# Patient Record
Sex: FEMALE | Race: WHITE | NOT HISPANIC OR LATINO | Employment: OTHER | URBAN - METROPOLITAN AREA
[De-identification: names, ages, dates, MRNs, and addresses within clinical notes are randomized per-mention and may not be internally consistent; named-entity substitution may affect disease eponyms.]

---

## 2017-06-07 ENCOUNTER — TRANSCRIBE ORDERS (OUTPATIENT)
Dept: LAB | Facility: CLINIC | Age: 67
End: 2017-06-07

## 2017-06-07 ENCOUNTER — ALLSCRIPTS OFFICE VISIT (OUTPATIENT)
Dept: OTHER | Facility: OTHER | Age: 67
End: 2017-06-07

## 2017-06-07 ENCOUNTER — APPOINTMENT (OUTPATIENT)
Dept: LAB | Facility: CLINIC | Age: 67
End: 2017-06-07
Payer: MEDICARE

## 2017-06-07 DIAGNOSIS — M79.10 MYALGIA: ICD-10-CM

## 2017-06-07 DIAGNOSIS — Z78.0 ASYMPTOMATIC MENOPAUSAL STATE: ICD-10-CM

## 2017-06-07 DIAGNOSIS — R53.83 OTHER FATIGUE: ICD-10-CM

## 2017-06-07 DIAGNOSIS — Z12.31 ENCOUNTER FOR SCREENING MAMMOGRAM FOR MALIGNANT NEOPLASM OF BREAST: ICD-10-CM

## 2017-06-07 DIAGNOSIS — J32.9 CHRONIC SINUSITIS: ICD-10-CM

## 2017-06-07 DIAGNOSIS — E78.5 HYPERLIPIDEMIA: ICD-10-CM

## 2017-06-07 DIAGNOSIS — I89.0 LYMPHEDEMA, NOT ELSEWHERE CLASSIFIED: ICD-10-CM

## 2017-06-07 DIAGNOSIS — I10 ESSENTIAL (PRIMARY) HYPERTENSION: ICD-10-CM

## 2017-06-07 DIAGNOSIS — E55.9 VITAMIN D DEFICIENCY: ICD-10-CM

## 2017-06-07 DIAGNOSIS — G56.03 BILATERAL CARPAL TUNNEL SYNDROME: ICD-10-CM

## 2017-06-07 LAB
25(OH)D3 SERPL-MCNC: 21.7 NG/ML (ref 30–100)
ALBUMIN SERPL BCP-MCNC: 3.5 G/DL (ref 3.5–5)
ALP SERPL-CCNC: 125 U/L (ref 46–116)
ALT SERPL W P-5'-P-CCNC: 28 U/L (ref 12–78)
ANION GAP SERPL CALCULATED.3IONS-SCNC: 7 MMOL/L (ref 4–13)
AST SERPL W P-5'-P-CCNC: 22 U/L (ref 5–45)
BASOPHILS # BLD AUTO: 0.03 THOUSANDS/ΜL (ref 0–0.1)
BASOPHILS NFR BLD AUTO: 1 % (ref 0–1)
BILIRUB SERPL-MCNC: 0.72 MG/DL (ref 0.2–1)
BUN SERPL-MCNC: 14 MG/DL (ref 5–25)
CALCIUM SERPL-MCNC: 8.8 MG/DL (ref 8.3–10.1)
CHLORIDE SERPL-SCNC: 107 MMOL/L (ref 100–108)
CHOLEST SERPL-MCNC: 151 MG/DL (ref 50–200)
CO2 SERPL-SCNC: 27 MMOL/L (ref 21–32)
CREAT SERPL-MCNC: 0.61 MG/DL (ref 0.6–1.3)
EOSINOPHIL # BLD AUTO: 0.05 THOUSAND/ΜL (ref 0–0.61)
EOSINOPHIL NFR BLD AUTO: 1 % (ref 0–6)
ERYTHROCYTE [DISTWIDTH] IN BLOOD BY AUTOMATED COUNT: 12 % (ref 11.6–15.1)
GFR SERPL CREATININE-BSD FRML MDRD: >60 ML/MIN/1.73SQ M
GLUCOSE P FAST SERPL-MCNC: 94 MG/DL (ref 65–99)
HCT VFR BLD AUTO: 46.5 % (ref 34.8–46.1)
HDLC SERPL-MCNC: 52 MG/DL (ref 40–60)
HGB BLD-MCNC: 15.9 G/DL (ref 11.5–15.4)
LDLC SERPL CALC-MCNC: 79 MG/DL (ref 0–100)
LYMPHOCYTES # BLD AUTO: 1.61 THOUSANDS/ΜL (ref 0.6–4.47)
LYMPHOCYTES NFR BLD AUTO: 30 % (ref 14–44)
MCH RBC QN AUTO: 30.5 PG (ref 26.8–34.3)
MCHC RBC AUTO-ENTMCNC: 34.2 G/DL (ref 31.4–37.4)
MCV RBC AUTO: 89 FL (ref 82–98)
MONOCYTES # BLD AUTO: 0.48 THOUSAND/ΜL (ref 0.17–1.22)
MONOCYTES NFR BLD AUTO: 9 % (ref 4–12)
NEUTROPHILS # BLD AUTO: 3.17 THOUSANDS/ΜL (ref 1.85–7.62)
NEUTS SEG NFR BLD AUTO: 59 % (ref 43–75)
NRBC BLD AUTO-RTO: 0 /100 WBCS
PLATELET # BLD AUTO: 228 THOUSANDS/UL (ref 149–390)
PMV BLD AUTO: 10.7 FL (ref 8.9–12.7)
POTASSIUM SERPL-SCNC: 4 MMOL/L (ref 3.5–5.3)
PROT SERPL-MCNC: 7 G/DL (ref 6.4–8.2)
RBC # BLD AUTO: 5.22 MILLION/UL (ref 3.81–5.12)
SODIUM SERPL-SCNC: 141 MMOL/L (ref 136–145)
TRIGL SERPL-MCNC: 100 MG/DL
TSH SERPL DL<=0.05 MIU/L-ACNC: 1.78 UIU/ML (ref 0.36–3.74)
WBC # BLD AUTO: 5.36 THOUSAND/UL (ref 4.31–10.16)

## 2017-06-07 PROCEDURE — 82306 VITAMIN D 25 HYDROXY: CPT

## 2017-06-07 PROCEDURE — 80053 COMPREHEN METABOLIC PANEL: CPT

## 2017-06-07 PROCEDURE — 36415 COLL VENOUS BLD VENIPUNCTURE: CPT

## 2017-06-07 PROCEDURE — 85025 COMPLETE CBC W/AUTO DIFF WBC: CPT

## 2017-06-07 PROCEDURE — 84443 ASSAY THYROID STIM HORMONE: CPT

## 2017-06-07 PROCEDURE — 80061 LIPID PANEL: CPT

## 2017-06-08 ENCOUNTER — GENERIC CONVERSION - ENCOUNTER (OUTPATIENT)
Dept: OTHER | Facility: OTHER | Age: 67
End: 2017-06-08

## 2017-06-16 ENCOUNTER — APPOINTMENT (OUTPATIENT)
Dept: PHYSICAL THERAPY | Facility: CLINIC | Age: 67
End: 2017-06-16
Payer: MEDICARE

## 2017-06-16 PROCEDURE — G8991 OTHER PT/OT GOAL STATUS: HCPCS | Performed by: PHYSICAL THERAPIST

## 2017-06-16 PROCEDURE — G8990 OTHER PT/OT CURRENT STATUS: HCPCS | Performed by: PHYSICAL THERAPIST

## 2017-06-16 PROCEDURE — 97535 SELF CARE MNGMENT TRAINING: CPT

## 2017-06-16 PROCEDURE — 97162 PT EVAL MOD COMPLEX 30 MIN: CPT

## 2017-06-26 ENCOUNTER — APPOINTMENT (OUTPATIENT)
Dept: PHYSICAL THERAPY | Facility: CLINIC | Age: 67
End: 2017-06-26
Payer: MEDICARE

## 2017-06-26 PROCEDURE — 97140 MANUAL THERAPY 1/> REGIONS: CPT

## 2017-06-28 ENCOUNTER — APPOINTMENT (OUTPATIENT)
Dept: PHYSICAL THERAPY | Facility: CLINIC | Age: 67
End: 2017-06-28
Payer: MEDICARE

## 2017-06-28 PROCEDURE — 97140 MANUAL THERAPY 1/> REGIONS: CPT

## 2017-06-30 ENCOUNTER — TRANSCRIBE ORDERS (OUTPATIENT)
Dept: ADMINISTRATIVE | Facility: HOSPITAL | Age: 67
End: 2017-06-30

## 2017-06-30 ENCOUNTER — APPOINTMENT (OUTPATIENT)
Dept: PHYSICAL THERAPY | Facility: CLINIC | Age: 67
End: 2017-06-30
Payer: MEDICARE

## 2017-06-30 DIAGNOSIS — Z13.820 SCREENING FOR OSTEOPOROSIS: Primary | ICD-10-CM

## 2017-06-30 PROCEDURE — 97140 MANUAL THERAPY 1/> REGIONS: CPT

## 2017-07-05 ENCOUNTER — APPOINTMENT (OUTPATIENT)
Dept: PHYSICAL THERAPY | Facility: CLINIC | Age: 67
End: 2017-07-05
Payer: MEDICARE

## 2017-07-05 ENCOUNTER — APPOINTMENT (OUTPATIENT)
Dept: OCCUPATIONAL THERAPY | Facility: CLINIC | Age: 67
End: 2017-07-05
Payer: MEDICARE

## 2017-07-05 PROCEDURE — 97535 SELF CARE MNGMENT TRAINING: CPT

## 2017-07-05 PROCEDURE — G8991 OTHER PT/OT GOAL STATUS: HCPCS

## 2017-07-05 PROCEDURE — 97140 MANUAL THERAPY 1/> REGIONS: CPT

## 2017-07-05 PROCEDURE — 97167 OT EVAL HIGH COMPLEX 60 MIN: CPT

## 2017-07-05 PROCEDURE — G8990 OTHER PT/OT CURRENT STATUS: HCPCS

## 2017-07-07 ENCOUNTER — APPOINTMENT (OUTPATIENT)
Dept: PHYSICAL THERAPY | Facility: CLINIC | Age: 67
End: 2017-07-07
Payer: MEDICARE

## 2017-07-07 ENCOUNTER — APPOINTMENT (OUTPATIENT)
Dept: OCCUPATIONAL THERAPY | Facility: CLINIC | Age: 67
End: 2017-07-07
Payer: MEDICARE

## 2017-07-07 PROCEDURE — 97140 MANUAL THERAPY 1/> REGIONS: CPT

## 2017-07-07 PROCEDURE — 97110 THERAPEUTIC EXERCISES: CPT

## 2017-07-07 PROCEDURE — 97035 APP MDLTY 1+ULTRASOUND EA 15: CPT

## 2017-07-11 ENCOUNTER — APPOINTMENT (OUTPATIENT)
Dept: OCCUPATIONAL THERAPY | Facility: CLINIC | Age: 67
End: 2017-07-11
Payer: MEDICARE

## 2017-07-12 ENCOUNTER — APPOINTMENT (OUTPATIENT)
Dept: PHYSICAL THERAPY | Facility: CLINIC | Age: 67
End: 2017-07-12
Payer: MEDICARE

## 2017-07-12 ENCOUNTER — APPOINTMENT (OUTPATIENT)
Dept: OCCUPATIONAL THERAPY | Facility: CLINIC | Age: 67
End: 2017-07-12
Payer: MEDICARE

## 2017-07-12 PROCEDURE — 97140 MANUAL THERAPY 1/> REGIONS: CPT

## 2017-07-14 ENCOUNTER — APPOINTMENT (OUTPATIENT)
Dept: OCCUPATIONAL THERAPY | Facility: CLINIC | Age: 67
End: 2017-07-14
Payer: MEDICARE

## 2017-07-14 PROCEDURE — 97035 APP MDLTY 1+ULTRASOUND EA 15: CPT

## 2017-07-14 PROCEDURE — 97140 MANUAL THERAPY 1/> REGIONS: CPT

## 2017-07-14 PROCEDURE — 97110 THERAPEUTIC EXERCISES: CPT

## 2017-07-17 ENCOUNTER — APPOINTMENT (OUTPATIENT)
Dept: PHYSICAL THERAPY | Facility: CLINIC | Age: 67
End: 2017-07-17
Payer: MEDICARE

## 2017-07-17 ENCOUNTER — APPOINTMENT (OUTPATIENT)
Dept: OCCUPATIONAL THERAPY | Facility: CLINIC | Age: 67
End: 2017-07-17
Payer: MEDICARE

## 2017-07-18 ENCOUNTER — APPOINTMENT (OUTPATIENT)
Dept: OCCUPATIONAL THERAPY | Facility: CLINIC | Age: 67
End: 2017-07-18
Payer: MEDICARE

## 2017-07-18 ENCOUNTER — APPOINTMENT (OUTPATIENT)
Dept: PHYSICAL THERAPY | Facility: CLINIC | Age: 67
End: 2017-07-18
Payer: MEDICARE

## 2017-07-18 PROCEDURE — 97164 PT RE-EVAL EST PLAN CARE: CPT

## 2017-07-18 PROCEDURE — G8991 OTHER PT/OT GOAL STATUS: HCPCS

## 2017-07-18 PROCEDURE — 97140 MANUAL THERAPY 1/> REGIONS: CPT

## 2017-07-18 PROCEDURE — G8990 OTHER PT/OT CURRENT STATUS: HCPCS

## 2017-07-18 PROCEDURE — 97110 THERAPEUTIC EXERCISES: CPT

## 2017-07-18 PROCEDURE — 97035 APP MDLTY 1+ULTRASOUND EA 15: CPT

## 2017-07-19 ENCOUNTER — APPOINTMENT (OUTPATIENT)
Dept: OCCUPATIONAL THERAPY | Facility: CLINIC | Age: 67
End: 2017-07-19
Payer: MEDICARE

## 2017-07-19 PROCEDURE — 97140 MANUAL THERAPY 1/> REGIONS: CPT

## 2017-07-19 PROCEDURE — 97110 THERAPEUTIC EXERCISES: CPT

## 2017-07-19 PROCEDURE — 97035 APP MDLTY 1+ULTRASOUND EA 15: CPT

## 2017-09-06 ENCOUNTER — GENERIC CONVERSION - ENCOUNTER (OUTPATIENT)
Dept: OTHER | Facility: OTHER | Age: 67
End: 2017-09-06

## 2017-09-06 DIAGNOSIS — R14.0 ABDOMINAL DISTENSION (GASEOUS): ICD-10-CM

## 2017-09-06 DIAGNOSIS — R10.9 ABDOMINAL PAIN: ICD-10-CM

## 2017-09-26 ENCOUNTER — GENERIC CONVERSION - ENCOUNTER (OUTPATIENT)
Dept: OTHER | Facility: OTHER | Age: 67
End: 2017-09-26

## 2017-09-26 ENCOUNTER — HOSPITAL ENCOUNTER (OUTPATIENT)
Dept: BONE DENSITY | Facility: HOSPITAL | Age: 67
Discharge: HOME | End: 2017-09-26
Payer: MEDICARE

## 2017-09-26 ENCOUNTER — HOSPITAL ENCOUNTER (OUTPATIENT)
Dept: MAMMOGRAPHY | Facility: HOSPITAL | Age: 67
Discharge: HOME | End: 2017-09-26
Payer: MEDICARE

## 2017-09-26 DIAGNOSIS — Z13.820 SCREENING FOR OSTEOPOROSIS: ICD-10-CM

## 2017-09-26 DIAGNOSIS — Z12.31 ENCOUNTER FOR SCREENING MAMMOGRAM FOR MALIGNANT NEOPLASM OF BREAST: ICD-10-CM

## 2017-09-26 PROCEDURE — G0202 SCR MAMMO BI INCL CAD: HCPCS

## 2017-09-26 PROCEDURE — 77080 DXA BONE DENSITY AXIAL: CPT

## 2017-09-26 PROCEDURE — 77063 BREAST TOMOSYNTHESIS BI: CPT

## 2017-09-27 ENCOUNTER — HOSPITAL ENCOUNTER (OUTPATIENT)
Dept: CT IMAGING | Facility: HOSPITAL | Age: 67
Discharge: HOME | End: 2017-09-27
Payer: MEDICARE

## 2017-09-27 ENCOUNTER — GENERIC CONVERSION - ENCOUNTER (OUTPATIENT)
Dept: OTHER | Facility: OTHER | Age: 67
End: 2017-09-27

## 2017-09-27 DIAGNOSIS — R10.9 ABDOMINAL PAIN: ICD-10-CM

## 2017-09-27 DIAGNOSIS — R14.0 ABDOMINAL DISTENSION (GASEOUS): ICD-10-CM

## 2017-09-27 PROCEDURE — 74176 CT ABD & PELVIS W/O CONTRAST: CPT

## 2017-09-28 ENCOUNTER — GENERIC CONVERSION - ENCOUNTER (OUTPATIENT)
Dept: OTHER | Facility: OTHER | Age: 67
End: 2017-09-28

## 2017-10-18 ENCOUNTER — GENERIC CONVERSION - ENCOUNTER (OUTPATIENT)
Dept: OTHER | Facility: OTHER | Age: 67
End: 2017-10-18

## 2017-12-20 ENCOUNTER — OFFICE VISIT (OUTPATIENT)
Dept: URGENT CARE | Facility: CLINIC | Age: 67
End: 2017-12-20
Payer: COMMERCIAL

## 2017-12-20 PROCEDURE — 99213 OFFICE O/P EST LOW 20 MIN: CPT

## 2017-12-20 PROCEDURE — G0463 HOSPITAL OUTPT CLINIC VISIT: HCPCS

## 2017-12-27 NOTE — PROGRESS NOTES
Assessment   1  Dental infection (522 4) (K04 7)    Plan   Dental infection    · Amoxicillin 875 MG Oral Tablet; TAKE 1 TABLET EVERY 12 HOURS DAILY    Discussion/Summary   Discussion Summary:    Start antibiotic and take as directed  Follow up with dentist     Medication Side Effects Reviewed: Possible side effects of new medications were reviewed with the patient/guardian today  Understands and agrees with treatment plan: The treatment plan was reviewed with the patient/guardian  The patient/guardian understands and agrees with the treatment plan      Chief Complaint   1  Dental Pain  Chief Complaint Free Text Note Form: C/o left lower dental pain and left side facial swelling x 3 days  History of Present Illness   HPI: 29-year-old female here with complaint of left lower dental pain  Has left-sided facial swelling for the last 3 days  Denies any fever or chills  Does not have a dentist     Dental Pain: Isabela Rosas presents with complaints of dental pain  Associated symptoms include facial swelling,-- swollen glands-- and-- ear pain, but-- no purulent discharge-- and-- no fever  Review of Systems   Focused-Female:      Constitutional: No fever, no chills, feels well, no tiredness, no recent weight gain or loss  ENT: as noted in HPI  ROS Reviewed:    ROS reviewed  Active Problems   1  Abdominal distention (787 3) (R14 0)   2  Abdominal pain (789 00) (R10 9)   3  Asthma (493 90) (J45 909)   4  Bilateral carpal tunnel syndrome (354 0) (G56 03)   5  Chronic musculoskeletal pain (729 1,338 29) (M79 1,G89 29)   6  Cough (786 2) (R05)   7  Encounter for screening mammogram for breast cancer (V76 12) (Z12 31)   8  Fatigue (780 79) (R53 83)   9  Generalized anxiety disorder (300 02) (F41 1)   10  Hyperlipidemia LDL goal <130 (272 4) (E78 5)   11  Hypertension (401 9) (I10)   12  Irritable bowel syndrome with diarrhea (564 1) (K58 0)   13  Joint pain (719 40) (M25 50)   14   Left knee pain (719 46) (M25 562)   15  Lymphedema (457 1) (I89 0)   16  Menopause (627 2) (Z78 0)   17  Need for pneumococcal vaccine (V03 82) (Z23)   18  Patent foramen ovale (745 5) (Q21 1)   19  Screening for genitourinary condition (V81 6) (Z13 89)   20  Screening for neurological condition (V80 09) (Z13 89)   21  Sinusitis (473 9) (J32 9)   22  Sprain of ankle, left (845 00) (S93 402A)   23  Status post fall (V15 88) (Z91 81)   24  Tension headache (307 81) (G44 209)   25  Vitamin D deficiency (268 9) (E55 9)    Past Medical History   1  History of Breast cancer, right (174 9) (C50 911)  Active Problems And Past Medical History Reviewed: The active problems and past medical history were reviewed and updated today  Family History   Family History    1  Denied: Family history of substance abuse  Family History Reviewed: The family history was reviewed and updated today  Social History    · Never a smoker   · Occasional alcohol use  Social History Reviewed: The social history was reviewed and updated today  The social history was reviewed and is unchanged  Surgical History   1  History of Breast Surgery   2  History of Gallbladder Surgery   3  History of Hysterectomy   4  History of Knee Surgery   5  History of Tonsillectomy  Surgical History Reviewed: The surgical history was reviewed and updated today  Current Meds    1  Atorvastatin Calcium 20 MG Oral Tablet; Take 1 tablet daily; Therapy: 31JSK7530 to (Evaluate:27Nov2017)  Requested for: 50ROA5930; Last     Rx:48Cxd0393 Ordered   2  Boost Oral Liquid; once daily; Therapy: 62NUD5585 to Recorded   3  Butalbital-APAP-Caffeine -40 MG Oral Tablet; TAKE 1 TABLET 3 TIMES DAILY AS     NEEDED FOR HEADACHE; Therapy: 23UNV4795 to (Evaluate:28Sep2017)  Requested for: 25MOS3434; Last     Rx:26Fki9021 Ordered   4  Dicyclomine HCl - 10 MG Oral Capsule; TAKE 1 CAPSULE 3 TIMES DAILY;      Therapy: 30XOG6640 to (Evaluate:27Mar2018)  Requested for: 50MVR9958; Last     Rx:34Fed5725 Ordered   5  DULoxetine HCl - 30 MG Oral Capsule Delayed Release Particles; take 1 capsule by     mouth daily (along with 60mg capsule); [facility]IB0799 [facility]  Requested for: 20AIM0587 Ordered   6  DULoxetine HCl - 60 MG Oral Capsule Delayed Release Particles; take 1 capsule daily; Therapy: 29LVS3333 to (Evaluate:02Jun2018)  Requested for: 55FHX1964; Last     Rx:42Ucl3158 Ordered   7  Loratadine 10 MG Oral Tablet; TAKE 1 TABLET DAILY; Therapy: 98Dtw3552 to Recorded   8  Montelukast Sodium 10 MG Oral Tablet; TAKE 1 TABLET DAILY AS DIRECTED; Therapy: 73VQD3155 to (Evaluate:27Mar2018)  Requested for: 22QSO3928; Last     Rx:39Xot9053 Ordered   9  Naproxen 500 MG Oral Tablet; TAKE 1 TABLET EVERY 12 HOURS WITH FOOD; Therapy: 28JOK0148 to (Evaluate:18Sep2016)  Requested for: 96VEL2909; Last     Rx:01Txb5429 Ordered   10  ProAir  (90 Base) MCG/ACT Inhalation Aerosol Solution; INHALE 1 TO 2 PUFFS      EVERY 4 TO 6 HOURS AS NEEDED; Therapy: 73AJL6362 to Recorded   11  Vitamin D3 2000 UNIT Oral Capsule; take 1 capsule daily; Therapy: 75OEG5611 to Recorded  Medication List Reviewed: The medication list was reviewed and updated today  Allergies   1  Aspirin TABS   2  Ibuprofen CAPS    Vitals   Signs   Recorded: 20Dec2017 08:07PM   Temperature: 98 4 F  Heart Rate: 118  Respiration: 20  Systolic: 287  Diastolic: 85  Height: 4 ft 10 in  Weight: 182 lb   BMI Calculated: 38 04  BSA Calculated: 1 75  O2 Saturation: 97  Pain Scale: 10    Physical Exam        Constitutional      General appearance: No acute distress, well appearing and well nourished  Ears, Nose, Mouth, and Throat      Oropharynx: Abnormal  -- Left lower jaw tender to palpation with swelling  Swelling extends to the midline and up into left ear  Tender to palpation with enlarged anterior cervical and submandibular lymph node        Pulmonary      Respiratory effort: No increased work of breathing or signs of respiratory distress  Auscultation of lungs: Clear to auscultation  Cardiovascular      Auscultation of heart: Normal rate and rhythm, normal S1 and S2, without murmurs         Signatures    Electronically signed by : Chau Verdugo, [facility]; Dec 26 2017 11:08AM EST                       (Author)     Electronically signed by : DAVID Nichole ; Dec 26 2017 12:05PM EST                       (Co-author)

## 2018-01-10 NOTE — RESULT NOTES
Verified Results  (1) CBC/PLT/DIFF 49FOC6771 10:01AM Herbie Hard Order Number: [id number]     Test Name Result Flag Reference   WBC COUNT 5 36 Thousand/uL  4 31-10 16   RBC COUNT 5 22 Million/uL H 3 81-5 12   HEMOGLOBIN 15 9 g/dL H 11 5-15 4   HEMATOCRIT 46 5 % H 34 8-46  1   MCV 89 fL  82-98   MCH 30 5 pg  26 8-34 3   MCHC 34 2 g/dL  31 4-37 4   RDW 12 0 %  11 6-15 1   MPV 10 7 fL  8 9-12 7   PLATELET COUNT 833 Thousands/uL  149-390   nRBC AUTOMATED 0 /100 WBCs     NEUTROPHILS RELATIVE PERCENT 59 %  43-75   LYMPHOCYTES RELATIVE PERCENT 30 %  14-44   MONOCYTES RELATIVE PERCENT 9 %  4-12   EOSINOPHILS RELATIVE PERCENT 1 %  0-6   BASOPHILS RELATIVE PERCENT 1 %  0-1   NEUTROPHILS ABSOLUTE COUNT 3 17 Thousands/? ??L  1 85-7 62   LYMPHOCYTES ABSOLUTE COUNT 1 61 Thousands/? ??L  0 60-4 47   MONOCYTES ABSOLUTE COUNT 0 48 Thousand/? ??L  0 17-1 22   EOSINOPHILS ABSOLUTE COUNT 0 05 Thousand/? ??L  0 00-0 61   BASOPHILS ABSOLUTE COUNT 0 03 Thousands/? ??L  0 00-0 10     (1) COMPREHENSIVE METABOLIC PANEL 57FRZ5722 82:91SO Jackeline Needle Order Number: [id number]     Test Name Result Flag Reference   SODIUM 141 mmol/L  136-145   POTASSIUM 4 0 mmol/L  3 5-5 3   CHLORIDE 107 mmol/L  100-108   CARBON DIOXIDE 27 mmol/L  21-32   ANION GAP (CALC) 7 mmol/L  4-13   BLOOD UREA NITROGEN 14 mg/dL  5-25   CREATININE 0 61 mg/dL  0 60-1 30   Standardized to IDMS reference method   CALCIUM 8 8 mg/dL  8 3-10 1   BILI, TOTAL 0 72 mg/dL  0 20-1 00   ALK PHOSPHATAS 125 U/L H    ALT (SGPT) 28 U/L  12-78   AST(SGOT) 22 U/L  5-45   ALBUMIN 3 5 g/dL  3 5-5 0   TOTAL PROTEIN 7 0 g/dL  6 4-8 2   eGFR Non-African American      >60 0 ml/min/1 73sq [facility] Disease Education Program recommendations are as follows:  GFR calculation is accurate only with a steady state creatinine  Chronic Kidney disease less than 60 ml/min/1 73 sq  meters  Kidney failure less than 15 ml/min/1 73 sq  meters     GLUCOSE FASTING 94 mg/dL  65-99     (1) LIPID PANEL, FASTING 07Jun2017 10:01AM Vanderpool Silence Order Number: [id number]     Test Name Result Flag Reference   CHOLESTEROL 151 mg/dL     HDL,DIRECT 52 mg/dL  40-60   Specimen collection should occur prior to Metamizole administration due to the potential for falsely depressed results  LDL CHOLESTEROL CALCULATED 79 mg/dL  0-100   Triglyceride:         Normal              <150 mg/dl       Borderline High    150-199 mg/dl       High               200-499 mg/dl       Very High          >499 mg/dl  Cholesterol:         Desirable        <200 mg/dl      Borderline High  200-239 mg/dl      High             >239 mg/dl  HDL Cholesterol:        High    >59 mg/dL      Low     <41 mg/dL  LDL CALCULATED:    This screening LDL is a calculated result  It does not have the accuracy of the Direct Measured LDL in the monitoring of patients with hyperlipidemia and/or statin therapy  Direct Measure LDL (HME513) must be ordered separately in these patients  TRIGLYCERIDES 100 mg/dL  <=150   Specimen collection should occur prior to N-Acetylcysteine or Metamizole administration due to the potential for falsely depressed results  (1) TSH 70RXM7625 10:01AM AstorinoMezekiel Phoenix Order Number: [id number]     Test Name Result Flag Reference   TSH 1 780 uIU/mL  0 358-3 740   Patients undergoing fluorescein dye angiography may retain small amounts of fluorescein in the body for 48-72 hours post procedure  Samples containing fluorescein can produce falsely depressed TSH values  If the patient had this procedure,a specimen should be resubmitted post fluorescein clearance            The recommended reference ranges for TSH during pregnancy are as follows:  First trimester 0 1 to 2 5 uIU/mL  Second trimester  0 2 to 3 0 uIU/mL  Third trimester 0 3 to 3 0 uIU/m     (1) VITAMIN D 25-HYDROXY 38IUQ1316 10:01AM Vanderpool Silence Order Number: [id number]     Test Name Result Flag Reference   VIT D 25-HYDROX 21 7 ng/mL L 30 0-100 0   This assay is a certified procedure of the CDC Vitamin D Standardization Certification Program (VDSCP)     Deficiency <20ng/ml   Insufficiency 20-30ng/ml   Sufficient  ng/ml     *Patients undergoing fluorescein dye angiography may retain small amounts of fluorescein in the body for 48-72 hours post procedure  Samples containing fluorescein can produce falsely elevated Vitamin D values  If the patient had this procedure, a specimen should be resubmitted post fluorescein clearance

## 2018-01-12 ENCOUNTER — GENERIC CONVERSION - ENCOUNTER (OUTPATIENT)
Dept: OTHER | Facility: OTHER | Age: 68
End: 2018-01-12

## 2018-01-12 NOTE — RESULT NOTES
Verified Results  * CT ABDOMEN PELVIS WO CONTRAST [id number] 11:00AM Javon Reason Order Number: [id number]    - Patient Instructions: To schedule this appointment, please contact Central Scheduling at [phone]  Test Name Result Flag Reference   CT ABDOMEN PELVIS WO CONTRAST (Report)     CT ABDOMEN AND PELVIS WITHOUT IV CONTRAST     INDICATION: pt c/o abd pain w/ bloating starting a few weeks ago  Status post cholecystectomy, hysterectomy and right breast surgery  COMPARISON: None  TECHNIQUE: CT examination of the abdomen and pelvis was performed without intravenous contrast  Reformatted images were created in axial, sagittal, and coronal planes  Radiation dose length product (DLP) for this visit: 794 32 mGy-cm   This examination, like all CT scans performed in the [facility], was performed utilizing techniques to minimize radiation dose exposure, including the use of iterative   reconstruction and automated exposure control  Enteric contrast was administered  FINDINGS:     ABDOMEN     LOWER CHEST: Calcified granuloma, left lung base  LIVER/BILIARY TREE: Unremarkable  GALLBLADDER: Surgically absent  SPLEEN: Unremarkable  PANCREAS: Fatty infiltration of the pancreas  ADRENAL GLANDS: Unremarkable  KIDNEYS/URETERS: Left parapelvic renal cysts, largest 20 mm  Exophytic cyst upper pole right kidney, 20 mm  STOMACH AND BOWEL: Stomach and small bowel unremarkable  Numerous left colonic diverticula noted without evidence of acute diverticulitis  APPENDIX: No findings to suggest appendicitis  ABDOMINOPELVIC CAVITY: No ascites or free intraperitoneal air  No lymphadenopathy  VESSELS: Unremarkable for patient's age  PELVIS     REPRODUCTIVE ORGANS: Hysterectomy  URINARY BLADDER: Unremarkable       ABDOMINAL WALL/INGUINAL REGIONS: Fat is noted in the left inguinal canal  Midline anterior pelvic wall surgical wound with focal fatty containing midline hernia (2 5 cm rent)  OSSEOUS STRUCTURES: No acute fracture or destructive osseous lesion  Mild degenerative changes, posterior elements, lower lumbar spine and lumbosacral junction  IMPRESSION:     No acute abnormality to explain patient's abdominal pain and bloating  Negative for bowel obstruction  Bilateral renal cysts  Left colonic diverticular disease without evidence of acute diverticulitis  See above additional nonacute findings  Workstation performed: [id number]     Signed by:   Rolando Ladd MD   9/27/17

## 2018-01-13 VITALS
RESPIRATION RATE: 18 BRPM | WEIGHT: 176.25 LBS | SYSTOLIC BLOOD PRESSURE: 132 MMHG | DIASTOLIC BLOOD PRESSURE: 80 MMHG | OXYGEN SATURATION: 96 % | HEART RATE: 84 BPM | BODY MASS INDEX: 37 KG/M2 | HEIGHT: 58 IN | TEMPERATURE: 97.8 F

## 2018-01-13 NOTE — RESULT NOTES
Verified Results  MAMMO SCREENING LEFT W 3D & CAD [id number] 10:10AM Herb Mae Order Number: [id number]    - Patient Instructions: To schedule this appointment, please contact Central Scheduling at [phone]  Do not wear any perfume, powder, lotion or deodorant on breast or underarm area  Please bring your doctors order, referral (if needed) and insurance information with you on the day of the test  Failure to bring this information may result in this test being rescheduled  Arrive 15 minutes prior to your appointment time to register  On the day of your test, please bring any prior mammogram or breast studies with you that were not performed at a [facility]  Failure to bring prior exams may result in your test needing to be rescheduled  Test Name Result Flag Reference   MAMMO SCREENING LEFT W 3D & CAD (Report)     Patient History:   Patient has history of cancer in the right breast at age 50, has    history of cancer in the right breast at age 48, and had previous   chemotherapy at age 48  Family history of breast cancer in sister  Malignant mastectomy of the right breast, 2005  Chemotherapy,    2005  Radiation therapy of the right breast, 2005  Patient has never smoked  Patient's BMI is 37 6  Reason for exam: screening, asymptomatic  Mammo Screening Left W DBT and CAD: September 26, 2017 - Check In   #: [de-identified]   2D/3D Procedure   3D views: MLO view(s) were taken of the left breast    2D views: CC view(s) were taken of the left breast        Technologist: KEVIN Kimbrough (KEVIN)(M)   Prior study comparison: June 29, 2016, mammo screening left W DBT   and CAD performed at [address] [place] [address] [address] [facility]  September 21, 2007, mammo screening left, performed at [facility]      There are scattered fibroglandular densities   A combination of    mediolateral oblique 3D tomographic slices as well as standard    two-dimensional orthogonal images were obtained  No dominant soft tissue mass, architectural distortion or    suspicious calcifications are noted in either breast   The skin    and nipple structures are within normal limits  Scattered benign   appearing calcifications are noted  No significant changes when compared with prior studies  ACR BI-RADSï¾® Assessments: BiRad:2 - Benign     Recommendation:   Routine screening mammogram of the left breast in 1 year  A    reminder letter will be scheduled  The patient is scheduled in a reminder system for screening    mammography  8-10% of cancers will be missed on mammography  Management of a    palpable abnormality must be based on clinical grounds  Patients   will be notified of their results via letter from our facility  Accredited by [employer] of Radiology and FDA  Transcription Location: KEVIN Kerirozinamadison 98: [id number]     Risk Value(s):   Myriad Table: 12 1%, MRS : Based on personal and/or    family history, consideration of hereditary risk assessment may    be warranted

## 2018-01-13 NOTE — RESULT NOTES
Verified Results  * DXA BONE DENSITY SPINE HIP AND PELVIS [id number] 10:10AM Merced Alex     Test Name Result Flag Reference   DXA BONE DENSITY SPINE HIP AND PELVIS (Report)     CENTRAL DXA SCAN     CLINICAL HISTORY:  74year old post-menopausal  female risk factors include estrogen deficient     TECHNIQUE: Bone densitometry was performed using a [employer] A bone densitometer  Regions of interest appear properly placed  There are no obvious fractures or other confounding variables which could limit the study  COMPARISON: None  RESULTS:    LUMBAR SPINE: L1-L4:   BMD 0 841 gm/cm2   T-score -1 9   Z-score -0 1     LEFT TOTAL HIP:   BMD 0 785 gm/cm2   T-score -1 3   Z-score 0 0     LEFT FEMORAL NECK:   BMD 0 604 gm/cm2   T-score -2 2   Z-score -0 7             IMPRESSION:   1  Based on the [facility] classification, the T-score of -2 2 in the left femoral neck is consistent with low bone mineral density  2  Any secondary causes of low bone mineral density should be excluded prior to treatment, if clinically indicated  3  A daily intake of at least 1200 mg calcium and 800 to 1000 IU of Vitamin D, as well as weight bearing and muscle strengthening exercise, fall prevention and avoidance of tobacco and excessive alcohol intake as basic preventive measures are suggested  4  Repeat DXA in 18 - 24 months, on the same machine, as clinically indicated  The 10 year risk of hip fracture is 1 6%, with the 10 year risk of major osteoporotic fracture being 10%, as calculated by the [facility] fracture risk assessment tool (FRAX)  The current NOF guidelines recommend treating patients with FRAX 10 year risk score    of >3% for hip fracture and >20% for major osteoporotic fracture        WHO CLASSIFICATION:   Normal (a T-score of -1 0 or higher)   Low bone mineral density (a T-score of less than -1 0 but higher than -2 5)   Osteoporosis (a T-score of -2 5 or less)   Severe osteoporosis (a T-score of -2 5 or less with a fragility fracture)             Workstation performed: [id number]     Signed by:   Francheska Reinoso MD   9/26/17

## 2018-01-15 NOTE — PROGRESS NOTES
Assessment    1  Irritable bowel syndrome with diarrhea (564 1) (K58 0)   2  Hyperlipidemia LDL goal <130 (272 4) (E78 5)   3  Joint pain (719 40) (M25 50)   4  Vitamin D deficiency (268 9) (E55 9)    Plan  Hyperlipidemia LDL goal <130    · Atorvastatin Calcium 10 MG Oral Tablet   · Atorvastatin Calcium 20 MG Oral Tablet; Take 1 tablet daily  Irritable bowel syndrome with diarrhea    · Dicyclomine HCl - 10 MG Oral Capsule; TAKE 1 CAPSULE 3 TIMES DAILY  Joint pain    · Lyrica 75 MG Oral Capsule; TAKE 1 CAPSULE AT BEDTIME  Laceration of hand, right    · Tdap (Adacel)    Discussion/Summary  Discussion Summary:   Continue current regime and restart lyrica  Check hepatic function panel  Tdap given  RTO 3 months  Counseling Documentation With Imm: The patient was counseled regarding instructions for management, risk factor reductions, patient and family education, risks and benefits of treatment options, importance of compliance with treatment  Medication SE Review and Pt Understands Tx: Possible side effects of new medications were reviewed with the patient/guardian today  The treatment plan was reviewed with the patient/guardian  The patient/guardian understands and agrees with the treatment plan      Chief Complaint  Chief Complaint Free Text Note Form: Pt here to f/u from last visit  Pt feels her meds are helping her bowel problems  Pt continues with joint pain and she took Lyrica 75 mg in the past which did help her  Pt cut her right hand about one week ago  She has some redness and tenderness in that area  Pt scheduled for for GI consult on 03/23/2016  History of Present Illness  HPI: Pt presents for follow up  She is noting improvement in her ibs-d symptoms  Pt continues with joint pain and desires to restart the lyrica that she had been on previously  Pt has upcoming GI Appt in March  She denies any other complaints or concerns   She did start her cholesterol medicine and vitamin d and is due for hepatic function panel  Review of Systems  Complete-Female:   Constitutional: as noted in HPI  Eyes: No complaints of eye pain, no red eyes, no eyesight problems, no discharge, no dry eyes, no itching of eyes  ENT: no complaints of earache, no loss of hearing, no nose bleeds, no nasal discharge, no sore throat, no hoarseness  Cardiovascular: No complaints of slow heart rate, no fast heart rate, no chest pain, no palpitations, no leg claudication, no lower extremity edema  Respiratory: No complaints of shortness of breath, no wheezing, no cough, no SOB on exertion, no orthopnea, no PND  Gastrointestinal: No complaints of abdominal pain, no constipation, no nausea or vomiting, no diarrhea, no bloody stools  Genitourinary: No complaints of dysuria, no incontinence, no pelvic pain, no dysmenorrhea, no vaginal discharge or bleeding  Musculoskeletal: No complaints of arthralgias, no myalgias, no joint swelling or stiffness, no limb pain or swelling  Integumentary: No complaints of skin rash or lesions, no itching, no skin wounds, no breast pain or lump  Neurological: No complaints of headache, no confusion, no convulsions, no numbness, no dizziness or fainting, no tingling, no limb weakness, no difficulty walking  Psychiatric: Not suicidal, no sleep disturbance, no anxiety or depression, no change in personality, no emotional problems  Endocrine: No complaints of proptosis, no hot flashes, no muscle weakness, no deepening of the voice, no feelings of weakness  Hematologic/Lymphatic: No complaints of swollen glands, no swollen glands in the neck, does not bleed easily, does not bruise easily  ROS Reviewed:   ROS reviewed  Active Problems    1  Encounter for screening for lipid disorder (V77 91) (Z13 220)   2  Encounter for screening mammogram for breast cancer (V76 12) (Z12 31)   3  Encounter for vitamin deficiency screening (V77 99) (Z13 21)   4  Fatigue (780 79) (R53 83)   5   Hyperlipidemia LDL goal <130 (272 4) (E78 5)   6  Irritable bowel syndrome with diarrhea (564 1) (K58 0)   7  Joint pain (719 40) (M25 50)   8  Need for influenza vaccination (V04 81) (Z23)   9  Screening for colon cancer (V76 51) (Z12 11)   10  Screening for depression (V79 0) (Z13 89)   11  Screening for diabetes mellitus (V77 1) (Z13 1)   12  Screening for hypertension (V81 1) (Z13 6)    Past Medical History    1  History of Breast cancer, right (174 9) (C50 911)  Active Problems And Past Medical History Reviewed: The active problems and past medical history were reviewed and updated today  Surgical History    1  History of Breast Surgery   2  History of Gallbladder Surgery   3  History of Hysterectomy   4  History of Knee Surgery   5  History of Tonsillectomy  Surgical History Reviewed: The surgical history was reviewed and updated today  Family History    1  No pertinent family history    2  No pertinent family history  Family History Reviewed: The family history was reviewed and updated today  Social History    · Never a smoker   · Occasional alcohol use  Social History Reviewed: The social history was reviewed and updated today  The social history was reviewed and is unchanged  Current Meds   1  Atorvastatin Calcium 10 MG Oral Tablet; take 1 tablet by mouth once daily; Therapy: [id number] to (Evaluate:04Jun2016)  Requested for: 24AIL9193; Last Rx:[id number]   Ordered   2  Atorvastatin Calcium 20 MG Oral Tablet; Take 1 tablet daily; Therapy: 48KKH2139 to Recorded   3  Dicyclomine HCl - 10 MG Oral Capsule; TAKE 1 CAPSULE 3 TIMES DAILY; Therapy: [id number] to (Preeti Kramer)  Requested for: 67Jpo1754; Last Rx:49Mxb1166   Ordered   4  Loratadine 10 MG Oral Tablet; TAKE 1 TABLET DAILY; Therapy: 70ACI4618 to Recorded   5  Multivitamins Oral Capsule; Therapy: 67MJM6049 to Recorded   6   ProAir  (90 Base) MCG/ACT Inhalation Aerosol Solution; INHALE 1 TO 2 PUFFS EVERY 4 TO   6 HOURS AS NEEDED; Therapy: 73MPH8275 to Recorded   7  Vitamin D3 2000 UNIT Oral Capsule; take 1 capsule daily; Therapy: 76BCX5812 to Recorded  Medication List Reviewed: The medication list was reviewed and updated today  Allergies    1  Aspirin TABS   2  Ibuprofen CAPS    Vitals  Vital Signs [Data Includes: Current Encounter]    Recorded: 43HQF5872 09:59AM   Temperature 97 8 F, Tympanic   Heart Rate 108   Respiration 16   Systolic 379, LUE, Sitting   Diastolic 78, LUE, Sitting   Height 4 ft 10 in   Weight 180 lb 2 08 oz   BMI Calculated 37 65   BSA Calculated 1 74     Physical Exam    Constitutional   General appearance: No acute distress, well appearing and well nourished  Eyes   Conjunctiva and lids: No swelling, erythema or discharge  Pupils and irises: Equal, round and reactive to light  Ears, Nose, Mouth, and Throat   External inspection of ears and nose: Normal     Otoscopic examination: Tympanic membranes translucent with normal light reflex  Canals patent without erythema  Nasal mucosa, septum, and turbinates: Normal without edema or erythema  Oropharynx: Normal with no erythema, edema, exudate or lesions  Pulmonary   Respiratory effort: No increased work of breathing or signs of respiratory distress  Auscultation of lungs: Clear to auscultation  Cardiovascular   Auscultation of heart: Normal rate and rhythm, normal S1 and S2, without murmurs  Examination of extremities for edema and/or varicosities: Normal     Carotid pulses: Normal     Abdomen   Abdomen: Non-tender, no masses  Musculoskeletal   Gait and station: Normal     Digits and nails: Normal without clubbing or cyanosis  Inspection/palpation of joints, bones, and muscles: Normal     Skin   Skin and subcutaneous tissue: Normal without rashes or lesions      Psychiatric   Orientation to person, place, and time: Normal     Mood and affect: Normal          Attending Note  Collaborating Physician Note: Collaborating Physician: I agree with the Advanced Practitioner note        Future Appointments    Date/Time Provider Specialty Site   03/23/2016 10:00 AM Lily Young MD [facility] [address] [place] [address]     Signatures   Electronically signed by : Ev[facility]; Jan 21 2016  4:01PM EST                       (Author)    Electronically signed by : Keri Martinez DO; Jan 21 2016  5:51PM EST                       (Co-participant)

## 2018-01-16 NOTE — RESULT NOTES
Verified Results  (1) HEPATIC FUNCTION PANEL 20Jan2016 06:12PM Mreced Alex     Test Name Result Flag Reference   ALBUMIN 3 7 g/dL  3 5-5 0   ALK PHOSPHATAS 108 U/L     ALT (SGPT) 43 U/L  12-78   AST(SGOT) 23 U/L  5-45   BILI, DIRECT 0 10 mg/dL  0 00-0 20   BILI, TOTAL 0 42 mg/dL  0 20-1 00   TOTAL PROTEIN 7 0 g/dL  6 4-8 2

## 2018-01-17 NOTE — RESULT NOTES
Message   No microscopic colitis  no high risk polyps  repeat colonoscopy in 5 years  enter reminder for repeat colonoscopy       Verified Results  COLONOSCOPY (GI, SURG) [id number] 12:00AM Patricia Marlow     Test Name Result Flag Reference   Colonoscopy 05/31/2016        Summary / No summary entered :      No summary entered   Documents attached :      EPIC OP NOTE - Patricia Marlow; Enc: [id number] - Appointment - Ca Franks - (Gastroenterology Adult) (Additional Information Document)

## 2018-01-22 VITALS
BODY MASS INDEX: 38.26 KG/M2 | SYSTOLIC BLOOD PRESSURE: 128 MMHG | HEART RATE: 84 BPM | HEIGHT: 58 IN | DIASTOLIC BLOOD PRESSURE: 78 MMHG | RESPIRATION RATE: 20 BRPM | WEIGHT: 182.25 LBS | TEMPERATURE: 98 F

## 2018-01-23 VITALS
TEMPERATURE: 98.4 F | OXYGEN SATURATION: 97 % | HEIGHT: 58 IN | DIASTOLIC BLOOD PRESSURE: 85 MMHG | SYSTOLIC BLOOD PRESSURE: 143 MMHG | WEIGHT: 182 LBS | BODY MASS INDEX: 38.2 KG/M2 | HEART RATE: 118 BPM | RESPIRATION RATE: 20 BRPM

## 2018-01-24 VITALS
WEIGHT: 188 LBS | RESPIRATION RATE: 18 BRPM | TEMPERATURE: 98.5 F | SYSTOLIC BLOOD PRESSURE: 140 MMHG | BODY MASS INDEX: 39.47 KG/M2 | DIASTOLIC BLOOD PRESSURE: 82 MMHG | HEIGHT: 58 IN | HEART RATE: 95 BPM | OXYGEN SATURATION: 95 %

## 2018-01-31 ENCOUNTER — TELEPHONE (OUTPATIENT)
Dept: FAMILY MEDICINE CLINIC | Facility: CLINIC | Age: 68
End: 2018-01-31

## 2018-01-31 RX ORDER — LORATADINE 10 MG/1
1 TABLET ORAL DAILY
COMMUNITY
Start: 2015-12-02 | End: 2018-05-16 | Stop reason: SDUPTHER

## 2018-01-31 RX ORDER — OXYCODONE HYDROCHLORIDE AND ACETAMINOPHEN 5; 325 MG/1; MG/1
TABLET ORAL AS NEEDED
COMMUNITY
Start: 2018-01-12 | End: 2018-10-05 | Stop reason: ALTCHOICE

## 2018-01-31 RX ORDER — ATORVASTATIN CALCIUM 20 MG/1
1 TABLET, FILM COATED ORAL DAILY
COMMUNITY
Start: 2015-12-02 | End: 2018-10-05 | Stop reason: SDUPTHER

## 2018-01-31 RX ORDER — BUTALBITAL, ACETAMINOPHEN AND CAFFEINE 50; 325; 40 MG/1; MG/1; MG/1
1 TABLET ORAL 3 TIMES DAILY PRN
COMMUNITY
Start: 2016-06-10 | End: 2019-01-30 | Stop reason: SDUPTHER

## 2018-01-31 RX ORDER — ACETAMINOPHEN 160 MG
1 TABLET,DISINTEGRATING ORAL DAILY
COMMUNITY
Start: 2016-01-20 | End: 2019-12-03

## 2018-01-31 RX ORDER — MONTELUKAST SODIUM 10 MG/1
1 TABLET ORAL DAILY
COMMUNITY
Start: 2017-06-08 | End: 2018-05-16 | Stop reason: SDUPTHER

## 2018-01-31 RX ORDER — ALBUTEROL SULFATE 90 UG/1
1-2 AEROSOL, METERED RESPIRATORY (INHALATION)
COMMUNITY
Start: 2015-12-02 | End: 2018-05-16 | Stop reason: SDUPTHER

## 2018-01-31 RX ORDER — NAPROXEN 500 MG/1
1 TABLET ORAL EVERY 12 HOURS
COMMUNITY
Start: 2016-08-19 | End: 2018-05-21 | Stop reason: ALTCHOICE

## 2018-01-31 RX ORDER — DULOXETIN HYDROCHLORIDE 60 MG/1
1 CAPSULE, DELAYED RELEASE ORAL DAILY
COMMUNITY
Start: 2016-01-29 | End: 2018-08-16 | Stop reason: SDUPTHER

## 2018-01-31 RX ORDER — DULOXETIN HYDROCHLORIDE 30 MG/1
30 CAPSULE, DELAYED RELEASE ORAL DAILY
COMMUNITY
Start: 2016-06-10 | End: 2018-10-05 | Stop reason: SDUPTHER

## 2018-01-31 RX ORDER — DICYCLOMINE HYDROCHLORIDE 10 MG/1
1 CAPSULE ORAL 3 TIMES DAILY
COMMUNITY
Start: 2015-12-02 | End: 2018-10-05 | Stop reason: SDUPTHER

## 2018-01-31 NOTE — TELEPHONE ENCOUNTER
Pt called asking for an antibiotic- states she is having pain and swelling in her jaw- states just recently had this and you treated her not to long ago-   [facility] effort

## 2018-02-01 DIAGNOSIS — K04.7 DENTAL ABSCESS: Primary | ICD-10-CM

## 2018-02-01 RX ORDER — AMOXICILLIN 500 MG/1
500 TABLET, FILM COATED ORAL 3 TIMES DAILY
Qty: 30 TABLET | Refills: 0 | Status: SHIPPED | OUTPATIENT
Start: 2018-02-01 | End: 2018-02-11

## 2018-02-01 NOTE — TELEPHONE ENCOUNTER
Pt has not been match with a dentist yet  She is in a program that does reduction on cost   She stated that her face and still swollen    She also stated that she may just have to see a reg dentist will work with her  Please advise

## 2018-04-17 RX ORDER — OFLOXACIN 3 MG/ML
SOLUTION/ DROPS OPHTHALMIC
Refills: 1 | COMMUNITY
Start: 2018-04-02 | End: 2018-05-21 | Stop reason: ALTCHOICE

## 2018-04-17 RX ORDER — PREDNISOLONE ACETATE 10 MG/ML
SUSPENSION/ DROPS OPHTHALMIC
Refills: 1 | COMMUNITY
Start: 2018-04-02 | End: 2018-05-21 | Stop reason: ALTCHOICE

## 2018-04-18 ENCOUNTER — CONSULT (OUTPATIENT)
Dept: FAMILY MEDICINE CLINIC | Facility: CLINIC | Age: 68
End: 2018-04-18
Payer: COMMERCIAL

## 2018-04-18 VITALS
BODY MASS INDEX: 41.85 KG/M2 | RESPIRATION RATE: 20 BRPM | OXYGEN SATURATION: 96 % | HEART RATE: 88 BPM | TEMPERATURE: 98.4 F | DIASTOLIC BLOOD PRESSURE: 86 MMHG | HEIGHT: 57 IN | SYSTOLIC BLOOD PRESSURE: 140 MMHG | WEIGHT: 194 LBS

## 2018-04-18 DIAGNOSIS — Z01.818 PREOP EXAMINATION: Primary | ICD-10-CM

## 2018-04-18 DIAGNOSIS — H25.012 CORTICAL AGE-RELATED CATARACT OF LEFT EYE: ICD-10-CM

## 2018-04-18 DIAGNOSIS — E78.5 HYPERLIPIDEMIA, UNSPECIFIED HYPERLIPIDEMIA TYPE: ICD-10-CM

## 2018-04-18 DIAGNOSIS — Z13.29 SCREENING FOR THYROID DISORDER: ICD-10-CM

## 2018-04-18 DIAGNOSIS — R10.10 PAIN OF UPPER ABDOMEN: ICD-10-CM

## 2018-04-18 DIAGNOSIS — E55.9 VITAMIN D DEFICIENCY: ICD-10-CM

## 2018-04-18 DIAGNOSIS — Z13.0 SCREENING FOR DEFICIENCY ANEMIA: ICD-10-CM

## 2018-04-18 DIAGNOSIS — Z13.1 SCREENING FOR DIABETES MELLITUS: ICD-10-CM

## 2018-04-18 PROBLEM — J45.909 ASTHMA: Status: ACTIVE | Noted: 2017-06-07

## 2018-04-18 PROBLEM — Z78.0 MENOPAUSE: Status: ACTIVE | Noted: 2017-06-07

## 2018-04-18 PROBLEM — I89.0 LYMPHEDEMA: Status: ACTIVE | Noted: 2017-06-07

## 2018-04-18 PROBLEM — R10.9 ABDOMINAL PAIN: Status: ACTIVE | Noted: 2017-09-06

## 2018-04-18 PROBLEM — G56.03 BILATERAL CARPAL TUNNEL SYNDROME: Status: ACTIVE | Noted: 2017-06-19

## 2018-04-18 PROCEDURE — 99204 OFFICE O/P NEW MOD 45 MIN: CPT | Performed by: NURSE PRACTITIONER

## 2018-04-18 NOTE — PATIENT INSTRUCTIONS
Cataracts   AMBULATORY CARE:   A cataract  is clouding of the eye lens  You may develop a cataract in one or both eyes  The cause of a cataracts is not known  Common symptoms include the following:   · Vision loss    · Cloudy, foggy, fuzzy, or hazy blurring of vision    · Problems driving at night or in bright sunlight    · Double vision    · Problem seeing shades of colors  Seek care immediately if:   · You suddenly lose your eyesight  · You feel a sudden, sharp pain in your eye  Contact your healthcare provider if:   · You have a fever  · You have chills, a cough, or feel weak and achy  · You have questions or concerns about your condition or care  Treatment for cataracts  may include glasses or contact lenses to correct your vision  You may also need surgery to remove your cataract  An artificial lens will be put into your eye to replace the damaged lens  Protect your eyes:   · Wear sunglasses  to protect your eyes from the sunlight and prevent eye discomfort  Make sure the sunglasses have UV protection  · Do not smoke  Nicotine and other chemicals in cigarettes and cigars can cause lung damage  Ask your healthcare provider for information if you currently smoke and need help to quit  E-cigarettes or smokeless tobacco still contain nicotine  Talk to your healthcare provider before you use these products  Follow up with your healthcare provider as directed:  Write down your questions so you remember to ask them during your visits  © 2017 [employer] Information is for End User's use only and may not be sold, redistributed or otherwise used for commercial purposes  All illustrations and images included in CareNotes® are the copyrighted property of A D A M [employer] Inc  or [employer]  The above information is an  only  It is not intended as medical advice for individual conditions or treatments   Talk to your doctor, nurse or pharmacist before following any medical regimen to see if it is safe and effective for you

## 2018-04-18 NOTE — PROGRESS NOTES
Assessment/Plan:      Diagnoses and all orders for this visit:    Preop examination  Comments:  pt low risk for left eye cataract surgery    Cortical age-related cataract of left eye  Comments:  Pt to have left cataract surgery 4/25/18    Pain of upper abdomen  Comments:  large mass noted right abdomen, pt needs to see GI but hasn't yet, CT abd no findings    Other orders  -     ofloxacin (OCUFLOX) 0 3 % ophthalmic solution; INSTILL 1 DROP INTO THE LEFT EYE 4X DAILY 3 DAYS PRIOR TO SURGERY, THEN 1 DROP AM OF SURGERY DAY  -     prednisoLONE acetate (PRED FORTE) 1 % ophthalmic suspension; INSTILL 1 DROP INTO THE LEFT EYE 4X DAILY STARTING 3 DAYS PRIOR TO SURGERY AND 1 DROP AM OF SURGERY          Subjective:     Patient ID: Corrina Taylor is a 79 y o  female  here for preop exam with planned left eye cataract surgery [facility] in [address]    Subjective:     Corrina Taylor is a 79 y o  female who presents to the office today for a preoperative consultation at the request of surgeon Dr Melina Mallory who plans on performing cataract surgery on April 25  This consultation is requested for the specific conditions prompting preoperative evaluation (i e  because of potential affect on operative risk): Bernardo Olmos Planned anesthesia: local/ and IV sedation  The patient has the following known anesthesia issues: past general anesthesia with complications (nausea and vomiting post op)  Patients bleeding risk: no recent abnormal bleeding  Patient does not have objections to receiving blood products if needed  The following portions of the patient's history were reviewed and updated as appropriate:   She  has a past medical history of Anxiety; Asthma; Cancer ([facility] [address] ); Cataract; High cholesterol; Hypertension; IBS (irritable bowel syndrome); Irritable bowel syndrome (IBS) (5/31/2016); Post-menopausal; and Vitamin D deficiency    She   Patient Active Problem List    Diagnosis Date Noted    Abdominal pain 09/06/2017    Bilateral carpal tunnel syndrome 06/19/2017    Asthma 06/07/2017    Lymphedema 06/07/2017    Menopause 06/07/2017    Left knee pain 08/19/2016    Generalized anxiety disorder 06/10/2016    Patent foramen ovale 06/10/2016    Tension type headache 06/10/2016    Hypertension 06/07/2016    Irritable bowel syndrome (IBS) 05/31/2016    Chronic musculoskeletal pain 01/29/2016    Vitamin D deficiency 01/20/2016    Hyperlipidemia LDL goal <130 12/07/2015    Fatigue 12/02/2015    Joint pain 12/02/2015     She  has a past surgical history that includes Tonsillectomy and adenoidectomy; Breast surgery; Cholecystectomy; Knee surgery; Colonoscopy (N/A, 5/31/2016); Gallbladder surgery; and Hysterectomy  Her Family history is unknown by patient  She  reports that she has never smoked  She has never used smokeless tobacco  She reports that she drinks alcohol  She reports that she does not use drugs  Current Outpatient Prescriptions   Medication Sig Dispense Refill    albuterol (2 5 mg/3 mL) 0 083 % nebulizer solution Take 2 5 mg by nebulization every 6 (six) hours as needed for wheezing   albuterol (PROAIR HFA) 90 mcg/act inhaler Inhale 1-2 puffs      atorvastatin (LIPITOR) 20 mg tablet Take 1 tablet by mouth daily      butalbital-acetaminophen-caffeine (FIORICET,ESGIC) -40 mg per tablet Take 1 tablet by mouth 3 (three) times a day as needed      calcium-vitamin D (OSCAL 500 + D) 500 mg-200 units per tablet Take 1 tablet by mouth daily        Cholecalciferol (VITAMIN D3) 2000 units capsule Take 1 capsule by mouth daily      dicyclomine (BENTYL) 10 mg capsule Take 1 capsule by mouth 3 (three) times a day      DULoxetine (CYMBALTA) 30 mg delayed release capsule Take 30 mg by mouth daily        DULoxetine (CYMBALTA) 60 mg delayed release capsule Take 1 capsule by mouth daily      loratadine (CLARITIN) 10 mg tablet Take 1 tablet by mouth daily      montelukast (SINGULAIR) 10 mg tablet Take 1 tablet by mouth daily      naproxen (NAPROSYN) 500 mg tablet Take 1 tablet by mouth every 12 (twelve) hours      Nutritional Supplements (BOOST BREEZE PO) Take by mouth daily      ofloxacin (OCUFLOX) 0 3 % ophthalmic solution INSTILL 1 DROP INTO THE LEFT EYE 4X DAILY 3 DAYS PRIOR TO SURGERY, THEN 1 DROP AM OF SURGERY DAY  1    oxyCODONE-acetaminophen (PERCOCET) 5-325 mg per tablet Take by mouth as needed        prednisoLONE acetate (PRED FORTE) 1 % ophthalmic suspension INSTILL 1 DROP INTO THE LEFT EYE 4X DAILY STARTING 3 DAYS PRIOR TO SURGERY AND 1 DROP AM OF SURGERY  1     No current facility-administered medications for this visit  Current Outpatient Prescriptions on File Prior to Visit   Medication Sig    albuterol (2 5 mg/3 mL) 0 083 % nebulizer solution Take 2 5 mg by nebulization every 6 (six) hours as needed for wheezing   albuterol (PROAIR HFA) 90 mcg/act inhaler Inhale 1-2 puffs    atorvastatin (LIPITOR) 20 mg tablet Take 1 tablet by mouth daily    butalbital-acetaminophen-caffeine (FIORICET,ESGIC) -40 mg per tablet Take 1 tablet by mouth 3 (three) times a day as needed    calcium-vitamin D (OSCAL 500 + D) 500 mg-200 units per tablet Take 1 tablet by mouth daily      Cholecalciferol (VITAMIN D3) 2000 units capsule Take 1 capsule by mouth daily    dicyclomine (BENTYL) 10 mg capsule Take 1 capsule by mouth 3 (three) times a day    DULoxetine (CYMBALTA) 30 mg delayed release capsule Take 30 mg by mouth daily      DULoxetine (CYMBALTA) 60 mg delayed release capsule Take 1 capsule by mouth daily    loratadine (CLARITIN) 10 mg tablet Take 1 tablet by mouth daily    montelukast (SINGULAIR) 10 mg tablet Take 1 tablet by mouth daily    naproxen (NAPROSYN) 500 mg tablet Take 1 tablet by mouth every 12 (twelve) hours    Nutritional Supplements (BOOST BREEZE PO) Take by mouth daily    oxyCODONE-acetaminophen (PERCOCET) 5-325 mg per tablet Take by mouth as needed  [DISCONTINUED] atorvastatin (LIPITOR) 20 mg tablet Take 20 mg by mouth daily   [DISCONTINUED] dicyclomine (BENTYL) 10 mg capsule Take 10 mg by mouth 3 (three) times a day   [DISCONTINUED] DULoxetine (CYMBALTA) 60 mg delayed release capsule Take 60 mg by mouth daily   [DISCONTINUED] loratadine (CLARITIN) 10 mg tablet Take 10 mg by mouth daily   [DISCONTINUED] multivitamin (THERAGRAN) TABS Take 1 tablet by mouth daily  No current facility-administered medications on file prior to visit  She is allergic to ibuprofen; latex; and asa [aspirin]       Review of Systems  A comprehensive review of systems was negative except for: Eyes: positive for contacts/glasses and visual disturbance     Objective:     /86 (BP Location: Left arm, Patient Position: Sitting, Cuff Size: Large)   Pulse 88   Temp 98 4 °F (36 9 °C) (Tympanic)   Resp 20   Ht 4' 9" (1 448 m)   Wt 88 kg (194 lb)   SpO2 96%   BMI 41 98 kg/m²     General Appearance:    Alert, cooperative, no distress, appears stated age   Head:    Normocephalic, without obvious abnormality, atraumatic   Eyes:    PERRL, conjunctiva/corneas clear, EOM's intact, fundi     benign, both eyes   Ears:    Normal TM's and external ear canals, both ears   Nose:   Nares normal, septum midline, mucosa normal, no drainage    or sinus tenderness   Throat:   Lips, mucosa, and tongue normal; teeth and gums normal   Neck:   Supple, symmetrical, trachea midline, no adenopathy;     thyroid:  no enlargement/tenderness/nodules; no carotid    bruit or JVD   Back:     Symmetric, no curvature, ROM normal, no CVA tenderness   Lungs:     Clear to auscultation bilaterally, respirations unlabored   Chest Wall:    No tenderness or deformity    Heart:    Regular rate and rhythm, S1 and S2 normal, no murmur, rub   or gallop   Breast Exam:    No tenderness, masses, or nipple abnormality   Abdomen:     Soft, non-tender, bowel sounds active all four quadrants,     no masses, no organomegaly   Genitalia:    Normal female without lesion, discharge or tenderness   Rectal:    Normal tone, no masses or tenderness; guaiac negative stool   Extremities:   Extremities normal, atraumatic, no cyanosis or edema   Pulses:   2+ and symmetric all extremities   Skin:   Skin color, texture, turgor normal, no rashes or lesions   Lymph nodes:   Cervical, supraclavicular, and axillary nodes normal   Neurologic:   CNII-XII intact, normal strength, sensation and reflexes     throughout         Cardiographics  ECG: no prior ECG available at this time however pt does follow with Dr Bryce Means of Cardiology- 2d echo WNL, stress test completed 2016 no ischemia      Imaging  Chest x-ray: calcified granuloma left chest     Lab Review   Lab Results   Component Value Date     06/07/2017     12/02/2015    K 4 0 06/07/2017    K 4 5 12/02/2015     06/07/2017     12/02/2015    CO2 27 06/07/2017    CO2 29 6 12/02/2015    BUN 14 06/07/2017    BUN 13 12/02/2015    CREATININE 0 61 06/07/2017    CREATININE 0 62 12/02/2015    GLUCOSE 79 12/02/2015    CALCIUM 8 8 06/07/2017    CALCIUM 8 9 12/02/2015       Assessment:     79 y o  female with planned surgery as above  Known risk factors for perioperative complications: None  morbid obesity with large lump right abdomen    Difficulty with intubation is not anticipated  Cardiac Risk Estimation: low    Current medications which may produce withdrawal symptoms if withheld perioperatively:  Plan:     1  Preoperative workup as follows none  2  Change in medication regimen before surgery: hold ASA 3 days before surgery    3  [place] risk Index Score: Subjective: low risk       HPI      Review of Systems         Physical Exam

## 2018-05-16 ENCOUNTER — APPOINTMENT (OUTPATIENT)
Dept: LAB | Facility: CLINIC | Age: 68
End: 2018-05-16
Payer: COMMERCIAL

## 2018-05-16 ENCOUNTER — TRANSCRIBE ORDERS (OUTPATIENT)
Dept: LAB | Facility: CLINIC | Age: 68
End: 2018-05-16

## 2018-05-16 DIAGNOSIS — Z13.0 SCREENING FOR DEFICIENCY ANEMIA: ICD-10-CM

## 2018-05-16 DIAGNOSIS — E55.9 VITAMIN D DEFICIENCY: ICD-10-CM

## 2018-05-16 DIAGNOSIS — R06.02 SHORTNESS OF BREATH: Primary | ICD-10-CM

## 2018-05-16 DIAGNOSIS — J30.9 ALLERGIC RHINITIS, UNSPECIFIED SEASONALITY, UNSPECIFIED TRIGGER: ICD-10-CM

## 2018-05-16 DIAGNOSIS — Z13.29 SCREENING FOR THYROID DISORDER: ICD-10-CM

## 2018-05-16 DIAGNOSIS — E78.5 HYPERLIPIDEMIA, UNSPECIFIED HYPERLIPIDEMIA TYPE: ICD-10-CM

## 2018-05-16 DIAGNOSIS — Z13.1 SCREENING FOR DIABETES MELLITUS: ICD-10-CM

## 2018-05-16 LAB
25(OH)D3 SERPL-MCNC: 19.1 NG/ML (ref 30–100)
ALBUMIN SERPL BCP-MCNC: 3.6 G/DL (ref 3.5–5)
ALP SERPL-CCNC: 119 U/L (ref 46–116)
ALT SERPL W P-5'-P-CCNC: 35 U/L (ref 12–78)
ANION GAP SERPL CALCULATED.3IONS-SCNC: 7 MMOL/L (ref 4–13)
AST SERPL W P-5'-P-CCNC: 24 U/L (ref 5–45)
BASOPHILS # BLD AUTO: 0.03 THOUSANDS/ΜL (ref 0–0.1)
BASOPHILS NFR BLD AUTO: 1 % (ref 0–1)
BILIRUB SERPL-MCNC: 0.66 MG/DL (ref 0.2–1)
BUN SERPL-MCNC: 13 MG/DL (ref 5–25)
CALCIUM SERPL-MCNC: 9 MG/DL (ref 8.3–10.1)
CHLORIDE SERPL-SCNC: 107 MMOL/L (ref 100–108)
CHOLEST SERPL-MCNC: 147 MG/DL (ref 50–200)
CO2 SERPL-SCNC: 27 MMOL/L (ref 21–32)
CREAT SERPL-MCNC: 0.65 MG/DL (ref 0.6–1.3)
EOSINOPHIL # BLD AUTO: 0.07 THOUSAND/ΜL (ref 0–0.61)
EOSINOPHIL NFR BLD AUTO: 1 % (ref 0–6)
ERYTHROCYTE [DISTWIDTH] IN BLOOD BY AUTOMATED COUNT: 12.6 % (ref 11.6–15.1)
GFR SERPL CREATININE-BSD FRML MDRD: 93 ML/MIN/1.73SQ M
GLUCOSE P FAST SERPL-MCNC: 101 MG/DL (ref 65–99)
HCT VFR BLD AUTO: 46 % (ref 34.8–46.1)
HDLC SERPL-MCNC: 58 MG/DL (ref 40–60)
HGB BLD-MCNC: 15.8 G/DL (ref 11.5–15.4)
IMM GRANULOCYTES # BLD AUTO: 0.02 THOUSAND/UL (ref 0–0.2)
IMM GRANULOCYTES NFR BLD AUTO: 0 % (ref 0–2)
LDLC SERPL CALC-MCNC: 67 MG/DL (ref 0–100)
LYMPHOCYTES # BLD AUTO: 1.58 THOUSANDS/ΜL (ref 0.6–4.47)
LYMPHOCYTES NFR BLD AUTO: 29 % (ref 14–44)
MCH RBC QN AUTO: 30.8 PG (ref 26.8–34.3)
MCHC RBC AUTO-ENTMCNC: 34.3 G/DL (ref 31.4–37.4)
MCV RBC AUTO: 90 FL (ref 82–98)
MONOCYTES # BLD AUTO: 0.52 THOUSAND/ΜL (ref 0.17–1.22)
MONOCYTES NFR BLD AUTO: 9 % (ref 4–12)
NEUTROPHILS # BLD AUTO: 3.31 THOUSANDS/ΜL (ref 1.85–7.62)
NEUTS SEG NFR BLD AUTO: 60 % (ref 43–75)
NONHDLC SERPL-MCNC: 89 MG/DL
NRBC BLD AUTO-RTO: 0 /100 WBCS
PLATELET # BLD AUTO: 210 THOUSANDS/UL (ref 149–390)
PMV BLD AUTO: 11 FL (ref 8.9–12.7)
POTASSIUM SERPL-SCNC: 3.7 MMOL/L (ref 3.5–5.3)
PROT SERPL-MCNC: 7.2 G/DL (ref 6.4–8.2)
RBC # BLD AUTO: 5.13 MILLION/UL (ref 3.81–5.12)
SODIUM SERPL-SCNC: 141 MMOL/L (ref 136–145)
TRIGL SERPL-MCNC: 108 MG/DL
TSH SERPL DL<=0.05 MIU/L-ACNC: 2.11 UIU/ML (ref 0.36–3.74)
WBC # BLD AUTO: 5.53 THOUSAND/UL (ref 4.31–10.16)

## 2018-05-16 PROCEDURE — 80061 LIPID PANEL: CPT

## 2018-05-16 PROCEDURE — 85025 COMPLETE CBC W/AUTO DIFF WBC: CPT

## 2018-05-16 PROCEDURE — 82306 VITAMIN D 25 HYDROXY: CPT

## 2018-05-16 PROCEDURE — 80053 COMPREHEN METABOLIC PANEL: CPT

## 2018-05-16 PROCEDURE — 36415 COLL VENOUS BLD VENIPUNCTURE: CPT

## 2018-05-16 PROCEDURE — 84443 ASSAY THYROID STIM HORMONE: CPT

## 2018-05-16 RX ORDER — MONTELUKAST SODIUM 10 MG/1
10 TABLET ORAL DAILY
Qty: 30 TABLET | Refills: 5 | Status: SHIPPED | OUTPATIENT
Start: 2018-05-16 | End: 2018-10-05 | Stop reason: SDUPTHER

## 2018-05-16 RX ORDER — ALBUTEROL SULFATE 90 UG/1
2 AEROSOL, METERED RESPIRATORY (INHALATION) EVERY 4 HOURS PRN
Qty: 1 INHALER | Refills: 5 | Status: SHIPPED | OUTPATIENT
Start: 2018-05-16 | End: 2018-10-05 | Stop reason: SDUPTHER

## 2018-05-16 RX ORDER — LORATADINE 10 MG/1
10 TABLET ORAL DAILY
Qty: 30 TABLET | Refills: 5 | Status: SHIPPED | OUTPATIENT
Start: 2018-05-16 | End: 2018-10-05 | Stop reason: SDUPTHER

## 2018-05-21 ENCOUNTER — OFFICE VISIT (OUTPATIENT)
Dept: FAMILY MEDICINE CLINIC | Facility: CLINIC | Age: 68
End: 2018-05-21
Payer: COMMERCIAL

## 2018-05-21 VITALS
WEIGHT: 194 LBS | DIASTOLIC BLOOD PRESSURE: 82 MMHG | HEART RATE: 80 BPM | SYSTOLIC BLOOD PRESSURE: 126 MMHG | TEMPERATURE: 99.2 F | RESPIRATION RATE: 20 BRPM | BODY MASS INDEX: 41.85 KG/M2 | HEIGHT: 57 IN

## 2018-05-21 DIAGNOSIS — L03.032 PARONYCHIA OF GREAT TOE OF LEFT FOOT: Primary | ICD-10-CM

## 2018-05-21 PROBLEM — R10.9 ABDOMINAL PAIN: Status: RESOLVED | Noted: 2017-09-06 | Resolved: 2018-05-21

## 2018-05-21 PROCEDURE — 99213 OFFICE O/P EST LOW 20 MIN: CPT | Performed by: PHYSICIAN ASSISTANT

## 2018-05-21 PROCEDURE — 3725F SCREEN DEPRESSION PERFORMED: CPT | Performed by: PHYSICIAN ASSISTANT

## 2018-05-21 PROCEDURE — G0438 PPPS, INITIAL VISIT: HCPCS | Performed by: PHYSICIAN ASSISTANT

## 2018-05-21 RX ORDER — CEPHALEXIN 500 MG/1
500 CAPSULE ORAL EVERY 8 HOURS SCHEDULED
Qty: 15 CAPSULE | Refills: 0 | Status: SHIPPED | OUTPATIENT
Start: 2018-05-21 | End: 2018-05-26

## 2018-05-21 RX ORDER — CYCLOSPORINE 0.5 MG/ML
1 EMULSION OPHTHALMIC EVERY 12 HOURS
COMMUNITY
End: 2019-02-22 | Stop reason: ALTCHOICE

## 2018-05-21 NOTE — PROGRESS NOTES
Assessment/Plan:    No problem-specific Assessment & Plan notes found for this encounter  Diagnoses and all orders for this visit:    Paronychia of great toe of left foot  -     cephalexin (KEFLEX) 500 mg capsule; Take 1 capsule (500 mg total) by mouth every 8 (eight) hours for 5 days    Other orders  -     cycloSPORINE (RESTASIS) 0 05 % ophthalmic emulsion; Administer 1 drop to both eyes every 12 (twelve) hours          Subjective:      Patient ID: Nhi Arzate is a 79 y o  female  Pt presents for routine visit  She is doing well overall  Labs were reviewed in detail  Mammogram is up to date  She complains of a wound on her left great toe that began after her daughter was clipping her toenails  It is tender, red, and has scant purulent discharge  The following portions of the patient's history were reviewed and updated as appropriate:   She  has a past medical history of Anxiety; Asthma; Cancer ([facility] [address] ); Cataract; High cholesterol; Hypertension; IBS (irritable bowel syndrome); Irritable bowel syndrome (IBS) (5/31/2016); Obesity; Post-menopausal; and Vitamin D deficiency  She   Patient Active Problem List    Diagnosis Date Noted    Abdominal pain 09/06/2017    Bilateral carpal tunnel syndrome 06/19/2017    Asthma 06/07/2017    Lymphedema 06/07/2017    Menopause 06/07/2017    Left knee pain 08/19/2016    Generalized anxiety disorder 06/10/2016    Patent foramen ovale 06/10/2016    Tension type headache 06/10/2016    Hypertension 06/07/2016    Irritable bowel syndrome (IBS) 05/31/2016    Chronic musculoskeletal pain 01/29/2016    Vitamin D deficiency 01/20/2016    Hyperlipidemia LDL goal <130 12/07/2015    Fatigue 12/02/2015    Joint pain 12/02/2015     She  has a past surgical history that includes Tonsillectomy and adenoidectomy; Breast surgery; Cholecystectomy; Knee surgery; Colonoscopy (N/A, 5/31/2016); Gallbladder surgery;  Hysterectomy; and Cataract extraction, bilateral   Her She was adopted  Family history is unknown by patient  She  reports that she has never smoked  She has never used smokeless tobacco  She reports that she drinks alcohol  She reports that she does not use drugs  Current Outpatient Prescriptions   Medication Sig Dispense Refill    albuterol (2 5 mg/3 mL) 0 083 % nebulizer solution Take 2 5 mg by nebulization every 6 (six) hours as needed for wheezing   albuterol (PROAIR HFA) 90 mcg/act inhaler Inhale 2 puffs every 4 (four) hours as needed for wheezing 1 Inhaler 5    atorvastatin (LIPITOR) 20 mg tablet Take 1 tablet by mouth daily      butalbital-acetaminophen-caffeine (FIORICET,ESGIC) -40 mg per tablet Take 1 tablet by mouth 3 (three) times a day as needed      Cholecalciferol (VITAMIN D3) 2000 units capsule Take 1 capsule by mouth daily      cycloSPORINE (RESTASIS) 0 05 % ophthalmic emulsion Administer 1 drop to both eyes every 12 (twelve) hours      dicyclomine (BENTYL) 10 mg capsule Take 1 capsule by mouth 3 (three) times a day      DULoxetine (CYMBALTA) 30 mg delayed release capsule Take 30 mg by mouth daily        DULoxetine (CYMBALTA) 60 mg delayed release capsule Take 1 capsule by mouth daily      loratadine (CLARITIN) 10 mg tablet Take 1 tablet (10 mg total) by mouth daily 30 tablet 5    montelukast (SINGULAIR) 10 mg tablet Take 1 tablet (10 mg total) by mouth daily 30 tablet 5    Nutritional Supplements (BOOST BREEZE PO) Take by mouth daily      oxyCODONE-acetaminophen (PERCOCET) 5-325 mg per tablet Take by mouth as needed        cephalexin (KEFLEX) 500 mg capsule Take 1 capsule (500 mg total) by mouth every 8 (eight) hours for 5 days 15 capsule 0     No current facility-administered medications for this visit  Current Outpatient Prescriptions on File Prior to Visit   Medication Sig    albuterol (2 5 mg/3 mL) 0 083 % nebulizer solution Take 2 5 mg by nebulization every 6 (six) hours as needed for wheezing      albuterol (PROAIR HFA) 90 mcg/act inhaler Inhale 2 puffs every 4 (four) hours as needed for wheezing    atorvastatin (LIPITOR) 20 mg tablet Take 1 tablet by mouth daily    butalbital-acetaminophen-caffeine (FIORICET,ESGIC) -40 mg per tablet Take 1 tablet by mouth 3 (three) times a day as needed    Cholecalciferol (VITAMIN D3) 2000 units capsule Take 1 capsule by mouth daily    dicyclomine (BENTYL) 10 mg capsule Take 1 capsule by mouth 3 (three) times a day    DULoxetine (CYMBALTA) 30 mg delayed release capsule Take 30 mg by mouth daily      DULoxetine (CYMBALTA) 60 mg delayed release capsule Take 1 capsule by mouth daily    loratadine (CLARITIN) 10 mg tablet Take 1 tablet (10 mg total) by mouth daily    montelukast (SINGULAIR) 10 mg tablet Take 1 tablet (10 mg total) by mouth daily    Nutritional Supplements (BOOST BREEZE PO) Take by mouth daily    oxyCODONE-acetaminophen (PERCOCET) 5-325 mg per tablet Take by mouth as needed      [DISCONTINUED] calcium-vitamin D (OSCAL 500 + D) 500 mg-200 units per tablet Take 1 tablet by mouth daily   [DISCONTINUED] naproxen (NAPROSYN) 500 mg tablet Take 1 tablet by mouth every 12 (twelve) hours    [DISCONTINUED] ofloxacin (OCUFLOX) 0 3 % ophthalmic solution INSTILL 1 DROP INTO THE LEFT EYE 4X DAILY 3 DAYS PRIOR TO SURGERY, THEN 1 DROP AM OF SURGERY DAY   [DISCONTINUED] prednisoLONE acetate (PRED FORTE) 1 % ophthalmic suspension INSTILL 1 DROP INTO THE LEFT EYE 4X DAILY STARTING 3 DAYS PRIOR TO SURGERY AND 1 DROP AM OF SURGERY     No current facility-administered medications on file prior to visit  She is allergic to ibuprofen; latex; and asa [aspirin]       Review of Systems   Constitutional: Negative for chills and fever  HENT: Negative for congestion, ear pain, hearing loss, postnasal drip, rhinorrhea, sinus pain, sinus pressure, sore throat and trouble swallowing  Eyes: Negative for pain and visual disturbance     Respiratory: Negative for cough, chest tightness, shortness of breath and wheezing  Cardiovascular: Negative  Negative for chest pain, palpitations and leg swelling  Gastrointestinal: Negative for abdominal pain, blood in stool, constipation, diarrhea, nausea and vomiting  Endocrine: Negative for cold intolerance, heat intolerance, polydipsia, polyphagia and polyuria  Genitourinary: Negative for difficulty urinating, dysuria, flank pain and urgency  Musculoskeletal: Negative for arthralgias, back pain, gait problem and myalgias  Skin: Positive for wound  Negative for rash  Allergic/Immunologic: Negative  Neurological: Negative for dizziness, weakness, light-headedness and headaches  Hematological: Negative  Psychiatric/Behavioral: Negative for behavioral problems, dysphoric mood and sleep disturbance  The patient is not nervous/anxious  Objective:      /82 (BP Location: Left arm, Patient Position: Sitting, Cuff Size: Large)   Pulse 80   Temp 99 2 °F (37 3 °C) (Tympanic)   Resp 20   Ht 4' 9" (1 448 m)   Wt 88 kg (194 lb)   BMI 41 98 kg/m²          Physical Exam   Constitutional: She is oriented to person, place, and time  She appears well-developed and well-nourished  No distress  HENT:   Head: Normocephalic and atraumatic  Right Ear: External ear normal    Left Ear: External ear normal    Nose: Nose normal    Mouth/Throat: Oropharynx is clear and moist  No oropharyngeal exudate  Eyes: Conjunctivae and EOM are normal  Pupils are equal, round, and reactive to light  Right eye exhibits no discharge  Left eye exhibits no discharge  No scleral icterus  Neck: Normal range of motion  Neck supple  No thyromegaly present  Cardiovascular: Normal rate, regular rhythm and normal heart sounds  Exam reveals no gallop and no friction rub  No murmur heard  Pulmonary/Chest: Effort normal and breath sounds normal  No respiratory distress  She has no wheezes  She has no rales  Abdominal: Soft   Bowel sounds are normal  She exhibits no distension  There is no tenderness  Musculoskeletal: Normal range of motion  She exhibits no edema, tenderness or deformity  Neurological: She is alert and oriented to person, place, and time  No cranial nerve deficit  Skin: Skin is warm and dry  She is not diaphoretic  Psychiatric: She has a normal mood and affect   Her behavior is normal  Judgment and thought content normal

## 2018-05-21 NOTE — PROGRESS NOTES
HPI:  Tyrone Quiroz is a 79 y o  female here for her Initial Wellness Visit  Patient Active Problem List   Diagnosis    Irritable bowel syndrome (IBS)    Abdominal pain    Asthma    Bilateral carpal tunnel syndrome    Chronic musculoskeletal pain    Fatigue    Generalized anxiety disorder    Hyperlipidemia LDL goal <130    Hypertension    Joint pain    Left knee pain    Lymphedema    Menopause    Patent foramen ovale    Tension type headache    Vitamin D deficiency     Past Medical History:   Diagnosis Date    Anxiety     Asthma     Cancer ([address] )     breast right    Cataract     High cholesterol     Hypertension     IBS (irritable bowel syndrome)     Irritable bowel syndrome (IBS) 5/31/2016    Obesity     Post-menopausal     Vitamin D deficiency      Past Surgical History:   Procedure Laterality Date    BREAST SURGERY      CATARACT EXTRACTION, BILATERAL      CHOLECYSTECTOMY      COLONOSCOPY N/A 5/31/2016    Procedure: COLONOSCOPY;  Surgeon: Sunshine Haile MD;  Location: [facility];  Service:    [facility] GALLBLADDER SURGERY      HYSTERECTOMY      Total    KNEE SURGERY      TONSILLECTOMY AND ADENOIDECTOMY       Family History   Problem Relation Age of Onset    Adopted: Yes    Family history unknown: Yes     History   Smoking Status    Never Smoker   Smokeless Tobacco    Never Used     History   Alcohol Use    Yes     Comment: socially      History   Drug Use No     /82 (BP Location: Left arm, Patient Position: Sitting, Cuff Size: Large)   Pulse 80   Temp 99 2 °F (37 3 °C) (Tympanic)   Resp 20   Ht 4' 9" (1 448 m)   Wt 88 kg (194 lb)   BMI 41 98 kg/m²       Current Outpatient Prescriptions   Medication Sig Dispense Refill    albuterol (2 5 mg/3 mL) 0 083 % nebulizer solution Take 2 5 mg by nebulization every 6 (six) hours as needed for wheezing        albuterol (PROAIR HFA) 90 mcg/act inhaler Inhale 2 puffs every 4 (four) hours as needed for wheezing 1 Inhaler 5    atorvastatin (LIPITOR) 20 mg tablet Take 1 tablet by mouth daily      butalbital-acetaminophen-caffeine (FIORICET,ESGIC) -40 mg per tablet Take 1 tablet by mouth 3 (three) times a day as needed      Cholecalciferol (VITAMIN D3) 2000 units capsule Take 1 capsule by mouth daily      cycloSPORINE (RESTASIS) 0 05 % ophthalmic emulsion Administer 1 drop to both eyes every 12 (twelve) hours      dicyclomine (BENTYL) 10 mg capsule Take 1 capsule by mouth 3 (three) times a day      DULoxetine (CYMBALTA) 30 mg delayed release capsule Take 30 mg by mouth daily        DULoxetine (CYMBALTA) 60 mg delayed release capsule Take 1 capsule by mouth daily      loratadine (CLARITIN) 10 mg tablet Take 1 tablet (10 mg total) by mouth daily 30 tablet 5    montelukast (SINGULAIR) 10 mg tablet Take 1 tablet (10 mg total) by mouth daily 30 tablet 5    Nutritional Supplements (BOOST BREEZE PO) Take by mouth daily      oxyCODONE-acetaminophen (PERCOCET) 5-325 mg per tablet Take by mouth as needed        cephalexin (KEFLEX) 500 mg capsule Take 1 capsule (500 mg total) by mouth every 8 (eight) hours for 5 days 15 capsule 0     No current facility-administered medications for this visit        Allergies   Allergen Reactions    Ibuprofen Dizziness and Syncope    Latex Dermatitis    Asa [Aspirin] Rash     Immunization History   Administered Date(s) Administered    Influenza 12/02/2015    Influenza Quadrivalent Preservative Free 3 years and older IM 12/02/2015    Pneumococcal Conjugate 13-Valent 06/07/2017    Tdap 01/20/2016       Patient Care Team:  Urbano Gaines DO as PCP - General  Merced Alex PA-C      Medicare Screening Tests and Risk Assessments:  AWV Clinical     ISAR:   Previous hospitalizations?:  No       Once in a Lifetime Medicare Screening:   EKG performed?:  Yes        Medicare Screening Tests and Risk Assessment:   AAA Risk Assessment    None Indicated:  Yes    Osteoporosis Risk Assessment     Female: Yes   :  Yes    Age over 48:  Yes    HIV Risk Assessment    None indicated:  Yes        Drug and Alcohol Use:   Tobacco use    Cigarettes:  never smoker    Smokeless:  never used smokeless tobacco    Tobacco use duration    Tobacco Cessation Readiness    Alcohol use    Alcohol use:  rare use    Concern about alcohol use:  No Tolerance to alcohol:  No   Attempts to cut down:  No Guilt about use:  No   Patient concern:  No Annoyed by criticism:  No   Morning drinking:  No Family concern:  No   Alcohol Treatment Readiness   Readiness to quit:  declined    Illicit Drug Use    Drug use:  never    Drug type:  no sedative use       Diet & Exercise:   Diet   What is your diet?:  No Added Salt   How many servings a day of the following:   Fruits and Vegetables:  3-4 Meat:  1-2   Whole Grains:  3 Simple Carbs:  2   Dairy:  0 Soda:  1   Coffee:  0 Tea:  2   Exercise    Do you currently exercise?:  yes    Frequency:  occasional    Type of exercise:  walking       Cognitive Impairment Screening:   Depression screening preformed:  Yes     PHQ-9 Depression scale score:  1   Depression screening results:  negative for symptoms   Cognitive Impairment Screening    Do you have difficulty learning or retaining new information?:  No Do you have difficulty handling new tasks?:  No   Do you have difficulty with reasoning?:  No Do you have difficulty with spatial ability and orientation?:  No   Do you have difficulty with language?:  No Do you have difficulty with behavior?:  No       Functional Ability/Level of Safety:   Hearing    Hearing difficulties:  No Bilateral:  normal   Left:  normal Right:  normal   Hearing aid:  No    Hearing Impairment Assessment    Hearing status:  No impairment   Do your family members ever complain that you turn on the radio or T V  too loudly?:  No Do you find that other people have to repeat themselves when talking to you?:  No   Do you have difficulty hearing while talking on the phone?:  No Has anyone ever told you that you are speaking too loudly when talking with them?:  No   Do you have trouble hearing the doorbell or phone ringing?:  No Do you have difficulty hearing such that you feel frustrated talking to people?:  No   Do you feel sad because you cannot hear well?:  No Do you feel inconvienced due to your hearing problem?:  No   Do you think you would be a happier person if you could hear better?:  No Would you be willing to go for a hearing aid fitting if suggested?:  Yes   Current Activities    Status:  unlimited ADL's   Help needed with the folllowing:    Using the phone:  No Transportation:  No   Shopping:  No Preparing Meals:  No   Doing Housework:  No Doing Laundry:  No   Managing Medications:  No Managing Money:  No   ADL    Feeding:  Independant   Oral hygiene and Facial grooming:  Independant   Bathing:  Independant   Upper Body Dressing:  Independant   Lower Body Dressing:  Independant   Toileting:  Independant   Bed Mobility:  Independant   Fall Risk   Have you fallen in the last 12 months?:  Yes Are you unsteady on your feet?:  Yes    Are you taking any medications that may cause fatigue or dizziness?:  Yes    Do you rush to the bathroom potentially risking a fall?:  Yes   Injury History   Polypharmacy:  No Antidepressant Use:  Yes   Sedative Use:  No Antihypertensive Use:  No   Previous Fall:  Yes Alcohol Use:  No   Deconditioning:  No Visual Impairment:  No   Cogitive Impairment:  No Mmobility Impairment:  No   Postural Hypotension:  No Urinary Incontinence:  Yes       Home Safety:   Are there hazards in your environment?:  Yes   What are the hazards:  Loose rugs on the floor   If you fell, would you need help to get back up from the ground?:  Yes Do you have problems or concerns getting in/out of a bed, chair, tub, or toilet?:  No   Do you feel unsteady when walking?:  Yes Is your activity limited by pain?:  No   Do you have handrails and grab-bars in the home?:  Yes Are emergency numbers kept by the phone and regularly updated?:  Yes   Are you and/or family members aware of the dangers of smoking in bed?:  Yes Are firearms stored securely?:  Yes   Do you have working smoke alarms and fire extinguisher?:  Yes Do all household members know how to use them?:  Yes   Have you left the stove on unsupervised?:  No    Home Safety Risk Factors   Unfamilar with surroundings:  No Uneven floors:  No   Stairs or handrail saftey risk:  No Loose rugs:  Yes   Household clutter:  No Poor household lighting:  No   No grab bars in bathroom:  No Further evaluation needed:  No       Advanced Directives:   Advanced Directives    Living Will:  No Durable POA for healthcare:  No   Advanced directive:  No    Patient's End of Life Decisions        Urinary Incontinence:   Do you have urinary incontinence?:  Yes Do you have incomplete emptying?:  No   Do you urinate frequently?:  No Do you have urinary urgency?:  No   Do you have urinary hesitancy?:  No Do you have dysuria (painful and/or difficult urination)?:  No   Do you have nocturia (waking up to urinate)?:  No Do you strain when urinating (have to push to urinate)?:  No   Do you have a weak stream when urinating?:  No Do you have intermittent streaming when urinating?:  No   Do you dribble urine after finishing?:  No    Do you have vaginal pressure?:  No Do you have vaginal dryness?:  No       Glaucoma:            Provider Screening    No data filed        No exam data present  Reviewed Updated [facility] Prior Wellness Visits:   Last Medicare wellness visit information was reviewed, patient interviewed , no change since last AWV N/A  Last Medicare wellness visit information was reviewed, patient interviewed and updates made to the record today yes    Assessment and Plan:  1   Paronychia of great toe of left foot  cephalexin (KEFLEX) 500 mg capsule       Health Maintenance Due   Topic Date Due    Hepatitis C Screening  1950    PNEUMOCOCCAL POLYSACCHARIDE VACCINE AGE 72 AND OVER  01/07/2017       Assessment/Plan:    No problem-specific Assessment & Plan notes found for this encounter  Diagnoses and all orders for this visit:    Paronychia of great toe of left foot  -     cephalexin (KEFLEX) 500 mg capsule; Take 1 capsule (500 mg total) by mouth every 8 (eight) hours for 5 days    Other orders  -     cycloSPORINE (RESTASIS) 0 05 % ophthalmic emulsion; Administer 1 drop to both eyes every 12 (twelve) hours          Subjective:      Patient ID: Florence Shah is a 79 y o  female  HPI    The following portions of the patient's history were reviewed and updated as appropriate:   She  has a past medical history of Anxiety; Asthma; Cancer ([facility] [address] ); Cataract; High cholesterol; Hypertension; IBS (irritable bowel syndrome); Irritable bowel syndrome (IBS) (5/31/2016); Obesity; Post-menopausal; and Vitamin D deficiency  She   Patient Active Problem List    Diagnosis Date Noted    Abdominal pain 09/06/2017    Bilateral carpal tunnel syndrome 06/19/2017    Asthma 06/07/2017    Lymphedema 06/07/2017    Menopause 06/07/2017    Left knee pain 08/19/2016    Generalized anxiety disorder 06/10/2016    Patent foramen ovale 06/10/2016    Tension type headache 06/10/2016    Hypertension 06/07/2016    Irritable bowel syndrome (IBS) 05/31/2016    Chronic musculoskeletal pain 01/29/2016    Vitamin D deficiency 01/20/2016    Hyperlipidemia LDL goal <130 12/07/2015    Fatigue 12/02/2015    Joint pain 12/02/2015     She  has a past surgical history that includes Tonsillectomy and adenoidectomy; Breast surgery; Cholecystectomy; Knee surgery; Colonoscopy (N/A, 5/31/2016); Gallbladder surgery; Hysterectomy; and Cataract extraction, bilateral   Her She was adopted  Family history is unknown by patient  She  reports that she has never smoked  She has never used smokeless tobacco  She reports that she drinks alcohol  She reports that she does not use drugs    Current Outpatient Prescriptions   Medication Sig Dispense Refill    albuterol (2 5 mg/3 mL) 0 083 % nebulizer solution Take 2 5 mg by nebulization every 6 (six) hours as needed for wheezing   albuterol (PROAIR HFA) 90 mcg/act inhaler Inhale 2 puffs every 4 (four) hours as needed for wheezing 1 Inhaler 5    atorvastatin (LIPITOR) 20 mg tablet Take 1 tablet by mouth daily      butalbital-acetaminophen-caffeine (FIORICET,ESGIC) -40 mg per tablet Take 1 tablet by mouth 3 (three) times a day as needed      Cholecalciferol (VITAMIN D3) 2000 units capsule Take 1 capsule by mouth daily      cycloSPORINE (RESTASIS) 0 05 % ophthalmic emulsion Administer 1 drop to both eyes every 12 (twelve) hours      dicyclomine (BENTYL) 10 mg capsule Take 1 capsule by mouth 3 (three) times a day      DULoxetine (CYMBALTA) 30 mg delayed release capsule Take 30 mg by mouth daily        DULoxetine (CYMBALTA) 60 mg delayed release capsule Take 1 capsule by mouth daily      loratadine (CLARITIN) 10 mg tablet Take 1 tablet (10 mg total) by mouth daily 30 tablet 5    montelukast (SINGULAIR) 10 mg tablet Take 1 tablet (10 mg total) by mouth daily 30 tablet 5    Nutritional Supplements (BOOST BREEZE PO) Take by mouth daily      oxyCODONE-acetaminophen (PERCOCET) 5-325 mg per tablet Take by mouth as needed        cephalexin (KEFLEX) 500 mg capsule Take 1 capsule (500 mg total) by mouth every 8 (eight) hours for 5 days 15 capsule 0     No current facility-administered medications for this visit  Current Outpatient Prescriptions on File Prior to Visit   Medication Sig    albuterol (2 5 mg/3 mL) 0 083 % nebulizer solution Take 2 5 mg by nebulization every 6 (six) hours as needed for wheezing      albuterol (PROAIR HFA) 90 mcg/act inhaler Inhale 2 puffs every 4 (four) hours as needed for wheezing    atorvastatin (LIPITOR) 20 mg tablet Take 1 tablet by mouth daily    butalbital-acetaminophen-caffeine (FIORICET,ESGIC) -40 mg per tablet Take 1 tablet by mouth 3 (three) times a day as needed    Cholecalciferol (VITAMIN D3) 2000 units capsule Take 1 capsule by mouth daily    dicyclomine (BENTYL) 10 mg capsule Take 1 capsule by mouth 3 (three) times a day    DULoxetine (CYMBALTA) 30 mg delayed release capsule Take 30 mg by mouth daily      DULoxetine (CYMBALTA) 60 mg delayed release capsule Take 1 capsule by mouth daily    loratadine (CLARITIN) 10 mg tablet Take 1 tablet (10 mg total) by mouth daily    montelukast (SINGULAIR) 10 mg tablet Take 1 tablet (10 mg total) by mouth daily    Nutritional Supplements (BOOST BREEZE PO) Take by mouth daily    oxyCODONE-acetaminophen (PERCOCET) 5-325 mg per tablet Take by mouth as needed      [DISCONTINUED] calcium-vitamin D (OSCAL 500 + D) 500 mg-200 units per tablet Take 1 tablet by mouth daily   [DISCONTINUED] naproxen (NAPROSYN) 500 mg tablet Take 1 tablet by mouth every 12 (twelve) hours    [DISCONTINUED] ofloxacin (OCUFLOX) 0 3 % ophthalmic solution INSTILL 1 DROP INTO THE LEFT EYE 4X DAILY 3 DAYS PRIOR TO SURGERY, THEN 1 DROP AM OF SURGERY DAY   [DISCONTINUED] prednisoLONE acetate (PRED FORTE) 1 % ophthalmic suspension INSTILL 1 DROP INTO THE LEFT EYE 4X DAILY STARTING 3 DAYS PRIOR TO SURGERY AND 1 DROP AM OF SURGERY     No current facility-administered medications on file prior to visit  She is allergic to ibuprofen; latex; and asa [aspirin]       Review of Systems      Objective:      /82 (BP Location: Left arm, Patient Position: Sitting, Cuff Size: Large)   Pulse 80   Temp 99 2 °F (37 3 °C) (Tympanic)   Resp 20   Ht 4' 9" (1 448 m)   Wt 88 kg (194 lb)   BMI 41 98 kg/m²          Physical Exam

## 2018-05-23 ENCOUNTER — TELEPHONE (OUTPATIENT)
Dept: FAMILY MEDICINE CLINIC | Facility: CLINIC | Age: 68
End: 2018-05-23

## 2018-08-16 DIAGNOSIS — F33.41 RECURRENT MAJOR DEPRESSIVE DISORDER, IN PARTIAL REMISSION (HCC): Primary | ICD-10-CM

## 2018-08-16 RX ORDER — DULOXETIN HYDROCHLORIDE 60 MG/1
CAPSULE, DELAYED RELEASE ORAL
Qty: 90 CAPSULE | Refills: 1 | Status: SHIPPED | OUTPATIENT
Start: 2018-08-16 | End: 2018-10-05 | Stop reason: SDUPTHER

## 2018-09-10 ENCOUNTER — APPOINTMENT (EMERGENCY)
Dept: CT IMAGING | Facility: HOSPITAL | Age: 68
End: 2018-09-10
Payer: COMMERCIAL

## 2018-09-10 ENCOUNTER — OFFICE VISIT (OUTPATIENT)
Dept: FAMILY MEDICINE CLINIC | Facility: CLINIC | Age: 68
End: 2018-09-10
Payer: COMMERCIAL

## 2018-09-10 ENCOUNTER — HOSPITAL ENCOUNTER (EMERGENCY)
Facility: HOSPITAL | Age: 68
Discharge: HOME | End: 2018-09-10
Attending: EMERGENCY MEDICINE | Admitting: EMERGENCY MEDICINE
Payer: COMMERCIAL

## 2018-09-10 VITALS
SYSTOLIC BLOOD PRESSURE: 140 MMHG | BODY MASS INDEX: 40.47 KG/M2 | RESPIRATION RATE: 20 BRPM | DIASTOLIC BLOOD PRESSURE: 94 MMHG | WEIGHT: 187.6 LBS | HEIGHT: 57 IN | TEMPERATURE: 99.3 F | OXYGEN SATURATION: 96 % | HEART RATE: 108 BPM

## 2018-09-10 VITALS
RESPIRATION RATE: 18 BRPM | HEART RATE: 86 BPM | WEIGHT: 187 LBS | DIASTOLIC BLOOD PRESSURE: 79 MMHG | HEIGHT: 57 IN | TEMPERATURE: 98.2 F | SYSTOLIC BLOOD PRESSURE: 167 MMHG | BODY MASS INDEX: 40.34 KG/M2 | OXYGEN SATURATION: 94 %

## 2018-09-10 DIAGNOSIS — K85.90 PANCREATITIS: Primary | ICD-10-CM

## 2018-09-10 DIAGNOSIS — R10.84 GENERALIZED ABDOMINAL PAIN: Primary | ICD-10-CM

## 2018-09-10 LAB
ALBUMIN SERPL BCP-MCNC: 2.8 G/DL (ref 3.5–5)
ALP SERPL-CCNC: 129 U/L (ref 46–116)
ALT SERPL W P-5'-P-CCNC: 34 U/L (ref 12–78)
ANION GAP SERPL CALCULATED.3IONS-SCNC: 6 MMOL/L (ref 4–13)
AST SERPL W P-5'-P-CCNC: 29 U/L (ref 5–45)
BASOPHILS # BLD AUTO: 0.04 THOUSANDS/ΜL (ref 0–0.1)
BASOPHILS NFR BLD AUTO: 0 % (ref 0–1)
BILIRUB SERPL-MCNC: 0.7 MG/DL (ref 0.2–1)
BILIRUB UR QL STRIP: ABNORMAL
BUN SERPL-MCNC: 6 MG/DL (ref 5–25)
CALCIUM SERPL-MCNC: 8.8 MG/DL (ref 8.3–10.1)
CHLORIDE SERPL-SCNC: 104 MMOL/L (ref 100–108)
CLARITY UR: CLEAR
CO2 SERPL-SCNC: 30 MMOL/L (ref 21–32)
COLOR UR: YELLOW
CREAT SERPL-MCNC: 0.66 MG/DL (ref 0.6–1.3)
EOSINOPHIL # BLD AUTO: 0.22 THOUSAND/ΜL (ref 0–0.61)
EOSINOPHIL NFR BLD AUTO: 2 % (ref 0–6)
ERYTHROCYTE [DISTWIDTH] IN BLOOD BY AUTOMATED COUNT: 12 % (ref 11.6–15.1)
GFR SERPL CREATININE-BSD FRML MDRD: 92 ML/MIN/1.73SQ M
GLUCOSE SERPL-MCNC: 104 MG/DL (ref 65–140)
GLUCOSE UR STRIP-MCNC: NEGATIVE MG/DL
HCT VFR BLD AUTO: 44.7 % (ref 34.8–46.1)
HGB BLD-MCNC: 15.2 G/DL (ref 11.5–15.4)
HGB UR QL STRIP.AUTO: NEGATIVE
IMM GRANULOCYTES # BLD AUTO: 0.04 THOUSAND/UL (ref 0–0.2)
IMM GRANULOCYTES NFR BLD AUTO: 0 % (ref 0–2)
KETONES UR STRIP-MCNC: ABNORMAL MG/DL
LEUKOCYTE ESTERASE UR QL STRIP: NEGATIVE
LIPASE SERPL-CCNC: 448 U/L (ref 73–393)
LYMPHOCYTES # BLD AUTO: 1.4 THOUSANDS/ΜL (ref 0.6–4.47)
LYMPHOCYTES NFR BLD AUTO: 13 % (ref 14–44)
MCH RBC QN AUTO: 30.3 PG (ref 26.8–34.3)
MCHC RBC AUTO-ENTMCNC: 34 G/DL (ref 31.4–37.4)
MCV RBC AUTO: 89 FL (ref 82–98)
MONOCYTES # BLD AUTO: 1.06 THOUSAND/ΜL (ref 0.17–1.22)
MONOCYTES NFR BLD AUTO: 10 % (ref 4–12)
NEUTROPHILS # BLD AUTO: 7.9 THOUSANDS/ΜL (ref 1.85–7.62)
NEUTS SEG NFR BLD AUTO: 75 % (ref 43–75)
NITRITE UR QL STRIP: NEGATIVE
NRBC BLD AUTO-RTO: 0 /100 WBCS
PH UR STRIP.AUTO: 7.5 [PH] (ref 4.5–8)
PLATELET # BLD AUTO: 189 THOUSANDS/UL (ref 149–390)
PMV BLD AUTO: 9.8 FL (ref 8.9–12.7)
POTASSIUM SERPL-SCNC: 3.6 MMOL/L (ref 3.5–5.3)
PROT SERPL-MCNC: 7 G/DL (ref 6.4–8.2)
PROT UR STRIP-MCNC: NEGATIVE MG/DL
RBC # BLD AUTO: 5.02 MILLION/UL (ref 3.81–5.12)
SODIUM SERPL-SCNC: 140 MMOL/L (ref 136–145)
SP GR UR STRIP.AUTO: 1.01 (ref 1–1.03)
TROPONIN I SERPL-MCNC: <0.02 NG/ML
UROBILINOGEN UR QL STRIP.AUTO: 1 E.U./DL
WBC # BLD AUTO: 10.66 THOUSAND/UL (ref 4.31–10.16)

## 2018-09-10 PROCEDURE — 96361 HYDRATE IV INFUSION ADD-ON: CPT

## 2018-09-10 PROCEDURE — 84484 ASSAY OF TROPONIN QUANT: CPT | Performed by: EMERGENCY MEDICINE

## 2018-09-10 PROCEDURE — 96374 THER/PROPH/DIAG INJ IV PUSH: CPT

## 2018-09-10 PROCEDURE — 93005 ELECTROCARDIOGRAM TRACING: CPT

## 2018-09-10 PROCEDURE — 80053 COMPREHEN METABOLIC PANEL: CPT | Performed by: EMERGENCY MEDICINE

## 2018-09-10 PROCEDURE — 99214 OFFICE O/P EST MOD 30 MIN: CPT | Performed by: PHYSICIAN ASSISTANT

## 2018-09-10 PROCEDURE — 36415 COLL VENOUS BLD VENIPUNCTURE: CPT | Performed by: EMERGENCY MEDICINE

## 2018-09-10 PROCEDURE — 99284 EMERGENCY DEPT VISIT MOD MDM: CPT

## 2018-09-10 PROCEDURE — 81003 URINALYSIS AUTO W/O SCOPE: CPT | Performed by: EMERGENCY MEDICINE

## 2018-09-10 PROCEDURE — 85025 COMPLETE CBC W/AUTO DIFF WBC: CPT | Performed by: EMERGENCY MEDICINE

## 2018-09-10 PROCEDURE — 3008F BODY MASS INDEX DOCD: CPT | Performed by: PHYSICIAN ASSISTANT

## 2018-09-10 PROCEDURE — 4040F PNEUMOC VAC/ADMIN/RCVD: CPT | Performed by: PHYSICIAN ASSISTANT

## 2018-09-10 PROCEDURE — 74177 CT ABD & PELVIS W/CONTRAST: CPT

## 2018-09-10 PROCEDURE — 83690 ASSAY OF LIPASE: CPT | Performed by: EMERGENCY MEDICINE

## 2018-09-10 RX ORDER — ONDANSETRON 4 MG/1
4 TABLET, ORALLY DISINTEGRATING ORAL EVERY 8 HOURS PRN
Qty: 12 TABLET | Refills: 0 | Status: SHIPPED | OUTPATIENT
Start: 2018-09-10 | End: 2019-02-22 | Stop reason: ALTCHOICE

## 2018-09-10 RX ORDER — OXYCODONE HYDROCHLORIDE AND ACETAMINOPHEN 5; 325 MG/1; MG/1
1 TABLET ORAL ONCE
Status: COMPLETED | OUTPATIENT
Start: 2018-09-10 | End: 2018-09-10

## 2018-09-10 RX ORDER — ONDANSETRON 2 MG/ML
4 INJECTION INTRAMUSCULAR; INTRAVENOUS ONCE
Status: COMPLETED | OUTPATIENT
Start: 2018-09-10 | End: 2018-09-10

## 2018-09-10 RX ORDER — OXYCODONE HYDROCHLORIDE AND ACETAMINOPHEN 5; 325 MG/1; MG/1
1 TABLET ORAL EVERY 4 HOURS PRN
Qty: 12 TABLET | Refills: 0 | Status: SHIPPED | OUTPATIENT
Start: 2018-09-10 | End: 2018-09-20

## 2018-09-10 RX ADMIN — OXYCODONE HYDROCHLORIDE AND ACETAMINOPHEN 1 TABLET: 5; 325 TABLET ORAL at 18:59

## 2018-09-10 RX ADMIN — ONDANSETRON 4 MG: 2 INJECTION INTRAMUSCULAR; INTRAVENOUS at 16:15

## 2018-09-10 RX ADMIN — IOHEXOL 100 ML: 350 INJECTION, SOLUTION INTRAVENOUS at 17:29

## 2018-09-10 RX ADMIN — SODIUM CHLORIDE 500 ML: 0.9 INJECTION, SOLUTION INTRAVENOUS at 16:13

## 2018-09-10 NOTE — ED PROVIDER NOTES
History  Chief Complaint   Patient presents with    Abdominal Pain     Pt c/o RUQ abdominal swelling for several months but has worsened on Friday  Pt c/o indigestion and increased flatus  Pt states LBM on Saturday       History provided by:  Patient  Abdominal Pain   Pain location:  RUQ  Pain quality: aching, bloating and fullness    Pain radiates to:  R flank and epigastric region  Pain severity:  Moderate  Onset quality:  Gradual  Duration:  4 weeks  Timing:  Intermittent  Progression:  Waxing and waning  Chronicity:  New  Context: previous surgery (csection, hysterectomy, and cholecystectomy)    Relieved by:  Nothing  Worsened by:  Nothing  Ineffective treatments:  None tried  Associated symptoms: belching, chest pain ("reflux" into chest), fever (subjective temp of 100), flatus and nausea    Associated symptoms: no chills, no constipation, no cough, no diarrhea, no fatigue, no hematuria, no shortness of breath, no sore throat and no vomiting    Risk factors: multiple surgeries and obesity        Prior to Admission Medications   Prescriptions Last Dose Informant Patient Reported? Taking? Cholecalciferol (VITAMIN D3) 2000 units capsule   Yes No   Sig: Take 1 capsule by mouth daily   DULoxetine (CYMBALTA) 30 mg delayed release capsule   Yes No   Sig: Take 30 mg by mouth daily     DULoxetine (CYMBALTA) 60 mg delayed release capsule   No No   Sig: TAKE 1 CAPSULE DAILY   Nutritional Supplements (BOOST BREEZE PO)   Yes No   Sig: Take by mouth daily   albuterol (2 5 mg/3 mL) 0 083 % nebulizer solution   Yes No   Sig: Take 2 5 mg by nebulization every 6 (six) hours as needed for wheezing     albuterol (PROAIR HFA) 90 mcg/act inhaler   No No   Sig: Inhale 2 puffs every 4 (four) hours as needed for wheezing   atorvastatin (LIPITOR) 20 mg tablet   Yes No   Sig: Take 1 tablet by mouth daily   butalbital-acetaminophen-caffeine (FIORICET,ESGIC) -40 mg per tablet   Yes No   Sig: Take 1 tablet by mouth 3 (three) times a day as needed   cycloSPORINE (RESTASIS) 0 05 % ophthalmic emulsion   Yes No   Sig: Administer 1 drop to both eyes every 12 (twelve) hours   dicyclomine (BENTYL) 10 mg capsule   Yes No   Sig: Take 1 capsule by mouth 3 (three) times a day   loratadine (CLARITIN) 10 mg tablet   No No   Sig: Take 1 tablet (10 mg total) by mouth daily   montelukast (SINGULAIR) 10 mg tablet   No No   Sig: Take 1 tablet (10 mg total) by mouth daily   oxyCODONE-acetaminophen (PERCOCET) 5-325 mg per tablet   Yes No   Sig: Take by mouth as needed        Facility-Administered Medications: None       Past Medical History:   Diagnosis Date    Anxiety     Asthma     Cancer ([facility] [address] )     breast right    Cataract     High cholesterol     Hypertension     IBS (irritable bowel syndrome)     Irritable bowel syndrome (IBS) 5/31/2016    Obesity     Post-menopausal     Vitamin D deficiency        Past Surgical History:   Procedure Laterality Date    BREAST SURGERY      CATARACT EXTRACTION, BILATERAL      CHOLECYSTECTOMY      COLONOSCOPY N/A 5/31/2016    Procedure: COLONOSCOPY;  Surgeon: Jose Fernandez MD;  Location: [facility];  Service:    Madeline Garcia GALLBLADDER SURGERY      HYSTERECTOMY      Total    KNEE SURGERY      TONSILLECTOMY AND ADENOIDECTOMY         Family History   Problem Relation Age of Onset    Adopted: Yes    Family history unknown: Yes     I have reviewed and agree with the history as documented  Social History   Substance Use Topics    Smoking status: Never Smoker    Smokeless tobacco: Never Used    Alcohol use Yes      Comment: socially        Review of Systems   Constitutional: Positive for fever (subjective temp of 100)  Negative for chills and fatigue  HENT: Negative for sore throat  Respiratory: Negative for cough and shortness of breath  Cardiovascular: Positive for chest pain ("reflux" into chest)  Gastrointestinal: Positive for abdominal pain, flatus and nausea   Negative for constipation, diarrhea and vomiting  Genitourinary: Negative for hematuria  All other systems reviewed and are negative  Physical Exam  Physical Exam   Constitutional: She appears well-developed and well-nourished  Obese with truncal obesity   HENT:   Head: Normocephalic and atraumatic  Eyes: EOM are normal  Pupils are equal, round, and reactive to light  Neck: Neck supple  Cardiovascular: Normal rate and regular rhythm  No murmur heard  Pulmonary/Chest: Effort normal and breath sounds normal    Abdominal: Soft  Bowel sounds are normal  She exhibits no distension  There is tenderness (diffuse tenderness, greatst is epigastrum and RUQ)  There is no rebound and no guarding  Musculoskeletal: She exhibits no edema  Neurological: She is alert  Skin: Skin is warm  No erythema  Psychiatric: She has a normal mood and affect  Vitals reviewed        Vital Signs  ED Triage Vitals [09/10/18 1514]   Temperature Pulse Respirations Blood Pressure SpO2   98 2 °F (36 8 °C) 101 20 146/85 94 %      Temp Source Heart Rate Source Patient Position - Orthostatic VS BP Location FiO2 (%)   Oral Monitor Sitting Left arm --      Pain Score       Worst Possible Pain           Vitals:    09/10/18 1632 09/10/18 1645 09/10/18 1745 09/10/18 1800   BP: 147/72 147/72 147/67 155/72   Pulse: 94 92 99 102   Patient Position - Orthostatic VS: Lying Lying Lying Lying       Visual Acuity      ED Medications  Medications   oxyCODONE-acetaminophen (PERCOCET) 5-325 mg per tablet 1 tablet (not administered)   sodium chloride 0 9 % bolus 500 mL (0 mL Intravenous Stopped 9/10/18 1743)   ondansetron (ZOFRAN) injection 4 mg (4 mg Intravenous Given 9/10/18 1615)   iohexol (OMNIPAQUE) 350 MG/ML injection (MULTI-DOSE) 100 mL (100 mL Intravenous Given 9/10/18 1729)       Diagnostic Studies  Results Reviewed     Procedure Component Value Units Date/Time    Comprehensive metabolic panel [[id number]]  (Abnormal) Collected:  09/10/18 1613    Lab Status:  Final result Specimen:  Blood from Arm, Left Updated:  09/10/18 1647     Sodium 140 mmol/L      Potassium 3 6 mmol/L      Chloride 104 mmol/L      CO2 30 mmol/L      ANION GAP 6 mmol/L      BUN 6 mg/dL      Creatinine 0 66 mg/dL      Glucose 104 mg/dL      Calcium 8 8 mg/dL      AST 29 U/L      ALT 34 U/L      Alkaline Phosphatase 129 (H) U/L      Total Protein 7 0 g/dL      Albumin 2 8 (L) g/dL      Total Bilirubin 0 70 mg/dL      eGFR 92 ml/min/1 73sq m     Narrative:         National Kidney Disease Education Program recommendations are as follows:  GFR calculation is accurate only with a steady state creatinine  Chronic Kidney disease less than 60 ml/min/1 73 sq  meters  Kidney failure less than 15 ml/min/1 73 sq  meters      Lipase [[id number]]  (Abnormal) Collected:  09/10/18 1613    Lab Status:  Final result Specimen:  Blood from Arm, Left Updated:  09/10/18 1647     Lipase 448 (H) u/L     Troponin I [[id number]]  (Normal) Collected:  09/10/18 1613    Lab Status:  Final result Specimen:  Blood from Arm, Left Updated:  09/10/18 1644     Troponin I <0 02 ng/mL     CBC and differential [[id number]]  (Abnormal) Collected:  09/10/18 1613    Lab Status:  Final result Specimen:  Blood from Arm, Left Updated:  09/10/18 1626     WBC 10 66 (H) Thousand/uL      RBC 5 02 Million/uL      Hemoglobin 15 2 g/dL      Hematocrit 44 7 %      MCV 89 fL      MCH 30 3 pg      MCHC 34 0 g/dL      RDW 12 0 %      MPV 9 8 fL      Platelets 728 Thousands/uL      nRBC 0 /100 WBCs      Neutrophils Relative 75 %      Immat GRANS % 0 %      Lymphocytes Relative 13 (L) %      Monocytes Relative 10 %      Eosinophils Relative 2 %      Basophils Relative 0 %      Neutrophils Absolute 7 90 (H) Thousands/µL      Immature Grans Absolute 0 04 Thousand/uL      Lymphocytes Absolute 1 40 Thousands/µL      Monocytes Absolute 1 06 Thousand/µL      Eosinophils Absolute 0 22 Thousand/µL      Basophils Absolute 0 04 Thousands/µL     UA w Reflex to Microscopic w Reflex to Culture [[id number]]  (Abnormal) Collected:  09/10/18 1539    Lab Status:  Final result Specimen:  Urine from Urine, Other Updated:  09/10/18 1555     Color, UA Yellow     Clarity, UA Clear     Specific Gravity, UA 1 010     pH, UA 7 5     Leukocytes, UA Negative     Nitrite, UA Negative     Protein, UA Negative mg/dl      Glucose, UA Negative mg/dl      Ketones, UA 15 (1+) (A) mg/dl      Urobilinogen, UA 1 0 E U /dl      Bilirubin, UA Small (A)     Blood, UA Negative                 CT abdomen pelvis with contrast   Final Result by Iggy Jarvis MD (09/10 7062)      Mild peripancreatic fat stranding in keeping with mild acute pancreatitis  No peripancreatic venous thrombus, pseudoaneurysm or pseudocysts  Status post cholecystectomy  No biliary dilation or radiopaque choledocholithiasis              Workstation performed: [id number]                    Procedures  ECG 12 Lead Documentation  Date/Time: 9/10/2018 3:52 PM  Performed by: Shea Harvey  Authorized by: Shea Harvey     Indications / Diagnosis:  Epigastric pain  ECG reviewed by me, the ED Provider: yes    Patient location:  ED  Rate:     ECG rate:  92    ECG rate assessment: normal    Rhythm:     Rhythm: sinus rhythm    QRS:     QRS axis:  Normal  Conduction:     Conduction: normal    ST segments:     ST segments:  Normal  T waves:     T waves: normal             Phone Contacts  ED Phone Contact    ED Course                               MDM  Number of Diagnoses or Management Options  Pancreatitis: new and requires workup     Amount and/or Complexity of Data Reviewed  Clinical lab tests: ordered and reviewed  Tests in the radiology section of CPT®: ordered and reviewed  Review and summarize past medical records: yes (Pt saw PCP earlier and sent here for eval )    Risk of Complications, Morbidity, and/or Mortality  Presenting problems: high    Patient Progress  Patient progress: improved (Discussed with patient her labs and her CT scan result showing mild pancreatitis  Upon reviewing patient did state that her symptoms really started Friday night after she ate a piece of sausage and him pizza, since then she has been nauseous and having some stomach discomfort that she has not really been eating for the past 2 days  Her symptoms are actually improving  Discussed with her pancreatitis is treated with NPO and advancing diet as tolerated, starting with liquids and then clears and then bland diet and avoiding fatty foods  Discussed also treating pain and nausea  Patient does not have any vomiting or diarrhea  Does not have fever  Symptoms are actually somewhat improving  So with this think patient can continue to try treatment as an outpatient with pain and nausea control and clear liquids for the next day and advancing as tolerated as we described  Will give her number for GI follow-up )    CritCare Time    Disposition  Final diagnoses:   Pancreatitis     Time reflects when diagnosis was documented in both MDM as applicable and the Disposition within this note     Time User Action Codes Description Comment    9/10/2018  6:53 PM Aurora BULL Add [K85 90] Pancreatitis       ED Disposition     ED Disposition Condition Comment    Discharge  Deyanira ABURTO Divya discharge to home/self care      Condition at discharge: Good        Follow-up Information     Follow up With Specialties Details Why [address] [facility], DO Internal Medicine   [facility] [id number]  [phone]            Patient's Medications   Discharge Prescriptions    ONDANSETRON (ZOFRAN-ODT) 4 MG DISINTEGRATING TABLET    Take 1 tablet (4 mg total) by mouth every 8 (eight) hours as needed for nausea or vomiting for up to 30 days       Start Date: 9/10/2018 End Date: 10/10/2018       Order Dose: 4 mg       Quantity: 12 tablet    Refills: 0    OXYCODONE-ACETAMINOPHEN (PERCOCET) 5-325 MG PER TABLET    Take 1 tablet by mouth every 4 (four) hours as needed for moderate pain for up to 10 days Max Daily Amount: 6 tablets       Start Date: 9/10/2018 End Date: 9/20/2018       Order Dose: 1 tablet       Quantity: 12 tablet    Refills: 0     No discharge procedures on file      ED Provider  Electronically Signed by           Marisol Tolbert MD  09/10/18 0274

## 2018-09-10 NOTE — ASSESSMENT & PLAN NOTE
Pt with an acute abdomen  Symptoms concerning for possible bowel obstruction vs urinary pathology  Will refer to ED for evaluation and needed imaging, labs and pain management   Pt and daughter agreeable to plan and pt will go to [facility] for evaluation

## 2018-09-10 NOTE — PROGRESS NOTES
Assessment/Plan:    Generalized abdominal pain  Pt with an acute abdomen  Symptoms concerning for possible bowel obstruction vs urinary pathology  Will refer to ED for evaluation and needed imaging, labs and pain management  Pt and daughter agreeable to plan and pt will go to [facility] for evaluation       Diagnoses and all orders for this visit:    Generalized abdominal pain          Subjective:      Patient ID: Corrina Taylor is a 79 y o  female  Pt presents for evaluation of severe abdominal pain that began on 9/7/18 and has been constant since onset  She has nausea without vomitting  No diarrhea  Last BM was 9/8/18  Abdominal pain is epigastric and right upper quadrant  Abdomen is visibly distended and pt feeling bloated  She is having increased belching and burping  She did pass flatus  Appetite is decreased  Only intake was tea and some mashed potatoes over the last few days  She is down 8 lbs since last visit  Heart rate and BP are elevated  She is hungry but feels to full to eat  No fever or chills  Some blood in her urine on Sunday with associated flank pain  She is unable to provide urine sample in the office at present  The following portions of the patient's history were reviewed and updated as appropriate:   She  has a past medical history of Anxiety; Asthma; Cancer ([facility] [address] ); Cataract; High cholesterol; Hypertension; IBS (irritable bowel syndrome); Irritable bowel syndrome (IBS) (5/31/2016); Obesity; Post-menopausal; and Vitamin D deficiency    She   Patient Active Problem List    Diagnosis Date Noted    Paronychia of great toe of left foot 05/21/2018    Generalized abdominal pain 09/06/2017    Bilateral carpal tunnel syndrome 06/19/2017    Asthma 06/07/2017    Lymphedema 06/07/2017    Menopause 06/07/2017    Generalized anxiety disorder 06/10/2016    Patent foramen ovale 06/10/2016    Tension type headache 06/10/2016    Hypertension 06/07/2016    Irritable bowel syndrome (IBS) 05/31/2016    Chronic musculoskeletal pain 01/29/2016    Vitamin D deficiency 01/20/2016    Hyperlipidemia LDL goal <130 12/07/2015    Fatigue 12/02/2015     She  has a past surgical history that includes Tonsillectomy and adenoidectomy; Breast surgery; Cholecystectomy; Knee surgery; Colonoscopy (N/A, 5/31/2016); Gallbladder surgery; Hysterectomy; and Cataract extraction, bilateral   Her She was adopted  Family history is unknown by patient  She  reports that she has never smoked  She has never used smokeless tobacco  She reports that she drinks alcohol  She reports that she does not use drugs  Current Outpatient Prescriptions   Medication Sig Dispense Refill    albuterol (2 5 mg/3 mL) 0 083 % nebulizer solution Take 2 5 mg by nebulization every 6 (six) hours as needed for wheezing        albuterol (PROAIR HFA) 90 mcg/act inhaler Inhale 2 puffs every 4 (four) hours as needed for wheezing 1 Inhaler 5    atorvastatin (LIPITOR) 20 mg tablet Take 1 tablet by mouth daily      butalbital-acetaminophen-caffeine (FIORICET,ESGIC) -40 mg per tablet Take 1 tablet by mouth 3 (three) times a day as needed      Cholecalciferol (VITAMIN D3) 2000 units capsule Take 1 capsule by mouth daily      cycloSPORINE (RESTASIS) 0 05 % ophthalmic emulsion Administer 1 drop to both eyes every 12 (twelve) hours      dicyclomine (BENTYL) 10 mg capsule Take 1 capsule by mouth 3 (three) times a day      DULoxetine (CYMBALTA) 30 mg delayed release capsule Take 30 mg by mouth daily        DULoxetine (CYMBALTA) 60 mg delayed release capsule TAKE 1 CAPSULE DAILY 90 capsule 1    loratadine (CLARITIN) 10 mg tablet Take 1 tablet (10 mg total) by mouth daily 30 tablet 5    montelukast (SINGULAIR) 10 mg tablet Take 1 tablet (10 mg total) by mouth daily 30 tablet 5    Nutritional Supplements (BOOST BREEZE PO) Take by mouth daily      oxyCODONE-acetaminophen (PERCOCET) 5-325 mg per tablet Take by mouth as needed         No current facility-administered medications for this visit  Current Outpatient Prescriptions on File Prior to Visit   Medication Sig    albuterol (2 5 mg/3 mL) 0 083 % nebulizer solution Take 2 5 mg by nebulization every 6 (six) hours as needed for wheezing   albuterol (PROAIR HFA) 90 mcg/act inhaler Inhale 2 puffs every 4 (four) hours as needed for wheezing    atorvastatin (LIPITOR) 20 mg tablet Take 1 tablet by mouth daily    butalbital-acetaminophen-caffeine (FIORICET,ESGIC) -40 mg per tablet Take 1 tablet by mouth 3 (three) times a day as needed    Cholecalciferol (VITAMIN D3) 2000 units capsule Take 1 capsule by mouth daily    cycloSPORINE (RESTASIS) 0 05 % ophthalmic emulsion Administer 1 drop to both eyes every 12 (twelve) hours    dicyclomine (BENTYL) 10 mg capsule Take 1 capsule by mouth 3 (three) times a day    DULoxetine (CYMBALTA) 30 mg delayed release capsule Take 30 mg by mouth daily      DULoxetine (CYMBALTA) 60 mg delayed release capsule TAKE 1 CAPSULE DAILY    loratadine (CLARITIN) 10 mg tablet Take 1 tablet (10 mg total) by mouth daily    montelukast (SINGULAIR) 10 mg tablet Take 1 tablet (10 mg total) by mouth daily    Nutritional Supplements (BOOST BREEZE PO) Take by mouth daily    oxyCODONE-acetaminophen (PERCOCET) 5-325 mg per tablet Take by mouth as needed       No current facility-administered medications on file prior to visit  She is allergic to ibuprofen; latex; and asa [aspirin]       Review of Systems   Constitutional: Negative for chills and fever  HENT: Negative for congestion, ear pain, hearing loss, postnasal drip, rhinorrhea, sinus pain, sinus pressure, sore throat and trouble swallowing  Eyes: Negative for pain and visual disturbance  Respiratory: Negative for cough, chest tightness, shortness of breath and wheezing  Cardiovascular: Negative  Negative for chest pain, palpitations and leg swelling     Gastrointestinal: Positive for abdominal distention, abdominal pain and nausea  Negative for blood in stool, constipation, diarrhea and vomiting  Endocrine: Negative for cold intolerance, heat intolerance, polydipsia, polyphagia and polyuria  Genitourinary: Negative for difficulty urinating, dysuria, flank pain and urgency  Musculoskeletal: Negative for arthralgias, back pain, gait problem and myalgias  Skin: Negative for rash  Allergic/Immunologic: Negative  Neurological: Negative for dizziness, weakness, light-headedness and headaches  Hematological: Negative  Psychiatric/Behavioral: Negative for behavioral problems, dysphoric mood and sleep disturbance  The patient is not nervous/anxious  Objective:      /94 (BP Location: Left arm, Patient Position: Sitting, Cuff Size: Large)   Pulse (!) 108   Temp 99 3 °F (37 4 °C) (Tympanic)   Resp 20   Ht 4' 9" (1 448 m)   Wt 85 1 kg (187 lb 9 6 oz)   SpO2 96%   BMI 40 60 kg/m²          Physical Exam   Constitutional: She is oriented to person, place, and time  She appears well-developed and well-nourished  No distress  HENT:   Head: Normocephalic and atraumatic  Right Ear: External ear normal    Left Ear: External ear normal    Nose: Nose normal    Mouth/Throat: Oropharynx is clear and moist  No oropharyngeal exudate  Eyes: Conjunctivae and EOM are normal  Pupils are equal, round, and reactive to light  Right eye exhibits no discharge  Left eye exhibits no discharge  No scleral icterus  Neck: Normal range of motion  Neck supple  No thyromegaly present  Cardiovascular: Normal rate, regular rhythm and normal heart sounds  Exam reveals no gallop and no friction rub  No murmur heard  Pulmonary/Chest: Effort normal and breath sounds normal  No respiratory distress  She has no wheezes  She has no rales  Abdominal: Soft  Bowel sounds are normal  She exhibits distension  There is generalized tenderness  Musculoskeletal: Normal range of motion   She exhibits no edema, tenderness or deformity  Neurological: She is alert and oriented to person, place, and time  No cranial nerve deficit  Skin: Skin is warm and dry  She is not diaphoretic  Psychiatric: She has a normal mood and affect   Her behavior is normal  Judgment and thought content normal

## 2018-09-10 NOTE — DISCHARGE INSTRUCTIONS
Pancreatitis   WHAT YOU NEED TO KNOW:   Pancreatitis is inflammation of your pancreas  The pancreas is an organ that makes insulin  It also makes enzymes (digestive juices) that help your body digest food  Pancreatitis may be an acute (short-term) problem that happens only once  It may become a chronic (long-term) problem that comes and goes over time  DISCHARGE INSTRUCTIONS:   Return to the emergency department if:   · You have severe pain in your abdomen and you are vomiting  Contact your healthcare provider if:   · You have a fever  · You continue to lose weight  · Your skin or the whites of your eyes turn yellow  · You have questions or concerns about your condition or care  Medicines: You may need any of the following:  · Antibiotics  treat a bacterial infection  · Prescription pain medicine  may be given  Ask your healthcare provider how to take this medicine safely  Some prescription pain medicines contain acetaminophen  Do not take other medicines that contain acetaminophen without talking to your healthcare provider  Too much acetaminophen may cause liver damage  Prescription pain medicine may cause constipation  Ask your healthcare provider how to prevent or treat constipation  · Take your medicine as directed  Contact your healthcare provider if you think your medicine is not helping or if you have side effects  Tell him or her if you are allergic to any medicine  Keep a list of the medicines, vitamins, and herbs you take  Include the amounts, and when and why you take them  Bring the list or the pill bottles to follow-up visits  Carry your medicine list with you in case of an emergency  Self-care:   · Rest  when you feel it is needed  Slowly start to do more each day  Return to your usual activities as directed  · Do not drink any alcohol    If you need help to stop drinking, contact the following organization:   ¨ Alcoholics Anonymous  Web Address: http://www means info/      · Ask your healthcare provider or dietitian about the best foods to eat  You may need to eat foods that are low in fat if you have chronic pancreatitis  Follow up with your healthcare provider as directed:  Write down your questions so you remember to ask them during your visits  © 2017 [employer] Information is for End User's use only and may not be sold, redistributed or otherwise used for commercial purposes  All illustrations and images included in CareNotes® are the copyrighted property of [employer]  or [employer]  The above information is an  only  It is not intended as medical advice for individual conditions or treatments  Talk to your doctor, nurse or pharmacist before following any medical regimen to see if it is safe and effective for you

## 2018-09-11 LAB
ATRIAL RATE: 92 BPM
P AXIS: 60 DEGREES
PR INTERVAL: 166 MS
QRS AXIS: 22 DEGREES
QRSD INTERVAL: 90 MS
QT INTERVAL: 368 MS
QTC INTERVAL: 455 MS
T WAVE AXIS: 53 DEGREES
VENTRICULAR RATE: 92 BPM

## 2018-09-11 PROCEDURE — 93010 ELECTROCARDIOGRAM REPORT: CPT | Performed by: INTERNAL MEDICINE

## 2018-10-05 ENCOUNTER — OFFICE VISIT (OUTPATIENT)
Dept: FAMILY MEDICINE CLINIC | Facility: CLINIC | Age: 68
End: 2018-10-05
Payer: COMMERCIAL

## 2018-10-05 ENCOUNTER — APPOINTMENT (OUTPATIENT)
Dept: LAB | Facility: CLINIC | Age: 68
End: 2018-10-05
Payer: COMMERCIAL

## 2018-10-05 ENCOUNTER — TRANSCRIBE ORDERS (OUTPATIENT)
Dept: LAB | Facility: CLINIC | Age: 68
End: 2018-10-05

## 2018-10-05 VITALS
DIASTOLIC BLOOD PRESSURE: 78 MMHG | WEIGHT: 185 LBS | SYSTOLIC BLOOD PRESSURE: 122 MMHG | TEMPERATURE: 98.5 F | RESPIRATION RATE: 20 BRPM | HEART RATE: 88 BPM | HEIGHT: 57 IN | BODY MASS INDEX: 39.91 KG/M2

## 2018-10-05 DIAGNOSIS — F33.41 RECURRENT MAJOR DEPRESSIVE DISORDER, IN PARTIAL REMISSION (HCC): ICD-10-CM

## 2018-10-05 DIAGNOSIS — R06.02 SHORTNESS OF BREATH: ICD-10-CM

## 2018-10-05 DIAGNOSIS — K85.90 ACUTE PANCREATITIS, UNSPECIFIED COMPLICATION STATUS, UNSPECIFIED PANCREATITIS TYPE: ICD-10-CM

## 2018-10-05 DIAGNOSIS — K85.90 ACUTE PANCREATITIS, UNSPECIFIED COMPLICATION STATUS, UNSPECIFIED PANCREATITIS TYPE: Primary | ICD-10-CM

## 2018-10-05 DIAGNOSIS — R10.9 ABDOMINAL PAIN, UNSPECIFIED ABDOMINAL LOCATION: ICD-10-CM

## 2018-10-05 DIAGNOSIS — Z23 NEED FOR INFLUENZA VACCINATION: ICD-10-CM

## 2018-10-05 DIAGNOSIS — J30.9 ALLERGIC RHINITIS, UNSPECIFIED SEASONALITY, UNSPECIFIED TRIGGER: ICD-10-CM

## 2018-10-05 DIAGNOSIS — F32.A DEPRESSION, UNSPECIFIED DEPRESSION TYPE: ICD-10-CM

## 2018-10-05 DIAGNOSIS — E78.5 HYPERLIPIDEMIA, UNSPECIFIED HYPERLIPIDEMIA TYPE: ICD-10-CM

## 2018-10-05 DIAGNOSIS — M19.90 ARTHRITIS: ICD-10-CM

## 2018-10-05 PROBLEM — R10.84 GENERALIZED ABDOMINAL PAIN: Status: RESOLVED | Noted: 2017-09-06 | Resolved: 2018-10-05

## 2018-10-05 PROBLEM — L03.032 PARONYCHIA OF GREAT TOE OF LEFT FOOT: Status: RESOLVED | Noted: 2018-05-21 | Resolved: 2018-10-05

## 2018-10-05 LAB
ALBUMIN SERPL BCP-MCNC: 3.3 G/DL (ref 3.5–5)
ALP SERPL-CCNC: 139 U/L (ref 46–116)
ALT SERPL W P-5'-P-CCNC: 35 U/L (ref 12–78)
AMYLASE SERPL-CCNC: 28 IU/L (ref 25–115)
ANION GAP SERPL CALCULATED.3IONS-SCNC: 6 MMOL/L (ref 4–13)
AST SERPL W P-5'-P-CCNC: 24 U/L (ref 5–45)
BILIRUB SERPL-MCNC: 0.64 MG/DL (ref 0.2–1)
BUN SERPL-MCNC: 14 MG/DL (ref 5–25)
CALCIUM SERPL-MCNC: 9 MG/DL (ref 8.3–10.1)
CHLORIDE SERPL-SCNC: 109 MMOL/L (ref 100–108)
CO2 SERPL-SCNC: 25 MMOL/L (ref 21–32)
CREAT SERPL-MCNC: 0.59 MG/DL (ref 0.6–1.3)
GFR SERPL CREATININE-BSD FRML MDRD: 96 ML/MIN/1.73SQ M
GLUCOSE P FAST SERPL-MCNC: 103 MG/DL (ref 65–99)
LIPASE SERPL-CCNC: 79 U/L (ref 73–393)
POTASSIUM SERPL-SCNC: 3.8 MMOL/L (ref 3.5–5.3)
PROT SERPL-MCNC: 7.2 G/DL (ref 6.4–8.2)
SODIUM SERPL-SCNC: 140 MMOL/L (ref 136–145)

## 2018-10-05 PROCEDURE — 36415 COLL VENOUS BLD VENIPUNCTURE: CPT

## 2018-10-05 PROCEDURE — 83690 ASSAY OF LIPASE: CPT

## 2018-10-05 PROCEDURE — 1036F TOBACCO NON-USER: CPT | Performed by: PHYSICIAN ASSISTANT

## 2018-10-05 PROCEDURE — 1160F RVW MEDS BY RX/DR IN RCRD: CPT | Performed by: PHYSICIAN ASSISTANT

## 2018-10-05 PROCEDURE — 1160F RVW MEDS BY RX/DR IN RCRD: CPT

## 2018-10-05 PROCEDURE — 80053 COMPREHEN METABOLIC PANEL: CPT

## 2018-10-05 PROCEDURE — 82150 ASSAY OF AMYLASE: CPT

## 2018-10-05 PROCEDURE — 99214 OFFICE O/P EST MOD 30 MIN: CPT | Performed by: PHYSICIAN ASSISTANT

## 2018-10-05 PROCEDURE — 90662 IIV NO PRSV INCREASED AG IM: CPT

## 2018-10-05 PROCEDURE — G0008 ADMIN INFLUENZA VIRUS VAC: HCPCS

## 2018-10-05 RX ORDER — ATORVASTATIN CALCIUM 20 MG/1
20 TABLET, FILM COATED ORAL DAILY
Qty: 90 TABLET | Refills: 1 | Status: SHIPPED | OUTPATIENT
Start: 2018-10-05 | End: 2019-03-25 | Stop reason: SDUPTHER

## 2018-10-05 RX ORDER — DULOXETIN HYDROCHLORIDE 60 MG/1
60 CAPSULE, DELAYED RELEASE ORAL DAILY
Qty: 90 CAPSULE | Refills: 1 | Status: SHIPPED | OUTPATIENT
Start: 2018-10-05 | End: 2019-03-25 | Stop reason: SDUPTHER

## 2018-10-05 RX ORDER — DULOXETIN HYDROCHLORIDE 30 MG/1
30 CAPSULE, DELAYED RELEASE ORAL DAILY
Qty: 90 CAPSULE | Refills: 1 | Status: SHIPPED | OUTPATIENT
Start: 2018-10-05 | End: 2019-03-25 | Stop reason: SDUPTHER

## 2018-10-05 RX ORDER — ALBUTEROL SULFATE 90 UG/1
2 AEROSOL, METERED RESPIRATORY (INHALATION) EVERY 4 HOURS PRN
Qty: 3 INHALER | Refills: 1 | Status: SHIPPED | OUTPATIENT
Start: 2018-10-05

## 2018-10-05 RX ORDER — MONTELUKAST SODIUM 10 MG/1
10 TABLET ORAL DAILY
Qty: 90 TABLET | Refills: 1 | Status: SHIPPED | OUTPATIENT
Start: 2018-10-05 | End: 2019-03-25 | Stop reason: SDUPTHER

## 2018-10-05 RX ORDER — DICLOFENAC SODIUM 75 MG/1
75 TABLET, DELAYED RELEASE ORAL 2 TIMES DAILY
Qty: 60 TABLET | Refills: 1 | Status: SHIPPED | OUTPATIENT
Start: 2018-10-05 | End: 2018-12-18 | Stop reason: SDUPTHER

## 2018-10-05 RX ORDER — LORATADINE 10 MG/1
10 TABLET ORAL DAILY
Qty: 90 TABLET | Refills: 1 | Status: SHIPPED | OUTPATIENT
Start: 2018-10-05 | End: 2022-07-20

## 2018-10-05 RX ORDER — DICYCLOMINE HYDROCHLORIDE 10 MG/1
10 CAPSULE ORAL 3 TIMES DAILY
Qty: 270 CAPSULE | Refills: 1 | Status: SHIPPED | OUTPATIENT
Start: 2018-10-05 | End: 2019-07-22 | Stop reason: SDUPTHER

## 2018-10-05 NOTE — ASSESSMENT & PLAN NOTE
Will have pt continue current conservative treatment as origin for her pancreatitis has not been established  Check labs  Keep upcoming GI appt

## 2018-10-05 NOTE — PROGRESS NOTES
Assessment/Plan:    Acute pancreatitis  Will have pt continue current conservative treatment as origin for her pancreatitis has not been established  Check labs  Keep upcoming GI appt  Diagnoses and all orders for this visit:    Acute pancreatitis, unspecified complication status, unspecified pancreatitis type  -     Comprehensive metabolic panel; Future  -     Amylase; Future  -     Lipase; Future    Need for influenza vaccination  -     influenza vaccine, 5602-4021, high-dose, PF 0 5 mL, for patients 65 yr+ (FLUZONE HIGH-DOSE)    Allergic rhinitis, unspecified seasonality, unspecified trigger  -     montelukast (SINGULAIR) 10 mg tablet; Take 1 tablet (10 mg total) by mouth daily  -     loratadine (CLARITIN) 10 mg tablet; Take 1 tablet (10 mg total) by mouth daily    Recurrent major depressive disorder, in partial remission (HCC)  -     DULoxetine (CYMBALTA) 60 mg delayed release capsule; Take 1 capsule (60 mg total) by mouth daily    Shortness of breath  -     albuterol (PROAIR HFA) 90 mcg/act inhaler; Inhale 2 puffs every 4 (four) hours as needed for wheezing    Hyperlipidemia, unspecified hyperlipidemia type  -     atorvastatin (LIPITOR) 20 mg tablet; Take 1 tablet (20 mg total) by mouth daily    Depression, unspecified depression type  -     DULoxetine (CYMBALTA) 30 mg delayed release capsule; Take 1 capsule (30 mg total) by mouth daily    Abdominal pain, unspecified abdominal location  -     dicyclomine (BENTYL) 10 mg capsule; Take 1 capsule (10 mg total) by mouth 3 (three) times a day    Arthritis  -     diclofenac (VOLTAREN) 75 mg EC tablet; Take 1 tablet (75 mg total) by mouth 2 (two) times a day          Subjective:      Patient ID: Robert Can is a 79 y o  female  Pt presents for ER follow up  She was sent to the ER at our last visit for an acute abdomen  She was diagnosed with pancreatitis  She does not drink alcohol and is not a diabetic   She was told to follow a light diet and increase her fluids  She is not on any new medications and no recent abx  She notes her pain is improved but not resolved  She has a GI appt on Monday  She denies any new complaints or concerns  The following portions of the patient's history were reviewed and updated as appropriate:   She  has a past medical history of Anxiety; Asthma; Cancer ([facility] [address] ); Cataract; High cholesterol; Hypertension; IBS (irritable bowel syndrome); Irritable bowel syndrome (IBS) (5/31/2016); Obesity; Post-menopausal; and Vitamin D deficiency  She   Patient Active Problem List    Diagnosis Date Noted    Acute pancreatitis 10/05/2018    Bilateral carpal tunnel syndrome 06/19/2017    Asthma 06/07/2017    Lymphedema 06/07/2017    Menopause 06/07/2017    Generalized anxiety disorder 06/10/2016    Patent foramen ovale 06/10/2016    Tension type headache 06/10/2016    Hypertension 06/07/2016    Irritable bowel syndrome (IBS) 05/31/2016    Chronic musculoskeletal pain 01/29/2016    Vitamin D deficiency 01/20/2016    Hyperlipidemia LDL goal <130 12/07/2015     She  has a past surgical history that includes Tonsillectomy and adenoidectomy; Breast surgery; Cholecystectomy; Knee surgery; Colonoscopy (N/A, 5/31/2016); Gallbladder surgery; Hysterectomy; and Cataract extraction, bilateral   Her She was adopted  Family history is unknown by patient  She  reports that she has never smoked  She has never used smokeless tobacco  She reports that she drinks alcohol  She reports that she does not use drugs  Current Outpatient Prescriptions   Medication Sig Dispense Refill    albuterol (2 5 mg/3 mL) 0 083 % nebulizer solution Take 2 5 mg by nebulization every 6 (six) hours as needed for wheezing        albuterol (PROAIR HFA) 90 mcg/act inhaler Inhale 2 puffs every 4 (four) hours as needed for wheezing 3 Inhaler 1    atorvastatin (LIPITOR) 20 mg tablet Take 1 tablet (20 mg total) by mouth daily 90 tablet 1    butalbital-acetaminophen-caffeine (FIORICET,ESGIC) -40 mg per tablet Take 1 tablet by mouth 3 (three) times a day as needed      Cholecalciferol (VITAMIN D3) 2000 units capsule Take 1 capsule by mouth daily      cycloSPORINE (RESTASIS) 0 05 % ophthalmic emulsion Administer 1 drop to both eyes every 12 (twelve) hours      dicyclomine (BENTYL) 10 mg capsule Take 1 capsule (10 mg total) by mouth 3 (three) times a day 270 capsule 1    DULoxetine (CYMBALTA) 30 mg delayed release capsule Take 1 capsule (30 mg total) by mouth daily 90 capsule 1    DULoxetine (CYMBALTA) 60 mg delayed release capsule Take 1 capsule (60 mg total) by mouth daily 90 capsule 1    loratadine (CLARITIN) 10 mg tablet Take 1 tablet (10 mg total) by mouth daily 90 tablet 1    montelukast (SINGULAIR) 10 mg tablet Take 1 tablet (10 mg total) by mouth daily 90 tablet 1    Nutritional Supplements (BOOST BREEZE PO) Take by mouth daily      ondansetron (ZOFRAN-ODT) 4 mg disintegrating tablet Take 1 tablet (4 mg total) by mouth every 8 (eight) hours as needed for nausea or vomiting for up to 30 days 12 tablet 0    diclofenac (VOLTAREN) 75 mg EC tablet Take 1 tablet (75 mg total) by mouth 2 (two) times a day 60 tablet 1     No current facility-administered medications for this visit  Current Outpatient Prescriptions on File Prior to Visit   Medication Sig    albuterol (2 5 mg/3 mL) 0 083 % nebulizer solution Take 2 5 mg by nebulization every 6 (six) hours as needed for wheezing      butalbital-acetaminophen-caffeine (FIORICET,ESGIC) -40 mg per tablet Take 1 tablet by mouth 3 (three) times a day as needed    Cholecalciferol (VITAMIN D3) 2000 units capsule Take 1 capsule by mouth daily    cycloSPORINE (RESTASIS) 0 05 % ophthalmic emulsion Administer 1 drop to both eyes every 12 (twelve) hours    Nutritional Supplements (BOOST BREEZE PO) Take by mouth daily    ondansetron (ZOFRAN-ODT) 4 mg disintegrating tablet Take 1 tablet (4 mg total) by mouth every 8 (eight) hours as needed for nausea or vomiting for up to 30 days    [DISCONTINUED] albuterol (PROAIR HFA) 90 mcg/act inhaler Inhale 2 puffs every 4 (four) hours as needed for wheezing    [DISCONTINUED] atorvastatin (LIPITOR) 20 mg tablet Take 1 tablet by mouth daily    [DISCONTINUED] dicyclomine (BENTYL) 10 mg capsule Take 1 capsule by mouth 3 (three) times a day    [DISCONTINUED] DULoxetine (CYMBALTA) 30 mg delayed release capsule Take 30 mg by mouth daily      [DISCONTINUED] DULoxetine (CYMBALTA) 60 mg delayed release capsule TAKE 1 CAPSULE DAILY    [DISCONTINUED] loratadine (CLARITIN) 10 mg tablet Take 1 tablet (10 mg total) by mouth daily    [DISCONTINUED] montelukast (SINGULAIR) 10 mg tablet Take 1 tablet (10 mg total) by mouth daily    [DISCONTINUED] oxyCODONE-acetaminophen (PERCOCET) 5-325 mg per tablet Take by mouth as needed       No current facility-administered medications on file prior to visit  She is allergic to latex and asa [aspirin]       Review of Systems   Constitutional: Negative for chills and fever  HENT: Negative for congestion, ear pain, hearing loss, postnasal drip, rhinorrhea, sinus pain, sinus pressure, sore throat and trouble swallowing  Eyes: Negative for pain and visual disturbance  Respiratory: Negative for cough, chest tightness, shortness of breath and wheezing  Cardiovascular: Negative  Negative for chest pain, palpitations and leg swelling  Gastrointestinal: Positive for abdominal pain  Negative for blood in stool, constipation, diarrhea, nausea and vomiting  Endocrine: Negative for cold intolerance, heat intolerance, polydipsia, polyphagia and polyuria  Genitourinary: Negative for difficulty urinating, dysuria, flank pain and urgency  Musculoskeletal: Negative for arthralgias, back pain, gait problem and myalgias  Skin: Negative for rash  Allergic/Immunologic: Negative  Neurological: Negative for dizziness, weakness, light-headedness and headaches  Hematological: Negative  Psychiatric/Behavioral: Negative for behavioral problems, dysphoric mood and sleep disturbance  The patient is not nervous/anxious  Objective:      /78 (BP Location: Left arm, Patient Position: Sitting, Cuff Size: Large)   Pulse 88   Temp 98 5 °F (36 9 °C) (Tympanic)   Resp 20   Ht 4' 9" (1 448 m)   Wt 83 9 kg (185 lb)   BMI 40 03 kg/m²          Physical Exam   Constitutional: She is oriented to person, place, and time  She appears well-developed and well-nourished  No distress  HENT:   Head: Normocephalic and atraumatic  Right Ear: External ear normal    Left Ear: External ear normal    Nose: Nose normal    Mouth/Throat: Oropharynx is clear and moist  No oropharyngeal exudate  Eyes: Pupils are equal, round, and reactive to light  Conjunctivae and EOM are normal  Right eye exhibits no discharge  Left eye exhibits no discharge  No scleral icterus  Neck: Normal range of motion  Neck supple  No thyromegaly present  Cardiovascular: Normal rate, regular rhythm and normal heart sounds  Exam reveals no gallop and no friction rub  No murmur heard  Pulmonary/Chest: Effort normal and breath sounds normal  No respiratory distress  She has no wheezes  She has no rales  Abdominal: Soft  Bowel sounds are normal  She exhibits distension  There is no tenderness  Musculoskeletal: Normal range of motion  She exhibits no edema, tenderness or deformity  Neurological: She is alert and oriented to person, place, and time  No cranial nerve deficit  Skin: Skin is warm and dry  She is not diaphoretic  Psychiatric: She has a normal mood and affect   Her behavior is normal  Judgment and thought content normal

## 2018-10-22 ENCOUNTER — TELEPHONE (OUTPATIENT)
Dept: FAMILY MEDICINE CLINIC | Facility: CLINIC | Age: 68
End: 2018-10-22

## 2018-10-22 ENCOUNTER — TRANSCRIBE ORDERS (OUTPATIENT)
Dept: ADMINISTRATIVE | Facility: HOSPITAL | Age: 68
End: 2018-10-22

## 2018-10-22 DIAGNOSIS — Z85.3 HISTORY OF BREAST CANCER: Primary | ICD-10-CM

## 2018-10-22 DIAGNOSIS — N63.20 LUMP OF LEFT BREAST: Primary | ICD-10-CM

## 2018-10-22 DIAGNOSIS — R10.11 RUQ PAIN: Primary | ICD-10-CM

## 2018-10-22 DIAGNOSIS — Z12.31 VISIT FOR SCREENING MAMMOGRAM: ICD-10-CM

## 2018-10-22 DIAGNOSIS — K86.1 OTHER CHRONIC PANCREATITIS (HCC): ICD-10-CM

## 2018-10-24 ENCOUNTER — TELEPHONE (OUTPATIENT)
Dept: FAMILY MEDICINE CLINIC | Facility: CLINIC | Age: 68
End: 2018-10-24

## 2018-10-24 DIAGNOSIS — N63.20 LUMP OF LEFT BREAST: ICD-10-CM

## 2018-10-24 DIAGNOSIS — Z12.39 SCREENING FOR BREAST CANCER: Primary | ICD-10-CM

## 2018-10-24 NOTE — TELEPHONE ENCOUNTER
Jean Carlos Melvin from [facility] is questioning why she needs a diagnostic Mammo for a lump when there is no indication in your note for this  On the referral it states Approval for Diagnostic call back/ ultrasound if necessary: Unknown    She feels the patient requested this when she scheduled the Mammo  She is scheduled for next week    So they need a little more clarity and a new referral stating exactly what needs to be done      Please [facility]  Phone:  [phone]

## 2018-10-24 NOTE — TELEPHONE ENCOUNTER
Are you going to change the script to include an us if indicated to keep the original or Bring her in for an appointment to document      Please advise

## 2018-10-24 NOTE — TELEPHONE ENCOUNTER
I did not put the mammo order in, the staff did  I also do not know about a lump which is why it wasn't documented  She has a known hx of breast CA and had a mastectomy in the past  She may have been the one to request it like Juan Roche suggested it

## 2018-11-01 ENCOUNTER — HOSPITAL ENCOUNTER (OUTPATIENT)
Dept: MAMMOGRAPHY | Facility: HOSPITAL | Age: 68
Discharge: HOME | End: 2018-11-01
Payer: COMMERCIAL

## 2018-11-01 ENCOUNTER — HOSPITAL ENCOUNTER (OUTPATIENT)
Dept: ULTRASOUND IMAGING | Facility: HOSPITAL | Age: 68
Discharge: HOME | End: 2018-11-01
Payer: COMMERCIAL

## 2018-11-01 DIAGNOSIS — Z12.39 SCREENING FOR BREAST CANCER: ICD-10-CM

## 2018-11-01 DIAGNOSIS — R10.11 RUQ PAIN: ICD-10-CM

## 2018-11-01 DIAGNOSIS — K86.1 OTHER CHRONIC PANCREATITIS (HCC): ICD-10-CM

## 2018-11-01 PROCEDURE — 77063 BREAST TOMOSYNTHESIS BI: CPT

## 2018-11-01 PROCEDURE — 76705 ECHO EXAM OF ABDOMEN: CPT

## 2018-11-01 PROCEDURE — 77067 SCR MAMMO BI INCL CAD: CPT

## 2018-11-19 ENCOUNTER — TELEPHONE (OUTPATIENT)
Dept: FAMILY MEDICINE CLINIC | Facility: CLINIC | Age: 68
End: 2018-11-19

## 2018-11-19 NOTE — TELEPHONE ENCOUNTER
Antoine Neal wants to know if you can remember where you told her to her her prostetic bra from about 2 yrs ago  She cant find a new place and her she remember you helped  Her with this      Please advise    Phone: [phone]

## 2018-12-06 ENCOUNTER — HOSPITAL ENCOUNTER (OUTPATIENT)
Dept: ULTRASOUND IMAGING | Facility: HOSPITAL | Age: 68
Discharge: HOME | End: 2018-12-06
Payer: COMMERCIAL

## 2018-12-06 DIAGNOSIS — R92.8 ABNORMAL MAMMOGRAM: ICD-10-CM

## 2018-12-06 PROCEDURE — 76642 ULTRASOUND BREAST LIMITED: CPT

## 2018-12-18 DIAGNOSIS — M19.90 ARTHRITIS: ICD-10-CM

## 2018-12-18 RX ORDER — DICLOFENAC SODIUM 75 MG/1
TABLET, DELAYED RELEASE ORAL
Qty: 60 TABLET | Refills: 1 | Status: SHIPPED | OUTPATIENT
Start: 2018-12-18 | End: 2019-01-24 | Stop reason: SDUPTHER

## 2019-01-24 DIAGNOSIS — M19.90 ARTHRITIS: ICD-10-CM

## 2019-01-24 RX ORDER — DICLOFENAC SODIUM 75 MG/1
TABLET, DELAYED RELEASE ORAL
Qty: 60 TABLET | Refills: 2 | Status: SHIPPED | OUTPATIENT
Start: 2019-01-24 | End: 2019-04-24 | Stop reason: SDUPTHER

## 2019-01-30 DIAGNOSIS — R51.9 NONINTRACTABLE HEADACHE, UNSPECIFIED CHRONICITY PATTERN, UNSPECIFIED HEADACHE TYPE: Primary | ICD-10-CM

## 2019-01-31 RX ORDER — BUTALBITAL, ACETAMINOPHEN AND CAFFEINE 50; 325; 40 MG/1; MG/1; MG/1
1 TABLET ORAL 3 TIMES DAILY PRN
Qty: 90 TABLET | Refills: 0 | Status: SHIPPED | OUTPATIENT
Start: 2019-01-31 | End: 2020-05-04 | Stop reason: SDUPTHER

## 2019-02-01 ENCOUNTER — TELEPHONE (OUTPATIENT)
Dept: FAMILY MEDICINE CLINIC | Facility: CLINIC | Age: 69
End: 2019-02-01

## 2019-02-01 NOTE — TELEPHONE ENCOUNTER
Pt wanted to let you know dicyclomine, she was taking 3 times a day, having problems going to the bathroom  So she cut it down to 2 times a day  Then went to 1 time a day  Said the 1 time a day is working good  Is this ok with you?

## 2019-02-22 ENCOUNTER — TRANSCRIBE ORDERS (OUTPATIENT)
Dept: LAB | Facility: CLINIC | Age: 69
End: 2019-02-22

## 2019-02-22 ENCOUNTER — APPOINTMENT (OUTPATIENT)
Dept: LAB | Facility: CLINIC | Age: 69
End: 2019-02-22
Payer: COMMERCIAL

## 2019-02-22 ENCOUNTER — OFFICE VISIT (OUTPATIENT)
Dept: FAMILY MEDICINE CLINIC | Facility: CLINIC | Age: 69
End: 2019-02-22
Payer: COMMERCIAL

## 2019-02-22 VITALS
BODY MASS INDEX: 38.62 KG/M2 | WEIGHT: 179 LBS | RESPIRATION RATE: 20 BRPM | HEIGHT: 57 IN | SYSTOLIC BLOOD PRESSURE: 140 MMHG | TEMPERATURE: 98.5 F | HEART RATE: 88 BPM | DIASTOLIC BLOOD PRESSURE: 82 MMHG

## 2019-02-22 DIAGNOSIS — Z11.59 NEED FOR HEPATITIS C SCREENING TEST: ICD-10-CM

## 2019-02-22 DIAGNOSIS — I10 ESSENTIAL HYPERTENSION: Primary | ICD-10-CM

## 2019-02-22 DIAGNOSIS — K86.1 OTHER CHRONIC PANCREATITIS (HCC): ICD-10-CM

## 2019-02-22 DIAGNOSIS — I89.0 LYMPHEDEMA: ICD-10-CM

## 2019-02-22 DIAGNOSIS — G89.29 CHRONIC MUSCULOSKELETAL PAIN: ICD-10-CM

## 2019-02-22 DIAGNOSIS — F33.41 RECURRENT MAJOR DEPRESSIVE DISORDER, IN PARTIAL REMISSION (HCC): ICD-10-CM

## 2019-02-22 DIAGNOSIS — M79.18 CHRONIC MUSCULOSKELETAL PAIN: ICD-10-CM

## 2019-02-22 PROBLEM — K85.90 ACUTE PANCREATITIS: Status: RESOLVED | Noted: 2018-10-05 | Resolved: 2019-02-22

## 2019-02-22 PROCEDURE — 36415 COLL VENOUS BLD VENIPUNCTURE: CPT

## 2019-02-22 PROCEDURE — 99213 OFFICE O/P EST LOW 20 MIN: CPT | Performed by: PHYSICIAN ASSISTANT

## 2019-02-22 PROCEDURE — 86803 HEPATITIS C AB TEST: CPT

## 2019-02-22 PROCEDURE — 3008F BODY MASS INDEX DOCD: CPT | Performed by: PHYSICIAN ASSISTANT

## 2019-02-22 PROCEDURE — 1160F RVW MEDS BY RX/DR IN RCRD: CPT | Performed by: PHYSICIAN ASSISTANT

## 2019-02-22 PROCEDURE — 1036F TOBACCO NON-USER: CPT | Performed by: PHYSICIAN ASSISTANT

## 2019-02-22 NOTE — PROGRESS NOTES
Assessment/Plan:    Hypertension  Pts symptoms are stable with current regime  No changes at present  Lymphedema  Proceed with new lymphedema sleeve and breast prosthetic  Chronic musculoskeletal pain  Pts symptoms are stable with current regime  No changes at present  Diagnoses and all orders for this visit:    Essential hypertension    Need for hepatitis C screening test  -     Hepatitis C antibody; Future    Recurrent major depressive disorder, in partial remission (HCC)    Other chronic pancreatitis (HCC)    Body mass index (BMI) of 40 0-44 9 in [facility])    Lymphedema    Chronic musculoskeletal pain          Subjective:      Patient ID: Roro Gautam is a 81 y o  female  Pt presents for routine visit  She is in the process of getting a new right breast prosthesis and new lymphedema sleeve from medical supply  The cymbalta continues to be beneficial for both her mood and neuropathic pain  Pancreatitis has resolved completely  BP is stable  She denies any new complaints or concerns  Mammo and labs are up to date  The following portions of the patient's history were reviewed and updated as appropriate:   She  has a past medical history of Anxiety, Asthma, Cancer ([facility] [address] ), Cataract, High cholesterol, Hypertension, IBS (irritable bowel syndrome), Irritable bowel syndrome (IBS) (5/31/2016), Obesity, Post-menopausal, and Vitamin D deficiency    She   Patient Active Problem List    Diagnosis Date Noted    Recurrent major depressive disorder, in partial remission ([facility] [address] ) 02/22/2019    [facility] 02/22/2019    Body mass index (BMI) of 40 0-44 9 in [facility]) 02/22/2019    Bilateral carpal tunnel syndrome 06/19/2017    Asthma 06/07/2017    Lymphedema 06/07/2017    Menopause 06/07/2017    Generalized anxiety disorder 06/10/2016    Patent foramen ovale 06/10/2016    Tension type headache 06/10/2016    Hypertension 06/07/2016    Irritable bowel syndrome (IBS) 05/31/2016    Chronic musculoskeletal pain 01/29/2016    Vitamin D deficiency 01/20/2016    Hyperlipidemia LDL goal <130 12/07/2015     She  has a past surgical history that includes Tonsillectomy and adenoidectomy; Breast surgery; Cholecystectomy; Knee surgery; Colonoscopy (N/A, 5/31/2016); Gallbladder surgery; Hysterectomy; and Cataract extraction, bilateral   Her She was adopted  Family history is unknown by patient  She  reports that she has never smoked  She has never used smokeless tobacco  She reports that she drinks alcohol  She reports that she does not use drugs  Current Outpatient Medications   Medication Sig Dispense Refill    albuterol (2 5 mg/3 mL) 0 083 % nebulizer solution Take 2 5 mg by nebulization every 6 (six) hours as needed for wheezing   albuterol (PROAIR HFA) 90 mcg/act inhaler Inhale 2 puffs every 4 (four) hours as needed for wheezing 3 Inhaler 1    atorvastatin (LIPITOR) 20 mg tablet Take 1 tablet (20 mg total) by mouth daily 90 tablet 1    butalbital-acetaminophen-caffeine (FIORICET,ESGIC) -40 mg per tablet Take 1 tablet by mouth 3 (three) times a day as needed for headaches 90 tablet 0    Cholecalciferol (VITAMIN D3) 2000 units capsule Take 1 capsule by mouth daily      diclofenac (VOLTAREN) 75 mg EC tablet Take 1 tablet by mouth twice daily   60 tablet 2    dicyclomine (BENTYL) 10 mg capsule Take 1 capsule (10 mg total) by mouth 3 (three) times a day (Patient taking differently: Take 10 mg by mouth daily ) 270 capsule 1    DULoxetine (CYMBALTA) 30 mg delayed release capsule Take 1 capsule (30 mg total) by mouth daily 90 capsule 1    DULoxetine (CYMBALTA) 60 mg delayed release capsule Take 1 capsule (60 mg total) by mouth daily 90 capsule 1    loratadine (CLARITIN) 10 mg tablet Take 1 tablet (10 mg total) by mouth daily 90 tablet 1    montelukast (SINGULAIR) 10 mg tablet Take 1 tablet (10 mg total) by mouth daily 90 tablet 1    Nutritional Supplements (BOOST BREEZE PO) Take by mouth daily       No current facility-administered medications for this visit  Current Outpatient Medications on File Prior to Visit   Medication Sig    albuterol (2 5 mg/3 mL) 0 083 % nebulizer solution Take 2 5 mg by nebulization every 6 (six) hours as needed for wheezing   albuterol (PROAIR HFA) 90 mcg/act inhaler Inhale 2 puffs every 4 (four) hours as needed for wheezing    atorvastatin (LIPITOR) 20 mg tablet Take 1 tablet (20 mg total) by mouth daily    butalbital-acetaminophen-caffeine (FIORICET,ESGIC) -40 mg per tablet Take 1 tablet by mouth 3 (three) times a day as needed for headaches    Cholecalciferol (VITAMIN D3) 2000 units capsule Take 1 capsule by mouth daily    diclofenac (VOLTAREN) 75 mg EC tablet Take 1 tablet by mouth twice daily   dicyclomine (BENTYL) 10 mg capsule Take 1 capsule (10 mg total) by mouth 3 (three) times a day (Patient taking differently: Take 10 mg by mouth daily )    DULoxetine (CYMBALTA) 30 mg delayed release capsule Take 1 capsule (30 mg total) by mouth daily    DULoxetine (CYMBALTA) 60 mg delayed release capsule Take 1 capsule (60 mg total) by mouth daily    loratadine (CLARITIN) 10 mg tablet Take 1 tablet (10 mg total) by mouth daily    montelukast (SINGULAIR) 10 mg tablet Take 1 tablet (10 mg total) by mouth daily    Nutritional Supplements (BOOST BREEZE PO) Take by mouth daily    [DISCONTINUED] cycloSPORINE (RESTASIS) 0 05 % ophthalmic emulsion Administer 1 drop to both eyes every 12 (twelve) hours    [DISCONTINUED] ondansetron (ZOFRAN-ODT) 4 mg disintegrating tablet Take 1 tablet (4 mg total) by mouth every 8 (eight) hours as needed for nausea or vomiting for up to 30 days     No current facility-administered medications on file prior to visit  She is allergic to latex and asa [aspirin]       Review of Systems   Constitutional: Negative for chills and fever     HENT: Negative for congestion, ear pain, hearing loss, postnasal drip, rhinorrhea, sinus pressure, sinus pain, sore throat and trouble swallowing  Eyes: Negative for pain and visual disturbance  Respiratory: Negative for cough, chest tightness, shortness of breath and wheezing  Cardiovascular: Negative  Negative for chest pain, palpitations and leg swelling  Gastrointestinal: Negative for abdominal pain, blood in stool, constipation, diarrhea, nausea and vomiting  Endocrine: Negative for cold intolerance, heat intolerance, polydipsia, polyphagia and polyuria  Genitourinary: Negative for difficulty urinating, dysuria, flank pain and urgency  Musculoskeletal: Negative for arthralgias, back pain, gait problem and myalgias  Skin: Negative for rash  Allergic/Immunologic: Negative  Neurological: Negative for dizziness, weakness, light-headedness and headaches  Hematological: Negative  Psychiatric/Behavioral: Negative for behavioral problems, dysphoric mood and sleep disturbance  The patient is not nervous/anxious  Objective:      /82 (BP Location: Left arm, Patient Position: Sitting, Cuff Size: Large)   Pulse 88   Temp 98 5 °F (36 9 °C) (Tympanic)   Resp 20   Ht 4' 9" (1 448 m)   Wt 81 2 kg (179 lb)   LMP  (LMP Unknown)   BMI 38 74 kg/m²          Physical Exam   Constitutional: She is oriented to person, place, and time  She appears well-developed and well-nourished  No distress  HENT:   Head: Normocephalic and atraumatic  Right Ear: External ear normal    Left Ear: External ear normal    Nose: Nose normal    Mouth/Throat: Oropharynx is clear and moist  No oropharyngeal exudate  Eyes: Pupils are equal, round, and reactive to light  Conjunctivae and EOM are normal  Right eye exhibits no discharge  Left eye exhibits no discharge  No scleral icterus  Neck: Normal range of motion  Neck supple  No thyromegaly present  Cardiovascular: Normal rate, regular rhythm and normal heart sounds  Exam reveals no gallop and no friction rub  No murmur heard  Pulmonary/Chest: Effort normal and breath sounds normal  No respiratory distress  She has no wheezes  She has no rales  Abdominal: Soft  Bowel sounds are normal  She exhibits no distension  There is no tenderness  Musculoskeletal: Normal range of motion  She exhibits no edema, tenderness or deformity  Neurological: She is alert and oriented to person, place, and time  No cranial nerve deficit  Skin: Skin is warm and dry  She is not diaphoretic  Psychiatric: She has a normal mood and affect   Her behavior is normal  Judgment and thought content normal

## 2019-02-23 LAB — HCV AB SER QL: NORMAL

## 2019-03-25 DIAGNOSIS — F32.A DEPRESSION, UNSPECIFIED DEPRESSION TYPE: ICD-10-CM

## 2019-03-25 DIAGNOSIS — K21.9 GASTROESOPHAGEAL REFLUX DISEASE WITHOUT ESOPHAGITIS: Primary | ICD-10-CM

## 2019-03-25 DIAGNOSIS — J30.9 ALLERGIC RHINITIS, UNSPECIFIED SEASONALITY, UNSPECIFIED TRIGGER: ICD-10-CM

## 2019-03-25 DIAGNOSIS — F33.41 RECURRENT MAJOR DEPRESSIVE DISORDER, IN PARTIAL REMISSION (HCC): ICD-10-CM

## 2019-03-25 DIAGNOSIS — E78.5 HYPERLIPIDEMIA, UNSPECIFIED HYPERLIPIDEMIA TYPE: ICD-10-CM

## 2019-03-25 RX ORDER — PANTOPRAZOLE SODIUM 40 MG/1
TABLET, DELAYED RELEASE ORAL
Qty: 30 TABLET | Refills: 6 | Status: SHIPPED | OUTPATIENT
Start: 2019-03-25 | End: 2019-10-20 | Stop reason: SDUPTHER

## 2019-03-28 RX ORDER — ATORVASTATIN CALCIUM 20 MG/1
TABLET, FILM COATED ORAL
Qty: 30 TABLET | Refills: 5 | Status: SHIPPED | OUTPATIENT
Start: 2019-03-28 | End: 2019-09-21 | Stop reason: SDUPTHER

## 2019-03-28 RX ORDER — DULOXETIN HYDROCHLORIDE 60 MG/1
CAPSULE, DELAYED RELEASE ORAL
Qty: 90 CAPSULE | Refills: 1 | Status: SHIPPED | OUTPATIENT
Start: 2019-03-28 | End: 2019-09-21 | Stop reason: SDUPTHER

## 2019-03-28 RX ORDER — MONTELUKAST SODIUM 10 MG/1
TABLET ORAL
Qty: 30 TABLET | Refills: 5 | Status: SHIPPED | OUTPATIENT
Start: 2019-03-28 | End: 2019-09-21 | Stop reason: SDUPTHER

## 2019-03-28 RX ORDER — DULOXETIN HYDROCHLORIDE 30 MG/1
CAPSULE, DELAYED RELEASE ORAL
Qty: 90 CAPSULE | Refills: 1 | Status: SHIPPED | OUTPATIENT
Start: 2019-03-28 | End: 2019-09-21 | Stop reason: SDUPTHER

## 2019-04-04 DIAGNOSIS — Z85.3 HISTORY OF BREAST CANCER: Primary | ICD-10-CM

## 2019-04-24 DIAGNOSIS — M19.90 ARTHRITIS: ICD-10-CM

## 2019-04-24 RX ORDER — DICLOFENAC SODIUM 75 MG/1
TABLET, DELAYED RELEASE ORAL
Qty: 60 TABLET | Refills: 1 | Status: SHIPPED | OUTPATIENT
Start: 2019-04-24 | End: 2019-06-22 | Stop reason: SDUPTHER

## 2019-05-24 ENCOUNTER — OFFICE VISIT (OUTPATIENT)
Dept: FAMILY MEDICINE CLINIC | Facility: CLINIC | Age: 69
End: 2019-05-24
Payer: COMMERCIAL

## 2019-05-24 VITALS
BODY MASS INDEX: 39.7 KG/M2 | SYSTOLIC BLOOD PRESSURE: 142 MMHG | DIASTOLIC BLOOD PRESSURE: 82 MMHG | RESPIRATION RATE: 20 BRPM | TEMPERATURE: 99.5 F | WEIGHT: 184 LBS | HEART RATE: 80 BPM | HEIGHT: 57 IN

## 2019-05-24 DIAGNOSIS — F33.41 RECURRENT MAJOR DEPRESSIVE DISORDER, IN PARTIAL REMISSION (HCC): ICD-10-CM

## 2019-05-24 DIAGNOSIS — I10 ESSENTIAL HYPERTENSION: Primary | ICD-10-CM

## 2019-05-24 DIAGNOSIS — Z00.00 MEDICARE ANNUAL WELLNESS VISIT, INITIAL: ICD-10-CM

## 2019-05-24 DIAGNOSIS — K86.1 OTHER CHRONIC PANCREATITIS (HCC): ICD-10-CM

## 2019-05-24 PROCEDURE — 1125F AMNT PAIN NOTED PAIN PRSNT: CPT | Performed by: PHYSICIAN ASSISTANT

## 2019-05-24 PROCEDURE — 1170F FXNL STATUS ASSESSED: CPT | Performed by: PHYSICIAN ASSISTANT

## 2019-05-24 PROCEDURE — 3008F BODY MASS INDEX DOCD: CPT | Performed by: PHYSICIAN ASSISTANT

## 2019-05-24 PROCEDURE — G0438 PPPS, INITIAL VISIT: HCPCS | Performed by: PHYSICIAN ASSISTANT

## 2019-05-24 PROCEDURE — 99213 OFFICE O/P EST LOW 20 MIN: CPT | Performed by: PHYSICIAN ASSISTANT

## 2019-05-24 PROCEDURE — 1160F RVW MEDS BY RX/DR IN RCRD: CPT | Performed by: PHYSICIAN ASSISTANT

## 2019-06-22 DIAGNOSIS — M19.90 ARTHRITIS: ICD-10-CM

## 2019-06-24 RX ORDER — DICLOFENAC SODIUM 75 MG/1
TABLET, DELAYED RELEASE ORAL
Qty: 60 TABLET | Refills: 2 | Status: SHIPPED | OUTPATIENT
Start: 2019-06-24 | End: 2019-09-21 | Stop reason: SDUPTHER

## 2019-07-22 DIAGNOSIS — R10.9 ABDOMINAL PAIN, UNSPECIFIED ABDOMINAL LOCATION: ICD-10-CM

## 2019-07-22 RX ORDER — DICYCLOMINE HYDROCHLORIDE 10 MG/1
CAPSULE ORAL
Qty: 90 CAPSULE | Refills: 0 | Status: SHIPPED | OUTPATIENT
Start: 2019-07-22 | End: 2019-10-20 | Stop reason: SDUPTHER

## 2019-09-21 DIAGNOSIS — M19.90 ARTHRITIS: ICD-10-CM

## 2019-09-21 DIAGNOSIS — F33.41 RECURRENT MAJOR DEPRESSIVE DISORDER, IN PARTIAL REMISSION (HCC): ICD-10-CM

## 2019-09-21 DIAGNOSIS — E78.5 HYPERLIPIDEMIA, UNSPECIFIED HYPERLIPIDEMIA TYPE: ICD-10-CM

## 2019-09-21 DIAGNOSIS — F32.A DEPRESSION, UNSPECIFIED DEPRESSION TYPE: ICD-10-CM

## 2019-09-21 DIAGNOSIS — J30.9 ALLERGIC RHINITIS, UNSPECIFIED SEASONALITY, UNSPECIFIED TRIGGER: ICD-10-CM

## 2019-09-21 RX ORDER — MONTELUKAST SODIUM 10 MG/1
TABLET ORAL
Qty: 30 TABLET | Refills: 4 | Status: SHIPPED | OUTPATIENT
Start: 2019-09-21 | End: 2020-02-18

## 2019-09-21 RX ORDER — ATORVASTATIN CALCIUM 20 MG/1
TABLET, FILM COATED ORAL
Qty: 30 TABLET | Refills: 4 | Status: SHIPPED | OUTPATIENT
Start: 2019-09-21 | End: 2020-02-18

## 2019-09-21 RX ORDER — DULOXETIN HYDROCHLORIDE 60 MG/1
CAPSULE, DELAYED RELEASE ORAL
Qty: 90 CAPSULE | Refills: 0 | Status: SHIPPED | OUTPATIENT
Start: 2019-09-21 | End: 2019-10-24 | Stop reason: SDUPTHER

## 2019-09-21 RX ORDER — DULOXETIN HYDROCHLORIDE 30 MG/1
CAPSULE, DELAYED RELEASE ORAL
Qty: 90 CAPSULE | Refills: 0 | Status: SHIPPED | OUTPATIENT
Start: 2019-09-21 | End: 2019-10-24

## 2019-09-21 RX ORDER — DICLOFENAC SODIUM 75 MG/1
TABLET, DELAYED RELEASE ORAL
Qty: 60 TABLET | Refills: 1 | Status: SHIPPED | OUTPATIENT
Start: 2019-09-21 | End: 2019-10-24

## 2019-10-20 DIAGNOSIS — K21.9 GASTROESOPHAGEAL REFLUX DISEASE WITHOUT ESOPHAGITIS: ICD-10-CM

## 2019-10-20 DIAGNOSIS — R10.9 ABDOMINAL PAIN, UNSPECIFIED ABDOMINAL LOCATION: ICD-10-CM

## 2019-10-21 RX ORDER — DICYCLOMINE HYDROCHLORIDE 10 MG/1
CAPSULE ORAL
Qty: 90 CAPSULE | Refills: 0 | Status: SHIPPED | OUTPATIENT
Start: 2019-10-21 | End: 2020-01-19

## 2019-10-22 ENCOUNTER — TELEPHONE (OUTPATIENT)
Dept: OTHER | Facility: OTHER | Age: 69
End: 2019-10-22

## 2019-10-23 RX ORDER — PANTOPRAZOLE SODIUM 40 MG/1
TABLET, DELAYED RELEASE ORAL
Qty: 30 TABLET | Refills: 5 | Status: SHIPPED | OUTPATIENT
Start: 2019-10-23 | End: 2019-10-24

## 2019-10-24 ENCOUNTER — OFFICE VISIT (OUTPATIENT)
Dept: INTERNAL MEDICINE CLINIC | Facility: CLINIC | Age: 69
End: 2019-10-24
Payer: COMMERCIAL

## 2019-10-24 ENCOUNTER — APPOINTMENT (OUTPATIENT)
Dept: RADIOLOGY | Facility: CLINIC | Age: 69
End: 2019-10-24
Payer: COMMERCIAL

## 2019-10-24 VITALS
BODY MASS INDEX: 40.13 KG/M2 | SYSTOLIC BLOOD PRESSURE: 128 MMHG | HEIGHT: 57 IN | WEIGHT: 186 LBS | RESPIRATION RATE: 16 BRPM | TEMPERATURE: 98.5 F | HEART RATE: 84 BPM | DIASTOLIC BLOOD PRESSURE: 72 MMHG

## 2019-10-24 DIAGNOSIS — F33.41 RECURRENT MAJOR DEPRESSIVE DISORDER, IN PARTIAL REMISSION (HCC): ICD-10-CM

## 2019-10-24 DIAGNOSIS — M79.671 FOOT PAIN, BILATERAL: Primary | ICD-10-CM

## 2019-10-24 DIAGNOSIS — Z13.29 SCREENING FOR HYPOTHYROIDISM: ICD-10-CM

## 2019-10-24 DIAGNOSIS — E55.9 VITAMIN D DEFICIENCY: ICD-10-CM

## 2019-10-24 DIAGNOSIS — E78.5 HYPERLIPIDEMIA LDL GOAL <130: ICD-10-CM

## 2019-10-24 DIAGNOSIS — Z23 ENCOUNTER FOR VACCINATION: ICD-10-CM

## 2019-10-24 DIAGNOSIS — M79.671 FOOT PAIN, BILATERAL: ICD-10-CM

## 2019-10-24 DIAGNOSIS — Z13.1 SCREENING FOR DIABETES MELLITUS: ICD-10-CM

## 2019-10-24 DIAGNOSIS — M79.672 FOOT PAIN, BILATERAL: ICD-10-CM

## 2019-10-24 DIAGNOSIS — B35.6 TINEA CRURIS: ICD-10-CM

## 2019-10-24 DIAGNOSIS — Z13.0 SCREENING FOR DEFICIENCY ANEMIA: ICD-10-CM

## 2019-10-24 DIAGNOSIS — M79.672 FOOT PAIN, BILATERAL: Primary | ICD-10-CM

## 2019-10-24 PROCEDURE — 99214 OFFICE O/P EST MOD 30 MIN: CPT | Performed by: PHYSICIAN ASSISTANT

## 2019-10-24 PROCEDURE — 90662 IIV NO PRSV INCREASED AG IM: CPT | Performed by: PHYSICIAN ASSISTANT

## 2019-10-24 PROCEDURE — G0008 ADMIN INFLUENZA VIRUS VAC: HCPCS | Performed by: PHYSICIAN ASSISTANT

## 2019-10-24 PROCEDURE — 3008F BODY MASS INDEX DOCD: CPT | Performed by: PHYSICIAN ASSISTANT

## 2019-10-24 PROCEDURE — 73630 X-RAY EXAM OF FOOT: CPT

## 2019-10-24 RX ORDER — NYSTATIN 100000 [USP'U]/G
POWDER TOPICAL 2 TIMES DAILY
Qty: 45 G | Refills: 0 | Status: SHIPPED | OUTPATIENT
Start: 2019-10-24 | End: 2019-11-03 | Stop reason: HOSPADM

## 2019-10-24 RX ORDER — NYSTATIN 100000 U/G
CREAM TOPICAL 2 TIMES DAILY
Qty: 30 G | Refills: 0 | Status: SHIPPED | OUTPATIENT
Start: 2019-10-24 | End: 2019-11-08

## 2019-10-24 RX ORDER — DICLOFENAC SODIUM 75 MG/1
75 TABLET, DELAYED RELEASE ORAL 2 TIMES DAILY
Qty: 60 TABLET | Refills: 2 | Status: SHIPPED | OUTPATIENT
Start: 2019-10-24 | End: 2020-02-18

## 2019-10-24 RX ORDER — DULOXETIN HYDROCHLORIDE 60 MG/1
60 CAPSULE, DELAYED RELEASE ORAL 2 TIMES DAILY
Qty: 180 CAPSULE | Refills: 0 | Status: SHIPPED | OUTPATIENT
Start: 2019-10-24 | End: 2020-01-27 | Stop reason: SDUPTHER

## 2019-10-24 NOTE — PROGRESS NOTES
Assessment/Plan:  Problem List Items Addressed This Visit        Musculoskeletal and Integument    Tinea cruris     Start nystatin cream each evening before bed  Apply nystatin powder during the day to help wick away moisture and prevent recurrence  Relevant Medications    nystatin (MYCOSTATIN) cream    nystatin (MYCOSTATIN) powder       Other    Hyperlipidemia LDL goal <130    Relevant Orders    Lipid Panel with Direct LDL reflex    Vitamin D deficiency    Relevant Orders    Vitamin D 25 hydroxy    Recurrent major depressive disorder, in partial remission ([facility] [address] )     Will increase cymbalta to 60mg bId to combat symptoms  May need to augment with another medication if ineffective  Start melatonin 10mg for ongoing insomnia  Relevant Medications    DULoxetine (CYMBALTA) 60 mg delayed release capsule    Foot pain, bilateral - Primary     Will get xrays of each foot  Will increase cymbalta to 120mg which may further improve her pain  Podiatry referral provided  Start diclofenac to also improve pain  This may also help the pain at the base of her left thumb  Relevant Medications    diclofenac (VOLTAREN) 75 mg EC tablet    Other Relevant Orders    XR foot 3+ vw right    XR foot 3+ vw left    Ambulatory referral to Podiatry      Other Visit Diagnoses     Screening for deficiency anemia        Relevant Orders    CBC and differential    Screening for diabetes mellitus        Relevant Orders    Comprehensive metabolic panel    Screening for hypothyroidism        Relevant Orders    TSH, 3rd generation with Free T4 reflex           Diagnoses and all orders for this visit:    Foot pain, bilateral  -     XR foot 3+ vw right; Future  -     XR foot 3+ vw left; Future  -     Ambulatory referral to Podiatry; Future  -     diclofenac (VOLTAREN) 75 mg EC tablet;  Take 1 tablet (75 mg total) by mouth 2 (two) times a day    Screening for deficiency anemia  -     CBC and differential; Future    Screening for diabetes mellitus  -     Comprehensive metabolic panel; Future    Hyperlipidemia LDL goal <130  -     Lipid Panel with Direct LDL reflex; Future    Screening for hypothyroidism  -     TSH, 3rd generation with Free T4 reflex; Future    Vitamin D deficiency  -     Vitamin D 25 hydroxy; Future    Recurrent major depressive disorder, in partial remission ([facility])  -     DULoxetine (CYMBALTA) 60 mg delayed release capsule; Take 1 capsule (60 mg total) by mouth 2 (two) times a day    Tinea cruris  -     nystatin (MYCOSTATIN) cream; Apply topically 2 (two) times a day  -     nystatin (MYCOSTATIN) powder; Apply topically 2 (two) times a day        Foot pain, bilateral  Will get xrays of each foot  Will increase cymbalta to 120mg which may further improve her pain  Podiatry referral provided  Start diclofenac to also improve pain  This may also help the pain at the base of her left thumb  Recurrent major depressive disorder, in partial remission ([facility] [address] )  Will increase cymbalta to 60mg bId to combat symptoms  May need to augment with another medication if ineffective  Start melatonin 10mg for ongoing insomnia  Tinea cruris  Start nystatin cream each evening before bed  Apply nystatin powder during the day to help wick away moisture and prevent recurrence  Subjective:      Patient ID: Evy Lazaro is a 81 y o  female  Pt presents for routine visit with multiple complaints  She complains of pain in both of her feet  She has some soft tissue swelling on the top of each foot and is acutely TTP  She is unable to wear shoes due to the pain  She also complains of a raw, red rash along her the crease along her abdominal panus  This is consistent with a fungal infection  She also complains of intense pain in her left thumb  Depression screen is also positive  She is on cymbalta and that had worked well for her more recently  She is having trouble sleeping at night also         The following portions of the patient's history were reviewed and updated as appropriate:   She has a past medical history of Anxiety, Asthma, Cancer ([address] ), Cataract, Depression, GERD (gastroesophageal reflux disease), High cholesterol, Hypertension, IBS (irritable bowel syndrome), Irritable bowel syndrome (IBS) (5/31/2016), Obesity, Post-menopausal, and Vitamin D deficiency  ,  does not have any pertinent problems on file  ,   has a past surgical history that includes Tonsillectomy and adenoidectomy; Breast surgery; Cholecystectomy; Knee surgery; Colonoscopy (N/A, 5/31/2016); Gallbladder surgery; Hysterectomy; and Cataract extraction, bilateral ,  family history includes Breast cancer in her maternal aunt, maternal aunt, maternal aunt, maternal aunt, and maternal aunt; Depression in her mother; Diabetes in her mother; Heart disease in her brother and mother; Mental illness in her mother  She was adopted  ,   reports that she has never smoked  She has never used smokeless tobacco  She reports that she drinks alcohol  She reports that she does not use drugs  ,  is allergic to asa [aspirin] and latex     Current Outpatient Medications   Medication Sig Dispense Refill    albuterol (2 5 mg/3 mL) 0 083 % nebulizer solution Take 2 5 mg by nebulization every 6 (six) hours as needed for wheezing   albuterol (PROAIR HFA) 90 mcg/act inhaler Inhale 2 puffs every 4 (four) hours as needed for wheezing 3 Inhaler 1    atorvastatin (LIPITOR) 20 mg tablet Take 1 tablet by mouth daily  30 tablet 4    butalbital-acetaminophen-caffeine (FIORICET,ESGIC) -40 mg per tablet Take 1 tablet by mouth 3 (three) times a day as needed for headaches 90 tablet 0    Cholecalciferol (VITAMIN D3) 2000 units capsule Take 1 capsule by mouth daily      dicyclomine (BENTYL) 10 mg capsule Take 1 capsule by mouth daily   90 capsule 0    DULoxetine (CYMBALTA) 60 mg delayed release capsule Take 1 capsule (60 mg total) by mouth 2 (two) times a day 180 capsule 0    loratadine (CLARITIN) 10 mg tablet Take 1 tablet (10 mg total) by mouth daily 90 tablet 1    montelukast (SINGULAIR) 10 mg tablet Take 1 tablet by mouth daily  30 tablet 4    Nutritional Supplements (BOOST BREEZE PO) Take by mouth daily      diclofenac (VOLTAREN) 75 mg EC tablet Take 1 tablet (75 mg total) by mouth 2 (two) times a day 60 tablet 2    nystatin (MYCOSTATIN) cream Apply topically 2 (two) times a day 30 g 0    nystatin (MYCOSTATIN) powder Apply topically 2 (two) times a day 45 g 0     No current facility-administered medications for this visit  Review of Systems   Constitutional: Negative for chills and fever  HENT: Negative for congestion, ear pain, hearing loss, postnasal drip, rhinorrhea, sinus pressure, sinus pain, sore throat and trouble swallowing  Eyes: Negative for pain and visual disturbance  Respiratory: Negative for cough, chest tightness, shortness of breath and wheezing  Cardiovascular: Negative  Negative for chest pain, palpitations and leg swelling  Gastrointestinal: Negative for abdominal pain, blood in stool, constipation, diarrhea, nausea and vomiting  Endocrine: Negative for cold intolerance, heat intolerance, polydipsia, polyphagia and polyuria  Genitourinary: Negative for difficulty urinating, dysuria, flank pain and urgency  Musculoskeletal: Positive for arthralgias  Negative for back pain, gait problem and myalgias  Skin: Positive for rash  Allergic/Immunologic: Negative  Neurological: Negative for dizziness, weakness, light-headedness and headaches  Hematological: Negative  Psychiatric/Behavioral: Positive for dysphoric mood and sleep disturbance  Negative for behavioral problems  The patient is not nervous/anxious            PHQ-9 Depression Screening    PHQ-9:    Frequency of the following problems over the past two weeks:       Little interest or pleasure in doing things:  1 - several days  Feeling down, depressed, or hopeless:  1 - several days  Trouble falling or staying asleep, or sleeping too much:  3 - nearly every day  Feeling tired or having little energy:  3 - nearly every day  Poor appetite or overeatin - several days  Feeling bad about yourself - or that you are a failure or have let yourself or your family down:  0 - not at all  Trouble concentrating on things, such as reading the newspaper or watching television:  1 - several days  Moving or speaking so slowly that other people could have noticed  Or the opposite - being so fidgety or restless that you have been moving around a lot more than usual:  1 - several days  Thoughts that you would be better off dead, or of hurting yourself in some way:  0 - not at all  PHQ-2 Score:  2  PHQ-9 Score:  11          Objective:  Vitals:    10/24/19 1010   BP: 128/72   Pulse: 84   Resp: 16   Temp: 98 5 °F (36 9 °C)   TempSrc: Tympanic   Weight: 84 4 kg (186 lb)   Height: 4' 9" (1 448 m)     Body mass index is 40 25 kg/m²  Physical Exam   Constitutional: She is oriented to person, place, and time  She appears well-developed and well-nourished  No distress  HENT:   Head: Normocephalic and atraumatic  Right Ear: External ear normal    Left Ear: External ear normal    Nose: Nose normal    Mouth/Throat: Oropharynx is clear and moist  No oropharyngeal exudate  Eyes: Pupils are equal, round, and reactive to light  Conjunctivae and EOM are normal  Right eye exhibits no discharge  Left eye exhibits no discharge  No scleral icterus  Neck: Normal range of motion  Neck supple  No thyromegaly present  Cardiovascular: Normal rate, regular rhythm and normal heart sounds  Exam reveals no gallop and no friction rub  No murmur heard  Pulmonary/Chest: Effort normal and breath sounds normal  No respiratory distress  She has no wheezes  She has no rales  Abdominal: Soft  Bowel sounds are normal  She exhibits no distension  There is no tenderness  Musculoskeletal: Normal range of motion   She exhibits no edema or deformity  Hands:       Right foot: There is tenderness and swelling  Left foot: There is tenderness and swelling  Neurological: She is alert and oriented to person, place, and time  No cranial nerve deficit  Skin: Skin is warm and dry  She is not diaphoretic  Psychiatric: Her behavior is normal  Judgment and thought content normal  She exhibits a depressed mood  Depression Screening Follow-up Plan: Patient's depression screening was positive with a PHQ-2 score of 2  Their PHQ-9 score was 11  Continue regular follow-up with their psychologist/therapist/psychiatrist who is managing their mental health condition(s)

## 2019-10-24 NOTE — ASSESSMENT & PLAN NOTE
Will increase cymbalta to 60mg bId to combat symptoms  May need to augment with another medication if ineffective  Start melatonin 10mg for ongoing insomnia

## 2019-10-24 NOTE — ASSESSMENT & PLAN NOTE
Start nystatin cream each evening before bed  Apply nystatin powder during the day to help wick away moisture and prevent recurrence

## 2019-10-24 NOTE — ASSESSMENT & PLAN NOTE
Will get xrays of each foot  Will increase cymbalta to 120mg which may further improve her pain  Podiatry referral provided  Start diclofenac to also improve pain  This may also help the pain at the base of her left thumb

## 2019-11-01 ENCOUNTER — HOSPITAL ENCOUNTER (INPATIENT)
Facility: HOSPITAL | Age: 69
LOS: 1 days | Discharge: HOME | DRG: 872 | End: 2019-11-03
Attending: EMERGENCY MEDICINE | Admitting: HOSPITALIST
Payer: COMMERCIAL

## 2019-11-01 ENCOUNTER — APPOINTMENT (OUTPATIENT)
Dept: CT IMAGING | Facility: HOSPITAL | Age: 69
DRG: 872 | End: 2019-11-01
Payer: COMMERCIAL

## 2019-11-01 ENCOUNTER — APPOINTMENT (EMERGENCY)
Dept: NON INVASIVE DIAGNOSTICS | Facility: HOSPITAL | Age: 69
DRG: 872 | End: 2019-11-01
Payer: COMMERCIAL

## 2019-11-01 DIAGNOSIS — L03.90 CELLULITIS: Primary | ICD-10-CM

## 2019-11-01 PROBLEM — J45.20 MILD INTERMITTENT ASTHMA WITHOUT COMPLICATION: Status: ACTIVE | Noted: 2017-06-07

## 2019-11-01 PROBLEM — A41.9 SEPSIS (HCC): Status: ACTIVE | Noted: 2019-11-01

## 2019-11-01 LAB
ALBUMIN SERPL BCP-MCNC: 3.4 G/DL (ref 3.5–5.7)
ALP SERPL-CCNC: 92 U/L (ref 55–165)
ALT SERPL W P-5'-P-CCNC: 36 U/L (ref 7–52)
ANION GAP SERPL CALCULATED.3IONS-SCNC: 8 MMOL/L (ref 4–13)
AST SERPL W P-5'-P-CCNC: 25 U/L (ref 13–39)
BASOPHILS # BLD AUTO: 0 THOUSANDS/ΜL (ref 0–0.1)
BASOPHILS NFR BLD AUTO: 0 % (ref 0–2)
BILIRUB SERPL-MCNC: 0.7 MG/DL (ref 0.2–1)
BILIRUB UR QL STRIP: NEGATIVE
BUN SERPL-MCNC: 11 MG/DL (ref 7–25)
CALCIUM SERPL-MCNC: 8.7 MG/DL (ref 8.6–10.5)
CHLORIDE SERPL-SCNC: 104 MMOL/L (ref 98–107)
CLARITY UR: CLEAR
CO2 SERPL-SCNC: 26 MMOL/L (ref 21–31)
COLOR UR: YELLOW
CREAT SERPL-MCNC: 0.7 MG/DL (ref 0.6–1.2)
EOSINOPHIL # BLD AUTO: 0 THOUSAND/ΜL (ref 0–0.61)
EOSINOPHIL NFR BLD AUTO: 0 % (ref 0–5)
ERYTHROCYTE [DISTWIDTH] IN BLOOD BY AUTOMATED COUNT: 13.1 % (ref 11.5–14.5)
GFR SERPL CREATININE-BSD FRML MDRD: 90 ML/MIN/1.73SQ M
GLUCOSE SERPL-MCNC: 105 MG/DL (ref 65–99)
GLUCOSE UR STRIP-MCNC: NEGATIVE MG/DL
HCT VFR BLD AUTO: 44.6 % (ref 42–47)
HGB BLD-MCNC: 15 G/DL (ref 12–16)
HGB UR QL STRIP.AUTO: NEGATIVE
KETONES UR STRIP-MCNC: NEGATIVE MG/DL
LACTATE SERPL-SCNC: 0.6 MMOL/L (ref 0.5–2)
LEUKOCYTE ESTERASE UR QL STRIP: NEGATIVE
LYMPHOCYTES # BLD AUTO: 1.3 THOUSANDS/ΜL (ref 0.6–4.47)
LYMPHOCYTES NFR BLD AUTO: 14 % (ref 21–51)
MCH RBC QN AUTO: 30.2 PG (ref 26–34)
MCHC RBC AUTO-ENTMCNC: 33.6 G/DL (ref 31–37)
MCV RBC AUTO: 90 FL (ref 81–99)
MONOCYTES # BLD AUTO: 0.7 THOUSAND/ΜL (ref 0.17–1.22)
MONOCYTES NFR BLD AUTO: 8 % (ref 2–12)
NEUTROPHILS # BLD AUTO: 6.9 THOUSANDS/ΜL (ref 1.4–6.5)
NEUTS SEG NFR BLD AUTO: 77 % (ref 42–75)
NITRITE UR QL STRIP: NEGATIVE
PH UR STRIP.AUTO: 6.5 [PH]
PLATELET # BLD AUTO: 176 THOUSANDS/UL (ref 149–390)
PMV BLD AUTO: 7.8 FL (ref 8.6–11.7)
POTASSIUM SERPL-SCNC: 3.6 MMOL/L (ref 3.5–5.5)
PROT SERPL-MCNC: 6.5 G/DL (ref 6.4–8.9)
PROT UR STRIP-MCNC: NEGATIVE MG/DL
RBC # BLD AUTO: 4.96 MILLION/UL (ref 3.9–5.2)
SODIUM SERPL-SCNC: 138 MMOL/L (ref 134–143)
SP GR UR STRIP.AUTO: 1.01 (ref 1–1.03)
UROBILINOGEN UR QL STRIP.AUTO: 1 E.U./DL
WBC # BLD AUTO: 9 THOUSAND/UL (ref 4.8–10.8)

## 2019-11-01 PROCEDURE — 87040 BLOOD CULTURE FOR BACTERIA: CPT | Performed by: NURSE PRACTITIONER

## 2019-11-01 PROCEDURE — 96365 THER/PROPH/DIAG IV INF INIT: CPT

## 2019-11-01 PROCEDURE — 80053 COMPREHEN METABOLIC PANEL: CPT | Performed by: NURSE PRACTITIONER

## 2019-11-01 PROCEDURE — 99285 EMERGENCY DEPT VISIT HI MDM: CPT

## 2019-11-01 PROCEDURE — 93971 EXTREMITY STUDY: CPT | Performed by: SURGERY

## 2019-11-01 PROCEDURE — 83605 ASSAY OF LACTIC ACID: CPT | Performed by: NURSE PRACTITIONER

## 2019-11-01 PROCEDURE — 94664 DEMO&/EVAL PT USE INHALER: CPT

## 2019-11-01 PROCEDURE — 73200 CT UPPER EXTREMITY W/O DYE: CPT

## 2019-11-01 PROCEDURE — 93971 EXTREMITY STUDY: CPT

## 2019-11-01 PROCEDURE — 36415 COLL VENOUS BLD VENIPUNCTURE: CPT | Performed by: NURSE PRACTITIONER

## 2019-11-01 PROCEDURE — 94760 N-INVAS EAR/PLS OXIMETRY 1: CPT

## 2019-11-01 PROCEDURE — 81003 URINALYSIS AUTO W/O SCOPE: CPT | Performed by: NURSE PRACTITIONER

## 2019-11-01 PROCEDURE — 85025 COMPLETE CBC W/AUTO DIFF WBC: CPT | Performed by: NURSE PRACTITIONER

## 2019-11-01 PROCEDURE — 99220 PR INITIAL OBSERVATION CARE/DAY 70 MINUTES: CPT | Performed by: NURSE PRACTITIONER

## 2019-11-01 RX ORDER — ACETAMINOPHEN 325 MG/1
650 TABLET ORAL 4 TIMES DAILY PRN
Status: DISCONTINUED | OUTPATIENT
Start: 2019-11-01 | End: 2019-11-01

## 2019-11-01 RX ORDER — ACETAMINOPHEN 325 MG/1
650 TABLET ORAL 4 TIMES DAILY PRN
Status: DISCONTINUED | OUTPATIENT
Start: 2019-11-01 | End: 2019-11-03 | Stop reason: HOSPADM

## 2019-11-01 RX ORDER — SODIUM CHLORIDE 9 MG/ML
125 INJECTION, SOLUTION INTRAVENOUS CONTINUOUS
Status: DISCONTINUED | OUTPATIENT
Start: 2019-11-01 | End: 2019-11-01

## 2019-11-01 RX ORDER — VANCOMYCIN HYDROCHLORIDE 1 G/200ML
17.5 INJECTION, SOLUTION INTRAVENOUS EVERY 12 HOURS
Status: DISCONTINUED | OUTPATIENT
Start: 2019-11-01 | End: 2019-11-03

## 2019-11-01 RX ORDER — ATORVASTATIN CALCIUM 20 MG/1
20 TABLET, FILM COATED ORAL DAILY
Status: DISCONTINUED | OUTPATIENT
Start: 2019-11-01 | End: 2019-11-03 | Stop reason: HOSPADM

## 2019-11-01 RX ORDER — LORATADINE 10 MG/1
10 TABLET ORAL DAILY
Status: DISCONTINUED | OUTPATIENT
Start: 2019-11-01 | End: 2019-11-03 | Stop reason: HOSPADM

## 2019-11-01 RX ORDER — ACETAMINOPHEN 325 MG/1
650 TABLET ORAL ONCE
Status: COMPLETED | OUTPATIENT
Start: 2019-11-01 | End: 2019-11-01

## 2019-11-01 RX ORDER — DICYCLOMINE HYDROCHLORIDE 10 MG/1
10 CAPSULE ORAL DAILY
Status: DISCONTINUED | OUTPATIENT
Start: 2019-11-01 | End: 2019-11-03 | Stop reason: HOSPADM

## 2019-11-01 RX ORDER — MONTELUKAST SODIUM 10 MG/1
10 TABLET ORAL DAILY
Status: DISCONTINUED | OUTPATIENT
Start: 2019-11-01 | End: 2019-11-03 | Stop reason: HOSPADM

## 2019-11-01 RX ORDER — TRAMADOL HYDROCHLORIDE 50 MG/1
50 TABLET ORAL EVERY 6 HOURS PRN
Status: DISCONTINUED | OUTPATIENT
Start: 2019-11-01 | End: 2019-11-02

## 2019-11-01 RX ORDER — NYSTATIN 100000 U/G
CREAM TOPICAL 2 TIMES DAILY
Status: DISCONTINUED | OUTPATIENT
Start: 2019-11-01 | End: 2019-11-03 | Stop reason: HOSPADM

## 2019-11-01 RX ORDER — DULOXETIN HYDROCHLORIDE 30 MG/1
60 CAPSULE, DELAYED RELEASE ORAL 2 TIMES DAILY
Status: DISCONTINUED | OUTPATIENT
Start: 2019-11-01 | End: 2019-11-03 | Stop reason: HOSPADM

## 2019-11-01 RX ORDER — SODIUM CHLORIDE 9 MG/ML
75 INJECTION, SOLUTION INTRAVENOUS CONTINUOUS
Status: DISCONTINUED | OUTPATIENT
Start: 2019-11-01 | End: 2019-11-02

## 2019-11-01 RX ORDER — ONDANSETRON 2 MG/ML
4 INJECTION INTRAMUSCULAR; INTRAVENOUS ONCE
Status: COMPLETED | OUTPATIENT
Start: 2019-11-01 | End: 2019-11-01

## 2019-11-01 RX ORDER — SODIUM CHLORIDE 9 MG/ML
75 INJECTION, SOLUTION INTRAVENOUS CONTINUOUS
Status: DISCONTINUED | OUTPATIENT
Start: 2019-11-01 | End: 2019-11-01

## 2019-11-01 RX ORDER — ALBUTEROL SULFATE 2.5 MG/3ML
2.5 SOLUTION RESPIRATORY (INHALATION) EVERY 6 HOURS PRN
Status: DISCONTINUED | OUTPATIENT
Start: 2019-11-01 | End: 2019-11-01

## 2019-11-01 RX ORDER — ONDANSETRON 2 MG/ML
4 INJECTION INTRAMUSCULAR; INTRAVENOUS EVERY 6 HOURS PRN
Status: DISCONTINUED | OUTPATIENT
Start: 2019-11-01 | End: 2019-11-03 | Stop reason: HOSPADM

## 2019-11-01 RX ORDER — BUTALBITAL, ACETAMINOPHEN AND CAFFEINE 50; 325; 40 MG/1; MG/1; MG/1
1 TABLET ORAL 3 TIMES DAILY PRN
Status: DISCONTINUED | OUTPATIENT
Start: 2019-11-01 | End: 2019-11-03 | Stop reason: HOSPADM

## 2019-11-01 RX ADMIN — ONDANSETRON 4 MG: 2 INJECTION INTRAMUSCULAR; INTRAVENOUS at 19:47

## 2019-11-01 RX ADMIN — ENOXAPARIN SODIUM 40 MG: 40 INJECTION SUBCUTANEOUS at 16:25

## 2019-11-01 RX ADMIN — VANCOMYCIN HYDROCHLORIDE 1250 MG: 1 INJECTION, POWDER, LYOPHILIZED, FOR SOLUTION INTRAVENOUS at 11:41

## 2019-11-01 RX ADMIN — SODIUM CHLORIDE 125 ML/HR: 0.9 INJECTION, SOLUTION INTRAVENOUS at 11:38

## 2019-11-01 RX ADMIN — ONDANSETRON 4 MG: 2 INJECTION INTRAMUSCULAR; INTRAVENOUS at 13:16

## 2019-11-01 RX ADMIN — VANCOMYCIN HYDROCHLORIDE 1000 MG: 1 INJECTION, SOLUTION INTRAVENOUS at 22:54

## 2019-11-01 RX ADMIN — ACETAMINOPHEN 650 MG: 325 TABLET ORAL at 13:16

## 2019-11-01 RX ADMIN — TRAMADOL HYDROCHLORIDE 50 MG: 50 TABLET, FILM COATED ORAL at 16:25

## 2019-11-01 RX ADMIN — SODIUM CHLORIDE 75 ML/HR: 9 INJECTION, SOLUTION INTRAVENOUS at 16:25

## 2019-11-01 RX ADMIN — NYSTATIN 1 APPLICATION: 100000 CREAM TOPICAL at 18:34

## 2019-11-01 RX ADMIN — TRAMADOL HYDROCHLORIDE 50 MG: 50 TABLET, FILM COATED ORAL at 22:54

## 2019-11-01 RX ADMIN — DULOXETINE HYDROCHLORIDE 60 MG: 30 CAPSULE, DELAYED RELEASE ORAL at 18:34

## 2019-11-01 NOTE — RESPIRATORY THERAPY NOTE
RT Protocol Note  Jimenez Jennings 81 y o  female MRN: [id number]  Unit/Bed#: -01 Encounter: [id number]    Assessment    Principal Problem:    Sepsis ([facility] )  Active Problems:    Irritable bowel syndrome (IBS)    Mild intermittent asthma without complication    Recurrent major depressive disorder, in partial remission ([facility])    Body mass index (BMI) of 40 0-44 9 in adult [facility])    Cellulitis      Home Pulmonary Medications:  None    Home Devices/Therapy: Other (Comment)(None)    Past Medical History:   Diagnosis Date    Anxiety     Arthritis     Asthma     Cancer ([facility] )     breast right    Cataract     CHF (congestive heart failure) ([facility])     Coronary artery disease     Depression     GERD (gastroesophageal reflux disease)     High cholesterol     Hypertension     IBS (irritable bowel syndrome)     Irritable bowel syndrome (IBS) 5/31/2016    Obesity     Post-menopausal     Psychiatric disorder     Renal disorder     Vitamin D deficiency      Social History     Socioeconomic History    Marital status:      Spouse name: None    Number of children: None    Years of education: None    Highest education level: None   Occupational History    Occupation: Retired   Social Needs    Financial resource strain: None    Food insecurity:     Worry: None     Inability: None    Transportation needs:     Medical: None     Non-medical: None   Tobacco Use    Smoking status: Never Smoker    Smokeless tobacco: Never Used   Substance and Sexual Activity    Alcohol use: Not Currently     Frequency: Never     Comment: socially    Drug use: Never    Sexual activity: None   Lifestyle    Physical activity:     Days per week: None     Minutes per session: None    Stress: None   Relationships    Social connections:     Talks on phone: None     Gets together: None     Attends Anabaptist service: None     Active member of club or organization: None     Attends meetings of clubs or organizations: None Relationship status: None    Intimate partner violence:     Fear of current or ex partner: None     Emotionally abused: None     Physically abused: None     Forced sexual activity: None   Other Topics Concern    None   Social History Narrative    Always uses seat belt    Occasional caffeine consumption         Retired    Lives with adult daughter       Subjective         Objective    Physical Exam:   Assessment Type: Assess only  General Appearance: Alert, Awake  Respiratory Pattern: Normal  Chest Assessment: Chest expansion symmetrical  Bilateral Breath Sounds: Clear  Cough: Non-productive, Dry, Strong  O2 Device: RA    Vitals:  Blood pressure 127/70, pulse 103, temperature 98 5 °F (36 9 °C), temperature source Temporal, resp  rate 20, height 4' 9" (1 448 m), weight 87 3 kg (192 lb 7 4 oz), SpO2 93 %  Imaging and other studies: I have personally reviewed pertinent reports  O2 Device: RA     Plan    Respiratory Plan: No distress/Pulmonary history        Resp Comments: Pt  assessed  Pt  stated that she is not SOB or having any Respiratory difficulty  Pt  stated that she does not wear O2, take any breathing medications or use BiPap/CPAP @ home  Nebulizer treatment offered, as ordered  Pt  did not feel that she required a UDN or an MDI @ this time  Albuterol UDN PRN will be DC'd @ this time

## 2019-11-01 NOTE — H&P
H&P- Mihir Michelle 1950, 81 y o  female MRN: [id number]    Unit/Bed#: -01 Encounter: [id number]    Primary Care Provider: Lucie Mims PA-C   Date and time admitted to hospital: 11/1/2019 11:10 AM        * Sepsis ([facility] )  Assessment & Plan  · Patient meets criteria for sepsis with tachycardia, fever with right arm cellulitis  · Will continue with vancomycin   · Please see treatment outlined below    Cellulitis  Assessment & Plan  · No prior of MRSA  No obvious port of entry  History of right lymphedema from prior right mastectomy  · Will continue with vancomycin for now   · Elevate extremity, pain management     Body mass index (BMI) of 40 0-44 9 in [facility])  Assessment & Plan  · Lifestyle and diet modifications discussed      Recurrent major depressive disorder, in partial remission ([facility] )  Assessment & Plan  · Will continue with current home medication    Mild intermittent asthma without complication  Assessment & Plan  · Not in acute exacerbation   · Will continue with montelukast and PRN nebs    Irritable bowel syndrome (IBS)  Assessment & Plan  · Will continue supportive care   · Continue with bentyl       VTE Prophylaxis: Enoxaparin (Lovenox)  Code Status: Full Code   POLST: POLST is not applicable to this patient  Discussion with family: daughter    Anticipated Length of Stay:  Patient will be admitted on an Observation basis with an anticipated length of stay of  < 2 midnights  Justification for Hospital Stay: cellulitis right arm     Chief Complaint:   Right arm redness and swelling    History of Present Illness:    Mihir Michelle is a 81 y o  female who presents with right arm redness and swelling  Patient with history of right mastectomy in 2005 and lymphedema of the right arm  She states noticing right arm redness and increased swelling after she woke up yesterday    The patient has a similar episodes in the past however the redness and the swelling is much worse this time and prompted her to come to the ED for further evaluation  She denies any chills but reports temperature of 99F with her normal temperature at home of 96-97F  She had episodes of nausea and vomiting in the ED today  She denies any abdominal pain or urinary symptom  She denies any diarrhea  She denies any insect bite, cuts, trauma, or injury to the right arm  She is supposed to wear compression stocking for the right arm lymphedema however has been noncompliant with it  Review of Systems:  Review of Systems   Constitutional: Positive for appetite change  Negative for activity change, chills, diaphoresis and fever  HENT: Negative for congestion, ear pain, hearing loss, tinnitus and trouble swallowing  Eyes: Negative for photophobia, pain, discharge, itching and visual disturbance  Respiratory: Negative for cough, shortness of breath, wheezing and stridor  Cardiovascular: Negative for chest pain, palpitations and leg swelling  Gastrointestinal: Positive for nausea and vomiting  Negative for abdominal pain, blood in stool, constipation and diarrhea  Endocrine: Negative for cold intolerance, heat intolerance, polydipsia, polyphagia and polyuria  Genitourinary: Negative for difficulty urinating, dysuria, frequency, hematuria and urgency  Musculoskeletal: Negative for back pain, gait problem and neck stiffness  Skin: Positive for color change  Negative for pallor, rash and wound  Allergic/Immunologic: Negative for environmental allergies, food allergies and immunocompromised state  Neurological: Negative for dizziness, tremors, speech difficulty, weakness, light-headedness, numbness and headaches  Hematological: Negative for adenopathy  Does not bruise/bleed easily  Psychiatric/Behavioral: Negative for confusion, hallucinations and sleep disturbance         Past Medical and Surgical History:   Past Medical History:   Diagnosis Date    Anxiety     Arthritis     Asthma     Cancer ([facility] ) breast right    Cataract     CHF (congestive heart failure) (HCC)     Coronary artery disease     Depression     GERD (gastroesophageal reflux disease)     High cholesterol     Hypertension     IBS (irritable bowel syndrome)     Irritable bowel syndrome (IBS) 5/31/2016    Obesity     Post-menopausal     Psychiatric disorder     Renal disorder     Vitamin D deficiency        Past Surgical History:   Procedure Laterality Date    BREAST SURGERY      CATARACT EXTRACTION, BILATERAL      CHOLECYSTECTOMY      COLONOSCOPY N/A 5/31/2016    Procedure: COLONOSCOPY;  Surgeon: Munira Pederson MD;  Location: [facility];  Service:    [facility] GALLBLADDER SURGERY      HYSTERECTOMY      Total    KNEE SURGERY      TONSILLECTOMY AND ADENOIDECTOMY         Meds/Allergies:  Prior to Admission medications    Medication Sig Start Date End Date Taking? Authorizing Provider   atorvastatin (LIPITOR) 20 mg tablet Take 1 tablet by mouth daily  9/21/19  Yes Merced Alex PA-C   butalbital-acetaminophen-caffeine (FIORICET,ESGIC) -40 mg per tablet Take 1 tablet by mouth 3 (three) times a day as needed for headaches 1/31/19  Yes Merced Alex PA-C   Cholecalciferol (VITAMIN D3) 2000 units capsule Take 1 capsule by mouth daily 1/20/16  Yes Historical Provider, MD   diclofenac (VOLTAREN) 75 mg EC tablet Take 1 tablet (75 mg total) by mouth 2 (two) times a day 10/24/19  Yes Merced Alex PA-C   dicyclomine (BENTYL) 10 mg capsule Take 1 capsule by mouth daily  10/21/19  Yes Merced Alex PA-C   DULoxetine (CYMBALTA) 60 mg delayed release capsule Take 1 capsule (60 mg total) by mouth 2 (two) times a day 10/24/19 1/22/20 Yes Merced Alex PA-C   loratadine (CLARITIN) 10 mg tablet Take 1 tablet (10 mg total) by mouth daily 10/5/18  Yes Merced Alex PA-C   montelukast (SINGULAIR) 10 mg tablet Take 1 tablet by mouth daily   9/21/19  Yes Merced Alex PA-C   albuterol (2 5 mg/3 mL) 0 083 % nebulizer solution Take 2 5 mg by nebulization every 6 (six) hours as needed for wheezing  Historical Provider, MD   albuterol (PROAIR HFA) 90 mcg/act inhaler Inhale 2 puffs every 4 (four) hours as needed for wheezing 10/5/18   Merced Alex PA-C   Nutritional Supplements (BOOST BREEZE PO) Take by mouth daily 9/6/17   Historical Provider, MD   nystatin (MYCOSTATIN) cream Apply topically 2 (two) times a day  Patient not taking: Reported on 11/1/2019 10/24/19   Lucie Mims PA-C   nystatin (MYCOSTATIN) powder Apply topically 2 (two) times a day  Patient not taking: Reported on 11/1/2019 10/24/19   Lucie Mims PA-C     I have reviewed home medications with patient personally  Allergies: Allergies   Allergen Reactions    Asa [Aspirin] Rash    Latex Dermatitis       Social History:  Marital Status:    Occupation: retired   Patient Pre-hospital Living Situation: family   Patient Pre-hospital Level of Mobility: independent   Patient Pre-hospital Diet Restrictions: none   Substance Use History:   Social History     Substance and Sexual Activity   Alcohol Use Not Currently    Frequency: Never    Comment: socially     Social History     Tobacco Use   Smoking Status Never Smoker   Smokeless Tobacco Never Used     Social History     Substance and Sexual Activity   Drug Use Never       Family History:  I have reviewed the patients family history    Physical Exam:   Vitals:   Blood Pressure: 144/72 (11/01/19 1354)  Pulse: (!) 116 (11/01/19 1354)  Temperature: 100 5 °F (38 1 °C) (11/01/19 1354)  Temp Source: Temporal (11/01/19 1354)  Respirations: 20 (11/01/19 1354)  Height: 4' 9" (144 8 cm) (11/01/19 1354)  Weight - Scale: 87 3 kg (192 lb 7 4 oz) (11/01/19 1354)  SpO2: 90 % (11/01/19 1354)    Physical Exam   Constitutional: She is oriented to person, place, and time  She appears well-developed and well-nourished  No distress  HENT:   Head: Normocephalic and atraumatic     Mouth/Throat: Oropharynx is clear and moist    Eyes: Pupils are equal, round, and reactive to light  Conjunctivae and EOM are normal    Neck: Normal range of motion  Neck supple  No thyromegaly present  Cardiovascular: Normal rate, regular rhythm, normal heart sounds and intact distal pulses  Exam reveals no gallop and no friction rub  Pulmonary/Chest: Effort normal and breath sounds normal  She has no wheezes  She has no rales  Abdominal: Soft  Bowel sounds are normal  She exhibits no distension  There is no tenderness  There is no rebound  Musculoskeletal: Normal range of motion  She exhibits no edema, tenderness or deformity  Neurological: She is alert and oriented to person, place, and time  No cranial nerve deficit  Skin: Skin is warm and dry  No rash noted  There is erythema (right upper extremity from right axila to wrist)  Psychiatric: She has a normal mood and affect  Her behavior is normal  Thought content normal    Vitals reviewed  Additional Data:   Lab Results: I have personally reviewed pertinent reports  Results from last 7 days   Lab Units 11/01/19  1137   WBC Thousand/uL 9 00   HEMOGLOBIN g/dL 15 0   HEMATOCRIT % 44 6   PLATELETS Thousands/uL 176   NEUTROS PCT % 77*   LYMPHS PCT % 14*   MONOS PCT % 8   EOS PCT % 0     Results from last 7 days   Lab Units 11/01/19  1137   POTASSIUM mmol/L 3 6   CHLORIDE mmol/L 104   CO2 mmol/L 26   BUN mg/dL 11   CREATININE mg/dL 0 70   CALCIUM mg/dL 8 7   ALK PHOS U/L 92   ALT U/L 36   AST U/L 25                   Imaging: I have personally reviewed pertinent reports  VAS upper limb venous duplex scan, unilateral/limited    (Results Pending)       EKG, Pathology, and Other Studies Reviewed on Admission:   · EKG: sinus tachycardia     [facility] Records Reviewed: Yes     ** Please Note: This note has been constructed using a voice recognition system   **

## 2019-11-01 NOTE — PLAN OF CARE
Problem: Potential for Falls  Goal: Patient will remain free of falls  Description  INTERVENTIONS:  - Assess patient frequently for physical needs  -  Identify cognitive and physical deficits and behaviors that affect risk of falls    -  [place] fall precautions as indicated by assessment   - Educate patient/family on patient safety including physical limitations  - Instruct patient to call for assistance with activity based on assessment  - Modify environment to reduce risk of injury  - Consider OT/PT consult to assist with strengthening/mobility  Outcome: Progressing     Problem: Prexisting or High Potential for Compromised Skin Integrity  Goal: Skin integrity is maintained or improved  Description  INTERVENTIONS:  - Identify patients at risk for skin breakdown  - Assess and monitor skin integrity  - Assess and monitor nutrition and hydration status  - Monitor labs   - Assess for incontinence   - Turn and reposition patient  - Assist with mobility/ambulation  - Relieve pressure over bony prominences  - Avoid friction and shearing  - Provide appropriate hygiene as needed including keeping skin clean and dry  - Evaluate need for skin moisturizer/barrier cream  - Collaborate with interdisciplinary team   - Patient/family teaching  - Consider wound care consult   Outcome: Progressing     Problem: PAIN - ADULT  Goal: Verbalizes/displays adequate comfort level or baseline comfort level  Description  Interventions:  - Encourage patient to monitor pain and request assistance  - Assess pain using appropriate pain scale  - Administer analgesics based on type and severity of pain and evaluate response  - Implement non-pharmacological measures as appropriate and evaluate response  - Consider cultural and social influences on pain and pain management  - Notify physician/advanced practitioner if interventions unsuccessful or patient reports new pain  Outcome: Progressing     Problem: INFECTION - ADULT  Goal: Absence or prevention of progression during hospitalization  Description  INTERVENTIONS:  - Assess and monitor for signs and symptoms of infection  - Monitor lab/diagnostic results  - Monitor all insertion sites, i e  indwelling lines, tubes, and drains  - Monitor endotracheal if appropriate and nasal secretions for changes in amount and color  - [place] appropriate cooling/warming therapies per order  - Administer medications as ordered  - Instruct and encourage patient and family to use good hand hygiene technique  - Identify and instruct in appropriate isolation precautions for identified infection/condition  Outcome: Progressing  Goal: Absence of fever/infection during neutropenic period  Description  INTERVENTIONS:  - Monitor WBC    Outcome: Progressing     Problem: SAFETY ADULT  Goal: Maintain or return to baseline ADL function  Description  INTERVENTIONS:  -  Assess patient's ability to carry out ADLs; assess patient's baseline for ADL function and identify physical deficits which impact ability to perform ADLs (bathing, care of mouth/teeth, toileting, grooming, dressing, etc )  - Assess/evaluate cause of self-care deficits   - Assess range of motion  - Assess patient's mobility; develop plan if impaired  - Assess patient's need for assistive devices and provide as appropriate  - Encourage maximum independence but intervene and supervise when necessary  - Involve family in performance of ADLs  - Assess for home care needs following discharge   - Consider OT consult to assist with ADL evaluation and planning for discharge  - Provide patient education as appropriate  Outcome: Progressing  Goal: Maintain or return mobility status to optimal level  Description  INTERVENTIONS:  - Assess patient's baseline mobility status (ambulation, transfers, stairs, etc )    - Identify cognitive and physical deficits and behaviors that affect mobility  - Identify mobility aids required to assist with transfers and/or ambulation (gait belt, sit-to-stand, lift, walker, cane, etc )  - [place] fall precautions as indicated by assessment  - Record patient progress and toleration of activity level on Mobility SBAR; progress patient to next Phase/Stage  - Instruct patient to call for assistance with activity based on assessment  - Consider rehabilitation consult to assist with strengthening/weightbearing, etc   Outcome: Progressing     Problem: DISCHARGE PLANNING  Goal: Discharge to home or other facility with appropriate resources  Description  INTERVENTIONS:  - Identify barriers to discharge w/patient and caregiver  - Arrange for needed discharge resources and transportation as appropriate  - Identify discharge learning needs (meds, wound care, etc )  - Arrange for interpretive services to assist at discharge as needed  - Refer to Case Management Department for coordinating discharge planning if the patient needs post-hospital services based on physician/advanced practitioner order or complex needs related to functional status, cognitive ability, or social support system  Outcome: Progressing     Problem: Knowledge Deficit  Goal: Patient/family/caregiver demonstrates understanding of disease process, treatment plan, medications, and discharge instructions  Description  Complete learning assessment and assess knowledge base    Interventions:  - Provide teaching at level of understanding  - Provide teaching via preferred learning methods  Outcome: Progressing

## 2019-11-01 NOTE — ASSESSMENT & PLAN NOTE
· Patient meets criteria for sepsis with tachycardia, fever with right arm cellulitis  · Will continue with vancomycin   · Please see treatment outlined below

## 2019-11-01 NOTE — ASSESSMENT & PLAN NOTE
· No prior of MRSA  No obvious port of entry  History of right lymphedema from prior right mastectomy     · Will continue with vancomycin for now   · Elevate extremity, pain management

## 2019-11-01 NOTE — PROGRESS NOTES
Vancomycin Assessment    Dixie E Mardel Favre is a 81 y o  female who is currently ordered vancomycin 1250 mg q12h for skin-soft tissue infection  Relevant clinical data and objective history reviewed:  Creatinine   Date Value Ref Range Status   11/01/2019 0 70 0 60 - 1 20 mg/dL Final     Comment:     Standardized to IDMS reference method   10/05/2018 0 59 (L) 0 60 - 1 30 mg/dL Final     Comment:     Standardized to IDMS reference method   09/10/2018 0 66 0 60 - 1 30 mg/dL Final     Comment:     Standardized to IDMS reference method   12/02/2015 0 62 0 60 - 1 30 mg/dL Final     Comment:     Standardized to IDMS reference method     /70 (BP Location: Right arm)   Pulse (!) 114   Temp 98 5 °F (36 9 °C) (Temporal)   Resp 20   Ht 4' 9" (1 448 m)   Wt 87 3 kg (192 lb 7 4 oz)   LMP  (LMP Unknown)   SpO2 93%   BMI 41 65 kg/m²   No intake/output data recorded  Lab Results   Component Value Date/Time    BUN 11 11/01/2019 11:37 AM    BUN 13 12/02/2015 10:51 AM    WBC 9 00 11/01/2019 11:37 AM    WBC 5 02 12/02/2015 10:51 AM    HGB 15 0 11/01/2019 11:37 AM    HGB 15 2 12/02/2015 10:51 AM    HCT 44 6 11/01/2019 11:37 AM    HCT 45 3 12/02/2015 10:51 AM    MCV 90 11/01/2019 11:37 AM    MCV 90 12/02/2015 10:51 AM     11/01/2019 11:37 AM     12/02/2015 10:51 AM     Temp Readings from Last 3 Encounters:   11/01/19 98 5 °F (36 9 °C) (Temporal)   10/24/19 98 5 °F (36 9 °C) (Tympanic)   05/24/19 99 5 °F (37 5 °C) (Tympanic)     Vancomycin Days of Therapy: 1    Assessment/Plan  The patient is currently on vancomycin utilizing scheduled dosing based on adjusted body weight (due to obesity)  Baseline risks associated with therapy include: advanced age  The patient is currently receiving 1250 mg q12h and after clinical evaluation will be changed to 1000 mg q12h  Pharmacy will also follow closely for s/sx of nephrotoxicity, infusion reactions and appropriateness of therapy    BMP and CBC will be ordered per protocol  Plan for trough as patient approaches steady state, prior to the 5th dose at approximately 1100 on 11/3  Due to infection severity, will target a trough of 15-20 (appropriate for most indications)   Pharmacy will continue to follow the patients culture results and clinical progress daily      Vanessa Thorne, FloryD

## 2019-11-01 NOTE — UTILIZATION REVIEW
Notification of Observation Care Status/Observation Authorization Request    This is a Notification of Observation Care Status to our facility [address] [place] [address]  Be advised that this patient was admitted to our facility under Observation Status  Please contact the Utilization Review Department where the patient is receiving care services for additional admission information  Facility: [address] [place] [address]  Place of Service Code: 25  Place of Service Name: [facility]  CPT Code for Observation: [place]  / CPT 18587    Presentation Date & Time: 11/1/2019 11:10 AM  Observation Admission Date & Time: 11/01/19 1305  Discharge Date & Time: No discharge date for patient encounter  Discharge Disposition (if discharged): Home/Self Care  Attending Physician and NPI#: Tanner Medrano [[id number]]  Admission Orders (From admission, onward)     Ordered        11/01/19 1305  Place in Observation (expected length of stay for this patient is less than two midnights)  Once                   Network Utilization Review Department  Phone: [phone]; Fax [phone]  [email or web address]  ATTENTION: Please call with any questions or concerns to [phone] and carefully listen to the prompts so that you are directed to the right person  All voicemails are confidential   [employer] all requests for admission clinical reviews, approved or denied determinations and any other requests to dedicated fax number below belonging to the campus where the patient is receiving treatment

## 2019-11-01 NOTE — ED PROVIDER NOTES
History  Chief Complaint   Patient presents with    Arm Swelling     right arm     Rt arm swelling warmth, redness and pain x 2-3 days without injury, decreased rom due to swelling and pain, reports numbness of rt hand and fingers Pt has + pulses brisk cap refill, cellulitic changes from rt wrist to shoulder +4 swelling, states not eating or drinking well in past 2-3 days with intermittent low grade fevers          Prior to Admission Medications   Prescriptions Last Dose Informant Patient Reported? Taking? Cholecalciferol (VITAMIN D3) 2000 units capsule   Yes No   Sig: Take 1 capsule by mouth daily   DULoxetine (CYMBALTA) 60 mg delayed release capsule   No No   Sig: Take 1 capsule (60 mg total) by mouth 2 (two) times a day   Nutritional Supplements (BOOST BREEZE PO)   Yes No   Sig: Take by mouth daily   albuterol (2 5 mg/3 mL) 0 083 % nebulizer solution   Yes No   Sig: Take 2 5 mg by nebulization every 6 (six) hours as needed for wheezing  albuterol (PROAIR HFA) 90 mcg/act inhaler   No No   Sig: Inhale 2 puffs every 4 (four) hours as needed for wheezing   atorvastatin (LIPITOR) 20 mg tablet   No No   Sig: Take 1 tablet by mouth daily  butalbital-acetaminophen-caffeine (FIORICET,ESGIC) -40 mg per tablet   No No   Sig: Take 1 tablet by mouth 3 (three) times a day as needed for headaches   diclofenac (VOLTAREN) 75 mg EC tablet   No No   Sig: Take 1 tablet (75 mg total) by mouth 2 (two) times a day   dicyclomine (BENTYL) 10 mg capsule   No No   Sig: Take 1 capsule by mouth daily  loratadine (CLARITIN) 10 mg tablet   No No   Sig: Take 1 tablet (10 mg total) by mouth daily   montelukast (SINGULAIR) 10 mg tablet   No No   Sig: Take 1 tablet by mouth daily     nystatin (MYCOSTATIN) cream   No No   Sig: Apply topically 2 (two) times a day   nystatin (MYCOSTATIN) powder   No No   Sig: Apply topically 2 (two) times a day      Facility-Administered Medications: None       Past Medical History:   Diagnosis Date    Anxiety     Asthma     Cancer ([facility] [address] )     breast right    Cataract     Depression     GERD (gastroesophageal reflux disease)     High cholesterol     Hypertension     IBS (irritable bowel syndrome)     Irritable bowel syndrome (IBS) 5/31/2016    Obesity     Post-menopausal     Vitamin D deficiency        Past Surgical History:   Procedure Laterality Date    BREAST SURGERY      CATARACT EXTRACTION, BILATERAL      CHOLECYSTECTOMY      COLONOSCOPY N/A 5/31/2016    Procedure: COLONOSCOPY;  Surgeon: Jennifer Bolton MD;  Location: [facility];  Service:    [facility] GALLBLADDER SURGERY      HYSTERECTOMY      Total    KNEE SURGERY      TONSILLECTOMY AND ADENOIDECTOMY         Family History   Adopted: Yes   Problem Relation Age of Onset    Diabetes Mother     Heart disease Mother     Mental illness Mother     Depression Mother     Heart disease Brother     Breast cancer Maternal Aunt     Breast cancer Maternal Aunt     Breast cancer Maternal Aunt     Breast cancer Maternal Aunt     Breast cancer Maternal Aunt      I have reviewed and agree with the history as documented  Social History     Tobacco Use    Smoking status: Never Smoker    Smokeless tobacco: Never Used   Substance Use Topics    Alcohol use: Yes     Comment: socially    Drug use: No        Review of Systems   Skin:        Cellulitis rt arm   All other systems reviewed and are negative  Physical Exam  Physical Exam   Constitutional: She is oriented to person, place, and time  She appears well-developed and well-nourished  HENT:   Head: Normocephalic and atraumatic  Eyes: Pupils are equal, round, and reactive to light  Conjunctivae and EOM are normal    Neck: Normal range of motion  Neck supple  Cardiovascular: Normal rate, regular rhythm, normal heart sounds and intact distal pulses  Pulmonary/Chest: [place] normal and breath sounds normal    Abdominal: Soft  Bowel sounds are normal    Musculoskeletal: Normal range of motion  Neurological: She is alert and oriented to person, place, and time  Skin: Capillary refill takes less than 2 seconds  Rt arm swelling warmth, redness and pain, decreased rom due to swelling and pain, reports numbness of rt hand and fingers,  + pulses brisk cap refill, cellulitic changes from rt wrist to shoulder +4 swelling    Nursing note and vitals reviewed  Vital Signs  ED Triage Vitals [11/01/19 1111]   Temperature Pulse Respirations Blood Pressure SpO2   98 4 °F (36 9 °C) 101 19 131/66 96 %      Temp Source Heart Rate Source Patient Position - Orthostatic VS BP Location FiO2 (%)   Tympanic Monitor Sitting Left arm --      Pain Score       8           Vitals:    11/01/19 1111   BP: 131/66   Pulse: 101   Patient Position - Orthostatic VS: Sitting         Visual Acuity      ED Medications  Medications   sodium chloride 0 9 % infusion (125 mL/hr Intravenous New Bag 11/1/19 1138)   vancomycin (VANCOCIN) 1,250 mg in sodium chloride 0 9 % 250 mL IVPB (1,250 mg Intravenous New Bag 11/1/19 1141)       Diagnostic Studies  Results Reviewed     Procedure Component Value Units Date/Time    Lactic acid, plasma [[id number]]  (Normal) Collected:  11/01/19 1137    Lab Status:  Final result Specimen:  Blood from Hand, Left Updated:  11/01/19 1202     LACTIC ACID 0 6 mmol/L     Narrative:       Result may be elevated if tourniquet was used during collection      Comprehensive metabolic panel [[id number]]  (Abnormal) Collected:  11/01/19 1137    Lab Status:  Final result Specimen:  Blood from Hand, Left Updated:  11/01/19 1202     Sodium 138 mmol/L      Potassium 3 6 mmol/L      Chloride 104 mmol/L      CO2 26 mmol/L      ANION GAP 8 mmol/L      BUN 11 mg/dL      Creatinine 0 70 mg/dL      Glucose 105 mg/dL      Calcium 8 7 mg/dL      AST 25 U/L      ALT 36 U/L      Alkaline Phosphatase 92 U/L      Total Protein 6 5 g/dL      Albumin 3 4 g/dL      Total Bilirubin 0 70 mg/dL      eGFR 90 ml/min/1 73sq m     Narrative: [place] guidelines for Chronic Kidney Disease (CKD):     Stage 1 with normal or high GFR (GFR > 90 mL/min/1 73 square meters)    Stage 2 Mild CKD (GFR = 60-89 mL/min/1 73 square meters)    Stage 3A Moderate CKD (GFR = 45-59 mL/min/1 73 square meters)    Stage 3B Moderate CKD (GFR = 30-44 mL/min/1 73 square meters)    Stage 4 Severe CKD (GFR = 15-29 mL/min/1 73 square meters)    Stage 5 End Stage CKD (GFR <15 mL/min/1 73 square meters)  Note: GFR calculation is accurate only with a steady state creatinine    Blood culture #1 [[id number]] Collected:  11/01/19 1138    Lab Status: In process Specimen:  Blood from Hand, Left Updated:  11/01/19 1148    CBC and differential [[id number]]  (Abnormal) Collected:  11/01/19 1137    Lab Status:  Final result Specimen:  Blood from Hand, Left Updated:  11/01/19 1146     WBC 9 00 Thousand/uL      RBC 4 96 Million/uL      Hemoglobin 15 0 g/dL      Hematocrit 44 6 %      MCV 90 fL      MCH 30 2 pg      MCHC 33 6 g/dL      RDW 13 1 %      MPV 7 8 fL      Platelets 744 Thousands/uL      Neutrophils Relative 77 %      Lymphocytes Relative 14 %      Monocytes Relative 8 %      Eosinophils Relative 0 %      Basophils Relative 0 %      Neutrophils Absolute 6 90 Thousands/µL      Lymphocytes Absolute 1 30 Thousands/µL      Monocytes Absolute 0 70 Thousand/µL      Eosinophils Absolute 0 00 Thousand/µL      Basophils Absolute 0 00 Thousands/µL     Blood culture #2 [[id number]] Collected:  11/01/19 1137    Lab Status:   In process Specimen:  Blood from Hand, Left Updated:  11/01/19 1142    UA w Reflex to Microscopic w Reflex to Culture [[id number]]     Lab Status:  No result Specimen:  Urine                  VAS upper limb venous duplex scan, unilateral/limited    (Results Pending)              Procedures  Procedures       ED Course  ED Course as of Nov 01 1306   Fri Nov 01, 2019   1305 Accepted by hosptialist for further [facility] MDM    Disposition  Final diagnoses:   Cellulitis     Time reflects when diagnosis was documented in both MDM as applicable and the Disposition within this note     Time User Action Codes Description Comment    11/1/2019  1:04 PM Oumou Graff Add [L03 90] Cellulitis       ED Disposition     ED Disposition Condition Date/Time Comment    Admit Stable Fri Nov 1, 2019  1:05 PM Case was discussed with Eladia Wiggins and the patient's admission status was agreed to be Admission Status: observation status to the service of Dr Chey Ndiaye   Follow-up Information    None         Patient's Medications   Discharge Prescriptions    No medications on file     No discharge procedures on file      ED Provider  Electronically Signed by           JERARDO Meraz  11/01/19 5413

## 2019-11-02 LAB
ALBUMIN SERPL BCP-MCNC: 3 G/DL (ref 3.5–5.7)
ALP SERPL-CCNC: 108 U/L (ref 55–165)
ALT SERPL W P-5'-P-CCNC: 42 U/L (ref 7–52)
ANION GAP SERPL CALCULATED.3IONS-SCNC: 5 MMOL/L (ref 4–13)
AST SERPL W P-5'-P-CCNC: 42 U/L (ref 13–39)
BASOPHILS # BLD AUTO: 0 THOUSANDS/ΜL (ref 0–0.1)
BASOPHILS NFR BLD AUTO: 0 % (ref 0–2)
BILIRUB SERPL-MCNC: 0.5 MG/DL (ref 0.2–1)
BUN SERPL-MCNC: 10 MG/DL (ref 7–25)
CALCIUM SERPL-MCNC: 8.1 MG/DL (ref 8.6–10.5)
CHLORIDE SERPL-SCNC: 107 MMOL/L (ref 98–107)
CO2 SERPL-SCNC: 27 MMOL/L (ref 21–31)
CREAT SERPL-MCNC: 0.69 MG/DL (ref 0.6–1.2)
EOSINOPHIL # BLD AUTO: 0 THOUSAND/ΜL (ref 0–0.61)
EOSINOPHIL NFR BLD AUTO: 1 % (ref 0–5)
ERYTHROCYTE [DISTWIDTH] IN BLOOD BY AUTOMATED COUNT: 13.2 % (ref 11.5–14.5)
GFR SERPL CREATININE-BSD FRML MDRD: 90 ML/MIN/1.73SQ M
GLUCOSE SERPL-MCNC: 119 MG/DL (ref 65–99)
HCT VFR BLD AUTO: 39.7 % (ref 42–47)
HGB BLD-MCNC: 13.4 G/DL (ref 12–16)
LYMPHOCYTES # BLD AUTO: 0.5 THOUSANDS/ΜL (ref 0.6–4.47)
LYMPHOCYTES NFR BLD AUTO: 15 % (ref 21–51)
MAGNESIUM SERPL-MCNC: 2 MG/DL (ref 1.9–2.7)
MCH RBC QN AUTO: 30.4 PG (ref 26–34)
MCHC RBC AUTO-ENTMCNC: 33.8 G/DL (ref 31–37)
MCV RBC AUTO: 90 FL (ref 81–99)
MONOCYTES # BLD AUTO: 0.2 THOUSAND/ΜL (ref 0.17–1.22)
MONOCYTES NFR BLD AUTO: 7 % (ref 2–12)
NEUTROPHILS # BLD AUTO: 2.5 THOUSANDS/ΜL (ref 1.4–6.5)
NEUTS SEG NFR BLD AUTO: 77 % (ref 42–75)
PLATELET # BLD AUTO: 130 THOUSANDS/UL (ref 149–390)
PMV BLD AUTO: 8.1 FL (ref 8.6–11.7)
POTASSIUM SERPL-SCNC: 3.3 MMOL/L (ref 3.5–5.5)
PROT SERPL-MCNC: 5.6 G/DL (ref 6.4–8.9)
RBC # BLD AUTO: 4.4 MILLION/UL (ref 3.9–5.2)
SODIUM SERPL-SCNC: 139 MMOL/L (ref 134–143)
WBC # BLD AUTO: 3.2 THOUSAND/UL (ref 4.8–10.8)

## 2019-11-02 PROCEDURE — 80053 COMPREHEN METABOLIC PANEL: CPT | Performed by: NURSE PRACTITIONER

## 2019-11-02 PROCEDURE — 99232 SBSQ HOSP IP/OBS MODERATE 35: CPT | Performed by: NURSE PRACTITIONER

## 2019-11-02 PROCEDURE — 83735 ASSAY OF MAGNESIUM: CPT | Performed by: NURSE PRACTITIONER

## 2019-11-02 PROCEDURE — 85025 COMPLETE CBC W/AUTO DIFF WBC: CPT | Performed by: NURSE PRACTITIONER

## 2019-11-02 RX ORDER — TRAMADOL HYDROCHLORIDE 50 MG/1
50 TABLET ORAL EVERY 4 HOURS PRN
Status: DISCONTINUED | OUTPATIENT
Start: 2019-11-02 | End: 2019-11-03 | Stop reason: HOSPADM

## 2019-11-02 RX ORDER — POTASSIUM CHLORIDE 20 MEQ/1
40 TABLET, EXTENDED RELEASE ORAL ONCE
Status: COMPLETED | OUTPATIENT
Start: 2019-11-02 | End: 2019-11-02

## 2019-11-02 RX ADMIN — DULOXETINE HYDROCHLORIDE 60 MG: 30 CAPSULE, DELAYED RELEASE ORAL at 08:49

## 2019-11-02 RX ADMIN — MONTELUKAST SODIUM 10 MG: 10 TABLET, COATED ORAL at 08:50

## 2019-11-02 RX ADMIN — DICYCLOMINE HYDROCHLORIDE 10 MG: 10 CAPSULE ORAL at 08:48

## 2019-11-02 RX ADMIN — NYSTATIN: 100000 CREAM TOPICAL at 08:49

## 2019-11-02 RX ADMIN — ATORVASTATIN CALCIUM 20 MG: 20 TABLET, FILM COATED ORAL at 08:48

## 2019-11-02 RX ADMIN — ACETAMINOPHEN 650 MG: 325 TABLET ORAL at 13:11

## 2019-11-02 RX ADMIN — ENOXAPARIN SODIUM 40 MG: 40 INJECTION SUBCUTANEOUS at 08:49

## 2019-11-02 RX ADMIN — DULOXETINE HYDROCHLORIDE 60 MG: 30 CAPSULE, DELAYED RELEASE ORAL at 17:44

## 2019-11-02 RX ADMIN — POTASSIUM CHLORIDE 40 MEQ: 1500 TABLET, EXTENDED RELEASE ORAL at 08:55

## 2019-11-02 RX ADMIN — VANCOMYCIN HYDROCHLORIDE 1000 MG: 1 INJECTION, SOLUTION INTRAVENOUS at 11:18

## 2019-11-02 RX ADMIN — TRAMADOL HYDROCHLORIDE 50 MG: 50 TABLET, FILM COATED ORAL at 13:10

## 2019-11-02 RX ADMIN — ONDANSETRON 4 MG: 2 INJECTION INTRAMUSCULAR; INTRAVENOUS at 19:43

## 2019-11-02 RX ADMIN — TRAMADOL HYDROCHLORIDE 50 MG: 50 TABLET, FILM COATED ORAL at 08:56

## 2019-11-02 RX ADMIN — ONDANSETRON 4 MG: 2 INJECTION INTRAMUSCULAR; INTRAVENOUS at 08:55

## 2019-11-02 RX ADMIN — LORATADINE 10 MG: 10 TABLET ORAL at 08:49

## 2019-11-02 RX ADMIN — NYSTATIN: 100000 CREAM TOPICAL at 17:44

## 2019-11-02 NOTE — NURSING NOTE
Pt alert and orientated, pleasant and cooperative  Heart is ns w/first degree on telemetry, edema noted to right arm, red warm and tender, outlined with skin marker, elevated on pillow  Pt states she is dizzy when she gets up, reminded pt to not get up without assistance, assist of 1 to commode  To CT and returned to room with no issues  Hourly rounding discussed  Call bell is within reach

## 2019-11-02 NOTE — PROGRESS NOTES
Vancomycin IV Pharmacy-to-Dose Consultation    Lizabeth Fisher is a 81 y o  female who is currently receiving Vancomycin IV with management by the Pharmacy Consult service  Assessment/Plan:  The patient was reviewed  Renal function is stable and no signs or symptoms of nephrotoxicity and/or infusion reactions were documented in the chart  Based on todays assessment, continue current vancomycin (day # 2) dosing of 1000 mg q12h, with a plan for trough to be drawn at 1100 on 11/3  We will continue to follow the patients culture results and clinical progress daily      Quillian Moritz, FloryD

## 2019-11-02 NOTE — PLAN OF CARE
Problem: Potential for Falls  Goal: Patient will remain free of falls  Description  INTERVENTIONS:  - Assess patient frequently for physical needs  -  Identify cognitive and physical deficits and behaviors that affect risk of falls    -  [place] fall precautions as indicated by assessment   - Educate patient/family on patient safety including physical limitations  - Instruct patient to call for assistance with activity based on assessment  - Modify environment to reduce risk of injury  - Consider OT/PT consult to assist with strengthening/mobility  Outcome: Progressing     Problem: Prexisting or High Potential for Compromised Skin Integrity  Goal: Skin integrity is maintained or improved  Description  INTERVENTIONS:  - Identify patients at risk for skin breakdown  - Assess and monitor skin integrity  - Assess and monitor nutrition and hydration status  - Monitor labs   - Assess for incontinence   - Turn and reposition patient  - Assist with mobility/ambulation  - Relieve pressure over bony prominences  - Avoid friction and shearing  - Provide appropriate hygiene as needed including keeping skin clean and dry  - Evaluate need for skin moisturizer/barrier cream  - Collaborate with interdisciplinary team   - Patient/family teaching  - Consider wound care consult   Outcome: Progressing     Problem: PAIN - ADULT  Goal: Verbalizes/displays adequate comfort level or baseline comfort level  Description  Interventions:  - Encourage patient to monitor pain and request assistance  - Assess pain using appropriate pain scale  - Administer analgesics based on type and severity of pain and evaluate response  - Implement non-pharmacological measures as appropriate and evaluate response  - Consider cultural and social influences on pain and pain management  - Notify physician/advanced practitioner if interventions unsuccessful or patient reports new pain  Outcome: Progressing     Problem: INFECTION - ADULT  Goal: Absence or prevention of progression during hospitalization  Description  INTERVENTIONS:  - Assess and monitor for signs and symptoms of infection  - Monitor lab/diagnostic results  - Monitor all insertion sites, i e  indwelling lines, tubes, and drains  - Monitor endotracheal if appropriate and nasal secretions for changes in amount and color  - [place] appropriate cooling/warming therapies per order  - Administer medications as ordered  - Instruct and encourage patient and family to use good hand hygiene technique  - Identify and instruct in appropriate isolation precautions for identified infection/condition  Outcome: Progressing  Goal: Absence of fever/infection during neutropenic period  Description  INTERVENTIONS:  - Monitor WBC    Outcome: Progressing     Problem: SAFETY ADULT  Goal: Maintain or return to baseline ADL function  Description  INTERVENTIONS:  -  Assess patient's ability to carry out ADLs; assess patient's baseline for ADL function and identify physical deficits which impact ability to perform ADLs (bathing, care of mouth/teeth, toileting, grooming, dressing, etc )  - Assess/evaluate cause of self-care deficits   - Assess range of motion  - Assess patient's mobility; develop plan if impaired  - Assess patient's need for assistive devices and provide as appropriate  - Encourage maximum independence but intervene and supervise when necessary  - Involve family in performance of ADLs  - Assess for home care needs following discharge   - Consider OT consult to assist with ADL evaluation and planning for discharge  - Provide patient education as appropriate  Outcome: Progressing  Goal: Maintain or return mobility status to optimal level  Description  INTERVENTIONS:  - Assess patient's baseline mobility status (ambulation, transfers, stairs, etc )    - Identify cognitive and physical deficits and behaviors that affect mobility  - Identify mobility aids required to assist with transfers and/or ambulation (gait belt, sit-to-stand, lift, walker, cane, etc )  - [place] fall precautions as indicated by assessment  - Record patient progress and toleration of activity level on Mobility SBAR; progress patient to next Phase/Stage  - Instruct patient to call for assistance with activity based on assessment  - Consider rehabilitation consult to assist with strengthening/weightbearing, etc   Outcome: Progressing     Problem: DISCHARGE PLANNING  Goal: Discharge to home or other facility with appropriate resources  Description  INTERVENTIONS:  - Identify barriers to discharge w/patient and caregiver  - Arrange for needed discharge resources and transportation as appropriate  - Identify discharge learning needs (meds, wound care, etc )  - Arrange for interpretive services to assist at discharge as needed  - Refer to Case Management Department for coordinating discharge planning if the patient needs post-hospital services based on physician/advanced practitioner order or complex needs related to functional status, cognitive ability, or social support system  Outcome: Progressing     Problem: Knowledge Deficit  Goal: Patient/family/caregiver demonstrates understanding of disease process, treatment plan, medications, and discharge instructions  Description  Complete learning assessment and assess knowledge base    Interventions:  - Provide teaching at level of understanding  - Provide teaching via preferred learning methods  Outcome: Progressing

## 2019-11-02 NOTE — ASSESSMENT & PLAN NOTE
· Patient meets criteria for sepsis with tachycardia, fever with right arm cellulitis  · Will continue with vancomycin day #2  · Please see treatment outlined below

## 2019-11-02 NOTE — PROGRESS NOTES
Progress Note - Justin Lot 1950, 81 y o  female MRN: [id number]    Unit/Bed#: -01 Encounter: [id number]    Primary Care Provider: Laura Mishra PA-C   Date and time admitted to hospital: 11/1/2019 11:10 AM         * Sepsis ([facility] )  Assessment & Plan  · Patient meets criteria for sepsis with tachycardia, fever with right arm cellulitis  · Will continue with vancomycin day #2  · Please see treatment outlined below    Cellulitis  Assessment & Plan  · No prior of MRSA  No obvious port of entry  History of right lymphedema from prior right mastectomy  · Not much improved with IV abx suspected that patient will need another 24-36 hours of IV treatment   · Will continue with vancomycin for now   · Elevate extremity, pain management   · CT right arm- pending     Body mass index (BMI) of 40 0-44 9 in [facility])  Assessment & Plan  · Lifestyle and diet modifications discussed        Recurrent major depressive disorder, in partial remission ([facility] )  Assessment & Plan  · Will continue with current home medication      Mild intermittent asthma without complication  Assessment & Plan  · Not in acute exacerbation   · Will continue with montelukast and PRN nebs      Irritable bowel syndrome (IBS)  Assessment & Plan  · Will continue supportive care   · Continue with bentyl           VTE Pharmacologic Prophylaxis: Pharmacologic: Enoxaparin (Lovenox)    Patient Centered Rounds: I have performed bedside rounds with nursing staff today  Discussions with Specialists or Other Care Team Provider: nursing, PT/OT, CM   Education and Discussions with Family / Patient: patient     Current Length of Stay: 0 day(s)    Current Patient Status: Inpatient   Certification Statement: The patient will continue to require additional inpatient hospital stay due to cellulitis    Discharge Plan: pending hospital course   I have changed patient to inpatient status due to minimal clinical improvement from IV antibiotic and will require at least 24-36 hours More of IV therapy  Code Status: Level 1 - Full Code    Subjective:   Not feeling well  C/o nausea  Feeling tired  She thinks that her right arm is somewhat improved  Objective:     Vitals:   Temp (24hrs), Av 8 °F (37 1 °C), Min:97 °F (36 1 °C), Max:100 7 °F (38 2 °C)    Temp:  [97 °F (36 1 °C)-100 7 °F (38 2 °C)] 99 °F (37 2 °C)  HR:  [] 88  Resp:  [18-20] 18  BP: (112-158)/(60-90) 112/69  SpO2:  [90 %-98 %] 92 %  Body mass index is 41 65 kg/m²  Input and Output Summary (last 24 hours): Intake/Output Summary (Last 24 hours) at 2019 1301  Last data filed at 2019 0334  Gross per 24 hour   Intake 1300 ml   Output 500 ml   Net 800 ml       Physical Exam:     Physical Exam   Constitutional: She is oriented to person, place, and time  She appears well-developed  No distress  HENT:   Head: Normocephalic and atraumatic  Mouth/Throat: Oropharynx is clear and moist    Eyes: Pupils are equal, round, and reactive to light  Conjunctivae and EOM are normal    Neck: Normal range of motion  Neck supple  No thyromegaly present  Cardiovascular: Normal rate and regular rhythm  Exam reveals no gallop and no friction rub  Murmur (+6/6 systolic murmur heard best at apex) heard  Pulmonary/Chest: [place] normal and breath sounds normal  She has no wheezes  She has no rales  Abdominal: Soft  Bowel sounds are normal  She exhibits no distension  There is no tenderness  There is no rebound  Musculoskeletal: Normal range of motion  She exhibits edema (right arm )  She exhibits no tenderness or deformity  Neurological: She is alert and oriented to person, place, and time  No cranial nerve deficit  Skin: Skin is warm and dry  No rash noted  No erythema  Psychiatric: She has a normal mood and affect  Her behavior is normal  Thought content normal    Vitals reviewed        Additional Data:     Labs:    Results from last 7 days   Lab Units 19  0507   WBC Thousand/uL 3 20*   HEMOGLOBIN g/dL 13 4   HEMATOCRIT % 39 7*   PLATELETS Thousands/uL 130*   NEUTROS PCT % 77*   LYMPHS PCT % 15*   MONOS PCT % 7   EOS PCT % 1     Results from last 7 days   Lab Units 11/02/19  0507   POTASSIUM mmol/L 3 3*   CHLORIDE mmol/L 107   CO2 mmol/L 27   BUN mg/dL 10   CREATININE mg/dL 0 69   CALCIUM mg/dL 8 1*   ALK PHOS U/L 108   ALT U/L 42   AST U/L 42*                   * I Have Reviewed All Lab Data Listed Above  * Additional Pertinent Lab Tests Reviewed: [place] [facility] Admission  Reviewed    Imaging:  Imaging Reports Reviewed Today Include: ct arm pending     Recent Cultures (last 7 days):           Last 24 Hours Medication List:     Current Facility-Administered Medications:  acetaminophen 650 mg Oral 4x Daily PRN [place] Greener, CRNP    atorvastatin 20 mg Oral Daily [place] Greener, CRNP    butalbital-acetaminophen-caffeine 1 tablet Oral TID PRN [place] Greener, CRNP    dicyclomine 10 mg Oral Daily [place] Greener, CRNP    DULoxetine 60 mg Oral BID [place] Greener, CRNP    enoxaparin 40 mg Subcutaneous Daily [place] Greener, CRNP    loratadine 10 mg Oral Daily [place] Greener, CRNP    montelukast 10 mg Oral Daily [place] Greener, CRNP    nystatin  Topical BID [place] Greener, CRNP    ondansetron 4 mg Intravenous Q6H PRN [place] Greener, CRNP    traMADol 50 mg Oral Q4H PRN [place] Greener, CRNP    vancomycin 17 5 mg/kg (Adjusted) Intravenous Q12H [place] Greenmaged, CRNP Last Rate: 1,000 mg (11/02/19 1118)        Today, Patient Was Seen By: JERARDO Yoon    ** Please Note: Dictation voice to text software may have been used in the creation of this document   **

## 2019-11-02 NOTE — SOCIAL WORK
Evaluated the pt at the bedside  Pt was admitted to the hospital with cellulitis of the rt arm  Explained the role of CM and then options of discharge planning with the pt  Pt states she lives with her daughter in a 2 story home with 2 ARTUR  Pt has a chairglide to the 2nd floor  Pt states she is independent with bathing and dressing herself  Pt states she cooks meals, however takes breaks while cooking and preparing food  Pt daughter does the cleaning, grocery shopping, laundry and transportation  Pt ambulates with a 4 prong cane  Pt also has a shower bench and hand held shower  Pt [facility] in [place]  Pt feels she does not need any services at this time  Pt daughter will transport home upon discharge  Explained the Observation level of care with the pt and provided booklet of same  Patient received discharge checklist   Content reviewed  Patient/caregiver encouraged to participate in discharge plan of care prior to discharge home  CM reviewed d/c planning process including the following: identifying help at home, patient preference for d/c planning needs, availability of treatment team to discuss questions or concerns patient and/or family may have regarding understanding medications and recognizing signs and symptoms once discharged  CM also encouraged patient to follow up with all recommended appointments after discharge  Patient advised of importance for patient and family to participate in managing patients medical well being

## 2019-11-02 NOTE — UTILIZATION REVIEW
Initial Clinical Review    Admission: Date/Time/Statement:  Placed in Observation status  On 11/1  @ 13:06 changed to inpatient tadmission 11/2 @ 13:01 due to need for continued IV antibiotics  11/02/19 1301  Inpatient Admission      Transfer Service: General Medicine    Expected Discharge Date: 11/03/19       Question Answer   Admitting Physician Rolanda Del Cid [facility]   Level of Care Med Surg   Estimated length of stay More than 2 Midnights   Certification I certify that inpatient services are medically necessary for this patient for a duration of greater than two midnights  See H&P and MD Progress Notes for additional information about the patient's course of treatment  ED Arrival Information     Expected Arrival Acuity Means of Arrival Escorted By Service Admission Type    - 11/1/2019 11:09 Urgent Ambulance [place] [facility] General Medicine Urgent    Arrival Complaint    arm pain        Chief Complaint   Patient presents with    Arm Swelling     right arm     Assessment/Plan: 80 yo f with a PMH of R mastectomy in 2005 and lymphedema of the right arm (noncompliant with her compression stocking)   She presents with right arm redness and swelling, this is much worse then prior episodes  She reoprts temp to 99 at home normal for her is 96-97 here to 100 7  She had episodes of nausea and vomiting in the ED  On exam erythema extends on the RUE from right axilla to her wrist  + 4 swelling  She was placed in observation status  On 11/1 treated with IV abx's  For a diagnosis of RUE cellulitis  11/2  -  Erythema with not much improvement with IV abx's will continue to need to remain for additional IV abx's  Elevate extremity, pain management -   Fever to 99 - Ct results pending       ED Triage Vitals [11/01/19 1111]   Temperature Pulse Respirations Blood Pressure SpO2   98 4 °F (36 9 °C) 101 19 131/66 96 %      Temp Source Heart Rate Source Patient Position - Orthostatic VS BP Location FiO2 (%) Tympanic Monitor Sitting Left arm --      Pain Score       8        Wt Readings from Last 1 Encounters:   11/01/19 87 3 kg (192 lb 7 4 oz)     Additional Vital Signs:   Date/Time  Temp  Pulse  Resp  BP  SpO2  O2 Device  Patient Position - Orthostatic VS   11/02/19 1249  99 °F (37 2 °C)  88  18  112/69  92 %  None (Room air)  Sitting   11/02/19 0556  97 7 °F (36 5 °C)  89  18  142/77  94 %  None (Room air)  Lying   11/02/19 0300  98 °F (36 7 °C)  91  18  132/71  93 %  None (Room air)  Sitting   11/01/19 2221  97 °F (36 1 °C)Abnormal   89  20  116/60  94 %  None (Room air)  Lying   11/01/19 1724    103  20    93 %  None (Room air)     11/01/19 1520  98 5 °F (36 9 °C)  114Abnormal   20  127/70  93 %  None (Room air)  Lying   11/01/19 1400            None (Room air)     11/01/19 1354  100 5 °F (38 1 °C)  116Abnormal   20  144/72  90 %  None (Room air)  Lying   11/01/19 1321  100 7 °F (38 2 °C)Abnormal   115Abnormal   18  158/90  98 %  None (Room air)  Sitting           Pertinent Labs/Diagnostic Test Results:   Results from last 7 days   Lab Units 11/02/19  0507 11/01/19  1137   WBC Thousand/uL 3 20* 9 00   HEMOGLOBIN g/dL 13 4 15 0   HEMATOCRIT % 39 7* 44 6   PLATELETS Thousands/uL 130* 176   NEUTROS ABS Thousands/µL 2 50 6 90*     Results from last 7 days   Lab Units 11/02/19  0507 11/01/19  1137   SODIUM mmol/L 139 138   POTASSIUM mmol/L 3 3* 3 6   CHLORIDE mmol/L 107 104   CO2 mmol/L 27 26   ANION GAP mmol/L 5 8   BUN mg/dL 10 11   CREATININE mg/dL 0 69 0 70   EGFR ml/min/1 73sq m 90 90   CALCIUM mg/dL 8 1* 8 7   MAGNESIUM mg/dL 2 0  --      Results from last 7 days   Lab Units 11/02/19  0507 11/01/19  1137   AST U/L 42* 25   ALT U/L 42 36   ALK PHOS U/L 108 92   TOTAL PROTEIN g/dL 5 6* 6 5   ALBUMIN g/dL 3 0* 3 4*   TOTAL BILIRUBIN mg/dL 0 50 0 70     Results from last 7 days   Lab Units 11/02/19  0507 11/01/19  1137   GLUCOSE RANDOM mg/dL 119* 105*       Results from last 7 days   Lab Units 11/01/19  1137   LACTIC ACID mmol/L 0 6       Results from last 7 days   Lab Units 11/01/19  1406   CLARITY UA  Clear   COLOR UA  Yellow   SPEC GRAV UA  1 015   PH UA  6 5   GLUCOSE UA mg/dl Negative   KETONES UA mg/dl Negative   BLOOD UA  Negative   PROTEIN UA mg/dl Negative   NITRITE UA  Negative   BILIRUBIN UA  Negative   UROBILINOGEN UA E U /dl 1 0   LEUKOCYTES UA  Negative     CT R 11/1 - Humerus - pending read      EKG -11/1 -  S Tach      ED Treatment:   Medication Administration from 11/01/2019 1109 to 11/01/2019 1337       Date/Time Order Dose Route Action     11/01/2019 1138 sodium chloride 0 9 % infusion 125 mL/hr Intravenous New Bag     11/01/2019 1141 vancomycin (VANCOCIN) 1,250 mg in sodium chloride 0 9 % 250 mL IVPB 1,250 mg Intravenous New Bag     11/01/2019 1316 ondansetron (ZOFRAN) injection 4 mg 4 mg Intravenous Given     11/01/2019 1316 acetaminophen (TYLENOL) tablet 650 mg 650 mg Oral Given        Past Medical History:   Diagnosis Date    Anxiety     Arthritis     Asthma     Cancer ([facility] [address] )     breast right    Cataract     CHF (congestive heart failure) (HCC)     Coronary artery disease     Depression     GERD (gastroesophageal reflux disease)     High cholesterol     Hypertension     IBS (irritable bowel syndrome)     Irritable bowel syndrome (IBS) 5/31/2016    Obesity     Post-menopausal     Psychiatric disorder     Renal disorder     Vitamin D deficiency      Present on Admission:   Cellulitis   Recurrent major depressive disorder, in partial remission ([facility] [address] )   Irritable bowel syndrome (IBS)   Mild intermittent asthma without complication      Admitting Diagnosis: Cellulitis [L03 90]  Swelling of arm [M79 89]  Age/Sex: 81 y o  female  Admission Orders:  Scheduled Medications:    Medications:  atorvastatin 20 mg Oral Daily   dicyclomine 10 mg Oral Daily   DULoxetine 60 mg Oral BID   enoxaparin 40 mg Subcutaneous Daily   loratadine 10 mg Oral Daily   montelukast 10 mg Oral Daily   nystatin  Topical BID   vancomycin 17 5 mg/kg (Adjusted) Intravenous Q12H     Continuous IV Infusions:   NS @ 75 ml/hr - d/c'd 11/2 @ 02:24    PRN Meds:    acetaminophen 650 mg Oral 4x Daily PRN 11/2 x 1    butalbital-acetaminophen-caffeine 1 tablet Oral TID PRN    ondansetron 4 mg Intravenous Q6H PRN 11/1 x 1  11/2 x 1    traMADol 50 mg Oral Q4H PRN 11/1 x 2 11/2 x 2     Nursing Orders -  Telem - up  & OOB as tolerated - elevate RUE -  Diet regular house       Network Utilization Review Department  [email or web address]  ATTENTION: Please call with any questions or concerns to [phone] and carefully listen to the prompts so that you are directed to the right person  All voicemails are confidential   [place] all requests for admission clinical reviews, approved or denied determinations and any other requests to dedicated fax number below belonging to the campus where the patient is receiving treatment    FACILITY NAME UR FAX NUMBER   ADMISSION DENIALS (Administrative/Medical Necessity) [address] [facility] (Maternity/NICU/Pediatrics) [phone]   [facility] [address] [facility] [phone]   145 [facility] [facility] [phone]   96 [place] [address] [phone]

## 2019-11-02 NOTE — ASSESSMENT & PLAN NOTE
· No prior of MRSA  No obvious port of entry  History of right lymphedema from prior right mastectomy     · Not much improved with IV abx suspected that patient will need another 24-36 hours of IV treatment   · Will continue with vancomycin for now   · Elevate extremity, pain management   · CT right arm- pending

## 2019-11-03 VITALS
HEART RATE: 89 BPM | SYSTOLIC BLOOD PRESSURE: 149 MMHG | DIASTOLIC BLOOD PRESSURE: 75 MMHG | BODY MASS INDEX: 41.52 KG/M2 | OXYGEN SATURATION: 97 % | TEMPERATURE: 98.1 F | RESPIRATION RATE: 18 BRPM | WEIGHT: 192.46 LBS | HEIGHT: 57 IN

## 2019-11-03 LAB
ANION GAP SERPL CALCULATED.3IONS-SCNC: 8 MMOL/L (ref 4–13)
BUN SERPL-MCNC: 9 MG/DL (ref 7–25)
CALCIUM SERPL-MCNC: 8.9 MG/DL (ref 8.6–10.5)
CHLORIDE SERPL-SCNC: 106 MMOL/L (ref 98–107)
CO2 SERPL-SCNC: 28 MMOL/L (ref 21–31)
CREAT SERPL-MCNC: 0.77 MG/DL (ref 0.6–1.2)
ERYTHROCYTE [DISTWIDTH] IN BLOOD BY AUTOMATED COUNT: 13.1 % (ref 11.5–14.5)
GFR SERPL CREATININE-BSD FRML MDRD: 80 ML/MIN/1.73SQ M
GLUCOSE SERPL-MCNC: 96 MG/DL (ref 65–99)
HCT VFR BLD AUTO: 44.2 % (ref 42–47)
HGB BLD-MCNC: 14.6 G/DL (ref 12–16)
MCH RBC QN AUTO: 30.1 PG (ref 26–34)
MCHC RBC AUTO-ENTMCNC: 33 G/DL (ref 31–37)
MCV RBC AUTO: 91 FL (ref 81–99)
PLATELET # BLD AUTO: 173 THOUSANDS/UL (ref 149–390)
PMV BLD AUTO: 8.1 FL (ref 8.6–11.7)
POTASSIUM SERPL-SCNC: 3.7 MMOL/L (ref 3.5–5.5)
PROCALCITONIN SERPL-MCNC: 0.75 NG/ML
RBC # BLD AUTO: 4.83 MILLION/UL (ref 3.9–5.2)
SODIUM SERPL-SCNC: 142 MMOL/L (ref 134–143)
WBC # BLD AUTO: 3.8 THOUSAND/UL (ref 4.8–10.8)

## 2019-11-03 PROCEDURE — 80048 BASIC METABOLIC PNL TOTAL CA: CPT | Performed by: NURSE PRACTITIONER

## 2019-11-03 PROCEDURE — 99239 HOSP IP/OBS DSCHRG MGMT >30: CPT | Performed by: NURSE PRACTITIONER

## 2019-11-03 PROCEDURE — 84145 PROCALCITONIN (PCT): CPT | Performed by: NURSE PRACTITIONER

## 2019-11-03 PROCEDURE — 85027 COMPLETE CBC AUTOMATED: CPT | Performed by: NURSE PRACTITIONER

## 2019-11-03 RX ORDER — CEPHALEXIN 500 MG/1
500 CAPSULE ORAL EVERY 6 HOURS SCHEDULED
Status: DISCONTINUED | OUTPATIENT
Start: 2019-11-03 | End: 2019-11-03 | Stop reason: HOSPADM

## 2019-11-03 RX ORDER — CEPHALEXIN 500 MG/1
500 CAPSULE ORAL EVERY 6 HOURS SCHEDULED
Qty: 44 CAPSULE | Refills: 0 | Status: SHIPPED | OUTPATIENT
Start: 2019-11-03 | End: 2019-11-14

## 2019-11-03 RX ADMIN — DULOXETINE HYDROCHLORIDE 60 MG: 30 CAPSULE, DELAYED RELEASE ORAL at 08:43

## 2019-11-03 RX ADMIN — DICYCLOMINE HYDROCHLORIDE 10 MG: 10 CAPSULE ORAL at 08:42

## 2019-11-03 RX ADMIN — VANCOMYCIN HYDROCHLORIDE 1000 MG: 1 INJECTION, SOLUTION INTRAVENOUS at 11:23

## 2019-11-03 RX ADMIN — ATORVASTATIN CALCIUM 20 MG: 20 TABLET, FILM COATED ORAL at 08:42

## 2019-11-03 RX ADMIN — ENOXAPARIN SODIUM 40 MG: 40 INJECTION SUBCUTANEOUS at 08:47

## 2019-11-03 RX ADMIN — BUTALBITAL, ACETAMINOPHEN AND CAFFEINE 1 TABLET: 50; 325; 40 TABLET ORAL at 08:42

## 2019-11-03 RX ADMIN — MONTELUKAST SODIUM 10 MG: 10 TABLET, COATED ORAL at 08:43

## 2019-11-03 RX ADMIN — VANCOMYCIN HYDROCHLORIDE 1000 MG: 1 INJECTION, SOLUTION INTRAVENOUS at 00:00

## 2019-11-03 RX ADMIN — LORATADINE 10 MG: 10 TABLET ORAL at 08:42

## 2019-11-03 NOTE — DISCHARGE INSTRUCTIONS
Cellulitis   WHAT YOU NEED TO KNOW:   Cellulitis is a skin infection caused by bacteria  Cellulitis may go away on its own or you may need treatment  Your healthcare provider may draw a [place] around the outside edges of your cellulitis  If your cellulitis spreads, your healthcare provider will see it outside of the [place]  DISCHARGE INSTRUCTIONS:   Call 911 if:   · You have sudden trouble breathing or chest pain  Seek care immediately if:   · Your wound gets larger and more painful  · You feel a crackling under your skin when you touch it  · You have purple dots or bumps on your skin, or you see bleeding under your skin  · You have new swelling and pain in your legs  · The red, warm, swollen area gets larger  · You see red streaks coming from the infected area  Contact your healthcare provider if:   · You have a fever  · Your fever or pain does not go away or gets worse  · The area does not get smaller after 2 days of antibiotics  · Your skin is flaking or peeling off  · You have questions or concerns about your condition or care  Medicines:   · Antibiotics  help treat the bacterial infection  · NSAIDs , such as ibuprofen, help decrease swelling, pain, and fever  NSAIDs can cause stomach bleeding or kidney problems in certain people  If you take blood thinner medicine, always ask if NSAIDs are safe for you  Always read the medicine label and follow directions  Do not give these medicines to children under 10months of age without direction from your child's healthcare provider  · Acetaminophen  decreases pain and fever  It is available without a doctor's order  Ask how much to take and how often to take it  Follow directions  Read the labels of all other medicines you are using to see if they also contain acetaminophen, or ask your doctor or pharmacist  Acetaminophen can cause liver damage if not taken correctly   Do not use more than 4 grams (4,000 milligrams) total of acetaminophen in one day  · Take your medicine as directed  Contact your healthcare provider if you think your medicine is not helping or if you have side effects  Tell him or her if you are allergic to any medicine  Keep a list of the medicines, vitamins, and herbs you take  Include the amounts, and when and why you take them  Bring the list or the pill bottles to follow-up visits  Carry your medicine list with you in case of an emergency  Self-care:   · Elevate the area above the level of your heart  as often as you can  This will help decrease swelling and pain  Prop the area on pillows or blankets to keep it elevated comfortably  · Clean the area daily until the wound scabs over  Gently wash the area with soap and water  Pat dry  Use dressings as directed  · Place cool or warm, wet cloths on the area as directed  Use clean cloths and clean water  Leave it on the area until the cloth is room temperature  Pat the area dry with a clean, dry cloth  The cloths may help decrease pain  Prevent cellulitis:   · Do not scratch bug bites or areas of injury  You increase your risk for cellulitis by scratching these areas  · Do not share personal items, such as towels, clothing, and razors  · Clean exercise equipment  with germ-killing  before and after you use it  · Wash your hands often  Use soap and water  Wash your hands after you use the bathroom, change a child's diapers, or sneeze  Wash your hands before you prepare or eat food  Use lotion to prevent dry, cracked skin  · Wear pressure stockings as directed  You may be told to wear the stockings if you have peripheral edema  The stockings improve blood flow and decrease swelling  · Treat athlete's foot  This can help prevent the spread of a bacterial skin infection  Follow up with your healthcare provider within 3 days, or as directed:   Your healthcare provider will check if your cellulitis is getting better  You may need different medicine  Write down your questions so you remember to ask them during your visits  © 2017 [address] [employer] Information is for End User's use only and may not be sold, redistributed or otherwise used for commercial purposes  All illustrations and images included in CareNotes® are the copyrighted property of [employer]  or [employer]  The above information is an  only  It is not intended as medical advice for individual conditions or treatments  Talk to your doctor, nurse or pharmacist before following any medical regimen to see if it is safe and effective for you  Cephalexin (By mouth)   Cephalexin ([id number]-[id number])  Treats infections  This medicine is a cephalosporin antibiotic  Brand Name(s): Daxbia, Keflex   There may be other brand names for this medicine  When This Medicine Should Not Be Used: This medicine is not right for everyone  Do not use this medicine if you had an allergic reaction to cephalexin or another cephalosporin medicine  How to Use This Medicine:   Capsule, Tablet, Tablet for Suspension, Liquid  · Your doctor will tell you how much medicine to use  Do not use more than directed  · Read and follow the patient instructions that come with this medicine  Talk to your doctor or pharmacist if you have any questions  · You may take your medicine with food or milk to avoid stomach upset  · Oral liquid: Shake well just before use  Measure the oral liquid medicine with a marked measuring spoon, oral syringe, or medicine cup  · Take all of the medicine in your prescription to clear up your infection, even if you feel better after the first few doses  · Missed dose: Take a dose as soon as you remember  If it is almost time for your next dose, wait until then and take a regular dose  Do not take extra medicine to make up for a missed dose    · Capsule or tablet: Store at room temperature away from heat, moisture, and direct light  · Oral liquid: Store in the refrigerator for 14 days  After 14 days, throw away any unused medicine  Do not freeze  Drugs and Foods to Avoid:   Ask your doctor or pharmacist before using any other medicine, including over-the-counter medicines, vitamins, and herbal products  · Some medicines and foods can affect how cephalexin works  Tell your doctor if you are also using  ¨ Metformin  ¨ Probenecid  Warnings While Using This Medicine:   · Tell your doctor if you are pregnant or breastfeeding, or if you have kidney disease, liver disease, or a history of digestive problems, such as colitis  Tell your doctor if you are allergic to penicillin  · This medicine can cause diarrhea  Call your doctor if the diarrhea becomes severe, does not stop, or is bloody  Do not take any medicine to stop diarrhea until you have talked to your doctor  Diarrhea can occur 2 months or more after you stop taking this medicine  · Tell any doctor or dentist who treats you that you are using this medicine  This medicine may affect certain medical test results  · Call your doctor if your symptoms do not improve or if they get worse  · Keep all medicine out of the reach of children  Never share your medicine with anyone  Possible Side Effects While Using This Medicine:   Call your doctor right away if you notice any of these side effects:  · Allergic reaction: Itching or hives, swelling in your face or hands, swelling or tingling in your mouth or throat, chest tightness, trouble breathing  · Blistering, peeling, red skin rash  · Severe diarrhea, especially if bloody or ongoing  · Severe stomach pain, vomiting  If you notice these less serious side effects, talk with your doctor:   · Mild diarrhea or nausea  If you notice other side effects that you think are caused by this medicine, tell your doctor  Call your doctor for medical advice about side effects   You may report side effects to FDA at 6-017-FDA-9026  © 2017 [facility] [address] is for End User's use only and may not be sold, redistributed or otherwise used for commercial purposes  The above information is an  only  It is not intended as medical advice for individual conditions or treatments  Talk to your doctor, nurse or pharmacist before following any medical regimen to see if it is safe and effective for you

## 2019-11-03 NOTE — ASSESSMENT & PLAN NOTE
· No prior of MRSA  No obvious port of entry  History of right lymphedema from prior right mastectomy  · Clinically is improving   Will transition to PO keflex 500 mg q6hr for 11 more days  · Elevate extremity, pain management   · Recommend to wear compression device to help with her lymphedema   · CT right arm- no evidence of abscess

## 2019-11-03 NOTE — ASSESSMENT & PLAN NOTE
· Patient meets criteria for sepsis with tachycardia, fever with right arm cellulitis  · Patient received vancomycin with very response  Will transition to PO keflex for total of 14-day therapy     · Please see treatment outlined below

## 2019-11-03 NOTE — PLAN OF CARE
Problem: Potential for Falls  Goal: Patient will remain free of falls  Description  INTERVENTIONS:  - Assess patient frequently for physical needs  -  Identify cognitive and physical deficits and behaviors that affect risk of falls    -  [place] fall precautions as indicated by assessment   - Educate patient/family on patient safety including physical limitations  - Instruct patient to call for assistance with activity based on assessment  - Modify environment to reduce risk of injury  - Consider OT/PT consult to assist with strengthening/mobility  Outcome: Progressing     Problem: Prexisting or High Potential for Compromised Skin Integrity  Goal: Skin integrity is maintained or improved  Description  INTERVENTIONS:  - Identify patients at risk for skin breakdown  - Assess and monitor skin integrity  - Assess and monitor nutrition and hydration status  - Monitor labs   - Assess for incontinence   - Turn and reposition patient  - Assist with mobility/ambulation  - Relieve pressure over bony prominences  - Avoid friction and shearing  - Provide appropriate hygiene as needed including keeping skin clean and dry  - Evaluate need for skin moisturizer/barrier cream  - Collaborate with interdisciplinary team   - Patient/family teaching  - Consider wound care consult   Outcome: Progressing     Problem: PAIN - ADULT  Goal: Verbalizes/displays adequate comfort level or baseline comfort level  Description  Interventions:  - Encourage patient to monitor pain and request assistance  - Assess pain using appropriate pain scale  - Administer analgesics based on type and severity of pain and evaluate response  - Implement non-pharmacological measures as appropriate and evaluate response  - Consider cultural and social influences on pain and pain management  - Notify physician/advanced practitioner if interventions unsuccessful or patient reports new pain  Outcome: Progressing     Problem: INFECTION - ADULT  Goal: Absence or prevention of progression during hospitalization  Description  INTERVENTIONS:  - Assess and monitor for signs and symptoms of infection  - Monitor lab/diagnostic results  - Monitor all insertion sites, i e  indwelling lines, tubes, and drains  - Monitor endotracheal if appropriate and nasal secretions for changes in amount and color  - [place] appropriate cooling/warming therapies per order  - Administer medications as ordered  - Instruct and encourage patient and family to use good hand hygiene technique  - Identify and instruct in appropriate isolation precautions for identified infection/condition  Outcome: Progressing  Goal: Absence of fever/infection during neutropenic period  Description  INTERVENTIONS:  - Monitor WBC    Outcome: Progressing     Problem: SAFETY ADULT  Goal: Maintain or return to baseline ADL function  Description  INTERVENTIONS:  -  Assess patient's ability to carry out ADLs; assess patient's baseline for ADL function and identify physical deficits which impact ability to perform ADLs (bathing, care of mouth/teeth, toileting, grooming, dressing, etc )  - Assess/evaluate cause of self-care deficits   - Assess range of motion  - Assess patient's mobility; develop plan if impaired  - Assess patient's need for assistive devices and provide as appropriate  - Encourage maximum independence but intervene and supervise when necessary  - Involve family in performance of ADLs  - Assess for home care needs following discharge   - Consider OT consult to assist with ADL evaluation and planning for discharge  - Provide patient education as appropriate  Outcome: Progressing  Goal: Maintain or return mobility status to optimal level  Description  INTERVENTIONS:  - Assess patient's baseline mobility status (ambulation, transfers, stairs, etc )    - Identify cognitive and physical deficits and behaviors that affect mobility  - Identify mobility aids required to assist with transfers and/or ambulation (gait belt, sit-to-stand, lift, walker, cane, etc )  - [place] fall precautions as indicated by assessment  - Record patient progress and toleration of activity level on Mobility SBAR; progress patient to next Phase/Stage  - Instruct patient to call for assistance with activity based on assessment  - Consider rehabilitation consult to assist with strengthening/weightbearing, etc   Outcome: Progressing     Problem: DISCHARGE PLANNING  Goal: Discharge to home or other facility with appropriate resources  Description  INTERVENTIONS:  - Identify barriers to discharge w/patient and caregiver  - Arrange for needed discharge resources and transportation as appropriate  - Identify discharge learning needs (meds, wound care, etc )  - Arrange for interpretive services to assist at discharge as needed  - Refer to Case Management Department for coordinating discharge planning if the patient needs post-hospital services based on physician/advanced practitioner order or complex needs related to functional status, cognitive ability, or social support system  Outcome: Progressing     Problem: Knowledge Deficit  Goal: Patient/family/caregiver demonstrates understanding of disease process, treatment plan, medications, and discharge instructions  Description  Complete learning assessment and assess knowledge base    Interventions:  - Provide teaching at level of understanding  - Provide teaching via preferred learning methods  Outcome: Progressing     Problem: DISCHARGE PLANNING - CARE MANAGEMENT  Goal: Discharge to post-acute care or home with appropriate resources  Description  INTERVENTIONS:  - Conduct assessment to determine patient/family and health care team treatment goals, and need for post-acute services based on payer coverage, community resources, and patient preferences, and barriers to discharge  - Address psychosocial, clinical, and financial barriers to discharge as identified in assessment in conjunction with the patient/family and health care team  - Arrange appropriate level of post-acute services according to patient's   needs and preference and payer coverage in collaboration with the physician and health care team  - Communicate with and update the patient/family, physician, and health care team regarding progress on the discharge plan  - Arrange appropriate transportation to post-acute venues  Discharge home with daughter who will transport     Outcome: Progressing

## 2019-11-03 NOTE — PLAN OF CARE
Problem: Potential for Falls  Goal: Patient will remain free of falls  Description  INTERVENTIONS:  - Assess patient frequently for physical needs  -  Identify cognitive and physical deficits and behaviors that affect risk of falls    -  [place] fall precautions as indicated by assessment   - Educate patient/family on patient safety including physical limitations  - Instruct patient to call for assistance with activity based on assessment  - Modify environment to reduce risk of injury  - Consider OT/PT consult to assist with strengthening/mobility  11/3/2019 1349 by Margi Smith RN  Outcome: Adequate for Discharge  11/3/2019 0906 by Margi Smith RN  Outcome: Progressing     Problem: Prexisting or High Potential for Compromised Skin Integrity  Goal: Skin integrity is maintained or improved  Description  INTERVENTIONS:  - Identify patients at risk for skin breakdown  - Assess and monitor skin integrity  - Assess and monitor nutrition and hydration status  - Monitor labs   - Assess for incontinence   - Turn and reposition patient  - Assist with mobility/ambulation  - Relieve pressure over bony prominences  - Avoid friction and shearing  - Provide appropriate hygiene as needed including keeping skin clean and dry  - Evaluate need for skin moisturizer/barrier cream  - Collaborate with interdisciplinary team   - Patient/family teaching  - Consider wound care consult   11/3/2019 1349 by Margi Smith RN  Outcome: Adequate for Discharge  11/3/2019 0906 by Margi Smith RN  Outcome: Progressing     Problem: PAIN - ADULT  Goal: Verbalizes/displays adequate comfort level or baseline comfort level  Description  Interventions:  - Encourage patient to monitor pain and request assistance  - Assess pain using appropriate pain scale  - Administer analgesics based on type and severity of pain and evaluate response  - Implement non-pharmacological measures as appropriate and evaluate response  - Consider cultural and social influences on pain and pain management  - Notify physician/advanced practitioner if interventions unsuccessful or patient reports new pain  11/3/2019 1349 by Philip Molina RN  Outcome: Adequate for Discharge  11/3/2019 0906 by Philip Molina RN  Outcome: Progressing     Problem: INFECTION - ADULT  Goal: Absence or prevention of progression during hospitalization  Description  INTERVENTIONS:  - Assess and monitor for signs and symptoms of infection  - Monitor lab/diagnostic results  - Monitor all insertion sites, i e  indwelling lines, tubes, and drains  - Monitor endotracheal if appropriate and nasal secretions for changes in amount and color  - [place] appropriate cooling/warming therapies per order  - Administer medications as ordered  - Instruct and encourage patient and family to use good hand hygiene technique  - Identify and instruct in appropriate isolation precautions for identified infection/condition  11/3/2019 1349 by Philip Molina RN  Outcome: Adequate for Discharge  11/3/2019 0906 by Philip Molina RN  Outcome: Progressing  Goal: Absence of fever/infection during neutropenic period  Description  INTERVENTIONS:  - Monitor WBC    11/3/2019 1349 by Philip Molnia RN  Outcome: Adequate for Discharge  11/3/2019 0906 by Philip Molina RN  Outcome: Progressing     Problem: SAFETY ADULT  Goal: Maintain or return to baseline ADL function  Description  INTERVENTIONS:  -  Assess patient's ability to carry out ADLs; assess patient's baseline for ADL function and identify physical deficits which impact ability to perform ADLs (bathing, care of mouth/teeth, toileting, grooming, dressing, etc )  - Assess/evaluate cause of self-care deficits   - Assess range of motion  - Assess patient's mobility; develop plan if impaired  - Assess patient's need for assistive devices and provide as appropriate  - Encourage maximum independence but intervene and supervise when necessary  - Involve family in performance of ADLs  - Assess for home care needs following discharge   - Consider OT consult to assist with ADL evaluation and planning for discharge  - Provide patient education as appropriate  11/3/2019 1349 by Woody ePnn RN  Outcome: Adequate for Discharge  11/3/2019 0906 by Woody Penn RN  Outcome: Progressing  Goal: Maintain or return mobility status to optimal level  Description  INTERVENTIONS:  - Assess patient's baseline mobility status (ambulation, transfers, stairs, etc )    - Identify cognitive and physical deficits and behaviors that affect mobility  - Identify mobility aids required to assist with transfers and/or ambulation (gait belt, sit-to-stand, lift, walker, cane, etc )  - [place] fall precautions as indicated by assessment  - Record patient progress and toleration of activity level on Mobility SBAR; progress patient to next Phase/Stage  - Instruct patient to call for assistance with activity based on assessment  - Consider rehabilitation consult to assist with strengthening/weightbearing, etc   11/3/2019 1349 by Woody Penn RN  Outcome: Adequate for Discharge  11/3/2019 0906 by Woody Penn RN  Outcome: Progressing     Problem: DISCHARGE PLANNING  Goal: Discharge to home or other facility with appropriate resources  Description  INTERVENTIONS:  - Identify barriers to discharge w/patient and caregiver  - Arrange for needed discharge resources and transportation as appropriate  - Identify discharge learning needs (meds, wound care, etc )  - Arrange for interpretive services to assist at discharge as needed  - Refer to Case Management Department for coordinating discharge planning if the patient needs post-hospital services based on physician/advanced practitioner order or complex needs related to functional status, cognitive ability, or social support system  11/3/2019 1349 by Woody Penn RN  Outcome: Adequate for Discharge  11/3/2019 0906 by Fleet Bernheim KOURTNEY Riley  Outcome: Progressing     Problem: Knowledge Deficit  Goal: Patient/family/caregiver demonstrates understanding of disease process, treatment plan, medications, and discharge instructions  Description  Complete learning assessment and assess knowledge base  Interventions:  - Provide teaching at level of understanding  - Provide teaching via preferred learning methods  11/3/2019 1349 by Maggie Huerta RN  Outcome: Adequate for Discharge  11/3/2019 0906 by Maggie Huerta RN  Outcome: Progressing     Problem: DISCHARGE PLANNING - CARE MANAGEMENT  Goal: Discharge to post-acute care or home with appropriate resources  Description  INTERVENTIONS:  - Conduct assessment to determine patient/family and health care team treatment goals, and need for post-acute services based on payer coverage, community resources, and patient preferences, and barriers to discharge  - Address psychosocial, clinical, and financial barriers to discharge as identified in assessment in conjunction with the patient/family and health care team  - Arrange appropriate level of post-acute services according to patient's   needs and preference and payer coverage in collaboration with the physician and health care team  - Communicate with and update the patient/family, physician, and health care team regarding progress on the discharge plan  - Arrange appropriate transportation to post-acute venues  Discharge home with daughter who will transport  11/3/2019 1349 by Maggie Huerta RN  Outcome: Adequate for Discharge  11/3/2019 0906 by Maggie Huerta RN  Outcome: Progressing     Problem: Potential for Falls  Goal: Patient will remain free of falls  Description  INTERVENTIONS:  - Assess patient frequently for physical needs  -  Identify cognitive and physical deficits and behaviors that affect risk of falls    -  [place] fall precautions as indicated by assessment   - Educate patient/family on patient safety including physical limitations  - Instruct patient to call for assistance with activity based on assessment  - Modify environment to reduce risk of injury  - Consider OT/PT consult to assist with strengthening/mobility  11/3/2019 1349 by Reina Ramirez RN  Outcome: Adequate for Discharge  11/3/2019 0906 by Reina Ramirez RN  Outcome: Progressing     Problem: Prexisting or High Potential for Compromised Skin Integrity  Goal: Skin integrity is maintained or improved  Description  INTERVENTIONS:  - Identify patients at risk for skin breakdown  - Assess and monitor skin integrity  - Assess and monitor nutrition and hydration status  - Monitor labs   - Assess for incontinence   - Turn and reposition patient  - Assist with mobility/ambulation  - Relieve pressure over bony prominences  - Avoid friction and shearing  - Provide appropriate hygiene as needed including keeping skin clean and dry  - Evaluate need for skin moisturizer/barrier cream  - Collaborate with interdisciplinary team   - Patient/family teaching  - Consider wound care consult   11/3/2019 1349 by Reina Ramirez RN  Outcome: Adequate for Discharge  11/3/2019 0906 by Reina Ramirez RN  Outcome: Progressing     Problem: PAIN - ADULT  Goal: Verbalizes/displays adequate comfort level or baseline comfort level  Description  Interventions:  - Encourage patient to monitor pain and request assistance  - Assess pain using appropriate pain scale  - Administer analgesics based on type and severity of pain and evaluate response  - Implement non-pharmacological measures as appropriate and evaluate response  - Consider cultural and social influences on pain and pain management  - Notify physician/advanced practitioner if interventions unsuccessful or patient reports new pain  11/3/2019 1349 by Reina Ramirez RN  Outcome: Adequate for Discharge  11/3/2019 0906 by Reina Ramirez RN  Outcome: Progressing     Problem: INFECTION - ADULT  Goal: Absence or prevention of progression during hospitalization  Description  INTERVENTIONS:  - Assess and monitor for signs and symptoms of infection  - Monitor lab/diagnostic results  - Monitor all insertion sites, i e  indwelling lines, tubes, and drains  - Monitor endotracheal if appropriate and nasal secretions for changes in amount and color  - [place] appropriate cooling/warming therapies per order  - Administer medications as ordered  - Instruct and encourage patient and family to use good hand hygiene technique  - Identify and instruct in appropriate isolation precautions for identified infection/condition  11/3/2019 1349 by Randolph Diamond RN  Outcome: Adequate for Discharge  11/3/2019 0906 by Randolph Diamond RN  Outcome: Progressing  Goal: Absence of fever/infection during neutropenic period  Description  INTERVENTIONS:  - Monitor WBC    11/3/2019 1349 by Randolph Diamond RN  Outcome: Adequate for Discharge  11/3/2019 0906 by Randolph Diamond RN  Outcome: Progressing     Problem: SAFETY ADULT  Goal: Maintain or return to baseline ADL function  Description  INTERVENTIONS:  -  Assess patient's ability to carry out ADLs; assess patient's baseline for ADL function and identify physical deficits which impact ability to perform ADLs (bathing, care of mouth/teeth, toileting, grooming, dressing, etc )  - Assess/evaluate cause of self-care deficits   - Assess range of motion  - Assess patient's mobility; develop plan if impaired  - Assess patient's need for assistive devices and provide as appropriate  - Encourage maximum independence but intervene and supervise when necessary  - Involve family in performance of ADLs  - Assess for home care needs following discharge   - Consider OT consult to assist with ADL evaluation and planning for discharge  - Provide patient education as appropriate  11/3/2019 1349 by Randolph Diamond RN  Outcome: Adequate for Discharge  11/3/2019 0906 by Randolph Diamond RN  Outcome: Progressing  Goal: Maintain or return mobility status to optimal level  Description  INTERVENTIONS:  - Assess patient's baseline mobility status (ambulation, transfers, stairs, etc )    - Identify cognitive and physical deficits and behaviors that affect mobility  - Identify mobility aids required to assist with transfers and/or ambulation (gait belt, sit-to-stand, lift, walker, cane, etc )  - [place] fall precautions as indicated by assessment  - Record patient progress and toleration of activity level on Mobility SBAR; progress patient to next Phase/Stage  - Instruct patient to call for assistance with activity based on assessment  - Consider rehabilitation consult to assist with strengthening/weightbearing, etc   11/3/2019 1349 by Caden Weiss RN  Outcome: Adequate for Discharge  11/3/2019 0906 by Caden Weiss RN  Outcome: Progressing     Problem: DISCHARGE PLANNING  Goal: Discharge to home or other facility with appropriate resources  Description  INTERVENTIONS:  - Identify barriers to discharge w/patient and caregiver  - Arrange for needed discharge resources and transportation as appropriate  - Identify discharge learning needs (meds, wound care, etc )  - Arrange for interpretive services to assist at discharge as needed  - Refer to Case Management Department for coordinating discharge planning if the patient needs post-hospital services based on physician/advanced practitioner order or complex needs related to functional status, cognitive ability, or social support system  11/3/2019 1349 by Caden Weiss RN  Outcome: Adequate for Discharge  11/3/2019 0906 by Caden Weiss RN  Outcome: Progressing     Problem: Knowledge Deficit  Goal: Patient/family/caregiver demonstrates understanding of disease process, treatment plan, medications, and discharge instructions  Description  Complete learning assessment and assess knowledge base    Interventions:  - Provide teaching at level of understanding  - Provide teaching via preferred learning methods  11/3/2019 1349 by Jenna Dorsey RN  Outcome: Adequate for Discharge  11/3/2019 0906 by Jenna Dorsey RN  Outcome: Progressing     Problem: DISCHARGE PLANNING - CARE MANAGEMENT  Goal: Discharge to post-acute care or home with appropriate resources  Description  INTERVENTIONS:  - Conduct assessment to determine patient/family and health care team treatment goals, and need for post-acute services based on payer coverage, community resources, and patient preferences, and barriers to discharge  - Address psychosocial, clinical, and financial barriers to discharge as identified in assessment in conjunction with the patient/family and health care team  - Arrange appropriate level of post-acute services according to patient's   needs and preference and payer coverage in collaboration with the physician and health care team  - Communicate with and update the patient/family, physician, and health care team regarding progress on the discharge plan  - Arrange appropriate transportation to post-acute venues  Discharge home with daughter who will transport     11/3/2019 1349 by Jenna Dorsey RN  Outcome: Adequate for Discharge  11/3/2019 0906 by Jenna Dorsey RN  Outcome: Progressing

## 2019-11-03 NOTE — NURSING NOTE
hospitalist was in to see and assess the pt and she is ok for d/c to home, iv site removed with the tip intact, avs reviewed with the pt, all questions answered, pt verbalized understanding, taken to family car via w/c by this rn and the pt was d/c'd from the hospital

## 2019-11-03 NOTE — CONSULTS
Vancomycin IV Pharmacy-to-Dose Consultation    Vancomycin to be discontinued, patient transitioned to cephalexin  Pharmacy will sign off  Please contact or re-consult with questions      Kenya Rogers, FloryD

## 2019-11-04 ENCOUNTER — TRANSITIONAL CARE MANAGEMENT (OUTPATIENT)
Dept: INTERNAL MEDICINE CLINIC | Facility: CLINIC | Age: 69
End: 2019-11-04

## 2019-11-04 ENCOUNTER — TELEPHONE (OUTPATIENT)
Dept: INTERNAL MEDICINE CLINIC | Facility: CLINIC | Age: 69
End: 2019-11-04

## 2019-11-06 LAB
BACTERIA BLD CULT: NORMAL
BACTERIA BLD CULT: NORMAL

## 2019-11-06 NOTE — UTILIZATION REVIEW
Notification of Discharge  This is a Notification of Discharge from our facility [address]  Please be advised that this patient has been discharge from our facility  Below you will find the admission and discharge date and time including the patients disposition  PRESENTATION DATE: 11/1/2019 11:10 AM  OBS ADMISSION DATE:   IP ADMISSION DATE: 11/2/19 1300   DISCHARGE DATE: 11/3/2019  2:28 PM  DISPOSITION: Home/Self Care Home/Self Care   Admission Orders listed below:  Admission Orders (From admission, onward)     Ordered        11/02/19 1300  Inpatient Admission  Once         11/01/19 1305  Place in Observation (expected length of stay for this patient is less than two midnights)  Once                   Please contact the UR Department if additional information is required to close this patient's authorization/case  [address] [place] Paymentus Utilization Review Department  Main: [phone] x carefully listen to the prompts  All voicemails are confidential   [email or web address]  Send all requests for admission clinical reviews, approved or denied determinations and any other requests to dedicated fax number below belonging to the campus where the patient is receiving treatment    List of dedicated fax numbers:  [address] DENIALS (Administrative/Medical Necessity) [phone]   [address] [place] [address] (Maternity/NICU/Pediatrics) [phone]   Mason Blankenship [phone]   Lolita Promise [phone]   [facility] [phone]   Vicky Eubanks [facility] [address] [facility] [phone]   Arnoldo Parris [address] [phone]

## 2019-11-08 ENCOUNTER — OFFICE VISIT (OUTPATIENT)
Dept: INTERNAL MEDICINE CLINIC | Facility: CLINIC | Age: 69
End: 2019-11-08
Payer: COMMERCIAL

## 2019-11-08 VITALS
TEMPERATURE: 98 F | WEIGHT: 185 LBS | HEIGHT: 57 IN | RESPIRATION RATE: 16 BRPM | SYSTOLIC BLOOD PRESSURE: 140 MMHG | HEART RATE: 84 BPM | BODY MASS INDEX: 39.91 KG/M2 | DIASTOLIC BLOOD PRESSURE: 82 MMHG

## 2019-11-08 DIAGNOSIS — G89.29 CHRONIC MUSCULOSKELETAL PAIN: Primary | ICD-10-CM

## 2019-11-08 DIAGNOSIS — L03.113 CELLULITIS OF RIGHT UPPER EXTREMITY: Primary | ICD-10-CM

## 2019-11-08 DIAGNOSIS — Z78.0 MENOPAUSE: ICD-10-CM

## 2019-11-08 DIAGNOSIS — M79.18 CHRONIC MUSCULOSKELETAL PAIN: Primary | ICD-10-CM

## 2019-11-08 DIAGNOSIS — B35.6 TINEA CRURIS: ICD-10-CM

## 2019-11-08 PROCEDURE — 99496 TRANSJ CARE MGMT HIGH F2F 7D: CPT | Performed by: PHYSICIAN ASSISTANT

## 2019-11-08 PROCEDURE — 1111F DSCHRG MED/CURRENT MED MERGE: CPT | Performed by: PHYSICIAN ASSISTANT

## 2019-11-08 PROCEDURE — 1160F RVW MEDS BY RX/DR IN RCRD: CPT | Performed by: PHYSICIAN ASSISTANT

## 2019-11-08 RX ORDER — OXYCODONE HYDROCHLORIDE AND ACETAMINOPHEN 5; 325 MG/1; MG/1
1 TABLET ORAL EVERY 6 HOURS PRN
COMMUNITY
End: 2019-11-08 | Stop reason: SDUPTHER

## 2019-11-08 RX ORDER — NYSTATIN 100000 [USP'U]/G
POWDER TOPICAL 3 TIMES DAILY
Qty: 60 G | Refills: 0 | Status: SHIPPED | OUTPATIENT
Start: 2019-11-08 | End: 2020-02-19 | Stop reason: SDUPTHER

## 2019-11-08 RX ORDER — NYSTATIN 100000 U/G
CREAM TOPICAL 2 TIMES DAILY
Qty: 30 G | Refills: 0 | Status: SHIPPED | OUTPATIENT
Start: 2019-11-08 | End: 2020-04-20 | Stop reason: SDUPTHER

## 2019-11-08 NOTE — TELEPHONE ENCOUNTER
Why is she requesting this?  Was given 5 day supply in ER and now is a request for 120 tabs and rosetta never prescribed this for her

## 2019-11-11 ENCOUNTER — TELEPHONE (OUTPATIENT)
Dept: INTERNAL MEDICINE CLINIC | Facility: CLINIC | Age: 69
End: 2019-11-11

## 2019-11-11 RX ORDER — OXYCODONE HYDROCHLORIDE AND ACETAMINOPHEN 5; 325 MG/1; MG/1
1 TABLET ORAL EVERY 8 HOURS PRN
Qty: 30 TABLET | Refills: 0 | Status: SHIPPED | OUTPATIENT
Start: 2019-11-11 | End: 2020-05-19

## 2019-11-11 NOTE — TELEPHONE ENCOUNTER
I sent rx for percocet but changed quantity from 120 tabs to 30 tabs  Will refill very sparignly  If she feels she needs more pain control would recommend pain management evaluation

## 2019-11-11 NOTE — TELEPHONE ENCOUNTER
Patient called back to clarify and uses it for her back and joint pain does not use frequently only as needed  She stated she does have some pain now in back      Please advise    Maru Rg

## 2019-11-11 NOTE — PROGRESS NOTES
Transition of Care  Follow-up After Hospitalization    Deyanira Stokes 81 y o  female   Date:  11/11/2019    TCM Call (since 10/11/2019)     Date and time call was made  11/4/2019  3:13 PM    Hospital care reviewed  Records reviewed    Patient was hospitialized at  [address] [place] [address]    Date of Admission  11/01/19    Date of discharge  11/03/19    Diagnosis  sepsis    Disposition  Home    Were the patients medications reviewed and updated  Yes    Current Symptoms  None      TCM Call (since 10/11/2019)     Post hospital issues  None    Should patient be enrolled in anticoag monitoring? No    Scheduled for follow up? Yes    Did you obtain your prescribed medications  Yes    Do you need help managing your prescriptions or medications  No    Is transportation to your appointment needed  No    I have advised the patient to call PCP with any new or worsening symptoms  jose r/herve        Admit Date: 11/1/19  Discharge Date: 11/3/19  Diagnosis: Right arm cellulitis  Location: [facility] records were reviewed  Medications upon discharge reviewed/updated  Medication Changes: Iv Vancomycin -> PO Keflex  Imaging: Ct humerus  Consults: none  Discharge Disposition:  home  Follow up visits with other specialists: none      Assessment and Plan:    Alexis Santiago was seen today for follow-up  Diagnoses and all orders for this visit:    Menopause  -     DXA bone density spine hip and pelvis; Future    Tinea cruris  -     nystatin (MYCOSTATIN) cream; Apply topically 2 (two) times a day  -     nystatin (MYCOSTATIN) powder; Apply topically 3 (three) times a day            HPI:  Pt presents for MARVIN  She was admitted to [facility] on 11/1 for right arm pain and swelling  She has a hx of mastectomy and right arm lymphedema as a result  She had not been wearing anything for compression  She underwent CT of her humerus which showed cellulitis  She placed placed on IV vanco and transitioned to PO keflex   She was discharged home on 11/3  Symptoms are nearly completely resolved  ROS: Review of Systems   Constitutional: Negative for chills and fever  HENT: Negative for congestion, ear pain, hearing loss, postnasal drip, rhinorrhea, sinus pressure, sinus pain, sore throat and trouble swallowing  Eyes: Negative for pain and visual disturbance  Respiratory: Negative for cough, chest tightness, shortness of breath and wheezing  Cardiovascular: Negative  Negative for chest pain, palpitations and leg swelling  Gastrointestinal: Negative for abdominal pain, blood in stool, constipation, diarrhea, nausea and vomiting  Endocrine: Negative for cold intolerance, heat intolerance, polydipsia, polyphagia and polyuria  Genitourinary: Negative for difficulty urinating, dysuria, flank pain and urgency  Musculoskeletal: Negative for arthralgias, back pain, gait problem and myalgias  Skin: Negative for rash  Allergic/Immunologic: Negative  Neurological: Negative for dizziness, weakness, light-headedness and headaches  Hematological: Negative  Psychiatric/Behavioral: Negative for behavioral problems, dysphoric mood and sleep disturbance  The patient is not nervous/anxious          Past Medical History:   Diagnosis Date    Anxiety     Arthritis     Asthma     Cancer ([facility] [address] )     breast right    Cataract     CHF (congestive heart failure) (HCC)     Coronary artery disease     Depression     GERD (gastroesophageal reflux disease)     High cholesterol     Hypertension     IBS (irritable bowel syndrome)     Irritable bowel syndrome (IBS) 5/31/2016    Obesity     Post-menopausal     Psychiatric disorder     Renal disorder     Vitamin D deficiency        Past Surgical History:   Procedure Laterality Date    BREAST SURGERY      CATARACT EXTRACTION, BILATERAL      CHOLECYSTECTOMY      COLONOSCOPY N/A 5/31/2016    Procedure: COLONOSCOPY;  Surgeon: Oriana Pizarro MD;  Location: [facility];  Service:    Yvette Fordyce GALLBLADDER SURGERY      HYSTERECTOMY      Total    KNEE SURGERY      TONSILLECTOMY AND ADENOIDECTOMY         Social History     Socioeconomic History    Marital status:      Spouse name: None    Number of children: None    Years of education: None    Highest education level: None   Occupational History    Occupation: Retired   Social Needs    Financial resource strain: None    Food insecurity:     Worry: None     Inability: None    Transportation needs:     Medical: None     Non-medical: None   Tobacco Use    Smoking status: Never Smoker    Smokeless tobacco: Never Used   Substance and Sexual Activity    Alcohol use: Not Currently     Frequency: Never     Comment: socially    Drug use: Never    Sexual activity: None   Lifestyle    Physical activity:     Days per week: None     Minutes per session: None    Stress: None   Relationships    Social connections:     Talks on phone: None     Gets together: None     Attends Sabianism service: None     Active member of club or organization: None     Attends meetings of clubs or organizations: None     Relationship status: None    Intimate partner violence:     Fear of current or ex partner: None     Emotionally abused: None     Physically abused: None     Forced sexual activity: None   Other Topics Concern    None   Social History Narrative    Always uses seat belt    Occasional caffeine consumption         Retired    Lives with adult daughter       Family History   Adopted: Yes   Problem Relation Age of Onset    Diabetes Mother     Heart disease Mother     Mental illness Mother     Depression Mother     Heart disease Brother     Breast cancer Maternal Aunt     Breast cancer Maternal Aunt     Breast cancer Maternal Aunt     Breast cancer Maternal Aunt     Breast cancer Maternal Aunt        Allergies   Allergen Reactions    Ibuprofen Dizziness, Syncope and Nausea Only    Asa [Aspirin] Rash    Latex Dermatitis         Current Outpatient Medications:     albuterol (2 5 mg/3 mL) 0 083 % nebulizer solution, Take 2 5 mg by nebulization every 6 (six) hours as needed for wheezing , Disp: , Rfl:     albuterol (PROAIR HFA) 90 mcg/act inhaler, Inhale 2 puffs every 4 (four) hours as needed for wheezing, Disp: 3 Inhaler, Rfl: 1    atorvastatin (LIPITOR) 20 mg tablet, Take 1 tablet by mouth daily  , Disp: 30 tablet, Rfl: 4    butalbital-acetaminophen-caffeine (FIORICET,ESGIC) -40 mg per tablet, Take 1 tablet by mouth 3 (three) times a day as needed for headaches, Disp: 90 tablet, Rfl: 0    cephalexin (KEFLEX) 500 mg capsule, Take 1 capsule (500 mg total) by mouth every 6 (six) hours for 44 doses, Disp: 44 capsule, Rfl: 0    Cholecalciferol (VITAMIN D3) 2000 units capsule, Take 1 capsule by mouth daily, Disp: , Rfl:     diclofenac (VOLTAREN) 75 mg EC tablet, Take 1 tablet (75 mg total) by mouth 2 (two) times a day, Disp: 60 tablet, Rfl: 2    dicyclomine (BENTYL) 10 mg capsule, Take 1 capsule by mouth daily  , Disp: 90 capsule, Rfl: 0    DULoxetine (CYMBALTA) 60 mg delayed release capsule, Take 1 capsule (60 mg total) by mouth 2 (two) times a day, Disp: 180 capsule, Rfl: 0    loratadine (CLARITIN) 10 mg tablet, Take 1 tablet (10 mg total) by mouth daily, Disp: 90 tablet, Rfl: 1    montelukast (SINGULAIR) 10 mg tablet, Take 1 tablet by mouth daily  , Disp: 30 tablet, Rfl: 4    Nutritional Supplements (BOOST BREEZE PO), Take by mouth daily, Disp: , Rfl:     oxyCODONE-acetaminophen (PERCOCET) 5-325 mg per tablet, Take 1 tablet by mouth every 6 (six) hours as needed for moderate pain, Disp: , Rfl:     nystatin (MYCOSTATIN) cream, Apply topically 2 (two) times a day, Disp: 30 g, Rfl: 0    nystatin (MYCOSTATIN) powder, Apply topically 3 (three) times a day, Disp: 60 g, Rfl: 0      Physical Exam:  /82   Pulse 84   Temp 98 °F (36 7 °C) (Tympanic)   Resp 16   Ht 4' 9" (1 448 m)   Wt 83 9 kg (185 lb)   LMP  (LMP Unknown)   BMI 40 03 kg/m² Physical Exam   Constitutional: She is oriented to person, place, and time  She appears well-developed and well-nourished  No distress  HENT:   Head: Normocephalic and atraumatic  Right Ear: External ear normal    Left Ear: External ear normal    Nose: Nose normal    Mouth/Throat: Oropharynx is clear and moist  No oropharyngeal exudate  Eyes: Pupils are equal, round, and reactive to light  Conjunctivae and EOM are normal  Right eye exhibits no discharge  Left eye exhibits no discharge  No scleral icterus  Neck: Normal range of motion  Neck supple  No thyromegaly present  Cardiovascular: Normal rate, regular rhythm and normal heart sounds  Exam reveals no gallop and no friction rub  No murmur heard  Pulmonary/Chest: Effort normal and breath sounds normal  No respiratory distress  She has no wheezes  She has no rales  Abdominal: Soft  Bowel sounds are normal  She exhibits no distension  There is no tenderness  Musculoskeletal: Normal range of motion  She exhibits no edema, tenderness or deformity  Neurological: She is alert and oriented to person, place, and time  No cranial nerve deficit  Skin: Skin is warm and dry  She is not diaphoretic  Psychiatric: She has a normal mood and affect  Her behavior is normal  Judgment and thought content normal            Labs:  Lab Results   Component Value Date    WBC 3 80 (L) 11/03/2019    HGB 14 6 11/03/2019    HCT 44 2 11/03/2019    MCV 91 11/03/2019     11/03/2019     Lab Results   Component Value Date     12/02/2015    K 3 7 11/03/2019     11/03/2019    CO2 28 11/03/2019    ANIONGAP 7 12/02/2015    BUN 9 11/03/2019    CREATININE 0 77 11/03/2019    GLUCOSE 79 12/02/2015    GLUF 103 (H) 10/05/2018    CALCIUM 8 9 11/03/2019    AST 42 (H) 11/02/2019    ALT 42 11/02/2019    ALKPHOS 108 11/02/2019    PROT 6 9 12/02/2015    BILITOT 0 55 12/02/2015    EGFR 80 11/03/2019           A/P  1   Cellulitis R arm: Pt continues with PO Keflex and symptoms have nearly completely resolved  She has been wearing a compression device which is also helping her lymphedema symptoms

## 2019-11-17 DIAGNOSIS — B35.6 TINEA CRURIS: ICD-10-CM

## 2019-11-18 RX ORDER — NYSTATIN 100000 U/G
CREAM TOPICAL 2 TIMES DAILY
Qty: 30 G | Refills: 1 | Status: SHIPPED | OUTPATIENT
Start: 2019-11-18 | End: 2020-02-26

## 2019-11-26 ENCOUNTER — TRANSCRIBE ORDERS (OUTPATIENT)
Dept: LAB | Facility: CLINIC | Age: 69
End: 2019-11-26

## 2019-11-26 ENCOUNTER — APPOINTMENT (OUTPATIENT)
Dept: LAB | Facility: CLINIC | Age: 69
End: 2019-11-26
Payer: COMMERCIAL

## 2019-11-26 DIAGNOSIS — E78.5 HYPERLIPIDEMIA LDL GOAL <130: ICD-10-CM

## 2019-11-26 DIAGNOSIS — Z13.1 SCREENING FOR DIABETES MELLITUS: ICD-10-CM

## 2019-11-26 DIAGNOSIS — Z13.0 SCREENING FOR DEFICIENCY ANEMIA: ICD-10-CM

## 2019-11-26 DIAGNOSIS — E55.9 VITAMIN D DEFICIENCY: ICD-10-CM

## 2019-11-26 DIAGNOSIS — Z13.29 SCREENING FOR HYPOTHYROIDISM: ICD-10-CM

## 2019-11-26 LAB
25(OH)D3 SERPL-MCNC: 14.6 NG/ML (ref 30–100)
ALBUMIN SERPL BCP-MCNC: 3.3 G/DL (ref 3.5–5)
ALP SERPL-CCNC: 110 U/L (ref 46–116)
ALT SERPL W P-5'-P-CCNC: 30 U/L (ref 12–78)
ANION GAP SERPL CALCULATED.3IONS-SCNC: 7 MMOL/L (ref 4–13)
AST SERPL W P-5'-P-CCNC: 18 U/L (ref 5–45)
BASOPHILS # BLD AUTO: 0.03 THOUSANDS/ΜL (ref 0–0.1)
BASOPHILS NFR BLD AUTO: 1 % (ref 0–1)
BILIRUB SERPL-MCNC: 0.66 MG/DL (ref 0.2–1)
BUN SERPL-MCNC: 11 MG/DL (ref 5–25)
CALCIUM SERPL-MCNC: 8.9 MG/DL (ref 8.3–10.1)
CHLORIDE SERPL-SCNC: 111 MMOL/L (ref 100–108)
CHOLEST SERPL-MCNC: 150 MG/DL (ref 50–200)
CO2 SERPL-SCNC: 25 MMOL/L (ref 21–32)
CREAT SERPL-MCNC: 0.76 MG/DL (ref 0.6–1.3)
EOSINOPHIL # BLD AUTO: 0.14 THOUSAND/ΜL (ref 0–0.61)
EOSINOPHIL NFR BLD AUTO: 3 % (ref 0–6)
ERYTHROCYTE [DISTWIDTH] IN BLOOD BY AUTOMATED COUNT: 12.4 % (ref 11.6–15.1)
GFR SERPL CREATININE-BSD FRML MDRD: 81 ML/MIN/1.73SQ M
GLUCOSE P FAST SERPL-MCNC: 112 MG/DL (ref 65–99)
HCT VFR BLD AUTO: 46.1 % (ref 34.8–46.1)
HDLC SERPL-MCNC: 56 MG/DL
HGB BLD-MCNC: 14.9 G/DL (ref 11.5–15.4)
IMM GRANULOCYTES # BLD AUTO: 0.02 THOUSAND/UL (ref 0–0.2)
IMM GRANULOCYTES NFR BLD AUTO: 1 % (ref 0–2)
LDLC SERPL CALC-MCNC: 69 MG/DL (ref 0–100)
LYMPHOCYTES # BLD AUTO: 1.63 THOUSANDS/ΜL (ref 0.6–4.47)
LYMPHOCYTES NFR BLD AUTO: 39 % (ref 14–44)
MCH RBC QN AUTO: 29.9 PG (ref 26.8–34.3)
MCHC RBC AUTO-ENTMCNC: 32.3 G/DL (ref 31.4–37.4)
MCV RBC AUTO: 93 FL (ref 82–98)
MONOCYTES # BLD AUTO: 0.45 THOUSAND/ΜL (ref 0.17–1.22)
MONOCYTES NFR BLD AUTO: 11 % (ref 4–12)
NEUTROPHILS # BLD AUTO: 1.95 THOUSANDS/ΜL (ref 1.85–7.62)
NEUTS SEG NFR BLD AUTO: 45 % (ref 43–75)
NRBC BLD AUTO-RTO: 0 /100 WBCS
PLATELET # BLD AUTO: 214 THOUSANDS/UL (ref 149–390)
PMV BLD AUTO: 10.2 FL (ref 8.9–12.7)
POTASSIUM SERPL-SCNC: 3.7 MMOL/L (ref 3.5–5.3)
PROT SERPL-MCNC: 6.7 G/DL (ref 6.4–8.2)
RBC # BLD AUTO: 4.98 MILLION/UL (ref 3.81–5.12)
SODIUM SERPL-SCNC: 143 MMOL/L (ref 136–145)
TRIGL SERPL-MCNC: 126 MG/DL
TSH SERPL DL<=0.05 MIU/L-ACNC: 1.6 UIU/ML (ref 0.36–3.74)
WBC # BLD AUTO: 4.22 THOUSAND/UL (ref 4.31–10.16)

## 2019-11-26 PROCEDURE — 82306 VITAMIN D 25 HYDROXY: CPT

## 2019-11-26 PROCEDURE — 85025 COMPLETE CBC W/AUTO DIFF WBC: CPT

## 2019-11-26 PROCEDURE — 36415 COLL VENOUS BLD VENIPUNCTURE: CPT

## 2019-11-26 PROCEDURE — 80053 COMPREHEN METABOLIC PANEL: CPT

## 2019-11-26 PROCEDURE — 84443 ASSAY THYROID STIM HORMONE: CPT

## 2019-11-26 PROCEDURE — 80061 LIPID PANEL: CPT

## 2019-11-27 DIAGNOSIS — E55.9 VITAMIN D DEFICIENCY: Primary | ICD-10-CM

## 2019-11-27 RX ORDER — ERGOCALCIFEROL 1.25 MG/1
50000 CAPSULE ORAL WEEKLY
Qty: 4 CAPSULE | Refills: 3 | Status: SHIPPED | OUTPATIENT
Start: 2019-11-27 | End: 2020-02-26 | Stop reason: SDUPTHER

## 2019-12-03 ENCOUNTER — OFFICE VISIT (OUTPATIENT)
Dept: INTERNAL MEDICINE CLINIC | Facility: CLINIC | Age: 69
End: 2019-12-03
Payer: COMMERCIAL

## 2019-12-03 VITALS
BODY MASS INDEX: 40.13 KG/M2 | WEIGHT: 186 LBS | DIASTOLIC BLOOD PRESSURE: 80 MMHG | SYSTOLIC BLOOD PRESSURE: 136 MMHG | HEIGHT: 57 IN | TEMPERATURE: 99.7 F | HEART RATE: 82 BPM | RESPIRATION RATE: 18 BRPM

## 2019-12-03 DIAGNOSIS — M79.672 FOOT PAIN, BILATERAL: ICD-10-CM

## 2019-12-03 DIAGNOSIS — G62.9 NEUROPATHY: ICD-10-CM

## 2019-12-03 DIAGNOSIS — F51.01 PRIMARY INSOMNIA: ICD-10-CM

## 2019-12-03 DIAGNOSIS — M79.671 FOOT PAIN, BILATERAL: ICD-10-CM

## 2019-12-03 DIAGNOSIS — L03.113 CELLULITIS OF RIGHT UPPER EXTREMITY: Primary | ICD-10-CM

## 2019-12-03 DIAGNOSIS — I89.0 LYMPHEDEMA: ICD-10-CM

## 2019-12-03 PROBLEM — A41.9 SEPSIS (HCC): Status: RESOLVED | Noted: 2019-11-01 | Resolved: 2019-12-03

## 2019-12-03 PROCEDURE — 1160F RVW MEDS BY RX/DR IN RCRD: CPT | Performed by: PHYSICIAN ASSISTANT

## 2019-12-03 PROCEDURE — 1036F TOBACCO NON-USER: CPT | Performed by: PHYSICIAN ASSISTANT

## 2019-12-03 PROCEDURE — 99214 OFFICE O/P EST MOD 30 MIN: CPT | Performed by: PHYSICIAN ASSISTANT

## 2019-12-03 PROCEDURE — 3725F SCREEN DEPRESSION PERFORMED: CPT | Performed by: PHYSICIAN ASSISTANT

## 2019-12-03 PROCEDURE — 3008F BODY MASS INDEX DOCD: CPT | Performed by: PHYSICIAN ASSISTANT

## 2019-12-03 RX ORDER — CEPHALEXIN 500 MG/1
500 CAPSULE ORAL EVERY 8 HOURS SCHEDULED
Qty: 30 CAPSULE | Refills: 0 | Status: SHIPPED | OUTPATIENT
Start: 2019-12-03 | End: 2019-12-13

## 2019-12-03 RX ORDER — TRAZODONE HYDROCHLORIDE 50 MG/1
50-100 TABLET ORAL
Qty: 60 TABLET | Refills: 0 | Status: SHIPPED | OUTPATIENT
Start: 2019-12-03 | End: 2020-02-28 | Stop reason: SDUPTHER

## 2019-12-03 RX ORDER — GABAPENTIN 100 MG/1
100 CAPSULE ORAL 3 TIMES DAILY
Qty: 90 CAPSULE | Refills: 1 | Status: SHIPPED | OUTPATIENT
Start: 2019-12-03 | End: 2020-02-26 | Stop reason: SDUPTHER

## 2019-12-03 NOTE — PROGRESS NOTES
Assessment/Plan:  Problem List Items Addressed This Visit        Nervous and Auditory    Neuropathy    Relevant Medications    gabapentin (NEURONTIN) 100 mg capsule       Other    Lymphedema     PT referral provided  Once this is better controlled the recurring cellulitis should resolve completely  Pt has a sleeve that is ill fitting and unable to compress as needed  Relevant Orders    Ambulatory referral to Physical Therapy    Foot pain, bilateral     Continue to schedule with podiatry as previously recommended  Continue current regime and add gabapentin to try to combat symptoms  Cellulitis     Start keflex  Call if symptoms worsen or persist           Relevant Medications    cephalexin (KEFLEX) 500 mg capsule    Primary insomnia - Primary     Start trazodone  Directions for use and possible side effects discussed and patient verbalized understanding of these  Relevant Medications    traZODone (DESYREL) 50 mg tablet           Diagnoses and all orders for this visit:    Primary insomnia  -     traZODone (DESYREL) 50 mg tablet; Take 1-2 tablets ( mg total) by mouth daily at bedtime    Cellulitis of right upper extremity  -     cephalexin (KEFLEX) 500 mg capsule; Take 1 capsule (500 mg total) by mouth every 8 (eight) hours for 10 days    Lymphedema  -     Ambulatory referral to Physical Therapy; Future    Neuropathy  -     gabapentin (NEURONTIN) 100 mg capsule; Take 1 capsule (100 mg total) by mouth 3 (three) times a day    Foot pain, bilateral        Lymphedema  PT referral provided  Once this is better controlled the recurring cellulitis should resolve completely  Pt has a sleeve that is ill fitting and unable to compress as needed  Foot pain, bilateral  Continue to schedule with podiatry as previously recommended  Continue current regime and add gabapentin to try to combat symptoms  Primary insomnia  Start trazodone   Directions for use and possible side effects discussed and patient verbalized understanding of these  Cellulitis  Start keflex  Call if symptoms worsen or persist        Subjective:      Patient ID: Luis Tang is a 81 y o  female  Pt presents for 1 month followup  She continues with pain in both of her feet despite the increase in cymbalta  She remains on diclofenac with little benefit as well  She has not seen the podiatrist yet  She notes associated numbness and tingling at times also  She also notes ongoing insomnia  She has trouble falling asleep and staying asleep  She notes that her cellulitis is much improved but not completely resolved  She still has some lingering redness and discomfort at times  The following portions of the patient's history were reviewed and updated as appropriate:   She has a past medical history of Anxiety, Arthritis, Asthma, Cancer ([facility] [address] ), Cataract, CHF (congestive heart failure) ([facility] ), Coronary artery disease, Depression, GERD (gastroesophageal reflux disease), High cholesterol, Hypertension, IBS (irritable bowel syndrome), Irritable bowel syndrome (IBS) (5/31/2016), Obesity, Post-menopausal, Psychiatric disorder, Renal disorder, and Vitamin D deficiency  ,  does not have any pertinent problems on file  ,   has a past surgical history that includes Tonsillectomy and adenoidectomy; Breast surgery; Cholecystectomy; Knee surgery; Colonoscopy (N/A, 5/31/2016); Gallbladder surgery; Hysterectomy; and Cataract extraction, bilateral ,  family history includes Breast cancer in her maternal aunt, maternal aunt, maternal aunt, maternal aunt, and maternal aunt; Depression in her mother; Diabetes in her mother; Heart disease in her brother and mother; Mental illness in her mother  She was adopted  ,   reports that she has never smoked  She has never used smokeless tobacco  She reports that she drank alcohol  She reports that she does not use drugs  ,  is allergic to ibuprofen; asa [aspirin]; and latex     Current Outpatient Medications   Medication Sig Dispense Refill    albuterol (2 5 mg/3 mL) 0 083 % nebulizer solution Take 2 5 mg by nebulization every 6 (six) hours as needed for wheezing   albuterol (PROAIR HFA) 90 mcg/act inhaler Inhale 2 puffs every 4 (four) hours as needed for wheezing 3 Inhaler 1    atorvastatin (LIPITOR) 20 mg tablet Take 1 tablet by mouth daily  30 tablet 4    butalbital-acetaminophen-caffeine (FIORICET,ESGIC) -40 mg per tablet Take 1 tablet by mouth 3 (three) times a day as needed for headaches 90 tablet 0    diclofenac (VOLTAREN) 75 mg EC tablet Take 1 tablet (75 mg total) by mouth 2 (two) times a day 60 tablet 2    dicyclomine (BENTYL) 10 mg capsule Take 1 capsule by mouth daily  90 capsule 0    DULoxetine (CYMBALTA) 60 mg delayed release capsule Take 1 capsule (60 mg total) by mouth 2 (two) times a day 180 capsule 0    ergocalciferol (VITAMIN D2) 50,000 units Take 1 capsule (50,000 Units total) by mouth once a week 4 capsule 3    loratadine (CLARITIN) 10 mg tablet Take 1 tablet (10 mg total) by mouth daily 90 tablet 1    montelukast (SINGULAIR) 10 mg tablet Take 1 tablet by mouth daily   30 tablet 4    Nutritional Supplements (BOOST BREEZE PO) Take by mouth daily      nystatin (MYCOSTATIN) cream Apply topically 2 (two) times a day 30 g 0    nystatin (MYCOSTATIN) cream Apply topically 2 (two) times a day 30 g 1    nystatin (MYCOSTATIN) powder Apply topically 3 (three) times a day 60 g 0    oxyCODONE-acetaminophen (PERCOCET) 5-325 mg per tablet Take 1 tablet by mouth every 8 (eight) hours as needed for moderate painMax Daily Amount: 3 tablets 30 tablet 0    cephalexin (KEFLEX) 500 mg capsule Take 1 capsule (500 mg total) by mouth every 8 (eight) hours for 10 days 30 capsule 0    gabapentin (NEURONTIN) 100 mg capsule Take 1 capsule (100 mg total) by mouth 3 (three) times a day 90 capsule 1    traZODone (DESYREL) 50 mg tablet Take 1-2 tablets ( mg total) by mouth daily at bedtime 60 tablet 0     No current facility-administered medications for this visit  Review of Systems   Constitutional: Negative for chills and fever  HENT: Negative for congestion, ear pain, hearing loss, postnasal drip, rhinorrhea, sinus pressure, sinus pain, sore throat and trouble swallowing  Eyes: Negative for pain and visual disturbance  Respiratory: Negative for cough, chest tightness, shortness of breath and wheezing  Cardiovascular: Negative  Negative for chest pain, palpitations and leg swelling  Gastrointestinal: Negative for abdominal pain, blood in stool, constipation, diarrhea, nausea and vomiting  Endocrine: Negative for cold intolerance, heat intolerance, polydipsia, polyphagia and polyuria  Genitourinary: Negative for difficulty urinating, dysuria, flank pain and urgency  Musculoskeletal: Positive for arthralgias and gait problem  Negative for back pain and myalgias  Skin: Positive for color change  Negative for rash  Allergic/Immunologic: Negative  Neurological: Positive for numbness  Negative for dizziness, weakness, light-headedness and headaches  Hematological: Negative  Psychiatric/Behavioral: Positive for sleep disturbance  Negative for behavioral problems and dysphoric mood  The patient is not nervous/anxious            PHQ-9 Depression Screening    PHQ-9:    Frequency of the following problems over the past two weeks:       Little interest or pleasure in doing things:  0 - not at all  Feeling down, depressed, or hopeless:  1 - several days  Trouble falling or staying asleep, or sleeping too much:  2 - more than half the days  Feeling tired or having little energy:  3 - nearly every day  Poor appetite or overeatin - not at all  Feeling bad about yourself - or that you are a failure or have let yourself or your family down:  0 - not at all  Trouble concentrating on things, such as reading the newspaper or watching television:  0 - not at all  Moving or speaking so slowly that other people could have noticed  Or the opposite - being so fidgety or restless that you have been moving around a lot more than usual:  1 - several days  Thoughts that you would be better off dead, or of hurting yourself in some way:  0 - not at all  PHQ-2 Score:  1  PHQ-9 Score:  7          Objective:  Vitals:    12/03/19 1021   BP: 136/80   Pulse: 82   Resp: 18   Temp: 99 7 °F (37 6 °C)   TempSrc: Tympanic   Weight: 84 4 kg (186 lb)   Height: 4' 9" (1 448 m)     Body mass index is 40 25 kg/m²  Physical Exam   Constitutional: She is oriented to person, place, and time  She appears well-developed and well-nourished  No distress  HENT:   Head: Normocephalic and atraumatic  Right Ear: External ear normal    Left Ear: External ear normal    Nose: Nose normal    Mouth/Throat: Oropharynx is clear and moist  No oropharyngeal exudate  Eyes: Pupils are equal, round, and reactive to light  Conjunctivae and EOM are normal  Right eye exhibits no discharge  Left eye exhibits no discharge  No scleral icterus  Neck: Normal range of motion  Neck supple  No thyromegaly present  Cardiovascular: Normal rate, regular rhythm and normal heart sounds  Exam reveals no gallop and no friction rub  No murmur heard  Pulmonary/Chest: Effort normal and breath sounds normal  No respiratory distress  She has no wheezes  She has no rales  Abdominal: Soft  Bowel sounds are normal  She exhibits no distension  There is no tenderness  Musculoskeletal: Normal range of motion  She exhibits no edema, tenderness or deformity  Neurological: She is alert and oriented to person, place, and time  No cranial nerve deficit  Skin: Skin is warm and dry  She is not diaphoretic  Psychiatric: She has a normal mood and affect   Her behavior is normal  Judgment and thought content normal

## 2019-12-03 NOTE — ASSESSMENT & PLAN NOTE
PT referral provided  Once this is better controlled the recurring cellulitis should resolve completely  Pt has a sleeve that is ill fitting and unable to compress as needed

## 2019-12-03 NOTE — ASSESSMENT & PLAN NOTE
Continue to schedule with podiatry as previously recommended  Continue current regime and add gabapentin to try to combat symptoms

## 2019-12-05 DIAGNOSIS — B35.6 TINEA CRURIS: ICD-10-CM

## 2019-12-05 RX ORDER — NYSTATIN 100000 [USP'U]/G
POWDER TOPICAL 2 TIMES DAILY
Qty: 60 G | Refills: 2 | Status: SHIPPED | OUTPATIENT
Start: 2019-12-05 | End: 2020-02-26

## 2019-12-26 ENCOUNTER — HOSPITAL ENCOUNTER (OUTPATIENT)
Dept: MAMMOGRAPHY | Facility: HOSPITAL | Age: 69
Discharge: HOME | End: 2019-12-26
Payer: COMMERCIAL

## 2019-12-26 ENCOUNTER — HOSPITAL ENCOUNTER (OUTPATIENT)
Dept: BONE DENSITY | Facility: HOSPITAL | Age: 69
Discharge: HOME | End: 2019-12-26
Payer: COMMERCIAL

## 2019-12-26 VITALS — HEIGHT: 57 IN | BODY MASS INDEX: 37.97 KG/M2 | WEIGHT: 176 LBS

## 2019-12-26 DIAGNOSIS — Z78.0 MENOPAUSE: ICD-10-CM

## 2019-12-26 DIAGNOSIS — N63.20 LUMP OF LEFT BREAST: ICD-10-CM

## 2019-12-26 PROCEDURE — 77080 DXA BONE DENSITY AXIAL: CPT

## 2019-12-26 PROCEDURE — 77065 DX MAMMO INCL CAD UNI: CPT

## 2019-12-26 PROCEDURE — G0279 TOMOSYNTHESIS, MAMMO: HCPCS

## 2020-01-19 DIAGNOSIS — R10.9 ABDOMINAL PAIN, UNSPECIFIED ABDOMINAL LOCATION: ICD-10-CM

## 2020-01-19 RX ORDER — DICYCLOMINE HYDROCHLORIDE 10 MG/1
CAPSULE ORAL
Qty: 90 CAPSULE | Refills: 0 | Status: SHIPPED | OUTPATIENT
Start: 2020-01-19 | End: 2020-04-20

## 2020-01-27 DIAGNOSIS — F33.41 RECURRENT MAJOR DEPRESSIVE DISORDER, IN PARTIAL REMISSION (HCC): ICD-10-CM

## 2020-01-27 RX ORDER — DULOXETIN HYDROCHLORIDE 60 MG/1
60 CAPSULE, DELAYED RELEASE ORAL 2 TIMES DAILY
Qty: 180 CAPSULE | Refills: 1 | Status: SHIPPED | OUTPATIENT
Start: 2020-01-27 | End: 2020-07-15 | Stop reason: SDUPTHER

## 2020-01-28 ENCOUNTER — EVALUATION (OUTPATIENT)
Dept: PHYSICAL THERAPY | Facility: CLINIC | Age: 70
End: 2020-01-28
Payer: COMMERCIAL

## 2020-01-28 DIAGNOSIS — M79.18 CHRONIC MUSCULOSKELETAL PAIN: ICD-10-CM

## 2020-01-28 DIAGNOSIS — G56.03 BILATERAL CARPAL TUNNEL SYNDROME: Primary | ICD-10-CM

## 2020-01-28 DIAGNOSIS — G89.29 CHRONIC MUSCULOSKELETAL PAIN: ICD-10-CM

## 2020-01-28 DIAGNOSIS — I89.0 LYMPHEDEMA: Primary | ICD-10-CM

## 2020-01-28 PROCEDURE — 97163 PT EVAL HIGH COMPLEX 45 MIN: CPT | Performed by: PHYSICAL THERAPIST

## 2020-01-28 PROCEDURE — 97140 MANUAL THERAPY 1/> REGIONS: CPT | Performed by: PHYSICAL THERAPIST

## 2020-01-28 NOTE — PROGRESS NOTES
PT Evaluation     Today's date: 2020  Patient name: Basilio Bright  : 1950  MRN: [id number]  Referring provider: Graciela Mackey PA-C  Dx:   Encounter Diagnosis     ICD-10-CM    1  Lymphedema I89 0                   Assessment  Assessment details: Patient presents to OPPT with s/s consistent with right UE lymphedema  PT interventions to include CDT (complete decongestive therapy) including MLD (manual lymphatic drainage) and compression using short stretch bandages as able  PT to further provide extensive patient education on self bandaging, self MLD techniques, and skin care  Patient will transition patient to long term maintenance with compression plan for both morning and evening wear once optimal decongestive and volume reduction of limb and trunk/abdomen has been achieved  Thank you for this referral and the opportunity to participate in the care of this patient      Impairments: abnormal or restricted ROM, activity intolerance, impaired physical strength, lacks appropriate home exercise program, pain with function and safety issue  Other impairment: Increased edema, s/s that may be consistent with onset of carpal tunnel, numbness/tingling into right UE that worsens with increased edema  Functional limitations: Decreased ADL performance recently - worsening in the last 2 yearsUnderstanding of Dx/Px/POC: good   Prognosis: good  Prognosis details: Non-compliance with compression sleeve  Non-compliance with short stretch bandages    Goals  STGs to be achieved in 2 - 4 weeks  Demonstrate at least 75% compliance with self MLD  Demonstrate at least 75% compliance with self compression  Demonstrate at least 75% compliance with self skin and nail inspection  Free from s/s of infection  Demonstrate understanding of importance of compliance with POC    LTGs to be achieved in 4 - 6 weeks  Demonstrate at least 100% compliance with self MLD  Demonstrate at least 100% compliance with self compression  Demonstrate at least 100% compliance with self skin and nail inspection  Free from s/s of infection  Demonstrate understanding of day/night compression wearing schedule  Obtain alternative compression to ensure independence with self management      Plan  Patient would benefit from: skilled physical therapy  Other planned modality interventions: Modalities prn for symptom management  Planned therapy interventions: manual therapy, neuromuscular re-education, therapeutic exercise and home exercise program  Frequency: 2x week  Duration in visits: 8  Duration in weeks: 4  Plan of Care beginning date: 1/28/2020  Plan of Care expiration date: 2/27/2020  Treatment plan discussed with: patient        Subjective Evaluation    History of Present Illness  Date of onset: 11/1/2019  Mechanism of injury: Patient with onset of cellulitis in right UE  Unsure if flu shot, bug bite as etiology  Patient with dx of cellulitis upon presentation to ED in November  She was admitted to the hospital   She received IV antibiotics  She ended up admitted for approximately 3 days  She was discharged home with oral antibiotics  A few weeks after she was discharged, she started again with the same symptoms  She was given an oral antibiotic at this time  Her last dose of antibiotics was the first week of January  She has had an increase in swelling in her right UE, right trunk and right abdomen  Pain  Location: Right UE, breast, trunk - Numbness    Social Support  Exterior steps/ramp assessed: 1 ARTUR  Interior stairs assessed: Chair lift     Lives in: multiple-level home  Lives with: adult children    Hand dominance: right    Treatments  Previous treatment: physical therapy  Patient Goals  Patient goals for therapy: increased strength, independence with ADLs/IADLs, return to sport/leisure activities, increased motion, decreased pain and decreased edema          Objective     Tests     Right Shoulder   Positive empty can and painful arc  Negative full can  Right Wrist/Hand   Positive Finkelstein's and Tinel's sign (medial nerve)         Lymphedema Evaluation    Medical Considerations:  NEG Cardiac  NEG Pulmonary  NEG Kidney  NEG Liver  POS Current Fever/Infection - Jan, 2020  NEG Current/Recent Wounds  NEG Current/Recent Treatments/Interventions/Port Access/PICC Line  NEG Current/Recent/History of DVT or Clotting issues    ROM Considerations:  GHJ ROM grossly WFL, painful with elevation and ABd  Strength 3-/5 flexion and ABd due to pain  Elbow flexion 4-/5  Elbow extn 4-/5  Gross  limited 50% 2/2 pain    Skin Considerations/Scars:  Patient presents with skin inspection as follows:  Hemosiderin staining/skin discoloration NEG  Finger/Toe Nails NEG  Open Wounds NEG  S/S infection NEG  Firm/Sponge/Hard - POS sponge feeling posterior forearm between radius and ulna, posterior tricep region  Peau D' Bland - NEG  Weeping - NEG      Girth:    UE girth measurements (cm)      Right           Left   Palmar Crease   19  19                        Wrist    15 5  15  +4                         18 5  17 5  +8 cm    22  21  +12                           27 5      23 5  +16 cm    29  25  +20                           28  25  +24 cm    31  26 5  +28                                   33 5  30  +32    37 5  34 5  +36    39  38  +40    39  37  +44    42 5  40  +48                                          43                   43              Elbow joint line  29  25                Wrist to elbow (pisiform to elbow) - medial - 22 cm  Elbow to ACJ (lateral) - 48 cm       Precautions: Fall, recent cellulitis  Re-eval Date: 2/27/2020    Date 1/28       Visit Count 1       FOTO See IE       Pain In See IE       Pain Out See IE             Manual         MLD                    Exercise Diary         PROM->AAROM                                                                            Modalities

## 2020-01-28 NOTE — PROGRESS NOTES
Daily Note     Today's date: 2020  Patient name: Luis Tang  : 1950  MRN: [id number]  Referring provider: Morenita Espinoza PA-C  Dx:   Encounter Diagnosis     ICD-10-CM    1  Lymphedema I89 0 PT plan of care cert/re-cert       Start Time: 0900  Stop Time: 09  Total time in clinic (min): 45 minutes    Subjective: See IE      Objective: See treatment diary below      Assessment: Tolerated treatment well denied any increase sx's with MLD / compression bandaging to R UE      Plan: Continue per plan of care        Precautions: Fall, recent cellulitis  Re-eval Date: 2020     Date            Visit Count 1           FOTO See IE           Pain In See IE           Pain Out See IE                    Manual  CV/PTA           MLD  45 min                             MLD R UE / R later trunk - pt positioned supine/L S/L for patient comfort    Compression bandaging to R UE - Soft Tubigrip, finger wraps, White foam, 1x 6cm, 1x 8cm, and 1 10 cm      Exercise Diary              PROM->AAROM                                                                                                                                   Modalities                                            Precautions: Fall, recent cellulitis  Re-eval Date: 2020     Date            Visit Count 1           FOTO See IE           Pain In See IE           Pain Out See IE                    Manual              MLD                                 Exercise Diary              PROM->AAROM                                                                                                                                   Modalities

## 2020-01-30 ENCOUNTER — OFFICE VISIT (OUTPATIENT)
Dept: PHYSICAL THERAPY | Facility: CLINIC | Age: 70
End: 2020-01-30
Payer: COMMERCIAL

## 2020-01-30 DIAGNOSIS — I89.0 LYMPHEDEMA: Primary | ICD-10-CM

## 2020-01-30 PROCEDURE — 97140 MANUAL THERAPY 1/> REGIONS: CPT

## 2020-01-30 NOTE — PROGRESS NOTES
Daily Note     Today's date: 2020  Patient name: Luis Tang  : 1950  MRN: [id number]  Referring provider: Morenita Espinoza PA-C  Dx:   Encounter Diagnosis     ICD-10-CM    1  Lymphedema I89 0                   Subjective: Compression bandaging felt really good last PT session  I was able to sleep through without pain waking me up at night      Objective: See treatment diary below      Assessment: Tolerated treatment well  Denied any increase pain/carpal tunnel sx's Pt demonstrated overall reduce volume R UE  Pt defer finger / hand compression trial this date  Patient would benefit from continued PT to reduce R UE edema      Plan: Continue per plan of care        Precautions: Fall, recent cellulitis  Re-eval Date: 2020     Date          Visit Count 1  2         FOTO See IE           Pain In See IE           Pain Out See IE                    Manual  CV/PTA           MLD  45 min 60 min                           MLD R UE / R later trunk - pt positioned supine/L S/L for patient comfort    Compression bandaging to R UE - Soft Tubigrip, finger wraps-NP, White foam, 1x 6cm, 1x 8cm, and 1 10 cm      Exercise Diary              PROM->AAROM                                                                                                               HEP    Lymphedema LE packet issued - review NV               Modalities                                            Precautions: Fall, recent cellulitis  Re-eval Date: 2020     Date            Visit Count 1           FOTO See IE           Pain In See IE           Pain Out See IE                    Manual              MLD                                 Exercise Diary              PROM->AAROM                                                                                                                                   Modalities

## 2020-02-04 ENCOUNTER — OFFICE VISIT (OUTPATIENT)
Dept: PHYSICAL THERAPY | Facility: CLINIC | Age: 70
End: 2020-02-04
Payer: COMMERCIAL

## 2020-02-04 DIAGNOSIS — I89.0 LYMPHEDEMA: Primary | ICD-10-CM

## 2020-02-04 PROCEDURE — 97140 MANUAL THERAPY 1/> REGIONS: CPT

## 2020-02-04 NOTE — PROGRESS NOTES
Daily Note     Today's date: 2020  Patient name: Vivi Boone  : 1950  MRN: [id number]  Referring provider: Stephanie Calloway PA-C  Dx:   Encounter Diagnosis     ICD-10-CM    1  Lymphedema I89 0                   Subjective:  I am doing well with the compression so far      Objective: See treatment diary below      Assessment: Tolerated treatment well  Denied any increase pain/sx's with MLD / bandaging  Noted heat rash R wrist / elbow and arm pit region  Held cotton at wrist/elbow today  Pt encourage to remove bandaging with increase itching, monitor upcoming  Noted overall reduce volume R UE with no finger/hand envolvement  Patient would benefit from continued PT to reduce lymph congestion R UE        Plan: Continue per plan of care        Precautions: Fall, recent cellulitis  Re-eval Date: 2020     Date        Visit Count 1  2  3       FOTO See IE           Pain In See IE           Pain Out See IE                 Manual  CV/PTA           MLD  45 min 60 min  60 min                         MLD R UE / R later trunk - pt positioned supine/L S/L for patient comfort    Compression bandaging to R UE - Soft Tubigrip, finger wraps, White foam, 1x 6cm, 1x 8cm, and 1 10 cm  (HELD cotton 20)     Exercise Diary              PROM->AAROM                                                                                                               HEP    Lymphedema LE packet issued - review NV  Reviewed during MLD             Modalities

## 2020-02-07 ENCOUNTER — OFFICE VISIT (OUTPATIENT)
Dept: PHYSICAL THERAPY | Facility: CLINIC | Age: 70
End: 2020-02-07
Payer: COMMERCIAL

## 2020-02-07 DIAGNOSIS — I89.0 LYMPHEDEMA: Primary | ICD-10-CM

## 2020-02-07 PROCEDURE — 97140 MANUAL THERAPY 1/> REGIONS: CPT

## 2020-02-07 NOTE — PROGRESS NOTES
Daily Note     Today's date: 2020  Patient name: Hafsa Rose  : 1950  MRN: [id number]  Referring provider: Michael Oliver PA-C  Dx:   Encounter Diagnosis     ICD-10-CM    1  Lymphedema I89 0                   Subjective: I am hurting today with arthritis 2* rainy weather  R UE wrapping felt really comfortable last PT session      Objective: See treatment diary below      Assessment: Tolerated treatment well  Pt denied any increase pain/sx's with MLD/compression bandaging this date  Noted improve with reduce volume R UE, hand/fingers sparred  Patient would benefit from continued PT to further address R UE lymphedema and would benefit MFR R pec region      Plan: Continue per plan of care        Precautions: Fall, recent cellulitis  Re-eval Date: 2020     Date      Visit Count 1  2  3  4     FOTO See IE           Pain In See IE           Pain Out See IE                 Manual  CV/PTA           MLD  45 min 60 min  60 min  60 min                       MLD R UE / R later trunk - pt positioned supine/L S/L for patient comfort    Compression bandaging to R UE - Soft Tubigrip, finger wraps(NP), 1/4 grey foam R palm, White foam, 1x 6cm, 1x 8cm, and 1 10 cm  (HELD cotton 20)     Exercise Diary              PROM->AAROM                                                                                                               HEP    Lymphedema LE packet issued - review NV  Reviewed during MLD             Modalities

## 2020-02-11 ENCOUNTER — OFFICE VISIT (OUTPATIENT)
Dept: PHYSICAL THERAPY | Facility: CLINIC | Age: 70
End: 2020-02-11
Payer: COMMERCIAL

## 2020-02-11 DIAGNOSIS — I89.0 LYMPHEDEMA: Primary | ICD-10-CM

## 2020-02-11 PROCEDURE — 97140 MANUAL THERAPY 1/> REGIONS: CPT | Performed by: PHYSICAL THERAPIST

## 2020-02-11 NOTE — PROGRESS NOTES
Daily Note     Today's date: 2020  Patient name: Woody Ellison  : 1950  MRN: [id number]  Referring provider: Valerie Fraser PA-C  Dx:   Encounter Diagnosis     ICD-10-CM    1  Lymphedema I89 0                   Subjective: Pt reporting that she is working on the scar massage at home but gets really sore towards the armpit  Objective: See treatment diary below      Assessment: Discomfort noted with deeper MFR to scar tissue; otherwise good tolerance to MLD throughout  No complaints of tightness or pain to end session with compression bandage in place  Plan: Continue per plan of care              Precautions: Fall, recent cellulitis  Re-eval Date: 2020     Date    Visit Count 1  2  3  4  5   FOTO See IE           Pain In See IE           Pain Out See IE                 Manual  CV/PTA           MLD  45 min 60 min  60 min  60 min  45 min                     MLD R UE / R later trunk - pt positioned supine/L S/L for patient comfort    Compression bandaging to R UE - Soft Tubigrip, finger wraps, 1/4 grey foam R palm, White foam, 1x 6cm, 1x 8cm, and 1 10 cm  (HELD cotton 20)      Exercise Diary              PROM->AAROM                                                                                                               HEP    Lymphedema LE packet issued - review NV  Reviewed during MLD             Modalities

## 2020-02-14 ENCOUNTER — OFFICE VISIT (OUTPATIENT)
Dept: PHYSICAL THERAPY | Facility: CLINIC | Age: 70
End: 2020-02-14
Payer: COMMERCIAL

## 2020-02-14 DIAGNOSIS — I89.0 LYMPHEDEMA: Primary | ICD-10-CM

## 2020-02-14 PROCEDURE — 97140 MANUAL THERAPY 1/> REGIONS: CPT | Performed by: PHYSICAL THERAPIST

## 2020-02-14 NOTE — PROGRESS NOTES
Daily Note     Today's date: 2020  Patient name: Vivi Boone  : 1950  MRN: [id number]  Referring provider: Stephanie Calloway PA-C  Dx:   Encounter Diagnosis     ICD-10-CM    1  Lymphedema I89 0                   Subjective: Pt reporting that the finger wraps were too loose last time and they came off  Objective: See treatment diary below      Assessment: Pt with c/o mild "numbness" in the hand following deeper MFR over incision site; alleviated to end session following compression bandage application  Finger wraps were applied with greater tension without complaints from patient and she was able to make a full fist        Plan: Continue per plan of care           Precautions: Fall, recent cellulitis  Re-eval Date: 2020     Date        Visit Count 6       FOTO            Pain In            Pain Out                  Manual             MLD  45 min                         MLD R UE / R later trunk - pt positioned supine/L S/L for patient comfort    Compression bandaging to R UE - Soft Tubigrip, finger wraps, 1/4 grey foam R palm, White foam, 1x 6cm, 1x 8cm, and 1 10 cm  (HELD cotton 20)      Exercise Diary              PROM->AAROM                                                                                                               HEP                 Modalities

## 2020-02-18 ENCOUNTER — OFFICE VISIT (OUTPATIENT)
Dept: PHYSICAL THERAPY | Facility: CLINIC | Age: 70
End: 2020-02-18
Payer: COMMERCIAL

## 2020-02-18 DIAGNOSIS — M79.671 FOOT PAIN, BILATERAL: ICD-10-CM

## 2020-02-18 DIAGNOSIS — I89.0 LYMPHEDEMA: Primary | ICD-10-CM

## 2020-02-18 DIAGNOSIS — E78.5 HYPERLIPIDEMIA, UNSPECIFIED HYPERLIPIDEMIA TYPE: ICD-10-CM

## 2020-02-18 DIAGNOSIS — M79.672 FOOT PAIN, BILATERAL: ICD-10-CM

## 2020-02-18 DIAGNOSIS — J30.9 ALLERGIC RHINITIS, UNSPECIFIED SEASONALITY, UNSPECIFIED TRIGGER: ICD-10-CM

## 2020-02-18 PROCEDURE — 97140 MANUAL THERAPY 1/> REGIONS: CPT | Performed by: PHYSICAL THERAPIST

## 2020-02-18 RX ORDER — MONTELUKAST SODIUM 10 MG/1
TABLET ORAL
Qty: 30 TABLET | Refills: 3 | Status: SHIPPED | OUTPATIENT
Start: 2020-02-18 | End: 2020-06-17

## 2020-02-18 RX ORDER — DICLOFENAC SODIUM 75 MG/1
75 TABLET, DELAYED RELEASE ORAL 2 TIMES DAILY
Qty: 60 TABLET | Refills: 1 | Status: SHIPPED | OUTPATIENT
Start: 2020-02-18 | End: 2020-04-20

## 2020-02-18 RX ORDER — ATORVASTATIN CALCIUM 20 MG/1
TABLET, FILM COATED ORAL
Qty: 30 TABLET | Refills: 3 | Status: SHIPPED | OUTPATIENT
Start: 2020-02-18 | End: 2020-06-17

## 2020-02-18 NOTE — PROGRESS NOTES
Daily Note     Today's date: 2020  Patient name: Imelda Wood  : 1950  MRN: [id number]  Referring provider: Carolina Yee PA-C  Dx:   Encounter Diagnosis     ICD-10-CM    1  Lymphedema I89 0                   Subjective: Pt states her daughter tried to wrap her but it is too loose  Objective: See treatment diary below      Assessment: Pt tolerable to MLD treatment  PT provided pt with verbal education during compression bandage application for tips to help her daughter with self wrapping at home  Pt verbalized understanding of home instruction  Mild reddening of skin at elbow crease due to slippage of compression bandage; however pt denied any pain or tenderness to soft tissue during session  Plan: Continue per plan of care           Precautions: Fall, recent cellulitis  Re-eval Date: 2020     Date       Visit Count 6 7      FOTO            Pain In            Pain Out                  Manual             MLD  45 min 45 min                        MLD R UE / R later trunk - pt positioned supine/L S/L for patient comfort    Compression bandaging to R UE - Soft Tubigrip, finger wraps, 1/4 grey foam R palm, White foam, 1x 6cm, 1x 8cm, and 1 10 cm  (HELD cotton 20)      Exercise Diary              PROM->AAROM                                                                                                               HEP                 Modalities

## 2020-02-19 DIAGNOSIS — B35.6 TINEA CRURIS: ICD-10-CM

## 2020-02-19 RX ORDER — NYSTATIN 100000 [USP'U]/G
POWDER TOPICAL 2 TIMES DAILY
Qty: 60 G | Refills: 5 | Status: SHIPPED | OUTPATIENT
Start: 2020-02-19 | End: 2022-01-10

## 2020-02-21 ENCOUNTER — OFFICE VISIT (OUTPATIENT)
Dept: PHYSICAL THERAPY | Facility: CLINIC | Age: 70
End: 2020-02-21
Payer: COMMERCIAL

## 2020-02-21 ENCOUNTER — EVALUATION (OUTPATIENT)
Dept: OCCUPATIONAL THERAPY | Facility: CLINIC | Age: 70
End: 2020-02-21
Payer: COMMERCIAL

## 2020-02-21 DIAGNOSIS — G56.03 BILATERAL CARPAL TUNNEL SYNDROME: Primary | ICD-10-CM

## 2020-02-21 DIAGNOSIS — I89.0 LYMPHEDEMA: Primary | ICD-10-CM

## 2020-02-21 PROCEDURE — 97018 PARAFFIN BATH THERAPY: CPT

## 2020-02-21 PROCEDURE — 97166 OT EVAL MOD COMPLEX 45 MIN: CPT

## 2020-02-21 PROCEDURE — 97110 THERAPEUTIC EXERCISES: CPT

## 2020-02-21 PROCEDURE — 97140 MANUAL THERAPY 1/> REGIONS: CPT | Performed by: PHYSICAL THERAPIST

## 2020-02-21 PROCEDURE — 97535 SELF CARE MNGMENT TRAINING: CPT

## 2020-02-21 NOTE — PROGRESS NOTES
Daily Note     Today's date: 2020  Patient name: Vivi Boone  : 1950  MRN: [id number]  Referring provider: Stpehanie Calloway PA-C  Dx:   Encounter Diagnosis     ICD-10-CM    1  Lymphedema I89 0                   Subjective: Patient reports she is doing better  Reports her thumb is sore at her [facility] joint  Objective: See treatment diary below      Assessment: Tolerated treatment well  Patient would benefit from continued PT  Patient with ongoing deficits related to ROM and strength of right UE  This will be addressed by OT  Increased edema noted at right posterior elbow and right hand  Ongoing restrictions with right UE edema  Plan: Continue per plan of care  Precautions: Fall, recent cellulitis  Re-eval Date: 2020     Date      Visit Count 6 7 8     FOTO            Pain In            Pain Out                  Manual             MLD  45 min 45 min 55 mins                       MLD R UE / R later trunk - pt positioned supine/L S/L for patient comfort    Compression bandaging to R UE - Soft Tubigrip, finger wraps, 1/4 grey foam R palm, White foam, 1x 6cm, 1x 8cm, and 1 10 cm       Exercise Diary              PROM->AAROM                                                                                                               HEP                 Modalities

## 2020-02-21 NOTE — PROGRESS NOTES
Girth right UE:  Palm: 18 5 cm  Wrist 16 cm  4+: 18  8+: 23 5  12+: 27  16+: 28  20+: 27  24+: 27 5  28+: 32  32+: 35 5  36+: 38  40+: 42    Length elbow: 23  Length ACJ: 49

## 2020-02-21 NOTE — PROGRESS NOTES
OT Evaluation     Today's date: 2020  Patient name: Brianna Bai  : 1950  MRN: [id number]  Referring provider: Sirisha Dickerson PA-C  Dx:   Encounter Diagnosis     ICD-10-CM    1  Bilateral carpal tunnel syndrome G56 03                   Assessment  Assessment details: Patient presenting to OP OT services with a dx of B/L hand numbness and pain  Patient reports left is worse then right  Patient is right handed  Patient reports symptoms occurred several years ago and have progressively become worse  Patient completed therapy cycle in the past for bilateral hands  Patient reports symptoms are worse in the morning and evening  Patient's next follow-up appointment is on 2020  Impairments: abnormal coordination, abnormal or restricted ROM and impaired physical strength  Other impairment: decreased functional use    Symptom irritability: moderateBarriers to therapy: PMH: See medical chart  Understanding of Dx/Px/POC: good   Prognosis: good    Goals  STGs    Pt will increase  strength by 5-10#  Pt will increase wrist strength by 1/2 grade  Pt will report decrease in morning stiffness by 25%    Pt will report a decrease in sensation deficits by 25%    Independent with HEP      LTGs     Pt will increase  strength by an additional 5-10#  Pt will increase wrist strength by 1-2 grade  Pt will increase pinch strength by 3-5#  Pt will report decrease in morning stiffness by 50%    Pt will report an increase in ADL/IADL participation    Pt will report a decrease in sensation deficits by 50%      Plan  Plan details: Patient has presenting to OP OT services with a dx of B/L Hand pain  Patient demonstrating increased pain, decreased strength, decreased ROM and decreased activity  Pt would benefit from continued Occupational Therapy services two times per week for 4 weeks to return to prior level of function and achieve all established goals   Thank you for the referral!    Patient would benefit from: OT eval and skilled occupational therapy  Referral necessary: Yes  Planned modality interventions: ultrasound, unattended electrical stimulation, thermotherapy: hydrocollator packs, cryotherapy and thermotherapy: paraffin bath  Planned therapy interventions: massage, manual therapy, joint mobilization, patient education, strengthening, stretching, fine motor coordination training, home exercise program, neuromuscular re-education, self care, therapeutic exercise and therapeutic activities  Frequency: 2x week  Duration in visits: 8  Duration in weeks: 4  Treatment plan discussed with: patient        Subjective Evaluation    History of Present Illness  Mechanism of injury: B/L Hand Numbness and Pain  Quality of life: good    Pain  Current pain ratin  At best pain ratin  At worst pain ratin  Location: B/L Hands  Quality: burning and sharp    Hand dominance: right    Treatments  Current treatment: occupational therapy  Patient Goals  Patient goals for therapy: decreased pain, increased motion, increased strength and independence with ADLs/IADLs          Objective     Neurological Testing     Sensation     Wrist/Hand   Left   Diminished: light touch    Right   Diminished: light touch    Additional Neurological Details  Patient presenting with bilateral hand numbness throughout entire left and right hand       Active Range of Motion     Left Wrist   Normal active range of motion    Right Wrist   Normal active range of motion    Left Thumb   Opposition: WNL    Right Thumb   Opposition: WNL    Additional Active Range of Motion Details  Soft composite fist noted    Strength/Myotome Testing     Left Wrist/Hand   Wrist extension: 3+  Wrist flexion: 3+  Radial deviation: 3+  Ulnar deviation: 3+     (2nd hand position)     Trial 1: 10    Thumb Strength  Key/Lateral Pinch     Trail 1: 5    Right Wrist/Hand   Wrist extension: 3+  Wrist flexion: 3+  Radial deviation: 3+  Ulnar deviation: 3+     (2nd hand position)     Trial 1: 15    Thumb Strength   Key/Lateral Pinch     Trial 1: 9    Additional Strength Details  Patient presenting with decreases in left/right , pinch and wrist strength as compared to norms  Tests     Left Wrist/Hand   Positive Finkelstein's and Phalen's sign  Right Wrist/Hand   Positive Finkelstein's and Phalen's sign  Additional Tests Details  Patient tested positive for bilateral Phalen's and Finkelstein's  Swelling     Left Wrist/Hand   Circumference wrist: 16 3 cm    Additional Swelling Details  No significant swelling noted bilaterally    Right Wrist/Hand   Circumference wrist: 16 3 cm    Additional Swelling Details  No significant swelling noted bilaterally            Subjective: "It really hurts at my thumb "      Objective: See treatment diary below      Assessment: Tolerated treatment well  Patient exhibited good technique with therapeutic exercises and would benefit from continued OT  Patient tolerated session well  Patient and therapist discussed exercises as per initial HEP  Patient verbalized understanding of education and demonstrated proper technique  Therapist will make increases as tolerated  Continue with POC        Plan: Continue per plan of care  Progress treatment as tolerated               Precautions CTS       Manual  02/21/2020       Manual Massage Flexor Mass       Wrist Flexor Stretch 30 sec x 3       Thumb Flexor Stretch        Median Nerve Glide        Prayer Stretch            Exercise Diary  02/21/2020       Wrist Flex/Ext  2 x 10       Wrist RD/UD 2 x 10       Putty Grasps Yellow x 20       Putty Pinches Yellow x 20       Digi-Flex        Tension Pins        Hand Master        Wrist Extension Iso        Flex Bar                                                                                                    Modalities 02/21/2020       Paraffin w/ JEROME Completed

## 2020-02-25 ENCOUNTER — OFFICE VISIT (OUTPATIENT)
Dept: PHYSICAL THERAPY | Facility: CLINIC | Age: 70
End: 2020-02-25
Payer: COMMERCIAL

## 2020-02-25 DIAGNOSIS — I89.0 LYMPHEDEMA: Primary | ICD-10-CM

## 2020-02-25 PROCEDURE — 97140 MANUAL THERAPY 1/> REGIONS: CPT | Performed by: PHYSICAL THERAPIST

## 2020-02-25 NOTE — PROGRESS NOTES
Daily Note     Today's date: 2020  Patient name: Greyson Mcdonough  : 1950  MRN: [id number]  Referring provider: Biju Hsieh PA-C  Dx:   Encounter Diagnosis     ICD-10-CM    1  Lymphedema I89 0                   Subjective: Pt reporting that she thinks she is allergic to the coca butter lotion  Objective: See treatment diary below      Assessment: Modified MLD treatment 2* time constraints this date  Pt continues to show good reduction in UE edema but remains with abdominal edema; PT spoke to pt about long term compression shirts to help address abdominal swelling  Plan: Continue per plan of care  Precautions: Fall, recent cellulitis  Re-eval Date: 2020     Date     Visit Count 6 7 8 9    FOTO            Pain In            Pain Out                  Manual             MLD  45 min 45 min 55 mins 30 min                      MLD R UE / R later trunk - pt positioned supine/L S/L for patient comfort    Compression bandaging to R UE - Med Soft Tubigrip, White foam, 1x 6cm, 1x 8cm, and 1 10 cm       Exercise Diary              PROM->AAROM                                                                                                               HEP                 Modalities

## 2020-02-26 ENCOUNTER — OFFICE VISIT (OUTPATIENT)
Dept: INTERNAL MEDICINE CLINIC | Facility: CLINIC | Age: 70
End: 2020-02-26
Payer: COMMERCIAL

## 2020-02-26 VITALS
HEART RATE: 90 BPM | TEMPERATURE: 98.4 F | SYSTOLIC BLOOD PRESSURE: 132 MMHG | HEIGHT: 57 IN | BODY MASS INDEX: 40.86 KG/M2 | DIASTOLIC BLOOD PRESSURE: 74 MMHG | OXYGEN SATURATION: 97 % | RESPIRATION RATE: 18 BRPM | WEIGHT: 189.4 LBS

## 2020-02-26 DIAGNOSIS — K86.1 OTHER CHRONIC PANCREATITIS (HCC): ICD-10-CM

## 2020-02-26 DIAGNOSIS — G62.9 NEUROPATHY: ICD-10-CM

## 2020-02-26 DIAGNOSIS — M81.0 AGE-RELATED OSTEOPOROSIS WITHOUT CURRENT PATHOLOGICAL FRACTURE: Primary | ICD-10-CM

## 2020-02-26 DIAGNOSIS — I89.0 LYMPHEDEMA: ICD-10-CM

## 2020-02-26 DIAGNOSIS — E55.9 VITAMIN D DEFICIENCY: ICD-10-CM

## 2020-02-26 PROBLEM — L03.90 CELLULITIS: Status: RESOLVED | Noted: 2019-11-01 | Resolved: 2020-02-26

## 2020-02-26 PROCEDURE — 3008F BODY MASS INDEX DOCD: CPT | Performed by: PHYSICIAN ASSISTANT

## 2020-02-26 PROCEDURE — 3075F SYST BP GE 130 - 139MM HG: CPT | Performed by: PHYSICIAN ASSISTANT

## 2020-02-26 PROCEDURE — 1160F RVW MEDS BY RX/DR IN RCRD: CPT | Performed by: PHYSICIAN ASSISTANT

## 2020-02-26 PROCEDURE — 99214 OFFICE O/P EST MOD 30 MIN: CPT | Performed by: PHYSICIAN ASSISTANT

## 2020-02-26 PROCEDURE — 1036F TOBACCO NON-USER: CPT | Performed by: PHYSICIAN ASSISTANT

## 2020-02-26 PROCEDURE — 4040F PNEUMOC VAC/ADMIN/RCVD: CPT | Performed by: PHYSICIAN ASSISTANT

## 2020-02-26 PROCEDURE — 3078F DIAST BP <80 MM HG: CPT | Performed by: PHYSICIAN ASSISTANT

## 2020-02-26 RX ORDER — ERGOCALCIFEROL 1.25 MG/1
50000 CAPSULE ORAL WEEKLY
Qty: 4 CAPSULE | Refills: 3 | Status: SHIPPED | OUTPATIENT
Start: 2020-02-26 | End: 2020-02-28 | Stop reason: SDUPTHER

## 2020-02-26 RX ORDER — BUPROPION HYDROCHLORIDE 150 MG/1
150 TABLET ORAL EVERY MORNING
Qty: 30 TABLET | Refills: 5 | Status: SHIPPED | OUTPATIENT
Start: 2020-02-26 | End: 2020-06-10 | Stop reason: SDUPTHER

## 2020-02-26 RX ORDER — GABAPENTIN 300 MG/1
300 CAPSULE ORAL 3 TIMES DAILY
Qty: 90 CAPSULE | Refills: 2 | Status: SHIPPED | OUTPATIENT
Start: 2020-02-26 | End: 2020-06-10 | Stop reason: SDUPTHER

## 2020-02-26 NOTE — PROGRESS NOTES
Assessment/Plan:  Problem List Items Addressed This Visit        Digestive    Other chronic pancreatitis ([address] )       Nervous and Auditory    Neuropathy     Continue gabapentin but increase dose to 300mg daily  Relevant Medications    gabapentin (NEURONTIN) 300 mg capsule       Musculoskeletal and Integument    Age-related osteoporosis without current pathological fracture - Primary     Will start pt with Prolia to help improve T-score and decrease fracture risk  Other    Lymphedema     Pt continues to have PT and OT and is making nice strides  No changes at present  Vitamin D deficiency    Relevant Medications    ergocalciferol (VITAMIN D2) 50,000 units      Other Visit Diagnoses     Body mass index (BMI) 40 0-44 9, adult ([facility])        Relevant Medications    buPROPion (WELLBUTRIN XL) 150 mg 24 hr tablet           Diagnoses and all orders for this visit:    Age-related osteoporosis without current pathological fracture    Neuropathy  -     gabapentin (NEURONTIN) 300 mg capsule; Take 1 capsule (300 mg total) by mouth 3 (three) times a day    Other chronic pancreatitis (HCC)    Body mass index (BMI) 40 0-44 9, adult ([facility])  -     buPROPion (WELLBUTRIN XL) 150 mg 24 hr tablet; Take 1 tablet (150 mg total) by mouth every morning    Vitamin D deficiency  -     ergocalciferol (VITAMIN D2) 50,000 units; Take 1 capsule (50,000 Units total) by mouth once a week    Lymphedema        Neuropathy  Continue gabapentin but increase dose to 300mg daily  Age-related osteoporosis without current pathological fracture  Will start pt with Prolia to help improve T-score and decrease fracture risk  Lymphedema  Pt continues to have PT and OT and is making nice strides  No changes at present  Subjective:      Patient ID: Osmin Pan is a 78 y o  female  Pt presents for routine visit  She is doing well overall  She has an arm wrap for her lymphedema and therapy has been helping this   She is also losing inches from her RUQ swelling also due to her history of lymph node removal  She recently had a dexa that showed osteoporosis  She is agreeable to Prolia  She is noting improvement in her neuropathy symptoms with gabapentin use but would like to try a higher dose  Labs, mammogram and CRC screening are all up to date  The following portions of the patient's history were reviewed and updated as appropriate:   She has a past medical history of Anxiety, Arthritis, Asthma, Breast cancer ([facility] ), [facility] ([facility] ), Cataract, CHF (congestive heart failure) ([facility] ), Coronary artery disease, Depression, GERD (gastroesophageal reflux disease), High cholesterol, History of chemotherapy, Hypertension, IBS (irritable bowel syndrome), Irritable bowel syndrome (IBS) (5/31/2016), Lymphedema of right arm, Obesity, Post-menopausal, Psychiatric disorder, Renal disorder, and Vitamin D deficiency  ,  does not have any pertinent problems on file  ,   has a past surgical history that includes Tonsillectomy and adenoidectomy; Breast surgery; Cholecystectomy; Knee surgery; Colonoscopy (N/A, 5/31/2016); Gallbladder surgery; Hysterectomy; Cataract extraction, bilateral; and Mastectomy (Right)  ,  family history includes Breast cancer in her maternal aunt, maternal aunt, maternal aunt, maternal aunt, and maternal aunt; Depression in her mother; Diabetes in her mother; Heart disease in her brother and mother; Mental illness in her mother; No Known Problems in her father  She was adopted  ,   reports that she has never smoked  She has never used smokeless tobacco  She reports that she drank alcohol  She reports that she does not use drugs  ,  is allergic to ibuprofen; asa [aspirin]; and latex     Current Outpatient Medications   Medication Sig Dispense Refill    albuterol (2 5 mg/3 mL) 0 083 % nebulizer solution Take 2 5 mg by nebulization every 6 (six) hours as needed for wheezing        albuterol (PROAIR HFA) 90 mcg/act inhaler Inhale 2 puffs every 4 (four) hours as needed for wheezing 3 Inhaler 1    atorvastatin (LIPITOR) 20 mg tablet Take 1 tablet by mouth daily  30 tablet 3    butalbital-acetaminophen-caffeine (FIORICET,ESGIC) -40 mg per tablet Take 1 tablet by mouth 3 (three) times a day as needed for headaches 90 tablet 0    diclofenac (VOLTAREN) 75 mg EC tablet Take 1 tablet (75 mg total) by mouth 2 (two) times a day 60 tablet 1    dicyclomine (BENTYL) 10 mg capsule Take 1 capsule by mouth daily  90 capsule 0    DULoxetine (CYMBALTA) 60 mg delayed release capsule Take 1 capsule (60 mg total) by mouth 2 (two) times a day 180 capsule 1    ergocalciferol (VITAMIN D2) 50,000 units Take 1 capsule (50,000 Units total) by mouth once a week 4 capsule 3    gabapentin (NEURONTIN) 300 mg capsule Take 1 capsule (300 mg total) by mouth 3 (three) times a day 90 capsule 2    loratadine (CLARITIN) 10 mg tablet Take 1 tablet (10 mg total) by mouth daily 90 tablet 1    montelukast (SINGULAIR) 10 mg tablet Take 1 tablet by mouth daily  30 tablet 3    Nutritional Supplements (BOOST BREEZE PO) Take by mouth daily      nystatin (MYCOSTATIN) cream Apply topically 2 (two) times a day 30 g 0    nystatin (MYCOSTATIN) powder Apply topically 2 (two) times a day 60 g 5    oxyCODONE-acetaminophen (PERCOCET) 5-325 mg per tablet Take 1 tablet by mouth every 8 (eight) hours as needed for moderate painMax Daily Amount: 3 tablets 30 tablet 0    traZODone (DESYREL) 50 mg tablet Take 1-2 tablets ( mg total) by mouth daily at bedtime 60 tablet 0    buPROPion (WELLBUTRIN XL) 150 mg 24 hr tablet Take 1 tablet (150 mg total) by mouth every morning 30 tablet 5     No current facility-administered medications for this visit  Review of Systems   Constitutional: Negative for chills and fever  HENT: Negative for congestion, ear pain, hearing loss, postnasal drip, rhinorrhea, sinus pressure, sinus pain, sore throat and trouble swallowing      Eyes: Negative for pain and visual disturbance  Respiratory: Negative for cough, chest tightness, shortness of breath and wheezing  Cardiovascular: Positive for leg swelling  Negative for chest pain and palpitations  Gastrointestinal: Negative for abdominal pain, blood in stool, constipation, diarrhea, nausea and vomiting  Endocrine: Negative for cold intolerance, heat intolerance, polydipsia, polyphagia and polyuria  Genitourinary: Negative for difficulty urinating, dysuria, flank pain and urgency  Musculoskeletal: Negative for arthralgias, back pain, gait problem and myalgias  Skin: Negative for rash  Allergic/Immunologic: Negative  Neurological: Positive for numbness  Negative for dizziness, weakness, light-headedness and headaches  Hematological: Negative  Psychiatric/Behavioral: Negative for behavioral problems, dysphoric mood and sleep disturbance  The patient is not nervous/anxious  PHQ-9 Depression Screening    PHQ-9:    Frequency of the following problems over the past two weeks:               Objective:  Vitals:    02/26/20 1003   BP: 132/74   BP Location: Left arm   Patient Position: Sitting   Cuff Size: Large   Pulse: 90   Resp: 18   Temp: 98 4 °F (36 9 °C)   SpO2: 97%   Weight: 85 9 kg (189 lb 6 4 oz)   Height: 4' 9" (1 448 m)     Body mass index is 40 99 kg/m²  Physical Exam   Constitutional: She is oriented to person, place, and time  She appears well-developed and well-nourished  No distress  HENT:   Head: Normocephalic and atraumatic  Right Ear: External ear normal    Left Ear: External ear normal    Nose: Nose normal    Mouth/Throat: Oropharynx is clear and moist  No oropharyngeal exudate  Eyes: Pupils are equal, round, and reactive to light  Conjunctivae and EOM are normal  Right eye exhibits no discharge  Left eye exhibits no discharge  No scleral icterus  Neck: Normal range of motion  Neck supple  No thyromegaly present     Cardiovascular: Normal rate, regular rhythm and normal heart sounds  Exam reveals no gallop and no friction rub  No murmur heard  Pulmonary/Chest: Effort normal and breath sounds normal  No respiratory distress  She has no wheezes  She has no rales  Abdominal: Soft  Bowel sounds are normal  She exhibits no distension  There is no tenderness  Musculoskeletal: Normal range of motion  She exhibits no edema, tenderness or deformity  Neurological: She is alert and oriented to person, place, and time  No cranial nerve deficit  Skin: Skin is warm and dry  She is not diaphoretic  Psychiatric: She has a normal mood and affect  Her behavior is normal  Judgment and thought content normal      BMI Counseling: Body mass index is 40 99 kg/m²  The BMI is above normal  Nutrition recommendations include decreasing portion sizes, consuming healthier snacks and limiting drinks that contain sugar

## 2020-02-28 ENCOUNTER — OFFICE VISIT (OUTPATIENT)
Dept: OCCUPATIONAL THERAPY | Facility: CLINIC | Age: 70
End: 2020-02-28
Payer: COMMERCIAL

## 2020-02-28 ENCOUNTER — OFFICE VISIT (OUTPATIENT)
Dept: PHYSICAL THERAPY | Facility: CLINIC | Age: 70
End: 2020-02-28
Payer: COMMERCIAL

## 2020-02-28 DIAGNOSIS — G56.03 BILATERAL CARPAL TUNNEL SYNDROME: Primary | ICD-10-CM

## 2020-02-28 DIAGNOSIS — E55.9 VITAMIN D DEFICIENCY: ICD-10-CM

## 2020-02-28 DIAGNOSIS — I89.0 LYMPHEDEMA: Primary | ICD-10-CM

## 2020-02-28 DIAGNOSIS — F51.01 PRIMARY INSOMNIA: ICD-10-CM

## 2020-02-28 PROCEDURE — 97140 MANUAL THERAPY 1/> REGIONS: CPT | Performed by: PHYSICAL THERAPIST

## 2020-02-28 PROCEDURE — 97140 MANUAL THERAPY 1/> REGIONS: CPT

## 2020-02-28 PROCEDURE — 97110 THERAPEUTIC EXERCISES: CPT

## 2020-02-28 RX ORDER — TRAZODONE HYDROCHLORIDE 50 MG/1
50-100 TABLET ORAL
Qty: 60 TABLET | Refills: 0 | Status: SHIPPED | OUTPATIENT
Start: 2020-02-28 | End: 2020-03-30

## 2020-02-28 RX ORDER — ERGOCALCIFEROL 1.25 MG/1
50000 CAPSULE ORAL WEEKLY
Qty: 4 CAPSULE | Refills: 3 | Status: SHIPPED | OUTPATIENT
Start: 2020-02-28

## 2020-02-28 NOTE — PROGRESS NOTES
Daily Note     Today's date: 2020  Patient name: Brianna Bai  : 1950  MRN: [id number]  Referring provider: Sirisha Dickerson PA-C  Dx:   Encounter Diagnosis     ICD-10-CM    1  Lymphedema I89 0                   Subjective: Patient notes increased abdominal distension, MD aware  Objective: See treatment diary below      Assessment: Tolerated treatment well  Patient would benefit from continued PT  Noted with increased edema after OT this date  Responded well to MLD  She plans for upcoming appointment with DME rep for UE compression as well as tankl top option for trunkal edema  Overall progression well and demonstrating good carryover with compression schedule  Plan: Continue per plan of care  Precautions: Fall, recent cellulitis  Re-eval Date: 2020     Date    Visit Count 6 7 8 9 10   FOTO            Pain In            Pain Out                  Manual             MLD  45 min 45 min 55 mins 30 min 55 mins                     MLD R UE / R later trunk - pt positioned supine/L S/L for patient comfort    Compression bandaging to R UE - Med Soft Tubigrip, White foam, 1x 6cm, 1x 8cm, and 1 10 cm       Exercise Diary              PROM->AAROM                                                                                                               HEP                 Modalities

## 2020-02-28 NOTE — PROGRESS NOTES
Daily Note     Today's date: 2020  Patient name: Evy Lazaro  : 1950  MRN: [id number]  Referring provider: Katy Ruiz PA-C  Dx:   Encounter Diagnosis     ICD-10-CM    1  Bilateral carpal tunnel syndrome G56 03                   Subjective: ' hand really hurt    The pain is a 20 "      Objective: See treatment diary below  Manual  2020      Manual Massage Flexor Mass Flexor mass B/L 10 min      Wrist Flexor Stretch 30 sec x 3 30 sec x 3      Thumb Flexor Stretch  30 sec x 3      Median Nerve Glide        Prayer Stretch            Exercise Diary  2020      Wrist Flex/Ext  2 x 10 2 x 10      Wrist UD/RD 2 x 10 2 x 10      Pron/sup  10      Opposition   X 3 RUE  Unable LUE      Fisting   X 5 RUE  Unable LUE      Thumb abd   5 B/L              Putty Grasps Yellow x 20       Putty Pinches Yellow x 20       Digi-Flex        Tension Pins        Hand Master        Wrist Extension Iso        Flex Bar                                                                                        HEP with handouts  AROM hand and wrist          Modalities 2020      Paraffin w/ MH Completed 10 minutes B/L                               Assessment: Tolerated treatment fair  Increased pain with difficulty with AROM on LUE and unable to complete any strengthening  Pt states understanding of HEP  Patient demonstrated fatigue post treatment and would benefit from continued OT      Plan: Continue per plan of care         Precautions: Fall, recent cellulitis

## 2020-02-28 NOTE — PROGRESS NOTES
PT Evaluation     Today's date: 2020  Patient name: Justin Vasquez  : 1950  MRN: [id number]  Referring provider: Stephon Johnson PA-C  Dx:   Encounter Diagnosis     ICD-10-CM    1  Lymphedema I89 0                   Assessment  Assessment details: Patient presents to OPPT with s/s consistent with right UE lymphedema  PT interventions included CDT (complete decongestive therapy) including MLD (manual lymphatic drainage) and compression using short stretch bandages as able  PT to further provided extensive patient and family/friend education on self bandaging, self MLD techniques, and skin care  Patient will transition patient to long term maintenance with compression plan for both morning and evening wear once optimal decongestive and volume reduction of limb and trunk/abdomen has been achieved  She is pending appointment with DME rep for appropriate compression/fitting  Thank you for this referral and the opportunity to participate in the care of this patient  Impairments: abnormal or restricted ROM, activity intolerance, impaired physical strength, lacks appropriate home exercise program, pain with function and safety issue  Other impairment: Increased edema, s/s that may be consistent with onset of carpal tunnel, numbness/tingling into right UE that worsens with increased edema  Functional limitations: Decreased ADL performance recently - worsening in the last 2 yearsUnderstanding of Dx/Px/POC: good   Prognosis: good  Prognosis details:  At eval:  Non-compliance with compression sleeve  Non-compliance with short stretch bandages    Goals  STGs to be achieved in 2 - 4 weeks - ALL MET AS LISTED AS STGs  Demonstrate at least 75% compliance with self MLD  Demonstrate at least 75% compliance with self compression  Demonstrate at least 75% compliance with self skin and nail inspection  Free from s/s of infection  Demonstrate understanding of importance of compliance with POC    LTGs to be achieved in 4 - 6 weeks  Demonstrate at least 100% compliance with self MLD - MET  Demonstrate at least 100% compliance with self compression - MET  Demonstrate at least 100% compliance with self skin and nail inspection - MET  Free from s/s of infection - MET  Demonstrate understanding of day/night compression wearing schedule - NOT MET  Obtain alternative compression to ensure independence with self management - NOT MET      Plan  Patient would benefit from: skilled physical therapy  Other planned modality interventions: Modalities prn for symptom management  Planned therapy interventions: manual therapy, neuromuscular re-education, therapeutic exercise and home exercise program  Frequency: 2x week  Duration in visits: 8  Duration in weeks: 4  Plan of Care beginning date: 2/28/2020  Plan of Care expiration date: 3/29/2020  Treatment plan discussed with: patient        Subjective Evaluation    History of Present Illness  Date of onset: 11/1/2019  Mechanism of injury: UPDATE:  Reports she was doing very well  Compliant with self MLD and compression schedule  Has not obtained OTC compression at this time yet  Is following with OT for hand/wrist pain, carpal tunnel  Patient with onset of cellulitis in right UE  Unsure if flu shot, bug bite as etiology  Patient with dx of cellulitis upon presentation to ED in November  She was admitted to the hospital   She received IV antibiotics  She ended up admitted for approximately 3 days  She was discharged home with oral antibiotics  A few weeks after she was discharged, she started again with the same symptoms  She was given an oral antibiotic at this time  Her last dose of antibiotics was the first week of January  She has had an increase in swelling in her right UE, right trunk and right abdomen  Pain  Location: Right UE, breast, trunk - Numbness    Social Support  Exterior steps/ramp assessed: 1 ARTUR  Interior stairs assessed: Chair lift     Lives in: multiple-level home  Lives with: adult children    Hand dominance: right    Treatments  Previous treatment: physical therapy  Patient Goals  Patient goals for therapy: increased strength, independence with ADLs/IADLs, return to sport/leisure activities, increased motion, decreased pain and decreased edema          Objective     Tests     Right Shoulder   Positive empty can and painful arc  Negative full can  Right Wrist/Hand   Positive Finkelstein's and Tinel's sign (medial nerve)  Lymphedema Evaluation    Medical Considerations:  NEG Cardiac  NEG Pulmonary  NEG Kidney  NEG Liver  POS Current Fever/Infection - Jan, 2020  NEG Current/Recent Wounds  NEG Current/Recent Treatments/Interventions/Port Access/PICC Line  NEG Current/Recent/History of DVT or Clotting issues    ROM Considerations:  GHJ ROM grossly WFL, painful with elevation and ABd  Strength 3-/5 flexion and ABd due to pain  Elbow flexion 4-/5  Elbow extn 4-/5  Gross  limited 50% 2/2 pain    Skin Considerations/Scars:  Patient presents with skin inspection as follows:  Hemosiderin staining/skin discoloration NEG  Finger/Toe Nails NEG  Open Wounds NEG  S/S infection NEG  Firm/Sponge/Hard - resolved since SOC  [place] - NEG  Weeping - NEG      Girth:   At re-eval:  Girth right UE:  Palm: 18 5 cm  Wrist 16 cm  4+: 18  8+: 23 5  12+: 27  16+: 28  20+: 27  24+: 27 5  28+: 32  32+: 35 5  36+: 38  40+: 42    At eval:  UE girth measurements (cm)      Right           Left   Palmar Crease   19  19                        Wrist    15 5  15  +4                         18 5  17 5  +8 cm    22  21  +12                           27 5      23 5  +16 cm    29  25  +20                           28  25  +24 cm    31  26 5  +28                                   33 5  30  +32    37 5  34 5  +36    39  38  +40    39  37  +44    42 5  40  +48                                          43                   43              Elbow joint line  29  25                Wrist to elbow (pisiform to elbow) - medial - 22 cm  Elbow to ACJ (lateral) - 48 cm

## 2020-03-04 ENCOUNTER — OFFICE VISIT (OUTPATIENT)
Dept: PHYSICAL THERAPY | Facility: CLINIC | Age: 70
End: 2020-03-04
Payer: COMMERCIAL

## 2020-03-04 ENCOUNTER — OFFICE VISIT (OUTPATIENT)
Dept: OCCUPATIONAL THERAPY | Facility: CLINIC | Age: 70
End: 2020-03-04
Payer: COMMERCIAL

## 2020-03-04 DIAGNOSIS — G56.03 BILATERAL CARPAL TUNNEL SYNDROME: Primary | ICD-10-CM

## 2020-03-04 DIAGNOSIS — I89.0 LYMPHEDEMA: Primary | ICD-10-CM

## 2020-03-04 PROCEDURE — 97110 THERAPEUTIC EXERCISES: CPT | Performed by: PHYSICAL THERAPIST

## 2020-03-04 PROCEDURE — 97018 PARAFFIN BATH THERAPY: CPT

## 2020-03-04 PROCEDURE — 97110 THERAPEUTIC EXERCISES: CPT

## 2020-03-04 PROCEDURE — 97140 MANUAL THERAPY 1/> REGIONS: CPT | Performed by: PHYSICAL THERAPIST

## 2020-03-04 PROCEDURE — 97140 MANUAL THERAPY 1/> REGIONS: CPT

## 2020-03-04 PROCEDURE — 97035 APP MDLTY 1+ULTRASOUND EA 15: CPT

## 2020-03-04 NOTE — PROGRESS NOTES
Daily Note     Today's date: 3/4/2020  Patient name: Daniel Yusuf  : 1950  MRN: [id number]  Referring provider: Lily Green PA-C  Dx:   Encounter Diagnosis     ICD-10-CM    1  Lymphedema I89 0                   Subjective: Feeling tight, but doing okay  Objective: See treatment diary below      Assessment: Tolerated treatment well  Patient demonstrated fatigue post treatment and would benefit from continued PT  Patient with ongoing GHJ restrictions  Tolerated ROM well  Plan: Continue per plan of care  Precautions: Fall, recent cellulitis  Re-eval Date: 3/27/2020     Date 3/4       Visit Count 11       FOTO        Pain In        Pain Out              Manual             MLD 30 mins                         MLD R UE / R later trunk - pt positioned supine/L S/L for patient comfort    Compression bandaging to R UE - Med Soft Tubigrip, White foam, 1x 6cm, 1x 8cm, and 1 10 cm       Exercise Diary              PROM->AAROM  GHJ with scapular repositioning x 5 mins with gentle AP mobs           UE lymph program 3 20 mins                                                                                               HEP                 Modalities

## 2020-03-04 NOTE — PROGRESS NOTES
Daily Note     Today's date: 3/4/2020  Patient name: Juventino Rocha  : 1950  MRN: [id number]  Referring provider: Anup Felix PA-C  Dx:   Encounter Diagnosis     ICD-10-CM    1  Bilateral carpal tunnel syndrome G56 03                   Subjective: My pain is a 10 today  Objective:   Manual  2020 2020  3/4/2020       Manual Massage Flexor Mass Flexor mass B/L 10 min  10 min       Wrist Flexor Stretch 30 sec x 3 30 sec x 3  30 sec x3       Thumb Flexor Stretch   30 sec x 3  30 sec x3       Median Nerve Glide             Prayer Stretch             PROM B/L   10 min ea hand           Exercise Diary  2020 2020  3/4/2020       Wrist Flex/Ext  2 x 10 2 x 10  10/2       Wrist UD/RD 2 x 10 2 x 10  10/2       Pron/sup   10  x10 R   x3 L       Opposition    X 3 RUE  Unable LUE  x4 R  Unable on L       Fisting    X 5 RUE  Unable LUE  x5 R  Unable on L       Thumb abd    5 B/L  x5 R  Unable on L                     Putty Grasps Yellow x 20           Putty Pinches Yellow x 20           Digi-Flex             Tension Pins             Hand Master             Wrist Extension Iso             Flex Bar              Tendon glide  Roof  Straight fist        x5  x5                                                                                                                                     HEP with handouts   AROM hand and wrist               Modalities 2020 2020  3/4/2020       Paraffin w/ MH Completed 10 minutes B/L  10 min   B/L        US L wrist/thumb CMC      10 min  1 0%  3mhz                             Assessment: Tolerated treatment fair  Patient reports pain pre 10 right hand post tx 9  Left hand pre and post tx 8  Unable to tolerate strengthening, increase pain with AROM primarily with LUE  Plan: Continue per plan of care  Progress treatment as tolerated

## 2020-03-06 ENCOUNTER — OFFICE VISIT (OUTPATIENT)
Dept: PHYSICAL THERAPY | Facility: CLINIC | Age: 70
End: 2020-03-06
Payer: COMMERCIAL

## 2020-03-06 ENCOUNTER — OFFICE VISIT (OUTPATIENT)
Dept: OCCUPATIONAL THERAPY | Facility: CLINIC | Age: 70
End: 2020-03-06
Payer: COMMERCIAL

## 2020-03-06 DIAGNOSIS — G56.03 BILATERAL CARPAL TUNNEL SYNDROME: Primary | ICD-10-CM

## 2020-03-06 DIAGNOSIS — I89.0 LYMPHEDEMA: Primary | ICD-10-CM

## 2020-03-06 PROCEDURE — 97140 MANUAL THERAPY 1/> REGIONS: CPT

## 2020-03-06 PROCEDURE — 97110 THERAPEUTIC EXERCISES: CPT

## 2020-03-06 PROCEDURE — 97018 PARAFFIN BATH THERAPY: CPT

## 2020-03-06 NOTE — PROGRESS NOTES
Daily Note     Today's date: 3/6/2020  Patient name: Kai Mcgill  : 1950  MRN: [id number]  Referring provider: Byron Gibbons PA-C  Dx:   Encounter Diagnosis     ICD-10-CM    1  Bilateral carpal tunnel syndrome G56 03                   Subjective: ' I feel a little better today with my hand pain    Maybe its an 8/10    Not as uncomfortable as last time I was here '      Objective: See treatment diary below  Manual  2020 2020  3/4/2020  3/6/2020     Manual Massage Flexor Mass Flexor mass B/L 10 min  10 min  10 min     Wrist Flexor Stretch 30 sec x 3 30 sec x 3  30 sec x3  30 sec x 3     Thumb Flexor Stretch   30 sec x 3  30 sec x3  30 sec x 3     Median Nerve Glide             Prayer Stretch             PROM B/L   10 min ea hand 10 min          Exercise Diary  2020 2020  3/4/2020  3/6/2020     Wrist Flex/Ext  2 x 10 2 x 10  10/2  2 x 10     Wrist UD/RD 2 x 10 2 x 10  10/2  2 x 10     Pron/sup   10  x10 R   x3 L  x 10   X 10     Opposition    X 3 RUE  Unable LUE  x4 R  Unable on L  x 5 R  x5 L D2D3     Fisting    X 5 RUE  Unable LUE  x5 R  Unable on L  x 5 R  X 5 L/incomplete     Thumb abd    5 B/L  x5 R  Unable on L  x 5 R  1/4 range L x5                   Putty Grasps   Yellow x 20     yellow  10R  5L     Putty Pinches Yellow x 20      yellow   20R 10L     Digi-Flex             Tension Pins             Hand Master             Wrist Extension Iso             Flex Bar              Tendon glide  Roof  Straight fist        x5  x5    X 5 B/L  X 5 R                                                                                                                                    HEP with handouts   AROM hand and wrist               Modalities 2020 2020  3/4/2020  3/6/2020     Paraffin w/ MH Completed 10 minutes B/L  10 min   B/L  10 min B/L      US L wrist/thumb CMC      10 min  1 0%  3mhz  10 min   1 0  3mhz                          Assessment: Tolerated treatment well   Pain levels decrease to 5/10 R and 6-7/10 post tx  Increased ROM performance on LUE, though still not to full flex of hand/digits  Patient demonstrated fatigue post treatment and would benefit from continued OT      Plan: Continue per plan of care        Precautions: Fall, recent cellulitis

## 2020-03-06 NOTE — PROGRESS NOTES
Daily Note     Today's date: 3/6/2020  Patient name: German Chung  : 1950  MRN: [id number]  Referring provider: Berkley Valdez PA-C  Dx:   Encounter Diagnosis     ICD-10-CM    1  Lymphedema I89 0                   Subjective: I dont want my R arm to be bandaged today as I have been feeling guilty with not being able to help around the house  I still have not yet been able to get compression garments 2* finances  Hope to go up coming week      Objective: See treatment diary below      Assessment: Tolerated treatment well  Reduce volume R UE noted today  Demonstrated increase R abdominal swelling with pt review compression options and encourage purchasing upcoming  Patient would benefit from continued PT      Plan: Continue per plan of care  Precautions: Fall, recent cellulitis  Re-eval Date:  3/27/2020     Date 3/4 3/6      Visit Count 11 12      FOTO        Pain In        Pain Out              Manual             MLD 30 mins 45 min                        MLD R UE / R later trunk - pt positioned supine/L S/L for patient comfort    Compression bandaging to R UE - Med Soft Tubigrip, White foam, 1x 6cm, 1x 8cm, and 1 10 cm   - np    Pt placed in tubigrip per pt preference over bandaging this date     Exercise Diary              PROM->AAROM  GHJ with scapular repositioning x 5 mins with gentle AP mobs     GHJ with scapular repositioning x 5 mins with gentle AP mobs   15 mins            UE lymph program 3 20 mins                                                                                               HEP                 Modalities

## 2020-03-09 ENCOUNTER — APPOINTMENT (OUTPATIENT)
Dept: OCCUPATIONAL THERAPY | Facility: CLINIC | Age: 70
End: 2020-03-09
Payer: COMMERCIAL

## 2020-03-09 ENCOUNTER — APPOINTMENT (OUTPATIENT)
Dept: PHYSICAL THERAPY | Facility: CLINIC | Age: 70
End: 2020-03-09
Payer: COMMERCIAL

## 2020-03-11 ENCOUNTER — OFFICE VISIT (OUTPATIENT)
Dept: PHYSICAL THERAPY | Facility: CLINIC | Age: 70
End: 2020-03-11
Payer: COMMERCIAL

## 2020-03-11 ENCOUNTER — OFFICE VISIT (OUTPATIENT)
Dept: OCCUPATIONAL THERAPY | Facility: CLINIC | Age: 70
End: 2020-03-11
Payer: COMMERCIAL

## 2020-03-11 DIAGNOSIS — G56.03 BILATERAL CARPAL TUNNEL SYNDROME: Primary | ICD-10-CM

## 2020-03-11 DIAGNOSIS — I89.0 LYMPHEDEMA: Primary | ICD-10-CM

## 2020-03-11 PROCEDURE — 97018 PARAFFIN BATH THERAPY: CPT

## 2020-03-11 PROCEDURE — 97110 THERAPEUTIC EXERCISES: CPT

## 2020-03-11 PROCEDURE — 97140 MANUAL THERAPY 1/> REGIONS: CPT

## 2020-03-11 NOTE — PROGRESS NOTES
Daily Note     Today's date: 3/11/2020  Patient name: Woody Ellison  : 1950  MRN: [id number]  Referring provider: Valerie Fraser PA-C  Dx:   Encounter Diagnosis     ICD-10-CM    1  Bilateral carpal tunnel syndrome G56 03                   Subjective: I have more movement in my left hand but it still really hurts        Objective:   Manual  2020 2020  3/4/2020  3/6/2020 3/11/2020   Manual Massage Flexor Mass Flexor mass B/L 10 min  10 min  10 min  5 min   Wrist Flexor Stretch 30 sec x 3 30 sec x 3  30 sec x3  30 sec x 3  30 sec x3   Thumb Flexor Stretch   30 sec x 3  30 sec x3  30 sec x 3  30 sec x3   Median Nerve Glide             Prayer Stretch             PROM B/L     10 min ea hand 10 min  10 min         Exercise Diary  2020 2020  3/4/2020  3/6/2020 3/11/2020   Wrist Flex/Ext  2 x 10 2 x 10  10/2  2 x 10  10/2   Wrist UD/RD 2 x 10 2 x 10  10/2  2 x 10  10/2   Pron/sup   10  x10 R   x3 L  x 10   X 10  x10  x10   Opposition    X 3 RUE  Unable LUE  x4 R  Unable on L  x 5 R  x5 L D2D3 R x5  L x5 D2,D3,D4   Fisting    X 5 RUE  Unable LUE  x5 R  Unable on L  x 5 R  X 5 L/incomplete  x10 R  x10 L   Thumb abd    5 B/L  x5 R  Unable on L  x 5 R  1/4 range L x5  R x5  1/2 range   x5 L                 Putty Grasps    Yellow x 20     yellow  10R  5L     Putty Pinches Yellow x 20      yellow   20R 10L     Digi-Flex             Tension Pins             Hand Master          Blue  R x10  L x5   Wrist Extension Iso             Flex Bar              Tendon glide  Roof  Straight fist  Hook        x5  x5    X 5 B/L  X 5 R     x5  x5  x5    Yellow Foam         R x10  L x10                                                                                                                   HEP with handouts   AROM hand and wrist               Modalities 2020 2020  3/4/2020  3/6/2020  3/11/2020   Paraffin w/ MH Completed 10 minutes B/L  10 min   B/L  10 min B/L  10 min b/l    US L wrist/thumb CMC      10 min  1 0%  3mhz  10 min   1 0  3mhz  10 min 1 0%  3 mhz                      Assessment: Tolerated treatment well  Patient  reports pain level of 8 pre and post tx of L  thumb/ hand  Increase AROM of left hand noted  Denied pain in R hand  Plan: Continue per plan of care  Progress treatment as tolerated

## 2020-03-11 NOTE — PROGRESS NOTES
Daily Note     Today's date: 3/11/2020  Patient name: Kai Mcgill  : 1950  MRN: [id number]  Referring provider: Byron Gibbons PA-C  Dx:   Encounter Diagnosis     ICD-10-CM    1  Lymphedema I89 0                   Subjective: I still did not get my compression for RUE yet  I still need to call Jose Metzger worked out great last PT session  Tummy still so swollen        Objective: See treatment diary below      Assessment: Tolerated treatment well  Noted overall reduce volume R UE  Cont's with edema R torso/trunk region  Good tolerance with MT today  Review HEP and educated benefit with compliancy with compression  Pt to purchase maintenance compression upcoming  Patient would benefit from continued PT      Plan: Continue per plan of care  Precautions: Fall, recent cellulitis  Re-eval Date:  3/27/2020     Date 3/4 3/6 3/11     Visit Count 11 12 13     FOTO        Pain In        Pain Out              Manual             MLD 30 mins 45 min 45 min                       MLD R UE / R later trunk - pt positioned supine/L S/L for patient comfort    Compression bandaging to R UE - Med Soft Tubigrip, White foam, 1x 6cm, 1x 8cm, and 1 10 cm   - np    Pt placed in tubigrip per pt preference over bandaging this date     Exercise Diary              PROM->AAROM  GHJ with scapular repositioning x 5 mins with gentle AP mobs     GHJ with scapular repositioning x 5 mins with gentle AP mobs   15 mins        GHJ with scapular repositioning x    10 mins          UE lymph program 3 20 mins                                                                                               HEP                 Modalities

## 2020-03-16 ENCOUNTER — OFFICE VISIT (OUTPATIENT)
Dept: OCCUPATIONAL THERAPY | Facility: CLINIC | Age: 70
End: 2020-03-16
Payer: COMMERCIAL

## 2020-03-16 ENCOUNTER — EVALUATION (OUTPATIENT)
Dept: PHYSICAL THERAPY | Facility: CLINIC | Age: 70
End: 2020-03-16
Payer: COMMERCIAL

## 2020-03-16 DIAGNOSIS — R53.1 WEAKNESS: Primary | ICD-10-CM

## 2020-03-16 DIAGNOSIS — G56.03 BILATERAL CARPAL TUNNEL SYNDROME: Primary | ICD-10-CM

## 2020-03-16 PROCEDURE — 97018 PARAFFIN BATH THERAPY: CPT

## 2020-03-16 PROCEDURE — 97110 THERAPEUTIC EXERCISES: CPT

## 2020-03-16 PROCEDURE — 97140 MANUAL THERAPY 1/> REGIONS: CPT

## 2020-03-16 PROCEDURE — 97035 APP MDLTY 1+ULTRASOUND EA 15: CPT

## 2020-03-16 NOTE — PROGRESS NOTES
Daily Note     Today's date: 3/16/2020  Patient name: Joselyn Romero  : 1950  MRN: [id number]  Referring provider: Nae Justin PA-C  Dx:   Encounter Diagnosis     ICD-10-CM    1  Bilateral carpal tunnel syndrome G56 03                   Subjective: I can make a fist with my left hand now        Objective:   Manual  3/16/2020 2020  3/4/2020  3/6/2020 3/11/2020   Manual Massage 5 min  L hand Flexor mass B/L 10 min  10 min  10 min  5 min   Wrist Flexor Stretch 30 sec x 3 30 sec x 3  30 sec x3  30 sec x 3  30 sec x3   Thumb Flexor Stretch   30 sec x 3  30 sec x3  30 sec x 3  30 sec x3   Median Nerve Glide             Prayer Stretch  10 sec hold  x5           PROM B/L  10 min   10 min ea hand 10 min  10 min         Exercise Diary  3/16/2020 2020  3/4/2020  3/6/2020 3/11/2020   Wrist Flex/Ext  3x 10 2 x 10  10/2  2 x 10  10/2   Wrist UD/RD 3 x 10 2 x 10  10/2  2 x 10  10/2   Pron/sup  x15 10  x10 R   x3 L  x 10   X 10  x10  x10   Opposition   x5 b/l X 3 RUE  Unable LUE  x4 R  Unable on L  x 5 R  x5 L D2D3 R x5  L x5 D2,D3,D4   Fisting   x10 b/l X 5 RUE  Unable LUE  x5 R  Unable on L  x 5 R  X 5 L/incomplete  x10 R  x10 L   Thumb abd   x5 b/l 5 B/L  x5 R  Unable on L  x 5 R  1/4 range L x5  R x5  1/2 range   x5 L                 Putty Grasps         yellow  10R  5L     Putty Pinches       yellow   20R 10L     Digi-Flex  Yellow   R-10  L-10           Tension Pins  3 jaw  lat  Red    Yellow  R x10    L x10   R x10    L x10           Hand Master  Blue  R-10  L-8        Blue  R x10  L x5   Wrist Extension Iso             Flex Bar              Tendon glide  Roof  Straight fist  Hook      x5  x5      x5  x5    X 5 B/L  X 5 R     x5  x5  x5    Yellow Foam         R x10  L x10                                                                                                                   HEP with handouts   AROM hand and wrist               Modalities 3/16/2020 2020  3/4/2020  3/6/2020  3/11/2020 Paraffin w/ MH 10 min b/l 10 minutes B/L  10 min   B/L  10 min B/L  10 min b/l    US L wrist/thumb CMC  10 min  1 0%  3 mhz    10 min  1 0%  3mhz  10 min   1 0  3mhz  10 min 1 0%  3 mhz                      Assessment: Tolerated treatment well  Patient demonstrated fatigue post treatment and would benefit from continued OT  Pt reports pain in right hand 0, left hand 8,slightly decreased post tx  Increase in AROM of left hand/wrist, and thumb  Tolerated increase strengthening and AROM of b/l hands  Plan: Continue per plan of care  Progress treatment as tolerated

## 2020-03-16 NOTE — PROGRESS NOTES
Daily Note     Today's date: 3/16/2020  Patient name: Mihir Michelle  : 1950  MRN: [id number]  Referring provider: Antony Lucero PA-C  Dx:   Encounter Diagnosis     ICD-10-CM    1  Weakness R53 1                   Subjective:  My right arm/hand feels swollen today  I have not been wearing my compression and never at night time        Objective: See treatment diary below      Assessment: Tolerated treatment well  Review HEP and performed reps to pt tolerance  Noted increase lymph volume R UE  Pt encourage to comply with daily compression  Review diaphragmatic breathing, abdominal MLD  Patient would benefit from continued PT   See RA for additional Findings    Plan: Continue per plan of care  Pt to bring SSB next visit      Precautions: Fall, recent cellulitis  Re-eval Date:  3/27/2020     Date 3/4 3/6 3/11 3/16    Visit Count 11 12 13 14    FOTO        Pain In        Pain Out              Manual             MLD 30 mins 45 min 45 min 30 min                      MLD R UE / R later trunk - pt positioned supine/L S/L for patient comfort    Compression bandaging to R UE - Med Soft Tubigrip, White foam, 1x 6cm, 1x 8cm, and 1 10 cm   - np (pt to bring NV    Pt placed in tubigrip per pt preference over bandaging this date     Exercise Diary              PROM->AAROM  GHJ with scapular repositioning x 5 mins with gentle AP mobs     GHJ with scapular repositioning x 5 mins with gentle AP mobs   15 mins        GHJ with scapular repositioning x    10 mins          UE lymph   program 3 20 mins      30 min program 3  Self MLD abdominal area  Max cus                                                                                         HEP                 Modalities

## 2020-03-18 ENCOUNTER — APPOINTMENT (OUTPATIENT)
Dept: PHYSICAL THERAPY | Facility: CLINIC | Age: 70
End: 2020-03-18
Payer: COMMERCIAL

## 2020-03-18 ENCOUNTER — OFFICE VISIT (OUTPATIENT)
Dept: OCCUPATIONAL THERAPY | Facility: CLINIC | Age: 70
End: 2020-03-18
Payer: COMMERCIAL

## 2020-03-18 DIAGNOSIS — G56.03 BILATERAL CARPAL TUNNEL SYNDROME: Primary | ICD-10-CM

## 2020-03-18 PROCEDURE — 97140 MANUAL THERAPY 1/> REGIONS: CPT

## 2020-03-18 PROCEDURE — 97018 PARAFFIN BATH THERAPY: CPT

## 2020-03-18 PROCEDURE — 97110 THERAPEUTIC EXERCISES: CPT

## 2020-03-18 NOTE — PROGRESS NOTES
Daily Note     Today's date: 3/18/2020  Patient name: Evy Lazaro  : 1950  MRN: [id number]  Referring provider: Katy Ruiz PA-C  Dx:   Encounter Diagnosis     ICD-10-CM    1  Bilateral carpal tunnel syndrome G56 03                   Subjective: No new c/o's      Objective:   Manual  3/16/2020 3/18/2020  3/4/2020  3/6/2020 3/11/2020   Manual Massage 5 min  L hand 5 min  L hand  10 min  10 min  5 min   Wrist Flexor Stretch 30 sec x 3 30 sec x 3  30 sec x3  30 sec x 3  30 sec x3   Thumb Flexor Stretch   30 sec x 3  30 sec x3  30 sec x 3  30 sec x3   Median Nerve Glide             Prayer Stretch  10 sec hold  x5           PROM B/L  10 min  10 min 10 min ea hand 10 min  10 min         Exercise Diary  3/16/2020 3/18/2020  3/4/2020  3/6/2020 3/11/2020   Wrist Flex/Ext  3x 10 2 x 10  10/2  2 x 10  10/2   Wrist UD/RD 3 x 10 2 x 10  10/2  2 x 10  10/2   Pron/sup  x15 10  x10 R   x3 L  x 10   X 10  x10  x10   Opposition   x5 b/l x10  x4 R  Unable on L  x 5 R  x5 L D2D3 R x5  L x5 D2,D3,D4   Fisting   x10 b/l x30  x5 R  Unable on L  x 5 R  X 5 L/incomplete  x10 R  x10 L   Thumb abd   x5 b/l 7 B/L  x5 R  Unable on L  x 5 R  1/4 range L x5  R x5  1/2 range   x5 L                 Putty Grasps          yellow  10R  5L     Putty Pinches        yellow   20R 10L     Digi-Flex  Yellow   R-10  L-10  Yellow  R-15  L-11         Tension Pins  3 jaw  lat  Red    Yellow  R x10    L x10   R x10    L x10 Red  Yellow  R x10  L x10  R x10  L x10         Hand Master  Blue  R-10  L-8  Blue  R-15  L-10      Blue  R x10  L x5   Wrist Extension Iso             Flex Bar              Tendon glide  Roof  Straight fist  Hook       x5  x5      x10  x10    x5  x5    X 5 B/L  X 5 R     x5  x5  x5    Yellow Foam         R x10  L x10                                                                                                                   HEP with handouts                  Modalities 3/16/2020 3/18/2020  3/4/2020  3/6/2020  3/11/2020 Paraffin w/ MH 10 min b/l 10 minutes B/L  10 min   B/L  10 min B/L  10 min b/l    US L wrist/thumb CMC  10 min  1 0%  3 mhz 1 0% intensity  3MHz  10 min  1 0%  3mhz  10 min   1 0  3mhz  10 min 1 0%  3 mhz                Assessment: Tolerated treatment well  Patient demonstrated fatigue post treatment and would benefit from continued OT  Pt tolerated increase reps  Pain left hand pre 8, decrease to 6 post tx  Pt continues to make gains with AROM of left hand  Plan: Continue per plan of care  Progress treatment as tolerated

## 2020-03-23 ENCOUNTER — APPOINTMENT (OUTPATIENT)
Dept: PHYSICAL THERAPY | Facility: CLINIC | Age: 70
End: 2020-03-23
Payer: COMMERCIAL

## 2020-03-23 ENCOUNTER — OFFICE VISIT (OUTPATIENT)
Dept: OCCUPATIONAL THERAPY | Facility: CLINIC | Age: 70
End: 2020-03-23
Payer: COMMERCIAL

## 2020-03-23 ENCOUNTER — OFFICE VISIT (OUTPATIENT)
Dept: PHYSICAL THERAPY | Facility: CLINIC | Age: 70
End: 2020-03-23
Payer: COMMERCIAL

## 2020-03-23 DIAGNOSIS — R53.1 WEAKNESS: Primary | ICD-10-CM

## 2020-03-23 DIAGNOSIS — G56.03 BILATERAL CARPAL TUNNEL SYNDROME: Primary | ICD-10-CM

## 2020-03-23 PROCEDURE — 97168 OT RE-EVAL EST PLAN CARE: CPT

## 2020-03-23 PROCEDURE — 97140 MANUAL THERAPY 1/> REGIONS: CPT

## 2020-03-23 PROCEDURE — 97110 THERAPEUTIC EXERCISES: CPT

## 2020-03-23 PROCEDURE — 97018 PARAFFIN BATH THERAPY: CPT

## 2020-03-23 PROCEDURE — 97035 APP MDLTY 1+ULTRASOUND EA 15: CPT

## 2020-03-23 NOTE — PROGRESS NOTES
Daily Note     Today's date: 3/23/2020  Patient name: Nicole Turner  : 1950  MRN: [id number]  Referring provider: Trenton Oneill PA-C  Dx:   Encounter Diagnosis     ICD-10-CM    1  Weakness R53 1                   Subjective: I am doing good with my Lymph HEP daily  I meet with Faustino Aldana Saturday for my compression      Objective: See treatment diary below      Assessment: Tolerated treatment well  Demonstrates swelling/R pec, abdominal, R shoulder/arm regions  R hand intact  Review HEP, self massage, daily compression   Patient would benefit from continued PT for additional R UE bandaging to transition into maintenance compression garment        Plan: Continue per plan of care  Precautions: Fall, recent cellulitis  Re-eval Date:  3/27/2020     Date 3/4 3/6 3/11 3/16 3/23   Visit Count 11 12 13 14 15   FOTO        Pain In        Pain Out              Manual             MLD 30 mins 45 min 45 min 30 min 55 min                     MLD R UE / R later trunk - pt positioned supine/L S/L for patient comfort    Compression bandaging to R UE - Med Soft Tubigrip, White foam, 1x 6cm, 1x 8cm, and 1 10 cm       Pt placed in tubigrip per pt preference over bandaging  - NP   Exercise Diary              PROM->AAROM  GHJ with scapular repositioning x 5 mins with gentle AP mobs     GHJ with scapular repositioning x 5 mins with gentle AP mobs   15 mins        GHJ with scapular repositioning x    10 mins          UE lymph   program 3 20 mins      30 min program 3  Self MLD abdominal area  Max cus                                                                                         HEP       review         Modalities

## 2020-03-23 NOTE — PROGRESS NOTES
OT Re-Evaluation    Today's date: 3/23/2020  Patient name: Joselyn Romero  : 1950  MRN: [id number]  Referring provider: Nae Justin PA-C  Dx:   Encounter Diagnosis     ICD-10-CM    1  Bilateral carpal tunnel syndrome G56 03                   Assessment  Assessment details: Patient presenting to OP OT services with a dx of B/L hand numbness and pain  Patient reports left is worse then right  Patient is right handed  Patient reports symptoms occurred several years ago and have progressively become worse  Patient completed therapy cycle in the past for bilateral hands  Patient reports symptoms are worse in the morning and evening  Patient's next follow-up appointment is on 2020  Re-assessment completed on 2020  Patient has consistently attended OP OT services since start of care  Patient has follow-up with MD in May  Patient reports a relief in symptoms of right thumb  Patient continues to report pain in left wrist  Patient has tolerated increases in therapy parameters with improvements noted  Patient notes a benefit in therapy  Impairments: abnormal coordination, abnormal or restricted ROM and impaired physical strength  Other impairment: decreased functional use    Symptom irritability: moderateBarriers to therapy: PMH: See medical chart  Understanding of Dx/Px/POC: good   Prognosis: good    Goals  STGs    Pt will increase  strength by 5-10#  - Met    Pt will increase wrist strength by 1/2 grade  - Met    Pt will report decrease in morning stiffness by 25%  - Met    Pt will report a decrease in sensation deficits by 25% - Met    Independent with HEP  - Met      LTGs     Pt will increase  strength by an additional 5-10#  - Progressing    Pt will increase wrist strength by 1-2 grade  - Progressing    Pt will increase pinch strength by 3-5#  - Met    Pt will report decrease in morning stiffness by 50% - Progressing    Pt will report an increase in ADL/IADL participation   - Progressing    Pt will report a decrease in sensation deficits by 50%  - Progressing      Plan  Plan details: Patient has Consistent attended OP OT services since start of care  Patient has made steady progress towards established goals  Pt has shown improvement start of care, demonstrating decreased pain, increased strength, increased ROM and increased activity  However, pt remains with impairments including remaining ROM and strength deficits, as well as remaining pain  Pt would benefit from continuation of skilled therapy services to further progress POC and address remaining deficits at this time  Thank you!       Patient would benefit from: OT eval and skilled occupational therapy  Referral necessary: Yes  Planned modality interventions: ultrasound, unattended electrical stimulation, thermotherapy: hydrocollator packs, cryotherapy and thermotherapy: paraffin bath  Planned therapy interventions: massage, manual therapy, joint mobilization, patient education, strengthening, stretching, fine motor coordination training, home exercise program, neuromuscular re-education, self care, therapeutic exercise and therapeutic activities  Frequency: 2x week  Duration in visits: 8  Duration in weeks: 4  Treatment plan discussed with: patient        Subjective Evaluation    History of Present Illness  Mechanism of injury: B/L Hand Numbness and Pain  Quality of life: good    Pain  Current pain ratin  At best pain ratin  At worst pain ratin  Location: B/L Hands  Quality: burning and sharp    Hand dominance: right    Treatments  Current treatment: occupational therapy  Patient Goals  Patient goals for therapy: decreased pain, increased motion, increased strength and independence with ADLs/IADLs          Objective     Neurological Testing     Sensation     Wrist/Hand   Left   Diminished: light touch    Right   Diminished: light touch    Additional Neurological Details  Patient presenting with bilateral hand numbness throughout entire left and right hand  Patient reports a 25% improvement since start of care    Active Range of Motion     Left Wrist   Normal active range of motion    Right Wrist   Normal active range of motion    Left Thumb   Opposition: WNL    Right Thumb   Opposition: WNL    Additional Active Range of Motion Details  composite fist noted    Strength/Myotome Testing     Left Wrist/Hand   Wrist extension: 4-  Wrist flexion: 4-  Radial deviation: 4-  Ulnar deviation: 4-     (2nd hand position)     Trial 1: 16    Thumb Strength  Key/Lateral Pinch     Trail 1: 7    Right Wrist/Hand   Wrist extension: 4-  Wrist flexion: 4-  Radial deviation: 4-  Ulnar deviation: 4-     (2nd hand position)     Trial 1: 21    Thumb Strength   Key/Lateral Pinch     Trial 1: 11    Additional Strength Details  Patient presenting with increases in left/right , pinch and wrist strength as compared to IE    Tests     Left Wrist/Hand   Positive Finkelstein's and Phalen's sign  Right Wrist/Hand   Negative Finkelstein's and Phalen's sign  Swelling     Left Wrist/Hand   Circumference wrist: 16 3 cm    Additional Swelling Details  No significant swelling noted bilaterally    Right Wrist/Hand   Circumference wrist: 16 3 cm    Additional Swelling Details  No significant swelling noted bilaterally              Subjective: "The right one is not bad "      Objective: See treatment diary below      Assessment: Tolerated treatment well  Patient exhibited good technique with therapeutic exercises and would benefit from continued OT  Patient reports a relief of symptoms this session in right hand  Therapist completed manual techniques and modalities to left hand this date  Patient tolerated without complaint  Plan: Continue per plan of care  Progress treatment as tolerated         Manual  3/16/2020 3/18/2020  03/23/2020  3/6/2020 3/11/2020   Manual Massage 5 min  L hand 5 min  L hand  10 min  10 min  5 min   Wrist Flexor Stretch 30 sec x 3 30 sec x 3  30 sec x3  30 sec x 3  30 sec x3   Thumb Flexor Stretch   30 sec x 3  30 sec x3  30 sec x 3  30 sec x3   Median Nerve Glide             Prayer Stretch  10 sec hold  x5           PROM B/L  10 min  10 min 10 min L 10 min  10 min         Exercise Diary  3/16/2020 3/18/2020  03/23/2020  3/6/2020 3/11/2020   Wrist Flex/Ext  3x 10 2 x 10 2 x 10  2 x 10  10/2   Wrist UD/RD 3 x 10 2 x 10 2 x 10  2 x 10  10/2   Pron/sup  x15 10   x 10   X 10  x10  x10   Opposition   x5 b/l x10   x 5 R  x5 L D2D3 R x5  L x5 D2,D3,D4   Fisting   x10 b/l x30   x 5 R  X 5 L/incomplete  x10 R  x10 L   Thumb abd   x5 b/l 7 B/L  x5 R  Unable on L  x 5 R  1/4 range L x5  R x5  1/2 range   x5 L                 Putty Grasps          yellow  10R  5L     Putty Pinches        yellow   20R 10L     Digi-Flex  Yellow   R-10  L-10  Yellow  R-15  L-11 Yellow  R x 15  L x 15       Tension Pins  3 jaw  lat  Red    Yellow  R x10    L x10   R x10    L x10 Red  Yellow  R x10  L x10  R x10  L x10 Red  Yellow  R x 10L x 10  R x 10L x 10       Hand Master  Blue  R-10  L-8  Blue  R-15  L-10 Blue  R x 15  L x 10    Blue  R x10  L x5   Wrist Extension Iso             Flex Bar             Tendon glide  Roof  Straight fist  Hook       x5  x5      x10  x10      X 10  X 10    X 5 B/L  X 5 R     x5  x5  x5   Yellow Foam         R x10  L x10                                                                                                                   HEP with handouts                  Modalities 3/16/2020 3/18/2020  03/23/2020  3/6/2020  3/11/2020   Paraffin w/ MH 10 min b/l 10 minutes B/L 10 min   L  10 min B/L  10 min b/l    US L wrist/thumb CMC  10 min  1 0%  3 mhz 1 0% intensity  3MHz 10 min  1 0%  3 MHz  10 min   1 0  3mhz  10 min 1 0%  3 mhz

## 2020-03-25 ENCOUNTER — APPOINTMENT (OUTPATIENT)
Dept: OCCUPATIONAL THERAPY | Facility: CLINIC | Age: 70
End: 2020-03-25
Payer: COMMERCIAL

## 2020-03-25 ENCOUNTER — APPOINTMENT (OUTPATIENT)
Dept: PHYSICAL THERAPY | Facility: CLINIC | Age: 70
End: 2020-03-25
Payer: COMMERCIAL

## 2020-03-27 ENCOUNTER — APPOINTMENT (OUTPATIENT)
Dept: PHYSICAL THERAPY | Facility: CLINIC | Age: 70
End: 2020-03-27
Payer: COMMERCIAL

## 2020-03-27 ENCOUNTER — OFFICE VISIT (OUTPATIENT)
Dept: PHYSICAL THERAPY | Facility: CLINIC | Age: 70
End: 2020-03-27
Payer: COMMERCIAL

## 2020-03-27 ENCOUNTER — TELEPHONE (OUTPATIENT)
Dept: INTERNAL MEDICINE CLINIC | Facility: CLINIC | Age: 70
End: 2020-03-27

## 2020-03-27 ENCOUNTER — APPOINTMENT (OUTPATIENT)
Dept: OCCUPATIONAL THERAPY | Facility: CLINIC | Age: 70
End: 2020-03-27
Payer: COMMERCIAL

## 2020-03-27 DIAGNOSIS — I89.0 LYMPHEDEMA: Primary | ICD-10-CM

## 2020-03-27 NOTE — PROGRESS NOTES
Pt arrived to PT clinic for PT/OT apts  Pt reported a cough at check in  Computer system flagged Pt chart as "being in contact with COVID 19"  Pt when asked again re: cough, stated her cough is part of her ongoing medical history, not new symptom  Temperature 97 7*  Pt stated she has been in self quarentine > 2 weeks and was not aware of being in contact with anyone with COVID 19  Pt sent home 2* to computer system  "Warning" msg    PT/MW made aware

## 2020-03-29 DIAGNOSIS — F51.01 PRIMARY INSOMNIA: ICD-10-CM

## 2020-03-30 ENCOUNTER — APPOINTMENT (OUTPATIENT)
Dept: OCCUPATIONAL THERAPY | Facility: CLINIC | Age: 70
End: 2020-03-30
Payer: COMMERCIAL

## 2020-03-30 ENCOUNTER — APPOINTMENT (OUTPATIENT)
Dept: PHYSICAL THERAPY | Facility: CLINIC | Age: 70
End: 2020-03-30
Payer: COMMERCIAL

## 2020-03-30 RX ORDER — TRAZODONE HYDROCHLORIDE 50 MG/1
50-100 TABLET ORAL
Qty: 60 TABLET | Refills: 0 | Status: SHIPPED | OUTPATIENT
Start: 2020-03-30 | End: 2020-04-27

## 2020-04-01 ENCOUNTER — APPOINTMENT (OUTPATIENT)
Dept: OCCUPATIONAL THERAPY | Facility: CLINIC | Age: 70
End: 2020-04-01
Payer: COMMERCIAL

## 2020-04-01 ENCOUNTER — APPOINTMENT (OUTPATIENT)
Dept: PHYSICAL THERAPY | Facility: CLINIC | Age: 70
End: 2020-04-01
Payer: COMMERCIAL

## 2020-04-02 ENCOUNTER — OFFICE VISIT (OUTPATIENT)
Dept: PHYSICAL THERAPY | Facility: CLINIC | Age: 70
End: 2020-04-02
Payer: COMMERCIAL

## 2020-04-02 ENCOUNTER — OFFICE VISIT (OUTPATIENT)
Dept: OCCUPATIONAL THERAPY | Facility: CLINIC | Age: 70
End: 2020-04-02
Payer: COMMERCIAL

## 2020-04-02 DIAGNOSIS — I89.0 LYMPHEDEMA: Primary | ICD-10-CM

## 2020-04-02 DIAGNOSIS — G56.03 BILATERAL CARPAL TUNNEL SYNDROME: Primary | ICD-10-CM

## 2020-04-02 PROCEDURE — 97110 THERAPEUTIC EXERCISES: CPT

## 2020-04-02 PROCEDURE — 97035 APP MDLTY 1+ULTRASOUND EA 15: CPT

## 2020-04-02 PROCEDURE — 97140 MANUAL THERAPY 1/> REGIONS: CPT

## 2020-04-18 DIAGNOSIS — R10.9 ABDOMINAL PAIN, UNSPECIFIED ABDOMINAL LOCATION: ICD-10-CM

## 2020-04-18 DIAGNOSIS — M79.671 FOOT PAIN, BILATERAL: ICD-10-CM

## 2020-04-18 DIAGNOSIS — K21.9 GASTROESOPHAGEAL REFLUX DISEASE WITHOUT ESOPHAGITIS: ICD-10-CM

## 2020-04-18 DIAGNOSIS — M79.672 FOOT PAIN, BILATERAL: ICD-10-CM

## 2020-04-18 RX ORDER — PANTOPRAZOLE SODIUM 40 MG/1
TABLET, DELAYED RELEASE ORAL
Qty: 30 TABLET | Refills: 4 | Status: SHIPPED | OUTPATIENT
Start: 2020-04-18 | End: 2020-05-19

## 2020-04-20 DIAGNOSIS — B35.6 TINEA CRURIS: ICD-10-CM

## 2020-04-20 RX ORDER — DICLOFENAC SODIUM 75 MG/1
75 TABLET, DELAYED RELEASE ORAL 2 TIMES DAILY
Qty: 60 TABLET | Refills: 0 | Status: SHIPPED | OUTPATIENT
Start: 2020-04-20 | End: 2020-05-18

## 2020-04-20 RX ORDER — NYSTATIN 100000 U/G
CREAM TOPICAL 2 TIMES DAILY
Qty: 30 G | Refills: 3 | Status: SHIPPED | OUTPATIENT
Start: 2020-04-20 | End: 2022-01-10

## 2020-04-20 RX ORDER — DICYCLOMINE HYDROCHLORIDE 10 MG/1
CAPSULE ORAL
Qty: 90 CAPSULE | Refills: 0 | Status: SHIPPED | OUTPATIENT
Start: 2020-04-20 | End: 2020-07-15 | Stop reason: SDUPTHER

## 2020-04-27 DIAGNOSIS — F51.01 PRIMARY INSOMNIA: ICD-10-CM

## 2020-04-27 RX ORDER — TRAZODONE HYDROCHLORIDE 50 MG/1
50-100 TABLET ORAL
Qty: 60 TABLET | Refills: 1 | Status: SHIPPED | OUTPATIENT
Start: 2020-04-27 | End: 2020-06-10 | Stop reason: SDUPTHER

## 2020-05-04 ENCOUNTER — TELEPHONE (OUTPATIENT)
Dept: INTERNAL MEDICINE CLINIC | Facility: CLINIC | Age: 70
End: 2020-05-04

## 2020-05-04 DIAGNOSIS — R51.9 NONINTRACTABLE HEADACHE, UNSPECIFIED CHRONICITY PATTERN, UNSPECIFIED HEADACHE TYPE: ICD-10-CM

## 2020-05-04 RX ORDER — BUTALBITAL, ACETAMINOPHEN AND CAFFEINE 50; 325; 40 MG/1; MG/1; MG/1
1 TABLET ORAL 3 TIMES DAILY PRN
Qty: 90 TABLET | Refills: 1 | Status: SHIPPED | OUTPATIENT
Start: 2020-05-04 | End: 2020-05-04 | Stop reason: SDUPTHER

## 2020-05-05 RX ORDER — BUTALBITAL, ACETAMINOPHEN AND CAFFEINE 50; 325; 40 MG/1; MG/1; MG/1
1 TABLET ORAL 3 TIMES DAILY PRN
Qty: 90 TABLET | Refills: 1 | Status: SHIPPED | OUTPATIENT
Start: 2020-05-05

## 2020-05-18 ENCOUNTER — TELEPHONE (OUTPATIENT)
Dept: INTERNAL MEDICINE CLINIC | Facility: CLINIC | Age: 70
End: 2020-05-18

## 2020-05-18 DIAGNOSIS — M79.671 FOOT PAIN, BILATERAL: ICD-10-CM

## 2020-05-18 DIAGNOSIS — M79.672 FOOT PAIN, BILATERAL: ICD-10-CM

## 2020-05-18 RX ORDER — DICLOFENAC SODIUM 75 MG/1
75 TABLET, DELAYED RELEASE ORAL 2 TIMES DAILY
Qty: 60 TABLET | Refills: 0 | Status: SHIPPED | OUTPATIENT
Start: 2020-05-18 | End: 2020-06-17

## 2020-05-19 ENCOUNTER — OFFICE VISIT (OUTPATIENT)
Dept: INTERNAL MEDICINE CLINIC | Facility: CLINIC | Age: 70
End: 2020-05-19
Payer: COMMERCIAL

## 2020-05-19 VITALS
HEIGHT: 57 IN | OXYGEN SATURATION: 98 % | TEMPERATURE: 99.5 F | SYSTOLIC BLOOD PRESSURE: 150 MMHG | HEART RATE: 82 BPM | BODY MASS INDEX: 40.99 KG/M2 | DIASTOLIC BLOOD PRESSURE: 78 MMHG | RESPIRATION RATE: 18 BRPM

## 2020-05-19 DIAGNOSIS — M25.462 SWELLING OF JOINT OF LEFT KNEE: ICD-10-CM

## 2020-05-19 DIAGNOSIS — M25.562 ACUTE PAIN OF LEFT KNEE: Primary | ICD-10-CM

## 2020-05-19 DIAGNOSIS — G89.29 CHRONIC MUSCULOSKELETAL PAIN: ICD-10-CM

## 2020-05-19 DIAGNOSIS — M79.18 CHRONIC MUSCULOSKELETAL PAIN: ICD-10-CM

## 2020-05-19 PROCEDURE — 1160F RVW MEDS BY RX/DR IN RCRD: CPT | Performed by: PHYSICIAN ASSISTANT

## 2020-05-19 PROCEDURE — 99214 OFFICE O/P EST MOD 30 MIN: CPT | Performed by: PHYSICIAN ASSISTANT

## 2020-05-19 PROCEDURE — 1036F TOBACCO NON-USER: CPT | Performed by: PHYSICIAN ASSISTANT

## 2020-05-19 PROCEDURE — 3078F DIAST BP <80 MM HG: CPT | Performed by: PHYSICIAN ASSISTANT

## 2020-05-19 PROCEDURE — 4040F PNEUMOC VAC/ADMIN/RCVD: CPT | Performed by: PHYSICIAN ASSISTANT

## 2020-05-19 PROCEDURE — 3077F SYST BP >= 140 MM HG: CPT | Performed by: PHYSICIAN ASSISTANT

## 2020-05-19 RX ORDER — OXYCODONE HYDROCHLORIDE AND ACETAMINOPHEN 5; 325 MG/1; MG/1
1 TABLET ORAL EVERY 8 HOURS PRN
Qty: 30 TABLET | Refills: 0 | Status: SHIPPED | OUTPATIENT
Start: 2020-05-19 | End: 2021-02-25

## 2020-05-21 ENCOUNTER — TELEPHONE (OUTPATIENT)
Dept: INTERNAL MEDICINE CLINIC | Facility: CLINIC | Age: 70
End: 2020-05-21

## 2020-05-28 ENCOUNTER — HOSPITAL ENCOUNTER (OUTPATIENT)
Dept: MRI IMAGING | Facility: HOSPITAL | Age: 70
Discharge: HOME | End: 2020-05-28
Payer: COMMERCIAL

## 2020-05-28 DIAGNOSIS — S83.242A ACUTE MEDIAL MENISCUS TEAR OF LEFT KNEE, INITIAL ENCOUNTER: Primary | ICD-10-CM

## 2020-05-28 DIAGNOSIS — M25.562 ACUTE PAIN OF LEFT KNEE: ICD-10-CM

## 2020-05-28 DIAGNOSIS — M25.462 SWELLING OF JOINT OF LEFT KNEE: ICD-10-CM

## 2020-05-28 PROCEDURE — 73721 MRI JNT OF LWR EXTRE W/O DYE: CPT

## 2020-05-29 DIAGNOSIS — R93.89 ABNORMAL MRI: Primary | ICD-10-CM

## 2020-06-02 ENCOUNTER — OFFICE VISIT (OUTPATIENT)
Dept: OBGYN CLINIC | Facility: CLINIC | Age: 70
End: 2020-06-02
Payer: COMMERCIAL

## 2020-06-02 VITALS — DIASTOLIC BLOOD PRESSURE: 80 MMHG | SYSTOLIC BLOOD PRESSURE: 128 MMHG

## 2020-06-02 DIAGNOSIS — M17.12 PRIMARY OSTEOARTHRITIS OF LEFT KNEE: Primary | ICD-10-CM

## 2020-06-02 DIAGNOSIS — S83.242A ACUTE MEDIAL MENISCUS TEAR OF LEFT KNEE, INITIAL ENCOUNTER: ICD-10-CM

## 2020-06-02 PROCEDURE — 1160F RVW MEDS BY RX/DR IN RCRD: CPT | Performed by: PHYSICIAN ASSISTANT

## 2020-06-02 PROCEDURE — 20610 DRAIN/INJ JOINT/BURSA W/O US: CPT | Performed by: PHYSICIAN ASSISTANT

## 2020-06-02 PROCEDURE — 3079F DIAST BP 80-89 MM HG: CPT | Performed by: PHYSICIAN ASSISTANT

## 2020-06-02 PROCEDURE — 99204 OFFICE O/P NEW MOD 45 MIN: CPT | Performed by: PHYSICIAN ASSISTANT

## 2020-06-02 PROCEDURE — 3074F SYST BP LT 130 MM HG: CPT | Performed by: PHYSICIAN ASSISTANT

## 2020-06-02 RX ORDER — BETAMETHASONE SODIUM PHOSPHATE AND BETAMETHASONE ACETATE 3; 3 MG/ML; MG/ML
6 INJECTION, SUSPENSION INTRA-ARTICULAR; INTRALESIONAL; INTRAMUSCULAR; SOFT TISSUE
Status: COMPLETED | OUTPATIENT
Start: 2020-06-02 | End: 2020-06-02

## 2020-06-02 RX ORDER — LIDOCAINE HYDROCHLORIDE 10 MG/ML
5 INJECTION, SOLUTION INFILTRATION; PERINEURAL
Status: COMPLETED | OUTPATIENT
Start: 2020-06-02 | End: 2020-06-02

## 2020-06-02 RX ADMIN — LIDOCAINE HYDROCHLORIDE 5 ML: 10 INJECTION, SOLUTION INFILTRATION; PERINEURAL at 16:02

## 2020-06-02 RX ADMIN — BETAMETHASONE SODIUM PHOSPHATE AND BETAMETHASONE ACETATE 6 MG: 3; 3 INJECTION, SUSPENSION INTRA-ARTICULAR; INTRALESIONAL; INTRAMUSCULAR; SOFT TISSUE at 16:02

## 2020-06-10 DIAGNOSIS — F51.01 PRIMARY INSOMNIA: ICD-10-CM

## 2020-06-10 DIAGNOSIS — G62.9 NEUROPATHY: ICD-10-CM

## 2020-06-10 RX ORDER — BUPROPION HYDROCHLORIDE 150 MG/1
150 TABLET ORAL EVERY MORNING
Qty: 30 TABLET | Refills: 5 | Status: SHIPPED | OUTPATIENT
Start: 2020-06-10 | End: 2020-12-16

## 2020-06-10 RX ORDER — GABAPENTIN 300 MG/1
300 CAPSULE ORAL 3 TIMES DAILY
Qty: 90 CAPSULE | Refills: 2 | Status: SHIPPED | OUTPATIENT
Start: 2020-06-10 | End: 2020-09-17

## 2020-06-10 RX ORDER — TRAZODONE HYDROCHLORIDE 50 MG/1
50-100 TABLET ORAL
Qty: 60 TABLET | Refills: 1 | Status: SHIPPED | OUTPATIENT
Start: 2020-06-10 | End: 2020-07-20

## 2020-06-16 ENCOUNTER — CLINICAL SUPPORT (OUTPATIENT)
Dept: NUTRITION | Facility: HOSPITAL | Age: 70
End: 2020-06-16
Payer: COMMERCIAL

## 2020-06-16 VITALS — BODY MASS INDEX: 41.76 KG/M2 | WEIGHT: 193.56 LBS | HEIGHT: 57 IN

## 2020-06-16 DIAGNOSIS — M17.12 PRIMARY OSTEOARTHRITIS OF LEFT KNEE: ICD-10-CM

## 2020-06-16 PROCEDURE — 97802 MEDICAL NUTRITION INDIV IN: CPT | Performed by: DIETITIAN, REGISTERED

## 2020-06-17 DIAGNOSIS — E78.5 HYPERLIPIDEMIA, UNSPECIFIED HYPERLIPIDEMIA TYPE: ICD-10-CM

## 2020-06-17 DIAGNOSIS — M79.672 FOOT PAIN, BILATERAL: ICD-10-CM

## 2020-06-17 DIAGNOSIS — M79.671 FOOT PAIN, BILATERAL: ICD-10-CM

## 2020-06-17 DIAGNOSIS — J30.9 ALLERGIC RHINITIS, UNSPECIFIED SEASONALITY, UNSPECIFIED TRIGGER: ICD-10-CM

## 2020-06-17 RX ORDER — DICLOFENAC SODIUM 75 MG/1
75 TABLET, DELAYED RELEASE ORAL 2 TIMES DAILY
Qty: 60 TABLET | Refills: 0 | Status: SHIPPED | OUTPATIENT
Start: 2020-06-17 | End: 2020-07-15 | Stop reason: SDUPTHER

## 2020-06-17 RX ORDER — ATORVASTATIN CALCIUM 20 MG/1
TABLET, FILM COATED ORAL
Qty: 30 TABLET | Refills: 2 | Status: SHIPPED | OUTPATIENT
Start: 2020-06-17 | End: 2020-09-17

## 2020-06-17 RX ORDER — MONTELUKAST SODIUM 10 MG/1
TABLET ORAL
Qty: 30 TABLET | Refills: 2 | Status: SHIPPED | OUTPATIENT
Start: 2020-06-17 | End: 2020-09-17

## 2020-07-15 DIAGNOSIS — M79.671 FOOT PAIN, BILATERAL: ICD-10-CM

## 2020-07-15 DIAGNOSIS — F33.41 RECURRENT MAJOR DEPRESSIVE DISORDER, IN PARTIAL REMISSION (HCC): ICD-10-CM

## 2020-07-15 DIAGNOSIS — M79.672 FOOT PAIN, BILATERAL: ICD-10-CM

## 2020-07-15 DIAGNOSIS — R10.9 ABDOMINAL PAIN, UNSPECIFIED ABDOMINAL LOCATION: ICD-10-CM

## 2020-07-15 RX ORDER — DICYCLOMINE HYDROCHLORIDE 10 MG/1
10 CAPSULE ORAL DAILY
Qty: 90 CAPSULE | Refills: 0 | Status: SHIPPED | OUTPATIENT
Start: 2020-07-15 | End: 2020-10-17

## 2020-07-15 RX ORDER — DULOXETIN HYDROCHLORIDE 60 MG/1
60 CAPSULE, DELAYED RELEASE ORAL 2 TIMES DAILY
Qty: 180 CAPSULE | Refills: 1 | Status: SHIPPED | OUTPATIENT
Start: 2020-07-15 | End: 2021-01-15

## 2020-07-15 RX ORDER — DICLOFENAC SODIUM 75 MG/1
75 TABLET, DELAYED RELEASE ORAL 2 TIMES DAILY
Qty: 180 TABLET | Refills: 0 | Status: SHIPPED | OUTPATIENT
Start: 2020-07-15 | End: 2020-10-17

## 2020-07-20 DIAGNOSIS — F51.01 PRIMARY INSOMNIA: ICD-10-CM

## 2020-07-20 RX ORDER — TRAZODONE HYDROCHLORIDE 50 MG/1
50-100 TABLET ORAL
Qty: 180 TABLET | Refills: 0 | Status: SHIPPED | OUTPATIENT
Start: 2020-07-20 | End: 2020-12-09 | Stop reason: SDUPTHER

## 2020-07-22 NOTE — TELEPHONE ENCOUNTER
Needs tcm note started, adm to [facility] on 11-1-19, dc 11-3-19, dx cellulitis, has appt with Graham Plummer on 11-8-19 yes

## 2020-09-10 ENCOUNTER — APPOINTMENT (OUTPATIENT)
Dept: RADIOLOGY | Facility: CLINIC | Age: 70
End: 2020-09-10
Payer: COMMERCIAL

## 2020-09-10 ENCOUNTER — OFFICE VISIT (OUTPATIENT)
Dept: INTERNAL MEDICINE CLINIC | Facility: CLINIC | Age: 70
End: 2020-09-10
Payer: COMMERCIAL

## 2020-09-10 VITALS
RESPIRATION RATE: 14 BRPM | DIASTOLIC BLOOD PRESSURE: 82 MMHG | HEART RATE: 82 BPM | BODY MASS INDEX: 41.29 KG/M2 | HEIGHT: 57 IN | SYSTOLIC BLOOD PRESSURE: 144 MMHG | TEMPERATURE: 99.2 F | WEIGHT: 191.4 LBS | OXYGEN SATURATION: 98 %

## 2020-09-10 DIAGNOSIS — Z23 ENCOUNTER FOR VACCINATION: ICD-10-CM

## 2020-09-10 DIAGNOSIS — F33.41 RECURRENT MAJOR DEPRESSIVE DISORDER, IN PARTIAL REMISSION (HCC): ICD-10-CM

## 2020-09-10 DIAGNOSIS — Z13.29 SCREENING FOR THYROID DISORDER: ICD-10-CM

## 2020-09-10 DIAGNOSIS — Z13.1 SCREENING FOR DIABETES MELLITUS: ICD-10-CM

## 2020-09-10 DIAGNOSIS — M41.9 SCOLIOSIS, UNSPECIFIED SCOLIOSIS TYPE, UNSPECIFIED SPINAL REGION: ICD-10-CM

## 2020-09-10 DIAGNOSIS — E78.5 HYPERLIPIDEMIA LDL GOAL <130: ICD-10-CM

## 2020-09-10 DIAGNOSIS — Z00.00 MEDICARE ANNUAL WELLNESS VISIT, SUBSEQUENT: ICD-10-CM

## 2020-09-10 DIAGNOSIS — I10 ESSENTIAL HYPERTENSION: ICD-10-CM

## 2020-09-10 DIAGNOSIS — N20.0 RIGHT KIDNEY STONE: Primary | ICD-10-CM

## 2020-09-10 DIAGNOSIS — E55.9 VITAMIN D DEFICIENCY: ICD-10-CM

## 2020-09-10 DIAGNOSIS — Z13.0 SCREENING FOR DEFICIENCY ANEMIA: ICD-10-CM

## 2020-09-10 PROBLEM — Z78.0 MENOPAUSE: Status: RESOLVED | Noted: 2017-06-07 | Resolved: 2020-09-10

## 2020-09-10 PROCEDURE — 72072 X-RAY EXAM THORAC SPINE 3VWS: CPT

## 2020-09-10 PROCEDURE — 3725F SCREEN DEPRESSION PERFORMED: CPT | Performed by: PHYSICIAN ASSISTANT

## 2020-09-10 PROCEDURE — 99214 OFFICE O/P EST MOD 30 MIN: CPT | Performed by: PHYSICIAN ASSISTANT

## 2020-09-10 PROCEDURE — G0008 ADMIN INFLUENZA VIRUS VAC: HCPCS | Performed by: PHYSICIAN ASSISTANT

## 2020-09-10 PROCEDURE — 1125F AMNT PAIN NOTED PAIN PRSNT: CPT | Performed by: PHYSICIAN ASSISTANT

## 2020-09-10 PROCEDURE — 90662 IIV NO PRSV INCREASED AG IM: CPT | Performed by: PHYSICIAN ASSISTANT

## 2020-09-10 PROCEDURE — 1170F FXNL STATUS ASSESSED: CPT | Performed by: PHYSICIAN ASSISTANT

## 2020-09-10 PROCEDURE — 72050 X-RAY EXAM NECK SPINE 4/5VWS: CPT

## 2020-09-10 PROCEDURE — G0439 PPPS, SUBSEQ VISIT: HCPCS | Performed by: PHYSICIAN ASSISTANT

## 2020-09-10 NOTE — PROGRESS NOTES
Assessment and Plan:     Problem List Items Addressed This Visit     None      Visit Diagnoses     Encounter for vaccination    -  Primary          Falls Plan of Care: balance, strength, and gait training instructions were provided  Preventive health issues were discussed with patient, and age appropriate screening tests were ordered as noted in patient's After Visit Summary  Personalized health advice and appropriate referrals for health education or preventive services given if needed, as noted in patient's After Visit Summary       History of Present Illness:     Patient presents for Medicare Annual Wellness visit    Patient Care Team:  Kyara Webster PA-C as PCP - General (Internal Medicine)  Raúl MilletDO as PCP - [address] [place] [address] (RTE)  Cristine Romero MD as Endoscopist     Problem List:     Patient Active Problem List   Diagnosis    Irritable bowel syndrome (IBS)    Mild intermittent asthma without complication    Bilateral carpal tunnel syndrome    Chronic musculoskeletal pain    Generalized anxiety disorder    Hyperlipidemia LDL goal <130    Essential hypertension    Acute pain of left knee    Lymphedema    Menopause    Patent foramen ovale    Tension type headache    Vitamin D deficiency    Recurrent major depressive disorder, in partial remission (HCC)    Other chronic pancreatitis ([place] [id number] )    Body mass index (BMI) of 40 0-44 9 in adult [facility])    Foot pain, bilateral    Tinea cruris    Primary insomnia    Neuropathy    Age-related osteoporosis without current pathological fracture    Swelling of joint of left knee      Past Medical and Surgical History:     Past Medical History:   Diagnosis Date    Anxiety     Arthritis     Asthma     Breast cancer ([place] [id number] )     rt mastectomy 2005    Cancer [facility])     breast right    Cataract     CHF (congestive heart failure) ([place] [id number] )     Coronary artery disease     Depression     GERD (gastroesophageal reflux disease)     High cholesterol     History of chemotherapy     2005 rt breast cancer    Hypertension     IBS (irritable bowel syndrome)     Irritable bowel syndrome (IBS) 5/31/2016    Kidney stone     Lymphedema of right arm     Obesity     Post-menopausal     Psychiatric disorder     Renal disorder     Vitamin D deficiency      Past Surgical History:   Procedure Laterality Date    BREAST SURGERY      CATARACT EXTRACTION, BILATERAL      CHOLECYSTECTOMY      COLONOSCOPY N/A 5/31/2016    Procedure: COLONOSCOPY;  Surgeon: Chanda Patel MD;  Location: [facility];  Service:    Tali Ri GALLBLADDER SURGERY      HYSTERECTOMY      Total    KNEE SURGERY      MASTECTOMY Right     rt breast mastectomy 2005    TONSILLECTOMY AND ADENOIDECTOMY        Family History:     Family History   Adopted: Yes   Problem Relation Age of Onset    Diabetes Mother     Heart disease Mother     Mental illness Mother     Depression Mother     Heart disease Brother     Breast cancer Maternal Aunt     Breast cancer Maternal Aunt     Breast cancer Maternal Aunt     Breast cancer Maternal Aunt     Breast cancer Maternal Aunt     No Known Problems Father       Social History:     E-Cigarette/Vaping    E-Cigarette Use Never User      E-Cigarette/Vaping Substances    Nicotine No     THC No     CBD No     Flavoring No     Other No     Unknown No      Social History     Socioeconomic History    Marital status:      Spouse name: None    Number of children: None    Years of education: None    Highest education level: None   Occupational History    Occupation: Retired   Social Needs    Financial resource strain: None    Food insecurity     Worry: None     Inability: None    Transportation needs     Medical: None     Non-medical: None   Tobacco Use    Smoking status: Never Smoker    Smokeless tobacco: Never Used   Substance and Sexual Activity    Alcohol use: Not Currently     Frequency: Never     Comment: socially    Drug use: Never    Sexual activity: None   Lifestyle    Physical activity     Days per week: None     Minutes per session: None    Stress: None   Relationships    Social connections     Talks on phone: None     Gets together: None     Attends [employer] service: None     Active member of club or organization: None     Attends meetings of clubs or organizations: None     Relationship status: None    Intimate partner violence     Fear of current or ex partner: None     Emotionally abused: None     Physically abused: None     Forced sexual activity: None   Other Topics Concern    None   Social History Narrative    Always uses seat belt    Occasional caffeine consumption         Retired    Lives with adult daughter      Medications and Allergies:     Current Outpatient Medications   Medication Sig Dispense Refill    albuterol (2 5 mg/3 mL) 0 083 % nebulizer solution Take 2 5 mg by nebulization every 6 (six) hours as needed for wheezing   albuterol (PROAIR HFA) 90 mcg/act inhaler Inhale 2 puffs every 4 (four) hours as needed for wheezing 3 Inhaler 1    atorvastatin (LIPITOR) 20 mg tablet Take 1 tablet by mouth daily   30 tablet 2    buPROPion (WELLBUTRIN XL) 150 mg 24 hr tablet Take 1 tablet (150 mg total) by mouth every morning 30 tablet 5    butalbital-acetaminophen-caffeine (FIORICET,ESGIC) -40 mg per tablet Take 1 tablet by mouth 3 (three) times a day as needed for headaches 90 tablet 1    diclofenac (VOLTAREN) 75 mg EC tablet Take 1 tablet (75 mg total) by mouth 2 (two) times a day 180 tablet 0    dicyclomine (BENTYL) 10 mg capsule Take 1 capsule (10 mg total) by mouth daily 90 capsule 0    DULoxetine (CYMBALTA) 60 mg delayed release capsule Take 1 capsule (60 mg total) by mouth 2 (two) times a day 180 capsule 1    ergocalciferol (VITAMIN D2) 50,000 units Take 1 capsule (50,000 Units total) by mouth once a week 4 capsule 3    gabapentin (NEURONTIN) 300 mg capsule Take 1 capsule (300 mg total) by mouth 3 (three) times a day 90 capsule 2    loratadine (CLARITIN) 10 mg tablet Take 1 tablet (10 mg total) by mouth daily 90 tablet 1    montelukast (SINGULAIR) 10 mg tablet Take 1 tablet by mouth daily  30 tablet 2    Nutritional Supplements (BOOST BREEZE PO) Take by mouth daily      nystatin (MYCOSTATIN) cream Apply topically 2 (two) times a day 30 g 3    nystatin (MYCOSTATIN) powder Apply topically 2 (two) times a day 60 g 5    oxyCODONE-acetaminophen (PERCOCET) 5-325 mg per tablet Take 1 tablet by mouth every 8 (eight) hours as needed for moderate painMax Daily Amount: 3 tablets 30 tablet 0    traZODone (DESYREL) 50 mg tablet Take 1-2 tablets ( mg total) by mouth daily at bedtime 180 tablet 0     No current facility-administered medications for this visit  Allergies   Allergen Reactions    Asa [Aspirin] Rash    Ibuprofen Nausea Only, Rash, Dizziness and Syncope    Latex Dermatitis      Immunizations:     Immunization History   Administered Date(s) Administered    INFLUENZA 12/02/2015    Influenza Quadrivalent Preservative Free 3 years and older IM 12/02/2015    Influenza, high dose seasonal 0 7 mL 10/05/2018, 10/24/2019    Pneumococcal Conjugate 13-Valent 06/07/2017    Tdap 01/20/2016      Health Maintenance:         Topic Date Due    MAMMOGRAM  12/26/2020    DXA SCAN  12/26/2021    Hepatitis C Screening  Completed         Topic Date Due    Pneumococcal Vaccine: 65+ Years (2 of 2 - PPSV23) 06/07/2018    Influenza Vaccine  07/01/2020      Medicare Health Risk Assessment:     /82 (BP Location: Left arm, Patient Position: Sitting, Cuff Size: Standard)   Pulse 82   Temp 99 2 °F (37 3 °C)   Resp 14   Ht 4' 9" (1 448 m)   Wt 86 8 kg (191 lb 6 4 oz)   LMP  (LMP Unknown)   SpO2 98%   BMI 41 42 kg/m²      Deyanira is here for her Subsequent Wellness visit  Last Medicare Wellness visit information reviewed, patient interviewed and updates made to the record today        Health Risk Assessment:   Patient rates overall health as good  Patient feels that their physical health rating is slightly worse  Eyesight was rated as same  Hearing was rated as same  Patient feels that their emotional and mental health rating is same  Pain experienced in the last 7 days has been some  Patient's pain rating has been 5/10  Patient states that she has experienced no weight loss or gain in last 6 months  Depression Screening:   PHQ-2 Score: 0  PHQ-9 Score: 0      Fall Risk Screening: In the past year, patient has experienced: history of falling in past year    Number of falls: 2 or more  Injured during fall?: No    Feels unsteady when standing or walking?: Yes    Worried about falling?: No      Urinary Incontinence Screening:   Patient has leaked urine accidently in the last six months  Home Safety:  Patient does not have trouble with stairs inside or outside of their home  Patient has working smoke alarms and has working carbon monoxide detector  Home safety hazards include: loose rugs on the floor  Nutrition:   Current diet is Low Saturated Fat  Medications:   Patient is currently taking over-the-counter supplements  OTC medications include: see medication list  Patient is able to manage medications  Activities of Daily Living (ADLs)/Instrumental Activities of Daily Living (IADLs):   Walk and transfer into and out of bed and chair?: Yes  Dress and groom yourself?: Yes    Bathe or shower yourself?: Yes    Feed yourself? Yes  Do your laundry/housekeeping?: Yes  Manage your money, pay your bills and track your expenses?: Yes  Make your own meals?: Yes    Do your own shopping?: No    Previous Hospitalizations:   Any hospitalizations or ED visits within the last 12 months?: Yes      Advance Care Planning:   Living will: No    Durable POA for healthcare:  Yes    Advanced directive: No    Five wishes given: Yes      Cognitive Screening:   Provider or family/friend/caregiver concerned regarding cognition?: No    PREVENTIVE SCREENINGS      Cardiovascular Screening:    General: Screening Not Indicated and History Lipid Disorder      Diabetes Screening:     General: Screening Current      Colorectal Cancer Screening:     General: Screening Current      Breast Cancer Screening:     General: History Breast Cancer      Cervical Cancer Screening:    General: Screening Not Indicated      Osteoporosis Screening:    General: Screening Not Indicated and History Osteoporosis      Abdominal Aortic Aneurysm (AAA) Screening:        General: Screening Not Indicated      Lung Cancer Screening:     General: Screening Not Indicated      Hepatitis C Screening:    General: Screening Current      Uzair Hartman PA-C

## 2020-09-10 NOTE — PATIENT INSTRUCTIONS
Medicare Preventive Visit Patient Instructions  Thank you for completing your Welcome to Medicare Visit or Medicare Annual Wellness Visit today  Your next wellness visit will be due in one year (9/10/2021)  The screening/preventive services that you may require over the next 5-10 years are detailed below  Some tests may not apply to you based off risk factors and/or age  Screening tests ordered at today's visit but not completed yet may show as past due  Also, please note that scanned in results may not display below  Preventive Screenings:  Service Recommendations Previous Testing/Comments   Colorectal Cancer Screening  * Colonoscopy    * Fecal Occult Blood Test (FOBT)/Fecal Immunochemical Test (FIT)  * Fecal DNA/Cologuard Test  * Flexible Sigmoidoscopy Age: 54-65 years old   Colonoscopy: every 10 years (may be performed more frequently if at higher risk)  OR  FOBT/FIT: every 1 year  OR  Cologuard: every 3 years  OR  Sigmoidoscopy: every 5 years  Screening may be recommended earlier than age 48 if at higher risk for colorectal cancer  Also, an individualized decision between you and your healthcare provider will decide whether screening between the ages of 74-80 would be appropriate  Colonoscopy: 05/31/2016  FOBT/FIT: Not on file  Cologuard: Not on file  Sigmoidoscopy: Not on file    Screening Current     Breast Cancer Screening Age: 36 years old  Frequency: every 1-2 years  Not required if history of left and right mastectomy Mammogram: 12/26/2019    History Breast Cancer   Cervical Cancer Screening Between the ages of 21-29, pap smear recommended once every 3 years  Between the ages of 33-67, can perform pap smear with HPV co-testing every 5 years     Recommendations may differ for women with a history of total hysterectomy, cervical cancer, or abnormal pap smears in past  Pap Smear: Not on file    Screening Not Indicated   Hepatitis C Screening Once for adults born between 1945 and 1965  More frequently in patients at high risk for Hepatitis C Hep C Antibody: 02/22/2019    Screening Current   Diabetes Screening 1-2 times per year if you're at risk for diabetes or have pre-diabetes Fasting glucose: 112 mg/dL   A1C: No results in last 5 years    Screening Current   Cholesterol Screening Once every 5 years if you don't have a lipid disorder  May order more often based on risk factors  Lipid panel: 11/26/2019    Screening Not Indicated  History Lipid Disorder     Other Preventive Screenings Covered by Medicare:  1  Abdominal Aortic Aneurysm (AAA) Screening: covered once if your at risk  You're considered to be at risk if you have a family history of AAA  2  Lung Cancer Screening: covers low dose CT scan once per year if you meet all of the following conditions: (1) Age 50-69; (2) No signs or symptoms of lung cancer; (3) Current smoker or have quit smoking within the last 15 years; (4) You have a tobacco smoking history of at least 30 pack years (packs per day multiplied by number of years you smoked); (5) You get a written order from a healthcare provider  3  Glaucoma Screening: covered annually if you're considered high risk: (1) You have diabetes OR (2) Family history of glaucoma OR (3)  aged 48 and older OR (3)  American aged 72 and older  3  Osteoporosis Screening: covered every 2 years if you meet one of the following conditions: (1) You're estrogen deficient and at risk for osteoporosis based off medical history and other findings; (2) Have a vertebral abnormality; (3) On glucocorticoid therapy for more than 3 months; (4) Have primary hyperparathyroidism; (5) On osteoporosis medications and need to assess response to drug therapy  · Last bone density test (DXA Scan): 12/26/2019   5  HIV Screening: covered annually if you're between the age of 15-65  Also covered annually if you are younger than 13 and older than 72 with risk factors for HIV infection   For pregnant patients, it is covered up to 3 times per pregnancy  Immunizations:  Immunization Recommendations   Influenza Vaccine Annual influenza vaccination during flu season is recommended for all persons aged >= 6 months who do not have contraindications   Pneumococcal Vaccine (Prevnar and Pneumovax)  * Prevnar = PCV13  * Pneumovax = PPSV23   Adults 25-60 years old: 1-3 doses may be recommended based on certain risk factors  Adults 72 years old: Prevnar (PCV13) vaccine recommended followed by Pneumovax (PPSV23) vaccine  If already received PPSV23 since turning 65, then PCV13 recommended at least one year after PPSV23 dose  Hepatitis B Vaccine 3 dose series if at intermediate or high risk (ex: diabetes, end stage renal disease, liver disease)   Tetanus (Td) Vaccine - COST NOT COVERED BY MEDICARE PART B Following completion of primary series, a booster dose should be given every 10 years to maintain immunity against tetanus  Td may also be given as tetanus wound prophylaxis  Tdap Vaccine - COST NOT COVERED BY MEDICARE PART B Recommended at least once for all adults  For pregnant patients, recommended with each pregnancy  Shingles Vaccine (Shingrix) - COST NOT COVERED BY MEDICARE PART B  2 shot series recommended in those aged 48 and above     Health Maintenance Due:      Topic Date Due    MAMMOGRAM  12/26/2020    DXA SCAN  12/26/2021    Hepatitis C Screening  Completed     Immunizations Due:      Topic Date Due    Pneumococcal Vaccine: 65+ Years (2 of 2 - PPSV23) 06/07/2018    Influenza Vaccine  07/01/2020     Advance Directives   What are advance directives? Advance directives are legal documents that state your wishes and plans for medical care  These plans are made ahead of time in case you lose your ability to make decisions for yourself  Advance directives can apply to any medical decision, such as the treatments you want, and if you want to donate organs  What are the types of advance directives?   There are many types of advance directives, and each state has rules about how to use them  You may choose a combination of any of the following:  · Living will: This is a written record of the treatment you want  You can also choose which treatments you do not want, which to limit, and which to stop at a certain time  This includes surgery, medicine, IV fluid, and tube feedings  · Durable power of  for healthcare [facility]): This is a written record that states who you want to make healthcare choices for you when you are unable to make them for yourself  This person, called a proxy, is usually a family member or a friend  You may choose more than 1 proxy  · Do not resuscitate (DNR) order:  A DNR order is used in case your heart stops beating or you stop breathing  It is a request not to have certain forms of treatment, such as CPR  A DNR order may be included in other types of advance directives  · Medical directive: This covers the care that you want if you are in a coma, near death, or unable to make decisions for yourself  You can list the treatments you want for each condition  Treatment may include pain medicine, surgery, blood transfusions, dialysis, IV or tube feedings, and a ventilator (breathing machine)  · Values history: This document has questions about your views, beliefs, and how you feel and think about life  This information can help others choose the care that you would choose  Why are advance directives important? An advance directive helps you control your care  Although spoken wishes may be used, it is better to have your wishes written down  Spoken wishes can be misunderstood, or not followed  Treatments may be given even if you do not want them  An advance directive may make it easier for your family to make difficult choices about your care  Fall Prevention    Fall prevention  includes ways to make your home and other areas safer  It also includes ways you can move more carefully to prevent a fall  Health conditions that cause changes in your blood pressure, vision, or muscle strength and coordination may increase your risk for falls  Medicines may also increase your risk for falls if they make you dizzy, weak, or sleepy  Fall prevention tips:   · Stand or sit up slowly  · Use assistive devices as directed  · Wear shoes that fit well and have soles that   · Wear a personal alarm  · Stay active  · Manage your medical conditions  Home Safety Tips:  · Add items to prevent falls in the bathroom  · Keep paths clear  · Install bright lights in your home  · Keep items you use often on shelves within reach  · Paint or place reflective tape on the edges of your stairs  Urinary Incontinence   Urinary incontinence (UI)  is when you lose control of your bladder  UI develops because your bladder cannot store or empty urine properly  The 3 most common types of UI are stress incontinence, urge incontinence, or both  Medicines:   · May be given to help strengthen your bladder control  Report any side effects of medication to your healthcare provider  Do pelvic muscle exercises often:  Your pelvic muscles help you stop urinating  Squeeze these muscles tight for 5 seconds, then relax for 5 seconds  Gradually work up to squeezing for 10 seconds  Do 3 sets of 15 repetitions a day, or as directed  This will help strengthen your pelvic muscles and improve bladder control  Train your bladder:  Go to the bathroom at set times, such as every 2 hours, even if you do not feel the urge to go  You can also try to hold your urine when you feel the urge to go  For example, hold your urine for 5 minutes when you feel the urge to go  As that becomes easier, hold your urine for 10 minutes  Self-care:   · Keep a UI record  Write down how often you leak urine and how much you leak  Make a note of what you were doing when you leaked urine  · Drink liquids as directed   You may need to limit the amount of liquid you drink to help control your urine leakage  Do not drink any liquid right before you go to bed  Limit or do not have drinks that contain caffeine or alcohol  · Prevent constipation  Eat a variety of high-fiber foods  Good examples are high-fiber cereals, beans, vegetables, and whole-grain breads  Walking is the best way to trigger your intestines to have a bowel movement  · Exercise regularly and maintain a healthy weight  Weight loss and exercise will decrease pressure on your bladder and help you control your leakage  · Use a catheter as directed  to help empty your bladder  A catheter is a tiny, plastic tube that is put into your bladder to drain your urine  · Go to behavior therapy as directed  Behavior therapy may be used to help you learn to control your urge to urinate  Weight Management   Why it is important to manage your weight:  Being overweight increases your risk of health conditions such as heart disease, high blood pressure, type 2 diabetes, and certain types of cancer  It can also increase your risk for osteoarthritis, sleep apnea, and other respiratory problems  Aim for a slow, steady weight loss  Even a small amount of weight loss can lower your risk of health problems  How to lose weight safely:  A safe and healthy way to lose weight is to eat fewer calories and get regular exercise  You can lose up about 1 pound a week by decreasing the number of calories you eat by 500 calories each day  Healthy meal plan for weight management:  A healthy meal plan includes a variety of foods, contains fewer calories, and helps you stay healthy  A healthy meal plan includes the following:  · Eat whole-grain foods more often  A healthy meal plan should contain fiber  Fiber is the part of grains, fruits, and vegetables that is not broken down by your body  Whole-grain foods are healthy and provide extra fiber in your diet   Some examples of whole-grain foods are whole-wheat breads and pastas, oatmeal, brown rice, and bulgur  · Eat a variety of vegetables every day  Include dark, leafy greens such as spinach, kale, florin greens, and mustard greens  Eat yellow and orange vegetables such as carrots, sweet potatoes, and winter squash  · Eat a variety of fruits every day  Choose fresh or canned fruit (canned in its own juice or light syrup) instead of juice  Fruit juice has very little or no fiber  · Eat low-fat dairy foods  Drink fat-free (skim) milk or 1% milk  Eat fat-free yogurt and low-fat cottage cheese  Try low-fat cheeses such as mozzarella and other reduced-fat cheeses  · Choose meat and other protein foods that are low in fat  Choose beans or other legumes such as split peas or lentils  Choose fish, skinless poultry (chicken or turkey), or lean cuts of red meat (beef or pork)  Before you cook meat or poultry, cut off any visible fat  · Use less fat and oil  Try baking foods instead of frying them  Add less fat, such as margarine, sour cream, regular salad dressing and mayonnaise to foods  Eat fewer high-fat foods  Some examples of high-fat foods include french fries, doughnuts, ice cream, and cakes  · Eat fewer sweets  Limit foods and drinks that are high in sugar  This includes candy, cookies, regular soda, and sweetened drinks  Exercise:  Exercise at least 30 minutes per day on most days of the week  Some examples of exercise include walking, biking, dancing, and swimming  You can also fit in more physical activity by taking the stairs instead of the elevator or parking farther away from stores  Ask your healthcare provider about the best exercise plan for you  © Copyright "[employer]" 2018 Information is for End User's use only and may not be sold, redistributed or otherwise used for commercial purposes   All illustrations and images included in CareNotes® are the copyrighted property of [employer]  or [address] [place] [address]

## 2020-09-10 NOTE — PROGRESS NOTES
Assessment/Plan:  Problem List Items Addressed This Visit        Cardiovascular and Mediastinum    Essential hypertension    Relevant Orders    Comprehensive metabolic panel       Musculoskeletal and Integument    Scoliosis     Will get xrays cervical and thoracic spine to better evalaute         Relevant Orders    XR spine thoracic 3 vw    XR spine cervical complete 4 or 5 vw non injury       Genitourinary    Right kidney stone - Primary     Symptoms improving  Pt urinating regularly  She is unsure if she passed the stone  Pain is improved but not resolved  Urology referral provided  Relevant Orders    Ambulatory referral to Urology       Other    Hyperlipidemia LDL goal <130    Relevant Orders    Lipid panel    Vitamin D deficiency    Relevant Orders    Vitamin D 25 hydroxy    Recurrent major depressive disorder, in partial remission ([facility] )      Other Visit Diagnoses     Encounter for vaccination        Relevant Orders    influenza vaccine, high-dose, PF 0 7 mL (FLUZONE HIGH-DOSE) (Completed)    Screening for deficiency anemia        Relevant Orders    CBC and differential    Screening for thyroid disorder        Relevant Orders    TSH, 3rd generation with Free T4 reflex    Screening for diabetes mellitus        Relevant Orders    Hemoglobin A1C           Diagnoses and all orders for this visit:    Right kidney stone  -     Ambulatory referral to Urology; Future    Encounter for vaccination  -     influenza vaccine, high-dose, PF 0 7 mL (FLUZONE HIGH-DOSE)    Recurrent major depressive disorder, in partial remission ([facility] )    Essential hypertension  -     Comprehensive metabolic panel; Future    Hyperlipidemia LDL goal <130  -     Lipid panel; Future    Vitamin D deficiency  -     Vitamin D 25 hydroxy; Future    Screening for deficiency anemia  -     CBC and differential; Future    Screening for thyroid disorder  -     TSH, 3rd generation with Free T4 reflex;  Future    Screening for diabetes mellitus  - Hemoglobin A1C; Future    Scoliosis, unspecified scoliosis type, unspecified spinal region  -     XR spine thoracic 3 vw; Future  -     XR spine cervical complete 4 or 5 vw non injury; Future        Right kidney stone  Symptoms improving  Pt urinating regularly  She is unsure if she passed the stone  Pain is improved but not resolved  Urology referral provided  Scoliosis  Will get xrays cervical and thoracic spine to better evalaute      Subjective:      Patient ID: Roro Gautam is a 78 y o  female  Pt presents for ER follow up  She was evaluated at [address] [place] [address] for right flank pain and urinary retention  She underwent CT that showed 4mm obstructing stone with mild hydrouretonephrosis  She has a hx of kidney stones  She was discharged home and pain is much improved but not resolved  She is unsure if she passed this stone or not  She desires urology referral  She is due for labs in November  She also was told that she should get xrays of her neck and back to rule out scoliosis due to buffalo hump type deformity  The following portions of the patient's history were reviewed and updated as appropriate:   She has a past medical history of Anxiety, Arthritis, Asthma, Breast cancer ([facility] ), Cancer ([facility] ), Cataract, CHF (congestive heart failure) ([facility] [facility] ), Coronary artery disease, Depression, GERD (gastroesophageal reflux disease), High cholesterol, History of chemotherapy, Hypertension, IBS (irritable bowel syndrome), Irritable bowel syndrome (IBS) (5/31/2016), Kidney stone, Lymphedema of right arm, Obesity, Post-menopausal, Psychiatric disorder, Renal disorder, and Vitamin D deficiency  ,  does not have any pertinent problems on file  ,   has a past surgical history that includes Tonsillectomy and adenoidectomy; Breast surgery; Cholecystectomy; Knee surgery; Colonoscopy (N/A, 5/31/2016); Gallbladder surgery; Hysterectomy; Cataract extraction, bilateral; and Mastectomy (Right)  ,  family history includes Breast cancer in her maternal aunt, maternal aunt, maternal aunt, maternal aunt, and maternal aunt; Depression in her mother; Diabetes in her mother; Heart disease in her brother and mother; Mental illness in her mother; No Known Problems in her father  She was adopted  ,   reports that she has never smoked  She has never used smokeless tobacco  She reports previous alcohol use  She reports that she does not use drugs  ,  is allergic to asa [aspirin]; ibuprofen; and latex     Current Outpatient Medications   Medication Sig Dispense Refill    albuterol (2 5 mg/3 mL) 0 083 % nebulizer solution Take 2 5 mg by nebulization every 6 (six) hours as needed for wheezing   albuterol (PROAIR HFA) 90 mcg/act inhaler Inhale 2 puffs every 4 (four) hours as needed for wheezing 3 Inhaler 1    atorvastatin (LIPITOR) 20 mg tablet Take 1 tablet by mouth daily  30 tablet 2    buPROPion (WELLBUTRIN XL) 150 mg 24 hr tablet Take 1 tablet (150 mg total) by mouth every morning 30 tablet 5    butalbital-acetaminophen-caffeine (FIORICET,ESGIC) -40 mg per tablet Take 1 tablet by mouth 3 (three) times a day as needed for headaches 90 tablet 1    diclofenac (VOLTAREN) 75 mg EC tablet Take 1 tablet (75 mg total) by mouth 2 (two) times a day 180 tablet 0    dicyclomine (BENTYL) 10 mg capsule Take 1 capsule (10 mg total) by mouth daily 90 capsule 0    DULoxetine (CYMBALTA) 60 mg delayed release capsule Take 1 capsule (60 mg total) by mouth 2 (two) times a day 180 capsule 1    ergocalciferol (VITAMIN D2) 50,000 units Take 1 capsule (50,000 Units total) by mouth once a week 4 capsule 3    gabapentin (NEURONTIN) 300 mg capsule Take 1 capsule (300 mg total) by mouth 3 (three) times a day 90 capsule 2    loratadine (CLARITIN) 10 mg tablet Take 1 tablet (10 mg total) by mouth daily 90 tablet 1    montelukast (SINGULAIR) 10 mg tablet Take 1 tablet by mouth daily   30 tablet 2    Nutritional Supplements (BOOST BREEZE PO) Take by mouth daily  nystatin (MYCOSTATIN) cream Apply topically 2 (two) times a day 30 g 3    nystatin (MYCOSTATIN) powder Apply topically 2 (two) times a day 60 g 5    oxyCODONE-acetaminophen (PERCOCET) 5-325 mg per tablet Take 1 tablet by mouth every 8 (eight) hours as needed for moderate painMax Daily Amount: 3 tablets 30 tablet 0    traZODone (DESYREL) 50 mg tablet Take 1-2 tablets ( mg total) by mouth daily at bedtime 180 tablet 0     No current facility-administered medications for this visit  Review of Systems   Constitutional: Negative for chills and fever  HENT: Negative for congestion, ear pain, hearing loss, postnasal drip, rhinorrhea, sinus pressure, sinus pain, sore throat and trouble swallowing  Eyes: Negative for pain and visual disturbance  Respiratory: Negative for cough, chest tightness, shortness of breath and wheezing  Cardiovascular: Negative  Negative for chest pain, palpitations and leg swelling  Gastrointestinal: Negative for abdominal pain, blood in stool, constipation, diarrhea, nausea and vomiting  Endocrine: Negative for cold intolerance, heat intolerance, polydipsia, polyphagia and polyuria  Genitourinary: Positive for flank pain  Negative for difficulty urinating, dysuria and urgency  Musculoskeletal: Negative for arthralgias, back pain, gait problem and myalgias  Skin: Negative for rash  Allergic/Immunologic: Negative  Neurological: Negative for dizziness, weakness, light-headedness and headaches  Hematological: Negative  Psychiatric/Behavioral: Negative for behavioral problems, dysphoric mood and sleep disturbance  The patient is not nervous/anxious  Falls Plan of Care: balance, strength, and gait training instructions were provided           PHQ-9 Depression Screening    PHQ-9:    Frequency of the following problems over the past two weeks:       Little interest or pleasure in doing things:  0 - not at all  Feeling down, depressed, or hopeless:  0 - not at all  Trouble falling or staying asleep, or sleeping too much:  0 - not at all  Feeling tired or having little energy:  0 - not at all  Poor appetite or overeatin - not at all  Feeling bad about yourself - or that you are a failure or have let yourself or your family down:  0 - not at all  Trouble concentrating on things, such as reading the newspaper or watching television:  0 - not at all  Moving or speaking so slowly that other people could have noticed  Or the opposite - being so fidgety or restless that you have been moving around a lot more than usual:  0 - not at all  Thoughts that you would be better off dead, or of hurting yourself in some way:  0 - not at all  PHQ-2 Score:  0  PHQ-9 Score:  0          Objective:  Vitals:    09/10/20 0836   BP: 144/82   BP Location: Left arm   Patient Position: Sitting   Cuff Size: Standard   Pulse: 82   Resp: 14   Temp: 99 2 °F (37 3 °C)   SpO2: 98%   Weight: 86 8 kg (191 lb 6 4 oz)   Height: 4' 9" (1 448 m)     Body mass index is 41 42 kg/m²  Physical Exam  Constitutional:       General: She is not in acute distress  Appearance: She is well-developed  She is not diaphoretic  HENT:      Head: Normocephalic and atraumatic  Right Ear: External ear normal       Left Ear: External ear normal       Nose: Nose normal       Mouth/Throat:      Pharynx: No oropharyngeal exudate  Eyes:      General: No scleral icterus  Right eye: No discharge  Left eye: No discharge  Conjunctiva/sclera: Conjunctivae normal       Pupils: Pupils are equal, round, and reactive to light  Neck:      Musculoskeletal: Normal range of motion and neck supple  Thyroid: No thyromegaly  Cardiovascular:      Rate and Rhythm: Normal rate and regular rhythm  Heart sounds: Normal heart sounds  No murmur  No friction rub  No gallop  Pulmonary:      Effort: Pulmonary effort is normal  No respiratory distress  Breath sounds: Normal breath sounds  No wheezing or rales  Abdominal:      General: Bowel sounds are normal  There is no distension  Palpations: Abdomen is soft  Tenderness: There is no abdominal tenderness  Musculoskeletal: Normal range of motion  General: No tenderness or deformity  Skin:     General: Skin is warm and dry  Neurological:      Mental Status: She is alert and oriented to person, place, and time  Cranial Nerves: No cranial nerve deficit  Psychiatric:         Behavior: Behavior normal          Thought Content:  Thought content normal          Judgment: Judgment normal

## 2020-09-11 ENCOUNTER — TELEPHONE (OUTPATIENT)
Dept: UROLOGY | Facility: CLINIC | Age: 70
End: 2020-09-11

## 2020-09-11 NOTE — TELEPHONE ENCOUNTER
Received referral message from patients PCP for kidney stones  Please review CT scan in care everywhere and advise on follow up for patient

## 2020-09-11 NOTE — TELEPHONE ENCOUNTER
Seen in [place] 07/26/2020 for 3 mm right UVJ stone  Re-presented for recurrent symptoms 09/04/2020 to [facility] scan at that time a 4mm right UVJ stone  I wonder if it's the same one that never passed  Not infected and kidney function OK   See if can get in the next 2-3 weeks

## 2020-09-17 ENCOUNTER — APPOINTMENT (OUTPATIENT)
Dept: LAB | Facility: MEDICAL CENTER | Age: 70
End: 2020-09-17
Payer: COMMERCIAL

## 2020-09-17 ENCOUNTER — OFFICE VISIT (OUTPATIENT)
Dept: UROLOGY | Facility: CLINIC | Age: 70
End: 2020-09-17
Payer: COMMERCIAL

## 2020-09-17 VITALS
BODY MASS INDEX: 41.22 KG/M2 | WEIGHT: 190.48 LBS | TEMPERATURE: 98.2 F | HEART RATE: 68 BPM | SYSTOLIC BLOOD PRESSURE: 120 MMHG | DIASTOLIC BLOOD PRESSURE: 80 MMHG

## 2020-09-17 DIAGNOSIS — K21.9 GASTROESOPHAGEAL REFLUX DISEASE WITHOUT ESOPHAGITIS: ICD-10-CM

## 2020-09-17 DIAGNOSIS — E78.5 HYPERLIPIDEMIA, UNSPECIFIED HYPERLIPIDEMIA TYPE: ICD-10-CM

## 2020-09-17 DIAGNOSIS — N20.1 URETERAL CALCULUS, RIGHT: Primary | ICD-10-CM

## 2020-09-17 DIAGNOSIS — N20.0 RIGHT KIDNEY STONE: ICD-10-CM

## 2020-09-17 DIAGNOSIS — J30.9 ALLERGIC RHINITIS, UNSPECIFIED SEASONALITY, UNSPECIFIED TRIGGER: ICD-10-CM

## 2020-09-17 DIAGNOSIS — N39.41 URGE URINARY INCONTINENCE: ICD-10-CM

## 2020-09-17 DIAGNOSIS — G62.9 NEUROPATHY: ICD-10-CM

## 2020-09-17 DIAGNOSIS — N20.1 URETERAL CALCULUS, RIGHT: ICD-10-CM

## 2020-09-17 LAB
ANION GAP SERPL CALCULATED.3IONS-SCNC: 5 MMOL/L (ref 4–13)
BUN SERPL-MCNC: 10 MG/DL (ref 5–25)
CALCIUM SERPL-MCNC: 9.3 MG/DL (ref 8.3–10.1)
CHLORIDE SERPL-SCNC: 108 MMOL/L (ref 100–108)
CO2 SERPL-SCNC: 29 MMOL/L (ref 21–32)
CREAT SERPL-MCNC: 0.71 MG/DL (ref 0.6–1.3)
GFR SERPL CREATININE-BSD FRML MDRD: 88 ML/MIN/1.73SQ M
GLUCOSE P FAST SERPL-MCNC: 106 MG/DL (ref 65–99)
POTASSIUM SERPL-SCNC: 4.3 MMOL/L (ref 3.5–5.3)
SODIUM SERPL-SCNC: 142 MMOL/L (ref 136–145)

## 2020-09-17 PROCEDURE — 80048 BASIC METABOLIC PNL TOTAL CA: CPT

## 2020-09-17 PROCEDURE — 36415 COLL VENOUS BLD VENIPUNCTURE: CPT

## 2020-09-17 PROCEDURE — 99204 OFFICE O/P NEW MOD 45 MIN: CPT | Performed by: PHYSICIAN ASSISTANT

## 2020-09-17 RX ORDER — MONTELUKAST SODIUM 10 MG/1
TABLET ORAL
Qty: 30 TABLET | Refills: 1 | Status: SHIPPED | OUTPATIENT
Start: 2020-09-17 | End: 2020-11-17

## 2020-09-17 RX ORDER — ATORVASTATIN CALCIUM 20 MG/1
TABLET, FILM COATED ORAL
Qty: 30 TABLET | Refills: 1 | Status: SHIPPED | OUTPATIENT
Start: 2020-09-17 | End: 2020-11-17

## 2020-09-17 RX ORDER — TAMSULOSIN HYDROCHLORIDE 0.4 MG/1
0.4 CAPSULE ORAL
Qty: 14 CAPSULE | Refills: 0 | Status: SHIPPED | OUTPATIENT
Start: 2020-09-17 | End: 2022-01-10

## 2020-09-17 RX ORDER — OXYBUTYNIN CHLORIDE 5 MG/1
5 TABLET, EXTENDED RELEASE ORAL DAILY
Qty: 30 TABLET | Refills: 1 | Status: SHIPPED | OUTPATIENT
Start: 2020-09-17 | End: 2020-12-14

## 2020-09-17 RX ORDER — GABAPENTIN 300 MG/1
300 CAPSULE ORAL 3 TIMES DAILY
Qty: 90 CAPSULE | Refills: 1 | Status: SHIPPED | OUTPATIENT
Start: 2020-09-17 | End: 2020-11-17

## 2020-09-17 RX ORDER — PANTOPRAZOLE SODIUM 40 MG/1
TABLET, DELAYED RELEASE ORAL
Qty: 30 TABLET | Refills: 3 | Status: SHIPPED | OUTPATIENT
Start: 2020-09-17 | End: 2021-01-15

## 2020-09-17 NOTE — PATIENT INSTRUCTIONS
BMP bloodwork for kidney function then CT scan in 1 week, meds sent to [facility] (tamsulosin- relax ureter tube to pass stone; oxybutynin- for urge incontinence/leakage)  will call with results for possible OR vs passage        Ureteral Stones   WHAT YOU NEED TO KNOW:   A ureteral stone is a stone that forms in the kidney and moves down the ureter and gets stuck there  The ureter is the tube that takes urine from the kidney to the bladder  Stones can form in the urinary system when your urine has high levels of minerals and salts  Urinary stones can be made of uric acid, calcium, phosphate, or oxalate crystals  DISCHARGE INSTRUCTIONS:   Return to the emergency department if:   · You have severe pain that does not improve, even after you take medicine  · You have vomiting that is not relieved by medicine  · You develop a fever  Contact your healthcare provider if:   · You develop a fever  · You have any questions or concerns about your condition or care  Follow up with your healthcare provider as directed: You may need to return for more tests  Write down your questions so you remember to ask them during your visits  Medicines: You may need any of the following:  · NSAIDs , such as ibuprofen, help decrease swelling, pain, and fever  This medicine is available with or without a doctor's order  NSAIDs can cause stomach bleeding or kidney problems in certain people  If you take blood thinner medicine, always ask your healthcare provider if NSAIDs are safe for you  Always read the medicine label and follow directions  · Prescription pain medicine  may help decrease pain or help your ureteral stone pass  Do not wait until the pain is severe before you take pain medicine  · Nausea medicine  may help calm your stomach and prevent vomiting  · Take your medicine as directed  Contact your healthcare provider if you think your medicine is not helping or if you have side effects   Tell him or her if you are allergic to any medicine  Keep a list of the medicines, vitamins, and herbs you take  Include the amounts, and when and why you take them  Bring the list or the pill bottles to follow-up visits  Carry your medicine list with you in case of an emergency  Self-care:   · Drink plenty of liquids  Your healthcare provider may tell you to drink at least 8 to 12 (eight-ounce) cups of liquids each day  This helps flush out the ureteral stones when you urinate  Water is the best liquid to drink  · Strain your urine every time you go to the bathroom  Urinate through a strainer or a piece of thin cloth to catch the stones  Take the stones to your healthcare provider so they can be sent to the lab for tests  This will help your healthcare providers plan the best treatment for you  · Ask your healthcare provider about any nutrition changes you need to make  You may need to limit certain foods such as foods high in sodium (salt), certain protein foods, or foods high in oxalate  After you pass your ureteral stone: Once you have passed your ureteral stone, you may need to do a 24-hour urine test  You may need to save all of your urine for 24 hours  Each time you go to the bathroom, you will urinate into a container  Then you will pour your urine into a larger container that is kept cold  You may be told to write down the time and amount of urine you passed  At the end of 24 hours, the urine is sent to a lab for tests  Results from the test will help your healthcare provider plan ways to prevent more stones from forming  © 2017 [employer] Information is for End User's use only and may not be sold, redistributed or otherwise used for commercial purposes  All illustrations and images included in CareNotes® are the copyrighted property of [employer]  or [employer]  The above information is an  only   It is not intended as medical advice for individual conditions or treatments  Talk to your doctor, nurse or pharmacist before following any medical regimen to see if it is safe and effective for you

## 2020-09-17 NOTE — PROGRESS NOTES
9/17/2020      Chief Complaint   Patient presents with    Nephrolithiasis         Assessment and Plan    78 y o  female NEW PATIENT    1  Right ureteral calculus    2  Urge incontinence    She has not passed any stone as far as she can tell  She's been straining the past 2 weeks  She remains mildly symptomatic right groin and bladder symptoms and a dull right back pain  Concerned she's still obstructed  Plan: Repeat CT scan so we can visualize physical images, if stone remains, schedule ureteroscopy lihtotripsy, ureteral dilation and stent on RIGHT side  Will prescribe flomax and oxybutynin for symptoms at this time  She wants to try oxybutynin longterm for urge incontinence anyway  History of Present Illness  Deyanira Oconnor is a 78 y o  female here for evaluation of ureteral calculus  Patient was seen in ER in [place] 07/26/2020 for 3 mm right UVJ stone with mild hydro  Re-presented for recurrent symptoms 09/04/2020 to [facility] scan at that time a 4mm right UVJ stone with mild hydro  I wonder if it's the same one that never passed  Not infected and kidney function was OK (creatinine 0 8)  Urinalysis with large microscopic blood both times  She also has a several year history of urge incontinence  She reports hysterectomy for fibroids at age 47 with some type of bladder surgery performed at the time as "turns out my uterus and bladder were stuck together and he made my bladder new during the surgery but it's only the size of 1 oz " She's had urinary urgency for about 10 years, and incontinence for the past few- on the way to the toilet  She wears 2 briefs per day, 1 overnight  She's not had hematuria or UTIs  Review of Systems   Constitutional: Negative for activity change, appetite change, chills, fever and unexpected weight change  HENT: Negative  Respiratory: Negative  Negative for shortness of breath  Cardiovascular: Negative  Negative for chest pain     Gastrointestinal: Negative for abdominal pain, diarrhea, nausea and vomiting  Endocrine: Negative  Genitourinary: Positive for flank pain, frequency, pelvic pain and urgency  Negative for decreased urine volume, difficulty urinating, dysuria, hematuria and vaginal bleeding  Musculoskeletal: Negative for back pain and gait problem  Skin: Negative  Allergic/Immunologic: Negative  Neurological: Negative  Hematological: Negative for adenopathy  Does not bruise/bleed easily                  Past Medical History  Past Medical History:   Diagnosis Date    Anxiety     Arthritis     Asthma     Breast cancer ([address] )     rt mastectomy 2005    Cancer [facility])     breast right    Cataract     CHF (congestive heart failure) (HCC)     Coronary artery disease     Depression     GERD (gastroesophageal reflux disease)     High cholesterol     History of chemotherapy     2005 rt breast cancer    Hypertension     IBS (irritable bowel syndrome)     Irritable bowel syndrome (IBS) 5/31/2016    Kidney stone     Lymphedema of right arm     Obesity     Post-menopausal     Psychiatric disorder     Renal disorder     Vitamin D deficiency      Past Social History  Past Surgical History:   Procedure Laterality Date    BREAST SURGERY      CATARACT EXTRACTION, BILATERAL      CHOLECYSTECTOMY      COLONOSCOPY N/A 5/31/2016    Procedure: COLONOSCOPY;  Surgeon: Shannan Thapa MD;  Location: [facility];  Service:    Temit[facility] Ramos GALLBLADDER SURGERY      HYSTERECTOMY      Total    KNEE SURGERY      MASTECTOMY Right     rt breast mastectomy 2005    TONSILLECTOMY AND ADENOIDECTOMY       Social History     Tobacco Use   Smoking Status Never Smoker   Smokeless Tobacco Never Used     Past Family History  Family History   Adopted: Yes   Problem Relation Age of Onset    Diabetes Mother     Heart disease Mother     Mental illness Mother     Depression Mother     Heart disease Brother     Breast cancer Maternal Aunt     Breast cancer Maternal Aunt     Breast cancer Maternal Aunt     Breast cancer Maternal Aunt     Breast cancer Maternal Aunt     No Known Problems Father      Past Social history  Social History     Socioeconomic History    Marital status:      Spouse name: Not on file    Number of children: Not on file    Years of education: Not on file    Highest education level: Not on file   Occupational History    Occupation: Retired   Social Needs    Financial resource strain: Not on file    Food insecurity     Worry: Not on file     Inability: Not on file   [employer] needs     Medical: Not on file     Non-medical: Not on file   Tobacco Use    Smoking status: Never Smoker    Smokeless tobacco: Never Used   Substance and Sexual Activity    Alcohol use: Yes     Frequency: Monthly or less     Comment: socially    Drug use: Never    Sexual activity: Not on file   Lifestyle    Physical activity     Days per week: Not on file     Minutes per session: Not on file    Stress: Not on file   Relationships    Social connections     Talks on phone: Not on file     Gets together: Not on file     Attends [employer] service: Not on file     Active member of club or organization: Not on file     Attends meetings of clubs or organizations: Not on file     Relationship status: Not on file    Intimate partner violence     Fear of current or ex partner: Not on file     Emotionally abused: Not on file     Physically abused: Not on file     Forced sexual activity: Not on file   Other Topics Concern    Not on file   Social History Narrative    Always uses seat belt    Occasional caffeine consumption         Retired    Lives with adult daughter     Current Medications  Current Outpatient Medications   Medication Sig Dispense Refill    albuterol (2 5 mg/3 mL) 0 083 % nebulizer solution Take 2 5 mg by nebulization every 6 (six) hours as needed for wheezing        albuterol (PROAIR HFA) 90 mcg/act inhaler Inhale 2 puffs every 4 (four) hours as needed for wheezing 3 Inhaler 1    atorvastatin (LIPITOR) 20 mg tablet Take 1 tablet by mouth daily  30 tablet 1    buPROPion (WELLBUTRIN XL) 150 mg 24 hr tablet Take 1 tablet (150 mg total) by mouth every morning 30 tablet 5    butalbital-acetaminophen-caffeine (FIORICET,ESGIC) -40 mg per tablet Take 1 tablet by mouth 3 (three) times a day as needed for headaches 90 tablet 1    diclofenac (VOLTAREN) 75 mg EC tablet Take 1 tablet (75 mg total) by mouth 2 (two) times a day 180 tablet 0    dicyclomine (BENTYL) 10 mg capsule Take 1 capsule (10 mg total) by mouth daily 90 capsule 0    DULoxetine (CYMBALTA) 60 mg delayed release capsule Take 1 capsule (60 mg total) by mouth 2 (two) times a day 180 capsule 1    ergocalciferol (VITAMIN D2) 50,000 units Take 1 capsule (50,000 Units total) by mouth once a week 4 capsule 3    gabapentin (NEURONTIN) 300 mg capsule Take 1 capsule (300 mg total) by mouth 3 (three) times a day 90 capsule 1    loratadine (CLARITIN) 10 mg tablet Take 1 tablet (10 mg total) by mouth daily 90 tablet 1    montelukast (SINGULAIR) 10 mg tablet Take 1 tablet by mouth daily  30 tablet 1    Nutritional Supplements (BOOST BREEZE PO) Take by mouth daily      oxyCODONE-acetaminophen (PERCOCET) 5-325 mg per tablet Take 1 tablet by mouth every 8 (eight) hours as needed for moderate painMax Daily Amount: 3 tablets 30 tablet 0    pantoprazole (PROTONIX) 40 mg tablet Take 1 tablet by mouth daily   30 tablet 3    traZODone (DESYREL) 50 mg tablet Take 1-2 tablets ( mg total) by mouth daily at bedtime 180 tablet 0    nystatin (MYCOSTATIN) cream Apply topically 2 (two) times a day (Patient not taking: Reported on 9/17/2020) 30 g 3    nystatin (MYCOSTATIN) powder Apply topically 2 (two) times a day (Patient not taking: Reported on 9/17/2020) 60 g 5    oxybutynin (DITROPAN-XL) 5 mg 24 hr tablet Take 1 tablet (5 mg total) by mouth daily 30 tablet 1    tamsulosin (FLOMAX) 0 4 mg Take 1 capsule (0 4 mg total) by mouth daily with dinner 14 capsule 0     No current facility-administered medications for this visit  Allergies  Allergies   Allergen Reactions    Asa [Aspirin] Rash    Ibuprofen Nausea Only, Rash, Dizziness and Syncope    Latex Dermatitis         The following portions of the patient's history were reviewed and updated as appropriate: allergies, current medications, past medical history, past social history, past surgical history and problem list       Vitals  Vitals:    09/17/20 1306   BP: 120/80   Pulse: 68   Temp: 98 2 °F (36 8 °C)   Weight: 86 4 kg (190 lb 7 6 oz)       Physical Exam  Vitals signs and nursing note reviewed  Constitutional:       General: She is not in acute distress  Appearance: Normal appearance  She is well-developed  She is obese  She is not diaphoretic  HENT:      Head: Normocephalic and atraumatic  Pulmonary:      Effort: Pulmonary effort is normal    Abdominal:      Tenderness: There is no right CVA tenderness or left CVA tenderness  Musculoskeletal:      Right lower leg: No edema  Left lower leg: No edema  Skin:     General: Skin is warm and dry  Neurological:      General: No focal deficit present  Mental Status: She is alert and oriented to person, place, and time  Psychiatric:         Mood and Affect: Mood normal          Speech: Speech normal          Behavior: Behavior normal            Results  No results found for this or any previous visit (from the past 1 hour(s))  ]  No results found for: PSA  Lab Results   Component Value Date    GLUCOSE 79 12/02/2015    CALCIUM 8 9 11/26/2019     12/02/2015    K 3 7 11/26/2019    CO2 25 11/26/2019     (H) 11/26/2019    BUN 11 11/26/2019    CREATININE 0 76 11/26/2019     Lab Results   Component Value Date    WBC 4 22 (L) 11/26/2019    HGB 14 9 11/26/2019    HCT 46 1 11/26/2019    MCV 93 11/26/2019     11/26/2019         Orders  Orders Placed This Encounter   Procedures    CT renal stone study abdomen pelvis wo contrast     Standing Status:   Future     Standing Expiration Date:   9/17/2024     Scheduling Instructions:      Nothing to eat 3 hours prior to your test   Clear liquids are also permitted up until the time of the scan  Clear liquids include water, black coffee or tea, apple juice or clear broth  If possible wear clothing without any metal in the abdomen area  Sweat suit, shorts, sports bra or bra without underwire may eliminate the need to change  Please bring your insurance cards, a form of photo ID and a list of your medications with you  Arrive 15 minutes prior to your appointment time in order to register  On the day of your test, please bring any prior CT or MRI studies of this area with you that were not performed at a [facility]  To schedule this appointment, please contact Central Scheduling at [phone]  Order Specific Question:   What is the patient's sedation requirement? Answer:   No Sedation     Order Specific Question:   Mychart Results Delay     Answer:   30 days    Basic metabolic panel     This is a patient instruction: Patient fasting for 8 hours or longer recommended       Standing Status:   Future     Number of Occurrences:   1     Standing Expiration Date:   9/17/2021

## 2020-09-22 ENCOUNTER — OFFICE VISIT (OUTPATIENT)
Dept: OBGYN CLINIC | Facility: CLINIC | Age: 70
End: 2020-09-22
Payer: COMMERCIAL

## 2020-09-22 VITALS
TEMPERATURE: 98.3 F | SYSTOLIC BLOOD PRESSURE: 110 MMHG | BODY MASS INDEX: 40.99 KG/M2 | DIASTOLIC BLOOD PRESSURE: 80 MMHG | WEIGHT: 190 LBS | HEIGHT: 57 IN

## 2020-09-22 DIAGNOSIS — M17.12 PRIMARY OSTEOARTHRITIS OF LEFT KNEE: Primary | ICD-10-CM

## 2020-09-22 PROCEDURE — 3079F DIAST BP 80-89 MM HG: CPT | Performed by: ORTHOPAEDIC SURGERY

## 2020-09-22 PROCEDURE — 99213 OFFICE O/P EST LOW 20 MIN: CPT | Performed by: ORTHOPAEDIC SURGERY

## 2020-09-22 PROCEDURE — 1160F RVW MEDS BY RX/DR IN RCRD: CPT | Performed by: ORTHOPAEDIC SURGERY

## 2020-09-22 PROCEDURE — 1036F TOBACCO NON-USER: CPT | Performed by: ORTHOPAEDIC SURGERY

## 2020-09-22 RX ORDER — OXYCODONE HYDROCHLORIDE 5 MG/1
5 TABLET ORAL EVERY 6 HOURS PRN
COMMUNITY
Start: 2020-07-26 | End: 2021-02-25

## 2020-09-22 RX ORDER — ONDANSETRON 4 MG/1
4 TABLET, ORALLY DISINTEGRATING ORAL EVERY 8 HOURS PRN
COMMUNITY
Start: 2020-07-26 | End: 2022-01-10

## 2020-09-22 NOTE — PROGRESS NOTES
Assessment:     1  Primary osteoarthritis of left knee          Plan:     Problem List Items Addressed This Visit        Musculoskeletal and Integument    Primary osteoarthritis of left knee - Primary    Relevant Orders    Injection procedure prior authorization       left knee osteoarthritis  Treatment options were discussed with Tuan Rene today  Visco injections were discussed  Orthovisc prior Karina Sousa was submitted today  This would be one injection a week for 3 weeks  If this is not beneficial for her, a total knee arthroplasty may be discussed  She was encouraged to continue to ice the knee  The office will give her a call once the visco injections are approved  Patient ID: Akshat Hamilton is a 78 y o  female  Chief Complaint:  Left knee pain     HPI:  78 y o  female presents to the office today for a follow up regarding left knee pain  Tuan Rene was seen in the office on 06/02/2020, at which time a CSI was perfromed  She notes that she was "sick" for 2 days following the injection  She notes that the CSI only provided her with aprox  1 week of decreased pain  She notes pain tot he medial and lateral aspect of her left knee         Allergy:  Allergies   Allergen Reactions    Asa [Aspirin] Rash    Ibuprofen Nausea Only, Rash, Dizziness and Syncope    Latex Dermatitis       Medications:  all current active meds have been reviewed    Past Medical History:  Past Medical History:   Diagnosis Date    Anxiety     Arthritis     Asthma     Breast cancer ([address] )     rt mastectomy 2005    Cancer [facility])     breast right    Cataract     CHF (congestive heart failure) (HCC)     Coronary artery disease     Depression     GERD (gastroesophageal reflux disease)     High cholesterol     History of chemotherapy     2005 rt breast cancer    Hypertension     IBS (irritable bowel syndrome)     Irritable bowel syndrome (IBS) 5/31/2016    Kidney stone     Lymphedema of right arm     Obesity     Post-menopausal  Psychiatric disorder     Renal disorder     Vitamin D deficiency        Past Surgical History:  Past Surgical History:   Procedure Laterality Date    BREAST SURGERY      CATARACT EXTRACTION, BILATERAL      CHOLECYSTECTOMY      COLONOSCOPY N/A 5/31/2016    Procedure: COLONOSCOPY;  Surgeon: Marda Dubin, MD;  Location: [facility];  Service:    [facility] GALLBLADDER SURGERY      HYSTERECTOMY      Total    KNEE SURGERY      MASTECTOMY Right     rt breast mastectomy 2005    TONSILLECTOMY AND ADENOIDECTOMY         Family History:  Family History   Adopted: Yes   Problem Relation Age of Onset    Diabetes Mother     Heart disease Mother     Mental illness Mother     Depression Mother     Heart disease Brother     Breast cancer Maternal Aunt     Breast cancer Maternal Aunt     Breast cancer Maternal Aunt     Breast cancer Maternal Aunt     Breast cancer Maternal Aunt     No Known Problems Father        Social History:  Social History     Substance and Sexual Activity   Alcohol Use Yes    Frequency: Monthly or less    Comment: socially     Social History     Substance and Sexual Activity   Drug Use Never     Social History     Tobacco Use   Smoking Status Never Smoker   Smokeless Tobacco Never Used           ROS:  Review of Systems   Constitutional: Negative for chills, fever and unexpected weight change  HENT: Negative for hearing loss, nosebleeds and sore throat  Eyes: Negative for pain, redness and visual disturbance  Respiratory: Negative for cough, shortness of breath and wheezing  Cardiovascular: Negative for chest pain, palpitations and leg swelling  Gastrointestinal: Negative for abdominal pain, nausea and vomiting  Endocrine: Negative for polydipsia and polyuria  Genitourinary: Negative for difficulty urinating and hematuria  Musculoskeletal: Positive for arthralgias  Negative for joint swelling and myalgias  Skin: Negative for rash and wound     Neurological: Negative for dizziness, numbness and headaches  Psychiatric/Behavioral: Negative for decreased concentration, dysphoric mood and suicidal ideas  The patient is not nervous/anxious  Objective:  BP Readings from Last 1 Encounters:   09/22/20 110/80      Wt Readings from Last 1 Encounters:   09/22/20 86 2 kg (190 lb)        BMI:   Estimated body mass index is 41 12 kg/m² as calculated from the following:    Height as of this encounter: 4' 9" (1 448 m)  Weight as of this encounter: 86 2 kg (190 lb)  EXAM:   Physical Exam  Vitals signs and nursing note reviewed  Constitutional:       Appearance: She is well-developed  HENT:      Head: Normocephalic and atraumatic  Eyes:      Pupils: Pupils are equal, round, and reactive to light  Neck:      Musculoskeletal: Neck supple  Pulmonary:      Effort: Pulmonary effort is normal       Breath sounds: Normal breath sounds  Musculoskeletal:      Left knee: She exhibits effusion (mild)  Skin:     General: Skin is warm and dry  Neurological:      Mental Status: She is alert and oriented to person, place, and time  Left Knee Exam     Tenderness   The patient is experiencing tenderness in the medial joint line      Range of Motion   Extension: 5   Left knee flexion: 95      Other   Erythema: absent  Scars: absent  Sensation: normal  Pulse: present  Swelling: none  Effusion: effusion (mild) present            Radiographs:  No new imaging to review       Scribe Attestation    I,:   Anam Valle am acting as a scribe while in the presence of the attending physician :        I,:   Kathie Galloway MD personally performed the services described in this documentation    as scribed in my presence :

## 2020-09-25 ENCOUNTER — HOSPITAL ENCOUNTER (OUTPATIENT)
Dept: CT IMAGING | Facility: HOSPITAL | Age: 70
Discharge: HOME | End: 2020-09-25
Payer: COMMERCIAL

## 2020-09-25 DIAGNOSIS — N20.1 URETERAL CALCULUS, RIGHT: ICD-10-CM

## 2020-09-25 PROCEDURE — 74176 CT ABD & PELVIS W/O CONTRAST: CPT

## 2020-09-25 PROCEDURE — G1004 CDSM NDSC: HCPCS

## 2020-09-28 ENCOUNTER — TELEPHONE (OUTPATIENT)
Dept: OTHER | Facility: HOSPITAL | Age: 70
End: 2020-09-28

## 2020-09-28 DIAGNOSIS — N20.0 NEPHROLITHIASIS: Primary | ICD-10-CM

## 2020-09-28 NOTE — TELEPHONE ENCOUNTER
ARCHULETA patient  Please let Deyanira know that I reviewed her repeat CT scan  She passed her stone  She currently has no remaining stones  She wants to continue to oxybutynin for incontinence that's fine  She can stop the flomax now   See me in 6 months with US

## 2020-09-30 NOTE — TELEPHONE ENCOUNTER
Pt scheduled at [facility] for [address] 3/24/2021, mailed appt card for US and FU to pt with directions for test

## 2020-09-30 NOTE — TELEPHONE ENCOUNTER
Patient returned call to office  Informed patient of CT scan results  Patient to follow up in 6 months  Patient scheduled for 3/30/21 at 9:00am  Patient will need to be scheduled for ultrasound at the [address] [place] [address]  Please send appointment cards to patient

## 2020-10-12 PROBLEM — Z85.3 HX: BREAST CANCER: Status: ACTIVE | Noted: 2020-10-12

## 2020-10-17 DIAGNOSIS — M79.672 FOOT PAIN, BILATERAL: ICD-10-CM

## 2020-10-17 DIAGNOSIS — M79.671 FOOT PAIN, BILATERAL: ICD-10-CM

## 2020-10-17 DIAGNOSIS — R10.9 ABDOMINAL PAIN, UNSPECIFIED ABDOMINAL LOCATION: ICD-10-CM

## 2020-10-17 RX ORDER — DICLOFENAC SODIUM 75 MG/1
TABLET, DELAYED RELEASE ORAL
Qty: 180 TABLET | Refills: 1 | Status: SHIPPED | OUTPATIENT
Start: 2020-10-17 | End: 2021-02-25 | Stop reason: SDUPTHER

## 2020-10-17 RX ORDER — DICYCLOMINE HYDROCHLORIDE 10 MG/1
CAPSULE ORAL
Qty: 90 CAPSULE | Refills: 1 | Status: SHIPPED | OUTPATIENT
Start: 2020-10-17 | End: 2021-04-15

## 2020-10-19 ENCOUNTER — TELEPHONE (OUTPATIENT)
Dept: INTERNAL MEDICINE CLINIC | Facility: CLINIC | Age: 70
End: 2020-10-19

## 2020-10-21 DIAGNOSIS — Z85.3 HX: BREAST CANCER: Primary | ICD-10-CM

## 2020-10-28 ENCOUNTER — TELEPHONE (OUTPATIENT)
Dept: INTERNAL MEDICINE CLINIC | Facility: CLINIC | Age: 70
End: 2020-10-28

## 2020-11-03 ENCOUNTER — TELEPHONE (OUTPATIENT)
Dept: INTERNAL MEDICINE CLINIC | Facility: CLINIC | Age: 70
End: 2020-11-03

## 2020-11-17 DIAGNOSIS — J30.9 ALLERGIC RHINITIS, UNSPECIFIED SEASONALITY, UNSPECIFIED TRIGGER: ICD-10-CM

## 2020-11-17 DIAGNOSIS — E78.5 HYPERLIPIDEMIA, UNSPECIFIED HYPERLIPIDEMIA TYPE: ICD-10-CM

## 2020-11-17 DIAGNOSIS — G62.9 NEUROPATHY: ICD-10-CM

## 2020-11-17 RX ORDER — MONTELUKAST SODIUM 10 MG/1
TABLET ORAL
Qty: 30 TABLET | Refills: 0 | Status: SHIPPED | OUTPATIENT
Start: 2020-11-17 | End: 2020-12-16

## 2020-11-17 RX ORDER — ATORVASTATIN CALCIUM 20 MG/1
TABLET, FILM COATED ORAL
Qty: 30 TABLET | Refills: 0 | Status: SHIPPED | OUTPATIENT
Start: 2020-11-17 | End: 2020-12-16

## 2020-11-17 RX ORDER — GABAPENTIN 300 MG/1
CAPSULE ORAL
Qty: 90 CAPSULE | Refills: 1 | Status: SHIPPED | OUTPATIENT
Start: 2020-11-17 | End: 2021-01-15

## 2020-12-01 ENCOUNTER — OFFICE VISIT (OUTPATIENT)
Dept: OBGYN CLINIC | Facility: CLINIC | Age: 70
End: 2020-12-01
Payer: COMMERCIAL

## 2020-12-01 VITALS
HEART RATE: 97 BPM | HEIGHT: 57 IN | DIASTOLIC BLOOD PRESSURE: 92 MMHG | WEIGHT: 190 LBS | BODY MASS INDEX: 40.99 KG/M2 | SYSTOLIC BLOOD PRESSURE: 145 MMHG

## 2020-12-01 DIAGNOSIS — M17.12 PRIMARY OSTEOARTHRITIS OF LEFT KNEE: Primary | ICD-10-CM

## 2020-12-01 PROCEDURE — 20610 DRAIN/INJ JOINT/BURSA W/O US: CPT | Performed by: ORTHOPAEDIC SURGERY

## 2020-12-08 ENCOUNTER — OFFICE VISIT (OUTPATIENT)
Dept: OBGYN CLINIC | Facility: CLINIC | Age: 70
End: 2020-12-08
Payer: COMMERCIAL

## 2020-12-08 VITALS
BODY MASS INDEX: 40.99 KG/M2 | HEIGHT: 57 IN | WEIGHT: 190 LBS | SYSTOLIC BLOOD PRESSURE: 139 MMHG | HEART RATE: 109 BPM | DIASTOLIC BLOOD PRESSURE: 86 MMHG

## 2020-12-08 DIAGNOSIS — M17.12 PRIMARY OSTEOARTHRITIS OF LEFT KNEE: Primary | ICD-10-CM

## 2020-12-08 PROCEDURE — 1036F TOBACCO NON-USER: CPT | Performed by: ORTHOPAEDIC SURGERY

## 2020-12-08 PROCEDURE — 3075F SYST BP GE 130 - 139MM HG: CPT | Performed by: ORTHOPAEDIC SURGERY

## 2020-12-08 PROCEDURE — 3008F BODY MASS INDEX DOCD: CPT | Performed by: ORTHOPAEDIC SURGERY

## 2020-12-08 PROCEDURE — 99213 OFFICE O/P EST LOW 20 MIN: CPT | Performed by: ORTHOPAEDIC SURGERY

## 2020-12-08 PROCEDURE — 1160F RVW MEDS BY RX/DR IN RCRD: CPT | Performed by: ORTHOPAEDIC SURGERY

## 2020-12-08 PROCEDURE — 3079F DIAST BP 80-89 MM HG: CPT | Performed by: ORTHOPAEDIC SURGERY

## 2020-12-09 DIAGNOSIS — F51.01 PRIMARY INSOMNIA: ICD-10-CM

## 2020-12-09 RX ORDER — TRAZODONE HYDROCHLORIDE 50 MG/1
50-100 TABLET ORAL
Qty: 180 TABLET | Refills: 0 | Status: SHIPPED | OUTPATIENT
Start: 2020-12-09 | End: 2021-03-08

## 2020-12-14 DIAGNOSIS — N20.1 URETERAL CALCULUS, RIGHT: ICD-10-CM

## 2020-12-14 DIAGNOSIS — N39.41 URGE URINARY INCONTINENCE: ICD-10-CM

## 2020-12-14 RX ORDER — OXYBUTYNIN CHLORIDE 5 MG/1
TABLET, EXTENDED RELEASE ORAL
Qty: 90 TABLET | Refills: 1 | Status: SHIPPED | OUTPATIENT
Start: 2020-12-14 | End: 2021-04-27

## 2020-12-16 ENCOUNTER — APPOINTMENT (OUTPATIENT)
Dept: PHYSICAL THERAPY | Facility: CLINIC | Age: 70
End: 2020-12-16
Payer: COMMERCIAL

## 2020-12-16 DIAGNOSIS — J30.9 ALLERGIC RHINITIS, UNSPECIFIED SEASONALITY, UNSPECIFIED TRIGGER: ICD-10-CM

## 2020-12-16 DIAGNOSIS — E78.5 HYPERLIPIDEMIA, UNSPECIFIED HYPERLIPIDEMIA TYPE: ICD-10-CM

## 2020-12-16 RX ORDER — MONTELUKAST SODIUM 10 MG/1
TABLET ORAL
Qty: 30 TABLET | Refills: 0 | Status: SHIPPED | OUTPATIENT
Start: 2020-12-16 | End: 2021-01-15

## 2020-12-16 RX ORDER — ATORVASTATIN CALCIUM 20 MG/1
TABLET, FILM COATED ORAL
Qty: 30 TABLET | Refills: 0 | Status: SHIPPED | OUTPATIENT
Start: 2020-12-16 | End: 2021-01-15

## 2020-12-16 RX ORDER — BUPROPION HYDROCHLORIDE 150 MG/1
TABLET ORAL
Qty: 30 TABLET | Refills: 4 | Status: SHIPPED | OUTPATIENT
Start: 2020-12-16 | End: 2021-05-13

## 2020-12-16 NOTE — PROGRESS NOTES
PT Evaluation     Today's date: 2020  Patient name: Evy Lazaro  : 1950  MRN: [id number]  Referring provider: Katy Ruiz PA-C  Dx:   Encounter Diagnosis     ICD-10-CM    1  Primary osteoarthritis of left knee  M17 12                   Assessment  Assessment details: Evy aLzaro is a 78 y o  female presenting to outpatient physical therapy with diagnosis of Primary osteoarthritis of left knee   Patient's current impairments include L knee pain, impaired soft tissue mobility, reduced range of motion, reduced strength, reduced postural awareness, and reduced activity tolerance  Patient's present functional limitations include difficulty with ADLs with increased need for assistance, reliance on medication and/or modalities for pain relief, poor tolerance for functional mobility and activity, and difficulty completing work/school responsibilities  Patient to benefit from skilled outpatient physical therapy ***x/week for *** weeks in order to reduce pain, maximize pain free range of motion, increase strength and stability, and improve functional mobility/functional activity in order to maximize return to prior level of function with reduced limitations   Thank you for your referral           Subjective    Objective           Precautions: ***    Re-eval Date: {Free Text:68997::"***"}    Date        Visit Count 1       FOTO completed       Pain In See eval       Pain Out See eval               Manuals                                        Neuro Re-Ed                                                                Ther Ex                                                                        Ther Activity                        Gait Training                        Modalities

## 2020-12-22 ENCOUNTER — EVALUATION (OUTPATIENT)
Dept: PHYSICAL THERAPY | Facility: CLINIC | Age: 70
End: 2020-12-22
Payer: COMMERCIAL

## 2020-12-22 DIAGNOSIS — M17.12 PRIMARY OSTEOARTHRITIS OF LEFT KNEE: ICD-10-CM

## 2020-12-22 PROCEDURE — 97110 THERAPEUTIC EXERCISES: CPT | Performed by: PHYSICAL THERAPIST

## 2020-12-22 PROCEDURE — 97162 PT EVAL MOD COMPLEX 30 MIN: CPT | Performed by: PHYSICAL THERAPIST

## 2020-12-28 ENCOUNTER — OFFICE VISIT (OUTPATIENT)
Dept: PHYSICAL THERAPY | Facility: CLINIC | Age: 70
End: 2020-12-28
Payer: COMMERCIAL

## 2020-12-28 DIAGNOSIS — M17.12 PRIMARY OSTEOARTHRITIS OF LEFT KNEE: Primary | ICD-10-CM

## 2020-12-28 PROCEDURE — 97110 THERAPEUTIC EXERCISES: CPT | Performed by: PHYSICAL THERAPIST

## 2020-12-30 ENCOUNTER — OFFICE VISIT (OUTPATIENT)
Dept: PHYSICAL THERAPY | Facility: CLINIC | Age: 70
End: 2020-12-30
Payer: COMMERCIAL

## 2020-12-30 DIAGNOSIS — M17.12 PRIMARY OSTEOARTHRITIS OF LEFT KNEE: Primary | ICD-10-CM

## 2020-12-30 PROCEDURE — 97140 MANUAL THERAPY 1/> REGIONS: CPT

## 2020-12-30 PROCEDURE — 97110 THERAPEUTIC EXERCISES: CPT

## 2021-01-06 ENCOUNTER — APPOINTMENT (OUTPATIENT)
Dept: RADIOLOGY | Facility: CLINIC | Age: 71
End: 2021-01-06
Payer: COMMERCIAL

## 2021-01-06 ENCOUNTER — OFFICE VISIT (OUTPATIENT)
Dept: PHYSICAL THERAPY | Facility: CLINIC | Age: 71
End: 2021-01-06
Payer: COMMERCIAL
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
## 2021-01-06 DIAGNOSIS — M17.12 PRIMARY OSTEOARTHRITIS OF LEFT KNEE: Primary | ICD-10-CM
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
## 2021-01-06 PROCEDURE — 73564 X-RAY EXAM KNEE 4 OR MORE: CPT

## 2021-01-06 NOTE — PROGRESS NOTES
Daily Note     Today's date: 2021  Patient name: Joselyn Romero  : 1950  MRN: [id number]  Referring provider: Nae Justin PA-C  Dx:   Encounter Diagnosis     ICD-10-CM    1  Primary osteoarthritis of left knee  M17 12                   Subjective: I fell on Monday and landed right on my left knee cap   No bruising but feels more painful swelling      Objective: See treatment diary below      Assessment:  Patient demonstrated hypersensitivty to L patella with very light palpation and medial patella swelling  Decrease knee flex  Per discussion with PT/TT recommend f/u with MD and to benefit with possible xray      Plan: Continue per plan of care    per MD recommendation following xray findings     Precautions: high pain tolerance   Re-Eval Date: 2021    Visit Count 1 2 3 4    FOTO See IE       Pain In See IE 9/10 9/10 9-10/10    Pain Out  see IE 9/10  No worse         Manuals    Held     Prn Distraction, flex mob, MFR/soft tissue ITB, Hamstring, medial/lateral knee  TTP   Knee flex/ext to pt charles  PROM                             Neuro Re-Ed        Rhom EO/EC        Tandem EO/EC                                                Ther Ex        NuStep 5 min   L1   Pain   Resume NV     LAQ/SAQ 2x10 ea  SAQ 25x ea     LAQ 12x ea  SAQ 25x ea     LAQ 12x ea     Glut Sets Supine   10x 5"  Supine   2x10 5"  Supine   2x10 5"      Quad Sets  Supine   Bolster under knees to rashel   2x10 5"   Supine   Bolster under knees to rashel   2x10 5"       Clamshells Supine   In painfree range   2x10  Supine B/L  Blue Band   2x10     Pain, rest breaks required  Supine B/L  W/o band  10x    pain     Bridges        HS curls        SLR        Supine Hip ADD   2x10 5"   W/ ball  2x10 5"   W/ ball   seated     Heel Slides   2x 5 to pt rashel      HR   Seated  20x     Ther Activity                        Gait Training        Training w/ cane                Modalities        prn CP 10 min   Supine w/ bolster CP 10 min   Supine w/ bolster  Pt def               HEP - see handout 12/30/20 PTA/CV  BL LE strengthening

## 2021-01-11 ENCOUNTER — OFFICE VISIT (OUTPATIENT)
Dept: PHYSICAL THERAPY | Facility: CLINIC | Age: 71
End: 2021-01-11
Payer: COMMERCIAL

## 2021-01-11 DIAGNOSIS — M17.12 PRIMARY OSTEOARTHRITIS OF LEFT KNEE: Primary | ICD-10-CM

## 2021-01-11 PROCEDURE — 97140 MANUAL THERAPY 1/> REGIONS: CPT

## 2021-01-11 PROCEDURE — 97110 THERAPEUTIC EXERCISES: CPT

## 2021-01-11 NOTE — PROGRESS NOTES
Daily Note     Today's date: 2021  Patient name: Vivi Boone  : 1950  MRN: [id number]  Referring provider: Stephanie Calloway PA-C  Dx:   Encounter Diagnosis     ICD-10-CM    1  Primary osteoarthritis of left knee  M17 12                   Subjective: Not taking anything for pain other than an occasional Tylenol   RTD 21      Objective: See treatment diary below      Assessment: Tolerated treatment fairly  well  Pt encourages submax with progression with WB TE this date to pt tolerance  Limited ROM L knee with 1* flexion  AROM 50* PROM w/OP 65*  Pt provided frequent seated rest 2* endurance fatigue  Pt ambulates with RW presently  Pt remains fear of falling this date  Patient would benefit from continued PT      Plan: Continue per plan of care        Precautions: high pain tolerance   Re-Eval Date: 2021   Visit Count 1 2 3 4 5   FOTO See IE       Pain In See IE 9/10 9/10 9-10/10 8   Pain Out  see IE 9/10  No worse  Better/looser       Manuals    Held     Prn Distraction, flex mob, MFR/soft tissue ITB, Hamstring, medial/lateral knee  TTP   Knee flex/ext to pt charles  PROM  15 min                           Neuro Re-Ed        Rhom EO/EC        Tandem EO/EC                                                Ther Ex        NuStep 5 min   L1   Pain   Resume NV  L1 10 min  First 2 min  Stretch hold 10 sec  Cues for pacing remaining 8 min   LAQ/SAQ 2x10 ea  SAQ 25x ea     LAQ 12x ea  SAQ 25x ea     LAQ 12x ea  2x10 5"      2x 10, 5"   Glut Sets Supine   10x 5"  Supine   2x10 5"  Supine   2x10 5"   Supine   2x10 5"    Quad Sets  Supine   Bolster under knees to rashel   2x10 5"   Supine   Bolster under knees to rashel   2x10 5"       Clamshells Supine   In painfree range   2x10  Supine B/L  Blue Band   2x10     Pain, rest breaks required  Supine B/L  W/o band  10x    pain  Supine B/L  Blue Band   2x10    Bridges        HS curls        SLR        Supine Hip ADD   2x10 5"   W/ ball 2x10 5"   W/ ball   seated  Seated  2x10, 5"   Heel Slides   2x 5 to pt rashel      HR   Seated  20x  HR 10x    TR 2x10   Steamboats     2x10 LLE only  Flex/ Ext  2x10 BL  ABD    Firm 2 HHA   Stand HS curls     10x   Firm  2 HHA           Ther Activity                        Gait Training        Training w/ cane                Modalities        prn CP 10 min   Supine w/ bolster CP 10 min   Supine w/ bolster  Pt def  Pt def             HEP - see handout 12/30/20 PTA/CV  BL LE strengthening

## 2021-01-13 ENCOUNTER — OFFICE VISIT (OUTPATIENT)
Dept: PHYSICAL THERAPY | Facility: CLINIC | Age: 71
End: 2021-01-13
Payer: COMMERCIAL

## 2021-01-13 DIAGNOSIS — M17.12 PRIMARY OSTEOARTHRITIS OF LEFT KNEE: Primary | ICD-10-CM

## 2021-01-13 PROCEDURE — 97110 THERAPEUTIC EXERCISES: CPT

## 2021-01-13 NOTE — PROGRESS NOTES
Daily Note     Today's date: 2021  Patient name: Vivi Boone  : 1950  MRN: [id number]  Referring provider: Stephanie Calloway PA-C  Dx:   Encounter Diagnosis     ICD-10-CM    1  Primary osteoarthritis of left knee  M17 12                   Subjective: I am trying to walk with a SPC vs my RW  I dont trust WB on my LLE too long      Objective: See treatment diary below      Assessment: Tolerated treatment well  Pt tolerated increase reps/resistance progression this date  Noted improved knee flexion  Poor patella mobility   Patient would benefit from continued PT      Plan: Continue per plan of care    review HEP and updated NV with possible DC next week   Pt considering possible TKR      Precautions: high pain tolerance   Re-Eval Date: 2021       Visit Count 6       FOTO Not completed -- -- -- --   Pain In L knee 7       Pain Out better           Manuals        Prn Distraction, flex mob, MFR/soft tissue ITB, Hamstring, medial/lateral knee  5 min patella mobs  Seated knee flexion to pt rashel                               Neuro Re-Ed        Rhom EO/EC        Tandem EO/EC                                                Ther Ex        NuStep L1 10 min  Cues for pacing       LAQ/SAQ 1#  2x10 5"      2x 10, 5" NP       Glut Sets Review DC to HEP       Quad Sets Review   DC HEP       Clamshells Seated  Hip abd w/ Red TB  2x10 5"       Bridges        SLR        Supine Hip ADD  Seated  2x10, 5"       Heel Slides        HR Stand/firm  HR 3x10    TR  3x10       Steamboats // bars  2 HHA  Firm     1# LLE 2x10 ea dir    0# RLE ext/abd only         Stand HS curls 2x10 0#  Firm  2 HHA  Limited ROM       Steps L LE step bottom step with knee flex to pt rashel  2 HHA  2x10                               Ther Activity                        Gait Training        Training w/ cane                Modalities        prn Pt def                 HEP - see handout 20 PTA/CV  BL LE strengthening

## 2021-01-15 DIAGNOSIS — G62.9 NEUROPATHY: ICD-10-CM

## 2021-01-15 DIAGNOSIS — J30.9 ALLERGIC RHINITIS, UNSPECIFIED SEASONALITY, UNSPECIFIED TRIGGER: ICD-10-CM

## 2021-01-15 DIAGNOSIS — K21.9 GASTROESOPHAGEAL REFLUX DISEASE WITHOUT ESOPHAGITIS: ICD-10-CM

## 2021-01-15 DIAGNOSIS — E78.5 HYPERLIPIDEMIA, UNSPECIFIED HYPERLIPIDEMIA TYPE: ICD-10-CM

## 2021-01-15 DIAGNOSIS — F33.41 RECURRENT MAJOR DEPRESSIVE DISORDER, IN PARTIAL REMISSION (HCC): ICD-10-CM

## 2021-01-15 RX ORDER — ATORVASTATIN CALCIUM 20 MG/1
TABLET, FILM COATED ORAL
Qty: 30 TABLET | Refills: 0 | Status: SHIPPED | OUTPATIENT
Start: 2021-01-15 | End: 2021-02-14

## 2021-01-15 RX ORDER — DULOXETIN HYDROCHLORIDE 60 MG/1
CAPSULE, DELAYED RELEASE ORAL
Qty: 180 CAPSULE | Refills: 0 | Status: SHIPPED | OUTPATIENT
Start: 2021-01-15 | End: 2021-04-15

## 2021-01-15 RX ORDER — MONTELUKAST SODIUM 10 MG/1
TABLET ORAL
Qty: 30 TABLET | Refills: 0 | Status: SHIPPED | OUTPATIENT
Start: 2021-01-15 | End: 2021-02-14

## 2021-01-15 RX ORDER — GABAPENTIN 300 MG/1
CAPSULE ORAL
Qty: 90 CAPSULE | Refills: 5 | Status: SHIPPED | OUTPATIENT
Start: 2021-01-15 | End: 2021-05-28 | Stop reason: HOSPADM

## 2021-01-15 RX ORDER — PANTOPRAZOLE SODIUM 40 MG/1
TABLET, DELAYED RELEASE ORAL
Qty: 30 TABLET | Refills: 2 | Status: SHIPPED | OUTPATIENT
Start: 2021-01-15 | End: 2021-04-15 | Stop reason: SDUPTHER

## 2021-01-18 ENCOUNTER — OFFICE VISIT (OUTPATIENT)
Dept: PHYSICAL THERAPY | Facility: CLINIC | Age: 71
End: 2021-01-18
Payer: COMMERCIAL

## 2021-01-18 DIAGNOSIS — M17.12 PRIMARY OSTEOARTHRITIS OF LEFT KNEE: Primary | ICD-10-CM

## 2021-01-18 PROCEDURE — 97110 THERAPEUTIC EXERCISES: CPT

## 2021-01-18 PROCEDURE — 97140 MANUAL THERAPY 1/> REGIONS: CPT

## 2021-01-18 NOTE — PROGRESS NOTES
Daily Note     Today's date: 2021  Patient name: Luis Tang  : 1950  MRN: [id number]  Referring provider: Morenita Espinoza PA-C  Dx:   Encounter Diagnosis     ICD-10-CM    1  Primary osteoarthritis of left knee  M17 12                   Subjective: Pt  reports "7"/10 pain level @ L knee @ this present time  Also reports L Quad Mm soreness/stiffness  Objective: See treatment diary below      Assessment: Tolerated treatment Fair+ to Fairly Well range overall with performance and tolerance to ther exer and Manual therapy applic  Pain level  by  5 by end of treatment session, as per pt feedback  Received MHP and CP applications Well  Plan:  Con't services 2x/week  Precautions: high pain tolerance     Re-Eval Date: 2021 18 21      Visit Count 6 7      FOTO Not completed -- -- -- --   Pain In L knee 7 7/10      Pain Out better 6 5/10          Manuals  1 18 21      Prn Distraction, flex mob, MFR/soft tissue ITB, Hamstring, medial/lateral knee  5 min patella mobs  Seated knee flexion to pt rashel 10 min total patella mobs,  Seated knee flexion, to pt rashel   flex mob, MFR/soft tissue ITB, Hamstring, medial/lateral knee                              Neuro Re-Ed        Rhom EO/EC        Tandem EO/EC                                                Ther Ex  1 18 21      NuStep L1 10 min  Cues for pacing L1 x 10 min      LAQ/SAQ 1#  2x10 5"      2x 10, 5" NP 1#  2x10 5"  ea      Glut Sets Review DC to HEP       Quad Sets Review   DC HEP       Clamshells Seated  Hip abd w/ Red TB  2x10 5" Seated  Hip abd w/ Red TB  2x10 5"      Bridges        SLR        Supine Hip ADD  Seated  2x10, 5" Seated  2x10, 5"      Heel Slides        HR Stand/firm  HR 3x10    TR  3x10 Stand/firm  HR 3x10    TR  3x10      Steamboats // bars  2 HHA  Firm     1# LLE 2x10 ea dir    0# RLE ext/abd only   // bars  2 HHA  Firm     1# LLE 2x10 ea dir    0# RLE ext/abd only        Stand HS curls 2x10 0#  Firm  2 HHA  Limited ROM 2x10 0#  Firm  2 HHA  Limited ROM      Steps L LE step bottom step with knee flex to pt rashel  2 HHA  2x10 L LE step bottom step with knee flex to pt rashel  2 HHA  2x10                              Ther Activity                        Gait Training        Training w/ cane                Modalities  1 18 21      prn Pt def **MHP to L ant /lat Quad Mm 10 min        **CP to L ant Knee 10 min      **No adverse reaction to MHP/CP applic    HEP - see handout 12/30/20 PTA/CV  BL LE strengthening

## 2021-01-20 ENCOUNTER — OFFICE VISIT (OUTPATIENT)
Dept: PHYSICAL THERAPY | Facility: CLINIC | Age: 71
End: 2021-01-20
Payer: COMMERCIAL

## 2021-01-20 DIAGNOSIS — M17.12 PRIMARY OSTEOARTHRITIS OF LEFT KNEE: Primary | ICD-10-CM

## 2021-01-20 PROCEDURE — 97110 THERAPEUTIC EXERCISES: CPT | Performed by: PHYSICAL THERAPIST

## 2021-01-20 NOTE — PROGRESS NOTES
PT Discharge    Today's date: 2021  Patient name: Luis Tang  : 1950  MRN: [id number]  Referring provider: Morenita Espinoza PA-C  Dx:   Encounter Diagnosis     ICD-10-CM    1  Primary osteoarthritis of left knee  M17 12        Start Time: 1300  Stop Time: 1400  Total time in clinic (min): 60 minutes    Assessment  Assessment details: Luis Tang is a 78 y o  female presenting to outpatient physical therapy with diagnosis of primary OA of L knee  Patient's current impairments include pain, impaired soft tissue mobility, limited range of motion, reduced strength, and reduced activity tolerance  Patient's present functional limitations include difficulty with ADLs with increased need for assistance, reliance on medication for pain relief, poor tolerance for functional mobility and activity  Pt is preparing for L TKA and would benefit from bilateral strengthening  PMH positive for cancer, HTN, and lymphedema of R UE  Pt has demonstrated improvements in strength and ROM, however is still not [facility] for either  Pt has demonstrated limited improvements in balance and pain, pt continues to resist WB through L LE  Pt can be d/c to HEP in preparation for surgery  Goals  STGs to be achieved in 4-6 weeks:    1  Pt will report reduced resting pain levels "at worst" by at least 2 points (0-10 scale) in order to allow improved tolerance for functional mobility and reduced reliance on medication or modalities for pain relief  - MET  2  Pt will demonstrate improved AROM of L knee by at least 10-15* in order to reduce pt difficulty with gait and transfers and restore normal joint mobility  - Partially MET  3  Pt will demonstrate improved strength of L knee by at least 1/2-1 MMT in order to improve weight bearing joint stability and allow for restoration of normal joint mechanics to reduce pain and improve function  - NOT MET     LTGs to be achieved in 8-12 weeks: - NOT MET as listed    1   Pt will be independent with HEP, demonstrating proper technique with exercises and understanding of self progression of program without need for cueing or assistance  2  Pt will report minimized pain levels with at least 50% reduction in pain since [facility]  3  Pt will demonstrate increased AROM by 30 degrees  4  Pt will demonstrate ability to ambulate 300 ft w/ use of least restrictive AD  5  FOTO will reflect score at discharge that is greater than or equal to predicted level  Plan  Duration in visits: 1  Duration in weeks: 1  Plan of Care beginning date: 1/20/2021  Plan of Care expiration date: 1/20/2021  Treatment plan discussed with: patient and PTA        Subjective Evaluation    History of Present Illness  Mechanism of injury: UPDATE:   Pt reports minimal changes in pain  She is eager for surgery to commence  Pt has been completing HEP as much as tolerated ~2-3x/week  Pt reports she is planning on L TKA but requires pre-hab for strengthening of L knee  Eventual goal, per pt, is for bilateral TKA  Pt reports increased swelling and edema bilateral LE with increased activities  Pt reports increased falls 1x/week, no injuries as a result  Pt struggles with ambulation in first 15-30 seconds, requires time to steady herself one standing  Pt reports she has decreased ambulation for exercise since March, and now cannot walk longer than 3 minutes without requiring a break  Pt experiences a lot of pain (~10/10) most of the time     Pain  Current pain rating: 10  At worst pain rating: 10  Location: Bilateral knee   Quality: sharp, discomfort and pressure  Relieving factors: rest, medications and ice  Aggravating factors: sitting, standing, stair climbing, walking and lifting  Progression: worsening    Social Support  Steps to enter house: yes  Stairs in house: yes   Lives in: multiple-level home  Lives with: adult children    Employment status: not working    Diagnostic Tests  MRI studies: normal    FCE comments: MRI LEFT KNEE     INDICATION:   M25 562: Pain in left knee  M25 462: Effusion, left knee      COMPARISON: None      TECHNIQUE:    MR sequences were obtained of the left knee including:  Localizer, axial T2 fat sat, coronal T1/T2 fat sat, sagittal PD/T2 fat sat        Gadolinium was not used      FINDINGS:     SUBCUTANEOUS TISSUES: Normal     JOINT EFFUSION: There is a small joint effusion      BAKER'S CYST: There is a small Baker's cyst      MENISCI: Diffuse degeneration and tearing noted throughout the medial meniscus exhibiting mild extrusion without flipped fragment  The lateral meniscus appears intact      CRUCIATE LIGAMENTS: Intact      EXTENSOR APPARATUS: Intact      COLLATERAL LIGAMENTS: Intact      ARTICULAR SURFACES: Tricompartmental degenerative change including extensive cartilage denudation throughout the medial compartment with prominent osteophyte formation and reactive subchondral marrow edema      BONES: Normal      MUSCULATURE:  Intact      IMPRESSION:     1  Diffuse degeneration and tearing throughout the medial meniscus exhibiting meniscal extrusion without flipped fragment  Lateral meniscus remains intact  2   Tricompartmental degenerative change including complete medial compartment cartilage denudation with reactive subchondral marrow edema and exuberant osteophyte formation noted  3   Small joint effusion and small Baker's cyst        Treatments  Previous treatment: medication and injection treatment  Current treatment: medication and physical therapy        Objective     Observations   Left Knee   Positive for edema  Right Knee   Positive for edema  Palpation   Left   Tenderness of the distal biceps femoris, distal semimembranosus, distal semitendinosus, lateral gastrocnemius, medial gastrocnemius, rectus femoris, vastus lateralis and vastus medialis       Additional Palpation Details  Significant tenderness     Tenderness   Left Knee   Tenderness in the inferior patella, ITB, lateral joint line, lateral patella, LCL (distal), LCL (proximal), MCL (distal), MCL (proximal), medial joint line, medial patella, patellar tendon, quadriceps tendon and superior patella  Neurological Testing     Sensation     Knee   Left Knee   Intact: light touch    Right Knee   Intact: light touch     Active Range of Motion   Left Knee   Flexion: 80 degrees with pain  Extension: -20 degrees with pain    Right Knee   Flexion: 80 degrees with pain  Extension: -20 degrees with pain    Passive Range of Motion     Additional Passive Range of Motion Details  Not tolerated due to pain threshold     Re-Eval Date: 1/19/2021 1/13 1 18 21 1/20     Visit Count 6 7 8     FOTO Not completed -- -- -- --   Pain In L knee 7 7/10      Pain Out better 6 5/10          Manuals  1 18 21      Prn Distraction, flex mob, MFR/soft tissue ITB, Hamstring, medial/lateral knee  5 min patella mobs  Seated knee flexion to pt rashel 10 min total patella mobs,  Seated knee flexion, to pt rashel   flex mob, MFR/soft tissue ITB, Hamstring, medial/lateral knee                              Neuro Re-Ed        Rhom EO/EC        Tandem EO/EC                                                Ther Ex  1 18 21      NuStep L1 10 min  Cues for pacing L1 x 10 min L2   10 min      LAQ/SAQ 1#  2x10 5"      2x 10, 5" NP 1#  2x10 5"  ea 1 5# 2x10   5" ea        Glut Sets Review DC to HEP       Quad Sets Review   DC HEP       Clamshells Seated  Hip abd w/ Red TB  2x10 5" Seated  Hip abd w/ Red TB  2x10 5"      Bridges        SLR        Supine Hip ADD  Seated  2x10, 5" Seated  2x10, 5" Seated  2x10, 5"     Heel Slides        HR Stand/firm  HR 3x10    TR  3x10 Stand/firm  HR 3x10    TR  3x10 Stand/firm  HR 3x10    TR  3x10     Steamboats // bars  2 HHA  Firm     1# LLE 2x10 ea dir    0# RLE ext/abd only   // bars  2 HHA  Firm     1# LLE 2x10 ea dir    0# RLE ext/abd only   // bars  2 HHA  Firm     1 5# BLE 2x10 ea dir       Stand HS curls 2x10 0#  Firm  2 HHA  Limited ROM 2x10 0#  Firm  2 HHA  Limited ROM 2x10 0#   Firm   2 HHA   Limited ROM     Steps L LE step bottom step with knee flex to pt rashel  2 HHA  2x10 L LE step bottom step with knee flex to pt rashel  2 HHA  2x10                              Ther Activity                        Gait Training        Training w/ cane                Modalities  1 18 21      prn Pt def **MHP to L ant /lat Quad Mm 10 min CP to L knee        **CP to L ant Knee 10 min      **No adverse reaction to MHP/CP applic    HEP - see handout 12/30/20 PTA/CV  BL LE strengthening

## 2021-01-25 ENCOUNTER — APPOINTMENT (OUTPATIENT)
Dept: PHYSICAL THERAPY | Facility: CLINIC | Age: 71
End: 2021-01-25
Payer: COMMERCIAL

## 2021-01-27 ENCOUNTER — APPOINTMENT (OUTPATIENT)
Dept: PHYSICAL THERAPY | Facility: CLINIC | Age: 71
End: 2021-01-27
Payer: COMMERCIAL

## 2021-02-14 DIAGNOSIS — E78.5 HYPERLIPIDEMIA, UNSPECIFIED HYPERLIPIDEMIA TYPE: ICD-10-CM

## 2021-02-14 DIAGNOSIS — J30.9 ALLERGIC RHINITIS, UNSPECIFIED SEASONALITY, UNSPECIFIED TRIGGER: ICD-10-CM

## 2021-02-14 RX ORDER — MONTELUKAST SODIUM 10 MG/1
TABLET ORAL
Qty: 30 TABLET | Refills: 0 | Status: SHIPPED | OUTPATIENT
Start: 2021-02-14 | End: 2021-03-16

## 2021-02-14 RX ORDER — ATORVASTATIN CALCIUM 20 MG/1
TABLET, FILM COATED ORAL
Qty: 30 TABLET | Refills: 0 | Status: SHIPPED | OUTPATIENT
Start: 2021-02-14 | End: 2021-03-16

## 2021-02-17 ENCOUNTER — LAB (OUTPATIENT)
Dept: LAB | Facility: CLINIC | Age: 71
End: 2021-02-17
Payer: COMMERCIAL

## 2021-02-17 ENCOUNTER — TRANSCRIBE ORDERS (OUTPATIENT)
Dept: LAB | Facility: CLINIC | Age: 71
End: 2021-02-17

## 2021-02-17 DIAGNOSIS — Z13.29 SCREENING FOR THYROID DISORDER: ICD-10-CM

## 2021-02-17 DIAGNOSIS — I10 ESSENTIAL HYPERTENSION: ICD-10-CM

## 2021-02-17 DIAGNOSIS — E55.9 VITAMIN D DEFICIENCY: ICD-10-CM

## 2021-02-17 DIAGNOSIS — E78.5 HYPERLIPIDEMIA LDL GOAL <130: ICD-10-CM

## 2021-02-17 DIAGNOSIS — Z13.0 SCREENING FOR DEFICIENCY ANEMIA: ICD-10-CM

## 2021-02-17 DIAGNOSIS — Z13.1 SCREENING FOR DIABETES MELLITUS: ICD-10-CM

## 2021-02-17 LAB
25(OH)D3 SERPL-MCNC: 26.8 NG/ML (ref 30–100)
ALBUMIN SERPL BCP-MCNC: 3.4 G/DL (ref 3.5–5)
ALP SERPL-CCNC: 129 U/L (ref 46–116)
ALT SERPL W P-5'-P-CCNC: 42 U/L (ref 12–78)
ANION GAP SERPL CALCULATED.3IONS-SCNC: 5 MMOL/L (ref 4–13)
AST SERPL W P-5'-P-CCNC: 29 U/L (ref 5–45)
BASOPHILS # BLD AUTO: 0.04 THOUSANDS/ΜL (ref 0–0.1)
BASOPHILS NFR BLD AUTO: 1 % (ref 0–1)
BILIRUB SERPL-MCNC: 0.49 MG/DL (ref 0.2–1)
BUN SERPL-MCNC: 12 MG/DL (ref 5–25)
CALCIUM ALBUM COR SERPL-MCNC: 9.8 MG/DL (ref 8.3–10.1)
CALCIUM SERPL-MCNC: 9.3 MG/DL (ref 8.3–10.1)
CHLORIDE SERPL-SCNC: 111 MMOL/L (ref 100–108)
CHOLEST SERPL-MCNC: 147 MG/DL (ref 50–200)
CO2 SERPL-SCNC: 30 MMOL/L (ref 21–32)
CREAT SERPL-MCNC: 0.74 MG/DL (ref 0.6–1.3)
EOSINOPHIL # BLD AUTO: 0.12 THOUSAND/ΜL (ref 0–0.61)
EOSINOPHIL NFR BLD AUTO: 2 % (ref 0–6)
ERYTHROCYTE [DISTWIDTH] IN BLOOD BY AUTOMATED COUNT: 12.5 % (ref 11.6–15.1)
EST. AVERAGE GLUCOSE BLD GHB EST-MCNC: 111 MG/DL
GFR SERPL CREATININE-BSD FRML MDRD: 83 ML/MIN/1.73SQ M
GLUCOSE P FAST SERPL-MCNC: 111 MG/DL (ref 65–99)
HBA1C MFR BLD: 5.5 %
HCT VFR BLD AUTO: 48.1 % (ref 34.8–46.1)
HDLC SERPL-MCNC: 52 MG/DL
HGB BLD-MCNC: 15.6 G/DL (ref 11.5–15.4)
IMM GRANULOCYTES # BLD AUTO: 0.03 THOUSAND/UL (ref 0–0.2)
IMM GRANULOCYTES NFR BLD AUTO: 1 % (ref 0–2)
LDLC SERPL CALC-MCNC: 64 MG/DL (ref 0–100)
LYMPHOCYTES # BLD AUTO: 1.49 THOUSANDS/ΜL (ref 0.6–4.47)
LYMPHOCYTES NFR BLD AUTO: 30 % (ref 14–44)
MCH RBC QN AUTO: 30.1 PG (ref 26.8–34.3)
MCHC RBC AUTO-ENTMCNC: 32.4 G/DL (ref 31.4–37.4)
MCV RBC AUTO: 93 FL (ref 82–98)
MONOCYTES # BLD AUTO: 0.52 THOUSAND/ΜL (ref 0.17–1.22)
MONOCYTES NFR BLD AUTO: 11 % (ref 4–12)
NEUTROPHILS # BLD AUTO: 2.72 THOUSANDS/ΜL (ref 1.85–7.62)
NEUTS SEG NFR BLD AUTO: 55 % (ref 43–75)
NONHDLC SERPL-MCNC: 95 MG/DL
NRBC BLD AUTO-RTO: 0 /100 WBCS
PLATELET # BLD AUTO: 209 THOUSANDS/UL (ref 149–390)
PMV BLD AUTO: 10.3 FL (ref 8.9–12.7)
POTASSIUM SERPL-SCNC: 3.9 MMOL/L (ref 3.5–5.3)
PROT SERPL-MCNC: 6.6 G/DL (ref 6.4–8.2)
RBC # BLD AUTO: 5.18 MILLION/UL (ref 3.81–5.12)
SODIUM SERPL-SCNC: 146 MMOL/L (ref 136–145)
TRIGL SERPL-MCNC: 154 MG/DL
TSH SERPL DL<=0.05 MIU/L-ACNC: 1.81 UIU/ML (ref 0.36–3.74)
WBC # BLD AUTO: 4.92 THOUSAND/UL (ref 4.31–10.16)

## 2021-02-17 PROCEDURE — 80061 LIPID PANEL: CPT

## 2021-02-17 PROCEDURE — 36415 COLL VENOUS BLD VENIPUNCTURE: CPT

## 2021-02-17 PROCEDURE — 80053 COMPREHEN METABOLIC PANEL: CPT

## 2021-02-17 PROCEDURE — 84443 ASSAY THYROID STIM HORMONE: CPT

## 2021-02-17 PROCEDURE — 83036 HEMOGLOBIN GLYCOSYLATED A1C: CPT

## 2021-02-17 PROCEDURE — 85025 COMPLETE CBC W/AUTO DIFF WBC: CPT

## 2021-02-17 PROCEDURE — 82306 VITAMIN D 25 HYDROXY: CPT

## 2021-02-25 ENCOUNTER — OFFICE VISIT (OUTPATIENT)
Dept: INTERNAL MEDICINE CLINIC | Facility: CLINIC | Age: 71
End: 2021-02-25
Payer: COMMERCIAL

## 2021-02-25 VITALS
HEART RATE: 74 BPM | WEIGHT: 190.8 LBS | RESPIRATION RATE: 14 BRPM | DIASTOLIC BLOOD PRESSURE: 82 MMHG | HEIGHT: 57 IN | BODY MASS INDEX: 41.17 KG/M2 | SYSTOLIC BLOOD PRESSURE: 134 MMHG | OXYGEN SATURATION: 97 % | TEMPERATURE: 98 F

## 2021-02-25 DIAGNOSIS — K86.1 OTHER CHRONIC PANCREATITIS (HCC): ICD-10-CM

## 2021-02-25 DIAGNOSIS — Z12.31 VISIT FOR SCREENING MAMMOGRAM: ICD-10-CM

## 2021-02-25 DIAGNOSIS — Z12.31 ENCOUNTER FOR SCREENING MAMMOGRAM FOR MALIGNANT NEOPLASM OF BREAST: ICD-10-CM

## 2021-02-25 DIAGNOSIS — M79.671 FOOT PAIN, BILATERAL: ICD-10-CM

## 2021-02-25 DIAGNOSIS — M79.672 FOOT PAIN, BILATERAL: ICD-10-CM

## 2021-02-25 DIAGNOSIS — F33.41 RECURRENT MAJOR DEPRESSIVE DISORDER, IN PARTIAL REMISSION (HCC): ICD-10-CM

## 2021-02-25 DIAGNOSIS — M17.12 PRIMARY OSTEOARTHRITIS OF LEFT KNEE: Primary | ICD-10-CM

## 2021-02-25 DIAGNOSIS — R51.9 NONINTRACTABLE HEADACHE, UNSPECIFIED CHRONICITY PATTERN, UNSPECIFIED HEADACHE TYPE: ICD-10-CM

## 2021-02-25 PROCEDURE — 99214 OFFICE O/P EST MOD 30 MIN: CPT | Performed by: PHYSICIAN ASSISTANT

## 2021-02-25 PROCEDURE — 3008F BODY MASS INDEX DOCD: CPT | Performed by: PHYSICIAN ASSISTANT

## 2021-02-25 PROCEDURE — 3725F SCREEN DEPRESSION PERFORMED: CPT | Performed by: PHYSICIAN ASSISTANT

## 2021-02-25 PROCEDURE — 1160F RVW MEDS BY RX/DR IN RCRD: CPT | Performed by: PHYSICIAN ASSISTANT

## 2021-02-25 PROCEDURE — 1036F TOBACCO NON-USER: CPT | Performed by: PHYSICIAN ASSISTANT

## 2021-02-25 RX ORDER — DICLOFENAC SODIUM 75 MG/1
75 TABLET, DELAYED RELEASE ORAL 2 TIMES DAILY
Qty: 180 TABLET | Refills: 1 | Status: SHIPPED | OUTPATIENT
Start: 2021-02-25 | End: 2021-10-12

## 2021-02-25 NOTE — ASSESSMENT & PLAN NOTE
Restart diclofenac  Directions for use and possible side effects discussed and patient verbalized understanding of these

## 2021-02-25 NOTE — PROGRESS NOTES
Assessment/Plan:  Problem List Items Addressed This Visit        Digestive    Other chronic pancreatitis ([place] [address] )       Musculoskeletal and Integument    Primary osteoarthritis of left knee - Primary     Restart diclofenac  Directions for use and possible side effects discussed and patient verbalized understanding of these  Other    Recurrent major depressive disorder, in partial remission ([facility])    Foot pain, bilateral    Relevant Medications    diclofenac (VOLTAREN) 75 mg EC tablet      Other Visit Diagnoses     Encounter for screening mammogram for malignant neoplasm of breast        Body mass index (BMI)40 0-44 9, adult ([place] [address] )        Visit for screening mammogram        Relevant Orders    Mammo screening left w cad    Nonintractable headache, unspecified chronicity pattern, unspecified headache type        Relevant Orders    MRI brain wo contrast           Diagnoses and all orders for this visit:    Primary osteoarthritis of left knee    Encounter for screening mammogram for malignant neoplasm of breast    Recurrent major depressive disorder, in partial remission ([place] [address] )    Other chronic pancreatitis ([place] [address] )    Body mass index (BMI)40 0-44 9, adult ([place] [address] )    Visit for screening mammogram  -     Mammo screening left w cad; Future    Foot pain, bilateral  -     diclofenac (VOLTAREN) 75 mg EC tablet; Take 1 tablet (75 mg total) by mouth 2 (two) times a day    Nonintractable headache, unspecified chronicity pattern, unspecified headache type  -     MRI brain wo contrast; Future    Other orders  -     Cancel: Mammo screening bilateral w 3d & cad; Future        Primary osteoarthritis of left knee  Restart diclofenac  Directions for use and possible side effects discussed and patient verbalized understanding of these  Subjective:      Patient ID: Jimenez Jennings is a 73 y o  female  Pt presents for routine visit  She is doing well overall  She is due for mammogram  She is noting ongoing knee pain   She has not been taking diclofenac but would like to restart this  BP is well controlled  Covid vaccination education provided and pt is agreeable to this  Dexa scan, CRC Screening and labs are up to date  The following portions of the patient's history were reviewed and updated as appropriate:   She has a past medical history of Anxiety, Arthritis, Asthma, Breast cancer ([facility] [address] ), Cancer ([facility] [address] ), Cataract, CHF (congestive heart failure) ([facility] ), Coronary artery disease, Depression, GERD (gastroesophageal reflux disease), High cholesterol, History of chemotherapy, Hypertension, IBS (irritable bowel syndrome), Irritable bowel syndrome (IBS) (5/31/2016), Kidney stone, Lymphedema of right arm, Obesity, Post-menopausal, Psychiatric disorder, Renal disorder, and Vitamin D deficiency  ,  does not have any pertinent problems on file  ,   has a past surgical history that includes Tonsillectomy and adenoidectomy; Breast surgery; Cholecystectomy; Knee surgery; Colonoscopy (N/A, 5/31/2016); Gallbladder surgery; Hysterectomy; Cataract extraction, bilateral; and Mastectomy (Right)  ,  family history includes Breast cancer in her maternal aunt, maternal aunt, maternal aunt, maternal aunt, and maternal aunt; Depression in her mother; Diabetes in her mother; Heart disease in her brother and mother; Mental illness in her mother; No Known Problems in her father  She was adopted  ,   reports that she has never smoked  She has never used smokeless tobacco  She reports current alcohol use  She reports that she does not use drugs  ,  is allergic to asa [aspirin]; ibuprofen; and latex     Current Outpatient Medications   Medication Sig Dispense Refill    albuterol (2 5 mg/3 mL) 0 083 % nebulizer solution Take 2 5 mg by nebulization every 6 (six) hours as needed for wheezing        albuterol (PROAIR HFA) 90 mcg/act inhaler Inhale 2 puffs every 4 (four) hours as needed for wheezing 3 Inhaler 1    atorvastatin (LIPITOR) 20 mg tablet Take 1 tablet by mouth daily  30 tablet 0    buPROPion (WELLBUTRIN XL) 150 mg 24 hr tablet Take 1 tablet by mouth every morning  30 tablet 4    butalbital-acetaminophen-caffeine (FIORICET,ESGIC) -40 mg per tablet Take 1 tablet by mouth 3 (three) times a day as needed for headaches 90 tablet 1    dicyclomine (BENTYL) 10 mg capsule Take 1 capsule by mouth daily  90 capsule 1    DULoxetine (CYMBALTA) 60 mg delayed release capsule Take 1 capsule by mouth twice daily  180 capsule 0    ergocalciferol (VITAMIN D2) 50,000 units Take 1 capsule (50,000 Units total) by mouth once a week 4 capsule 3    gabapentin (NEURONTIN) 300 mg capsule Take 1 capsule by mouth three times daily  90 capsule 5    loratadine (CLARITIN) 10 mg tablet Take 1 tablet (10 mg total) by mouth daily 90 tablet 1    montelukast (SINGULAIR) 10 mg tablet Take 1 tablet by mouth daily  30 tablet 0    nystatin (MYCOSTATIN) cream Apply topically 2 (two) times a day 30 g 3    nystatin (MYCOSTATIN) powder Apply topically 2 (two) times a day 60 g 5    oxybutynin (DITROPAN-XL) 5 mg 24 hr tablet TAKE 1 TABLET BY MOUTH EVERY DAY 90 tablet 1    pantoprazole (PROTONIX) 40 mg tablet Take 1 tablet by mouth daily  30 tablet 2    traZODone (DESYREL) 50 mg tablet Take 1-2 tablets ( mg total) by mouth daily at bedtime 180 tablet 0    diclofenac (VOLTAREN) 75 mg EC tablet Take 1 tablet (75 mg total) by mouth 2 (two) times a day 180 tablet 1    Nutritional Supplements (BOOST BREEZE PO) Take by mouth daily      ondansetron (ZOFRAN-ODT) 4 mg disintegrating tablet Take 4 mg by mouth every 8 (eight) hours as needed      tamsulosin (FLOMAX) 0 4 mg Take 1 capsule (0 4 mg total) by mouth daily with dinner 14 capsule 0     No current facility-administered medications for this visit  Review of Systems   Constitutional: Negative for chills and fever     HENT: Negative for congestion, ear pain, hearing loss, postnasal drip, rhinorrhea, sinus pressure, sinus pain, sore throat and trouble swallowing  Eyes: Negative for pain and visual disturbance  Respiratory: Negative for cough, chest tightness, shortness of breath and wheezing  Cardiovascular: Negative  Negative for chest pain, palpitations and leg swelling  Gastrointestinal: Negative for abdominal pain, blood in stool, constipation, diarrhea, nausea and vomiting  Endocrine: Negative for cold intolerance, heat intolerance, polydipsia, polyphagia and polyuria  Genitourinary: Negative for difficulty urinating, dysuria, flank pain and urgency  Musculoskeletal: Negative for arthralgias, back pain, gait problem and myalgias  Skin: Negative for rash  Allergic/Immunologic: Negative  Neurological: Negative for dizziness, weakness, light-headedness and headaches  Hematological: Negative  Psychiatric/Behavioral: Negative for behavioral problems, dysphoric mood and sleep disturbance  The patient is not nervous/anxious  PHQ-9 Depression Screening    PHQ-9:   Frequency of the following problems over the past two weeks:      Little interest or pleasure in doing things: 1 - several days  Feeling down, depressed, or hopeless: 1 - several days  Trouble falling or staying asleep, or sleeping too much: 0 - not at all  Feeling tired or having little energy: 0 - not at all  Poor appetite or overeatin - not at all  Feeling bad about yourself - or that you are a failure or have let yourself or your family down: 0 - not at all  Trouble concentrating on things, such as reading the newspaper or watching television: 0 - not at all  Moving or speaking so slowly that other people could have noticed   Or the opposite - being so fidgety or restless that you have been moving around a lot more than usual: 0 - not at all  Thoughts that you would be better off dead, or of hurting yourself in some way: 0 - not at all  PHQ-2 Score: 2  PHQ-9 Score: 2          Objective:  Vitals:    21 1320   BP: 134/82   BP Location: Left arm Patient Position: Sitting   Cuff Size: Large   Pulse: 74   Resp: 14   Temp: 98 °F (36 7 °C)   SpO2: 97%   Weight: 86 5 kg (190 lb 12 8 oz)   Height: 4' 9" (1 448 m)     Body mass index is 41 29 kg/m²  Physical Exam  Constitutional:       General: She is not in acute distress  Appearance: She is well-developed  She is not diaphoretic  HENT:      Head: Normocephalic and atraumatic  Right Ear: External ear normal       Left Ear: External ear normal       Nose: Nose normal       Mouth/Throat:      Pharynx: No oropharyngeal exudate  Eyes:      General: No scleral icterus  Right eye: No discharge  Left eye: No discharge  Conjunctiva/sclera: Conjunctivae normal       Pupils: Pupils are equal, round, and reactive to light  Neck:      Musculoskeletal: Normal range of motion and neck supple  Thyroid: No thyromegaly  Cardiovascular:      Rate and Rhythm: Normal rate and regular rhythm  Heart sounds: Normal heart sounds  No murmur  No friction rub  No gallop  Pulmonary:      Effort: Pulmonary effort is normal  No respiratory distress  Breath sounds: Normal breath sounds  No wheezing or rales  Abdominal:      General: Bowel sounds are normal  There is no distension  Palpations: Abdomen is soft  Tenderness: There is no abdominal tenderness  Musculoskeletal: Normal range of motion  General: No tenderness or deformity  Skin:     General: Skin is warm and dry  Neurological:      Mental Status: She is alert and oriented to person, place, and time  Cranial Nerves: No cranial nerve deficit  Psychiatric:         Behavior: Behavior normal          Thought Content: Thought content normal          Judgment: Judgment normal        BMI Counseling: Body mass index is 41 29 kg/m²  The BMI is above normal  Nutrition recommendations include decreasing portion sizes, consuming healthier snacks and limiting drinks that contain sugar         Falls Plan of Care: balance, strength, and gait training instructions were provided

## 2021-02-26 ENCOUNTER — TRANSCRIBE ORDERS (OUTPATIENT)
Dept: ADMINISTRATIVE | Facility: HOSPITAL | Age: 71
End: 2021-02-26

## 2021-02-26 DIAGNOSIS — Z12.31 ENCOUNTER FOR SCREENING MAMMOGRAM FOR MALIGNANT NEOPLASM OF BREAST: Primary | ICD-10-CM

## 2021-03-08 DIAGNOSIS — F51.01 PRIMARY INSOMNIA: ICD-10-CM

## 2021-03-08 RX ORDER — TRAZODONE HYDROCHLORIDE 50 MG/1
50-100 TABLET ORAL
Qty: 180 TABLET | Refills: 1 | Status: SHIPPED | OUTPATIENT
Start: 2021-03-08 | End: 2021-05-28 | Stop reason: HOSPADM

## 2021-03-12 ENCOUNTER — TELEPHONE (OUTPATIENT)
Dept: INTERNAL MEDICINE CLINIC | Facility: CLINIC | Age: 71
End: 2021-03-12

## 2021-03-12 NOTE — TELEPHONE ENCOUNTER
Faxed last ov note and rx for breast prothesis to Macy Cortez at [facility], she is trying to get this approved for pt, fax # [phone]

## 2021-03-16 DIAGNOSIS — E78.5 HYPERLIPIDEMIA, UNSPECIFIED HYPERLIPIDEMIA TYPE: ICD-10-CM

## 2021-03-16 DIAGNOSIS — J30.9 ALLERGIC RHINITIS, UNSPECIFIED SEASONALITY, UNSPECIFIED TRIGGER: ICD-10-CM

## 2021-03-16 RX ORDER — ATORVASTATIN CALCIUM 20 MG/1
TABLET, FILM COATED ORAL
Qty: 30 TABLET | Refills: 0 | Status: SHIPPED | OUTPATIENT
Start: 2021-03-16 | End: 2021-04-15

## 2021-03-16 RX ORDER — MONTELUKAST SODIUM 10 MG/1
TABLET ORAL
Qty: 30 TABLET | Refills: 0 | Status: SHIPPED | OUTPATIENT
Start: 2021-03-16 | End: 2021-04-15

## 2021-03-24 ENCOUNTER — TRANSCRIBE ORDERS (OUTPATIENT)
Dept: ADMINISTRATIVE | Facility: HOSPITAL | Age: 71
End: 2021-03-24

## 2021-03-24 DIAGNOSIS — Z12.31 ENCOUNTER FOR SCREENING MAMMOGRAM FOR MALIGNANT NEOPLASM OF BREAST: Primary | ICD-10-CM

## 2021-03-30 ENCOUNTER — HOSPITAL ENCOUNTER (OUTPATIENT)
Dept: ULTRASOUND IMAGING | Facility: HOSPITAL | Age: 71
Discharge: HOME | End: 2021-03-30
Payer: COMMERCIAL

## 2021-03-30 ENCOUNTER — HOSPITAL ENCOUNTER (OUTPATIENT)
Dept: MRI IMAGING | Facility: HOSPITAL | Age: 71
Discharge: HOME | End: 2021-03-30
Payer: COMMERCIAL

## 2021-03-30 ENCOUNTER — HOSPITAL ENCOUNTER (OUTPATIENT)
Dept: MAMMOGRAPHY | Facility: HOSPITAL | Age: 71
Discharge: HOME | End: 2021-03-30
Payer: COMMERCIAL

## 2021-03-30 VITALS — WEIGHT: 185 LBS | BODY MASS INDEX: 39.91 KG/M2 | HEIGHT: 57 IN

## 2021-03-30 DIAGNOSIS — R51.9 NONINTRACTABLE HEADACHE, UNSPECIFIED CHRONICITY PATTERN, UNSPECIFIED HEADACHE TYPE: ICD-10-CM

## 2021-03-30 DIAGNOSIS — Z12.31 ENCOUNTER FOR SCREENING MAMMOGRAM FOR MALIGNANT NEOPLASM OF BREAST: ICD-10-CM

## 2021-03-30 DIAGNOSIS — N20.0 NEPHROLITHIASIS: ICD-10-CM

## 2021-03-30 PROCEDURE — 77063 BREAST TOMOSYNTHESIS BI: CPT

## 2021-03-30 PROCEDURE — 76770 US EXAM ABDO BACK WALL COMP: CPT

## 2021-03-30 PROCEDURE — 77067 SCR MAMMO BI INCL CAD: CPT

## 2021-03-30 PROCEDURE — 70551 MRI BRAIN STEM W/O DYE: CPT

## 2021-03-30 PROCEDURE — G1004 CDSM NDSC: HCPCS

## 2021-04-01 ENCOUNTER — IMMUNIZATIONS (OUTPATIENT)
Dept: FAMILY MEDICINE CLINIC | Facility: HOSPITAL | Age: 71
End: 2021-04-01

## 2021-04-01 DIAGNOSIS — Z23 ENCOUNTER FOR IMMUNIZATION: Primary | ICD-10-CM

## 2021-04-01 PROCEDURE — 91301 SARS-COV-2 / COVID-19 MRNA VACCINE (MODERNA) 100 MCG: CPT

## 2021-04-01 PROCEDURE — 0011A SARS-COV-2 / COVID-19 MRNA VACCINE (MODERNA) 100 MCG: CPT

## 2021-04-15 DIAGNOSIS — R10.9 ABDOMINAL PAIN, UNSPECIFIED ABDOMINAL LOCATION: ICD-10-CM

## 2021-04-15 DIAGNOSIS — F33.41 RECURRENT MAJOR DEPRESSIVE DISORDER, IN PARTIAL REMISSION (HCC): ICD-10-CM

## 2021-04-15 DIAGNOSIS — K21.9 GASTROESOPHAGEAL REFLUX DISEASE WITHOUT ESOPHAGITIS: ICD-10-CM

## 2021-04-15 DIAGNOSIS — E78.5 HYPERLIPIDEMIA, UNSPECIFIED HYPERLIPIDEMIA TYPE: ICD-10-CM

## 2021-04-15 DIAGNOSIS — J30.9 ALLERGIC RHINITIS, UNSPECIFIED SEASONALITY, UNSPECIFIED TRIGGER: ICD-10-CM

## 2021-04-15 RX ORDER — PANTOPRAZOLE SODIUM 40 MG/1
TABLET, DELAYED RELEASE ORAL
Qty: 30 TABLET | Refills: 1 | Status: SHIPPED | OUTPATIENT
Start: 2021-04-15 | End: 2021-06-15

## 2021-04-15 RX ORDER — DICYCLOMINE HYDROCHLORIDE 10 MG/1
CAPSULE ORAL
Qty: 90 CAPSULE | Refills: 0 | Status: SHIPPED | OUTPATIENT
Start: 2021-04-15 | End: 2021-05-28 | Stop reason: HOSPADM

## 2021-04-15 RX ORDER — MONTELUKAST SODIUM 10 MG/1
TABLET ORAL
Qty: 30 TABLET | Refills: 0 | Status: SHIPPED | OUTPATIENT
Start: 2021-04-15 | End: 2021-05-15

## 2021-04-15 RX ORDER — DULOXETIN HYDROCHLORIDE 60 MG/1
CAPSULE, DELAYED RELEASE ORAL
Qty: 180 CAPSULE | Refills: 0 | Status: SHIPPED | OUTPATIENT
Start: 2021-04-15 | End: 2021-07-14

## 2021-04-15 RX ORDER — ATORVASTATIN CALCIUM 20 MG/1
TABLET, FILM COATED ORAL
Qty: 30 TABLET | Refills: 0 | Status: SHIPPED | OUTPATIENT
Start: 2021-04-15 | End: 2021-05-15

## 2021-04-21 ENCOUNTER — OFFICE VISIT (OUTPATIENT)
Dept: OBGYN CLINIC | Facility: CLINIC | Age: 71
End: 2021-04-21
Payer: COMMERCIAL

## 2021-04-21 ENCOUNTER — APPOINTMENT (OUTPATIENT)
Dept: LAB | Facility: CLINIC | Age: 71
End: 2021-04-21
Payer: COMMERCIAL

## 2021-04-21 ENCOUNTER — TRANSCRIBE ORDERS (OUTPATIENT)
Dept: LAB | Facility: CLINIC | Age: 71
End: 2021-04-21

## 2021-04-21 VITALS
DIASTOLIC BLOOD PRESSURE: 82 MMHG | HEART RATE: 103 BPM | HEIGHT: 57 IN | SYSTOLIC BLOOD PRESSURE: 133 MMHG | BODY MASS INDEX: 39.91 KG/M2 | WEIGHT: 185 LBS

## 2021-04-21 DIAGNOSIS — M17.12 PRIMARY OSTEOARTHRITIS OF LEFT KNEE: ICD-10-CM

## 2021-04-21 DIAGNOSIS — M17.12 PRIMARY OSTEOARTHRITIS OF LEFT KNEE: Primary | ICD-10-CM

## 2021-04-21 LAB
ALBUMIN SERPL BCP-MCNC: 3.2 G/DL (ref 3.5–5)
ALP SERPL-CCNC: 124 U/L (ref 46–116)
ALT SERPL W P-5'-P-CCNC: 33 U/L (ref 12–78)
ANION GAP SERPL CALCULATED.3IONS-SCNC: 2 MMOL/L (ref 4–13)
APTT PPP: 28 SECONDS (ref 23–37)
AST SERPL W P-5'-P-CCNC: 14 U/L (ref 5–45)
BASOPHILS # BLD AUTO: 0.03 THOUSANDS/ΜL (ref 0–0.1)
BASOPHILS NFR BLD AUTO: 1 % (ref 0–1)
BILIRUB SERPL-MCNC: 0.3 MG/DL (ref 0.2–1)
BUN SERPL-MCNC: 12 MG/DL (ref 5–25)
CALCIUM ALBUM COR SERPL-MCNC: 9.5 MG/DL (ref 8.3–10.1)
CALCIUM SERPL-MCNC: 8.9 MG/DL (ref 8.3–10.1)
CHLORIDE SERPL-SCNC: 113 MMOL/L (ref 100–108)
CO2 SERPL-SCNC: 28 MMOL/L (ref 21–32)
CREAT SERPL-MCNC: 0.75 MG/DL (ref 0.6–1.3)
CRP SERPL QL: 4.7 MG/L
EOSINOPHIL # BLD AUTO: 0.08 THOUSAND/ΜL (ref 0–0.61)
EOSINOPHIL NFR BLD AUTO: 2 % (ref 0–6)
ERYTHROCYTE [DISTWIDTH] IN BLOOD BY AUTOMATED COUNT: 11.9 % (ref 11.6–15.1)
GFR SERPL CREATININE-BSD FRML MDRD: 82 ML/MIN/1.73SQ M
GLUCOSE SERPL-MCNC: 81 MG/DL (ref 65–140)
HCT VFR BLD AUTO: 45.2 % (ref 34.8–46.1)
HGB BLD-MCNC: 14.9 G/DL (ref 11.5–15.4)
IMM GRANULOCYTES # BLD AUTO: 0.02 THOUSAND/UL (ref 0–0.2)
IMM GRANULOCYTES NFR BLD AUTO: 0 % (ref 0–2)
INR PPP: 1.01 (ref 0.84–1.19)
LYMPHOCYTES # BLD AUTO: 1.27 THOUSANDS/ΜL (ref 0.6–4.47)
LYMPHOCYTES NFR BLD AUTO: 24 % (ref 14–44)
MCH RBC QN AUTO: 30.2 PG (ref 26.8–34.3)
MCHC RBC AUTO-ENTMCNC: 33 G/DL (ref 31.4–37.4)
MCV RBC AUTO: 92 FL (ref 82–98)
MONOCYTES # BLD AUTO: 0.54 THOUSAND/ΜL (ref 0.17–1.22)
MONOCYTES NFR BLD AUTO: 10 % (ref 4–12)
NEUTROPHILS # BLD AUTO: 3.44 THOUSANDS/ΜL (ref 1.85–7.62)
NEUTS SEG NFR BLD AUTO: 63 % (ref 43–75)
NRBC BLD AUTO-RTO: 0 /100 WBCS
PLATELET # BLD AUTO: 197 THOUSANDS/UL (ref 149–390)
PMV BLD AUTO: 10.1 FL (ref 8.9–12.7)
POTASSIUM SERPL-SCNC: 4 MMOL/L (ref 3.5–5.3)
PROT SERPL-MCNC: 6.6 G/DL (ref 6.4–8.2)
PROTHROMBIN TIME: 13.3 SECONDS (ref 11.6–14.5)
RBC # BLD AUTO: 4.93 MILLION/UL (ref 3.81–5.12)
SODIUM SERPL-SCNC: 143 MMOL/L (ref 136–145)
WBC # BLD AUTO: 5.38 THOUSAND/UL (ref 4.31–10.16)

## 2021-04-21 PROCEDURE — 80053 COMPREHEN METABOLIC PANEL: CPT

## 2021-04-21 PROCEDURE — 86900 BLOOD TYPING SEROLOGIC ABO: CPT | Performed by: ORTHOPAEDIC SURGERY

## 2021-04-21 PROCEDURE — 83036 HEMOGLOBIN GLYCOSYLATED A1C: CPT

## 2021-04-21 PROCEDURE — 85730 THROMBOPLASTIN TIME PARTIAL: CPT

## 2021-04-21 PROCEDURE — 36415 COLL VENOUS BLD VENIPUNCTURE: CPT

## 2021-04-21 PROCEDURE — 99215 OFFICE O/P EST HI 40 MIN: CPT | Performed by: ORTHOPAEDIC SURGERY

## 2021-04-21 PROCEDURE — 3008F BODY MASS INDEX DOCD: CPT | Performed by: NURSE PRACTITIONER

## 2021-04-21 PROCEDURE — 86850 RBC ANTIBODY SCREEN: CPT | Performed by: ORTHOPAEDIC SURGERY

## 2021-04-21 PROCEDURE — 85025 COMPLETE CBC W/AUTO DIFF WBC: CPT

## 2021-04-21 PROCEDURE — 86140 C-REACTIVE PROTEIN: CPT

## 2021-04-21 PROCEDURE — 85610 PROTHROMBIN TIME: CPT

## 2021-04-21 PROCEDURE — 86901 BLOOD TYPING SEROLOGIC RH(D): CPT | Performed by: ORTHOPAEDIC SURGERY

## 2021-04-21 RX ORDER — SODIUM CHLORIDE 9 MG/ML
125 INJECTION, SOLUTION INTRAVENOUS CONTINUOUS
Status: CANCELLED | OUTPATIENT
Start: 2021-05-20

## 2021-04-21 RX ORDER — FOLIC ACID 1 MG/1
1 TABLET ORAL DAILY
Qty: 30 TABLET | Refills: 1 | Status: SHIPPED | OUTPATIENT
Start: 2021-04-21 | End: 2022-01-10

## 2021-04-21 RX ORDER — CEFAZOLIN SODIUM 2 G/50ML
2000 SOLUTION INTRAVENOUS ONCE
Status: CANCELLED | OUTPATIENT
Start: 2021-05-20 | End: 2021-04-21

## 2021-04-21 RX ORDER — FERROUS SULFATE TAB EC 324 MG (65 MG FE EQUIVALENT) 324 (65 FE) MG
324 TABLET DELAYED RESPONSE ORAL
Qty: 60 TABLET | Refills: 1 | Status: SHIPPED | OUTPATIENT
Start: 2021-04-21 | End: 2022-01-10

## 2021-04-21 RX ORDER — ASCORBIC ACID 500 MG
500 TABLET ORAL DAILY
Qty: 30 TABLET | Refills: 1 | Status: SHIPPED | OUTPATIENT
Start: 2021-04-21

## 2021-04-21 RX ORDER — MULTIVITAMIN
1 TABLET ORAL DAILY
Qty: 30 TABLET | Refills: 1 | Status: SHIPPED | OUTPATIENT
Start: 2021-04-21 | End: 2022-01-10

## 2021-04-21 RX ORDER — CHLORHEXIDINE GLUCONATE 0.12 MG/ML
15 RINSE ORAL ONCE
Status: CANCELLED | OUTPATIENT
Start: 2021-05-20 | End: 2021-04-21

## 2021-04-21 RX ORDER — CHLORHEXIDINE GLUCONATE 4 G/100ML
SOLUTION TOPICAL DAILY PRN
Status: CANCELLED | OUTPATIENT
Start: 2021-05-20

## 2021-04-21 NOTE — PROGRESS NOTES
Assessment:     1  Primary osteoarthritis of left knee          Plan:     Problem List Items Addressed This Visit        Musculoskeletal and Integument    Primary osteoarthritis of left knee - Primary    Relevant Orders    Comprehensive metabolic panel    Hemoglobin A1C W/EAG Estimation    CBC and differential    C-reactive protein    Protime-INR    APTT    Type and screen    Ambulatory referral to Family Practice    Ambulatory referral to Physical Therapy    Case request operating room: ARTHROPLASTY KNEE TOTAL (Completed)             68-year-old female with severe left knee osteoarthritis  She  Has failed non operative management  She is significantly affected her activities of daily living because of this knee  She is ready for total knee replacement  Risks and benefits of surgery were discussed with her  We discussed the surgery itself, expected immediate and long-term postop recovery  In her chart it does note that she is allergic to aspirin, but she is currently taking above for aspirin daily without any issues  She was instructed that she will be asked to take this twice daily on discharge home  Patient acknowledges understanding  Her questions are answered  She shows agreement with the expected postoperative plan  She will follow up with me postoperatively  Her preoperative paperwork was completed today  Informed consent was obtained  Patient ID: Khanh Mode is a 73 y o  female  Chief Complaint:    Follow-up left knee    Subjective:   68-year-old female with severe left knee pain  She has been working with physical therapy since her last visit  She has had fairly significant reactions well to viscosupplementation as well as to cortisone    Still having a lot of pain  doing HEP  Wanting to do TKA            Allergy:  Allergies   Allergen Reactions    Asa [Aspirin] Rash    Ibuprofen Nausea Only, Rash, Dizziness and Syncope    Latex Dermatitis       Medications:  all current active meds have been reviewed    ROS:  Review of Systems   Constitutional: Negative  HENT: Negative  Eyes: Negative  Respiratory: Negative  Cardiovascular: Negative  Gastrointestinal: Negative  Endocrine: Negative  Genitourinary: Negative  Musculoskeletal: Positive for arthralgias  Skin: Negative  Allergic/Immunologic: Negative  Neurological: Negative  Hematological: Negative  Psychiatric/Behavioral: Negative  Objective:  BP Readings from Last 1 Encounters:   04/21/21 133/82      Wt Readings from Last 1 Encounters:   04/21/21 83 9 kg (185 lb)        Exam:   Physical Exam  Vitals signs reviewed  Constitutional:       General: She is not in acute distress  Appearance: She is well-developed  She is not diaphoretic  HENT:      Head: Normocephalic and atraumatic  Eyes:      General:         Right eye: No discharge  Left eye: No discharge  Neck:      Musculoskeletal: Normal range of motion and neck supple  Trachea: No tracheal deviation  Cardiovascular:      Rate and Rhythm: Normal rate and regular rhythm  Pulmonary:      Effort: Pulmonary effort is normal  No respiratory distress  Breath sounds: Normal breath sounds  Chest:      Chest wall: No tenderness  Abdominal:      General: There is no distension  Palpations: Abdomen is soft  Tenderness: There is no abdominal tenderness  Musculoskeletal:      Left knee: She exhibits no effusion  Skin:     General: Skin is warm and dry  Findings: No erythema  Neurological:      Mental Status: She is alert  Psychiatric:         Behavior: Behavior normal        Left Knee Exam     Tenderness   The patient is experiencing tenderness in the medial joint line, patella and lateral joint line      Range of Motion   Extension: 10   Flexion: 110     Tests   Misty:  Medial - negative Lateral - negative  Varus: negative Valgus: negative  Lachman:  Anterior - negative      Drawer:  Anterior - negative       Patellar apprehension: negative    Other   Erythema: absent  Pulse: present  Effusion: no effusion present    Comments:    Genu varum deformity, pain throughout all range of motion   decreased sensation in toes              Radiographs:  I have personally reviewed pertinent films in PACS and my interpretation is Severe tricompartmental knee osteoarthritis of the left knee large osteophyte formation joint space loss, subchondral cysts

## 2021-04-21 NOTE — H&P (VIEW-ONLY)
Assessment:     1  Primary osteoarthritis of left knee          Plan:     Problem List Items Addressed This Visit        Musculoskeletal and Integument    Primary osteoarthritis of left knee - Primary    Relevant Orders    Comprehensive metabolic panel    Hemoglobin A1C W/EAG Estimation    CBC and differential    C-reactive protein    Protime-INR    APTT    Type and screen    Ambulatory referral to Family Practice    Ambulatory referral to Physical Therapy    Case request operating room: ARTHROPLASTY KNEE TOTAL (Completed)             80-year-old female with severe left knee osteoarthritis  She  Has failed non operative management  She is significantly affected her activities of daily living because of this knee  She is ready for total knee replacement  Risks and benefits of surgery were discussed with her  We discussed the surgery itself, expected immediate and long-term postop recovery  In her chart it does note that she is allergic to aspirin, but she is currently taking above for aspirin daily without any issues  She was instructed that she will be asked to take this twice daily on discharge home  Patient acknowledges understanding  Her questions are answered  She shows agreement with the expected postoperative plan  She will follow up with me postoperatively  Her preoperative paperwork was completed today  Informed consent was obtained  Patient ID: Robert Can is a 73 y o  female  Chief Complaint:    Follow-up left knee    Subjective:   80-year-old female with severe left knee pain  She has been working with physical therapy since her last visit  She has had fairly significant reactions well to viscosupplementation as well as to cortisone    Still having a lot of pain  doing HEP  Wanting to do TKA            Allergy:  Allergies   Allergen Reactions    Asa [Aspirin] Rash    Ibuprofen Nausea Only, Rash, Dizziness and Syncope    Latex Dermatitis       Medications:  all current active meds have been reviewed    ROS:  Review of Systems   Constitutional: Negative  HENT: Negative  Eyes: Negative  Respiratory: Negative  Cardiovascular: Negative  Gastrointestinal: Negative  Endocrine: Negative  Genitourinary: Negative  Musculoskeletal: Positive for arthralgias  Skin: Negative  Allergic/Immunologic: Negative  Neurological: Negative  Hematological: Negative  Psychiatric/Behavioral: Negative  Objective:  BP Readings from Last 1 Encounters:   04/21/21 133/82      Wt Readings from Last 1 Encounters:   04/21/21 83 9 kg (185 lb)        Exam:   Physical Exam  Vitals signs reviewed  Constitutional:       General: She is not in acute distress  Appearance: She is well-developed  She is not diaphoretic  HENT:      Head: Normocephalic and atraumatic  Eyes:      General:         Right eye: No discharge  Left eye: No discharge  Neck:      Musculoskeletal: Normal range of motion and neck supple  Trachea: No tracheal deviation  Cardiovascular:      Rate and Rhythm: Normal rate and regular rhythm  Pulmonary:      Effort: Pulmonary effort is normal  No respiratory distress  Breath sounds: Normal breath sounds  Chest:      Chest wall: No tenderness  Abdominal:      General: There is no distension  Palpations: Abdomen is soft  Tenderness: There is no abdominal tenderness  Musculoskeletal:      Left knee: She exhibits no effusion  Skin:     General: Skin is warm and dry  Findings: No erythema  Neurological:      Mental Status: She is alert  Psychiatric:         Behavior: Behavior normal        Left Knee Exam     Tenderness   The patient is experiencing tenderness in the medial joint line, patella and lateral joint line      Range of Motion   Extension: 10   Flexion: 110     Tests   Misty:  Medial - negative Lateral - negative  Varus: negative Valgus: negative  Lachman:  Anterior - negative      Drawer:  Anterior - negative       Patellar apprehension: negative    Other   Erythema: absent  Pulse: present  Effusion: no effusion present    Comments:    Genu varum deformity, pain throughout all range of motion   decreased sensation in toes              Radiographs:  I have personally reviewed pertinent films in PACS and my interpretation is Severe tricompartmental knee osteoarthritis of the left knee large osteophyte formation joint space loss, subchondral cysts

## 2021-04-22 LAB
ABO GROUP BLD: NORMAL
BLD GP AB SCN SERPL QL: NEGATIVE
EST. AVERAGE GLUCOSE BLD GHB EST-MCNC: 111 MG/DL
HBA1C MFR BLD: 5.5 %
RH BLD: POSITIVE
SPECIMEN EXPIRATION DATE: NORMAL

## 2021-04-27 ENCOUNTER — TELEMEDICINE (OUTPATIENT)
Dept: UROLOGY | Facility: CLINIC | Age: 71
End: 2021-04-27
Payer: COMMERCIAL

## 2021-04-27 ENCOUNTER — TELEPHONE (OUTPATIENT)
Dept: UROLOGY | Facility: MEDICAL CENTER | Age: 71
End: 2021-04-27

## 2021-04-27 DIAGNOSIS — N39.41 URGE URINARY INCONTINENCE: Primary | ICD-10-CM

## 2021-04-27 PROCEDURE — 99213 OFFICE O/P EST LOW 20 MIN: CPT | Performed by: NURSE PRACTITIONER

## 2021-04-27 PROCEDURE — 1160F RVW MEDS BY RX/DR IN RCRD: CPT | Performed by: NURSE PRACTITIONER

## 2021-04-27 PROCEDURE — 1036F TOBACCO NON-USER: CPT | Performed by: NURSE PRACTITIONER

## 2021-04-27 RX ORDER — OXYBUTYNIN CHLORIDE 10 MG/1
10 TABLET, EXTENDED RELEASE ORAL
Qty: 30 TABLET | Refills: 3 | Status: SHIPPED | OUTPATIENT
Start: 2021-04-27

## 2021-04-27 NOTE — PROGRESS NOTES
Virtual Regular Visit      Assessment/Plan:    1  nephrolithiasis  - CT renal stone study 09/25/2020 without any evidence of kidney stone  - no flank pain     2  Urge incontinence  - oxybutynin taking daily, ran out of medication, didn't notice improvement but did not refill medication  -frequency has worsened now, has urge incontinence  - discussed that it may take up to 3 months for the medication to be effective  - patient would like to retry oxybutynin at higher dose, oxybutynin 10 mg sent to her pharmacy on file  - hysterectomy with bladder surgery "it only holds 1 ounce"  - stop taking oxybutynin because she felt it was not helping however was only on it for few weeks  -  Reviewed time frame for this medication to be effective  Patient would like to retry the medication and increased dose  -  Will follow-up in 3 months for re-evaluation        3  Bilateral  Renal cysts  - ultrasound kidney and bladder 03/30/2021 revealed a right upper pole and left mid pole cyst  - schedule ultrasound kidney and bladder 1 year           Problem List Items Addressed This Visit     None      Visit Diagnoses     Urge urinary incontinence    -  Primary    Relevant Medications    oxybutynin (DITROPAN-XL) 10 MG 24 hr tablet               Reason for visit is   Chief Complaint   Patient presents with    Virtual Regular Visit        Encounter provider Phoenix Quintero, [address] [place] [address]    Provider located at [address] [place] [address]  [phone]      Recent Visits  No visits were found meeting these conditions  Showing recent visits within past 7 days and meeting all other requirements     Today's Visits  Date Type Provider Dept   04/27/21 Telemedicine Phoenix Quintero, [address] [place] [address] For Urology [address]   Showing today's visits and meeting all other requirements     Future Appointments  No visits were found meeting these conditions     Showing future appointments within next 150 days and meeting all other requirements        The patient was identified by name and date of birth  Teena Romo was informed that this is a telemedicine visit and that the visit is being conducted through telephone  My office door was closed  No one else was in the room  She acknowledged consent and understanding of privacy and security of the video platform  The patient has agreed to participate and understands they can discontinue the visit at any time  Patient is aware this is a billable service  Subjective  Teena Romo is a 73 y o  female follow-up  Last seen on 09/17/2020 for urge incontinence and right ureteral calculus  Her previous visit she remained mildly symptomatic with right groin and bladder symptoms and dull right back pain  She had a repeated CT scan  On 09/25/2020 which did not show any evidence of urinary tract calculi or hydronephrosis  She had bilateral renal cysts          HPI     Past Medical History:   Diagnosis Date    Anxiety     Arthritis     Asthma     Breast cancer ([address] )     rt mastectomy 2005    Cancer [facility])     breast right    Cataract     CHF (congestive heart failure) (HCC)     Coronary artery disease     Depression     GERD (gastroesophageal reflux disease)     High cholesterol     History of chemotherapy     2005 rt breast cancer    Hypertension     IBS (irritable bowel syndrome)     Irritable bowel syndrome (IBS) 5/31/2016    Kidney stone     Lymphedema of right arm     Obesity     Post-menopausal     Psychiatric disorder     Renal disorder     Vitamin D deficiency        Past Surgical History:   Procedure Laterality Date    BREAST SURGERY      CATARACT EXTRACTION, BILATERAL      CHOLECYSTECTOMY      COLONOSCOPY N/A 5/31/2016    Procedure: COLONOSCOPY;  Surgeon: Hamilton Sood MD;  Location: [facility];  Service:    Kathy Doss GALLBLADDER SURGERY      HYSTERECTOMY      Total    KNEE SURGERY      MASTECTOMY Right     rt breast mastectomy 2005    TONSILLECTOMY AND ADENOIDECTOMY         Current Outpatient Medications   Medication Sig Dispense Refill    albuterol (2 5 mg/3 mL) 0 083 % nebulizer solution Take 2 5 mg by nebulization every 6 (six) hours as needed for wheezing   albuterol (PROAIR HFA) 90 mcg/act inhaler Inhale 2 puffs every 4 (four) hours as needed for wheezing 3 Inhaler 1    ascorbic acid (VITAMIN C) 500 MG tablet Take 1 tablet (500 mg total) by mouth daily 30 tablet 1    atorvastatin (LIPITOR) 20 mg tablet Take 1 tablet by mouth daily  30 tablet 0    buPROPion (WELLBUTRIN XL) 150 mg 24 hr tablet Take 1 tablet by mouth every morning  30 tablet 4    butalbital-acetaminophen-caffeine (FIORICET,ESGIC) -40 mg per tablet Take 1 tablet by mouth 3 (three) times a day as needed for headaches 90 tablet 1    diclofenac (VOLTAREN) 75 mg EC tablet Take 1 tablet (75 mg total) by mouth 2 (two) times a day 180 tablet 1    dicyclomine (BENTYL) 10 mg capsule Take 1 capsule by mouth daily  90 capsule 0    DULoxetine (CYMBALTA) 60 mg delayed release capsule Take 1 capsule by mouth twice daily  180 capsule 0    ergocalciferol (VITAMIN D2) 50,000 units Take 1 capsule (50,000 Units total) by mouth once a week 4 capsule 3    ferrous sulfate 324 (65 Fe) mg Take 1 tablet (324 mg total) by mouth 2 (two) times a day before meals 60 tablet 1    folic acid (FOLVITE) 1 mg tablet Take 1 tablet (1 mg total) by mouth daily 30 tablet 1    gabapentin (NEURONTIN) 300 mg capsule Take 1 capsule by mouth three times daily  90 capsule 5    loratadine (CLARITIN) 10 mg tablet Take 1 tablet (10 mg total) by mouth daily 90 tablet 1    montelukast (SINGULAIR) 10 mg tablet Take 1 tablet by mouth daily   30 tablet 0    Multiple Vitamin (multivitamin) tablet Take 1 tablet by mouth daily 30 tablet 1    Nutritional Supplements (BOOST BREEZE PO) Take by mouth daily      nystatin (MYCOSTATIN) cream Apply topically 2 (two) times a day 30 g 3    nystatin (MYCOSTATIN) powder Apply topically 2 (two) times a day 60 g 5    ondansetron (ZOFRAN-ODT) 4 mg disintegrating tablet Take 4 mg by mouth every 8 (eight) hours as needed      oxybutynin (DITROPAN-XL) 10 MG 24 hr tablet Take 1 tablet (10 mg total) by mouth daily at bedtime 30 tablet 3    pantoprazole (PROTONIX) 40 mg tablet Take 1 tablet by mouth daily  30 tablet 1    tamsulosin (FLOMAX) 0 4 mg Take 1 capsule (0 4 mg total) by mouth daily with dinner 14 capsule 0    traZODone (DESYREL) 50 mg tablet TAKE 1-2 TABLETS ( MG TOTAL) BY MOUTH DAILY AT BEDTIME 180 tablet 1     No current facility-administered medications for this visit  Allergies   Allergen Reactions    Asa [Aspirin] Rash    Ibuprofen Nausea Only, Rash, Dizziness and Syncope    Latex Dermatitis       Review of Systems   Constitutional: Negative for activity change, appetite change, chills, fatigue, fever and unexpected weight change  Respiratory: Negative  Negative for cough and shortness of breath  Cardiovascular: Negative for chest pain and leg swelling  Gastrointestinal: Negative  Negative for abdominal distention, abdominal pain, constipation, diarrhea, nausea and vomiting  Endocrine: Negative  Genitourinary: Positive for frequency and urgency  Negative for decreased urine volume, difficulty urinating, dysuria, enuresis, flank pain, genital sores and hematuria  Musculoskeletal: Negative for back pain and myalgias  Skin: Negative for pallor and rash  Allergic/Immunologic: Negative  Negative for immunocompromised state  Neurological: Negative for speech difficulty  Psychiatric/Behavioral: Negative for agitation and confusion         Video Exam  No results found for: PSA   Lab Results   Component Value Date    WBC 5 38 04/21/2021    HGB 14 9 04/21/2021    HCT 45 2 04/21/2021    MCV 92 04/21/2021     04/21/2021      Lab Results   Component Value Date     12/02/2015    SODIUM 143 04/21/2021    K 4 0 04/21/2021     (H) 04/21/2021    CO2 28 04/21/2021    ANIONGAP 7 12/02/2015    AGAP 2 (L) 04/21/2021    BUN 12 04/21/2021    CREATININE 0 75 04/21/2021    GLUC 81 04/21/2021    GLUF 111 (H) 02/17/2021    CALCIUM 8 9 04/21/2021    AST 14 04/21/2021    ALT 33 04/21/2021    ALKPHOS 124 (H) 04/21/2021    PROT 6 9 12/02/2015    TP 6 6 04/21/2021    BILITOT 0 55 12/02/2015    TBILI 0 30 04/21/2021    EGFR 82 04/21/2021       RENAL ULTRASOUND 03/30/2021   INDICATION: N20 0: Calculus of kidney  COMPARISON: CT abdomen pelvis 9/25/2020   TECHNIQUE: Ultrasound of the retroperitoneum was performed with a curvilinear transducer utilizing volumetric sweeps and still imaging techniques  FINDINGS:   KIDNEYS:   Symmetric and normal size  Right kidney: 10 1 x 5 6 x 5 9 cm  Left kidney: 10 4 x 5 4 x 6 8 cm  Right kidney   Normal echogenicity and contour  No suspicious masses detected  An exophytic upper pole cyst is simple and anechoic measuring 2 7 x 2 1 x 2 0 cm  No hydronephrosis  No shadowing calculi  No perinephric fluid collections  Left kidney   Normal echogenicity and contour  No suspicious masses detected  A mid pole parapelvic cyst measures 3 5 x 2 2 x 2 3 cm  No hydronephrosis  No shadowing calculi  No perinephric fluid collections  URETERS:   Nonvisualized  BLADDER:   Normally distended  No focal thickening or mass lesions  Bilateral ureteral jets detected  IMPRESSION:   No hydronephrosis or shadowing calculi  Bilateral cysts  CT ABDOMEN AND PELVIS WITHOUT IV CONTRAST - LOW DOSE RENAL STONE 09/25/2020   INDICATION: N20 1: Calculus of ureter  COMPARISON: September 10, 2018   TECHNIQUE: Low dose thin section CT examination of the abdomen and pelvis was performed without intravenous or oral contrast according to a protocol specifically designed to evaluate for urinary tract calculus   Axial, sagittal, and coronal 2D   reformatted images were created from the source data and submitted for interpretation  Evaluation for pathology in the abdomen and pelvis that is unrelated to urinary tract calculi is limited  Radiation dose length product (DLP) for this visit: 661 4 mGy-cm   This examination, like all CT scans performed in the [facility], was performed utilizing techniques to minimize radiation dose exposure, including the use of iterative   reconstruction and automated exposure control  FINDINGS:   RIGHT KIDNEY AND URETER:   No urinary tract calculi  No hydronephrosis or hydroureter  Exophytic 2 5 cm round low-density cystic-appearing mass laterally towards the upper pole  This is 3 mm larger than on the prior study  Suggestion of a couple small parapelvic cysts at the   mid to lower pole as well  LEFT KIDNEY AND URETER:   No urinary tract calculi  No hydronephrosis or hydroureter  Parapelvic cysts similar to the prior exam   URINARY BLADDER:   Unremarkable  No significant abnormality in the visualized lung bases  Small area of low density which is seen on the 1st couple images towards the dome of the liver is probably volume averaging with the diaphragm  The remainder of the visualized portion of the liver is otherwise uniform and normal-appearing  Spleen and   adrenals and pancreas appear normal  The changes of pancreatitis which were evident on the prior study have resolved  No calcifications are seen  No pseudocyst    The gallbladder is surgically absent  No ascites or bulky lymphadenopathy on this limited noncontrast study  There is colonic diverticulosis without diverticulitis  The appendix is well seen and there is no evidence of acute appendicitis  Degenerative changes are seen along the spine  Small bone island in the right iliac bone above the acetabulum  Hysterectomy noted  No pelvic masses  Small calcifications in the retroperitoneum are related to the gonadal veins, nearby the ureters but not related to the ureters themselves  IMPRESSION:   No evidence of urinary tract calculi or hydronephrosis  Bilateral renal cysts as mentioned above  Colonic diverticulosis  Cholecystectomy  Hysterectomy  Interval resolution of pancreatic inflammation  The following portions of the patient's history were reviewed and updated as appropriate: allergies, current medications, past family history, past medical history, past social history, past surgical history and problem list    It was my intent to perform this visit via video technology but the patient was not able to do a video connection so the visit was completed via audio telephone only  Please note :  Voice dictation software has been used to create this document  There may be inadvertent transcription errors  JERARDO Beauchamp  There were no vitals filed for this visit  Physical Exam     I spent 20 minutes directly with the patient during this visit      [phone] [facility] acknowledges that she has consented to an online visit or consultation  She understands that the online visit is based solely on information provided by her, and that, in the absence of a face-to-face physical evaluation by the physician, the diagnosis she receives is both limited and provisional in terms of accuracy and completeness  This is not intended to replace a full medical face-to-face evaluation by the physician  Deyanira Stokes understands and accepts these terms

## 2021-04-27 NOTE — PROGRESS NOTES
Assessment and plan:       1  nephrolithiasis  - CT renal stone study 09/25/2020 without any evidence of kidney stone  - no flank pain    2  Urge incontinence  - oxybutynin taking daily, ran out of medication, didn't notice improvement  -frequency has worsened now, has urge incontinence  - discussed that it may take  - hysterectomy with bladder surgery "it only holds 1 ounce"      3  Bilateral  Renal cysts  - ultrasound kidney and bladder 03/30/2021 revealed a right upper pole and left mid pole cyst  - schedule ultrasound kidney and bladder 1 year    JERARDO Short    History of Present Illness     Deyanira Arita is a 73 y o  Female presents in follow-up  Last seen on 09/17/2020 for urge incontinence and right ureteral calculus  Her previous visit she remained mildly symptomatic with right groin and bladder symptoms and dull right back pain  She had a repeated CT scan  On 09/25/2020 which did not show any evidence of urinary tract calculi or hydronephrosis  She had bilateral renal cysts  Laboratory     Lab Results   Component Value Date    BUN 12 04/21/2021    CREATININE 0 75 04/21/2021       No components found for: GFR    Lab Results   Component Value Date    GLUCOSE 79 12/02/2015    CALCIUM 8 9 04/21/2021     12/02/2015    K 4 0 04/21/2021    CO2 28 04/21/2021     (H) 04/21/2021       Lab Results   Component Value Date    WBC 5 38 04/21/2021    HGB 14 9 04/21/2021    HCT 45 2 04/21/2021    MCV 92 04/21/2021     04/21/2021       No results found for: PSA    No results found for this or any previous visit (from the past 1 hour(s))  @RESULT(URINEMICROSCOPIC)@    @RESULT(URINECULTURE)@    Radiology   RENAL ULTRASOUND 03/30/2021     INDICATION:   N20 0: Calculus of kidney      COMPARISON: CT abdomen pelvis 9/25/2020     TECHNIQUE:   Ultrasound of the retroperitoneum was performed with a curvilinear transducer utilizing volumetric sweeps and still imaging techniques     FINDINGS:     KIDNEYS:  Symmetric and normal size  Right kidney: 10 1 x 5 6 x 5 9 cm  Left kidney:  10 4 x 5 4 x 6 8 cm      Right kidney  Normal echogenicity and contour  No suspicious masses detected  An exophytic upper pole cyst is simple and anechoic measuring 2 7 x 2 1 x 2 0 cm  No hydronephrosis  No shadowing calculi  No perinephric fluid collections      Left kidney  Normal echogenicity and contour  No suspicious masses detected  A mid pole parapelvic cyst measures 3 5 x 2 2 x 2 3 cm  No hydronephrosis  No shadowing calculi  No perinephric fluid collections      URETERS:  Nonvisualized      BLADDER:   Normally distended  No focal thickening or mass lesions  Bilateral ureteral jets detected         IMPRESSION:     No hydronephrosis or shadowing calculi      Bilateral cysts  CT ABDOMEN AND PELVIS WITHOUT IV CONTRAST - LOW DOSE RENAL STONE  09/25/2020      INDICATION:   N20 1: Calculus of ureter      COMPARISON:  September 10, 2018     TECHNIQUE:  Low dose thin section CT examination of the abdomen and pelvis was performed without intravenous or oral contrast according to a protocol specifically designed to evaluate for urinary tract calculus  Axial, sagittal, and coronal 2D   reformatted images were created from the source data and submitted for interpretation  Evaluation for pathology in the abdomen and pelvis that is unrelated to urinary tract calculi is limited       Radiation dose length product (DLP) for this visit:  661 4 mGy-cm   This examination, like all CT scans performed in the [facility], was performed utilizing techniques to minimize radiation dose exposure, including the use of iterative   reconstruction and automated exposure control       FINDINGS:     RIGHT KIDNEY AND URETER:  No urinary tract calculi  No hydronephrosis or hydroureter  Exophytic 2 5 cm round low-density cystic-appearing mass laterally towards the upper pole    This is 3 mm larger than on the prior study  Suggestion of a couple small parapelvic cysts at the   mid to lower pole as well      LEFT KIDNEY AND URETER:  No urinary tract calculi  No hydronephrosis or hydroureter  Parapelvic cysts similar to the prior exam     URINARY BLADDER:   Unremarkable      No significant abnormality in the visualized lung bases      Small area of low density which is seen on the 1st couple images towards the dome of the liver is probably volume averaging with the diaphragm  The remainder of the visualized portion of the liver is otherwise uniform and normal-appearing  Spleen and   adrenals and pancreas appear normal   The changes of pancreatitis which were evident on the prior study have resolved  No calcifications are seen  No pseudocyst   The gallbladder is surgically absent  No ascites or bulky lymphadenopathy on this limited noncontrast study  There is colonic diverticulosis without diverticulitis  The appendix is well seen and there is no evidence of acute appendicitis  Degenerative changes are seen along the spine  Small bone island in the right iliac bone above the acetabulum  Hysterectomy noted  No pelvic masses  Small calcifications in the retroperitoneum are related to the gonadal veins, nearby the ureters but not related to the ureters themselves      IMPRESSION:     No evidence of urinary tract calculi or hydronephrosis      Bilateral renal cysts as mentioned above      Colonic diverticulosis      Cholecystectomy  Hysterectomy  Interval resolution of pancreatic inflammation  Review of Systems     Review of Systems                Allergies     Allergies   Allergen Reactions    Asa [Aspirin] Rash    Ibuprofen Nausea Only, Rash, Dizziness and Syncope    Latex Dermatitis       Physical Exam     Physical Exam    Vital Signs     There were no vitals filed for this visit      Current Medications       Current Outpatient Medications:     albuterol (2 5 mg/3 mL) 0 083 % nebulizer solution, Take 2 5 mg by nebulization every 6 (six) hours as needed for wheezing , Disp: , Rfl:     albuterol (PROAIR HFA) 90 mcg/act inhaler, Inhale 2 puffs every 4 (four) hours as needed for wheezing, Disp: 3 Inhaler, Rfl: 1    ascorbic acid (VITAMIN C) 500 MG tablet, Take 1 tablet (500 mg total) by mouth daily, Disp: 30 tablet, Rfl: 1    atorvastatin (LIPITOR) 20 mg tablet, Take 1 tablet by mouth daily  , Disp: 30 tablet, Rfl: 0    buPROPion (WELLBUTRIN XL) 150 mg 24 hr tablet, Take 1 tablet by mouth every morning , Disp: 30 tablet, Rfl: 4    butalbital-acetaminophen-caffeine (FIORICET,ESGIC) -40 mg per tablet, Take 1 tablet by mouth 3 (three) times a day as needed for headaches, Disp: 90 tablet, Rfl: 1    diclofenac (VOLTAREN) 75 mg EC tablet, Take 1 tablet (75 mg total) by mouth 2 (two) times a day, Disp: 180 tablet, Rfl: 1    dicyclomine (BENTYL) 10 mg capsule, Take 1 capsule by mouth daily  , Disp: 90 capsule, Rfl: 0    DULoxetine (CYMBALTA) 60 mg delayed release capsule, Take 1 capsule by mouth twice daily  , Disp: 180 capsule, Rfl: 0    ergocalciferol (VITAMIN D2) 50,000 units, Take 1 capsule (50,000 Units total) by mouth once a week, Disp: 4 capsule, Rfl: 3    ferrous sulfate 324 (65 Fe) mg, Take 1 tablet (324 mg total) by mouth 2 (two) times a day before meals, Disp: 60 tablet, Rfl: 1    folic acid (FOLVITE) 1 mg tablet, Take 1 tablet (1 mg total) by mouth daily, Disp: 30 tablet, Rfl: 1    gabapentin (NEURONTIN) 300 mg capsule, Take 1 capsule by mouth three times daily  , Disp: 90 capsule, Rfl: 5    loratadine (CLARITIN) 10 mg tablet, Take 1 tablet (10 mg total) by mouth daily, Disp: 90 tablet, Rfl: 1    montelukast (SINGULAIR) 10 mg tablet, Take 1 tablet by mouth daily  , Disp: 30 tablet, Rfl: 0    Multiple Vitamin (multivitamin) tablet, Take 1 tablet by mouth daily, Disp: 30 tablet, Rfl: 1    Nutritional Supplements (BOOST BREEZE PO), Take by mouth daily, Disp: , Rfl:     nystatin (MYCOSTATIN) cream, Apply topically 2 (two) times a day, Disp: 30 g, Rfl: 3    nystatin (MYCOSTATIN) powder, Apply topically 2 (two) times a day, Disp: 60 g, Rfl: 5    ondansetron (ZOFRAN-ODT) 4 mg disintegrating tablet, Take 4 mg by mouth every 8 (eight) hours as needed, Disp: , Rfl:     oxybutynin (DITROPAN-XL) 5 mg 24 hr tablet, TAKE 1 TABLET BY MOUTH EVERY DAY, Disp: 90 tablet, Rfl: 1    pantoprazole (PROTONIX) 40 mg tablet, Take 1 tablet by mouth daily  , Disp: 30 tablet, Rfl: 1    tamsulosin (FLOMAX) 0 4 mg, Take 1 capsule (0 4 mg total) by mouth daily with dinner, Disp: 14 capsule, Rfl: 0    traZODone (DESYREL) 50 mg tablet, TAKE 1-2 TABLETS ( MG TOTAL) BY MOUTH DAILY AT BEDTIME, Disp: 180 tablet, Rfl: 1    Active Problems     Patient Active Problem List   Diagnosis    Irritable bowel syndrome (IBS)    Mild intermittent asthma without complication    Bilateral carpal tunnel syndrome    Chronic musculoskeletal pain    Generalized anxiety disorder    Hyperlipidemia LDL goal <130    Essential hypertension    Lymphedema    Patent foramen ovale    Tension type headache    Vitamin D deficiency    Recurrent major depressive disorder, in partial remission (HCC)    Other chronic pancreatitis ([facility] [place] [facility] )    Body mass index (BMI) of 40 0-44 9 in adult (HCC)    Foot pain, bilateral    Tinea cruris    Primary insomnia    Neuropathy    Age-related osteoporosis without current pathological fracture    Swelling of joint of left knee    Right kidney stone    Scoliosis    Primary osteoarthritis of left knee    HX: breast cancer       Past Medical History     Past Medical History:   Diagnosis Date    Anxiety     Arthritis     Asthma     [facility]     rt mastectomy 2005    Cancer [facility] )     breast right    Cataract     CHF (congestive heart failure) (HCC)     Coronary artery disease     Depression     GERD (gastroesophageal reflux disease)     High cholesterol     History of chemotherapy     2005 rt breast cancer    Hypertension     IBS (irritable bowel syndrome)     Irritable bowel syndrome (IBS) 5/31/2016    Kidney stone     Lymphedema of right arm     Obesity     Post-menopausal     Psychiatric disorder     Renal disorder     Vitamin D deficiency        Surgical History     Past Surgical History:   Procedure Laterality Date    BREAST SURGERY      CATARACT EXTRACTION, BILATERAL      CHOLECYSTECTOMY      COLONOSCOPY N/A 5/31/2016    Procedure: COLONOSCOPY;  Surgeon: Shannan Thapa MD;  Location: [facility];  Service:    [facility] GALLBLADDER SURGERY      HYSTERECTOMY      Total    KNEE SURGERY      MASTECTOMY Right     rt breast mastectomy 2005    TONSILLECTOMY AND ADENOIDECTOMY         Family History     Family History   Adopted: Yes   Problem Relation Age of Onset    Diabetes Mother     Heart disease Mother     Mental illness Mother     Depression Mother     Heart disease Brother     Breast cancer Maternal Aunt     Breast cancer Maternal Aunt     Breast cancer Maternal Aunt     Breast cancer Maternal Aunt     Breast cancer Maternal Aunt     No Known Problems Father     No Known Problems Sister     No Known Problems Daughter     Breast cancer Maternal Grandmother     No Known Problems Sister     No Known Problems Sister     No Known Problems Sister        Social History     Social History     Social History     Tobacco Use   Smoking Status Never Smoker   Smokeless Tobacco Never Used       Past Surgical History:   Procedure Laterality Date    BREAST SURGERY      CATARACT EXTRACTION, BILATERAL      CHOLECYSTECTOMY      COLONOSCOPY N/A 5/31/2016    Procedure: COLONOSCOPY;  Surgeon: Shannan Thapa MD;  Location: [facility];  Service:    [facility] GALLBLADDER SURGERY      HYSTERECTOMY      Total    KNEE SURGERY      MASTECTOMY Right     rt breast mastectomy 2005    TONSILLECTOMY AND ADENOIDECTOMY           The following portions of the patient's history were reviewed and updated as appropriate: allergies, current medications, past family history, past medical history, past social history, past surgical history and problem list    Please note :  Voice dictation software has been used to create this document  There may be inadvertent transcription errors      [id number] [id number] [address]

## 2021-04-27 NOTE — TELEPHONE ENCOUNTER
Pt calling in regards to 1pm appointment with Ellie Carlin she was just informed 35 [address] Chile[address] no longer goes to [place] area can her appointment be done virtual/telephone?

## 2021-04-29 ENCOUNTER — TELEPHONE (OUTPATIENT)
Dept: OBGYN CLINIC | Facility: HOSPITAL | Age: 71
End: 2021-04-29

## 2021-04-29 ENCOUNTER — IMMUNIZATIONS (OUTPATIENT)
Dept: FAMILY MEDICINE CLINIC | Facility: HOSPITAL | Age: 71
End: 2021-04-29

## 2021-04-29 DIAGNOSIS — Z23 ENCOUNTER FOR IMMUNIZATION: Primary | ICD-10-CM

## 2021-04-29 PROCEDURE — 0012A SARS-COV-2 / COVID-19 MRNA VACCINE (MODERNA) 100 MCG: CPT

## 2021-04-29 PROCEDURE — 91301 SARS-COV-2 / COVID-19 MRNA VACCINE (MODERNA) 100 MCG: CPT

## 2021-04-29 NOTE — TELEPHONE ENCOUNTER
Pt contacted today to complete a preoperative elective admission assessment  VM left for patient to return my call at earliest convenience  Pt and I will discuss BMI, as she is slightly over at 40 03, needs 1lb loss per [facility] protocol  Will discuss and watch for wt check at [facility] appt

## 2021-04-30 NOTE — TELEPHONE ENCOUNTER
Attempted patient again to complete a preoperative elective admission assessment  VM left requesting a callback

## 2021-05-08 NOTE — PROGRESS NOTES
PT Evaluation     Today's date: 5/10/21  Patient name: Jemal Turcios  : 1950  MRN: [id number]  Referring provider: Елена Lloyd PA-C  Dx:   Encounter Diagnosis     ICD-10-CM    1  Primary osteoarthritis of left knee  M17 12                   Assessment  Assessment details: Jemal Turcios is a 73 y o  female presenting to outpatient physical therapy with diagnosis of Primary osteoarthritis of left knee  (primary encounter diagnosis)  Pt is here for a pre-op PT visit  Patient's current impairments include L knee  pain, impaired soft tissue mobility, reduced  L knee salbador of motion, reduced  LLE strength, reduced postural awareness, and reduced activity tolerance  Patient's present functional limitations include difficulty with ADLs with increased need for assistance, reliance on medication and/or modalities for pain relief, poor tolerance for functional mobility and activity, and difficulty completing work/school responsibilities  Patient to benefit from skilled outpatient physical therapy 2-3 x/week for 8 weeks post op L TKR  in order to reduce pain, maximize pain free range of motion, increase strength and stability, and improve functional mobility/functional activity in order to maximize return to prior level of function with reduced limitations   Thank you for your referral     Impairments: abnormal gait, abnormal or restricted ROM, activity intolerance, impaired physical strength, lacks appropriate home exercise program and pain with function  Understanding of Dx/Px/POC: good   Prognosis: good    Goals  To be established at 1st post-op visit    Plan  Plan details: 1 visit only pre-op, will cont PT 2-3x/week x 8 weeks post op    Patient would benefit from: skilled physical therapy  Planned modality interventions: cryotherapy and thermotherapy: hydrocollator packs  Planned therapy interventions: manual therapy, neuromuscular re-education, patient education, strengthening, stretching, therapeutic exercise, home exercise program and gait training  Plan of Care beginning date: 5/10/2021  Plan of Care expiration date: 5/10/2021  Treatment plan discussed with: patient        Subjective Evaluation    History of Present Illness  Mechanism of injury:  Per Dr Nadira Hernandez note:    42-year-old female with severe left knee osteoarthritis  She  Has failed non operative management  She is significantly affected her activities of daily living because of this knee  She is ready for total knee replacement   Pt is scheduled for L TKR           Recurrent probem          Objective     Tests     Additional Tests Details  TUG test  5TSTS test             Precautions: ***    Re-eval Date: 6/10/21    Date 5/10/21       Visit Count 1       FOTO completed       Pain In See IE       Pain Out See IE               Manuals 5/10                                       Neuro Re-Ed                                                                Ther Ex        QS/APs reviewed       GS reviewed       Heel slides reviewed       abd/add reviewed       LAQs reviewed                               Ther Activity                        Gait Training                        Modalities

## 2021-05-10 ENCOUNTER — APPOINTMENT (OUTPATIENT)
Dept: PHYSICAL THERAPY | Facility: CLINIC | Age: 71
End: 2021-05-10
Payer: COMMERCIAL

## 2021-05-10 NOTE — TELEPHONE ENCOUNTER
Patient sees Jasen Fonseca    Patient gave me a good call back number for us to call her back to answer the pre-operative questions       - [phone] - Home Number  Call after 11 am today 05/10/21

## 2021-05-10 NOTE — TELEPHONE ENCOUNTER
Preoperative Elective Admission Assessment- Spoke to patient     Living Situation:  Pt reports living at home with her daughter, in a multilevel home with a chair lift  Her bedroom is on the second floor, with bathroom (walk in shower with large space and shower seat)  Pt reports someone is "also near by" when showering currently and after surgery  Pt reports daughter has been preparing the home and picking up rugs to make it accessible as instructed  Steps: 2 to enter then chairlift to 2nd floor  First Floor Setup: Chairlift    DME: Pt reports having a cane and RW at home  Patient's Current Level of Function: independent with ADLS but is using the cane and RW for ambulating due to pain  Post-op Caregiver: Daughter, Adrian Diego Transport: Daughter, or friend  Outpatient Physical Therapy Site: [facility] scheduled  Medication Management: Pt self manages daily medications removing them daily from the pillpacks from the pharmacy  Preferred Pharmacy for Post-op Medications: [facility] [place]  Blood Management Vitamin Regimen: Pt reports only taking a daily multivitamin  Will update surgeon  Post-op anticoagulant: TBD by surgeon  Discharge Plan: Pt educated that our goal, if at all possible, is to appropriately discharge patient based off their post-op function while striving to maintain maximal independence  If possible, the goal is to discharge patient to home and for them to attend outpatient physical therapy      Barriers to DC identified preoperatively:   *Chairlift in place at home  *Transportatio- daughter is busy with work per patient, but reports a friend will be available for appts, including PT      BMI: 40 03     [employer]: Pt denied enrollment    Patient Education: Pt educated on post op pain, early mobilization (POD0), indication/use of incentive spirometer (10x/hr while awake), and indication for/use of foot/leg pumps  pt encouraged to call me with questions, concerns or issues

## 2021-05-13 ENCOUNTER — APPOINTMENT (OUTPATIENT)
Dept: RADIOLOGY | Facility: CLINIC | Age: 71
End: 2021-05-13
Payer: COMMERCIAL

## 2021-05-13 ENCOUNTER — CONSULT (OUTPATIENT)
Dept: INTERNAL MEDICINE CLINIC | Facility: CLINIC | Age: 71
End: 2021-05-13
Payer: COMMERCIAL

## 2021-05-13 VITALS
WEIGHT: 188 LBS | DIASTOLIC BLOOD PRESSURE: 80 MMHG | TEMPERATURE: 97.6 F | RESPIRATION RATE: 18 BRPM | BODY MASS INDEX: 40.56 KG/M2 | SYSTOLIC BLOOD PRESSURE: 124 MMHG | OXYGEN SATURATION: 97 % | HEART RATE: 83 BPM | HEIGHT: 57 IN

## 2021-05-13 DIAGNOSIS — F33.41 RECURRENT MAJOR DEPRESSIVE DISORDER, IN PARTIAL REMISSION (HCC): ICD-10-CM

## 2021-05-13 DIAGNOSIS — G89.29 CHRONIC PAIN OF LEFT KNEE: ICD-10-CM

## 2021-05-13 DIAGNOSIS — Z01.818 PRE-OPERATIVE CLEARANCE: Primary | ICD-10-CM

## 2021-05-13 DIAGNOSIS — M25.562 CHRONIC PAIN OF LEFT KNEE: ICD-10-CM

## 2021-05-13 DIAGNOSIS — Z01.818 PREOP TESTING: ICD-10-CM

## 2021-05-13 PROCEDURE — 93000 ELECTROCARDIOGRAM COMPLETE: CPT | Performed by: PHYSICIAN ASSISTANT

## 2021-05-13 PROCEDURE — 1036F TOBACCO NON-USER: CPT | Performed by: PHYSICIAN ASSISTANT

## 2021-05-13 PROCEDURE — 71046 X-RAY EXAM CHEST 2 VIEWS: CPT

## 2021-05-13 PROCEDURE — 99213 OFFICE O/P EST LOW 20 MIN: CPT | Performed by: PHYSICIAN ASSISTANT

## 2021-05-13 PROCEDURE — 1160F RVW MEDS BY RX/DR IN RCRD: CPT | Performed by: PHYSICIAN ASSISTANT

## 2021-05-13 RX ORDER — BUPROPION HYDROCHLORIDE 300 MG/1
300 TABLET ORAL EVERY MORNING
Qty: 90 TABLET | Refills: 3 | Status: SHIPPED | OUTPATIENT
Start: 2021-05-13

## 2021-05-13 NOTE — PROGRESS NOTES
Subjective:     Juve Rodriguez is a 73 y o  female who presents to the office today for a preoperative consultation at the request of surgeon Dr Ankit Cisneros who plans on performing Left TKR on May 20  Planned anesthesia: general  The patient has the following known anesthesia issues: past general anesthesia with complications (nausea and vomiting)  Patients bleeding risk: no recent abnormal bleeding  Patient does not have objections to receiving blood products if needed  The following portions of the patient's history were reviewed and updated as appropriate: She  has a past medical history of Anxiety, Arthritis, Asthma, Breast cancer ([facility] [address] ), Cancer ([facility] ), Cataract, CHF (congestive heart failure) ([facility] ), Coronary artery disease, Depression, GERD (gastroesophageal reflux disease), High cholesterol, History of chemotherapy, Hypertension, IBS (irritable bowel syndrome), Irritable bowel syndrome (IBS) (5/31/2016), Kidney stone, Lymphedema of right arm, Obesity, Post-menopausal, Psychiatric disorder, Renal disorder, and Vitamin D deficiency    She   Patient Active Problem List    Diagnosis Date Noted    HX: breast cancer 10/12/2020    Primary osteoarthritis of left knee 09/22/2020    Right kidney stone 09/10/2020    Scoliosis 09/10/2020    Swelling of joint of left knee 05/19/2020    Age-related osteoporosis without current pathological fracture 02/26/2020    Primary insomnia 12/03/2019    Neuropathy 12/03/2019    Foot pain, bilateral 10/24/2019    Tinea cruris 10/24/2019    Recurrent major depressive disorder, in partial remission ([facility] ) 02/22/2019    Other chronic pancreatitis ([facility] ) 02/22/2019    Body mass index (BMI) of 40 0-44 9 in adult [facility]) 02/22/2019    Bilateral carpal tunnel syndrome 06/19/2017    Mild intermittent asthma without complication 51/82/3313    Lymphedema 06/07/2017    Generalized anxiety disorder 06/10/2016    Patent foramen ovale 06/10/2016    Tension type headache 06/10/2016    Essential hypertension 06/07/2016    Irritable bowel syndrome (IBS) 05/31/2016    Chronic musculoskeletal pain 01/29/2016    Vitamin D deficiency 01/20/2016    Hyperlipidemia LDL goal <130 12/07/2015     She  has a past surgical history that includes Tonsillectomy and adenoidectomy; Breast surgery; Cholecystectomy; Knee surgery; Colonoscopy (N/A, 5/31/2016); Gallbladder surgery; Hysterectomy; Cataract extraction, bilateral; and Mastectomy (Right)  Her family history includes Breast cancer in her maternal aunt, maternal aunt, maternal aunt, maternal aunt, maternal aunt, and maternal grandmother; Depression in her mother; Diabetes in her mother; Heart disease in her brother and mother; Mental illness in her mother; No Known Problems in her daughter, father, sister, sister, sister, and sister  She was adopted  She  reports that she has never smoked  She has never used smokeless tobacco  She reports current alcohol use  She reports that she does not use drugs  Current Outpatient Medications   Medication Sig Dispense Refill    albuterol (2 5 mg/3 mL) 0 083 % nebulizer solution Take 2 5 mg by nebulization every 6 (six) hours as needed for wheezing   albuterol (PROAIR HFA) 90 mcg/act inhaler Inhale 2 puffs every 4 (four) hours as needed for wheezing 3 Inhaler 1    ascorbic acid (VITAMIN C) 500 MG tablet Take 1 tablet (500 mg total) by mouth daily 30 tablet 1    atorvastatin (LIPITOR) 20 mg tablet Take 1 tablet by mouth daily  30 tablet 0    butalbital-acetaminophen-caffeine (FIORICET,ESGIC) -40 mg per tablet Take 1 tablet by mouth 3 (three) times a day as needed for headaches 90 tablet 1    diclofenac (VOLTAREN) 75 mg EC tablet Take 1 tablet (75 mg total) by mouth 2 (two) times a day 180 tablet 1    dicyclomine (BENTYL) 10 mg capsule Take 1 capsule by mouth daily  90 capsule 0    DULoxetine (CYMBALTA) 60 mg delayed release capsule Take 1 capsule by mouth twice daily   180 capsule 0    ergocalciferol (VITAMIN D2) 50,000 units Take 1 capsule (50,000 Units total) by mouth once a week 4 capsule 3    ferrous sulfate 324 (65 Fe) mg Take 1 tablet (324 mg total) by mouth 2 (two) times a day before meals 60 tablet 1    folic acid (FOLVITE) 1 mg tablet Take 1 tablet (1 mg total) by mouth daily 30 tablet 1    gabapentin (NEURONTIN) 300 mg capsule Take 1 capsule by mouth three times daily  90 capsule 5    loratadine (CLARITIN) 10 mg tablet Take 1 tablet (10 mg total) by mouth daily 90 tablet 1    montelukast (SINGULAIR) 10 mg tablet Take 1 tablet by mouth daily  30 tablet 0    Multiple Vitamin (multivitamin) tablet Take 1 tablet by mouth daily 30 tablet 1    Nutritional Supplements (BOOST BREEZE PO) Take by mouth daily      nystatin (MYCOSTATIN) cream Apply topically 2 (two) times a day 30 g 3    nystatin (MYCOSTATIN) powder Apply topically 2 (two) times a day 60 g 5    ondansetron (ZOFRAN-ODT) 4 mg disintegrating tablet Take 4 mg by mouth every 8 (eight) hours as needed      oxybutynin (DITROPAN-XL) 10 MG 24 hr tablet Take 1 tablet (10 mg total) by mouth daily at bedtime 30 tablet 3    pantoprazole (PROTONIX) 40 mg tablet Take 1 tablet by mouth daily  30 tablet 1    tamsulosin (FLOMAX) 0 4 mg Take 1 capsule (0 4 mg total) by mouth daily with dinner 14 capsule 0    traZODone (DESYREL) 50 mg tablet TAKE 1-2 TABLETS ( MG TOTAL) BY MOUTH DAILY AT BEDTIME 180 tablet 1    buPROPion (WELLBUTRIN XL) 300 mg 24 hr tablet Take 1 tablet (300 mg total) by mouth every morning 90 tablet 3     No current facility-administered medications for this visit  Current Outpatient Medications on File Prior to Visit   Medication Sig    albuterol (2 5 mg/3 mL) 0 083 % nebulizer solution Take 2 5 mg by nebulization every 6 (six) hours as needed for wheezing      albuterol (PROAIR HFA) 90 mcg/act inhaler Inhale 2 puffs every 4 (four) hours as needed for wheezing    ascorbic acid (VITAMIN C) 500 MG tablet Take 1 tablet (500 mg total) by mouth daily    atorvastatin (LIPITOR) 20 mg tablet Take 1 tablet by mouth daily   butalbital-acetaminophen-caffeine (FIORICET,ESGIC) -40 mg per tablet Take 1 tablet by mouth 3 (three) times a day as needed for headaches    diclofenac (VOLTAREN) 75 mg EC tablet Take 1 tablet (75 mg total) by mouth 2 (two) times a day    dicyclomine (BENTYL) 10 mg capsule Take 1 capsule by mouth daily   DULoxetine (CYMBALTA) 60 mg delayed release capsule Take 1 capsule by mouth twice daily   ergocalciferol (VITAMIN D2) 50,000 units Take 1 capsule (50,000 Units total) by mouth once a week    ferrous sulfate 324 (65 Fe) mg Take 1 tablet (324 mg total) by mouth 2 (two) times a day before meals    folic acid (FOLVITE) 1 mg tablet Take 1 tablet (1 mg total) by mouth daily    gabapentin (NEURONTIN) 300 mg capsule Take 1 capsule by mouth three times daily   loratadine (CLARITIN) 10 mg tablet Take 1 tablet (10 mg total) by mouth daily    montelukast (SINGULAIR) 10 mg tablet Take 1 tablet by mouth daily   Multiple Vitamin (multivitamin) tablet Take 1 tablet by mouth daily    Nutritional Supplements (BOOST BREEZE PO) Take by mouth daily    nystatin (MYCOSTATIN) cream Apply topically 2 (two) times a day    nystatin (MYCOSTATIN) powder Apply topically 2 (two) times a day    ondansetron (ZOFRAN-ODT) 4 mg disintegrating tablet Take 4 mg by mouth every 8 (eight) hours as needed    oxybutynin (DITROPAN-XL) 10 MG 24 hr tablet Take 1 tablet (10 mg total) by mouth daily at bedtime    pantoprazole (PROTONIX) 40 mg tablet Take 1 tablet by mouth daily   tamsulosin (FLOMAX) 0 4 mg Take 1 capsule (0 4 mg total) by mouth daily with dinner    traZODone (DESYREL) 50 mg tablet TAKE 1-2 TABLETS ( MG TOTAL) BY MOUTH DAILY AT BEDTIME    [DISCONTINUED] buPROPion (WELLBUTRIN XL) 150 mg 24 hr tablet Take 1 tablet by mouth every morning      [DISCONTINUED] pantoprazole (PROTONIX) 40 mg tablet Take 1 tablet by mouth daily  No current facility-administered medications on file prior to visit  She is allergic to asa [aspirin]; ibuprofen; and latex       Review of Systems  Constitutional: negative  Ears, nose, mouth, throat, and face: negative  Respiratory: negative  Cardiovascular: negative  Genitourinary:negative  Musculoskeletal:negative  Neurological: negative  Behavioral/Psych: negative     Objective:     /80   Pulse 83   Temp 97 6 °F (36 4 °C) (Tympanic)   Resp 18   Ht 4' 9" (1 448 m)   Wt 85 3 kg (188 lb)   LMP  (LMP Unknown)   SpO2 97%   BMI 40 68 kg/m²     General Appearance:    Alert, cooperative, no distress, appears stated age   Head:    Normocephalic, without obvious abnormality, atraumatic   Eyes:    PERRL, conjunctiva/corneas clear, EOM's intact, fundi     benign, both eyes   Ears:    Normal TM's and external ear canals, both ears   Nose:   Nares normal, septum midline, mucosa normal, no drainage    or sinus tenderness   Throat:   Lips, mucosa, and tongue normal; teeth and gums normal   Neck:   Supple, symmetrical, trachea midline, no adenopathy;     thyroid:  no enlargement/tenderness/nodules; no carotid    bruit or JVD   Back:     Symmetric, no curvature, ROM normal, no CVA tenderness   Lungs:     Clear to auscultation bilaterally, respirations unlabored   Chest Wall:    No tenderness or deformity    Heart:    Regular rate and rhythm, S1 and S2 normal, no murmur, rub   or gallop   Breast Exam:    No tenderness, masses, or nipple abnormality   Abdomen:     Soft, non-tender, bowel sounds active all four quadrants,     no masses, no organomegaly   Genitalia:    Normal female without lesion, discharge or tenderness   Rectal:    Normal tone, no masses or tenderness; guaiac negative stool   Extremities:   Extremities normal, atraumatic, no cyanosis or edema   Pulses:   2+ and symmetric all extremities   Skin:   Skin color, texture, turgor normal, no rashes or lesions   Lymph nodes:   Cervical, supraclavicular, and axillary nodes normal   Neurologic:   CNII-XII intact, normal strength, sensation and reflexes     throughout         Cardiographics  ECG: normal sinus rhythm, no blocks or conduction defects, no ischemic changes      Imaging  Chest x-ray: normal     Lab Review   Appointment on 04/21/2021   Component Date Value    Sodium 04/21/2021 143     Potassium 04/21/2021 4 0     Chloride 04/21/2021 113*    CO2 04/21/2021 28     ANION GAP 04/21/2021 2*    BUN 04/21/2021 12     Creatinine 04/21/2021 0 75     Glucose 04/21/2021 81     Calcium 04/21/2021 8 9     Corrected Calcium 04/21/2021 9 5     AST 04/21/2021 14     ALT 04/21/2021 33     Alkaline Phosphatase 04/21/2021 124*    Total Protein 04/21/2021 6 6     Albumin 04/21/2021 3 2*    Total Bilirubin 04/21/2021 0 30     eGFR 04/21/2021 82     WBC 04/21/2021 5 38     RBC 04/21/2021 4 93     Hemoglobin 04/21/2021 14 9     Hematocrit 04/21/2021 45 2     MCV 04/21/2021 92     MCH 04/21/2021 30 2     MCHC 04/21/2021 33 0     RDW 04/21/2021 11 9     MPV 04/21/2021 10 1     Platelets 88/49/8751 197     nRBC 04/21/2021 0     Neutrophils Relative 04/21/2021 63     Immat GRANS % 04/21/2021 0     Lymphocytes Relative 04/21/2021 24     Monocytes Relative 04/21/2021 10     Eosinophils Relative 04/21/2021 2     Basophils Relative 04/21/2021 1     Neutrophils Absolute 04/21/2021 3 44     Immature Grans Absolute 04/21/2021 0 02     Lymphocytes Absolute 04/21/2021 1 27     Monocytes Absolute 04/21/2021 0 54     Eosinophils Absolute 04/21/2021 0 08     Basophils Absolute 04/21/2021 0 03     CRP 04/21/2021 4 7*    Protime 04/21/2021 13 3     INR 04/21/2021 1 01     PTT 04/21/2021 28     Hemoglobin A1C 04/21/2021 5 5     EAG 04/21/2021 111    Transcribe Orders on 04/21/2021   Component Date Value    ABO Grouping 04/21/2021 A     Rh Factor 04/21/2021 Positive     Antibody Screen 04/21/2021 Negative     Specimen Expiration Date 04/21/2021 [id number]         Assessment:     73 y o  female with planned surgery as above  Known risk factors for perioperative complications: None    Difficulty with intubation is not anticipated  Cardiac Risk Estimation: low    Current medications which may produce withdrawal symptoms if withheld perioperatively: none      Plan:     1  Preoperative workup as follows chest x-ray, ECG, hemoglobin, hematocrit, electrolytes, creatinine, glucose, liver function studies, coagulation studies  2  Change in medication regimen before surgery: discontinue ASA 14 days before surgery  3  [place] risk Index Score: 0 giving pt a less than 1% chance of perioperative cardiac complications

## 2021-05-15 DIAGNOSIS — J30.9 ALLERGIC RHINITIS, UNSPECIFIED SEASONALITY, UNSPECIFIED TRIGGER: ICD-10-CM

## 2021-05-15 DIAGNOSIS — E78.5 HYPERLIPIDEMIA, UNSPECIFIED HYPERLIPIDEMIA TYPE: ICD-10-CM

## 2021-05-15 RX ORDER — MONTELUKAST SODIUM 10 MG/1
TABLET ORAL
Qty: 30 TABLET | Refills: 0 | Status: SHIPPED | OUTPATIENT
Start: 2021-05-15 | End: 2021-06-14

## 2021-05-15 RX ORDER — ATORVASTATIN CALCIUM 20 MG/1
TABLET, FILM COATED ORAL
Qty: 30 TABLET | Refills: 0 | Status: SHIPPED | OUTPATIENT
Start: 2021-05-15 | End: 2021-06-14

## 2021-05-15 NOTE — PROGRESS NOTES
PT Evaluation    Pre-Op for TKR pending     Today's date: 2021  Patient name: Rubi Presley  : 1950  MRN: [id number]  Referring provider: Darrian Jewell PA-C  Dx:   Encounter Diagnosis     ICD-10-CM    1  Primary osteoarthritis of left knee  M17 12 Ambulatory referral to Physical Therapy                  Assessment  Assessment details: Rubi Presley is a 73 y o  female presenting to outpatient physical therapy with diagnosis of primary osteoarthritis of left knee  She is pre-op at this time and pending surgery on 2021  Patient's current impairments include pain, impaired soft tissue mobility, reduced range of motion, reduced strength, reduced postural awareness, and reduced activity tolerance  Patient's present functional limitations include difficulty with ADLs with increased need for assistance, reliance on medication and/or modalities for pain relief, poor tolerance for functional mobility and activity, and difficulty completing household responsibilities  Patient to benefit from skilled outpatient physical therapy 2x/week for 6-8 weeks after surgery in order to reduce pain, maximize pain free range of motion, increase strength and stability, and improve functional mobility/functional activity in order to maximize return to prior level of function with reduced limitations  Thank you for your referral     Impairments: abnormal gait, abnormal or restricted ROM, abnormal movement, activity intolerance, impaired physical strength, lacks appropriate home exercise program, pain with function and weight-bearing intolerance  Understanding of Dx/Px/POC: good   Prognosis: good  Prognosis details: Positive prognostic indicators include positive attitude toward recovery and good understanding of diagnosis and treatment plan options  Negative prognostic indicators include chronicity of symptoms and co-morbidities  Goals  STGs to be achieved in 4 weeks:  1   Pt to demonstrate reduced subjective pain rating "at worst" by at least 2-3 points from Initial Eval in order to allow for reduced pain with ADLs and improved functional activity tolerance  2  Pt to demonstrate increased AROM of left knee by at least 5-10 degrees in order to allow for greater ease and independence with ADLs and functional mobility  3  Pt to demonstrate full PROM of left knee in order to maximize joint mobility and function and allow for progression of exercise program and achievement of goals  4  Pt to demonstrate increased MMT of left LE by at least 1/2-1 grade in order to improve safety and stability with ADLs and functional mobility  LTGs to be achieved in 6-8 weeks:  1  Pt will be I with HEP in order to continue to improve quality of life and independence and reduce risk for re-injury  2  Pt to demonstrate return to community activities without limitations or restrictions  3  Pt to demonstrate improved function as noted by achieving or exceeding predicted score on FOTO outcomes assessment tool  Plan  Patient would benefit from: skilled physical therapy  Other planned modality interventions: Modalities prn for symptom management  Planned therapy interventions: manual therapy, neuromuscular re-education, Ellis taping, therapeutic exercise, therapeutic activities, gait training and home exercise program  Frequency: 1x week  Duration in visits: 1  Duration in weeks: 1  Plan of Care beginning date: 5/17/2021  Plan of Care expiration date: 5/17/2021  Treatment plan discussed with: patient        Subjective Evaluation    History of Present Illness  Mechanism of injury: Chronic knee pain, pending OR for TKR on 5/20/2021    2 ARTUR - challenge height of last step  Does have another entrance available  All areas are accessible with her walker  She will have 24 hours supervision/assist  Lupillo Gross is planning for meal delivery  First living area floor - 1/2 bath, grab bar  Level surface, removed throw rug  Has area to elevate leg on this level  Stair glide to 2nd floor is available  Has a cane and walker available upstairs  Bedroom is on 2nd floor  Bathroom with full shower on the 2nd floor  Walk-in shower with shower chair  She is unsure if she is going to be doing home health or outpatient PT after surgery  Quality of life: good    Pain  Location: Constant and chronic pain    Social Support  Steps to enter house: yes  Stairs in house: yes   Lives in: multiple-level home  Lives with: adult children    Employment status: not working    Diagnostic Tests    FCE comments: LEFT KNEE     INDICATION:   W19  XXXA: Unspecified fall, initial encounter      COMPARISON:  None     VIEWS:  XR KNEE 4+ VW LEFT INJURY         FINDINGS:     There is no acute fracture or dislocation      Small joint effusion      Tricompartmental osteophytes  Moderate narrowing of the medial compartment      No lytic or blastic osseous lesion      Patellar enthesopathy      IMPRESSION:     No acute osseous abnormality      Degenerative changes as described  Treatments  Previous treatment: medication  Current treatment: medication and physical therapy  Patient Goals  Patient goals for therapy: increased strength, independence with ADLs/IADLs, return to sport/leisure activities, improved balance, decreased pain and increased motion          Objective     Observations   Left Knee   Positive for atrophy and effusion       Neurological Testing     Sensation     Knee   Left Knee   Intact: proprioception  Diminished: light touch     Additional Neurological Details  Chronic neuropathy in bilateral feet    Active Range of Motion   Left Knee   Flexion: 74 degrees with pain  Extension: -12 degrees with pain  Extensor lag: 10 degrees     Additional Active Range of Motion Details  (+) valgus noted    Edema  Inf patella - 35 cm  Mid patella - 42 cm  Sup patella - 46 cm    Strength/Myotome Testing     Left Knee   Flexion: 3+  Extension: 3+    Additional Strength Details  Hip flexion 3+/5  Hip ABd 3+/5  Hip extn 3/5                 Precautions: Falls  Re-eval Date: 6/16/2021    Date 5/17       Visit Count 1       FOTO PRE OP       Pain In        Pain Out            Manuals        LEs prn                               Neuro Re-Ed        Dynavision        Natus         Obstacle Course        Star Drill        PWR focus                        Ther Ex        Aerobic        SLR x 4        HR/TR        Mini Squats        Single Leg progression                                HEP 10 mins for APs, heel slides, GS, HS set, hip ABd LAQ       Ther Activity        Kitchen/ADL                Gait Training        Divided Attention        Head turns        Modalities         prn

## 2021-05-17 ENCOUNTER — ANESTHESIA EVENT (OUTPATIENT)
Dept: PERIOP | Facility: HOSPITAL | Age: 71
End: 2021-05-17
Payer: COMMERCIAL

## 2021-05-17 ENCOUNTER — EVALUATION (OUTPATIENT)
Dept: PHYSICAL THERAPY | Facility: CLINIC | Age: 71
End: 2021-05-17
Payer: COMMERCIAL

## 2021-05-17 DIAGNOSIS — M17.12 PRIMARY OSTEOARTHRITIS OF LEFT KNEE: ICD-10-CM

## 2021-05-17 PROCEDURE — 97162 PT EVAL MOD COMPLEX 30 MIN: CPT | Performed by: PHYSICAL THERAPIST

## 2021-05-17 PROCEDURE — 97110 THERAPEUTIC EXERCISES: CPT | Performed by: PHYSICAL THERAPIST

## 2021-05-18 ENCOUNTER — APPOINTMENT (OUTPATIENT)
Dept: PREADMISSION TESTING | Facility: HOSPITAL | Age: 71
End: 2021-05-18
Payer: COMMERCIAL

## 2021-05-18 NOTE — PRE-PROCEDURE INSTRUCTIONS
Pre-Surgery Instructions:   Medication Instructions    atorvastatin (LIPITOR) 20 mg tablet Instructed patient per Anesthesia Guidelines  take 5/20    buPROPion (WELLBUTRIN XL) 300 mg 24 hr tablet Instructed patient per Anesthesia Guidelines  take 5/20    diclofenac (VOLTAREN) 75 mg EC tablet Instructed patient per Anesthesia Guidelines  stop 5/18    dicyclomine (BENTYL) 10 mg capsule Instructed patient per Anesthesia Guidelines  take 5/20    DULoxetine (CYMBALTA) 60 mg delayed release capsule Instructed patient per Anesthesia Guidelines  take 5/20    ergocalciferol (VITAMIN D2) 50,000 units Instructed patient per Anesthesia Guidelines   gabapentin (NEURONTIN) 300 mg capsule Instructed patient per Anesthesia Guidelines  may take 5/20    loratadine (CLARITIN) 10 mg tablet Instructed patient per Anesthesia Guidelines  stop 5/18    montelukast (SINGULAIR) 10 mg tablet Instructed patient per Anesthesia Guidelines  take 5/20    Multiple Vitamin (multivitamin) tablet Instructed patient per Anesthesia Guidelines  hold 5/20    Nutritional Supplements (BOOST BREEZE PO) Instructed patient per Anesthesia Guidelines   nystatin (MYCOSTATIN) cream Instructed patient per Anesthesia Guidelines  do not use after surgical shower    nystatin (MYCOSTATIN) powder Instructed patient per Anesthesia Guidelines  do not use after surgical shower    oxybutynin (DITROPAN-XL) 10 MG 24 hr tablet Instructed patient per Anesthesia Guidelines  pm usage hold 5/20    tamsulosin (FLOMAX) 0 4 mg Instructed patient per Anesthesia Guidelines  take 5/20    traZODone (DESYREL) 50 mg tablet Instructed patient per Anesthesia Guidelines  Pre Procedure instructions given and verbalized understanding  NPO after MN  Ortho Class completed  Bathing reviewed  Incentive Spirometer reviewed and to be started 5/18  No NSAIDS  Morning meds with water  No vitamins dos  Ortho office notified patient did not know she was to take any orthopedic vitamins  All questions answered

## 2021-05-19 NOTE — ANESTHESIA PREPROCEDURE EVALUATION
Procedure:  ARTHROPLASTY KNEE TOTAL (Left Knee)    Relevant Problems   CARDIO   (+) Essential hypertension   (+) Hyperlipidemia LDL goal <130      GI/HEPATIC   (+) Other chronic pancreatitis (HCC)      /RENAL   (+) Right kidney stone      MUSCULOSKELETAL   (+) Primary osteoarthritis of left knee   (+) Scoliosis      NEURO/PSYCH   (+) Chronic musculoskeletal pain   (+) Generalized anxiety disorder   (+) HX: breast cancer   (+) Recurrent major depressive disorder, in partial remission (HCC)   (+) Tension type headache      PULMONARY   (+) Mild intermittent asthma without complication      CAD, CHF, GERD, HLD, breast ca s/p R mastectomy  R arm lymphedema    bmi 41    Cr 0 75, hgb 14 9, plt 197  a1c 5 5       Anesthesia Plan  ASA Score- 3     Anesthesia Type- spinal with ASA Monitors  Additional Monitors:   Airway Plan:     Comment: Spinal with IV sedation, GA back up; standard ASA monitors  Risks and benefits discussed with patient; patient consented and agrees to proceed  I saw and evaluated the patient  If seen with CRNA, we have discussed the anesthetic plan and I am in agreement that the plan is appropriate for the patient  Spinal anesthetic - no bleeding disorders, no blood thinners; risks dicussed including bleeding, infection, PDPH, neurological compromise, failed spinal  LEFT iPACK and adductor canal blocks requested by surgeon for post-operative pain control - risks discussed including infection, bleeding, neurological compromise, failed block  Risks and alternatives discussed  Plan Factors-    Chart reviewed  Existing labs reviewed  Induction- intravenous  Postoperative Plan- Plan for postoperative opioid use  Informed Consent- Anesthetic plan and risks discussed with patient  I personally reviewed this patient with the CRNA  Discussed and agreed on the Anesthesia Plan with the CRNA  Allison Beckett

## 2021-05-20 ENCOUNTER — ANESTHESIA (OUTPATIENT)
Dept: PERIOP | Facility: HOSPITAL | Age: 71
End: 2021-05-20
Payer: COMMERCIAL

## 2021-05-20 ENCOUNTER — HOSPITAL ENCOUNTER (OUTPATIENT)
Facility: HOSPITAL | Age: 71
Setting detail: OUTPATIENT SURGERY
End: 2021-05-24
Attending: ORTHOPAEDIC SURGERY | Admitting: ORTHOPAEDIC SURGERY
Payer: COMMERCIAL

## 2021-05-20 DIAGNOSIS — J45.20 MILD INTERMITTENT ASTHMA WITHOUT COMPLICATION: ICD-10-CM

## 2021-05-20 DIAGNOSIS — M17.12 PRIMARY OSTEOARTHRITIS OF LEFT KNEE: Primary | ICD-10-CM

## 2021-05-20 DIAGNOSIS — I10 ESSENTIAL HYPERTENSION: ICD-10-CM

## 2021-05-20 PROCEDURE — C1776 JOINT DEVICE (IMPLANTABLE): HCPCS | Performed by: ORTHOPAEDIC SURGERY

## 2021-05-20 PROCEDURE — 27447 TOTAL KNEE ARTHROPLASTY: CPT | Performed by: ORTHOPAEDIC SURGERY

## 2021-05-20 PROCEDURE — 97163 PT EVAL HIGH COMPLEX 45 MIN: CPT

## 2021-05-20 PROCEDURE — C9290 INJ, BUPIVACAINE LIPOSOME: HCPCS | Performed by: ANESTHESIOLOGY

## 2021-05-20 PROCEDURE — 94664 DEMO&/EVAL PT USE INHALER: CPT

## 2021-05-20 PROCEDURE — 97167 OT EVAL HIGH COMPLEX 60 MIN: CPT

## 2021-05-20 PROCEDURE — 99220 PR INITIAL OBSERVATION CARE/DAY 70 MINUTES: CPT | Performed by: INTERNAL MEDICINE

## 2021-05-20 PROCEDURE — C1713 ANCHOR/SCREW BN/BN,TIS/BN: HCPCS | Performed by: ORTHOPAEDIC SURGERY

## 2021-05-20 PROCEDURE — 94760 N-INVAS EAR/PLS OXIMETRY 1: CPT

## 2021-05-20 DEVICE — SMARTSET HIGH PERFORMANCE MV MEDIUM VISCOSITY BONE CEMENT 40G
Type: IMPLANTABLE DEVICE | Site: KNEE | Status: FUNCTIONAL
Brand: SMARTSET

## 2021-05-20 DEVICE — ATTUNE PATELLA MEDIALIZED DOME 32MM CEMENTED AOX
Type: IMPLANTABLE DEVICE | Site: KNEE | Status: FUNCTIONAL
Brand: ATTUNE

## 2021-05-20 DEVICE — ATTUNE KNEE SYSTEM TIBIAL INSERT FIXED BEARING POSTERIOR STABILIZED 4 16MM AOX
Type: IMPLANTABLE DEVICE | Site: KNEE | Status: FUNCTIONAL
Brand: ATTUNE

## 2021-05-20 DEVICE — ATTUNE KNEE SYSTEM TIBIAL BASE FIXED BEARING SIZE 3 CEMENTED
Type: IMPLANTABLE DEVICE | Site: KNEE | Status: FUNCTIONAL
Brand: ATTUNE

## 2021-05-20 DEVICE — ATTUNE KNEE SYSTEM FEMORAL POSTERIOR STABILIZED SIZE 4 LEFT CEMENTED
Type: IMPLANTABLE DEVICE | Site: KNEE | Status: FUNCTIONAL
Brand: ATTUNE

## 2021-05-20 RX ORDER — ATORVASTATIN CALCIUM 20 MG/1
20 TABLET, FILM COATED ORAL DAILY
Status: DISCONTINUED | OUTPATIENT
Start: 2021-05-20 | End: 2021-05-24 | Stop reason: HOSPADM

## 2021-05-20 RX ORDER — TRAZODONE HYDROCHLORIDE 50 MG/1
50 TABLET ORAL
Status: DISCONTINUED | OUTPATIENT
Start: 2021-05-20 | End: 2021-05-24 | Stop reason: HOSPADM

## 2021-05-20 RX ORDER — FENTANYL CITRATE 50 UG/ML
INJECTION, SOLUTION INTRAMUSCULAR; INTRAVENOUS AS NEEDED
Status: DISCONTINUED | OUTPATIENT
Start: 2021-05-20 | End: 2021-05-20

## 2021-05-20 RX ORDER — FERROUS SULFATE 325(65) MG
325 TABLET ORAL 2 TIMES DAILY WITH MEALS
Status: DISCONTINUED | OUTPATIENT
Start: 2021-05-20 | End: 2021-05-24 | Stop reason: HOSPADM

## 2021-05-20 RX ORDER — DICYCLOMINE HYDROCHLORIDE 10 MG/1
10 CAPSULE ORAL DAILY
Status: DISCONTINUED | OUTPATIENT
Start: 2021-05-20 | End: 2021-05-24 | Stop reason: HOSPADM

## 2021-05-20 RX ORDER — PROPOFOL 10 MG/ML
INJECTION, EMULSION INTRAVENOUS CONTINUOUS PRN
Status: DISCONTINUED | OUTPATIENT
Start: 2021-05-20 | End: 2021-05-20

## 2021-05-20 RX ORDER — MAGNESIUM HYDROXIDE/ALUMINUM HYDROXICE/SIMETHICONE 120; 1200; 1200 MG/30ML; MG/30ML; MG/30ML
30 SUSPENSION ORAL EVERY 6 HOURS PRN
Status: DISCONTINUED | OUTPATIENT
Start: 2021-05-20 | End: 2021-05-24 | Stop reason: HOSPADM

## 2021-05-20 RX ORDER — DULOXETIN HYDROCHLORIDE 60 MG/1
60 CAPSULE, DELAYED RELEASE ORAL 2 TIMES DAILY
Status: DISCONTINUED | OUTPATIENT
Start: 2021-05-20 | End: 2021-05-24 | Stop reason: HOSPADM

## 2021-05-20 RX ORDER — BUPIVACAINE HYDROCHLORIDE 7.5 MG/ML
INJECTION, SOLUTION INTRASPINAL AS NEEDED
Status: DISCONTINUED | OUTPATIENT
Start: 2021-05-20 | End: 2021-05-20

## 2021-05-20 RX ORDER — DOCUSATE SODIUM 100 MG/1
100 CAPSULE, LIQUID FILLED ORAL 2 TIMES DAILY
Status: DISCONTINUED | OUTPATIENT
Start: 2021-05-20 | End: 2021-05-24 | Stop reason: HOSPADM

## 2021-05-20 RX ORDER — FENTANYL CITRATE/PF 50 MCG/ML
25 SYRINGE (ML) INJECTION
Status: DISCONTINUED | OUTPATIENT
Start: 2021-05-20 | End: 2021-05-20 | Stop reason: HOSPADM

## 2021-05-20 RX ORDER — CHLORHEXIDINE GLUCONATE 4 G/100ML
SOLUTION TOPICAL DAILY PRN
Status: DISCONTINUED | OUTPATIENT
Start: 2021-05-20 | End: 2021-05-20 | Stop reason: HOSPADM

## 2021-05-20 RX ORDER — SODIUM CHLORIDE 9 MG/ML
125 INJECTION, SOLUTION INTRAVENOUS CONTINUOUS
Status: DISCONTINUED | OUTPATIENT
Start: 2021-05-20 | End: 2021-05-20

## 2021-05-20 RX ORDER — ALBUTEROL SULFATE 90 UG/1
2 AEROSOL, METERED RESPIRATORY (INHALATION) EVERY 4 HOURS PRN
Status: DISCONTINUED | OUTPATIENT
Start: 2021-05-20 | End: 2021-05-24 | Stop reason: HOSPADM

## 2021-05-20 RX ORDER — CEFAZOLIN SODIUM 2 G/50ML
2000 SOLUTION INTRAVENOUS EVERY 8 HOURS
Status: COMPLETED | OUTPATIENT
Start: 2021-05-20 | End: 2021-05-21

## 2021-05-20 RX ORDER — CEFAZOLIN SODIUM 2 G/50ML
2000 SOLUTION INTRAVENOUS ONCE
Status: COMPLETED | OUTPATIENT
Start: 2021-05-20 | End: 2021-05-20

## 2021-05-20 RX ORDER — HYDROMORPHONE HCL IN WATER/PF 6 MG/30 ML
0.2 PATIENT CONTROLLED ANALGESIA SYRINGE INTRAVENOUS
Status: DISCONTINUED | OUTPATIENT
Start: 2021-05-20 | End: 2021-05-20 | Stop reason: HOSPADM

## 2021-05-20 RX ORDER — OXYCODONE HYDROCHLORIDE 10 MG/1
10 TABLET ORAL EVERY 4 HOURS PRN
Status: DISCONTINUED | OUTPATIENT
Start: 2021-05-20 | End: 2021-05-24 | Stop reason: HOSPADM

## 2021-05-20 RX ORDER — FOLIC ACID 1 MG/1
1 TABLET ORAL DAILY
Status: DISCONTINUED | OUTPATIENT
Start: 2021-05-20 | End: 2021-05-24 | Stop reason: HOSPADM

## 2021-05-20 RX ORDER — ONDANSETRON 2 MG/ML
INJECTION INTRAMUSCULAR; INTRAVENOUS AS NEEDED
Status: DISCONTINUED | OUTPATIENT
Start: 2021-05-20 | End: 2021-05-20

## 2021-05-20 RX ORDER — CHLORHEXIDINE GLUCONATE 0.12 MG/ML
15 RINSE ORAL ONCE
Status: COMPLETED | OUTPATIENT
Start: 2021-05-20 | End: 2021-05-20

## 2021-05-20 RX ORDER — MIDAZOLAM HYDROCHLORIDE 2 MG/2ML
INJECTION, SOLUTION INTRAMUSCULAR; INTRAVENOUS AS NEEDED
Status: DISCONTINUED | OUTPATIENT
Start: 2021-05-20 | End: 2021-05-20

## 2021-05-20 RX ORDER — ASCORBIC ACID 500 MG
500 TABLET ORAL DAILY
Status: DISCONTINUED | OUTPATIENT
Start: 2021-05-20 | End: 2021-05-24 | Stop reason: HOSPADM

## 2021-05-20 RX ORDER — ACETAMINOPHEN 325 MG/1
650 TABLET ORAL EVERY 6 HOURS PRN
Status: DISCONTINUED | OUTPATIENT
Start: 2021-05-20 | End: 2021-05-24 | Stop reason: HOSPADM

## 2021-05-20 RX ORDER — BISACODYL 10 MG
10 SUPPOSITORY, RECTAL RECTAL DAILY PRN
Status: DISCONTINUED | OUTPATIENT
Start: 2021-05-20 | End: 2021-05-24 | Stop reason: HOSPADM

## 2021-05-20 RX ORDER — PROPOFOL 10 MG/ML
INJECTION, EMULSION INTRAVENOUS AS NEEDED
Status: DISCONTINUED | OUTPATIENT
Start: 2021-05-20 | End: 2021-05-20

## 2021-05-20 RX ORDER — ERGOCALCIFEROL 1.25 MG/1
50000 CAPSULE ORAL WEEKLY
Status: DISCONTINUED | OUTPATIENT
Start: 2021-05-26 | End: 2021-05-24 | Stop reason: HOSPADM

## 2021-05-20 RX ORDER — PANTOPRAZOLE SODIUM 40 MG/1
40 TABLET, DELAYED RELEASE ORAL DAILY
Status: DISCONTINUED | OUTPATIENT
Start: 2021-05-20 | End: 2021-05-24 | Stop reason: HOSPADM

## 2021-05-20 RX ORDER — OXYCODONE HYDROCHLORIDE 5 MG/1
5 TABLET ORAL EVERY 4 HOURS PRN
Status: DISCONTINUED | OUTPATIENT
Start: 2021-05-20 | End: 2021-05-24 | Stop reason: HOSPADM

## 2021-05-20 RX ORDER — MONTELUKAST SODIUM 10 MG/1
10 TABLET ORAL DAILY
Status: DISCONTINUED | OUTPATIENT
Start: 2021-05-20 | End: 2021-05-24 | Stop reason: HOSPADM

## 2021-05-20 RX ORDER — OXYBUTYNIN CHLORIDE 5 MG/1
10 TABLET, EXTENDED RELEASE ORAL
Status: DISCONTINUED | OUTPATIENT
Start: 2021-05-20 | End: 2021-05-24 | Stop reason: HOSPADM

## 2021-05-20 RX ORDER — SODIUM CHLORIDE, SODIUM LACTATE, POTASSIUM CHLORIDE, CALCIUM CHLORIDE 600; 310; 30; 20 MG/100ML; MG/100ML; MG/100ML; MG/100ML
125 INJECTION, SOLUTION INTRAVENOUS CONTINUOUS
Status: DISCONTINUED | OUTPATIENT
Start: 2021-05-20 | End: 2021-05-20

## 2021-05-20 RX ORDER — GABAPENTIN 300 MG/1
300 CAPSULE ORAL 3 TIMES DAILY
Status: DISCONTINUED | OUTPATIENT
Start: 2021-05-20 | End: 2021-05-24 | Stop reason: HOSPADM

## 2021-05-20 RX ORDER — SODIUM CHLORIDE, SODIUM LACTATE, POTASSIUM CHLORIDE, CALCIUM CHLORIDE 600; 310; 30; 20 MG/100ML; MG/100ML; MG/100ML; MG/100ML
125 INJECTION, SOLUTION INTRAVENOUS CONTINUOUS
Status: DISCONTINUED | OUTPATIENT
Start: 2021-05-20 | End: 2021-05-22

## 2021-05-20 RX ORDER — TAMSULOSIN HYDROCHLORIDE 0.4 MG/1
0.4 CAPSULE ORAL
Status: DISCONTINUED | OUTPATIENT
Start: 2021-05-20 | End: 2021-05-24 | Stop reason: HOSPADM

## 2021-05-20 RX ORDER — BUPROPION HYDROCHLORIDE 150 MG/1
300 TABLET ORAL EVERY MORNING
Status: DISCONTINUED | OUTPATIENT
Start: 2021-05-20 | End: 2021-05-24 | Stop reason: HOSPADM

## 2021-05-20 RX ORDER — MAGNESIUM HYDROXIDE 1200 MG/15ML
LIQUID ORAL AS NEEDED
Status: DISCONTINUED | OUTPATIENT
Start: 2021-05-20 | End: 2021-05-20 | Stop reason: HOSPADM

## 2021-05-20 RX ORDER — CALCIUM CARBONATE 200(500)MG
1000 TABLET,CHEWABLE ORAL DAILY PRN
Status: DISCONTINUED | OUTPATIENT
Start: 2021-05-20 | End: 2021-05-24 | Stop reason: HOSPADM

## 2021-05-20 RX ORDER — ROPIVACAINE HYDROCHLORIDE 5 MG/ML
INJECTION, SOLUTION EPIDURAL; INFILTRATION; PERINEURAL
Status: COMPLETED | OUTPATIENT
Start: 2021-05-20 | End: 2021-05-20

## 2021-05-20 RX ORDER — ALBUTEROL SULFATE 2.5 MG/3ML
2.5 SOLUTION RESPIRATORY (INHALATION) EVERY 6 HOURS PRN
Status: DISCONTINUED | OUTPATIENT
Start: 2021-05-20 | End: 2021-05-20

## 2021-05-20 RX ORDER — BUPIVACAINE HYDROCHLORIDE 2.5 MG/ML
INJECTION, SOLUTION EPIDURAL; INFILTRATION; INTRACAUDAL
Status: COMPLETED | OUTPATIENT
Start: 2021-05-20 | End: 2021-05-20

## 2021-05-20 RX ORDER — NYSTATIN 100000 [USP'U]/G
POWDER TOPICAL 2 TIMES DAILY
Status: DISCONTINUED | OUTPATIENT
Start: 2021-05-20 | End: 2021-05-24 | Stop reason: HOSPADM

## 2021-05-20 RX ADMIN — MIDAZOLAM HYDROCHLORIDE 1 MG: 1 INJECTION, SOLUTION INTRAMUSCULAR; INTRAVENOUS at 08:03

## 2021-05-20 RX ADMIN — PROPOFOL 70 MCG/KG/MIN: 10 INJECTION, EMULSION INTRAVENOUS at 08:41

## 2021-05-20 RX ADMIN — CEFAZOLIN SODIUM 2000 MG: 2 SOLUTION INTRAVENOUS at 08:19

## 2021-05-20 RX ADMIN — FOLIC ACID 1 MG: 1 TABLET ORAL at 12:21

## 2021-05-20 RX ADMIN — ONDANSETRON 4 MG: 2 INJECTION INTRAMUSCULAR; INTRAVENOUS at 10:05

## 2021-05-20 RX ADMIN — DULOXETINE HYDROCHLORIDE 60 MG: 60 CAPSULE, DELAYED RELEASE ORAL at 17:28

## 2021-05-20 RX ADMIN — FENTANYL CITRATE 50 MCG: 50 INJECTION INTRAMUSCULAR; INTRAVENOUS at 08:03

## 2021-05-20 RX ADMIN — PROPOFOL 30 MG: 10 INJECTION, EMULSION INTRAVENOUS at 08:41

## 2021-05-20 RX ADMIN — ROPIVACAINE HYDROCHLORIDE 8 ML: 5 INJECTION, SOLUTION EPIDURAL; INFILTRATION; PERINEURAL at 08:07

## 2021-05-20 RX ADMIN — MONTELUKAST SODIUM 10 MG: 10 TABLET, COATED ORAL at 12:21

## 2021-05-20 RX ADMIN — DOCUSATE SODIUM 100 MG: 100 CAPSULE, LIQUID FILLED ORAL at 17:28

## 2021-05-20 RX ADMIN — OXYCODONE HYDROCHLORIDE AND ACETAMINOPHEN 500 MG: 500 TABLET ORAL at 12:20

## 2021-05-20 RX ADMIN — FERROUS SULFATE TAB 325 MG (65 MG ELEMENTAL FE) 325 MG: 325 (65 FE) TAB at 17:25

## 2021-05-20 RX ADMIN — NYSTATIN: 100000 POWDER TOPICAL at 17:28

## 2021-05-20 RX ADMIN — MORPHINE SULFATE 2 MG: 2 INJECTION, SOLUTION INTRAMUSCULAR; INTRAVENOUS at 19:27

## 2021-05-20 RX ADMIN — TRANEXAMIC ACID 1000 MG: 1 INJECTION, SOLUTION INTRAVENOUS at 08:32

## 2021-05-20 RX ADMIN — GABAPENTIN 300 MG: 300 CAPSULE ORAL at 17:25

## 2021-05-20 RX ADMIN — TRAZODONE HYDROCHLORIDE 50 MG: 50 TABLET ORAL at 21:04

## 2021-05-20 RX ADMIN — FENTANYL CITRATE 25 MCG: 50 INJECTION INTRAMUSCULAR; INTRAVENOUS at 11:22

## 2021-05-20 RX ADMIN — CHLORHEXIDINE GLUCONATE 0.12% ORAL RINSE 15 ML: 1.2 LIQUID ORAL at 06:55

## 2021-05-20 RX ADMIN — MIDAZOLAM HYDROCHLORIDE 1 MG: 1 INJECTION, SOLUTION INTRAMUSCULAR; INTRAVENOUS at 08:05

## 2021-05-20 RX ADMIN — OXYCODONE HYDROCHLORIDE 10 MG: 10 TABLET ORAL at 22:46

## 2021-05-20 RX ADMIN — ENOXAPARIN SODIUM 40 MG: 40 INJECTION SUBCUTANEOUS at 12:20

## 2021-05-20 RX ADMIN — DOCUSATE SODIUM 100 MG: 100 CAPSULE, LIQUID FILLED ORAL at 12:20

## 2021-05-20 RX ADMIN — BUPIVACAINE HYDROCHLORIDE IN DEXTROSE 1.6 ML: 7.5 INJECTION, SOLUTION SUBARACHNOID at 08:38

## 2021-05-20 RX ADMIN — TAMSULOSIN HYDROCHLORIDE 0.4 MG: 0.4 CAPSULE ORAL at 17:25

## 2021-05-20 RX ADMIN — OXYCODONE HYDROCHLORIDE 10 MG: 10 TABLET ORAL at 17:25

## 2021-05-20 RX ADMIN — PHENYLEPHRINE HYDROCHLORIDE 200 MCG: 10 INJECTION INTRAVENOUS at 09:10

## 2021-05-20 RX ADMIN — BUPIVACAINE 20 ML: 13.3 INJECTION, SUSPENSION, LIPOSOMAL INFILTRATION at 08:16

## 2021-05-20 RX ADMIN — SODIUM CHLORIDE, SODIUM LACTATE, POTASSIUM CHLORIDE, AND CALCIUM CHLORIDE: .6; .31; .03; .02 INJECTION, SOLUTION INTRAVENOUS at 10:03

## 2021-05-20 RX ADMIN — BUPIVACAINE HYDROCHLORIDE 7 ML: 2.5 INJECTION, SOLUTION EPIDURAL; INFILTRATION; INTRACAUDAL; PERINEURAL at 08:26

## 2021-05-20 RX ADMIN — ATORVASTATIN CALCIUM 20 MG: 20 TABLET, FILM COATED ORAL at 12:21

## 2021-05-20 RX ADMIN — FENTANYL CITRATE 25 MCG: 50 INJECTION INTRAMUSCULAR; INTRAVENOUS at 11:27

## 2021-05-20 RX ADMIN — SODIUM CHLORIDE, SODIUM LACTATE, POTASSIUM CHLORIDE, AND CALCIUM CHLORIDE 125 ML/HR: .6; .31; .03; .02 INJECTION, SOLUTION INTRAVENOUS at 07:00

## 2021-05-20 RX ADMIN — OXYCODONE HYDROCHLORIDE 10 MG: 10 TABLET ORAL at 12:20

## 2021-05-20 RX ADMIN — PANTOPRAZOLE SODIUM 40 MG: 40 TABLET, DELAYED RELEASE ORAL at 12:22

## 2021-05-20 RX ADMIN — BUPROPION HYDROCHLORIDE 300 MG: 150 TABLET, EXTENDED RELEASE ORAL at 12:21

## 2021-05-20 RX ADMIN — DICYCLOMINE HYDROCHLORIDE 10 MG: 10 CAPSULE ORAL at 12:20

## 2021-05-20 RX ADMIN — CEFAZOLIN SODIUM 2000 MG: 2 SOLUTION INTRAVENOUS at 17:25

## 2021-05-20 RX ADMIN — PHENYLEPHRINE HYDROCHLORIDE 100 MCG: 10 INJECTION INTRAVENOUS at 08:59

## 2021-05-20 RX ADMIN — SODIUM CHLORIDE, SODIUM LACTATE, POTASSIUM CHLORIDE, AND CALCIUM CHLORIDE 125 ML/HR: .6; .31; .03; .02 INJECTION, SOLUTION INTRAVENOUS at 19:27

## 2021-05-20 RX ADMIN — SODIUM CHLORIDE, SODIUM LACTATE, POTASSIUM CHLORIDE, AND CALCIUM CHLORIDE 125 ML/HR: .6; .31; .03; .02 INJECTION, SOLUTION INTRAVENOUS at 12:08

## 2021-05-20 RX ADMIN — GABAPENTIN 300 MG: 300 CAPSULE ORAL at 21:04

## 2021-05-20 RX ADMIN — OXYBUTYNIN CHLORIDE 10 MG: 5 TABLET, EXTENDED RELEASE ORAL at 21:04

## 2021-05-20 NOTE — OP NOTE
Orthopedics ARTHROPLASTY KNEE TOTAL  Op Note      Pre-op Diagnosis:   Primary osteoarthritis of left knee    Post-op Diagnosis:  Same    Procedure:  LEFT TOTAL KNEE ARTHROPLASTY     Surgeon:  Shun Tapia MD    Assistants:  KOFI Loera was present for the entire procedure, A qualified resident physician was not available and A physician assistant was required during the procedure for retraction tissue handling,dissection and suturing      Anesthesia:  Regional block with spinal    Tourniquet Time:  55 min    Estimated Blood Loss:  Minimal    Material sent to lab:  None      Complications:  None    Findings:  Severe tricompartmental knee osteoarthritis of medial compartment, lateral compartment and patellofemoral compartment    Indications:  73 y o  y/o female with pain in her left knee with exam and X-rays consistent with osteoarthritis of the knee  The patient was unresponsive to nonoperative management  Treatment options were discussed, and the patient wished to proceed with a total knee replacement  Risks and benefits of surgery were discussed which included but were not limited to infection, neurovascular damage, incomplete resolution of symptoms, wound healing problems, stiffness, instability, need for further surgery, failure of the implants, fracture, need for revision surgery, MI, DVT, PE, and death  Informed consent was obtained  Implants:  DePuy Attune Total Knee System  Femur: 4  Tibia: 3  Patella: 32  Poly: 14        Procedure:  Patient was met preoperatively and the left knee was identified and signed  The patient was taken back to the operating room where a Spinal was performed  A well-padded tourniquet was placed on the left thigh and the leg was sterilely prepped and draped in the usual fashion  A timeout was held identifying the patient, side, site, antibiotics, and allergies  The patient received Ancef and TXA preoperatively      The leg was exsanguinated with an Esmarch and the tourniquet inflated to 250 mmHg  A longitudinal incision was made on the anterior aspect of the knee  I dissected down through the subcutaneous tissue to Ron's layer  Ron's layer was split and elevated a full-thickness fashion over the tendon  A standard medial parapatellar approach was then made  The patella was everted, the fat pad was excised, the soft tissue off the proximal medial tibia was elevated, the medial and lateral menisci were debrided as tolerated  The knee was flexed and the ACL and PCL were excised  The femoral entry reamer was placed down the femoral canal, and the distal femoral cutting jig was placed  The distal femoral cut was made, removing 11 mm of distal femur  My attention was then turned to the proximal tibia  The external cutting guide was used  I secured it proximally, set the varus and valgus as well as the posterior slope, and elected to remove 2 mm off the  medial tibial plateau  A significant medial release had been performed due to the severe varus deformity  I checked the extension gaps, and once pleased with this returned to the femur  I then turned my attention back to the distal femur  All remaining menisci were removed at this time  The distal femur was sized at a size 4  The cutting block was then secured, the flexion gap checked, and then the anterior, posterior, anterior chamfer, and posterior chamfer cuts were made  The cutting block was removed, the notch cutting block was placed in the notch cut was made  I returned back to the tibia  I sized the proximal tibia at a size 3  The rotation was set, and the proximal tibia was reamed and then broached  The trial implants were then placed, the knee was ranged, the stability was assessed  At this time I sequentially released further medially trialing sequentially with my spacer use    Once I had this spacers up to a size 14 good stability able to varus and valgus stress both in full extension as well as flexion  Patient's knee flexion was to approximately 90° before there is anterior translation of the tibia  Her preoperative flexion was approximately 90°  Attention was then turned to the patella  The patella was measured at a thickness of 23 mm  8 5 mm of patella were removed  The patella was sized at a 32, and the drill was used for the peg holes  The knee was ranged with the trial patella and which showed excellent patellar tracking  The trial implants were removed  Osteophytes were removed posteriorly from the knee  Aqua mantis was used posteriorly and laterally and medially on the capsule  Duramorph was placed in the posterior capsule  Pulse lavage was used to thoroughly irrigate and clean the knee and remove all fluid from the cancellus bone  In a standard fashion the implants were cemented in, first the tibia, then the femur, then the patella  The knee was held until the cement had hardened  I re-trialed with the poly and placed a final 14 mm poly without difficulty  At this point the knee had excellent range of motion and good stability with varus and valgus stress and no significant anterior posterior instability  The tourniquet was let down  Adequate hemostasis was obtained  The deep fascia was closed with interrupted Ethibond, subcutaneous skin was closed with 0 Vicryl, 2-0 Vicryl, and stratus fix  Steri-Strips and a sterile dressing were placed  Disposition:  Patient tolerated the procedure well and was taken to PACU postoperatively  This patient underwent a procedure not on the inpatient only list and therefore is subject to the 2 midnight benchmark  Accordingly, in my judgement, the patient will require at least 2 midnights in the hospital receiving acute medical care  The patient is noted to have comorbid conditions which will require management in the neo-operative period prior to safe discharge   As such the patient will require acute care beyond the usual and routine recovery period for the procedure alone and is therefore appropriate for inpatient admission    Plan:  Patient will be admitted postoperatively  - 24 hours of IV antibiotics  - DVT prophylaxis: Lovenox for 2 weeks followed by one month of aspirin, 325 mg twice a day, foot pumps  - Physical therapy and occupational therapy consults  - Weightbearing as tolerated  - Internal medicine consult for medical management postoperatively  - Social work consult for discharge planning  - Incentive spirometer every hour when awake  - We'll monitor patient's hematocrit daily, assess acute blood loss anemia, and transfuse only if necessary  - We'll follow patient's vital signs, blood pressure, temperature and address issues as needed  - Follow-up 10-14 days postoperatively with Dr Hetal Dougherty in clinic  No x-rays will be needed at that time          @[facility]@     Date: 5/20/2021  Time: 10:29 AM

## 2021-05-20 NOTE — PLAN OF CARE
Problem: OCCUPATIONAL THERAPY ADULT  Goal: Performs self-care activities at highest level of function for planned discharge setting  See evaluation for individualized goals  Description: Treatment Interventions: ADL retraining, Functional transfer training, Endurance training, Patient/family training, Equipment evaluation/education, Compensatory technique education          See flowsheet documentation for full assessment, interventions and recommendations  Note: Limitation: Decreased ADL status, Decreased endurance, Decreased sensation, Decreased self-care trans, Decreased high-level ADLs  Prognosis: Good  Assessment: Patient is a 73 y o  female seen for OT evaluation s/p admit to [address] [place] [address]  on 5/20/2021 w/ primary OA of L knee  Commorbidities affecting patient's functional performance at time of assessment include: irritable bowel syndrome, bilateral carpal tunnel syndrome, anxiety disorder, HTN, and insomnia  Orders placed for OT evaluation and treatment and ambulate with walker  Performed at least two patient identifiers during session including name and wristband  Prior to admission, pt  reporting being independent w/ADLs, ambulatory w/rollator, and lives w/ daughter  Personal factors affecting patient at time of initial evaluation include: steps to enter, difficulty performing ADLs and difficulty performing IADLs  Upon evaluation, patient requires supervision and minimal  assist for UB ADLs, moderate assist for LB ADLs, transfers and functional ambulation in room and bathroom with minimal  assist x 2, with the use of Rolling Walker  Patient is oriented x 4 and presents with ability to recognize a problem, define a problem, identify alternative plan, select a plan, organize steps in a plan, implement plan and evaluate outcome (problem solving)    Occupational performance is affected by the following deficits: decreased muscle strength, dynamic sit/ stand balance deficit with poor standing tolerance time for self care and functional mobility, decreased activity tolerance, impaired sensation, increased pain and delayed righting and equilibrium reactions  Patient to benefit from continued Occupational Therapy treatment while in the hospital to address deficits as defined above and maximize level of functional independence with ADLs and functional mobility  Occupational Performance areas to address include: grooming , bathing/ shower, dressing, toilet hygiene, transfer to all surfaces, functional ambulation and functional mobility  From OT standpoint, recommendation at time of d/c would be Post acute rehabilitation services         OT Discharge Recommendation: Post acute rehabilitation services  OT - OK to Discharge: Yes(once medically cleared)     Shannan Evans, OT

## 2021-05-20 NOTE — ANESTHESIA PROCEDURE NOTES
Peripheral Block    Start time: 5/20/2021 8:16 AM  Reason for block: at surgeon's request and post-op pain management  Staffing  Anesthesiologist: Aristides Mcknight MD  Performed: anesthesiologist   Preanesthetic Checklist  Completed: patient identified, site marked, surgical consent, pre-op evaluation, timeout performed, IV checked, risks and benefits discussed and monitors and equipment checked  Peripheral Block  Patient position: supine  Prep: ChloraPrep  Patient monitoring: heart rate, cardiac monitor, continuous pulse ox and frequent blood pressure checks  Block type: adductor canal block  Laterality: left  Injection technique: single-shot  Procedures: ultrasound guided, Ultrasound guidance required for the procedure to increase accuracy and safety of medication placement and decrease risk of complications    Ultrasound permanent image savedbupivacaine (MARCAINE) 0 25 % perineural infiltration, 7 mL  Needle  Needle type: Stimuplex (4 inch)   Needle localization: ultrasound guidance  Assessment  Injection assessment: incremental injection, local visualized surrounding nerve on ultrasound, negative aspiration for CSF, negative aspiration for heme and no paresthesia on injection  Paresthesia pain: none  Heart rate change: yes  Slow fractionated injection: yes  Post-procedure:  site cleaned  patient tolerated the procedure well with no immediate complications

## 2021-05-20 NOTE — PHYSICAL THERAPY NOTE
Physical Therapy Evaluation     Patient's Name: Dileep Monge    Admitting Diagnosis  Primary osteoarthritis of left knee [M17 12]    Problem List  Patient Active Problem List   Diagnosis    Irritable bowel syndrome (IBS)    Mild intermittent asthma without complication    Bilateral carpal tunnel syndrome    Chronic musculoskeletal pain    Generalized anxiety disorder    Hyperlipidemia LDL goal <130    Essential hypertension    Lymphedema    Patent foramen ovale    Tension type headache    Vitamin D deficiency    Recurrent major depressive disorder, in partial remission (HCC)    Other chronic pancreatitis ([facility] )    Body mass index (BMI) of 40 0-44 9 in adult ([facility]    Foot pain, bilateral    Tinea cruris    Primary insomnia    Neuropathy    Age-related osteoporosis without current pathological fracture    Swelling of joint of left knee    Right kidney stone    Scoliosis    Primary osteoarthritis of left knee    HX: breast cancer       Past Medical History  Past Medical History:   Diagnosis Date    Anxiety     Arthritis     Asthma     [facility] cancer [facility]     rt mastectomy 2005    Cancer ([facility] )     breast right    Cataract     CHF (congestive heart failure) (HCC)     Coronary artery disease     Depression     GERD (gastroesophageal reflux disease)     High cholesterol     History of chemotherapy     2005 rt breast cancer    History of transfusion     Hypertension     IBS (irritable bowel syndrome)     Irritable bowel syndrome (IBS) 5/31/2016    Kidney stone     Lymphedema of right arm     Obesity     PONV (postoperative nausea and vomiting)     Post-menopausal     Psychiatric disorder     Renal disorder     Vitamin D deficiency        Past Surgical History  Past Surgical History:   Procedure Laterality Date    BREAST SURGERY      CATARACT EXTRACTION Bilateral     CATARACT EXTRACTION, BILATERAL      CHOLECYSTECTOMY      COLONOSCOPY N/A 5/31/2016    Procedure: COLONOSCOPY;  Surgeon: Chanda Patel MD;  Location: [facility] MAIN OR;  Service:     GALLBLADDER SURGERY      HYSTERECTOMY      Total    KNEE ARTHROSCOPY      KNEE SURGERY      arthroscopy    MASTECTOMY Right     rt breast mastectomy 2005    AR TOTAL KNEE ARTHROPLASTY Left 5/20/2021    Procedure: ARTHROPLASTY KNEE TOTAL;  Surgeon: Marcin Torres MD;  Location: [facility] MAIN OR;  Service: Orthopedics    TONSILLECTOMY AND ADENOIDECTOMY          05/20/21 1248   PT Last Visit   PT Visit Date 05/20/21   Note Type   Note type Evaluation   Pain Assessment   Pain Assessment Tool 0-10   Pain Score 9   Pain Location/Orientation Orientation: Left; Location: Knee   Pain Onset/Description Frequency: Constant/Continuous; Onset: Ongoing  ("feels like a hammer smashing knee")   Effect of Pain on Daily Activities yes   Hospital Pain Intervention(s) Medication (See MAR); Cold applied; Ambulation/increased activity   Multiple Pain Sites No   Home Living   Type of Home House  (2 ARTUR w/ handrails)   Home Layout Two level  (chair lift to access bedroom on 2nd floor)   Bathroom Shower/Tub Walk-in shower   Bathroom Toilet Raised   Bathroom Equipment Grab bars around toilet;Built-in shower seat   Bathroom Accessibility Accessible   Home Equipment Walker;Cane;Stair glide  (RW, rollator, leg )   Additional Comments uses rollator primarily but also has available a RW   Prior Function   Level of [place] Independent with ADLs and functional mobility; Needs assistance with IADLs   Lives With Daughter;Family   Receives Help From Family   ADL Assistance Independent  (bathing, laundry, cooking)   IADLs Needs assistance  (does not drive; daughter goes for groceries)   Falls in the last 6 months 1 to 4  (Pt stated 4 or 5, unsure of exact amount)   Vocational Retired   Restrictions/Precautions   Wells Steve Bearing Precautions Per Order Yes   LLE Weight Bearing Per Order WBAT   Braces or Orthoses LE Immobilizer  (LLE)   Other Precautions Chair Alarm; Bed Alarm;WBS;O2;Fall Risk;Pain  (2L NC O2, did not utilize prior to admission)   General   Family/Caregiver Present No   Cognition   Overall Cognitive Status WFL   Arousal/Participation Responsive   Orientation Level Oriented X4   Memory Within functional limits   Following Commands Follows all commands and directions without difficulty   RLE Assessment   RLE Assessment X  (4-/5 grossly assessed)   LLE Assessment   LLE Assessment X  (3/5 grossly assessed)   Coordination   Movements are Fluid and Coordinated 0   Bed Mobility   Supine to Sit 4  Minimal assistance   Additional items Assist x 1;HOB elevated; Increased time required;LE management;Verbal cues   Sit to Supine 4  Minimal assistance   Additional items Assist x 1; Increased time required;LE management;Verbal cues   Transfers   Sit to Stand 4  Minimal assistance   Additional items Assist x 2; Increased time required;Verbal cues   Stand to Sit 4  Minimal assistance   Additional items Assist x 2; Increased time required;Verbal cues   Ambulation/Elevation   Gait pattern Foward flexed; Antalgic; Short stride  (took side steps)   Gait Assistance 4  Minimal assist   Additional items Assist x 2;Verbal cues; Tactile cues   Assistive Device Rolling walker  (knee immobilizer)   Distance 2 ft  (towards head of bed)   Balance   Static Sitting Fair +   Dynamic Sitting Fair   Static Standing Fair   Dynamic Standing Fair -   Ambulatory Fair -   Endurance Deficit   Endurance Deficit Yes   Endurance Deficit Description EOB BP taken with Christine /78 L arm, O2 96% w/ supplemental O2 maintained  (lightheadedness present with mobility, resolved with BTB)   Activity Tolerance   Activity Tolerance Patient limited by pain;Treatment limited secondary to medical complications (Comment)   Medical Staff Made Aware Pam griffin OT co-evaluation due to post-operative complexity and first time up [address] yes, Christine PCA made aware of outcomes   Assessment   Prognosis Good  (motivated to get better)   Problem List Decreased strength;Decreased endurance;Decreased mobility; Decreased coordination;Obesity; Decreased skin integrity;Orthopedic restrictions;Pain   Assessment Pt is 73 y o  female seen for PT evaluation s/p admit to [address] [place] [address] on 5/20/2021 w/ primary OA L knee  PT consulted to assess pt's functional mobility and d/c needs  Order placed for PT eval and tx, w/ WBAT L LE and ambulate with walker order  Comorbidities affecting pt's physical performance at time of assessment include: weakness, pain  PTA, pt was independent w/ all functional mobility w/ AD usage  Personal factors affecting pt at time of IE include: ambulating w/ assistive device, stairs to enter home, inability to ambulate household distances, inability to navigate community distances, inability to navigate level surfaces w/o external assistance, unable to perform dynamic tasks in community, positive fall history and inability to perform ADLs  Please find objective findings from PT assessment regarding body systems outlined above with impairments and limitations including weakness, impaired balance, decreased endurance, impaired coordination, gait deviations, pain, decreased activity tolerance, decreased functional mobility tolerance, fall risk, orthopedic restrictions and decreased skin integrity  The following objective measures performed on IE also reveal limitations: AM-PAC 6-Clicks: 13/87  Pt's clinical presentation is currently unstable/unpredictable seen in pt's presentation of pain severity, lightheadedness,fall hx  Pt to benefit from continued PT tx to address deficits as defined above and maximize level of functional independent mobility and consistency  From PT/mobility standpoint, recommendation at time of d/c would be post acute rehabilitation services pending progress in order to facilitate return to PLOF     Barriers to Discharge Inaccessible home environment  (ARTUR home)   Goals   Patient Goals to do her therapy and get better   LTG Expiration Date 05/30/21   Long Term Goal #1 1 )Patient will complete bed mobility CGA of 1 for decrease need for caregiver assistance, decrease burden of care  2 ) Patient will complete transfers with CGA of 1 to decrease risk of falls, facilitate upright standing posture  3 ) LLE strength to greater than/equal to 4-/5 gross musculature to increase ability to safely transfer, control descent to chair  4 ) Patient will exhibit increase static standing balance to Fair+ > 2 minutes without LOB and CGA of 1 to improve activity tolerance  5 ) Patient will exhibit increase dynamic ambulatory balance to Fair+ > 50 feet w/AD CGA of 1 to improve ability to mobilize to toilet, chair and decrease risk for additional medical complications  PT Treatment Day 0   Plan   Treatment/Interventions Functional transfer training;LE strengthening/ROM; Therapeutic exercise; Endurance training;Patient/family training;Equipment eval/education; Bed mobility;Gait training;Spoke to nursing;OT   PT Frequency Twice a day   Recommendation   PT Discharge Recommendation Post acute rehabilitation services   Equipment Recommended Elizabeth Oneill  (pt  has own)   PT - OK to Discharge No   Additional Comments Upon conclusion, pt  was resting in bed w/bed alarm engaged and all needs within reach,CPs placed x 2  Additional Comments 2 Pt's raw score on the AM-PAC Basic Mobility inpatient short form is 15, standardized score is 36 97  Patients at this level are likely to benefit from DC to [facility]  However, please refer to therapist recommendation for safe DC planning     AM-PAC Basic Mobility Inpatient   Turning in Bed Without Bedrails 3   Lying on Back to Sitting on Edge of Flat Bed 3   Moving Bed to Chair 3   Standing Up From Chair 2   Walk in Room 2   Climb 3-5 Stairs 2   Basic Mobility Inpatient Raw Score 15   Basic Mobility Standardized Score 36 97     Coby Galarza, PT

## 2021-05-20 NOTE — ANESTHESIA POSTPROCEDURE EVALUATION
Post-Op Assessment Note    CV Status:  Stable  Pain Score: 0    Pain management: adequate     Mental Status:  Alert and awake   Hydration Status:  Euvolemic   PONV Controlled:  Controlled   Airway Patency:  Patent      Post Op Vitals Reviewed: Yes      Staff: CRNA         No complications documented      BP   99/55   Temp   97 4   Pulse  84   Resp   20   SpO2   96

## 2021-05-20 NOTE — ANESTHESIA PROCEDURE NOTES
Spinal Block    Patient location during procedure: OR  Start time: 5/20/2021 8:38 AM  Reason for block: primary anesthetic  Staffing  Anesthesiologist: Serena Carney MD  Performed: anesthesiologist   Preanesthetic Checklist  Completed: patient identified, surgical consent, pre-op evaluation, timeout performed, IV checked, risks and benefits discussed and monitors and equipment checked  Spinal Block  Patient position: sitting  Prep: Betadine, site prepped and draped and swab x3  Patient monitoring: heart rate, cardiac monitor, continuous pulse ox and frequent blood pressure checks  Approach: midline  Location: L3-4  Injection technique: single-shot  Needle  Needle type: pencil-tip (pencan)   Needle gauge: 25 G  Assessment  Injection Assessment:  negative aspiration for heme, no paresthesia on injection and positive aspiration for clear CSF    Post-procedure:  site cleaned

## 2021-05-20 NOTE — ANESTHESIA PROCEDURE NOTES
Peripheral Block    Patient location during procedure: holding area  Start time: 5/20/2021 8:07 AM  Reason for block: at surgeon's request and post-op pain management  Staffing  Anesthesiologist: Avinash Bowie MD  Performed: anesthesiologist   Preanesthetic Checklist  Completed: patient identified, site marked, surgical consent, pre-op evaluation, timeout performed, IV checked, risks and benefits discussed and monitors and equipment checked  Peripheral Block  Patient position: sitting  Prep: ChloraPrep  Patient monitoring: heart rate, cardiac monitor, continuous pulse ox and frequent blood pressure checks  Block type: ipack block  Laterality: left  Injection technique: single-shot  Procedures: ultrasound guided, Ultrasound guidance required for the procedure to increase accuracy and safety of medication placement and decrease risk of complications  Ultrasound permanent image savedropivacaine (NAROPIN) 0 5 % perineural infiltration, 8 mL  Needle  Needle type: Stimuplex   Needle gauge: 4 inch    Needle localization: ultrasound guidance  Assessment  Injection assessment: incremental injection, local visualized surrounding nerve on ultrasound, negative aspiration for CSF, negative aspiration for heme and no paresthesia on injection  Paresthesia pain: none  Heart rate change: no  Slow fractionated injection: no  Post-procedure:  site cleaned  patient tolerated the procedure well with no immediate complications  Additional Notes  In 12 cc NS

## 2021-05-20 NOTE — PLAN OF CARE
Problem: PHYSICAL THERAPY ADULT  Goal: Performs mobility at highest level of function for planned discharge setting  See evaluation for individualized goals  Description: Treatment/Interventions: Functional transfer training, LE strengthening/ROM, Therapeutic exercise, Endurance training, Patient/family training, Equipment eval/education, Bed mobility, Gait training, Spoke to nursing, OT  Equipment Recommended: Walker(pt  has own)       See flowsheet documentation for full assessment, interventions and recommendations  Note: Prognosis: Good(motivated to get better)  Problem List: Decreased strength, Decreased endurance, Decreased mobility, Decreased coordination, Obesity, Decreased skin integrity, Orthopedic restrictions, Pain  Assessment: Pt is 73 y o  female seen for PT evaluation s/p admit to [address] [place] [address] on 5/20/2021 w/ primary OA L knee  PT consulted to assess pt's functional mobility and d/c needs  Order placed for PT eval and tx, w/ WBAT L LE and ambulate with walker order  Comorbidities affecting pt's physical performance at time of assessment include: weakness, pain  PTA, pt was independent w/ all functional mobility w/ AD usage  Personal factors affecting pt at time of IE include: ambulating w/ assistive device, stairs to enter home, inability to ambulate household distances, inability to navigate community distances, inability to navigate level surfaces w/o external assistance, unable to perform dynamic tasks in community, positive fall history and inability to perform ADLs  Please find objective findings from PT assessment regarding body systems outlined above with impairments and limitations including weakness, impaired balance, decreased endurance, impaired coordination, gait deviations, pain, decreased activity tolerance, decreased functional mobility tolerance, fall risk, orthopedic restrictions and decreased skin integrity   The following objective measures performed on IE also reveal limitations: AM-PAC 6-Clicks: 10/90  Pt's clinical presentation is currently unstable/unpredictable seen in pt's presentation of pain severity, lightheadedness,fall hx  Pt to benefit from continued PT tx to address deficits as defined above and maximize level of functional independent mobility and consistency  From PT/mobility standpoint, recommendation at time of d/c would be post acute rehabilitation services pending progress in order to facilitate return to PLOF  Barriers to Discharge: Inaccessible home environment(ARTUR home)        PT Discharge Recommendation: (S) Post acute rehabilitation services     PT - OK to Discharge: No    See flowsheet documentation for full assessment

## 2021-05-20 NOTE — ASSESSMENT & PLAN NOTE
· Patient status post left total knee arthroplasty on 05/20/2021  · Continue management per orthopedic team

## 2021-05-20 NOTE — INTERVAL H&P NOTE
H&P reviewed  After examining the patient I find no changes in the patients condition since the H&P had been written      Vitals:    05/20/21 0637   BP: 137/68   Pulse: 84   Resp: 18   Temp: 98 1 °F (36 7 °C)   SpO2: 95%

## 2021-05-20 NOTE — RESPIRATORY THERAPY NOTE
RT Protocol Note  Ceci Victor 73 y o  female MRN: [id number]  Unit/Bed#: -01 Encounter: [id number]    Assessment    Active Problems:    * No active hospital problems   *      Home Pulmonary Medications:    Home Devices/Therapy: (P) Other (Comment)(MDi alb prn no other resp therapy )    Past Medical History:   Diagnosis Date    Anxiety     Arthritis     Asthma     Breast cancer ([address] )     rt mastectomy 2005    Cancer [facility])     breast right    Cataract     CHF (congestive heart failure) (HCC)     Coronary artery disease     Depression     GERD (gastroesophageal reflux disease)     High cholesterol     History of chemotherapy     2005 rt breast cancer    History of transfusion     Hypertension     IBS (irritable bowel syndrome)     Irritable bowel syndrome (IBS) 5/31/2016    Kidney stone     Lymphedema of right arm     Obesity     PONV (postoperative nausea and vomiting)     Post-menopausal     Psychiatric disorder     Renal disorder     Vitamin D deficiency      Social History     Socioeconomic History    Marital status:      Spouse name: None    Number of children: None    Years of education: None    Highest education level: None   Occupational History    Occupation: Retired   Social Needs    Financial resource strain: None    Food insecurity     Worry: None     Inability: None    Transportation needs     Medical: None     Non-medical: None   Tobacco Use    Smoking status: Never Smoker    Smokeless tobacco: Never Used   Substance and Sexual Activity    Alcohol use: Never     Frequency: Monthly or less     Comment: socially    Drug use: Never    Sexual activity: Not Currently   Lifestyle    Physical activity     Days per week: None     Minutes per session: None    Stress: None   Relationships    Social connections     Talks on phone: None     Gets together: None     Attends [employer]ism service: None     Active member of club or organization: None     Attends meetings of clubs or organizations: None     Relationship status: None    Intimate partner violence     Fear of current or ex partner: None     Emotionally abused: None     Physically abused: None     Forced sexual activity: None   Other Topics Concern    None   Social History Narrative    Always uses seat belt    Occasional caffeine consumption         Retired    Lives with adult daughter       Subjective    Subjective Data: (P) pt is post op     Objective    Physical Exam:   Assessment Type: (P) Assess only  General Appearance: (P) Alert, Awake  Respiratory Pattern: (P) Normal  Chest Assessment: (P) Chest expansion symmetrical  Bilateral Breath Sounds: (P) Clear  O2 Device: (P) NC     Vitals:  Blood pressure 137/78, pulse (P) 89, temperature (!) 96 6 °F (35 9 °C), temperature source Temporal, resp  rate (P) 16, height 4' 9" (1 448 m), weight 83 9 kg (185 lb), SpO2 (P) 95 %, not currently breastfeeding  Imaging and other studies: I have personally reviewed pertinent reports        O2 Device: (P) NC      Plan    Respiratory Plan: (P) Home Bronchodilator Patient pathway        Resp Comments: (P) pt assessed for resp protocol, no apparent distress will cont with MDi alb prn as pt has asthma hx,  pt aware to call rn if needs, spacer given and demonstrated safe and proper use, will cont to monitor

## 2021-05-20 NOTE — OCCUPATIONAL THERAPY NOTE
Occupational Therapy Evaluation      Delta Mode    5/20/2021    Patient Active Problem List   Diagnosis    Irritable bowel syndrome (IBS)    Mild intermittent asthma without complication    Bilateral carpal tunnel syndrome    Chronic musculoskeletal pain    Generalized anxiety disorder    Hyperlipidemia LDL goal <130    Essential hypertension    Lymphedema    Patent foramen ovale    Tension type headache    Vitamin D deficiency    Recurrent major depressive disorder, in partial remission (HCC)    Other chronic pancreatitis (HCC)    Body mass index (BMI) of 40 0-44 9 in adult [facility])    Foot pain, bilateral    Tinea cruris    Primary insomnia    Neuropathy    Age-related osteoporosis without current pathological fracture    Swelling of joint of left knee    Right kidney stone    Scoliosis    Primary osteoarthritis of left knee    HX: breast cancer       Past Medical History:   Diagnosis Date    Anxiety     Arthritis     Asthma     Breast cancer [address] [address] )     rt mastectomy 2005    Cancer ([facility] [address] )     breast right    Cataract     CHF (congestive heart failure) (HCC)     Coronary artery disease     Depression     GERD (gastroesophageal reflux disease)     High cholesterol     History of chemotherapy     2005 rt breast cancer    History of transfusion     Hypertension     IBS (irritable bowel syndrome)     Irritable bowel syndrome (IBS) 5/31/2016    Kidney stone     Lymphedema of right arm     Obesity     PONV (postoperative nausea and vomiting)     Post-menopausal     Psychiatric disorder     Renal disorder     Vitamin D deficiency        Past Surgical History:   Procedure Laterality Date    BREAST SURGERY      CATARACT EXTRACTION Bilateral     CATARACT EXTRACTION, BILATERAL      CHOLECYSTECTOMY      COLONOSCOPY N/A 5/31/2016    Procedure: COLONOSCOPY;  Surgeon: Skip Hsieh MD;  Location: [facility] MAIN [facility];  Service:    [facility] GALLBLADDER SURGERY      HYSTERECTOMY      Total    KNEE ARTHROSCOPY      KNEE SURGERY      arthroscopy    MASTECTOMY Right     rt breast mastectomy 2005    AL TOTAL KNEE ARTHROPLASTY Left 5/20/2021    Procedure: ARTHROPLASTY KNEE TOTAL;  Surgeon: Kathie Galloway MD;  Location: [address] [place] [address] MAIN OR;  Service: Orthopedics    TONSILLECTOMY AND ADENOIDECTOMY          05/20/21 1400   OT Last Visit   OT Visit Date 05/20/21   Note Type   Note type Evaluation   Restrictions/Precautions   Weight Bearing Precautions Per Order Yes   LLE Weight Bearing Per Order WBAT   Braces or Orthoses LE Immobilizer  (LLE)   Pain Assessment   Pain Assessment Tool 0-10   Pain Score 9   Pain Location/Orientation Orientation: Left; Location: Knee   Pain Onset/Description Onset: Ongoing;Frequency: Constant/Continuous  ("feels like a hammer smashing knee")   Effect of Pain on Daily Activities yes   Hospital Pain Intervention(s) Medication (See MAR); Repositioned; Ambulation/increased activity   Multiple Pain Sites No   Home Living   Type of [address] [place] [address] Two level;Stairs to enter with rails  (chair lift to access bedroom on 2nd floor, 2 ARTUR)   Bathroom Shower/Tub Walk-in shower   Bathroom Toilet Raised   Bathroom Equipment Grab bars in shower;Built-in shower seat   Bathroom Accessibility Accessible   Home Equipment Walker;Cane  (RW, rollator with prior usage)   Prior Function   Level of [place] Independent with ADLs and functional mobility; Needs assistance with IADLs   Lives With Daughter;Family   Receives Help From Family   ADL Assistance Independent  (bathing, laundry, cooking)   IADLs Needs assistance  (does not drive; daughter goes for groceries)   Falls in the last 6 months 1 to 4  (Pt stated 4 or 5, unsure of exact amount)   Vocational Retired   Lifestyle   Autonomy pt  reporting being independent w/ADLs, ambulatory w/rollator, and lives w/ daughter   Reciprocal Relationships supportive daughter   Service to Others retired   ADL   Eating Assistance 6  Modified independent   Grooming Assistance 6  Modified Independent   UB Bathing Assistance 5  Supervision/Setup   LB Bathing Assistance 3  Moderate Assistance   UB Dressing Assistance 4  Minimal Assistance   LB Dressing Assistance 3  Moderate Assistance   Toileting Assistance  3  Moderate Assistance   Bed Mobility   Supine to Sit 4  Minimal assistance   Additional items Assist x 1;HOB elevated; Bedrails; Increased time required;Verbal cues;LE management   Sit to Supine 4  Minimal assistance   Additional items Assist x 1; Increased time required;Verbal cues;LE management   Transfers   Sit to Stand 4  Minimal assistance   Additional items Assist x 2; Increased time required;Verbal cues   Stand to Sit 4  Minimal assistance   Additional items Assist x 2; Increased time required;Verbal cues   Additional Comments Pt  reported lightheadedness with positional change  Symptomatic resolution upon return BTB  Sitting BP taken w/Christine /78, O2 saturation 96%  Balance   Static Sitting Fair +   Dynamic Sitting Fair   Static Standing Fair   Dynamic Standing Fair -   Ambulatory Fair -   Activity Tolerance   Activity Tolerance Patient limited by pain;Treatment limited secondary to medical complications (Comment)   Medical Staff Made Aware Yes, co-evaluation with [employer] PT due to post operative complexity and first OOB assessment     Nurse Made Aware yes, Christine PCA   RUE Assessment   RUE Assessment WFL   LUE Assessment   LUE Assessment WFL   Hand Function   Gross Motor Coordination Functional   Fine Motor Coordination Functional   Sensation   Light Touch Partial deficits in the RLE;Partial deficits in the LLE  (neuropathy noted B feet)   Vision-Basic Assessment   Current Vision Wears glasses only for reading   Visual History Corrective eye surgery   Cognition   Overall Cognitive Status WFL   Arousal/Participation Alert   Attention Within functional limits   Orientation Level Oriented X4   Memory Within functional limits   Following Commands Follows all commands and directions without difficulty   Comments Pt  agreeable to OT assessment  Assessment   Limitation Decreased ADL status; Decreased endurance;Decreased sensation;Decreased self-care trans;Decreased high-level ADLs   Prognosis Good   Assessment Patient is a 73 y o  female seen for OT evaluation s/p admit to [address] [place] [address]  on 5/20/2021 w/ primary OA of L knee  Commorbidities affecting patient's functional performance at time of assessment include: irritable bowel syndrome, bilateral carpal tunnel syndrome, anxiety disorder, HTN, and insomnia  Orders placed for OT evaluation and treatment and ambulate with walker  Performed at least two patient identifiers during session including name and wristband  Prior to admission, pt  reporting being independent w/ADLs, ambulatory w/rollator, and lives w/ daughter  Personal factors affecting patient at time of initial evaluation include: steps to enter, difficulty performing ADLs and difficulty performing IADLs  Upon evaluation, patient requires supervision and minimal  assist for UB ADLs, moderate assist for LB ADLs, transfers and functional ambulation in room and bathroom with minimal  assist x 2, with the use of Rolling Walker  Patient is oriented x 4 and presents with ability to recognize a problem, define a problem, identify alternative plan, select a plan, organize steps in a plan, implement plan and evaluate outcome (problem solving)  Occupational performance is affected by the following deficits: decreased muscle strength, dynamic sit/ stand balance deficit with poor standing tolerance time for self care and functional mobility, decreased activity tolerance, impaired sensation, increased pain and delayed righting and equilibrium reactions  Patient to benefit from continued Occupational Therapy treatment while in the hospital to address deficits as defined above and maximize level of functional independence with ADLs and functional mobility   Occupational Performance areas to address include: grooming , bathing/ shower, dressing, toilet hygiene, transfer to all surfaces, functional ambulation and functional mobility  From OT standpoint, recommendation at time of d/c would be Post acute rehabilitation services  Goals   Patient Goals to do therapy and get better   Plan   Treatment Interventions ADL retraining;Functional transfer training; Endurance training;Patient/family training;Equipment evaluation/education; Compensatory technique education   Goal Expiration Date 05/30/21   OT Treatment Day 0   OT Frequency 3-5x/wk   Recommendation   OT Discharge Recommendation Post acute rehabilitation services   OT - OK to Discharge Yes  (once medically cleared)   Additional Comments  The patient's raw score on the AM-PAC Daily Activity inpatient short form is 16, standardized score is 35 96, less than 39 4  Patients at this level are likely to benefit from discharge to post-acute rehabilitation services  Please refer to the recommendation of the Occupational Therapist for safe discharge planning     AM-PAC Daily Activity Inpatient   Lower Body Dressing 2   Bathing 3   Toileting 2   Upper Body Dressing 3   Grooming 3   Eating 3   Daily Activity Raw Score 16   Daily Activity Standardized Score (Calc for Raw Score >=11) 35 96   -[facility] Applied Cognition Inpatient   Following a Speech/Presentation 4   Understanding Ordinary Conversation 4   Taking Medications 3   Remembering Where Things Are Placed or Put Away 3   Remembering List of 4-5 Errands 3   Taking Care of Complicated Tasks 3   Applied Cognition Raw Score 20   Applied Cognition Standardized Score 41 76     GOALS:    *ADL transfers with (I) for inc'd independence with ADLs/purposeful tasks    *UB ADL with (I) for inc'd independence with self cares    *LB ADL with (I) using AE prn for inc'd independence with self cares    *Toileting with (I) for clothing management and hygiene for return to PLOF with personal care    *Increase stand tolerance x5 m for inc'd tolerance with standing purposeful tasks    *Participate in 10m UE therex to increase overall stamina/activity tolerance for purposeful tasks    *Bed mobility- (I) for inc'd independence to manage own comfort and initiate EOB & OOB purposeful tasks    *Patient will verbalize 3 safety awareness/ principles to prevent falls in the home setting  *Patient will verbalize and demonstrate use of energy conservation/deep breathing techniques and work simplification skills during functional activities with no verbal cues  *Patient will increase OOB/sitting tolerance to 2-4 hours per day to increase participation in self-care and leisure tasks with no s/s of exertion  *Pt will verbalize and demonstrate understanding of post-op movement precautions 100% in tx sessions for increased safety and functional mobility        *Pt will demonstrate use of long handled AE during 100% of tx sessions for increased ADL safety and independence following D/C     Adrianna Collins MS, OTR/L

## 2021-05-20 NOTE — CONSULTS
Jim Stokes 1950, 73 y o  female MRN: [id number]  Unit/Bed#: -01 Encounter: [id number]  Primary Care Provider: Sam Brown PA-C   Date and time admitted to hospital: 5/20/2021  6:19 AM    Inpatient consult to Internal Medicine  Consult performed by: Georgi Jo MD  Consult ordered by: Janneth Coyle PA-C          Primary osteoarthritis of left knee  Assessment & Plan  · Patient status post left total knee arthroplasty on 05/20/2021  · Continue management per orthopedic team    Neuropathy  Assessment & Plan  · Continue gabapentin    Recurrent major depressive disorder, in partial remission ([facility] [address] )  Assessment & Plan  · Stable, continue Cymbalta, Wellbutrin, and trazodone    Vitamin D deficiency  Assessment & Plan  · Continue vitamin-D supplementation    Hyperlipidemia LDL goal <130  Assessment & Plan  · Continue statin    Mild intermittent asthma without complication  Assessment & Plan  · Continue albuterol p r n  · Continue singular  · Patient is not on home oxygen      Recommendations for Discharge:  · Per Primary Service    History of Present Illness:  Teena Romo is a 73 y o  female who is originally admitted to the orthopedic service due to left knee osteoarthritis  We are consulted for medical management  Patient had left knee replacement done with orthopedic team today  Patient tolerated procedure well  Patient states her pain is currently controlled  Patient is tolerating diet  Denies any nausea vomiting  No fever or chills  Review of Systems:  Review of Systems   Constitutional: Positive for activity change  Negative for chills and fever  Respiratory: Negative for shortness of breath  Cardiovascular: Negative for chest pain  Musculoskeletal: Positive for arthralgias  All other systems reviewed and are negative        Past Medical and Surgical History:   Past Medical History:   Diagnosis Date    Anxiety     Arthritis     Asthma     Breast cancer ([facility] [address] )     rt mastectomy 2005    Cancer [facility])     breast right    Cataract     CHF (congestive heart failure) (HCC)     Coronary artery disease     Depression     GERD (gastroesophageal reflux disease)     High cholesterol     History of chemotherapy     2005 rt breast cancer    History of transfusion     Hypertension     IBS (irritable bowel syndrome)     Irritable bowel syndrome (IBS) 5/31/2016    Kidney stone     Lymphedema of right arm     Obesity     PONV (postoperative nausea and vomiting)     Post-menopausal     Psychiatric disorder     Renal disorder     Vitamin D deficiency        Past Surgical History:   Procedure Laterality Date    BREAST SURGERY      CATARACT EXTRACTION Bilateral     CATARACT EXTRACTION, BILATERAL      CHOLECYSTECTOMY      COLONOSCOPY N/A 5/31/2016    Procedure: COLONOSCOPY;  Surgeon: Lenny Vega MD;  Location: [facility] MA[facility];  Service:    [facility] GALLBLADDER SURGERY      HYSTERECTOMY      Total    KNEE ARTHROSCOPY      KNEE SURGERY      arthroscopy    MASTECTOMY Right     rt breast mastectomy 2005    DE TOTAL KNEE ARTHROPLASTY Left 5/20/2021    Procedure: ARTHROPLASTY KNEE TOTAL;  Surgeon: Chula Conway MD;  [facility] MA[facility] OR;  Service: Orthopedics    TONSILLECTOMY AND ADENOIDECTOMY         Meds/Allergies:  all medications and allergies reviewed    Allergies:    Allergies   Allergen Reactions    Ibuprofen Nausea Only, Rash, Dizziness and Syncope    Latex Dermatitis       Social History:     Marital Status:     Substance Use History:   Social History     Substance and Sexual Activity   Alcohol Use Never    Frequency: Monthly or less    Comment: socially     Social History     Tobacco Use   Smoking Status Never Smoker   Smokeless Tobacco Never Used     Social History     Substance and Sexual Activity   Drug Use Never       Family History:  I have reviewed the patients family history    Physical Exam:   Vitals:   Blood Pressure: 125/57 (05/20/21 1400)  Pulse: 84 (05/20/21 1400)  Temperature: (!) 96 5 °F (35 8 °C) (05/20/21 1400)  Temp Source: Temporal (05/20/21 1400)  Respirations: 18 (05/20/21 1400)  Height: 4' 9" (144 8 cm) (05/20/21 6304)  Weight - Scale: 83 9 kg (185 lb) (05/20/21 0697)  SpO2: 90 % (05/20/21 1400)    Physical Exam  Constitutional:       General: She is not in acute distress  Appearance: She is obese  HENT:      Head: Normocephalic and atraumatic  Nose: Nose normal       Mouth/Throat:      Mouth: Mucous membranes are moist    Eyes:      Extraocular Movements: Extraocular movements intact  Conjunctiva/sclera: Conjunctivae normal    Neck:      Musculoskeletal: Normal range of motion and neck supple  Cardiovascular:      Rate and Rhythm: Normal rate and regular rhythm  Pulmonary:      Effort: Pulmonary effort is normal  No respiratory distress  Abdominal:      Palpations: Abdomen is soft  Tenderness: There is no abdominal tenderness  Musculoskeletal:      Comments: Left knee with decreased range of motion and brace in place   Skin:     General: Skin is warm and dry  Neurological:      General: No focal deficit present  Mental Status: She is alert  Cranial Nerves: No cranial nerve deficit  Psychiatric:         Mood and Affect: Mood normal          Behavior: Behavior normal          Additional Data:   Lab Results: I have personally reviewed pertinent reports  Invalid input(s): LABALBU          Lab Results   Component Value Date/Time    HGBA1C 5 5 04/21/2021 10:35 AM    HGBA1C 5 5 02/17/2021 07:20 AM           Imaging: I have personally reviewed pertinent reports  No orders to display       ** Please Note: This note has been constructed using a voice recognition system   **

## 2021-05-21 LAB
ANION GAP SERPL CALCULATED.3IONS-SCNC: 5 MMOL/L (ref 4–13)
BUN SERPL-MCNC: 8 MG/DL (ref 7–25)
CALCIUM SERPL-MCNC: 8 MG/DL (ref 8.6–10.5)
CHLORIDE SERPL-SCNC: 98 MMOL/L (ref 98–107)
CO2 SERPL-SCNC: 30 MMOL/L (ref 21–31)
CREAT SERPL-MCNC: 0.68 MG/DL (ref 0.6–1.2)
ERYTHROCYTE [DISTWIDTH] IN BLOOD BY AUTOMATED COUNT: 12.8 % (ref 11.5–14.5)
GFR SERPL CREATININE-BSD FRML MDRD: 90 ML/MIN/1.73SQ M
GLUCOSE P FAST SERPL-MCNC: 166 MG/DL (ref 65–99)
GLUCOSE SERPL-MCNC: 166 MG/DL (ref 65–99)
HCT VFR BLD AUTO: 37.2 % (ref 42–47)
HGB BLD-MCNC: 12.6 G/DL (ref 12–16)
MCH RBC QN AUTO: 31 PG (ref 26–34)
MCHC RBC AUTO-ENTMCNC: 33.9 G/DL (ref 31–37)
MCV RBC AUTO: 92 FL (ref 81–99)
PLATELET # BLD AUTO: 146 THOUSANDS/UL (ref 149–390)
PMV BLD AUTO: 8.5 FL (ref 8.6–11.7)
POTASSIUM SERPL-SCNC: 3.8 MMOL/L (ref 3.5–5.5)
RBC # BLD AUTO: 4.06 MILLION/UL (ref 3.9–5.2)
SODIUM SERPL-SCNC: 133 MMOL/L (ref 134–143)
WBC # BLD AUTO: 6.5 THOUSAND/UL (ref 4.8–10.8)

## 2021-05-21 PROCEDURE — 85027 COMPLETE CBC AUTOMATED: CPT | Performed by: ORTHOPAEDIC SURGERY

## 2021-05-21 PROCEDURE — 97116 GAIT TRAINING THERAPY: CPT

## 2021-05-21 PROCEDURE — 99024 POSTOP FOLLOW-UP VISIT: CPT | Performed by: ORTHOPAEDIC SURGERY

## 2021-05-21 PROCEDURE — 80048 BASIC METABOLIC PNL TOTAL CA: CPT | Performed by: ORTHOPAEDIC SURGERY

## 2021-05-21 PROCEDURE — 97530 THERAPEUTIC ACTIVITIES: CPT

## 2021-05-21 PROCEDURE — 97110 THERAPEUTIC EXERCISES: CPT

## 2021-05-21 PROCEDURE — 94664 DEMO&/EVAL PT USE INHALER: CPT

## 2021-05-21 PROCEDURE — 94760 N-INVAS EAR/PLS OXIMETRY 1: CPT

## 2021-05-21 RX ORDER — ONDANSETRON 2 MG/ML
4 INJECTION INTRAMUSCULAR; INTRAVENOUS EVERY 6 HOURS PRN
Status: DISCONTINUED | OUTPATIENT
Start: 2021-05-21 | End: 2021-05-24 | Stop reason: HOSPADM

## 2021-05-21 RX ADMIN — SODIUM CHLORIDE, SODIUM LACTATE, POTASSIUM CHLORIDE, AND CALCIUM CHLORIDE 125 ML/HR: .6; .31; .03; .02 INJECTION, SOLUTION INTRAVENOUS at 15:27

## 2021-05-21 RX ADMIN — OXYBUTYNIN CHLORIDE 10 MG: 5 TABLET, EXTENDED RELEASE ORAL at 22:14

## 2021-05-21 RX ADMIN — OXYCODONE HYDROCHLORIDE 10 MG: 10 TABLET ORAL at 15:27

## 2021-05-21 RX ADMIN — NYSTATIN: 100000 POWDER TOPICAL at 08:45

## 2021-05-21 RX ADMIN — DULOXETINE HYDROCHLORIDE 60 MG: 60 CAPSULE, DELAYED RELEASE ORAL at 17:33

## 2021-05-21 RX ADMIN — DULOXETINE HYDROCHLORIDE 60 MG: 60 CAPSULE, DELAYED RELEASE ORAL at 08:56

## 2021-05-21 RX ADMIN — OXYCODONE HYDROCHLORIDE AND ACETAMINOPHEN 500 MG: 500 TABLET ORAL at 08:55

## 2021-05-21 RX ADMIN — DOCUSATE SODIUM 100 MG: 100 CAPSULE, LIQUID FILLED ORAL at 08:56

## 2021-05-21 RX ADMIN — TRAZODONE HYDROCHLORIDE 50 MG: 50 TABLET ORAL at 22:14

## 2021-05-21 RX ADMIN — NYSTATIN: 100000 POWDER TOPICAL at 17:32

## 2021-05-21 RX ADMIN — DOCUSATE SODIUM 100 MG: 100 CAPSULE, LIQUID FILLED ORAL at 17:33

## 2021-05-21 RX ADMIN — OXYCODONE HYDROCHLORIDE 10 MG: 10 TABLET ORAL at 22:13

## 2021-05-21 RX ADMIN — BUPROPION HYDROCHLORIDE 300 MG: 150 TABLET, EXTENDED RELEASE ORAL at 08:56

## 2021-05-21 RX ADMIN — MONTELUKAST SODIUM 10 MG: 10 TABLET, COATED ORAL at 08:55

## 2021-05-21 RX ADMIN — OXYCODONE HYDROCHLORIDE 10 MG: 10 TABLET ORAL at 08:50

## 2021-05-21 RX ADMIN — ENOXAPARIN SODIUM 40 MG: 40 INJECTION SUBCUTANEOUS at 08:56

## 2021-05-21 RX ADMIN — FERROUS SULFATE TAB 325 MG (65 MG ELEMENTAL FE) 325 MG: 325 (65 FE) TAB at 08:55

## 2021-05-21 RX ADMIN — ATORVASTATIN CALCIUM 20 MG: 20 TABLET, FILM COATED ORAL at 08:55

## 2021-05-21 RX ADMIN — DICYCLOMINE HYDROCHLORIDE 10 MG: 10 CAPSULE ORAL at 08:55

## 2021-05-21 RX ADMIN — PANTOPRAZOLE SODIUM 40 MG: 40 TABLET, DELAYED RELEASE ORAL at 08:55

## 2021-05-21 RX ADMIN — ONDANSETRON 4 MG: 2 INJECTION INTRAMUSCULAR; INTRAVENOUS at 08:55

## 2021-05-21 RX ADMIN — FERROUS SULFATE TAB 325 MG (65 MG ELEMENTAL FE) 325 MG: 325 (65 FE) TAB at 15:30

## 2021-05-21 RX ADMIN — CEFAZOLIN SODIUM 2000 MG: 2 SOLUTION INTRAVENOUS at 01:17

## 2021-05-21 RX ADMIN — GABAPENTIN 300 MG: 300 CAPSULE ORAL at 08:55

## 2021-05-21 RX ADMIN — CEFAZOLIN SODIUM 2000 MG: 2 SOLUTION INTRAVENOUS at 08:56

## 2021-05-21 RX ADMIN — GABAPENTIN 300 MG: 300 CAPSULE ORAL at 22:00

## 2021-05-21 RX ADMIN — SODIUM CHLORIDE, SODIUM LACTATE, POTASSIUM CHLORIDE, AND CALCIUM CHLORIDE 125 ML/HR: .6; .31; .03; .02 INJECTION, SOLUTION INTRAVENOUS at 04:11

## 2021-05-21 RX ADMIN — GABAPENTIN 300 MG: 300 CAPSULE ORAL at 15:30

## 2021-05-21 RX ADMIN — TAMSULOSIN HYDROCHLORIDE 0.4 MG: 0.4 CAPSULE ORAL at 15:30

## 2021-05-21 RX ADMIN — FOLIC ACID 1 MG: 1 TABLET ORAL at 08:56

## 2021-05-21 NOTE — OCCUPATIONAL THERAPY NOTE
05/21/21 1031   OT Last Visit   OT Visit Date 05/20/21   Note Type   Note Type Treatment   Restrictions/Precautions   Weight Bearing Precautions Per Order Yes   LLE Weight Bearing Per Order WBAT   General   Response to Previous Treatment Patient unable to report, no changes reported from family or staff   ADL   Equipment Provided   (initiated instruction/demos  of LH/LB AE-attention lacking)   Therapeutic Excerise-Strength   UE Strength Yes   Right Upper Extremity- Strength   R Shoulder Flexion; Horizontal ABduction; Other (Comment)  (pro/re-traction)   R Elbow Elbow flexion;Elbow extension   R Weight/Reps/Sets 1 pound weight - 10 reps shoulder and 15 reps elbow    Left Upper Extremity-Strength   L Weights/Reps/Sets all exers  same as RUE above    LUE Strength Comment *poor performance secondary to patient's lethargy - she also had complaint of a headache (nurse aware)    Coordination   Gross Motor appears WFL   Dexterity appears WFL   Cognition   Overall Cognitive Status Impaired  (lethargic, patient reports some unclear thinking )   Arousal/Participation Responsive; Cooperative   Attention Difficulty attending to directions   Following Commands Follows one step commands with increased time or repetition   Activity Tolerance   Activity Tolerance Patient limited by fatigue;Patient limited by pain   Assessment   Assessment Patient participated in Skilled OT session this date with interventions consisting of ADL re training with the use of correct body mechnaics,  long handle equipment and therapeutic exercise to: increase functional use of BUEs, increase BUE muscle strength    Patient agreeable to OT treatment session, upon arrival patient was found semi-reclined in bed (appeared to be sleeping)  Patient requiring frequent re direction, verbal cues for correct technique, one step directives and frequent rest periods   Patient continues to be functioning below baseline level, occupational performance remains limited secondary to factors listed above and increased risk for falls and injury  From OT standpoint, recommendation at time of d/c would be post-acute rehabilitation services  *Poor level of participation today  *   Patient to benefit from continued Occupational Therapy treatment while in the hospital to address deficits as defined above and maximize level of functional independence with ADLs and functional mobility  Plan   Treatment Interventions ADL retraining;UE strengthening/ROM; Patient/family training; Activityengagement   Goal Expiration Date 05/30/21   OT Treatment Day 1   Recommendation   Additional Comments 2 The patient's raw score on the AM-PAC Daily Activity inpatient short form is 16, standardized score is 35 96, less than 39 4  Patients at this level are likely to benefit from discharge to post-acute rehabilitation services  Please refer to the recommendation of the Occupational Therapist for safe discharge planning     AM-PAC Daily Activity Inpatient   Lower Body Dressing 2   Bathing 3   Toileting 2   Upper Body Dressing 3   Grooming 3   Eating 3   Daily Activity Raw Score 16   Daily Activity Standardized Score (Calc for Raw Score >=11) 35 96   AM-[facility] Applied Cognition Inpatient   Following a Speech/Presentation 3   Understanding Ordinary Conversation 4   Taking Medications 3   Remembering Where Things Are Placed or Put Away 3   Remembering List of 4-5 Errands 3   Taking Care of Complicated Tasks 2   Applied Cognition Raw Score 18   Applied Cognition Standardized Score 38 07   LEAH Damon/PORTIA

## 2021-05-21 NOTE — PLAN OF CARE
Problem: PHYSICAL THERAPY ADULT  Goal: Performs mobility at highest level of function for planned discharge setting  See evaluation for individualized goals  Description: Treatment/Interventions: Functional transfer training, LE strengthening/ROM, Therapeutic exercise, Endurance training, Patient/family training, Equipment eval/education, Bed mobility, Gait training, Spoke to nursing, OT  Equipment Recommended: Walker(pt  has own)       See flowsheet documentation for full assessment, interventions and recommendations  5/21/2021 1440 by Roseline Perez PT  Outcome: Progressing  Note: Prognosis: Good  Problem List: Decreased endurance, Decreased mobility, Decreased coordination, Obesity, Decreased skin integrity, Orthopedic restrictions, Pain, Decreased range of motion, Decreased strength  Assessment: Pt seen for PT treatment session this date with interventions consisting of gait training w/ emphasis on improving pt's ability to ambulate level surfaces x 10 feet total with mod A provided by therapist with RW, Therapeutic exercise consisting of: AROM and AAROM 10 reps and 20 reps B LE in sitting position and therapeutic activity consisting of training: supine<>sit transfers, sit<>stand transfers, static sitting tolerance at EOB for 5 minutes w/ B UE support, static standing tolerance for 2 minutes w/ B UE support, vc and tactile cues for static sitting posture faciliation, vc and tactile cues for static standing posture faciliation and stand pivot transfers towards B direction  Pt agreeable to PT treatment session upon arrival, pt found supine in bed w/ HOB elevated, A&O x 4  In comparison to previous session, pt with improvements in ambulatory distance  Post session: pt returned BTB, bed alarm engaged, all needs in reach and RN notified of session findings/recommendations   Continue to recommend post acute rehabilitation services at time of d/c in order to maximize pt's functional independence and safety w/ mobility  Pt continues to be functioning below baseline level, and remains limited 2* factors listed above and including weakness, pain, gait deviations, impaired balance, decreased endurance  PT will continue to see pt during current hospitalization in order to address the deficits listed above and provide interventions consistent w/ POC in effort to achieve LTGs  Barriers to Discharge: Inaccessible home environment  Barriers to Discharge Comments: ARTUR home     PT Discharge Recommendation: Post acute rehabilitation services(continued recommendation)     PT - OK to Discharge: Yes(if medically stable, to PAR)    See flowsheet documentation for full assessment

## 2021-05-21 NOTE — PROGRESS NOTES
Progress Note - Orthopedics   Cris King 73 y o  female MRN: [id number]  Unit/Bed#: -01 Encounter: [id number]    Assessment:  POD 1 s/p  Elective left total knee replacement    Plan:   continue physical therapy and occupational therapy weight-bearing as tolerated   Continue Lovenox for DVT prophylaxis   Out of bed   Continue immobilizer unless patient is ambulating, sitting, icing her knee, or working with physical therapy   patient will need rehab upon discharge   Dressing change prior to discharge  Weight bearing:  Weight-bearing as tolerated left lower extremity    VTE Pharmacologic Prophylaxis: Enoxaparin (Lovenox)  VTE Mechanical Prophylaxis: sequential compression device    Subjective:  Pain is well controlled  Slowly ambulating  Vitals: Blood pressure 148/74, pulse (!) 117, temperature (!) 97 4 °F (36 3 °C), temperature source Temporal, resp  rate 19, height 4' 9" (1 448 m), weight 83 9 kg (185 lb), SpO2 97 %, not currently breastfeeding  ,Body mass index is 40 03 kg/m²        Intake/Output Summary (Last 24 hours) at 5/21/2021 1101  Last data filed at 5/20/2021 1700  Gross per 24 hour   Intake 240 ml   Output 400 ml   Net -160 ml       Invasive Devices     Peripheral Intravenous Line            Peripheral IV 05/20/21 Left Arm 1 day                Physical Exam:    left lower extremity is neurovascularly intact   Toes are pink and mobile   Compartments are soft   Dressing is clean, dry and intact   Knee immobilizer in place   Negative Homans   Good dorsiflexion and plantar flexion of ankle   Brisk cap refill   Sensation intact    Lab, Imaging and other studies:   CBC:   Lab Results   Component Value Date    WBC 6 50 05/21/2021    HGB 12 6 05/21/2021    HCT 37 2 (L) 05/21/2021    MCV 92 05/21/2021     (L) 05/21/2021    MCH 31 0 05/21/2021    MCHC 33 9 05/21/2021    RDW 12 8 05/21/2021    MPV 8 5 (L) 05/21/2021     CMP:   Lab Results   Component Value Date    SODIUM 133 (L) 05/21/2021    CL 98 05/21/2021    CO2 30 05/21/2021    BUN 8 05/21/2021    CREATININE 0 68 05/21/2021    CALCIUM 8 0 (L) 05/21/2021    EGFR 90 05/21/2021

## 2021-05-21 NOTE — CASE MANAGEMENT
Cm placed a call to [employer] and spoke with Hien Sears at 13:48 pm to # [phone], pt is able to come over the weekend if an Sybil Back is obtained, pt will need a covid test 24 hrs prior to d/c , transportation is needed, faizan placed a call to the insurance company at 13:55 and I spoke with Felicitas Chan , ref# [id number] she stated I marjorie receive a mariano from a nurse and then I will be told where to fax clinical, faizan will continue to work on a safe d/c plan

## 2021-05-21 NOTE — UTILIZATION REVIEW
Initial Clinical Review    Elective  Outpatient  surgical procedure  Age/Sex: 73 y o  female  Surgery Date:  5/20  Procedure:  LEFT TOTAL KNEE ARTHROPLASTY   Anesthesia:  Regional block w/spinal   Operative Findings:  Severe tricompartmental knee osteoarthritis of medial compartment, lateral compartment and patellofemoral compartment    Outpatient No Charge Bed (Outpatient No Charge Bed/Extended Recovery) Once      05/20/21 1050       POD#1 Progress Note: Cont PT/OT, wbat, cont immobilizer/ice when not ambulating ;   Dressing change prior to discharge    Per PT - Pain and drowsiness/fatigue/dizziness w/being upright      Requiring assist w/all transfers       CONT IVF @  125,  Pain / nausea control     Vital Signs: /74 (BP Location: Left arm)   Pulse (!) 117   Temp (!) 97 4 °F (36 3 °C) (Temporal)   Resp 19   Ht 4' 9" (1 448 m)   Wt 83 9 kg (185 lb)   LMP  (LMP Unknown)   SpO2 97%   Breastfeeding No   BMI 40 03 kg/m²     Pertinent Labs/Diagnostic Test Results:         Results from last 7 days   Lab Units 05/21/21  0429   WBC Thousand/uL 6 50   HEMOGLOBIN g/dL 12 6   HEMATOCRIT % 37 2*   PLATELETS Thousands/uL 146*         Results from last 7 days   Lab Units 05/21/21  0429   SODIUM mmol/L 133*   POTASSIUM mmol/L 3 8   CHLORIDE mmol/L 98   CO2 mmol/L 30   ANION GAP mmol/L 5   BUN mg/dL 8   CREATININE mg/dL 0 68   EGFR ml/min/1 73sq m 90   CALCIUM mg/dL 8 0*             Results from last 7 days   Lab Units 05/21/21  0429   GLUCOSE RANDOM mg/dL 166*     Diet: reg  Mobility: amb q shift w/assist;   wbat   DVT Prophylaxis: lovenox/ scd's     Medications/Pain Control: see below   Scheduled Medications:  ascorbic acid, 500 mg, Oral, Daily  atorvastatin, 20 mg, Oral, Daily  buPROPion, 300 mg, Oral, QAM  dicyclomine, 10 mg, Oral, Daily  docusate sodium, 100 mg, Oral, BID  DULoxetine, 60 mg, Oral, BID  enoxaparin, 40 mg, Subcutaneous, Daily  [START ON 5/26/2021] ergocalciferol, 50,000 Units, Oral, Weekly  ferrous sulfate, 325 mg, Oral, BID With Meals  folic acid, 1 mg, Oral, Daily  gabapentin, 300 mg, Oral, TID  montelukast, 10 mg, Oral, Daily  nystatin, , Topical, BID  oxybutynin, 10 mg, Oral, HS  pantoprazole, 40 mg, Oral, Daily  tamsulosin, 0 4 mg, Oral, Daily With Dinner  traZODone, 50 mg, Oral, HS      Continuous IV Infusions:  lactated ringers, 125 mL/hr, Intravenous, Continuous      PRN Meds:     acetaminophen, 650 mg, Oral, Q6H PRN  albuterol, 2 puff, Inhalation, Q4H PRN  aluminum-magnesium hydroxide-simethicone, 30 mL, Oral, Q6H PRN  bisacodyl, 10 mg, Rectal, Daily PRN  calcium carbonate, 1,000 mg, Oral, Daily PRN  morphine injection, 2 mg, Intravenous, Q2H PRN   5/20 @  1930  ondansetron, 4 mg, Intravenous, Q6H PRN   5/21 @  0900  oxyCODONE, 10 mg, Oral, Q4H PRN  5/20 postop x3;  5/21 @  0900  oxyCODONE, 5 mg, Oral, Q4H PRN        Network Utilization Review Department  ATTENTION: Please call with any questions or concerns to [phone] and carefully listen to the prompts so that you are directed to the right person  All voicemails are confidential   [facility] all requests for admission clinical reviews, approved or denied determinations and any other requests to dedicated fax number below belonging to the campus where the patient is receiving treatment   List of dedicated fax numbers for the Facilities:  [address] DENIALS (Administrative/Medical Necessity) [phone]   [address] (Maternity/NICU/Pediatrics) [phone]   [address] Isac Arechiga [address] [place] [address] [phone]   Daniel Clarke [address] [place] [address] [address] [place] [address] [phone]

## 2021-05-21 NOTE — CASE MANAGEMENT
LOS: 1 day pt was admitted as outpt surgery, pt is not a 30 day admission , cm met with the pt and made her aware of cm role, pt was not up to answering my questions to complete my assessment, pt gave me permission to call her daughter, cm placed a call at 09:18 am to # [phone] and I had to leave a message for her to call me to discuss d/c plan,   A post acute care recommendation was made by your care team for STR  Discussed Freedom of Choice with patient  List of facilities given to patient via in person  patient aware the list is custom filtered for them by zip code location and that [facility] post acute providers are designated  Pt's daughter called me and left a message for a new# to call her back on, cm placed a call at 10:19 am to # [phone] , pt lives with her daughter in a 2 story home with also a basement, pt does not need to use, 3 steps outside, ,stair lift to the 2nd floor, pt does had hx of depression, pt is a negative pasar, DME: walker, stair lift pt has no hx of HHC, pt does have a visiting nurse once a year from [employer], RX plan [facility], pt is independent except for driving, I did review the facility list with Jane Suh and she gave permission to send to [facility], referrals were sent authorization is need, transportation will be needed cm will continue to work on a safe d/c plan CM reviewed d/c planning process including the following: identifying help at home, patient preference for d/c planning needs, availability of treatment team to discuss questions or concerns patient and/or family may have regarding understanding medications and recognizing signs and symptoms once discharged  CM also encouraged patient to follow up with all recommended appointments after discharge  Patient advised of importance for patient and family to participate in managing patients medical well being

## 2021-05-21 NOTE — PHYSICAL THERAPY NOTE
Physical Therapy Treatment Session Note    Patient's Name: Horacio Salas    Admitting Diagnosis  Primary osteoarthritis of left knee [M17 12]    Problem List  Patient Active Problem List   Diagnosis    Irritable bowel syndrome (IBS)    Mild intermittent asthma without complication    Bilateral carpal tunnel syndrome    Chronic musculoskeletal pain    Generalized anxiety disorder    Hyperlipidemia LDL goal <130    Essential hypertension    Lymphedema    Patent foramen ovale    Tension type headache    Vitamin D deficiency    Recurrent major depressive disorder, in partial remission (HCC)    Other chronic pancreatitis ([facility] )    Body mass index (BMI) of 40 0-44 9 in adult ([facility] )    Foot pain, bilateral    Tinea cruris    Primary insomnia    Neuropathy    Age-related osteoporosis without current pathological fracture    Swelling of joint of left knee    Right kidney stone    Scoliosis    Primary osteoarthritis of left knee    HX: breast cancer       Past Medical History  Past Medical History:   Diagnosis Date    Anxiety     Arthritis     Asthma     Breast cancer ([facility] )     rt mastectomy 2005    Cancer ([facility] )     breast right    Cataract     CHF (congestive heart failure) (HCC)     Coronary artery disease     Depression     GERD (gastroesophageal reflux disease)     High cholesterol     History of chemotherapy     2005 rt breast cancer    History of transfusion     Hypertension     IBS (irritable bowel syndrome)     Irritable bowel syndrome (IBS) 5/31/2016    Kidney stone     Lymphedema of right arm     Obesity     PONV (postoperative nausea and vomiting)     Post-menopausal     Psychiatric disorder     Renal disorder     Vitamin D deficiency        Past Surgical History  Past Surgical History:   Procedure Laterality Date    BREAST SURGERY      CATARACT EXTRACTION Bilateral     CATARACT EXTRACTION, BILATERAL      CHOLECYSTECTOMY      COLONOSCOPY N/A 5/31/2016 Procedure: COLONOSCOPY;  Surgeon: Shannan Thapa MD;  Location: [facility] MAIN OR;  Service:     GALLBLADDER SURGERY      HYSTERECTOMY      Total    KNEE ARTHROSCOPY      KNEE SURGERY      arthroscopy    MASTECTOMY Right     rt breast mastectomy 2005    FL TOTAL KNEE ARTHROPLASTY Left 5/20/2021    Procedure: ARTHROPLASTY KNEE TOTAL;  Surgeon: Namita Luna MD;  Location: [address] [place] [address] MAIN OR;  Service: Orthopedics    TONSILLECTOMY AND ADENOIDECTOMY          05/21/21 0819   PT Last Visit   PT Visit Date 05/21/21   Note Type   Note Type Treatment   Pain Assessment   Pain Assessment Tool 0-10   Pain Score Worst Possible Pain   Pain Location/Orientation Orientation: Left; Location: Knee  (also reported 10/10 headache, nausea)   Pain Onset/Description Onset: Ongoing;Frequency: Constant/Continuous; Descriptor: Aching;Descriptor: Sore  ("spasms")   Effect of Pain on Daily Activities yes   Patient's Stated Pain Goal No pain   Hospital Pain Intervention(s) Medication (See MAR); Repositioned; Ambulation/increased activity; Elevated; Emotional support;Cold applied   Multiple Pain Sites No   Restrictions/Precautions   Weight Bearing Precautions Per Order Yes   LLE Weight Bearing Per Order WBAT   Braces or Orthoses LE Immobilizer  (LLE)   Other Precautions Chair Alarm; Bed Alarm;Multiple lines; Fall Risk;Pain  (on RA as 2 L has been discontinued)   General   Chart Reviewed Yes   Response to Previous Treatment Patient with no complaints from previous session  Family/Caregiver Present No   Cognition   Overall Cognitive Status WFL   Arousal/Participation Alert; Responsive; Cooperative   Attention Within functional limits   Orientation Level Oriented X4   Memory Within functional limits   Following Commands Follows one step commands with increased time or repetition   Comments Pt  agreeable to PT tx session, pleasant     Subjective   Subjective "I am just so sleepy"   Bed Mobility   Supine to Sit 3  Moderate assistance   Additional items Assist x 1;HOB elevated; Bedrails; Increased time required;Verbal cues;LE management   Sit to Supine   (DNT as pt  was sitting OOB on recliner upon conclusion )   Additional Comments Pt  reported initial dizziness EOB  Symptomatic resolution within 2 minutes rest    Transfers   Sit to Stand 3  Moderate assistance   Additional items Assist x 1;Bedrails; Increased time required;Verbal cues   Stand to Sit 3  Moderate assistance   Additional items Assist x 1;Bedrails; Increased time required;Verbal cues   Stand pivot 3  Moderate assistance   Additional items Assist x 1; Increased time required;Verbal cues; Other  (2nd staff present for safety)   Ambulation/Elevation   Gait pattern Improper Weight shift; Forward Flexion;Decreased foot clearance;Decreased L stance; Antalgic; Short stride; Excessively slow; Inconsistent eloisa   Gait Assistance 3  Moderate assist   Additional items Assist x 1;Verbal cues; Tactile cues; Other (Comment)  (SBA of 2nd staff for safety)   Assistive Device Rolling walker   Distance 4 feet  (bedside->recliner, could not advance 2/2 pain)   Stair Management Assistance Not tested  (unsafe for trialing)   Balance   Static Sitting Fair +   Dynamic Sitting Fair   Static Standing Fair   Dynamic Standing Fair -   Ambulatory Fair -   Endurance Deficit   Endurance Deficit Yes   Activity Tolerance   Activity Tolerance Patient limited by fatigue;Patient limited by pain   Medical Staff Made Aware yes, Anuja Olmos CM   Nurse Made Aware yes, Paulina BRICEÑO present for mobility; Steff RN informed of outcome   Exercises   [employer]; Bilateral;20 reps  (BLE elevated)   Glute Sets Sitting;20 reps;AROM; Bilateral   Hip Flexion Sitting;20 reps;AAROM; Left;AROM; Right   Hip Abduction Sitting;20 reps;AROM; Bilateral   Hip Adduction Sitting;20 reps;AROM; Bilateral   Ankle Pumps Supine;AROM; Bilateral;20 reps; Sitting   Assessment   Prognosis Good   Problem List Decreased endurance;Decreased mobility; Decreased coordination;Obesity; Decreased skin integrity;Orthopedic restrictions;Pain;Decreased range of motion;Decreased strength   Assessment Pt seen for PT treatment session this date with interventions consisting of gait training w/ emphasis on improving pt's ability to ambulate level surfaces x 4 feet with mod A provided by therapist with RW, Therapeutic exercise consisting of: AROM 20 reps B LE in sitting position and therapeutic activity consisting of training: supine<>sit transfers, sit<>stand transfers, static sitting tolerance at EOB for 5 minutes w/ B UE support, static standing tolerance for 2 minutes w/ B UE support, vc and tactile cues for static sitting posture faciliation, vc and tactile cues for static standing posture faciliation and stand pivot transfers towards R direction  Pt agreeable to PT treatment session upon arrival, pt found supine in bed w/ HOB elevated, A&O x 4  In comparison to previous session, pt with improvements in functional mobility to OOB  Post session: chair alarm engaged, all needs in reach and RN notified of session findings/recommendations  Continue to recommend post acute rehabilitation services at time of d/c in order to maximize pt's functional independence and safety w/ mobility  Pt continues to be functioning below baseline level, and remains limited 2* factors listed above and including weakness, pain, gait deviations, decreased endurance  PT will continue to see pt during current hospitalization in order to address the deficits listed above and provide interventions consistent w/ POC in effort to achieve LTGs  Barriers to Discharge Inaccessible home environment   Goals   Patient Goals to rest and less nausea  Jennifer Castro RN provided Zofran  )   LTG Expiration Date 05/30/21   Long Term Goal #1 LTGs remain appropriate   PT Treatment Day 1   Plan   Treatment/Interventions Functional transfer training; Therapeutic exercise; Endurance training;Elevations;LE strengthening/ROM; Patient/family training;Equipment eval/education; Bed mobility;Gait training;Spoke to nursing;Spoke to case management;OT   Progress Progressing toward goals   PT Frequency Twice a day   Recommendation   PT Discharge Recommendation Post acute rehabilitation services  (continued recommendation)   Equipment Recommended [employer] Hernandez  (pt  has own)   PT - OK to Discharge No   Additional Comments Upon conclusion, pt  was sitting OOB on recliner w/CPs placed and all needs within reach  Nursing at chairside  Additional Comments 2 Pt's raw score on the AM-PAC Basic Mobility inpatient short form is 15, standardized score is 36 97  Patients at this level are likely to benefit from DC to [facility]  However, please refer to therapist recommendation for safe DC planning     AM-PAC Basic Mobility Inpatient   Turning in Bed Without Bedrails 3   Lying on Back to Sitting on Edge of Flat Bed 3   Moving Bed to Chair 3   Standing Up From Chair 2   Walk in Room 2   Climb 3-5 Stairs 2   Basic Mobility Inpatient Raw Score 15   Basic Mobility Standardized Score 36 97     Tammy Herndon, PT

## 2021-05-21 NOTE — PLAN OF CARE
Problem: OCCUPATIONAL THERAPY ADULT  Goal: Performs self-care activities at highest level of function for planned discharge setting  See evaluation for individualized goals  Description: Treatment Interventions: ADL retraining, Functional transfer training, Endurance training, Patient/family training, Equipment evaluation/education, Compensatory technique education          See flowsheet documentation for full assessment, interventions and recommendations  Outcome: Not Progressing - this session   Note: Limitation: Decreased ADL status, Decreased endurance, Decreased sensation, Decreased self-care trans, Decreased high-level ADLs  Prognosis: Good  Assessment: Patient participated in Skilled OT session this date with interventions consisting of ADL re training with the use of correct body mechnaics,  long handle equipment and therapeutic exercise to: increase functional use of BUEs, increase BUE muscle strength    Patient agreeable to OT treatment session, upon arrival patient was found semi-reclined in bed (appeared to be sleeping)  Patient requiring frequent re direction, verbal cues for correct technique, one step directives and frequent rest periods  Patient continues to be functioning below baseline level, occupational performance remains limited secondary to factors listed above and increased risk for falls and injury  From OT standpoint, recommendation at time of d/c would be post-acute rehabilitation services  *Poor level of participation today  *   Patient to benefit from continued Occupational Therapy treatment while in the hospital to address deficits as defined above and maximize level of functional independence with ADLs and functional mobility        OT Discharge Recommendation: Post acute rehabilitation services  OT - OK to Discharge: Yes(once medically cleared)  LEAH Fuentes/PORTIA

## 2021-05-21 NOTE — PLAN OF CARE
Problem: PHYSICAL THERAPY ADULT  Goal: Performs mobility at highest level of function for planned discharge setting  See evaluation for individualized goals  Description: Treatment/Interventions: Functional transfer training, LE strengthening/ROM, Therapeutic exercise, Endurance training, Patient/family training, Equipment eval/education, Bed mobility, Gait training, Spoke to nursing, OT  Equipment Recommended: Walker(pt  has own)       See flowsheet documentation for full assessment, interventions and recommendations  Outcome: Progressing  Note: Prognosis: Good  Problem List: Decreased endurance, Decreased mobility, Decreased coordination, Obesity, Decreased skin integrity, Orthopedic restrictions, Pain, Decreased range of motion, Decreased strength  Assessment: Pt seen for PT treatment session this date with interventions consisting of gait training w/ emphasis on improving pt's ability to ambulate level surfaces x 4 feet with mod A provided by therapist with RW, Therapeutic exercise consisting of: AROM 20 reps B LE in sitting position and therapeutic activity consisting of training: supine<>sit transfers, sit<>stand transfers, static sitting tolerance at EOB for 5 minutes w/ B UE support, static standing tolerance for 2 minutes w/ B UE support, vc and tactile cues for static sitting posture faciliation, vc and tactile cues for static standing posture faciliation and stand pivot transfers towards R direction  Pt agreeable to PT treatment session upon arrival, pt found supine in bed w/ HOB elevated, A&O x 4  In comparison to previous session, pt with improvements in functional mobility to OOB  Post session: chair alarm engaged, all needs in reach and RN notified of session findings/recommendations  Continue to recommend post acute rehabilitation services at time of d/c in order to maximize pt's functional independence and safety w/ mobility   Pt continues to be functioning below baseline level, and remains limited 2* factors listed above and including weakness, pain, gait deviations, decreased endurance  PT will continue to see pt during current hospitalization in order to address the deficits listed above and provide interventions consistent w/ POC in effort to achieve LTGs  Barriers to Discharge: Inaccessible home environment        PT Discharge Recommendation: Post acute rehabilitation services(continued recommendation)     PT - OK to Discharge: No    See flowsheet documentation for full assessment

## 2021-05-21 NOTE — PHYSICAL THERAPY NOTE
Physical Therapy Treatment Session Note    Patient's Name: Kyra Chavez    Admitting Diagnosis  Primary osteoarthritis of left knee [M17 12]    Problem List  Patient Active Problem List   Diagnosis    Irritable bowel syndrome (IBS)    Mild intermittent asthma without complication    Bilateral carpal tunnel syndrome    Chronic musculoskeletal pain    Generalized anxiety disorder    Hyperlipidemia LDL goal <130    Essential hypertension    Lymphedema    Patent foramen ovale    Tension type headache    Vitamin D deficiency    Recurrent major depressive disorder, in partial remission (HCC)    Other [facility] ([facility] )    Body mass index (BMI) of 40 0-44 9 in adult ([facility] )    Foot pain, bilateral    Tinea cruris    Primary insomnia    Neuropathy    Age-related osteoporosis without current pathological fracture    Swelling of joint of left knee    Right kidney stone    Scoliosis    Primary osteoarthritis of left knee    HX: breast cancer       Past Medical History  Past Medical History:   Diagnosis Date    Anxiety     Arthritis     Asthma     Breast cancer ([facility] )     rt mastectomy 2005    Cancer ([facility] )     breast right    Cataract     CHF (congestive heart failure) (HCC)     Coronary artery disease     Depression     GERD (gastroesophageal reflux disease)     High cholesterol     History of chemotherapy     2005 rt breast cancer    History of transfusion     Hypertension     IBS (irritable bowel syndrome)     Irritable bowel syndrome (IBS) 5/31/2016    Kidney stone     Lymphedema of right arm     Obesity     PONV (postoperative nausea and vomiting)     Post-menopausal     Psychiatric disorder     Renal disorder     Vitamin D deficiency        Past Surgical History  Past Surgical History:   Procedure Laterality Date    BREAST SURGERY      CATARACT EXTRACTION Bilateral     CATARACT EXTRACTION, BILATERAL      CHOLECYSTECTOMY      COLONOSCOPY N/A 5/31/2016 Procedure: COLONOSCOPY;  Surgeon: Los Blanc MD;  Location: [facility];  Service:     GALLBLADDER SURGERY      HYSTERECTOMY      Total    KNEE ARTHROSCOPY      KNEE SURGERY      arthroscopy    MASTECTOMY Right     rt breast mastectomy 2005    NE TOTAL KNEE ARTHROPLASTY Left 5/20/2021    Procedure: ARTHROPLASTY KNEE TOTAL;  Surgeon: Juan Rodriguez MD;  Location: [facility] MAIN OR;  Service: Orthopedics    TONSILLECTOMY AND ADENOIDECTOMY          05/21/21 1323   PT Last Visit   PT Visit Date 05/21/21   Note Type   Note Type BID visit/treatment   Pain Assessment   Pain Assessment Tool 0-10   Pain Score 7   Pain Location/Orientation Orientation: Left; Location: Knee   Pain Onset/Description Onset: Ongoing;Frequency: Constant/Continuous; Descriptor: Aching;Descriptor: Sore   Effect of Pain on Daily Activities yes   Patient's Stated Pain Goal No pain   Hospital Pain Intervention(s) Medication (See MAR); Repositioned; Ambulation/increased activity; Elevated;Cold applied  (Cold packs x 2 refreshed and placed upon conclusion )   Multiple Pain Sites No   Restrictions/Precautions   Weight Bearing Precautions Per Order Yes   LLE Weight Bearing Per Order WBAT   Braces or Orthoses LE Immobilizer  (L LE)   Other Precautions Chair Alarm; Bed Alarm; Fall Risk;Pain;Multiple lines   General   Chart Reviewed Yes   Response to Previous Treatment Patient with no complaints from previous session  Family/Caregiver Present No   Cognition   Overall Cognitive Status WFL   Arousal/Participation Alert; Responsive; Cooperative   Attention Attends with cues to redirect   Orientation Level Oriented X4   Memory Within functional limits   Following Commands Follows one step commands with increased time or repetition   Comments Pt  agreeable to PT tx session, pleasant  Subjective   Subjective "I can get up and do stuff"   Bed Mobility   Supine to Sit 3  Moderate assistance   Additional items Assist x 1;HOB elevated; Bedrails; Increased time required;LE management;Verbal cues   Sit to Supine 3  Moderate assistance   Additional items Assist x 1;Bedrails; Increased time required;Verbal cues;LE management   Additional Comments Pt  denied lightheadedness with initial positional change  Agreeable to mobilize OOB to recliner for TE, but requested return BTB at session's end  Transfers   Sit to Stand 3  Moderate assistance   Additional items Assist x 1;Bedrails; Increased time required;Verbal cues   Stand to Sit 3  Moderate assistance   Additional items Assist x 1;Bedrails; Increased time required;Verbal cues   Stand pivot 3  Moderate assistance   Additional items Assist x 1; Increased time required;Verbal cues  (max A for terminal hip shift onto surfaces)   Ambulation/Elevation   Gait pattern Improper Weight shift; Antalgic;Decreased foot clearance; Forward Flexion;Decreased L stance; Step to;Excessively slow   Gait Assistance 3  Moderate assist   Additional items Assist x 1;Verbal cues; Tactile cues   Assistive Device Rolling walker   Distance 5 feet x 2  (to/from recliner)   Stair Management Assistance Not tested  (unsafe for trialing at this time)   Balance   Static Sitting Fair +   Dynamic Sitting Fair   Static Standing Fair   Dynamic Standing Fair -   Ambulatory Fair -   Endurance Deficit   Endurance Deficit Yes   Endurance Deficit Description Reported lightheadedness upon sitting in recliner, symptomatic resolution ~ 2 minutes rest    Activity Tolerance   Activity Tolerance Patient limited by fatigue;Patient limited by pain;Treatment limited secondary to medical complications (Comment)   Medical Staff Made Aware Yes, Adama naylor PA-C informed of continued PAR recommendation  Nurse Made Aware yes, Riccardo Estrella Located [facility]   Exercises   [employer] Sitting;10 reps; Left   Glute Sets Sitting;20 reps;AROM; Bilateral   Hip Flexion Sitting;20 reps;AROM; Right;10 reps;AAROM; Left   Hip Abduction Sitting;20 reps;AROM; Bilateral   Hip Adduction Sitting;20 reps;AROM; Bilateral   Knee AROM [employer] Sitting;20 reps;AROM; Right;10 reps;AAROM; Left   Ankle Pumps Sitting;20 reps;AROM; Bilateral   Assessment   Prognosis Good   Problem List Decreased endurance;Decreased mobility; Decreased coordination;Obesity; Decreased skin integrity;Orthopedic restrictions;Pain;Decreased range of motion;Decreased strength   Assessment Pt seen for PT treatment session this date with interventions consisting of gait training w/ emphasis on improving pt's ability to ambulate level surfaces x 10 feet total with mod A provided by therapist with RW, Therapeutic exercise consisting of: AROM and AAROM 10 reps and 20 reps B LE in sitting position and therapeutic activity consisting of training: supine<>sit transfers, sit<>stand transfers, static sitting tolerance at EOB for 5 minutes w/ B UE support, static standing tolerance for 2 minutes w/ B UE support, vc and tactile cues for static sitting posture faciliation, vc and tactile cues for static standing posture faciliation and stand pivot transfers towards B direction  Pt agreeable to PT treatment session upon arrival, pt found supine in bed w/ HOB elevated, A&O x 4  In comparison to previous session, pt with improvements in ambulatory distance  Post session: pt returned BTB, bed alarm engaged, all needs in reach and RN notified of session findings/recommendations  Continue to recommend post acute rehabilitation services at time of d/c in order to maximize pt's functional independence and safety w/ mobility  Pt continues to be functioning below baseline level, and remains limited 2* factors listed above and including weakness, pain, gait deviations, impaired balance, decreased endurance  PT will continue to see pt during current hospitalization in order to address the deficits listed above and provide interventions consistent w/ POC in effort to achieve LTGs     Barriers to Discharge Inaccessible home environment   Barriers to Discharge Comments [facility] home   Goals   Patient Goals to have a hot tea  (prepared for pt  upon conclusion)   LTG Expiration Date 05/30/21   Long Term Goal #1 LTGs remain appropriate   PT Treatment Day 2   Plan   Treatment/Interventions Functional transfer training;LE strengthening/ROM; Elevations; Therapeutic exercise; Endurance training;Patient/family training;Equipment eval/education; Bed mobility;Gait training;Spoke to nursing;Spoke to advanced practitioner   Progress Progressing toward goals   PT Frequency Twice a day   Recommendation   PT Discharge Recommendation Post acute rehabilitation services  (continued recommendation)   Equipment Recommended Walker  (pt  has own)   PT - OK to Discharge Yes  (if medically stable, to PAR)   Additional Comments Upon conclusion, pt  was resting in bed w/bed alarm engaged, cold packs placed, and all needs within reach  Additional Comments 2 Pt's raw score on the AM-PAC Basic Mobility inpatient short form is 15, standardized score is 36 97  Patients at this level are likely to benefit from DC to [address]  However, please refer to therapist recommendation for safe DC planning     AM-PAC Basic Mobility Inpatient   Turning in Bed Without Bedrails 3   Lying on Back to Sitting on Edge of Flat Bed 3   Moving Bed to Chair 3   Standing Up From Chair 2   Walk in Room 2   Climb 3-5 Stairs 2   Basic Mobility Inpatient Raw Score 15   Basic Mobility Standardized Score 36 97     Bharati Campo, PT

## 2021-05-22 ENCOUNTER — APPOINTMENT (OUTPATIENT)
Dept: CT IMAGING | Facility: HOSPITAL | Age: 71
End: 2021-05-22
Payer: COMMERCIAL

## 2021-05-22 PROBLEM — R00.0 TACHYCARDIA: Status: ACTIVE | Noted: 2021-05-22

## 2021-05-22 LAB
ANION GAP SERPL CALCULATED.3IONS-SCNC: 5 MMOL/L (ref 4–13)
ATRIAL RATE: 117 BPM
BUN SERPL-MCNC: 8 MG/DL (ref 7–25)
CALCIUM SERPL-MCNC: 8.3 MG/DL (ref 8.6–10.5)
CHLORIDE SERPL-SCNC: 99 MMOL/L (ref 98–107)
CO2 SERPL-SCNC: 32 MMOL/L (ref 21–31)
CREAT SERPL-MCNC: 0.65 MG/DL (ref 0.6–1.2)
ERYTHROCYTE [DISTWIDTH] IN BLOOD BY AUTOMATED COUNT: 12.6 % (ref 11.5–14.5)
GFR SERPL CREATININE-BSD FRML MDRD: 91 ML/MIN/1.73SQ M
GLUCOSE SERPL-MCNC: 163 MG/DL (ref 65–99)
HCT VFR BLD AUTO: 32.3 % (ref 42–47)
HGB BLD-MCNC: 11.2 G/DL (ref 12–16)
MCH RBC QN AUTO: 31.5 PG (ref 26–34)
MCHC RBC AUTO-ENTMCNC: 34.6 G/DL (ref 31–37)
MCV RBC AUTO: 91 FL (ref 81–99)
P AXIS: 57 DEGREES
PLATELET # BLD AUTO: 138 THOUSANDS/UL (ref 149–390)
PMV BLD AUTO: 8.6 FL (ref 8.6–11.7)
POTASSIUM SERPL-SCNC: 3.6 MMOL/L (ref 3.5–5.5)
PR INTERVAL: 178 MS
QRS AXIS: -2 DEGREES
QRSD INTERVAL: 98 MS
QT INTERVAL: 320 MS
QTC INTERVAL: 446 MS
RBC # BLD AUTO: 3.55 MILLION/UL (ref 3.9–5.2)
SODIUM SERPL-SCNC: 136 MMOL/L (ref 134–143)
T WAVE AXIS: 56 DEGREES
VENTRICULAR RATE: 117 BPM
WBC # BLD AUTO: 7.1 THOUSAND/UL (ref 4.8–10.8)

## 2021-05-22 PROCEDURE — 93010 ELECTROCARDIOGRAM REPORT: CPT | Performed by: INTERNAL MEDICINE

## 2021-05-22 PROCEDURE — 71275 CT ANGIOGRAPHY CHEST: CPT

## 2021-05-22 PROCEDURE — 93005 ELECTROCARDIOGRAM TRACING: CPT

## 2021-05-22 PROCEDURE — 80048 BASIC METABOLIC PNL TOTAL CA: CPT | Performed by: ORTHOPAEDIC SURGERY

## 2021-05-22 PROCEDURE — 99024 POSTOP FOLLOW-UP VISIT: CPT | Performed by: ORTHOPAEDIC SURGERY

## 2021-05-22 PROCEDURE — 85027 COMPLETE CBC AUTOMATED: CPT | Performed by: ORTHOPAEDIC SURGERY

## 2021-05-22 PROCEDURE — G1004 CDSM NDSC: HCPCS

## 2021-05-22 PROCEDURE — 99225 PR SBSQ OBSERVATION CARE/DAY 25 MINUTES: CPT | Performed by: INTERNAL MEDICINE

## 2021-05-22 RX ADMIN — ENOXAPARIN SODIUM 40 MG: 40 INJECTION SUBCUTANEOUS at 08:11

## 2021-05-22 RX ADMIN — ATORVASTATIN CALCIUM 20 MG: 20 TABLET, FILM COATED ORAL at 08:10

## 2021-05-22 RX ADMIN — MONTELUKAST SODIUM 10 MG: 10 TABLET, COATED ORAL at 08:12

## 2021-05-22 RX ADMIN — OXYCODONE HYDROCHLORIDE 10 MG: 10 TABLET ORAL at 08:10

## 2021-05-22 RX ADMIN — DULOXETINE HYDROCHLORIDE 60 MG: 60 CAPSULE, DELAYED RELEASE ORAL at 17:46

## 2021-05-22 RX ADMIN — NYSTATIN: 100000 POWDER TOPICAL at 17:46

## 2021-05-22 RX ADMIN — FOLIC ACID 1 MG: 1 TABLET ORAL at 08:12

## 2021-05-22 RX ADMIN — OXYCODONE HYDROCHLORIDE 10 MG: 10 TABLET ORAL at 03:33

## 2021-05-22 RX ADMIN — FERROUS SULFATE TAB 325 MG (65 MG ELEMENTAL FE) 325 MG: 325 (65 FE) TAB at 08:11

## 2021-05-22 RX ADMIN — TRAZODONE HYDROCHLORIDE 50 MG: 50 TABLET ORAL at 22:06

## 2021-05-22 RX ADMIN — IOHEXOL 85 ML: 350 INJECTION, SOLUTION INTRAVENOUS at 11:50

## 2021-05-22 RX ADMIN — FERROUS SULFATE TAB 325 MG (65 MG ELEMENTAL FE) 325 MG: 325 (65 FE) TAB at 15:59

## 2021-05-22 RX ADMIN — NYSTATIN 1 APPLICATION: 100000 POWDER TOPICAL at 08:12

## 2021-05-22 RX ADMIN — DICYCLOMINE HYDROCHLORIDE 10 MG: 10 CAPSULE ORAL at 08:10

## 2021-05-22 RX ADMIN — OXYCODONE HYDROCHLORIDE 5 MG: 5 TABLET ORAL at 19:46

## 2021-05-22 RX ADMIN — GABAPENTIN 300 MG: 300 CAPSULE ORAL at 21:05

## 2021-05-22 RX ADMIN — SODIUM CHLORIDE, SODIUM LACTATE, POTASSIUM CHLORIDE, AND CALCIUM CHLORIDE 125 ML/HR: .6; .31; .03; .02 INJECTION, SOLUTION INTRAVENOUS at 06:08

## 2021-05-22 RX ADMIN — GABAPENTIN 300 MG: 300 CAPSULE ORAL at 15:59

## 2021-05-22 RX ADMIN — DULOXETINE HYDROCHLORIDE 60 MG: 60 CAPSULE, DELAYED RELEASE ORAL at 08:11

## 2021-05-22 RX ADMIN — OXYBUTYNIN CHLORIDE 10 MG: 5 TABLET, EXTENDED RELEASE ORAL at 22:05

## 2021-05-22 RX ADMIN — OXYCODONE HYDROCHLORIDE AND ACETAMINOPHEN 500 MG: 500 TABLET ORAL at 08:09

## 2021-05-22 RX ADMIN — TAMSULOSIN HYDROCHLORIDE 0.4 MG: 0.4 CAPSULE ORAL at 16:00

## 2021-05-22 RX ADMIN — DOCUSATE SODIUM 100 MG: 100 CAPSULE, LIQUID FILLED ORAL at 08:11

## 2021-05-22 RX ADMIN — DOCUSATE SODIUM 100 MG: 100 CAPSULE, LIQUID FILLED ORAL at 17:46

## 2021-05-22 RX ADMIN — BUPROPION HYDROCHLORIDE 300 MG: 150 TABLET, EXTENDED RELEASE ORAL at 08:10

## 2021-05-22 RX ADMIN — OXYCODONE HYDROCHLORIDE 10 MG: 10 TABLET ORAL at 16:29

## 2021-05-22 RX ADMIN — PANTOPRAZOLE SODIUM 40 MG: 40 TABLET, DELAYED RELEASE ORAL at 08:12

## 2021-05-22 RX ADMIN — GABAPENTIN 300 MG: 300 CAPSULE ORAL at 08:12

## 2021-05-22 NOTE — CASE MANAGEMENT
Cm called Airam Teixeira at [facility] [place] and they are not able to transport tomorrow, cm called [facility] and they are not able to transport, pt met with Connie Bermudez and she does not feel comfortable transporting the pt via her car, pt was not medically stable for d/c today, Connie Bermudez was made aware [place] w/[facility] is able to transport the pt at 10 am on Monday to [facility] , she is aware she will receive a call to secure payment for the transport, pt and family are in agreement with the d/c plan, Patrickjanny Pickering was called again and made aware of the 10 am d/c for Monday cm tiger text ortho and made them aware of the Monday transport time and the pt needs a covid test 24 hrs before d/c to [facility],  report needs to be called to [phone]  Fax d/c instructions to # [phone], pt had elvated hr rate today and had a ct of the chest today and he does not have a PE, pt is able to go to [facility] on Monday if medically stable, cm will continue to follow

## 2021-05-22 NOTE — UTILIZATION REVIEW
Continued Stay Review    Date: 5/22/21                          Current Patient Class: Outpatient procedure  Current Level of Care:  Med Surg     HPI:71 y o  female initially admitted on 5/20 for elective OP surgery - left TKR  Assessment/Plan: 5/22 - pt with a blister on anterior lower extremity, distal to her incision , drained , wound dressed with xerofoam, ABD's kerlix and ace wrap  Monitor Hg 11 2 today  weigt bearing as toelrated LLE, PT working with her  DVT prophylaxis, conntiue immobilizer unless ambulating, sitting of icing her knee           Vital Signs:   Date/Time  Temp  Pulse  Resp  BP  MAP (mmHg)  SpO2  Calculated FIO2 (%) - Nasal Cannula  O2 Flow Rate (L/min)  Nasal Cannula O2 Flow Rate (L/min)  O2 Device  Cardiac (WDL)  Patient Position - Orthostatic VS   05/22/21 0650  97 9 °F (36 6 °C)  120Abnormal   18  121/58  83  90 %  --  --  --  None (Room air)  --  Lying   05/21/21 2300  98 6 °F (37 °C)  123Abnormal   18  128/65  91  90 %  --  --  --  None (Room air)  --  Lying   05/21/21 1546  97 6 °F (36 4 °C)  124Abnormal   20  146/85  --  90 %  --  --  --  None (Room air)  --  Lying   05/21/21 0815  --  118Abnormal   16  --  --  94 %  --  --  --  None (Room air)  --  --   05/21/21 0700  97 4 °F (36 3 °C)Abnormal   117Abnormal   19  148/74  --  97 %  --  --  --  None (Room air)  --  --   05/20/21 2252  99 °F (37 2 °C)  102  18  144/71  100  94 %  --  --  --  None (Room air)  --  Lying         Pertinent Labs/Diagnostic Results:       Results from last 7 days   Lab Units 05/22/21 0423 05/21/21  0429   WBC Thousand/uL 7 10 6 50   HEMOGLOBIN g/dL 11 2* 12 6   HEMATOCRIT % 32 3* 37 2*   PLATELETS Thousands/uL 138* 146*         Results from last 7 days   Lab Units 05/22/21  0423 05/21/21  0429   SODIUM mmol/L 136 133*   POTASSIUM mmol/L 3 6 3 8   CHLORIDE mmol/L 99 98   CO2 mmol/L 32* 30   ANION GAP mmol/L 5 5   BUN mg/dL 8 8   CREATININE mg/dL 0 65 0 68   EGFR ml/min/1 73sq m 91 90   CALCIUM mg/dL 8 3* 8 0*             Results from last 7 days   Lab Units 05/22/21  0423 05/21/21  0429   GLUCOSE RANDOM mg/dL 163* 166*     CTA Chest  - 5/22 -  No PE       Medications:   Scheduled Medications:  ascorbic acid, 500 mg, Oral, Daily  atorvastatin, 20 mg, Oral, Daily  buPROPion, 300 mg, Oral, QAM  dicyclomine, 10 mg, Oral, Daily  docusate sodium, 100 mg, Oral, BID  DULoxetine, 60 mg, Oral, BID  enoxaparin, 40 mg, Subcutaneous, Daily  [START ON 5/26/2021] ergocalciferol, 50,000 Units, Oral, Weekly  ferrous sulfate, 325 mg, Oral, BID With Meals  folic acid, 1 mg, Oral, Daily  gabapentin, 300 mg, Oral, TID  montelukast, 10 mg, Oral, Daily  nystatin, , Topical, BID  oxybutynin, 10 mg, Oral, HS  pantoprazole, 40 mg, Oral, Daily  tamsulosin, 0 4 mg, Oral, Daily With Dinner  traZODone, 50 mg, Oral, HS      Continuous IV Infusions:  lactated ringers, 125 mL/hr, Intravenous, Continuous      PRN Meds:  acetaminophen, 650 mg, Oral, Q6H PRN  albuterol, 2 puff, Inhalation, Q4H PRN  aluminum-magnesium hydroxide-simethicone, 30 mL, Oral, Q6H PRN  bisacodyl, 10 mg, Rectal, Daily PRN  calcium carbonate, 1,000 mg, Oral, Daily PRN  ondansetron, 4 mg, Intravenous, Q6H PRN - 5/21 x 1   oxyCODONE, 10 mg, Oral, Q4H PRN - 5/21 x 3 - 5/22 x 2   oxyCODONE, 5 mg, Oral, Q4H PRN        Discharge Plan: To rehab on 5/23    Network Utilization Review Department  ATTENTION: Please call with any questions or concerns to [phone] and carefully listen to the prompts so that you are directed to the right person  All voicemails are confidential   [facility] all requests for admission clinical reviews, approved or denied determinations and any other requests to dedicated fax number below belonging to the campus where the patient is receiving treatment   List of dedicated fax numbers for the Facilities:  [address] [place] [address] DENIALS (Administrative/Medical Necessity) [phone]   [address]  (Maternity/NICU/Pediatrics) [phone] [address] [place] [address] [address] [place] [address] [address] [place] [facility] [phone] [address] [place] [address] [address]ie [phone] [place] [address] [phone]

## 2021-05-22 NOTE — CASE MANAGEMENT
Sky received a call from the insurance company, pt was approved to go to ;s as per Danna, auth# [id number] nrd 7 days from admission, Danna was made aware pt is not medically stable for d/c today, fax# for clinical [address], phone# [phone], cm called Jay Valverde at [facility]' at 12:47pm to # [phone] and I made her aware I received the authorization and the pt will be able to be d/c tomorrow, report needs to be called to [phone] and fax # for d/c instructions [phone], cm met with pt and made her aware she received approval,sky then placed a call to her daughter at 13:03 to # [phone], cm spoke with therapy and they feel the family could transport if they wanted they would just need assistance from the staff her and at the snf , sky called veronica w/[facility] and they are unable to transport tomorrow, sky then called elsy at [facility] and the will call me back to let me know if they are able to transport  Cm will continue to work on a safe d/c plan

## 2021-05-22 NOTE — NURSING NOTE
1900---AWAKE AND PLEASANT   R/A STATUS  RESP EASY  NO SOB  LUNGS CLEAR  DRESSING DRY AND INTACT TOLEFT KNEE  ADEQ PEDALS C/O 5/10 PAIN   CALL BELL GIVEN

## 2021-05-22 NOTE — PHYSICAL THERAPY NOTE
Physical Therapy Cancellation Note       05/22/21 1104   PT Last Visit   PT Visit Date 05/22/21   Note Type   Cancel Reasons Medical status       Chart review performed  At this time, PT treatment session cancelled as patient is pending CTA of chest to r/o PE  PT will follow and provide PT interventions as appropriate once imaging results are received      Lisa Fernandes, PT, DPT

## 2021-05-22 NOTE — ASSESSMENT & PLAN NOTE
· Patient with tachycardia which has been persistent over the last 24 hours  Patient complaining of pain in left lower extremity more than she anticipated  · Given risk factor for PE, will check CTA chest to rule out embolism    Patient has been on DVT prophylaxis postoperatively with Lovenox  · Continue pain control per orthopedic team  · Will check EKG

## 2021-05-22 NOTE — PROGRESS NOTES
Progress Note - Orthopedics   Cris King 73 y o  female MRN: [id number]  Unit/Bed#: -01 Encounter: [id number]    Assessment:  POD 2 S/P  Elective left total knee replacement  Plan:  - Blister on anterior lower extremity, distal to incision successfully drained with a 27G needle  Wound covered in xeroform, ABDs, kerlix, and ace wrap  The patient tolerated the dressing change well without immediate complications  - WBAT LLE, OOB  - Continue PT/OT   -Patient cleared by PT/OT for discharge to acute rehab facility   - Case management on board  - SLIM on board, pending medical clearance  - Continue DVT prophylaxis with Lovenox   - Continue immobilizer unless patient is ambulating, sitting, icing her knee, or working with physical therapy  - Dressing change prior to discharge  - Hgb this morning at 11 2  Will continue to monitor for acute blood loss anemia  Weight bearing:  Weight-bearing as tolerated left lower extremity    VTE Pharmacologic Prophylaxis: Enoxaparin (Lovenox)  VTE Mechanical Prophylaxis: sequential compression device    Subjective:  71 YOF POD2 left TKA  The patient is doing well this morning, she continues to complain of significant pain in the left knee, only slightly improved with pain medication  Slowly ambulating  The patient denies any numbness or tingling  She denies any fever or chills  Vitals: Blood pressure 121/58, pulse (!) 120, temperature 97 9 °F (36 6 °C), temperature source Temporal, resp  rate 18, height 4' 9" (1 448 m), weight 83 9 kg (185 lb), SpO2 90 %, not currently breastfeeding  ,Body mass index is 40 03 kg/m²        Intake/Output Summary (Last 24 hours) at 5/22/2021 0911  Last data filed at 5/22/2021 6088  Gross per 24 hour   Intake 1000 ml   Output --   Net 1000 ml       Invasive Devices     Peripheral Intravenous Line            Peripheral IV 05/20/21 Left Arm 2 days                Physical Exam:   - Left lower extremity is neurovascularly intact   - Knee immobilizer in place   - Dressings are clean, dry, and intact  The dressings were removed for further examination   - Incision is clean, dry, and intact, healing well without signs of dehiscence or infection  There is a large serous filled blister along the anterior portion of the lower extremity, distal to the incision  There are two smaller blisters also noted along the lateral and medial sides   No signs of erythema, drainage, or infection    - Toes are pink and mobile   - Lower extremity compartments are soft   - Good dorsiflexion and plantar flexion of ankle   - Brisk cap refill in all toes  - Sensation intact to light touch    Lab, Imaging and other studies:   CBC:   Lab Results   Component Value Date    WBC 7 10 05/22/2021    HGB 11 2 (L) 05/22/2021    HCT 32 3 (L) 05/22/2021    MCV 91 05/22/2021     (L) 05/22/2021    MCH 31 5 05/22/2021    MCHC 34 6 05/22/2021    RDW 12 6 05/22/2021    MPV 8 6 05/22/2021     CMP:   Lab Results   Component Value Date    SODIUM 136 05/22/2021    CL 99 05/22/2021    CO2 32 (H) 05/22/2021    BUN 8 05/22/2021    CREATININE 0 65 05/22/2021    CALCIUM 8 3 (L) 05/22/2021    EGFR 91 05/22/2021

## 2021-05-22 NOTE — PROGRESS NOTES
[address] [facility]  Progress Note - [facility] 1950, 73 y o  female MRN: [id number]  Unit/Bed#: -01 Encounter: [id number]  Primary Care Provider: Serenity Wiseman PA-C   Date and time admitted to hospital: 5/20/2021  6:19 AM    Tachycardia  Assessment & Plan  · Patient with tachycardia which has been persistent over the last 24 hours  Patient complaining of pain in left lower extremity more than she anticipated  · Given risk factor for PE, will check CTA chest to rule out embolism  Patient has been on DVT prophylaxis postoperatively with Lovenox  · Continue pain control per orthopedic team  · Will check EKG    Recurrent major depressive disorder, in partial remission (HCC)  Assessment & Plan  · Stable, continue Cymbalta, Wellbutrin, and trazodone    Vitamin D deficiency  Assessment & Plan  · Continue vitamin-D supplementation    Mild intermittent asthma without complication  Assessment & Plan  · Continue albuterol p r n  · Continue singular  · Patient is not on home oxygen    * Primary osteoarthritis of left knee  Assessment & Plan  · Patient status post left total knee arthroplasty on 05/20/2021  · Continue management per orthopedic team      VTE Prophylaxis:  Enoxaparin (Lovenox)    Patient Centered Rounds: I have performed bedside rounds with nursing staff today  Discussions with Specialists or Other Care Team Provider: yes  Education and Discussions with Family / Patient:  Updated patient regarding plan of care    Current Length of Stay: 0 day(s)    Current Patient Status: Outpatient Surgery   Certification Statement: The patient will continue to require additional inpatient hospital stay due to Per primary team    Discharge Plan:  Per primary team    Code Status: Level 1 - Full Code    Subjective:   Patient tachycardic and complaining of pain in left knee  Denies any nausea or vomiting  No fever or chills    Tolerating diet    Objective:     Vitals:   Temp (24hrs), Av °F (36 7 °C), Min:97 6 °F (36 4 °C), Max:98 6 °F (37 °C)    Temp:  [97 6 °F (36 4 °C)-98 6 °F (37 °C)] 97 9 °F (36 6 °C)  HR:  [120-124] 120  Resp:  [18-20] 18  BP: (121-146)/(58-85) 121/58  SpO2:  [90 %] 90 %  Body mass index is 40 03 kg/m²  Input and Output Summary (last 24 hours): Intake/Output Summary (Last 24 hours) at 2021 0941  Last data filed at 2021 2935  Gross per 24 hour   Intake 1000 ml   Output --   Net 1000 ml       Physical Exam:   Physical Exam  Constitutional:       General: She is not in acute distress  Appearance: She is obese  HENT:      Head: Normocephalic and atraumatic  Nose: Nose normal       Mouth/Throat:      Mouth: Mucous membranes are moist    Eyes:      Extraocular Movements: Extraocular movements intact  Conjunctiva/sclera: Conjunctivae normal    Neck:      Musculoskeletal: Normal range of motion and neck supple  Cardiovascular:      Rate and Rhythm: Tachycardia present  Pulmonary:      Effort: Pulmonary effort is normal  No respiratory distress  Abdominal:      Palpations: Abdomen is soft  Tenderness: There is no abdominal tenderness  Musculoskeletal:      Comments: Decreased range of motion left knee with dressing/brace in place   Skin:     General: Skin is warm and dry  Neurological:      General: No focal deficit present  Mental Status: She is alert  Cranial Nerves: No cranial nerve deficit     Psychiatric:         Mood and Affect: Mood normal          Behavior: Behavior normal          Additional Data:     Labs:    Results from last 7 days   Lab Units 21  0423   WBC Thousand/uL 7 10   HEMOGLOBIN g/dL 11 2*   HEMATOCRIT % 32 3*   PLATELETS Thousands/uL 138*     Results from last 7 days   Lab Units 21  0423   SODIUM mmol/L 136   POTASSIUM mmol/L 3 6   CHLORIDE mmol/L 99   CO2 mmol/L 32*   BUN mg/dL 8   CREATININE mg/dL 0 65   CALCIUM mg/dL 8 3*                   * I Have Reviewed All Lab Data Listed Above   * Additional Pertinent Lab Tests Reviewed:  [facility] 68 Admission  Reviewed    Imaging:  Imaging Reports Reviewed Today Include:  No new imaging    Recent Cultures (last 7 days):           Last 24 Hours Medication List:   Current Facility-Administered Medications   Medication Dose Route Frequency Provider Last Rate    acetaminophen  650 mg Oral Q6H PRN Ivan Scriver, PA-C      albuterol  2 puff Inhalation Q4H PRN Ivan Scriver, PA-C      aluminum-magnesium hydroxide-simethicone  30 mL Oral Q6H PRN Ivan Scriver, PA-C      ascorbic acid  500 mg Oral Daily Ivan Scriver, PA-C      atorvastatin  20 mg Oral Daily Ivan Scriver, [place]      bisacodyl  10 mg Rectal Daily PRN Ivan Scriver, PA-C      buPROPion  300 mg Oral QAM Ivan Scriver, PA-C      calcium carbonate  1,000 mg Oral Daily PRN Ivan Scriver, PA-C      dicyclomine  10 mg Oral Daily Ivan Scriver, PA-C      docusate sodium  100 mg Oral BID Ivan Scriver, PA-C      DULoxetine  60 mg Oral BID Ivan Scriver, PA-C      enoxaparin  40 mg Subcutaneous Daily Ivan Scriver, [place]      [START ON 5/26/2021] ergocalciferol  50,000 Units Oral Weekly Ivan Scriver, PA-C      ferrous sulfate  325 mg Oral BID With Meals Ivan Scriver, PA-C      folic acid  1 mg Oral Daily Ivan Scriver, PA-C      gabapentin  300 mg Oral TID Ivan Scriver, PA-C      lactated ringers  125 mL/hr Intravenous Continuous Ivan Scriver, PA-C 125 mL/hr (05/22/21 0608)    montelukast  10 mg Oral Daily Ivan Scriver, PA-C      morphine injection  2 mg Intravenous Q2H PRN Ivan Scriver, PA-C      nystatin   Topical BID Ivan Scriver, PA-C      ondansetron  4 mg Intravenous Q6H PRN Branda Hurl, PA-C      oxybutynin  10 mg Oral HS Ivan Scriver, PA-C      oxyCODONE  10 mg Oral Q4H PRN Ivan Scriver, PA-C      oxyCODONE  5 mg Oral Q4H PRN Ivan Scriver, PA-C      pantoprazole  40 mg Oral Daily Ivan Scriver, PA-C      tamsulosin  0 4 mg Oral Daily With Melania Bell PA-C      traZODone  50 mg Oral HS Barbara Bell PA-C          Today, Patient Was Seen By: Pérez Cabrera MD    ** Please Note: Dictation voice to text software may have been used in the creation of this document   **

## 2021-05-23 LAB
ANION GAP SERPL CALCULATED.3IONS-SCNC: 4 MMOL/L (ref 4–13)
BUN SERPL-MCNC: 8 MG/DL (ref 7–25)
CALCIUM SERPL-MCNC: 8 MG/DL (ref 8.6–10.5)
CHLORIDE SERPL-SCNC: 100 MMOL/L (ref 98–107)
CO2 SERPL-SCNC: 34 MMOL/L (ref 21–31)
CREAT SERPL-MCNC: 0.58 MG/DL (ref 0.6–1.2)
ERYTHROCYTE [DISTWIDTH] IN BLOOD BY AUTOMATED COUNT: 12.8 % (ref 11.5–14.5)
GFR SERPL CREATININE-BSD FRML MDRD: 94 ML/MIN/1.73SQ M
GLUCOSE P FAST SERPL-MCNC: 136 MG/DL (ref 65–99)
GLUCOSE SERPL-MCNC: 136 MG/DL (ref 65–99)
HCT VFR BLD AUTO: 29.3 % (ref 42–47)
HGB BLD-MCNC: 10.1 G/DL (ref 12–16)
MCH RBC QN AUTO: 31.3 PG (ref 26–34)
MCHC RBC AUTO-ENTMCNC: 34.6 G/DL (ref 31–37)
MCV RBC AUTO: 91 FL (ref 81–99)
PLATELET # BLD AUTO: 146 THOUSANDS/UL (ref 149–390)
PMV BLD AUTO: 8.1 FL (ref 8.6–11.7)
POTASSIUM SERPL-SCNC: 3.7 MMOL/L (ref 3.5–5.5)
RBC # BLD AUTO: 3.23 MILLION/UL (ref 3.9–5.2)
SODIUM SERPL-SCNC: 138 MMOL/L (ref 134–143)
WBC # BLD AUTO: 6.8 THOUSAND/UL (ref 4.8–10.8)

## 2021-05-23 PROCEDURE — 99024 POSTOP FOLLOW-UP VISIT: CPT | Performed by: ORTHOPAEDIC SURGERY

## 2021-05-23 PROCEDURE — 97530 THERAPEUTIC ACTIVITIES: CPT

## 2021-05-23 PROCEDURE — 85027 COMPLETE CBC AUTOMATED: CPT | Performed by: ORTHOPAEDIC SURGERY

## 2021-05-23 PROCEDURE — 80048 BASIC METABOLIC PNL TOTAL CA: CPT | Performed by: ORTHOPAEDIC SURGERY

## 2021-05-23 RX ADMIN — FERROUS SULFATE TAB 325 MG (65 MG ELEMENTAL FE) 325 MG: 325 (65 FE) TAB at 08:18

## 2021-05-23 RX ADMIN — BUPROPION HYDROCHLORIDE 300 MG: 150 TABLET, EXTENDED RELEASE ORAL at 08:18

## 2021-05-23 RX ADMIN — PANTOPRAZOLE SODIUM 40 MG: 40 TABLET, DELAYED RELEASE ORAL at 08:17

## 2021-05-23 RX ADMIN — FERROUS SULFATE TAB 325 MG (65 MG ELEMENTAL FE) 325 MG: 325 (65 FE) TAB at 15:44

## 2021-05-23 RX ADMIN — NYSTATIN 1 APPLICATION: 100000 POWDER TOPICAL at 09:57

## 2021-05-23 RX ADMIN — OXYCODONE HYDROCHLORIDE AND ACETAMINOPHEN 500 MG: 500 TABLET ORAL at 08:17

## 2021-05-23 RX ADMIN — OXYCODONE HYDROCHLORIDE 10 MG: 10 TABLET ORAL at 08:17

## 2021-05-23 RX ADMIN — ALBUTEROL SULFATE 2 PUFF: 90 AEROSOL, METERED RESPIRATORY (INHALATION) at 14:32

## 2021-05-23 RX ADMIN — TRAZODONE HYDROCHLORIDE 50 MG: 50 TABLET ORAL at 21:42

## 2021-05-23 RX ADMIN — OXYCODONE HYDROCHLORIDE 10 MG: 10 TABLET ORAL at 16:18

## 2021-05-23 RX ADMIN — FOLIC ACID 1 MG: 1 TABLET ORAL at 08:19

## 2021-05-23 RX ADMIN — OXYBUTYNIN CHLORIDE 10 MG: 5 TABLET, EXTENDED RELEASE ORAL at 21:43

## 2021-05-23 RX ADMIN — DOCUSATE SODIUM 100 MG: 100 CAPSULE, LIQUID FILLED ORAL at 17:45

## 2021-05-23 RX ADMIN — GABAPENTIN 300 MG: 300 CAPSULE ORAL at 15:44

## 2021-05-23 RX ADMIN — ENOXAPARIN SODIUM 40 MG: 40 INJECTION SUBCUTANEOUS at 09:49

## 2021-05-23 RX ADMIN — TAMSULOSIN HYDROCHLORIDE 0.4 MG: 0.4 CAPSULE ORAL at 15:44

## 2021-05-23 RX ADMIN — NYSTATIN 1 APPLICATION: 100000 POWDER TOPICAL at 17:45

## 2021-05-23 RX ADMIN — DOCUSATE SODIUM 100 MG: 100 CAPSULE, LIQUID FILLED ORAL at 08:18

## 2021-05-23 RX ADMIN — GABAPENTIN 300 MG: 300 CAPSULE ORAL at 21:43

## 2021-05-23 RX ADMIN — OXYCODONE HYDROCHLORIDE 10 MG: 10 TABLET ORAL at 12:45

## 2021-05-23 RX ADMIN — DULOXETINE HYDROCHLORIDE 60 MG: 60 CAPSULE, DELAYED RELEASE ORAL at 17:46

## 2021-05-23 RX ADMIN — DULOXETINE HYDROCHLORIDE 60 MG: 60 CAPSULE, DELAYED RELEASE ORAL at 08:18

## 2021-05-23 RX ADMIN — ATORVASTATIN CALCIUM 20 MG: 20 TABLET, FILM COATED ORAL at 08:17

## 2021-05-23 RX ADMIN — MONTELUKAST SODIUM 10 MG: 10 TABLET, COATED ORAL at 08:18

## 2021-05-23 RX ADMIN — GABAPENTIN 300 MG: 300 CAPSULE ORAL at 08:18

## 2021-05-23 RX ADMIN — DICYCLOMINE HYDROCHLORIDE 10 MG: 10 CAPSULE ORAL at 08:18

## 2021-05-23 RX ADMIN — OXYCODONE HYDROCHLORIDE 10 MG: 10 TABLET ORAL at 03:13

## 2021-05-23 NOTE — NURSING NOTE
1900---awake and in bed  R/a status  resp easy  No sob  Lungs clear  Dressing dry and intact left knee  No c/o pain  adeq left pedal and cap refill is noted  Call bell given, and scds are maintained ble  No complaints are voiced

## 2021-05-23 NOTE — NURSING NOTE
Awakened and medicated for pain  No changes to left knee dressing/post op site or neurovascular status   Call bell given 3 rails up/ bed alarm on scds maintained

## 2021-05-23 NOTE — PROGRESS NOTES
Progress Note - Orthopedics   Luke Livingston 73 y o  female MRN: [id number]  Unit/Bed#: [facility]Alej[facility] Encounter: [id number]    Assessment:  POD 3 S/P  left total knee replacement  Plan:  - WBAT LLE, OOB  - Continue PT/OT   -Patient cleared by PT/OT for discharge to acute rehab facility   - Case management on board  - SLIM on board, pending medical clearance  - Continue DVT prophylaxis with Lovenox, CT neg for PE  - Continue immobilizer unless patient is ambulating, sitting, icing her knee, or working with physical therapy  - Dressing change prior to discharge  - Hgb this morning at 10  ABLA with drop in H/H, tachycardia, no transfusion indicated at this time  - consider LLE doppler to r/o DVT    Weight bearing:  Weight-bearing as tolerated left lower extremity    VTE Pharmacologic Prophylaxis: Enoxaparin (Lovenox)  VTE Mechanical Prophylaxis: sequential compression device    Subjective:  71 YOF POD3 left TKA  Patient c/o being groggy  Weaned off O2  Denies CP, SOB, dizziness  Vitals: Blood pressure 104/55, pulse (!) 108, temperature 98 8 °F (37 1 °C), temperature source Tympanic, resp  rate 20, height 4' 9" (1 448 m), weight 83 9 kg (185 lb), SpO2 90 %, not currently breastfeeding  ,Body mass index is 40 03 kg/m²  Intake/Output Summary (Last 24 hours) at 5/23/2021 1114  Last data filed at 5/23/2021 0900  Gross per 24 hour   Intake 1040 ml   Output 0 ml   Net 1040 ml       Invasive Devices     Peripheral Intravenous Line            Peripheral IV 05/20/21 Left Arm 3 days                Physical Exam:   - Left lower extremity is neurovascularly intact   - Dressings are clean, dry, and intact    - Toes are pink and mobile   - Lower extremity compartments are soft but calf tender  - Good dorsiflexion and plantar flexion of ankle   - Brisk cap refill in all toes  - Sensation intact to light touch    Lab, Imaging and other studies:   CBC:   Lab Results   Component Value Date    WBC 6 80 05/23/2021    HGB 10 1 (L) 05/23/2021    HCT 29 3 (L) 05/23/2021    MCV 91 05/23/2021     (L) 05/23/2021    MCH 31 3 05/23/2021    MCHC 34 6 05/23/2021    RDW 12 8 05/23/2021    MPV 8 1 (L) 05/23/2021     CMP:   Lab Results   Component Value Date    SODIUM 138 05/23/2021     05/23/2021    CO2 34 (H) 05/23/2021    BUN 8 05/23/2021    CREATININE 0 58 (L) 05/23/2021    CALCIUM 8 0 (L) 05/23/2021    EGFR 94 05/23/2021

## 2021-05-23 NOTE — PLAN OF CARE
Problem: Prexisting or High Potential for Compromised Skin Integrity  Goal: Skin integrity is maintained or improved  Description: INTERVENTIONS:  - Identify patients at risk for skin breakdown  - Assess and monitor skin integrity  - Assess and monitor nutrition and hydration status  - Monitor labs   - Assess for incontinence   - Turn and reposition patient  - Assist with mobility/ambulation  - Relieve pressure over bony prominences  - Avoid friction and shearing  - Provide appropriate hygiene as needed including keeping skin clean and dry  - Evaluate need for skin moisturizer/barrier cream  - Collaborate with interdisciplinary team   - Patient/family teaching  - Consider wound care consult   Outcome: Progressing     Problem: Potential for Falls  Goal: Patient will remain free of falls  Description: INTERVENTIONS:  - Assess patient frequently for physical needs  -  Identify cognitive and physical deficits and behaviors that affect risk of falls    -  [place] fall precautions as indicated by assessment   - Educate patient/family on patient safety including physical limitations  - Instruct patient to call for assistance with activity based on assessment  - Modify environment to reduce risk of injury  - Consider OT/PT consult to assist with strengthening/mobility  Outcome: Progressing     Problem: MUSCULOSKELETAL - ADULT  Goal: Maintain or return mobility to safest level of function  Description: INTERVENTIONS:  - Assess patient's ability to carry out ADLs; assess patient's baseline for ADL function and identify physical deficits which impact ability to perform ADLs (bathing, care of mouth/teeth, toileting, grooming, dressing, etc )  - Assess/evaluate cause of self-care deficits   - Assess range of motion  - Assess patient's mobility  - Assess patient's need for assistive devices and provide as appropriate  - Encourage maximum independence but intervene and supervise when necessary  - Involve family in performance of ADLs  - Assess for home care needs following discharge   - Consider OT consult to assist with ADL evaluation and planning for discharge  - Provide patient education as appropriate  Outcome: Progressing  Goal: Maintain proper alignment of affected body part  Description: INTERVENTIONS:  - Support, maintain and protect limb and body alignment  - Provide patient/ family with appropriate education  Outcome: Progressing     Problem: PAIN - ADULT  Goal: Verbalizes/displays adequate comfort level or baseline comfort level  Description: Interventions:  - Encourage patient to monitor pain and request assistance  - Assess pain using appropriate pain scale  - Administer analgesics based on type and severity of pain and evaluate response  - Implement non-pharmacological measures as appropriate and evaluate response  - Consider cultural and social influences on pain and pain management  - Notify physician/advanced practitioner if interventions unsuccessful or patient reports new pain  Outcome: Progressing     Problem: INFECTION - ADULT  Goal: Absence or prevention of progression during hospitalization  Description: INTERVENTIONS:  - Assess and monitor for signs and symptoms of infection  - Monitor lab/diagnostic results  - Monitor all insertion sites, i e  indwelling lines, tubes, and drains  - Monitor endotracheal if appropriate and nasal secretions for changes in amount and color  - [place] appropriate cooling/warming therapies per order  - Administer medications as ordered  - Instruct and encourage patient and family to use good hand hygiene technique  - Identify and instruct in appropriate isolation precautions for identified infection/condition  Outcome: Progressing  Goal: Absence of fever/infection during neutropenic period  Description: INTERVENTIONS:  - Monitor WBC    Outcome: Progressing     Problem: SAFETY ADULT  Goal: Patient will remain free of falls  Description: INTERVENTIONS:  - Assess patient frequently for physical needs  -  Identify cognitive and physical deficits and behaviors that affect risk of falls    -  [place] fall precautions as indicated by assessment   - Educate patient/family on patient safety including physical limitations  - Instruct patient to call for assistance with activity based on assessment  - Modify environment to reduce risk of injury  - Consider OT/PT consult to assist with strengthening/mobility  Outcome: Progressing  Goal: Maintain or return to baseline ADL function  Description: INTERVENTIONS:  -  Assess patient's ability to carry out ADLs; assess patient's baseline for ADL function and identify physical deficits which impact ability to perform ADLs (bathing, care of mouth/teeth, toileting, grooming, dressing, etc )  - Assess/evaluate cause of self-care deficits   - Assess range of motion  - Assess patient's mobility; develop plan if impaired  - Assess patient's need for assistive devices and provide as appropriate  - Encourage maximum independence but intervene and supervise when necessary  - Involve family in performance of ADLs  - Assess for home care needs following discharge   - Consider OT consult to assist with ADL evaluation and planning for discharge  - Provide patient education as appropriate  Outcome: Progressing  Goal: Maintain or return mobility status to optimal level  Description: INTERVENTIONS:  - Assess patient's baseline mobility status (ambulation, transfers, stairs, etc )    - Identify cognitive and physical deficits and behaviors that affect mobility  - Identify mobility aids required to assist with transfers and/or ambulation (gait belt, sit-to-stand, lift, walker, cane, etc )  - [place] fall precautions as indicated by assessment  - Record patient progress and toleration of activity level on Mobility SBAR; progress patient to next Phase/Stage  - Instruct patient to call for assistance with activity based on assessment  - Consider rehabilitation consult to assist with strengthening/weightbearing, etc   Outcome: Progressing     Problem: DISCHARGE PLANNING  Goal: Discharge to home or other facility with appropriate resources  Description: INTERVENTIONS:  - Identify barriers to discharge w/patient and caregiver  - Arrange for needed discharge resources and transportation as appropriate  - Identify discharge learning needs (meds, wound care, etc )  - Arrange for interpretive services to assist at discharge as needed  - Refer to Case Management Department for coordinating discharge planning if the patient needs post-hospital services based on physician/advanced practitioner order or complex needs related to functional status, cognitive ability, or social support system  Outcome: Progressing     Problem: Knowledge Deficit  Goal: Patient/family/caregiver demonstrates understanding of disease process, treatment plan, medications, and discharge instructions  Description: Complete learning assessment and assess knowledge base    Interventions:  - Provide teaching at level of understanding  - Provide teaching via preferred learning methods  Outcome: Progressing

## 2021-05-23 NOTE — PLAN OF CARE
Problem: PHYSICAL THERAPY ADULT  Goal: Performs mobility at highest level of function for planned discharge setting  See evaluation for individualized goals  Description: Treatment/Interventions: Functional transfer training, LE strengthening/ROM, Therapeutic exercise, Endurance training, Patient/family training, Equipment eval/education, Bed mobility, Gait training, Spoke to nursing, OT  Equipment Recommended: Walker(pt  has own)       See flowsheet documentation for full assessment, interventions and recommendations  Outcome: Progressing  Note: Prognosis: Good  Problem List: Decreased endurance, Decreased mobility, Decreased coordination, Obesity, Decreased skin integrity, Orthopedic restrictions, Pain, Decreased range of motion, Decreased strength  Assessment: Pt seen for PT treatment session this date with interventions consisting of gait training w/ emphasis on improving pt's ability to ambulate level surfaces x 2 ft x 2 with max A provided by therapist with RW and therapeutic activity consisting of training: supine<>sit transfers, sit<>stand transfers and vc and tactile cues for static standing posture faciliation  Pt agreeable to PT treatment session upon arrival, pt found supine in bed w/ HOB elevated, in no apparent distress and responsive  In comparison to previous session, pt with improvements in consistency improvement with OOB tolerance, however pt remains limited d/t fatigue and pain  Post session: pt returned back to recliner, all needs in reach and RN notified of session findings/recommendations  Continue to recommend post acute rehabilitation services at time of d/c in order to maximize pt's functional independence and safety w/ mobility  Pt continues to be functioning below baseline level, and remains limited 2* factors listed above   PT will continue to see pt during current hospitalization in order to address the deficits listed above and provide interventions consistent w/ POC in effort to achieve STGs  Barriers to Discharge: Inaccessible home environment  Barriers to Discharge Comments: [facility] home     PT Discharge Recommendation: Post acute rehabilitation services     PT - OK to Discharge: Yes(when medically cleared, if to STR)    See flowsheet documentation for full assessment

## 2021-05-23 NOTE — PHYSICAL THERAPY NOTE
Physical Therapy Treatment Note       05/23/21 0959   PT Last Visit   PT Visit Date 05/23/21   Note Type   Note Type Treatment   Pain Assessment   Pain Assessment Tool 0-10   Pain Score 8   Pain Location/Orientation Orientation: Left; Location: Knee   Pain Onset/Description Onset: Ongoing   Patient's Stated Pain Goal No pain   Hospital Pain Intervention(s) Repositioned; Ambulation/increased activity; Elevated; Emotional support; Rest   Restrictions/Precautions   Weight Bearing Precautions Per Order Yes   LLE Weight Bearing Per Order WBAT   Braces or Orthoses LE Immobilizer   Other Precautions Chair Alarm; Bed Alarm;WBS;Fall Risk;Pain   General   Chart Reviewed Yes   Response to Previous Treatment Patient with no complaints from previous session  Family/Caregiver Present No   Cognition   Arousal/Participation Alert; Responsive; Cooperative   Attention Attends with cues to redirect   Orientation Level Oriented X4   Memory Decreased recall of precautions   Following Commands Follows one step commands with increased time or repetition   Comments pt agreeable to PT session   Subjective   Subjective "I need to use the commode"   Bed Mobility   Supine to Sit 3  Moderate assistance   Additional items Assist x 1;HOB elevated; Bedrails; Increased time required;Verbal cues;LE management   Transfers   Sit to Stand 2  Maximal assistance   Additional items Assist x 1; Increased time required;Verbal cues  (poor safety awareness)   Stand to Sit 3  Moderate assistance   Additional items Assist x 1; Increased time required;Verbal cues   Toilet transfer 2  Maximal assistance   Additional items Assist x 1;Commode;Verbal cues; Increased time required   Ambulation/Elevation   Gait pattern Decreased foot clearance;Decreased L stance; Antalgic; Shuffling; Step to;Excessively slow   Gait Assistance 2  Maximal assist   Additional items Assist x 1;Verbal cues; Tactile cues   Assistive Device Rolling walker   Distance 2 ft x 2   Stair Management Assistance Not tested   Balance   Static Sitting Fair +   Dynamic Sitting Fair -   Static Standing Poor +   Dynamic Standing Poor   Ambulatory Poor   Endurance Deficit   Endurance Deficit Yes   Activity Tolerance   Activity Tolerance Patient limited by fatigue;Patient limited by pain   Nurse Made Aware Yes, RN Evangelina Eliza made aware of outcomes/recs   Assessment   Prognosis Good   Problem List Decreased endurance;Decreased mobility; Decreased coordination;Obesity; Decreased skin integrity;Orthopedic restrictions;Pain;Decreased range of motion;Decreased strength   Assessment Pt seen for PT treatment session this date with interventions consisting of gait training w/ emphasis on improving pt's ability to ambulate level surfaces x 2 ft x 2 with max A provided by therapist with RW and therapeutic activity consisting of training: supine<>sit transfers, sit<>stand transfers and vc and tactile cues for static standing posture faciliation  Pt agreeable to PT treatment session upon arrival, pt found supine in bed w/ HOB elevated, in no apparent distress and responsive  In comparison to previous session, pt with improvements in consistency improvement with OOB tolerance, however pt remains limited d/t fatigue and pain  Post session: pt returned back to recliner, all needs in reach and RN notified of session findings/recommendations  Continue to recommend post acute rehabilitation services at time of d/c in order to maximize pt's functional independence and safety w/ mobility  Pt continues to be functioning below baseline level, and remains limited 2* factors listed above  PT will continue to see pt during current hospitalization in order to address the deficits listed above and provide interventions consistent w/ POC in effort to achieve STGs     Barriers to Discharge Inaccessible home environment   Goals   Patient Goals "to get better"   LTG Expiration Date 05/30/21   Long Term Goal #1 goals remain appropriate   PT Treatment Day 3   Plan Treatment/Interventions Functional transfer training;LE strengthening/ROM; Therapeutic exercise; Endurance training;Patient/family training;Equipment eval/education; Bed mobility;Gait training;Spoke to nursing;OT   Progress Slow progress, decreased activity tolerance   PT Frequency Twice a day   Recommendation   PT Discharge Recommendation Post acute rehabilitation services   Equipment Recommended   (continue RW use)   PT - OK to Discharge Yes  (when medically cleared, if to STR)   Additional Comments Pt's raw score on the AM-[facility] Basic Mobility inpatient short form is 11, standardized score is 30 25  Patients at this level are likely to benefit from DC to SNF/IRF, however, please refer to therapist recommendation for safe DC planning     AM-[facility] Basic Mobility Inpatient   Turning in Bed Without Bedrails 2   Lying on Back to Sitting on Edge of Flat Bed 2   Moving Bed to Chair 2   Standing Up From Chair 2   Walk in Room 2   Climb 3-5 Stairs 1   Basic Mobility Inpatient Raw Score 11   Basic Mobility Standardized Score 30 25       Laura Marcus, PT    Time of PT treatment session: 0195-1900

## 2021-05-24 ENCOUNTER — APPOINTMENT (EMERGENCY)
Dept: RADIOLOGY | Facility: HOSPITAL | Age: 71
DRG: 092 | End: 2021-05-24
Payer: COMMERCIAL

## 2021-05-24 ENCOUNTER — HOSPITAL ENCOUNTER (INPATIENT)
Facility: HOSPITAL | Age: 71
LOS: 4 days | Discharge: NURSING, REHAB OR LONG-TERM CARE | DRG: 092 | End: 2021-05-28
Attending: EMERGENCY MEDICINE | Admitting: INTERNAL MEDICINE
Payer: COMMERCIAL

## 2021-05-24 ENCOUNTER — APPOINTMENT (EMERGENCY)
Dept: NON INVASIVE DIAGNOSTICS | Facility: HOSPITAL | Age: 71
DRG: 092 | End: 2021-05-24
Payer: COMMERCIAL

## 2021-05-24 ENCOUNTER — HOSPITAL ENCOUNTER (INPATIENT)
Facility: HOSPITAL | Age: 71
LOS: 1 days | DRG: 554 | End: 2021-05-24
Attending: INTERNAL MEDICINE | Admitting: INTERNAL MEDICINE
Payer: COMMERCIAL

## 2021-05-24 ENCOUNTER — APPOINTMENT (EMERGENCY)
Dept: CT IMAGING | Facility: HOSPITAL | Age: 71
DRG: 092 | End: 2021-05-24
Payer: COMMERCIAL

## 2021-05-24 VITALS
DIASTOLIC BLOOD PRESSURE: 71 MMHG | BODY MASS INDEX: 39.91 KG/M2 | HEIGHT: 57 IN | SYSTOLIC BLOOD PRESSURE: 133 MMHG | OXYGEN SATURATION: 94 % | WEIGHT: 185 LBS | TEMPERATURE: 98 F | RESPIRATION RATE: 18 BRPM | HEART RATE: 116 BPM

## 2021-05-24 DIAGNOSIS — Z96.652 S/P TOTAL KNEE ARTHROPLASTY, LEFT: ICD-10-CM

## 2021-05-24 DIAGNOSIS — R41.82 ALTERED MENTAL STATUS, UNSPECIFIED ALTERED MENTAL STATUS TYPE: ICD-10-CM

## 2021-05-24 DIAGNOSIS — G93.41 ACUTE METABOLIC ENCEPHALOPATHY: ICD-10-CM

## 2021-05-24 DIAGNOSIS — R44.0 AUDITORY HALLUCINATION: ICD-10-CM

## 2021-05-24 DIAGNOSIS — M17.12 PRIMARY OSTEOARTHRITIS OF LEFT KNEE: ICD-10-CM

## 2021-05-24 DIAGNOSIS — M79.18 CHRONIC MUSCULOSKELETAL PAIN: ICD-10-CM

## 2021-05-24 DIAGNOSIS — G89.29 CHRONIC MUSCULOSKELETAL PAIN: ICD-10-CM

## 2021-05-24 DIAGNOSIS — N20.0 RIGHT KIDNEY STONE: ICD-10-CM

## 2021-05-24 DIAGNOSIS — Z11.52 ENCOUNTER FOR SCREENING FOR COVID-19: ICD-10-CM

## 2021-05-24 DIAGNOSIS — K86.1 OTHER CHRONIC PANCREATITIS (HCC): Primary | ICD-10-CM

## 2021-05-24 DIAGNOSIS — R41.0 DELIRIUM: Primary | ICD-10-CM

## 2021-05-24 LAB
ALBUMIN SERPL BCP-MCNC: 2.5 G/DL (ref 3.5–5)
ALP SERPL-CCNC: 130 U/L (ref 46–116)
ALT SERPL W P-5'-P-CCNC: 72 U/L (ref 12–78)
ANION GAP SERPL CALCULATED.3IONS-SCNC: 6 MMOL/L (ref 4–13)
AST SERPL W P-5'-P-CCNC: 28 U/L (ref 5–45)
BASOPHILS # BLD AUTO: 0.05 THOUSANDS/ΜL (ref 0–0.1)
BASOPHILS NFR BLD AUTO: 1 % (ref 0–1)
BILIRUB SERPL-MCNC: 0.68 MG/DL (ref 0.2–1)
BILIRUB UR QL STRIP: NEGATIVE
BUN SERPL-MCNC: 11 MG/DL (ref 5–25)
CALCIUM ALBUM COR SERPL-MCNC: 9.6 MG/DL (ref 8.3–10.1)
CALCIUM SERPL-MCNC: 8.4 MG/DL (ref 8.3–10.1)
CHLORIDE SERPL-SCNC: 102 MMOL/L (ref 100–108)
CLARITY UR: CLEAR
CO2 SERPL-SCNC: 30 MMOL/L (ref 21–32)
COLOR UR: YELLOW
CREAT SERPL-MCNC: 0.76 MG/DL (ref 0.6–1.3)
EOSINOPHIL # BLD AUTO: 0.14 THOUSAND/ΜL (ref 0–0.61)
EOSINOPHIL NFR BLD AUTO: 2 % (ref 0–6)
ERYTHROCYTE [DISTWIDTH] IN BLOOD BY AUTOMATED COUNT: 12.1 % (ref 11.6–15.1)
GFR SERPL CREATININE-BSD FRML MDRD: 80 ML/MIN/1.73SQ M
GLUCOSE SERPL-MCNC: 158 MG/DL (ref 65–140)
GLUCOSE UR STRIP-MCNC: NEGATIVE MG/DL
HCT VFR BLD AUTO: 32.4 % (ref 34.8–46.1)
HGB BLD-MCNC: 10.5 G/DL (ref 11.5–15.4)
HGB UR QL STRIP.AUTO: NEGATIVE
IMM GRANULOCYTES # BLD AUTO: 0.16 THOUSAND/UL (ref 0–0.2)
IMM GRANULOCYTES NFR BLD AUTO: 2 % (ref 0–2)
KETONES UR STRIP-MCNC: NEGATIVE MG/DL
LACTATE SERPL-SCNC: 1 MMOL/L (ref 0.5–2)
LEUKOCYTE ESTERASE UR QL STRIP: NEGATIVE
LIPASE SERPL-CCNC: 27 U/L (ref 73–393)
LYMPHOCYTES # BLD AUTO: 1.01 THOUSANDS/ΜL (ref 0.6–4.47)
LYMPHOCYTES NFR BLD AUTO: 13 % (ref 14–44)
MAGNESIUM SERPL-MCNC: 2.1 MG/DL (ref 1.6–2.6)
MCH RBC QN AUTO: 31.1 PG (ref 26.8–34.3)
MCHC RBC AUTO-ENTMCNC: 32.4 G/DL (ref 31.4–37.4)
MCV RBC AUTO: 96 FL (ref 82–98)
MONOCYTES # BLD AUTO: 0.88 THOUSAND/ΜL (ref 0.17–1.22)
MONOCYTES NFR BLD AUTO: 12 % (ref 4–12)
NEUTROPHILS # BLD AUTO: 5.35 THOUSANDS/ΜL (ref 1.85–7.62)
NEUTS SEG NFR BLD AUTO: 70 % (ref 43–75)
NITRITE UR QL STRIP: NEGATIVE
NRBC BLD AUTO-RTO: 0 /100 WBCS
NT-PROBNP SERPL-MCNC: 217.7 PG/ML
PH UR STRIP.AUTO: 7 [PH]
PLATELET # BLD AUTO: 202 THOUSANDS/UL (ref 149–390)
PMV BLD AUTO: 9.8 FL (ref 8.9–12.7)
POTASSIUM SERPL-SCNC: 3.6 MMOL/L (ref 3.5–5.3)
PROT SERPL-MCNC: 6.4 G/DL (ref 6.4–8.2)
PROT UR STRIP-MCNC: NEGATIVE MG/DL
RBC # BLD AUTO: 3.38 MILLION/UL (ref 3.81–5.12)
SARS-COV-2 RNA RESP QL NAA+PROBE: NEGATIVE
SODIUM SERPL-SCNC: 138 MMOL/L (ref 136–145)
SP GR UR STRIP.AUTO: 1.01 (ref 1–1.03)
TROPONIN I SERPL-MCNC: <0.02 NG/ML
UROBILINOGEN UR QL STRIP.AUTO: 0.2 E.U./DL
WBC # BLD AUTO: 7.59 THOUSAND/UL (ref 4.31–10.16)

## 2021-05-24 PROCEDURE — U0003 INFECTIOUS AGENT DETECTION BY NUCLEIC ACID (DNA OR RNA); SEVERE ACUTE RESPIRATORY SYNDROME CORONAVIRUS 2 (SARS-COV-2) (CORONAVIRUS DISEASE [COVID-19]), AMPLIFIED PROBE TECHNIQUE, MAKING USE OF HIGH THROUGHPUT TECHNOLOGIES AS DESCRIBED BY CMS-2020-01-R: HCPCS | Performed by: HOSPITALIST

## 2021-05-24 PROCEDURE — G1004 CDSM NDSC: HCPCS

## 2021-05-24 PROCEDURE — 83605 ASSAY OF LACTIC ACID: CPT | Performed by: EMERGENCY MEDICINE

## 2021-05-24 PROCEDURE — 97116 GAIT TRAINING THERAPY: CPT

## 2021-05-24 PROCEDURE — 71045 X-RAY EXAM CHEST 1 VIEW: CPT

## 2021-05-24 PROCEDURE — U0005 INFEC AGEN DETEC AMPLI PROBE: HCPCS | Performed by: HOSPITALIST

## 2021-05-24 PROCEDURE — 80053 COMPREHEN METABOLIC PANEL: CPT | Performed by: EMERGENCY MEDICINE

## 2021-05-24 PROCEDURE — 99024 POSTOP FOLLOW-UP VISIT: CPT | Performed by: ORTHOPAEDIC SURGERY

## 2021-05-24 PROCEDURE — 93971 EXTREMITY STUDY: CPT | Performed by: SURGERY

## 2021-05-24 PROCEDURE — 93971 EXTREMITY STUDY: CPT

## 2021-05-24 PROCEDURE — 87081 CULTURE SCREEN ONLY: CPT | Performed by: INTERNAL MEDICINE

## 2021-05-24 PROCEDURE — 87040 BLOOD CULTURE FOR BACTERIA: CPT | Performed by: EMERGENCY MEDICINE

## 2021-05-24 PROCEDURE — 81003 URINALYSIS AUTO W/O SCOPE: CPT | Performed by: EMERGENCY MEDICINE

## 2021-05-24 PROCEDURE — 84484 ASSAY OF TROPONIN QUANT: CPT | Performed by: EMERGENCY MEDICINE

## 2021-05-24 PROCEDURE — 99285 EMERGENCY DEPT VISIT HI MDM: CPT

## 2021-05-24 PROCEDURE — 97167 OT EVAL HIGH COMPLEX 60 MIN: CPT

## 2021-05-24 PROCEDURE — 99223 1ST HOSP IP/OBS HIGH 75: CPT | Performed by: PHYSICIAN ASSISTANT

## 2021-05-24 PROCEDURE — 97163 PT EVAL HIGH COMPLEX 45 MIN: CPT

## 2021-05-24 PROCEDURE — 83880 ASSAY OF NATRIURETIC PEPTIDE: CPT | Performed by: EMERGENCY MEDICINE

## 2021-05-24 PROCEDURE — 97530 THERAPEUTIC ACTIVITIES: CPT

## 2021-05-24 PROCEDURE — 85025 COMPLETE CBC W/AUTO DIFF WBC: CPT | Performed by: EMERGENCY MEDICINE

## 2021-05-24 PROCEDURE — 83735 ASSAY OF MAGNESIUM: CPT | Performed by: EMERGENCY MEDICINE

## 2021-05-24 PROCEDURE — 97110 THERAPEUTIC EXERCISES: CPT

## 2021-05-24 PROCEDURE — 93005 ELECTROCARDIOGRAM TRACING: CPT

## 2021-05-24 PROCEDURE — 99285 EMERGENCY DEPT VISIT HI MDM: CPT | Performed by: EMERGENCY MEDICINE

## 2021-05-24 PROCEDURE — 83690 ASSAY OF LIPASE: CPT | Performed by: EMERGENCY MEDICINE

## 2021-05-24 PROCEDURE — 36415 COLL VENOUS BLD VENIPUNCTURE: CPT | Performed by: EMERGENCY MEDICINE

## 2021-05-24 PROCEDURE — 70450 CT HEAD/BRAIN W/O DYE: CPT

## 2021-05-24 PROCEDURE — 94664 DEMO&/EVAL PT USE INHALER: CPT

## 2021-05-24 RX ORDER — DOCUSATE SODIUM 100 MG/1
100 CAPSULE, LIQUID FILLED ORAL 2 TIMES DAILY
Qty: 10 CAPSULE | Refills: 0
Start: 2021-05-24 | End: 2022-01-10

## 2021-05-24 RX ORDER — OXYCODONE HYDROCHLORIDE 5 MG/1
5 TABLET ORAL EVERY 4 HOURS PRN
Qty: 30 TABLET | Refills: 0 | Status: SHIPPED | OUTPATIENT
Start: 2021-05-24 | End: 2021-05-28 | Stop reason: HOSPADM

## 2021-05-24 RX ORDER — GABAPENTIN 300 MG/1
300 CAPSULE ORAL 3 TIMES DAILY
Status: DISCONTINUED | OUTPATIENT
Start: 2021-05-24 | End: 2021-05-25

## 2021-05-24 RX ORDER — OXYCODONE HYDROCHLORIDE 5 MG/1
5 TABLET ORAL EVERY 4 HOURS PRN
Status: DISCONTINUED | OUTPATIENT
Start: 2021-05-24 | End: 2021-05-25

## 2021-05-24 RX ORDER — LORATADINE 10 MG/1
10 TABLET ORAL DAILY
Status: DISCONTINUED | OUTPATIENT
Start: 2021-05-25 | End: 2021-05-28 | Stop reason: HOSPADM

## 2021-05-24 RX ORDER — ALBUTEROL SULFATE 2.5 MG/3ML
2.5 SOLUTION RESPIRATORY (INHALATION) EVERY 6 HOURS PRN
Status: DISCONTINUED | OUTPATIENT
Start: 2021-05-24 | End: 2021-05-24

## 2021-05-24 RX ORDER — BUTALBITAL, ACETAMINOPHEN AND CAFFEINE 50; 325; 40 MG/1; MG/1; MG/1
1 TABLET ORAL 3 TIMES DAILY PRN
Status: DISCONTINUED | OUTPATIENT
Start: 2021-05-24 | End: 2021-05-28 | Stop reason: HOSPADM

## 2021-05-24 RX ORDER — ONDANSETRON 2 MG/ML
4 INJECTION INTRAMUSCULAR; INTRAVENOUS EVERY 6 HOURS PRN
Status: DISCONTINUED | OUTPATIENT
Start: 2021-05-24 | End: 2021-05-28 | Stop reason: HOSPADM

## 2021-05-24 RX ORDER — MONTELUKAST SODIUM 10 MG/1
10 TABLET ORAL
Status: DISCONTINUED | OUTPATIENT
Start: 2021-05-24 | End: 2021-05-28 | Stop reason: HOSPADM

## 2021-05-24 RX ORDER — SODIUM CHLORIDE 9 MG/ML
75 INJECTION, SOLUTION INTRAVENOUS CONTINUOUS
Status: DISCONTINUED | OUTPATIENT
Start: 2021-05-24 | End: 2021-05-25

## 2021-05-24 RX ORDER — ATORVASTATIN CALCIUM 10 MG/1
20 TABLET, FILM COATED ORAL EVERY EVENING
Status: DISCONTINUED | OUTPATIENT
Start: 2021-05-24 | End: 2021-05-28 | Stop reason: HOSPADM

## 2021-05-24 RX ORDER — ALBUTEROL SULFATE 90 UG/1
2 AEROSOL, METERED RESPIRATORY (INHALATION) EVERY 4 HOURS PRN
Status: DISCONTINUED | OUTPATIENT
Start: 2021-05-24 | End: 2021-05-24

## 2021-05-24 RX ORDER — TAMSULOSIN HYDROCHLORIDE 0.4 MG/1
0.4 CAPSULE ORAL
Status: DISCONTINUED | OUTPATIENT
Start: 2021-05-25 | End: 2021-05-28 | Stop reason: HOSPADM

## 2021-05-24 RX ORDER — ALBUTEROL SULFATE 2.5 MG/3ML
2.5 SOLUTION RESPIRATORY (INHALATION) EVERY 4 HOURS PRN
Status: DISCONTINUED | OUTPATIENT
Start: 2021-05-24 | End: 2021-05-28 | Stop reason: HOSPADM

## 2021-05-24 RX ORDER — DOCUSATE SODIUM 100 MG/1
100 CAPSULE, LIQUID FILLED ORAL 2 TIMES DAILY
Status: DISCONTINUED | OUTPATIENT
Start: 2021-05-24 | End: 2021-05-28 | Stop reason: HOSPADM

## 2021-05-24 RX ORDER — ASPIRIN 325 MG
325 TABLET, DELAYED RELEASE (ENTERIC COATED) ORAL 2 TIMES DAILY
Qty: 60 TABLET | Refills: 0
Start: 2021-06-04 | End: 2021-06-15

## 2021-05-24 RX ORDER — ASCORBIC ACID 500 MG
500 TABLET ORAL DAILY
Status: DISCONTINUED | OUTPATIENT
Start: 2021-05-25 | End: 2021-05-28 | Stop reason: HOSPADM

## 2021-05-24 RX ORDER — FOLIC ACID 1 MG/1
1 TABLET ORAL DAILY
Status: DISCONTINUED | OUTPATIENT
Start: 2021-05-25 | End: 2021-05-28 | Stop reason: HOSPADM

## 2021-05-24 RX ORDER — DICYCLOMINE HYDROCHLORIDE 10 MG/1
10 CAPSULE ORAL DAILY
Status: DISCONTINUED | OUTPATIENT
Start: 2021-05-25 | End: 2021-05-28 | Stop reason: HOSPADM

## 2021-05-24 RX ORDER — FERROUS SULFATE 325(65) MG
325 TABLET ORAL 2 TIMES DAILY WITH MEALS
Status: DISCONTINUED | OUTPATIENT
Start: 2021-05-25 | End: 2021-05-28 | Stop reason: HOSPADM

## 2021-05-24 RX ORDER — SENNOSIDES 8.6 MG
650 CAPSULE ORAL EVERY 8 HOURS PRN
Qty: 30 TABLET | Refills: 0
Start: 2021-05-24

## 2021-05-24 RX ORDER — ACETAMINOPHEN 325 MG/1
650 TABLET ORAL EVERY 6 HOURS PRN
Status: DISCONTINUED | OUTPATIENT
Start: 2021-05-24 | End: 2021-05-28 | Stop reason: HOSPADM

## 2021-05-24 RX ORDER — SODIUM CHLORIDE 9 MG/ML
3 INJECTION INTRAVENOUS
Status: DISCONTINUED | OUTPATIENT
Start: 2021-05-24 | End: 2021-05-28 | Stop reason: HOSPADM

## 2021-05-24 RX ADMIN — ENOXAPARIN SODIUM 40 MG: 40 INJECTION SUBCUTANEOUS at 08:20

## 2021-05-24 RX ADMIN — FOLIC ACID 1 MG: 1 TABLET ORAL at 08:21

## 2021-05-24 RX ADMIN — ENOXAPARIN SODIUM 40 MG: 40 INJECTION SUBCUTANEOUS at 20:48

## 2021-05-24 RX ADMIN — DICYCLOMINE HYDROCHLORIDE 10 MG: 10 CAPSULE ORAL at 08:21

## 2021-05-24 RX ADMIN — DULOXETINE HYDROCHLORIDE 60 MG: 60 CAPSULE, DELAYED RELEASE ORAL at 08:21

## 2021-05-24 RX ADMIN — MONTELUKAST SODIUM 10 MG: 10 TABLET, COATED ORAL at 08:21

## 2021-05-24 RX ADMIN — BUPROPION HYDROCHLORIDE 300 MG: 150 TABLET, EXTENDED RELEASE ORAL at 08:21

## 2021-05-24 RX ADMIN — DOCUSATE SODIUM 100 MG: 100 CAPSULE, LIQUID FILLED ORAL at 20:46

## 2021-05-24 RX ADMIN — NYSTATIN: 100000 POWDER TOPICAL at 08:24

## 2021-05-24 RX ADMIN — OXYCODONE HYDROCHLORIDE AND ACETAMINOPHEN 500 MG: 500 TABLET ORAL at 08:21

## 2021-05-24 RX ADMIN — DOCUSATE SODIUM 100 MG: 100 CAPSULE, LIQUID FILLED ORAL at 08:21

## 2021-05-24 RX ADMIN — GABAPENTIN 300 MG: 300 CAPSULE ORAL at 08:21

## 2021-05-24 RX ADMIN — ATORVASTATIN CALCIUM 20 MG: 10 TABLET, FILM COATED ORAL at 20:46

## 2021-05-24 RX ADMIN — SODIUM CHLORIDE 75 ML/HR: 0.9 INJECTION, SOLUTION INTRAVENOUS at 22:51

## 2021-05-24 RX ADMIN — MONTELUKAST 10 MG: 10 TABLET, FILM COATED ORAL at 21:03

## 2021-05-24 RX ADMIN — GABAPENTIN 300 MG: 300 CAPSULE ORAL at 20:47

## 2021-05-24 RX ADMIN — PANTOPRAZOLE SODIUM 40 MG: 40 TABLET, DELAYED RELEASE ORAL at 08:21

## 2021-05-24 RX ADMIN — ATORVASTATIN CALCIUM 20 MG: 20 TABLET, FILM COATED ORAL at 08:21

## 2021-05-24 RX ADMIN — FERROUS SULFATE TAB 325 MG (65 MG ELEMENTAL FE) 325 MG: 325 (65 FE) TAB at 07:53

## 2021-05-24 NOTE — ASSESSMENT & PLAN NOTE
Presented to the emergency room for evaluation of changes in mental status  She was admitted skilled nursing facility for acute Rehab S/P total left knee Arthroplasty  Unclear etiology probably multifactorial  Head CT shows-No acute intracranial abnormality     Blood glucose level at 158  Admit to med surg  Hold current psych medication for now  Neuro Check q 4 hours  Consider MRI if worsening mental status  Consider Neurology consult  Monitor vital signs closely  Speech Eval  Am labs  Supportive care

## 2021-05-24 NOTE — NURSING NOTE
CONFUSED MORE TONIGHT, AND TALKING, AND NOT MAKING SENSE  3 RAILS ARE UP AND BED ALAR  NO CHANGES TO LEFT KNEE  LEFT LEG IMMOBILIZER IS INTACT  NEUROVASCULAR STATUS TO LLE REMAINS ADEQ  CALL [facility]ADAMS GIVEN  SHE DENIES PAIN MED NEEDS

## 2021-05-24 NOTE — CASE MANAGEMENT
Pt has been cleared for discharge  IMM reviewed with patient  patient agree with discharge determination  Pt is set up with [facility] for 10 am   Pt requested a call to the dtr to bring some belongings  Left a VM for dtr Veronica at [phone]  Provided the number for report to [facility]

## 2021-05-24 NOTE — ASSESSMENT & PLAN NOTE
Respiratory distress evidence of acute exacerbation  Respiratory protocol  Continue PT respiratory medications  Continue outpatient pulmonary follow-up

## 2021-05-24 NOTE — NURSING NOTE
Patient discharged in stable condition  Report called to [employer], questions addressed

## 2021-05-24 NOTE — PHYSICAL THERAPY NOTE
Physical Therapy Treatment Session Note    Patient's Name: Ashley Ma    Admitting Diagnosis  Primary osteoarthritis of left knee [M17 12]    Problem List  Patient Active Problem List   Diagnosis    Irritable bowel syndrome (IBS)    Mild intermittent asthma without complication    Bilateral carpal tunnel syndrome    Chronic musculoskeletal pain    Generalized anxiety disorder    Hyperlipidemia LDL goal <130    Essential hypertension    Lymphedema    Patent foramen ovale    Tension type headache    Vitamin D deficiency    Recurrent major depressive disorder, in partial remission (HCC)    Other chronic pancreatitis ([facility] )    Body mass index (BMI) of 40 0-44 9 in adult ([facility] )    Foot pain, bilateral    Tinea cruris    Primary insomnia    Neuropathy    Age-related osteoporosis without current pathological fracture    Swelling of joint of left knee    Right kidney stone    Scoliosis    Primary osteoarthritis of left knee    HX: breast cancer    Tachycardia       Past Medical History  Past Medical History:   Diagnosis Date    Anxiety     Arthritis     Asthma     Breast cancer ([facility] )     rt mastectomy 2005    Cancer ([place] [address] )     breast right    Cataract     CHF (congestive heart failure) (HCC)     Coronary artery disease     Depression     GERD (gastroesophageal reflux disease)     High cholesterol     History of chemotherapy     2005 rt breast cancer    History of transfusion     Hypertension     IBS (irritable bowel syndrome)     Irritable bowel syndrome (IBS) 5/31/2016    Kidney stone     Lymphedema of right arm     Obesity     PONV (postoperative nausea and vomiting)     Post-menopausal     Psychiatric disorder     Renal disorder     Vitamin D deficiency        Past Surgical History  Past Surgical History:   Procedure Laterality Date    BREAST SURGERY      CATARACT EXTRACTION Bilateral     CATARACT EXTRACTION, BILATERAL      CHOLECYSTECTOMY      COLONOSCOPY N/A 5/31/2016    Procedure: COLONOSCOPY;  Surgeon: Sudheer Morrison MD;  Location: [facility] MAIN [facility];  Service:    [address] [place] [address] GALLBLADDER SURGERY      HYSTERECTOMY      Total    KNEE ARTHROSCOPY      KNEE SURGERY      arthroscopy    MASTECTOMY Right     rt breast mastectomy 2005    TN TOTAL KNEE ARTHROPLASTY Left 5/20/2021    Procedure: ARTHROPLASTY KNEE TOTAL;  Surgeon: Immanuel Robert MD;  Location: [address] [place] [address] MAIN OR;  Service: Orthopedics    TONSILLECTOMY AND ADENOIDECTOMY          05/24/21 0931   PT Last Visit   PT Visit Date 05/24/21   Note Type   Note Type Treatment   Pain Assessment   Pain Assessment Tool 0-10   Pain Score 9   Pain Location/Orientation Orientation: Left; Location: Knee   Pain Onset/Description Onset: Ongoing;Frequency: Constant/Continuous; Descriptor: Aching;Descriptor: Sore   Effect of Pain on Daily Activities yes   Patient's Stated Pain Goal No pain   Hospital Pain Intervention(s) Medication (See MAR); Repositioned; Ambulation/increased activity; Elevated; Emotional support   Multiple Pain Sites No   Restrictions/Precautions   Weight Bearing Precautions Per Order Yes   LLE Weight Bearing Per Order WBAT   Braces or Orthoses LE Immobilizer  (LLE,see below for usage parameters)   Other Precautions Cognitive; Chair Alarm; Bed Alarm;Pain; Fall Risk   General   Chart Reviewed Yes   Additional Pertinent History As per Dr Shailesh Jackman: "Continue immobilizer unless patient is ambulating, sitting, icing her knee, or working with physical therapy"   Response to Previous Treatment Patient unable to report, no changes reported from family or staff   Family/Caregiver Present Yes  (daughter present upon conclusion)   Cognition   Overall Cognitive Status Impaired   Arousal/Participation Alert   Attention Attends with cues to redirect   Orientation Level Oriented to person;Disoriented to time;Disoriented to situation;Oriented to place   Memory Decreased recall of recent events;Decreased recall of precautions   Following Commands Follows one step commands with increased time or repetition   Comments Pt  agreeable to PT tx session  Subjective   Subjective "tell your mother to talk in a ambriz voice"   Bed Mobility   Supine to Sit 3  Moderate assistance   Additional items Assist x 1;HOB elevated; Bedrails; Increased time required;Verbal cues;LE management   Sit to Supine   (DNT as pt  was sitting OOB on recliner upon conclusion )   Additional Comments Pt  denied lightheadedness w/positional change  Urgently placed L foot on floor, as she reported it helps with pain  Transfers   Sit to Stand 3  Moderate assistance   Additional items Assist x 1;Bedrails; Increased time required;Verbal cues   Stand to Sit 3  Moderate assistance   Additional items Assist x 1;Bedrails; Increased time required;Verbal cues   Stand pivot 3  Moderate assistance   Additional items Assist x 1; Increased time required;Verbal cues   Toilet transfer 3  Moderate assistance   Additional items Assist x 1; Increased time required;Verbal cues;Raised toilet seat; Other  (commode placed over top, R grab bar)   Additional Comments Pt  was assisted w/donning clothing, undergarments, shoes for pending d/c to [facility]  Ambulation/Elevation   Gait pattern Improper Weight shift;Narrow SUSSY; Forward Flexion;Decreased foot clearance;Decreased L stance; Short stride   Gait Assistance 3  Moderate assist   Additional items Assist x 1;Verbal cues; Tactile cues   Assistive Device Rolling walker   Distance 20 feet x 2   Stair Management Assistance Not tested  (unsafe for trialing at this time)   Balance   Static Sitting Fair +   Dynamic Sitting Fair   Static Standing Fair -   Dynamic Standing Poor +   Ambulatory Poor +   Endurance Deficit   Endurance Deficit Yes   Activity Tolerance   Activity Tolerance Patient limited by fatigue;Patient limited by pain   Nurse Made Aware yes, Briseyda Donnelly RN & Paulina PCA   Exercises   Hip Flexion Sitting;AROM; Bilateral;25 reps   Hip Abduction Sitting;AROM; Bilateral;25 reps   Hip Adduction Sitting;AROM; Bilateral;25 reps   Knee AROM [employer]; Bilateral;25 reps   Ankle Pumps Sitting;AROM; Bilateral;25 reps   Assessment   Prognosis Good   Problem List Decreased endurance;Decreased mobility; Decreased coordination;Obesity; Decreased skin integrity;Orthopedic restrictions;Pain;Decreased range of motion;Decreased strength;Decreased cognition;Decreased safety awareness   Assessment Pt seen for PT treatment session this date with interventions consisting of gait training w/ emphasis on improving pt's ability to ambulate level surfaces x 40 feet total with mod A provided by therapist with RW, Therapeutic exercise consisting of: AROM 25 reps B LE in sitting position and therapeutic activity consisting of training: supine<>sit transfers, sit<>stand transfers, static sitting tolerance at EOB for 5 minutes w/ B UE support, static standing tolerance for 3 minutes w/ B UE support, vc and tactile cues for static sitting posture faciliation, vc and tactile cues for static standing posture faciliation, stand pivot transfers towards B direction and toileting  Pt agreeable to PT treatment session upon arrival, pt found supine in bed w/ HOB elevated, A&O x 2  In comparison to previous session, pt with improvements in ambulatory distance  Post session: chair alarm engaged, all needs in reach and RN notified of session findings/recommendations  Continue to recommend post acute rehabilitation services at time of d/c in order to maximize pt's functional independence and safety w/ mobility  Pt continues to be functioning below baseline level, and remains limited 2* factors listed above and including weakness, impaired balance, decreased endurance, impaired cognition, gait deviations  PT will continue to see pt during current hospitalization in order to address the deficits listed above and provide interventions consistent w/ POC in effort to achieve LTGs     Barriers to Discharge Inaccessible home environment Goals   Patient Goals to rest   LTG Expiration Date 05/30/21   Long Term Goal #1 LTGs remain appropriate   PT Treatment Day 4   Plan   Treatment/Interventions Functional transfer training;LE strengthening/ROM; Therapeutic exercise;Elevations; Endurance training;Cognitive reorientation;Patient/family training;Equipment eval/education; Bed mobility;Gait training;Spoke to nursing;OT   Progress Slow progress, decreased activity tolerance   PT Frequency Twice a day   Recommendation   PT Discharge Recommendation Post acute rehabilitation services  (continued recommendation)   Equipment Recommended Walker  (pt  has own)   PT - OK to Discharge Yes  (when medically stable, to [facility])   Additional Comments Upon conclusion, pt  was resting OOB on recliner with all needs within [facility] [place] present  Additional Comments 2 Pt's raw score on the AM-[facility] Basic Mobility inpatient short form is 13, standardized score is 33 99  Patients at this level are likely to benefit from DC to [facility]  However, please refer to therapist recommendation for safe DC planning     AM-[facility] Basic Mobility Inpatient   Turning in Bed Without Bedrails 3   Lying on Back to Sitting on Edge of Flat Bed 3   Moving Bed to Chair 2   Standing Up From Chair 2   Walk in Room 2   Climb 3-5 Stairs 1   Basic Mobility Inpatient Raw Score 13   Basic Mobility Standardized Score 33 99       Roseline Perez, PT

## 2021-05-24 NOTE — PLAN OF CARE
Problem: PHYSICAL THERAPY ADULT  Goal: Performs mobility at highest level of function for planned discharge setting  See evaluation for individualized goals  Description: Treatment/Interventions: Functional transfer training, LE strengthening/ROM, Therapeutic exercise, Endurance training, Patient/family training, Equipment eval/education, Bed mobility, Gait training, Spoke to nursing, OT  Equipment Recommended: Walker(pt  has own)       See flowsheet documentation for full assessment, interventions and recommendations  Outcome: Progressing  Note: Prognosis: Good  Problem List: Decreased endurance, Decreased mobility, Decreased coordination, Obesity, Decreased skin integrity, Orthopedic restrictions, Pain, Decreased range of motion, Decreased strength, Decreased cognition, Decreased safety awareness  Assessment: Pt seen for PT treatment session this date with interventions consisting of gait training w/ emphasis on improving pt's ability to ambulate level surfaces x 40 feet total with mod A provided by therapist with RW, Therapeutic exercise consisting of: AROM 25 reps B LE in sitting position and therapeutic activity consisting of training: supine<>sit transfers, sit<>stand transfers, static sitting tolerance at EOB for 5 minutes w/ B UE support, static standing tolerance for 3 minutes w/ B UE support, vc and tactile cues for static sitting posture faciliation, vc and tactile cues for static standing posture faciliation, stand pivot transfers towards B direction and toileting  Pt agreeable to PT treatment session upon arrival, pt found supine in bed w/ HOB elevated, A&O x 2  In comparison to previous session, pt with improvements in ambulatory distance  Post session: chair alarm engaged, all needs in reach and RN notified of session findings/recommendations  Continue to recommend post acute rehabilitation services at time of d/c in order to maximize pt's functional independence and safety w/ mobility   Pt continues to be functioning below baseline level, and remains limited 2* factors listed above and including weakness, impaired balance, decreased endurance, impaired cognition, gait deviations  PT will continue to see pt during current hospitalization in order to address the deficits listed above and provide interventions consistent w/ POC in effort to achieve LTGs  Barriers to Discharge: Inaccessible home environment  Barriers to Discharge Comments: [facility] home     PT Discharge Recommendation: Post acute rehabilitation services(continued recommendation)     PT - OK to Discharge: Yes(when medically stable, to [facility])    See flowsheet documentation for full assessment

## 2021-05-24 NOTE — PLAN OF CARE
Problem: Potential for Falls  Goal: Patient will remain free of falls  Description: INTERVENTIONS:  - Assess patient frequently for physical needs  -  Identify cognitive and physical deficits and behaviors that affect risk of falls    -  [place] fall precautions as indicated by assessment   - Educate patient/family on patient safety including physical limitations  - Instruct patient to call for assistance with activity based on assessment  - Modify environment to reduce risk of injury  - Consider OT/PT consult to assist with strengthening/mobility  Outcome: Progressing     Problem: Prexisting or High Potential for Compromised Skin Integrity  Goal: Skin integrity is maintained or improved  Description: INTERVENTIONS:  - Identify patients at risk for skin breakdown  - Assess and monitor skin integrity  - Assess and monitor nutrition and hydration status  - Monitor labs   - Assess for incontinence   - Turn and reposition patient  - Assist with mobility/ambulation  - Relieve pressure over bony prominences  - Avoid friction and shearing  - Provide appropriate hygiene as needed including keeping skin clean and dry  - Evaluate need for skin moisturizer/barrier cream  - Collaborate with interdisciplinary team   - Patient/family teaching  - Consider wound care consult   Outcome: Progressing     Problem: NEUROSENSORY - ADULT  Goal: Achieves stable or improved neurological status  Description: INTERVENTIONS  - Monitor and report changes in neurological status  - Monitor vital signs such as temperature, blood pressure, glucose, and any other labs ordered   - Initiate measures to prevent increased intracranial pressure  - Monitor for seizure activity and implement precautions if appropriate      Outcome: Progressing  Goal: Remains free of injury related to seizures activity  Description: INTERVENTIONS  - Maintain airway, patient safety  and administer oxygen as ordered  - Monitor patient for seizure activity, document and report duration and description of seizure to physician/advanced practitioner  - If seizure occurs,  ensure patient safety during seizure  - Reorient patient post seizure  - Seizure pads on all 4 side rails  - Instruct patient/family to notify RN of any seizure activity including if an aura is experienced  - Instruct patient/family to call for assistance with activity based on nursing assessment  - Administer anti-seizure medications if ordered    Outcome: Progressing  Goal: Achieves maximal functionality and self care  Description: INTERVENTIONS  - Monitor swallowing and airway patency with patient fatigue and changes in neurological status  - Encourage and assist patient to increase activity and self care     - Encourage visually impaired, hearing impaired and aphasic patients to use assistive/communication devices  Outcome: Progressing     Problem: SKIN/TISSUE INTEGRITY - ADULT  Goal: Skin integrity remains intact  Description: INTERVENTIONS  - Identify patients at risk for skin breakdown  - Assess and monitor skin integrity  - Assess and monitor nutrition and hydration status  - Monitor labs (i e  albumin)  - Assess for incontinence   - Turn and reposition patient  - Assist with mobility/ambulation  - Relieve pressure over bony prominences  - Avoid friction and shearing  - Provide appropriate hygiene as needed including keeping skin clean and dry  - Evaluate need for skin moisturizer/barrier cream  - Collaborate with interdisciplinary team (i e  Nutrition, Rehabilitation, etc )   - Patient/family teaching  Outcome: Progressing  Goal: Incision(s), wounds(s) or drain site(s) healing without S/S of infection  Description: INTERVENTIONS  - Assess and document risk factors for skin impairment   - Assess and document dressing, incision, wound bed, drain sites and surrounding tissue  - Consider nutrition services referral as needed  - Oral mucous membranes remain intact  - Provide patient/ family education  Outcome: Progressing  Goal: Oral mucous membranes remain intact  Description: INTERVENTIONS  - Assess oral mucosa and hygiene practices  - Implement preventative oral hygiene regimen  - Implement oral medicated treatments as ordered  - Initiate Nutrition services referral as needed  Outcome: Progressing     Problem: MUSCULOSKELETAL - ADULT  Goal: Maintain or return mobility to safest level of function  Description: INTERVENTIONS:  - Assess patient's ability to carry out ADLs; assess patient's baseline for ADL function and identify physical deficits which impact ability to perform ADLs (bathing, care of mouth/teeth, toileting, grooming, dressing, etc )  - Assess/evaluate cause of self-care deficits   - Assess range of motion  - Assess patient's mobility  - Assess patient's need for assistive devices and provide as appropriate  - Encourage maximum independence but intervene and supervise when necessary  - Involve family in performance of ADLs  - Assess for home care needs following discharge   - Consider OT consult to assist with ADL evaluation and planning for discharge  - Provide patient education as appropriate  Outcome: Progressing  Goal: Maintain proper alignment of affected body part  Description: INTERVENTIONS:  - Support, maintain and protect limb and body alignment  - Provide patient/ family with appropriate education  Outcome: Progressing

## 2021-05-24 NOTE — H&P
[address] [facility] 1950, 73 y o  female MRN: [id number]  Unit/Bed#: 406-01 Encounter: [id number]  Primary Care Provider: Eagle Bolton PA-C   Date and time admitted to hospital: 5/24/2021  1:39 PM    * Altered mental status, unspecified  Assessment & Plan  Presented to the emergency room for evaluation of changes in mental status  She was admitted skilled nursing facility for acute Rehab S/P total left knee Arthroplasty  Unclear etiology probably multifactorial  Head CT shows-No acute intracranial abnormality     Blood glucose level at 158  Admit to med surg  Hold current psych medication for now  Neuro Check q 4 hours  Consider MRI if worsening mental status  Consider Neurology consult  Monitor vital signs closely  Speech Eval  Am labs  Supportive care    S/P total knee arthroplasty, left  Assessment & Plan  She is S/P left knee arthroplasty on 5/20/2021  She has notable fluid-filled bullae at surgical site  Continue PRN pain medication  Daily wound care  Orthopedic surgery consult   Supportive care     Tachycardia  Assessment & Plan  She has ongoing episodes of unexplained tachycardia  She had onset of tachycardia post OP left knee Arthroplasty  CTA was negative for acute PE  EKG shows-sinus tachycardia at rate of 111 bpm  Telemetry monitoring  Check ECHO  Monitor for new onset chest pain  Monitor for evidence of acute infection  Continue to manage patient's pain  Monitor vital signs closely  Consider cardiology consult  Supportive care    Recurrent major depressive disorder, in partial remission ([facility] [address] )  Assessment & Plan  Currently stable  Hold PTA psych medication for tonight given current AMS and reports of Hallucinations  Consider inpatient psych consult pending current work up for AMS    Vitamin D deficiency  Assessment & Plan  Continue PTA medication    Essential hypertension  Assessment & Plan  Blood pressure is currently stable  Continue PTA blood pressure medications  Monitor blood pressure     Mild intermittent asthma without complication  Assessment & Plan  Respiratory distress evidence of acute exacerbation  Respiratory protocol  Continue PT respiratory medications  Continue outpatient pulmonary follow-up    VTE Prophylaxis: Enoxaparin (Lovenox)  / sequential compression device   Code Status: Level 3- DNAR/DNI  POLST: POLST form is not discussed and not completed at this time  Discussion with family: None    Anticipated Length of Stay:  Patient will be admitted on an Inpatient basis with an anticipated length of stay of  > 2 midnights  Justification for Hospital Stay: Altered mental status, Tachycardia    Total Time for Visit, including Counseling / Coordination of Care: 30 minutes  Greater than 50% of this total time spent on direct patient counseling and coordination of care  Chief Complaint:   Altered mental status    History of Present Illness:    Myron Lackey is a 73 y o  female who presents to ER acute rehab for evaluation of change in mental status  Patient is status post total left knee replacement on 05/20/2021 and was admitted to 5th floor SNF acute rehab  Patient was also noted to be tachycardic with unknown etiology at this time  Upon arrival to the emergency room patient was noted to have large fluid-filled bullae close to site of surgical wound  She was still disoriented upon arrival to the ER she was able to endorse that she still has some pain to her left knee  No reports of chest pain, shortness of breath, fever, chills, cough, nausea, vomiting, diarrhea, abdominal pain  Labs completed in the ER with results as shown below  CT head and chest completed with results as shown below  At bedside patient is awake with intermittent periods of confusion no acute distress  She she follows commands   She was unable to perform finger-to-nose during neuro exam  Patient is being admitted on inpatient status med surg level care for further work up and management of Altered mental status, Tachycardia,     Review of Systems:    Review of Systems   Constitutional: Negative for chills, diaphoresis, fatigue and fever  HENT: Negative for congestion  Eyes: Negative for photophobia and visual disturbance  Respiratory: Negative for cough, chest tightness, shortness of breath and wheezing  Cardiovascular: Negative for chest pain, palpitations and leg swelling  Gastrointestinal: Negative for abdominal pain, nausea and vomiting  Endocrine: Negative  Genitourinary: Negative for difficulty urinating, dysuria, frequency and hematuria  Musculoskeletal: Positive for gait problem  Negative for arthralgias, back pain and joint swelling  Skin: Positive for wound  Negative for color change, pallor and rash  Surgical wound   Neurological: Positive for headaches  Negative for dizziness  Psychiatric/Behavioral: Positive for agitation and confusion  The patient is nervous/anxious          Past Medical and Surgical History:     Past Medical History:   Diagnosis Date    Anxiety     Arthritis     Asthma     Breast cancer ([address] )     rt mastectomy 2005    Cancer [facility])     breast right    Cataract     CHF (congestive heart failure) (HCC)     Coronary artery disease     Depression     GERD (gastroesophageal reflux disease)     High cholesterol     History of chemotherapy     2005 rt breast cancer    History of transfusion     Hypertension     IBS (irritable bowel syndrome)     Irritable bowel syndrome (IBS) 5/31/2016    Kidney stone     Lymphedema of right arm     Obesity     PONV (postoperative nausea and vomiting)     Post-menopausal     Psychiatric disorder     Renal disorder     Vitamin D deficiency        Past Surgical History:   Procedure Laterality Date    BREAST SURGERY      CATARACT EXTRACTION Bilateral     CATARACT EXTRACTION, BILATERAL      CHOLECYSTECTOMY      COLONOSCOPY N/A 5/31/2016    Procedure: COLONOSCOPY;  Surgeon: Grazyna Escobar MD;  Location: [facility] MAIN OR;  Service:    [facility] GALLBLADDER SURGERY      HYSTERECTOMY      Total    KNEE ARTHROSCOPY      KNEE SURGERY      arthroscopy    MASTECTOMY Right     rt breast mastectomy 2005    [place] TOTAL KNEE ARTHROPLASTY Left 5/20/2021    Procedure: ARTHROPLASTY KNEE TOTAL;  Surgeon: Matty Juan MD;  Location: [address] [place] [address] MAIN OR;  Service: Orthopedics    TONSILLECTOMY AND ADENOIDECTOMY         Meds/Allergies:    Prior to Admission medications    Medication Sig Start Date End Date Taking? Authorizing Provider   acetaminophen (TYLENOL) 650 mg CR tablet Take 1 tablet (650 mg total) by mouth every 8 (eight) hours as needed for mild pain 5/24/21  Yes Noel Chaudhari PA-C   albuterol (2 5 mg/3 mL) 0 083 % nebulizer solution Take 2 5 mg by nebulization every 6 (six) hours as needed for wheezing  Yes Historical Provider, MD   ascorbic acid (VITAMIN C) 500 MG tablet Take 1 tablet (500 mg total) by mouth daily 4/21/21  Yes Matty Juan MD   aspirin (ECOTRIN) 325 mg EC tablet Take 1 tablet (325 mg total) by mouth 2 (two) times a day 6/4/21 7/4/21 Yes Noel Chaudhari PA-C   atorvastatin (LIPITOR) 20 mg tablet Take 1 tablet by mouth daily  5/15/21  Yes Merced Alex PA-C   buPROPion (WELLBUTRIN XL) 300 mg 24 hr tablet Take 1 tablet (300 mg total) by mouth every morning 5/13/21  Yes Merced Alex PA-C   butalbital-acetaminophen-caffeine (FIORICET,ESGIC) -40 mg per tablet Take 1 tablet by mouth 3 (three) times a day as needed for headaches 5/5/20  Yes Merced Alex PA-C   diclofenac (VOLTAREN) 75 mg EC tablet Take 1 tablet (75 mg total) by mouth 2 (two) times a day 2/25/21  Yes Merced Alex PA-C   dicyclomine (BENTYL) 10 mg capsule Take 1 capsule by mouth daily   4/15/21  Yes Merced Alex PA-C   docusate sodium (COLACE) 100 mg capsule Take 1 capsule (100 mg total) by mouth 2 (two) times a day 5/24/21  Yes Noel Chaudhari PA-C   DULoxetine (CYMBALTA) 60 mg delayed release capsule Take 1 capsule by mouth twice daily  4/15/21  Yes Merced Alex PA-C   enoxaparin (LOVENOX) 40 mg/0 4 mL Inject 0 4 mL (40 mg total) under the skin 2 (two) times a day for 11 days 5/24/21 6/4/21 Yes Barbara Bell PA-C   ergocalciferol (VITAMIN D2) 50,000 units Take 1 capsule (50,000 Units total) by mouth once a week 2/28/20  Yes Merced lAex PA-C   ferrous sulfate 324 (65 Fe) mg Take 1 tablet (324 mg total) by mouth 2 (two) times a day before meals 4/21/21  Yes Jackson Barragan MD   folic acid (FOLVITE) 1 mg tablet Take 1 tablet (1 mg total) by mouth daily 4/21/21  Yes Jackson Barragan MD   gabapentin (NEURONTIN) 300 mg capsule Take 1 capsule by mouth three times daily  1/15/21  Yes Merced Alex PA-C   loratadine (CLARITIN) 10 mg tablet Take 1 tablet (10 mg total) by mouth daily 10/5/18  Yes Merced Alex PA-C   montelukast (SINGULAIR) 10 mg tablet Take 1 tablet by mouth daily   5/15/21  Yes Merced Alex PA-C   Nutritional Supplements (BOOST BREEZE PO) Take by mouth daily 9/6/17  Yes Claude Griffin MD   nystatin (MYCOSTATIN) powder Apply topically 2 (two) times a day 2/19/20  Yes Merced Alex PA-C   tamsulosin (FLOMAX) 0 4 mg Take 1 capsule (0 4 mg total) by mouth daily with dinner 9/17/20  Yes Corey Gibson PA-C   traZODone (DESYREL) 50 mg tablet TAKE 1-2 TABLETS ( MG TOTAL) BY MOUTH DAILY AT BEDTIME 3/8/21  Yes Merced Alex PA-C   albuterol (PROAIR HFA) 90 mcg/act inhaler Inhale 2 puffs every 4 (four) hours as needed for wheezing 10/5/18   Merced Alex PA-C   Multiple Vitamin (multivitamin) tablet Take 1 tablet by mouth daily  Patient not taking: Reported on 5/24/2021 4/21/21   Jackson Barragan MD   nystatin (MYCOSTATIN) cream Apply topically 2 (two) times a day  Patient not taking: Reported on 5/24/2021 4/20/20   Merced Alex PA-C   ondansetron (ZOFRAN-ODT) 4 mg disintegrating tablet Take 4 mg by mouth every 8 (eight) hours as needed 7/26/20   Historical Provider, MD   oxybutynin (DITROPAN-XL) 10 MG 24 hr tablet Take 1 tablet (10 mg total) by mouth daily at bedtime 4/27/21   JERARDO Duncan   oxyCODONE (ROXICODONE) 5 mg immediate release tablet Take 1 tablet (5 mg total) by mouth every 4 (four) hours as needed for moderate pain for up to 20 daysMax Daily Amount: 30 mg 5/24/21 6/13/21  Angus Das PA-C   pantoprazole (PROTONIX) 40 mg tablet Take 1 tablet by mouth daily  Patient not taking: Reported on 5/24/2021 4/15/21   Darshan Angeles PA-C   pantoprazole (PROTONIX) 40 mg tablet Take 1 tablet by mouth daily  1/15/21   Darshan Angeles PA-C     I have reviewed home medications with patient personally  Allergies:    Allergies   Allergen Reactions    Ibuprofen Nausea Only, Rash, Dizziness and Syncope    Latex Dermatitis       Social History:     Marital Status:    Occupation: Retired  Patient Pre-hospital Living Situation: Lives with daughter  Patient Pre-hospital Level of Mobility: Active, Uses walker to assist with ambulation  Patient Pre-hospital Diet Restrictions: None reported  Substance Use History:   Social History     Substance and Sexual Activity   Alcohol Use Never    Frequency: Monthly or less    Comment: socially     Social History     Tobacco Use   Smoking Status Never Smoker   Smokeless Tobacco Never Used     Social History     Substance and Sexual Activity   Drug Use Never       Family History:    Family History   Adopted: Yes   Problem Relation Age of Onset    Diabetes Mother     Heart disease Mother     Mental illness Mother     Depression Mother     Heart disease Brother     Breast cancer Maternal Aunt     Breast cancer Maternal Aunt     Breast cancer Maternal Aunt     Breast cancer Maternal Aunt     Breast cancer Maternal Aunt     No Known Problems Father     No Known Problems Sister     No Known Problems Daughter     Breast cancer Maternal Grandmother     No Known Problems Sister     No Known Problems Sister     No Known Problems Sister Physical Exam:     Vitals:   Blood Pressure: 134/79 (05/25/21 0011)  Pulse: (!) 121 (05/25/21 0011)  Temperature: 99 7 °F (37 6 °C) (05/25/21 0011)  Temp Source: Temporal (05/24/21 1342)  Respirations: (!) 32 (05/25/21 0011)  Height: 4' 9" (144 8 cm) (05/24/21 1747)  Weight - Scale: 87 8 kg (193 lb 9 oz) (05/24/21 1747)  SpO2: 94 % (05/25/21 0011)    Physical Exam  Vitals signs reviewed  Constitutional:       General: She is not in acute distress  Appearance: She is not ill-appearing  HENT:      Head: Normocephalic and atraumatic  Nose: No congestion or rhinorrhea  Mouth/Throat:      Mouth: Mucous membranes are dry  Pharynx: Oropharynx is clear  Eyes:      Pupils: Pupils are equal, round, and reactive to light  Neck:      Musculoskeletal: Neck supple  Cardiovascular:      Rate and Rhythm: Normal rate and regular rhythm  Pulses: Normal pulses  Pulmonary:      Effort: No respiratory distress  Breath sounds: No wheezing or rales  Abdominal:      General: There is no distension  Tenderness: There is no abdominal tenderness  Musculoskeletal:         General: Swelling and tenderness present  Right lower leg: No edema  Left lower leg: No edema  Comments: Post surgical wounds,intact sressing   Skin:     General: Skin is warm and dry  Capillary Refill: Capillary refill takes less than 2 seconds  Coloration: Skin is not pale  Findings: No erythema or rash  Neurological:      Mental Status: She is disoriented  Comments: Unable to perform finger to nose bilaterally         Additional Data:     Lab Results: I have personally reviewed pertinent reports        Results from last 7 days   Lab Units 05/24/21  1354   WBC Thousand/uL 7 59   HEMOGLOBIN g/dL 10 5*   HEMATOCRIT % 32 4*   PLATELETS Thousands/uL 202   NEUTROS PCT % 70   LYMPHS PCT % 13*   MONOS PCT % 12   EOS PCT % 2     Results from last 7 days   Lab Units 05/24/21  1354   SODIUM mmol/L 138   POTASSIUM mmol/L 3 6   CHLORIDE mmol/L 102   CO2 mmol/L 30   BUN mg/dL 11   CREATININE mg/dL 0 76   ANION GAP mmol/L 6   CALCIUM mg/dL 8 4   ALBUMIN g/dL 2 5*   TOTAL BILIRUBIN mg/dL 0 68   ALK PHOS U/L 130*   ALT U/L 72   AST U/L 28   GLUCOSE RANDOM mg/dL 158*                 Results from last 7 days   Lab Units 05/24/21  1354   LACTIC ACID mmol/L 1 0       Imaging: I have personally reviewed pertinent reports  CT head without contrast   Final Result by Rayray Flores MD (05/24 1632)      No acute intracranial abnormality  Workstation performed: [id number]         VAS lower limb venous duplex study, unilateral/limited   Final Result by Abida Loyola DO (05/24 2049)      X-ray chest 1 view portable   Final Result by Ele Vega MD (05/24 1515)      Mild pulmonary venous congestion  Workstation performed: [id number]             EKG, Pathology, and Other Studies Reviewed on Admission:   · EKG: Sinus Tachycardia at rate of 111 bpm    Allscripts / Epic Records Reviewed: Yes     ** Please Note: This note has been constructed using a voice recognition system   **

## 2021-05-24 NOTE — PLAN OF CARE
Problem: Prexisting or High Potential for Compromised Skin Integrity  Goal: Skin integrity is maintained or improved  Description: INTERVENTIONS:  - Identify patients at risk for skin breakdown  - Assess and monitor skin integrity  - Assess and monitor nutrition and hydration status  - Monitor labs   - Assess for incontinence   - Turn and reposition patient  - Assist with mobility/ambulation  - Relieve pressure over bony prominences  - Avoid friction and shearing  - Provide appropriate hygiene as needed including keeping skin clean and dry  - Evaluate need for skin moisturizer/barrier cream  - Collaborate with interdisciplinary team   - Patient/family teaching  - Consider wound care consult   Outcome: Adequate for Discharge     Problem: Potential for Falls  Goal: Patient will remain free of falls  Description: INTERVENTIONS:  - Assess patient frequently for physical needs  -  Identify cognitive and physical deficits and behaviors that affect risk of falls    -  [place] fall precautions as indicated by assessment   - Educate patient/family on patient safety including physical limitations  - Instruct patient to call for assistance with activity based on assessment  - Modify environment to reduce risk of injury  - Consider OT/PT consult to assist with strengthening/mobility  Outcome: Adequate for Discharge     Problem: MUSCULOSKELETAL - ADULT  Goal: Maintain or return mobility to safest level of function  Description: INTERVENTIONS:  - Assess patient's ability to carry out ADLs; assess patient's baseline for ADL function and identify physical deficits which impact ability to perform ADLs (bathing, care of mouth/teeth, toileting, grooming, dressing, etc )  - Assess/evaluate cause of self-care deficits   - Assess range of motion  - Assess patient's mobility  - Assess patient's need for assistive devices and provide as appropriate  - Encourage maximum independence but intervene and supervise when necessary  - Involve family in performance of ADLs  - Assess for home care needs following discharge   - Consider OT consult to assist with ADL evaluation and planning for discharge  - Provide patient education as appropriate  Outcome: Adequate for Discharge  Goal: Maintain proper alignment of affected body part  Description: INTERVENTIONS:  - Support, maintain and protect limb and body alignment  - Provide patient/ family with appropriate education  Outcome: Adequate for Discharge     Problem: PAIN - ADULT  Goal: Verbalizes/displays adequate comfort level or baseline comfort level  Description: Interventions:  - Encourage patient to monitor pain and request assistance  - Assess pain using appropriate pain scale  - Administer analgesics based on type and severity of pain and evaluate response  - Implement non-pharmacological measures as appropriate and evaluate response  - Consider cultural and social influences on pain and pain management  - Notify physician/advanced practitioner if interventions unsuccessful or patient reports new pain  Outcome: Adequate for Discharge     Problem: INFECTION - ADULT  Goal: Absence or prevention of progression during hospitalization  Description: INTERVENTIONS:  - Assess and monitor for signs and symptoms of infection  - Monitor lab/diagnostic results  - Monitor all insertion sites, i e  indwelling lines, tubes, and drains  - Monitor endotracheal if appropriate and nasal secretions for changes in amount and color  - [place] appropriate cooling/warming therapies per order  - Administer medications as ordered  - Instruct and encourage patient and family to use good hand hygiene technique  - Identify and instruct in appropriate isolation precautions for identified infection/condition  Outcome: Adequate for Discharge  Goal: Absence of fever/infection during neutropenic period  Description: INTERVENTIONS:  - Monitor WBC    Outcome: Adequate for Discharge     Problem: SAFETY ADULT  Goal: Patient will remain free of falls  Description: INTERVENTIONS:  - Assess patient frequently for physical needs  -  Identify cognitive and physical deficits and behaviors that affect risk of falls    -  [place] fall precautions as indicated by assessment   - Educate patient/family on patient safety including physical limitations  - Instruct patient to call for assistance with activity based on assessment  - Modify environment to reduce risk of injury  - Consider OT/PT consult to assist with strengthening/mobility  Outcome: Adequate for Discharge  Goal: Maintain or return to baseline ADL function  Description: INTERVENTIONS:  -  Assess patient's ability to carry out ADLs; assess patient's baseline for ADL function and identify physical deficits which impact ability to perform ADLs (bathing, care of mouth/teeth, toileting, grooming, dressing, etc )  - Assess/evaluate cause of self-care deficits   - Assess range of motion  - Assess patient's mobility; develop plan if impaired  - Assess patient's need for assistive devices and provide as appropriate  - Encourage maximum independence but intervene and supervise when necessary  - Involve family in performance of ADLs  - Assess for home care needs following discharge   - Consider OT consult to assist with ADL evaluation and planning for discharge  - Provide patient education as appropriate  Outcome: Adequate for Discharge  Goal: Maintain or return mobility status to optimal level  Description: INTERVENTIONS:  - Assess patient's baseline mobility status (ambulation, transfers, stairs, etc )    - Identify cognitive and physical deficits and behaviors that affect mobility  - Identify mobility aids required to assist with transfers and/or ambulation (gait belt, sit-to-stand, lift, walker, cane, etc )  - [place] fall precautions as indicated by assessment  - Record patient progress and toleration of activity level on Mobility SBAR; progress patient to next Phase/Stage  - Instruct patient to call for assistance with activity based on assessment  - Consider rehabilitation consult to assist with strengthening/weightbearing, etc   Outcome: Adequate for Discharge     Problem: DISCHARGE PLANNING  Goal: Discharge to home or other facility with appropriate resources  Description: INTERVENTIONS:  - Identify barriers to discharge w/patient and caregiver  - Arrange for needed discharge resources and transportation as appropriate  - Identify discharge learning needs (meds, wound care, etc )  - Arrange for interpretive services to assist at discharge as needed  - Refer to Case Management Department for coordinating discharge planning if the patient needs post-hospital services based on physician/advanced practitioner order or complex needs related to functional status, cognitive ability, or social support system  Outcome: Adequate for Discharge     Problem: Knowledge Deficit  Goal: Patient/family/caregiver demonstrates understanding of disease process, treatment plan, medications, and discharge instructions  Description: Complete learning assessment and assess knowledge base    Interventions:  - Provide teaching at level of understanding  - Provide teaching via preferred learning methods  Outcome: Adequate for Discharge

## 2021-05-24 NOTE — ASSESSMENT & PLAN NOTE
Currently stable  Hold PTA psych medication for tonight given current AMS and reports of Hallucinations  Consider inpatient psych consult pending current work up for AMS

## 2021-05-24 NOTE — DISCHARGE SUMMARY
ORTHOPEDICS DISCHARGE SUMMARY  Kyra Chavez 73 y o  female MRN: [id number]  Unit/Bed#: -01      Attending Physician: Dr Sb Torres    Admitting diagnosis: Primary osteoarthritis of left knee [M17 12]    Discharge diagnosis: Primary osteoarthritis of left knee [M17 12]    Date of admission: 5/20/2021    Date of discharge: 05/24/21       Procedure: Total knee arthroplasty- left    HPI:  This is a 73y o  year old female that presented to the office with signs and symptoms of left knee osteoarthritis  They tried and failed conservative treatment measures and wished to proceed with surgical intervention  The risks, benefits, and complications of the procedure were discussed with the patient and informed consent was obtained  Hospital Course: The patient was admitted to the hospital on 5/20/2021 and underwent an uncomplicated left total knee arthroplasty  They were transferred to the floor after a brief stay in the post-anesthesia care unit  Their pain was well managed with IV and oral pain medications  They began therapy on post operative day #1  Lovenox was also started for DVT prophylaxis 12 hours post operatively  The patient was noted to have tachycardia, so a CT scan was ordered, which was negative for PE  On discharge date pt was cleared by PT and the medicine team and determined to be safe for discharge to an acute rehab facility  Daily discussion was had with the patient, nursing staff, orthopaedic team, and family members if present  All questions were answered to the patients satisifaction        0   Lab Value Date/Time    HGB 10 1 (L) 05/23/2021 0520    HGB 11 2 (L) 05/22/2021 0423    HGB 12 6 05/21/2021 0429    HGB 14 9 04/21/2021 1035    HGB 15 6 (H) 02/17/2021 0720    HGB 14 9 11/26/2019 0816    HGB 14 6 11/03/2019 0603    HGB 13 4 11/02/2019 0507    HGB 15 0 11/01/2019 1137    HGB 15 2 09/10/2018 1613    HGB 15 8 (H) 05/16/2018 0909    HGB 15 9 (H) 06/07/2017 1001    HGB 15 2 12/02/2015 1051       Greater than 2 gram drop which qualifies for diagnosis of acute blood loss anemia  Vital signs remained stable and pt was resuscitated with IVF as needed   Body mass index is 40 03 kg/m²  morbidly obese  Recommend behavior modifications, nutrition and physical activity  Discharge Instructions:  -The patient was discharged to acute rehab facility  -Maintain dressings clean, dry, and intact  -Weight bearing as tolerated to the left lower extremity    -Lovenox will be continued until 6/3/2021 (total of 14 days post op)  -Continue PT/OT  -Take pain medications as instructed  Discharge Medications: For the complete list of discharge medications, please refer to the patient's medication reconciliation

## 2021-05-24 NOTE — DISCHARGE INSTRUCTIONS
Keep incision clean and until follow-up    When patient is laying in bed, the knee should be straight  Do not put any pillows underneath the knee  A knee immobilizer the use to remind the patient and the staff to keep the knee straight  The knee immobilizer does not need to be on at all times  She may have a removed to the ambulating, to work with therapy, to be out of bed  Knee immobilizer is only to remind the patient to keep the knee straight and not have pillows underneath the knee when she is laying in bed    Lovenox, 40 mg, b i d  Until two weeks postoperatively at which time the Lovenox will be transitioned to aspirin, 325 mg p o  B i d     Weight-bearing as tolerated to the right lower extremity    Keep blister sites covered with a Xeroform dressing until healing well  Keep a light, dry dressing over the wound  Change as needed  Knee Replacement   WHAT YOU NEED TO KNOW:   Knee replacement is surgery to replace all or part of your knee joint  It is also called knee arthroplasty  It is normal to have increased stiffness and pain after surgery  Your pain and stiffness should get better with exercise  You may be able to go home shortly after surgery  Your healthcare provider will talk to you about rehabilitation you can do at home  You may  be given a joint replacement ID card  The card will tell what joint was replaced and when it was replaced  Tell all healthcare providers about your joint replacement surgery  DISCHARGE INSTRUCTIONS:   Call your local emergency number (911 in the [facility]) for any of the following:   · You feel lightheaded, short of breath, and have chest pain  · You cough up blood  Call your doctor or surgeon if:   · Your leg feels warm, tender, and painful  It may look swollen and red  · You cannot walk or move your knee  · Blood soaks through your bandage  · Your incision wound comes apart  · Your incision area is red, swollen, or draining pus      · You have a fever or chills  · You have trouble moving or bending your knee  · You have new knee pain or pain that does not get better with medicine  · You have questions or concerns about your condition or care  Medicines: You may  need any of the following:  · Prescription pain medicine  may be given  Ask your healthcare provider how to take this medicine safely  Some prescription pain medicines contain acetaminophen  Do not take other medicines that contain acetaminophen without talking to your healthcare provider  Too much acetaminophen may cause liver damage  Prescription pain medicine may cause constipation  Ask your healthcare provider how to prevent or treat constipation  · NSAIDs , such as ibuprofen, help decrease swelling, pain, and fever  This medicine is available with or without a doctor's order  NSAIDs can cause stomach bleeding or kidney problems in certain people  If you take blood thinner medicine, always ask your healthcare provider if NSAIDs are safe for you  Always read the medicine label and follow directions  · Blood thinners  help prevent blood clots  Clots can cause strokes, heart attacks, and death  The following are general safety guidelines to follow while you are taking a blood thinner:    ? Watch for bleeding and bruising while you take blood thinners  Watch for bleeding from your gums or nose  Watch for blood in your urine and bowel movements  Use a soft washcloth on your skin, and a soft toothbrush to brush your teeth  This can keep your skin and gums from bleeding  If you shave, use an electric shaver  Do not play contact sports  ? Tell your dentist and other healthcare providers that you take a blood thinner  Wear a bracelet or necklace that says you take this medicine  ? Do not start or stop any other medicines unless your healthcare provider tells you to  Many medicines cannot be used with blood thinners      ? Take your blood thinner exactly as prescribed by your healthcare provider  Do not skip does or take less than prescribed  Tell your provider right away if you forget to take your blood thinner, or if you take too much  ? Warfarin  is a blood thinner that you may need to take  The following are things you should be aware of if you take warfarin:     § Foods and medicines can affect the amount of warfarin in your blood  Do not make major changes to your diet while you take warfarin  Warfarin works best when you eat about the same amount of vitamin K every day  Vitamin K is found in green leafy vegetables and certain other foods  Ask for more information about what to eat when you are taking warfarin  § You will need to see your healthcare provider for follow-up visits when you are on warfarin  You will need regular blood tests  These tests are used to decide how much medicine you need  · Take your medicine as directed  Contact your healthcare provider if you think your medicine is not helping or if you have side effects  Tell him or her if you are allergic to any medicine  Keep a list of the medicines, vitamins, and herbs you take  Include the amounts, and when and why you take them  Bring the list or the pill bottles to follow-up visits  Carry your medicine list with you in case of an emergency  Self-care:   · Apply ice  on your knee for 15 to 20 minutes every hour or as directed  Use an ice pack, or put crushed ice in a plastic bag  Cover it with a towel  Ice helps prevent tissue damage and decreases swelling and pain  · Do not get the area wet  until your healthcare provider says it is okay  When your provider says it is okay, carefully wash the area with soap and water  Dry the area and put on new, clean bandages as directed  · Care for your incision area  as directed  Do not put powders or lotions over the area  If you have strips of medical tape over the incision area, let them fall off on their own   If they do not fall off within 14 days, gently remove them  Check the area for signs of infection, such as swelling, redness, or pus  · Go to physical or occupational therapy , if directed  A physical therapist will teach you exercises to build muscle strength, decrease pain and swelling, and prevent blood clots  An occupational therapist will show you how to do daily activities safely  He or she may recommend assistive devices to help you dress, bathe, and  objects  The assistive devices will help you prevent knee damage from twisting or bending  Prevent blood clots:   · Wear pressure stockings as directed  Pressure stockings help keep blood from pooling in your leg veins  Your healthcare provider can prescribe stockings that are right for you  Do not buy over-the-counter pressure stockings unless your healthcare provider says it is okay  They may not fit correctly or may have elastic that cuts off your circulation  Ask your healthcare provider when to start wearing pressure stockings and how long to wear them each day  · Do not cross your legs for 6 weeks or as directed  Your risk for blood clots is greater when you cross your legs  You might also move your implant out of place  Activity guidelines:   · Know your limits  Start activities slowly and give yourself rest periods  Pain and swelling can increase when you do too much  Do not do an activity until your healthcare provider says you are ready  · Use assistive devices as directed  Your thigh muscles will be weak after surgery  Assistive devices will help you not fall  · Go up the stairs  by placing your non-operated leg on the step first  Then bring your operated leg to the same step  Repeat  · Go down stairs  by placing your operated leg down on the step first  Then bring your non-operated leg to the same step  Repeat  · Do light housekeeping only  Ask your healthcare provider which housekeeping activities are okay for you   Do not vacuum, change sheets on a bed, or anything that makes you reach up, bend, or twist     · Do not drive for at least 6 weeks  or until your healthcare provider says it is okay  · Do not play contact sports, tennis, golf, or ski  until your healthcare provider says it is okay  These activities can loosen your knee implant  Prevent falls:   · Remove all loose carpets and cords  These can cause you to trip and fall  · Use a shower bench or chair  when you take a shower to limit the time you are standing  · Use a toilet seat riser with arms  if your toilet seat is low  A toilet seat riser will help prevent bending or twisting your knee  Metal detectors and MRIs after joint replacement surgery:   · The metal in your joint may set off metal detectors, such as at an airport  Tell the officials about your joint replacement surgery  You may also want to show your joint replacement ID card, if you were given one  · MRIs are considered safe for people with joint replacements  The type of metal used is not magnetic  Tell all healthcare providers about your joint replacement surgery  Follow up with your healthcare provider as directed: Your healthcare provider may contact you weeks after your surgery  He or she may ask if surgery helped relieve your pain or stiffness  Tell your provider how well you are able to do your daily activities after surgery  Also tell him or her if you are having any problems with mobility or range of motion  Write down your questions so you remember to ask them during your visits  © Copyright [address] Information is for End User's use only and may not be sold, redistributed or otherwise used for commercial purposes  All illustrations and images included in CareNotes® are the copyrighted property of [employer]  or [facility]   The above information is an  only  It is not intended as medical advice for individual conditions or treatments   Talk to your doctor, nurse or pharmacist before following any medical regimen to see if it is safe and effective for you

## 2021-05-24 NOTE — ASSESSMENT & PLAN NOTE
She has ongoing episodes of unexplained tachycardia  She had onset of tachycardia post OP left knee Arthroplasty  CTA was negative for acute PE  EKG shows-sinus tachycardia at rate of 111 bpm  Telemetry monitoring  Check ECHO  Monitor for new onset chest pain  Monitor for evidence of acute infection  Continue to manage patient's pain  Monitor vital signs closely  Consider cardiology consult  Supportive care

## 2021-05-25 ENCOUNTER — APPOINTMENT (INPATIENT)
Dept: NON INVASIVE DIAGNOSTICS | Facility: HOSPITAL | Age: 71
DRG: 092 | End: 2021-05-25
Payer: COMMERCIAL

## 2021-05-25 PROBLEM — G93.41 ACUTE METABOLIC ENCEPHALOPATHY: Status: ACTIVE | Noted: 2021-05-24

## 2021-05-25 PROBLEM — L30.9 DERMATITIS: Status: ACTIVE | Noted: 2021-05-25

## 2021-05-25 LAB
ALBUMIN SERPL BCP-MCNC: 2.2 G/DL (ref 3.5–5)
ALP SERPL-CCNC: 112 U/L (ref 46–116)
ALT SERPL W P-5'-P-CCNC: 56 U/L (ref 12–78)
ANION GAP SERPL CALCULATED.3IONS-SCNC: 6 MMOL/L (ref 4–13)
AST SERPL W P-5'-P-CCNC: 24 U/L (ref 5–45)
ATRIAL RATE: 111 BPM
ATRIAL RATE: 112 BPM
BILIRUB SERPL-MCNC: 0.92 MG/DL (ref 0.2–1)
BUN SERPL-MCNC: 10 MG/DL (ref 5–25)
CALCIUM ALBUM COR SERPL-MCNC: 9.4 MG/DL (ref 8.3–10.1)
CALCIUM SERPL-MCNC: 8 MG/DL (ref 8.3–10.1)
CHLORIDE SERPL-SCNC: 106 MMOL/L (ref 100–108)
CO2 SERPL-SCNC: 28 MMOL/L (ref 21–32)
CREAT SERPL-MCNC: 0.6 MG/DL (ref 0.6–1.3)
ERYTHROCYTE [DISTWIDTH] IN BLOOD BY AUTOMATED COUNT: 12.1 % (ref 11.6–15.1)
GFR SERPL CREATININE-BSD FRML MDRD: 93 ML/MIN/1.73SQ M
GLUCOSE SERPL-MCNC: 120 MG/DL (ref 65–140)
HCT VFR BLD AUTO: 30.4 % (ref 34.8–46.1)
HGB BLD-MCNC: 9.6 G/DL (ref 11.5–15.4)
MAGNESIUM SERPL-MCNC: 2.1 MG/DL (ref 1.6–2.6)
MAGNESIUM SERPL-MCNC: 2.1 MG/DL (ref 1.6–2.6)
MCH RBC QN AUTO: 30.2 PG (ref 26.8–34.3)
MCHC RBC AUTO-ENTMCNC: 31.6 G/DL (ref 31.4–37.4)
MCV RBC AUTO: 96 FL (ref 82–98)
P AXIS: 49 DEGREES
P AXIS: 62 DEGREES
PHOSPHATE SERPL-MCNC: 3.2 MG/DL (ref 2.3–4.1)
PLATELET # BLD AUTO: 218 THOUSANDS/UL (ref 149–390)
PMV BLD AUTO: 9.7 FL (ref 8.9–12.7)
POTASSIUM SERPL-SCNC: 3.6 MMOL/L (ref 3.5–5.3)
PR INTERVAL: 180 MS
PR INTERVAL: 182 MS
PROT SERPL-MCNC: 5.9 G/DL (ref 6.4–8.2)
QRS AXIS: 11 DEGREES
QRS AXIS: 15 DEGREES
QRSD INTERVAL: 100 MS
QRSD INTERVAL: 96 MS
QT INTERVAL: 340 MS
QT INTERVAL: 342 MS
QTC INTERVAL: 464 MS
QTC INTERVAL: 465 MS
RBC # BLD AUTO: 3.18 MILLION/UL (ref 3.81–5.12)
SODIUM SERPL-SCNC: 140 MMOL/L (ref 136–145)
T WAVE AXIS: 35 DEGREES
T WAVE AXIS: 47 DEGREES
VENTRICULAR RATE: 111 BPM
VENTRICULAR RATE: 112 BPM
WBC # BLD AUTO: 7.4 THOUSAND/UL (ref 4.31–10.16)

## 2021-05-25 PROCEDURE — 93306 TTE W/DOPPLER COMPLETE: CPT

## 2021-05-25 PROCEDURE — 99232 SBSQ HOSP IP/OBS MODERATE 35: CPT | Performed by: PHYSICIAN ASSISTANT

## 2021-05-25 PROCEDURE — 93306 TTE W/DOPPLER COMPLETE: CPT | Performed by: INTERNAL MEDICINE

## 2021-05-25 PROCEDURE — 99024 POSTOP FOLLOW-UP VISIT: CPT | Performed by: PHYSICIAN ASSISTANT

## 2021-05-25 PROCEDURE — 97167 OT EVAL HIGH COMPLEX 60 MIN: CPT

## 2021-05-25 PROCEDURE — 83735 ASSAY OF MAGNESIUM: CPT | Performed by: PHYSICIAN ASSISTANT

## 2021-05-25 PROCEDURE — 84100 ASSAY OF PHOSPHORUS: CPT | Performed by: PHYSICIAN ASSISTANT

## 2021-05-25 PROCEDURE — 93010 ELECTROCARDIOGRAM REPORT: CPT | Performed by: INTERNAL MEDICINE

## 2021-05-25 PROCEDURE — 97163 PT EVAL HIGH COMPLEX 45 MIN: CPT

## 2021-05-25 PROCEDURE — 97535 SELF CARE MNGMENT TRAINING: CPT

## 2021-05-25 PROCEDURE — 85027 COMPLETE CBC AUTOMATED: CPT | Performed by: PHYSICIAN ASSISTANT

## 2021-05-25 PROCEDURE — 92610 EVALUATE SWALLOWING FUNCTION: CPT

## 2021-05-25 PROCEDURE — 97530 THERAPEUTIC ACTIVITIES: CPT

## 2021-05-25 PROCEDURE — 80053 COMPREHEN METABOLIC PANEL: CPT | Performed by: PHYSICIAN ASSISTANT

## 2021-05-25 RX ORDER — SODIUM CHLORIDE 9 MG/ML
75 INJECTION, SOLUTION INTRAVENOUS CONTINUOUS
Status: DISCONTINUED | OUTPATIENT
Start: 2021-05-25 | End: 2021-05-27

## 2021-05-25 RX ORDER — GABAPENTIN 300 MG/1
300 CAPSULE ORAL 2 TIMES DAILY
Status: DISCONTINUED | OUTPATIENT
Start: 2021-05-25 | End: 2021-05-28 | Stop reason: HOSPADM

## 2021-05-25 RX ORDER — DULOXETIN HYDROCHLORIDE 30 MG/1
60 CAPSULE, DELAYED RELEASE ORAL 2 TIMES DAILY
Status: DISCONTINUED | OUTPATIENT
Start: 2021-05-25 | End: 2021-05-28 | Stop reason: HOSPADM

## 2021-05-25 RX ORDER — OXYCODONE HYDROCHLORIDE 5 MG/1
5 TABLET ORAL EVERY 6 HOURS PRN
Status: DISCONTINUED | OUTPATIENT
Start: 2021-05-25 | End: 2021-05-28 | Stop reason: HOSPADM

## 2021-05-25 RX ORDER — OXYCODONE HYDROCHLORIDE 5 MG/1
2.5 TABLET ORAL EVERY 6 HOURS PRN
Status: DISCONTINUED | OUTPATIENT
Start: 2021-05-25 | End: 2021-05-28 | Stop reason: HOSPADM

## 2021-05-25 RX ORDER — BUPROPION HYDROCHLORIDE 150 MG/1
300 TABLET ORAL DAILY
Status: DISCONTINUED | OUTPATIENT
Start: 2021-05-25 | End: 2021-05-28 | Stop reason: HOSPADM

## 2021-05-25 RX ORDER — TRAZODONE HYDROCHLORIDE 50 MG/1
25 TABLET ORAL
Status: DISCONTINUED | OUTPATIENT
Start: 2021-05-25 | End: 2021-05-25

## 2021-05-25 RX ADMIN — MONTELUKAST 10 MG: 10 TABLET, FILM COATED ORAL at 23:05

## 2021-05-25 RX ADMIN — FERROUS SULFATE TAB 325 MG (65 MG ELEMENTAL FE) 325 MG: 325 (65 FE) TAB at 18:20

## 2021-05-25 RX ADMIN — DOCUSATE SODIUM 100 MG: 100 CAPSULE, LIQUID FILLED ORAL at 09:36

## 2021-05-25 RX ADMIN — SODIUM CHLORIDE 75 ML/HR: 0.9 INJECTION, SOLUTION INTRAVENOUS at 18:21

## 2021-05-25 RX ADMIN — DICYCLOMINE HYDROCHLORIDE 10 MG: 10 CAPSULE ORAL at 09:36

## 2021-05-25 RX ADMIN — GABAPENTIN 300 MG: 300 CAPSULE ORAL at 18:20

## 2021-05-25 RX ADMIN — TAMSULOSIN HYDROCHLORIDE 0.4 MG: 0.4 CAPSULE ORAL at 18:20

## 2021-05-25 RX ADMIN — FOLIC ACID 1 MG: 1 TABLET ORAL at 09:36

## 2021-05-25 RX ADMIN — OXYCODONE HYDROCHLORIDE 5 MG: 5 TABLET ORAL at 10:56

## 2021-05-25 RX ADMIN — OXYCODONE HYDROCHLORIDE AND ACETAMINOPHEN 500 MG: 500 TABLET ORAL at 09:36

## 2021-05-25 RX ADMIN — LORATADINE 10 MG: 10 TABLET ORAL at 09:35

## 2021-05-25 RX ADMIN — ENOXAPARIN SODIUM 40 MG: 40 INJECTION SUBCUTANEOUS at 09:35

## 2021-05-25 RX ADMIN — ATORVASTATIN CALCIUM 20 MG: 10 TABLET, FILM COATED ORAL at 18:20

## 2021-05-25 RX ADMIN — DULOXETINE HYDROCHLORIDE 60 MG: 30 CAPSULE, DELAYED RELEASE ORAL at 18:23

## 2021-05-25 RX ADMIN — DOCUSATE SODIUM 100 MG: 100 CAPSULE, LIQUID FILLED ORAL at 18:20

## 2021-05-25 RX ADMIN — ENOXAPARIN SODIUM 40 MG: 40 INJECTION SUBCUTANEOUS at 23:05

## 2021-05-25 RX ADMIN — GABAPENTIN 300 MG: 300 CAPSULE ORAL at 09:35

## 2021-05-25 RX ADMIN — BUPROPION HYDROCHLORIDE 300 MG: 150 TABLET, EXTENDED RELEASE ORAL at 11:56

## 2021-05-25 RX ADMIN — DULOXETINE HYDROCHLORIDE 60 MG: 30 CAPSULE, DELAYED RELEASE ORAL at 11:56

## 2021-05-25 RX ADMIN — FERROUS SULFATE TAB 325 MG (65 MG ELEMENTAL FE) 325 MG: 325 (65 FE) TAB at 09:35

## 2021-05-25 NOTE — UTILIZATION REVIEW
Initial Clinical Review    Admission: Date/Time/Statement:   Admission Orders (From admission, onward)    Orders from this encounter     Ordered        05/24/21 1706  Inpatient Admission  Once                          Orders Placed This Encounter   Procedures    Inpatient Admission     Standing Status:   Standing     Number of Occurrences:   1     Order Specific Question:   Level of Care     Answer:   Med Surg [16]     Order Specific Question:   Estimated length of stay     Answer:   More than 2 Midnights     Order Specific Question:   Certification     Answer:   I certify that inpatient services are medically necessary for this patient for a duration of greater than two midnights  See H&P and MD Progress Notes for additional information about the patient's course of treatment  ED Arrival Information     Expected Arrival Acuity Means of Arrival Escorted By Service Admission Type    - 5/24/2021 13:38 Urgent Stretcher Friend Hospitalist Urgent    Arrival Complaint    confusion        Chief Complaint   Patient presents with    Altered Mental Status     pt was just admitted to skilled nursing about 2 hours ago, states that she started last night with altered mental status, nursing staff is concerned due to recent L knee replacement     Initial Presentation:    72 yo female,  Elective Left TKR on 5/20  Remained in hospital (therapies) until 5/24 when transfer to Acute rehab:  A few hrs later pt was taken to ER for  evaluation of changes in mental status and intermittent episodes of tachycardia (started postop)  IN ER  HEAD Ct neg (no acute abnormality)    EKG shows-sinus tachycardia at rate of 111 bpm   large fluid-filled bullae close to site of surgical wound (Dermatitis?)      Admit IP Status, ms level of care for workup of acute metabolic encephalopathy following TKR on 5/20  Will cont telemetry,  Check ECHO,  HOLD all psych meds    Place psych meds on HOLD, neuro cks q 4 hr,  Consult Neurology and psychology, Order Speech (cog eval)  and Ortho (fup Rt knee)  Wound care consult  (knee bullae)    IVF NS @  75 cc/hr (dc'ed 5/25  @  1130)       Date:  5/25  Day 2:  GCS 15 - Pt forgetful, poor concentration; short term memory loss   Roxicodone po @  1100   For  Knee pain  Remains tachycardic,  TMax 99 7     Will add neurology consult given ongoing hallucinations and confusion, although seems to be improved  Will discontinue trazodone given ongoing confusion  , add IV fluid hydration as the patient appears dry by exam currently    INITIATED IVF NS  @  75 cc/hr      ED Triage Vitals [05/24/21 1342]   Temperature Pulse Respirations Blood Pressure SpO2   97 6 °F (36 4 °C) (!) 115 18 127/68 96 %      Temp Source Heart Rate Source Patient Position - Orthostatic VS BP Location FiO2 (%)   Temporal Monitor Lying Left arm --      Pain Score       Worst Possible Pain          Wt Readings from Last 1 Encounters:   05/25/21 90 9 kg (200 lb 6 4 oz)     Additional Vital Signs:   05/25/21 07:22:02  99 2 °F   114Abnormal   18  153/92  112  90 %  None (Room air)   05/25/21 00:11:19  99 7 °F   121Abnormal   32  134/79  97  94 %  --   05/24/21 20:50:26  98 9 °F   115Abnormal   --  136/80  99  91 %       05/26/21 06:49:06  99 °F (37 2 °C)  101  18  134/91  105  94 %  None (Room air)  Lying   05/25/21 23:07:06  99 3 °F (37 4 °C)  112Abnormal   20  136/90  105  95 %             Pertinent Labs/Diagnostic Test Results:   5/24  EKG: Sinus Tachycardia at rate of 111 bpm      Results from last 7 days   Lab Units 05/24/21  0758   SARS-COV-2  Negative     Results from last 7 days   Lab Units 05/25/21  0420 05/24/21  1354 05/23/21  0520 05/22/21  0423 05/21/21  0429   WBC Thousand/uL 7 40 7 59 6 80 7 10 6 50   HEMOGLOBIN g/dL 9 6* 10 5* 10 1* 11 2* 12 6   HEMATOCRIT % 30 4* 32 4* 29 3* 32 3* 37 2*   PLATELETS Thousands/uL 218 202 146* 138* 146*   NEUTROS ABS Thousands/µL  --  5 35  --   --   --          Results from last 7 days   Lab Units 05/25/21  0428 05/24/21  1354 05/23/21  0520 05/22/21  0423 05/21/21  0429   SODIUM mmol/L 140 138 138 136 133*   POTASSIUM mmol/L 3 6 3 6 3 7 3 6 3 8   CHLORIDE mmol/L 106 102 100 99 98   CO2 mmol/L 28 30 34* 32* 30   ANION GAP mmol/L 6 6 4 5 5   BUN mg/dL 10 11 8 8 8   CREATININE mg/dL 0 60 0 76 0 58* 0 65 0 68   EGFR ml/min/1 73sq m 93 80 94 91 90   CALCIUM mg/dL 8 0* 8 4 8 0* 8 3* 8 0*   MAGNESIUM mg/dL 2 1 2 1  --   --   --    PHOSPHORUS mg/dL 3 2  --   --   --   --      Results from last 7 days   Lab Units 05/25/21  0420 05/24/21  1354   AST U/L 24 28   ALT U/L 56 72   ALK PHOS U/L 112 130*   TOTAL PROTEIN g/dL 5 9* 6 4   ALBUMIN g/dL 2 2* 2 5*   TOTAL BILIRUBIN mg/dL 0 92 0 68         Results from last 7 days   Lab Units 05/25/21  0420 05/24/21  1354 05/23/21  0520 05/22/21  0423 05/21/21  0429   GLUCOSE RANDOM mg/dL 120 158* 136* 163* 166*     Results from last 7 days   Lab Units 05/24/21  1354   TROPONIN I ng/mL <0 02     Results from last 7 days   Lab Units 05/24/21  1354   LACTIC ACID mmol/L 1 0     Results from last 7 days   Lab Units 05/24/21  1354   NT-PRO BNP pg/mL 217 7*     Results from last 7 days   Lab Units 05/24/21  1354   LIPASE u/L 27*     Results from last 7 days   Lab Units 05/24/21  1504   CLARITY UA  Clear   COLOR UA  Yellow   SPEC GRAV UA  1 015   PH UA  7 0   GLUCOSE UA mg/dl Negative   KETONES UA mg/dl Negative   BLOOD UA  Negative   PROTEIN UA mg/dl Negative   NITRITE UA  Negative   BILIRUBIN UA  Negative   UROBILINOGEN UA E U /dl 0 2   LEUKOCYTES UA  Negative     Results from last 7 days   Lab Units 05/24/21  1354   BLOOD CULTURE  Received in Microbiology Lab  Culture in Progress  Received in Microbiology Lab  Culture in Progress       Past Medical History:   Diagnosis Date    Anxiety     Arthritis     Asthma     Breast cancer ([facility] )     rt mastectomy 2005    Cancer [facility])     breast right    Cataract     CHF (congestive heart failure) (HCC)     Coronary artery disease     Depression     GERD (gastroesophageal reflux disease)     High cholesterol     History of chemotherapy     2005 rt breast cancer    History of transfusion     Hypertension     IBS (irritable bowel syndrome)     Irritable bowel syndrome (IBS) 5/31/2016    Kidney stone     Lymphedema of right arm     Obesity     PONV (postoperative nausea and vomiting)     Post-menopausal     Psychiatric disorder     Renal disorder     Vitamin D deficiency      Present on Admission:   Acute metabolic encephalopathy   Essential hypertension   Recurrent major depressive disorder, in partial remission ([facility] )   Sinus tachycardia   Vitamin D deficiency   Mild intermittent asthma without complication      Admitting Diagnosis: Delirium [R41 0]  Altered mental status [R41 82]  Age/Sex: 73 y o  female  Admission Orders:  Telemetry;  PT/OT/Speech - wbat w/RW;  neuro cks q 4 hr;  I/O q shift;  daily wgt;  ECHO;  IP CONSULT TO ORTHOPEDIC SURGERY;   Wound care ;  scd's b/l LE;      Scheduled Medications:  ascorbic acid, 500 mg, Oral, Daily  atorvastatin, 20 mg, Oral, QPM  buPROPion, 300 mg, Oral, Daily  dicyclomine, 10 mg, Oral, Daily  docusate sodium, 100 mg, Oral, BID  DULoxetine, 60 mg, Oral, BID  enoxaparin, 40 mg, Subcutaneous, BID  ferrous sulfate, 325 mg, Oral, BID With Meals  folic acid, 1 mg, Oral, Daily  gabapentin, 300 mg, Oral, BID  loratadine, 10 mg, Oral, Daily  montelukast, 10 mg, Oral, HS  tamsulosin, 0 4 mg, Oral, Daily With Dinner      Continuous IV Infusions:   NS @  75 cc/hr      PRN Meds:  acetaminophen, 650 mg, Oral, Q6H PRN  albuterol, 2 5 mg, Nebulization, Q4H PRN  butalbital-acetaminophen-caffeine, 1 tablet, Oral, TID PRN  ondansetron, 4 mg, Intravenous, Q6H PRN  oxyCODONE, 2 5 mg, Oral, Q6H PRN    Or  oxyCODONE, 5 mg, Oral, Q6H PRN  sodium chloride (PF), 3 mL, Intravenous, Q1H PRN      Network Utilization Review Department  ATTENTION: Please call with any questions or concerns to [phone] and carefully listen to the prompts so that you are directed to the right person  All voicemails are confidential   Amalia Ham all requests for admission clinical reviews, approved or denied determinations and any other requests to dedicated fax number below belonging to the campus where the patient is receiving treatment   List of dedicated fax numbers for the Facilities:  [address] DENIALS (Administrative/Medical Necessity) [phone]   [address] (Maternity/NICU/Pediatrics) [phone]   [address] [place] [address] [address] [address] [place] [address] [facility] [place] [address] [phone]

## 2021-05-25 NOTE — PLAN OF CARE
Problem: PHYSICAL THERAPY ADULT  Goal: Performs mobility at highest level of function for planned discharge setting  See evaluation for individualized goals  Description: Treatment/Interventions: LE strengthening/ROM, Functional transfer training, Elevations, Therapeutic exercise, Endurance training, Gait training, Bed mobility          See flowsheet documentation for full assessment, interventions and recommendations  Note: Prognosis: Fair  Problem List: Decreased strength, Decreased range of motion, Decreased endurance, Impaired balance, Decreased mobility, Decreased cognition, Orthopedic restrictions, Pain  Assessment:Patient is a 73 y o  female evaluated by Physical Therapy s/p admit to [address] [place] [address],[address] on 5/24/2021 with admitting diagnosis of: Delirium, Altered mental status and principal problem of: Acute metabolic encephalopathy  PT was consulted to assess patient's functional mobility and discharge needs  Ordered are PT Evaluation and treatment with activity level of: up with assistance  Comorbidities affecting patient's physical performance at time of assessment include: GERD, asthma, arthritis, CHF, CAD  Personal factors affecting the patient at time of IE include: lives in 2 story home, ambulating with assistive device, step(s) to enter home, inability to navigate community distances, inability to navigate level surfaces without external assistance, unable to perform dynamic tasks in community, impaired cognition, inability/difficulty performing IADLs and inability/difficulty performing ADLs  Please locate objective findings from PT assessment regarding body systems outlined above  Upon evaluation, pt completed sit <-> stand and commode transfer with MinAx2, RW, and minimal cuing  Pt unable to ambulate after transfer due to increased L knee pain and fatigue  Pt notes minimal to moderate fatigue during transfer  She did not experience LOB during session   The patient's AM-PAC Basic Mobility Inpatient Short Form Raw Score is 12, Standardized Score is 32 23  A standardized score less than 42 9 suggests the patient may benefit from discharge to post-acute rehabilitation services  Please also refer to the recommendation of the Physical Therapist for safe discharge planning  Pt will benefit from continued PT intervention during LOS to address current deficits, increase LOF, and facilitate safe d/c to next level of care when medically appropriate  D/c recommendation at this time is return to facility with rehabilitation services  PT Discharge Recommendation: Return to facility with rehabilitation services          See flowsheet documentation for full assessment

## 2021-05-25 NOTE — PROGRESS NOTES
[address] [facility]  Progress Note - Roro Gautam 1950, 73 y o  female MRN: [id number]  Unit/Bed#: 406-01 Encounter: [id number]  Primary Care Provider: Yanique Renee PA-C   Date and time admitted to hospital: 5/24/2021  1:39 PM    * Acute metabolic encephalopathy  Assessment & Plan  Presented to the emergency room for evaluation of changes in mental status  She was just admitted skilled nursing facility for acute Rehab S/P total left knee Arthroplasty and found to be confused and with hallucinations upon arrival     Suspect etiology is due to polypharmacy - on oxycodone 10mg tablets every 6 hours in addition to underlying psychiatric medications  Head CT shows - No acute intracranial abnormality  Blood glucose level at 158  No focal neurologic deficits  Will continue neurologic checks and close observation  Consider further imaging studies or specialty consultation if her mental status worsen or additional symptoms present  Will restart narcotics at lower dosing protocol  Will reduce Neurontin dosing from TID to BID dosing  Dermatitis  Assessment & Plan  Bullae noted surrounding surgical site  Suspect contact dermatitis due to allergy vs  Irritant  Will monitor closely  Orthopedics consult pending  Wound care consult pending  S/P total knee arthroplasty, left  Assessment & Plan  She is S/P left knee arthroplasty on 5/20/2021  She has notable fluid-filled bullae at surgical site  Suspect possible contact allergic dermatitis due to skin prep or from immobilizer? Afebrile, no leukocytosis  Orthopedics consult pending  Continue PT/OT - WBAT LLE  Plan for STR on discharge  Sinus tachycardia  Assessment & Plan  She has ongoing episodes postop sinus tachycardia  Suspect this is multifactorial in the setting of pain, mild anxiety, dehydration  CTA was negative for acute PE on 5/22    EKG shows-sinus tachycardia at rate of 111 bpm     Patient received lana IVF hydration overnight, will discontinue further IV fluids at this time given chest Xray findings  Control pain adequately  Continue Telemetry monitoring  Check magnesium level  Echocardiogram - EF 60%, mild LVH, moderate AS, otherwise unremarkable  Recurrent major depressive disorder, in partial remission ([facility] [address] )  Assessment & Plan  Currently stable  Held PTA psych medication overnight given AMS and reports of Hallucinations  Will reoder cymbalta, wellbutrin at previous doses  Will restart trazodone at 25mg dose (usual dose is 50mg)  Vitamin D deficiency  Assessment & Plan  Continue vitamin D weekly supplementation  Essential hypertension  Assessment & Plan  Blood pressure is currently stable  Not on any home BP medications  Monitor blood pressure closely  Mild intermittent asthma without complication  Assessment & Plan  Respiratory protocol  Continue PT respiratory medications  Continue outpatient pulmonary follow-up  VTE Pharmacologic Prophylaxis:   Pharmacologic: Enoxaparin (Lovenox)  Mechanical VTE Prophylaxis in Place: No    Patient Centered Rounds: I have performed bedside rounds with nursing staff today  Time Spent for Care: 30 minutes  More than 50% of total time spent on counseling and coordination of care as described above  Current Length of Stay: 1 day(s)    Current Patient Status: Inpatient   Certification Statement: The patient will continue to require additional inpatient hospital stay due to need for telemetry monitoring, neuro checks, close lab monitoring  Discharge Plan / Estimated Discharge Date: TBD      Code Status: Level 3 - DNAR and DNI      Subjective:   Patient is feeling well today, slightly fatigued and lightheaded but felt much better when she woke up this morning  She does not remember exactly what happened yesterday, cannot recall the events from discharge from hospital to admission at rehab   She notes some tingling in her fingers and toes otherwise ok  Objective:   Vitals:   Temp (24hrs), Av °F (37 2 °C), Min:97 6 °F (36 4 °C), Max:99 7 °F (37 6 °C)    Temp:  [97 6 °F (36 4 °C)-99 7 °F (37 6 °C)] 99 2 °F (37 3 °C)  HR:  [] 114  Resp:  [16-38] 18  BP: (122-153)/(61-96) 153/92  SpO2:  [90 %-97 %] 90 %  Body mass index is 43 37 kg/m²  Input and Output Summary (last 24 hours): Intake/Output Summary (Last 24 hours) at 2021 1119  Last data filed at 2021 1271  Gross per 24 hour   Intake 623 75 ml   Output 1500 ml   Net -876 25 ml         Physical Exam:   Physical Exam  Vitals signs and nursing note reviewed  Constitutional:       General: She is not in acute distress  Appearance: She is obese  She is not ill-appearing  HENT:      Head: Normocephalic  Mouth/Throat:      Mouth: Mucous membranes are moist    Neck:      Musculoskeletal: Neck supple  Cardiovascular:      Rate and Rhythm: Regular rhythm  Tachycardia present  Heart sounds: Murmur (systolic) present  Pulmonary:      Effort: Pulmonary effort is normal    Abdominal:      General: There is no distension  Palpations: Abdomen is soft  Tenderness: There is no abdominal tenderness  Musculoskeletal:      Right lower leg: No edema  Left lower leg: No edema  Skin:     General: Skin is warm  Coloration: Skin is not pale  Comments: Left knee with ACE bandage in place, appears clean and dry  Neurovascularly intact  Neurological:      Mental Status: She is alert and oriented to person, place, and time     Psychiatric:         Mood and Affect: Mood normal            Additional Data:   Labs:  Results from last 7 days   Lab Units 21  0420 21  1354   WBC Thousand/uL 7 40 7 59   HEMOGLOBIN g/dL 9 6* 10 5*   HEMATOCRIT % 30 4* 32 4*   PLATELETS Thousands/uL 218 202   NEUTROS PCT %  --  70   LYMPHS PCT %  --  13*   MONOS PCT %  --  12   EOS PCT %  --  2     Results from last 7 days   Lab Units 21  0420   POTASSIUM mmol/L 3 6   CHLORIDE mmol/L 106   CO2 mmol/L 28   BUN mg/dL 10   CREATININE mg/dL 0 60   CALCIUM mg/dL 8 0*   ALK PHOS U/L 112   ALT U/L 56   AST U/L 24           * I Have Reviewed All Lab Data Listed Above  * Additional Pertinent Lab Tests Reviewed: All Labs Within Last 24 Hours Reviewed      Imaging:  Imaging Reports Reviewed Today Include:         Recent Cultures (last 7 days):     Results from last 7 days   Lab Units 05/24/21  1354   BLOOD CULTURE  Received in Microbiology Lab  Culture in Progress  Received in Microbiology Lab  Culture in Progress  Last 24 Hours Medication List:   Current Facility-Administered Medications   Medication Dose Route Frequency Provider Last Rate    acetaminophen  650 mg Oral Q6H PRN Juancho Li PA-C      albuterol  2 5 mg Nebulization Q4H PRN Tanner Floyd MD      ascorbic acid  500 mg Oral Daily Juancho Li, KOFI      atorvastatin  20 mg Oral QPM Juancho Li PA-C      butalbital-acetaminophen-caffeine  1 tablet Oral TID PRN Juancho Li PA-C      dicyclomine  10 mg Oral Daily Juancho Li PA-C      docusate sodium  100 mg Oral BID Juancho Li PA-C      enoxaparin  40 mg Subcutaneous BID Juancho Li, KOFI      ferrous sulfate  325 mg Oral BID With Meals Juancho Li PA-C      folic acid  1 mg Oral Daily Juancho Li, KOFI      gabapentin  300 mg Oral TID Juancho Li, PA-JOSÉ LUIS      loratadine  10 mg Oral Daily Juancho Li PA-C      montelukast  10 mg Oral HS Juancho Li PA-C      ondansetron  4 mg Intravenous Q6H PRN Juancho Li PA-C      oxyCODONE  2 5 mg Oral Q6H PRN Jose Roberto Correia PA-C      Or    oxyCODONE  5 mg Oral Q6H PRN Jose Roberto Correia PA-C      sodium chloride (PF)  3 mL Intravenous Q1H PRN Juancho Li PA-C      tamsulosin  0 4 mg Oral Daily With Dinner Juancho Li PA-C          Today, Patient Was Seen By: Jose Roberto Correia PA-C    ** Please Note: Dragon 360 Dictation voice to text software may have been used in the creation of this document   **

## 2021-05-25 NOTE — PLAN OF CARE
Problem: OCCUPATIONAL THERAPY ADULT  Goal: Performs self-care activities at highest level of function for planned discharge setting  See evaluation for individualized goals  Description: Treatment Interventions: ADL retraining, Functional transfer training, UE strengthening/ROM, Endurance training, Patient/family training, Compensatory technique education, Energy conservation, Activityengagement          See flowsheet documentation for full assessment, interventions and recommendations  Note: Limitation: Decreased ADL status, Decreased UE strength, Decreased Safe judgement during ADL, Decreased endurance, Decreased self-care trans, Decreased high-level ADLs     Assessment:  Pt is a 73 y o  female seen for OT evaluation s/p admit to [facility] on 1/36[facility]7141 w/ Acute metabolic encephalopathy  Comorbidities affecting pt's functional performance at time of assessment include: CHF and Cancer, IBS, Asthma, Anxiety, HTN, GERD, Arthritis, CAD, Renal disorder  Personal factors affecting pt at time of IE include:steps to enter environment, difficulty performing ADLS, difficulty performing IADLS , limited insight into deficits, decreased initiation and engagement  and health management   Prior to admission, pt was (I) with ADLs and (A) IADLs using a rollator for functional mobility  Upon evaluation: Pt requires min-max (A) x1-2 for ADLs including transfers, toileting, LB dressing requiring the use of RW 2* the following deficits impacting occupational performance: weakness, decreased strength, decreased balance, decreased tolerance, impaired initiation, impaired sequencing, impaired problem solving, decreased safety awareness and increased pain  Pt to benefit from continued skilled OT tx while in the hospital to address deficits as defined above and maximize level of functional independence w ADL's and functional mobility   Occupational Performance areas to address include: grooming, bathing/shower, toilet hygiene, dressing, health maintenance, functional mobility, community mobility and clothing management  The patient's raw score on the AM-PAC Daily Activity inpatient short form is 16, standardized score is 35 96, less than 39 4  Patients at this level are likely to benefit from discharge to post-acute rehabilitation services  Please refer to the recommendation of the Occupational Therapist for safe discharge planning         OT Discharge Recommendation: Post acute rehabilitation services

## 2021-05-25 NOTE — ASSESSMENT & PLAN NOTE
Bullae noted surrounding surgical site  Suspect contact dermatitis due to allergy vs  Irritant  Will monitor closely  Orthopedics consult pending  Wound care consult pending

## 2021-05-25 NOTE — ASSESSMENT & PLAN NOTE
Presented to the emergency room for evaluation of changes in mental status  She was just admitted skilled nursing facility for acute Rehab S/P total left knee Arthroplasty and found to be confused and with hallucinations upon arrival     Suspect etiology is due to polypharmacy - on oxycodone 10mg tablets every 6 hours in addition to underlying psychiatric medications  Head CT shows - No acute intracranial abnormality  Blood glucose level at 158  No focal neurologic deficits  Will continue neurologic checks and close observation  Consider further imaging studies or specialty consultation if her mental status worsen or additional symptoms present  Will restart narcotics at lower dosing protocol  Will reduce Neurontin dosing from TID to BID dosing

## 2021-05-25 NOTE — RESPIRATORY THERAPY NOTE
RT Protocol Note  Cris King 73 y o  female MRN: [id number]  Unit/Bed#: 406-01 Encounter: [id number]    Assessment    Principal Problem:    Altered mental status, unspecified  Active Problems:    Mild intermittent asthma without complication    Essential hypertension    Vitamin D deficiency    Recurrent major depressive disorder, in partial remission (HCC)    Tachycardia      Home Pulmonary Medications:  Albuterol MDI does not take   Albuterol Neb  Home Devices/Therapy: Other (Comment)(Pt  takes alb   MDI prn but does not take it )    Past Medical History:   Diagnosis Date    Anxiety     Arthritis     Asthma     Breast cancer ([address] )     rt mastectomy 2005    Cancer [facility])     breast right    Cataract     CHF (congestive heart failure) (HCC)     Coronary artery disease     Depression     GERD (gastroesophageal reflux disease)     High cholesterol     History of chemotherapy     2005 rt breast cancer    History of transfusion     Hypertension     IBS (irritable bowel syndrome)     Irritable bowel syndrome (IBS) 5/31/2016    Kidney stone     Lymphedema of right arm     Obesity     PONV (postoperative nausea and vomiting)     Post-menopausal     Psychiatric disorder     Renal disorder     Vitamin D deficiency      Social History     Socioeconomic History    Marital status:      Spouse name: None    Number of children: None    Years of education: None    Highest education level: None   Occupational History    Occupation: Retired   Social Needs    Financial resource strain: None    Food insecurity     Worry: None     Inability: None    Transportation needs     Medical: None     Non-medical: None   Tobacco Use    Smoking status: Never Smoker    Smokeless tobacco: Never Used   Substance and Sexual Activity    Alcohol use: Never     Frequency: Monthly or less     Comment: socially    Drug use: Never    Sexual activity: Not Currently   Lifestyle    Physical activity     Days per week: None     Minutes per session: None    Stress: None   Relationships    Social connections     Talks on phone: None     Gets together: None     Attends Buddhist service: None     Active member of club or organization: None     Attends meetings of clubs or organizations: None     Relationship status: None    Intimate partner violence     Fear of current or ex partner: None     Emotionally abused: None     Physically abused: None     Forced sexual activity: None   Other Topics Concern    None   Social History Narrative    Always uses seat belt    Occasional caffeine consumption         Retired    Lives with adult daughter       Subjective         Objective    Physical Exam:   Assessment Type: Assess only  General Appearance: Alert, Awake  Respiratory Pattern: Normal  Chest Assessment: Chest expansion symmetrical  Bilateral Breath Sounds: Clear  Cough: None  O2 Device: None    Vitals:  Blood pressure 127/77, pulse 86, temperature 98 9 °F (37 2 °C), resp  rate 16, height 4' 9" (1 448 m), weight 87 8 kg (193 lb 9 oz), SpO2 97 %, not currently breastfeeding  Imaging and other studies: I have personally reviewed pertinent reports  O2 Device: None     Plan    Respiratory Plan: Home Bronchodilator Patient pathway      Will keep Prn Neb as ordered

## 2021-05-25 NOTE — QUICK NOTE
Re-evaluated patient with daughter at bedside and gave overview of treatment plan and ongoing care plan  Will add neurology consult given ongoing hallucinations and confusion, although seems to be improved  Will discontinue trazodone given ongoing confusion  , add IV fluid hydration as the patient appears dry by exam currently

## 2021-05-25 NOTE — CASE MANAGEMENT
Met with pt to discuss role as  in helping pt to develop discharge plan and to help pt carry out their plan  Pt's current LOS is  1 day     Pt is a Risk of Unplanned Readmission score of  12    Pt is a 30 day readmission  Pt was going to 5th floor for STR  Ino Shankar is going to talk to patient and daughter to see if they are a bedhold  On discharge from 5th floor pt will go to return home with daughter   Pt has 3 ARTUR  Pt has 13 steps on the inside but patient has a chair lift            I will continue to follow for any Case Management needs

## 2021-05-25 NOTE — ASSESSMENT & PLAN NOTE
She is S/P left knee arthroplasty on 5/20/2021  She has notable fluid-filled bullae at surgical site  Suspect possible contact allergic dermatitis due to skin prep or from immobilizer? Afebrile, no leukocytosis  Orthopedics consult pending  Continue PT/OT - WBAT LLE  Plan for STR on discharge

## 2021-05-25 NOTE — PLAN OF CARE
Problem: Potential for Falls  Goal: Patient will remain free of falls  Description: INTERVENTIONS:  - Assess patient frequently for physical needs  -  Identify cognitive and physical deficits and behaviors that affect risk of falls  (confusion, hallucinations, weakness, swelling, injury, pain)  -  [place] fall precautions as indicated by assessment   (high fall risk)  - Educate patient/family on patient safety including physical limitations  - Instruct patient to call for assistance with activity based on assessment  - Modify environment to reduce risk of injury  - Consider OT/PT consult to assist with strengthening/mobility  Outcome: Progressing     Problem: Prexisting or High Potential for Compromised Skin Integrity  Goal: Skin integrity is maintained or improved  Description: INTERVENTIONS:  - Identify patients at risk for skin breakdown  - Assess and monitor skin integrity  - Assess and monitor nutrition and hydration status  - Monitor labs   - Assess for incontinence   - Turn and reposition patient  - Assist with mobility/ambulation  - Relieve pressure over bony prominences  - Avoid friction and shearing  - Provide appropriate hygiene as needed including keeping skin clean and dry  - Evaluate need for skin moisturizer/barrier cream  - Collaborate with interdisciplinary team   - Patient/family teaching  - Consider wound care consult   Outcome: Progressing     Problem: NEUROSENSORY - ADULT  Goal: Achieves stable or improved neurological status  Description: INTERVENTIONS  - Monitor and report changes in neurological status  - Monitor vital signs such as temperature, blood pressure, glucose, and any other labs ordered   - Initiate measures to prevent increased intracranial pressure  - Monitor for seizure activity and implement precautions if appropriate      Outcome: Progressing     Problem: NEUROSENSORY - ADULT  Goal: Achieves maximal functionality and self care  Description: INTERVENTIONS  - Monitor swallowing and airway patency with patient fatigue and changes in neurological status  - Encourage and assist patient to increase activity and self care     - Encourage visually impaired, hearing impaired and aphasic patients to use assistive/communication devices  Outcome: Progressing     Problem: SKIN/TISSUE INTEGRITY - ADULT  Goal: Skin integrity remains intact  Description: INTERVENTIONS  - Identify patients at risk for skin breakdown  - Assess and monitor skin integrity  - Assess and monitor nutrition and hydration status  - Monitor labs (i e  albumin)  - Assess for incontinence   - Turn and reposition patient  - Assist with mobility/ambulation  - Relieve pressure over bony prominences  - Avoid friction and shearing  - Provide appropriate hygiene as needed including keeping skin clean and dry  - Evaluate need for skin moisturizer/barrier cream  - Collaborate with interdisciplinary team (i e  Nutrition, Rehabilitation, etc )   - Patient/family teaching  Outcome: Progressing     Problem: MUSCULOSKELETAL - ADULT  Goal: Maintain or return mobility to safest level of function  Description: INTERVENTIONS:  - Assess patient's ability to carry out ADLs; assess patient's baseline for ADL function and identify physical deficits which impact ability to perform ADLs (bathing, care of mouth/teeth, toileting, grooming, dressing, etc )  - Assess/evaluate cause of self-care deficits   - Assess range of motion  - Assess patient's mobility  - Assess patient's need for assistive devices and provide as appropriate  - Encourage maximum independence but intervene and supervise when necessary  - Involve family in performance of ADLs  - Assess for home care needs following discharge   - Consider OT consult to assist with ADL evaluation and planning for discharge  - Provide patient education as appropriate  Outcome: Progressing     Problem: INFECTION - ADULT  Goal: Absence or prevention of progression during hospitalization  Description: INTERVENTIONS:  - Assess and monitor for signs and symptoms of infection  - Monitor lab/diagnostic results  - Monitor all insertion sites, i e  indwelling lines, tubes, and drains  - Monitor endotracheal if appropriate and nasal secretions for changes in amount and color  - [place] appropriate cooling/warming therapies per order  - Administer medications as ordered  - Instruct and encourage patient and family to use good hand hygiene technique  - Identify and instruct in appropriate isolation precautions for identified infection/condition  Outcome: Progressing     Problem: SAFETY ADULT  Goal: Patient will remain free of falls  Description: INTERVENTIONS:  - Assess patient frequently for physical needs  -  Identify cognitive and physical deficits and behaviors that affect risk of falls  (confusion, hallucinations, weakness, swelling, injury, pain)  -  [place] fall precautions as indicated by assessment   (high fall risk)  - Educate patient/family on patient safety including physical limitations  - Instruct patient to call for assistance with activity based on assessment  - Modify environment to reduce risk of injury  - Consider OT/PT consult to assist with strengthening/mobility  Outcome: Progressing  Goal: Maintain or return to baseline ADL function  Description: INTERVENTIONS:  -  Assess patient's ability to carry out ADLs; assess patient's baseline for ADL function and identify physical deficits which impact ability to perform ADLs (bathing, care of mouth/teeth, toileting, grooming, dressing, etc )  - Assess/evaluate cause of self-care deficits   - Assess range of motion  - Assess patient's mobility; develop plan if impaired  - Assess patient's need for assistive devices and provide as appropriate  - Encourage maximum independence but intervene and supervise when necessary  - Involve family in performance of ADLs  - Assess for home care needs following discharge   - Consider OT consult to assist with ADL evaluation and planning for discharge  - Provide patient education as appropriate  Outcome: Progressing  Goal: Maintain or return mobility status to optimal level  Description: INTERVENTIONS:  - Assess patient's baseline mobility status (ambulation, transfers, stairs, etc )    - Identify cognitive and physical deficits and behaviors that affect mobility  - Identify mobility aids required to assist with transfers and/or ambulation (gait belt, sit-to-stand, lift, walker, cane, etc )  - [employer] fall precautions as indicated by assessment  - Record patient progress and toleration of activity level on Mobility SBAR; progress patient to next Phase/Stage  - Instruct patient to call for assistance with activity based on assessment  - Consider rehabilitation consult to assist with strengthening/weightbearing, etc   Outcome: Progressing     Problem: PAIN - ADULT  Goal: Verbalizes/displays adequate comfort level or baseline comfort level  Description: Interventions:  - Encourage patient to monitor pain and request assistance  - Assess pain using appropriate pain scale  - Administer analgesics based on type and severity of pain and evaluate response  - Implement non-pharmacological measures as appropriate and evaluate response  - Consider cultural and social influences on pain and pain management  - Notify physician/advanced practitioner if interventions unsuccessful or patient reports new pain  Outcome: Progressing     Problem: DISCHARGE PLANNING  Goal: Discharge to home or other facility with appropriate resources  Description: INTERVENTIONS:  - Identify barriers to discharge w/patient and caregiver  - Arrange for needed discharge resources and transportation as appropriate  - Identify discharge learning needs (meds, wound care, etc )  - Arrange for interpretive services to assist at discharge as needed  - Refer to Case Management Department for coordinating discharge planning if the patient needs post-hospital services based on physician/advanced practitioner order or complex needs related to functional status, cognitive ability, or social support system  Outcome: Progressing     Problem: Nutrition/Hydration-ADULT  Goal: Nutrient/Hydration intake appropriate for improving, restoring or maintaining nutritional needs  Description: Monitor and assess patient's nutrition/hydration status for malnutrition  Collaborate with interdisciplinary team and initiate plan and interventions as ordered  Monitor patient's weight and dietary intake as ordered or per policy  Utilize nutrition screening tool and intervene as necessary  Determine patient's food preferences and provide high-protein, high-caloric foods as appropriate       INTERVENTIONS:  - Monitor oral intake, urinary output, labs, and treatment plans  - Assess nutrition and hydration status and recommend course of action  - Evaluate amount of meals eaten  - Assist patient with eating if necessary   - Allow adequate time for meals  - Recommend/ encourage appropriate diets, oral nutritional supplements, and vitamin/mineral supplements  - Order, calculate, and assess calorie counts as needed  - Recommend, monitor, and adjust tube feedings and TPN/PPN based on assessed needs  - Assess need for intravenous fluids  - Provide specific nutrition/hydration education as appropriate  - Include patient/family/caregiver in decisions related to nutrition  Outcome: Progressing

## 2021-05-25 NOTE — PLAN OF CARE
Problem: Potential for Falls  Goal: Patient will remain free of falls  Description: INTERVENTIONS:  - Assess patient frequently for physical needs  -  Identify cognitive and physical deficits and behaviors that affect risk of falls  (confusion, hallucinations, weakness, swelling, injury, pain)  -  [place] fall precautions as indicated by assessment   (high fall risk)  - Educate patient/family on patient safety including physical limitations  - Instruct patient to call for assistance with activity based on assessment  - Modify environment to reduce risk of injury  - Consider OT/PT consult to assist with strengthening/mobility  5/25/2021 0252 by Kathy Angel RN  Outcome: Progressing  5/25/2021 0251 by Kathy Angel RN  Outcome: Progressing     Problem: Prexisting or High Potential for Compromised Skin Integrity  Goal: Skin integrity is maintained or improved  Description: INTERVENTIONS:  - Identify patients at risk for skin breakdown  - Assess and monitor skin integrity  - Assess and monitor nutrition and hydration status  - Monitor labs   - Assess for incontinence   - Turn and reposition patient  - Assist with mobility/ambulation  - Relieve pressure over bony prominences  - Avoid friction and shearing  - Provide appropriate hygiene as needed including keeping skin clean and dry  - Evaluate need for skin moisturizer/barrier cream  - Collaborate with interdisciplinary team   - Patient/family teaching  - Consider wound care consult   5/25/2021 0252 by Kathy Angel RN  Outcome: Progressing  5/25/2021 0251 by Kathy Angel RN  Outcome: Progressing     Problem: NEUROSENSORY - ADULT  Goal: Achieves stable or improved neurological status  Description: INTERVENTIONS  - Monitor and report changes in neurological status  - Monitor vital signs such as temperature, blood pressure, glucose, and any other labs ordered   - Initiate measures to prevent increased intracranial pressure  - Monitor for seizure activity and implement precautions if appropriate      5/25/2021 0252 by Fernanda Mena RN  Outcome: Progressing  5/25/2021 0251 by Fernanda Mena RN  Outcome: Progressing  Goal: Remains free of injury related to seizures activity  Description: INTERVENTIONS  - Maintain airway, patient safety  and administer oxygen as ordered  - Monitor patient for seizure activity, document and report duration and description of seizure to physician/advanced practitioner  - If seizure occurs,  ensure patient safety during seizure  - Reorient patient post seizure  - Seizure pads on all 4 side rails  - Instruct patient/family to notify RN of any seizure activity including if an aura is experienced  - Instruct patient/family to call for assistance with activity based on nursing assessment  - Administer anti-seizure medications if ordered    5/25/2021 0252 by Fernanda Mena RN  Outcome: Progressing  5/25/2021 0251 by Fernanda Mena RN  Outcome: Progressing  Goal: Achieves maximal functionality and self care  Description: INTERVENTIONS  - Monitor swallowing and airway patency with patient fatigue and changes in neurological status  - Encourage and assist patient to increase activity and self care     - Encourage visually impaired, hearing impaired and aphasic patients to use assistive/communication devices  5/25/2021 0252 by Fernanda Mena RN  Outcome: Progressing  5/25/2021 0251 by Fernanda Mena RN  Outcome: Progressing     Problem: SKIN/TISSUE INTEGRITY - ADULT  Goal: Skin integrity remains intact  Description: INTERVENTIONS  - Identify patients at risk for skin breakdown  - Assess and monitor skin integrity  - Assess and monitor nutrition and hydration status  - Monitor labs (i e  albumin)  - Assess for incontinence   - Turn and reposition patient  - Assist with mobility/ambulation  - Relieve pressure over bony prominences  - Avoid friction and shearing  - Provide appropriate hygiene as needed including keeping skin clean and dry  - Evaluate need for skin moisturizer/barrier cream  - Collaborate with interdisciplinary team (i e  Nutrition, Rehabilitation, etc )   - Patient/family teaching  5/25/2021 0252 by Norberto Maynard RN  Outcome: Progressing  5/25/2021 0251 by Norberto Maynard RN  Outcome: Progressing  Goal: Incision(s), wounds(s) or drain site(s) healing without S/S of infection  Description: INTERVENTIONS  - Assess and document risk factors for skin impairment   - Assess and document dressing, incision, wound bed, drain sites and surrounding tissue  - Consider nutrition services referral as needed  - Oral mucous membranes remain intact  - Provide patient/ family education  5/25/2021 0252 by Norberto Maynard RN  Outcome: Progressing  5/25/2021 0251 by Norberto Maynard RN  Outcome: Progressing  Goal: Oral mucous membranes remain intact  Description: INTERVENTIONS  - Assess oral mucosa and hygiene practices  - Implement preventative oral hygiene regimen  - Implement oral medicated treatments as ordered  - Initiate Nutrition services referral as needed  5/25/2021 0252 by Norberto Maynard RN  Outcome: Progressing  5/25/2021 0251 by Norberto Maynard RN  Outcome: Progressing     Problem: MUSCULOSKELETAL - ADULT  Goal: Maintain or return mobility to safest level of function  Description: INTERVENTIONS:  - Assess patient's ability to carry out ADLs; assess patient's baseline for ADL function and identify physical deficits which impact ability to perform ADLs (bathing, care of mouth/teeth, toileting, grooming, dressing, etc )  - Assess/evaluate cause of self-care deficits   - Assess range of motion  - Assess patient's mobility  - Assess patient's need for assistive devices and provide as appropriate  - Encourage maximum independence but intervene and supervise when necessary  - Involve family in performance of ADLs  - Assess for home care needs following discharge   - Consider OT consult to assist with ADL evaluation and planning for discharge  - Provide patient education as appropriate  5/25/2021 0252 by Gareth Sharif RN  Outcome: Progressing  5/25/2021 0251 by Gareth Sharif RN  Outcome: Progressing  Goal: Maintain proper alignment of affected body part  Description: INTERVENTIONS:  - Support, maintain and protect limb and body alignment  - Provide patient/ family with appropriate education  5/25/2021 0252 by Gareth Sharif RN  Outcome: Progressing  5/25/2021 0251 by Gareth Sharif RN  Outcome: Progressing     Problem: INFECTION - ADULT  Goal: Absence or prevention of progression during hospitalization  Description: INTERVENTIONS:  - Assess and monitor for signs and symptoms of infection  - Monitor lab/diagnostic results  - Monitor all insertion sites, i e  indwelling lines, tubes, and drains  - Monitor endotracheal if appropriate and nasal secretions for changes in amount and color  - [place] appropriate cooling/warming therapies per order  - Administer medications as ordered  - Instruct and encourage patient and family to use good hand hygiene technique  - Identify and instruct in appropriate isolation precautions for identified infection/condition  5/25/2021 0252 by Gareth Sharif RN  Outcome: Progressing  5/25/2021 0251 by Gareth Sharif RN  Outcome: Progressing     Problem: SAFETY ADULT  Goal: Patient will remain free of falls  Description: INTERVENTIONS:  - Assess patient frequently for physical needs  -  Identify cognitive and physical deficits and behaviors that affect risk of falls  (confusion, hallucinations, weakness, swelling, injury, pain)  -  [place] fall precautions as indicated by assessment   (high fall risk)  - Educate patient/family on patient safety including physical limitations  - Instruct patient to call for assistance with activity based on assessment  - Modify environment to reduce risk of injury  - Consider OT/PT consult to assist with strengthening/mobility  5/25/2021 0252 by Gareth Sharif RN  Outcome: Progressing  5/25/2021 0251 by Ember Nance KOURTNEY Silva  Outcome: Progressing  Goal: Maintain or return to baseline ADL function  Description: INTERVENTIONS:  -  Assess patient's ability to carry out ADLs; assess patient's baseline for ADL function and identify physical deficits which impact ability to perform ADLs (bathing, care of mouth/teeth, toileting, grooming, dressing, etc )  - Assess/evaluate cause of self-care deficits   - Assess range of motion  - Assess patient's mobility; develop plan if impaired  - Assess patient's need for assistive devices and provide as appropriate  - Encourage maximum independence but intervene and supervise when necessary  - Involve family in performance of ADLs  - Assess for home care needs following discharge   - Consider OT consult to assist with ADL evaluation and planning for discharge  - Provide patient education as appropriate  5/25/2021 0252 by Amada Casey RN  Outcome: Progressing  5/25/2021 0251 by Amada Casey RN  Outcome: Progressing  Goal: Maintain or return mobility status to optimal level  Description: INTERVENTIONS:  - Assess patient's baseline mobility status (ambulation, transfers, stairs, etc )    - Identify cognitive and physical deficits and behaviors that affect mobility  - Identify mobility aids required to assist with transfers and/or ambulation (gait belt, sit-to-stand, lift, walker, cane, etc )  - [place] fall precautions as indicated by assessment  - Record patient progress and toleration of activity level on Mobility SBAR; progress patient to next Phase/Stage  - Instruct patient to call for assistance with activity based on assessment  - Consider rehabilitation consult to assist with strengthening/weightbearing, etc   5/25/2021 0252 by Amada Casey RN  Outcome: Progressing  5/25/2021 0251 by Amada Casey RN  Outcome: Progressing

## 2021-05-25 NOTE — CONSULTS
Liban Callejas [place] 73 y o  female MRN: [id number]  Unit/Bed#: 406-01      Chief Complaint:   left knee pain    HPI:   73 y  o female complaining of left knee pain  She underwent left total knee arthroplasty by Dr Gavin Homans 5/20/21, 5 days ago  She went from the hospital to the 5th floor rehab  In rehab she was noted to have mental status changes was sent back to the hospital for evaluation ER and was admitted under medicine service  She is unsure whether she fell  Orthopedics has been asked to evaluate the patient due to recent surgery and fluid-filled bullae  Patient is not the best historian at current      Review Of Systems:   · Skin: Normal  · Neuro: See HPI  · Musculoskeletal: See HPI  · 14 point review of systems negative except as stated above     Past Medical History:   Past Medical History:   Diagnosis Date    Anxiety     Arthritis     Asthma     Breast cancer ([address] )     rt mastectomy 2005    Cancer [facility])     breast right    Cataract     CHF (congestive heart failure) (HCC)     Coronary artery disease     Depression     GERD (gastroesophageal reflux disease)     High cholesterol     History of chemotherapy     2005 rt breast cancer    History of transfusion     Hypertension     IBS (irritable bowel syndrome)     Irritable bowel syndrome (IBS) 5/31/2016    Kidney stone     Lymphedema of right arm     Obesity     PONV (postoperative nausea and vomiting)     Post-menopausal     Psychiatric disorder     Renal disorder     Vitamin D deficiency      Past Surgical History:   Past Surgical History:   Procedure Laterality Date    BREAST SURGERY      CATARACT EXTRACTION Bilateral     CATARACT EXTRACTION, BILATERAL      CHOLECYSTECTOMY      COLONOSCOPY N/A 5/31/2016    Procedure: COLONOSCOPY;  Surgeon: Hussain Armando MD;  [facility]: [facility];  Service:    Serena Dey GALLBLADDER SURGERY      HYSTERECTOMY      Total    KNEE ARTHROSCOPY      KNEE SURGERY      arthroscopy    MASTECTOMY Right rt breast mastectomy 2005    [place] TOTAL KNEE ARTHROPLASTY Left 5/20/2021    Procedure: ARTHROPLASTY KNEE TOTAL;  Surgeon: Livier Ramos MD;  Location: [address] [place], [place] [address] MAIN OR;  Service: Orthopedics    TONSILLECTOMY AND ADENOIDECTOMY       Family History:  Family history reviewed and non-contributory  Family History   Adopted: Yes   Problem Relation Age of Onset    Diabetes Mother     Heart disease Mother     Mental illness Mother     Depression Mother     Heart disease Brother     Breast cancer Maternal Aunt     Breast cancer Maternal Aunt     Breast cancer Maternal Aunt     Breast cancer Maternal Aunt     Breast cancer Maternal Aunt     No Known Problems Father     No Known Problems Sister     No Known Problems Daughter     Breast cancer Maternal Grandmother     No Known Problems Sister     No Known Problems Sister     No Known Problems Sister      Social History:  Social History     Socioeconomic History    Marital status:      Spouse name: None    Number of children: None    Years of education: None    Highest education level: None   Occupational History    Occupation: Retired   Social Needs    Financial resource strain: None    Food insecurity     Worry: None     Inability: None    Transportation needs     Medical: None     Non-medical: None   Tobacco Use    Smoking status: Never Smoker    Smokeless tobacco: Never Used   Substance and Sexual Activity    Alcohol use: Never     Frequency: Monthly or less     Comment: socially    Drug use: Never    Sexual activity: Not Currently   Lifestyle    Physical activity     Days per week: None     Minutes per session: None    Stress: None   Relationships    Social connections     Talks on phone: None     Gets together: None     Attends [employer] service: None     Active member of club or organization: None     Attends meetings of clubs or organizations: None     Relationship status: None    Intimate partner violence     Fear of current or ex partner: None     Emotionally abused: None     Physically abused: None     Forced sexual activity: None   Other Topics Concern    None   Social History Narrative    Always uses seat belt    Occasional caffeine consumption         Retired    Lives with adult daughter     Allergies:    Allergies   Allergen Reactions    Ibuprofen Nausea Only, Rash, Dizziness and Syncope    Latex Dermatitis     Labs:  0   Lab Value Date/Time    HCT 30 4 (L) 05/25/2021 0420    HCT 32 4 (L) 05/24/2021 1354    HCT 29 3 (L) 05/23/2021 0520    HCT 45 3 12/02/2015 1051    HGB 9 6 (L) 05/25/2021 0420    HGB 10 5 (L) 05/24/2021 1354    HGB 10 1 (L) 05/23/2021 0520    HGB 15 2 12/02/2015 1051    INR 1 01 04/21/2021 1035    WBC 7 40 05/25/2021 0420    WBC 7 59 05/24/2021 1354    WBC 6 80 05/23/2021 0520    WBC 5 02 12/02/2015 1051    ESR 15 12/02/2015 1051    CRP 4 7 (H) 04/21/2021 1035     Meds:    Current Facility-Administered Medications:     acetaminophen (TYLENOL) tablet 650 mg, 650 mg, Oral, Q6H PRN, Juancho Li PA-C    albuterol inhalation solution 2 5 mg, 2 5 mg, Nebulization, Q4H PRN, Alena Dunn MD    ascorbic acid (VITAMIN C) tablet 500 mg, 500 mg, Oral, Daily, Juancho Li PA-C, 500 mg at 05/25/21 0936    atorvastatin (LIPITOR) tablet 20 mg, 20 mg, Oral, QPM, Juancho Li PA-C, 20 mg at 05/24/21 2046    buPROPion (WELLBUTRIN XL) 24 hr tablet 300 mg, 300 mg, Oral, Daily, Araceli Ruth PA-C    butalbital-acetaminophen-caffeine (FIORICET,ESGIC) -40 mg per tablet 1 tablet, 1 tablet, Oral, TID PRN, Juancho Li PA-C    dicyclomine (BENTYL) capsule 10 mg, 10 mg, Oral, Daily, Juancho Li PA-C, 10 mg at 05/25/21 0936    docusate sodium (COLACE) capsule 100 mg, 100 mg, Oral, BID, Juancho Li PA-C, 100 mg at 05/25/21 0936    DULoxetine (CYMBALTA) delayed release capsule 60 mg, 60 mg, Oral, BID, Araceli Ruth PA-C    enoxaparin (LOVENOX) subcutaneous injection 40 mg, 40 mg, Subcutaneous, BID, Juancho Li PA-C, 40 mg at 05/25/21 0935    ferrous sulfate tablet 325 mg, 325 mg, Oral, BID With Meals, Juancho Li PA-C, 325 mg at 67/96/51 5432    folic acid (FOLVITE) tablet 1 mg, 1 mg, Oral, Daily, Juancho Li PA-C, 1 mg at 05/25/21 1583    gabapentin (NEURONTIN) capsule 300 mg, 300 mg, Oral, BID, Araceli Ruth PA-C    loratadine (CLARITIN) tablet 10 mg, 10 mg, Oral, Daily, Juancho Li PA-C, 10 mg at 05/25/21 0935    montelukast (SINGULAIR) tablet 10 mg, 10 mg, Oral, HS, Juancho Li PA-C, 10 mg at 05/24/21 2103    ondansetron (ZOFRAN) injection 4 mg, 4 mg, Intravenous, Q6H PRN, Juancho Li PA-C    oxyCODONE (ROXICODONE) IR tablet 2 5 mg, 2 5 mg, Oral, Q6H PRN **OR** oxyCODONE (ROXICODONE) IR tablet 5 mg, 5 mg, Oral, Q6H PRN, Araceli Ruth PA-C, 5 mg at 05/25/21 1056    Insert peripheral IV, , , Once **AND** sodium chloride (PF) 0 9 % injection 3 mL, 3 mL, Intravenous, Q1H PRN, Juancho Li PA-C    tamsulosin (FLOMAX) capsule 0 4 mg, 0 4 mg, Oral, Daily With Dinner, Juancho Li PA-C    Blood Culture:   Lab Results   Component Value Date    BLOODCX Received in Microbiology Lab  Culture in Progress  05/24/2021    BLOODCX Received in Microbiology Lab  Culture in Progress  05/24/2021     Wound Culture:   No results found for: WOUNDCULT    Ins and Outs:  I/O last 24 hours: In: 706 3 [I V :706 3]  Out: 1500 [Urine:1500]    Physical Exam:   /92 (BP Location: Left arm)   Pulse (!) 114   Temp 99 2 °F (37 3 °C) (Oral)   Resp 18   Ht 4' 9" (1 448 m)   Wt 90 9 kg (200 lb 6 4 oz)   LMP  (LMP Unknown)   SpO2 90%   BMI 43 37 kg/m²   Gen: Alert and oriented to person, place, time  HEENT: EOMI, eyes clear, moist mucus membranes, hearing intact  Respiratory: Bilateral chest rise  No audible wheezing found  Cardiovascular: Regular Rate and Rhythm  Abdomen: soft nontender/nondistended  Musculoskeletal: left lower extremity  · Skin intact with fluid bullae secondary to soft tissue swelling    There was an organized clot extending out the distal aspect of the incision which was removed with alcohol pads  There is no obvious disruption or dehiscence of the incision with Steri-Strips in place  There was saturation distal aspect of the incision time likely ruptured bullae  For multiple bullae on the anterior half of the leg distal to the knee  There is no lymphangitic streaking  There is no calf pain  The thigh is soft nontender  There is expected tenderness about the knee joint  · Positive postop mild effusion  · Can perform straight leg raise, slightly  · Sensation intact L3-S1  · 4-/5 strength to hip flexion/extension, knee flexion/extension, ankle dorsi/plantar flexion, EHL/FHL  · 2+ DP pulse    Radiology:    None    Assessment:  73 y o  female with left total knee arthroplasty 5 days ago with significant fluid filled swelling blisters and recent mental status changes making the patient's history unreliable  Plan:   · Weight bearing as tolerated  left lower extremity but must be in knee immobilizer for ambulation  · PT  · Pain control elevation the leg with ice to the knee as needed  · Pain meds as needed  · Continue anticoagulation, current Lovenox  · Attending surgeon I will see the patient tomorrow and case was discussed with her earlier today  · We will keep 0 form gauze and bulky dressings an Ace wrap in place left knee  · Body mass index is 43 37 kg/m²  moderately obese  Recommend behavior modifications    · Dispo: Ortho will follow    Jose Trevino PA-C

## 2021-05-25 NOTE — SPEECH THERAPY NOTE
Speech-Language Pathology Bedside Swallow Evaluation    Patient Name: Marino NICHOLS Date: 5/25/2021     Problem List  Principal Problem:    Acute metabolic encephalopathy  Active Problems:    Mild intermittent asthma without complication    Essential hypertension    Vitamin D deficiency    Recurrent major depressive disorder, in partial remission (HCC)    Sinus tachycardia    S/P total knee arthroplasty, left    Dermatitis      Past Medical History  Past Medical History:   Diagnosis Date    Anxiety     Arthritis     Asthma     Breast cancer ([facility] )     rt mastectomy 2005    Cancer [facility])     breast right    Cataract     CHF (congestive heart failure) ([facility] )     Coronary artery disease     Depression     GERD (gastroesophageal reflux disease)     High cholesterol     History of chemotherapy     2005 rt breast cancer    History of transfusion     Hypertension     IBS (irritable bowel syndrome)     Irritable bowel syndrome (IBS) 5/31/2016    Kidney stone     Lymphedema of right arm     Obesity     PONV (postoperative nausea and vomiting)     Post-menopausal     Psychiatric disorder     Renal disorder     Vitamin D deficiency        Past Surgical History  Past Surgical History:   Procedure Laterality Date    BREAST SURGERY      CATARACT EXTRACTION Bilateral     CATARACT EXTRACTION, BILATERAL      CHOLECYSTECTOMY      COLONOSCOPY N/A 5/31/2016    Procedure: COLONOSCOPY;  Surgeon: Joanne Hedrick MD;  [facility];  Service:    Maritza Alvarado GALLBLADDER SURGERY      HYSTERECTOMY      Total    KNEE ARTHROSCOPY      KNEE SURGERY      arthroscopy    MASTECTOMY Right     rt breast mastectomy 2005    [place] TOTAL KNEE ARTHROPLASTY Left 5/20/2021    Procedure: ARTHROPLASTY KNEE TOTAL;  Surgeon: Mattie Sen MD;  Location: [address] [place] [address] MAIN OR;  Service: Orthopedics    TONSILLECTOMY AND ADENOIDECTOMY         Summary  Pt presented with s/s suggestive of mild oral dysphagia and suspected WFL pharyngeal swallow  Pt is edentulous, wears dentures to eat but does not have them with her  Shw was seen for evaluation during lunch, and is able to cut foods and masticate thoroughly prior to swallow  Pt prefers to remain on regular diet and cut foods herself rather than have foods cut for her, which is reasonable  ST will f/u briefly for tolerance  Risk/s for Aspiration: low    Recommended Diet: regular diet and thin liquids   Recommended Form of Meds: whole with liquid   Aspiration precautions and swallowing strategies: upright posture      Current Medical Status per H&P 5/24/21  Marcia Mitchell is a 73 y o  female who presents to ER acute rehab for evaluation of change in mental status  Patient is status post total left knee replacement on 05/20/2021 and was admitted to 5th floor SNF acute rehab  Patient was also noted to be tachycardic with unknown etiology at this time  Upon arrival to the emergency room patient was noted to have large fluid-filled bullae close to site of surgical wound  She was still disoriented upon arrival to the ER she was able to endorse that she still has some pain to her left knee  No reports of chest pain, shortness of breath, fever, chills, cough, nausea, vomiting, diarrhea, abdominal pain  Labs completed in the ER with results as shown below  CT head and chest completed with results as shown below  At bedside patient is awake with intermittent periods of confusion no acute distress  She she follows commands  She was unable to perform finger-to-nose during neuro exam  Patient is being admitted on inpatient status med surg level care for further work up and management of Altered mental status, Tachycardia    Current Precautions: Fall     Special Studies:  CT head 5/24/21 IMPRESSION:  No acute intracranial abnormality  CXR 5/24/21 IMPRESSION:  Mild pulmonary venous congestion      Social/Education/Vocational Hx:  Pt lives with family    Swallow Information   Current Risks for Dysphagia & Aspiration: AMS  Current Diet: regular diet and thin liquids   Baseline Diet: regular diet and thin liquids      Baseline Assessment   Behavior/Cognition: alert  Speech/Language Status: able to participate in conversation and able to follow commands  Patient Positioning: upright in bed  Pain Status/Interventions/Response to Interventions: No report of or nonverbal indications of pain  Swallow Mechanism Exam  Facial: symmetrical  Labial: WFL  Lingual: WFL  Velum: symmetrical  Mandible: adequate ROM  Dentition: edentulous  Vocal quality:clear/adequate   Volitional Cough: strong/productive   Respiratory Status: on RA       Consistencies Assessed and Performance   Consistencies Administered: thin liquids, puree and regular solids    Oral Stage: mild  Mastication was slow/prolonged but adequate with the materials administered today  Bolus formation and transfer were functional with no significant oral residue noted  No overt s/s reduced oral control  Pharyngeal Stage: WFL  Swallow Mechanics: Swallowing initiation appeared prompt  Laryngeal rise was palpated and judged to be within functional limits  No coughing, throat clearing, change in vocal quality or respiratory status noted today  Esophageal Concerns: none reported      Summary and Recommendations (see above)    Results Reviewed with: patient and RN     Treatment Recommended: yes, brief f/u     Frequency of treatment: 1-2x f/u    Patient Stated Goal: to remain on regular diet    Dysphagia LTG  -Patient will demonstrate safe and effective oral intake (without overt s/s significant oral/pharyngeal dysphagia including s/s penetration or aspiration) for the highest appropriate diet level  Short Term Goals:  -Pt will tolerate regular diet and thin liquid with no significant s/s oral or pharyngeal dysphagia across 1-3 diagnostic sessions

## 2021-05-25 NOTE — PROGRESS NOTES
ST recommending regular diet/thin liquids per 5/25 note  Fair intake at this time consuming 50% of meals  Pt noted previously to drink Boost daily at home  Given recent surgery and inadequate oral intake, will order ensure enlive chocolate BID  Maintain regular diet at this time  Diet ed not appropriate at this time

## 2021-05-25 NOTE — ASSESSMENT & PLAN NOTE
Presented to the emergency room for evaluation of change in mental status  She was just admitted to skilled nursing facility for STR S/P total left knee Arthroplasty and found to be confused and with hallucinations upon arrival     Suspect etiology is due to polypharmacy - on oxycodone 10mg tablets every 6 hours in addition to underlying psychiatric medications  CT head- No acute intracranial abnormality  No focal neurologic deficits  Will continue neurologic checks and close observation  Restarted narcotics at lower dosing protocol  Reduced Neurontin dosing from TID to BID dosing    Neurology consult pending

## 2021-05-25 NOTE — ASSESSMENT & PLAN NOTE
Currently stable  Held PTA psych medication overnight given AMS and reports of Hallucinations  Will reoder cymbalta, wellbutrin at previous doses  Will restart trazodone at 25mg dose (usual dose is 50mg)

## 2021-05-25 NOTE — PHYSICAL THERAPY NOTE
Physical Therapy Evaluation    Patient Name: Ceci Victor    KMIJG'R Date: 5/25/2021     Problem List  Principal Problem:    Acute metabolic encephalopathy  Active Problems:    Mild intermittent asthma without complication    Essential hypertension    Vitamin D deficiency    Recurrent major depressive disorder, in partial remission (HCC)    Sinus tachycardia    S/P total knee arthroplasty, left    Dermatitis       Past Medical History  Past Medical History:   Diagnosis Date    Anxiety     Arthritis     Asthma     [facility] ([facility] )     rt mastectomy 2005    Cancer [facility])     breast right    Cataract     CHF (congestive heart failure) ([facility] )     Coronary artery disease     Depression     GERD (gastroesophageal reflux disease)     High cholesterol     History of chemotherapy     2005 rt breast cancer    History of transfusion     Hypertension     IBS (irritable bowel syndrome)     Irritable bowel syndrome (IBS) 5/31/2016    Kidney stone     Lymphedema of right arm     Obesity     PONV (postoperative nausea and vomiting)     Post-menopausal     Psychiatric disorder     Renal disorder     Vitamin D deficiency         Past Surgical History  Past Surgical History:   Procedure Laterality Date    BREAST SURGERY      CATARACT EXTRACTION Bilateral     CATARACT EXTRACTION, BILATERAL      CHOLECYSTECTOMY      COLONOSCOPY N/A 5/31/2016    Procedure: COLONOSCOPY;  Surgeon: Boyd Rodriguez MD;  [facility];  Service:    [facility] GALLBLADDER SURGERY      HYSTERECTOMY      Total    KNEE ARTHROSCOPY      KNEE SURGERY      arthroscopy    MASTECTOMY Right     rt breast mastectomy 2005    [place] TOTAL KNEE ARTHROPLASTY Left 5/20/2021    Procedure: ARTHROPLASTY KNEE TOTAL;  Surgeon: Nadeen Gowers, MD;  Location: [address] [place] [address] MAIN OR;  Service: Orthopedics    TONSILLECTOMY AND ADENOIDECTOMY          05/25/21 0958   PT Last Visit   PT Visit Date 05/25/21   Note Type Note type Evaluation   Pain Assessment   Pain Assessment Tool 0-10   Pain Score 8   Pain Location/Orientation Orientation: Left; Location: Knee   Home Living   Type of [address] Two [address]   Bathroom Shower/Tub Walk-in shower  (Simultaneous filing  User may not have seen previous data )   Bathroom Toilet Raised   Bathroom Equipment Grab bars in shower; Shower chair   [address] Walker;Cane;Stair glide   Additional Comments pt reports use of rollator for ambulation    Prior Function   Level of [place] Needs assistance with IADLs; Needs assistance with ADLs and functional mobility   Lives With Family   Receives Help From Family   ADL Assistance Needs assistance   IADLs Needs assistance   Falls in the last 6 months 1 to 4   Vocational Retired   Comments family drives   Restrictions/Precautions   [place] Bearing Precautions Per Order Yes   LLE Weight Bearing Per Order WBAT   Other Precautions Chair Alarm;WBS;Fall Risk;Pain   Cognition   Overall Cognitive Status Impaired   Arousal/Participation Alert   Orientation Level Oriented to person;Oriented to place   Following Commands Follows one step commands without difficulty   RLE Assessment   RLE Assessment X  (4/5 gross strength)   LLE Assessment   LLE Assessment X  (decreased L knee ROM, 3-/5 strength grossly)   Bed Mobility   Additional Comments pt seated in chair at beginning/end of session    Transfers   Sit to Stand 4  Minimal assistance   Additional items Assist x 2;Armrests; Other  (RW)   Stand to Sit 4  Minimal assistance   Additional items Assist x 2;Armrests; Other  (RW)   Stand pivot 4  Minimal assistance   Additional items Assist x 2;Armrests; Other  (RW)   Toilet transfer 4  Minimal assistance   Additional items Assist x 2;Other;Commode  (RW)   Additional Comments pt notes increased L knee pain with transfers      Ambulation/Elevation   Gait pattern Not appropriate   Balance   Static Sitting Good   Dynamic Sitting Fair   Static Standing Fair   Dynamic Standing Fair -   Ambulatory Zero   Endurance Deficit   Endurance Deficit Yes   Endurance Deficit Description Pt reports fatigue and dizziness during transfers   Activity Tolerance   Activity Tolerance Patient limited by pain; Patient limited by fatigue   Assessment   Prognosis Fair   Problem List Decreased strength;Decreased range of motion;Decreased endurance; Impaired balance;Decreased mobility; Decreased cognition;Orthopedic restrictions;Pain   Assessment Patient is a 73 y o  female evaluated by Physical Therapy s/p admit to [facility] on 5/24/2021 with admitting diagnosis of: Delirium, Altered mental status and principal problem of: Acute metabolic encephalopathy  PT was consulted to assess patient's functional mobility and discharge needs  Ordered are PT Evaluation and treatment with activity level of: up with assistance  Comorbidities affecting patient's physical performance at time of assessment include: GERD, asthma, arthritis, CHF, CAD  Personal factors affecting the patient at time of IE include: lives in 2 story home, ambulating with assistive device, step(s) to enter home, inability to navigate community distances, inability to navigate level surfaces without external assistance, unable to perform dynamic tasks in community, impaired cognition, inability/difficulty performing IADLs and inability/difficulty performing ADLs  Please locate objective findings from PT assessment regarding body systems outlined above  Upon evaluation, pt completed sit <-> stand and commode transfer with MinAx2, RW, and minimal cuing  Pt unable to ambulate after transfer due to increased L knee pain and fatigue  Pt notes minimal to moderate fatigue during transfer  She did not experience LOB during session  The patient's AM-PAC Basic Mobility Inpatient Short Form Raw Score is 12, Standardized Score is 32 23   A standardized score less than 42 9 suggests the patient may benefit from discharge to post-acute rehabilitation services  Please also refer to the recommendation of the Physical Therapist for safe discharge planning  Pt will benefit from continued PT intervention during LOS to address current deficits, increase LOF, and facilitate safe d/c to next level of care when medically appropriate  D/c recommendation at this time is return to facility with rehabilitation services  Goals   Patient Goals to reduce knee pain   Short Term Goal #1 pt will perform BLE strengthening exercises to improve functional mobility   LTG Expiration Date 06/08/21   Long Term Goal #1 pt will ambulate 50' on level surface with minAx1 and RW with good safety awareness   Long Term Goal #2 pt will perform functional transfers and bed mobility with SUP while maintaining balance   Plan   Treatment/Interventions LE strengthening/ROM; Functional transfer training;Elevations; Therapeutic exercise; Endurance training;Gait training;Bed mobility   PT Frequency Other (Comment)  (BID M-F; 1x/day Sat & Sun)   Recommendation   PT Discharge Recommendation Return to facility with rehabilitation services   AM-PAC Basic Mobility Inpatient   Turning in Bed Without Bedrails 2   Lying on Back to Sitting on Edge of Flat Bed 2   Moving Bed to Chair 3   Standing Up From Chair 2   Walk in Room 1  (Simultaneous filing  User may not have seen previous data )   Climb 3-5 Stairs 1   Basic Mobility Inpatient Raw Score 11   Basic Mobility Standardized Score 30 25   Pt ends session seated in recliner with all needs in reach

## 2021-05-25 NOTE — ASSESSMENT & PLAN NOTE
She is S/P left knee arthroplasty on 5/20/2021  She has notable fluid-filled bullae at surgical site  Continue PRN pain medication  Daily wound care  Orthopedic surgery consult   Supportive care

## 2021-05-25 NOTE — ASSESSMENT & PLAN NOTE
She has ongoing episodes postop sinus tachycardia  Suspect this is multifactorial in the setting of pain, mild anxiety, dehydration  CTA was negative for acute PE on 5/22  EKG shows-sinus tachycardia at rate of 111 bpm     Patient received lana IVF hydration overnight, will discontinue further IV fluids at this time given chest Xray findings  Control pain adequately  Continue Telemetry monitoring  Check magnesium level  Echocardiogram - EF 60%, mild LVH, moderate AS, otherwise unremarkable

## 2021-05-25 NOTE — OCCUPATIONAL THERAPY NOTE
Occupational Therapy Evaluation     Patient Name: Joel Reyes  BFRGS'K Date: 5/25/2021  Problem List  Principal Problem:    Acute metabolic encephalopathy  Active Problems:    Mild intermittent asthma without complication    Essential hypertension    Vitamin D deficiency    Recurrent major depressive disorder, in partial remission (HCC)    Sinus tachycardia    S/P total knee arthroplasty, left    Dermatitis    Past Medical History  Past Medical History:   Diagnosis Date    Anxiety     Arthritis     Asthma     Breast cancer ([facility] )     rt mastectomy 2005    Cancer [facility])     breast right    Cataract     CHF (congestive heart failure) ([facility] )     Coronary artery disease     Depression     GERD (gastroesophageal reflux disease)     High cholesterol     History of chemotherapy     2005 rt breast cancer    History of transfusion     Hypertension     IBS (irritable bowel syndrome)     Irritable bowel syndrome (IBS) 5/31/2016    Kidney stone     Lymphedema of right arm     Obesity     PONV (postoperative nausea and vomiting)     Post-menopausal     Psychiatric disorder     Renal disorder     Vitamin D deficiency      Past Surgical History  Past Surgical History:   Procedure Laterality Date    BREAST SURGERY      CATARACT EXTRACTION Bilateral     CATARACT EXTRACTION, BILATERAL      CHOLECYSTECTOMY      COLONOSCOPY N/A 5/31/2016    Procedure: COLONOSCOPY;  Surgeon: Tiffany Hill MD;  Location: [facility] MAIN OR;  Service:    Erin Campos GALLBLADDER SURGERY      HYSTERECTOMY      Total    KNEE ARTHROSCOPY      KNEE SURGERY      arthroscopy    MASTECTOMY Right     rt breast mastectomy 2005    [place] TOTAL KNEE ARTHROPLASTY Left 5/20/2021    Procedure: ARTHROPLASTY KNEE TOTAL;  Surgeon: Rajwinder Moore MD;  Location: [address] [place] [address] MAIN OR;  Service: Orthopedics    TONSILLECTOMY AND ADENOIDECTOMY               05/25/21 0956   OT Last Visit   OT Visit Date 05/25/21   Note Type   Note type Evaluation   Restrictions/Precautions Weight Bearing Precautions Per Order Yes   LLE Weight Bearing Per Order WBAT   Other Precautions WBS; Chair Alarm;Multiple lines; Fall Risk;Pain   Pain Assessment   Pain Assessment Tool 0-10   Pain Score 8   Pain Location/Orientation Orientation: Left; Location: Leg;Location: Knee   Home Living   Type of [address] Two level   Bathroom Shower/Tub Walk-in shower   Bathroom Toilet Raised   Bathroom Equipment Built-in shower seat;Grab bars in [employer] [address] Walker;Cane  (rollator)   Additional Comments 2 ARTUR; stair guide to 2nd; pt reports use of rollator at baseline   Prior Function   Level of [place] Needs assistance with IADLs; Needs assistance with ADLs and functional mobility   Lives With Family;Daughter   Receives Help From Family   ADL Assistance Needs assistance   IADLs Needs assistance   Falls in the last 6 months 1 to 4   Vocational Retired   Comments Dtr drives   Psychosocial   Psychosocial (WDL) WDL   Subjective   Subjective "I have to use the bathroom"   ADL   Where Assessed Other (Comment)  (Commode)   LB Dressing Assistance 2  Maximal Assistance   LB Dressing Deficit Verbal cueing;Supervision/safety; Increased time to complete; Thread RLE into underwear; Thread LLE into underwear;Pull up over hips   Toileting Assistance  2  Maximal Assistance   Toileting Deficit Verbal cueing;Supervison/safety; Increased time to complete; Bedside commode;Clothing management up;Clothing management down   Additional Comments pt attempted donning pull-up; despite education on compensitory strategies pt unable to complete due to limiting distal funcitonal reach; pt unable to perform clothing management while standing due to instability requiring 2 hands on RW due to limiting weight through (L)LE   Bed Mobility   Additional Comments pt on RA; SPO2 WNL; pt seated in caro at start/end of session with all needs within reach   Transfers   Sit to Stand 4  Minimal assistance Additional items Assist x 2;Armrests; Increased time required;Verbal cues   Stand to Sit 4  Minimal assistance   Additional items Assist x 2;Armrests; Increased time required;Verbal cues   Toilet transfer 4  Minimal assistance   Additional items Assist x 2;Armrests; Increased time required;Verbal cues; Commode   Additional Comments RW used   Functional Mobility   Additional Comments due to increased c/o pain and fatigue functional mobility no completed this day   Balance   Static Sitting Good   Dynamic Sitting Fair   Static Standing Fair   Dynamic Standing Fair -   Activity Tolerance   Activity Tolerance Patient limited by fatigue;Patient limited by pain   RUE Assessment   RUE Assessment WFL   LUE Assessment   LUE Assessment WFL   Hand Function   Gross Motor Coordination Functional   Fine Motor Coordination Functional   Sensation   Light Touch No apparent deficits   Sharp/Dull No apparent deficits   Cognition   Overall Cognitive Status Impaired   Arousal/Participation Alert; Responsive; Cooperative   Attention Attends with cues to redirect   Orientation Level Oriented to person;Oriented to place; Disoriented to time;Disoriented to situation   Memory Decreased recall of recent events   Following Commands Follows one step commands without difficulty   Assessment   Limitation Decreased ADL status; Decreased UE strength;Decreased Safe judgement during ADL;Decreased endurance;Decreased self-care trans;Decreased high-level ADLs   Assessment  Pt is a 73 y o  female seen for OT evaluation s/p admit to [facility] on 9/87[facility]3345 w/ Acute metabolic encephalopathy  Comorbidities affecting pt's functional performance at time of assessment include: CHF and Cancer, IBS, Asthma, Anxiety, HTN, GERD, Arthritis, CAD, Renal disorder   Personal factors affecting pt at time of IE include:steps to enter environment, difficulty performing ADLS, difficulty performing IADLS , limited insight into deficits, decreased initiation and engagement  and health management   Prior to admission, pt was (I) with ADLs and (A) IADLs using a rollator for functional mobility  Upon evaluation: Pt requires min-max (A) x1-2 for ADLs including transfers, toileting, LB dressing requiring the use of RW 2* the following deficits impacting occupational performance: weakness, decreased strength, decreased balance, decreased tolerance, impaired initiation, impaired sequencing, impaired problem solving, decreased safety awareness and increased pain  Pt to benefit from continued skilled OT tx while in the hospital to address deficits as defined above and maximize level of functional independence w ADL's and functional mobility  Occupational Performance areas to address include: grooming, bathing/shower, toilet hygiene, dressing, health maintenance, functional mobility, community mobility and clothing management  The patient's raw score on the AM-PAC Daily Activity inpatient short form is 16, standardized score is 35 96, less than 39 4  Patients at this level are likely to benefit from discharge to post-acute rehabilitation services  Please refer to the recommendation of the Occupational Therapist for safe discharge planning  Goals   Patient Goals to go home   Short Term Goal  pt will perform UB strengthening exercises   Long Term Goal #1 pt will demonstrate UB/LB bathing and grooming at (S) level   Long Term Goal #2 pt will demonstrate toileting transfers and hygine at a (S) level   Long Term Goal pt will demonstrate funcitonal mobility with RW at a (S) level with no LOB   Plan   Treatment Interventions ADL retraining;Functional transfer training;UE strengthening/ROM; Endurance training;Patient/family training; Compensatory technique education; Energy conservation; Activityengagement   Goal Expiration Date 06/08/21   OT Frequency 3-5x/wk   Recommendation   OT Discharge Recommendation Post acute rehabilitation services   [facility] Daily Activity Inpatient   Lower Body Dressing 2   Bathing 2 Toileting 2   Upper Body Dressing 2   Grooming 4   Eating 4   Daily Activity Raw Score 16   Daily Activity Standardized Score (Calc for Raw Score >=11) 35 96   AM-PAC Applied Cognition Inpatient   Following a Speech/Presentation 4   Understanding Ordinary Conversation 4   Taking Medications 4   Remembering Where Things Are Placed or Put Away 3   Remembering List of 4-5 Errands 2   Taking Care of Complicated Tasks 2   Applied Cognition Raw Score 19   Applied Cognition Standardized Score 39 77     Pt will benefit from continued OT services in order to maximize (I) c ADL performance, FM c RW, and improve overall endurance/strength required to complete functional tasks in preparation for d/c  Pt left seated in chair at end of session; all needs within reach; all lines intact

## 2021-05-26 LAB
ANION GAP SERPL CALCULATED.3IONS-SCNC: 10 MMOL/L (ref 4–13)
BASOPHILS # BLD MANUAL: 0 THOUSAND/UL (ref 0–0.1)
BASOPHILS NFR MAR MANUAL: 0 % (ref 0–1)
BUN SERPL-MCNC: 9 MG/DL (ref 5–25)
CALCIUM SERPL-MCNC: 7.4 MG/DL (ref 8.3–10.1)
CHLORIDE SERPL-SCNC: 107 MMOL/L (ref 100–108)
CO2 SERPL-SCNC: 23 MMOL/L (ref 21–32)
CREAT SERPL-MCNC: 0.55 MG/DL (ref 0.6–1.3)
EOSINOPHIL # BLD MANUAL: 0.07 THOUSAND/UL (ref 0–0.4)
EOSINOPHIL NFR BLD MANUAL: 1 % (ref 0–6)
ERYTHROCYTE [DISTWIDTH] IN BLOOD BY AUTOMATED COUNT: 12.2 % (ref 11.6–15.1)
GFR SERPL CREATININE-BSD FRML MDRD: 96 ML/MIN/1.73SQ M
GLUCOSE SERPL-MCNC: 121 MG/DL (ref 65–140)
HCT VFR BLD AUTO: 29 % (ref 34.8–46.1)
HGB BLD-MCNC: 9.3 G/DL (ref 11.5–15.4)
LYMPHOCYTES # BLD AUTO: 1.39 THOUSAND/UL (ref 0.6–4.47)
LYMPHOCYTES # BLD AUTO: 19 % (ref 14–44)
MCH RBC QN AUTO: 30.6 PG (ref 26.8–34.3)
MCHC RBC AUTO-ENTMCNC: 32.1 G/DL (ref 31.4–37.4)
MCV RBC AUTO: 95 FL (ref 82–98)
METAMYELOCYTES NFR BLD MANUAL: 2 % (ref 0–1)
MONOCYTES # BLD AUTO: 0.81 THOUSAND/UL (ref 0–1.22)
MONOCYTES NFR BLD: 11 % (ref 4–12)
MRSA NOSE QL CULT: NORMAL
MYELOCYTES NFR BLD MANUAL: 1 % (ref 0–1)
NEUTROPHILS # BLD MANUAL: 4.76 THOUSAND/UL (ref 1.85–7.62)
NEUTS SEG NFR BLD AUTO: 65 % (ref 43–75)
NRBC BLD AUTO-RTO: 0 /100 WBCS
PLATELET # BLD AUTO: 222 THOUSANDS/UL (ref 149–390)
PLATELET BLD QL SMEAR: ADEQUATE
PMV BLD AUTO: 9.6 FL (ref 8.9–12.7)
POTASSIUM SERPL-SCNC: 3.1 MMOL/L (ref 3.5–5.3)
RBC # BLD AUTO: 3.04 MILLION/UL (ref 3.81–5.12)
SODIUM SERPL-SCNC: 140 MMOL/L (ref 136–145)
TOTAL CELLS COUNTED SPEC: 100
VARIANT LYMPHS # BLD AUTO: 1 %
WBC # BLD AUTO: 7.32 THOUSAND/UL (ref 4.31–10.16)

## 2021-05-26 PROCEDURE — G0425 INPT/ED TELECONSULT30: HCPCS | Performed by: PSYCHIATRY & NEUROLOGY

## 2021-05-26 PROCEDURE — 99232 SBSQ HOSP IP/OBS MODERATE 35: CPT | Performed by: PHYSICIAN ASSISTANT

## 2021-05-26 PROCEDURE — 94760 N-INVAS EAR/PLS OXIMETRY 1: CPT

## 2021-05-26 PROCEDURE — 85007 BL SMEAR W/DIFF WBC COUNT: CPT | Performed by: PHYSICIAN ASSISTANT

## 2021-05-26 PROCEDURE — 85027 COMPLETE CBC AUTOMATED: CPT | Performed by: PHYSICIAN ASSISTANT

## 2021-05-26 PROCEDURE — 80048 BASIC METABOLIC PNL TOTAL CA: CPT | Performed by: PHYSICIAN ASSISTANT

## 2021-05-26 RX ORDER — POTASSIUM CHLORIDE 20 MEQ/1
40 TABLET, EXTENDED RELEASE ORAL ONCE
Status: COMPLETED | OUTPATIENT
Start: 2021-05-26 | End: 2021-05-26

## 2021-05-26 RX ADMIN — TAMSULOSIN HYDROCHLORIDE 0.4 MG: 0.4 CAPSULE ORAL at 16:51

## 2021-05-26 RX ADMIN — ATORVASTATIN CALCIUM 20 MG: 10 TABLET, FILM COATED ORAL at 17:50

## 2021-05-26 RX ADMIN — FERROUS SULFATE TAB 325 MG (65 MG ELEMENTAL FE) 325 MG: 325 (65 FE) TAB at 16:51

## 2021-05-26 RX ADMIN — DOCUSATE SODIUM 100 MG: 100 CAPSULE, LIQUID FILLED ORAL at 08:36

## 2021-05-26 RX ADMIN — OXYCODONE HYDROCHLORIDE AND ACETAMINOPHEN 500 MG: 500 TABLET ORAL at 08:36

## 2021-05-26 RX ADMIN — POTASSIUM CHLORIDE 40 MEQ: 1500 TABLET, EXTENDED RELEASE ORAL at 11:34

## 2021-05-26 RX ADMIN — ENOXAPARIN SODIUM 40 MG: 40 INJECTION SUBCUTANEOUS at 08:36

## 2021-05-26 RX ADMIN — DULOXETINE HYDROCHLORIDE 60 MG: 30 CAPSULE, DELAYED RELEASE ORAL at 08:36

## 2021-05-26 RX ADMIN — BUPROPION HYDROCHLORIDE 300 MG: 150 TABLET, EXTENDED RELEASE ORAL at 08:36

## 2021-05-26 RX ADMIN — DULOXETINE HYDROCHLORIDE 60 MG: 30 CAPSULE, DELAYED RELEASE ORAL at 17:50

## 2021-05-26 RX ADMIN — OXYCODONE HYDROCHLORIDE 5 MG: 5 TABLET ORAL at 04:29

## 2021-05-26 RX ADMIN — OXYCODONE HYDROCHLORIDE 5 MG: 5 TABLET ORAL at 11:33

## 2021-05-26 RX ADMIN — GABAPENTIN 300 MG: 300 CAPSULE ORAL at 17:50

## 2021-05-26 RX ADMIN — ENOXAPARIN SODIUM 40 MG: 40 INJECTION SUBCUTANEOUS at 21:46

## 2021-05-26 RX ADMIN — MONTELUKAST 10 MG: 10 TABLET, FILM COATED ORAL at 21:46

## 2021-05-26 RX ADMIN — DICYCLOMINE HYDROCHLORIDE 10 MG: 10 CAPSULE ORAL at 08:36

## 2021-05-26 RX ADMIN — FOLIC ACID 1 MG: 1 TABLET ORAL at 08:36

## 2021-05-26 RX ADMIN — OXYCODONE HYDROCHLORIDE 5 MG: 5 TABLET ORAL at 18:09

## 2021-05-26 RX ADMIN — LORATADINE 10 MG: 10 TABLET ORAL at 08:36

## 2021-05-26 RX ADMIN — GABAPENTIN 300 MG: 300 CAPSULE ORAL at 08:36

## 2021-05-26 RX ADMIN — DOCUSATE SODIUM 100 MG: 100 CAPSULE, LIQUID FILLED ORAL at 17:49

## 2021-05-26 RX ADMIN — FERROUS SULFATE TAB 325 MG (65 MG ELEMENTAL FE) 325 MG: 325 (65 FE) TAB at 08:35

## 2021-05-26 NOTE — TELEMEDICINE
REQUIRED DOCUMENTATION:     1  This service was provided via Telemedicine  2  Provider located at [facility] AND [facility]  3  TeleMed provider: Carter Sequeira MD   4  Identify all parties in room with patient during tele consult:  Patient, nurse  5  After connecting through [email or web address], patient was identified by name and date of birth and assistant checked wristband  Patient was then informed that this was a Telemedicine visit and that the exam was being conducted confidentially over secure lines  My office door was closed  No one else was in the room  Patient acknowledged consent and understanding of privacy and security of the Telemedicine visit, and gave us permission to have the assistant stay in the room in order to assist with the history and to conduct the exam   I informed the patient that I have reviewed their record in Epic and presented the opportunity for them to ask any questions regarding the visit today  The patient agreed to participate  Teleneurology Consultation     PATIENT NAME: Levon Davis  YOB: 1950   MEDICAL RECORD NUMBER: [id number]  Chief Complaint/Reason for Consult: encephalopathy/confusion    HPI: Levon Davis is a 73 y o  female with of HTN/HLD, remote breat cancer IBS i've been called to evaluate for confusion  She went in for surgery for a knee replacement on May 20th and then "things become fuzzy for me"  She was noted to have confusion and was reported to have hallucinations  It was noted post op she had some tachycardia and hada  PE study  She was admitted to skilled nursing facility and was brought to the ER for mental status changes  She has had intermittent tachycardai  She denied history of psychiatric disease initially but ultimately did admit to depressiON (it is noted she has recurrent MDD) and is on medications to "keep me level"  In the past she had episodes where she would forget where she was and felt she was in a different place than she was at   She lives at home with her daughter  She was a  professionally when she worked  She does have a history of breast Ca/chemo in 2005  She does have some orolingual dyskinesias         PAST MEDICAL HISTORY  Past Medical History:   Diagnosis Date    Anxiety     Arthritis     Asthma     Breast cancer ([address] )     rt mastectomy 2005    Cancer [facility])     breast right    Cataract     CHF (congestive heart failure) (HCC)     Coronary artery disease     Depression     GERD (gastroesophageal reflux disease)     High cholesterol     History of chemotherapy     2005 rt breast cancer    History of transfusion     Hypertension     IBS (irritable bowel syndrome)     Irritable bowel syndrome (IBS) 5/31/2016    Kidney stone     Lymphedema of right arm     Obesity     PONV (postoperative nausea and vomiting)     Post-menopausal     Psychiatric disorder     Renal disorder     Vitamin D deficiency         PAST SURGICAL HISTORY  Past Surgical History:   Procedure Laterality Date    BREAST SURGERY      CATARACT EXTRACTION Bilateral     CATARACT EXTRACTION, BILATERAL      CHOLECYSTECTOMY      COLONOSCOPY N/A 5/31/2016    Procedure: COLONOSCOPY;  Surgeon: Joanne Hedrick MD;  Location: [facility] OR;  Service:    [facility] GALLBLADDER SURGERY      HYSTERECTOMY      Total    KNEE ARTHROSCOPY      KNEE SURGERY      arthroscopy    MASTECTOMY Right     rt breast mastectomy 2005    ID TOTAL KNEE ARTHROPLASTY Left 5/20/2021    Procedure: ARTHROPLASTY KNEE TOTAL;  Surgeon: Mattie Sen MD;  Location: [address] [place] [address] MAIN OR;  Service: Orthopedics    TONSILLECTOMY AND ADENOIDECTOMY          ALLERGIES: Ibuprofen and Latex     CURRENT MEDICATIONS  Scheduled Meds:  Current Facility-Administered Medications   Medication Dose Route Frequency Provider Last Rate    acetaminophen  650 mg Oral Q6H PRN Gae Hamman, PA-C      albuterol  2 5 mg Nebulization Q4H PRN Gae Hamman, PA-C      ascorbic acid  500 mg Oral Daily Gae Hamman, PA-C      atorvastatin  20 mg Oral QPM [facility], PA-C      buPROPion  300 mg Oral Daily [facility], [place]      butalbital-acetaminophen-caffeine  1 tablet Oral TID PRN [facility], PA-C      dicyclomine  10 mg Oral Daily [facility], PA-C      docusate sodium  100 mg Oral BID [facility], PA-C      DULoxetine  60 mg Oral BID [facility], PA-C      enoxaparin  40 mg Subcutaneous BID [facility], PA-C      ferrous sulfate  325 mg Oral BID With Meals [facility], PA-C      folic acid  1 mg Oral Daily [facility], PA-C      gabapentin  300 mg Oral BID [facility], PA-C      loratadine  10 mg Oral Daily [facility], PA-C      montelukast  10 mg Oral HS [facility], PA-C      ondansetron  4 mg Intravenous Q6H PRN [facility], PA-C      oxyCODONE  2 5 mg Oral Q6H PRN [facility], PA-C      Or    oxyCODONE  5 mg Oral Q6H PRN [facility], PA-C      sodium chloride (PF)  3 mL Intravenous Q1H PRN [facility], PA-C      sodium chloride  75 mL/hr Intravenous Continuous [facility], PA-C 75 mL/hr (05/25/21 1821)    tamsulosin  0 4 mg Oral Daily With Dinner [facility], PA-JOSÉ LUIS       Continuous Infusions:sodium chloride, 75 mL/hr, Last Rate: 75 mL/hr (05/25/21 1821)      PRN Meds:   acetaminophen    albuterol    butalbital-acetaminophen-caffeine    ondansetron    oxyCODONE **OR** oxyCODONE    Insert peripheral IV **AND** sodium chloride (PF)     SOCIAL HISTORY   reports that she has never smoked  She has never used smokeless tobacco  She reports that she does not drink alcohol or use drugs          FAMILY HISTORY  Family History   Adopted: Yes   Problem Relation Age of Onset    Diabetes Mother     Heart disease Mother     Mental illness Mother     Depression Mother     Heart disease Brother     Breast cancer Maternal Aunt     Breast cancer Maternal Aunt     Breast cancer Maternal Aunt     Breast cancer Maternal Aunt     Breast cancer Maternal Aunt     No Known Problems Father     No Known Problems Sister     No Known Problems Daughter     Breast cancer Maternal Grandmother     No Known Problems Sister     No Known Problems Sister     No Known Problems Sister         REVIEW OF SYSTEMS   Extensive 12 point ROS conducted, negative except for HPI  PHYSICAL EXAMINATION  Temp:  [99 °F (37 2 °C)-99 3 °F (37 4 °C)] 99 °F (37 2 °C)  HR:  [101-112] 101  Resp:  [18-20] 18  BP: (134-136)/(90-91) 134/91   General Examination: In no apparent distress, well developed and well nourished, and cooperative   Some mild oroliginual dyskinesias at times  HEENT: Normocephalic, Atraumatic  Moist mucus membranes  Anicteric  PPC    CVS: No JVD noted  Lungs: Speaking full sentences, no respiratory distress  No usage of accessory muscles of respiration  Abdomen: Unable to assess  Ext: some LLE swelling and bandaging from the knee replacement  Psych: Thought content - No VH/AH  No delusions  Though Process - logical    Skin - No rash    Neurological Examination:   Mental Status: The patient was awake, alert, attentive, oriented to person, place, and time  Recent and remote memory intact to conversation with no evidence of language dysfunction  Satisfactory fund of knowledge  Normal attention span and concentration  Able to name and repeat  Speech fluent  Knows president, good insight  [address] in a dollar  Able to spell "world" forwards but spelled it "DLORW" backwards"  Cranial Nerves:   I: smell Not tested   II: visual fields grossly full although limited assessment via video  Pupils equal, round, reactive to light with normal accomodation  Fundus: unable to assess  III,IV,VI: extraocular muscles EOMI, no nystagmus   V:   Sensation in the V1 through V3 distributions intact to self light touch bilaterally  VII: Face is symmetric with no weakness noted  VIII: mildly diminished hearing in left ear compared to right to self finger rub  IX/X: No dysarthria noted      XI: Trapezius and SCM full ROM, no asymmetry bilaterally  XII: Tongue midline with no atrophy or fasciculations with appropriate movement  Motor Examination:   No pronator drift  Bulk: Normal  No atrophy  Fasciculations: None  Grossly symmetric movements and strength b/l UE and RLE  Deferred LLE exam due to recent knee replacement  Reflexes:    Unable to assess  Coordination: Patient able to perform normal finger-to-nose and heel to shin appropriately  Normal rapid alternating movements  Mild intention tremor L>R  Sensory: Normal sensation to self light touch bilateral upper and lower extremities  Gait:deferred due to fall risk       Labs:  Recent Results (from the past 24 hour(s))   CBC and differential    Collection Time: 05/26/21  4:26 AM   Result Value Ref Range    WBC 7 32 4 31 - 10 16 Thousand/uL    RBC 3 04 (L) 3 81 - 5 12 Million/uL    Hemoglobin 9 3 (L) 11 5 - 15 4 g/dL    Hematocrit 29 0 (L) 34 8 - 46 1 %    MCV 95 82 - 98 fL    MCH 30 6 26 8 - 34 3 pg    MCHC 32 1 31 4 - 37 4 g/dL    RDW 12 2 11 6 - 15 1 %    MPV 9 6 8 9 - 12 7 fL    Platelets 702 201 - 409 Thousands/uL    nRBC 0 /100 WBCs   Basic metabolic panel    Collection Time: 05/26/21  4:26 AM   Result Value Ref Range    Sodium 140 136 - 145 mmol/L    Potassium 3 1 (L) 3 5 - 5 3 mmol/L    Chloride 107 100 - 108 mmol/L    CO2 23 21 - 32 mmol/L    ANION GAP 10 4 - 13 mmol/L    BUN 9 5 - 25 mg/dL    Creatinine 0 55 (L) 0 60 - 1 30 mg/dL    Glucose 121 65 - 140 mg/dL    Calcium 7 4 (L) 8 3 - 10 1 mg/dL    eGFR 96 ml/min/1 73sq m   Manual Differential(PHLEBS Do Not Order)    Collection Time: 05/26/21  4:26 AM   Result Value Ref Range    Segmented % 65 43 - 75 %    Lymphocytes % 19 14 - 44 %    Monocytes % 11 4 - 12 %    Eosinophils, % 1 0 - 6 %    Basophils % 0 0 - 1 %    Metamyelocytes% 2 (H) 0 - 1 %    Myelocytes % 1 0 - 1 %    Atypical Lymphocytes % 1 (H) <=0 %    Absolute Neutrophils 4 76 1 85 - 7 62 Thousand/uL Lymphocytes Absolute 1 39 0 60 - 4 47 Thousand/uL    Monocytes Absolute 0 81 0 00 - 1 22 Thousand/uL    Eosinophils Absolute 0 07 0 00 - 0 40 Thousand/uL    Basophils Absolute 0 00 0 00 - 0 10 Thousand/uL    Total Counted 100     Platelet Estimate Adequate Adequate            panel  Results from last 7 days   Lab Units 05/26/21  0426   POTASSIUM mmol/L 3 1*   CHLORIDE mmol/L 107   BUN mg/dL 9   CREATININE mg/dL 0 55*      Results from last 7 days   Lab Units 05/26/21  0426   HEMATOCRIT % 29 0*   HEMOGLOBIN g/dL 9 3*   MCH pg 30 6   MCHC g/dL 32 1   MCV fL 95   MPV fL 9 6   PLATELETS Thousands/uL 222   RDW % 12 2   WBC Thousand/uL 7 32          Invalid input(s): LABPT   Results from last 7 days   Lab Units 05/26/21  0426   SODIUM mmol/L 140   CO2 mmol/L 23   BUN mg/dL 9   CREATININE mg/dL 0 55*      Lab Results   Component Value Date    ALT 56 05/25/2021    AST 24 05/25/2021    ALKPHOS 112 05/25/2021    TBILI 0 92 05/25/2021          Invalid input(s): TRIGLYCERIDE   Lab Results   Component Value Date    HGBA1C 5 5 04/21/2021    TSH i: No results found for: TSH Imaging: CT head     CT head - no acute disease  ASSESSMENT/PLAN  74 yo female with what appears to be post op encephalopathy  Check B12  Avoid anticholinergics  Suspect more psychiatric/delerium than organic neurological issue  Can consider routine EEG, but if improving would not need it  Call with questions

## 2021-05-26 NOTE — ASSESSMENT & PLAN NOTE
Bullae noted surrounding surgical site  Suspect contact dermatitis due to allergy vs  Irritant  Will monitor closely  Orthopedics consultation appreciated- does not appear to have infection  Wound care consult pending

## 2021-05-26 NOTE — CASE MANAGEMENT
I notified patient that physician said patient might be ready to start her rehab again tomorrow  5th floor does not think they will have  Bed tomorrow  Pt is not quite sure where she wanted to go to Rehab  I called patient' s daughter Cammy Hagan to see where she wanted her to go to STR  Cammy Hagan did not answer so I left a message for her to return my call  Pt did request to go to [facility] when she was at [facility]   I will sent referrals to those facilities have any availability while I await for daughters return call

## 2021-05-26 NOTE — PROGRESS NOTES
[address] [facility] [address]  Progress Note - Shlomo Myrick 1950, 73 y o  female MRN: [id number]  Unit/Bed#: 411-01 Encounter: [id number]  Primary Care Provider: Morris Acosta PA-C   Date and time admitted to hospital: 5/24/2021  1:39 PM    * Acute metabolic encephalopathy  Assessment & Plan  Presented to the emergency room for evaluation of change in mental status  She was just admitted to skilled nursing facility for STR S/P total left knee Arthroplasty and found to be confused and with hallucinations upon arrival     Suspect etiology is due to polypharmacy - on oxycodone 10mg tablets every 6 hours in addition to underlying psychiatric medications  CT head- No acute intracranial abnormality  No focal neurologic deficits  Will continue neurologic checks and close observation  Restarted narcotics at lower dosing protocol  Reduced Neurontin dosing from TID to BID dosing  Neurology consult pending        Dermatitis  Assessment & Plan  Bullae noted surrounding surgical site  Suspect contact dermatitis due to allergy vs  Irritant  Will monitor closely  Orthopedics consultation appreciated- does not appear to have infection  Wound care consult pending  S/P total knee arthroplasty, left  Assessment & Plan  She is S/P left knee arthroplasty on 5/20/2021  She has notable fluid-filled bullae at surgical site  Suspect possible contact allergic dermatitis due to skin prep or from immobilizer? Afebrile, no leukocytosis  Orthopedics consultation appreciated  Continue PT/OT - WBAT LLE  Plan for STR on discharge- patient may not be able to return to 5th floor,  to send out referrals  Sinus tachycardia  Assessment & Plan  She has ongoing episodes postop sinus tachycardia  Suspect this is multifactorial in the setting of pain, mild anxiety, dehydration  CTA was negative for acute PE on 5/22    EKG shows-sinus tachycardia at rate of 111 bpm     Patient received lana IVF hydration overnight, discontinued further IV fluids on 5/25 given chest Xray findings  Control pain adequately  magnesium level 2 1  Echocardiogram - EF 60%, mild LVH, moderate AS, otherwise unremarkable  Recurrent major depressive disorder, in partial remission ([facility] [address] )  Assessment & Plan  Currently stable  Held PTA psych medication overnight on the night of admission given AMS and reports of Hallucinations  Restarted cymbalta, wellbutrin at previous doses on 5/25/21  Restarted trazodone at 25mg dose (usual dose is 50mg) on 5/25/21  Consider psych consult pending neurology recommendations    Vitamin D deficiency  Assessment & Plan  Continue vitamin D weekly supplementation  Essential hypertension  Assessment & Plan  Blood pressure is currently stable  Not on any home BP medications  Monitor blood pressure closely  Mild intermittent asthma without complication  Assessment & Plan  Respiratory protocol  Continue PT respiratory medications  Continue outpatient pulmonary follow-up  VTE Pharmacologic Prophylaxis:   Pharmacologic: Enoxaparin (Lovenox)  Mechanical VTE Prophylaxis in Place: Yes    Patient Centered Rounds: I have performed bedside rounds with nursing staff today  Discussions with Specialists or Other Care Team Provider: nursing, CM    Education and Discussions with Family / Patient: patient    Time Spent for Care: 20 minutes  More than 50% of total time spent on counseling and coordination of care as described above  Current Length of Stay: 2 day(s)    Current Patient Status: Inpatient   Certification Statement: The patient will continue to require additional inpatient hospital stay due to continued workup and monitoring of encephalopathy with neuro consult    Discharge Plan: Discharge to SNF when medically stable    Code Status: Level 3 - DNAR and DNI      Subjective: The patient was seen and examined   The patient states she heard the voice of her daughter and grandchildren this am  She does acknowledge that her daughter/grandchildren were not at the hospital this am      Objective:     Vitals:   Temp (24hrs), Av °F (37 2 °C), Min:98 6 °F (37 °C), Max:99 3 °F (37 4 °C)    Temp:  [98 6 °F (37 °C)-99 3 °F (37 4 °C)] 98 6 °F (37 °C)  HR:  [101-112] 101  Resp:  [18-20] 18  BP: (133-136)/(79-91) 133/79  SpO2:  [91 %-95 %] 94 %  Body mass index is 41 7 kg/m²  Input and Output Summary (last 24 hours): Intake/Output Summary (Last 24 hours) at 2021 1622  Last data filed at 2021 1252  Gross per 24 hour   Intake 360 ml   Output 200 ml   Net 160 ml       Physical Exam:     Physical Exam  Vitals signs and nursing note reviewed  Constitutional:       General: She is awake  Appearance: Normal appearance  Cardiovascular:      Rate and Rhythm: Normal rate and regular rhythm  Pulmonary:      Effort: Pulmonary effort is normal       Breath sounds: Normal breath sounds  No wheezing, rhonchi or rales  Abdominal:      General: Bowel sounds are normal       Palpations: Abdomen is soft  Tenderness: There is no abdominal tenderness  Skin:     General: Skin is warm and dry  Neurological:      General: No focal deficit present  Mental Status: She is alert and oriented to person, place, and time  Comments: Patient stated her surgery was done here at [facility]  I did tell the patient her surgery was preformed at [facility] and then transfer for STR to [facility]  The patient does NOT have any memory of this  Psychiatric:         Attention and Perception: Attention normal          Mood and Affect: Mood normal          Speech: Speech normal          Behavior: Behavior is cooperative           Additional Data:     Labs:    Results from last 7 days   Lab Units 21  0426  21  1354   WBC Thousand/uL 7 32   < > 7 59   HEMOGLOBIN g/dL 9 3*   < > 10 5*   HEMATOCRIT % 29 0*   < > 32 4*   PLATELETS Thousands/uL 222   < > 202   NEUTROS PCT %  --   --  70 LYMPHS PCT %  --   --  13*   LYMPHO PCT % 19  --   --    MONOS PCT %  --   --  12   MONO PCT % 11  --   --    EOS PCT % 1  --  2    < > = values in this interval not displayed  Results from last 7 days   Lab Units 05/26/21  0426 05/25/21  0420   SODIUM mmol/L 140 140   POTASSIUM mmol/L 3 1* 3 6   CHLORIDE mmol/L 107 106   CO2 mmol/L 23 28   BUN mg/dL 9 10   CREATININE mg/dL 0 55* 0 60   ANION GAP mmol/L 10 6   CALCIUM mg/dL 7 4* 8 0*   ALBUMIN g/dL  --  2 2*   TOTAL BILIRUBIN mg/dL  --  0 92   ALK PHOS U/L  --  112   ALT U/L  --  56   AST U/L  --  24   GLUCOSE RANDOM mg/dL 121 120                 Results from last 7 days   Lab Units 05/24/21  1354   LACTIC ACID mmol/L 1 0           * I Have Reviewed All Lab Data Listed Above  * Additional Pertinent Lab Tests Reviewed: All Labs Within Last 24 Hours Reviewed    Imaging:    Imaging Reports Reviewed Today Include: none  Imaging Personally Reviewed by Myself Includes:  none    Recent Cultures (last 7 days):     Results from last 7 days   Lab Units 05/24/21  1354   BLOOD CULTURE  No Growth at 24 hrs  No Growth at 24 hrs         Last 24 Hours Medication List:   Current Facility-Administered Medications   Medication Dose Route Frequency Provider Last Rate    acetaminophen  650 mg Oral Q6H PRN María Pearce, PA-C      albuterol  2 5 mg Nebulization Q4H PRN María Pearce, PA-C      ascorbic acid  500 mg Oral Daily María Pearce, PA-C      atorvastatin  20 mg Oral QPM María Pearce, PA-C      buPROPion  300 mg Oral Daily María Pearce, PA-C      butalbital-acetaminophen-caffeine  1 tablet Oral TID PRN María Pearce, PA-C      dicyclomine  10 mg Oral Daily María Pearce, PA-C      docusate sodium  100 mg Oral BID María Pearce, PA-C      DULoxetine  60 mg Oral BID María Pearce, PA-C      enoxaparin  40 mg Subcutaneous BID María Pearce, PA-C      ferrous sulfate  325 mg Oral BID With Meals María Pearce, PA-C      folic acid  1 mg Oral Daily María Pearce, KOFI      gabapentin  300 mg Oral BID [facility], PA-JOSÉ LUIS      loratadine  10 mg Oral Daily [facility], PA-JOSÉ LUIS      montelukast  10 mg Oral HS Araceli Ruth PA-C      ondansetron  4 mg Intravenous Q6H PRN [facility], KOFI      oxyCODONE  2 5 mg Oral Q6H PRN [facility], KOFI      Or    oxyCODONE  5 mg Oral Q6H PRN [facility], KOFI      sodium chloride (PF)  3 mL Intravenous Q1H PRN [facility], PA-JOSÉ LUIS      sodium chloride  75 mL/hr Intravenous Continuous Araceli Ruth PA-C 75 mL/hr (05/25/21 1821)    tamsulosin  0 4 mg Oral Daily With Dinner Ambarha Joseline, KOFI          Today, Patient Was Seen By: Erin Cooper PA-C    ** Please Note: Dictation voice to text software may have been used in the creation of this document   **

## 2021-05-26 NOTE — ASSESSMENT & PLAN NOTE
She is S/P left knee arthroplasty on 5/20/2021  She has notable fluid-filled bullae at surgical site  Suspect possible contact allergic dermatitis due to skin prep or from immobilizer? Afebrile, no leukocytosis  Orthopedics consultation appreciated  Continue PT/OT - WBAT LLE  Plan for STR on discharge- patient may not be able to return to 5th floor,  to send out referrals

## 2021-05-26 NOTE — ASSESSMENT & PLAN NOTE
She has ongoing episodes postop sinus tachycardia  Suspect this is multifactorial in the setting of pain, mild anxiety, dehydration  CTA was negative for acute PE on 5/22  EKG shows-sinus tachycardia at rate of 111 bpm     Patient received lana IVF hydration overnight, discontinued further IV fluids on 5/25 given chest Xray findings  Control pain adequately  magnesium level 2 1  Echocardiogram - EF 60%, mild LVH, moderate AS, otherwise unremarkable

## 2021-05-26 NOTE — PLAN OF CARE
Problem: Potential for Falls  Goal: Patient will remain free of falls  Description: INTERVENTIONS:  - Assess patient frequently for physical needs  -  Identify cognitive and physical deficits and behaviors that affect risk of falls  (confusion, hallucinations, weakness, swelling, injury, pain)  -  [place] fall precautions as indicated by assessment   (high fall risk)  - Educate patient/family on patient safety including physical limitations  - Instruct patient to call for assistance with activity based on assessment  - Modify environment to reduce risk of injury  - Consider OT/PT consult to assist with strengthening/mobility  Outcome: Progressing     Problem: Prexisting or High Potential for Compromised Skin Integrity  Goal: Skin integrity is maintained or improved  Description: INTERVENTIONS:  - Identify patients at risk for skin breakdown  - Assess and monitor skin integrity  - Assess and monitor nutrition and hydration status  - Monitor labs   - Assess for incontinence   - Turn and reposition patient  - Assist with mobility/ambulation  - Relieve pressure over bony prominences  - Avoid friction and shearing  - Provide appropriate hygiene as needed including keeping skin clean and dry  - Evaluate need for skin moisturizer/barrier cream  - Collaborate with interdisciplinary team   - Patient/family teaching  - Consider wound care consult   Outcome: Progressing     Problem: NEUROSENSORY - ADULT  Goal: Achieves stable or improved neurological status  Description: INTERVENTIONS  - Monitor and report changes in neurological status  - Monitor vital signs such as temperature, blood pressure, glucose, and any other labs ordered   - Initiate measures to prevent increased intracranial pressure  - Monitor for seizure activity and implement precautions if appropriate      Outcome: Progressing  Goal: Achieves maximal functionality and self care  Description: INTERVENTIONS  - Monitor swallowing and airway patency with patient fatigue and changes in neurological status  - Encourage and assist patient to increase activity and self care     - Encourage visually impaired, hearing impaired and aphasic patients to use assistive/communication devices  Outcome: Progressing     Problem: SKIN/TISSUE INTEGRITY - ADULT  Goal: Skin integrity remains intact  Description: INTERVENTIONS  - Identify patients at risk for skin breakdown  - Assess and monitor skin integrity  - Assess and monitor nutrition and hydration status  - Monitor labs (i e  albumin)  - Assess for incontinence   - Turn and reposition patient  - Assist with mobility/ambulation  - Relieve pressure over bony prominences  - Avoid friction and shearing  - Provide appropriate hygiene as needed including keeping skin clean and dry  - Evaluate need for skin moisturizer/barrier cream  - Collaborate with interdisciplinary team (i e  Nutrition, Rehabilitation, etc )   - Patient/family teaching  Outcome: Progressing     Problem: MUSCULOSKELETAL - ADULT  Goal: Maintain or return mobility to safest level of function  Description: INTERVENTIONS:  - Assess patient's ability to carry out ADLs; assess patient's baseline for ADL function and identify physical deficits which impact ability to perform ADLs (bathing, care of mouth/teeth, toileting, grooming, dressing, etc )  - Assess/evaluate cause of self-care deficits   - Assess range of motion  - Assess patient's mobility  - Assess patient's need for assistive devices and provide as appropriate  - Encourage maximum independence but intervene and supervise when necessary  - Involve family in performance of ADLs  - Assess for home care needs following discharge   - Consider OT consult to assist with ADL evaluation and planning for discharge  - Provide patient education as appropriate  Outcome: Progressing     Problem: INFECTION - ADULT  Goal: Absence or prevention of progression during hospitalization  Description: INTERVENTIONS:  - Assess and monitor for signs and symptoms of infection  - Monitor lab/diagnostic results  - Monitor all insertion sites, i e  indwelling lines, tubes, and drains  - Monitor endotracheal if appropriate and nasal secretions for changes in amount and color  - [place] appropriate cooling/warming therapies per order  - Administer medications as ordered  - Instruct and encourage patient and family to use good hand hygiene technique  - Identify and instruct in appropriate isolation precautions for identified infection/condition  Outcome: Progressing     Problem: SAFETY ADULT  Goal: Patient will remain free of falls  Description: INTERVENTIONS:  - Assess patient frequently for physical needs  -  Identify cognitive and physical deficits and behaviors that affect risk of falls  (confusion, hallucinations, weakness, swelling, injury, pain)  -  [place] fall precautions as indicated by assessment   (high fall risk)  - Educate patient/family on patient safety including physical limitations  - Instruct patient to call for assistance with activity based on assessment  - Modify environment to reduce risk of injury  - Consider OT/PT consult to assist with strengthening/mobility  Outcome: Progressing  Goal: Maintain or return to baseline ADL function  Description: INTERVENTIONS:  -  Assess patient's ability to carry out ADLs; assess patient's baseline for ADL function and identify physical deficits which impact ability to perform ADLs (bathing, care of mouth/teeth, toileting, grooming, dressing, etc )  - Assess/evaluate cause of self-care deficits   - Assess range of motion  - Assess patient's mobility; develop plan if impaired  - Assess patient's need for assistive devices and provide as appropriate  - Encourage maximum independence but intervene and supervise when necessary  - Involve family in performance of ADLs  - Assess for home care needs following discharge   - Consider OT consult to assist with ADL evaluation and planning for discharge  - Provide patient education as appropriate  Outcome: Progressing  Goal: Maintain or return mobility status to optimal level  Description: INTERVENTIONS:  - Assess patient's baseline mobility status (ambulation, transfers, stairs, etc )    - Identify cognitive and physical deficits and behaviors that affect mobility  - Identify mobility aids required to assist with transfers and/or ambulation (gait belt, sit-to-stand, lift, walker, cane, etc )  - [place] fall precautions as indicated by assessment  - Record patient progress and toleration of activity level on Mobility SBAR; progress patient to next Phase/Stage  - Instruct patient to call for assistance with activity based on assessment  - Consider rehabilitation consult to assist with strengthening/weightbearing, etc   Outcome: Progressing     Problem: PAIN - ADULT  Goal: Verbalizes/displays adequate comfort level or baseline comfort level  Description: Interventions:  - Encourage patient to monitor pain and request assistance  - Assess pain using appropriate pain scale  - Administer analgesics based on type and severity of pain and evaluate response  - Implement non-pharmacological measures as appropriate and evaluate response  - Consider cultural and social influences on pain and pain management  - Notify physician/advanced practitioner if interventions unsuccessful or patient reports new pain  Outcome: Progressing     Problem: DISCHARGE PLANNING  Goal: Discharge to home or other facility with appropriate resources  Description: INTERVENTIONS:  - Identify barriers to discharge w/patient and caregiver  - Arrange for needed discharge resources and transportation as appropriate  - Identify discharge learning needs (meds, wound care, etc )  - Arrange for interpretive services to assist at discharge as needed  - Refer to Case Management Department for coordinating discharge planning if the patient needs post-hospital services based on physician/advanced practitioner order or complex needs related to functional status, cognitive ability, or social support system  Outcome: Progressing     Problem: Nutrition/Hydration-ADULT  Goal: Nutrient/Hydration intake appropriate for improving, restoring or maintaining nutritional needs  Description: Monitor and assess patient's nutrition/hydration status for malnutrition  Collaborate with interdisciplinary team and initiate plan and interventions as ordered  Monitor patient's weight and dietary intake as ordered or per policy  Utilize nutrition screening tool and intervene as necessary  Determine patient's food preferences and provide high-protein, high-caloric foods as appropriate       INTERVENTIONS:  - Monitor oral intake, urinary output, labs, and treatment plans  - Assess nutrition and hydration status and recommend course of action  - Evaluate amount of meals eaten  - Assist patient with eating if necessary   - Allow adequate time for meals  - Recommend/ encourage appropriate diets, oral nutritional supplements, and vitamin/mineral supplements  - Order, calculate, and assess calorie counts as needed  - Recommend, monitor, and adjust tube feedings and TPN/PPN based on assessed needs  - Assess need for intravenous fluids  - Provide specific nutrition/hydration education as appropriate  - Include patient/family/caregiver in decisions related to nutrition  Outcome: Progressing

## 2021-05-26 NOTE — QUICK NOTE
Patient would benefit from psych consult given auditory hallucinations  I did speak to the patient's daughter regarding this  She would like to be present doing the consult if possible  Given visiting hours are almost over, will place consult tomorrow in hopes it is done with daughter is here  Will pass on to nursing to have consulted called daughter if consult done when daughter not present

## 2021-05-26 NOTE — ASSESSMENT & PLAN NOTE
Currently stable  Held PTA psych medication overnight on the night of admission given AMS and reports of Hallucinations  Restarted cymbalta, wellbutrin at previous doses on 5/25/21  Restarted trazodone at 25mg dose (usual dose is 50mg) on 5/25/21     Consider psych consult pending neurology recommendations

## 2021-05-27 LAB
ANION GAP SERPL CALCULATED.3IONS-SCNC: 6 MMOL/L (ref 4–13)
BUN SERPL-MCNC: 8 MG/DL (ref 5–25)
CALCIUM SERPL-MCNC: 8.4 MG/DL (ref 8.3–10.1)
CHLORIDE SERPL-SCNC: 106 MMOL/L (ref 100–108)
CO2 SERPL-SCNC: 28 MMOL/L (ref 21–32)
CREAT SERPL-MCNC: 0.67 MG/DL (ref 0.6–1.3)
ERYTHROCYTE [DISTWIDTH] IN BLOOD BY AUTOMATED COUNT: 12.4 % (ref 11.6–15.1)
GFR SERPL CREATININE-BSD FRML MDRD: 90 ML/MIN/1.73SQ M
GLUCOSE SERPL-MCNC: 121 MG/DL (ref 65–140)
HCT VFR BLD AUTO: 33 % (ref 34.8–46.1)
HGB BLD-MCNC: 10.8 G/DL (ref 11.5–15.4)
MCH RBC QN AUTO: 31 PG (ref 26.8–34.3)
MCHC RBC AUTO-ENTMCNC: 32.7 G/DL (ref 31.4–37.4)
MCV RBC AUTO: 95 FL (ref 82–98)
NRBC BLD AUTO-RTO: 0 /100 WBCS
PLATELET # BLD AUTO: 271 THOUSANDS/UL (ref 149–390)
PMV BLD AUTO: 9.1 FL (ref 8.9–12.7)
POTASSIUM SERPL-SCNC: 3.7 MMOL/L (ref 3.5–5.3)
RBC # BLD AUTO: 3.48 MILLION/UL (ref 3.81–5.12)
SODIUM SERPL-SCNC: 140 MMOL/L (ref 136–145)
WBC # BLD AUTO: 8.46 THOUSAND/UL (ref 4.31–10.16)

## 2021-05-27 PROCEDURE — 97530 THERAPEUTIC ACTIVITIES: CPT

## 2021-05-27 PROCEDURE — 85027 COMPLETE CBC AUTOMATED: CPT | Performed by: PHYSICIAN ASSISTANT

## 2021-05-27 PROCEDURE — 99024 POSTOP FOLLOW-UP VISIT: CPT | Performed by: PHYSICIAN ASSISTANT

## 2021-05-27 PROCEDURE — 97535 SELF CARE MNGMENT TRAINING: CPT

## 2021-05-27 PROCEDURE — NC001 PR NO CHARGE: Performed by: PSYCHIATRY & NEUROLOGY

## 2021-05-27 PROCEDURE — 99221 1ST HOSP IP/OBS SF/LOW 40: CPT | Performed by: PSYCHIATRY & NEUROLOGY

## 2021-05-27 PROCEDURE — 94668 MNPJ CHEST WALL SBSQ: CPT

## 2021-05-27 PROCEDURE — 97116 GAIT TRAINING THERAPY: CPT

## 2021-05-27 PROCEDURE — 99232 SBSQ HOSP IP/OBS MODERATE 35: CPT | Performed by: PHYSICIAN ASSISTANT

## 2021-05-27 PROCEDURE — 80048 BASIC METABOLIC PNL TOTAL CA: CPT | Performed by: PHYSICIAN ASSISTANT

## 2021-05-27 RX ORDER — PROMETHAZINE HYDROCHLORIDE 25 MG/ML
25 INJECTION, SOLUTION INTRAMUSCULAR; INTRAVENOUS EVERY 6 HOURS PRN
Status: DISCONTINUED | OUTPATIENT
Start: 2021-05-27 | End: 2021-05-28 | Stop reason: HOSPADM

## 2021-05-27 RX ADMIN — OXYCODONE HYDROCHLORIDE 5 MG: 5 TABLET ORAL at 15:46

## 2021-05-27 RX ADMIN — ATORVASTATIN CALCIUM 20 MG: 10 TABLET, FILM COATED ORAL at 18:17

## 2021-05-27 RX ADMIN — OXYCODONE HYDROCHLORIDE AND ACETAMINOPHEN 500 MG: 500 TABLET ORAL at 10:06

## 2021-05-27 RX ADMIN — FERROUS SULFATE TAB 325 MG (65 MG ELEMENTAL FE) 325 MG: 325 (65 FE) TAB at 10:06

## 2021-05-27 RX ADMIN — SODIUM CHLORIDE 75 ML/HR: 0.9 INJECTION, SOLUTION INTRAVENOUS at 03:29

## 2021-05-27 RX ADMIN — MONTELUKAST 10 MG: 10 TABLET, FILM COATED ORAL at 21:05

## 2021-05-27 RX ADMIN — DOCUSATE SODIUM 100 MG: 100 CAPSULE, LIQUID FILLED ORAL at 18:17

## 2021-05-27 RX ADMIN — LORATADINE 10 MG: 10 TABLET ORAL at 10:06

## 2021-05-27 RX ADMIN — OXYCODONE HYDROCHLORIDE 5 MG: 5 TABLET ORAL at 00:04

## 2021-05-27 RX ADMIN — DICYCLOMINE HYDROCHLORIDE 10 MG: 10 CAPSULE ORAL at 10:06

## 2021-05-27 RX ADMIN — BUPROPION HYDROCHLORIDE 300 MG: 150 TABLET, EXTENDED RELEASE ORAL at 10:06

## 2021-05-27 RX ADMIN — ONDANSETRON 4 MG: 2 INJECTION INTRAMUSCULAR; INTRAVENOUS at 11:09

## 2021-05-27 RX ADMIN — FERROUS SULFATE TAB 325 MG (65 MG ELEMENTAL FE) 325 MG: 325 (65 FE) TAB at 15:46

## 2021-05-27 RX ADMIN — TAMSULOSIN HYDROCHLORIDE 0.4 MG: 0.4 CAPSULE ORAL at 15:46

## 2021-05-27 RX ADMIN — DULOXETINE HYDROCHLORIDE 60 MG: 30 CAPSULE, DELAYED RELEASE ORAL at 18:17

## 2021-05-27 RX ADMIN — DOCUSATE SODIUM 100 MG: 100 CAPSULE, LIQUID FILLED ORAL at 10:06

## 2021-05-27 RX ADMIN — DULOXETINE HYDROCHLORIDE 60 MG: 30 CAPSULE, DELAYED RELEASE ORAL at 10:06

## 2021-05-27 RX ADMIN — ENOXAPARIN SODIUM 40 MG: 40 INJECTION SUBCUTANEOUS at 21:05

## 2021-05-27 RX ADMIN — ENOXAPARIN SODIUM 40 MG: 40 INJECTION SUBCUTANEOUS at 10:05

## 2021-05-27 RX ADMIN — GABAPENTIN 300 MG: 300 CAPSULE ORAL at 10:06

## 2021-05-27 RX ADMIN — GABAPENTIN 300 MG: 300 CAPSULE ORAL at 18:17

## 2021-05-27 RX ADMIN — FOLIC ACID 1 MG: 1 TABLET ORAL at 10:05

## 2021-05-27 NOTE — TELEMEDICINE
TeleConsultation - [facility]   Ceci Victor 73 y o  female MRN: [id number]  Unit/Bed#: 411-01 Encounter: [id number]      REQUIRED DOCUMENTATION:     1  This service was provided via Telemedicine  2  Provider located at [facility]   3  TeleMed provider: Val Mitchell  4  Identify all parties in room with patient during tele consult:  Patient and daughter at patient request  5  After connecting through televideo, patient was identified by name and date of birth and assistant checked wristband  Patient was then informed that this was a Telemedicine visit and that the exam was being conducted confidentially over secure lines  My office door was closed  No one else was in the room  Patient acknowledged consent and understanding of privacy and security of the Telemedicine visit, and gave us permission to have the assistant stay in the room in order to assist with the history and to conduct the exam   I informed the patient that I have reviewed their record in Epic and presented the opportunity for them to ask any questions regarding the visit today  The patient agreed to participate  Assessment/Plan     Assessment:  Possible anticholinergic (Bentyl) induced confusion and hallucinations possibly due to delirium component at times    Plan:   If possible, eliminating all medications with significant anticholinergic affects such as the Bentyl would likely be very helpful  Chief Complaint:  I was seeing things not there    History of Present Illness     Reason for Consult / Principal Problem:  Complaint of visual hallucinations    History of present illness: This 53-year-old patient had recent knee replacement surgery on May 20th  Thereafter she began to report some confusion as well as visual hallucinations  Neurological evaluation was unremarkable and it was felt that the patient was likely experiencing and encephalopathy/delirium component  It was recommended to avoid anticholinergic medications      At this point since her hospitalization the patient only seems to recall that last night during the night when she was extremely tired and trying to sleep she felt she saw a big vicious dog coming down the hallway  She says she has not experienced that since awakening today  She does not seem to recall other reported hallucinations during the hospital stay  She does state however that about 2 months ago she began to experience auditory nation's feeling that she was hearing people like on the news in the next room in but nothing was in the next room and nobody was present  She states she was not able to make out what was being said  She found this very frustrating  She had reported this to daughter who confirmed these reports that the patient's frustration  The patient also reports that very gradually over recent years she has experienced having difficulty recalling things  Daughter says she has more frequently heard her mother say that she knows that she has for gotten something which is can think what it is but really has not noticed any memory disturbance other than the patient's report  There have been no visual hallucinations reported prior to the hospitalization  Past psychiatric history:  The patient states she had a breakdown about 35 years ago and received treatment for that  She states she is had no problems since then but she is on Wellbutrin and Cymbalta although these perhaps could have been started for pain management along with the Neurontin she is on for her knee pain  This is not clear to me  The patient reports she has also been on trazodone at night for insomnia at home  Social history:  Regarding alcohol the patient states she may have a glass a wine every now and then  She states she did smoke some marijuana before her children were born but not after she got   She is not a smoker  She is   She lives with her daughter  She has 3 children    She is retired from [employer]  Family history:  The patient states she knows that her biological mother required psychiatric hospitalization for about a year when the patient was very young reportedly for depression, anxiety and schizophrenia  Mental status examination:  The patient is alert and well oriented to person, time, place and situation  She is very pleasant and cooperative throughout the evaluation  Sensorium is clear  Thought process is logical linear  Thought content is reality based  The patient demonstrate good remote and immediate memory  Regarding recent memory she recalled 2 of 3 words after 5 minutes  She reports auditory and visual hallucinations as described above  She denies experiencing any hallucinations since being up this morning  Insight and judgment are intact at this time  She is judged to be of average intellectual function  She appears to have a very good relationship with her daughter who was present  Daughter presents as being very supportive     Inpatient consult to Psychiatry  Consult performed by: Anna Brown  Consult ordered by: Jackie Mckeon PA-C            Past Medical History:   Diagnosis Date    Anxiety     Arthritis     Asthma     Breast cancer ([facility] [address] )     rt mastectomy 2005    [facility])     breast right    Cataract     CHF (congestive heart failure) ([facility] )     Coronary artery disease     Depression     GERD (gastroesophageal reflux disease)     High cholesterol     History of chemotherapy     2005 rt breast cancer    History of transfusion     Hypertension     IBS (irritable bowel syndrome)     Irritable bowel syndrome (IBS) 5/31/2016    Kidney stone     Lymphedema of right arm     Obesity     PONV (postoperative nausea and vomiting)     Post-menopausal     Psychiatric disorder     Renal disorder     Vitamin D deficiency        Medical Review Of Systems:  Review of Systems    Meds/Allergies   all current active meds have been reviewed  Allergies   Allergen Reactions    Ibuprofen Nausea Only, Rash, Dizziness and Syncope    Latex Dermatitis       Objective   Vital signs in last 24 hours:  Temp:  [98 4 °F (36 9 °C)-98 6 °F (37 °C)] 98 4 °F (36 9 °C)  HR:  [107] 107  Resp:  [16-18] 16  BP: (133)/(79) 133/79      Intake/Output Summary (Last 24 hours) at 5/27/2021 1513  Last data filed at 5/27/2021 7159  Gross per 24 hour   Intake 2480 ml   Output 800 ml   Net 1680 ml         Lab Results:  Reviewed  Imaging Studies:  Reviewed  EKG, Pathology, and Other Studies:  Reviewed     Code Status: Level 3 - DNAR and DNI  Advance Directive and Living Will:      Power of :    POLST:      Counseling / Coordination of Care  Total floor / unit time spent today 50 minutes  Greater than 50% of total time was spent with the patient and / or family counseling and / or coordination of care   A description of the counseling / coordination of care:  Chart review, patient evaluation and discussion of results with patient and her daughter, coordination staff

## 2021-05-27 NOTE — PLAN OF CARE
Problem: PHYSICAL THERAPY ADULT  Goal: Performs mobility at highest level of function for planned discharge setting  See evaluation for individualized goals  Description: Treatment/Interventions: LE strengthening/ROM, Functional transfer training, Elevations, Therapeutic exercise, Endurance training, Gait training, Bed mobility          See flowsheet documentation for full assessment, interventions and recommendations  Outcome: Progressing  Note: Prognosis: Guarded  Problem List: Decreased strength, Decreased range of motion, Decreased endurance, Decreased cognition, Decreased mobility, Impaired balance, Decreased safety awareness, Impaired judgement, Pain, Orthopedic restrictions  Assessment: Pt seen this date for physical therapy treatment session to improve functional mobility and transfers  During sit<-> transfers, pt requires Min Ax2, frequent verbal cues and RW  She displays decreased weightbearing through LLE in transfers, and requires cuing to shift weight onto LLE to tolerance  Pt was able to ambulate 5' and 21' with Min Ax2, RW, and chair follow; pt reports fatigue and SOB  She required seated rest during ambulation, and needed chair to return to room  SpO2 remained WFL on RA throughout  Throughout session, pt guarded L knee flexion and was hesitant to move LLE  Pt displays improvements in exercise tolerance in comparison to last session  PT services are recommended to continue for current hospitalization in order to address functional deficits and facilitate safe discharge  The patient's AM-PAC Basic Mobility Inpatient Short Form Raw Score is 14, Standardized Score is 35 55  A standardized score less than 42 9 suggests the patient may benefit from discharge to post-acute rehabilitation services  Please also refer to the recommendation of the Physical Therapist for safe discharge planning             PT Discharge Recommendation: Return to facility with rehabilitation services          See flowsheet documentation for full assessment

## 2021-05-27 NOTE — PROGRESS NOTES
[facility]  Progress Note - Teena Romo 1950, 73 y o  female MRN: [id number]  Unit/Bed#: 411-01 Encounter: [id number]  Primary Care Provider: Sam Brown PA-C   Date and time admitted to hospital: 5/24/2021  1:39 PM    * Acute metabolic encephalopathy  Assessment & Plan  Presented to the emergency room for evaluation of change in mental status  She was just admitted to skilled nursing facility for STR S/P total left knee Arthroplasty and found to be confused and with hallucinations upon arrival  Now resolved    Suspect etiology is due to polypharmacy - on oxycodone 10mg tablets every 6 hours in addition to underlying psychiatric medications  CT head- No acute intracranial abnormality  No focal neurologic deficits  Restarted narcotics at lower dosing protocol  Reduced Neurontin dosing from TID to BID dosing  Neurology consultation appreciated- suspect psychiatric/delerium more than organic neurological issue  Check B12, avoid anticholinergics  Psychiatry consult appreciated-  Possible anticholinergic induced confusion and hallucinations- avoid/elimiate all medications with significant anticholinergic if possible  Dermatitis  Assessment & Plan  Bullae noted surrounding surgical site  Suspect contact dermatitis due to allergy vs  Irritant  Will monitor closely  Orthopedics consultation appreciated- does not appear to have infection  Wound care consult pending  S/P total knee arthroplasty, left  Assessment & Plan  She is S/P left knee arthroplasty on 5/20/2021  She has notable fluid-filled bullae at surgical site  Suspect possible contact allergic dermatitis due to skin prep or from immobilizer? Afebrile, no leukocytosis  Orthopedics consultation appreciated  Continue PT/OT - WBAT LLE    Plan for STR on discharge- patient accepted at [address]     Sinus tachycardia  Assessment & Plan  She has ongoing episodes postop sinus tachycardia  Suspect this is multifactorial in the setting of pain, mild anxiety, dehydration  CTA was negative for acute PE on 5/22  EKG shows-sinus tachycardia at rate of 111 bpm     Patient received lana IVF hydration overnight, discontinued further IV fluids on 5/25 given chest Xray findings  Control pain adequately  magnesium level 2 1  Echocardiogram - EF 60%, mild LVH, moderate AS, otherwise unremarkable  Recurrent major depressive disorder, in partial remission ([facility] [address] )  Assessment & Plan  Currently stable  Held PTA psych medication overnight on the night of admission given AMS and reports of Hallucinations  Restarted cymbalta, wellbutrin at previous doses on 5/25/21  Restarted trazodone at 25mg dose (usual dose is 50mg) on 5/25/21  Vitamin D deficiency  Assessment & Plan  Continue vitamin D weekly supplementation  Essential hypertension  Assessment & Plan  Blood pressure is currently stable  Not on any home BP medications  Monitor blood pressure closely  Mild intermittent asthma without complication  Assessment & Plan  Respiratory protocol  Continue PT respiratory medications  Continue outpatient pulmonary follow-up  VTE Pharmacologic Prophylaxis:   Pharmacologic: Enoxaparin (Lovenox)  Mechanical VTE Prophylaxis in Place: Yes    Patient Centered Rounds: I have performed bedside rounds with nursing staff today  Discussions with Specialists or Other Care Team Provider: nursing, CM    Education and Discussions with Family / Patient: patient    Time Spent for Care: 20 minutes  More than 50% of total time spent on counseling and coordination of care as described above      Current Length of Stay: 3 day(s)    Current Patient Status: Inpatient   Certification Statement: The patient will continue to require additional inpatient hospital stay due to need for placement to STR    Discharge Plan: Likely discharge tomorrow to [address] [place] [address]     Code Status: Level 3 - DNAR and DNI      Subjective: The patient was seen and examined  The patient denies any complaints  Objective:     Vitals:   Temp (24hrs), Av 6 °F (37 °C), Min:98 4 °F (36 9 °C), Max:98 7 °F (37 1 °C)    Temp:  [98 4 °F (36 9 °C)-98 7 °F (37 1 °C)] 98 7 °F (37 1 °C)  HR:  [107-109] 109  Resp:  [16-18] 17  BP: (133-140)/(79-89) 140/89  SpO2:  [93 %-95 %] 94 %  Body mass index is 40 55 kg/m²  Input and Output Summary (last 24 hours): Intake/Output Summary (Last 24 hours) at 2021 1734  Last data filed at 2021 0810  Gross per 24 hour   Intake 2480 ml   Output 800 ml   Net 1680 ml       Physical Exam:     Physical Exam  Vitals signs and nursing note reviewed  Constitutional:       General: She is awake  Appearance: Normal appearance  Cardiovascular:      Rate and Rhythm: Normal rate and regular rhythm  Pulmonary:      Effort: Pulmonary effort is normal       Breath sounds: Normal breath sounds  No wheezing, rhonchi or rales  Abdominal:      General: Bowel sounds are normal       Palpations: Abdomen is soft  Tenderness: There is no abdominal tenderness  Skin:     General: Skin is warm and dry  Neurological:      General: No focal deficit present  Mental Status: She is alert and oriented to person, place, and time  Psychiatric:         Attention and Perception: Attention normal          Mood and Affect: Mood normal          Speech: Speech normal          Behavior: Behavior is cooperative           Additional Data:     Labs:    Results from last 7 days   Lab Units 21  0441 21  0426  21  1354   WBC Thousand/uL 8 46 7 32   < > 7 59   HEMOGLOBIN g/dL 10 8* 9 3*   < > 10 5*   HEMATOCRIT % 33 0* 29 0*   < > 32 4*   PLATELETS Thousands/uL 271 222   < > 202   NEUTROS PCT %  --   --   --  70   LYMPHS PCT %  --   --   --  13*   LYMPHO PCT %  --  19  --   --    MONOS PCT %  --   --   --  12   MONO PCT %  --  11  --   --    EOS PCT %  --  1  --  2    < > = values in this interval not displayed  Results from last 7 days   Lab Units 05/27/21  0441  05/25/21  0420   SODIUM mmol/L 140   < > 140   POTASSIUM mmol/L 3 7   < > 3 6   CHLORIDE mmol/L 106   < > 106   CO2 mmol/L 28   < > 28   BUN mg/dL 8   < > 10   CREATININE mg/dL 0 67   < > 0 60   ANION GAP mmol/L 6   < > 6   CALCIUM mg/dL 8 4   < > 8 0*   ALBUMIN g/dL  --   --  2 2*   TOTAL BILIRUBIN mg/dL  --   --  0 92   ALK PHOS U/L  --   --  112   ALT U/L  --   --  56   AST U/L  --   --  24   GLUCOSE RANDOM mg/dL 121   < > 120    < > = values in this interval not displayed  Results from last 7 days   Lab Units 05/24/21  1354   LACTIC ACID mmol/L 1 0           * I Have Reviewed All Lab Data Listed Above  * Additional Pertinent Lab Tests Reviewed: All Labs Within Last 24 Hours Reviewed    Imaging:    Imaging Reports Reviewed Today Include: none  Imaging Personally Reviewed by Myself Includes:  none    Recent Cultures (last 7 days):     Results from last 7 days   Lab Units 05/24/21  1354   BLOOD CULTURE  No Growth at 48 hrs  No Growth at 48 hrs         Last 24 Hours Medication List:   Current Facility-Administered Medications   Medication Dose Route Frequency Provider Last Rate    acetaminophen  650 mg Oral Q6H PRN Sanjay Acosta PA-C      albuterol  2 5 mg Nebulization Q4H PRN Sanjay Acosta, PA-JOSÉ LUIS      ascorbic acid  500 mg Oral Daily ARIS Roblero-JOSÉ LUIS      atorvastatin  20 mg Oral QPM Sanjay Acosta PA-C      buPROPion  300 mg Oral Daily Sanjay Acosta PA-C      butalbital-acetaminophen-caffeine  1 tablet Oral TID PRN Sanjay Acosta, PA-JOSÉ LUIS      dicyclomine  10 mg Oral Daily Sanjay Acosta PA-C      docusate sodium  100 mg Oral BID Sanjay Acosta PA-C      DULoxetine  60 mg Oral BID Sanjay Acosta PA-C      enoxaparin  40 mg Subcutaneous BID Sanjay Acosta, PA-JOSÉ LUIS      ferrous sulfate  325 mg Oral BID With Meals ARIS Roblero-JOSÉ LUIS      folic acid  1 mg Oral Daily Araceli Ruth PA-C      gabapentin  300 mg Oral BID Sophia Hays PA-C      loratadine  10 mg Oral Daily Sophia Hays PA-C      montelukast  10 mg Oral HS Araceli Ruth PA-C      ondansetron  4 mg Intravenous Q6H PRN Sophia Hays PA-C      oxyCODONE  2 5 mg Oral Q6H PRN Sophia Hays PA-C      Or    oxyCODONE  5 mg Oral Q6H PRN Sophia Hays PA-C      promethazine  25 mg Intravenous Q6H PRN Isis Rosales PA-C      sodium chloride (PF)  3 mL Intravenous Q1H PRN Sophia Hays PA-C      sodium chloride  75 mL/hr Intravenous Continuous Araceli Ruth PA-C 75 mL/hr (05/27/21 0329)    tamsulosin  0 4 mg Oral Daily With Dinner Sophia Hays PA-C          Today, Patient Was Seen By: Isis Rosales PA-C    ** Please Note: Dictation voice to text software may have been used in the creation of this document   **

## 2021-05-27 NOTE — RESPIRATORY THERAPY NOTE
05/26/21 8590   Oxygen Therapy/Pulse Ox   O2 Device None (Room air)   O2 Therapy Room air   SpO2 Activity At Rest   $ Pulse Oximetry Spot Check Charge Completed       Oxygen saturations are 94%  Patient is not c/o sob, nor is she in respiratory distress  She does have a coarse, congested, non-clearing cough   She stated she had a flutter valve, but that it got lost in her moves in the hospital  I have given patient a new one, it is on her bedside table, at this time, patient is sleeping, but she did tell me earlier she knew how to use the device

## 2021-05-27 NOTE — ASSESSMENT & PLAN NOTE
She is S/P left knee arthroplasty on 5/20/2021  She has notable fluid-filled bullae at surgical site  Suspect possible contact allergic dermatitis due to skin prep or from immobilizer? Afebrile, no leukocytosis  Orthopedics consultation appreciated  Continue PT/OT - WBAT LLE    Plan for STR on discharge- patient accepted at [address]

## 2021-05-27 NOTE — CASE MANAGEMENT
I spoke with patient's daughter Catalina Wilson and she would like the patient to go to 1) 5th floor and [facility] for STR   Referrals were made yesterday

## 2021-05-27 NOTE — PLAN OF CARE
Problem: Potential for Falls  Goal: Patient will remain free of falls  Description: INTERVENTIONS:  - Assess patient frequently for physical needs  -  Identify cognitive and physical deficits and behaviors that affect risk of falls  (confusion, hallucinations, weakness, swelling, injury, pain)  -  [place] fall precautions as indicated by assessment   (high fall risk)  - Educate patient/family on patient safety including physical limitations  - Instruct patient to call for assistance with activity based on assessment  - Modify environment to reduce risk of injury  - Consider OT/PT consult to assist with strengthening/mobility  Outcome: Progressing     Problem: Prexisting or High Potential for Compromised Skin Integrity  Goal: Skin integrity is maintained or improved  Description: INTERVENTIONS:  - Identify patients at risk for skin breakdown  - Assess and monitor skin integrity  - Assess and monitor nutrition and hydration status  - Monitor labs   - Assess for incontinence   - Turn and reposition patient  - Assist with mobility/ambulation  - Relieve pressure over bony prominences  - Avoid friction and shearing  - Provide appropriate hygiene as needed including keeping skin clean and dry  - Evaluate need for skin moisturizer/barrier cream  - Collaborate with interdisciplinary team   - Patient/family teaching  - Consider wound care consult   Outcome: Progressing     Problem: NEUROSENSORY - ADULT  Goal: Achieves stable or improved neurological status  Description: INTERVENTIONS  - Monitor and report changes in neurological status  - Monitor vital signs such as temperature, blood pressure, glucose, and any other labs ordered   - Initiate measures to prevent increased intracranial pressure  - Monitor for seizure activity and implement precautions if appropriate      Outcome: Progressing  Goal: Achieves maximal functionality and self care  Description: INTERVENTIONS  - Monitor swallowing and airway patency with patient fatigue and changes in neurological status  - Encourage and assist patient to increase activity and self care     - Encourage visually impaired, hearing impaired and aphasic patients to use assistive/communication devices  Outcome: Progressing     Problem: SKIN/TISSUE INTEGRITY - ADULT  Goal: Skin integrity remains intact  Description: INTERVENTIONS  - Identify patients at risk for skin breakdown  - Assess and monitor skin integrity  - Assess and monitor nutrition and hydration status  - Monitor labs (i e  albumin)  - Assess for incontinence   - Turn and reposition patient  - Assist with mobility/ambulation  - Relieve pressure over bony prominences  - Avoid friction and shearing  - Provide appropriate hygiene as needed including keeping skin clean and dry  - Evaluate need for skin moisturizer/barrier cream  - Collaborate with interdisciplinary team (i e  Nutrition, Rehabilitation, etc )   - Patient/family teaching  Outcome: Progressing     Problem: MUSCULOSKELETAL - ADULT  Goal: Maintain or return mobility to safest level of function  Description: INTERVENTIONS:  - Assess patient's ability to carry out ADLs; assess patient's baseline for ADL function and identify physical deficits which impact ability to perform ADLs (bathing, care of mouth/teeth, toileting, grooming, dressing, etc )  - Assess/evaluate cause of self-care deficits   - Assess range of motion  - Assess patient's mobility  - Assess patient's need for assistive devices and provide as appropriate  - Encourage maximum independence but intervene and supervise when necessary  - Involve family in performance of ADLs  - Assess for home care needs following discharge   - Consider OT consult to assist with ADL evaluation and planning for discharge  - Provide patient education as appropriate  Outcome: Progressing     Problem: INFECTION - ADULT  Goal: Absence or prevention of progression during hospitalization  Description: INTERVENTIONS:  - Assess and monitor for signs and symptoms of infection  - Monitor lab/diagnostic results  - Monitor all insertion sites, i e  indwelling lines, tubes, and drains  - Monitor endotracheal if appropriate and nasal secretions for changes in amount and color  - [place] appropriate cooling/warming therapies per order  - Administer medications as ordered  - Instruct and encourage patient and family to use good hand hygiene technique  - Identify and instruct in appropriate isolation precautions for identified infection/condition  Outcome: Progressing     Problem: SAFETY ADULT  Goal: Patient will remain free of falls  Description: INTERVENTIONS:  - Assess patient frequently for physical needs  -  Identify cognitive and physical deficits and behaviors that affect risk of falls  (confusion, hallucinations, weakness, swelling, injury, pain)  -  [place] fall precautions as indicated by assessment   (high fall risk)  - Educate patient/family on patient safety including physical limitations  - Instruct patient to call for assistance with activity based on assessment  - Modify environment to reduce risk of injury  - Consider OT/PT consult to assist with strengthening/mobility  Outcome: Progressing  Goal: Maintain or return to baseline ADL function  Description: INTERVENTIONS:  -  Assess patient's ability to carry out ADLs; assess patient's baseline for ADL function and identify physical deficits which impact ability to perform ADLs (bathing, care of mouth/teeth, toileting, grooming, dressing, etc )  - Assess/evaluate cause of self-care deficits   - Assess range of motion  - Assess patient's mobility; develop plan if impaired  - Assess patient's need for assistive devices and provide as appropriate  - Encourage maximum independence but intervene and supervise when necessary  - Involve family in performance of ADLs  - Assess for home care needs following discharge   - Consider OT consult to assist with ADL evaluation and planning for discharge  - Provide patient education as appropriate  Outcome: Progressing  Goal: Maintain or return mobility status to optimal level  Description: INTERVENTIONS:  - Assess patient's baseline mobility status (ambulation, transfers, stairs, etc )    - Identify cognitive and physical deficits and behaviors that affect mobility  - Identify mobility aids required to assist with transfers and/or ambulation (gait belt, sit-to-stand, lift, walker, cane, etc )  - [place] fall precautions as indicated by assessment  - Record patient progress and toleration of activity level on Mobility SBAR; progress patient to next Phase/Stage  - Instruct patient to call for assistance with activity based on assessment  - Consider rehabilitation consult to assist with strengthening/weightbearing, etc   Outcome: Progressing     Problem: PAIN - ADULT  Goal: Verbalizes/displays adequate comfort level or baseline comfort level  Description: Interventions:  - Encourage patient to monitor pain and request assistance  - Assess pain using appropriate pain scale  - Administer analgesics based on type and severity of pain and evaluate response  - Implement non-pharmacological measures as appropriate and evaluate response  - Consider cultural and social influences on pain and pain management  - Notify physician/advanced practitioner if interventions unsuccessful or patient reports new pain  Outcome: Progressing     Problem: DISCHARGE PLANNING  Goal: Discharge to home or other facility with appropriate resources  Description: INTERVENTIONS:  - Identify barriers to discharge w/patient and caregiver  - Arrange for needed discharge resources and transportation as appropriate  - Identify discharge learning needs (meds, wound care, etc )  - Arrange for interpretive services to assist at discharge as needed  - Refer to Case Management Department for coordinating discharge planning if the patient needs post-hospital services based on physician/advanced practitioner order or complex needs related to functional status, cognitive ability, or social support system  Outcome: Progressing     Problem: Nutrition/Hydration-ADULT  Goal: Nutrient/Hydration intake appropriate for improving, restoring or maintaining nutritional needs  Description: Monitor and assess patient's nutrition/hydration status for malnutrition  Collaborate with interdisciplinary team and initiate plan and interventions as ordered  Monitor patient's weight and dietary intake as ordered or per policy  Utilize nutrition screening tool and intervene as necessary  Determine patient's food preferences and provide high-protein, high-caloric foods as appropriate       INTERVENTIONS:  - Monitor oral intake, urinary output, labs, and treatment plans  - Assess nutrition and hydration status and recommend course of action  - Evaluate amount of meals eaten  - Assist patient with eating if necessary   - Allow adequate time for meals  - Recommend/ encourage appropriate diets, oral nutritional supplements, and vitamin/mineral supplements  - Order, calculate, and assess calorie counts as needed  - Recommend, monitor, and adjust tube feedings and TPN/PPN based on assessed needs  - Assess need for intravenous fluids  - Provide specific nutrition/hydration education as appropriate  - Include patient/family/caregiver in decisions related to nutrition  Outcome: Progressing

## 2021-05-27 NOTE — OCCUPATIONAL THERAPY NOTE
OccupationalTherapy Progress Note     Patient Name: Dileep Monge  XJNDH'P Date: 5/27/2021  Problem List  Principal Problem:    Acute metabolic encephalopathy  Active Problems:    Mild intermittent asthma without complication    Essential hypertension    Vitamin D deficiency    Recurrent major depressive disorder, in partial remission (HCC)    Sinus tachycardia    S/P total knee arthroplasty, left    Dermatitis              05/27/21 1332   OT Last Visit   OT Visit Date 05/27/21   Note Type   Note Type Treatment   Restrictions/Precautions   Weight Bearing Precautions Per Order Yes   LLE Weight Bearing Per Order WBAT   Other Precautions Chair Alarm;WBS;Multiple lines;O2;Fall Risk;Pain   Pain Assessment   Pain Assessment Tool 0-10   Pain Score 6   Pain Location/Orientation Orientation: Left; Location: Leg;Location: Generalized; Location: Knee   ADL   Where Assessed Chair   LB Dressing Assistance 3  Moderate Assistance   LB Dressing Deficit Verbal cueing;Supervision/safety; Increased time to complete; Thread LLE into underwear; Thread RLE into underwear;Pull up over hips; Impulsive   Toileting Comments pt donned pull-up with education to michelle (L)LE first; pt encouraged to slow down with activity as she was impulsive at times    Functional Standing Tolerance   Time ~2 min   Activity pt educated on weight shifting while standing c RW; pt tolerated standing to michelle pull-up over hips and funcitonal mobility    Bed Mobility   Additional Comments pt on RA; SPO2 WNL; pt seated in chair at start/end of session with all needs within reach   Transfers   Sit to Stand 4  Minimal assistance   Additional items Assist x 2;Armrests; Increased time required;Verbal cues; Impulsive   Stand to Sit 4  Minimal assistance   Additional items Assist x 2;Armrests; Increased time required;Verbal cues; Impulsive   Additional Comments RW used   Functional Mobility   Functional Mobility 4  Minimal assistance   Additional Comments pt demonstrated functional mobility c RW; 1st 5 ft; 2nd 20ft; (A) x2 due to frequent reminders to educate pt on sequencing of RW/pace/and direction    Additional items Rolling walker   Cognition   Overall Cognitive Status Impaired   Arousal/Participation Alert; Responsive; Cooperative   Attention Attends with cues to redirect   Orientation Level Oriented to person;Oriented to place;Oriented to time;Disoriented to situation   Memory Decreased recall of recent events;Decreased recall of precautions   Following Commands Follows one step commands without difficulty   Activity Tolerance   Activity Tolerance Patient limited by fatigue;Patient limited by pain   Assessment   Assessment  Pt is a 73 y o  female seen for OT treatment s/p admit [facility] 0/49[facility]0819 w/ Acute metabolic encephalopathy  Pt requires min (A) x1-2 for ADLs, tranfers, and functional mobility requiring the use of RW 2* the following deficits impacting occupational performance: weakness, decreased strength, decreased balance, decreased tolerance, impaired initiation, impaired sequencing, impaired problem solving, impulsivity, decreased safety awareness and increased pain  Pt to benefit from continued skilled OT tx while in the hospital to address deficits as defined above and maximize level of functional independence w ADL's and functional mobility  Occupational Performance areas to address include: grooming, bathing/shower, toilet hygiene, dressing, health maintenance, functional mobility, community mobility and clothing management  The patient's raw score on the AM-PAC Daily Activity inpatient short form is 16, standardized score is 35 96, less than 39 4  Patients at this level are likely to benefit from discharge to post-acute rehabilitation services  Please refer to the recommendation of the Occupational Therapist for safe discharge planning  Plan   Treatment Interventions ADL retraining;Functional transfer training;UE strengthening/ROM; Endurance training;Patient/family training;Equipment evaluation/education; Energy conservation; Activityengagement   Goal Expiration Date 06/08/21   OT Treatment Day 1   OT Frequency 3-5x/wk   Recommendation   OT Discharge Recommendation Post acute rehabilitation services   AM-PAC Daily Activity Inpatient   Lower Body Dressing 2   Bathing 2   Toileting 2   Upper Body Dressing 2   Grooming 4   Eating 4   Daily Activity Raw Score 16   Daily Activity Standardized Score (Calc for Raw Score >=11) 35 96   AM-PAC Applied Cognition Inpatient   Following a Speech/Presentation 4   Understanding Ordinary Conversation 4   Taking Medications 4   Remembering Where Things Are Placed or Put Away 3   Remembering List of 4-5 Errands 3   Taking Care of Complicated Tasks 2   Applied Cognition Raw Score 20   Applied Cognition Standardized Score 41 76     Pt will benefit from continued OT services in order to maximize (I) c ADL performance, FM c RW, and improve overall endurance/strength required to complete functional tasks in preparation for d/c  Pt left seated in chair at end of session; all needs within reach; all lines intact; scds connected and turned on

## 2021-05-27 NOTE — CASE MANAGEMENT
5th floor is unable to accept the patient  [facility] able to [facility] the patient  I started authorization for SNF  Pending Reference number is [id number]  I await call to see where I send clinicals to

## 2021-05-27 NOTE — PROGRESS NOTES
Progress Note - Chiquita Stokes 73 y o  female MRN: [id number]  Unit/Bed#: 411-01 Encounter: [id number]    Assessment:    Status post left total knee replacement from 05/20/2021    Plan:   dressing was changed this morning  Continue physical therapy and occupational therapy weight-bearing as tolerated   Continue Lovenox for DVT prophylaxis   Continue daily dressing changes with Ace wrap, 4 x 4 gauze, ABDs   okay to discharge to rehab/snf from orthopedic perspective    Weight bearing:  Weight-bearing as tolerated    VTE Pharmacologic Prophylaxis: Enoxaparin (Lovenox)  VTE Mechanical Prophylaxis: sequential compression device    Subjective:  States pain is better controlled today  Vitals: Blood pressure 133/79, pulse (!) 107, temperature 98 4 °F (36 9 °C), resp  rate 16, height 4' 9" (1 448 m), weight 85 kg (187 lb 6 3 oz), SpO2 95 %, not currently breastfeeding  ,Body mass index is 40 55 kg/m²        Intake/Output Summary (Last 24 hours) at 5/27/2021 1153  Last data filed at 5/27/2021 0778  Gross per 24 hour   Intake 2720 ml   Output 1000 ml   Net 1720 ml       Invasive Devices     Peripheral Intravenous Line            Peripheral IV 05/25/21 Left;Dorsal (posterior) Hand 2 days          Drain            External Urinary Catheter less than 1 day                Physical Exam:     Left lower extremity is neurovascularly intact  Toes are pink and mobile   Compartments are soft   There are several bullae along the anterior portion of the patient's lower leg  There are no streaking, tahir pus, or significant erythema   Some of the bullae have ruptured, but no signs of infection   Incision is clean, dry and intact with Steri-Strips in place   Brisk cap refill   Sensation intact   Negative Homans   Good dorsiflexion and plantar flexion of ankle  Good quad tone    Lab, Imaging and other studies:   CBC:   Lab Results   Component Value Date    WBC 8 46 05/27/2021    HGB 10 8 (L) 05/27/2021    HCT 33 0 (L) 05/27/2021    MCV 95 05/27/2021     05/27/2021    MCH 31 0 05/27/2021    MCHC 32 7 05/27/2021    RDW 12 4 05/27/2021    MPV 9 1 05/27/2021    NRBC 0 05/27/2021     CMP:   Lab Results   Component Value Date    SODIUM 140 05/27/2021     05/27/2021    CO2 28 05/27/2021    BUN 8 05/27/2021    CREATININE 0 67 05/27/2021    CALCIUM 8 4 05/27/2021    EGFR 90 05/27/2021

## 2021-05-27 NOTE — PHYSICAL THERAPY NOTE
Physical Therapy Progress Note    Patient Name: Delta Mode    CRXHG'M Date: 5/27/2021     Problem List  Principal Problem:    Acute metabolic encephalopathy  Active Problems:    Mild intermittent asthma without complication    Essential hypertension    Vitamin D deficiency    Recurrent major depressive disorder, in partial remission (HCC)    Sinus tachycardia    S/P total knee arthroplasty, left    Dermatitis       Past Medical History  Past Medical History:   Diagnosis Date    Anxiety     Arthritis     Asthma     Breast cancer ([facility] )     rt mastectomy 2005    Cancer [facility])     breast right    Cataract     CHF (congestive heart failure) ([facility] )     Coronary artery disease     Depression     GERD (gastroesophageal reflux disease)     High cholesterol     History of chemotherapy     2005 rt breast cancer    History of transfusion     Hypertension     IBS (irritable bowel syndrome)     Irritable bowel syndrome (IBS) 5/31/2016    Kidney stone     Lymphedema of right arm     Obesity     PONV (postoperative nausea and vomiting)     Post-menopausal     Psychiatric disorder     Renal disorder     Vitamin D deficiency         Past Surgical History  Past Surgical History:   Procedure Laterality Date    BREAST SURGERY      CATARACT EXTRACTION Bilateral     CATARACT EXTRACTION, BILATERAL      CHOLECYSTECTOMY      COLONOSCOPY N/A 5/31/2016    Procedure: COLONOSCOPY;  Surgeon: Skip Hsieh MD;  Location: [facility] MAIN [facility];  Service:    Olga Mckeon GALLBLADDER SURGERY      HYSTERECTOMY      Total    KNEE ARTHROSCOPY      KNEE SURGERY      arthroscopy    MASTECTOMY Right     rt breast mastectomy 2005    AL TOTAL KNEE ARTHROPLASTY Left 5/20/2021    Procedure: ARTHROPLASTY KNEE TOTAL;  Surgeon: Ally Fonseca MD;  Location[facility] MAIN OR;  Service: Orthopedics    TONSILLECTOMY AND ADENOIDECTOMY             05/27/21 1334   PT Last Visit   PT Visit Date 05/27/21   Note Type   Note Type Treatment   Pain Assessment   Pain Assessment Tool 0-10   Pain Score 6   Pain Location/Orientation Orientation: Left; Location: Leg   Restrictions/Precautions   Weight Bearing Precautions Per Order Yes   LLE Weight Bearing Per Order WBAT   Other Precautions Multiple lines;Pain; Fall Risk; Chair Alarm; Bed Alarm;WBS;Cognitive   General   Chart Reviewed Yes   Family/Caregiver Present Yes  (Daughter)   Cognition   Overall Cognitive Status Impaired   Arousal/Participation Alert   Attention Difficulty attending to directions   Orientation Level Oriented to person;Oriented to place;Oriented to time   Memory Decreased recall of precautions   Following Commands Follows one step commands without difficulty   Subjective   Subjective "I don't mean to complain"   Transfers   Sit to Stand 4  Minimal assistance   Additional items Assist x 2;Armrests; Increased time required;Verbal cues; Impulsive   Stand to Sit 4  Minimal assistance   Additional items Assist x 2;Armrests; Increased time required;Verbal cues; Impulsive   Ambulation/Elevation   Gait pattern Excessively slow; Forward Flexion; Inconsistent eloisa; Improper Weight shift;Decreased L stance   Gait Assistance 4  Minimal assist   Additional items Verbal cues; Assist x 2  (Chair follow )   Assistive Device Rolling walker   Distance 20'   Stair Management Assistance Not tested   Balance   Static Sitting Good   Dynamic Sitting Fair +   Static Standing Fair -   Dynamic Standing Poor +   Ambulatory Poor +   Endurance Deficit   Endurance Deficit Yes   Endurance Deficit Description pt reports SOB and nausea   Activity Tolerance   Activity Tolerance Patient limited by fatigue;Patient limited by pain   Assessment   Prognosis Guarded   Problem List Decreased strength;Decreased range of motion;Decreased endurance;Decreased cognition;Decreased mobility; Impaired balance;Decreased safety awareness; Impaired judgement;Pain;Orthopedic restrictions   Assessment Pt seen this date for physical therapy treatment session to improve functional mobility and transfers  During sit<-> transfers, pt requires Min Ax2, frequent verbal cues and RW  She displays decreased weightbearing through LLE in transfers, and requires cuing to shift weight onto LLE to tolerance  Pt was able to ambulate 5' and 21' with Min Ax2, RW, and chair follow; pt reports fatigue and SOB  She required seated rest during ambulation, and needed chair to return to room  SpO2 remained WFL on RA throughout  Throughout session, pt guarded L knee flexion and was hesitant to move LLE  Pt displays improvements in exercise tolerance in comparison to last session  PT services are recommended to continue for current hospitalization in order to address functional deficits and facilitate safe discharge  The [facility]'s [facility] Basic Mobility Inpatient Short Form Raw Score is 14, Standardized Score is 35 55  A standardized score less than 42 9 suggests the patient may benefit from discharge to post-acute rehabilitation services  Please also refer to the recommendation of the Physical Therapist for safe discharge planning  Goals   Patient Goals to get better   LTG Expiration Date 06/10/21   Plan   Treatment/Interventions Functional transfer training;LE strengthening/ROM; Therapeutic exercise; Bed mobility;Gait training; Endurance training   Progress Progressing toward goals   PT Frequency Other (Comment)  (3-5x/wk)   AM-PAC Basic Mobility Inpatient   Turning in Bed Without Bedrails 2   Lying on Back to Sitting on Edge of Flat Bed 2   Moving Bed to Chair 2   Standing Up From Chair 2   Walk in Room 2   Climb 3-5 Stairs 4   Basic Mobility Inpatient Raw Score 14   Basic Mobility Standardized Score 35 55   Pt seated in recliner at end of session with all needs in reach

## 2021-05-27 NOTE — CASE MANAGEMENT
Pt will likely be going to [facility] at [facility] on 5/28/21  [facility] will pick the patient up at 1300 to transport her

## 2021-05-27 NOTE — PLAN OF CARE
Problem: OCCUPATIONAL THERAPY ADULT  Goal: Performs self-care activities at highest level of function for planned discharge setting  See evaluation for individualized goals  Description: Treatment Interventions: ADL retraining, Functional transfer training, UE strengthening/ROM, Endurance training, Patient/family training, Compensatory technique education, Energy conservation, Activityengagement          See flowsheet documentation for full assessment, interventions and recommendations  Outcome: Progressing  Note: Limitation: Decreased ADL status, Decreased UE strength, Decreased Safe judgement during ADL, Decreased endurance, Decreased self-care trans, Decreased high-level ADLs     Assessment:  Pt is a 73 y o  female seen for OT treatment s/p admit to [facility] on 9/78/0746 w/ Acute metabolic encephalopathy  Pt requires min (A) x1-2 for ADLs, tranfers, and functional mobility requiring the use of RW 2* the following deficits impacting occupational performance: weakness, decreased strength, decreased balance, decreased tolerance, impaired initiation, impaired sequencing, impaired problem solving, impulsivity, decreased safety awareness and increased pain  Pt to benefit from continued skilled OT tx while in the hospital to address deficits as defined above and maximize level of functional independence w ADL's and functional mobility  Occupational Performance areas to address include: grooming, bathing/shower, toilet hygiene, dressing, health maintenance, functional mobility, community mobility and clothing management  The patient's raw score on the AM-PAC Daily Activity inpatient short form is 16, standardized score is 35 96, less than 39 4  Patients at this level are likely to benefit from discharge to post-acute rehabilitation services  Please refer to the recommendation of the Occupational Therapist for safe discharge planning         OT Discharge Recommendation: Post acute rehabilitation services

## 2021-05-27 NOTE — ASSESSMENT & PLAN NOTE
Presented to the emergency room for evaluation of change in mental status  She was just admitted to skilled nursing facility for STR S/P total left knee Arthroplasty and found to be confused and with hallucinations upon arrival  Now resolved    Suspect etiology is due to polypharmacy - on oxycodone 10mg tablets every 6 hours in addition to underlying psychiatric medications  CT head- No acute intracranial abnormality  No focal neurologic deficits  Restarted narcotics at lower dosing protocol  Reduced Neurontin dosing from TID to BID dosing  Neurology consultation appreciated- suspect psychiatric/delerium more than organic neurological issue  Check B12, avoid anticholinergics  Psychiatry consult appreciated-  Possible anticholinergic induced confusion and hallucinations- avoid/elimiate all medications with significant anticholinergic if possible

## 2021-05-28 VITALS
DIASTOLIC BLOOD PRESSURE: 90 MMHG | RESPIRATION RATE: 18 BRPM | HEART RATE: 107 BPM | BODY MASS INDEX: 40.62 KG/M2 | SYSTOLIC BLOOD PRESSURE: 142 MMHG | TEMPERATURE: 98.4 F | OXYGEN SATURATION: 93 % | WEIGHT: 188.27 LBS | HEIGHT: 57 IN

## 2021-05-28 LAB
ANION GAP SERPL CALCULATED.3IONS-SCNC: 6 MMOL/L (ref 4–13)
ATRIAL RATE: 117 BPM
BUN SERPL-MCNC: 10 MG/DL (ref 5–25)
CALCIUM SERPL-MCNC: 8.2 MG/DL (ref 8.3–10.1)
CHLORIDE SERPL-SCNC: 106 MMOL/L (ref 100–108)
CO2 SERPL-SCNC: 28 MMOL/L (ref 21–32)
CREAT SERPL-MCNC: 0.62 MG/DL (ref 0.6–1.3)
ERYTHROCYTE [DISTWIDTH] IN BLOOD BY AUTOMATED COUNT: 12.3 % (ref 11.6–15.1)
GFR SERPL CREATININE-BSD FRML MDRD: 92 ML/MIN/1.73SQ M
GLUCOSE SERPL-MCNC: 132 MG/DL (ref 65–140)
HCT VFR BLD AUTO: 33.4 % (ref 34.8–46.1)
HGB BLD-MCNC: 10.7 G/DL (ref 11.5–15.4)
MCH RBC QN AUTO: 30.5 PG (ref 26.8–34.3)
MCHC RBC AUTO-ENTMCNC: 32 G/DL (ref 31.4–37.4)
MCV RBC AUTO: 95 FL (ref 82–98)
NRBC BLD AUTO-RTO: 0 /100 WBCS
P AXIS: 57 DEGREES
PLATELET # BLD AUTO: 315 THOUSANDS/UL (ref 149–390)
PMV BLD AUTO: 8.9 FL (ref 8.9–12.7)
POTASSIUM SERPL-SCNC: 3.7 MMOL/L (ref 3.5–5.3)
PR INTERVAL: 178 MS
QRS AXIS: -2 DEGREES
QRSD INTERVAL: 98 MS
QT INTERVAL: 320 MS
QTC INTERVAL: 446 MS
RBC # BLD AUTO: 3.51 MILLION/UL (ref 3.81–5.12)
SARS-COV-2 RNA RESP QL NAA+PROBE: NEGATIVE
SODIUM SERPL-SCNC: 140 MMOL/L (ref 136–145)
T WAVE AXIS: 56 DEGREES
VENTRICULAR RATE: 117 BPM
VIT B12 SERPL-MCNC: 379 PG/ML (ref 100–900)
WBC # BLD AUTO: 8.54 THOUSAND/UL (ref 4.31–10.16)

## 2021-05-28 PROCEDURE — 97535 SELF CARE MNGMENT TRAINING: CPT

## 2021-05-28 PROCEDURE — 97116 GAIT TRAINING THERAPY: CPT

## 2021-05-28 PROCEDURE — 82607 VITAMIN B-12: CPT | Performed by: PHYSICIAN ASSISTANT

## 2021-05-28 PROCEDURE — 93010 ELECTROCARDIOGRAM REPORT: CPT | Performed by: INTERNAL MEDICINE

## 2021-05-28 PROCEDURE — 97530 THERAPEUTIC ACTIVITIES: CPT

## 2021-05-28 PROCEDURE — 94760 N-INVAS EAR/PLS OXIMETRY 1: CPT

## 2021-05-28 PROCEDURE — 94664 DEMO&/EVAL PT USE INHALER: CPT

## 2021-05-28 PROCEDURE — 99239 HOSP IP/OBS DSCHRG MGMT >30: CPT | Performed by: PHYSICIAN ASSISTANT

## 2021-05-28 PROCEDURE — 80048 BASIC METABOLIC PNL TOTAL CA: CPT | Performed by: PHYSICIAN ASSISTANT

## 2021-05-28 PROCEDURE — U0005 INFEC AGEN DETEC AMPLI PROBE: HCPCS | Performed by: PHYSICIAN ASSISTANT

## 2021-05-28 PROCEDURE — 85027 COMPLETE CBC AUTOMATED: CPT | Performed by: PHYSICIAN ASSISTANT

## 2021-05-28 PROCEDURE — U0003 INFECTIOUS AGENT DETECTION BY NUCLEIC ACID (DNA OR RNA); SEVERE ACUTE RESPIRATORY SYNDROME CORONAVIRUS 2 (SARS-COV-2) (CORONAVIRUS DISEASE [COVID-19]), AMPLIFIED PROBE TECHNIQUE, MAKING USE OF HIGH THROUGHPUT TECHNOLOGIES AS DESCRIBED BY CMS-2020-01-R: HCPCS | Performed by: PHYSICIAN ASSISTANT

## 2021-05-28 RX ORDER — GABAPENTIN 300 MG/1
300 CAPSULE ORAL 2 TIMES DAILY
Refills: 0
Start: 2021-05-28 | End: 2022-01-10

## 2021-05-28 RX ORDER — OXYCODONE HYDROCHLORIDE 5 MG/1
2.5 TABLET ORAL EVERY 6 HOURS PRN
Qty: 4 TABLET | Refills: 0 | Status: SHIPPED | OUTPATIENT
Start: 2021-05-28 | End: 2021-06-15

## 2021-05-28 RX ORDER — OXYCODONE HYDROCHLORIDE 5 MG/1
5 TABLET ORAL EVERY 6 HOURS PRN
Qty: 4 TABLET | Refills: 0 | Status: SHIPPED | OUTPATIENT
Start: 2021-05-28 | End: 2022-07-20

## 2021-05-28 RX ADMIN — BUPROPION HYDROCHLORIDE 300 MG: 150 TABLET, EXTENDED RELEASE ORAL at 08:35

## 2021-05-28 RX ADMIN — DULOXETINE HYDROCHLORIDE 60 MG: 30 CAPSULE, DELAYED RELEASE ORAL at 08:35

## 2021-05-28 RX ADMIN — FERROUS SULFATE TAB 325 MG (65 MG ELEMENTAL FE) 325 MG: 325 (65 FE) TAB at 08:34

## 2021-05-28 RX ADMIN — DOCUSATE SODIUM 100 MG: 100 CAPSULE, LIQUID FILLED ORAL at 08:35

## 2021-05-28 RX ADMIN — OXYCODONE HYDROCHLORIDE AND ACETAMINOPHEN 500 MG: 500 TABLET ORAL at 08:35

## 2021-05-28 RX ADMIN — ENOXAPARIN SODIUM 40 MG: 40 INJECTION SUBCUTANEOUS at 08:35

## 2021-05-28 RX ADMIN — LORATADINE 10 MG: 10 TABLET ORAL at 08:35

## 2021-05-28 RX ADMIN — GABAPENTIN 300 MG: 300 CAPSULE ORAL at 08:35

## 2021-05-28 RX ADMIN — FOLIC ACID 1 MG: 1 TABLET ORAL at 08:35

## 2021-05-28 RX ADMIN — OXYCODONE HYDROCHLORIDE 5 MG: 5 TABLET ORAL at 08:34

## 2021-05-28 NOTE — PHYSICAL THERAPY NOTE
PHYSICAL THERAPY NOTE          Patient Name: Diana LOCKE'A Date: 5/28/2021 05/28/21 1126   Note Type   Note Type Treatment   Restrictions/Precautions   Weight Bearing Precautions Per Order Yes   LLE Weight Bearing Per Order WBAT   Braces or Orthoses LE Immobilizer   Cognition   Overall Cognitive Status WFL   Following Commands Follows one step commands without difficulty   Subjective   Subjective States she is coming around and feeling better  Transfers   Sit to Stand 4  Minimal assistance   Additional items Assist x 2;Armrests; Increased time required;Verbal cues   Stand to Sit 4  Minimal assistance   Additional items Assist x 2;Armrests; Increased time required;Verbal cues   Toilet transfer 4  Minimal assistance   Additional items Assist x 2;Armrests; Increased time required;Commode   Additional Comments Co treatment with SIRENA secondary to complex medical condition of pt, ( min ) A of 2 required to achieve and maintain transitional movements, requiring the need of skilled therapeutic intervention of 2 therapists to achieve delivery of services  poor+ balance standing to for hygiene and clothing  Min A x 2 when managing clothing  Ambulation/Elevation   Gait pattern Excessively slow; Foward flexed;Decreased L stance; Improper Weight shift;Decreased foot clearance   Gait Assistance 4  Minimal assist   Additional items Assist x 2;Verbal cues   Assistive Device Rolling walker   Distance 20' x 1, 45' x 1   Balance   Static Sitting Fair +   Dynamic Sitting Fair +   Static Standing Fair -   Dynamic Standing Poor +  (unsupported)   Ambulatory Fair -  (with RW)   Endurance Deficit   Endurance Deficit Yes   Activity Tolerance   Activity Tolerance Patient limited by fatigue   Assessment   Prognosis Good   Problem List Decreased strength;Decreased range of motion;Decreased endurance; Impaired balance;Decreased mobility;Orthopedic restrictions   Assessment Pt  seen for PT treatment session this date with interventions consisting of  therapeutic exercises, transfers and  gait training w/ emphasis on improving pt's ability to ambulate  Pt  Currently performing  tx and ambulation at (min ) x 2 level of function  The patient's AM-PAC Basic Mobility Inpatient Short Form Raw Score is 16, Standardized Score is 38 32  A standardized score less than 42 9 suggests the patient may benefit from discharge to post-acute rehabilitation services  Please also refer to physical therapy recommendation for safe DC planning  In comparison to previous session, Pt  With improvements in activity tolerance  Pt is in need of continued activity in PT to improve strength balance endurance mobility transfers and ambulation with return to maximize LOF  From PT/mobility standpoint, recommendation at time of d/c would be post acute rehab  in order to promote return to PLOF and independence  Goals   LTG Expiration Date 06/10/21   Plan   Treatment/Interventions Functional transfer training;LE strengthening/ROM; Therapeutic exercise; Endurance training;Bed mobility;Gait training   Progress Progressing toward goals   Recommendation   PT Discharge Recommendation Post acute rehabilitation services   AM-PAC Basic Mobility Inpatient   Turning in Bed Without Bedrails 3   Lying on Back to Sitting on Edge of Flat Bed 2   Moving Bed to Chair 3   Standing Up From Chair 3   Walk in Room 3   Climb 3-5 Stairs 2   Basic Mobility Inpatient Raw Score 16   Basic Mobility Standardized Score 38 32   Pt  OOB in chair  with call bell within reach, all lines intact and alarm on at end of PT session  Discussed with  PT today's treatment and patient's current level of function for care coordination

## 2021-05-28 NOTE — RESPIRATORY THERAPY NOTE
05/28/21 0908   Respiratory Assessment   Assessment Type Assess only   General Appearance Alert; Awake   Respiratory Pattern Normal   Chest Assessment Chest expansion symmetrical   Bilateral Breath Sounds Clear;Diminished   O2 Device RA   Additional Assessments   Pulse (!) 107   Respirations 18   SpO2 93 %

## 2021-05-28 NOTE — ASSESSMENT & PLAN NOTE
Bullae noted surrounding surgical site  Suspect contact dermatitis due to allergy vs  Irritant  Orthopedics consultation appreciated- does not appear to have infection  Wound care consult recommended after discharge

## 2021-05-28 NOTE — PLAN OF CARE
Problem: Potential for Falls  Goal: Patient will remain free of falls  Description: INTERVENTIONS:  - Assess patient frequently for physical needs  -  Identify cognitive and physical deficits and behaviors that affect risk of falls  (confusion, hallucinations, weakness, swelling, injury, pain)  -  [place] fall precautions as indicated by assessment   (high fall risk)  - Educate patient/family on patient safety including physical limitations  - Instruct patient to call for assistance with activity based on assessment  - Modify environment to reduce risk of injury  - Consider OT/PT consult to assist with strengthening/mobility  Outcome: Progressing     Problem: Prexisting or High Potential for Compromised Skin Integrity  Goal: Skin integrity is maintained or improved  Description: INTERVENTIONS:  - Identify patients at risk for skin breakdown  - Assess and monitor skin integrity  - Assess and monitor nutrition and hydration status  - Monitor labs   - Assess for incontinence   - Turn and reposition patient  - Assist with mobility/ambulation  - Relieve pressure over bony prominences  - Avoid friction and shearing  - Provide appropriate hygiene as needed including keeping skin clean and dry  - Evaluate need for skin moisturizer/barrier cream  - Collaborate with interdisciplinary team   - Patient/family teaching  - Consider wound care consult   Outcome: Progressing     Problem: NEUROSENSORY - ADULT  Goal: Achieves stable or improved neurological status  Description: INTERVENTIONS  - Monitor and report changes in neurological status  - Monitor vital signs such as temperature, blood pressure, glucose, and any other labs ordered   - Initiate measures to prevent increased intracranial pressure  - Monitor for seizure activity and implement precautions if appropriate      Outcome: Progressing  Goal: Achieves maximal functionality and self care  Description: INTERVENTIONS  - Monitor swallowing and airway patency with patient fatigue and changes in neurological status  - Encourage and assist patient to increase activity and self care     - Encourage visually impaired, hearing impaired and aphasic patients to use assistive/communication devices  Outcome: Progressing     Problem: SKIN/TISSUE INTEGRITY - ADULT  Goal: Skin integrity remains intact  Description: INTERVENTIONS  - Identify patients at risk for skin breakdown  - Assess and monitor skin integrity  - Assess and monitor nutrition and hydration status  - Monitor labs (i e  albumin)  - Assess for incontinence   - Turn and reposition patient  - Assist with mobility/ambulation  - Relieve pressure over bony prominences  - Avoid friction and shearing  - Provide appropriate hygiene as needed including keeping skin clean and dry  - Evaluate need for skin moisturizer/barrier cream  - Collaborate with interdisciplinary team (i e  Nutrition, Rehabilitation, etc )   - Patient/family teaching  Outcome: Progressing     Problem: MUSCULOSKELETAL - ADULT  Goal: Maintain or return mobility to safest level of function  Description: INTERVENTIONS:  - Assess patient's ability to carry out ADLs; assess patient's baseline for ADL function and identify physical deficits which impact ability to perform ADLs (bathing, care of mouth/teeth, toileting, grooming, dressing, etc )  - Assess/evaluate cause of self-care deficits   - Assess range of motion  - Assess patient's mobility  - Assess patient's need for assistive devices and provide as appropriate  - Encourage maximum independence but intervene and supervise when necessary  - Involve family in performance of ADLs  - Assess for home care needs following discharge   - Consider OT consult to assist with ADL evaluation and planning for discharge  - Provide patient education as appropriate  Outcome: Progressing     Problem: INFECTION - ADULT  Goal: Absence or prevention of progression during hospitalization  Description: INTERVENTIONS:  - Assess and monitor for signs and symptoms of infection  - Monitor lab/diagnostic results  - Monitor all insertion sites, i e  indwelling lines, tubes, and drains  - Monitor endotracheal if appropriate and nasal secretions for changes in amount and color  - [place] appropriate cooling/warming therapies per order  - Administer medications as ordered  - Instruct and encourage patient and family to use good hand hygiene technique  - Identify and instruct in appropriate isolation precautions for identified infection/condition  Outcome: Progressing     Problem: SAFETY ADULT  Goal: Patient will remain free of falls  Description: INTERVENTIONS:  - Assess patient frequently for physical needs  -  Identify cognitive and physical deficits and behaviors that affect risk of falls  (confusion, hallucinations, weakness, swelling, injury, pain)  -  [place] fall precautions as indicated by assessment   (high fall risk)  - Educate patient/family on patient safety including physical limitations  - Instruct patient to call for assistance with activity based on assessment  - Modify environment to reduce risk of injury  - Consider OT/PT consult to assist with strengthening/mobility  Outcome: Progressing  Goal: Maintain or return to baseline ADL function  Description: INTERVENTIONS:  -  Assess patient's ability to carry out ADLs; assess patient's baseline for ADL function and identify physical deficits which impact ability to perform ADLs (bathing, care of mouth/teeth, toileting, grooming, dressing, etc )  - Assess/evaluate cause of self-care deficits   - Assess range of motion  - Assess patient's mobility; develop plan if impaired  - Assess patient's need for assistive devices and provide as appropriate  - Encourage maximum independence but intervene and supervise when necessary  - Involve family in performance of ADLs  - Assess for home care needs following discharge   - Consider OT consult to assist with ADL evaluation and planning for discharge  - Provide patient education as appropriate  Outcome: Progressing  Goal: Maintain or return mobility status to optimal level  Description: INTERVENTIONS:  - Assess patient's baseline mobility status (ambulation, transfers, stairs, etc )    - Identify cognitive and physical deficits and behaviors that affect mobility  - Identify mobility aids required to assist with transfers and/or ambulation (gait belt, sit-to-stand, lift, walker, cane, etc )  - [place] fall precautions as indicated by assessment  - Record patient progress and toleration of activity level on Mobility SBAR; progress patient to next Phase/Stage  - Instruct patient to call for assistance with activity based on assessment  - Consider rehabilitation consult to assist with strengthening/weightbearing, etc   Outcome: Progressing     Problem: PAIN - ADULT  Goal: Verbalizes/displays adequate comfort level or baseline comfort level  Description: Interventions:  - Encourage patient to monitor pain and request assistance  - Assess pain using appropriate pain scale  - Administer analgesics based on type and severity of pain and evaluate response  - Implement non-pharmacological measures as appropriate and evaluate response  - Consider cultural and social influences on pain and pain management  - Notify physician/advanced practitioner if interventions unsuccessful or patient reports new pain  Outcome: Progressing     Problem: DISCHARGE PLANNING  Goal: Discharge to home or other facility with appropriate resources  Description: INTERVENTIONS:  - Identify barriers to discharge w/patient and caregiver  - Arrange for needed discharge resources and transportation as appropriate  - Identify discharge learning needs (meds, wound care, etc )  - Arrange for interpretive services to assist at discharge as needed  - Refer to Case Management Department for coordinating discharge planning if the patient needs post-hospital services based on physician/advanced practitioner order or complex needs related to functional status, cognitive ability, or social support system  Outcome: Progressing     Problem: Nutrition/Hydration-ADULT  Goal: Nutrient/Hydration intake appropriate for improving, restoring or maintaining nutritional needs  Description: Monitor and assess patient's nutrition/hydration status for malnutrition  Collaborate with interdisciplinary team and initiate plan and interventions as ordered  Monitor patient's weight and dietary intake as ordered or per policy  Utilize nutrition screening tool and intervene as necessary  Determine patient's food preferences and provide high-protein, high-caloric foods as appropriate       INTERVENTIONS:  - Monitor oral intake, urinary output, labs, and treatment plans  - Assess nutrition and hydration status and recommend course of action  - Evaluate amount of meals eaten  - Assist patient with eating if necessary   - Allow adequate time for meals  - Recommend/ encourage appropriate diets, oral nutritional supplements, and vitamin/mineral supplements  - Order, calculate, and assess calorie counts as needed  - Recommend, monitor, and adjust tube feedings and TPN/PPN based on assessed needs  - Assess need for intravenous fluids  - Provide specific nutrition/hydration education as appropriate  - Include patient/family/caregiver in decisions related to nutrition  Outcome: Progressing

## 2021-05-28 NOTE — DISCHARGE SUMMARY
[facility] [address]  Discharge- Diana Juan 1950, 73 y o  female MRN: [id number]  Unit/Bed#: 125-42 Encounter: [id number]  Primary Care Provider: Hernandez Garcia PA-C   Date and time admitted to hospital: 5/24/2021  1:39 PM    * Acute metabolic encephalopathy  Assessment & Plan  Presented to the emergency room for evaluation of change in mental status  She was just admitted to skilled nursing facility for STR S/P total left knee Arthroplasty and found to be confused and with hallucinations upon arrival  Now resolved    Suspected etiology was due to polypharmacy, anticholinergic effects - on oxycodone 10mg tablets every 6 hours in addition to underlying psychiatric medications prior to admissions  Doing better with lower dosing protocol of narcotics, decreased frequency of neurontin  Also with possible anticholinergic effects  CT head- No acute intracranial abnormality  No focal neurologic deficits  Neurology consultation appreciated- suspect psychiatric/delerium more than organic neurological issue  Check B12 (pending), avoid anticholinergics  Psychiatry consult appreciated-  Possible anticholinergic induced confusion and hallucinations- avoid/elimiate all medications with significant anticholinergic if possible  Dermatitis  Assessment & Plan  Bullae noted surrounding surgical site  Suspect contact dermatitis due to allergy vs  Irritant  Orthopedics consultation appreciated- does not appear to have infection  Wound care consult recommended after discharge  S/P total knee arthroplasty, left  Assessment & Plan  She is S/P left knee arthroplasty on 5/20/2021  She has notable fluid-filled bullae at surgical site  Suspect possible contact allergic dermatitis due to skin prep or from immobilizer? Afebrile, no leukocytosis  Orthopedics consultation appreciated  Continue PT/OT - WBAT LLE  Plan for 2 weeks of Lovenox to prevent postoperative DVT    Plan for STR on discharge- patient accepted at [facility] [address]     Sinus tachycardia  Assessment & Plan  She is having intermittent episodes postop sinus tachycardia, now appears resolved  Suspect this is multifactorial in the setting of pain, mild anxiety, dehydration  CTA was negative for acute PE on 5/22  EKG on admission -sinus tachycardia at rate of 111 bpm     Patient received lana IVF hydration overnight, discontinued further IV fluids on 5/25 given chest Xray findings  Continue to Control pain adequately  magnesium level 2 1  Echocardiogram - EF 60%, mild LVH, moderate AS, otherwise unremarkable  Recurrent major depressive disorder, in partial remission ([facility] [address] )  Assessment & Plan  Currently stable  Held PTA psych medication overnight on the night of admission given AMS and reports of Hallucinations  Restarted cymbalta, wellbutrin at previous doses on 5/25/21  Stopped trazodone due to possible adverse effects  Recommend close outpatient follow up with psychiatry  Vitamin D deficiency  Assessment & Plan  Continue vitamin D weekly supplementation  Essential hypertension  Assessment & Plan  Blood pressure is currently stable  Not on any home BP medications  Mild intermittent asthma without complication  Assessment & Plan  Respiratory protocol  Continue singulair,   Continue outpatient pulmonary follow-up  Discharging Physician / Practitioner: Valerie Calvert PA-C  PCP: Morris Acosta PA-C  Admission Date:   Admission Orders (From admission, onward)     Ordered        05/24/21 1706  Inpatient Admission  Once                   Discharge Date: 05/28/21    Resolved Problems  Date Reviewed: 5/28/2021    None          Consultations During Hospital Stay:  · Psychiatry  · Neurology  · Orthopedics    Procedures Performed:   · None    Significant Findings / Test Results:   X-ray Chest 1 View Portable  Result Date: 5/24/2021  Impression: Mild pulmonary venous congestion   Workstation performed: [id number]     Ct Head Without Contrast  Result Date: 5/24/2021  Impression: No acute intracranial abnormality  Workstation performed: [id number]       Incidental Findings:   · None    Test Results Pending at Discharge (will require follow up): · Vitamin B12     Outpatient Tests Requested:  · none    Complications:  none    Reason for Admission: altered mental status    Hospital Course:     Rafael Gonzales is a 73 y o  female patient who originally presented to the hospital on 5/24/2021 due to Altered Mental Status (pt was just admitted to skilled nursing about 2 hours ago, states that she started last night with altered mental status, nursing staff is concerned due to recent L knee replacement)    Patient had a CT of the head performed that was negative  She underwent workup to include neurology and psychiatry evaluation  Ultimately her altered mental status was likely due to polypharmacy, including frequent dosing of narcotics as well as anticholinergic effects from her home medications  Mental status seemed to improve with multiple medication adjustments as noted above  Orthopedics evaluated the patient and did not feel there is any concern over acute infection of the left knee she should continue general postop care  She will be discharged to short-term rehab  Please see above list of diagnoses and related plan for additional information  Condition at Discharge: good     Discharge Day Visit / Exam:   Subjective:  Patient is feeling well today  Hallucinations and confusion are getting much better overall  She feels she is greatly improved since admission  No CP, SOB, fever    Vitals: Blood Pressure: 142/90 (05/28/21 0723)  Pulse: (!) 107 (05/28/21 0908)  Temperature: 98 4 °F (36 9 °C) (05/28/21 0723)  Temp Source: Oral (05/28/21 0723)  Respirations: 18 (05/28/21 0908)  Height: 4' 9" (144 8 cm) (05/25/21 1323)  Weight - Scale: 85 4 kg (188 lb 4 4 oz) (05/28/21 0600)  SpO2: 93 % (05/28/21 0908)  Exam: Physical Exam  Vitals signs and nursing note reviewed  Constitutional:       General: She is not in acute distress  Appearance: She is not ill-appearing  HENT:      Head: Normocephalic  Mouth/Throat:      Mouth: Mucous membranes are moist    Cardiovascular:      Rate and Rhythm: Normal rate and regular rhythm  Heart sounds: Normal heart sounds  Pulmonary:      Effort: Pulmonary effort is normal       Breath sounds: Normal breath sounds  Musculoskeletal:      Right lower leg: No edema  Left lower leg: No edema  Comments: ACE wrap over LLE  Skin:     General: Skin is warm and dry  Neurological:      General: No focal deficit present  Mental Status: She is alert and oriented to person, place, and time  Psychiatric:         Mood and Affect: Mood normal          Behavior: Behavior normal          Discussion with Family: discussed with daughter Nayeli Aguilera via phone  Discharge instructions/Information to patient and family:   See after visit summary for information provided to patient and family  Provisions for Follow-Up Care:  See after visit summary for information related to follow-up care and any pertinent home health orders  Disposition:   Prateek Valdez Sylvester at [facility]      Planned Readmission: no     Discharge Statement:  I spent 45 minutes discharging the patient  This time was spent on the day of discharge  I had direct contact with the patient on the day of discharge  Greater than 50% of the total time was spent examining patient, answering all patient questions, arranging and discussing plan of care with patient as well as directly providing post-discharge instructions  Additional time then spent on discharge activities  Discharge Medications:  See after visit summary for reconciled discharge medications provided to patient and family        ** Please Note: This note has been constructed using a voice recognition system **

## 2021-05-28 NOTE — PLAN OF CARE
Problem: OCCUPATIONAL THERAPY ADULT  Goal: Performs self-care activities at highest level of function for planned discharge setting  See evaluation for individualized goals  Description: Treatment Interventions: ADL retraining, Functional transfer training, UE strengthening/ROM, Endurance training, Patient/family training, Compensatory technique education, Energy conservation, Activityengagement          See flowsheet documentation for full assessment, interventions and recommendations  Outcome: Progressing  Note: Limitation: Decreased ADL status, Decreased UE strength, Decreased Safe judgement during ADL, Decreased endurance, Decreased self-care trans, Decreased high-level ADLs     Assessment: Patient participated in Skilled OT session this date with interventions consisting of ADL re training with the use of correct body mechnaics and  therapeutic activities to: increase activity tolerance   Co treatment with PTA secondary to complex medical condition of pt, Min A of 2 required to achieve and maintain transitional movements, requiring the need of skilled therapeutic intervention of 2 therapists to achieve delivery of services  Patient agreeable to OT treatment session, upon arrival patient was found seated OOB to Recliner  Sit to stand txfrs from chyna chair with Min Ax2 and verbal cues throughout, standing balance/functional mobility around room with RW and Min Ax2 with no significant LOB throughout with Pts O2 WFL throughout  Pt required cues for pacing, sequencing and safety with RW  To increase posture, dynamic standing balance, balance during self cares, use of BUE  Pt completed toileting with Min A and cues throughout to complete, toilet txfr with use of RW and Min Ax2, LB dressing Mod A  Patient continues to be functioning below baseline level, occupational performance remains limited secondary to factors listed above and increased risk for falls and injury     From OT standpoint, recommendation at time of d/c would be Post acute rehabilitation services  The patient's raw score on the AM-PAC Daily Activity inpatient short form is 18, standardized score is 38 66, less than 39 4  Patients at this level are likely to benefit from discharge to post-acute rehabilitation services  Please refer to the recommendation of the Occupational Therapist for safe discharge planning       OT Discharge Recommendation: Post acute rehabilitation services

## 2021-05-28 NOTE — ASSESSMENT & PLAN NOTE
Currently stable  Held PTA psych medication overnight on the night of admission given AMS and reports of Hallucinations  Restarted cymbalta, wellbutrin at previous doses on 5/25/21  Stopped trazodone due to possible adverse effects  Recommend close outpatient follow up with psychiatry

## 2021-05-28 NOTE — DISCHARGE INSTRUCTIONS
Recommend wound care consult after discharge  Per orthopedics:  Maintain dressings clean, dry, and intact  -Weight bearing as tolerated to the left lower extremity    -Lovenox will be continued until 6/3/2021 (total of 14 days post op)  -Continue PT/OT  -Take pain medications as instructed

## 2021-05-28 NOTE — ASSESSMENT & PLAN NOTE
She is S/P left knee arthroplasty on 5/20/2021  She has notable fluid-filled bullae at surgical site  Suspect possible contact allergic dermatitis due to skin prep or from immobilizer? Afebrile, no leukocytosis  Orthopedics consultation appreciated  Continue PT/OT - WBAT LLE  Plan for 2 weeks of Lovenox to prevent postoperative DVT    Plan for STR on discharge- patient accepted at [facility]

## 2021-05-28 NOTE — PLAN OF CARE
Problem: PHYSICAL THERAPY ADULT  Goal: Performs mobility at highest level of function for planned discharge setting  See evaluation for individualized goals  Description: Treatment/Interventions: LE strengthening/ROM, Functional transfer training, Elevations, Therapeutic exercise, Endurance training, Gait training, Bed mobility          See flowsheet documentation for full assessment, interventions and recommendations  Outcome: Progressing  Note: Prognosis: Good  Problem List: Decreased strength, Decreased range of motion, Decreased endurance, Impaired balance, Decreased mobility, Orthopedic restrictions  Assessment: Pt  seen for PT treatment session this date with interventions consisting of  therapeutic exercises, transfers and  gait training w/ emphasis on improving pt's ability to ambulate  Pt  Currently performing  tx and ambulation at (min ) x 2 level of function  The patient's AM-PAC Basic Mobility Inpatient Short Form Raw Score is 16, Standardized Score is 38 32  A standardized score less than 42 9 suggests the patient may benefit from discharge to post-acute rehabilitation services  Please also refer to physical therapy recommendation for safe DC planning  In comparison to previous session, Pt  With improvements in activity tolerance  Pt is in need of continued activity in PT to improve strength balance endurance mobility transfers and ambulation with return to maximize LOF  From PT/mobility standpoint, recommendation at time of d/c would be post acute rehab  in order to promote return to PLOF and independence  PT Discharge Recommendation: Post acute rehabilitation services          See flowsheet documentation for full assessment

## 2021-05-28 NOTE — RESPIRATORY THERAPY NOTE
05/27/21 2130   Chest Physiotherapy Tx   $ Chest Physiotherapy (CPT)  Yes   CPT Delivery Source [place]   CPT Duration 10   CPT Chest Site Full range   Breath Sounds Pre-Treatment Bilateral Diminished   Breath Sounds Post-Treatment Bilateral Diminished   Cough Non-productive; Congested   Position Semi Vero's   CPT Treatment Tolerance Tolerated well       Patient is on room air  She denies sob at this time   Patient gave good return demonstration on the flutter valve

## 2021-05-28 NOTE — ASSESSMENT & PLAN NOTE
She is having intermittent episodes postop sinus tachycardia, now appears resolved  Suspect this is multifactorial in the setting of pain, mild anxiety, dehydration  CTA was negative for acute PE on 5/22  EKG on admission -sinus tachycardia at rate of 111 bpm     Patient received lana IVF hydration overnight, discontinued further IV fluids on 5/25 given chest Xray findings  Continue to Control pain adequately  magnesium level 2 1  Echocardiogram - EF 60%, mild LVH, moderate AS, otherwise unremarkable

## 2021-05-28 NOTE — ASSESSMENT & PLAN NOTE
Presented to the emergency room for evaluation of change in mental status  She was just admitted to skilled nursing facility for STR S/P total left knee Arthroplasty and found to be confused and with hallucinations upon arrival  Now resolved    Suspected etiology was due to polypharmacy, anticholinergic effects - on oxycodone 10mg tablets every 6 hours in addition to underlying psychiatric medications prior to admissions  Doing better with lower dosing protocol of narcotics, decreased frequency of neurontin  Also with possible anticholinergic effects  CT head- No acute intracranial abnormality  No focal neurologic deficits  Neurology consultation appreciated- suspect psychiatric/delerium more than organic neurological issue  Check B12 (pending), avoid anticholinergics  Psychiatry consult appreciated-  Possible anticholinergic induced confusion and hallucinations- avoid/elimiate all medications with significant anticholinergic if possible

## 2021-05-28 NOTE — CASE MANAGEMENT
Received auth from Daija for patient to go to [address] [place] [address]  NRD is in 7 days  Auth number is [id number]  Clinicals should be sent to [facility] [phone] and fax  [phone]  The facility must submit notification within 24 hours of arriving at facility

## 2021-05-28 NOTE — NURSING NOTE
Patient in no acute distress, able to leave the floor accompanied by Maureen abebe wheelchair van transporter

## 2021-05-28 NOTE — OCCUPATIONAL THERAPY NOTE
OccupationalTherapy Progress Note     Patient Name: Teena Romo  FWLKD'T Date: 5/28/2021  Problem List  Principal Problem:    Acute metabolic encephalopathy  Active Problems:    Mild intermittent asthma without complication    Essential hypertension    Vitamin D deficiency    Recurrent major depressive disorder, in partial remission ([facility] [address] )    Sinus tachycardia    S/P total knee arthroplasty, left    Dermatitis          05/28/21 1113   OT Last Visit   OT Visit Date 05/28/21   Note Type   Note Type Treatment   Restrictions/Precautions   Weight Bearing Precautions Per Order Yes   LLE Weight Bearing Per Order WBAT   Other Precautions Fall Risk; Chair Alarm; Bed Alarm;Multiple lines;Pain   Pain Assessment   Pain Assessment Tool Pain Assessment not indicated - pt denies pain   ADL   Where Assessed Chair   LB Dressing Assistance 3  Moderate Assistance   LB Dressing Deficit Verbal cueing;Supervision/safety; Increased time to complete; Don/doff R sock; Don/doff L sock   Toileting Assistance  4  Minimal Assistance   Toileting Deficit Setup;Supervison/safety; Increased time to complete;Perineal hygiene; Bedside commode   Transfers   Sit to Stand 4  Minimal assistance   Additional items Assist x 2; Increased time required;Verbal cues;Armrests   Stand to Sit 4  Minimal assistance   Additional items Assist x 2; Increased time required;Verbal cues;Armrests   Toilet transfer 4  Minimal assistance   Additional items Assist x 2; Increased time required;Verbal cues; Commode  (RW)   Additional Comments Use of RW   Functional Mobility   Functional Mobility 4  Minimal assistance   Additional Comments Pt completed standing balance/functional mobility around room with RW and Min Ax2 with verbal cues for safety with RW, sequencing and pace with O2 WFL throughout and no significant LOB throughout     Additional items Rolling walker   Toilet Transfers   Toilet Transfer From Rolling walker   Toilet Transfer Type To and from   Toilet Transfer to Standard bedside commode   Toilet Transfer Technique Ambulating   Toilet Transfers Minimal assistance   Toilet Transfers Comments Ax2 with cues throughout   Cognition   Overall Cognitive Status WFL   Arousal/Participation Alert   Attention Within functional limits   Orientation Level Oriented to person;Oriented to place;Oriented to time;Oriented to situation   Memory Decreased recall of precautions   Following Commands Follows one step commands without difficulty   Activity Tolerance   Activity Tolerance Patient limited by fatigue   Assessment   Assessment Patient participated in Skilled OT session this date with interventions consisting of ADL re training with the use of correct body mechnaics and  therapeutic activities to: increase activity tolerance   Co treatment with PTA secondary to complex medical condition of pt, Min A of 2 required to achieve and maintain transitional movements, requiring the need of skilled therapeutic intervention of 2 therapists to achieve delivery of services  Patient agreeable to OT treatment session, upon arrival patient was found seated OOB to Recliner  Sit to stand txfrs from chyna chair with Min Ax2 and verbal cues throughout, standing balance/functional mobility around room with RW and Min Ax2 with no significant LOB throughout with Pts O2 WFL throughout  Pt required cues for pacing, sequencing and safety with RW  To increase posture, dynamic standing balance, balance during self cares, use of BUE  Pt completed toileting with Min A and cues throughout to complete, toilet txfr with use of RW and Min Ax2, LB dressing Mod A  Patient continues to be functioning below baseline level, occupational performance remains limited secondary to factors listed above and increased risk for falls and injury  From OT standpoint, recommendation at time of d/c would be Post acute rehabilitation services    The patient's raw score on the AM-PAC Daily Activity inpatient short form is 18, standardized score is 38 66, less than 39 4  Patients at this level are likely to benefit from discharge to post-acute rehabilitation services  Please refer to the recommendation of the Occupational Therapist for safe discharge planning  Plan   Goal Expiration Date 06/08/21   OT Treatment Day 2   OT Frequency 3-5x/wk   Recommendation   OT Discharge Recommendation Post acute rehabilitation services   AM-PAC Daily Activity Inpatient   Lower Body Dressing 2   Bathing 2   Toileting 2   Upper Body Dressing 3   Grooming 4   Eating 4   Daily Activity Raw Score 17   Daily Activity Standardized Score (Calc for Raw Score >=11) 37 26   AM-PAC Applied Cognition Inpatient   Following a Speech/Presentation 4   Understanding Ordinary Conversation 4   Taking Medications 4   Remembering Where Things Are Placed or Put Away 3   Remembering List of 4-5 Errands 3   Taking Care of Complicated Tasks 2   Applied Cognition Raw Score 20   Applied Cognition Standardized Score 41 76     Pt left seated in norris chair with chair alarm on and all needs in reach

## 2021-05-29 LAB
BACTERIA BLD CULT: NORMAL
BACTERIA BLD CULT: NORMAL

## 2021-06-02 NOTE — UTILIZATION REVIEW
Notification of Discharge   This is a Notification of Discharge from our facility [address]  Please be advised that this patient has been discharge from our facility  Below you will find the admission and discharge date and time including the patients disposition  UTILIZATION REVIEW CONTACT:  Ray Baig RN  Utilization   Network Utilization Review Department  Phone: [phone] x carefully listen to the prompts  All voicemails are confidential   Email: [email or web address]     PHYSICIAN ADVISORY SERVICES:  FOR [id number] REVIEW - MEDICAL NECESSITY DENIAL  Phone: [phone]  Fax: [phone]  Email: [email or web address]     PRESENTATION DATE: 5/24/2021  1:39 PM  INPATIENT ADMISSION DATE: 5/24/21 1706   DISCHARGE DATE: 5/28/2021  1:45 PM  DISPOSITION: Non [facility]/S[facility] Non [facility] SN[facility]/SNU      IMPORTANT INFORMATION:  Send all requests for admission clinical reviews, approved or denied determinations and any other requests to dedicated fax number below belonging to the campus where the patient is receiving treatment   List of dedicated fax numbers:  [address] [place] [address] DENIALS (Administrative/Medical Necessity) [phone]   [address]  (Maternity/NICU/Pediatrics) [phone]   Jody Sanchez [phone]   Zeeshan Leija [phone]   Bharti Olguin [phone]   [facility] [address] [facility] [phone]   [facility]  [phone]   [address] [facility]  [address] [facility] [address] [facility] [phone]

## 2021-06-03 ENCOUNTER — TELEPHONE (OUTPATIENT)
Dept: OBGYN CLINIC | Facility: HOSPITAL | Age: 71
End: 2021-06-03

## 2021-06-03 ENCOUNTER — TELEPHONE (OUTPATIENT)
Dept: OBGYN CLINIC | Facility: CLINIC | Age: 71
End: 2021-06-03

## 2021-06-03 NOTE — TELEPHONE ENCOUNTER
Belkys Queen from [facility] called to see if the patient needs to wear her knee mobilizer at all times or for pain  He can be reached at [phone]  If you could please ask for Mirta Lebron if calling tomorrow 6/4/21

## 2021-06-04 NOTE — TELEPHONE ENCOUNTER
Spoke with Snehal Miranda at [facility]  KI should be worn during periods of rest to encourage full knee extension   It should be remove to work with PT

## 2021-06-08 ENCOUNTER — OFFICE VISIT (OUTPATIENT)
Dept: OBGYN CLINIC | Facility: CLINIC | Age: 71
End: 2021-06-08

## 2021-06-08 ENCOUNTER — TELEPHONE (OUTPATIENT)
Dept: OBGYN CLINIC | Facility: MEDICAL CENTER | Age: 71
End: 2021-06-08

## 2021-06-08 VITALS
BODY MASS INDEX: 40.74 KG/M2 | HEIGHT: 57 IN | DIASTOLIC BLOOD PRESSURE: 79 MMHG | SYSTOLIC BLOOD PRESSURE: 137 MMHG | HEART RATE: 77 BPM

## 2021-06-08 DIAGNOSIS — M17.12 PRIMARY OSTEOARTHRITIS OF LEFT KNEE: ICD-10-CM

## 2021-06-08 DIAGNOSIS — Z96.652 S/P TOTAL KNEE ARTHROPLASTY, LEFT: Primary | ICD-10-CM

## 2021-06-08 PROCEDURE — 99024 POSTOP FOLLOW-UP VISIT: CPT | Performed by: ORTHOPAEDIC SURGERY

## 2021-06-08 NOTE — TELEPHONE ENCOUNTER
Patient sees Dr Jenny Addison at [facility] confirming appt for today, confirmed

## 2021-06-08 NOTE — PROGRESS NOTES
Assessment:     1  S/P total knee arthroplasty, left    2  Primary osteoarthritis of left knee          Plan:   Diagnoses and all orders for this visit:    S/P total knee arthroplasty, left    Primary osteoarthritis of left knee          53-year-old female s/p left TKA performed on 5/20/2021  I discussed that she may discontinue use of the brace at night  I discussed that if she wakes up and has a difficult time extending the knee, then she must wear the brace at night again  I discussed that she should continue physical therapy at this time  I will see her back in office in 4 weeks for a re-evaluation of the left knee and new x-rays at that time  Patient ID: Veronica Marvin is a 73 y o  female  Chief Complaint:  Follow-up  Left TKA    HPI:   53-year-old female presents to the office today status post left TKA performed on 05/20/2021  She presents to the office today in a wheelchair  Patient is doing well since surgery  Patient stated that she has been doing well since surgery  She stated that she has a significant decrease in knee pain since the surgery  She stated that she has been doing physical therapy  Patient stated that when she slept with the immobilizer on the knee became very stiff  She denied any numbness or tingling        Allergy:  Allergies   Allergen Reactions    Ibuprofen Nausea Only, Rash, Dizziness and Syncope    Latex Dermatitis       Medications:  all current active meds have been reviewed    Past Medical History:  Past Medical History:   Diagnosis Date    Anxiety     Arthritis     Asthma     Breast cancer ([facility] [address] )     rt mastectomy 2005    Cancer [facility])     breast right    Cataract     CHF (congestive heart failure) (HCC)     Coronary artery disease     Depression     GERD (gastroesophageal reflux disease)     High cholesterol     History of chemotherapy     2005 rt breast cancer    History of transfusion     Hypertension     IBS (irritable bowel syndrome)     Irritable bowel syndrome (IBS) 5/31/2016    Kidney stone     Lymphedema of right arm     Obesity     PONV (postoperative nausea and vomiting)     Post-menopausal     Psychiatric disorder     Renal disorder     Vitamin D deficiency        Past Surgical History:  Past Surgical History:   Procedure Laterality Date    BREAST SURGERY      CATARACT EXTRACTION Bilateral     CATARACT EXTRACTION, BILATERAL      CHOLECYSTECTOMY      COLONOSCOPY N/A 5/31/2016    Procedure: COLONOSCOPY;  Surgeon: Boyd Rodriguez MD;  [facility]: [facility];  Service:    Leighann Marjade GALLBLADDER SURGERY      HYSTERECTOMY      Total    KNEE ARTHROSCOPY      KNEE SURGERY      arthroscopy    MASTECTOMY Right     rt breast mastectomy 2005    [place] TOTAL KNEE ARTHROPLASTY Left 5/20/2021    Procedure: ARTHROPLASTY KNEE TOTAL;  Surgeon: Nadeen Gowers, MD;  Location: [facility] MAIN OR;  Service: Orthopedics    TONSILLECTOMY AND ADENOIDECTOMY         Family History:  Family History   Adopted: Yes   Problem Relation Age of Onset    Diabetes Mother     Heart disease Mother     Mental illness Mother     Depression Mother     Heart disease Brother     Breast cancer Maternal Aunt     Breast cancer Maternal Aunt     Breast cancer Maternal Aunt     Breast cancer Maternal Aunt     Breast cancer Maternal Aunt     No Known Problems Father     No Known Problems Sister     No Known Problems Daughter     Breast cancer Maternal Grandmother     No Known Problems Sister     No Known Problems Sister     No Known Problems Sister        Social History:  Social History     Substance and Sexual Activity   Alcohol Use Never    Frequency: Monthly or less    Comment: socially     Social History     Substance and Sexual Activity   Drug Use Never     Social History     Tobacco Use   Smoking Status Never Smoker   Smokeless Tobacco Never Used           ROS:  Review of Systems   All other systems reviewed and are negative        Objective:  BP Readings from Last 1 Encounters:   06/08/21 137/79      Wt Readings from Last 1 Encounters:   05/28/21 85 4 kg (188 lb 4 4 oz)        BMI:   Estimated body mass index is 40 74 kg/m² as calculated from the following:    Height as of this encounter: 4' 9" (1 448 m)  Weight as of 5/28/21: 85 4 kg (188 lb 4 4 oz)  EXAM:   Physical Exam  Musculoskeletal:      Left knee: She exhibits no effusion  Right Knee Exam   Right knee exam is normal       Left Knee Exam     Tenderness   The patient is experiencing no tenderness       Range of Motion   Extension: 5   Flexion: 70     Tests   Varus: negative Valgus: negative    Other   Erythema: absent  Scars: present  Sensation: normal  Pulse: present  Swelling: none  Effusion: no effusion present    Comments:  Incision clean, dry, intact   Healed blisters noted anterior leg   Compartments soft   Toes pink and mobile   Sensation intact             :  Radiographs  None today        Scribe Attestation    I,:  Jen Angeles am acting as a scribe while in the presence of the attending physician :       I,:  Ely Hernandez MD personally performed the services described in this documentation    as scribed in my presence :

## 2021-06-09 DIAGNOSIS — M81.0 AGE-RELATED OSTEOPOROSIS WITHOUT CURRENT PATHOLOGICAL FRACTURE: Primary | ICD-10-CM

## 2021-06-11 ENCOUNTER — TRANSITIONAL CARE MANAGEMENT (OUTPATIENT)
Dept: INTERNAL MEDICINE CLINIC | Facility: CLINIC | Age: 71
End: 2021-06-11

## 2021-06-11 ENCOUNTER — TELEPHONE (OUTPATIENT)
Dept: OBGYN CLINIC | Facility: HOSPITAL | Age: 71
End: 2021-06-11

## 2021-06-11 NOTE — TELEPHONE ENCOUNTER
Solomon Doherty is calling from [facility]  Patient has been on Lovenox since 05-20-21 and is being discharged from [facility] to home  Patient refuses to administer the Lovenox at home  Solomon Doherty would like to know if Dr Stella Denise is comfortable discontinuing Lovenox early        Please call Solomon Doherty at [facility] , until 4 pm

## 2021-06-12 NOTE — PROGRESS NOTES
I called deng howard back and spoke to dontae-she will have everything faxed to us today-discharge paperwork/summary ( I called [phone])

## 2021-06-14 DIAGNOSIS — E78.5 HYPERLIPIDEMIA, UNSPECIFIED HYPERLIPIDEMIA TYPE: ICD-10-CM

## 2021-06-14 DIAGNOSIS — K21.9 GASTROESOPHAGEAL REFLUX DISEASE WITHOUT ESOPHAGITIS: ICD-10-CM

## 2021-06-14 DIAGNOSIS — J30.9 ALLERGIC RHINITIS, UNSPECIFIED SEASONALITY, UNSPECIFIED TRIGGER: ICD-10-CM

## 2021-06-14 RX ORDER — MONTELUKAST SODIUM 10 MG/1
TABLET ORAL
Qty: 30 TABLET | Refills: 0 | Status: SHIPPED | OUTPATIENT
Start: 2021-06-14 | End: 2021-07-14

## 2021-06-14 RX ORDER — ATORVASTATIN CALCIUM 20 MG/1
TABLET, FILM COATED ORAL
Qty: 30 TABLET | Refills: 0 | Status: SHIPPED | OUTPATIENT
Start: 2021-06-14 | End: 2021-07-14

## 2021-06-15 ENCOUNTER — OFFICE VISIT (OUTPATIENT)
Dept: INTERNAL MEDICINE CLINIC | Facility: CLINIC | Age: 71
End: 2021-06-15
Payer: COMMERCIAL

## 2021-06-15 VITALS
OXYGEN SATURATION: 98 % | BODY MASS INDEX: 38.83 KG/M2 | SYSTOLIC BLOOD PRESSURE: 120 MMHG | RESPIRATION RATE: 18 BRPM | HEART RATE: 90 BPM | TEMPERATURE: 97.5 F | HEIGHT: 57 IN | DIASTOLIC BLOOD PRESSURE: 70 MMHG | WEIGHT: 180 LBS

## 2021-06-15 DIAGNOSIS — R41.0 DELIRIUM: ICD-10-CM

## 2021-06-15 DIAGNOSIS — M17.12 PRIMARY OSTEOARTHRITIS OF LEFT KNEE: Primary | ICD-10-CM

## 2021-06-15 PROCEDURE — 3008F BODY MASS INDEX DOCD: CPT | Performed by: PHYSICIAN ASSISTANT

## 2021-06-15 PROCEDURE — 99496 TRANSJ CARE MGMT HIGH F2F 7D: CPT | Performed by: PHYSICIAN ASSISTANT

## 2021-06-15 RX ORDER — ASPIRIN 81 MG/1
81 TABLET ORAL 2 TIMES DAILY
Start: 2021-06-15

## 2021-06-15 RX ORDER — PANTOPRAZOLE SODIUM 40 MG/1
TABLET, DELAYED RELEASE ORAL
Qty: 30 TABLET | Refills: 0 | Status: SHIPPED | OUTPATIENT
Start: 2021-06-15 | End: 2021-07-14

## 2021-06-16 NOTE — PROGRESS NOTES
Transition of Care  Follow-up After Hospitalization    Deyanira Stokes 73 y o  female   Date:  6/16/2021    TCM Call (since 5/16/2021)     None      TCM Call (since 5/16/2021)     None        Admit Date: 5/20  Discharge Date:6/12  Diagnosis:left TKR  Location: [facility] --> [facility] for rehab  Hospital records were reviewed  Medications upon discharge reviewed/updated  Medication Changes:   Imaging: CT head, CTA chest, CXR and venous duplex  Consults: none  Discharge Disposition:home    Follow up visits with other specialists: ortho      Assessment and Plan:    1  S/P Left TKR: incision site is healing nicely  Bullae are resolving with continued avoidance of knee immobilizer  She is now on ASA 81mg BID for 6 weeks in place of lovenox  2  Delerium: Improved but not resolved  Will do further lab workup and treat according to results  MMSE revealed appropriate answers but delaying in getting to the answer  Deyanira was seen today for follow-up  Diagnoses and all orders for this visit:    Primary osteoarthritis of left knee  -     aspirin (ECOTRIN LOW STRENGTH) 81 mg EC tablet; Take 1 tablet (81 mg total) by mouth 2 (two) times a day    Delirium  -     TSH, 3rd generation with Free T4 reflex; Future  -     Ammonia; Future  -     CBC and differential; Future  -     Comprehensive metabolic panel; Future  -     UA w Reflex to Microscopic w Reflex to Culture -Lab Collect  -     Blood culture; Future  -     Phosphorus; Future  -     Magnesium; Future            HPI:  Pt presents for TCM  She was admitted to [facility] on 5/20 for elective left TKR  Surgery went well  She was going to be discharged to [facility] skilled nursing unit for rehab on 5/24 but 2 hours after admission there she became very confused and was evaluated in the ER  She underwent significant lab workup for her delirium as well as CTA chest, CT head, venous duplex and CXR   Workup was negative and it was felt that the pt had delirium induced by polypharmacy and anticholinergic medications  She also had large bullae surrounding her surgical site and it was felt this may have been due to the prep used for surgery vs her immobilizer  These have since improved but rash remains where bullae were present  She was then discharged to [address] [place] [address] for rehab until 6/12  She continues with episodes of confusion  She notes visual hallucinations of seeing shadow people  She had trouble recognizing her family  She notes distorted images of the rooms she is in  She notes she still has good insight and these episodes both frighten her and make her depressed as she is worried it wont go away  There is some confusion in regards to what mediations she should be taking and what ones to avoid  ROS: Review of Systems   Constitutional: Negative for chills and fever  HENT: Negative for congestion, ear pain, hearing loss, postnasal drip, rhinorrhea, sinus pressure, sinus pain, sore throat and trouble swallowing  Eyes: Negative for pain and visual disturbance  Respiratory: Negative for cough, chest tightness, shortness of breath and wheezing  Cardiovascular: Negative  Negative for chest pain, palpitations and leg swelling  Gastrointestinal: Negative for abdominal pain, blood in stool, constipation, diarrhea, nausea and vomiting  Endocrine: Negative for cold intolerance, heat intolerance, polydipsia, polyphagia and polyuria  Genitourinary: Negative for difficulty urinating, dysuria, flank pain and urgency  Musculoskeletal: Positive for arthralgias and gait problem  Negative for back pain and myalgias  Skin: Negative for rash  Allergic/Immunologic: Negative  Neurological: Negative for dizziness, weakness, light-headedness and headaches  Hematological: Negative  Psychiatric/Behavioral: Positive for confusion and hallucinations  Negative for behavioral problems, dysphoric mood and sleep disturbance  The patient is nervous/anxious  Past Medical History:   Diagnosis Date    Anxiety     Arthritis     Asthma     Breast cancer ([address] )     rt mastectomy 2005    Cancer [facility])     breast right    Cataract     CHF (congestive heart failure) (HCC)     Coronary artery disease     Depression     GERD (gastroesophageal reflux disease)     High cholesterol     History of chemotherapy     2005 rt breast cancer    History of transfusion     Hypertension     IBS (irritable bowel syndrome)     Irritable bowel syndrome (IBS) 5/31/2016    Kidney stone     Lymphedema of right arm     Obesity     PONV (postoperative nausea and vomiting)     Post-menopausal     Psychiatric disorder     Renal disorder     Vitamin D deficiency        Past Surgical History:   Procedure Laterality Date    BREAST SURGERY      CATARACT EXTRACTION Bilateral     CATARACT EXTRACTION, BILATERAL      CHOLECYSTECTOMY      COLONOSCOPY N/A 5/31/2016    Procedure: COLONOSCOPY;  Surgeon: Anushka Foster MD;  Location: [facility];  Service:    Danica Harmon GALLBLADDER SURGERY      HYSTERECTOMY      Total    KNEE ARTHROSCOPY      KNEE SURGERY      arthroscopy    MASTECTOMY Right     rt breast mastectomy 2005    [place] TOTAL KNEE ARTHROPLASTY Left 5/20/2021    Procedure: ARTHROPLASTY KNEE TOTAL;  Surgeon: Lawyer Bright MD;  Location: [facility] MAIN OR;  Service: Orthopedics    TONSILLECTOMY AND ADENOIDECTOMY         Social History     Socioeconomic History    Marital status:      Spouse name: None    Number of children: None    Years of education: None    Highest education level: None   Occupational History    Occupation: Retired   Tobacco Use    Smoking status: Never Smoker    Smokeless tobacco: Never Used   Vaping Use    Vaping Use: Never used   Substance and Sexual Activity    Alcohol use: Never     Comment: socially    Drug use: Never    Sexual activity: Not Currently   Other Topics Concern    None   Social History Narrative    Always uses seat belt    Occasional caffeine consumption         Retired    Lives with adult daughter     Social Determinants of [address] Strain:     Difficulty of Paying Living Expenses:    Food Insecurity:     Worried About [address] in the Last Year:    [address] in the Last Year:    Transportation Needs:     Lack of Transportation (Medical):      Lack of Transportation (Non-Medical):    Physical Activity:     Days of Exercise per Week:     Minutes of Exercise per Session:    Stress:     Feeling of Stress :    Social Connections:     Frequency of Communication with Friends and Family:     Frequency of Social Gatherings with Friends and Family:     Attends Mu-ism Services:     Active Member of Clubs or Organizations:     Attends Club or Organization Meetings:     Marital Status:    Intimate Partner Violence:     Fear of Current or Ex-Partner:     Emotionally Abused:     Physically Abused:     Sexually Abused:        Family History   Adopted: Yes   Problem Relation Age of Onset    Diabetes Mother     Heart disease Mother     Mental illness Mother     Depression Mother     Heart disease Brother     Breast cancer Maternal Aunt     Breast cancer Maternal Aunt     Breast cancer Maternal Aunt     Breast cancer Maternal Aunt     Breast cancer Maternal Aunt     No Known Problems Father     No Known Problems Sister     No Known Problems Daughter     Breast cancer Maternal Grandmother     No Known Problems Sister     No Known Problems Sister     No Known Problems Sister        Allergies   Allergen Reactions    Ibuprofen Nausea Only, Rash, Dizziness and Syncope    Morphine Other (See Comments)     Intolerance    Latex Dermatitis         Current Outpatient Medications:     acetaminophen (TYLENOL) 650 mg CR tablet, Take 1 tablet (650 mg total) by mouth every 8 (eight) hours as needed for mild pain, Disp: 30 tablet, Rfl: 0    albuterol (2 5 mg/3 mL) 0 083 % nebulizer solution, Take 2 5 mg by nebulization every 6 (six) hours as needed for wheezing , Disp: , Rfl:     albuterol (PROAIR HFA) 90 mcg/act inhaler, Inhale 2 puffs every 4 (four) hours as needed for wheezing, Disp: 3 Inhaler, Rfl: 1    ascorbic acid (VITAMIN C) 500 MG tablet, Take 1 tablet (500 mg total) by mouth daily, Disp: 30 tablet, Rfl: 1    atorvastatin (LIPITOR) 20 mg tablet, Take 1 tablet by mouth daily  , Disp: 30 tablet, Rfl: 0    buPROPion (WELLBUTRIN XL) 300 mg 24 hr tablet, Take 1 tablet (300 mg total) by mouth every morning, Disp: 90 tablet, Rfl: 3    butalbital-acetaminophen-caffeine (FIORICET,ESGIC) -40 mg per tablet, Take 1 tablet by mouth 3 (three) times a day as needed for headaches, Disp: 90 tablet, Rfl: 1    diclofenac (VOLTAREN) 75 mg EC tablet, Take 1 tablet (75 mg total) by mouth 2 (two) times a day, Disp: 180 tablet, Rfl: 1    docusate sodium (COLACE) 100 mg capsule, Take 1 capsule (100 mg total) by mouth 2 (two) times a day, Disp: 10 capsule, Rfl: 0    DULoxetine (CYMBALTA) 60 mg delayed release capsule, Take 1 capsule by mouth twice daily  , Disp: 180 capsule, Rfl: 0    ergocalciferol (VITAMIN D2) 50,000 units, Take 1 capsule (50,000 Units total) by mouth once a week, Disp: 4 capsule, Rfl: 3    ferrous sulfate 324 (65 Fe) mg, Take 1 tablet (324 mg total) by mouth 2 (two) times a day before meals, Disp: 60 tablet, Rfl: 1    folic acid (FOLVITE) 1 mg tablet, Take 1 tablet (1 mg total) by mouth daily, Disp: 30 tablet, Rfl: 1    gabapentin (NEURONTIN) 300 mg capsule, Take 1 capsule (300 mg total) by mouth 2 (two) times a day, Disp:  , Rfl: 0    loratadine (CLARITIN) 10 mg tablet, Take 1 tablet (10 mg total) by mouth daily, Disp: 90 tablet, Rfl: 1    montelukast (SINGULAIR) 10 mg tablet, Take 1 tablet by mouth daily  , Disp: 30 tablet, Rfl: 0    Multiple Vitamin (multivitamin) tablet, Take 1 tablet by mouth daily, Disp: 30 tablet, Rfl: 1    nystatin (MYCOSTATIN) cream, Apply topically 2 (two) times a day, Disp: 30 g, Rfl: 3    nystatin (MYCOSTATIN) powder, Apply topically 2 (two) times a day, Disp: 60 g, Rfl: 5    ondansetron (ZOFRAN-ODT) 4 mg disintegrating tablet, Take 4 mg by mouth every 8 (eight) hours as needed, Disp: , Rfl:     oxybutynin (DITROPAN-XL) 10 MG 24 hr tablet, Take 1 tablet (10 mg total) by mouth daily at bedtime, Disp: 30 tablet, Rfl: 3    oxyCODONE (ROXICODONE) 5 mg immediate release tablet, Take 1 tablet (5 mg total) by mouth every 6 (six) hours as needed for severe pain for up to 4 dosesMax Daily Amount: 20 mg, Disp: 4 tablet, Rfl: 0    tamsulosin (FLOMAX) 0 4 mg, Take 1 capsule (0 4 mg total) by mouth daily with dinner, Disp: 14 capsule, Rfl: 0    aspirin (ECOTRIN LOW STRENGTH) 81 mg EC tablet, Take 1 tablet (81 mg total) by mouth 2 (two) times a day, Disp: , Rfl:     Nutritional Supplements (BOOST BREEZE PO), Take by mouth daily, Disp: , Rfl:     pantoprazole (PROTONIX) 40 mg tablet, Take 1 tablet by mouth daily  , Disp: 30 tablet, Rfl: 0      Physical Exam:  /70   Pulse 90   Temp 97 5 °F (36 4 °C) (Tympanic)   Resp 18   Ht 4' 9" (1 448 m)   Wt 81 6 kg (180 lb)   LMP  (LMP Unknown)   SpO2 98%   BMI 38 95 kg/m²     Physical Exam  Constitutional:       General: She is not in acute distress  Appearance: She is well-developed  She is obese  She is not diaphoretic  HENT:      Head: Normocephalic and atraumatic  Right Ear: External ear normal       Left Ear: External ear normal       Nose: Nose normal       Mouth/Throat:      Pharynx: No oropharyngeal exudate  Eyes:      General: No scleral icterus  Right eye: No discharge  Left eye: No discharge  Conjunctiva/sclera: Conjunctivae normal       Pupils: Pupils are equal, round, and reactive to light  Neck:      Thyroid: No thyromegaly  Cardiovascular:      Rate and Rhythm: Normal rate and regular rhythm  Heart sounds: Normal heart sounds  No murmur heard     No friction rub  No gallop  Pulmonary:      Effort: Pulmonary effort is normal  No respiratory distress  Breath sounds: Normal breath sounds  No wheezing or rales  Abdominal:      General: Bowel sounds are normal  There is no distension  Palpations: Abdomen is soft  Tenderness: There is no abdominal tenderness  Musculoskeletal:         General: No tenderness or deformity  Normal range of motion  Cervical back: Normal range of motion and neck supple  Left knee: Swelling present  Skin:     General: Skin is warm and dry  Findings: Rash present  Rash is papular  Pustular rash: surrounding left knee where immobilizer was  Neurological:      Mental Status: She is alert and oriented to person, place, and time  Cranial Nerves: No cranial nerve deficit  Psychiatric:         Mood and Affect: Mood is anxious and depressed  Affect is tearful  Behavior: Behavior normal          Thought Content:  Thought content normal          Judgment: Judgment normal              Labs:  Lab Results   Component Value Date    WBC 8 54 05/28/2021    HGB 10 7 (L) 05/28/2021    HCT 33 4 (L) 05/28/2021    MCV 95 05/28/2021     05/28/2021     Lab Results   Component Value Date     12/02/2015    K 3 7 05/28/2021     05/28/2021    CO2 28 05/28/2021    ANIONGAP 7 12/02/2015    BUN 10 05/28/2021    CREATININE 0 62 05/28/2021    GLUCOSE 79 12/02/2015    GLUF 136 (H) 05/23/2021    CALCIUM 8 2 (L) 05/28/2021    CORRECTEDCA 9 4 05/25/2021    AST 24 05/25/2021    ALT 56 05/25/2021    ALKPHOS 112 05/25/2021    PROT 6 9 12/02/2015    BILITOT 0 55 12/02/2015    EGFR 92 05/28/2021

## 2021-06-18 ENCOUNTER — TELEPHONE (OUTPATIENT)
Dept: INTERNAL MEDICINE CLINIC | Facility: CLINIC | Age: 71
End: 2021-06-18

## 2021-06-18 NOTE — TELEPHONE ENCOUNTER
Pt states that she hasn't heard from home health care, and is concerned  She was told to call us and update us

## 2021-06-22 ENCOUNTER — TELEPHONE (OUTPATIENT)
Dept: OBGYN CLINIC | Facility: HOSPITAL | Age: 71
End: 2021-06-22

## 2021-06-22 ENCOUNTER — APPOINTMENT (OUTPATIENT)
Dept: LAB | Facility: HOSPITAL | Age: 71
End: 2021-06-22
Payer: COMMERCIAL

## 2021-06-22 DIAGNOSIS — R41.0 DELIRIUM: ICD-10-CM

## 2021-06-22 LAB
ALBUMIN SERPL BCP-MCNC: 3.6 G/DL (ref 3.5–5.7)
ALP SERPL-CCNC: 130 U/L (ref 55–165)
ALT SERPL W P-5'-P-CCNC: 17 U/L (ref 7–52)
AMMONIA PLAS-SCNC: 35 UMOL/L (ref 25–90)
ANION GAP SERPL CALCULATED.3IONS-SCNC: 9 MMOL/L (ref 4–13)
AST SERPL W P-5'-P-CCNC: 17 U/L (ref 13–39)
BASOPHILS # BLD AUTO: 0 THOUSANDS/ΜL (ref 0–0.1)
BASOPHILS NFR BLD AUTO: 1 % (ref 0–2)
BILIRUB SERPL-MCNC: 0.6 MG/DL (ref 0.2–1)
BILIRUB UR QL STRIP: NEGATIVE
BUN SERPL-MCNC: 14 MG/DL (ref 7–25)
CALCIUM SERPL-MCNC: 9.2 MG/DL (ref 8.6–10.5)
CHLORIDE SERPL-SCNC: 103 MMOL/L (ref 98–107)
CLARITY UR: NORMAL
CO2 SERPL-SCNC: 31 MMOL/L (ref 21–31)
COLOR UR: YELLOW
CREAT SERPL-MCNC: 0.86 MG/DL (ref 0.6–1.2)
EOSINOPHIL # BLD AUTO: 0.1 THOUSAND/ΜL (ref 0–0.61)
EOSINOPHIL NFR BLD AUTO: 2 % (ref 0–5)
ERYTHROCYTE [DISTWIDTH] IN BLOOD BY AUTOMATED COUNT: 13.6 % (ref 11.5–14.5)
GFR SERPL CREATININE-BSD FRML MDRD: 69 ML/MIN/1.73SQ M
GLUCOSE P FAST SERPL-MCNC: 116 MG/DL (ref 65–99)
GLUCOSE UR STRIP-MCNC: NEGATIVE MG/DL
HCT VFR BLD AUTO: 43 % (ref 42–47)
HGB BLD-MCNC: 14 G/DL (ref 12–16)
HGB UR QL STRIP.AUTO: NEGATIVE
KETONES UR STRIP-MCNC: NEGATIVE MG/DL
LEUKOCYTE ESTERASE UR QL STRIP: NEGATIVE
LYMPHOCYTES # BLD AUTO: 1.2 THOUSANDS/ΜL (ref 0.6–4.47)
LYMPHOCYTES NFR BLD AUTO: 22 % (ref 21–51)
MAGNESIUM SERPL-MCNC: 1.9 MG/DL (ref 1.9–2.7)
MCH RBC QN AUTO: 29.9 PG (ref 26–34)
MCHC RBC AUTO-ENTMCNC: 32.6 G/DL (ref 31–37)
MCV RBC AUTO: 92 FL (ref 81–99)
MONOCYTES # BLD AUTO: 0.6 THOUSAND/ΜL (ref 0.17–1.22)
MONOCYTES NFR BLD AUTO: 11 % (ref 2–12)
NEUTROPHILS # BLD AUTO: 3.4 THOUSANDS/ΜL (ref 1.4–6.5)
NEUTS SEG NFR BLD AUTO: 65 % (ref 42–75)
NITRITE UR QL STRIP: NEGATIVE
PH UR STRIP.AUTO: 6 [PH]
PHOSPHATE SERPL-MCNC: 4.1 MG/DL (ref 3–5.5)
PLATELET # BLD AUTO: 245 THOUSANDS/UL (ref 149–390)
PMV BLD AUTO: 7.9 FL (ref 8.6–11.7)
POTASSIUM SERPL-SCNC: 4 MMOL/L (ref 3.5–5.5)
PROT SERPL-MCNC: 6.4 G/DL (ref 6.4–8.9)
PROT UR STRIP-MCNC: NEGATIVE MG/DL
RBC # BLD AUTO: 4.7 MILLION/UL (ref 3.9–5.2)
SODIUM SERPL-SCNC: 143 MMOL/L (ref 134–143)
SP GR UR STRIP.AUTO: 1.02 (ref 1–1.03)
TSH SERPL DL<=0.05 MIU/L-ACNC: 2.56 UIU/ML (ref 0.45–5.33)
UROBILINOGEN UR QL STRIP.AUTO: 0.2 E.U./DL
WBC # BLD AUTO: 5.3 THOUSAND/UL (ref 4.8–10.8)

## 2021-06-22 PROCEDURE — 36415 COLL VENOUS BLD VENIPUNCTURE: CPT

## 2021-06-22 PROCEDURE — 80053 COMPREHEN METABOLIC PANEL: CPT

## 2021-06-22 PROCEDURE — 83735 ASSAY OF MAGNESIUM: CPT

## 2021-06-22 PROCEDURE — 82140 ASSAY OF AMMONIA: CPT

## 2021-06-22 PROCEDURE — 84443 ASSAY THYROID STIM HORMONE: CPT

## 2021-06-22 PROCEDURE — 81003 URINALYSIS AUTO W/O SCOPE: CPT | Performed by: PHYSICIAN ASSISTANT

## 2021-06-22 PROCEDURE — 85025 COMPLETE CBC W/AUTO DIFF WBC: CPT

## 2021-06-22 PROCEDURE — 84100 ASSAY OF PHOSPHORUS: CPT

## 2021-06-22 PROCEDURE — 87040 BLOOD CULTURE FOR BACTERIA: CPT

## 2021-06-22 NOTE — TELEPHONE ENCOUNTER
Patient is calling to state that she had surgery on 05/20/21 and then was in [address] [facility] [address]  She's been home about a week and still hasn't received a call regarding home health care  Please advise      Callback [phone]

## 2021-06-22 NOTE — TELEPHONE ENCOUNTER
S/w pt  She is unaware of which [place] [id number] agency is supposed to be seeing her  attempted to reach  at [address] [place] [address] to discuss what home health care agency is supposed to be servicing patient    She is unavailable, Left  for  asking for call back with Kaden

## 2021-06-24 NOTE — TELEPHONE ENCOUNTER
Ly with  at [address] [place] [address] left NN VM that she s/w pt  Pt was referred to [facility] [place] for home services  Per her, they have been trying to reach pt to set up services but have been unable to reach pt  Per her VM she gave pt [employer] for pt to call and f/u about setting up date/time to be seen

## 2021-06-27 LAB — BACTERIA BLD CULT: NORMAL

## 2021-06-29 ENCOUNTER — OFFICE VISIT (OUTPATIENT)
Dept: INTERNAL MEDICINE CLINIC | Facility: CLINIC | Age: 71
End: 2021-06-29
Payer: COMMERCIAL

## 2021-06-29 VITALS
RESPIRATION RATE: 18 BRPM | DIASTOLIC BLOOD PRESSURE: 68 MMHG | HEART RATE: 104 BPM | WEIGHT: 177 LBS | SYSTOLIC BLOOD PRESSURE: 120 MMHG | OXYGEN SATURATION: 96 % | HEIGHT: 57 IN | TEMPERATURE: 98 F | BODY MASS INDEX: 38.19 KG/M2

## 2021-06-29 DIAGNOSIS — G93.41 ACUTE METABOLIC ENCEPHALOPATHY: ICD-10-CM

## 2021-06-29 DIAGNOSIS — N32.81 OAB (OVERACTIVE BLADDER): Primary | ICD-10-CM

## 2021-06-29 PROCEDURE — 99213 OFFICE O/P EST LOW 20 MIN: CPT | Performed by: PHYSICIAN ASSISTANT

## 2021-06-29 PROCEDURE — 1160F RVW MEDS BY RX/DR IN RCRD: CPT | Performed by: PHYSICIAN ASSISTANT

## 2021-06-29 PROCEDURE — 1036F TOBACCO NON-USER: CPT | Performed by: PHYSICIAN ASSISTANT

## 2021-06-29 RX ORDER — OXYBUTYNIN CHLORIDE 5 MG/1
5 TABLET ORAL 3 TIMES DAILY
Qty: 30 TABLET | Refills: 2 | Status: SHIPPED | OUTPATIENT
Start: 2021-06-29

## 2021-06-29 NOTE — PROGRESS NOTES
Assessment/Plan:  Problem List Items Addressed This Visit        Nervous and Auditory    Acute metabolic encephalopathy     Resolving since last visit  Continue without trazodone  Recheck 3 months           Other Visit Diagnoses     OAB (overactive bladder)    -  Primary    Relevant Medications    oxybutynin (DITROPAN) 5 mg tablet           Diagnoses and all orders for this visit:    OAB (overactive bladder)  -     oxybutynin (DITROPAN) 5 mg tablet; Take 1 tablet (5 mg total) by mouth 3 (three) times a day    Acute metabolic encephalopathy        Acute metabolic encephalopathy  Resolving since last visit  Continue without trazodone  Recheck 3 months      Subjective:      Patient ID: Johanna Payne is a 73 y o  female  Pt presents for 2 week follow up  She is noting improvement in both her cognition and hallucinations  She is accompanied by her daughter who relates that her cognition and hallucinations directly occur after the pt takes trazodone  Will continue to avoid this  Pt should try OTC melatonin or zzzquil in the meantime to try to combat her insomnia without causing side effects  The following portions of the patient's history were reviewed and updated as appropriate:   She has a past medical history of Anxiety, Arthritis, Asthma, Breast cancer ([facility] [address] ), [facility] ), Cataract, CHF (congestive heart failure) ([facility] ), Coronary artery disease, Depression, GERD (gastroesophageal reflux disease), High cholesterol, History of chemotherapy, History of transfusion, Hypertension, IBS (irritable bowel syndrome), Irritable bowel syndrome (IBS) (5/31/2016), Kidney stone, Lymphedema of right arm, Obesity, PONV (postoperative nausea and vomiting), Post-menopausal, Psychiatric disorder, Renal disorder, and Vitamin D deficiency  ,  does not have any pertinent problems on file  ,   has a past surgical history that includes Tonsillectomy and adenoidectomy; Breast surgery;  Cholecystectomy; Colonoscopy (N/A, 5/31/2016); Gallbladder surgery; Hysterectomy; Cataract extraction, bilateral; Mastectomy (Right); Knee arthroscopy; Knee surgery; Cataract extraction (Bilateral); and pr total knee arthroplasty (Left, 5/20/2021)  ,  family history includes Breast cancer in her maternal aunt, maternal aunt, maternal aunt, maternal aunt, maternal aunt, and maternal grandmother; Depression in her mother; Diabetes in her mother; Heart disease in her brother and mother; Mental illness in her mother; No Known Problems in her daughter, father, sister, sister, sister, and sister  She was adopted  ,   reports that she has never smoked  She has never used smokeless tobacco  She reports that she does not drink alcohol and does not use drugs  ,  is allergic to ibuprofen, morphine, and latex     Current Outpatient Medications   Medication Sig Dispense Refill    acetaminophen (TYLENOL) 650 mg CR tablet Take 1 tablet (650 mg total) by mouth every 8 (eight) hours as needed for mild pain 30 tablet 0    albuterol (2 5 mg/3 mL) 0 083 % nebulizer solution Take 2 5 mg by nebulization every 6 (six) hours as needed for wheezing   albuterol (PROAIR HFA) 90 mcg/act inhaler Inhale 2 puffs every 4 (four) hours as needed for wheezing 3 Inhaler 1    ascorbic acid (VITAMIN C) 500 MG tablet Take 1 tablet (500 mg total) by mouth daily 30 tablet 1    aspirin (ECOTRIN LOW STRENGTH) 81 mg EC tablet Take 1 tablet (81 mg total) by mouth 2 (two) times a day      atorvastatin (LIPITOR) 20 mg tablet Take 1 tablet by mouth daily   30 tablet 0    buPROPion (WELLBUTRIN XL) 300 mg 24 hr tablet Take 1 tablet (300 mg total) by mouth every morning 90 tablet 3    butalbital-acetaminophen-caffeine (FIORICET,ESGIC) -40 mg per tablet Take 1 tablet by mouth 3 (three) times a day as needed for headaches 90 tablet 1    docusate sodium (COLACE) 100 mg capsule Take 1 capsule (100 mg total) by mouth 2 (two) times a day 10 capsule 0    ergocalciferol (VITAMIN D2) 50,000 units Take 1 capsule (50,000 Units total) by mouth once a week 4 capsule 3    ferrous sulfate 324 (65 Fe) mg Take 1 tablet (324 mg total) by mouth 2 (two) times a day before meals 60 tablet 1    folic acid (FOLVITE) 1 mg tablet Take 1 tablet (1 mg total) by mouth daily 30 tablet 1    loratadine (CLARITIN) 10 mg tablet Take 1 tablet (10 mg total) by mouth daily 90 tablet 1    montelukast (SINGULAIR) 10 mg tablet Take 1 tablet by mouth daily  30 tablet 0    Multiple Vitamin (multivitamin) tablet Take 1 tablet by mouth daily 30 tablet 1    Nutritional Supplements (BOOST BREEZE PO) Take by mouth daily      nystatin (MYCOSTATIN) cream Apply topically 2 (two) times a day 30 g 3    nystatin (MYCOSTATIN) powder Apply topically 2 (two) times a day 60 g 5    ondansetron (ZOFRAN-ODT) 4 mg disintegrating tablet Take 4 mg by mouth every 8 (eight) hours as needed      oxyCODONE (ROXICODONE) 5 mg immediate release tablet Take 1 tablet (5 mg total) by mouth every 6 (six) hours as needed for severe pain for up to 4 dosesMax Daily Amount: 20 mg 4 tablet 0    pantoprazole (PROTONIX) 40 mg tablet Take 1 tablet by mouth daily  30 tablet 0    tamsulosin (FLOMAX) 0 4 mg Take 1 capsule (0 4 mg total) by mouth daily with dinner 14 capsule 0    diclofenac (VOLTAREN) 75 mg EC tablet Take 1 tablet (75 mg total) by mouth 2 (two) times a day (Patient not taking: Reported on 6/29/2021) 180 tablet 1    DULoxetine (CYMBALTA) 60 mg delayed release capsule Take 1 capsule by mouth twice daily   (Patient not taking: Reported on 6/29/2021) 180 capsule 0    gabapentin (NEURONTIN) 300 mg capsule Take 1 capsule (300 mg total) by mouth 2 (two) times a day (Patient not taking: Reported on 6/29/2021)  0    oxybutynin (DITROPAN) 5 mg tablet Take 1 tablet (5 mg total) by mouth 3 (three) times a day 30 tablet 2    oxybutynin (DITROPAN-XL) 10 MG 24 hr tablet Take 1 tablet (10 mg total) by mouth daily at bedtime (Patient not taking: Reported on 6/29/2021) 30 tablet 3     No current facility-administered medications for this visit  Review of Systems   Constitutional: Negative for chills and fever  HENT: Negative for congestion, ear pain, hearing loss, postnasal drip, rhinorrhea, sinus pressure, sinus pain, sore throat and trouble swallowing  Eyes: Negative for pain and visual disturbance  Respiratory: Negative for cough, chest tightness, shortness of breath and wheezing  Cardiovascular: Negative  Negative for chest pain, palpitations and leg swelling  Gastrointestinal: Negative for abdominal pain, blood in stool, constipation, diarrhea, nausea and vomiting  Endocrine: Negative for cold intolerance, heat intolerance, polydipsia, polyphagia and polyuria  Genitourinary: Negative for difficulty urinating, dysuria, flank pain and urgency  Musculoskeletal: Negative for arthralgias, back pain, gait problem and myalgias  Skin: Negative for rash  Allergic/Immunologic: Negative  Neurological: Negative for dizziness, weakness, light-headedness and headaches  Hematological: Negative  Psychiatric/Behavioral: Negative for behavioral problems, dysphoric mood and sleep disturbance  The patient is not nervous/anxious  PHQ-9 Depression Screening    PHQ-9:   Frequency of the following problems over the past two weeks:              Objective:  Vitals:    06/29/21 1002   BP: 120/68   Pulse: 104   Resp: 18   Temp: 98 °F (36 7 °C)   TempSrc: Tympanic   SpO2: 96%   Weight: 80 3 kg (177 lb)   Height: 4' 9" (1 448 m)     Body mass index is 38 3 kg/m²  Physical Exam  Constitutional:       General: She is not in acute distress  Appearance: She is well-developed  She is not diaphoretic  HENT:      Head: Normocephalic and atraumatic  Right Ear: External ear normal       Left Ear: External ear normal       Nose: Nose normal       Mouth/Throat:      Pharynx: No oropharyngeal exudate  Eyes:      General: No scleral icterus          Right eye: No discharge  Left eye: No discharge  Conjunctiva/sclera: Conjunctivae normal       Pupils: Pupils are equal, round, and reactive to light  Neck:      Thyroid: No thyromegaly  Cardiovascular:      Rate and Rhythm: Normal rate and regular rhythm  Heart sounds: Normal heart sounds  No murmur heard  No friction rub  No gallop  Pulmonary:      Effort: Pulmonary effort is normal  No respiratory distress  Breath sounds: Normal breath sounds  No wheezing or rales  Abdominal:      General: Bowel sounds are normal  There is no distension  Palpations: Abdomen is soft  Tenderness: There is no abdominal tenderness  Musculoskeletal:         General: No tenderness or deformity  Normal range of motion  Cervical back: Normal range of motion and neck supple  Skin:     General: Skin is warm and dry  Neurological:      Mental Status: She is alert and oriented to person, place, and time  Cranial Nerves: No cranial nerve deficit  Psychiatric:         Behavior: Behavior normal          Thought Content:  Thought content normal          Judgment: Judgment normal

## 2021-07-06 ENCOUNTER — OFFICE VISIT (OUTPATIENT)
Dept: OBGYN CLINIC | Facility: CLINIC | Age: 71
End: 2021-07-06

## 2021-07-06 ENCOUNTER — APPOINTMENT (OUTPATIENT)
Dept: RADIOLOGY | Facility: CLINIC | Age: 71
End: 2021-07-06
Payer: COMMERCIAL

## 2021-07-06 VITALS — HEIGHT: 57 IN | WEIGHT: 177 LBS | BODY MASS INDEX: 38.19 KG/M2

## 2021-07-06 DIAGNOSIS — M17.12 PRIMARY OSTEOARTHRITIS OF LEFT KNEE: ICD-10-CM

## 2021-07-06 DIAGNOSIS — M17.12 PRIMARY OSTEOARTHRITIS OF LEFT KNEE: Primary | ICD-10-CM

## 2021-07-06 PROCEDURE — 3008F BODY MASS INDEX DOCD: CPT | Performed by: PHYSICIAN ASSISTANT

## 2021-07-06 PROCEDURE — 99024 POSTOP FOLLOW-UP VISIT: CPT | Performed by: ORTHOPAEDIC SURGERY

## 2021-07-06 PROCEDURE — 73562 X-RAY EXAM OF KNEE 3: CPT

## 2021-07-06 NOTE — PROGRESS NOTES
Assessment:     1  Primary osteoarthritis of left knee          Plan:   Diagnoses and all orders for this visit:    Primary osteoarthritis of left knee  -     XR knee 3 vw left non injury; Future          68-year-old female s/p left TKA performed on 5/20/2021  She is making good progress  Importance of stretching discussed  F/u 1 m  No xrays needed  Patient ID: Corrina Taylor is a 73 y o  female  Chief Complaint:  Follow-up  Left TKA    HPI:   68-year-old female presents to the office today status post left TKA performed on 05/20/2021  She is doing well  Having no pain  Hopeing to be off the walker next week        Allergy:  Allergies   Allergen Reactions    Ibuprofen Nausea Only, Rash, Dizziness and Syncope    Morphine Other (See Comments)     Intolerance    Latex Dermatitis       Medications:  all current active meds have been reviewed    Past Medical History:  Past Medical History:   Diagnosis Date    Anxiety     Arthritis     Asthma     Breast cancer ([address] )     rt mastectomy 2005    Cancer [facility])     breast right    Cataract     CHF (congestive heart failure) (HCC)     Coronary artery disease     Depression     GERD (gastroesophageal reflux disease)     High cholesterol     History of chemotherapy     2005 rt breast cancer    History of transfusion     Hypertension     IBS (irritable bowel syndrome)     Irritable bowel syndrome (IBS) 5/31/2016    Kidney stone     Lymphedema of right arm     Obesity     PONV (postoperative nausea and vomiting)     Post-menopausal     Psychiatric disorder     Renal disorder     Vitamin D deficiency        Past Surgical History:  Past Surgical History:   Procedure Laterality Date    BREAST SURGERY      CATARACT EXTRACTION Bilateral     CATARACT EXTRACTION, BILATERAL      CHOLECYSTECTOMY      COLONOSCOPY N/A 5/31/2016    Procedure: COLONOSCOPY;  Surgeon: Cuca Haji MD;  Location: [facility];  Service:    [address] [place] [address]  HYSTERECTOMY      Total    KNEE ARTHROSCOPY      KNEE SURGERY      arthroscopy    MASTECTOMY Right     rt breast mastectomy 2005    HI TOTAL KNEE ARTHROPLASTY Left 5/20/2021    Procedure: ARTHROPLASTY KNEE TOTAL;  Surgeon: Eliza Gaines MD;  Location: [address] [place] [address] MAIN OR;  Service: Orthopedics    TONSILLECTOMY AND ADENOIDECTOMY         Family History:  Family History   Adopted: Yes   Problem Relation Age of Onset    Diabetes Mother     Heart disease Mother     Mental illness Mother     Depression Mother     Heart disease Brother     Breast cancer Maternal Aunt     Breast cancer Maternal Aunt     Breast cancer Maternal Aunt     Breast cancer Maternal Aunt     Breast cancer Maternal Aunt     No Known Problems Father     No Known Problems Sister     No Known Problems Daughter     Breast cancer Maternal Grandmother     No Known Problems Sister     No Known Problems Sister     No Known Problems Sister        Social History:  Social History     Substance and Sexual Activity   Alcohol Use Never    Comment: socially     Social History     Substance and Sexual Activity   Drug Use Never     Social History     Tobacco Use   Smoking Status Never Smoker   Smokeless Tobacco Never Used           ROS:  Review of Systems   All other systems reviewed and are negative  Objective:  BP Readings from Last 1 Encounters:   06/29/21 120/68      Wt Readings from Last 1 Encounters:   07/06/21 80 3 kg (177 lb)        BMI:   Estimated body mass index is 38 3 kg/m² as calculated from the following:    Height as of this encounter: 4' 9" (1 448 m)  Weight as of this encounter: 80 3 kg (177 lb)  EXAM:   Physical Exam  Musculoskeletal:      Left knee: No effusion  Left Knee Exam     Tenderness   The patient is experiencing no tenderness       Range of Motion   Extension: 0   Flexion: 90     Tests   Varus: negative Valgus: negative    Other   Erythema: absent  Scars: present  Sensation: normal  Pulse: present  Swelling: none  Effusion: no effusion present    Comments:  Incision well healed            :  Radiographs  I have personally reviewed pertinent films in PACS and my interpretation is a total knee replacement in good position with no loosening or failure

## 2021-07-14 DIAGNOSIS — F33.41 RECURRENT MAJOR DEPRESSIVE DISORDER, IN PARTIAL REMISSION (HCC): ICD-10-CM

## 2021-07-14 DIAGNOSIS — J30.9 ALLERGIC RHINITIS, UNSPECIFIED SEASONALITY, UNSPECIFIED TRIGGER: ICD-10-CM

## 2021-07-14 DIAGNOSIS — K21.9 GASTROESOPHAGEAL REFLUX DISEASE WITHOUT ESOPHAGITIS: ICD-10-CM

## 2021-07-14 DIAGNOSIS — E78.5 HYPERLIPIDEMIA, UNSPECIFIED HYPERLIPIDEMIA TYPE: ICD-10-CM

## 2021-07-14 RX ORDER — MONTELUKAST SODIUM 10 MG/1
TABLET ORAL
Qty: 30 TABLET | Refills: 0 | Status: SHIPPED | OUTPATIENT
Start: 2021-07-14 | End: 2021-08-13

## 2021-07-14 RX ORDER — PANTOPRAZOLE SODIUM 40 MG/1
TABLET, DELAYED RELEASE ORAL
Qty: 30 TABLET | Refills: 0 | Status: SHIPPED | OUTPATIENT
Start: 2021-07-14 | End: 2021-08-13

## 2021-07-14 RX ORDER — ATORVASTATIN CALCIUM 20 MG/1
TABLET, FILM COATED ORAL
Qty: 30 TABLET | Refills: 0 | Status: SHIPPED | OUTPATIENT
Start: 2021-07-14 | End: 2021-08-13

## 2021-07-14 RX ORDER — DULOXETIN HYDROCHLORIDE 60 MG/1
CAPSULE, DELAYED RELEASE ORAL
Qty: 180 CAPSULE | Refills: 0 | Status: SHIPPED | OUTPATIENT
Start: 2021-07-14 | End: 2021-10-12

## 2021-07-19 ENCOUNTER — HOSPITAL ENCOUNTER (OUTPATIENT)
Dept: INFUSION CENTER | Facility: HOSPITAL | Age: 71
Discharge: HOME | End: 2021-07-19
Payer: COMMERCIAL

## 2021-07-19 VITALS — TEMPERATURE: 97 F

## 2021-07-19 DIAGNOSIS — M81.0 AGE-RELATED OSTEOPOROSIS WITHOUT CURRENT PATHOLOGICAL FRACTURE: Primary | ICD-10-CM

## 2021-07-19 PROCEDURE — 96401 CHEMO ANTI-NEOPL SQ/IM: CPT

## 2021-07-19 RX ADMIN — DENOSUMAB 60 MG: 60 INJECTION SUBCUTANEOUS at 15:35

## 2021-07-19 NOTE — PROGRESS NOTES
Pt tolerated todays prolia injection well to left arm  Recent labs reviewed  Discharged ambulatory with avs and next appt scheduled

## 2021-07-23 ENCOUNTER — TELEPHONE (OUTPATIENT)
Dept: OBGYN CLINIC | Facility: HOSPITAL | Age: 71
End: 2021-07-23

## 2021-07-23 NOTE — TELEPHONE ENCOUNTER
Patient sees Dr Yasmeen Maldonado from [facility] called asking, if patient can please have a script to have outpatient physical therapy at [facility] in [place]?

## 2021-07-23 NOTE — TELEPHONE ENCOUNTER
I spoke with Franck Mena who confirmed that she will let patient know PT is ordered and they will be in contact with her to schedule

## 2021-08-10 ENCOUNTER — OFFICE VISIT (OUTPATIENT)
Dept: OBGYN CLINIC | Facility: CLINIC | Age: 71
End: 2021-08-10

## 2021-08-10 VITALS
SYSTOLIC BLOOD PRESSURE: 133 MMHG | DIASTOLIC BLOOD PRESSURE: 85 MMHG | BODY MASS INDEX: 38.62 KG/M2 | HEIGHT: 57 IN | WEIGHT: 179 LBS | HEART RATE: 94 BPM

## 2021-08-10 DIAGNOSIS — Z96.652 S/P TOTAL KNEE ARTHROPLASTY, LEFT: Primary | ICD-10-CM

## 2021-08-10 PROCEDURE — 99024 POSTOP FOLLOW-UP VISIT: CPT | Performed by: ORTHOPAEDIC SURGERY

## 2021-08-10 PROCEDURE — 3008F BODY MASS INDEX DOCD: CPT | Performed by: PHYSICIAN ASSISTANT

## 2021-08-10 NOTE — PROGRESS NOTES
Assessment:     1  S/P total knee arthroplasty, left          Plan:   Diagnoses and all orders for this visit:    S/P total knee arthroplasty, left          68-year-old female s/p left TKA performed on 5/20/2021  She is making good progress  Continue work with PT and work on motion  F/u 1 year with xrays of left knee  Patient ID: Amanuel Peñaloza is a 73 y o  female  Chief Complaint:  Follow-up  Left TKA    HPI:   68-year-old female presents to the office today status post left TKA performed on 05/20/2021  She states she is doing very well  She is not having any pain  She is no longer using the walker  She notes that she forgets her cane around the house regularly        Allergy:  Allergies   Allergen Reactions    Ibuprofen Nausea Only, Rash, Dizziness and Syncope    Morphine Other (See Comments)     Intolerance    Latex Dermatitis       Medications:  all current active meds have been reviewed    Past Medical History:  Past Medical History:   Diagnosis Date    Anxiety     Arthritis     Asthma     Breast cancer ([address] )     rt mastectomy 2005    Cancer [facility])     breast right    Cataract     CHF (congestive heart failure) (HCC)     Coronary artery disease     Depression     GERD (gastroesophageal reflux disease)     High cholesterol     History of chemotherapy     2005 rt breast cancer    History of transfusion     Hypertension     IBS (irritable bowel syndrome)     Irritable bowel syndrome (IBS) 5/31/2016    Kidney stone     Lymphedema of right arm     Obesity     PONV (postoperative nausea and vomiting)     Post-menopausal     Psychiatric disorder     Renal disorder     Vitamin D deficiency        Past Surgical History:  Past Surgical History:   Procedure Laterality Date    BREAST SURGERY      CATARACT EXTRACTION Bilateral     CATARACT EXTRACTION, BILATERAL      CHOLECYSTECTOMY      COLONOSCOPY N/A 5/31/2016    Procedure: COLONOSCOPY;  Surgeon: Fidencio Mccain MD;  Location: [facility] OR;  Service:     GALLBLADDER SURGERY      HYSTERECTOMY      Total    KNEE ARTHROSCOPY      KNEE SURGERY      arthroscopy    MASTECTOMY Right     rt breast mastectomy 2005    NM TOTAL KNEE ARTHROPLASTY Left 5/20/2021    Procedure: ARTHROPLASTY KNEE TOTAL;  Surgeon: Radha Wu MD;  Location: [address] [place], [place] [address] MAIN OR;  Service: Orthopedics    TONSILLECTOMY AND ADENOIDECTOMY         Family History:  Family History   Adopted: Yes   Problem Relation Age of Onset    Diabetes Mother     Heart disease Mother     Mental illness Mother     Depression Mother     Heart disease Brother     Breast cancer Maternal Aunt     Breast cancer Maternal Aunt     Breast cancer Maternal Aunt     Breast cancer Maternal Aunt     Breast cancer Maternal Aunt     No Known Problems Father     No Known Problems Sister     No Known Problems Daughter     Breast cancer Maternal Grandmother     No Known Problems Sister     No Known Problems Sister     No Known Problems Sister        Social History:  Social History     Substance and Sexual Activity   Alcohol Use Never    Comment: socially     Social History     Substance and Sexual Activity   Drug Use Never     Social History     Tobacco Use   Smoking Status Never Smoker   Smokeless Tobacco Never Used           ROS:  Review of Systems   All other systems reviewed and are negative  Objective:  BP Readings from Last 1 Encounters:   08/10/21 133/85      Wt Readings from Last 1 Encounters:   08/10/21 81 2 kg (179 lb)        BMI:   Estimated body mass index is 38 74 kg/m² as calculated from the following:    Height as of this encounter: 4' 9" (1 448 m)  Weight as of this encounter: 81 2 kg (179 lb)  EXAM:   Physical Exam  Musculoskeletal:      Left knee: No effusion  Left Knee Exam     Tenderness   The patient is experiencing no tenderness       Range of Motion   Extension: 0   Flexion: 80     Tests   Varus: negative Valgus: negative    Other   Erythema: absent  Scars: present  Sensation: normal  Pulse: present  Swelling: none  Effusion: no effusion present    Comments:  Incision well healed

## 2021-08-13 DIAGNOSIS — E78.5 HYPERLIPIDEMIA, UNSPECIFIED HYPERLIPIDEMIA TYPE: ICD-10-CM

## 2021-08-13 DIAGNOSIS — J30.9 ALLERGIC RHINITIS, UNSPECIFIED SEASONALITY, UNSPECIFIED TRIGGER: ICD-10-CM

## 2021-08-13 DIAGNOSIS — K21.9 GASTROESOPHAGEAL REFLUX DISEASE WITHOUT ESOPHAGITIS: ICD-10-CM

## 2021-08-13 RX ORDER — ATORVASTATIN CALCIUM 20 MG/1
TABLET, FILM COATED ORAL
Qty: 30 TABLET | Refills: 0 | Status: SHIPPED | OUTPATIENT
Start: 2021-08-13 | End: 2021-09-13

## 2021-08-13 RX ORDER — MONTELUKAST SODIUM 10 MG/1
TABLET ORAL
Qty: 30 TABLET | Refills: 0 | Status: SHIPPED | OUTPATIENT
Start: 2021-08-13 | End: 2021-09-13

## 2021-08-13 RX ORDER — PANTOPRAZOLE SODIUM 40 MG/1
TABLET, DELAYED RELEASE ORAL
Qty: 30 TABLET | Refills: 0 | Status: SHIPPED | OUTPATIENT
Start: 2021-08-13 | End: 2021-09-12

## 2021-09-02 ENCOUNTER — EVALUATION (OUTPATIENT)
Dept: PHYSICAL THERAPY | Facility: CLINIC | Age: 71
End: 2021-09-02
Payer: COMMERCIAL

## 2021-09-02 DIAGNOSIS — Z96.652 S/P TOTAL KNEE ARTHROPLASTY, LEFT: ICD-10-CM

## 2021-09-02 PROCEDURE — 97110 THERAPEUTIC EXERCISES: CPT

## 2021-09-02 PROCEDURE — 97162 PT EVAL MOD COMPLEX 30 MIN: CPT

## 2021-09-02 NOTE — PROGRESS NOTES
PT Evaluation     Today's date: 21  Patient name: Cristy Canales  : 1950  MRN: [id number]  Referring provider: Rom Kulkarni MD  Dx:   No diagnosis found  Assessment  Assessment details: Cristy Canales is a 73 y o  female presenting to outpatient physical therapy with noted impairments including pain, impaired soft tissue mobility, reduced range of motion, reduced strength, reduced postural awareness, and reduced activity tolerance  Signs and symptoms at present are consistent with referring diagnosis of L TKR  Due to noted impairments, the patient's present functional limitations include difficulty with ADLs with increased need for assistance, reliance on medication and/or modalities for pain relief, reduced tolerance for functional mobility and activity, and difficulty completing HH and  Self care  responsibilities  Patient to benefit from skilled outpatient physical therapy 2x/week for 4 weeks in order to reduce pain, maximize pain free range of motion, increase strength and stability, and improve functional mobility/functional activity in order to maximize return to prior level of function with reduced limitations  Home exercise program was provided and all questions answered to patient's level of satisfaction  Pt notes she will be leaving for [place]  and will not be back until November  Thank you for your referral       Impairments: abnormal gait, abnormal or restricted ROM, activity intolerance, impaired balance, impaired physical strength, lacks appropriate home exercise program and pain with function  Understanding of Dx/Px/POC: good   Prognosis: good    Goals  STGs to be achieved in 4 weeks:  1  Pt to demonstrate reduced subjective pain rating "at worst" by at least 2-3 points from Initial Eval in order to allow for reduced pain with ADLs and improved functional activity tolerance     2  Pt to demonstrate increased AROM of L knee  by at least 5-10 degrees in order to allow for greater ease and independence with ADLs and functional mobility  3  Pt to demonstrate FOF  stair climbing in order to maximize  mobility and function and allow for progression of exercise program and achievement of goals  4  Pt to demonstrate increased MMT of L knee  by at least 1/2-1 grade in order to improve safety and stability with ADLs and functional mobility  LTGs to be achieved in 6-8 weeks:  1  Pt will be I with HEP in order to continue to improve quality of life and independence and reduce risk for re-injury  2  Pt to demonstrate return to usual [place] and community activities without limitations or restrictions  3  Pt to demonstrate improved function as noted by achieving or exceeding predicted score on FOTO outcomes assessment tool  Plan  Patient would benefit from: skilled physical therapy  Planned modality interventions: cryotherapy  Planned therapy interventions: manual therapy, neuromuscular re-education, strengthening, stretching, therapeutic exercise and home exercise program  Frequency: 2x week  Duration in weeks: 4  Plan of Care beginning date: 9/2/2021  Plan of Care expiration date: 10/2/2021  Treatment plan discussed with: patient        Subjective Evaluation    History of Present Illness  Mechanism of injury: Per Dr Brody Morales note:  62-year-old female s/p left TKA performed on 5/20/2021  She is making good progress  Continue work with PT and work on motion  F/u 1 year with xrays of left knee  Pt had complications of cardiac arrest during surgery, blisters from L knee to ankle, severe allergic reaction to morphine  Was hospitalized for 2 1/2 weeks  Transferred to [facility] and [address]  [address] 6/12/21   Pt had [address] for [address] sessions  States they [address] her after she reached 90* flexion  Pt states she lost a lot of memory and experienced confusion after surgery  Now for OPPT            Not a recurrent problem   Quality of life: good    Pain  Current pain ratin  At best pain ratin  At worst pain ratin  Quality: sharp, dull ache and tight  Relieving factors: rest, relaxation, ice, medications and change in position  Aggravating factors: walking and standing (bending the L knee)  Progression: improved    Social Support  Steps to enter house: yes (2)  Stairs in house: yes (stair glide)   Lives in: multiple-level home  Lives with: adult children    Employment status: not working  Treatments  Previous treatment: physical therapy, occupational therapy and medication  Current treatment: medication and physical therapy  Patient Goals  Patient goals for therapy: increased motion, increased strength, return to sport/leisure activities and independence with ADLs/IADLs  Patient goal: return to swimming        Objective     Observations   Left Knee   Positive for incision (healing)  Negative for drainage  Tenderness     Additional Tenderness Details  NO TTP    Neurological Testing     Sensation     Knee   Left Knee   Intact: hot/cold discrimination  Diminished: light touch     Comments   Left light touch: diminished L knee area  Active Range of Motion     Right Knee   Flexion: 88 degrees   Extension: -11 degrees     Strength/Myotome Testing     Left Knee   Flexion: 4  Extension: 4+  Quadriceps contraction: good    Right Knee   Normal strength    Tests     Additional Tests Details  5TSTS test= with UE's  22 3 sec  TUG test    Ambulation     Observational Gait   Gait: antalgic   Stride length, left stance time and left step length within functional limits  Decreased walking speed  Left foot contact pattern: heel to toe  Base of support: normal    Additional Observational Gait Details  Mild antalgic gt with decreased L knee flex during swing phase of gait   Using [employer] outdoors, no AD's inside             Precautions: OA R knee    Re-eval Date: 10/2/21    Date  21       Visit Count 1       FOTO completed       Pain In See IE       Pain Out See IE Manuals 9/2       PROM/stretching L knee        Patellar mobs                        Neuro Re-Ed        Wobble board        aeromat   Side step  tandem        Tandem stand                                        Ther Ex        Nustep L2  12'       Heel slides        Knee flex stretch        SAQs        Sup abd/add        bridges        Seated march        LAQs        Step ups  Fwd/lat        Mini squats        TR's/HR's        Leg press        Ham stretch                                Gait Training                        Modalities        CP to L knee

## 2021-09-07 NOTE — PROGRESS NOTES
Daily Note     Today's date: 2021  Patient name: Albin Harding  : 1950  MRN: [id number]  Referring provider: Anisa Seaman MD  Dx:   Encounter Diagnosis     ICD-10-CM    1  S/P total knee arthroplasty, left  [id number]        Start Time: 1400  Stop Time: 1500  Total time in clinic (min): 60 minutes    Subjective: "I'm good today  Sometimes I forget I can't bend my knee as far as I would like "      Objective: See treatment diary below      Assessment: Tolerated treatment well  Initiated exercise program without incident  Good strength and motion noted  Appropriate end range tightness noted with minimal pain  Would benefit from continued skilled therapy to increase motion, mobility, and strength to return to PLOF  Patient demonstrated fatigue post treatment and would benefit from continued PT      Plan: Continue per plan of care  Progress treatment as tolerated         Precautions: OA R knee    Re-eval Date: 10/2/21    Date  21      Visit Count 1 2      FOTO completed       Pain In See IE 0      Pain Out See IE 0        Manuals       PROM/stretching L knee  PROM/stretching L knee; patella mobility 10'      Patellar mobs                        Neuro Re-Ed        Wobble board        aeromat   Side step  tandem        Tandem stand                                        Ther Ex        Nustep L2  12' L2 10'      Heel slides  2x10/3-5"      Knee flex stretch  QS 2x10/3-5"      SAQs        Sup abd/add  Ball sq   20x/3-5"      bridges        Seated march        LAQs  2x10/3-5"      Step ups  Fwd/lat  Fwd 6"   20x cueing      Mini squats  2x10      TR's/HR's  20x ea      Leg press        Ham stretch                                Gait Training                        Modalities        CP to L knee  CP to L knee 10' no adverse reaction

## 2021-09-08 ENCOUNTER — OFFICE VISIT (OUTPATIENT)
Dept: PHYSICAL THERAPY | Facility: CLINIC | Age: 71
End: 2021-09-08
Payer: COMMERCIAL

## 2021-09-08 DIAGNOSIS — Z96.652 S/P TOTAL KNEE ARTHROPLASTY, LEFT: Primary | ICD-10-CM

## 2021-09-08 PROCEDURE — 97140 MANUAL THERAPY 1/> REGIONS: CPT

## 2021-09-08 PROCEDURE — 97110 THERAPEUTIC EXERCISES: CPT

## 2021-09-10 ENCOUNTER — OFFICE VISIT (OUTPATIENT)
Dept: PHYSICAL THERAPY | Facility: CLINIC | Age: 71
End: 2021-09-10
Payer: COMMERCIAL

## 2021-09-10 DIAGNOSIS — Z96.652 S/P TOTAL KNEE ARTHROPLASTY, LEFT: Primary | ICD-10-CM

## 2021-09-10 PROCEDURE — 97110 THERAPEUTIC EXERCISES: CPT

## 2021-09-10 NOTE — PROGRESS NOTES
Daily Note     Today's date: 9/10/2021  Patient name: Reinaldo Slater  : 1950  MRN: [id number]  Referring provider: Sathish Bay MD  Dx:   Encounter Diagnosis     ICD-10-CM    1  S/P total knee arthroplasty, left  [id number]                   Subjective:  Pt's only c/o is tightness @ L  [place]  Objective: See treatment diary below      Assessment: Tolerated treatment Fairly Well overall with performance of ther exer  Pt  ambulating w/wo AD (SPC)  Plan:  Con't services 2x/week           Precautions: OA R knee    Re-eval Date: 10/2/21    Date  21 9 10 21     Visit Count 1 2 3     FOTO completed       Pain In See IE 0 0/10  "tight"     Pain Out See IE 0 0/10       Manuals  9 10 21     PROM/stretching L knee  PROM/stretching L knee; patella mobility 10' PROM/stretching L knee; patella mobility 10'     Patellar mobs   **5 min                     Neuro Re-Ed        Wobble board        aeromat   Side step  tandem        Tandem stand                                        Ther Ex   9 10 21     Nustep L2  12' L2 10' L2 10'       Heel slides  2x10/3-5" 2x10/3-5"       Knee flex stretch  QS 2x10/3-5" QS 2x10/3-5"     SAQs   **L 3x10/5"       Sup abd/add  Ball sq   20x/3-5" Ball sq   30x/3-5"     bridges        Seated march   **B Alt 30x     LAQs  2x10/3-5" L 30x       Step ups  Fwd/lat  Fwd 6"   20x cueing Fwd 6"   20x ea     Mini squats  2x10 2x10       TR's/HR's  20x ea 30x       Leg press        Ham stretch   **L 4x/30"                             Gait Training                        Modalities   9 10 21     CP to L knee  CP to L knee 10' no adverse reaction  **Deferred

## 2021-09-12 DIAGNOSIS — K21.9 GASTROESOPHAGEAL REFLUX DISEASE WITHOUT ESOPHAGITIS: ICD-10-CM

## 2021-09-12 DIAGNOSIS — E78.5 HYPERLIPIDEMIA, UNSPECIFIED HYPERLIPIDEMIA TYPE: ICD-10-CM

## 2021-09-12 DIAGNOSIS — J30.9 ALLERGIC RHINITIS, UNSPECIFIED SEASONALITY, UNSPECIFIED TRIGGER: ICD-10-CM

## 2021-09-12 RX ORDER — PANTOPRAZOLE SODIUM 40 MG/1
TABLET, DELAYED RELEASE ORAL
Qty: 30 TABLET | Refills: 0 | Status: SHIPPED | OUTPATIENT
Start: 2021-09-12 | End: 2021-10-13

## 2021-09-13 ENCOUNTER — APPOINTMENT (OUTPATIENT)
Dept: PHYSICAL THERAPY | Facility: CLINIC | Age: 71
End: 2021-09-13
Payer: COMMERCIAL

## 2021-09-13 RX ORDER — ATORVASTATIN CALCIUM 20 MG/1
TABLET, FILM COATED ORAL
Qty: 30 TABLET | Refills: 0 | Status: SHIPPED | OUTPATIENT
Start: 2021-09-13 | End: 2021-10-12

## 2021-09-13 RX ORDER — MONTELUKAST SODIUM 10 MG/1
TABLET ORAL
Qty: 30 TABLET | Refills: 0 | Status: SHIPPED | OUTPATIENT
Start: 2021-09-13 | End: 2021-10-12

## 2021-09-15 ENCOUNTER — OFFICE VISIT (OUTPATIENT)
Dept: PHYSICAL THERAPY | Facility: CLINIC | Age: 71
End: 2021-09-15
Payer: COMMERCIAL

## 2021-09-15 DIAGNOSIS — Z96.652 S/P TOTAL KNEE ARTHROPLASTY, LEFT: Primary | ICD-10-CM

## 2021-09-15 PROCEDURE — 97110 THERAPEUTIC EXERCISES: CPT

## 2021-09-15 NOTE — PROGRESS NOTES
Daily Note     Today's date: 9/15/2021  Patient name: Lubna Franco  : 1950  MRN: [id number]  Referring provider: Petra Correia MD  Dx:   Encounter Diagnosis     ICD-10-CM    1  S/P total knee arthroplasty, left  [id number]                   Subjective:  Pt  states she's not feeling the greatest today  Says her arthritic issues are bothersome today, and also has a headache which usually comes along with the arthritis flare-up  Objective: See treatment diary below      Assessment: Tolerated treatment Fair+ to Fairly Well overall with performance of ther exer  Pt stated she felt better after therapy session  Plan: Con't services 2x/week          Precautions: OA R knee    Re-eval Date: 10/2/21    Date  21 9 10 21 9 15 21    Visit Count 1 2 3 4    FOTO completed       Pain In See IE 0 0/10  "tight" 0/10    Pain Out See IE 0 0/10 0/10      Manuals  9 10 21 9 15 21    PROM/stretching L knee  PROM/stretching L knee; patella mobility 10' PROM/stretching L knee; patella mobility 10' PROM/stretching L knee; patella mobility 10'    Patellar mobs   **5 min 5 min                    Neuro Re-Ed        Wobble board        aeromat   Side step  tandem        Tandem stand                                        Ther Ex   9 10 21 9 15 21    Nustep L2  12' L2 10' L2 10'   L3 10    Heel slides  2x10/3-5" 2x10/3-5"   3x10/3-5"    Knee flex stretch  QS 2x10/3-5" QS 2x10/3-5" QS 2x15/3-5"    SAQs   **L 3x10/5"   L 3x10/5"    Sup abd/add  Ball sq   20x/3-5" Ball sq   30x/3-5" Ball sq   30x/3-5"    bridges        Seated march   **B Alt 30x B Alt 30x    LAQs  2x10/3-5" L 30x   L 30x    Step ups  Fwd/lat  Fwd 6"   20x cueing Fwd 6"   20x ea resume    Mini squats  2x10 2x10   2x10    TR's/HR's  20x ea 30x   Seated 40x    Leg press        Ham stretch   **L 4x/30" *L 4x/30"                            Gait Training                        Modalities   9 10 21 9 15 21    CP to L knee  CP to L knee 10' no adverse reaction **Deferred CP to L knee 10' no adverse reaction

## 2021-09-20 ENCOUNTER — OFFICE VISIT (OUTPATIENT)
Dept: PHYSICAL THERAPY | Facility: CLINIC | Age: 71
End: 2021-09-20
Payer: COMMERCIAL

## 2021-09-20 DIAGNOSIS — Z96.652 S/P TOTAL KNEE ARTHROPLASTY, LEFT: Primary | ICD-10-CM

## 2021-09-20 PROCEDURE — 97110 THERAPEUTIC EXERCISES: CPT

## 2021-09-20 NOTE — PROGRESS NOTES
Daily Note     Today's date: 2021  Patient name: Tatiana Palmer  : 1950  MRN: [id number]  Referring provider: Katrina Hernadez MD  Dx:   Encounter Diagnosis     ICD-10-CM    1  S/P total knee arthroplasty, left  [id number]                   Subjective:  Pt  feeling better today  Denies any pain @ L knee at this present time  Objective: See treatment diary below      Assessment: Tolerated treatment Fairly Well to Well overall with performance and tolerance of ther exer  Plan: Con't services 2x/week           Precautions: OA R knee    Re-eval Date: 10/2/21    Date  21 9 10 21 9 15 21 9 20 21   Visit Count 1 2 3 4 5   FOTO completed       Pain In See IE 0 0/10  "tight" 0/10 0/10   Pain Out See IE 0 0/10 0/10 0/10     Manuals  9 10 21 9 15 21 9 20 21   PROM/stretching L knee  PROM/stretching L knee; patella mobility 10' PROM/stretching L knee; patella mobility 10' PROM/stretching L knee; patella mobility 10' PROM/stretching L knee; patella mobility 10'   Patellar mobs   **5 min 5 min 5 min                   Neuro Re-Ed        Wobble board        aeromat   Side step  tandem        Tandem stand                                        Ther Ex   9 10 21 9 15 21 9 20 21   Nustep L2  12' L2 10' L2 10'   L3 10 L3 10   Heel slides  2x10/3-5" 2x10/3-5"   3x10/3-5" 3x10/3-5"   Knee flex stretch  QS 2x10/3-5" QS 2x10/3-5" QS 2x15/3-5" QS 2x15/3-5"   SAQs   **L 3x10/5"   L 3x10/5" L 3x15/5"   Sup abd/add  Ball sq   20x/3-5" Ball sq   30x/3-5" Ball sq   30x/3-5" Ball sq   40x/3-5"   bridges        Seated march   **B Alt 30x B Alt 30x B Alt 40x   LAQs  2x10/3-5" L 30x   L 30x L 40x   Step ups  Fwd/lat  Fwd 6"   20x cueing Fwd 6"   20x ea resume Fwd 6"   20x ea   Mini squats  2x10 2x10   2x10 2x15   TR's/HR's  20x ea 30x   Seated 40x Seated 40x   Leg press        Ham stretch   **L 4x/30" *L 4x/30" L 4x/20"                           Gait Training                        Modalities   9 10 21 9 15 21 9 20 21 CP to L knee  CP to L knee 10' no adverse reaction  **Deferred CP to L knee 10' no adverse reaction CP to L knee 10' no adverse reaction

## 2021-09-22 ENCOUNTER — OFFICE VISIT (OUTPATIENT)
Dept: PHYSICAL THERAPY | Facility: CLINIC | Age: 71
End: 2021-09-22
Payer: COMMERCIAL

## 2021-09-22 DIAGNOSIS — Z96.652 S/P TOTAL KNEE ARTHROPLASTY, LEFT: Primary | ICD-10-CM

## 2021-09-22 PROCEDURE — 97110 THERAPEUTIC EXERCISES: CPT

## 2021-09-22 PROCEDURE — 97140 MANUAL THERAPY 1/> REGIONS: CPT

## 2021-09-22 PROCEDURE — 97535 SELF CARE MNGMENT TRAINING: CPT

## 2021-09-22 NOTE — PROGRESS NOTES
Daily Note     Today's date: 2021  Patient name: Linda Ching  : 1950  MRN: [id number]  Referring provider: Era Barrera MD  Dx:   Encounter Diagnosis     ICD-10-CM    1  S/P total knee arthroplasty, left  V10 208                   Subjective: Patient states she has been feeling pretty good  Patient denies L knee pain at beginning of session  Patient reports she will be traveling for a few weeks and would like to transition to an independent HEP  Objective: See treatment diary below      Assessment: Provided patient with updated HEP- patient demonstrated verbal understanding  Tolerated treatment well  Patient demonstrated moderate fatigue following performance of heel slides and step ups  Patient would benefit from transition to independent HEP to increase L knee strength and mobility  Plan: See PT DC summary       Precautions: OA R knee    Re-eval Date: 10/2/21    Date 8 9 10 21 9 15 21 9 20 21   Visit Count 6 2 3 4 5   FOTO        Pain In 0/10 0 0/10  "tight" 0/10 0/10   Pain Out 0/10 0 0/10 0/10 0/10     Manuals 8 9 10 21 9 15 21 9 20 21   PROM/stretching L knee PROM/stretching L knee; patella mobility 10' PROM/stretching L knee; patella mobility 10' PROM/stretching L knee; patella mobility 10' PROM/stretching L knee; patella mobility 10' PROM/stretching L knee; patella mobility 10'   Patellar mobs   **5 min 5 min 5 min                   Neuro Re-Ed        Wobble board        aeromat   Side step  tandem        Tandem stand                                        Ther Ex   9 10 21 9 15 21 9 20 21   Nustep L3 10 L2 10' L2 10'   L3 10 L3 10   Heel slides 3x10/3-5" 2x10/3-5" 2x10/3-5"   3x10/3-5" 3x10/3-5"   Knee flex stretch QS 2x15/3-5" QS 2x10/3-5" QS 2x10/3-5" QS 2x15/3-5" QS 2x15/3-5"   SAQs L 3x15/5"  **L 3x10/5"   L 3x10/5" L 3x15/5"   Sup abd/add Ball sq   40x/3-5" Ball sq   20x/3-5" Ball sq   30x/3-5" Ball sq   30x/3-5" Ball sq   40x/3-5"   bridges        Seated  Alt 40x  **B Alt 30x B Alt 30x B Alt 40x   LAQs L 40x 2x10/3-5" L 30x   L 30x L 40x   Step ups  Fwd/lat Fwd 6"   20x ea Fwd 6"   20x cueing Fwd 6"   20x ea resume Fwd 6"   20x ea   Mini squats 2x15 2x10 2x10   2x10 2x15   TR's/HR's Seated 40x 20x ea 30x   Seated 40x Seated 40x   Leg press        Ham stretch   **L 4x/30" *L 4x/30" L 4x/20"                           Gait Training                        Modalities   9 10 21 9 15 21 9 20 21   CP to L knee CP to L knee 10' no adverse reaction CP to L knee 10' no adverse reaction  **Deferred CP to L knee 10' no adverse reaction CP to L knee 10' no adverse reaction

## 2021-09-22 NOTE — PROGRESS NOTES
PT Discharge    Today's date: 21  Patient name: Albni Harding  : 1950  MRN: [id number]  Referring provider: Anisa Seaman MD  Dx:   Encounter Diagnosis     ICD-10-CM    1  S/P total knee arthroplasty, left  [id number] [id number]        Start Time: 1300  Stop Time: 1400  Total time in clinic (min): 60 minutes    Assessment  Assessment details: Albin Harding is a 73 y o  female presenting to outpatient physical therapy with noted impairments including pain, impaired soft tissue mobility, reduced range of motion, reduced strength, reduced postural awareness, and reduced activity tolerance  Signs and symptoms at present are consistent with referring diagnosis of L TKR  Due to noted impairments, the patient's present functional limitations include difficulty with ADLs with increased need for assistance, reliance on medication and/or modalities for pain relief, reduced tolerance for functional mobility and activity, and difficulty completing HH and  Self care  responsibilities  Patient to benefit from skilled outpatient physical therapy 2x/week for 4 weeks in order to reduce pain, maximize pain free range of motion, increase strength and stability, and improve functional mobility/functional activity in order to maximize return to prior level of function with reduced limitations  Home exercise program was provided and all questions answered to patient's level of satisfaction  Pt notes she will be leaving for [place]  and will not be back until November  Thank you for your referral     21 Pt states today will be her last visit as she is going to [place] for 6 weeks  Pt feels she is doing well enough to do this despite her limited l knee flex  Pt states she will be compliant with HEP provided to pt today  Pt has made steady progressduring her course of PT as seen in objective data  Will DC PT at this time    Impairments: abnormal gait, abnormal or restricted ROM, activity intolerance, impaired balance, impaired physical strength, lacks appropriate home exercise program and pain with function  Understanding of Dx/Px/POC: good   Prognosis: good    Goals  STGs to be achieved in 4 weeks:  1  Pt to demonstrate reduced subjective pain rating "at worst" by at least 2-3 points from Initial Eval in order to allow for reduced pain with ADLs and improved functional activity tolerance  MET  2  Pt to demonstrate increased AROM of L knee  by at least 5-10 degrees in order to allow for greater ease and independence with ADLs and functional mobility  MET  3  Pt to demonstrate FOF  stair climbing in order to maximize  mobility and function and allow for progression of exercise program and achievement of goals  NOT MET  4  Pt to demonstrate increased MMT of L knee  by at least 1/2-1 grade in order to improve safety and stability with ADLs and functional mobility  Partially Met    LTGs to be achieved in 6-8 weeks:  1  Pt will be I with HEP in order to continue to improve quality of life and independence and reduce risk for re-injury  MET  2  Pt to demonstrate return to usual [facility] and community activities without limitations or restrictions  3  Pt to demonstrate improved function as noted by achieving or exceeding predicted score on FOTO outcomes assessment tool  Plan  Plan details: DC PT        Subjective Evaluation    History of Present Illness  Mechanism of injury: Per Dr Joe Farias note:  72-year-old female s/p left TKA performed on 5/20/2021  She is making good progress  Continue work with PT and work on motion  F/u 1 year with xrays of left knee  Pt had complications of cardiac arrest during surgery, blisters from L knee to ankle, severe allergic reaction to morphine  Was hospitalized for 2 1/2 weeks  Transferred to [facility] and [address]  [address] 6/12/21   Pt had [address] for [address] sessions  States they [address] her after she reached 90* flexion   Pt states she lost a lot of memory and experienced confusion after surgery  Now for OPPT     21 Pt states today will be her last visit as she is going to [place] for 6 weeks  Pt feels she is doing well enough to do this despite her limited l knee flex  Pt states she will be compliant with HEP provided to pt today  Not a recurrent problem   Quality of life: good    Pain  Current pain ratin  At best pain ratin  At worst pain rating: 3  Quality: dull ache and tight  Relieving factors: rest, relaxation, ice, medications and change in position  Aggravating factors: walking and standing (bending the L knee)  Progression: improved    Social Support  Steps to enter house: yes (2)  Stairs in house: yes (stair glide)   Lives in: multiple-level home  Lives with: adult children    Employment status: not working  Treatments  Previous treatment: physical therapy, occupational therapy and medication  Current treatment: medication and physical therapy  Patient Goals  Patient goals for therapy: increased motion, increased strength, return to sport/leisure activities and independence with ADLs/IADLs  Patient goal: return to swimming        Objective     Observations   Left Knee   Positive for incision (healing)  Negative for drainage  Tenderness     Additional Tenderness Details  NO TTP    Neurological Testing     Sensation     Knee   Left Knee   Intact: hot/cold discrimination  Diminished: light touch     Comments   Left light touch: diminished L knee area  Active Range of Motion     Right Knee   Flexion: 100 degrees   Extension: -6 degrees     Strength/Myotome Testing     Left Knee   Flexion: 4+  Extension: 4+  Quadriceps contraction: good    Right Knee   Normal strength    Tests     Additional Tests Details  5TSTS test= with UE's  22 3 sec  TUG test    Ambulation     Observational Gait   Gait: antalgic   Stride length, left stance time and left step length within functional limits  Decreased walking speed     Left foot contact pattern: heel to toe  Base of support: normal    Additional Observational Gait Details  Mild antalgic gt with decreased L knee flex during swing phase of gait   Using [employer] outdoors, no AD's inside             Precautions: OA R knee    Re-eval Date: 10/2/21    Date  9/2/21       Visit Count 1       FOTO completed       Pain In See IE       Pain Out See IE               Manuals 9/2       PROM/stretching L knee        Patellar mobs                        Neuro Re-Ed        Wobble board        aeromat   Side step  tandem        Tandem stand                                        Ther Ex        Nustep L2  12'       Heel slides        Knee flex stretch        SAQs        Sup abd/add        bridges        Seated march        LAQs        Step ups  Fwd/lat        Mini squats        TR's/HR's        Leg press        Ham stretch                                Gait Training                        Modalities        CP to L knee

## 2021-10-12 DIAGNOSIS — M79.671 FOOT PAIN, BILATERAL: ICD-10-CM

## 2021-10-12 DIAGNOSIS — E78.5 HYPERLIPIDEMIA, UNSPECIFIED HYPERLIPIDEMIA TYPE: ICD-10-CM

## 2021-10-12 DIAGNOSIS — J30.9 ALLERGIC RHINITIS, UNSPECIFIED SEASONALITY, UNSPECIFIED TRIGGER: ICD-10-CM

## 2021-10-12 DIAGNOSIS — F33.41 RECURRENT MAJOR DEPRESSIVE DISORDER, IN PARTIAL REMISSION (HCC): ICD-10-CM

## 2021-10-12 DIAGNOSIS — M79.672 FOOT PAIN, BILATERAL: ICD-10-CM

## 2021-10-12 DIAGNOSIS — K21.9 GASTROESOPHAGEAL REFLUX DISEASE WITHOUT ESOPHAGITIS: ICD-10-CM

## 2021-10-12 RX ORDER — DULOXETIN HYDROCHLORIDE 60 MG/1
CAPSULE, DELAYED RELEASE ORAL
Qty: 180 CAPSULE | Refills: 0 | Status: SHIPPED | OUTPATIENT
Start: 2021-10-12 | End: 2022-01-12

## 2021-10-12 RX ORDER — ATORVASTATIN CALCIUM 20 MG/1
TABLET, FILM COATED ORAL
Qty: 30 TABLET | Refills: 0 | Status: SHIPPED | OUTPATIENT
Start: 2021-10-12 | End: 2021-11-12

## 2021-10-12 RX ORDER — MONTELUKAST SODIUM 10 MG/1
TABLET ORAL
Qty: 30 TABLET | Refills: 0 | Status: SHIPPED | OUTPATIENT
Start: 2021-10-12 | End: 2021-11-12

## 2021-10-12 RX ORDER — DICLOFENAC SODIUM 75 MG/1
TABLET, DELAYED RELEASE ORAL
Qty: 180 TABLET | Refills: 0 | Status: SHIPPED | OUTPATIENT
Start: 2021-10-12 | End: 2022-01-12

## 2021-10-13 RX ORDER — PANTOPRAZOLE SODIUM 40 MG/1
TABLET, DELAYED RELEASE ORAL
Qty: 30 TABLET | Refills: 0 | Status: SHIPPED | OUTPATIENT
Start: 2021-10-13 | End: 2021-11-12

## 2021-11-09 ENCOUNTER — TELEPHONE (OUTPATIENT)
Dept: INTERNAL MEDICINE CLINIC | Facility: CLINIC | Age: 71
End: 2021-11-09

## 2021-11-12 DIAGNOSIS — J30.9 ALLERGIC RHINITIS, UNSPECIFIED SEASONALITY, UNSPECIFIED TRIGGER: ICD-10-CM

## 2021-11-12 DIAGNOSIS — E78.5 HYPERLIPIDEMIA, UNSPECIFIED HYPERLIPIDEMIA TYPE: ICD-10-CM

## 2021-11-12 DIAGNOSIS — K21.9 GASTROESOPHAGEAL REFLUX DISEASE WITHOUT ESOPHAGITIS: ICD-10-CM

## 2021-11-12 RX ORDER — ATORVASTATIN CALCIUM 20 MG/1
TABLET, FILM COATED ORAL
Qty: 30 TABLET | Refills: 0 | Status: SHIPPED | OUTPATIENT
Start: 2021-11-12 | End: 2021-12-13

## 2021-11-12 RX ORDER — PANTOPRAZOLE SODIUM 40 MG/1
TABLET, DELAYED RELEASE ORAL
Qty: 30 TABLET | Refills: 0 | Status: SHIPPED | OUTPATIENT
Start: 2021-11-12 | End: 2021-12-12

## 2021-11-12 RX ORDER — MONTELUKAST SODIUM 10 MG/1
TABLET ORAL
Qty: 30 TABLET | Refills: 0 | Status: SHIPPED | OUTPATIENT
Start: 2021-11-12 | End: 2021-12-13

## 2021-11-30 DIAGNOSIS — M81.0 AGE-RELATED OSTEOPOROSIS WITHOUT CURRENT PATHOLOGICAL FRACTURE: Primary | ICD-10-CM

## 2021-12-12 DIAGNOSIS — E78.5 HYPERLIPIDEMIA, UNSPECIFIED HYPERLIPIDEMIA TYPE: ICD-10-CM

## 2021-12-12 DIAGNOSIS — J30.9 ALLERGIC RHINITIS, UNSPECIFIED SEASONALITY, UNSPECIFIED TRIGGER: ICD-10-CM

## 2021-12-12 DIAGNOSIS — K21.9 GASTROESOPHAGEAL REFLUX DISEASE WITHOUT ESOPHAGITIS: ICD-10-CM

## 2021-12-12 RX ORDER — PANTOPRAZOLE SODIUM 40 MG/1
TABLET, DELAYED RELEASE ORAL
Qty: 30 TABLET | Refills: 0 | Status: SHIPPED | OUTPATIENT
Start: 2021-12-12 | End: 2022-01-10 | Stop reason: SDUPTHER

## 2021-12-13 RX ORDER — ATORVASTATIN CALCIUM 20 MG/1
TABLET, FILM COATED ORAL
Qty: 30 TABLET | Refills: 0 | Status: SHIPPED | OUTPATIENT
Start: 2021-12-13 | End: 2022-01-12

## 2021-12-13 RX ORDER — MONTELUKAST SODIUM 10 MG/1
TABLET ORAL
Qty: 30 TABLET | Refills: 0 | Status: SHIPPED | OUTPATIENT
Start: 2021-12-13 | End: 2022-01-10

## 2021-12-29 ENCOUNTER — TELEPHONE (OUTPATIENT)
Dept: INTERNAL MEDICINE CLINIC | Facility: CLINIC | Age: 71
End: 2021-12-29

## 2022-01-06 ENCOUNTER — RA CDI HCC (OUTPATIENT)
Dept: OTHER | Facility: HOSPITAL | Age: 72
End: 2022-01-06

## 2022-01-07 NOTE — PROGRESS NOTES
[place] [address]  coding opportunities     provider did not use E66 01     Number of diagnosis code(s) already on the problem list added to FYI flag: 3               Number of suggestions used: 2      Number of suggestions NOT actually used: 1     Patients insurance company: [employer]     Visit status: Patient arrived for their scheduled appointment        [place] [address]  coding opportunities     Please review/recertify the BPA diagnoses for 2022     Number of diagnosis code(s) already on the problem list added to FYI flag: 3                     Patients insurance company: [employer]

## 2022-01-10 ENCOUNTER — OFFICE VISIT (OUTPATIENT)
Dept: INTERNAL MEDICINE CLINIC | Facility: CLINIC | Age: 72
End: 2022-01-10
Payer: COMMERCIAL

## 2022-01-10 ENCOUNTER — APPOINTMENT (OUTPATIENT)
Dept: LAB | Facility: CLINIC | Age: 72
End: 2022-01-10
Payer: COMMERCIAL

## 2022-01-10 VITALS
WEIGHT: 183.38 LBS | BODY MASS INDEX: 39.56 KG/M2 | DIASTOLIC BLOOD PRESSURE: 78 MMHG | HEART RATE: 96 BPM | TEMPERATURE: 98.3 F | HEIGHT: 57 IN | OXYGEN SATURATION: 97 % | SYSTOLIC BLOOD PRESSURE: 138 MMHG

## 2022-01-10 DIAGNOSIS — Z13.0 SCREENING FOR DEFICIENCY ANEMIA: ICD-10-CM

## 2022-01-10 DIAGNOSIS — Z13.29 SCREENING FOR THYROID DISORDER: ICD-10-CM

## 2022-01-10 DIAGNOSIS — I10 ESSENTIAL HYPERTENSION: Primary | ICD-10-CM

## 2022-01-10 DIAGNOSIS — E55.9 VITAMIN D DEFICIENCY: ICD-10-CM

## 2022-01-10 DIAGNOSIS — E28.39 PRIMARY OVARIAN FAILURE: ICD-10-CM

## 2022-01-10 DIAGNOSIS — Z12.31 SCREENING MAMMOGRAM FOR BREAST CANCER: ICD-10-CM

## 2022-01-10 DIAGNOSIS — M81.0 AGE-RELATED OSTEOPOROSIS WITHOUT CURRENT PATHOLOGICAL FRACTURE: ICD-10-CM

## 2022-01-10 DIAGNOSIS — K86.1 OTHER CHRONIC PANCREATITIS (HCC): ICD-10-CM

## 2022-01-10 DIAGNOSIS — F33.41 MAJOR DEPRESSIVE DISORDER, RECURRENT, IN PARTIAL REMISSION (HCC): ICD-10-CM

## 2022-01-10 DIAGNOSIS — Z23 NEED FOR INFLUENZA VACCINATION: ICD-10-CM

## 2022-01-10 DIAGNOSIS — L82.1 SEBORRHEIC KERATOSES: ICD-10-CM

## 2022-01-10 DIAGNOSIS — Z13.1 SCREENING FOR DIABETES MELLITUS: ICD-10-CM

## 2022-01-10 DIAGNOSIS — F11.20 CONTINUOUS OPIOID DEPENDENCE (HCC): ICD-10-CM

## 2022-01-10 DIAGNOSIS — Z00.00 MEDICARE ANNUAL WELLNESS VISIT, SUBSEQUENT: ICD-10-CM

## 2022-01-10 DIAGNOSIS — K21.9 GASTROESOPHAGEAL REFLUX DISEASE WITHOUT ESOPHAGITIS: ICD-10-CM

## 2022-01-10 DIAGNOSIS — E78.5 HYPERLIPIDEMIA LDL GOAL <130: ICD-10-CM

## 2022-01-10 LAB
25(OH)D3 SERPL-MCNC: 29.5 NG/ML (ref 30–100)
ALBUMIN SERPL BCP-MCNC: 3.3 G/DL (ref 3.5–5)
ALP SERPL-CCNC: 107 U/L (ref 46–116)
ALT SERPL W P-5'-P-CCNC: 25 U/L (ref 12–78)
ANION GAP SERPL CALCULATED.3IONS-SCNC: 4 MMOL/L (ref 4–13)
AST SERPL W P-5'-P-CCNC: 18 U/L (ref 5–45)
BASOPHILS # BLD AUTO: 0.05 THOUSANDS/ΜL (ref 0–0.1)
BASOPHILS NFR BLD AUTO: 1 % (ref 0–1)
BILIRUB SERPL-MCNC: 0.72 MG/DL (ref 0.2–1)
BUN SERPL-MCNC: 10 MG/DL (ref 5–25)
CALCIUM ALBUM COR SERPL-MCNC: 9.4 MG/DL (ref 8.3–10.1)
CALCIUM SERPL-MCNC: 8.8 MG/DL (ref 8.3–10.1)
CHLORIDE SERPL-SCNC: 107 MMOL/L (ref 100–108)
CHOLEST SERPL-MCNC: 161 MG/DL
CO2 SERPL-SCNC: 30 MMOL/L (ref 21–32)
CREAT SERPL-MCNC: 0.73 MG/DL (ref 0.6–1.3)
EOSINOPHIL # BLD AUTO: 0.1 THOUSAND/ΜL (ref 0–0.61)
EOSINOPHIL NFR BLD AUTO: 2 % (ref 0–6)
ERYTHROCYTE [DISTWIDTH] IN BLOOD BY AUTOMATED COUNT: 12 % (ref 11.6–15.1)
GFR SERPL CREATININE-BSD FRML MDRD: 83 ML/MIN/1.73SQ M
GLUCOSE P FAST SERPL-MCNC: 97 MG/DL (ref 65–99)
HCT VFR BLD AUTO: 48.8 % (ref 34.8–46.1)
HDLC SERPL-MCNC: 54 MG/DL
HGB BLD-MCNC: 16.2 G/DL (ref 11.5–15.4)
IMM GRANULOCYTES # BLD AUTO: 0.02 THOUSAND/UL (ref 0–0.2)
IMM GRANULOCYTES NFR BLD AUTO: 0 % (ref 0–2)
LDLC SERPL CALC-MCNC: 79 MG/DL (ref 0–100)
LYMPHOCYTES # BLD AUTO: 1.95 THOUSANDS/ΜL (ref 0.6–4.47)
LYMPHOCYTES NFR BLD AUTO: 30 % (ref 14–44)
MCH RBC QN AUTO: 29.6 PG (ref 26.8–34.3)
MCHC RBC AUTO-ENTMCNC: 33.2 G/DL (ref 31.4–37.4)
MCV RBC AUTO: 89 FL (ref 82–98)
MONOCYTES # BLD AUTO: 0.77 THOUSAND/ΜL (ref 0.17–1.22)
MONOCYTES NFR BLD AUTO: 12 % (ref 4–12)
NEUTROPHILS # BLD AUTO: 3.7 THOUSANDS/ΜL (ref 1.85–7.62)
NEUTS SEG NFR BLD AUTO: 55 % (ref 43–75)
NONHDLC SERPL-MCNC: 107 MG/DL
NRBC BLD AUTO-RTO: 0 /100 WBCS
PLATELET # BLD AUTO: 251 THOUSANDS/UL (ref 149–390)
PMV BLD AUTO: 9.8 FL (ref 8.9–12.7)
POTASSIUM SERPL-SCNC: 4.1 MMOL/L (ref 3.5–5.3)
PROT SERPL-MCNC: 7.3 G/DL (ref 6.4–8.2)
RBC # BLD AUTO: 5.48 MILLION/UL (ref 3.81–5.12)
SODIUM SERPL-SCNC: 141 MMOL/L (ref 136–145)
TRIGL SERPL-MCNC: 139 MG/DL
TSH SERPL DL<=0.05 MIU/L-ACNC: 1.5 UIU/ML (ref 0.36–3.74)
WBC # BLD AUTO: 6.59 THOUSAND/UL (ref 4.31–10.16)

## 2022-01-10 PROCEDURE — 1036F TOBACCO NON-USER: CPT | Performed by: PHYSICIAN ASSISTANT

## 2022-01-10 PROCEDURE — 99214 OFFICE O/P EST MOD 30 MIN: CPT | Performed by: PHYSICIAN ASSISTANT

## 2022-01-10 PROCEDURE — 80061 LIPID PANEL: CPT

## 2022-01-10 PROCEDURE — 85025 COMPLETE CBC W/AUTO DIFF WBC: CPT

## 2022-01-10 PROCEDURE — 1125F AMNT PAIN NOTED PAIN PRSNT: CPT | Performed by: PHYSICIAN ASSISTANT

## 2022-01-10 PROCEDURE — 4040F PNEUMOC VAC/ADMIN/RCVD: CPT | Performed by: PHYSICIAN ASSISTANT

## 2022-01-10 PROCEDURE — 3725F SCREEN DEPRESSION PERFORMED: CPT | Performed by: PHYSICIAN ASSISTANT

## 2022-01-10 PROCEDURE — 3288F FALL RISK ASSESSMENT DOCD: CPT | Performed by: PHYSICIAN ASSISTANT

## 2022-01-10 PROCEDURE — 84443 ASSAY THYROID STIM HORMONE: CPT

## 2022-01-10 PROCEDURE — 36415 COLL VENOUS BLD VENIPUNCTURE: CPT

## 2022-01-10 PROCEDURE — 3008F BODY MASS INDEX DOCD: CPT | Performed by: PHYSICIAN ASSISTANT

## 2022-01-10 PROCEDURE — 83036 HEMOGLOBIN GLYCOSYLATED A1C: CPT

## 2022-01-10 PROCEDURE — 1170F FXNL STATUS ASSESSED: CPT | Performed by: PHYSICIAN ASSISTANT

## 2022-01-10 PROCEDURE — G0439 PPPS, SUBSEQ VISIT: HCPCS | Performed by: PHYSICIAN ASSISTANT

## 2022-01-10 PROCEDURE — G0008 ADMIN INFLUENZA VIRUS VAC: HCPCS | Performed by: PHYSICIAN ASSISTANT

## 2022-01-10 PROCEDURE — 82306 VITAMIN D 25 HYDROXY: CPT

## 2022-01-10 PROCEDURE — 90662 IIV NO PRSV INCREASED AG IM: CPT | Performed by: PHYSICIAN ASSISTANT

## 2022-01-10 PROCEDURE — 80053 COMPREHEN METABOLIC PANEL: CPT

## 2022-01-10 PROCEDURE — 1160F RVW MEDS BY RX/DR IN RCRD: CPT | Performed by: PHYSICIAN ASSISTANT

## 2022-01-10 RX ORDER — PANTOPRAZOLE SODIUM 40 MG/1
40 TABLET, DELAYED RELEASE ORAL DAILY
Qty: 30 TABLET | Refills: 0 | Status: SHIPPED | OUTPATIENT
Start: 2022-01-10

## 2022-01-10 NOTE — ASSESSMENT & PLAN NOTE
Pt counseled on benign nature of these, dermatology referral provided per pt request and for full skin check

## 2022-01-10 NOTE — PROGRESS NOTES
Assessment and Plan:     Problem List Items Addressed This Visit     None      Visit Diagnoses     Screening mammogram for breast cancer    -  Primary        BMI Counseling: Body mass index is 39 68 kg/m²  The BMI is above normal  Nutrition recommendations include decreasing portion sizes, consuming healthier snacks and limiting drinks that contain sugar  Rationale for BMI follow-up plan is due to patient being overweight or obese  Preventive health issues were discussed with patient, and age appropriate screening tests were ordered as noted in patient's After Visit Summary  Personalized health advice and appropriate referrals for health education or preventive services given if needed, as noted in patient's After Visit Summary       History of Present Illness:     Patient presents for Medicare Annual Wellness visit    Patient Care Team:  Dalila Wagner PA-C as PCP - General (Internal Medicine)  [facility], DO as PCP - [address] [place] [address])  Samuel Thomson MD as Endoscopist     Problem List:     Patient Active Problem List   Diagnosis    Irritable bowel syndrome (IBS)    Mild intermittent asthma without complication    Bilateral carpal tunnel syndrome    Chronic musculoskeletal pain    Generalized anxiety disorder    Hyperlipidemia LDL goal <130    Essential hypertension    Lymphedema    Patent foramen ovale    Tension type headache    Vitamin D deficiency    Recurrent major depressive disorder, in partial remission ([facility] )    Other chronic pancreatitis ([facility] )    Body mass index (BMI) of 40 0-44 9 in adult [facility])    Foot pain, bilateral    Tinea cruris    Primary insomnia    Neuropathy    Age-related osteoporosis without current pathological fracture    Swelling of joint of left knee    Right kidney stone    Scoliosis    Primary osteoarthritis of left knee    HX: breast cancer    Sinus tachycardia    Acute metabolic encephalopathy    Encounter for screening for COVID-19    S/P total knee arthroplasty, left    Dermatitis      Past Medical and Surgical History:     Past Medical History:   Diagnosis Date    Anxiety     Arthritis     Asthma     Breast cancer ([address] )     rt mastectomy 2005    [facility])     breast right    Cataract     CHF (congestive heart failure) (HCC)     Coronary artery disease     Depression     GERD (gastroesophageal reflux disease)     High cholesterol     History of chemotherapy     2005 rt breast cancer    History of transfusion     Hypertension     IBS (irritable bowel syndrome)     Irritable bowel syndrome (IBS) 5/31/2016    Kidney stone     Lymphedema of right arm     Obesity     PONV (postoperative nausea and vomiting)     Post-menopausal     Psychiatric disorder     Renal disorder     Vitamin D deficiency      Past Surgical History:   Procedure Laterality Date    BREAST SURGERY      CATARACT EXTRACTION Bilateral     CATARACT EXTRACTION, BILATERAL      CHOLECYSTECTOMY      COLONOSCOPY N/A 5/31/2016    Procedure: COLONOSCOPY;  Surgeon: Boyd Rodriguez MD;  Location: [facility] MAIN [facility];  Service:    Leighann Marjade GALLBLADDER SURGERY      HYSTERECTOMY      Total    KNEE ARTHROSCOPY      KNEE SURGERY      arthroscopy    MASTECTOMY Right     rt breast mastectomy 2005    ND TOTAL KNEE ARTHROPLASTY Left 5/20/2021    Procedure: ARTHROPLASTY KNEE TOTAL;  Surgeon: Nadeen Gowers, MD;  [facility] MA[facility];  Service: Orthopedics    TONSILLECTOMY AND ADENOIDECTOMY        Family History:     Family History   Adopted: Yes   Problem Relation Age of Onset    Diabetes Mother     Heart disease Mother     Mental illness Mother     Depression Mother     Heart disease Brother     Breast cancer Maternal Aunt     Breast cancer Maternal Aunt     Breast cancer Maternal Aunt     Breast cancer Maternal Aunt     Breast cancer Maternal Aunt     No Known Problems Father     No Known Problems Sister     No Known Problems Daughter     Breast cancer Maternal Grandmother  No Known Problems Sister     No Known Problems Sister     No Known Problems Sister       Social History:     Social History     Socioeconomic History    Marital status:      Spouse name: None    Number of children: None    Years of education: None    Highest education level: None   Occupational History    Occupation: Retired   Tobacco Use    Smoking status: Never Smoker    Smokeless tobacco: Never Used   Vaping Use    Vaping Use: Never used   Substance and Sexual Activity    Alcohol use: Never     Comment: socially    Drug use: Never    Sexual activity: Not Currently   Other Topics Concern    None   Social History Narrative    Always uses seat belt    Occasional caffeine consumption         Retired    Lives with adult daughter     Social Determinants of Health     Financial Resource Strain: Not on file   Food Insecurity: Not on file   Transportation Needs: Not on file   Physical Activity: Not on file   Stress: Not on file   Social Connections: Not on file   Intimate Partner Violence: Not on file   Housing Stability: Not on file      Medications and Allergies:     Current Outpatient Medications   Medication Sig Dispense Refill    acetaminophen (TYLENOL) 650 mg CR tablet Take 1 tablet (650 mg total) by mouth every 8 (eight) hours as needed for mild pain 30 tablet 0    albuterol (2 5 mg/3 mL) 0 083 % nebulizer solution Take 2 5 mg by nebulization every 6 (six) hours as needed for wheezing   albuterol (PROAIR HFA) 90 mcg/act inhaler Inhale 2 puffs every 4 (four) hours as needed for wheezing 3 Inhaler 1    ascorbic acid (VITAMIN C) 500 MG tablet Take 1 tablet (500 mg total) by mouth daily 30 tablet 1    aspirin (ECOTRIN LOW STRENGTH) 81 mg EC tablet Take 1 tablet (81 mg total) by mouth 2 (two) times a day      atorvastatin (LIPITOR) 20 mg tablet Take 1 tablet by mouth daily   30 tablet 0    buPROPion (WELLBUTRIN XL) 300 mg 24 hr tablet Take 1 tablet (300 mg total) by mouth every morning 90 tablet 3    butalbital-acetaminophen-caffeine (FIORICET,ESGIC) -40 mg per tablet Take 1 tablet by mouth 3 (three) times a day as needed for headaches 90 tablet 1    diclofenac (VOLTAREN) 75 mg EC tablet Take 1 tablet by mouth twice daily  180 tablet 0    DULoxetine (CYMBALTA) 60 mg delayed release capsule Take 1 capsule by mouth twice daily   180 capsule 0    ergocalciferol (VITAMIN D2) 50,000 units Take 1 capsule (50,000 Units total) by mouth once a week 4 capsule 3    loratadine (CLARITIN) 10 mg tablet Take 1 tablet (10 mg total) by mouth daily 90 tablet 1    Nutritional Supplements (BOOST BREEZE PO) Take by mouth daily      nystatin (MYCOSTATIN) cream Apply topically 2 (two) times a day (Patient taking differently: Apply topically 2 (two) times a day Only PRN ) 30 g 3    nystatin (MYCOSTATIN) powder Apply topically 2 (two) times a day (Patient taking differently: Apply topically 2 (two) times a day Only PRN ) 60 g 5    oxybutynin (DITROPAN) 5 mg tablet Take 1 tablet (5 mg total) by mouth 3 (three) times a day 30 tablet 2    oxyCODONE (ROXICODONE) 5 mg immediate release tablet Take 1 tablet (5 mg total) by mouth every 6 (six) hours as needed for severe pain for up to 4 dosesMax Daily Amount: 20 mg (Patient taking differently: Take 5 mg by mouth every 6 (six) hours as needed for severe pain only PRN ) 4 tablet 0    docusate sodium (COLACE) 100 mg capsule Take 1 capsule (100 mg total) by mouth 2 (two) times a day (Patient not taking: Reported on 1/10/2022 ) 10 capsule 0    ferrous sulfate 324 (65 Fe) mg Take 1 tablet (324 mg total) by mouth 2 (two) times a day before meals (Patient not taking: Reported on 1/10/2022 ) 60 tablet 1    folic acid (FOLVITE) 1 mg tablet Take 1 tablet (1 mg total) by mouth daily (Patient not taking: Reported on 1/10/2022 ) 30 tablet 1    gabapentin (NEURONTIN) 300 mg capsule Take 1 capsule (300 mg total) by mouth 2 (two) times a day (Patient not taking: Reported on 1/10/2022 )  0    montelukast (SINGULAIR) 10 mg tablet Take 1 tablet by mouth daily  (Patient not taking: Reported on 1/10/2022) 30 tablet 0    Multiple Vitamin (multivitamin) tablet Take 1 tablet by mouth daily (Patient not taking: Reported on 1/10/2022 ) 30 tablet 1    ondansetron (ZOFRAN-ODT) 4 mg disintegrating tablet Take 4 mg by mouth every 8 (eight) hours as needed (Patient not taking: Reported on 1/10/2022 )      oxybutynin (DITROPAN-XL) 10 MG 24 hr tablet Take 1 tablet (10 mg total) by mouth daily at bedtime (Patient not taking: Reported on 1/10/2022 ) 30 tablet 3    pantoprazole (PROTONIX) 40 mg tablet Take 1 tablet by mouth daily  (Patient not taking: Reported on 1/10/2022) 30 tablet 0    tamsulosin (FLOMAX) 0 4 mg Take 1 capsule (0 4 mg total) by mouth daily with dinner (Patient not taking: Reported on 1/10/2022 ) 14 capsule 0     No current facility-administered medications for this visit       Allergies   Allergen Reactions    Ibuprofen Nausea Only, Rash, Dizziness and Syncope    Morphine Other (See Comments)     Intolerance    Latex Dermatitis      Immunizations:     Immunization History   Administered Date(s) Administered    COVID-19 MODERNA VACC 0 5 ML IM 04/01/2021, 04/29/2021    INFLUENZA 12/02/2015    Influenza Quadrivalent Preservative Free 3 years and older IM 12/02/2015    Influenza, high dose seasonal 0 7 mL 10/05/2018, 10/24/2019, 09/10/2020    Pneumococcal Conjugate 13-Valent 06/07/2017    Tdap 01/20/2016      Health Maintenance:         Topic Date Due    DXA SCAN  12/26/2021    Breast Cancer Screening: Mammogram  03/30/2022    Colorectal Cancer Screening  05/31/2026    Hepatitis C Screening  Completed         Topic Date Due    Pneumococcal Vaccine: 65+ Years (1 of 2 - PPSV23) 08/02/2017    Influenza Vaccine (1) 09/01/2021    COVID-19 Vaccine (3 - Booster for Moderna series) 10/29/2021      Medicare Health Risk Assessment:     /78 (BP Location: Left arm, Patient Position: Sitting)   Pulse 96   Temp 98 3 °F (36 8 °C)   Ht 4' 9" (1 448 m)   Wt 83 2 kg (183 lb 6 oz)   LMP  (LMP Unknown)   SpO2 97%   BMI 39 68 kg/m²      Deyanira is here for her Subsequent Wellness visit  Last Medicare Wellness visit information reviewed, patient interviewed and updates made to the record today  Health Risk Assessment:   Patient rates overall health as good  Patient feels that their physical health rating is slightly better  Patient is very satisfied with their life  Eyesight was rated as same  Hearing was rated as slightly worse  Patient feels that their emotional and mental health rating is slightly better  Patients states they are never, rarely angry  Patient states they are often unusually tired/fatigued  Pain experienced in the last 7 days has been some  Patient's pain rating has been 6/10  Patient states that she has experienced no weight loss or gain in last 6 months  Depression Screening:   PHQ-9 Score: 9      Fall Risk Screening: In the past year, patient has experienced: history of falling in past year    Number of falls: 2 or more  Injured during fall?: Yes    Feels unsteady when standing or walking?: Yes    Worried about falling?: Yes      Urinary Incontinence Screening:   Patient has leaked urine accidently in the last six months  Home Safety:  Patient has trouble with stairs inside or outside of their home  Patient has working smoke alarms and has working carbon monoxide detector  Home safety hazards include: none  Nutrition:   Meditrianian    Medications:   Patient is currently taking over-the-counter supplements  OTC medications include: see medication list  Patient is able to manage medications  Activities of Daily Living (ADLs)/Instrumental Activities of Daily Living (IADLs):   Walk and transfer into and out of bed and chair?: Yes  Dress and groom yourself?: Yes    Bathe or shower yourself?: Yes    Feed yourself?  Yes  Do your laundry/housekeeping?: Yes  Manage your money, pay your bills and track your expenses?: No  Make your own meals?: Yes    Do your own shopping?: No    Previous Hospitalizations:   Any hospitalizations or ED visits within the last 12 months?: Yes    How many hospitalizations have you had in the last year?: 1-2    Advance Care Planning:   Living will: No    Durable POA for healthcare:  Yes    Advanced directive: Yes      Cognitive Screening:   Provider or family/friend/caregiver concerned regarding cognition?: No    PREVENTIVE SCREENINGS      Cardiovascular Screening:    General: Screening Not Indicated and History Lipid Disorder      Diabetes Screening:     General: Screening Current      Colorectal Cancer Screening:     General: Screening Current      Breast Cancer Screening:     General: History Breast Cancer      Cervical Cancer Screening:    General: Screening Not Indicated      Osteoporosis Screening:    General: Screening Not Indicated and History Osteoporosis      Abdominal Aortic Aneurysm (AAA) Screening:        General: Screening Not Indicated      Lung Cancer Screening:     General: Screening Not Indicated      Hepatitis C Screening:    General: Screening Current    Screening, Brief Intervention, and Referral to Treatment (SBIRT)    Screening      Single Item Drug Screening:  How often have you used an illegal drug (including marijuana) or a prescription medication for non-medical reasons in the past year? never    Single Item Drug Screen Score: 0  Interpretation: Negative screen for possible drug use disorder    Review of Current Opioid Use    Opioid Risk Tool (ORT) Interpretation: Complete Opioid Risk Tool (ORT)      Merced Alex PA-C

## 2022-01-10 NOTE — PROGRESS NOTES
Assessment/Plan:  Problem List Items Addressed This Visit        Digestive    Other chronic pancreatitis [facility])       Cardiovascular and Mediastinum    Essential hypertension - Primary     Pts symptoms are stable with current regime  No changes at present  Musculoskeletal and Integument    Seborrheic keratoses     Pt counseled on benign nature of these, dermatology referral provided per pt request and for full skin check         Relevant Orders    Ambulatory referral to Dermatology       Other    Hyperlipidemia LDL goal <130    Relevant Orders    Lipid panel    Vitamin D deficiency    Relevant Orders    Vitamin D 25 hydroxy    Continuous opioid dependence ([facility] [address] )      Other Visit Diagnoses     Screening mammogram for breast cancer        Relevant Orders    Mammo screening bilateral w 3d & cad    Need for influenza vaccination        Relevant Orders    influenza vaccine, high-dose, PF 0 7 mL (FLUZONE HIGH-DOSE) (Completed)    Screening for diabetes mellitus        Relevant Orders    Comprehensive metabolic panel    Hemoglobin A1C    Screening for thyroid disorder        Relevant Orders    TSH, 3rd generation with Free T4 reflex    Screening for deficiency anemia        Relevant Orders    CBC and differential    Primary ovarian failure        Relevant Orders    DXA bone density spine hip and pelvis    Major depressive disorder, recurrent, in partial remission (HCC)        Body mass index (BMI) 40 0-44 9, adult (HCC)        Gastroesophageal reflux disease without esophagitis        Relevant Medications    pantoprazole (PROTONIX) 40 mg tablet           Diagnoses and all orders for this visit:    Essential hypertension    Screening mammogram for breast cancer  -     Mammo screening bilateral w 3d & cad; Future    Need for influenza vaccination  -     influenza vaccine, high-dose, PF 0 7 mL (FLUZONE HIGH-DOSE)    Hyperlipidemia LDL goal <130  -     Lipid panel;  Future    Vitamin D deficiency  -     Vitamin D 25 hydroxy; Future    Screening for diabetes mellitus  -     Comprehensive metabolic panel; Future  -     Hemoglobin A1C; Future    Screening for thyroid disorder  -     TSH, 3rd generation with Free T4 reflex; Future    Screening for deficiency anemia  -     CBC and differential; Future    Primary ovarian failure  -     DXA bone density spine hip and pelvis; Future    Continuous opioid dependence (HCC)    Major depressive disorder, recurrent, in partial remission ([facility] )    Other chronic pancreatitis ([facility] )    Body mass index (BMI) 40 0-44 9, adult (HCC)    Seborrheic keratoses  -     Ambulatory referral to Dermatology; Future    Gastroesophageal reflux disease without esophagitis  -     pantoprazole (PROTONIX) 40 mg tablet; Take 1 tablet (40 mg total) by mouth daily        Essential hypertension  Pts symptoms are stable with current regime  No changes at present  Seborrheic keratoses  Pt counseled on benign nature of these, dermatology referral provided per pt request and for full skin check      Subjective:      Patient ID: Khanh Waldrop is a 81 y o  female  Pt presents for routine visit  She is doing well overall  Subsequent AWV also performed  She is due for labs, mammogram, dexa scan and pneumovax 23  She had her flu vaccine and covid vaccine with booster  CRC screening up to date  She complains of hearing loss and her ears feeling blocked  No ear pain  Symptoms x weeks  She also complaints of a skin tag on her back as well as multiple brown spots on her back and face that she would like evaluated         The following portions of the patient's history were reviewed and updated as appropriate:   She has a past medical history of Anxiety, Arthritis, Asthma, Breast cancer ([facility] ), Cancer ([facility] ), Cataract, CHF (congestive heart failure) ([facility] ), Coronary artery disease, Depression, GERD (gastroesophageal reflux disease), High cholesterol, History of chemotherapy, History of transfusion, Hypertension, IBS (irritable bowel syndrome), Irritable bowel syndrome (IBS) (5/31/2016), Kidney stone, Lymphedema of right arm, Obesity, PONV (postoperative nausea and vomiting), Post-menopausal, Psychiatric disorder, Renal disorder, and Vitamin D deficiency  ,  does not have any pertinent problems on file  ,   has a past surgical history that includes Tonsillectomy and adenoidectomy; Breast surgery; Cholecystectomy; Colonoscopy (N/A, 5/31/2016); Gallbladder surgery; Hysterectomy; Cataract extraction, bilateral; Mastectomy (Right); Knee arthroscopy; Knee surgery; Cataract extraction (Bilateral); and pr total knee arthroplasty (Left, 5/20/2021)  ,  family history includes Breast cancer in her maternal aunt, maternal aunt, maternal aunt, maternal aunt, maternal aunt, and maternal grandmother; Depression in her mother; Diabetes in her mother; Heart disease in her brother and mother; Mental illness in her mother; No Known Problems in her daughter, father, sister, sister, sister, and sister  She was adopted  ,   reports that she has never smoked  She has never used smokeless tobacco  She reports that she does not drink alcohol and does not use drugs  ,  is allergic to ibuprofen, morphine, and latex     Current Outpatient Medications   Medication Sig Dispense Refill    acetaminophen (TYLENOL) 650 mg CR tablet Take 1 tablet (650 mg total) by mouth every 8 (eight) hours as needed for mild pain 30 tablet 0    albuterol (2 5 mg/3 mL) 0 083 % nebulizer solution Take 2 5 mg by nebulization every 6 (six) hours as needed for wheezing   albuterol (PROAIR HFA) 90 mcg/act inhaler Inhale 2 puffs every 4 (four) hours as needed for wheezing 3 Inhaler 1    ascorbic acid (VITAMIN C) 500 MG tablet Take 1 tablet (500 mg total) by mouth daily 30 tablet 1    aspirin (ECOTRIN LOW STRENGTH) 81 mg EC tablet Take 1 tablet (81 mg total) by mouth 2 (two) times a day      atorvastatin (LIPITOR) 20 mg tablet Take 1 tablet by mouth daily   30 tablet 0    buPROPion (WELLBUTRIN XL) 300 mg 24 hr tablet Take 1 tablet (300 mg total) by mouth every morning 90 tablet 3    butalbital-acetaminophen-caffeine (FIORICET,ESGIC) -40 mg per tablet Take 1 tablet by mouth 3 (three) times a day as needed for headaches 90 tablet 1    diclofenac (VOLTAREN) 75 mg EC tablet Take 1 tablet by mouth twice daily  180 tablet 0    DULoxetine (CYMBALTA) 60 mg delayed release capsule Take 1 capsule by mouth twice daily  180 capsule 0    ergocalciferol (VITAMIN D2) 50,000 units Take 1 capsule (50,000 Units total) by mouth once a week 4 capsule 3    loratadine (CLARITIN) 10 mg tablet Take 1 tablet (10 mg total) by mouth daily 90 tablet 1    Nutritional Supplements (BOOST BREEZE PO) Take by mouth daily      oxybutynin (DITROPAN) 5 mg tablet Take 1 tablet (5 mg total) by mouth 3 (three) times a day 30 tablet 2    oxyCODONE (ROXICODONE) 5 mg immediate release tablet Take 1 tablet (5 mg total) by mouth every 6 (six) hours as needed for severe pain for up to 4 dosesMax Daily Amount: 20 mg (Patient taking differently: Take 5 mg by mouth every 6 (six) hours as needed for severe pain only PRN ) 4 tablet 0    oxybutynin (DITROPAN-XL) 10 MG 24 hr tablet Take 1 tablet (10 mg total) by mouth daily at bedtime (Patient not taking: Reported on 1/10/2022 ) 30 tablet 3    pantoprazole (PROTONIX) 40 mg tablet Take 1 tablet (40 mg total) by mouth daily 30 tablet 0     No current facility-administered medications for this visit  Review of Systems   Constitutional: Negative for chills and fever  HENT: Positive for hearing loss  Negative for congestion, ear pain, postnasal drip, rhinorrhea, sinus pressure, sinus pain, sore throat and trouble swallowing  Eyes: Negative for pain and visual disturbance  Respiratory: Negative for cough, chest tightness, shortness of breath and wheezing  Cardiovascular: Negative  Negative for chest pain, palpitations and leg swelling     Gastrointestinal: Negative for abdominal pain, blood in stool, constipation, diarrhea, nausea and vomiting  Endocrine: Negative for cold intolerance, heat intolerance, polydipsia, polyphagia and polyuria  Genitourinary: Negative for difficulty urinating, dysuria, flank pain and urgency  Musculoskeletal: Negative for arthralgias, back pain, gait problem and myalgias  Skin: Positive for rash  Allergic/Immunologic: Negative  Neurological: Negative for dizziness, weakness, light-headedness and headaches  Hematological: Negative  Psychiatric/Behavioral: Negative for behavioral problems, dysphoric mood and sleep disturbance  The patient is not nervous/anxious  PHQ-2/9 Depression Screening    Little interest or pleasure in doing things: 0 - not at all  Feeling down, depressed, or hopeless: 1 - several days  Trouble falling or staying asleep, or sleeping too much: 3 - nearly every day  Feeling tired or having little energy: 3 - nearly every day  Poor appetite or overeatin - not at all  Feeling bad about yourself - or that you are a failure or have let yourself or your family down: 0 - not at all  Trouble concentrating on things, such as reading the newspaper or watching television: 1 - several days  Moving or speaking so slowly that other people could have noticed  Or the opposite - being so fidgety or restless that you have been moving around a lot more than usual: 1 - several days  Thoughts that you would be better off dead, or of hurting yourself in some way: 0 - not at all  PHQ-9 Score: 9   PHQ-9 Interpretation: Mild depression           Objective:  Vitals:    01/10/22 1034   BP: 138/78   BP Location: Left arm   Patient Position: Sitting   Pulse: 96   Temp: 98 3 °F (36 8 °C)   SpO2: 97%   Weight: 83 2 kg (183 lb 6 oz)   Height: 4' 9" (1 448 m)     Body mass index is 39 68 kg/m²  Physical Exam  Constitutional:       General: She is not in acute distress  Appearance: She is well-developed   She is not diaphoretic  HENT:      Head: Normocephalic and atraumatic  Right Ear: External ear normal       Left Ear: External ear normal       Nose: Nose normal       Mouth/Throat:      Pharynx: No oropharyngeal exudate  Eyes:      General: No scleral icterus  Right eye: No discharge  Left eye: No discharge  Conjunctiva/sclera: Conjunctivae normal       Pupils: Pupils are equal, round, and reactive to light  Neck:      Thyroid: No thyromegaly  Cardiovascular:      Rate and Rhythm: Normal rate and regular rhythm  Heart sounds: Normal heart sounds  No murmur heard  No friction rub  No gallop  Pulmonary:      Effort: Pulmonary effort is normal  No respiratory distress  Breath sounds: Normal breath sounds  No wheezing or rales  Abdominal:      General: Bowel sounds are normal  There is no distension  Palpations: Abdomen is soft  Tenderness: There is no abdominal tenderness  Musculoskeletal:         General: No tenderness or deformity  Normal range of motion  Cervical back: Normal range of motion and neck supple  Skin:     General: Skin is warm and dry  Neurological:      Mental Status: She is alert and oriented to person, place, and time  Cranial Nerves: No cranial nerve deficit  Psychiatric:         Behavior: Behavior normal          Thought Content: Thought content normal          Judgment: Judgment normal        BMI Counseling: Body mass index is 39 68 kg/m²  The BMI is above normal  Nutrition recommendations include decreasing portion sizes, consuming healthier snacks and limiting drinks that contain sugar  Rationale for BMI follow-up plan is due to patient being overweight or obese

## 2022-01-10 NOTE — PATIENT INSTRUCTIONS
Medicare Preventive Visit Patient Instructions  Thank you for completing your Welcome to Medicare Visit or Medicare Annual Wellness Visit today  Your next wellness visit will be due in one year (1/11/2023)  The screening/preventive services that you may require over the next 5-10 years are detailed below  Some tests may not apply to you based off risk factors and/or age  Screening tests ordered at today's visit but not completed yet may show as past due  Also, please note that scanned in results may not display below  Preventive Screenings:  Service Recommendations Previous Testing/Comments   Colorectal Cancer Screening  * Colonoscopy    * Fecal Occult Blood Test (FOBT)/Fecal Immunochemical Test (FIT)  * Fecal DNA/Cologuard Test  * Flexible Sigmoidoscopy Age: 54-65 years old   Colonoscopy: every 10 years (may be performed more frequently if at higher risk)  OR  FOBT/FIT: every 1 year  OR  Cologuard: every 3 years  OR  Sigmoidoscopy: every 5 years  Screening may be recommended earlier than age 48 if at higher risk for colorectal cancer  Also, an individualized decision between you and your healthcare provider will decide whether screening between the ages of 74-80 would be appropriate  Colonoscopy: 05/31/2016  FOBT/FIT: Not on file  Cologuard: Not on file  Sigmoidoscopy: Not on file    Screening Current     Breast Cancer Screening Age: 36 years old  Frequency: every 1-2 years  Not required if history of left and right mastectomy Mammogram: 03/30/2021    History Breast Cancer   Cervical Cancer Screening Between the ages of 21-29, pap smear recommended once every 3 years  Between the ages of 33-67, can perform pap smear with HPV co-testing every 5 years     Recommendations may differ for women with a history of total hysterectomy, cervical cancer, or abnormal pap smears in past  Pap Smear: Not on file    Screening Not Indicated   Hepatitis C Screening Once for adults born between 1945 and 1965  More frequently in patients at high risk for Hepatitis C Hep C Antibody: 02/22/2019    Screening Current   Diabetes Screening 1-2 times per year if you're at risk for diabetes or have pre-diabetes Fasting glucose: 116 mg/dL   A1C: 5 5 %    Screening Current   Cholesterol Screening Once every 5 years if you don't have a lipid disorder  May order more often based on risk factors  Lipid panel: 02/17/2021    Screening Not Indicated  History Lipid Disorder     Other Preventive Screenings Covered by Medicare:  1  Abdominal Aortic Aneurysm (AAA) Screening: covered once if your at risk  You're considered to be at risk if you have a family history of AAA  2  Lung Cancer Screening: covers low dose CT scan once per year if you meet all of the following conditions: (1) Age 50-69; (2) No signs or symptoms of lung cancer; (3) Current smoker or have quit smoking within the last 15 years; (4) You have a tobacco smoking history of at least 30 pack years (packs per day multiplied by number of years you smoked); (5) You get a written order from a healthcare provider  3  Glaucoma Screening: covered annually if you're considered high risk: (1) You have diabetes OR (2) Family history of glaucoma OR (3)  aged 48 and older OR (3)  American aged 72 and older  3  Osteoporosis Screening: covered every 2 years if you meet one of the following conditions: (1) You're estrogen deficient and at risk for osteoporosis based off medical history and other findings; (2) Have a vertebral abnormality; (3) On glucocorticoid therapy for more than 3 months; (4) Have primary hyperparathyroidism; (5) On osteoporosis medications and need to assess response to drug therapy  · Last bone density test (DXA Scan): 12/26/2019   5  HIV Screening: covered annually if you're between the age of 15-65  Also covered annually if you are younger than 13 and older than 72 with risk factors for HIV infection   For pregnant patients, it is covered up to 3 times per pregnancy  Immunizations:  Immunization Recommendations   Influenza Vaccine Annual influenza vaccination during flu season is recommended for all persons aged >= 6 months who do not have contraindications   Pneumococcal Vaccine (Prevnar and Pneumovax)  * Prevnar = PCV13  * Pneumovax = PPSV23   Adults 25-60 years old: 1-3 doses may be recommended based on certain risk factors  Adults 72 years old: Prevnar (PCV13) vaccine recommended followed by Pneumovax (PPSV23) vaccine  If already received PPSV23 since turning 65, then PCV13 recommended at least one year after PPSV23 dose  Hepatitis B Vaccine 3 dose series if at intermediate or high risk (ex: diabetes, end stage renal disease, liver disease)   Tetanus (Td) Vaccine - COST NOT COVERED BY MEDICARE PART B Following completion of primary series, a booster dose should be given every 10 years to maintain immunity against tetanus  Td may also be given as tetanus wound prophylaxis  Tdap Vaccine - COST NOT COVERED BY MEDICARE PART B Recommended at least once for all adults  For pregnant patients, recommended with each pregnancy  Shingles Vaccine (Shingrix) - COST NOT COVERED BY MEDICARE PART B  2 shot series recommended in those aged 48 and above     Health Maintenance Due:      Topic Date Due    DXA SCAN  12/26/2021    Breast Cancer Screening: Mammogram  03/30/2022    Colorectal Cancer Screening  05/31/2026    Hepatitis C Screening  Completed     Immunizations Due:      Topic Date Due    Pneumococcal Vaccine: 65+ Years (1 of 2 - PPSV23) 08/02/2017    Influenza Vaccine (1) 09/01/2021    COVID-19 Vaccine (3 - Booster for Moderna series) 10/29/2021     Advance Directives   What are advance directives? Advance directives are legal documents that state your wishes and plans for medical care  These plans are made ahead of time in case you lose your ability to make decisions for yourself   Advance directives can apply to any medical decision, such as the treatments you want, and if you want to donate organs  What are the types of advance directives? There are many types of advance directives, and each state has rules about how to use them  You may choose a combination of any of the following:  · Living will: This is a written record of the treatment you want  You can also choose which treatments you do not want, which to limit, and which to stop at a certain time  This includes surgery, medicine, IV fluid, and tube feedings  · Durable power of  for healthcare [facility]): This is a written record that states who you want to make healthcare choices for you when you are unable to make them for yourself  This person, called a proxy, is usually a family member or a friend  You may choose more than 1 proxy  · Do not resuscitate (DNR) order:  A DNR order is used in case your heart stops beating or you stop breathing  It is a request not to have certain forms of treatment, such as CPR  A DNR order may be included in other types of advance directives  · Medical directive: This covers the care that you want if you are in a coma, near death, or unable to make decisions for yourself  You can list the treatments you want for each condition  Treatment may include pain medicine, surgery, blood transfusions, dialysis, IV or tube feedings, and a ventilator (breathing machine)  · Values history: This document has questions about your views, beliefs, and how you feel and think about life  This information can help others choose the care that you would choose  Why are advance directives important? An advance directive helps you control your care  Although spoken wishes may be used, it is better to have your wishes written down  Spoken wishes can be misunderstood, or not followed  Treatments may be given even if you do not want them  An advance directive may make it easier for your family to make difficult choices about your care     Fall Prevention    Fall prevention includes ways to make your home and other areas safer  It also includes ways you can move more carefully to prevent a fall  Health conditions that cause changes in your blood pressure, vision, or muscle strength and coordination may increase your risk for falls  Medicines may also increase your risk for falls if they make you dizzy, weak, or sleepy  Fall prevention tips:   · Stand or sit up slowly  · Use assistive devices as directed  · Wear shoes that fit well and have soles that   · Wear a personal alarm  · Stay active  · Manage your medical conditions  Home Safety Tips:  · Add items to prevent falls in the bathroom  · Keep paths clear  · Install bright lights in your home  · Keep items you use often on shelves within reach  · Paint or place reflective tape on the edges of your stairs  Urinary Incontinence   Urinary incontinence (UI)  is when you lose control of your bladder  UI develops because your bladder cannot store or empty urine properly  The 3 most common types of UI are stress incontinence, urge incontinence, or both  Medicines:   · May be given to help strengthen your bladder control  Report any side effects of medication to your healthcare provider  Do pelvic muscle exercises often:  Your pelvic muscles help you stop urinating  Squeeze these muscles tight for 5 seconds, then relax for 5 seconds  Gradually work up to squeezing for 10 seconds  Do 3 sets of 15 repetitions a day, or as directed  This will help strengthen your pelvic muscles and improve bladder control  Train your bladder:  Go to the bathroom at set times, such as every 2 hours, even if you do not feel the urge to go  You can also try to hold your urine when you feel the urge to go  For example, hold your urine for 5 minutes when you feel the urge to go  As that becomes easier, hold your urine for 10 minutes  Self-care:   · Keep a UI record    Write down how often you leak urine and how much you leak  Make a note of what you were doing when you leaked urine  · Drink liquids as directed  You may need to limit the amount of liquid you drink to help control your urine leakage  Do not drink any liquid right before you go to bed  Limit or do not have drinks that contain caffeine or alcohol  · Prevent constipation  Eat a variety of high-fiber foods  Good examples are high-fiber cereals, beans, vegetables, and whole-grain breads  Walking is the best way to trigger your intestines to have a bowel movement  · Exercise regularly and maintain a healthy weight  Weight loss and exercise will decrease pressure on your bladder and help you control your leakage  · Use a catheter as directed  to help empty your bladder  A catheter is a tiny, plastic tube that is put into your bladder to drain your urine  · Go to behavior therapy as directed  Behavior therapy may be used to help you learn to control your urge to urinate  Weight Management   Why it is important to manage your weight:  Being overweight increases your risk of health conditions such as heart disease, high blood pressure, type 2 diabetes, and certain types of cancer  It can also increase your risk for osteoarthritis, sleep apnea, and other respiratory problems  Aim for a slow, steady weight loss  Even a small amount of weight loss can lower your risk of health problems  How to lose weight safely:  A safe and healthy way to lose weight is to eat fewer calories and get regular exercise  You can lose up about 1 pound a week by decreasing the number of calories you eat by 500 calories each day  Healthy meal plan for weight management:  A healthy meal plan includes a variety of foods, contains fewer calories, and helps you stay healthy  A healthy meal plan includes the following:  · Eat whole-grain foods more often  A healthy meal plan should contain fiber  Fiber is the part of grains, fruits, and vegetables that is not broken down by your body  Whole-grain foods are healthy and provide extra fiber in your diet  Some examples of whole-grain foods are whole-wheat breads and pastas, oatmeal, brown rice, and bulgur  · Eat a variety of vegetables every day  Include dark, leafy greens such as spinach, kale, florin greens, and mustard greens  Eat yellow and orange vegetables such as carrots, sweet potatoes, and winter squash  · Eat a variety of fruits every day  Choose fresh or canned fruit (canned in its own juice or light syrup) instead of juice  Fruit juice has very little or no fiber  · Eat low-fat dairy foods  Drink fat-free (skim) milk or 1% milk  Eat fat-free yogurt and low-fat cottage cheese  Try low-fat cheeses such as mozzarella and other reduced-fat cheeses  · Choose meat and other protein foods that are low in fat  Choose beans or other legumes such as split peas or lentils  Choose fish, skinless poultry (chicken or turkey), or lean cuts of red meat (beef or pork)  Before you cook meat or poultry, cut off any visible fat  · Use less fat and oil  Try baking foods instead of frying them  Add less fat, such as margarine, sour cream, regular salad dressing and mayonnaise to foods  Eat fewer high-fat foods  Some examples of high-fat foods include french fries, doughnuts, ice cream, and cakes  · Eat fewer sweets  Limit foods and drinks that are high in sugar  This includes candy, cookies, regular soda, and sweetened drinks  Exercise:  Exercise at least 30 minutes per day on most days of the week  Some examples of exercise include walking, biking, dancing, and swimming  You can also fit in more physical activity by taking the stairs instead of the elevator or parking farther away from stores  Ask your healthcare provider about the best exercise plan for you     Narcotic (Opioid) Safety    Use narcotics safely:  · Take prescribed narcotics exactly as directed  · Do not give narcotics to others or take narcotics that belong to someone else  · Do not mix narcotics without medicines or alcohol  · Do not drive or operate heavy machinery after you take the narcotic  · Monitor for side effects and notify your healthcare provider if you experienced side effects such as nausea, sleepiness, itching, or trouble thinking clearly  Manage constipation:    Constipation is the most common side effect of narcotic medicine  Constipation is when you have hard, dry bowel movements, or you go longer than usual between bowel movements  Tell your healthcare provider about all changes in your bowel movements while you are taking narcotics  He or she may recommend laxative medicine to help you have a bowel movement  He or she may also change the kind of narcotic you are taking, or change when you take it  The following are more ways you can prevent or relieve constipation:    · Drink liquids as directed  You may need to drink extra liquids to help soften and move your bowels  Ask how much liquid to drink each day and which liquids are best for you  · Eat high-fiber foods  This may help decrease constipation by adding bulk to your bowel movements  High-fiber foods include fruits, vegetables, whole-grain breads and cereals, and beans  Your healthcare provider or dietitian can help you create a high-fiber meal plan  Your provider may also recommend a fiber supplement if you cannot get enough fiber from food  · Exercise regularly  Regular physical activity can help stimulate your intestines  Walking is a good exercise to prevent or relieve constipation  Ask which exercises are best for you  · Schedule a time each day to have a bowel movement  This may help train your body to have regular bowel movements  Bend forward while you are on the toilet to help move the bowel movement out  Sit on the toilet for at least 10 minutes, even if you do not have a bowel movement  Store narcotics safely:   · Store narcotics where others cannot easily get them    Keep them in a locked cabinet or secure area  Do not  keep them in a purse or other bag you carry with you  A person may be looking for something else and find the narcotics  · Make sure narcotics are stored out of the reach of children  A child can easily overdose on narcotics  Narcotics may look like candy to a small child  The best way to dispose of narcotics: The laws vary by country and area  In the [place], the best way is to return the narcotics through a take-back program  This program is offered by the [employer] ([employer])  The following are options for using the program:  · Take the narcotics to a NATALY collection site  The site is often a law enforcement center  Call your local law enforcement center for scheduled take-back days in your area  You will be given information on where to go if the collection site is in a different location  · Take the narcotics to an approved pharmacy or hospital   A pharmacy or hospital may be set up as a collection site  You will need to ask if it is a NATALY collection site if you were not directed there  A pharmacy or doctor's office may not be able to take back narcotics unless it is a NATALY site  · Use a mail-back system  This means you are given containers to put the narcotics into  You will then mail them in the containers  · Use a take-back drop box  This is a place to leave the narcotics at any time  People and animals will not be able to get into the box  Your local law enforcement agency can tell you where to find a drop box in your area  Other ways to manage pain:   · Ask your healthcare provider about non-narcotic medicines to control pain  Nonprescription medicines include NSAIDs (such as ibuprofen) and acetaminophen  Prescription medicines include muscle relaxers, antidepressants, and steroids  · Pain may be managed without any medicines  Some ways to relieve pain include massage, aromatherapy, or meditation   Physical or occupational therapy may also help  For more information:   · Drug Enforcement Administration  [address]  Phone: 6- 063 - 571-6404  Web Address: [facility]/drug_disposal/    · [address] and Drug Administration  [place] , [address]  Phone: 1- 089 - 815-1279  Web Address: http://[employer]/     © Copyright [facility] 2018 Information is for End User's use only and may not be sold, redistributed or otherwise used for commercial purposes   All illustrations and images included in CareNotes® are the copyrighted property of A D A M , Inc  or [facility] [place]

## 2022-01-11 LAB
EST. AVERAGE GLUCOSE BLD GHB EST-MCNC: 120 MG/DL
HBA1C MFR BLD: 5.8 %

## 2022-01-12 DIAGNOSIS — M79.672 FOOT PAIN, BILATERAL: ICD-10-CM

## 2022-01-12 DIAGNOSIS — M79.671 FOOT PAIN, BILATERAL: ICD-10-CM

## 2022-01-12 DIAGNOSIS — F33.41 RECURRENT MAJOR DEPRESSIVE DISORDER, IN PARTIAL REMISSION (HCC): ICD-10-CM

## 2022-01-12 DIAGNOSIS — E78.5 HYPERLIPIDEMIA, UNSPECIFIED HYPERLIPIDEMIA TYPE: ICD-10-CM

## 2022-01-12 RX ORDER — ATORVASTATIN CALCIUM 20 MG/1
TABLET, FILM COATED ORAL
Qty: 30 TABLET | Refills: 0 | Status: SHIPPED | OUTPATIENT
Start: 2022-01-12 | End: 2022-02-14

## 2022-01-12 RX ORDER — DICLOFENAC SODIUM 75 MG/1
TABLET, DELAYED RELEASE ORAL
Qty: 180 TABLET | Refills: 0 | Status: SHIPPED | OUTPATIENT
Start: 2022-01-12 | End: 2022-04-16

## 2022-01-12 RX ORDER — DULOXETIN HYDROCHLORIDE 60 MG/1
CAPSULE, DELAYED RELEASE ORAL
Qty: 180 CAPSULE | Refills: 0 | Status: SHIPPED | OUTPATIENT
Start: 2022-01-12 | End: 2022-04-16

## 2022-01-14 ENCOUNTER — TELEPHONE (OUTPATIENT)
Dept: INTERNAL MEDICINE CLINIC | Facility: CLINIC | Age: 72
End: 2022-01-14

## 2022-01-14 DIAGNOSIS — M81.0 AGE-RELATED OSTEOPOROSIS WITHOUT CURRENT PATHOLOGICAL FRACTURE: Primary | ICD-10-CM

## 2022-01-17 ENCOUNTER — HOSPITAL ENCOUNTER (OUTPATIENT)
Dept: INFUSION CENTER | Facility: HOSPITAL | Age: 72
Discharge: HOME | End: 2022-01-17
Attending: INTERNAL MEDICINE

## 2022-01-20 ENCOUNTER — TELEPHONE (OUTPATIENT)
Dept: INTERNAL MEDICINE CLINIC | Facility: CLINIC | Age: 72
End: 2022-01-20

## 2022-01-20 DIAGNOSIS — M81.0 AGE-RELATED OSTEOPOROSIS WITHOUT CURRENT PATHOLOGICAL FRACTURE: Primary | ICD-10-CM

## 2022-01-24 ENCOUNTER — HOSPITAL ENCOUNTER (OUTPATIENT)
Dept: INFUSION CENTER | Facility: HOSPITAL | Age: 72
Discharge: HOME | End: 2022-01-24
Attending: INTERNAL MEDICINE
Payer: COMMERCIAL

## 2022-01-24 VITALS
SYSTOLIC BLOOD PRESSURE: 146 MMHG | HEART RATE: 112 BPM | DIASTOLIC BLOOD PRESSURE: 85 MMHG | RESPIRATION RATE: 18 BRPM | TEMPERATURE: 97 F

## 2022-01-24 DIAGNOSIS — M81.0 AGE-RELATED OSTEOPOROSIS WITHOUT CURRENT PATHOLOGICAL FRACTURE: Primary | ICD-10-CM

## 2022-01-24 PROCEDURE — 96372 THER/PROPH/DIAG INJ SC/IM: CPT

## 2022-01-24 RX ADMIN — DENOSUMAB 60 MG: 60 INJECTION SUBCUTANEOUS at 13:23

## 2022-01-24 NOTE — PROGRESS NOTES
Presented today for prolia injection, prolia 60mg given as ordered in L arm, tolerated well, Will return 7/2022 for next prolia injection

## 2022-04-15 DIAGNOSIS — F33.41 RECURRENT MAJOR DEPRESSIVE DISORDER, IN PARTIAL REMISSION (HCC): ICD-10-CM

## 2022-04-15 DIAGNOSIS — M79.672 FOOT PAIN, BILATERAL: ICD-10-CM

## 2022-04-15 DIAGNOSIS — M79.671 FOOT PAIN, BILATERAL: ICD-10-CM

## 2022-04-16 RX ORDER — DULOXETIN HYDROCHLORIDE 60 MG/1
CAPSULE, DELAYED RELEASE ORAL
Qty: 180 CAPSULE | Refills: 0 | Status: SHIPPED | OUTPATIENT
Start: 2022-04-16 | End: 2022-07-14

## 2022-04-16 RX ORDER — DICLOFENAC SODIUM 75 MG/1
TABLET, DELAYED RELEASE ORAL
Qty: 180 TABLET | Refills: 0 | Status: SHIPPED | OUTPATIENT
Start: 2022-04-16 | End: 2022-07-20 | Stop reason: SDUPTHER

## 2022-05-13 DIAGNOSIS — E78.5 HYPERLIPIDEMIA, UNSPECIFIED HYPERLIPIDEMIA TYPE: ICD-10-CM

## 2022-05-13 RX ORDER — ATORVASTATIN CALCIUM 20 MG/1
TABLET, FILM COATED ORAL
Qty: 30 TABLET | Refills: 1 | Status: SHIPPED | OUTPATIENT
Start: 2022-05-13 | End: 2022-07-14

## 2022-06-14 DIAGNOSIS — M81.0 AGE-RELATED OSTEOPOROSIS WITHOUT CURRENT PATHOLOGICAL FRACTURE: Primary | ICD-10-CM

## 2022-06-20 ENCOUNTER — TELEPHONE (OUTPATIENT)
Dept: INTERNAL MEDICINE CLINIC | Facility: CLINIC | Age: 72
End: 2022-06-20

## 2022-06-20 NOTE — TELEPHONE ENCOUNTER
PT called stating that at her last visit you placed a referral for her for podiatry  I could not find a referral in there  Pt would like to know if you could please place that order  I asked what she needed it for  She states she is having issues with her R foot  PT states she cannot move three of her toes and has swelling in her foot since January  Please advise

## 2022-06-21 ENCOUNTER — TELEPHONE (OUTPATIENT)
Dept: OBGYN CLINIC | Facility: HOSPITAL | Age: 72
End: 2022-06-21

## 2022-06-21 DIAGNOSIS — M79.671 RIGHT FOOT PAIN: Primary | ICD-10-CM

## 2022-06-22 ENCOUNTER — APPOINTMENT (OUTPATIENT)
Dept: RADIOLOGY | Facility: CLINIC | Age: 72
End: 2022-06-22
Payer: COMMERCIAL

## 2022-06-22 ENCOUNTER — CONSULT (OUTPATIENT)
Dept: PODIATRY | Facility: CLINIC | Age: 72
End: 2022-06-22
Payer: COMMERCIAL

## 2022-06-22 ENCOUNTER — HOSPITAL ENCOUNTER (OUTPATIENT)
Dept: MRI IMAGING | Facility: HOSPITAL | Age: 72
Discharge: HOME | End: 2022-06-22
Attending: PODIATRIST
Payer: COMMERCIAL

## 2022-06-22 VITALS
DIASTOLIC BLOOD PRESSURE: 85 MMHG | HEIGHT: 57 IN | SYSTOLIC BLOOD PRESSURE: 146 MMHG | WEIGHT: 183 LBS | BODY MASS INDEX: 39.48 KG/M2

## 2022-06-22 DIAGNOSIS — S96.819A RUPTURE OF EXTENSOR TENDON OF FOOT: ICD-10-CM

## 2022-06-22 DIAGNOSIS — M79.671 PAIN IN BOTH FEET: ICD-10-CM

## 2022-06-22 DIAGNOSIS — M79.672 PAIN IN BOTH FEET: ICD-10-CM

## 2022-06-22 DIAGNOSIS — M79.672 PAIN IN BOTH FEET: Primary | ICD-10-CM

## 2022-06-22 DIAGNOSIS — M79.671 RIGHT FOOT PAIN: ICD-10-CM

## 2022-06-22 DIAGNOSIS — M79.671 PAIN IN BOTH FEET: Primary | ICD-10-CM

## 2022-06-22 PROCEDURE — 1160F RVW MEDS BY RX/DR IN RCRD: CPT | Performed by: PODIATRIST

## 2022-06-22 PROCEDURE — 1036F TOBACCO NON-USER: CPT | Performed by: PODIATRIST

## 2022-06-22 PROCEDURE — 99204 OFFICE O/P NEW MOD 45 MIN: CPT | Performed by: PODIATRIST

## 2022-06-22 PROCEDURE — 73630 X-RAY EXAM OF FOOT: CPT

## 2022-06-22 PROCEDURE — 73718 MRI LOWER EXTREMITY W/O DYE: CPT

## 2022-06-22 NOTE — PROGRESS NOTES
HISTORY AND PHYSICAL EXAM  - [facility]    PATIENT:  Carolyne Medina    1950      Assessment/Plan     Problem List Items Addressed This Visit    None     Visit Diagnoses     Pain in both feet    -  Primary    Relevant Orders    X-ray foot left 3+ views    X-ray foot right 3+ views    Right foot pain        Rupture of extensor tendon of foot               Diagnoses and all orders for this visit:    Pain in both feet  -     X-ray foot left 3+ views; Future  -     X-ray foot right 3+ views; Future    Right foot pain  -     Ambulatory Referral to Podiatry    Rupture of extensor tendon of foot     -x-rays three views were taken reviewed of the bilateral feet, and show no acute osseous abnormalities, there is mild diffuse degenerative change across the right foot with significant dorsal soft tissue swelling  No acute osseous abnormalities   -will obtain a stat MRI to rule out extensor tendon rupture and please place marker overlying the midfoot    History of Present Illness   Deyanira Diaz is a 81 y o  female who presents with acute right foot pain and inability to move her lesser digits of her right foot, states that she got up and felt a pop yesterday and since then had severe pain in her right foot and inability to move her toes  Review of Systems   Constitutional: Negative for chills and fever  HENT: Negative for ear pain and sore throat  Eyes: Negative for pain and visual disturbance  Respiratory: Negative for cough and shortness of breath  Cardiovascular: Negative for chest pain and palpitations  Gastrointestinal: Negative for abdominal pain and vomiting  Genitourinary: Negative for dysuria and hematuria  Musculoskeletal: Negative for arthralgias and back pain  Skin: Negative for color change and rash  Neurological: Negative for seizures and syncope  All other systems reviewed and are negative        Historical Information   Past Medical History:   Diagnosis Date    Anxiety     Arthritis     Asthma     Breast cancer ([facility] [address] )     rt mastectomy 2005    Cancer [facility])     breast right    Cataract     CHF (congestive heart failure) (HCC)     Coronary artery disease     Depression     GERD (gastroesophageal reflux disease)     High cholesterol     History of chemotherapy     2005 rt breast cancer    History of transfusion     Hypertension     IBS (irritable bowel syndrome)     Irritable bowel syndrome (IBS) 5/31/2016    Kidney stone     Lymphedema of right arm     Obesity     PONV (postoperative nausea and vomiting)     Post-menopausal     Psychiatric disorder     Renal disorder     Vitamin D deficiency      Past Surgical History:   Procedure Laterality Date    BREAST SURGERY      CATARACT EXTRACTION Bilateral     CATARACT EXTRACTION, BILATERAL      CHOLECYSTECTOMY      COLONOSCOPY N/A 5/31/2016    Procedure: COLONOSCOPY;  Surgeon: Moustapha Salazar MD;  Location: [facility] MAIN [facility];  Service:    [facility] GALLBLADDER SURGERY      HYSTERECTOMY      Total    KNEE ARTHROSCOPY      KNEE SURGERY      arthroscopy    MASTECTOMY Right     rt breast mastectomy 2005    DE TOTAL KNEE ARTHROPLASTY Left 5/20/2021    Procedure: ARTHROPLASTY KNEE TOTAL;  Surgeon: Smitha Mccrya MD;  Location: [address] [place] [address] MAIN OR;  Service: Orthopedics    TONSILLECTOMY AND ADENOIDECTOMY       Social History   Social History     Substance and Sexual Activity   Alcohol Use Never    Comment: socially     Social History     Substance and Sexual Activity   Drug Use Never     Social History     Tobacco Use   Smoking Status Never Smoker   Smokeless Tobacco Never Used     Family History: non-contributory    Meds/Allergies   all medications and allergies reviewed  Allergies   Allergen Reactions    Ibuprofen Nausea Only, Rash, Dizziness and Syncope    Morphine Other (See Comments)     Intolerance    Latex Dermatitis       Objective   Vitals: Blood pressure 146/85, height 4' 9" (1 448 m), weight 83 kg (183 lb), not currently breastfeeding  ,Body mass index is 39 6 kg/m²  Physical Exam  Vitals reviewed  Constitutional:       Appearance: Normal appearance  She is obese  HENT:      Head: Normocephalic and atraumatic  Nose: Nose normal       Mouth/Throat:      Mouth: Mucous membranes are moist    Eyes:      Pupils: Pupils are equal, round, and reactive to light  Pulmonary:      Effort: Pulmonary effort is normal    Musculoskeletal:         General: Swelling and tenderness present  Normal range of motion  Cervical back: Normal range of motion  Right lower leg: Edema present  Comments: There is significant lack of range of motion of the lesser digits of the right foot, significant pain along the dorsal foot and there seems to be a nidus for bile duct tendon overlying her actual midfoot   Skin:     Capillary Refill: Capillary refill takes 2 to 3 seconds  Neurological:      General: No focal deficit present  Mental Status: She is alert and oriented to person, place, and time           Ortho Exam

## 2022-06-23 ENCOUNTER — TELEPHONE (OUTPATIENT)
Dept: PAIN MEDICINE | Facility: MEDICAL CENTER | Age: 72
End: 2022-06-23

## 2022-06-27 ENCOUNTER — OFFICE VISIT (OUTPATIENT)
Dept: PODIATRY | Facility: CLINIC | Age: 72
End: 2022-06-27
Payer: COMMERCIAL

## 2022-06-27 VITALS
DIASTOLIC BLOOD PRESSURE: 81 MMHG | SYSTOLIC BLOOD PRESSURE: 146 MMHG | HEIGHT: 57 IN | BODY MASS INDEX: 39.48 KG/M2 | WEIGHT: 183 LBS

## 2022-06-27 DIAGNOSIS — S90.31XA CONTUSION OF RIGHT FOOT, INITIAL ENCOUNTER: Primary | ICD-10-CM

## 2022-06-27 DIAGNOSIS — S93.601A SPRAIN OF RIGHT FOOT, INITIAL ENCOUNTER: ICD-10-CM

## 2022-06-27 PROCEDURE — 3008F BODY MASS INDEX DOCD: CPT | Performed by: PODIATRIST

## 2022-06-27 PROCEDURE — 99213 OFFICE O/P EST LOW 20 MIN: CPT | Performed by: PODIATRIST

## 2022-06-27 NOTE — PROGRESS NOTES
Assessment/Plan:    No problem-specific Assessment & Plan notes found for this encounter  Diagnoses and all orders for this visit:    Contusion of right foot, initial encounter    Sprain of right foot, initial encounter      - MRI reviewed and shows partial interossei tear and 5th metatarsal contusion  - She is to be immobilized for the next 6 week, WBAT in CAM boot  - She is to return in 3 weeks for further assessment of her pain  - She is emile continue ice and elevation and modulate her activity  Subjective:      Patient ID: Nitza Bernstein is a 81 y o  female  Patient presents for evaluation and managment for her right foot pain, has been wearing her CAM boot and notes some reduction in her pain, is still having some continued pain  No open lesions  She is also here to review her MRI      The following portions of the patient's history were reviewed and updated as appropriate: allergies, current medications, past family history, past medical history, past social history, past surgical history and problem list     Review of Systems   Constitutional: Negative for chills and fever  HENT: Negative for ear pain and sore throat  Eyes: Negative for pain and visual disturbance  Respiratory: Negative for cough and shortness of breath  Cardiovascular: Negative for chest pain and palpitations  Gastrointestinal: Negative for abdominal pain and vomiting  Genitourinary: Negative for dysuria and hematuria  Musculoskeletal: Negative for arthralgias and back pain  Skin: Negative for color change and rash  Neurological: Negative for seizures and syncope  All other systems reviewed and are negative  Objective:      /81   Ht 4' 9" (1 448 m)   Wt 83 kg (183 lb)   LMP  (LMP Unknown)   BMI 39 60 kg/m²          Physical Exam  Vitals reviewed  Constitutional:       Appearance: Normal appearance  She is obese  HENT:      Head: Normocephalic and atraumatic        Right Ear: Tympanic membrane normal       Nose: Nose normal       Mouth/Throat:      Mouth: Mucous membranes are moist    Eyes:      Pupils: Pupils are equal, round, and reactive to light  Pulmonary:      Effort: Pulmonary effort is normal    Musculoskeletal:         General: Swelling and tenderness present  Right lower leg: Edema present  Skin:     Capillary Refill: Capillary refill takes 2 to 3 seconds  Neurological:      General: No focal deficit present  Mental Status: She is alert and oriented to person, place, and time

## 2022-07-08 ENCOUNTER — OFFICE VISIT (OUTPATIENT)
Dept: PODIATRY | Facility: CLINIC | Age: 72
End: 2022-07-08
Payer: COMMERCIAL

## 2022-07-08 ENCOUNTER — TELEPHONE (OUTPATIENT)
Dept: INTERNAL MEDICINE CLINIC | Facility: CLINIC | Age: 72
End: 2022-07-08

## 2022-07-08 VITALS
HEIGHT: 57 IN | WEIGHT: 183 LBS | BODY MASS INDEX: 39.48 KG/M2 | SYSTOLIC BLOOD PRESSURE: 130 MMHG | DIASTOLIC BLOOD PRESSURE: 87 MMHG | HEART RATE: 123 BPM

## 2022-07-08 DIAGNOSIS — S90.31XA CONTUSION OF RIGHT FOOT, INITIAL ENCOUNTER: Primary | ICD-10-CM

## 2022-07-08 DIAGNOSIS — M79.671 RIGHT FOOT PAIN: ICD-10-CM

## 2022-07-08 DIAGNOSIS — S93.601A SPRAIN OF RIGHT FOOT, INITIAL ENCOUNTER: ICD-10-CM

## 2022-07-08 PROCEDURE — 99213 OFFICE O/P EST LOW 20 MIN: CPT | Performed by: PODIATRIST

## 2022-07-08 NOTE — PROGRESS NOTES
Assessment/Plan:    No problem-specific Assessment & Plan notes found for this encounter  Diagnoses and all orders for this visit:    Contusion of right foot, initial encounter    Sprain of right foot, initial encounter    Right foot pain      -patient is weight-bearing as tolerated to the right foot in Cam boot with cane assistance  -she is to continue anti-inflammatories, I advised her to increase her icing regimen for the continued increased pain  We discussed she is around 3-4 weeks out from her initial injury, and she can start transitioning out of her boot at night to sleep without her boot  She also may remove this to shower  She still should be walking every single step of the day in her cam boot     -she is to return in 4 weeks for further assessment of her right foot pain, and will consider transitioning her into physical therapy pending her function and pain  Subjective:      Patient ID: Horacio Salas is a 81 y o  female  Patient presents for evaluation management of her right foot injury, she has been immobilized in Cam boot for the past 3 weeks, she states that her pain has come down from a 10/10 to an 8/10  She is icing, taking Tylenol and elevating as much as possible  She is using cane for assistance  She states that she is coming out of boot occasionally and walking barefoot, but she is wearing the boot at night to bed  The following portions of the patient's history were reviewed and updated as appropriate: allergies, current medications, past family history, past medical history, past social history, past surgical history and problem list     Review of Systems   Constitutional: Negative for chills and fever  HENT: Negative for ear pain and sore throat  Eyes: Negative for pain and visual disturbance  Respiratory: Negative for cough and shortness of breath  Cardiovascular: Negative for chest pain and palpitations     Gastrointestinal: Negative for abdominal pain and vomiting  Genitourinary: Negative for dysuria and hematuria  Musculoskeletal: Negative for arthralgias and back pain  Skin: Negative for color change and rash  Neurological: Negative for seizures and syncope  All other systems reviewed and are negative  Objective:      /87   Pulse (!) 123   Ht 4' 9" (1 448 m)   Wt 83 kg (183 lb)   LMP  (LMP Unknown)   BMI 39 60 kg/m²          Physical Exam  Vitals reviewed  Constitutional:       Appearance: Normal appearance  She is obese  HENT:      Head: Normocephalic and atraumatic  Nose: Nose normal       Mouth/Throat:      Mouth: Mucous membranes are moist    Eyes:      Pupils: Pupils are equal, round, and reactive to light  Pulmonary:      Effort: Pulmonary effort is normal    Musculoskeletal:         General: Swelling, tenderness and signs of injury present  Comments: There is tenderness to palpation along the right foot, the swelling has improved, but still present  Ecchymosis has also improved  There are paresthesias about the right 5th toe   Skin:     Capillary Refill: Capillary refill takes 2 to 3 seconds  Findings: No erythema  Neurological:      General: No focal deficit present  Mental Status: She is alert and oriented to person, place, and time

## 2022-07-08 NOTE — TELEPHONE ENCOUNTER
Pt came to office asking for a referral for lymphedema therapy for right arm and rt hand numbness  She does her therapy at home but with the weather and humidity it is bothersome

## 2022-07-10 DIAGNOSIS — I89.0 LYMPHEDEMA: Primary | ICD-10-CM

## 2022-07-12 ENCOUNTER — RA CDI HCC (OUTPATIENT)
Dept: OTHER | Facility: HOSPITAL | Age: 72
End: 2022-07-12

## 2022-07-12 DIAGNOSIS — M81.0 AGE-RELATED OSTEOPOROSIS WITHOUT CURRENT PATHOLOGICAL FRACTURE: Primary | ICD-10-CM

## 2022-07-13 NOTE — PROGRESS NOTES
[address] [address]  coding opportunities       Chart reviewed, no opportunity found: CHART REVIEWED, NO OPPORTUNITY FOUND        Patients Insurance     Medicare Insurance: [employer]

## 2022-07-14 DIAGNOSIS — E78.5 HYPERLIPIDEMIA, UNSPECIFIED HYPERLIPIDEMIA TYPE: ICD-10-CM

## 2022-07-14 DIAGNOSIS — F33.41 RECURRENT MAJOR DEPRESSIVE DISORDER, IN PARTIAL REMISSION (HCC): ICD-10-CM

## 2022-07-14 RX ORDER — DULOXETIN HYDROCHLORIDE 60 MG/1
CAPSULE, DELAYED RELEASE ORAL
Qty: 180 CAPSULE | Refills: 0 | Status: SHIPPED | OUTPATIENT
Start: 2022-07-14 | End: 2022-10-07

## 2022-07-14 RX ORDER — ATORVASTATIN CALCIUM 20 MG/1
TABLET, FILM COATED ORAL
Qty: 30 TABLET | Refills: 0 | Status: SHIPPED | OUTPATIENT
Start: 2022-07-14 | End: 2022-08-12

## 2022-07-15 ENCOUNTER — VBI (OUTPATIENT)
Dept: ADMINISTRATIVE | Facility: OTHER | Age: 72
End: 2022-07-15

## 2022-07-15 NOTE — TELEPHONE ENCOUNTER
07/15/22 10:53 AM     See documentation in the [employer] SmartForm       No PVP     Devaughn De Luna MA

## 2022-07-20 ENCOUNTER — TELEMEDICINE (OUTPATIENT)
Dept: INTERNAL MEDICINE CLINIC | Facility: CLINIC | Age: 72
End: 2022-07-20
Payer: COMMERCIAL

## 2022-07-20 DIAGNOSIS — H10.13 ALLERGIC CONJUNCTIVITIS OF BOTH EYES: Primary | ICD-10-CM

## 2022-07-20 DIAGNOSIS — N32.81 OAB (OVERACTIVE BLADDER): ICD-10-CM

## 2022-07-20 DIAGNOSIS — M79.672 FOOT PAIN, BILATERAL: ICD-10-CM

## 2022-07-20 DIAGNOSIS — J30.2 SEASONAL ALLERGIES: ICD-10-CM

## 2022-07-20 DIAGNOSIS — M79.671 FOOT PAIN, BILATERAL: ICD-10-CM

## 2022-07-20 DIAGNOSIS — M79.671 RIGHT FOOT PAIN: ICD-10-CM

## 2022-07-20 PROCEDURE — 99214 OFFICE O/P EST MOD 30 MIN: CPT | Performed by: PHYSICIAN ASSISTANT

## 2022-07-20 PROCEDURE — 1160F RVW MEDS BY RX/DR IN RCRD: CPT | Performed by: PHYSICIAN ASSISTANT

## 2022-07-20 PROCEDURE — 3288F FALL RISK ASSESSMENT DOCD: CPT | Performed by: PHYSICIAN ASSISTANT

## 2022-07-20 PROCEDURE — 1100F PTFALLS ASSESS-DOCD GE2>/YR: CPT | Performed by: PHYSICIAN ASSISTANT

## 2022-07-20 RX ORDER — FEXOFENADINE HCL 180 MG/1
180 TABLET ORAL DAILY
Qty: 30 TABLET | Refills: 2 | Status: SHIPPED | OUTPATIENT
Start: 2022-07-20 | End: 2022-10-11

## 2022-07-20 RX ORDER — OLOPATADINE HYDROCHLORIDE 1 MG/ML
1 SOLUTION/ DROPS OPHTHALMIC 2 TIMES DAILY
Qty: 5 ML | Refills: 1 | Status: SHIPPED | OUTPATIENT
Start: 2022-07-20 | End: 2022-10-11

## 2022-07-20 RX ORDER — DICLOFENAC SODIUM 75 MG/1
75 TABLET, DELAYED RELEASE ORAL 2 TIMES DAILY
Qty: 180 TABLET | Refills: 0 | Status: SHIPPED | OUTPATIENT
Start: 2022-07-20 | End: 2022-10-07

## 2022-07-21 PROBLEM — N32.81 OAB (OVERACTIVE BLADDER): Status: ACTIVE | Noted: 2022-07-21

## 2022-07-21 PROBLEM — J30.2 SEASONAL ALLERGIES: Status: ACTIVE | Noted: 2022-07-21

## 2022-07-21 PROBLEM — H10.13 ALLERGIC CONJUNCTIVITIS OF BOTH EYES: Status: ACTIVE | Noted: 2022-07-21

## 2022-07-21 NOTE — PROGRESS NOTES
Virtual Regular Visit    Verification of patient location:    Patient is located in the following state in which I hold an active license [place]      Assessment/Plan:    Problem List Items Addressed This Visit        Genitourinary    OAB (overactive bladder)    Relevant Orders    Ambulatory Referral to Urology       Other    Right foot pain     PT is following with ortho and will give PT referral also  Relevant Medications    diclofenac (VOLTAREN) 75 mg EC tablet    Other Relevant Orders    Ambulatory Referral to Physical Therapy    Allergic conjunctivitis of both eyes - Primary    Relevant Medications    diclofenac (VOLTAREN) 75 mg EC tablet    olopatadine (PATANOL) 0 1 % ophthalmic solution    Seasonal allergies     Start allegra in place of claritin  Start pataday for allergic conjunctivitis symptoms  Relevant Medications    fexofenadine (ALLEGRA) 180 MG tablet            Falls Plan of Care: balance, strength, and gait training instructions were provided and referral to physical therapy  Reason for visit is   Chief Complaint   Patient presents with    Virtual Brief Visit     Amwell-Epic  review medication, allergies, very tired    Virtual Regular Visit        Encounter provider Castro Lam PA-C    Provider located at [address] [place] [id number]      Recent Visits  Date Type Provider Dept   07/20/22 Telemedicine [facility]g recent visits within past 7 days and meeting all other requirements  Future Appointments  No visits were found meeting these conditions  Showing future appointments within next 150 days and meeting all other requirements       The patient was identified by name and date of birth   Marino Osborne was informed that this is a telemedicine visit and that the visit is being conducted through [facility] and patient was informed this is a secure, HIPAA-complaint platform  She agrees to proceed     My office door was closed  No one else was in the room  She acknowledged consent and understanding of privacy and security of the video platform  The patient has agreed to participate and understands they can discontinue the visit at any time  Patient is aware this is a billable service  Federico Mendoza is a 81 y o  female    Pt presents for routine visit  She is up to date with labs and CRC screening and AWV  She is due for dexa scan and mammogram which is scheduled  She desires a referral to see urology again as she is having increased incontinence issues  She also notes issues with worsening seasonal allergies especially with itchy, watery, irritated eyes as well and rhinitis and Pnd  She was using claritin with little improvement   She also needs a new PT referral         Past Medical History:   Diagnosis Date    Anxiety     Arthritis     Asthma     Breast cancer ([facility] [address] )     rt mastectomy 2005    Cancer [facility])     breast right    Cataract     CHF (congestive heart failure) (HCC)     Coronary artery disease     Depression     GERD (gastroesophageal reflux disease)     High cholesterol     History of chemotherapy     2005 rt breast cancer    History of transfusion     Hypertension     IBS (irritable bowel syndrome)     Irritable bowel syndrome (IBS) 5/31/2016    Kidney stone     Lymphedema of right arm     Obesity     PONV (postoperative nausea and vomiting)     Post-menopausal     Psychiatric disorder     Renal disorder     Vitamin D deficiency        Past Surgical History:   Procedure Laterality Date    BREAST SURGERY      CATARACT EXTRACTION Bilateral     CATARACT EXTRACTION, BILATERAL      CHOLECYSTECTOMY      COLONOSCOPY N/A 5/31/2016    Procedure: COLONOSCOPY;  Surgeon: Danita Marcano MD;  Location: [facility];  Service:    Elder Malone GALLBLADDER SURGERY      HYSTERECTOMY      Total    KNEE ARTHROSCOPY      KNEE SURGERY arthroscopy    MASTECTOMY Right     rt breast mastectomy 2005    SC TOTAL KNEE ARTHROPLASTY Left 5/20/2021    Procedure: ARTHROPLASTY KNEE TOTAL;  Surgeon: No Yeboah MD;  Location: [address] [place], [place] [address];  Service: Orthopedics    TONSILLECTOMY AND ADENOIDECTOMY         Current Outpatient Medications   Medication Sig Dispense Refill    diclofenac (VOLTAREN) 75 mg EC tablet Take 1 tablet (75 mg total) by mouth 2 (two) times a day 180 tablet 0    fexofenadine (ALLEGRA) 180 MG tablet Take 1 tablet (180 mg total) by mouth daily 30 tablet 2    olopatadine (PATANOL) 0 1 % ophthalmic solution Administer 1 drop to both eyes 2 (two) times a day 5 mL 1    acetaminophen (TYLENOL) 650 mg CR tablet Take 1 tablet (650 mg total) by mouth every 8 (eight) hours as needed for mild pain 30 tablet 0    albuterol (2 5 mg/3 mL) 0 083 % nebulizer solution Take 2 5 mg by nebulization every 6 (six) hours as needed for wheezing   albuterol (PROAIR HFA) 90 mcg/act inhaler Inhale 2 puffs every 4 (four) hours as needed for wheezing 3 Inhaler 1    ascorbic acid (VITAMIN C) 500 MG tablet Take 1 tablet (500 mg total) by mouth daily 30 tablet 1    aspirin (ECOTRIN LOW STRENGTH) 81 mg EC tablet Take 1 tablet (81 mg total) by mouth 2 (two) times a day      atorvastatin (LIPITOR) 20 mg tablet Take 1 tablet by mouth daily  30 tablet 0    buPROPion (WELLBUTRIN XL) 300 mg 24 hr tablet Take 1 tablet (300 mg total) by mouth every morning 90 tablet 3    butalbital-acetaminophen-caffeine (FIORICET,ESGIC) -40 mg per tablet Take 1 tablet by mouth 3 (three) times a day as needed for headaches 90 tablet 1    DULoxetine (CYMBALTA) 60 mg delayed release capsule Take 1 capsule by mouth twice daily   180 capsule 0    ergocalciferol (VITAMIN D2) 50,000 units Take 1 capsule (50,000 Units total) by mouth once a week 4 capsule 3    Nutritional Supplements (BOOST BREEZE PO) Take by mouth daily      oxybutynin (DITROPAN) 5 mg tablet Take 1 tablet (5 mg total) by mouth 3 (three) times a day 30 tablet 2    oxybutynin (DITROPAN-XL) 10 MG 24 hr tablet Take 1 tablet (10 mg total) by mouth daily at bedtime 30 tablet 3    pantoprazole (PROTONIX) 40 mg tablet Take 1 tablet (40 mg total) by mouth daily 30 tablet 0     No current facility-administered medications for this visit  Allergies   Allergen Reactions    Ibuprofen Nausea Only, Rash, Dizziness and Syncope    Morphine Other (See Comments)     Intolerance    Latex Dermatitis       Review of Systems   Constitutional: Negative for chills and fever  HENT: Positive for postnasal drip and rhinorrhea  Negative for congestion, ear pain, hearing loss, sinus pressure, sinus pain, sore throat and trouble swallowing  Eyes: Positive for discharge, redness and itching  Negative for pain and visual disturbance  Respiratory: Negative for cough, chest tightness, shortness of breath and wheezing  Cardiovascular: Negative  Negative for chest pain, palpitations and leg swelling  Gastrointestinal: Negative for abdominal pain, blood in stool, constipation, diarrhea, nausea and vomiting  Endocrine: Negative for cold intolerance, heat intolerance, polydipsia, polyphagia and polyuria  Genitourinary: Positive for enuresis  Negative for difficulty urinating, dysuria, flank pain and urgency  Musculoskeletal: Positive for arthralgias  Negative for back pain, gait problem and myalgias  Skin: Negative for rash  Allergic/Immunologic: Negative  Neurological: Negative for dizziness, weakness, light-headedness and headaches  Hematological: Negative  Psychiatric/Behavioral: Negative for behavioral problems, dysphoric mood and sleep disturbance  The patient is not nervous/anxious  Video Exam    Vitals:       Physical Exam  Nursing note reviewed  Constitutional:       Appearance: She is well-developed  HENT:      Head: Normocephalic and atraumatic        Nose: Nose normal    Eyes:      Pupils: Pupils are equal, round, and reactive to light  Pulmonary:      Effort: Pulmonary effort is normal    Musculoskeletal:         General: Normal range of motion  Cervical back: Normal range of motion  Neurological:      Mental Status: She is alert  Psychiatric:         Behavior: Behavior normal          Thought Content: Thought content normal          Judgment: Judgment normal           I spent 20 minutes directly with the patient during this visit    Cesar Rosario 149 verbally agrees to participate in [employer]  Pt is aware that [employer] could be limited without vital signs or the ability to perform a full hands-on physical exam  Deyanira Carrasco understands she or the provider may request at any time to terminate the video visit and request the patient to seek care or treatment in person

## 2022-07-22 ENCOUNTER — APPOINTMENT (OUTPATIENT)
Dept: LAB | Facility: CLINIC | Age: 72
End: 2022-07-22
Payer: COMMERCIAL

## 2022-07-22 DIAGNOSIS — M81.0 AGE-RELATED OSTEOPOROSIS WITHOUT CURRENT PATHOLOGICAL FRACTURE: ICD-10-CM

## 2022-07-22 LAB
ALBUMIN SERPL BCP-MCNC: 3.2 G/DL (ref 3.5–5)
ALP SERPL-CCNC: 94 U/L (ref 46–116)
ALT SERPL W P-5'-P-CCNC: 31 U/L (ref 12–78)
ANION GAP SERPL CALCULATED.3IONS-SCNC: 4 MMOL/L (ref 4–13)
AST SERPL W P-5'-P-CCNC: 21 U/L (ref 5–45)
BILIRUB SERPL-MCNC: 0.57 MG/DL (ref 0.2–1)
BUN SERPL-MCNC: 19 MG/DL (ref 5–25)
CALCIUM ALBUM COR SERPL-MCNC: 10.6 MG/DL (ref 8.3–10.1)
CALCIUM SERPL-MCNC: 10 MG/DL (ref 8.3–10.1)
CHLORIDE SERPL-SCNC: 109 MMOL/L (ref 96–108)
CO2 SERPL-SCNC: 29 MMOL/L (ref 21–32)
CREAT SERPL-MCNC: 0.75 MG/DL (ref 0.6–1.3)
GFR SERPL CREATININE-BSD FRML MDRD: 81 ML/MIN/1.73SQ M
GLUCOSE SERPL-MCNC: 113 MG/DL (ref 65–140)
POTASSIUM SERPL-SCNC: 4 MMOL/L (ref 3.5–5.3)
PROT SERPL-MCNC: 6.7 G/DL (ref 6.4–8.4)
SODIUM SERPL-SCNC: 142 MMOL/L (ref 135–147)

## 2022-07-22 PROCEDURE — 36415 COLL VENOUS BLD VENIPUNCTURE: CPT

## 2022-07-22 PROCEDURE — 80053 COMPREHEN METABOLIC PANEL: CPT

## 2022-07-25 ENCOUNTER — HOSPITAL ENCOUNTER (OUTPATIENT)
Dept: INFUSION CENTER | Facility: HOSPITAL | Age: 72
Discharge: HOME | End: 2022-07-25
Attending: INTERNAL MEDICINE

## 2022-07-25 DIAGNOSIS — M81.0 AGE-RELATED OSTEOPOROSIS WITHOUT CURRENT PATHOLOGICAL FRACTURE: Primary | ICD-10-CM

## 2022-07-28 ENCOUNTER — HOSPITAL ENCOUNTER (OUTPATIENT)
Dept: INFUSION CENTER | Facility: HOSPITAL | Age: 72
Discharge: HOME | End: 2022-07-28
Attending: INTERNAL MEDICINE
Payer: COMMERCIAL

## 2022-07-28 DIAGNOSIS — M81.0 AGE-RELATED OSTEOPOROSIS WITHOUT CURRENT PATHOLOGICAL FRACTURE: Primary | ICD-10-CM

## 2022-07-28 PROCEDURE — 96372 THER/PROPH/DIAG INJ SC/IM: CPT

## 2022-07-28 RX ADMIN — DENOSUMAB 60 MG: 60 INJECTION SUBCUTANEOUS at 13:04

## 2022-08-04 ENCOUNTER — HOSPITAL ENCOUNTER (OUTPATIENT)
Dept: MAMMOGRAPHY | Facility: HOSPITAL | Age: 72
Discharge: HOME | End: 2022-08-04
Payer: COMMERCIAL

## 2022-08-04 VITALS — WEIGHT: 183 LBS | HEIGHT: 57 IN | BODY MASS INDEX: 39.48 KG/M2

## 2022-08-04 DIAGNOSIS — Z12.31 SCREENING MAMMOGRAM FOR BREAST CANCER: ICD-10-CM

## 2022-08-04 PROCEDURE — 77063 BREAST TOMOSYNTHESIS BI: CPT

## 2022-08-04 PROCEDURE — 77067 SCR MAMMO BI INCL CAD: CPT

## 2022-08-12 DIAGNOSIS — E78.5 HYPERLIPIDEMIA, UNSPECIFIED HYPERLIPIDEMIA TYPE: ICD-10-CM

## 2022-08-12 RX ORDER — ATORVASTATIN CALCIUM 20 MG/1
TABLET, FILM COATED ORAL
Qty: 30 TABLET | Refills: 0 | Status: SHIPPED | OUTPATIENT
Start: 2022-08-12 | End: 2022-09-10

## 2022-08-16 NOTE — DISCHARGE SUMMARY
Discharge- Cindy Moyer 1950, 81 y o  female MRN: [id number]    Unit/Bed#: -01 Encounter: [id number]    Primary Care Provider: David Conte PA-C   Date and time admitted to hospital: 11/1/2019 11:10 AM        * Sepsis ([facility] )  Assessment & Plan  · Patient meets criteria for sepsis with tachycardia, fever with right arm cellulitis  · Patient received vancomycin with very response  Will transition to PO keflex for total of 14-day therapy  · Please see treatment outlined below    Cellulitis  Assessment & Plan  · No prior of MRSA  No obvious port of entry  History of right lymphedema from prior right mastectomy  · Clinically is improving   Will transition to PO keflex 500 mg q6hr for 11 more days  · Elevate extremity, pain management   · Recommend to wear compression device to help with her lymphedema   · CT right arm- no evidence of abscess     Body mass index (BMI) of 40 0-44 9 in [facility])  Assessment & Plan  · Lifestyle and diet modifications discussed        Recurrent major depressive disorder, in partial remission ([facility] )  Assessment & Plan  · Will continue with current home medication      Mild intermittent asthma without complication  Assessment & Plan  · Not in acute exacerbation   · Will continue with montelukast and PRN nebs      Irritable bowel syndrome (IBS)  Assessment & Plan  · Will continue supportive care   · Continue with bentyl           Discharging Physician / Practitioner: Lance Raymond  PCP: David Conte PA-C  Admission Date:   Admission Orders (From admission, onward)     Ordered        11/02/19 1300  Inpatient Admission  Once         11/01/19 1305  Place in Observation (expected length of stay for this patient is less than two midnights)  Once                   Discharge Date: 11/03/19    Resolved Problems  Date Reviewed: 11/3/2019    None          Consultations During Hospital Stay:  · None    Procedures Performed:   · None     Significant Findings / Test Results:   · CT humerus right- There is subcutaneous edema in the right forearm and upper arm consistent with cellulitis   Although limited without IV contrast, there is no gross evidence of abscess formation   There is also no evidence of deeper infections  Incidental Findings:   · None      Test Results Pending at Discharge (will require follow up): · Final blood culture      Outpatient Tests Requested:  · Follow up with PCP     Complications:     None     Reason for Admission: right arm redness and swelling     Hospital Course:     Grecia Lawrence is a 81 y o  female patient who originally presented to the hospital on 11/1/2019 due to right arm cellulitis  Patient with history of right arm lymphedema status post right mastectomy  Patient presented with worsening of right arm redness and swelling  She was started on vancomycin IV  CT right arm result noted above  Initially, patient had slow response to IV antibiotic but today's erythema and swelling is much improved  Patient would like to go home  Her blood cultures are negative to date  She remains afebrile without leukocytosis  She will be transitioned to PO keflex for 11 more days of therapy  Recommended that she follow up with PCP and wear compression device on right arm  Please see above list of diagnoses and related plan for additional information  Condition at Discharge: good     Discharge Day Visit / Exam:     Subjective:  Feeling better  Having some mild headache  Denies any fevers or chills  Vitals: Blood Pressure: 149/75 (11/03/19 0716)  Pulse: 89 (11/03/19 0716)  Temperature: 98 1 °F (36 7 °C) (11/03/19 0716)  Temp Source: Temporal (11/03/19 0716)  Respirations: 18 (11/03/19 0716)  Height: 4' 9" (144 8 cm) (11/01/19 1354)  Weight - Scale: 87 3 kg (192 lb 7 4 oz) (11/01/19 1354)  SpO2: 97 % (11/03/19 0716)  Exam:   Physical Exam   Constitutional: She is oriented to person, place, and time  She appears well-developed and well-nourished  No distress  HENT:   Head: Normocephalic and atraumatic  Mouth/Throat: Oropharynx is clear and moist    Eyes: Pupils are equal, round, and reactive to light  Conjunctivae and EOM are normal    Neck: Normal range of motion  Neck supple  No thyromegaly present  Cardiovascular: Normal rate, regular rhythm and normal heart sounds  Exam reveals no gallop and no friction rub  No murmur heard  Pulmonary/Chest: Effort normal and breath sounds normal  She has no wheezes  She has no rales  Abdominal: Soft  Bowel sounds are normal  She exhibits no distension  There is no tenderness  There is no rebound  Musculoskeletal: Normal range of motion  She exhibits edema (right arm)  She exhibits no tenderness or deformity  Neurological: She is alert and oriented to person, place, and time  No cranial nerve deficit  Skin: Skin is warm and dry  No rash noted  There is erythema (right arm- clinically is much improved)  Psychiatric: She has a normal mood and affect  Her behavior is normal  Thought content normal    Vitals reviewed  Discussion with Family: n/a     Discharge instructions/Information to patient and family:   See after visit summary for information provided to patient and family  Provisions for Follow-Up Care:  See after visit summary for information related to follow-up care and any pertinent home health orders  Disposition:     Home    For Discharges to [facility] SNF:   · Not Applicable to this Patient - Not Applicable to this Patient    Planned Readmission:    No      Discharge Statement:  I spent 36 minutes discharging the patient  This time was spent on the day of discharge  I had direct contact with the patient on the day of discharge  Greater than 50% of the total time was spent examining patient, answering all patient questions, arranging and discussing plan of care with patient as well as directly providing post-discharge instructions    Additional time then spent on discharge activities  Discharge Medications:  See after visit summary for reconciled discharge medications provided to patient and family        ** Please Note: This note has been constructed using a voice recognition system ** Wound Care: [place]

## 2022-09-10 DIAGNOSIS — E78.5 HYPERLIPIDEMIA, UNSPECIFIED HYPERLIPIDEMIA TYPE: ICD-10-CM

## 2022-09-10 RX ORDER — ATORVASTATIN CALCIUM 20 MG/1
TABLET, FILM COATED ORAL
Qty: 30 TABLET | Refills: 0 | Status: SHIPPED | OUTPATIENT
Start: 2022-09-10 | End: 2022-10-09

## 2022-09-19 ENCOUNTER — OFFICE VISIT (OUTPATIENT)
Dept: PODIATRY | Facility: CLINIC | Age: 72
End: 2022-09-19
Payer: COMMERCIAL

## 2022-09-19 ENCOUNTER — APPOINTMENT (OUTPATIENT)
Dept: RADIOLOGY | Facility: CLINIC | Age: 72
End: 2022-09-19
Payer: COMMERCIAL

## 2022-09-19 VITALS
DIASTOLIC BLOOD PRESSURE: 85 MMHG | WEIGHT: 183 LBS | HEIGHT: 57 IN | SYSTOLIC BLOOD PRESSURE: 151 MMHG | BODY MASS INDEX: 39.48 KG/M2 | HEART RATE: 97 BPM

## 2022-09-19 DIAGNOSIS — M79.671 RIGHT FOOT PAIN: ICD-10-CM

## 2022-09-19 DIAGNOSIS — S92.901D CLOSED FRACTURE OF RIGHT FOOT WITH ROUTINE HEALING, SUBSEQUENT ENCOUNTER: ICD-10-CM

## 2022-09-19 DIAGNOSIS — M19.279 OSTEOARTHRITIS OF MIDFOOT DUE TO INFLAMMATORY ARTHRITIS: ICD-10-CM

## 2022-09-19 DIAGNOSIS — M79.671 RIGHT FOOT PAIN: Primary | ICD-10-CM

## 2022-09-19 DIAGNOSIS — M19.90 OSTEOARTHRITIS OF MIDFOOT DUE TO INFLAMMATORY ARTHRITIS: ICD-10-CM

## 2022-09-19 PROCEDURE — 99214 OFFICE O/P EST MOD 30 MIN: CPT | Performed by: PODIATRIST

## 2022-09-19 PROCEDURE — 11721 DEBRIDE NAIL 6 OR MORE: CPT | Performed by: PODIATRIST

## 2022-09-19 PROCEDURE — 11055 PARING/CUTG B9 HYPRKER LES 1: CPT | Performed by: PODIATRIST

## 2022-09-19 PROCEDURE — 73630 X-RAY EXAM OF FOOT: CPT

## 2022-09-19 NOTE — PROGRESS NOTES
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  -     FL guided needle plac bx/asp/inj; Future       -x-rays three views weight-bearing were taken reviewed of the right foot and show healed 5th metatarsal mid shaft fractures consistent with dancer's fracture, global osteopenia, significant degenerative joint disease with spurring of the midfoot  Decreased longitudinal medial arch  -I will send her for fluoroscopic guided needle injection with Dr Sarah Colin or Dr Debora Daly in [place]  -advised rigid shoes  -she is to return in 3 weeks following her injection   -Q 8 findings with [id number], [id number]      Procedure: All mycotic toenails were reduced and debrided in length, width, and girth using a nail nipper and dremel  All hyperkeratotic skin lesion(s) were sharply pared with a scalpel with no bleeding or evidence of ulceration  Patient tolerated procedure(s) well without complications  Subjective:      Patient ID: Luke Livingston is a 81 y o  female  Patient presents for evaluation management of right foot pain, she is also complaining of elongated painful toenails which he can no longer bend down and cut herself  She is having pain of the right foot and did wear a boot and notes that this is significantly improved, she has minimal pain now when she is outside of the boot  He notes aching and pain stabbing as the day goes on especially when she is barefoot of her midfoot        The following portions of the patient's history were reviewed and updated as appropriate: allergies, current medications, past family history, past medical history, past social history, past surgical history and problem list     Review of Systems   Constitutional: Negative for chills and fever  HENT: Negative for ear pain and sore throat  Eyes: Negative for pain and visual disturbance  Respiratory: Negative for cough and shortness of breath  Cardiovascular: Negative for chest pain and palpitations  Gastrointestinal: Negative for abdominal pain and vomiting  Genitourinary: Negative for dysuria and hematuria  Musculoskeletal: Negative for arthralgias and back pain  Skin: Negative for color change and rash  Neurological: Negative for seizures and syncope  All other systems reviewed and are negative  Objective:      /85 Comment: left arm only  Pulse 97   Ht 4' 9" (1 448 m)   Wt 83 kg (183 lb)   LMP  (LMP Unknown)   BMI 39 60 kg/m²          Physical Exam  Vitals reviewed  Constitutional:       Appearance: Normal appearance  She is obese  HENT:      Head: Normocephalic and atraumatic  Nose: Nose normal       Mouth/Throat:      Mouth: Mucous membranes are moist    Pulmonary:      Effort: Pulmonary effort is normal    Musculoskeletal:         General: Swelling and tenderness present  Comments: There is tenderness with palpation along the 5th metatarsal mid shaft, minimal   Nails 1 through 5 bilaterally thickened elongated with notable subungual debris, there is callus on the medial aspect of the left hallux with extension proximal PIPJ   +1/4 pitting edema bilateral lower extremities, DP are +1/4 and PT are +0/4  Skin:     Capillary Refill: Capillary refill takes less than 2 seconds  Neurological:      General: No focal deficit present  Mental Status: She is alert and oriented to person, place, and time

## 2022-09-20 ENCOUNTER — APPOINTMENT (OUTPATIENT)
Dept: RADIOLOGY | Facility: CLINIC | Age: 72
End: 2022-09-20
Payer: COMMERCIAL

## 2022-09-20 ENCOUNTER — OFFICE VISIT (OUTPATIENT)
Dept: OBGYN CLINIC | Facility: CLINIC | Age: 72
End: 2022-09-20
Payer: COMMERCIAL

## 2022-09-20 ENCOUNTER — APPOINTMENT (OUTPATIENT)
Dept: PHYSICAL THERAPY | Facility: CLINIC | Age: 72
End: 2022-09-20

## 2022-09-20 VITALS
HEART RATE: 94 BPM | WEIGHT: 183 LBS | DIASTOLIC BLOOD PRESSURE: 84 MMHG | BODY MASS INDEX: 39.48 KG/M2 | HEIGHT: 57 IN | SYSTOLIC BLOOD PRESSURE: 148 MMHG

## 2022-09-20 DIAGNOSIS — Z11.59 SPECIAL SCREENING EXAMINATION FOR VIRAL DISEASE: ICD-10-CM

## 2022-09-20 DIAGNOSIS — G89.29 CHRONIC PAIN OF RIGHT KNEE: ICD-10-CM

## 2022-09-20 DIAGNOSIS — Z96.652 S/P TOTAL KNEE ARTHROPLASTY, LEFT: ICD-10-CM

## 2022-09-20 DIAGNOSIS — Z01.812 PRE-OPERATIVE LABORATORY EXAMINATION: ICD-10-CM

## 2022-09-20 DIAGNOSIS — M25.561 CHRONIC PAIN OF RIGHT KNEE: ICD-10-CM

## 2022-09-20 DIAGNOSIS — M17.11 PRIMARY OSTEOARTHRITIS OF RIGHT KNEE: Primary | ICD-10-CM

## 2022-09-20 PROCEDURE — 1160F RVW MEDS BY RX/DR IN RCRD: CPT | Performed by: ORTHOPAEDIC SURGERY

## 2022-09-20 PROCEDURE — 73562 X-RAY EXAM OF KNEE 3: CPT

## 2022-09-20 PROCEDURE — 99213 OFFICE O/P EST LOW 20 MIN: CPT | Performed by: ORTHOPAEDIC SURGERY

## 2022-09-20 RX ORDER — ASCORBIC ACID 500 MG
500 TABLET ORAL 2 TIMES DAILY
Qty: 60 TABLET | Refills: 1 | Status: SHIPPED | OUTPATIENT
Start: 2022-10-16 | End: 2022-10-11

## 2022-09-20 RX ORDER — FOLIC ACID 1 MG/1
1 TABLET ORAL DAILY
Qty: 30 TABLET | Refills: 1 | Status: SHIPPED | OUTPATIENT
Start: 2022-10-16 | End: 2022-10-11

## 2022-09-20 RX ORDER — MULTIVIT-MIN/IRON FUM/FOLIC AC 7.5 MG-4
1 TABLET ORAL DAILY
Qty: 30 TABLET | Refills: 1 | Status: SHIPPED | OUTPATIENT
Start: 2022-10-16 | End: 2022-10-11

## 2022-09-20 RX ORDER — FERROUS SULFATE TAB EC 324 MG (65 MG FE EQUIVALENT) 324 (65 FE) MG
324 TABLET DELAYED RESPONSE ORAL
Qty: 60 TABLET | Refills: 1 | Status: SHIPPED | OUTPATIENT
Start: 2022-10-16 | End: 2022-10-11

## 2022-09-20 RX ORDER — CHLORHEXIDINE GLUCONATE 0.12 MG/ML
15 RINSE ORAL ONCE
OUTPATIENT
Start: 2022-09-20 | End: 2022-09-20

## 2022-09-20 RX ORDER — CEFAZOLIN SODIUM 2 G/50ML
2000 SOLUTION INTRAVENOUS ONCE
OUTPATIENT
Start: 2022-09-20 | End: 2022-09-20

## 2022-09-20 RX ORDER — CHLORHEXIDINE GLUCONATE 4 G/100ML
SOLUTION TOPICAL DAILY PRN
OUTPATIENT
Start: 2022-09-20

## 2022-09-20 NOTE — PROGRESS NOTES
PT Evaluation     Today's date: 2022  Patient name: Lili Paige  : 1950  MRN: [id number]  Referring provider: Harrington Litten, PA-C  Dx:   Encounter Diagnosis     ICD-10-CM    1  Lymphedema  I89 0                   Assessment/Plan    Subjective    Objective Lymphedema Evaluation    Medical/MLD Precautions/Contraindications:  ___  Cardiac/CHF/Cardiac Edema/Cardiac arrhythmia  ___  Pulmonary  ___  Kidney  ___  Liver  ___  Current Fever/Infection  ___  Current/Recent Wounds  ___  Current/Recent Treatments/Interventions/Port Access/PICC Line/Soo filter  ___  Current/Recent/History of DVT or Clotting issues/Pelvic DVT  ___  Age > 62 y/o  ___  HTN  ___  Malignancy  ___  Hyper/hypothyroidism  ___  AAA  ___  GI disorder (Crohn's Diverticulitis, IBD)  ___  Liver disease  ___  Recent abdominal surgery  ___  Pregnancy  ___  Abdominal pain    Bandaging Precautions/Contraindications:  ___  Diabetes  ___  Neuropathy  ___  Vascular problems/insufficiency  ___  Arterial Disease    Other:  ___  Latex allergies  ___  Pacemaker    CA treatment:  ___  Chemo  ___  XRT      ROM Considerations:      Strength Considerations:      Gait Considerations:  Patient with symmetry of gait and adequate foot clearance to allow for safe placement of LE compression to ***Knee/***Hip  Patient ambulates ***Independently/***with assist on level surfaces with ***no AD***SPC***RW***4WRW      Skin Considerations/Scars:  Patient presents with skin inspection as follows:  Hemosiderin staining/skin discoloration  Finger/Toe Nails  Open Wounds/Size/Color  S/S infection  Firm/Sponge/Hard  Peau D' Orange/Papillomas  Lymphorrhea/Weeping  Skin mobility  Skin temperature  Fungal infection  Lymph fistula  Lipodermatosclerosis    Girth:                 Precautions: ***      Re-eval Date: {Free Text:03762::"***"}    Date        Visit Count   1       FOTO         Pain In See eval       Pain Out              Manuals Neuro Re-Ed                                                                Ther Ex                                                                        Ther Activity                        Gait Training                        Modalities

## 2022-09-20 NOTE — PROGRESS NOTES
ASSESSMENT/PLAN:    Diagnoses and all orders for this visit:    Primary osteoarthritis of right knee  -     Ambulatory referral to Physical Therapy; Future  -     Case request operating room: ARTHROPLASTY KNEE TOTAL; Standing  -     ascorbic acid (VITAMIN C) 500 MG tablet; Take 1 tablet (500 mg total) by mouth 2 (two) times a day  -     ferrous sulfate 324 (65 Fe) mg; Take 1 tablet (324 mg total) by mouth 2 (two) times a day before meals  -     folic acid (FOLVITE) 1 mg tablet; Take 1 tablet (1 mg total) by mouth daily  -     Multiple Vitamins-Minerals (multivitamin with minerals) tablet; Take 1 tablet by mouth daily  -     Case request operating room: ARTHROPLASTY KNEE TOTAL    S/P total knee arthroplasty, left  -     XR knee 3 vw left non injury; Future    Chronic pain of right knee  -     XR knee 3 vw right non injury; Future    Pre-operative laboratory examination  -     Comprehensive metabolic panel; Future  -     Hemoglobin A1C W/EAG Estimation; Future  -     CBC and differential; Future  -     Anemia Panel w/Reflex; Future  -     Protime-INR; Future  -     APTT; Future  -     Type and screen; Future  -     EKG 12 lead; Future    Special screening examination for viral disease  -     PAT Covid Screening; Future    Other orders  -     Diet NPO; Sips with meds; Standing  -     Nursing Communication [address] [place] [address] Interventions Implemented; Standing  -     Nursing Communication [facility] bath, have staff wash entire body (neck down) per pre-op bathing protocol  Routine, evening prior to, and day of surgery ; Standing  -     Nursing Communication Swab both nares with Povidone-Iodine solution, EXCLUDE if patient has shellfish/Iodine allergy  Routine, day of surgery, on call to OR; Standing  -     chlorhexidine (PERIDEX) 0 12 % oral rinse 15 mL  -     Void on call to OR; Standing  -     Insert peripheral IV;  Standing  -     Place sequential compression device; Standing  -     chlorhexidine (HIBICLENS) 4 % topical liquid  - ceFAZolin (ANCEF) IVPB (premix in dextrose) 2,000 mg 50 mL        X-rays of the patient's left knee are consistent with a well-seated left total knee replacement  The prosthesis is in good alignment  There are no signs of loosening  There are no fractures dislocations  X-rays of the patient's right knee are consistent with advanced arthritic changes with no joint space remaining  After exhausting conservative treatment, the risks and benefits of surgery discussed with the patient  She decided on an elective right total knee replacement  Her surgery is tentatively scheduled for November 16, 2022  In regards to the patient's left knee, she is doing quite well  There is full strength full motion  In regards her severely arthritic right knee, there was significant amount of pain  There is decreased range of motion in a deformity  Treatment options were discussed  She has opted for elective right total knee replacement  All risks, complications, benefits were discussed with the patient in great detail including bleeding, infection, blood clots, pain, stiffness, neurovascular damage, fractures, dislocations, the possibility of loss of life from surgery, heart attack, stroke, etcetera  Her surgery scheduled for November 16, 2022    Return in about 8 weeks (around 11/16/2022) for Surgery  _____________________________________________________  CHIEF COMPLAINT:  Chief Complaint   Patient presents with    Left Knee - Follow-up         SUBJECTIVE:  Cris King is a 81 y o  female who presents to our office for a follow-up visit  The patient is status post left total knee replacement from 05/20/2021 performed by Dr Laird Cowden  She is pleased with the results from surgery  She does complain of right knee pain today  She states that her symptoms are continuing to worsen starting to affect her quality of life  She is ambulating with a single-point cane and antalgic gait    She denies any numbness or tingling  She denies any fever or chills        The following portions of the patient's history were reviewed and updated as appropriate: allergies, current medications, past family history, past medical history, past social history, past surgical history and problem list     PAST MEDICAL HISTORY:  Past Medical History:   Diagnosis Date    Anxiety     Arthritis     Asthma     Breast cancer ([address] )     rt mastectomy 2005    Cancer [facility])     breast right    Cataract     CHF (congestive heart failure) (HCC)     Coronary artery disease     Depression     GERD (gastroesophageal reflux disease)     High cholesterol     History of chemotherapy     2005 rt breast cancer    History of transfusion     Hypertension     IBS (irritable bowel syndrome)     Irritable bowel syndrome (IBS) 5/31/2016    Kidney stone     Lymphedema of right arm     Obesity     PONV (postoperative nausea and vomiting)     Post-menopausal     Psychiatric disorder     Renal disorder     Vitamin D deficiency        PAST SURGICAL HISTORY:  Past Surgical History:   Procedure Laterality Date    BREAST SURGERY      CATARACT EXTRACTION Bilateral     CATARACT EXTRACTION, BILATERAL      CHOLECYSTECTOMY      COLONOSCOPY N/A 5/31/2016    Procedure: COLONOSCOPY;  Surgeon: Lenny Vega MD;  Location: [facility] MAIN [facility];  Service:    Kaleb Woodson GALLBLADDER SURGERY      HYSTERECTOMY      Total    KNEE ARTHROSCOPY      KNEE SURGERY      arthroscopy    MASTECTOMY Right     rt breast mastectomy 2005    [place] TOTAL KNEE ARTHROPLASTY Left 5/20/2021    Procedure: ARTHROPLASTY KNEE TOTAL;  Surgeon: Chula Conway MD;  Location: [address] [place] [address] MAIN OR;  Service: Orthopedics    TONSILLECTOMY AND ADENOIDECTOMY         FAMILY HISTORY:  Family History   Adopted: Yes   Problem Relation Age of Onset    Diabetes Mother     Heart disease Mother     Mental illness Mother     Depression Mother     Heart disease Brother     Breast cancer Maternal Aunt     Breast cancer Maternal Aunt  Breast cancer Maternal Aunt     Breast cancer Maternal Aunt     Breast cancer Maternal Aunt     No Known Problems Father     No Known Problems Sister     No Known Problems Daughter     Breast cancer Maternal Grandmother     No Known Problems Sister     No Known Problems Sister     No Known Problems Sister        SOCIAL HISTORY:  Social History     Tobacco Use    Smoking status: Never Smoker    Smokeless tobacco: Never Used   Vaping Use    Vaping Use: Never used   Substance Use Topics    Alcohol use: Never     Comment: socially    Drug use: Never       MEDICATIONS:    Current Outpatient Medications:     acetaminophen (TYLENOL) 650 mg CR tablet, Take 1 tablet (650 mg total) by mouth every 8 (eight) hours as needed for mild pain, Disp: 30 tablet, Rfl: 0    albuterol (2 5 mg/3 mL) 0 083 % nebulizer solution, Take 2 5 mg by nebulization every 6 (six) hours as needed for wheezing , Disp: , Rfl:     albuterol (PROAIR HFA) 90 mcg/act inhaler, Inhale 2 puffs every 4 (four) hours as needed for wheezing, Disp: 3 Inhaler, Rfl: 1    ascorbic acid (VITAMIN C) 500 MG tablet, Take 1 tablet (500 mg total) by mouth daily, Disp: 30 tablet, Rfl: 1    [START ON 10/16/2022] ascorbic acid (VITAMIN C) 500 MG tablet, Take 1 tablet (500 mg total) by mouth 2 (two) times a day, Disp: 60 tablet, Rfl: 1    aspirin (ECOTRIN LOW STRENGTH) 81 mg EC tablet, Take 1 tablet (81 mg total) by mouth 2 (two) times a day, Disp: , Rfl:     atorvastatin (LIPITOR) 20 mg tablet, Take 1 tablet by mouth daily  , Disp: 30 tablet, Rfl: 0    buPROPion (WELLBUTRIN XL) 300 mg 24 hr tablet, Take 1 tablet (300 mg total) by mouth every morning, Disp: 90 tablet, Rfl: 3    butalbital-acetaminophen-caffeine (FIORICET,ESGIC) -40 mg per tablet, Take 1 tablet by mouth 3 (three) times a day as needed for headaches, Disp: 90 tablet, Rfl: 1    diclofenac (VOLTAREN) 75 mg EC tablet, Take 1 tablet (75 mg total) by mouth 2 (two) times a day, Disp: 180 tablet, Rfl: 0    DULoxetine (CYMBALTA) 60 mg delayed release capsule, Take 1 capsule by mouth twice daily  , Disp: 180 capsule, Rfl: 0    ergocalciferol (VITAMIN D2) 50,000 units, Take 1 capsule (50,000 Units total) by mouth once a week, Disp: 4 capsule, Rfl: 3    [START ON 10/16/2022] ferrous sulfate 324 (65 Fe) mg, Take 1 tablet (324 mg total) by mouth 2 (two) times a day before meals, Disp: 60 tablet, Rfl: 1    fexofenadine (ALLEGRA) 180 MG tablet, Take 1 tablet (180 mg total) by mouth daily, Disp: 30 tablet, Rfl: 2    [START ON 42/84/3818] folic acid (FOLVITE) 1 mg tablet, Take 1 tablet (1 mg total) by mouth daily, Disp: 30 tablet, Rfl: 1    [START ON 10/16/2022] Multiple Vitamins-Minerals (multivitamin with minerals) tablet, Take 1 tablet by mouth daily, Disp: 30 tablet, Rfl: 1    Nutritional Supplements (BOOST BREEZE PO), Take by mouth daily, Disp: , Rfl:     olopatadine (PATANOL) 0 1 % ophthalmic solution, Administer 1 drop to both eyes 2 (two) times a day, Disp: 5 mL, Rfl: 1    oxybutynin (DITROPAN) 5 mg tablet, Take 1 tablet (5 mg total) by mouth 3 (three) times a day, Disp: 30 tablet, Rfl: 2    oxybutynin (DITROPAN-XL) 10 MG 24 hr tablet, Take 1 tablet (10 mg total) by mouth daily at bedtime, Disp: 30 tablet, Rfl: 3    pantoprazole (PROTONIX) 40 mg tablet, Take 1 tablet (40 mg total) by mouth daily, Disp: 30 tablet, Rfl: 0    ALLERGIES:  Allergies   Allergen Reactions    Ibuprofen Nausea Only, Rash, Dizziness and Syncope    Morphine Other (See Comments)     Intolerance    Latex Dermatitis       ROS:  Review of Systems     Constitutional: Negative for fatigue, fever or loss of appetite  HENT: Negative  Respiratory: Negative for shortness of breath, dyspnea  Cardiovascular: Negative for chest pain/tightness  Gastrointestinal: Negative for abdominal pain, N/V  Endocrine: Negative for cold/heat intolerance, unexplained weight loss/gain     Genitourinary: Negative for flank pain, dysuria, hematuria  Musculoskeletal: Positive for arthralgia   Skin: Negative for rash  Neurological: Negative for numbness or tingling  Psychiatric/Behavioral: Negative for agitation  _____________________________________________________  PHYSICAL EXAMINATION:    Blood pressure 148/84, pulse 94, height 4' 9" (1 448 m), weight 83 kg (183 lb), not currently breastfeeding  Constitutional: Oriented to person, place, and time  Appears well-developed and well-nourished  No distress  HENT:   Head: Normocephalic  Eyes: Conjunctivae are normal  Right eye exhibits no discharge  Left eye exhibits no discharge  No scleral icterus  Cardiovascular: Normal rate  Pulmonary/Chest: Effort normal    Neurological: Alert and oriented to person, place, and time  Skin: Skin is warm and dry  No rash noted  Not diaphoretic  No erythema  No pallor  Psychiatric: Normal mood and affect  Behavior is normal  Judgment and thought content normal       MUSCULOSKELETAL EXAMINATION:   Physical Exam  Ortho Exam    Left lower extremity is neurovascularly intact  Toes are pink and mobile  Compartments are soft  Range of motion of the knee is from 0-120 degrees  No ligament laxity  Brisk cap refill  Sensation intact   Incision is well-healed    Right lower extremity is neurovascularly intact  Toes are pink and mobile  Compartments are soft  Range of motion of the knee is from 5-115 degrees  No ligament laxity  Brisk cap refill  Sensation intact  Medial joint line tenderness and slight lateral joint line tenderness    Objective:  BP Readings from Last 1 Encounters:   09/20/22 148/84      Wt Readings from Last 1 Encounters:   09/20/22 83 kg (183 lb)        BMI:   Estimated body mass index is 39 6 kg/m² as calculated from the following:    Height as of this encounter: 4' 9" (1 448 m)  Weight as of this encounter: 83 kg (183 lb)            Scribe Attestation    I,:  Radhika Candelario PA-C am acting as a scribe while in the presence of the attending physician :       I,:  Curtis Freeman DO personally performed the services described in this documentation    as scribed in my presence :

## 2022-09-21 ENCOUNTER — PREP FOR PROCEDURE (OUTPATIENT)
Dept: OBGYN CLINIC | Facility: CLINIC | Age: 72
End: 2022-09-21

## 2022-09-25 NOTE — PROGRESS NOTES
PT Evaluation     Today's date: 2022  Patient name: Kelin Juarez  : 1950  MRN: [id number]  Referring provider: Chantell Witt PA-C  Dx:   No diagnosis found  Assessment  Assessment details: Patient presents to OPPT with s/s consistent with right/left/bilateral UE/LE lymphedema  PT interventions to include CDT (complete decongestive therapy) including MLD (manual lymphatic drainage) and compression using short stretch bandages as able  PT to further provide extensive patient education on self bandaging, self MLD techniques, and skin care  Patient will transition patient to long term maintenance with compression plan for both morning and evening wear once optimal decongestive and volume reduction of limb has been achieved  Thank you for this referral and the opportunity to participate in the care of this patient  Impairments: abnormal or restricted ROM, abnormal movement, activity intolerance, impaired physical strength, lacks appropriate home exercise program and safety issue  Prognosis details: Positive prognostic indicators include positive attitude toward recovery and good understanding of diagnosis and treatment plan options  Negative prognostic indicators include chronicity of symptoms and ***      Goals  STGs to be achieved in 2 - 4 weeks  Demonstrate at least 75% compliance with self MLD  Demonstrate at least 75% compliance with self compression  Demonstrate at least 75% compliance with self skin and nail inspection  Free from s/s of infection  Demonstrate understanding of importance of compliance with POC    LTGs to be achieved in 4 - 6 weeks  Demonstrate at least 100% compliance with self MLD  Demonstrate at least 100% compliance with self compression  Demonstrate at least 100% compliance with self skin and nail inspection  Free from s/s of infection  Demonstrate understanding of day/night compression wearing schedule  Obtain alternative compression to ensure independence with self management     Plan  Patient would benefit from: skilled physical therapy  Other planned modality interventions: Modalities prn for symptom management  Planned therapy interventions: manual therapy, neuromuscular re-education, therapeutic exercise, therapeutic training, home exercise program and orthotic fitting/training  Frequency: 2x week  Duration in weeks: 8  Plan of Care beginning date: 9/27/2022  Plan of Care expiration date: 11/27/2022  Treatment plan discussed with: PTA and patient        Subjective    Objective Lymphedema Evaluation    Medical/MLD Precautions/Contraindications:  ___  Cardiac/CHF/Cardiac Edema/Cardiac arrhythmia  ___  Pulmonary  ___  Kidney  ___  Liver  ___  Current Fever/Infection  ___  Current/Recent Wounds  ___  Current/Recent Treatments/Interventions/Port Access/PICC Line/Byron filter  ___  Current/Recent/History of DVT or Clotting issues/Pelvic DVT  ___  Age > 60 y/o  ___  HTN  ___  Malignancy  ___  Hyper/hypothyroidism  ___  AAA  ___  GI disorder (Crohn's Diverticulitis, IBD)  ___  Liver disease  ___  Recent abdominal surgery  ___  Pregnancy  ___  Abdominal pain    Bandaging Precautions/Contraindications:  ___  Diabetes  ___  Neuropathy  ___  Vascular problems/insufficiency  ___  Arterial Disease    Other:  ___  Latex allergies  ___  Pacemaker    CA treatment:  ___  Chemo  ___  XRT      ROM Considerations:      Strength Considerations:      Gait Considerations:  Patient with symmetry of gait and adequate foot clearance to allow for safe placement of LE compression to ***Knee/***Hip  Patient ambulates ***Independently/***with assist on level surfaces with ***no AD***SPC***RW***4WRW      Skin Considerations/Scars:  Patient presents with skin inspection as follows:  Hemosiderin staining/skin discoloration  Finger/Toe Nails  Open Wounds/Size/Color  S/S infection  Firm/Sponge/Hard  Peau D' Orange/Papillomas  Lymphorrhea/Weeping  Skin mobility  Skin temperature  Fungal infection  Lymph fistula  Lipodermatosclerosis    Girth:                 Precautions: R breast CA, anxiety, psych disorder, renal disorder      Re-eval Date: 10/25/22    Date 9/20       Visit Count   1       FOTO         Pain In See eval       Pain Out              Manuals                                        Neuro Re-Ed                                                                Ther Ex                                                                        Ther Activity                        Gait Training                        Modalities

## 2022-09-27 ENCOUNTER — APPOINTMENT (OUTPATIENT)
Dept: PHYSICAL THERAPY | Facility: CLINIC | Age: 72
End: 2022-09-27

## 2022-10-03 NOTE — PROGRESS NOTES
PT Evaluation     Today's date: 10/6/22  Patient name: Jemal   : 1950  MRN: [id number]  Referring provider: Елена Lloyd PA-C  Dx:   Encounter Diagnosis     ICD-10-CM    1  Lymphedema  I89 0                   Assessment  Assessment details: Patient presents to OPPT with s/s consistent with right/left/bilateral UE/LE lymphedema  PT interventions to include CDT (complete decongestive therapy) including MLD (manual lymphatic drainage) and compression using short stretch bandages as able  PT to further provide extensive patient education on self bandaging, self MLD techniques, and skin care  Patient will transition patient to long term maintenance with compression plan for both morning and evening wear once optimal decongestive and volume reduction of limb has been achieved  Thank you for this referral and the opportunity to participate in the care of this patient  Impairments: abnormal or restricted ROM, abnormal movement, activity intolerance, impaired physical strength, lacks appropriate home exercise program and safety issue  Understanding of Dx/Px/POC: good   Prognosis: good  Prognosis details: Positive prognostic indicators include positive attitude toward recovery and good understanding of diagnosis and treatment plan options  Negative prognostic indicators include chronicity of symptoms and ***      Goals  STGs to be achieved in 2 - 4 weeks  Demonstrate at least 75% compliance with self MLD  Demonstrate at least 75% compliance with self compression  Demonstrate at least 75% compliance with self skin and nail inspection  Free from s/s of infection  Demonstrate understanding of importance of compliance with POC    LTGs to be achieved in 4 - 6 weeks  Demonstrate at least 100% compliance with self MLD  Demonstrate at least 100% compliance with self compression  Demonstrate at least 100% compliance with self skin and nail inspection  Free from s/s of infection  Demonstrate understanding of day/night compression wearing schedule  Obtain alternative compression to ensure independence with self management     Plan  Plan details: Pt will need to have her pneumatic pump upgraded  Patient would benefit from: skilled physical therapy  Other planned modality interventions: Modalities prn for symptom management  Planned therapy interventions: manual therapy, neuromuscular re-education, therapeutic exercise, therapeutic training, home exercise program and orthotic fitting/training  Frequency: 2x week  Duration in weeks: 8  Plan of Care beginning date: 10/6/2022  Plan of Care expiration date: 2022  Treatment plan discussed with: PTA and patient    **Pt has increased swelling R abdominal area and R axilla with tight cord in R sub axilla area    Subjective Evaluation    History of Present Illness  Mechanism of injury: surgery  Mechanism of injury: Pt with hx of R breast CA in  with R mastectomy and removal of 25 lymph nodes, 12 of which were cancerous  Pt has pneumatic pump which she uses 10 min per day  Pt wears daily compression sleeve which she removes for sleep  States her daughter noticed her R arm is bigger than the L which promptd her to return for tx  Recurrent probem    Quality of life: fair    Pain  Current pain ratin  At best pain ratin  At worst pain rating: 10  Quality: dull ache, needle-like and tight  Relieving factors: ice, relaxation, rest and change in position  Exacerbated by: heat, certain foods    Progression: worsening    Social Support  Steps to enter house: yes (2)  Stairs in house: yes (14, has stair glide)   Lives in: multiple-level home  Lives with: adult children    Employment status: not working  Hand dominance: right    Treatments  Previous treatment: massage and physical therapy  Current treatment: massage and physical therapy  Patient Goals  Patient goals for therapy: decreased edema, improved balance, increased motion and increased strength          Objective Lymphedema Evaluation    Medical/MLD Precautions/Contraindications:  N___  Cardiac/CHF/Cardiac Edema/Cardiac arrhythmia  Y___  Pulmonary---asthma  _N__  Kidney  _N__  Liver  _N_  Current Fever/Infection  _N__  Current/Recent Wounds  _N_  Current/Recent Treatments/Interventions/Port Access/PICC Line/Soo filter  _N__  Current/Recent/History of DVT or Clotting issues/Pelvic DVT  _Y__  Age > 60 y/o  _Y__  HTN  N___  Malignancy  N___  Hyper/hypothyroidism  _N__  AAA  ___  GI disorder (Crohn's Diverticulitis, IBD)  _N__  Liver disease  _N__  Recent abdominal surgery  _N__  Pregnancy  _Y__  Abdominal pain--when she does not follow dietary recs    Bandaging Precautions/Contraindications:  _N__  Diabetes  Y___  Neuropathy  _N__  Vascular problems/insufficiency  _N__  Arterial Disease    Other:  _Y__  Latex allergies  _N__  Pacemaker    CA treatment:  __N_  Chemo  N___  XRT      ROM Considerations: RUE end range limitations on flex/abd      Strength Considerations: RUE weaker than L      Gait Considerations:  Pt is indep with amb w/SPC, pt has had recent falls  Skin Considerations/Scars: All skin cnsiderations are negative  Firm tissue in subaxillary area  Patient presents with skin inspection as follows:  Hemosiderin staining/skin discoloration  Finger/Toe Nails  Open Wounds/Size/Color  S/S infection  Firm/Sponge/Hard  Peau D' Orange/Papillomas  Lymphorrhea/Weeping  Skin mobility  Skin temperature  Fungal infection  Lymph fistula  Lipodermatosclerosis    Girth:see volumetrics      * Pt has increased abdominal swelling on R , increased swelling R axilla with tight cord R subaxilla area and increased swelling R scapular area             Precautions: R breast CA, anxiety, psych disorder, renal disorder, recent R foot fx w/removal of boot 10/1/22      Re-eval Date: 10/25/22    Date 10/6       Visit Count   1       FOTO         Pain In See eval       Pain Out              Manuals 10/6 MLD  by PTA  CV    10'                               Neuro Re-Ed                                                                Ther Ex                                                                        Ther Activity                        Gait Training                        Modalities

## 2022-10-06 ENCOUNTER — EVALUATION (OUTPATIENT)
Dept: PHYSICAL THERAPY | Facility: CLINIC | Age: 72
End: 2022-10-06
Payer: COMMERCIAL

## 2022-10-06 DIAGNOSIS — I89.0 LYMPHEDEMA: Primary | ICD-10-CM

## 2022-10-06 PROCEDURE — 97140 MANUAL THERAPY 1/> REGIONS: CPT

## 2022-10-06 PROCEDURE — 97163 PT EVAL HIGH COMPLEX 45 MIN: CPT

## 2022-10-07 ENCOUNTER — RA CDI HCC (OUTPATIENT)
Dept: OTHER | Facility: HOSPITAL | Age: 72
End: 2022-10-07

## 2022-10-07 DIAGNOSIS — M79.672 FOOT PAIN, BILATERAL: ICD-10-CM

## 2022-10-07 DIAGNOSIS — F33.41 RECURRENT MAJOR DEPRESSIVE DISORDER, IN PARTIAL REMISSION (HCC): ICD-10-CM

## 2022-10-07 DIAGNOSIS — M79.671 FOOT PAIN, BILATERAL: ICD-10-CM

## 2022-10-07 RX ORDER — DULOXETIN HYDROCHLORIDE 60 MG/1
CAPSULE, DELAYED RELEASE ORAL
Qty: 180 CAPSULE | Refills: 0 | Status: SHIPPED | OUTPATIENT
Start: 2022-10-07

## 2022-10-07 RX ORDER — DICLOFENAC SODIUM 75 MG/1
TABLET, DELAYED RELEASE ORAL
Qty: 180 TABLET | Refills: 0 | Status: SHIPPED | OUTPATIENT
Start: 2022-10-07 | End: 2022-10-11

## 2022-10-07 NOTE — PROGRESS NOTES
PT Evaluation     Today's date: 10/6/22  Patient name: Marino Osborne  : 1950  MRN: [id number]  Referring provider: Moustapha Houser PA-C  Dx:   Encounter Diagnosis     ICD-10-CM    1  Lymphedema  I89 0 PT plan of care cert/re-cert       Start Time: 1300  Stop Time: 1400  Total time in clinic (min): 60 minutes    Assessment  Assessment details: Patient presents to OPPT with s/s consistent with right/left/bilateral UE/LE lymphedema  PT interventions to include CDT (complete decongestive therapy) including MLD (manual lymphatic drainage) and compression using short stretch bandages as able  PT to further provide extensive patient education on self bandaging, self MLD techniques, and skin care  Patient will transition patient to long term maintenance with compression plan for both morning and evening wear once optimal decongestive and volume reduction of limb has been achieved  Thank you for this referral and the opportunity to participate in the care of this patient  Impairments: abnormal gait, abnormal or restricted ROM, abnormal movement, activity intolerance and lacks appropriate home exercise program  Other impairment: RUE and scapular and abdominal edema  Understanding of Dx/Px/POC: good   Prognosis: good  Prognosis details: Positive prognostic indicators include positive attitude toward recovery and good understanding of diagnosis and treatment plan options  Negative prognostic indicators include chronicity of symptoms          Goals  STGs to be achieved in 2 - 4 weeks  Demonstrate at least 75% compliance with self MLD  Demonstrate at least 75% compliance with self compression  Demonstrate at least 75% compliance with self skin and nail inspection  Free from s/s of infection  Demonstrate understanding of importance of compliance with POC    LTGs to be achieved in 4 - 6 weeks  Demonstrate at least 100% compliance with self MLD  Demonstrate at least 100% compliance with self compression  Demonstrate at least 100% compliance with self skin and nail inspection  Free from s/s of infection  Demonstrate understanding of day/night compression wearing schedule  Obtain alternative compression to ensure independence with self management      Plan  Patient would benefit from: skilled physical therapy  Other planned modality interventions: Modalities prn for symptom management  Planned therapy interventions: manual therapy, neuromuscular re-education, therapeutic activities, therapeutic exercise, home exercise program and gait training  Frequency: 2x week  Duration in weeks: 8  Plan of Care beginning date: 10/6/2022  Plan of Care expiration date: 11/3/2022  Treatment plan discussed with: patient and PTA        Subjective Evaluation    History of Present Illness  Mechanism of injury: surgery  Mechanism of injury: 80 yo female with hx of R breast CA  And subsequent R mastectomy with removal of 25 lymph nodes, of which 12 were cancerous  Pt began with lymphedema following surgery  States she has a lymphedema pump at home which she uses 10 min per day  Wears a compression sleeve daily which she removes for sleep  Pt feels she has increased swelling in her RUE which prompted her to return for lymphedema management  Recurrent probem    Quality of life: fair    Pain  Current pain rating: 3  At best pain ratin  At worst pain rating: 10  Quality: dull ache, tight and needle-like  Relieving factors: ice, rest and relaxation  Exacerbated by: humidity, heat  Progression: worsening    Social Support  Steps to enter house: yes  Stairs in house: yes   Lives in: multiple-level home  Lives with: adult daughter      Employment status: not working  Hand dominance: right  Exercise history: 3x per week at [employer] for balance and ROM ex    Treatments  Previous treatment: physical therapy  Current treatment: physical therapy  Patient Goals  Patient goals for therapy: decreased edema, improved balance, increased motion, increased strength and independence with ADLs/IADLs          Objective Lymphedema Evaluation    Medical/MLD Precautions/Contraindications:  _N__  Cardiac/CHF/Cardiac Edema/Cardiac arrhythmia  _Y__  Pulmonary--asthma  N___  Kidney  _N__  Liver  _N__  Current Fever/Infection  _N__  Current/Recent Wounds  _N__  Current/Recent Treatments/Interventions/Port Access/PICC Line/Waller filter  _N__  Current/Recent/History of DVT or Clotting issues/Pelvic DVT  _Y__  Age > 62 y/o  _Y_  HTN  _N__  Malignancy--hx of R breast CA  N___  Hyper/hypothyroidism  _N__  AAA  _Y__  GI disorder (Crohn's Diverticulitis, IBD)---diverticulitis, IBS  _N_  Liver disease  N___  Recent abdominal surgery  N___  Pregnancy  N___  Abdominal pain    Bandaging Precautions/Contraindications:  _N__  Diabetes  _Y__  Neuropathy  _N__  Vascular problems/insufficiency  _N__  Arterial Disease    Other:  Y___  Latex allergies  _N__  Pacemaker    CA treatment:  N__  Chemo  _N__  XRT      ROM Considerations:  end range R shld flex and abd limitations    Strength Considerations: RUE weaker than L, but functional      Gait Considerations: Pt amb with SPC with steady waddle gait    Skin Considerations/Scars: No abnormal skin issues noted upon eval except firmness in R subaxilla area with tight cord    Patient presents with skin inspection as follows:  Hemosiderin staining/skin discoloration  Finger/Toe Nails  Open Wounds/Size/Color  S/S infection  Firm/Sponge/Hard  Peau D' Orange/Papillomas  Lymphorrhea/Weeping  Skin mobility  Skin temperature  Fungal infection  Lymph fistula  Lipodermatosclerosis    Girth:see volumetrics for RUE measurements                   Precautions: hx of R breast CA with mastectomy, psych disorder, renal disorder, recent R foot fx with removal of boot 10/1/22, fall risk, imbalance    Re-eval Date: 11/3/22    Date   10/6       Visit Count 1       FOTO Comp 10/6       Pain In  see eval       Pain Out  see eval               Manuals 10/6 MLD By PTA--CV  30'                               Neuro Re-Ed                                                                Ther Ex                                                                        Ther Activity                        Gait Training                        Modalities

## 2022-10-09 DIAGNOSIS — E78.5 HYPERLIPIDEMIA, UNSPECIFIED HYPERLIPIDEMIA TYPE: ICD-10-CM

## 2022-10-09 RX ORDER — ATORVASTATIN CALCIUM 20 MG/1
TABLET, FILM COATED ORAL
Qty: 30 TABLET | Refills: 0 | Status: SHIPPED | OUTPATIENT
Start: 2022-10-09

## 2022-10-10 ENCOUNTER — OFFICE VISIT (OUTPATIENT)
Dept: UROLOGY | Facility: CLINIC | Age: 72
End: 2022-10-10
Payer: COMMERCIAL

## 2022-10-10 VITALS
BODY MASS INDEX: 39.48 KG/M2 | HEART RATE: 110 BPM | SYSTOLIC BLOOD PRESSURE: 118 MMHG | HEIGHT: 57 IN | WEIGHT: 183 LBS | DIASTOLIC BLOOD PRESSURE: 80 MMHG

## 2022-10-10 DIAGNOSIS — N20.0 NEPHROLITHIASIS: Primary | ICD-10-CM

## 2022-10-10 DIAGNOSIS — N39.46 MIXED STRESS AND URGE URINARY INCONTINENCE: ICD-10-CM

## 2022-10-10 DIAGNOSIS — Z11.59 SPECIAL SCREENING EXAMINATION FOR VIRAL DISEASE: ICD-10-CM

## 2022-10-10 DIAGNOSIS — N39.41 URGE URINARY INCONTINENCE: ICD-10-CM

## 2022-10-10 PROBLEM — N28.1 RENAL CYST: Status: ACTIVE | Noted: 2022-10-10

## 2022-10-10 PROCEDURE — 81001 URINALYSIS AUTO W/SCOPE: CPT | Performed by: NURSE PRACTITIONER

## 2022-10-10 PROCEDURE — 99214 OFFICE O/P EST MOD 30 MIN: CPT | Performed by: NURSE PRACTITIONER

## 2022-10-10 PROCEDURE — 87086 URINE CULTURE/COLONY COUNT: CPT | Performed by: NURSE PRACTITIONER

## 2022-10-10 RX ORDER — OXYBUTYNIN CHLORIDE 10 MG/1
10 TABLET, EXTENDED RELEASE ORAL
Qty: 30 TABLET | Refills: 12 | Status: SHIPPED | OUTPATIENT
Start: 2022-10-10

## 2022-10-10 NOTE — ASSESSMENT & PLAN NOTE
· Hysterectomy and bladder surgery "only holds 1 ounce"  · Referral to pelvic floor physical therapy  · Resume oxybutynin 10 mg daily  · Follow-up 3 months, can be telemedicine visit

## 2022-10-10 NOTE — PATIENT INSTRUCTIONS
BLADDER HEALTH    WHAT IS CONSIDERED NORMAL? The average bladder can hold about 2 cups of urine before it needs to be emptied  The normal range of voiding urine is 6 to 8 times during a 24 hour period  As we get older, our bladder capacity can get smaller and we may need to pass urine more frequently but usually not more than every 2 hours  Urine should flow easily without discomfort in a good, steady stream until the bladder is empty  No pushing or straining is necessary to empty the bladder  An urge is a signal that you feel as the bladder stretches to fill with urine  Urges can be felt even if the bladder is not full  Urges are not commands to go to the toilet, merely a signal and can be controlled  WHAT ARE GOOD BLADDER HABITS? Take your time when emptying your bladder  Don’t strain or push to empty your bladder  Make sure you empty your bladder completely each time you pass urine  Do not rush the process  Consistently ignoring the urge to go (waiting more than 4 hours between toileting) or urinating too infrequently may be convenient but not healthy for your bladder  Avoid going to the toilet “just in case” or more often than every 2 hours  It is usually not necessary to go when you feel the first urge  Try to go only when your bladder is full  Urgency and frequency of urination can be improved by retraining the bladder and spacing your fluid intake throughout the day  Practice good toilet habits  Don’t let your bladder control your life  TIPS TO MAINTAIN GOOD BLADDER HABITS  Maintain a good fluid intake  Depending on your body size and environment, drink 6 -8 cups (8 oz each) of fluid per day unless otherwise advised by your doctor  Not enough fluid creates a foul odor and dark color of the urine  Limit the amount of caffeine (coffee, cola, chocolate or tea) and citrus foods that you consume as these foods can be associated with increased sensation of urinary urgency and frequency  Limit the amount of alcohol you drink  Alcohol increases urine production and makes it difficult for the brain to coordinate bladder control  Avoid constipation by maintaining a balanced diet of dietary fiber  Cigarette smoking is also irritating to the bladder surface and is associated with bladder cancer  In addition, the coughing associated with smoking may lead to increased incontinent episodes because of the increased pressure  HOW DIET CAN AFFECT YOUR BLADDER  Although there is no particular "diet" that can cure bladder control, there are certain dietary suggestions you can use to help control the problem  There are 2 points to consider when evaluating how your habits and diet may affect your bladder:    Foods and fluids can irritate the bladder  Some foods and beverages are thought to contribute to bladder leakage and irritability  However their effect on the bladder is not completely understood and you may want to see if eliminating one or all of these items improves your bladder control  If you are unable to give them up completely, it is recommended that you use the following items in moderation:  Acidic beverages and foods (orange juice, grapefruit juice, lemonade etc)  Alcoholic beverages  Vinegar  Coffee (regular and decaf)  Tea (regular and decaf)  Caffeinated beverages  Carbonated beverages          Drinking enough and the right kinds of fluids  Many people with bladder control issues decrease their intake of liquids in hope that they will need to urinate less frequently or have less urinary leakage  You should not restrict fluids to control your bladder  While a decrease in liquid intake does result in a decrease in the volume of urine, the smaller amount of urine may be more highly concentrated  Highly concentrated, dark yellow urine is irritating to the bladder surface and may actually cause you to go to the bathroom more frequently        It also encourages the growth of bacteria, which may lead to infections resulting in incontinence  Substitutions for Bladder Irritants: water is always the best beverage choice  Grape and apple juice are thirst quenchers are good selections and are not as irritating to the bladder  Low acid fruits:  Pears, apricots, papaya, watermelon  For coffee drinkers: KAVA®, Postum®, Lucila®, Kaffree Bhavna®  For tea drinkers:  non-citrus or herbal and sun brewed tea    BEHAVIORAL THERAPY FOR IMPROVED BLADDER CONTROL      1  Urge Control Techniques       A  Stop whatever you are doing and concentrate on daria your pelvic floor muscles  B   Contract your pelvic floor muscles repetitively (as in your "flick" exercises)  C   Once the urgency has subsided, realize that sometimes the urgency is because you have a             full bladder and have to urinate  Other times the urgency is a false signal and you do not have a full bladder  D  If you have urgency triggered by running water, "key in the door," getting up from a sitting position, etc , contract your pelvic floor muscles prior to       initiating the activity  2   Daily Fluid Intake       A  Avoid drinking too little (less than 3 cups/day) or drinking too much (more than 6             cups/day)  B   Avoid caffeinated beverages  C   If voiding frequently at night, limit your liquid intake after 7 p m  3   Bowel Regularity--Constipation Makes Bladder Symptoms Worse       A  Drink enough liquids, eat plenty of high fiber food  B  Exercise       C  Avoid laxatives and enemas on a regular basis, this decreases the bowel's normal function  4   Voiding Schedules       A  Empty your bladder at 1½ to 2 hour intervals even if you may not have the urge to urinate             at that time  You may gradually increase the time between voidings to 30  minutes, until you can comfortably void every 3 hours  B    If you are voiding more frequently than every 1½ to 2 hours, resist the urge to go  by using urge control techniques (described in 1 )  Dietary Management of Kidney Stone Disease    The dietary recommendations for most people who make kidney stones (especially the most common calcium oxalate stones) are uncomplicated and are not too tedious or bland  Most importantly, the following recommendations also promote better health for a variety of reasons  FLUIDS:  The single most important change for the majority patients is the need to greatly increase fluid intake  You should at least produce two liters (about two quarts) of urine each day  Depending on the heat outdoors and your level of physical activity, this usually means consuming ten, 10 ounce glasses (100 ounces) of fluid per day  Water is always a good choice, but other drinks including tea, coffee, soda, and juice are also allowed as long as no one beverage becomes the sole source of fluid  CALCIUM:  There is excellent evidence that calcium should not be avoided, but instead moderated  A range of 600 to 1,100 mg of calcium per day, especially consumed at meals is probably a reasonable target  (i e  2-3 dairy servings per day) This might include small servings of yogurt, milk or ice cream   This amount helps avoid over-absorption of oxalate from the digestive tract and also allows for healthy bone maintenance  SODIUM (SALT): Too much salt in your diet (both from the shaker and in the prepared foods that we buy) is bad for your blood pressure, bad for your heart, and also increases the amount of calcium in your urine  A reasonable sodium restriction to 2,000-2,500mg/day (about the amount in one teaspoon) is an excellent target  You should get into the habit of reading the “Nutrients” labels on all the foods that you eat and watch out for the foods that have a high sodium content (snack foods, smoked or processed foods, caned foods)    Fresh and frozen foods usually have the least amount of sodium  PROTEIN:  High protein diets from animal meat (beef, chicken, pork, fish) also increases the rate of kidney stone formation and is equally unhealthy for your heart  All patients should moderate their meat intake to 3-7 ounces per day, and particularly stay away from red meat protein  OXALATE:  Most stone-formers should avoid heavy intake of oxalate-rich foods  These include green roughage (spinach, mustard, kale), strawberries, chocolate, tea, iced tea, and nuts  In addition, heavy, excess doses of Vitamin C can also produce surges in urinary oxalate levels and should be avoided  ** THESE FOODS ARE HIGH IN OXALATE - TRY TO LIMIT HOW MUCH OF THESE YOU EAT:  Coke and Pepsi  Nuts  Dark Leafy Greens:     Spinach and Kale, Rhubarb, Chard  Asparagus  Beets  Sweet potatoes   Blueberries, Strawberries   Dark tea (Green tea is okay)  Tofu      DRINK 3 QUARTS (96 Oz ) LIQUIDS EACH DAY - ALL LIQUIDS COUNT        ADD LEMON JUICE 3 OZ  DAILY - Fresh squeezed or lemon juice concentrate - Not [employer], [place] [facility] [place], Crystal Lite, etc         ** Recipe for AGARWAL'S OLDCRISELDA TYME LEMONADE:         One ounce of lemon juice, glass of water, sweetener of your choice    Coffee is okay! Cranberry juice is good to prevent infections, but does not help for stones  BARE-BONES RECOMMENDATIONS:  Fluids, fluids, fluids  Low salt diet (your primary care doctor will love you)  Moderate red meat intake  Moderate calcium (dairy products), especially with meals

## 2022-10-10 NOTE — PROGRESS NOTES
Assessment and plan:     Nephrolithiasis  · Schedule ct stone study  · Reviewed AUA dietary recommendations and hydration goals  · Follow up 1 year, sooner pending results of imaging    Mixed stress and urge urinary incontinence  · Hysterectomy and bladder surgery "only holds 1 ounce"  · Referral to pelvic floor physical therapy  · Resume oxybutynin 10 mg daily  · Follow-up 3 months, can be telemedicine visit    Renal cyst  · Bilateral renal cysts noted on prior ultrasound  · No further imaging needed  · Follow-up 1 year      Magdalena Sport    History of Present Illness     Levon Davis is a 81 y o  female presents for 1 year follow-up  Last seen in a telemedicine visit by me 04/27/2021  She is following up for urge incontinence as well as right ureteral calculus  She reports urinary frequency and urinary incontinence  She wears depends  She has some urgency and urge incontinence  She also has a several year history of urge incontinence  She reports hysterectomy for fibroids at age 47 with some type of bladder surgery performed at the time as "turns out my uterus and bladder were stuck together and he made my bladder new during the surgery but it's only the size of 1 oz " She's had urinary urgency for about 12 years, and incontinence for the past few- on the way to the toilet  She wears 2 briefs per day, 1 overnight  She's not had hematuria or UTIs  She drinks 30 ounces of water a day, 16 ounces of tea and juice  Rare soda and no coffee  She reports having passed 2 kidney stones approximately 1 year ago  She does report some mild right-sided flank pain    Laboratory     Lab Results   Component Value Date    BUN 19 07/22/2022    CREATININE 0 75 07/22/2022       No components found for: GFR    Lab Results   Component Value Date    GLUCOSE 79 12/02/2015    CALCIUM 10 0 07/22/2022     12/02/2015    K 4 0 07/22/2022    CO2 29 07/22/2022     (H) 07/22/2022       Lab Results   Component Value Date WBC 6 59 01/10/2022    HGB 16 2 (H) 01/10/2022    HCT 48 8 (H) 01/10/2022    MCV 89 01/10/2022     01/10/2022       No results found for: PSA    No results found for this or any previous visit (from the past 1 hour(s))  @RESULT(URINEMICROSCOPIC)@    @RESULT(URINECULTURE)@    Radiology     RENAL ULTRASOUND 03/30/2021     INDICATION:   N20 0: Calculus of kidney      COMPARISON: CT abdomen pelvis 9/25/2020     TECHNIQUE:   Ultrasound of the retroperitoneum was performed with a curvilinear transducer utilizing volumetric sweeps and still imaging techniques       FINDINGS:     KIDNEYS:  Symmetric and normal size  Right kidney: 10 1 x 5 6 x 5 9 cm  Left kidney:  10 4 x 5 4 x 6 8 cm      Right kidney  Normal echogenicity and contour  No suspicious masses detected  An exophytic upper pole cyst is simple and anechoic measuring 2 7 x 2 1 x 2 0 cm  No hydronephrosis  No shadowing calculi  No perinephric fluid collections      Left kidney  Normal echogenicity and contour  No suspicious masses detected  A mid pole parapelvic cyst measures 3 5 x 2 2 x 2 3 cm  No hydronephrosis  No shadowing calculi  No perinephric fluid collections      URETERS:  Nonvisualized      BLADDER:   Normally distended  No focal thickening or mass lesions  Bilateral ureteral jets detected         IMPRESSION:     No hydronephrosis or shadowing calculi      Bilateral cysts       Review of Systems     Review of Systems   Constitutional: Negative for activity change, appetite change, chills, fatigue, fever and unexpected weight change  HENT: Negative for facial swelling  Eyes: Negative for discharge  Respiratory: Negative  Negative for cough and shortness of breath  Cardiovascular: Negative for chest pain and leg swelling  Gastrointestinal: Positive for constipation and diarrhea  Negative for abdominal distention, abdominal pain, nausea and vomiting  IBS   Endocrine: Negative      Genitourinary: Positive for flank pain, frequency and urgency  Negative for decreased urine volume, difficulty urinating, dysuria, enuresis, genital sores and hematuria  Musculoskeletal: Negative for back pain and myalgias  Skin: Negative for pallor and rash  Allergic/Immunologic: Negative  Negative for immunocompromised state  Neurological: Negative for facial asymmetry and speech difficulty  Psychiatric/Behavioral: Negative for agitation and confusion  Allergies     Allergies   Allergen Reactions   • Ibuprofen Nausea Only, Rash, Dizziness and Syncope   • Morphine Other (See Comments)     Intolerance   • Latex Dermatitis       Physical Exam     Physical Exam  Vitals reviewed  Constitutional:       General: She is not in acute distress  Appearance: Normal appearance  She is obese  She is not ill-appearing, toxic-appearing or diaphoretic  HENT:      Head: Normocephalic and atraumatic  Eyes:      General: No scleral icterus  Cardiovascular:      Rate and Rhythm: Normal rate  Pulmonary:      Effort: Pulmonary effort is normal  No respiratory distress  Abdominal:      General: Abdomen is flat  There is no distension  Palpations: Abdomen is soft  Tenderness: There is no abdominal tenderness  There is no right CVA tenderness, left CVA tenderness, guarding or rebound  Musculoskeletal:         General: No swelling  Cervical back: Normal range of motion  Right lower leg: No edema  Left lower leg: No edema  Comments: Right Lymphedema secondary to right mastectomy   Skin:     General: Skin is warm and dry  Coloration: Skin is not jaundiced or pale  Findings: No rash  Neurological:      General: No focal deficit present  Mental Status: She is alert and oriented to person, place, and time  Gait: Gait normal    Psychiatric:         Mood and Affect: Mood normal          Behavior: Behavior normal          Thought Content:  Thought content normal          Judgment: Judgment normal          Vital Signs     Vitals:    10/10/22 1133   BP: 118/80   Pulse: (!) 110   Weight: 83 kg (183 lb)   Height: 4' 9" (1 448 m)       Current Medications       Current Outpatient Medications:   •  acetaminophen (TYLENOL) 650 mg CR tablet, Take 1 tablet (650 mg total) by mouth every 8 (eight) hours as needed for mild pain, Disp: 30 tablet, Rfl: 0  •  albuterol (2 5 mg/3 mL) 0 083 % nebulizer solution, Take 2 5 mg by nebulization every 6 (six) hours as needed for wheezing , Disp: , Rfl:   •  albuterol (PROAIR HFA) 90 mcg/act inhaler, Inhale 2 puffs every 4 (four) hours as needed for wheezing, Disp: 3 Inhaler, Rfl: 1  •  [START ON 10/16/2022] ascorbic acid (VITAMIN C) 500 MG tablet, Take 1 tablet (500 mg total) by mouth 2 (two) times a day, Disp: 60 tablet, Rfl: 1  •  aspirin (ECOTRIN LOW STRENGTH) 81 mg EC tablet, Take 1 tablet (81 mg total) by mouth 2 (two) times a day, Disp: , Rfl:   •  atorvastatin (LIPITOR) 20 mg tablet, Take 1 tablet by mouth daily  , Disp: 30 tablet, Rfl: 0  •  buPROPion (WELLBUTRIN XL) 300 mg 24 hr tablet, Take 1 tablet (300 mg total) by mouth every morning, Disp: 90 tablet, Rfl: 3  •  butalbital-acetaminophen-caffeine (FIORICET,ESGIC) -40 mg per tablet, Take 1 tablet by mouth 3 (three) times a day as needed for headaches, Disp: 90 tablet, Rfl: 1  •  DULoxetine (CYMBALTA) 60 mg delayed release capsule, Take 1 capsule by mouth twice daily  , Disp: 180 capsule, Rfl: 0  •  ergocalciferol (VITAMIN D2) 50,000 units, Take 1 capsule (50,000 Units total) by mouth once a week, Disp: 4 capsule, Rfl: 3  •  Nutritional Supplements (BOOST BREEZE PO), Take by mouth daily, Disp: , Rfl:   •  oxybutynin (DITROPAN-XL) 10 MG 24 hr tablet, Take 1 tablet (10 mg total) by mouth daily at bedtime, Disp: 30 tablet, Rfl: 12  •  pantoprazole (PROTONIX) 40 mg tablet, Take 1 tablet (40 mg total) by mouth daily, Disp: 30 tablet, Rfl: 0  •  ascorbic acid (VITAMIN C) 500 MG tablet, Take 1 tablet (500 mg total) by mouth daily, Disp: 30 tablet, Rfl: 1  •  diclofenac (VOLTAREN) 75 mg EC tablet, Take 1 tablet by mouth twice daily  , Disp: 180 tablet, Rfl: 0  •  [START ON 10/16/2022] ferrous sulfate 324 (65 Fe) mg, Take 1 tablet (324 mg total) by mouth 2 (two) times a day before meals, Disp: 60 tablet, Rfl: 1  •  fexofenadine (ALLEGRA) 180 MG tablet, Take 1 tablet (180 mg total) by mouth daily, Disp: 30 tablet, Rfl: 2  •  [START ON 58/14/8400] folic acid (FOLVITE) 1 mg tablet, Take 1 tablet (1 mg total) by mouth daily, Disp: 30 tablet, Rfl: 1  •  [START ON 10/16/2022] Multiple Vitamins-Minerals (multivitamin with minerals) tablet, Take 1 tablet by mouth daily, Disp: 30 tablet, Rfl: 1  •  olopatadine (PATANOL) 0 1 % ophthalmic solution, Administer 1 drop to both eyes 2 (two) times a day, Disp: 5 mL, Rfl: 1    Active Problems     Patient Active Problem List   Diagnosis   • Irritable bowel syndrome (IBS)   • Mild intermittent asthma without complication   • Bilateral carpal tunnel syndrome   • Chronic musculoskeletal pain   • Generalized anxiety disorder   • Hyperlipidemia LDL goal <130   • Essential hypertension   • Lymphedema   • Patent foramen ovale   • Tension type headache   • Vitamin D deficiency   • Recurrent major depressive disorder, in partial remission (HCC)   • Other chronic pancreatitis (HCC)   • Body mass index (BMI) of 40 0-44 9 in adult [facility])   • Right foot pain   • Tinea cruris   • Primary insomnia   • Neuropathy   • Age-related osteoporosis without current pathological fracture   • Swelling of joint of left knee   • Nephrolithiasis   • Scoliosis   • Primary osteoarthritis of left knee   • HX: breast cancer   • Sinus tachycardia   • Acute metabolic encephalopathy   • Encounter for screening for COVID-19   • S/P total knee arthroplasty, left   • Dermatitis   • Continuous opioid dependence (HCC)   • Seborrheic keratoses   • Allergic conjunctivitis of both eyes   • Seasonal allergies   • OAB (overactive bladder)   • Age-related osteoporosis without current pathological fracture   • Mixed stress and urge urinary incontinence   • Renal cyst       Past Medical History     Past Medical History:   Diagnosis Date   • Anxiety    • Arthritis    • Asthma    • Breast cancer ([facility] [address] )     rt mastectomy 2005   • Cancer [facility])     breast right   • Cataract    • CHF (congestive heart failure) ([facility] [address] )    • Coronary artery disease    • Depression    • GERD (gastroesophageal reflux disease)    • High cholesterol    • History of chemotherapy     2005 rt breast cancer   • History of transfusion    • Hypertension    • IBS (irritable bowel syndrome)    • Irritable bowel syndrome (IBS) 5/31/2016   • Kidney stone    • Lymphedema of right arm    • Obesity    • PONV (postoperative nausea and vomiting)    • Post-menopausal    • Psychiatric disorder    • Renal disorder    • Vitamin D deficiency        Surgical History     Past Surgical History:   Procedure Laterality Date   • BREAST SURGERY     • CATARACT EXTRACTION Bilateral    • CATARACT EXTRACTION, BILATERAL     • CHOLECYSTECTOMY     • COLONOSCOPY N/A 5/31/2016    Procedure: COLONOSCOPY;  Surgeon: Vika Buckley MD;  Location: [facility] MAIN OR;  Service:    • GALLBLADDER SURGERY     • HYSTERECTOMY      Total   • KNEE ARTHROSCOPY     • KNEE SURGERY      arthroscopy   • MASTECTOMY Right     rt breast mastectomy 2005   • CO TOTAL KNEE ARTHROPLASTY Left 5/20/2021    Procedure: ARTHROPLASTY KNEE TOTAL;  Surgeon: Chiquita Villalobos MD;  Location: [facility] MAIN OR;  Service: Orthopedics   • TONSILLECTOMY AND ADENOIDECTOMY         Family History     Family History   Adopted: Yes   Problem Relation Age of Onset   • Diabetes Mother    • Heart disease Mother    • Mental illness Mother    • Depression Mother    • Heart disease Brother    • Breast cancer Maternal Aunt    • Breast cancer Maternal Aunt    • Breast cancer Maternal Aunt    • Breast cancer Maternal Aunt    • Breast cancer Maternal Aunt    • No Known Problems Father    • No Known Problems Sister    • No Known Problems Daughter    • Breast cancer Maternal Grandmother    • No Known Problems Sister    • No Known Problems Sister    • No Known Problems Sister        Social History     Social History     Social History     Tobacco Use   Smoking Status Never Smoker   Smokeless Tobacco Never Used       Past Surgical History:   Procedure Laterality Date   • BREAST SURGERY     • CATARACT EXTRACTION Bilateral    • CATARACT EXTRACTION, BILATERAL     • CHOLECYSTECTOMY     • COLONOSCOPY N/A 5/31/2016    Procedure: COLONOSCOPY;  Surgeon: Samuel Thomson MD;  Location: [facility] MA[facility];  Service:    • GALLBLADDER SURGERY     • HYSTERECTOMY      Total   • KNEE ARTHROSCOPY     • KNEE SURGERY      arthroscopy   • MASTECTOMY Right     rt breast mastectomy 2005   • [place] TOTAL KNEE ARTHROPLASTY Left 5/20/2021    Procedure: ARTHROPLASTY KNEE TOTAL;  Surgeon: Chiquis Matos MD;  Location: [facility] MAIN OR;  Service: Orthopedics   • TONSILLECTOMY AND ADENOIDECTOMY           The following portions of the patient's history were reviewed and updated as appropriate: allergies, current medications, past family history, past medical history, past social history, past surgical history and problem list    Please note :  Voice dictation software has been used to create this document  There may be inadvertent transcription errors      Lisseth Montemayor

## 2022-10-10 NOTE — ASSESSMENT & PLAN NOTE
· Schedule ct stone study  · Reviewed AUA dietary recommendations and hydration goals  · Follow up 1 year, sooner pending results of imaging

## 2022-10-11 ENCOUNTER — OFFICE VISIT (OUTPATIENT)
Dept: INTERNAL MEDICINE CLINIC | Facility: CLINIC | Age: 72
End: 2022-10-11
Payer: COMMERCIAL

## 2022-10-11 VITALS
HEART RATE: 91 BPM | OXYGEN SATURATION: 96 % | HEIGHT: 57 IN | DIASTOLIC BLOOD PRESSURE: 66 MMHG | TEMPERATURE: 97.6 F | BODY MASS INDEX: 39.91 KG/M2 | WEIGHT: 185 LBS | SYSTOLIC BLOOD PRESSURE: 124 MMHG

## 2022-10-11 DIAGNOSIS — K59.09 CHRONIC CONSTIPATION: Primary | ICD-10-CM

## 2022-10-11 PROBLEM — H10.13 ALLERGIC CONJUNCTIVITIS OF BOTH EYES: Status: RESOLVED | Noted: 2022-07-21 | Resolved: 2022-10-11

## 2022-10-11 PROCEDURE — 99213 OFFICE O/P EST LOW 20 MIN: CPT | Performed by: PHYSICIAN ASSISTANT

## 2022-10-11 RX ORDER — LINACLOTIDE 145 UG/1
1 CAPSULE, GELATIN COATED ORAL DAILY
Qty: 30 CAPSULE | Refills: 2 | Status: SHIPPED | OUTPATIENT
Start: 2022-10-11

## 2022-10-11 RX ORDER — LORATADINE 10 MG/1
10 TABLET ORAL DAILY
COMMUNITY

## 2022-10-11 NOTE — PROGRESS NOTES
Jamar Baxter      : 1950      MRN: [id number]  Encounter Provider: Eagle Bolton PA-C  Encounter Date: 10/11/2022   Encounter department: [address] [place] [address]  Chronic constipation  Assessment & Plan:  Start linzess  Directions for use and possible side effects discussed and patient verbalized understanding of these  Orders:  -     linaCLOtide (Linzess) 145 MCG CAPS; Take 1 capsule (145 mcg total) by mouth in the morning    2  Body mass index (BMI) of 40 0-44 9 in adult [facility])  Assessment & Plan:  Weight management referral provided per pt request      Orders:  -     Ambulatory Referral to Weight Management; Future           Subjective      Pt presents for routine visit  She is having knee replacement surgery next month and is a bit nervous about this  Reassurance provided  She also notes issues with constipation  She notes she has to strain and does not feel that she has a complete evacuation on the days that she does go  She tends to have a BM every 3rd day  Lastly, she would like a referral to weight management  Review of Systems   Constitutional: Negative for chills and fever  HENT: Negative for congestion, ear pain, hearing loss, postnasal drip, rhinorrhea, sinus pressure, sinus pain, sore throat and trouble swallowing  Eyes: Negative for pain and visual disturbance  Respiratory: Negative for cough, chest tightness, shortness of breath and wheezing  Cardiovascular: Negative  Negative for chest pain, palpitations and leg swelling  Gastrointestinal: Positive for constipation  Negative for abdominal pain, blood in stool, diarrhea, nausea and vomiting  Endocrine: Negative for cold intolerance, heat intolerance, polydipsia, polyphagia and polyuria  Genitourinary: Negative for difficulty urinating, dysuria, flank pain and urgency  Musculoskeletal: Negative for arthralgias, back pain, gait problem and myalgias     Skin: Negative for rash    Allergic/Immunologic: Negative  Neurological: Negative for dizziness, weakness, light-headedness and headaches  Hematological: Negative  Psychiatric/Behavioral: Negative for behavioral problems, dysphoric mood and sleep disturbance  The patient is not nervous/anxious  Current Outpatient Medications on File Prior to Visit   Medication Sig   • acetaminophen (TYLENOL) 650 mg CR tablet Take 1 tablet (650 mg total) by mouth every 8 (eight) hours as needed for mild pain   • albuterol (2 5 mg/3 mL) 0 083 % nebulizer solution Take 2 5 mg by nebulization every 6 (six) hours as needed for wheezing  • albuterol (PROAIR HFA) 90 mcg/act inhaler Inhale 2 puffs every 4 (four) hours as needed for wheezing   • ascorbic acid (VITAMIN C) 500 MG tablet Take 1 tablet (500 mg total) by mouth daily   • atorvastatin (LIPITOR) 20 mg tablet Take 1 tablet by mouth daily  • buPROPion (WELLBUTRIN XL) 300 mg 24 hr tablet Take 1 tablet (300 mg total) by mouth every morning   • butalbital-acetaminophen-caffeine (FIORICET,ESGIC) -40 mg per tablet Take 1 tablet by mouth 3 (three) times a day as needed for headaches   • DULoxetine (CYMBALTA) 60 mg delayed release capsule Take 1 capsule by mouth twice daily     • ergocalciferol (VITAMIN D2) 50,000 units Take 1 capsule (50,000 Units total) by mouth once a week   • loratadine (CLARITIN) 10 mg tablet Take 10 mg by mouth daily   • Nutritional Supplements (BOOST BREEZE PO) Take by mouth daily   • oxybutynin (DITROPAN-XL) 10 MG 24 hr tablet Take 1 tablet (10 mg total) by mouth daily at bedtime   • [DISCONTINUED] aspirin (ECOTRIN LOW STRENGTH) 81 mg EC tablet Take 1 tablet (81 mg total) by mouth 2 (two) times a day (Patient taking differently: Take 81 mg by mouth daily)   • [DISCONTINUED] ascorbic acid (VITAMIN C) 500 MG tablet Take 1 tablet (500 mg total) by mouth 2 (two) times a day (Patient not taking: Reported on 10/11/2022 Do not start before October 16, 2022 )   • [DISCONTINUED] diclofenac (VOLTAREN) 75 mg EC tablet Take 1 tablet by mouth twice daily  (Patient not taking: Reported on 10/11/2022)   • [DISCONTINUED] ferrous sulfate 324 (65 Fe) mg Take 1 tablet (324 mg total) by mouth 2 (two) times a day before meals (Patient not taking: Reported on 10/11/2022 Do not start before October 16, 2022 )   • [DISCONTINUED] fexofenadine (ALLEGRA) 180 MG tablet Take 1 tablet (180 mg total) by mouth daily (Patient not taking: Reported on 10/11/2022)   • [DISCONTINUED] folic acid (FOLVITE) 1 mg tablet Take 1 tablet (1 mg total) by mouth daily (Patient not taking: Reported on 10/11/2022 Do not start before October 16, 2022 )   • [DISCONTINUED] Multiple Vitamins-Minerals (multivitamin with minerals) tablet Take 1 tablet by mouth daily (Patient not taking: Reported on 10/11/2022 Do not start before October 16, 2022 )   • [DISCONTINUED] olopatadine (PATANOL) 0 1 % ophthalmic solution Administer 1 drop to both eyes 2 (two) times a day (Patient not taking: Reported on 10/11/2022)   • [DISCONTINUED] pantoprazole (PROTONIX) 40 mg tablet Take 1 tablet (40 mg total) by mouth daily (Patient not taking: Reported on 10/11/2022)       Objective     /66 (BP Location: Left arm, Patient Position: Sitting)   Pulse 91   Temp 97 6 °F (36 4 °C)   Ht 4' 9" (1 448 m)   Wt 83 9 kg (185 lb)   LMP  (LMP Unknown)   SpO2 96%   BMI 40 03 kg/m²     Physical Exam  Constitutional:       General: She is not in acute distress  Appearance: She is well-developed  She is obese  She is not diaphoretic  HENT:      Head: Normocephalic and atraumatic  Right Ear: External ear normal       Left Ear: External ear normal       Nose: Nose normal       Mouth/Throat:      Pharynx: No oropharyngeal exudate  Eyes:      General: No scleral icterus  Right eye: No discharge  Left eye: No discharge        Conjunctiva/sclera: Conjunctivae normal       Pupils: Pupils are equal, round, and reactive to light    Neck:      Thyroid: No thyromegaly  Cardiovascular:      Rate and Rhythm: Normal rate and regular rhythm  Heart sounds: Normal heart sounds  No murmur heard  No friction rub  No gallop  Pulmonary:      Effort: Pulmonary effort is normal  No respiratory distress  Breath sounds: Normal breath sounds  No wheezing or rales  Abdominal:      General: Bowel sounds are normal  There is no distension  Palpations: Abdomen is soft  Tenderness: There is no abdominal tenderness  Musculoskeletal:         General: No tenderness or deformity  Normal range of motion  Cervical back: Normal range of motion and neck supple  Skin:     General: Skin is warm and dry  Neurological:      Mental Status: She is alert and oriented to person, place, and time  Cranial Nerves: No cranial nerve deficit  Psychiatric:         Behavior: Behavior normal          Thought Content:  Thought content normal          Judgment: Judgment normal        Merced Alex PA-C

## 2022-10-11 NOTE — ASSESSMENT & PLAN NOTE
Start linzess  Directions for use and possible side effects discussed and patient verbalized understanding of these

## 2022-10-12 ENCOUNTER — TELEPHONE (OUTPATIENT)
Dept: UROLOGY | Facility: AMBULATORY SURGERY CENTER | Age: 72
End: 2022-10-12

## 2022-10-12 DIAGNOSIS — N20.0 NEPHROLITHIASIS: Primary | ICD-10-CM

## 2022-10-12 LAB
BACTERIA UR QL AUTO: ABNORMAL /HPF
BILIRUB UR QL STRIP: NEGATIVE
CAOX CRY URNS QL MICRO: ABNORMAL /HPF
CLARITY UR: ABNORMAL
COLOR UR: YELLOW
GLUCOSE UR STRIP-MCNC: NEGATIVE MG/DL
HGB UR QL STRIP.AUTO: NEGATIVE
KETONES UR STRIP-MCNC: NEGATIVE MG/DL
LEUKOCYTE ESTERASE UR QL STRIP: NEGATIVE
MUCOUS THREADS UR QL AUTO: ABNORMAL
NITRITE UR QL STRIP: NEGATIVE
NON-SQ EPI CELLS URNS QL MICRO: ABNORMAL /HPF
PH UR STRIP.AUTO: 5.5 [PH]
PROT UR STRIP-MCNC: ABNORMAL MG/DL
RBC #/AREA URNS AUTO: ABNORMAL /HPF
SP GR UR STRIP.AUTO: 1.02 (ref 1–1.03)
UROBILINOGEN UR STRIP-ACNC: <2 MG/DL
WBC #/AREA URNS AUTO: ABNORMAL /HPF

## 2022-10-12 NOTE — TELEPHONE ENCOUNTER
Pt under care of Madelaine Toledo from [facility] called and left VM stating he is in need or urine culture from 10/10/22     Call [phone]

## 2022-10-12 NOTE — PROGRESS NOTES
Daily Note     Today's date: 10/13/2022  Patient name: Gulshan Fatima  : 1950  MRN: [id number]  Referring provider: Lori Chavez PA-C  Dx:   Encounter Diagnosis     ICD-10-CM    1  Lymphedema  I89 0                   Subjective: I brought my daughter to help/learn self massage        Objective: See treatment diary below      Assessment: Tolerated treatment well  1* focus pt /pt's daughter educ self massage  Review current compression bras/R UE sleeves  Pt encourage to update compression for max compression/reduction R UE  Patient would benefit from continued PT      Plan: Continue per plan of care        Precautions: hx of R breast CA with mastectomy, psych disorder, renal disorder, recent R foot fx with removal of boot 10/1/22, fall risk, imbalance    Re-eval Date: 11/3/22    Date   10/6 10/11      Visit Count 1 2      FOTO Comp 10/6       Pain In  see eval       Pain Out  see eval               Manuals 10/6       MLD By PTA--CV  30' Short neck  Diaphragmatic breath  AIA  R UE      Pt /pt daughter   educ  lymph massage  Short neck  L axilla  Femoral triangle  Deltoid  AIA  Poster IAA  Pt self perf with max cuing                                Neuro Re-Ed                                                                Ther Ex                                                                        Ther Activity                        Gait Training                        Modalities

## 2022-10-12 NOTE — TELEPHONE ENCOUNTER
Tabatha Rendon from [address] [place] [address] labs have a question about specimen that was not received at the lab     Tabatha Rendon can be reached at [phone] option 1

## 2022-10-13 ENCOUNTER — TELEPHONE (OUTPATIENT)
Dept: UROLOGY | Facility: CLINIC | Age: 72
End: 2022-10-13

## 2022-10-13 ENCOUNTER — APPOINTMENT (OUTPATIENT)
Dept: PHYSICAL THERAPY | Facility: CLINIC | Age: 72
End: 2022-10-13

## 2022-10-13 ENCOUNTER — OFFICE VISIT (OUTPATIENT)
Dept: PHYSICAL THERAPY | Facility: CLINIC | Age: 72
End: 2022-10-13
Payer: COMMERCIAL

## 2022-10-13 DIAGNOSIS — I89.0 LYMPHEDEMA: Primary | ICD-10-CM

## 2022-10-13 LAB — BACTERIA UR CULT: NORMAL

## 2022-10-13 PROCEDURE — 97110 THERAPEUTIC EXERCISES: CPT

## 2022-10-13 PROCEDURE — 97140 MANUAL THERAPY 1/> REGIONS: CPT

## 2022-10-13 NOTE — TELEPHONE ENCOUNTER
Spoke with patient, advised results, no further questions  Patient was thankful for the call back  Detail Level: Detailed

## 2022-10-13 NOTE — TELEPHONE ENCOUNTER
----- Message from [address] [place] [address] sent at 10/13/2022  1:37 PM EDT -----  Please let patient know her urine testing did not show any signs of blood or infection

## 2022-10-14 ENCOUNTER — ANESTHESIA EVENT (OUTPATIENT)
Dept: PERIOP | Facility: HOSPITAL | Age: 72
End: 2022-10-14

## 2022-10-18 ENCOUNTER — TELEPHONE (OUTPATIENT)
Dept: OBGYN CLINIC | Facility: HOSPITAL | Age: 72
End: 2022-10-18

## 2022-10-18 ENCOUNTER — OFFICE VISIT (OUTPATIENT)
Dept: PHYSICAL THERAPY | Facility: CLINIC | Age: 72
End: 2022-10-18
Payer: COMMERCIAL

## 2022-10-18 DIAGNOSIS — I89.0 LYMPHEDEMA: Primary | ICD-10-CM

## 2022-10-18 PROCEDURE — 97140 MANUAL THERAPY 1/> REGIONS: CPT

## 2022-10-18 NOTE — PROGRESS NOTES
Daily Note     Today's date: 10/18/2022  Patient name: Johanna Payne  : 1950  MRN: [id number]  Referring provider: Seferino Mulligan PA-C  Dx:   Encounter Diagnosis     ICD-10-CM    1  Lymphedema  I89 0                   Subjective: I am not sure if I am doing self massage properly        Objective: See treatment diary below      Assessment: Tolerated treatment well  Noted decrease firmness/decongestion end rx session  Pt demon edema 1* R UE/ R abdominal region  Pt looking into updating compression R UE sleeve/bras  Pt to benefit with upgrade of R UE pneumatic pump with current pump old/non function to further reduce R UE congestion  Patient would benefit from continued PT      Plan: Continue per plan of care  Precautions: hx of R breast CA with mastectomy, psych disorder, renal disorder, recent R foot fx with removal of boot 10/1/22, fall risk, imbalance    Re-eval Date: 11/3/22    Date   10/6 10/11 10/18     Visit Count 1 2 3     FOTO Comp 10/6       Pain In  see eval       Pain Out  see eval               Manuals 10/6       MLD By PTA--CV  30' Short neck  Diaphragmatic breath  AIA  R UE      Pt /pt daughter   educ  lymph massage  Short neck  L axilla  Femoral triangle  Deltoid  AIA  Poster IAA  Pt self perf with max cuing   Short neck  Diaphragmatic breath  Deep abdominal viseral   R AIA & posterior IAA  Anterior thorax  R UE    Pt review self lymph massage    Pt educ   UE lymph exer    Added pec stretch gentle    Total 60 min                             Neuro Re-Ed                                                                Ther Ex                                                                        Ther Activity                        Gait Training                        Modalities                            Access Code: [email or web address]  URL: [email or web address]  Date: 10/18/2022  Prepared by: Sandra Mendes    Exercises  · Doorway Pec Stretch at 60 Degrees Abduction with Arm Straight - 1 x daily - 7 x weekly - 3 sets - 30 sec hold

## 2022-10-20 ENCOUNTER — HOSPITAL ENCOUNTER (OUTPATIENT)
Dept: CT IMAGING | Facility: HOSPITAL | Age: 72
End: 2022-10-20
Payer: COMMERCIAL

## 2022-10-20 DIAGNOSIS — N20.0 NEPHROLITHIASIS: ICD-10-CM

## 2022-10-20 PROCEDURE — 74176 CT ABD & PELVIS W/O CONTRAST: CPT

## 2022-10-20 PROCEDURE — G1004 CDSM NDSC: HCPCS

## 2022-10-24 NOTE — PROGRESS NOTES
Daily Note     Today's date: 10/24/2022  Patient name: Deo Swain  : 1950  MRN: [id number]  Referring provider: Priyanka See PA-C  Dx: No diagnosis found  Subjective: ***      Objective: See treatment diary below      Assessment: Tolerated treatment {Tolerated treatment :}  Patient {assessment:}      Plan: {PLAN:}     Precautions: hx of R breast CA with mastectomy, psych disorder, renal disorder, recent R foot fx with removal of boot 10/1/22, fall risk, imbalance    Re-eval Date: 11/3/22    Date   10/6 10/11 10/18     Visit Count 1 2 3     FOTO Comp 10/6       Pain In  see eval       Pain Out  see eval               Manuals 10/6       MLD By PTA--CV  30' Short neck  Diaphragmatic breath  AIA  R UE      Pt /pt daughter   educ  lymph massage  Short neck  L axilla  Femoral triangle  Deltoid  AIA  Poster IAA  Pt self perf with max cuing   Short neck  Diaphragmatic breath  Deep abdominal viseral   R AIA & posterior IAA  Anterior thorax  R UE    Pt review self lymph massage    Pt educ   UE lymph exer    Added pec stretch gentle    Total 60 min                             Neuro Re-Ed                                                                Ther Ex                                                                        Ther Activity                        Gait Training                        Modalities                            Access Code: [email or web address]  URL: [email or web address]  Date: 10/18/2022  Prepared by: Lianne Wright    Exercises  · Doorway Pec Stretch at 60 Degrees Abduction with Arm Straight - 1 x daily - 7 x weekly - 3 sets - 30 sec hold

## 2022-10-25 ENCOUNTER — APPOINTMENT (OUTPATIENT)
Dept: PHYSICAL THERAPY | Facility: CLINIC | Age: 72
End: 2022-10-25

## 2022-10-25 ENCOUNTER — TELEPHONE (OUTPATIENT)
Dept: UROLOGY | Facility: AMBULATORY SURGERY CENTER | Age: 72
End: 2022-10-25

## 2022-10-25 NOTE — TELEPHONE ENCOUNTER
Radiology Test Results - Radiology Calling with report update   ct abd pelvis 10/20/22    Pt under care of: Prema    Significant Findings - Please review

## 2022-10-25 NOTE — TELEPHONE ENCOUNTER
IMPRESSION:     1  No urinary tract calculi or hydronephrosis  2   Very mild inflammatory changes about the distal descending colon which could represent acute uncomplicated diverticulitis or a nonspecific colitis

## 2022-10-26 NOTE — TELEPHONE ENCOUNTER
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

## 2022-10-27 ENCOUNTER — APPOINTMENT (OUTPATIENT)
Dept: PHYSICAL THERAPY | Facility: CLINIC | Age: 72
End: 2022-10-27

## 2022-10-31 DIAGNOSIS — M17.12 PRIMARY OSTEOARTHRITIS OF LEFT KNEE: ICD-10-CM

## 2022-10-31 RX ORDER — ASCORBIC ACID 500 MG
500 TABLET ORAL DAILY
Qty: 30 TABLET | Refills: 1 | Status: SHIPPED | OUTPATIENT
Start: 2022-10-31

## 2022-10-31 RX ORDER — FOLIC ACID 1 MG/1
1 TABLET ORAL DAILY
Qty: 30 TABLET | Refills: 1 | Status: SHIPPED | OUTPATIENT
Start: 2022-10-31

## 2022-10-31 RX ORDER — FERROUS SULFATE TAB EC 324 MG (65 MG FE EQUIVALENT) 324 (65 FE) MG
324 TABLET DELAYED RESPONSE ORAL
Qty: 60 TABLET | Refills: 1 | Status: SHIPPED | OUTPATIENT
Start: 2022-10-31 | End: 2022-12-30

## 2022-10-31 RX ORDER — MULTIVIT-MIN/IRON FUM/FOLIC AC 7.5 MG-4
1 TABLET ORAL DAILY
Qty: 30 TABLET | Refills: 1 | Status: SHIPPED | OUTPATIENT
Start: 2022-10-31

## 2022-11-01 ENCOUNTER — TELEPHONE (OUTPATIENT)
Dept: OBGYN CLINIC | Facility: CLINIC | Age: 72
End: 2022-11-01

## 2022-11-01 ENCOUNTER — OFFICE VISIT (OUTPATIENT)
Dept: PHYSICAL THERAPY | Facility: CLINIC | Age: 72
End: 2022-11-01

## 2022-11-01 ENCOUNTER — LAB (OUTPATIENT)
Dept: LAB | Facility: CLINIC | Age: 72
End: 2022-11-01

## 2022-11-01 DIAGNOSIS — Z01.818 PRE-OP TESTING: ICD-10-CM

## 2022-11-01 DIAGNOSIS — I89.0 LYMPHEDEMA: Primary | ICD-10-CM

## 2022-11-01 DIAGNOSIS — N20.0 NEPHROLITHIASIS: ICD-10-CM

## 2022-11-01 DIAGNOSIS — Z01.812 PRE-OPERATIVE LABORATORY EXAMINATION: ICD-10-CM

## 2022-11-01 LAB
ABO GROUP BLD: NORMAL
ALBUMIN SERPL BCP-MCNC: 3.2 G/DL (ref 3.5–5)
ALP SERPL-CCNC: 79 U/L (ref 46–116)
ALT SERPL W P-5'-P-CCNC: 33 U/L (ref 12–78)
ANION GAP SERPL CALCULATED.3IONS-SCNC: 3 MMOL/L (ref 4–13)
APTT PPP: 26 SECONDS (ref 23–37)
AST SERPL W P-5'-P-CCNC: 22 U/L (ref 5–45)
BACTERIA UR QL AUTO: ABNORMAL /HPF
BASOPHILS # BLD AUTO: 0.04 THOUSANDS/ÂΜL (ref 0–0.1)
BASOPHILS NFR BLD AUTO: 1 % (ref 0–1)
BILIRUB SERPL-MCNC: 0.52 MG/DL (ref 0.2–1)
BILIRUB UR QL STRIP: NEGATIVE
BLD GP AB SCN SERPL QL: NEGATIVE
BUN SERPL-MCNC: 14 MG/DL (ref 5–25)
CALCIUM ALBUM COR SERPL-MCNC: 9.8 MG/DL (ref 8.3–10.1)
CALCIUM SERPL-MCNC: 9.2 MG/DL (ref 8.3–10.1)
CAOX CRY URNS QL MICRO: ABNORMAL /HPF
CHLORIDE SERPL-SCNC: 110 MMOL/L (ref 96–108)
CLARITY UR: CLEAR
CO2 SERPL-SCNC: 29 MMOL/L (ref 21–32)
COLOR UR: YELLOW
CREAT SERPL-MCNC: 0.67 MG/DL (ref 0.6–1.3)
EOSINOPHIL # BLD AUTO: 0.14 THOUSAND/ÂΜL (ref 0–0.61)
EOSINOPHIL NFR BLD AUTO: 2 % (ref 0–6)
ERYTHROCYTE [DISTWIDTH] IN BLOOD BY AUTOMATED COUNT: 12 % (ref 11.6–15.1)
EST. AVERAGE GLUCOSE BLD GHB EST-MCNC: 120 MG/DL
GFR SERPL CREATININE-BSD FRML MDRD: 89 ML/MIN/1.73SQ M
GLUCOSE SERPL-MCNC: 115 MG/DL (ref 65–140)
GLUCOSE UR STRIP-MCNC: NEGATIVE MG/DL
HBA1C MFR BLD: 5.8 %
HCT VFR BLD AUTO: 46.6 % (ref 34.8–46.1)
HGB BLD-MCNC: 15.1 G/DL (ref 11.5–15.4)
HGB UR QL STRIP.AUTO: NEGATIVE
IMM GRANULOCYTES # BLD AUTO: 0.02 THOUSAND/UL (ref 0–0.2)
IMM GRANULOCYTES NFR BLD AUTO: 0 % (ref 0–2)
INR PPP: 0.96 (ref 0.84–1.19)
KETONES UR STRIP-MCNC: NEGATIVE MG/DL
LEUKOCYTE ESTERASE UR QL STRIP: NEGATIVE
LYMPHOCYTES # BLD AUTO: 1.69 THOUSANDS/ÂΜL (ref 0.6–4.47)
LYMPHOCYTES NFR BLD AUTO: 29 % (ref 14–44)
MCH RBC QN AUTO: 30 PG (ref 26.8–34.3)
MCHC RBC AUTO-ENTMCNC: 32.4 G/DL (ref 31.4–37.4)
MCV RBC AUTO: 93 FL (ref 82–98)
MONOCYTES # BLD AUTO: 0.67 THOUSAND/ÂΜL (ref 0.17–1.22)
MONOCYTES NFR BLD AUTO: 12 % (ref 4–12)
MUCOUS THREADS UR QL AUTO: ABNORMAL
NEUTROPHILS # BLD AUTO: 3.25 THOUSANDS/ÂΜL (ref 1.85–7.62)
NEUTS SEG NFR BLD AUTO: 56 % (ref 43–75)
NITRITE UR QL STRIP: NEGATIVE
NON-SQ EPI CELLS URNS QL MICRO: ABNORMAL /HPF
NRBC BLD AUTO-RTO: 0 /100 WBCS
PH UR STRIP.AUTO: 6.5 [PH]
PLATELET # BLD AUTO: 200 THOUSANDS/UL (ref 149–390)
PMV BLD AUTO: 10.4 FL (ref 8.9–12.7)
POTASSIUM SERPL-SCNC: 4 MMOL/L (ref 3.5–5.3)
PROT SERPL-MCNC: 6.4 G/DL (ref 6.4–8.4)
PROT UR STRIP-MCNC: ABNORMAL MG/DL
PROTHROMBIN TIME: 12.9 SECONDS (ref 11.6–14.5)
RBC # BLD AUTO: 5.03 MILLION/UL (ref 3.81–5.12)
RBC #/AREA URNS AUTO: ABNORMAL /HPF
RH BLD: POSITIVE
SODIUM SERPL-SCNC: 142 MMOL/L (ref 135–147)
SP GR UR STRIP.AUTO: 1.02 (ref 1–1.03)
SPECIMEN EXPIRATION DATE: NORMAL
UROBILINOGEN UR STRIP-ACNC: <2 MG/DL
WBC # BLD AUTO: 5.81 THOUSAND/UL (ref 4.31–10.16)
WBC #/AREA URNS AUTO: ABNORMAL /HPF

## 2022-11-01 NOTE — PROGRESS NOTES
Daily Note     Today's date: 2022  Patient name: Miguel Carrasco  : 1950  MRN: [id number]  Referring provider: Chivo Abreu PA-C  Dx:   Encounter Diagnosis     ICD-10-CM    1  Lymphedema  I89 0                   Subjective: My right arm is red, itchy, a little warm, but not painful      Objective: See treatment diary below      Assessment: Tolerated treatment well  Pt demon slight red irritation R forearm, denies any pain with pt encourage to avoid scratching  Pt review s/s's of infection/cellulitis    Trial SSB R UE  Pt struggles with self massage and reported may have daughter come upcoming PT session for cross training of MLD/ compression bandageing  Patient would benefit from continued PT      Plan: Continue per plan of care        Precautions: hx of R breast CA with mastectomy, psych disorder, renal disorder, recent R foot fx with removal of boot 10/1/22, fall risk, imbalance    Re-eval Date: 11/3/22    Date   10/6 10/11 10/18 11/1    Visit Count 1 2 3 4    FOTO Comp 10/6       Pain In  see eval       Pain Out  see eval               Manuals 10/6       MLD By PTA--CV  30' Short neck  Diaphragmatic breath  AIA  R UE      Pt /pt daughter   educ  lymph massage  Short neck  L axilla  Femoral triangle  Deltoid  AIA  Poster IAA  Pt self perf with max cuing   Short neck  Diaphragmatic breath  Deep abdominal viseral   R AIA & posterior IAA  Anterior thorax  R UE    Pt review self lymph massage    Pt educ   UE lymph exer    Added pec stretch gentle    Total 60 min Short neck  Diaphragmatic breath  Deep abdominal viseral   R AIA & posterior IAA  Anterior thorax  R UE    Pt review self lymph massage    Pt educ   UE lymph exer    Pt placed in SSB:   6/8/10x2 cm  With soft Tubi/artiflex/white foam  R UE    Total 60 min                            Neuro Re-Ed                                                                Ther Ex                                                                        Ther Activity                        Gait Training                        Modalities                            Access Code: [email or web address]  URL: [email or web address]  Date: 10/18/2022  Prepared by: Odalys Zheng    Exercises  · Doorway Pec Stretch at 60 Degrees Abduction with Arm Straight - 1 x daily - 7 x weekly - 3 sets - 30 sec hold

## 2022-11-02 ENCOUNTER — TELEPHONE (OUTPATIENT)
Dept: PODIATRY | Facility: CLINIC | Age: 72
End: 2022-11-02

## 2022-11-02 NOTE — TELEPHONE ENCOUNTER
Caller: Pt  Doctor: Janay Mistry    Reason for call: Patient not sure where to go for shot    11/15/22    Call back#:

## 2022-11-02 NOTE — TELEPHONE ENCOUNTER
I spoke to patient and advised her to reschedule her foot injection as she is scheduled for her knee replacement surgery the very next day  Patient verbalized understanding and was transferred to scheduling to arrange an appointment for after her surgery

## 2022-11-03 ENCOUNTER — PREP FOR PROCEDURE (OUTPATIENT)
Dept: OBGYN CLINIC | Facility: CLINIC | Age: 72
End: 2022-11-03

## 2022-11-03 ENCOUNTER — OFFICE VISIT (OUTPATIENT)
Dept: PHYSICAL THERAPY | Facility: CLINIC | Age: 72
End: 2022-11-03

## 2022-11-03 DIAGNOSIS — I89.0 LYMPHEDEMA: Primary | ICD-10-CM

## 2022-11-03 LAB — BACTERIA UR CULT: NORMAL

## 2022-11-03 RX ORDER — ASPIRIN 81 MG/1
81 TABLET ORAL DAILY
COMMUNITY
End: 2022-11-22

## 2022-11-03 RX ORDER — PHENOL 1.4 %
AEROSOL, SPRAY (ML) MUCOUS MEMBRANE
COMMUNITY

## 2022-11-03 NOTE — PRE-PROCEDURE INSTRUCTIONS
Pre-Surgery Instructions:   Medication Instructions   • acetaminophen (TYLENOL) 650 mg CR tablet Uses PRN- OK to take day of surgery   • albuterol (2 5 mg/3 mL) 0 083 % nebulizer solution Uses PRN- OK to take day of surgery   • albuterol (PROAIR HFA) 90 mcg/act inhaler Uses PRN- OK to take day of surgery   • ascorbic acid (VITAMIN C) 500 MG tablet Hold day of surgery  • aspirin (ECOTRIN LOW STRENGTH) 81 mg EC tablet Instructions provided by MD   • atorvastatin (LIPITOR) 20 mg tablet Take night before surgery   • buPROPion (WELLBUTRIN XL) 300 mg 24 hr tablet Take day of surgery  • butalbital-acetaminophen-caffeine (FIORICET,ESGIC) -40 mg per tablet Uses PRN- OK to take day of surgery   • DULoxetine (CYMBALTA) 60 mg delayed release capsule Take day of surgery  • ergocalciferol (VITAMIN D2) 50,000 units Continue normal schedule   • ferrous sulfate 324 (65 Fe) mg Hold day of surgery  • folic acid (FOLVITE) 1 mg tablet Hold day of surgery  • linaCLOtide (Linzess) 145 MCG CAPS Take day of surgery  • loratadine (CLARITIN) 10 mg tablet Take day of surgery  • Melatonin 10 MG TABS Stop taking 7 days prior to surgery  • Nutritional Supplements (BOOST BREEZE PO) Hold day of surgery  • oxybutynin (DITROPAN-XL) 10 MG 24 hr tablet Take night before surgery   Covid screening negative as per patient  Fully vaccinated  Reviewed showering and medication instructions  Take medications morning of surgery with a small sip of water  Patient verbalized understanding  Advised NPO after MN and ASC will call with scheduled surgical time  Instructed to stop NSAIDS and non prescribed vitamins 7 days prior to DOS  Tylenol is OK to take  Confirmed that she received [facility] book[facility]  She stated she has read over the information  See Geriatric Assessment below         • Cognitive Assessment:   • CAM:   • TUG <15 sec:  • Falls (last 6 months): Frequently falls  • Hand  score:  -Attempt 1:  -Attempt 2:  -Attempt 3:  • Dimitri Total Score: 20  • PHQ- 9 Depression Scale: 12 Hx depression  On medications and sees therapist  • Nutrition Assessment Score:8 Eating better and lost 10 lbs  • METS:4 36 SOB with exertion  Walks with walker which limits activity  • Health goals:  -What are your overall health goals? (quit smoking, wt  loss, rest, decrease stress) "I want to get mobile again and lose weight "    -What brings you strength? (family, friends, [employer]ianism, health) "L[employer] and family  "    -What activities are important to you? (exercise, reading, travel, work)  "I like to chat with friends  Swim, Read, and do puzzles "    Lives in 2 story house with stair glide  She lives with supportive daughter  She takes boost daily to help with vitamins and nutrition  She does not sleep well  Had covid in August and still does not smell or taste well

## 2022-11-03 NOTE — PROGRESS NOTES
Daily Note     Today's date: 11/3/2022  Patient name: Deo Swain  : 1950  MRN: [id number]  Referring provider: Priyanka See PA-C  Dx:   Encounter Diagnosis     ICD-10-CM    1  Lymphedema  I89 0                   Subjective: I forgot my compression  SSB/wrap felt good after last PT session      Objective: See treatment diary below      Assessment: Tolerated treatment well  Noted > edema R UE 1* proximal / deltoid  Skin integrity intact  Pt trial pneumatic pump with good rashel  Note reduction/firmness end rx session   Patient would benefit from continued PT      Plan: Continue per plan of care        Precautions: hx of R breast CA with mastectomy, psych disorder, renal disorder, recent R foot fx with removal of boot 10/1/22, fall risk, imbalance    Re-eval Date: 11/3/22    Date   10/6 10/11 10/18 11/1 11/3   Visit Count 1 2 3 4 5    FOTO - DC Comp 10/6    completed   Pain In  see eval       Pain Out  see eval               Manuals 10/6       MLD By PTA--CV  30' Short neck  Diaphragmatic breath  AIA  R UE      Pt /pt daughter   educ  lymph massage  Short neck  L axilla  Femoral triangle  Deltoid  AIA  Poster IAA  Pt self perf with max cuing   Short neck  Diaphragmatic breath  Deep abdominal viseral   R AIA & posterior IAA  Anterior thorax  R UE    Pt review self lymph massage    Pt educ   UE lymph exer    Added pec stretch gentle    Total 60 min Short neck  Diaphragmatic breath  Deep abdominal viseral   R AIA & posterior IAA  Anterior thorax  R UE    Pt review self lymph massage    Pt educ   UE lymph exer    Pt placed in SSB:   6/8/10x2 cm  With soft Tubi/artiflex/white foam  R UE    Total 60 min Short neck  Diaphragmatic breath  Deep abdominal viseral   R AIA & posterior IAA  Anterior thorax  R UE    Pt review self lymph massage    Trial pneumatic pump 40 mmHg R UE during R AIA  15 min    Held placement of SSB 2* pt forgot at home    60 min                           Neuro Re-Ed Ther Ex                                                                        Ther Activity                        Gait Training                        Modalities                            Access Code: [email or web address]  URL: [email or web address]  Date: 10/18/2022  Prepared by: Aj Rios    Exercises  · Doorway Pec Stretch at 60 Degrees Abduction with Arm Straight - 1 x daily - 7 x weekly - 3 sets - 30 sec hold

## 2022-11-04 ENCOUNTER — TELEPHONE (OUTPATIENT)
Dept: UROLOGY | Facility: CLINIC | Age: 72
End: 2022-11-04

## 2022-11-04 NOTE — TELEPHONE ENCOUNTER
----- Message from [address] [place] [address] sent at 11/3/2022 12:03 PM EDT -----  Please let patient know her urine testing did not show any signs of infection

## 2022-11-07 ENCOUNTER — TELEPHONE (OUTPATIENT)
Dept: PODIATRY | Facility: CLINIC | Age: 72
End: 2022-11-07

## 2022-11-07 DIAGNOSIS — E78.5 HYPERLIPIDEMIA, UNSPECIFIED HYPERLIPIDEMIA TYPE: ICD-10-CM

## 2022-11-07 RX ORDER — ATORVASTATIN CALCIUM 20 MG/1
TABLET, FILM COATED ORAL
Qty: 30 TABLET | Refills: 0 | Status: ON HOLD | OUTPATIENT
Start: 2022-11-07

## 2022-11-08 ENCOUNTER — EVALUATION (OUTPATIENT)
Dept: PHYSICAL THERAPY | Facility: CLINIC | Age: 72
End: 2022-11-08

## 2022-11-08 DIAGNOSIS — I89.0 LYMPHEDEMA: Primary | ICD-10-CM

## 2022-11-08 NOTE — PROGRESS NOTES
Daily Note     Today's date: 2022  Patient name: Lakshmi Soares  : 1950  MRN: [id number]  Referring provider: Juan J Mosley PA-C  Dx:   Encounter Diagnosis     ICD-10-CM    1  Lymphedema  I89 0                   Subjective: I had a salty/shrimp dish last night and had a difficulty time BL hand/swelling  I ordered a night time UE garment      Objective: See treatment diary below      Assessment: Tolerated treatment well  Pt demon good skin integrity  Noted increase volume R UE with pt looking into night time garment  Pt encourage to look into updating daytime garments   Looking to update pneumatic pump R UE  Patient would benefit from continued PT x 1visit for further decongestion R UE with upcoming R TKR surgery scheduled 22      Plan: Continue per plan of care  x1 visit     Precautions: hx of R breast CA with mastectomy, psych disorder, renal disorder, recent R foot fx with removal of boot 10/1/22, fall risk, imbalance    Re-eval Date: 11/3/22    Date        Visit Count 6       FOTO - DC        Pain In        Pain Out                Manuals        MLD Short neck  Diaphragmatic breath  Deep abdominal viseral   R AIA & posterior IAA  Anterior thorax  R UE    Pt review self lymph massage    Pt placed in SSB:   6/8/10x2 cm  With soft Tubi/artiflex/white foam  R UE    60 min                               Neuro Re-Ed                                                                Ther Ex                                                                        Ther Activity                        Gait Training                        Modalities                            Access Code: [email or web address]  URL: [email or web address]  Date: 10/18/2022  Prepared by: Harper Neil    Exercises  · Doorway Pec Stretch at 60 Degrees Abduction with Arm Straight - 1 x daily - 7 x weekly - 3 sets - 30 sec hold

## 2022-11-09 ENCOUNTER — CONSULT (OUTPATIENT)
Dept: INTERNAL MEDICINE CLINIC | Facility: CLINIC | Age: 72
End: 2022-11-09

## 2022-11-09 VITALS
HEIGHT: 57 IN | HEART RATE: 86 BPM | DIASTOLIC BLOOD PRESSURE: 70 MMHG | SYSTOLIC BLOOD PRESSURE: 130 MMHG | OXYGEN SATURATION: 97 % | WEIGHT: 189.25 LBS | TEMPERATURE: 97.9 F | BODY MASS INDEX: 40.83 KG/M2

## 2022-11-09 DIAGNOSIS — K52.9 COLITIS: ICD-10-CM

## 2022-11-09 DIAGNOSIS — Z01.818 PRE-OPERATIVE CLEARANCE: Primary | ICD-10-CM

## 2022-11-09 DIAGNOSIS — Z01.811 PRE-OP CHEST EXAM: ICD-10-CM

## 2022-11-09 DIAGNOSIS — Z01.818 PRE-OP TESTING: ICD-10-CM

## 2022-11-09 RX ORDER — CIPROFLOXACIN 500 MG/1
500 TABLET, FILM COATED ORAL EVERY 12 HOURS SCHEDULED
Qty: 20 TABLET | Refills: 0 | Status: ON HOLD | OUTPATIENT
Start: 2022-11-09 | End: 2022-11-19

## 2022-11-09 RX ORDER — METRONIDAZOLE 250 MG/1
250 TABLET ORAL EVERY 8 HOURS SCHEDULED
Qty: 30 TABLET | Refills: 0 | Status: ON HOLD | OUTPATIENT
Start: 2022-11-09 | End: 2022-11-19

## 2022-11-09 NOTE — PROGRESS NOTES
Subjective:     Lizabeth Fisher is a 81 y o  female who presents to the office today for a preoperative consultation at the request of surgeon Dr Randi Nava who plans on performing Right TKR on November 16  Planned anesthesia: general  The patient has the following known anesthesia issues: past endotracheal intubation with complications (none)  Patients bleeding risk: no recent abnormal bleeding  Patient does not have objections to receiving blood products if needed  The following portions of the patient's history were reviewed and updated as appropriate:   She  has a past medical history of Anxiety, Arthritis, Asthma, Breast cancer ([facility] [address] ), Cancer ([facility] [address] ), Cataract, CHF (congestive heart failure) ([facility] ), Chronic headaches, Concussion, Coronary artery disease, Depression, Dietary lactose intolerance, GERD (gastroesophageal reflux disease), High cholesterol, History of chemotherapy, History of transfusion, Hypertension, IBS (irritable bowel syndrome), Irritable bowel syndrome (IBS) (05/31/2016), Kidney stone, Lymphedema of right arm, Obesity, PFO (patent foramen ovale), PONV (postoperative nausea and vomiting), Post-menopausal, Psychiatric disorder, Renal disorder, Shortness of breath, and Vitamin D deficiency    She   Patient Active Problem List    Diagnosis Date Noted   • Chronic constipation 10/11/2022   • Mixed stress and urge urinary incontinence 10/10/2022   • Renal cyst 10/10/2022   • Seasonal allergies 07/21/2022   • OAB (overactive bladder) 07/21/2022   • Continuous opioid dependence ([facility] ) 01/10/2022   • Seborrheic keratoses 01/10/2022   • Age-related osteoporosis without current pathological fracture 06/09/2021   • Dermatitis 05/25/2021   • Acute metabolic encephalopathy 63/52/0137   • Encounter for screening for COVID-19 05/24/2021   • S/P total knee arthroplasty, left 05/24/2021   • Sinus tachycardia 05/22/2021   • HX: breast cancer 10/12/2020   • Primary osteoarthritis of left knee 09/22/2020   • Nephrolithiasis 09/10/2020   • Scoliosis 09/10/2020   • Swelling of joint of left knee 05/19/2020   • Age-related osteoporosis without current pathological fracture 02/26/2020   • Primary insomnia 12/03/2019   • Neuropathy 12/03/2019   • Right foot pain 10/24/2019   • Tinea cruris 10/24/2019   • Recurrent major depressive disorder, in partial remission ([facility] ) 02/22/2019   • Other chronic pancreatitis ([facility] ) 02/22/2019   • Body mass index (BMI) of 40 0-44 9 in adult [facility]) 02/22/2019   • Bilateral carpal tunnel syndrome 06/19/2017   • Mild intermittent asthma without complication 30/02/9691   • Lymphedema 06/07/2017   • Generalized anxiety disorder 06/10/2016   • Patent foramen ovale 06/10/2016   • Tension type headache 06/10/2016   • Essential hypertension 06/07/2016   • Irritable bowel syndrome (IBS) 05/31/2016   • Chronic musculoskeletal pain 01/29/2016   • Vitamin D deficiency 01/20/2016   • Hyperlipidemia LDL goal <130 12/07/2015     She  has a past surgical history that includes Tonsillectomy and adenoidectomy; Breast surgery; Cholecystectomy; Colonoscopy (N/A, 05/31/2016); Gallbladder surgery; Hysterectomy; Cataract extraction, bilateral; Mastectomy (Right); Knee arthroscopy; Knee surgery; Cataract extraction (Bilateral); pr total knee arthroplasty (Left, 05/20/2021); and Joint replacement  Her family history includes Breast cancer in her maternal aunt, maternal aunt, maternal aunt, maternal aunt, maternal aunt, and maternal grandmother; Depression in her mother; Diabetes in her mother; Heart disease in her brother and mother; Mental illness in her mother; No Known Problems in her daughter, father, sister, sister, sister, and sister  She was adopted  She  reports that she has never smoked  She has never used smokeless tobacco  She reports current alcohol use of about 1 0 standard drink of alcohol per week  She reports previous drug use  Drug: Marijuana    Current Outpatient Medications   Medication Sig Dispense Refill   • acetaminophen (TYLENOL) 650 mg CR tablet Take 1 tablet (650 mg total) by mouth every 8 (eight) hours as needed for mild pain 30 tablet 0   • albuterol (2 5 mg/3 mL) 0 083 % nebulizer solution Take 2 5 mg by nebulization every 6 (six) hours as needed for wheezing  • albuterol (PROAIR HFA) 90 mcg/act inhaler Inhale 2 puffs every 4 (four) hours as needed for wheezing 3 Inhaler 1   • ascorbic acid (VITAMIN C) 500 MG tablet Take 1 tablet (500 mg total) by mouth daily 30 tablet 1   • aspirin (ECOTRIN LOW STRENGTH) 81 mg EC tablet Take 81 mg by mouth daily     • atorvastatin (LIPITOR) 20 mg tablet Take 1 tablet by mouth daily  30 tablet 0   • buPROPion (WELLBUTRIN XL) 300 mg 24 hr tablet Take 1 tablet (300 mg total) by mouth every morning 90 tablet 3   • butalbital-acetaminophen-caffeine (FIORICET,ESGIC) -40 mg per tablet Take 1 tablet by mouth 3 (three) times a day as needed for headaches 90 tablet 1   • ciprofloxacin (CIPRO) 500 mg tablet Take 1 tablet (500 mg total) by mouth every 12 (twelve) hours for 10 days 20 tablet 0   • DULoxetine (CYMBALTA) 60 mg delayed release capsule Take 1 capsule by mouth twice daily   180 capsule 0   • ergocalciferol (VITAMIN D2) 50,000 units Take 1 capsule (50,000 Units total) by mouth once a week 4 capsule 3   • ferrous sulfate 324 (65 Fe) mg Take 1 tablet (324 mg total) by mouth 2 (two) times a day before meals 60 tablet 1   • folic acid (FOLVITE) 1 mg tablet Take 1 tablet (1 mg total) by mouth daily 30 tablet 1   • linaCLOtide (Linzess) 145 MCG CAPS Take 1 capsule (145 mcg total) by mouth in the morning 30 capsule 2   • loratadine (CLARITIN) 10 mg tablet Take 10 mg by mouth daily     • Melatonin 10 MG TABS Take by mouth     • metroNIDAZOLE (FLAGYL) 250 mg tablet Take 1 tablet (250 mg total) by mouth every 8 (eight) hours for 10 days 30 tablet 0   • Multiple Vitamins-Minerals (multivitamin with minerals) tablet Take 1 tablet by mouth daily 30 tablet 1   • Nutritional Supplements (BOOST BREEZE PO) Take by mouth daily     • oxybutynin (DITROPAN-XL) 10 MG 24 hr tablet Take 1 tablet (10 mg total) by mouth daily at bedtime 30 tablet 12     No current facility-administered medications for this visit  Current Outpatient Medications on File Prior to Visit   Medication Sig   • acetaminophen (TYLENOL) 650 mg CR tablet Take 1 tablet (650 mg total) by mouth every 8 (eight) hours as needed for mild pain   • albuterol (2 5 mg/3 mL) 0 083 % nebulizer solution Take 2 5 mg by nebulization every 6 (six) hours as needed for wheezing  • albuterol (PROAIR HFA) 90 mcg/act inhaler Inhale 2 puffs every 4 (four) hours as needed for wheezing   • ascorbic acid (VITAMIN C) 500 MG tablet Take 1 tablet (500 mg total) by mouth daily   • aspirin (ECOTRIN LOW STRENGTH) 81 mg EC tablet Take 81 mg by mouth daily   • atorvastatin (LIPITOR) 20 mg tablet Take 1 tablet by mouth daily  • buPROPion (WELLBUTRIN XL) 300 mg 24 hr tablet Take 1 tablet (300 mg total) by mouth every morning   • butalbital-acetaminophen-caffeine (FIORICET,ESGIC) -40 mg per tablet Take 1 tablet by mouth 3 (three) times a day as needed for headaches   • DULoxetine (CYMBALTA) 60 mg delayed release capsule Take 1 capsule by mouth twice daily     • ergocalciferol (VITAMIN D2) 50,000 units Take 1 capsule (50,000 Units total) by mouth once a week   • ferrous sulfate 324 (65 Fe) mg Take 1 tablet (324 mg total) by mouth 2 (two) times a day before meals   • folic acid (FOLVITE) 1 mg tablet Take 1 tablet (1 mg total) by mouth daily   • linaCLOtide (Linzess) 145 MCG CAPS Take 1 capsule (145 mcg total) by mouth in the morning   • loratadine (CLARITIN) 10 mg tablet Take 10 mg by mouth daily   • Melatonin 10 MG TABS Take by mouth   • Multiple Vitamins-Minerals (multivitamin with minerals) tablet Take 1 tablet by mouth daily   • Nutritional Supplements (BOOST BREEZE PO) Take by mouth daily   • oxybutynin (DITROPAN-XL) 10 MG 24 hr tablet Take 1 tablet (10 mg total) by mouth daily at bedtime     No current facility-administered medications on file prior to visit  She is allergic to ibuprofen, morphine, and latex       Review of Systems  Constitutional: negative  Ears, nose, mouth, throat, and face: negative  Respiratory: negative  Cardiovascular: negative  Gastrointestinal: positive for abdominal pain  Hematologic/lymphatic: negative  Musculoskeletal:negative  Neurological: negative  Behavioral/Psych: negative     Objective:     /70 (BP Location: Left arm, Patient Position: Sitting)   Pulse 86   Temp 97 9 °F (36 6 °C)   Ht 4' 9" (1 448 m)   Wt 85 8 kg (189 lb 4 oz)   LMP  (LMP Unknown)   SpO2 97%   BMI 40 95 kg/m²     General Appearance:    Alert, cooperative, no distress, appears stated age   Head:    Normocephalic, without obvious abnormality, atraumatic   Eyes:    PERRL, conjunctiva/corneas clear, EOM's intact, fundi     benign, both eyes   Ears:    Normal TM's and external ear canals, both ears   Nose:   Nares normal, septum midline, mucosa normal, no drainage    or sinus tenderness   Throat:   Lips, mucosa, and tongue normal; teeth and gums normal   Neck:   Supple, symmetrical, trachea midline, no adenopathy;     thyroid:  no enlargement/tenderness/nodules; no carotid    bruit or JVD   Back:     Symmetric, no curvature, ROM normal, no CVA tenderness   Lungs:     Clear to auscultation bilaterally, respirations unlabored   Chest Wall:    No tenderness or deformity    Heart:    Regular rate and rhythm, S1 and S2 normal, no murmur, rub   or gallop   Breast Exam:    No tenderness, masses, or nipple abnormality   Abdomen:     Soft, non-tender, bowel sounds active all four quadrants,     no masses, no organomegaly   Genitalia:    Normal female without lesion, discharge or tenderness   Rectal:    Normal tone, no masses or tenderness; guaiac negative stool   Extremities:   Extremities normal, atraumatic, no cyanosis or edema   Pulses:   2+ and symmetric all extremities   Skin:   Skin color, texture, turgor normal, no rashes or lesions   Lymph nodes:   Cervical, supraclavicular, and axillary nodes normal   Neurologic:   CNII-XII intact, normal strength, sensation and reflexes     throughout         Cardiographics  ECG: normal sinus rhythm, no blocks or conduction defects, no ischemic changes      Imaging  Chest x-ray: not required   Recent CT a/p reveals possible mild colitis vs diverticulitis     Lab Review   Lab on 11/01/2022   Component Date Value   • Sodium 11/01/2022 142    • Potassium 11/01/2022 4 0    • Chloride 11/01/2022 110 (A)   • CO2 11/01/2022 29    • ANION GAP 11/01/2022 3 (A)   • BUN 11/01/2022 14    • Creatinine 11/01/2022 0 67    • Glucose 11/01/2022 115    • Calcium 11/01/2022 9 2    • Corrected Calcium 11/01/2022 9 8    • AST 11/01/2022 22    • ALT 11/01/2022 33    • Alkaline Phosphatase 11/01/2022 79    • Total Protein 11/01/2022 6 4    • Albumin 11/01/2022 3 2 (A)   • Total Bilirubin 11/01/2022 0 52    • eGFR 11/01/2022 89    • WBC 11/01/2022 5 81    • RBC 11/01/2022 5 03    • Hemoglobin 11/01/2022 15 1    • Hematocrit 11/01/2022 46 6 (A)   • MCV 11/01/2022 93    • MCH 11/01/2022 30 0    • MCHC 11/01/2022 32 4    • RDW 11/01/2022 12 0    • MPV 11/01/2022 10 4    • Platelets 60/37/6188 200    • nRBC 11/01/2022 0    • Neutrophils Relative 11/01/2022 56    • Immat GRANS % 11/01/2022 0    • Lymphocytes Relative 11/01/2022 29    • Monocytes Relative 11/01/2022 12    • Eosinophils Relative 11/01/2022 2    • Basophils Relative 11/01/2022 1    • Neutrophils Absolute 11/01/2022 3 25    • Immature Grans Absolute 11/01/2022 0 02    • Lymphocytes Absolute 11/01/2022 1 69    • Monocytes Absolute 11/01/2022 0 67    • Eosinophils Absolute 11/01/2022 0 14    • Basophils Absolute 11/01/2022 0 04    • Protime 11/01/2022 12 9    • INR 11/01/2022 0 96    • PTT 11/01/2022 26    • Urine Culture 11/01/2022 10,000-19,000 cfu/ml     • Hemoglobin A1C 11/01/2022 5 8 (A)   • EAG 11/01/2022 120    • ABO Grouping 11/01/2022 A    • Rh Factor 11/01/2022 Positive    • Antibody Screen 11/01/2022 Negative    • Specimen Expiration Date 11/01/2022 [id number]    Telephone on 10/12/2022   Component Date Value   • Color, UA 11/01/2022 Yellow    • Clarity, UA 11/01/2022 Clear    • Specific Conner, UA 11/01/2022 1 022    • pH, UA 11/01/2022 6 5    • Leukocytes, UA 11/01/2022 Negative    • Nitrite, UA 11/01/2022 Negative    • Protein, UA 11/01/2022 Trace (A)   • Glucose, UA 11/01/2022 Negative    • Ketones, UA 11/01/2022 Negative    • Urobilinogen, UA 11/01/2022 <2 0    • Bilirubin, UA 11/01/2022 Negative    • Occult Blood, UA 11/01/2022 Negative    • RBC, UA 11/01/2022 1-2    • WBC, UA 11/01/2022 2-4 (A)   • Epithelial Cells 11/01/2022 Moderate (A)   • Bacteria, UA 11/01/2022 Occasional    • MUCUS THREADS 11/01/2022 Occasional (A)   • Ca Oxalate Jackie, UA 11/01/2022 Occasional (A)   Office Visit on 10/10/2022   Component Date Value   • Color, UA 10/10/2022 Yellow    • Clarity, UA 10/10/2022 Turbid    • Specific Gravity, UA 10/10/2022 1 023    • pH, UA 10/10/2022 5 5    • Leukocytes, UA 10/10/2022 Negative    • Nitrite, UA 10/10/2022 Negative    • Protein, UA 10/10/2022 Trace (A)   • Glucose, UA 10/10/2022 Negative    • Ketones, UA 10/10/2022 Negative    • Urobilinogen, UA 10/10/2022 <2 0    • Bilirubin, UA 10/10/2022 Negative    • Occult Blood, UA 10/10/2022 Negative    • RBC, UA 10/10/2022 None Seen    • WBC, UA 10/10/2022 1-2    • Epithelial Cells 10/10/2022 Occasional    • Bacteria, UA 10/10/2022 Occasional    • MUCUS THREADS 10/10/2022 Occasional (A)   • Ca Oxalate Jackie, UA 10/10/2022 Innumerable (A)   • Urine Culture 10/10/2022 <10,000 cfu/ml          Assessment:     81 y o  female with planned surgery as above  Known risk factors for perioperative complications: None    Difficulty with intubation is not anticipated      Cardiac Risk Estimation: low    Current medications which may produce withdrawal symptoms if withheld perioperatively: none      Plan:     1  Preoperative workup as follows ECG, hemoglobin, hematocrit, electrolytes, creatinine, glucose, coagulation studies, urinalysis (urinary tract instrumentation planned)  2  Change in medication regimen before surgery: discontinue ASA 14 days before surgery  3  [place] risk Index Score: 5 pts for age giving pt a less than 1% chance of perioperative cardiac complications  Pt may proceed with surgery  4  Possible colitis/diverticulitis: Start cipro and flagyl  Directions for use and possible side effects discussed and patient verbalized understanding of these

## 2022-11-10 NOTE — PROGRESS NOTES
Daily Note     Today's date: 2022  Patient name: Noe Luna  : 1950  MRN: [id number]  Referring provider: Adan Moy PA-C  Dx:   Encounter Diagnosis     ICD-10-CM    1  Lymphedema  I89 0                   Subjective: No new co's  The compression bandages really seem to help/feel good      Objective: See treatment diary below      Assessment: Tolerated treatment well  Demon reduce firmness/decongestion R UE  Pt schedule apt time for set up for pneumatic pump for R UE next week  Pt demon good skin integrity  Patient report independent with UE lymph exer and good understanding of self massage        Plan: DC to self maintenance lymphedema program     Precautions: hx of R breast CA with mastectomy, psych disorder, renal disorder, recent R foot fx with removal of boot 10/1/22, fall risk, imbalance    Re-eval Date: 11/3/22    Date       Visit Count 6 7      FOTO - DC        Pain In        Pain Out                Manuals        MLD Short neck  Diaphragmatic breath  Deep abdominal viseral   R AIA & posterior IAA  Anterior thorax  R UE    Pt review self lymph massage    Pt placed in SSB:   6/8/10x2 cm  With soft Tubi/artiflex/white foam  R UE    60 min Short neck  Diaphragmatic breath  Deep abdominal viseral   R AIA & posterior IAA  Anterior thorax  R UE    Pt review self lymph massage/ UE lymph exer    Pt educ s/s's of infection/cellulitis, contraindications with pneumatic pump R UE    Pt placed in SSB:   6/8/10x2 cm  With soft Tubi/artiflex/white foam  R UE    60 min                              Neuro Re-Ed                                                                Ther Ex                                                                        Ther Activity                        Gait Training                        Modalities                            Access Code: [email or web address]  URL: [email or web address]  Date: 10/18/2022  Prepared by: Les Campbell Renea Dorado    Exercises  · Doorway Pec Stretch at 60 Degrees Abduction with Arm Straight - 1 x daily - 7 x weekly - 3 sets - 30 sec hold

## 2022-11-11 ENCOUNTER — OFFICE VISIT (OUTPATIENT)
Dept: PHYSICAL THERAPY | Facility: CLINIC | Age: 72
End: 2022-11-11

## 2022-11-11 DIAGNOSIS — I89.0 LYMPHEDEMA: Primary | ICD-10-CM

## 2022-11-15 NOTE — ANESTHESIA PREPROCEDURE EVALUATION
Procedure:  ARTHROPLASTY KNEE TOTAL (Right Knee)    EF 60%,     Relevant Problems   CARDIO   (+) Essential hypertension   (+) Hyperlipidemia LDL goal <130      GI/HEPATIC   (+) Other chronic pancreatitis (HCC)      /RENAL   (+) Nephrolithiasis   (+) Renal cyst      MUSCULOSKELETAL   (+) Primary osteoarthritis of left knee   (+) Scoliosis      NEURO/PSYCH   (+) Chronic musculoskeletal pain   (+) Continuous opioid dependence (HCC)   (+) Generalized anxiety disorder   (+) HX: breast cancer   (+) Recurrent major depressive disorder, in partial remission (HCC)   (+) Tension type headache      PULMONARY   (+) Mild intermittent asthma without complication      Other   (+) S/P total knee arthroplasty, left        Physical Exam    Airway    Mallampati score: III  TM Distance: >3 FB  Neck ROM: full     Dental   upper dentures and lower dentures,     Cardiovascular  Comment: Negative ROS, Rhythm: regular, Rate: normal, Cardiovascular exam normal    Pulmonary  Pulmonary exam normal Breath sounds clear to auscultation,     Other Findings        Anesthesia Plan  ASA Score- 3     Anesthesia Type- spinal with ASA Monitors  Additional Monitors:   Airway Plan: ETT  Comment: Discussed the risks/benefits of neuraxial anesthesia, including risk of bleeding/infection/damage to underlying structures, the likely side effect of nausea, and unlikely complication of spinal headache  Plan to maintain native airway with EtCO2 monitoring under spinal anesthesia, general anesthesia with OETT discussed as backup          Plan Factors-Exercise tolerance (METS): >4 METS  Patient summary reviewed  Patient instructed to abstain from smoking on day of procedure  Patient did not smoke on day of surgery  Induction-     Postoperative Plan- Plan for postoperative opioid use  Informed Consent- Anesthetic plan and risks discussed with patient  I personally reviewed this patient with the CRNA   Discussed and agreed on the Anesthesia Plan with the LYNETTE Montes

## 2022-11-16 ENCOUNTER — APPOINTMENT (OUTPATIENT)
Dept: RADIOLOGY | Facility: HOSPITAL | Age: 72
End: 2022-11-16

## 2022-11-16 ENCOUNTER — HOSPITAL ENCOUNTER (INPATIENT)
Facility: HOSPITAL | Age: 72
End: 2022-11-16
Attending: ORTHOPAEDIC SURGERY | Admitting: ORTHOPAEDIC SURGERY

## 2022-11-16 ENCOUNTER — ANESTHESIA (OUTPATIENT)
Dept: PERIOP | Facility: HOSPITAL | Age: 72
End: 2022-11-16

## 2022-11-16 DIAGNOSIS — M81.0 AGE-RELATED OSTEOPOROSIS WITHOUT CURRENT PATHOLOGICAL FRACTURE: Primary | ICD-10-CM

## 2022-11-16 DIAGNOSIS — M17.11 PRIMARY OSTEOARTHRITIS OF RIGHT KNEE: Primary | ICD-10-CM

## 2022-11-16 PROBLEM — K52.9 COLITIS: Status: ACTIVE | Noted: 2022-11-16

## 2022-11-16 PROCEDURE — 0SRC0J9 REPLACEMENT OF RIGHT KNEE JOINT WITH SYNTHETIC SUBSTITUTE, CEMENTED, OPEN APPROACH: ICD-10-PCS | Performed by: ORTHOPAEDIC SURGERY

## 2022-11-16 DEVICE — IMPLANTABLE DEVICE
Type: IMPLANTABLE DEVICE | Site: KNEE | Status: FUNCTIONAL
Brand: NEXGEN®

## 2022-11-16 DEVICE — IMPLANTABLE DEVICE: Type: IMPLANTABLE DEVICE | Site: KNEE | Status: FUNCTIONAL

## 2022-11-16 DEVICE — IMPLANTABLE DEVICE
Type: IMPLANTABLE DEVICE | Site: KNEE | Status: FUNCTIONAL
Brand: NEXGEN® LEGACY®

## 2022-11-16 DEVICE — IMPLANTABLE DEVICE
Type: IMPLANTABLE DEVICE | Site: KNEE | Status: FUNCTIONAL
Brand: NEXGEN® LEGACY® GENDER SOLUTIONS™

## 2022-11-16 DEVICE — COMPONENT PATELLAR 8.5MM SZ 32 NEXGEN: Type: IMPLANTABLE DEVICE | Site: PATELLA | Status: FUNCTIONAL

## 2022-11-16 RX ORDER — BUPROPION HYDROCHLORIDE 150 MG/1
300 TABLET ORAL EVERY MORNING
Status: DISCONTINUED | OUTPATIENT
Start: 2022-11-16 | End: 2022-11-22 | Stop reason: HOSPADM

## 2022-11-16 RX ORDER — OXYBUTYNIN CHLORIDE 5 MG/1
10 TABLET, EXTENDED RELEASE ORAL
Status: DISCONTINUED | OUTPATIENT
Start: 2022-11-16 | End: 2022-11-22 | Stop reason: HOSPADM

## 2022-11-16 RX ORDER — DULOXETIN HYDROCHLORIDE 60 MG/1
60 CAPSULE, DELAYED RELEASE ORAL 2 TIMES DAILY
Status: DISCONTINUED | OUTPATIENT
Start: 2022-11-16 | End: 2022-11-22 | Stop reason: HOSPADM

## 2022-11-16 RX ORDER — CHLORHEXIDINE GLUCONATE 0.12 MG/ML
15 RINSE ORAL ONCE
Status: COMPLETED | OUTPATIENT
Start: 2022-11-16 | End: 2022-11-16

## 2022-11-16 RX ORDER — OXYCODONE HYDROCHLORIDE AND ACETAMINOPHEN 5; 325 MG/1; MG/1
1 TABLET ORAL EVERY 4 HOURS PRN
Status: DISCONTINUED | OUTPATIENT
Start: 2022-11-16 | End: 2022-11-22 | Stop reason: HOSPADM

## 2022-11-16 RX ORDER — BUPIVACAINE HYDROCHLORIDE 5 MG/ML
INJECTION, SOLUTION PERINEURAL
Status: DISCONTINUED | OUTPATIENT
Start: 2022-11-16 | End: 2022-11-16

## 2022-11-16 RX ORDER — MIDAZOLAM HYDROCHLORIDE 2 MG/2ML
INJECTION, SOLUTION INTRAMUSCULAR; INTRAVENOUS AS NEEDED
Status: DISCONTINUED | OUTPATIENT
Start: 2022-11-16 | End: 2022-11-16

## 2022-11-16 RX ORDER — PROPOFOL 10 MG/ML
INJECTION, EMULSION INTRAVENOUS CONTINUOUS PRN
Status: DISCONTINUED | OUTPATIENT
Start: 2022-11-16 | End: 2022-11-16

## 2022-11-16 RX ORDER — HYDROMORPHONE HCL IN WATER/PF 6 MG/30 ML
0.2 PATIENT CONTROLLED ANALGESIA SYRINGE INTRAVENOUS
Status: DISCONTINUED | OUTPATIENT
Start: 2022-11-16 | End: 2022-11-22 | Stop reason: HOSPADM

## 2022-11-16 RX ORDER — ATORVASTATIN CALCIUM 20 MG/1
20 TABLET, FILM COATED ORAL DAILY
Status: DISCONTINUED | OUTPATIENT
Start: 2022-11-16 | End: 2022-11-22 | Stop reason: HOSPADM

## 2022-11-16 RX ORDER — ALBUTEROL SULFATE 2.5 MG/3ML
2.5 SOLUTION RESPIRATORY (INHALATION) EVERY 6 HOURS PRN
Status: DISCONTINUED | OUTPATIENT
Start: 2022-11-16 | End: 2022-11-16

## 2022-11-16 RX ORDER — SODIUM CHLORIDE, SODIUM LACTATE, POTASSIUM CHLORIDE, CALCIUM CHLORIDE 600; 310; 30; 20 MG/100ML; MG/100ML; MG/100ML; MG/100ML
125 INJECTION, SOLUTION INTRAVENOUS CONTINUOUS
Status: DISCONTINUED | OUTPATIENT
Start: 2022-11-16 | End: 2022-11-16

## 2022-11-16 RX ORDER — OXYCODONE HYDROCHLORIDE AND ACETAMINOPHEN 5; 325 MG/1; MG/1
TABLET ORAL
Status: COMPLETED
Start: 2022-11-16 | End: 2022-11-16

## 2022-11-16 RX ORDER — ACETAMINOPHEN 325 MG/1
650 TABLET ORAL EVERY 4 HOURS PRN
Status: DISCONTINUED | OUTPATIENT
Start: 2022-11-16 | End: 2022-11-22 | Stop reason: HOSPADM

## 2022-11-16 RX ORDER — FENTANYL CITRATE 50 UG/ML
INJECTION, SOLUTION INTRAMUSCULAR; INTRAVENOUS AS NEEDED
Status: DISCONTINUED | OUTPATIENT
Start: 2022-11-16 | End: 2022-11-16

## 2022-11-16 RX ORDER — METRONIDAZOLE 500 MG/1
250 TABLET ORAL EVERY 8 HOURS SCHEDULED
Status: COMPLETED | OUTPATIENT
Start: 2022-11-16 | End: 2022-11-19

## 2022-11-16 RX ORDER — ALBUTEROL SULFATE 2.5 MG/3ML
2.5 SOLUTION RESPIRATORY (INHALATION) EVERY 6 HOURS PRN
Status: DISCONTINUED | OUTPATIENT
Start: 2022-11-16 | End: 2022-11-22 | Stop reason: HOSPADM

## 2022-11-16 RX ORDER — CEFAZOLIN SODIUM 1 G/50ML
1000 SOLUTION INTRAVENOUS EVERY 8 HOURS
Status: COMPLETED | OUTPATIENT
Start: 2022-11-16 | End: 2022-11-17

## 2022-11-16 RX ORDER — BUPIVACAINE HYDROCHLORIDE 7.5 MG/ML
INJECTION, SOLUTION INTRASPINAL AS NEEDED
Status: DISCONTINUED | OUTPATIENT
Start: 2022-11-16 | End: 2022-11-16

## 2022-11-16 RX ORDER — LANOLIN ALCOHOL/MO/W.PET/CERES
9 CREAM (GRAM) TOPICAL
Status: DISCONTINUED | OUTPATIENT
Start: 2022-11-16 | End: 2022-11-22 | Stop reason: HOSPADM

## 2022-11-16 RX ORDER — LORATADINE 10 MG/1
10 TABLET ORAL DAILY
Status: DISCONTINUED | OUTPATIENT
Start: 2022-11-16 | End: 2022-11-22 | Stop reason: HOSPADM

## 2022-11-16 RX ORDER — ONDANSETRON 2 MG/ML
4 INJECTION INTRAMUSCULAR; INTRAVENOUS ONCE AS NEEDED
Status: DISCONTINUED | OUTPATIENT
Start: 2022-11-16 | End: 2022-11-16 | Stop reason: HOSPADM

## 2022-11-16 RX ORDER — FENTANYL CITRATE/PF 50 MCG/ML
25 SYRINGE (ML) INJECTION
Status: DISCONTINUED | OUTPATIENT
Start: 2022-11-16 | End: 2022-11-16 | Stop reason: HOSPADM

## 2022-11-16 RX ORDER — OXYCODONE HYDROCHLORIDE AND ACETAMINOPHEN 5; 325 MG/1; MG/1
2 TABLET ORAL EVERY 6 HOURS PRN
Status: DISCONTINUED | OUTPATIENT
Start: 2022-11-16 | End: 2022-11-22 | Stop reason: HOSPADM

## 2022-11-16 RX ORDER — ONDANSETRON 2 MG/ML
4 INJECTION INTRAMUSCULAR; INTRAVENOUS EVERY 6 HOURS PRN
Status: DISCONTINUED | OUTPATIENT
Start: 2022-11-16 | End: 2022-11-22 | Stop reason: HOSPADM

## 2022-11-16 RX ORDER — MAGNESIUM HYDROXIDE/ALUMINUM HYDROXICE/SIMETHICONE 120; 1200; 1200 MG/30ML; MG/30ML; MG/30ML
30 SUSPENSION ORAL EVERY 6 HOURS PRN
Status: DISCONTINUED | OUTPATIENT
Start: 2022-11-16 | End: 2022-11-22 | Stop reason: HOSPADM

## 2022-11-16 RX ORDER — ALBUTEROL SULFATE 90 UG/1
2 AEROSOL, METERED RESPIRATORY (INHALATION) EVERY 4 HOURS PRN
Status: DISCONTINUED | OUTPATIENT
Start: 2022-11-16 | End: 2022-11-22 | Stop reason: HOSPADM

## 2022-11-16 RX ORDER — ERGOCALCIFEROL 1.25 MG/1
50000 CAPSULE ORAL WEEKLY
Status: DISCONTINUED | OUTPATIENT
Start: 2022-11-22 | End: 2022-11-22 | Stop reason: HOSPADM

## 2022-11-16 RX ORDER — LUBIPROSTONE 8 UG/1
24 CAPSULE ORAL 2 TIMES DAILY WITH MEALS
Status: DISCONTINUED | OUTPATIENT
Start: 2022-11-16 | End: 2022-11-22 | Stop reason: HOSPADM

## 2022-11-16 RX ORDER — CEFAZOLIN SODIUM 2 G/50ML
2000 SOLUTION INTRAVENOUS ONCE
Status: COMPLETED | OUTPATIENT
Start: 2022-11-16 | End: 2022-11-16

## 2022-11-16 RX ORDER — FONDAPARINUX SODIUM 2.5 MG/.5ML
2.5 INJECTION SUBCUTANEOUS DAILY
Status: DISCONTINUED | OUTPATIENT
Start: 2022-11-17 | End: 2022-11-22 | Stop reason: HOSPADM

## 2022-11-16 RX ORDER — LIDOCAINE HYDROCHLORIDE 10 MG/ML
0.5 INJECTION, SOLUTION EPIDURAL; INFILTRATION; INTRACAUDAL; PERINEURAL ONCE AS NEEDED
Status: DISCONTINUED | OUTPATIENT
Start: 2022-11-16 | End: 2022-11-16 | Stop reason: HOSPADM

## 2022-11-16 RX ORDER — ASCORBIC ACID 500 MG
500 TABLET ORAL 2 TIMES DAILY
Status: DISCONTINUED | OUTPATIENT
Start: 2022-11-16 | End: 2022-11-22 | Stop reason: HOSPADM

## 2022-11-16 RX ORDER — CHLORHEXIDINE GLUCONATE 4 G/100ML
SOLUTION TOPICAL DAILY PRN
Status: DISCONTINUED | OUTPATIENT
Start: 2022-11-16 | End: 2022-11-16 | Stop reason: HOSPADM

## 2022-11-16 RX ORDER — DOCUSATE SODIUM 100 MG/1
100 CAPSULE, LIQUID FILLED ORAL 2 TIMES DAILY
Status: DISCONTINUED | OUTPATIENT
Start: 2022-11-16 | End: 2022-11-22 | Stop reason: HOSPADM

## 2022-11-16 RX ORDER — LACTOSE-REDUCED FOOD 0.04G-1.05
1 LIQUID (ML) ORAL DAILY
Status: DISCONTINUED | OUTPATIENT
Start: 2022-11-16 | End: 2022-11-16

## 2022-11-16 RX ORDER — FOLIC ACID 1 MG/1
1 TABLET ORAL DAILY
Status: DISCONTINUED | OUTPATIENT
Start: 2022-11-16 | End: 2022-11-22 | Stop reason: HOSPADM

## 2022-11-16 RX ORDER — ONDANSETRON 2 MG/ML
INJECTION INTRAMUSCULAR; INTRAVENOUS AS NEEDED
Status: DISCONTINUED | OUTPATIENT
Start: 2022-11-16 | End: 2022-11-16

## 2022-11-16 RX ORDER — BUTALBITAL, ACETAMINOPHEN AND CAFFEINE 50; 325; 40 MG/1; MG/1; MG/1
1 TABLET ORAL 3 TIMES DAILY PRN
Status: DISCONTINUED | OUTPATIENT
Start: 2022-11-16 | End: 2022-11-22 | Stop reason: HOSPADM

## 2022-11-16 RX ORDER — FERROUS SULFATE 325(65) MG
325 TABLET ORAL 2 TIMES DAILY WITH MEALS
Status: DISCONTINUED | OUTPATIENT
Start: 2022-11-16 | End: 2022-11-22 | Stop reason: HOSPADM

## 2022-11-16 RX ADMIN — Medication 1000 MG: at 08:35

## 2022-11-16 RX ADMIN — FENTANYL CITRATE 25 MCG: 50 INJECTION INTRAMUSCULAR; INTRAVENOUS at 11:17

## 2022-11-16 RX ADMIN — HYDROMORPHONE HYDROCHLORIDE 0.2 MG: 0.2 INJECTION, SOLUTION INTRAMUSCULAR; INTRAVENOUS; SUBCUTANEOUS at 18:10

## 2022-11-16 RX ADMIN — FENTANYL CITRATE 25 MCG: 50 INJECTION INTRAMUSCULAR; INTRAVENOUS at 09:49

## 2022-11-16 RX ADMIN — PROPOFOL 100 MCG/KG/MIN: 10 INJECTION, EMULSION INTRAVENOUS at 08:16

## 2022-11-16 RX ADMIN — FENTANYL CITRATE 25 MCG: 50 INJECTION INTRAMUSCULAR; INTRAVENOUS at 11:03

## 2022-11-16 RX ADMIN — BUPROPION HYDROCHLORIDE 300 MG: 150 TABLET, EXTENDED RELEASE ORAL at 15:54

## 2022-11-16 RX ADMIN — FENTANYL CITRATE 25 MCG: 50 INJECTION INTRAMUSCULAR; INTRAVENOUS at 10:04

## 2022-11-16 RX ADMIN — BUPIVACAINE 20 ML: 13.3 INJECTION, SUSPENSION, LIPOSOMAL INFILTRATION at 07:50

## 2022-11-16 RX ADMIN — DOCUSATE SODIUM 100 MG: 100 CAPSULE, LIQUID FILLED ORAL at 15:53

## 2022-11-16 RX ADMIN — METRONIDAZOLE 250 MG: 500 TABLET ORAL at 15:53

## 2022-11-16 RX ADMIN — METRONIDAZOLE 250 MG: 500 TABLET ORAL at 21:06

## 2022-11-16 RX ADMIN — FENTANYL CITRATE 25 MCG: 50 INJECTION INTRAMUSCULAR; INTRAVENOUS at 11:35

## 2022-11-16 RX ADMIN — PHENYLEPHRINE HYDROCHLORIDE 30 MCG/MIN: 10 INJECTION INTRAVENOUS at 08:43

## 2022-11-16 RX ADMIN — OXYCODONE HYDROCHLORIDE AND ACETAMINOPHEN 2 TABLET: 5; 325 TABLET ORAL at 11:54

## 2022-11-16 RX ADMIN — HYDROMORPHONE HYDROCHLORIDE 0.2 MG: 0.2 INJECTION, SOLUTION INTRAMUSCULAR; INTRAVENOUS; SUBCUTANEOUS at 13:48

## 2022-11-16 RX ADMIN — Medication 1 TABLET: at 15:54

## 2022-11-16 RX ADMIN — BUPIVACAINE HYDROCHLORIDE IN DEXTROSE 1.4 ML: 7.5 INJECTION, SOLUTION SUBARACHNOID at 08:14

## 2022-11-16 RX ADMIN — LORATADINE 10 MG: 10 TABLET ORAL at 15:54

## 2022-11-16 RX ADMIN — Medication 9 MG: at 21:06

## 2022-11-16 RX ADMIN — ONDANSETRON 4 MG: 2 INJECTION INTRAMUSCULAR; INTRAVENOUS at 13:48

## 2022-11-16 RX ADMIN — BUPIVACAINE HYDROCHLORIDE 10 ML: 5 INJECTION, SOLUTION PERINEURAL at 07:50

## 2022-11-16 RX ADMIN — ATORVASTATIN CALCIUM 20 MG: 20 TABLET, FILM COATED ORAL at 18:13

## 2022-11-16 RX ADMIN — OXYBUTYNIN 10 MG: 5 TABLET, FILM COATED, EXTENDED RELEASE ORAL at 21:06

## 2022-11-16 RX ADMIN — OXYCODONE HYDROCHLORIDE AND ACETAMINOPHEN 2 TABLET: 5; 325 TABLET ORAL at 21:06

## 2022-11-16 RX ADMIN — CEFAZOLIN SODIUM 2000 MG: 2 SOLUTION INTRAVENOUS at 08:01

## 2022-11-16 RX ADMIN — FERROUS SULFATE TAB 325 MG (65 MG ELEMENTAL FE) 325 MG: 325 (65 FE) TAB at 18:13

## 2022-11-16 RX ADMIN — CEFAZOLIN SODIUM 1000 MG: 1 SOLUTION INTRAVENOUS at 18:11

## 2022-11-16 RX ADMIN — ONDANSETRON 4 MG: 2 INJECTION INTRAMUSCULAR; INTRAVENOUS at 10:08

## 2022-11-16 RX ADMIN — HYDROMORPHONE HYDROCHLORIDE 0.2 MG: 0.2 INJECTION, SOLUTION INTRAMUSCULAR; INTRAVENOUS; SUBCUTANEOUS at 22:32

## 2022-11-16 RX ADMIN — CHLORHEXIDINE GLUCONATE 0.12% ORAL RINSE 15 ML: 1.2 LIQUID ORAL at 07:27

## 2022-11-16 RX ADMIN — BUPIVACAINE HYDROCHLORIDE 20 ML: 5 INJECTION, SOLUTION PERINEURAL at 07:45

## 2022-11-16 RX ADMIN — SODIUM CHLORIDE, POTASSIUM CHLORIDE, SODIUM LACTATE AND CALCIUM CHLORIDE: 600; 310; 30; 20 INJECTION, SOLUTION INTRAVENOUS at 09:07

## 2022-11-16 RX ADMIN — MIDAZOLAM 2 MG: 1 INJECTION INTRAMUSCULAR; INTRAVENOUS at 07:24

## 2022-11-16 RX ADMIN — SODIUM CHLORIDE, POTASSIUM CHLORIDE, SODIUM LACTATE AND CALCIUM CHLORIDE 125 ML/HR: 600; 310; 30; 20 INJECTION, SOLUTION INTRAVENOUS at 07:26

## 2022-11-16 RX ADMIN — FENTANYL CITRATE 25 MCG: 50 INJECTION INTRAMUSCULAR; INTRAVENOUS at 11:24

## 2022-11-16 RX ADMIN — FOLIC ACID 1 MG: 1 TABLET ORAL at 15:54

## 2022-11-16 RX ADMIN — DULOXETINE 60 MG: 60 CAPSULE, DELAYED RELEASE ORAL at 18:13

## 2022-11-16 RX ADMIN — LUBIPROSTONE 24 MCG: 8 CAPSULE, GELATIN COATED ORAL at 18:13

## 2022-11-16 RX ADMIN — OXYCODONE HYDROCHLORIDE AND ACETAMINOPHEN 500 MG: 500 TABLET ORAL at 15:53

## 2022-11-16 NOTE — PHYSICAL THERAPY NOTE
Physical Therapy Evaluation   Time in: 13:03  Time out: 13:30  Total evaluation time: 27 minutes    Patient's Name: Shilpa Echosl    Admitting Diagnosis  Primary osteoarthritis of right knee [M17 11]    Problem List  Patient Active Problem List   Diagnosis    Irritable bowel syndrome (IBS)    Mild intermittent asthma without complication    Bilateral carpal tunnel syndrome    Chronic musculoskeletal pain    Generalized anxiety disorder    Hyperlipidemia LDL goal <130    Essential hypertension    Lymphedema    Patent foramen ovale    Tension type headache    Vitamin D deficiency    Recurrent major depressive disorder, in partial remission (HCC)    Other chronic pancreatitis (HCC)    Body mass index (BMI) of 40 0-44 9 in adult [facility])    Right foot pain    Tinea cruris    Primary insomnia    Neuropathy    Age-related osteoporosis without current pathological fracture    Swelling of joint of left knee    Nephrolithiasis    Scoliosis    Primary osteoarthritis of left knee    HX: breast cancer    Sinus tachycardia    Acute metabolic encephalopathy    Encounter for screening for COVID-19    S/P total knee arthroplasty, left    Dermatitis    Continuous opioid dependence ([facility] )    Seborrheic keratoses    Seasonal allergies    OAB (overactive bladder)    Age-related osteoporosis without current pathological fracture    Mixed stress and urge urinary incontinence    Renal cyst    Chronic constipation    Arthritis of right knee    Colitis       Past Medical History  Past Medical History:   Diagnosis Date    Anxiety     Arthritis     Asthma     Breast cancer ([facility] )     rt mastectomy 2005    Cancer [facility])     breast right    Cataract     CHF (congestive heart failure) ([facility])     Chronic headaches     Concussion     At age 7; Chronic headaches since then    Coronary artery disease     Depression     Dietary lactose intolerance     GERD (gastroesophageal reflux disease)     High cholesterol     History of chemotherapy     2005 rt breast cancer    History of transfusion     Hypertension     IBS (irritable bowel syndrome)     Irritable bowel syndrome (IBS) 05/31/2016    Kidney stone     Lymphedema of right arm     Obesity     PFO (patent foramen ovale)     PONV (postoperative nausea and vomiting)     Post-menopausal     Psychiatric disorder     Renal disorder     Shortness of breath     Vitamin D deficiency        Past Surgical History  Past Surgical History:   Procedure Laterality Date    BREAST SURGERY      CATARACT EXTRACTION Bilateral     CATARACT EXTRACTION, BILATERAL      CHOLECYSTECTOMY      COLONOSCOPY N/A 05/31/2016    Procedure: COLONOSCOPY;  Surgeon: Xin Larkin MD;  Location: [facility] MAIN OR;  Service:     GALLBLADDER SURGERY      HYSTERECTOMY      Total    JOINT REPLACEMENT      KNEE ARTHROSCOPY      KNEE SURGERY      arthroscopy    MASTECTOMY Right     rt breast mastectomy 2005    ID TOTAL KNEE ARTHROPLASTY Left 05/20/2021    Procedure: ARTHROPLASTY KNEE TOTAL;  Surgeon: Chary Monteiro MD;  Location: [facility] MAIN OR;  Service: Orthopedics    TONSILLECTOMY AND ADENOIDECTOMY         PT performed at least 2 patient identifiers during session: Name and wristband  11/16/22 1304   PT Last Visit   PT Visit Date 11/16/22   Note Type   Note type Evaluation   Pain Assessment   Pain Assessment Tool 0-10   Pain Score 10 - Worst Possible Pain   Pain Location/Orientation Orientation: Right;Location: Knee; Location: Incision   Pain Onset/Description Onset: Ongoing;Frequency: Constant/Continuous  ("it just hurts, I can't describe it)   Patient's Stated Pain Goal No pain   Hospital Pain Intervention(s) Repositioned; Ambulation/increased activity; Emotional support   Restrictions/Precautions   Weight Bearing Precautions Per Order Yes   RLE Weight Bearing Per Order WBAT   Other Precautions Bed Alarm;WBS;Multiple lines;Telemetry;O2;Fall Risk;Pain  (2 L O2)   Home Living   Type of Home House   Home Layout Two level;Bed/bath upstairs; Access;Stairs to enter with rails  (3 ARTUR bilateral railings, chair glide to second floor)   Bathroom Shower/Tub Walk-in shower   Bathroom Toilet Standard   Bathroom Equipment Shower chair;Grab bars around toilet  (ledges in shower to hold on [address] Walker;Cane  (rollator and 3 point cane)   Additional Comments Daughter assists pt in answering questions about PLOF and home setup   Prior Function   Level of [place] Independent with ADLs; Independent with functional mobility; Independent with IADLS   Lives With Daughter   Receives Help From Family   IADLs Family/Friend/Other provides transportation; Independent with meal prep; Independent with medication management   Falls in the last 6 months 1 to 4  (3 falls, most recently 11/15/2022 d/t LOB)   Vocational Retired  (homemaker)   General   Family/Caregiver Present Yes  (DaughterGrupo, present throughout session)   Cognition   Overall Cognitive Status WFL   Arousal/Participation Alert   Orientation Level Oriented X4   Memory Within functional limits   Following Commands Follows all commands and directions without difficulty   Comments Pt agreeable to PT session   Subjective   Subjective "I will try to move"   RLE Assessment   RLE Assessment X  (Deficits noted in knee flexion/extension ROM and strength)   LLE Assessment   LLE Assessment WFL  (Grossly assessed 3+/5 throughout)   Vision-Basic Assessment   Current Vision Wears glasses only for reading   Light Touch   RLE Light Touch Grossly intact   LLE Light Touch Grossly intact   Bed Mobility   Supine to Sit 4  Minimal assistance   Additional items Assist x 1;Bedrails; Increased time required;Verbal cues;LE management  (VC for hand placement/technique)   Sit to Supine 2  Maximal assistance  (D/t patient (+) lightheadedness and nausea)   Additional items Assist x 2; Increased time required;Verbal cues   Additional Comments Pt reports feeling lightheaded following supine to sit transition, BP supine = 124/67, BP seated EOB = 112/61   Transfers   Additional Comments Did not assess transfers d/t pt dizziness and nausea   Ambulation/Elevation   Ambulation/Elevation Additional Comments Did not assess ambulation d/t pt dizziness and nausea   Balance   Static Sitting Fair +   Dynamic Sitting Fair -   Static Standing   (Not assessed d/t pt dizziness and nausea)   Dynamic Standing   (Not assessed d/t pt dizziness and nausea)   Ambulatory   (Not assessed d/t pt dizziness and nausea)   Endurance Deficit   Endurance Deficit Yes   Activity Tolerance   Activity Tolerance Treatment limited secondary to medical complications (Comment)  ((+) dizziness/lightheadedness and nausea)   Nurse Made Aware [place] made aware of session outcomes/recs   Assessment   Prognosis Fair   Problem List Decreased strength;Decreased range of motion;Decreased endurance; Impaired balance;Decreased mobility; Decreased safety awareness;Decreased skin integrity;Orthopedic restrictions;Pain  (RLE WBAT)   Assessment Pt is 81 y o  female seen for high-complexity PT evaluation on 11/16/2022 s/p admit to [place] [id number] on 11/16/2022 w/ Arthritis of right knee  PT was consulted to assess pt's functional mobility and d/c needs  Order placed for PT eval and tx, w/ WBAT R LE and dangle at bedside order  PTA, pt lives w/ daughter in two level home w/ 3 ARTUR w/ bilateral railings and second floor bedroom w/ stair glide to second floor  Pt reports (I) w/ ADL/IADL performance and functional mobility w/ rollator and three-point cane  At time of eval, pt performs supine to sit w/ min A x 1 for LE management and sit to supine w/ max A x 2 d/t dizziness/lightheadedness and nausea  Upon evaluation, pt presenting with impaired functional mobility d/t decreased strength, decreased ROM, decreased endurance, impaired balance, decreased mobility, decreased safety awareness, decreased skin integrity, orthopedic restrictions of RLE WBAT and pain   Pertinent PMHx and current co-morbidities affecting pt's physical performance at time of assessment include: arthritis, asthma, history of breast cancer, CHF, CAD, GERD, hypertension, L TKA  Personal factors affecting pt at time of eval include: inaccessible home environment, lives in 2 story house, ambulating w/ assistive device, stairs to enter home, inability to ambulate household distances and limited home support  The following objective measures performed on IE also reveal limitations: AM-PAC 6-Clicks: 54/32  Pt's clinical presentation is currently unstable/unpredictable seen in pt's presentation of need for min-max assist w/ all phases of mobility when usually mobilizing independently, severe RLE pain impacting overall mobility status, ongoing medical assessment, on telemetry monitoring and (+) dizziness/lightheadedness and nausea w/ movement  Overall, pt's rehab potential and prognosis to return to PLOF is fair as impacted by objective findings, warranting pt to receive further skilled PT interventions to address identified impairments, activity limitation(s), and participation restriction(s)  Goal for patient is to feel better  Pt to benefit from continued PT tx to address deficits as defined above and maximize level of functional independent mobility and consistency in order for pt to safely and independently perform all stages of mobility and improve ADL/IADL performance  From PT/mobility standpoint, recommendation at time of d/c would be home with outpatient rehabilitation pending progress in order to facilitate return to PLOF  Barriers to Discharge Inaccessible home environment   Goals   Patient Goals "To feel better"   [facility] Expiration Date 11/26/22   Short Term Goal #1 In 7-10 days: Pt will perform supine <> sit mod (I) to improve independence w/ transitional mobility  Pt will perform sit <> stand transfers from various surfaces w/ supervision x 1 to improve safety and independence w/ functional mobility   Pt will tolerate sitting EOB 15 minutes to improve ADL/IADL performance  Pt will ambulate 22' w/ RW supervision x 1 to improve household ambulatory capacity  PT Treatment Day 0   Plan   Treatment/Interventions Functional transfer training;LE strengthening/ROM; Elevations; Therapeutic exercise; Endurance training;Patient/family training;Equipment eval/education; Bed mobility;Gait training;Spoke to nursing   PT Frequency Twice a day   Recommendation   PT Discharge Recommendation Home with outpatient rehabilitation  (Pending improvements in medical status and ambulation/elevation performance)   Additional Comments Pt raw score on [facility] Basic Mobility short form is 10/24, standardized score is 29 25  Pts at this level are likely to benefit from DC to post acute rehabilitation services, however please refer to therapist recommendation for safe planning  AM-PAC Basic Mobility Inpatient   Turning in Bed Without Bedrails 3   Lying on Back to Sitting on Edge of Flat Bed 3   Moving Bed to Chair 1   Standing Up From Chair 1   Walk in Room 1   Climb 3-5 Stairs 1   Basic Mobility Inpatient Raw Score 10   Turning Head Towards Sound 4   Follow Simple Instructions 4   Low Function Basic Mobility Raw Score 18   Low Function Basic Mobility Standardized Score 29 25   Highest Level Of Mobility   -HLM Goal 4: Move to chair/commode   JH-HLM Achieved 3: Sit at edge of bed   End of Consult   Patient Position at End of Consult Supine; All needs within reach       NATHAN THOMAS AT [facility]

## 2022-11-16 NOTE — ASSESSMENT & PLAN NOTE
· Patient with recently diagnosed colitis/suspected diverticulitis  · Was on Cipro/Flagyl as outpatient  · Continue antibiotics on discharge to complete course of therapy

## 2022-11-16 NOTE — DISCHARGE INSTRUCTIONS
TOTAL KNEE/TOTAL HIP REPLACEMENT  POST OPERATIVE INFORMATION    1  You should use a walker or crutches, but you may put as much weight on the leg as is comfortable unless instructed otherwise  2  You may use ice packs as needed for pain and swelling  20 minutes is required to allow the cold to penetrate deep enough  Cover skin with a layer of cloth before applying the ice pack  3  Pump your ankle up and down frequently throughout the day to facilitate circulation, maintain muscle tone, and to aid in reduction of swelling  4  You may shower after 5 days, but remember to keep the dressings dry  5  You should call our office if you experience pain not controlled by your medication, develop a fever, and experience increased drainage or redness around the incision  6  Do not drive a vehicle until you see your physician at the follow-up appointment  7  Refer to your total joint card regarding the need for antibiotics for the following procedures:  Any dental procedures, any infection, especially skin infections, vaginal or GYN exams or surgery, and colonoscopy  8  If you have had a total hip replacement following instructions below to prevent the hip from sliding out a position:   -DO NOT bend your hip more than 90°  This means no squatting, no bending over to tie your shoes, and no bending from the waist to  things from the floor, even if seated  -DO NOT cross your operated leg/foot inward ([place]-toed)   -ALWAYS sleep with pillow or cushion between your legs, as instructed by your doctor   -After surgery these precautions will be again covered by your therapists on a daily basis  9  Call our office for an appointment to see Dr Jeffery Rowe in about 14 days  10  You should start outpatient therapy as soon as possible after discharge  11   The patient was instructed to paint incision with betadine two times per day

## 2022-11-16 NOTE — ANESTHESIA PROCEDURE NOTES
Spinal Block    Patient location during procedure: OR  Start time: 11/16/2022 8:14 AM  Reason for block: primary anesthetic  Staffing  Performed: Anesthesiologist   Anesthesiologist: Winifred Brown MD  Resident/CRNA: Yanet Schilling CRNA  Preanesthetic Checklist  Completed: patient identified, IV checked, site marked, risks and benefits discussed, surgical consent, monitors and equipment checked, pre-op evaluation and timeout performed  Spinal Block  Patient position: sitting  Prep: ChloraPrep  Patient monitoring: heart rate, cardiac monitor, continuous pulse ox and frequent blood pressure checks  Approach: midline  Location: L3-4  Injection technique: single-shot  Needle  Needle type: pencil-tip   Needle gauge: 24 G  Needle length: 5 cm  Assessment  Sensory level: T10  Injection Assessment:  negative aspiration for heme, no paresthesia on injection and positive aspiration for clear CSF    Post-procedure:  site cleaned

## 2022-11-16 NOTE — NURSING NOTE
Pt awake, alert, and oriented x4  States that she is having ongoing surgical site, right knee pain  Pt to be medicated as ordered  Pt states that she is having mild numbness to right leg down to toes  Pt states she can feel me touching her  Skin warm and dry  No paleness or redness to skin  Pt able to move toes  Dr to be made aware of same

## 2022-11-16 NOTE — ANESTHESIA PROCEDURE NOTES
Peripheral Block    Patient location during procedure: holding area  Start time: 11/16/2022 7:50 AM  Reason for block: at surgeon's request and post-op pain management  Staffing  Performed: Anesthesiologist   Anesthesiologist: Guerrero Pérez MD  Preanesthetic Checklist  Completed: patient identified, IV checked, site marked, risks and benefits discussed, surgical consent, monitors and equipment checked, pre-op evaluation and timeout performed  Peripheral Block  Patient position: sitting  Prep: ChloraPrep  Patient monitoring: continuous pulse ox and frequent blood pressure checks  Block type: adductor canal block  Laterality: right  Procedures: ultrasound guided, Ultrasound guidance required for the procedure to increase accuracy and safety of medication placement and decrease risk of complications    Ultrasound permanent image savedbupivacaine (MARCAINE) 0 5 % - Perineural   10 mL - 11/16/2022 7:50:00 AM  Needle  Needle type: pencil-tip   Needle gauge: 22 G  Needle length: 10 cm  Needle localization: ultrasound guidance  Test dose: negative  Assessment  Injection assessment: incremental injection, local visualized surrounding nerve on ultrasound, negative aspiration for heme and no paresthesia on injection  Paresthesia pain: none  Heart rate change: no  Slow fractionated injection: yes  Post-procedure:  site cleaned  patient tolerated the procedure well with no immediate complications  Additional Notes  Unremarkable adductor canal block

## 2022-11-16 NOTE — OP NOTE
OPERATIVE REPORT  PATIENT NAME: Monae Sprague    :  1950  MRN: [id number]  Pt Location: [facility] OR ROOM 02    SURGERY DATE: 2022    Surgeon(s) and Role:     * Merari Bonds DO - Primary    Assistant:  Stacey May PA-C    Preop Diagnosis:  Primary osteoarthritis of right knee [M17 11]    Post-Op Diagnosis Codes:     * Primary osteoarthritis of right knee [M17 11]    Procedure(s) (LRB):  ARTHROPLASTY KNEE TOTAL (Right) using the [employer] system with the following sizes:  E LPS GSF femoral component, #3  Tibial tray, 10 mm polyarticular surface, and a 32 mm patella button    Specimen(s):  Bone/synovium    Estimated Blood Loss:    50 cc    Drains:  Solcotrans    Anesthesia Type:   Spinal with iPACK and adductor canal blocks    Operative Indications:  Primary osteoarthritis of right knee [M17 11]      Operative Findings:  TT: 81    Complications:   None    Procedure and Technique:    Procedure and Technique:  The patient was properly identified and brought to the operative suite  After successful induction of the a anesthetic, a tourniquet was placed on the patient's right proximal thigh  The right lower extremity was prepped and draped in the usual usual sterile fashion  It was medically necessary that the physician's assistant be in the room to aid in positioning placing the appropriate amount of retraction the patient  A qualified resident was not available  The physician assistant's role was very integral to the success of this case  He assisted on positioning, draping, retraction soft tissue, retraction neurovascular bundles, assisted with implantation of prosthesis, assisted with reduction of prosthesis, and assisted with closure of a complex wound      She was given a dose of intravenous antibiotics  Surgical landmarks were outline  With  a skin marker  An Esmarch was used to examine the right lower extremity and the tourniquet was then inflated    Using a #10  Scalpel  Blade, an anterior incision was made on patient's right knee  Hemostasis was achieved with the use of electrocautery  Through a significant amount of dissection through adipose tissue the capsule and  quadriceps tendon  Was  then located  Using a 2nd #10  Scalpel blade a medial parapatellar arthrotomy did occur with a rush of clear synovial fluid  A posteriormedial sleeve was developed to the level of the semi membranous  tendon  The patella then everted and  knee was flexed  The retro and  superior fat pads were excised  The cruciate ligaments were  divided  At this point the proximal tibia could  be anteriorly subluxed  An intramedullary drill hole was placed in the proximal tibia, followed by the external cutting jig  The  smallest portion of deficient medial side bone was to  be removed, with its corresponding lateral side  Varus and  valgus angulation was also checked  The collateral ligaments were then  protected  The neurovascular bundle was then protected  The proximal tibia was then osteotomized  Attention then paid to the preparation of distal femur  The intramedullary augusto was placed with a 6 degree valgus cut placed approximately 3° of external rotation  The external  cutting jig was placed on top of this  An additional 2 mm cut the because of a flexion contracture  The collateral ligaments were protected  The  distal femur was then osteotomized  The femur was then sized  A size E did have the best fit  Both gender and  standard cutting blocks were checked and a gender specific  cutting block did  Have the best fit  Without any excessive notching of the anterior condyle  The collateral ligaments were  then protected, the distal femur was an osteotomized in the  following sequence 1 anterior condyle 2  posterior condyle 3 posterior chamfer and 4 anterior chamfer  At this point all the bone fragments and then removed    A lamina  was placed both the medial and lateral sides and the redundant menisci and posterior osteophytes were then removed  Flexion-extension blocks were placed next and a 10 mm block gave good symmetric balancing  The femur was then finished with the trochlear recess and notch block placed in a slightly lateral position to facilitate tracking patella  The tibial was sized next  A size #3 did have  best fit  The size E-LPS GSF femoral component placed, followed by a number 3  Tibial tray, followed by several polyarticular services and a 10 mm tray did the best fit  The rotation of the  tibia was then marked  The patella was inspected next  There was a tremendous amount of arthrosis  The  peripheral osteophytes were removed  It was confirmed with a caliper that there is adequate bone stock  And then using the  Values of n reaming system approximately 9 mm of undersurface patella then reamed  The patella sized to a 32 mm patella button  This guide was drilled in a slightly superior medial position facilitate tracking patella  The tibia was then finished with a drill hole drill hole and keel punch  At this point there was there is good stability and range of motion in the knee  All the trial components removed  The wound was copiously irrigated sterile antibiotic solution  Cement  was being mixed on the back table was used 3rd generation cementing techniques  The proximal tibia was cemented in placed followed by the distal femur  A trial poly articular surface was placed till the cement fully cured  The patella was  cemented in place and held with a patellar clamp till the cement fully cured as well  Once the cement fully cured, it was irrigated  The polyarticular surfaces were  tried once again and a 32 mm tray to the best fit  The final 10 mm tray was placed reduced 1 final time and found to be quite stable  After it was irrigated,  a quarter-inch solcotrans  Was  placed near the superior aspect  Of the incision    The quadriceps  and capsule were closed with #1 Vicryl  Deep layer fascia closed multiple layers of 0 Vicryl  Superficial was closed with 2-0 Vicryl  The skin was approximated  With staples  The wounds were dressed with ointment Xeroform 4x4s ABDs sterile Webril and Ace bandage  There was no complication during procedure  She was successfully awoken,  transferred  To the hospital bed,  And went to the recovery room stable  In condition            I was present for the entire procedure, A qualified resident physician was not available, and A physician assistant was required during the procedure for retraction tissue handling,dissection and suturing    Patient Disposition:  PACU         SIGNATURE: Orlando Frank DO  DATE: November 16, 2022  TIME: 7:22 AM

## 2022-11-16 NOTE — NURSING NOTE
Pt received to room 226 from OR s/p TRK  Pt placed on Masimo and oriented to room and unit  Pt awake, and drowsy  States surgical site pain 10/10 and nausea  Assessment as noted in computer charting  Pt medicated as ordered for same  Call bell at side  Right knee with dressing CDI  NV check wnl

## 2022-11-16 NOTE — RESPIRATORY THERAPY NOTE
RT Protocol Note  Ashley Rank 81 y o  female MRN: [id number]  Unit/Bed#: -01 Encounter: [id number]    Assessment    Principal Problem:    Arthritis of right knee      Home Pulmonary Medications:  2 5 mg albuterol Q4 prn  Past Medical History:   Diagnosis Date    Anxiety     Arthritis     Asthma     Breast cancer ([address] )     rt mastectomy 2005    Cancer [facility])     breast right    Cataract     CHF (congestive heart failure) (HCC)     Chronic headaches     Concussion     At age 7; Chronic headaches since then    Coronary artery disease     Depression     Dietary lactose intolerance     GERD (gastroesophageal reflux disease)     High cholesterol     History of chemotherapy     2005 rt breast cancer    History of transfusion     Hypertension     IBS (irritable bowel syndrome)     Irritable bowel syndrome (IBS) 05/31/2016    Kidney stone     Lymphedema of right arm     Obesity     PFO (patent foramen ovale)     PONV (postoperative nausea and vomiting)     Post-menopausal     Psychiatric disorder     Renal disorder     Shortness of breath     Vitamin D deficiency      Social History     Socioeconomic History    Marital status:      Spouse name: None    Number of children: None    Years of education: None    Highest education level: None   Occupational History    Occupation: Retired   Tobacco Use    Smoking status: Never    Smokeless tobacco: Never   Vaping Use    Vaping Use: Never used   Substance and Sexual Activity    Alcohol use:  Yes     Alcohol/week: 1 0 standard drink     Types: 1 Glasses of wine per week     Comment: socially    Drug use: Not Currently     Types: Marijuana     Comment: In her youth    Sexual activity: Not Currently   Other Topics Concern    None   Social History Narrative    Always uses seat belt    Occasional caffeine consumption         Retired    Lives with adult daughter     Social Determinants of Health     Financial Resource Strain: Not on file   Food Insecurity: Not on file   Transportation Needs: Not on file   Physical Activity: Not on file   Stress: Not on file   Social Connections: Not on file   Intimate Partner Violence: Not on file   Housing Stability: Not on file       Subjective         Objective    Physical Exam:   Assessment Type: Assess only  General Appearance: Alert, Awake  Respiratory Pattern: Normal  Chest Assessment: Chest expansion symmetrical  Bilateral Breath Sounds: Clear    Vitals:  Blood pressure 112/61, pulse 66, temperature 97 7 °F (36 5 °C), resp  rate (!) 23, height 4' 9" (1 448 m), weight 85 7 kg (189 lb), SpO2 96 %, not currently breastfeeding  Imaging and other studies: I have personally reviewed pertinent reports  Plan    Respiratory Plan: Home Bronchodilator Patient pathway        Resp Comments: (P) Pt started on albuterol 2 5mg prn by physician  Pt uses this at home for sob/wheezing  Pt seen today s/p R knee arthroplasty  She is on 2lnc with clear breath sounds  As she is not admitted with an exacerbation I will cont  UDN as ordered, and cont   to evaluate pt per respiratory protocal

## 2022-11-16 NOTE — H&P
H&P Exam - Orthopedics   Akshat Hamilton 81 y o  female MRN: [id number]  Unit/Bed#: OR POOL Encounter: [id number]    Assessment/Plan     Assessment:  Primary osteoarthritis right knee  Plan:  Elective right total knee replacement    History of Present Illness   HPI:  Akshat Hamilton is a 81 y o  female who presented to our office complaining of worsening right knee pain  The patient stated that her symptoms were starting to affect her quality of life  X-rays were performed which were consistent with advanced arthritic changes  After exhausting conservative treatment, the risks and benefits of surgery discussed with the patient  She decided on an elective right total knee replacement  Review of Systems   Constitutional: Negative for chills, fever and unexpected weight change  HENT: Negative for nosebleeds and sore throat  Eyes: Negative for pain, redness and visual disturbance  Respiratory: Negative for cough, shortness of breath and wheezing  Cardiovascular: Negative for chest pain, palpitations and leg swelling  Gastrointestinal: Negative for abdominal pain, nausea and vomiting  Endocrine: Negative for polydipsia and polyuria  Genitourinary: Negative for dysuria and hematuria  Musculoskeletal: Positive for arthralgias, gait problem and myalgias  As noted in HPI   Skin: Negative for rash and wound  Neurological: Negative for dizziness, numbness and headaches  Psychiatric/Behavioral: Negative for decreased concentration and suicidal ideas  The patient is not nervous/anxious          Historical Information   Past Medical History:   Diagnosis Date   • Anxiety    • Arthritis    • Asthma    • Breast cancer ([address] )     rt mastectomy 2005   • Cancer [facility])     breast right   • Cataract    • CHF (congestive heart failure) ([facility])    • Chronic headaches    • Concussion     At age 7; Chronic headaches since then   • Coronary artery disease    • Depression    • Dietary lactose intolerance    • GERD (gastroesophageal reflux disease)    • High cholesterol    • History of chemotherapy     2005 rt breast cancer   • History of transfusion    • Hypertension    • IBS (irritable bowel syndrome)    • Irritable bowel syndrome (IBS) 05/31/2016   • Kidney stone    • Lymphedema of right arm    • Obesity    • PFO (patent foramen ovale)    • PONV (postoperative nausea and vomiting)    • Post-menopausal    • Psychiatric disorder    • Renal disorder    • Shortness of breath    • Vitamin D deficiency      Past Surgical History:   Procedure Laterality Date   • BREAST SURGERY     • CATARACT EXTRACTION Bilateral    • CATARACT EXTRACTION, BILATERAL     • CHOLECYSTECTOMY     • COLONOSCOPY N/A 05/31/2016    Procedure: COLONOSCOPY;  Surgeon: Mariela Sosa MD;  Location: [facility] MAIN OR;  Service:    • GALLBLADDER SURGERY     • HYSTERECTOMY      Total   • JOINT REPLACEMENT     • KNEE ARTHROSCOPY     • KNEE SURGERY      arthroscopy   • MASTECTOMY Right     rt breast mastectomy 2005   • CT TOTAL KNEE ARTHROPLASTY Left 05/20/2021    Procedure: ARTHROPLASTY KNEE TOTAL;  Surgeon: Priscilla Opitz, MD;  Location: [address] [place] [address] MAIN OR;  Service: Orthopedics   • TONSILLECTOMY AND ADENOIDECTOMY       Social History   Social History     Substance and Sexual Activity   Alcohol Use Yes   • Alcohol/week: 1 0 standard drink   • Types: 1 Glasses of wine per week    Comment: socially     Social History     Substance and Sexual Activity   Drug Use Not Currently   • Types: Marijuana    Comment: In her youth     Social History     Tobacco Use   Smoking Status Never   Smokeless Tobacco Never     Family History: non-contributory    Meds/Allergies   all medications and allergies reviewed  Allergies   Allergen Reactions   • Ibuprofen Nausea Only, Rash, Dizziness and Syncope   • Morphine Other (See Comments) and Hallucinations     Intolerance   • Latex Dermatitis       Objective   Vitals: Blood pressure (!) 171/78, pulse 85, temperature 98 2 °F (36 8 °C), temperature source Temporal, resp  rate 18, height 4' 9" (1 448 m), weight 85 7 kg (189 lb), SpO2 97 %, not currently breastfeeding  ,Body mass index is 40 9 kg/m²  No intake or output data in the 24 hours ending 11/16/22 0723    No intake/output data recorded  Invasive Devices     None                 Physical Exam  Ortho Exam  Right lower extremity is neurovascularly intact  Toes are pink and mobile   Compartments are soft  No warmth, erythema or ecchymosis  ROM of knee is from 5-115 degrees  Negative Lachman, drawer or pivot shift  No medial instability  Medial joint line tenderness, slight lateral joint line tenderness  Patellofemoral crepitation  Lungs CTA  Heart RRR  Lab Results: I have personally reviewed pertinent lab results  Imaging: I have personally reviewed pertinent reports  EKG, Pathology, and Other Studies: I have personally reviewed pertinent reports  Code Status: Prior  Advance Directive and Living Will: Yes    Power of :    POLST:      Counseling / Coordination of Care  Total floor / unit time spent today 30 minutes  Greater than 50% of total time was spent with the patient and / or family counseling and / or coordination of care

## 2022-11-16 NOTE — CONSULTS
[address] [facility] 1950, 81 y o  female MRN: [id number]  Unit/Bed#: -01 Encounter: [id number]  Primary Care Provider: Harish Schmitz PA-C   Date and time admitted to hospital: 11/16/2022  6:26 AM    Inpatient consult to Internal Medicine  Consult performed by: Pari Rodriguez MD  Consult ordered by: Jamia Sher PA-C          Colitis  Assessment & Plan  · Patient with recently diagnosed colitis/suspected diverticulitis  · Was on Cipro/Flagyl as outpatient  · Continue antibiotics on discharge to complete course of therapy    Mixed stress and urge urinary incontinence  Assessment & Plan  Continue Ditropan  Monitor urine output  Dela Cruz catheter currently in place, will defer to orthopedic team regarding when to discontinue    Seasonal allergies  Assessment & Plan  Continue antihistamines    HX: breast cancer  Assessment & Plan  · Stable  Continue outpatient follow-up with PCP    Recurrent major depressive disorder, in partial remission ([facility] [address] [address] )  Assessment & Plan  Continue home medications with Wellbutrin and Cymbalta    Mild intermittent asthma without complication  Assessment & Plan  No signs of acute exacerbation  Continue albuterol as needed    Irritable bowel syndrome (IBS)  Assessment & Plan  · Continue Amitiza  Monitor bowel consistency  No BM today as patient has been NPO    * Arthritis of right knee  Assessment & Plan  · Continue management per primary team  · Patient postop day 0        Recommendations for Discharge:  · Per Primary Service    History of Present Illness:  Dileep Monge is a 81 y o  female who is originally admitted to the orthopedic service due to right knee osteoarthritis  We are consulted for medical management  Patient had surgical repair of right knee today with orthopedic team   Patient appears to have tolerated procedure well  Currently complaining of expected postoperative pain worse with movement    Also with nausea and vomiting  Denies any abdominal pain  No diarrhea  Currently afebrile  Review of Systems:  Review of Systems   Constitutional: Negative for chills and fever  Respiratory: Negative for shortness of breath  Cardiovascular: Negative for chest pain  Gastrointestinal: Positive for nausea and vomiting  Negative for diarrhea  Musculoskeletal: Positive for arthralgias  All other systems reviewed and are negative        Past Medical and Surgical History:   Past Medical History:   Diagnosis Date   • Anxiety    • Arthritis    • Asthma    • Breast cancer ([address] )     rt mastectomy 2005   • Cancer [facility])     breast right   • Cataract    • CHF (congestive heart failure) (HCC)    • Chronic headaches    • Concussion     At age 7; Chronic headaches since then   • Coronary artery disease    • Depression    • Dietary lactose intolerance    • GERD (gastroesophageal reflux disease)    • High cholesterol    • History of chemotherapy     2005 rt breast cancer   • History of transfusion    • Hypertension    • IBS (irritable bowel syndrome)    • Irritable bowel syndrome (IBS) 05/31/2016   • Kidney stone    • Lymphedema of right arm    • Obesity    • PFO (patent foramen ovale)    • PONV (postoperative nausea and vomiting)    • Post-menopausal    • Psychiatric disorder    • Renal disorder    • Shortness of breath    • Vitamin D deficiency        Past Surgical History:   Procedure Laterality Date   • BREAST SURGERY     • CATARACT EXTRACTION Bilateral    • CATARACT EXTRACTION, BILATERAL     • CHOLECYSTECTOMY     • COLONOSCOPY N/A 05/31/2016    Procedure: COLONOSCOPY;  Surgeon: Hank Michaels MD;  Location: [facility] MAIN OR;  Service:    • GALLBLADDER SURGERY     • HYSTERECTOMY      Total   • JOINT REPLACEMENT     • KNEE ARTHROSCOPY     • KNEE SURGERY      arthroscopy   • MASTECTOMY Right     rt breast mastectomy 2005   • [facility] TOTAL KNEE ARTHROPLASTY Left 05/20/2021    Procedure: ARTHROPLASTY KNEE TOTAL;  Surgeon: Luciana Shabazz MD; Location: [address] [place] [address] MAIN OR;  Service: Orthopedics   • TONSILLECTOMY AND ADENOIDECTOMY         Meds/Allergies:  all medications and allergies reviewed     Allergies: Allergies   Allergen Reactions   • Ibuprofen Nausea Only, Rash, Dizziness and Syncope   • Morphine Other (See Comments) and Hallucinations     Intolerance   • Latex Dermatitis       Social History:     Marital Status:     Substance Use History:   Social History     Substance and Sexual Activity   Alcohol Use Yes   • Alcohol/week: 1 0 standard drink   • Types: 1 Glasses of wine per week    Comment: socially     Social History     Tobacco Use   Smoking Status Never   Smokeless Tobacco Never     Social History     Substance and Sexual Activity   Drug Use Not Currently   • Types: Marijuana    Comment: In her youth       Family History:  I have reviewed the patients family history    Physical Exam:   Vitals:   Blood Pressure: 112/61 (11/16/22 1319)  Pulse: 66 (11/16/22 1319)  Temperature: 97 7 °F (36 5 °C) (11/16/22 1023)  Temp Source: Temporal (11/16/22 0719)  Respirations: (!) 23 (11/16/22 1200)  Height: 4' 9" (144 8 cm) (11/16/22 0719)  Weight - Scale: 85 7 kg (189 lb) (11/16/22 0719)  SpO2: 96 % (11/16/22 1340)    Physical Exam  Constitutional:       General: She is not in acute distress  Comments: Drowsy but arousable   HENT:      Head: Normocephalic and atraumatic  Nose: Nose normal       Mouth/Throat:      Mouth: Mucous membranes are moist    Eyes:      Extraocular Movements: Extraocular movements intact  Conjunctiva/sclera: Conjunctivae normal    Cardiovascular:      Rate and Rhythm: Normal rate and regular rhythm  Pulmonary:      Effort: Pulmonary effort is normal  No respiratory distress  Abdominal:      Palpations: Abdomen is soft  Tenderness: There is no abdominal tenderness  Musculoskeletal:         General: Normal range of motion  Cervical back: Normal range of motion and neck supple        Comments: Limited range of motion right knee   Skin:     General: Skin is warm and dry  Neurological:      Comments: Drowsy but arousable  Answering questions appropriately  Following commands   Psychiatric:         Mood and Affect: Mood normal          Behavior: Behavior normal          Additional Data:   Lab Results: I have personally reviewed pertinent reports  Invalid input(s): LABALBU          Lab Results   Component Value Date/Time    HGBA1C 5 8 (H) 11/01/2022 01:06 PM    HGBA1C 5 8 (H) 01/10/2022 11:32 AM    HGBA1C 5 5 04/21/2021 10:35 AM           Imaging: I have personally reviewed pertinent reports  XR knee right 1 or 2 views    (Results Pending)     ** Please Note: This note has been constructed using a voice recognition system   **

## 2022-11-16 NOTE — PLAN OF CARE
Problem: PHYSICAL THERAPY ADULT  Goal: Performs mobility at highest level of function for planned discharge setting  See evaluation for individualized goals  Description: Treatment/Interventions: Functional transfer training, LE strengthening/ROM, Elevations, Therapeutic exercise, Endurance training, Patient/family training, Equipment eval/education, Bed mobility, Gait training, Spoke to nursing          See flowsheet documentation for full assessment, interventions and recommendations  Note: Prognosis: Fair  Problem List: Decreased strength, Decreased range of motion, Decreased endurance, Impaired balance, Decreased mobility, Decreased safety awareness, Decreased skin integrity, Orthopedic restrictions, Pain (RLE WBAT)  Assessment: Pt is 81 y o  female seen for high-complexity PT evaluation on 11/16/2022 s/p admit to [place] [id number] on 11/16/2022 w/ Arthritis of right knee  PT was consulted to assess pt's functional mobility and d/c needs  Order placed for PT eval and tx, w/ WBAT R LE and dangle at bedside order  PTA, pt lives w/ daughter in two level home w/ 3 ARTUR w/ bilateral railings and second floor bedroom w/ stair glide to second floor  Pt reports (I) w/ ADL/IADL performance and functional mobility w/ rollator and three-point cane  At time of eval, pt performs supine to sit w/ min A x 1 for LE management and sit to supine w/ max A x 2 d/t dizziness/lightheadedness and nausea  Upon evaluation, pt presenting with impaired functional mobility d/t decreased strength, decreased ROM, decreased endurance, impaired balance, decreased mobility, decreased safety awareness, decreased skin integrity, orthopedic restrictions of RLE WBAT and pain  Pertinent PMHx and current co-morbidities affecting pt's physical performance at time of assessment include: arthritis, asthma, history of breast cancer, CHF, CAD, GERD, hypertension, L TKA   Personal factors affecting pt at time of eval include: inaccessible home environment, lives in 2 story house, ambulating w/ assistive device, stairs to enter home, inability to ambulate household distances and limited home support  The following objective measures performed on IE also reveal limitations: AM-PAC 6-Clicks: 57/09  Pt's clinical presentation is currently unstable/unpredictable seen in pt's presentation of need for min-max assist w/ all phases of mobility when usually mobilizing independently, severe RLE pain impacting overall mobility status, ongoing medical assessment, on telemetry monitoring and (+) dizziness/lightheadedness and nausea w/ movement  Overall, pt's rehab potential and prognosis to return to PLOF is fair as impacted by objective findings, warranting pt to receive further skilled PT interventions to address identified impairments, activity limitation(s), and participation restriction(s)  Goal for patient is to feel better  Pt to benefit from continued PT tx to address deficits as defined above and maximize level of functional independent mobility and consistency in order for pt to safely and independently perform all stages of mobility and improve ADL/IADL performance  From PT/mobility standpoint, recommendation at time of d/c would be home with outpatient rehabilitation pending progress in order to facilitate return to PLOF  Barriers to Discharge: Inaccessible home environment     PT Discharge Recommendation: Home with outpatient rehabilitation (Pending improvements in medical status and ambulation/elevation performance)    See flowsheet documentation for full assessment

## 2022-11-16 NOTE — ASSESSMENT & PLAN NOTE
Continue Ditropan  Monitor urine output    Dela Cruz catheter currently in place, will defer to orthopedic team regarding when to discontinue

## 2022-11-16 NOTE — ANESTHESIA POSTPROCEDURE EVALUATION
Post-Op Assessment Note    CV Status:  Stable  Pain Score: 0    Pain management: adequate     Mental Status:  Arousable   Hydration Status:  Stable   PONV Controlled:  None   Airway Patency:  Patent      Post Op Vitals Reviewed: Yes      Staff: CRNA         No notable events documented      BP  138/65   Temp   97 7   Pulse  81   Resp 16   SpO2   97%

## 2022-11-16 NOTE — ANESTHESIA PROCEDURE NOTES
Peripheral Block    Patient location during procedure: holding area  Start time: 11/16/2022 7:45 AM  Reason for block: at surgeon's request and post-op pain management  Staffing  Performed: Anesthesiologist   Anesthesiologist: Robert Fuller MD  Preanesthetic Checklist  Completed: patient identified, IV checked, site marked, risks and benefits discussed, surgical consent, monitors and equipment checked, pre-op evaluation and timeout performed  Peripheral Block  Patient position: sitting  Prep: ChloraPrep  Patient monitoring: continuous pulse ox and frequent blood pressure checks  Block type: ipack block  Laterality: right  Procedures: ultrasound guided, Ultrasound guidance required for the procedure to increase accuracy and safety of medication placement and decrease risk of complications    Ultrasound permanent image savedbupivacaine (MARCAINE) 0 5 % - Perineural   20 mL - 11/16/2022 7:45:00 AM  Needle  Needle type: pencil-tip   Needle gauge: 22 G  Needle length: 10 cm  Needle localization: ultrasound guidance  Test dose: negative  Assessment  Injection assessment: incremental injection, local visualized surrounding nerve on ultrasound, no paresthesia on injection and negative aspiration for heme  Paresthesia pain: none  Heart rate change: no  Slow fractionated injection: yes  Post-procedure:  site cleaned  patient tolerated the procedure well with no immediate complications  Additional Notes  Unremarkable IPACK block

## 2022-11-17 LAB
ANION GAP SERPL CALCULATED.3IONS-SCNC: 6 MMOL/L (ref 4–13)
BUN SERPL-MCNC: 7 MG/DL (ref 5–25)
CALCIUM SERPL-MCNC: 8.4 MG/DL (ref 8.4–10.2)
CHLORIDE SERPL-SCNC: 101 MMOL/L (ref 96–108)
CO2 SERPL-SCNC: 28 MMOL/L (ref 21–32)
CREAT SERPL-MCNC: 0.7 MG/DL (ref 0.6–1.3)
ERYTHROCYTE [DISTWIDTH] IN BLOOD BY AUTOMATED COUNT: 12 % (ref 11.6–15.1)
GFR SERPL CREATININE-BSD FRML MDRD: 88 ML/MIN/1.73SQ M
GLUCOSE P FAST SERPL-MCNC: 164 MG/DL (ref 65–99)
GLUCOSE SERPL-MCNC: 164 MG/DL (ref 65–140)
HCT VFR BLD AUTO: 44.5 % (ref 34.8–46.1)
HGB BLD-MCNC: 15.2 G/DL (ref 11.5–15.4)
MCH RBC QN AUTO: 31.6 PG (ref 26.8–34.3)
MCHC RBC AUTO-ENTMCNC: 34.2 G/DL (ref 31.4–37.4)
MCV RBC AUTO: 93 FL (ref 82–98)
PLATELET # BLD AUTO: 166 THOUSANDS/UL (ref 149–390)
PMV BLD AUTO: 9.7 FL (ref 8.9–12.7)
POTASSIUM SERPL-SCNC: 3.8 MMOL/L (ref 3.5–5.3)
RBC # BLD AUTO: 4.81 MILLION/UL (ref 3.81–5.12)
SODIUM SERPL-SCNC: 135 MMOL/L (ref 135–147)
WBC # BLD AUTO: 7.58 THOUSAND/UL (ref 4.31–10.16)

## 2022-11-17 RX ORDER — HYDRALAZINE HYDROCHLORIDE 20 MG/ML
10 INJECTION INTRAMUSCULAR; INTRAVENOUS EVERY 6 HOURS PRN
Status: DISCONTINUED | OUTPATIENT
Start: 2022-11-17 | End: 2022-11-22 | Stop reason: HOSPADM

## 2022-11-17 RX ORDER — LABETALOL HYDROCHLORIDE 5 MG/ML
10 INJECTION, SOLUTION INTRAVENOUS ONCE
Status: COMPLETED | OUTPATIENT
Start: 2022-11-17 | End: 2022-11-17

## 2022-11-17 RX ORDER — LISINOPRIL 10 MG/1
10 TABLET ORAL DAILY
Status: DISCONTINUED | OUTPATIENT
Start: 2022-11-17 | End: 2022-11-18

## 2022-11-17 RX ADMIN — CEFAZOLIN SODIUM 1000 MG: 1 SOLUTION INTRAVENOUS at 08:52

## 2022-11-17 RX ADMIN — DOCUSATE SODIUM 100 MG: 100 CAPSULE, LIQUID FILLED ORAL at 17:45

## 2022-11-17 RX ADMIN — OXYCODONE HYDROCHLORIDE AND ACETAMINOPHEN 2 TABLET: 5; 325 TABLET ORAL at 05:09

## 2022-11-17 RX ADMIN — LUBIPROSTONE 24 MCG: 8 CAPSULE, GELATIN COATED ORAL at 16:58

## 2022-11-17 RX ADMIN — OXYBUTYNIN 10 MG: 5 TABLET, FILM COATED, EXTENDED RELEASE ORAL at 22:09

## 2022-11-17 RX ADMIN — DULOXETINE 60 MG: 60 CAPSULE, DELAYED RELEASE ORAL at 17:45

## 2022-11-17 RX ADMIN — LUBIPROSTONE 24 MCG: 8 CAPSULE, GELATIN COATED ORAL at 08:51

## 2022-11-17 RX ADMIN — Medication 9 MG: at 22:08

## 2022-11-17 RX ADMIN — OXYCODONE HYDROCHLORIDE AND ACETAMINOPHEN 500 MG: 500 TABLET ORAL at 17:45

## 2022-11-17 RX ADMIN — OXYCODONE HYDROCHLORIDE AND ACETAMINOPHEN 1 TABLET: 5; 325 TABLET ORAL at 20:43

## 2022-11-17 RX ADMIN — FOLIC ACID 1 MG: 1 TABLET ORAL at 08:51

## 2022-11-17 RX ADMIN — HYDROMORPHONE HYDROCHLORIDE 0.2 MG: 0.2 INJECTION, SOLUTION INTRAMUSCULAR; INTRAVENOUS; SUBCUTANEOUS at 01:35

## 2022-11-17 RX ADMIN — METRONIDAZOLE 250 MG: 500 TABLET ORAL at 22:08

## 2022-11-17 RX ADMIN — METRONIDAZOLE 250 MG: 500 TABLET ORAL at 05:04

## 2022-11-17 RX ADMIN — FONDAPARINUX SODIUM 2.5 MG: 2.5 INJECTION, SOLUTION SUBCUTANEOUS at 08:53

## 2022-11-17 RX ADMIN — DULOXETINE 60 MG: 60 CAPSULE, DELAYED RELEASE ORAL at 08:51

## 2022-11-17 RX ADMIN — FERROUS SULFATE TAB 325 MG (65 MG ELEMENTAL FE) 325 MG: 325 (65 FE) TAB at 16:58

## 2022-11-17 RX ADMIN — OXYCODONE HYDROCHLORIDE AND ACETAMINOPHEN 1 TABLET: 5; 325 TABLET ORAL at 11:40

## 2022-11-17 RX ADMIN — HYDROMORPHONE HYDROCHLORIDE 0.2 MG: 0.2 INJECTION, SOLUTION INTRAMUSCULAR; INTRAVENOUS; SUBCUTANEOUS at 06:29

## 2022-11-17 RX ADMIN — METRONIDAZOLE 250 MG: 500 TABLET ORAL at 14:16

## 2022-11-17 RX ADMIN — LABETALOL HYDROCHLORIDE 10 MG: 5 INJECTION, SOLUTION INTRAVENOUS at 13:20

## 2022-11-17 RX ADMIN — OXYCODONE HYDROCHLORIDE AND ACETAMINOPHEN 500 MG: 500 TABLET ORAL at 08:51

## 2022-11-17 RX ADMIN — LORATADINE 10 MG: 10 TABLET ORAL at 08:51

## 2022-11-17 RX ADMIN — BUPROPION HYDROCHLORIDE 300 MG: 150 TABLET, EXTENDED RELEASE ORAL at 08:51

## 2022-11-17 RX ADMIN — Medication 1 TABLET: at 08:51

## 2022-11-17 RX ADMIN — ACETAMINOPHEN 650 MG: 325 TABLET ORAL at 16:58

## 2022-11-17 RX ADMIN — LISINOPRIL 10 MG: 10 TABLET ORAL at 14:00

## 2022-11-17 RX ADMIN — FERROUS SULFATE TAB 325 MG (65 MG ELEMENTAL FE) 325 MG: 325 (65 FE) TAB at 08:51

## 2022-11-17 RX ADMIN — DOCUSATE SODIUM 100 MG: 100 CAPSULE, LIQUID FILLED ORAL at 08:51

## 2022-11-17 RX ADMIN — ATORVASTATIN CALCIUM 20 MG: 20 TABLET, FILM COATED ORAL at 16:58

## 2022-11-17 RX ADMIN — CEFAZOLIN SODIUM 1000 MG: 1 SOLUTION INTRAVENOUS at 01:28

## 2022-11-17 NOTE — PHYSICAL THERAPY NOTE
Physical Therapy Treatment Session Note    Patient's Name: Shilpa Plan    Admitting Diagnosis  Primary osteoarthritis of right knee [M17 11]    Problem List  Patient Active Problem List   Diagnosis    Irritable bowel syndrome (IBS)    Mild intermittent asthma without complication    Bilateral carpal tunnel syndrome    Chronic musculoskeletal pain    Generalized anxiety disorder    Hyperlipidemia LDL goal <130    Essential hypertension    Lymphedema    Patent foramen ovale    Tension type headache    Vitamin D deficiency    Recurrent major depressive disorder, in partial remission (HCC)    Other chronic pancreatitis (HCC)    Body mass index (BMI) of 40 0-44 9 in adult [facility])    Right foot pain    Tinea cruris    Primary insomnia    Neuropathy    Age-related osteoporosis without current pathological fracture    Swelling of joint of left knee    Nephrolithiasis    Scoliosis    Primary osteoarthritis of left knee    HX: breast cancer    Sinus tachycardia    Acute metabolic encephalopathy    Encounter for screening for COVID-19    S/P total knee arthroplasty, left    Dermatitis    Continuous opioid dependence ([facility] [address] )    Seborrheic keratoses    Seasonal allergies    OAB (overactive bladder)    Age-related osteoporosis without current pathological fracture    Mixed stress and urge urinary incontinence    Renal cyst    Chronic constipation    Arthritis of right knee    Colitis       Past Medical History  Past Medical History:   Diagnosis Date    Anxiety     Arthritis     Asthma     Breast cancer ([facility] [address] )     rt mastectomy 2005    Cancer [facility])     breast right    Cataract     CHF (congestive heart failure) (HCC)     Chronic headaches     Concussion     At age 7; Chronic headaches since then    Coronary artery disease     Depression     Dietary lactose intolerance     GERD (gastroesophageal reflux disease)     High cholesterol     History of chemotherapy     2005 rt breast cancer    History of transfusion     Hypertension IBS (irritable bowel syndrome)     Irritable bowel syndrome (IBS) 05/31/2016    Kidney stone     Lymphedema of right arm     Obesity     PFO (patent foramen ovale)     PONV (postoperative nausea and vomiting)     Post-menopausal     Psychiatric disorder     Renal disorder     Shortness of breath     Vitamin D deficiency        Past Surgical History  Past Surgical History:   Procedure Laterality Date    BREAST SURGERY      CATARACT EXTRACTION Bilateral     CATARACT EXTRACTION, BILATERAL      CHOLECYSTECTOMY      COLONOSCOPY N/A 05/31/2016    Procedure: COLONOSCOPY;  Surgeon: Romaine Cook MD;  Location: [facility] MA[facility];  Service:     GALLBLADDER SURGERY      HYSTERECTOMY      Total    JOINT REPLACEMENT      KNEE ARTHROSCOPY      KNEE SURGERY      arthroscopy    MASTECTOMY Right     rt breast mastectomy 2005    [place] TOTAL KNEE ARTHROPLASTY Left 05/20/2021    Procedure: ARTHROPLASTY KNEE TOTAL;  Surgeon: Fidencio Motley MD;  Location: [address] [place], [place] [address]o [place] MAIN OR;  Service: Orthopedics    TONSILLECTOMY AND ADENOIDECTOMY          11/17/22 0900   PT Last Visit   PT Visit Date 11/17/22   Note Type   Note Type Treatment   Pain Assessment   Pain Assessment Tool 0-10   Pain Score 9   Pain Location/Orientation Orientation: Right;Location: Knee  (lateral knee)   Pain Onset/Description Onset: Ongoing;Frequency: Constant/Continuous; Descriptor: Marinell Mutter   Effect of Pain on Daily Activities yes   Patient's Stated Pain Goal No pain   Hospital Pain Intervention(s) Medication (See MAR); Repositioned; Ambulation/increased activity; Elevated; Emotional support; Environmental changes   Multiple Pain Sites No   Precautions   Total Knee Replacement Other (Comment)  (CPM usage in place)   Restrictions/Precautions   Weight Bearing Precautions Per Order Yes   RLE Weight Bearing Per Order FWB   Other Precautions O2;Fall Risk;Multiple lines;Pain;Bed Alarm  (2 L NC,does not utilize at home;Mini drain, surgical site R knee)   General   Chart Reviewed Yes   Response to Previous Treatment Patient with no complaints from previous session  Family/Caregiver Present Yes  (daughter present upon arrival)   Cognition   Overall Cognitive Status WFL   Arousal/Participation Responsive   Attention Within functional limits   Orientation Level Oriented X4   Memory Within functional limits   Following Commands Follows one step commands with increased time or repetition   Comments Karol Oleary was agreeable to PT treatment session, pleasant  Subjective   Subjective "I went to rehab" when asked about prior total joint surgery  Bed Mobility   Rolling R 4  Minimal assistance   Additional items Assist x 1;Bedrails; Increased time required;Verbal cues;LE management   Rolling L 3  Moderate assistance   Additional items Assist x 1;Bedrails; Increased time required;Verbal cues;LE management   Supine to Sit 3  Moderate assistance   Additional items Assist x 1;HOB elevated; Bedrails; Increased time required;Verbal cues;LE management   Sit to Supine 3  Moderate assistance   Additional items Assist x 2;Bedrails; Increased time required;Verbal cues;LE management   Additional Comments Verbal cues for bedrail utilization and proper body mechanics  External tactile support provided to RLE while approaching edge of bed  Karol Oleary reported lightheadedness upon establishing static sitting balance  Increased time provided for symptomatic resolution  Transfers   Sit to Stand 3  Moderate assistance   Additional items Assist x 2;Bedrails; Increased time required;Verbal cues  (RW usage)   Stand to Sit 3  Moderate assistance   Additional items Assist x 1;Bedrails; Increased time required;Verbal cues   Stand pivot Unable to assess  (could not safely advance task at this time)   Additional Comments Verbal cues for proper BUE placement with transitional movements, proper body mechanics, and deep breathing utilization  Ambulation/Elevation   Gait pattern Improper Weight shift; Forward Flexion;Decreased R stance; Antalgic; Short stride; Excessively slow   Gait Assistance 3  Moderate assist   Additional items Assist x 2;Verbal cues; Tactile cues   Assistive Device Rolling walker   Distance 1 foot  (toward head of bed, could not safely advance distance due to medical complications)   Stair Management Assistance Not tested   Ambulation/Elevation Additional Comments Upon taking step toward head of bed, Yannick Costello reported lightheadedness without subsidence  Patient was assisted back to bed and repositioned accordingly  Yannick Costello reported improvement upon supine positioning  Balance   Static Sitting Fair +   Dynamic Sitting Fair -   Static Standing Poor   Dynamic Standing Poor   Ambulatory Poor   Endurance Deficit   Endurance Deficit Yes   Activity Tolerance   Activity Tolerance Patient limited by fatigue;Patient limited by pain;Treatment limited secondary to medical complications (Comment)  (lightheadedness)   Nurse Made Aware Yes, Iglesia Cheng RN was aware of treatment session outcome  Julieta Canseco PCA assisted with mobility  PT appreciated the care coordination  Chloe TOWNSENDC and Reji JUDGE informed of updated d/c disposition recommendation )   Assessment   Prognosis Fair   Problem List Decreased strength;Decreased endurance; Impaired balance;Decreased mobility;Pain;Orthopedic restrictions;Decreased skin integrity   Assessment Pt seen for PT treatment session this date with interventions consisting of gait training w/ emphasis on improving pt's ability to ambulate level surfaces x 1 foot with mod A of 2 provided by therapist with RW and therapeutic activity consisting of training: bed mobility, supine<>sit transfers, sit<>stand transfers, static sitting tolerance at EOB for >10 minutes w/ B UE support, static standing tolerance for <1 minutes w/ B UE support, vc and tactile cues for static sitting posture faciliation, vc and tactile cues for static standing posture faciliation and safety awareness education, deep breathing technique utilization   Pt agreeable to PT treatment session upon arrival, pt found supine in bed w/ HOB elevated, A&O x 4  In comparison to previous session, pt with improvements in advancement to weightbearing tasks  Post session: pt returned BTB, bed alarm engaged, all needs in reach and RN notified of session findings/recommendations  Updated recommendation of post acute rehabilitation services at time of d/c in order to maximize pt's functional independence and safety w/ mobility  Pt continues to be functioning below baseline level, and remains limited 2* factors listed above and including weakness, pain, decreased endurance,activity intolerance, gait deviations,medical complications impeding functional task advancement  PT will continue to see pt during current hospitalization in order to address the deficits listed above and provide interventions consistent w/ POC in effort to achieve STGs  Barriers to Discharge Inaccessible home environment   Goals   Patient Goals to feel better   STG Expiration Date 11/26/22   Short Term Goal #1 STGs remain appropriate   PT Treatment Day 1   Plan   Treatment/Interventions Functional transfer training;Elevations; Endurance training;Patient/family training;Equipment eval/education; Bed mobility;Gait training;Spoke to nursing;Spoke to case management;Spoke to advanced practitioner   Progress Slow progress, medical status limitations   PT Frequency Twice a day   Recommendation   PT Discharge Recommendation (S)  Post acute rehabilitation services  (updated recommendation)   Equipment Recommended [facility] Recommended Wheeled walker   Change/add to [place]? No   Additional Comments Upon conclusion, pt  was resting in bed  All needs within reach     AM-PAC Basic Mobility Inpatient   Turning in Bed Without Bedrails 3   Lying on Back to Sitting on Edge of Flat Bed 2   Moving Bed to Chair 2   Standing Up From Chair 2   Walk in Room 1   Climb 3-5 Stairs 1   Basic Mobility Inpatient Raw Score 11 Basic Mobility Standardized Score 30 25   Highest Level Of Mobility   -HLM Goal 4: Move to chair/commode   -HLM Achieved 3: Sit at edge of bed     Treatment Time: 6053-4770    Gil Points, PT

## 2022-11-17 NOTE — OCCUPATIONAL THERAPY NOTE
Occupational Therapy Evaluation      Deyanira Stokes    11/17/2022    Principal Problem:    Arthritis of right knee  Active Problems:    Irritable bowel syndrome (IBS)    Mild intermittent asthma without complication    Recurrent major depressive disorder, in partial remission ([facility] [address] )    HX: breast cancer    Seasonal allergies    Mixed stress and urge urinary incontinence    Colitis      Past Medical History:   Diagnosis Date    Anxiety     Arthritis     Asthma     Breast cancer ([facility] [address] )     rt mastectomy 2005    Cancer [facility])     breast right    Cataract     CHF (congestive heart failure) (HCC)     Chronic headaches     Concussion     At age 7; Chronic headaches since then    Coronary artery disease     Depression     Dietary lactose intolerance     GERD (gastroesophageal reflux disease)     High cholesterol     History of chemotherapy     2005 rt breast cancer    History of transfusion     Hypertension     IBS (irritable bowel syndrome)     Irritable bowel syndrome (IBS) 05/31/2016    Kidney stone     Lymphedema of right arm     Obesity     PFO (patent foramen ovale)     PONV (postoperative nausea and vomiting)     Post-menopausal     Psychiatric disorder     Renal disorder     Shortness of breath     Vitamin D deficiency        Past Surgical History:   Procedure Laterality Date    BREAST SURGERY      CATARACT EXTRACTION Bilateral     CATARACT EXTRACTION, BILATERAL      CHOLECYSTECTOMY      COLONOSCOPY N/A 05/31/2016    Procedure: COLONOSCOPY;  Surgeon: Heide Tong MD;  Location: [facility];  Service:     GALLBLADDER SURGERY      HYSTERECTOMY      Total    JOINT REPLACEMENT      KNEE ARTHROSCOPY      KNEE SURGERY      arthroscopy    MASTECTOMY Right     rt breast mastectomy 2005    [place] TOTAL KNEE ARTHROPLASTY Left 05/20/2021    Procedure: ARTHROPLASTY KNEE TOTAL;  Surgeon: Kashmir Reese MD;  Location: [address] [place] [address] MAIN OR;  Service: Orthopedics    TONSILLECTOMY AND ADENOIDECTOMY          11/17/22 1321   OT Last Visit   OT Visit Date 11/17/22   Note Type   Note type Evaluation   Additional Comments Pt seen as a co-session with PT due to the patient's co-morbidities, clinically unstable presentation, and present impairments which are a regression from the patient's baseline  Pain Assessment   Pain Assessment Tool 0-10   Pain Score 9   Pain Location/Orientation Orientation: Right;Location: Leg   Pain Onset/Description Onset: Ongoing;Frequency: Constant/Continuous; Descriptor: Merary Kidney   Effect of Pain on Daily Activities Yes   Patient's Stated Pain Goal No pain   Hospital Pain Intervention(s) Medication (See MAR)   Multiple Pain Sites No   Restrictions/Precautions   Weight Bearing Precautions Per Order Yes   RLE Weight Bearing Per Order FWB   Other Precautions O2;Fall Risk;Multiple lines;Pain;Bed Alarm  (2L NC, pt reports not utilizing O2 at home; Mini drain, surgical site R knee)   Home Living   Type of [address] [place] [address] Two level;Bed/bath upstairs; Access;Stairs to enter with rails  (3 ARTUR bilateral railings, chair glide to second floor)   Bathroom Shower/Tub Walk-in shower   Bathroom Toilet Standard   Bathroom Equipment Shower chair;Grab bars around toilet  (ledges in shower to hold onto)   [address] Walker;Cane  (rollator and 3 point cane)   Prior Function   Level of [place] Independent with ADLs; Independent with functional mobility; Independent with IADLS   Lives With Daughter   Receives Help From Family   IADLs Family/Friend/Other provides transportation; Independent with meal prep; Independent with medication management   Falls in the last 6 months 1 to 4  (3 falls, most recently 11/15/22 d/t LOB)   Vocational Retired   Comments When asked PLOF, pt reported receiving help with ADLs prior to admission  Pt's daugther was present during evaluation, daughter reports pt required no assistance with ADLs prior to admission     ADL   Where Assessed Edge of bed   UB Bathing Assistance 4  Minimal Assistance   LB Bathing Assistance 3  Moderate Assistance  (per therapist clinical reasoning skills, pt would require mod (A) to complete LB bathing d/t pain, stiffness and limited LE mobility )   UB Dressing Assistance 4  Minimal Assistance    [address] 3  Moderate Assistance  (per therapist clinical reasoning skills, pt would require mod (A) to complete LB dressing d/t pain, stiffness and limited LE mobility )   Bed Mobility   Rolling R 3  Moderate assistance   Additional items Assist x 1; Increased time required;Verbal cues  (verbal cues for hand placement during technique)   Rolling L 3  Moderate assistance   Additional items Assist x 1; Increased time required;Verbal cues  (verbal cues for hand placement during technique)   Supine to Sit 3  Moderate assistance   Additional items Assist x 2;HOB elevated; Bedrails; Increased time required;Verbal cues;LE management   Sit to Supine 3  Moderate assistance   Additional items Assist x 2;HOB elevated; Bedrails; Increased time required;Verbal cues;LE management   Transfers   Additional Comments functional transfers were not assessed this date d/t pt's current physical status during evaluation  Pt reported lightheadedness and dizziness upon sitting EOB  Pt appeared lethagric and clamy  Therefore, per therapist clinical reasoning skills, transfers and mobility were not assessed  Pt returned to bed supine     Balance   Static Sitting Poor   Dynamic Sitting Poor   Static Standing   (DNT d/t pt dizziness)   Dynamic Standing   (DNT d/t pt dizziness)   Activity Tolerance   Activity Tolerance Patient limited by fatigue;Patient limited by pain;Treatment limited secondary to medical complications (Comment)  (BP limited today's session)   Nurse Made Aware KOURTNEY Velazquez made aware of pts BP, medical status and evaluation outcome   RUE Assessment   RUE Assessment X  (Pt presented with partial AROM RUE which would impact her ADLs and IADLs completion )   RUE Strength   RUE Overall Strength   (3/5)   Edema   RUE Edema Non-pitting   LUE Assessment   LUE Assessment X  (Pt presents with partial L UE AROM which would impact her ADLs and IADLs completion )   LUE Strength   LUE Overall Strength   (3/5)   Vision-Basic Assessment   Current Vision Wears glasses only for reading   Psychosocial   Psychosocial (WDL) WDL   Cognition   Overall Cognitive Status Impaired   Arousal/Participation Cooperative;Lethargic   Attention Attends with cues to redirect   Orientation Level Oriented to person;Oriented to time;Disoriented to place;Oriented to situation   Memory Decreased recall of biographical information;Decreased recall of recent events  (Pt unable to recall PLOF in accordance with nini's report )   Following Commands Follows one step commands without difficulty   Assessment   Limitation Decreased ADL status; Decreased UE ROM; Decreased UE strength;Decreased Safe judgement during ADL;Decreased endurance   Prognosis Good   Assessment Pt is a 81 y o  female seen for OT evaluation s/p admit to [place] [id number] on 11/16/2022 w/ Arthritis of right knee  Comorbidities affecting pt's functional performance at time of assessment include: IBS, MDD, ELINOR, chronic musculoskeletal pain, OA pain  Personal factors affecting pt at time of IE include:difficulty performing ADLS, difficulty performing IADLS , decreased initiation and engagement  and health management   Prior to admission, pt was mod I with ADLs and IADLs  Upon evaluation: the following deficits impact occupational performance: weakness, decreased ROM, decreased strength, decreased balance, decreased tolerance and decreased safety awareness  Pt to benefit from continued skilled OT tx while in the hospital to address deficits as defined above and maximize level of functional independence w ADL's and functional mobility  Occupational Performance areas to address include: bathing/shower, toilet hygiene, dressing, functional mobility and clothing management   From OT standpoint, recommendation at time of d/c would be STR  Goals   Patient Goals To feel better   Plan   Treatment Interventions ADL retraining;Functional transfer training;UE strengthening/ROM; Patient/family training;Equipment evaluation/education; Energy conservation; Activityengagement   Goal Expiration Date 11/27/22   OT Treatment Day 0   OT Frequency 3-5x/wk   Recommendation   OT Discharge Recommendation Post acute rehabilitation services   Additional Comments  The patient's raw score on the AM-PAC Daily Activity inpatient short form is 17, standardized score is 37 26, less than 39 4  Patients at this level are likely to benefit from discharge to post-acute rehabilitation services  Please refer to the recommendation of the Occupational Therapist for safe discharge planning     AM-PAC Daily Activity Inpatient   Lower Body Dressing 2   Bathing 3   Toileting 2   Upper Body Dressing 3   Grooming 3   Eating 4   Daily Activity Raw Score 17   Daily Activity Standardized Score (Calc for Raw Score >=11) 37 26   AM-PAC Applied Cognition Inpatient   Following a Speech/Presentation 3   Understanding Ordinary Conversation 4   Taking Medications 3   Remembering Where Things Are Placed or Put Away 3   Remembering List of 4-5 Errands 3   Taking Care of Complicated Tasks 3   Applied Cognition Raw Score 19   Applied Cognition Standardized Score 39 77     GOALS:    Pt will achieve the following within specified time frame: STG  Pt will achieve the following goals within 5 days    *ADL transfers with Min (A) for inc'd independence with ADLs/purposeful task    *UB ADL with (S) for inc'd independence with self cares    *LB ADL with Min (A) using AE prn for inc'd independence with self cares    *Toileting with Min (A) for clothing management and hygiene for return to [facility] with personal care    *Increase static sitting balance and dyn sitting balance to F for inc'd safety with standing purposeful tasks    *Increase static sitting balance and dyn stand balance to F- for inc'd safety with standing purposeful tasks    *Increase stand tolerance x3 m for inc'd tolerance with standing purposeful tasks    *Participate in 10m UE therex to increase overall stamina/activity tolerance for purposeful tasks    *Bed mobility- Min (A) for inc'd independence to manage own comfort and initiate EOB & OOB purposeful tasks    Pt will achieve the following within specified time frame: LTG  Pt will achieve the following goals within 10 days    *ADL transfers with CGA for inc'd independence with ADLs/purposeful tasks    *UB ADL with (I) for inc'd independence with self cares    *LB ADL with CGA using AE prn for inc'd independence with self cares    *Toileting with CGA for clothing management and hygiene for return to [facility] with personal care    *Increase static sitting balance and dyn sitting balance to G for inc'd safety with standing purposeful tasks    *Increase static stand balance and dyn stand balance to F+ for inc'd safety with standing purposeful tasks    *Increase stand tolerance x7 m for inc'd tolerance with standing purposeful tasks    *Bed mobility- CGA for inc'd independence to manage own comfort and initiate EOB & OOB purposeful tasks      Piper Madrigal MS, OTR/L

## 2022-11-17 NOTE — PLAN OF CARE
Problem: PHYSICAL THERAPY ADULT  Goal: Performs mobility at highest level of function for planned discharge setting  See evaluation for individualized goals  Description: Treatment/Interventions: Functional transfer training, LE strengthening/ROM, Elevations, Therapeutic exercise, Endurance training, Patient/family training, Equipment eval/education, Bed mobility, Gait training, Spoke to nursing          See flowsheet documentation for full assessment, interventions and recommendations  Outcome: Progressing  Note: Prognosis: Fair  Problem List: Decreased strength, Decreased endurance, Impaired balance, Decreased mobility, Pain, Orthopedic restrictions, Decreased skin integrity  Assessment: Pt seen for PT treatment session this date with interventions consisting of gait training w/ emphasis on improving pt's ability to ambulate level surfaces x 1 foot with mod A of 2 provided by therapist with RW and therapeutic activity consisting of training: bed mobility, supine<>sit transfers, sit<>stand transfers, static sitting tolerance at EOB for >10 minutes w/ B UE support, static standing tolerance for <1 minutes w/ B UE support, vc and tactile cues for static sitting posture faciliation, vc and tactile cues for static standing posture faciliation and safety awareness education, deep breathing technique utilization  Pt agreeable to PT treatment session upon arrival, pt found supine in bed w/ HOB elevated, A&O x 4  In comparison to previous session, pt with improvements in advancement to weightbearing tasks  Post session: pt returned BTB, bed alarm engaged, all needs in reach and RN notified of session findings/recommendations  Updated recommendation of post acute rehabilitation services at time of d/c in order to maximize pt's functional independence and safety w/ mobility   Pt continues to be functioning below baseline level, and remains limited 2* factors listed above and including weakness, pain, decreased endurance,activity intolerance, gait deviations,medical complications impeding functional task advancement  PT will continue to see pt during current hospitalization in order to address the deficits listed above and provide interventions consistent w/ POC in effort to achieve STGs  Barriers to Discharge: Inaccessible home environment     PT Discharge Recommendation: (S) Post acute rehabilitation services (updated recommendation)    See flowsheet documentation for full assessment

## 2022-11-17 NOTE — PHYSICAL THERAPY NOTE
PHYSICAL THERAPY TREATMENT NOTE  NAME:  Joel Reyes  DATE: 11/17/22    Length Of Stay: 0  Performed at least 2 patient identifiers during session: Name and ID bracelet    TREATMENT:      11/17/22 1410   PT Last Visit   PT Visit Date 11/17/22   Note Type   Note Type Treatment   Pain Assessment   Pain Assessment Tool 0-10   Pain Score 9   Pain Location/Orientation Orientation: Right;Location: Knee   Pain Onset/Description Onset: Ongoing;Frequency: Constant/Continuous   Effect of Pain on Daily Activities yes   Patient's Stated Pain Goal No pain   Hospital Pain Intervention(s) Medication (See MAR); Repositioned; Ambulation/increased activity; Emotional support   Multiple Pain Sites No   Restrictions/Precautions   Weight Bearing Precautions Per Order Yes   RLE Weight Bearing Per Order FWB   Other Precautions Bed Alarm;O2;Fall Risk;Pain;Multiple lines   General   Chart Reviewed Yes   Response to Previous Treatment Patient with no complaints from previous session  Family/Caregiver Present Yes  (daughter)   Cognition   Overall Cognitive Status Impaired   Arousal/Participation Cooperative;Lethargic   Attention Attends with cues to redirect   Orientation Level Oriented to person;Oriented to time;Disoriented to place;Oriented to situation   Memory Decreased recall of biographical information;Decreased recall of recent events   Following Commands Follows one step commands without difficulty   Comments pt agreeable to PT session   Bed Mobility   Rolling R 3  Moderate assistance   Additional items Assist x 1; Increased time required;Verbal cues;LE management   Rolling L 3  Moderate assistance   Additional items Assist x 1; Increased time required;Verbal cues;LE management   Supine to Sit 3  Moderate assistance   Additional items Assist x 2;HOB elevated; Bedrails; Increased time required;Verbal cues;LE management   Sit to Supine 3  Moderate assistance   Additional items Assist x 2;HOB elevated; Bedrails; Increased time required;Verbal cues;LE management   Additional Comments pt performed sitting scooting toward HOB, upon sitting at EOB patient reporting dizzines and became dyapharetic, /66, patient returned back to bed   Transfers   Sit to Stand Unable to assess   Balance   Static Sitting Poor   Dynamic Sitting Poor   Endurance Deficit   Endurance Deficit Yes   Endurance Deficit Description lethargy, dizziness   Activity Tolerance   Activity Tolerance Patient limited by fatigue;Treatment limited secondary to medical complications (Comment)  (BP limited mobility progression)   Nurse Made Aware yes   Exercises   Quad Sets Supine;15 reps;AROM; Bilateral   Heelslides Supine;15 reps;AROM; Left;AAROM; Right   Glute Sets Supine;15 reps;AROM; Bilateral   Hip Flexion Supine;15 reps;AROM; Left;AAROM; Right   Hip Abduction Supine;15 reps;AROM; Left;AAROM; Right   Hip Adduction Supine;15 reps;AROM; Left;AAROM; Right   Knee AROM Short Arc Quad Supine;15 reps;AROM; Left;AAROM; Right   Ankle Pumps Supine;15 reps;AROM; Left;AAROM; Right   Assessment   Prognosis Fair   Problem List Decreased strength;Decreased endurance; Impaired balance;Decreased mobility;Pain;Orthopedic restrictions;Decreased skin integrity   Assessment Pt seen for PT treatment session this date with interventions consisting of Therapeutic exercise consisting of: AROM and AAROM 15 reps R LE and L LE in supine position and therapeutic activity consisting of training: bed mobility, supine<>sit transfers, static sitting tolerance at EOB for 23 minutes w/ B UE support, vc and tactile cues for static sitting posture faciliation and multiple trials of rolling in bed, and seated scooting toward HOB  Pt agreeable to PT treatment session upon arrival, pt found supine in bed w/ HOB elevated, responsive and lethargic  In comparison to previous session, pt with no improvements as evidenced by treatment limited by BP today   Post session: pt returned BTB, bed alarm engaged, all needs in reach and RN notified of session findings/recommendations  Continue to recommend post acute rehabilitation services at time of d/c in order to maximize pt's functional independence and safety w/ mobility  Pt continues to be functioning below baseline level  PT will continue to see pt during current hospitalization in order to address the deficits listed above and provide interventions consistent w/ POC in effort to achieve STGs  Barriers to Discharge Inaccessible home environment   Goals   Patient Goals to feel better   STG Expiration Date 11/26/22   PT Treatment Day 2   Plan   Treatment/Interventions Functional transfer training;LE strengthening/ROM; Therapeutic exercise; Endurance training;Bed mobility;Gait training;Spoke to nursing   Progress No functional improvements   PT Frequency Twice a day   Recommendation   PT Discharge Recommendation Post acute rehabilitation services   Equipment Recommended [address] Basic Mobility Inpatient   Turning in Bed Without Bedrails 3   Lying on Back to Sitting on Edge of Flat Bed 2   Moving Bed to Chair 2   Standing Up From Chair 2   Walk in Room 1   Climb 3-5 Stairs 1   Basic Mobility Inpatient Raw Score 11   Basic Mobility Standardized Score 30 25   Turning Head Towards Sound 4   Follow Simple Instructions 4   Low Function Basic Mobility Raw Score 19   Low Function Basic Mobility Standardized Score 31 06   Highest Level Of Mobility   JH-HLM Goal 4: Move to chair/commode   JH-HLM Achieved 3: Sit at edge of bed   Education   Education Provided Mobility training;Home exercise program   Patient Demonstrates acceptance/verbal understanding;Reinforcement needed   End of Consult   Patient Position at End of Consult Supine; All needs within reach   End of Consult Comments CP applied post treatment       The patient's AM-PAC Basic Mobility Inpatient Short Form Raw Score is 11   A Raw score of less than or equal to 16 suggests the patient may benefit from discharge to post-acute rehabilitation services  Please also refer to the recommendation of the Physical Therapist for safe discharge planning        Jacqueline Hernandez, PTA,PTA

## 2022-11-17 NOTE — NURSING NOTE
Patient awake, complaint of mild headache and discomfort to R leg  BP elevated, [facility] made aware  Dressing dry and intact R leg   Call bell in reach

## 2022-11-17 NOTE — PLAN OF CARE
Problem: OCCUPATIONAL THERAPY ADULT  Goal: Performs self-care activities at highest level of function for planned discharge setting  See evaluation for individualized goals  Description: Treatment Interventions: ADL retraining, Functional transfer training, UE strengthening/ROM, Patient/family training, Equipment evaluation/education, Energy conservation, Activityengagement    See flowsheet documentation for full assessment, interventions and recommendations  Note: Limitation: Decreased ADL status, Decreased UE ROM, Decreased UE strength, Decreased Safe judgement during ADL, Decreased endurance  Prognosis: Good  Assessment: Pt is a 81 y o  female seen for OT evaluation s/p admit to [place] [address] on 11/16/2022 w/ Arthritis of right knee  Comorbidities affecting pt's functional performance at time of assessment include: IBS, MDD, ELINOR, chronic musculoskeletal pain, OA pain  Personal factors affecting pt at time of IE include:difficulty performing ADLS, difficulty performing IADLS , decreased initiation and engagement  and health management   Prior to admission, pt was mod I with ADLs and IADLs  Upon evaluation: the following deficits impact occupational performance: weakness, decreased ROM, decreased strength, decreased balance, decreased tolerance and decreased safety awareness  Pt to benefit from continued skilled OT tx while in the hospital to address deficits as defined above and maximize level of functional independence w ADL's and functional mobility  Occupational Performance areas to address include: bathing/shower, toilet hygiene, dressing, functional mobility and clothing management  From OT standpoint, recommendation at time of d/c would be STR       OT Discharge Recommendation: Post acute rehabilitation services     Robin Whaley MS, OTR/L

## 2022-11-17 NOTE — QUICK NOTE
Notified by RN regarding elevated BP which continues to elevate despite better pain control  Not on any antihypertensive medication at home  Will start the patient on lisinopril 10 mg daily  Will give 1 dose of labetalol 10 mg IV x1    Added p r n  hydralazine IV for systolic pressure greater than 881 and diastolic pressure greater 100

## 2022-11-17 NOTE — CASE MANAGEMENT
Case Management Assessment & Discharge Planning Note    Patient name Sihlpa Plan  Location /-03 MRN [id number]  : 1950 Date 2022       Current Admission Date: 2022  Current Admission Diagnosis:Arthritis of right knee   Patient Active Problem List    Diagnosis Date Noted   • Arthritis of right knee 2022   • Colitis 2022   • Chronic constipation 10/11/2022   • Mixed stress and urge urinary incontinence 10/10/2022   • Renal cyst 10/10/2022   • Seasonal allergies 2022   • OAB (overactive bladder) 2022   • Continuous opioid dependence ([facility] [address] ) 01/10/2022   • Seborrheic keratoses 01/10/2022   • Age-related osteoporosis without current pathological fracture 2021   • Dermatitis 2021   • Acute metabolic encephalopathy    • Encounter for screening for COVID-19 2021   • S/P total knee arthroplasty, left 2021   • Sinus tachycardia 2021   • HX: breast cancer 10/12/2020   • Primary osteoarthritis of left knee 2020   • Nephrolithiasis 09/10/2020   • Scoliosis 09/10/2020   • Swelling of joint of left knee 2020   • Age-related osteoporosis without current pathological fracture 2020   • Primary insomnia 2019   • Neuropathy 2019   • Right foot pain 10/24/2019   • Tinea cruris 10/24/2019   • Recurrent major depressive disorder, in partial remission ([facility] [address] ) 2019   • Other chronic pancreatitis ([facility] [address] ) 2019   • Body mass index (BMI) of 40 0-44 9 in adult [facility]) 2019   • Bilateral carpal tunnel syndrome 2017   • Mild intermittent asthma without complication    • Lymphedema 2017   • Generalized anxiety disorder 06/10/2016   • Patent foramen ovale 06/10/2016   • Tension type headache 06/10/2016   • Essential hypertension 2016   • Irritable bowel syndrome (IBS) 2016   • Chronic musculoskeletal pain 2016   • Vitamin D deficiency 2016   • Hyperlipidemia LDL goal <130 12/07/2015      LOS (days): 0  Geometric Mean LOS (GMLOS) (days):   Days to GMLOS:     OBJECTIVE:              Current admission status: Outpatient Surgery  Referral Reason: Other (d/c planning)    Preferred Pharmacy:   [id number] [place] [address]  Phone: [phone] Fax: [phone] by [address] [place] [address]  Phone: [phone] Fax: [phone]    Primary Care Provider: Uzair Hartman PA-C    Primary Insurance: [facility]  Secondary Insurance: [facility]    ASSESSMENT:  Millie Evans, [address] [place] [address] Representative - Daughter   Primary Phone: [phone] (Mobile)  Home Phone: [phone]               Advance Directives  Does patient have a [address]?: No  Was patient offered paperwork?: Yes  Does patient have Advance Directives?: No  Was patient offered paperwork?: Yes (pt declined paperwork)         Readmission Root Cause  30 Day Readmission: No    Patient Information  Admitted from[de-identified] Home  Mental Status: Alert  During Assessment patient was accompanied by: Daughter  Assessment information provided by[de-identified] Patient, Daughter  Primary Caregiver: Family  Caregiver's Name[de-identified] Angie Cruz Relationship to Patient[de-identified] Family Member (daughter)  Caregiver's Telephone Number[de-identified] [phone] ( home)   [phone] ( cell)  Support Systems: Daughter  [place] of Residence: [facility][note 3] [address] do you live in?: [facility] entry access options   Select all that apply : Stairs  Number of steps to enter home : 3  Do the steps have railings?: No  Type of Current Residence: 2 story home  Upon entering residence, is there a bedroom on the main floor (no further steps)?: No  A bedroom is located on the following floor levels of residence (select all that apply):: 2nd Floor  Upon entering residence, is there a bathroom on the main floor (no further steps)?: Yes  Number of steps to 2nd floor from main floor: 2 (2 steps to the stair lift)  In the last 12 months, was there a time when you were not able to pay the mortgage or rent on time?: No  In the last 12 months, how many places have you lived?: 1  In the last 12 months, was there a time when you did not have a steady place to sleep or slept in a shelter (including now)?: No  Homeless/housing insecurity resource given?: N/A  Living Arrangements: Lives w/ Daughter    Activities of Daily Living Prior to Admission  Functional Status: Assistance (driving, laundry, meals,shopping, cleaning,  pt takes her own meds)  Completes ADLs independently?: No  Level of ADL dependence: Assistance (she needs some assitance)  Ambulates independently?: Yes  Does patient use assisted devices?: Yes  Assisted Devices (DME) used: Thelma Reich  Does patient currently own DME?: Yes  What DME does the patient currently own?: Anneliese Sidhu, [employer]  Does patient have a history of Outpatient Therapy (PT/OT)?: Yes  Does the patient have a history of Short-Term Rehab?: Yes [facility])  Does patient have a history of HHC?: Yes (pt unsure of the agency)  Does patient currently have [facility]?: No         Patient Information Continued  Income Source: Pension/residential  Does patient have prescription coverage?: Yes ([employer])  Within the past 12 months, you worried that your food would run out before you got the money to buy more : Never true  Within the past 12 months, the food you bought just didn't last and you didn't have money to get more : Never true  Food insecurity resource given?: N/A  Does patient receive dialysis treatments?: No  Does patient have a history of substance abuse?: No  Does patient have a history of Mental Health Diagnosis?: Yes (depression)  Is patient receiving treatment for mental health?: Yes (medication from pcp)  Has patient received inpatient treatment related to mental health in the last 2 years?: No         Means of Transportation  Means of Transport to Appts[de-identified] Family transport  In the past 12 months, has lack of transportation kept you from medical appointments or from getting medications?: No  In the past 12 months, has lack of transportation kept you from meetings, work, or from getting things needed for daily living?: No  Was application for public transport provided?: N/A        DISCHARGE DETAILS:    Discharge planning discussed with[de-identified] patient and daughter at [facility] bedside  Freedom of Choice: Yes  Comments - Freedom of Choice: recommendation is for rehab- permission was given to place  blanket referrals for snf rehab and a referral to [facility] contacted family/caregiver?: Yes             Contacts  Patient Contacts: Sawyer Torres  Relationship to Patient[de-identified] Family  Contact Method:  In Person  Reason/Outcome: Discharge [address]         Is the patient interested in [facility] at discharge?: No    DME Referral Provided  Referral made for DME?: No    Other Referral/Resources/Interventions Provided:  Interventions: Acute Rehab, Short Term Rehab  Referral Comments: [facility] and blanket referrals sent with permission    Would you like to participate in our [address] service program?  : No - Declined    Treatment Team Recommendation: Acute Rehab, Short Term Rehab (arc vs snf rehab auth needed- tbd)

## 2022-11-17 NOTE — PLAN OF CARE
Problem: PHYSICAL THERAPY ADULT  Goal: Performs mobility at highest level of function for planned discharge setting  See evaluation for individualized goals  Description: Treatment/Interventions: Functional transfer training, LE strengthening/ROM, Elevations, Therapeutic exercise, Endurance training, Patient/family training, Equipment eval/education, Bed mobility, Gait training, Spoke to nursing          See flowsheet documentation for full assessment, interventions and recommendations  Outcome: Not Progressing  Note: Prognosis: Fair  Problem List: Decreased strength, Decreased endurance, Impaired balance, Decreased mobility, Pain, Orthopedic restrictions, Decreased skin integrity  Assessment: Pt seen for PT treatment session this date with interventions consisting of Therapeutic exercise consisting of: AROM and AAROM 15 reps R LE and L LE in supine position and therapeutic activity consisting of training: bed mobility, supine<>sit transfers, static sitting tolerance at EOB for 23 minutes w/ B UE support, vc and tactile cues for static sitting posture faciliation and multiple trials of rolling in bed, and seated scooting toward HOB  Pt agreeable to PT treatment session upon arrival, pt found supine in bed w/ HOB elevated, responsive and lethargic  In comparison to previous session, pt with no improvements as evidenced by treatment limited by BP today  Post session: pt returned BTB, bed alarm engaged, all needs in reach and RN notified of session findings/recommendations  Continue to recommend post acute rehabilitation services at time of d/c in order to maximize pt's functional independence and safety w/ mobility  Pt continues to be functioning below baseline level  PT will continue to see pt during current hospitalization in order to address the deficits listed above and provide interventions consistent w/ POC in effort to achieve STGs    Barriers to Discharge: Inaccessible home environment     PT Discharge Recommendation: Post acute rehabilitation services    See flowsheet documentation for full assessment

## 2022-11-17 NOTE — PROGRESS NOTES
Progress Note - Orthopedics   Marcia Mitchell 81 y o  female MRN: [id number]  Unit/Bed#: -01 Encounter: [id number]    Assessment:  POD 1 s/p right total knee replacement    Plan:  Continue physical therapy and occupational therapy weight-bearing as tolerated  Continue Arixtra for DVT prophylaxis  Out of bed  DC planning  Remove leg drain and change dressing tomorrow    Weight bearing:  Weight-bearing as tolerated right lower extremity    VTE Pharmacologic Prophylaxis: Fondaparinux (Arixtra)  VTE Mechanical Prophylaxis: sequential compression device    Subjective:  She does complain of surgical pain this morning  She may require rehab as per therapy this morning  Vitals: Blood pressure 161/94, pulse 98, temperature 97 6 °F (36 4 °C), temperature source Oral, resp  rate 20, height 4' 9" (1 448 m), weight 85 7 kg (189 lb), SpO2 94 %, not currently breastfeeding  ,Body mass index is 40 9 kg/m²        Intake/Output Summary (Last 24 hours) at 11/17/2022 1110  Last data filed at 11/17/2022 0900  Gross per 24 hour   Intake 490 ml   Output 1750 ml   Net -1260 ml       Invasive Devices     Peripheral Intravenous Line  Duration           Peripheral IV 11/16/22 Left Hand 1 day    Peripheral IV 11/16/22 Left;Upper Forearm 1 day          Drain  Duration           Autologus Reinfusion/Drain Device Right Knee 1 day                Physical Exam:  Right lower extremity is neurovascularly intact  Toes are pink and mobile  Compartments are soft  Dressing is clean, dry and intact  Good dorsiflexion and plantar flexion of ankle  Brisk cap refill  Sensation intact  Negative Homans  Lab, Imaging and other studies:   CBC:   Lab Results   Component Value Date    WBC 7 58 11/17/2022    HGB 15 2 11/17/2022    HCT 44 5 11/17/2022    MCV 93 11/17/2022     11/17/2022    MCH 31 6 11/17/2022    MCHC 34 2 11/17/2022    RDW 12 0 11/17/2022    MPV 9 7 11/17/2022     CMP:   Lab Results   Component Value Date    SODIUM 135 11/17/2022    CL 101 11/17/2022    CO2 28 11/17/2022    BUN 7 11/17/2022    CREATININE 0 70 11/17/2022    CALCIUM 8 4 11/17/2022    EGFR 88 11/17/2022

## 2022-11-17 NOTE — QUICK NOTE
This patient underwent a procedure not on the inpatient only list and therefore is subject to the 2 midnight benchmark  Accordingly, in my judgement, the patient will require at least 2 midnights in the hospital receiving acute medical care  The patient is noted to have comorbid conditions including hypertension and ambulatory dysfunction which will require management in the neo-operative period prior to safe discharge  As such the patient will require acute care beyond the usual and routine recovery period for the procedure alone and is therefore appropriate for inpatient admission

## 2022-11-18 DIAGNOSIS — Z96.651 S/P TKR (TOTAL KNEE REPLACEMENT) USING CEMENT, RIGHT: Primary | ICD-10-CM

## 2022-11-18 LAB
ANION GAP SERPL CALCULATED.3IONS-SCNC: 5 MMOL/L (ref 4–13)
BUN SERPL-MCNC: 12 MG/DL (ref 5–25)
CALCIUM SERPL-MCNC: 8.8 MG/DL (ref 8.4–10.2)
CHLORIDE SERPL-SCNC: 100 MMOL/L (ref 96–108)
CO2 SERPL-SCNC: 29 MMOL/L (ref 21–32)
CREAT SERPL-MCNC: 0.71 MG/DL (ref 0.6–1.3)
ERYTHROCYTE [DISTWIDTH] IN BLOOD BY AUTOMATED COUNT: 12.4 % (ref 11.6–15.1)
GFR SERPL CREATININE-BSD FRML MDRD: 86 ML/MIN/1.73SQ M
GLUCOSE SERPL-MCNC: 141 MG/DL (ref 65–140)
HCT VFR BLD AUTO: 41.8 % (ref 34.8–46.1)
HGB BLD-MCNC: 13.9 G/DL (ref 11.5–15.4)
MCH RBC QN AUTO: 31.2 PG (ref 26.8–34.3)
MCHC RBC AUTO-ENTMCNC: 33.3 G/DL (ref 31.4–37.4)
MCV RBC AUTO: 94 FL (ref 82–98)
PLATELET # BLD AUTO: 163 THOUSANDS/UL (ref 149–390)
PMV BLD AUTO: 9.8 FL (ref 8.9–12.7)
POTASSIUM SERPL-SCNC: 3.7 MMOL/L (ref 3.5–5.3)
RBC # BLD AUTO: 4.45 MILLION/UL (ref 3.81–5.12)
SODIUM SERPL-SCNC: 134 MMOL/L (ref 135–147)
WBC # BLD AUTO: 9.75 THOUSAND/UL (ref 4.31–10.16)

## 2022-11-18 RX ORDER — ENOXAPARIN SODIUM 100 MG/ML
40 INJECTION SUBCUTANEOUS DAILY
Qty: 5.6 ML | Refills: 0 | Status: SHIPPED | OUTPATIENT
Start: 2022-11-18 | End: 2022-11-22

## 2022-11-18 RX ORDER — LISINOPRIL 5 MG/1
5 TABLET ORAL DAILY
Status: DISCONTINUED | OUTPATIENT
Start: 2022-11-19 | End: 2022-11-22 | Stop reason: HOSPADM

## 2022-11-18 RX ORDER — LISINOPRIL 10 MG/1
10 TABLET ORAL DAILY
Qty: 30 TABLET | Refills: 0 | Status: CANCELLED | OUTPATIENT
Start: 2022-11-18 | End: 2022-12-18

## 2022-11-18 RX ADMIN — BUPROPION HYDROCHLORIDE 300 MG: 150 TABLET, EXTENDED RELEASE ORAL at 09:24

## 2022-11-18 RX ADMIN — OXYCODONE HYDROCHLORIDE AND ACETAMINOPHEN 1 TABLET: 5; 325 TABLET ORAL at 20:27

## 2022-11-18 RX ADMIN — METRONIDAZOLE 250 MG: 500 TABLET ORAL at 14:32

## 2022-11-18 RX ADMIN — FONDAPARINUX SODIUM 2.5 MG: 2.5 INJECTION, SOLUTION SUBCUTANEOUS at 09:24

## 2022-11-18 RX ADMIN — LORATADINE 10 MG: 10 TABLET ORAL at 09:24

## 2022-11-18 RX ADMIN — Medication 9 MG: at 22:58

## 2022-11-18 RX ADMIN — METRONIDAZOLE 250 MG: 500 TABLET ORAL at 06:23

## 2022-11-18 RX ADMIN — LUBIPROSTONE 24 MCG: 8 CAPSULE, GELATIN COATED ORAL at 17:30

## 2022-11-18 RX ADMIN — OXYBUTYNIN 10 MG: 5 TABLET, FILM COATED, EXTENDED RELEASE ORAL at 22:58

## 2022-11-18 RX ADMIN — LUBIPROSTONE 24 MCG: 8 CAPSULE, GELATIN COATED ORAL at 08:30

## 2022-11-18 RX ADMIN — FERROUS SULFATE TAB 325 MG (65 MG ELEMENTAL FE) 325 MG: 325 (65 FE) TAB at 17:30

## 2022-11-18 RX ADMIN — DULOXETINE 60 MG: 60 CAPSULE, DELAYED RELEASE ORAL at 09:23

## 2022-11-18 RX ADMIN — METRONIDAZOLE 250 MG: 500 TABLET ORAL at 22:59

## 2022-11-18 RX ADMIN — DOCUSATE SODIUM 100 MG: 100 CAPSULE, LIQUID FILLED ORAL at 09:24

## 2022-11-18 RX ADMIN — OXYCODONE HYDROCHLORIDE AND ACETAMINOPHEN 500 MG: 500 TABLET ORAL at 17:32

## 2022-11-18 RX ADMIN — DOCUSATE SODIUM 100 MG: 100 CAPSULE, LIQUID FILLED ORAL at 17:32

## 2022-11-18 RX ADMIN — ACETAMINOPHEN 650 MG: 325 TABLET ORAL at 15:34

## 2022-11-18 RX ADMIN — OXYCODONE HYDROCHLORIDE AND ACETAMINOPHEN 500 MG: 500 TABLET ORAL at 09:24

## 2022-11-18 RX ADMIN — FOLIC ACID 1 MG: 1 TABLET ORAL at 09:23

## 2022-11-18 RX ADMIN — FERROUS SULFATE TAB 325 MG (65 MG ELEMENTAL FE) 325 MG: 325 (65 FE) TAB at 08:30

## 2022-11-18 RX ADMIN — OXYCODONE HYDROCHLORIDE AND ACETAMINOPHEN 1 TABLET: 5; 325 TABLET ORAL at 06:24

## 2022-11-18 RX ADMIN — DULOXETINE 60 MG: 60 CAPSULE, DELAYED RELEASE ORAL at 17:30

## 2022-11-18 RX ADMIN — ACETAMINOPHEN 650 MG: 325 TABLET ORAL at 09:41

## 2022-11-18 RX ADMIN — ATORVASTATIN CALCIUM 20 MG: 20 TABLET, FILM COATED ORAL at 17:30

## 2022-11-18 NOTE — PLAN OF CARE
Problem: OCCUPATIONAL THERAPY ADULT  Goal: Performs self-care activities at highest level of function for planned discharge setting  See evaluation for individualized goals  Description: Treatment Interventions: ADL retraining, Functional transfer training, UE strengthening/ROM, Patient/family training, Equipment evaluation/education, Energy conservation, Activityengagement    See flowsheet documentation for full assessment, interventions and recommendations  Outcome: Progressing  Note: Limitation: Decreased ADL status, Decreased UE ROM, Decreased UE strength, Decreased Safe judgement during ADL, Decreased endurance  Prognosis: Good  Assessment: Patient participated in Skilled OT session this date with interventions consisting of therapeutic exercise to: increase functional use of BUEs, increase BUE muscle strength  and  therapeutic activities to: increase activity tolerance   Patient agreeable to OT treatment session, upon arrival patient was found supine in bed  In comparison to previous session, patient with improvements in functional transfers  Patient requiring one step directives and frequent rest periods  Patient continues to be functioning below baseline level, occupational performance remains limited secondary to factors listed above and increased risk for falls and injury  From OT standpoint, recommendation at time of d/c would be Short Term Rehab  Patient to benefit from continued Occupational Therapy treatment while in the hospital to address deficits as defined above and maximize level of functional independence with ADLs and functional mobility       OT Discharge Recommendation: Post acute rehabilitation services     [address] [facility], MS, OTR/L

## 2022-11-18 NOTE — PHYSICAL THERAPY NOTE
Physical Therapy Treatment Session Note    Patient's Name: Teena Romo    Admitting Diagnosis  Primary osteoarthritis of right knee [M17 11]    Problem List  Patient Active Problem List   Diagnosis    Irritable bowel syndrome (IBS)    Mild intermittent asthma without complication    Bilateral carpal tunnel syndrome    Chronic musculoskeletal pain    Generalized anxiety disorder    Hyperlipidemia LDL goal <130    Essential hypertension    Lymphedema    Patent foramen ovale    Tension type headache    Vitamin D deficiency    Recurrent major depressive disorder, in partial remission (HCC)    Other chronic pancreatitis (HCC)    Body mass index (BMI) of 40 0-44 9 in adult [facility])    Right foot pain    Tinea cruris    Primary insomnia    Neuropathy    Age-related osteoporosis without current pathological fracture    Swelling of joint of left knee    Nephrolithiasis    Scoliosis    Primary osteoarthritis of left knee    HX: breast cancer    Sinus tachycardia    Acute metabolic encephalopathy    Encounter for screening for COVID-19    S/P total knee arthroplasty, left    Dermatitis    Continuous opioid dependence ([facility] [address] )    Seborrheic keratoses    Seasonal allergies    OAB (overactive bladder)    Age-related osteoporosis without current pathological fracture    Mixed stress and urge urinary incontinence    Renal cyst    Chronic constipation    Arthritis of right knee    Colitis       Past Medical History  Past Medical History:   Diagnosis Date    Anxiety     Arthritis     Asthma     Breast cancer ([facility] [address] )     rt mastectomy 2005    Cancer [facility])     breast right    Cataract     CHF (congestive heart failure) ([facility])     Chronic headaches     Concussion     At age 7; Chronic headaches since then    Coronary artery disease     Depression     Dietary lactose intolerance     GERD (gastroesophageal reflux disease)     High cholesterol     History of chemotherapy     2005 rt breast cancer    History of transfusion     Hypertension IBS (irritable bowel syndrome)     Irritable bowel syndrome (IBS) 05/31/2016    Kidney stone     Lymphedema of right arm     Obesity     PFO (patent foramen ovale)     PONV (postoperative nausea and vomiting)     Post-menopausal     Psychiatric disorder     Renal disorder     Shortness of breath     Vitamin D deficiency        Past Surgical History  Past Surgical History:   Procedure Laterality Date    BREAST SURGERY      CATARACT EXTRACTION Bilateral     CATARACT EXTRACTION, BILATERAL      CHOLECYSTECTOMY      COLONOSCOPY N/A 05/31/2016    Procedure: COLONOSCOPY;  Surgeon: Shannan Thapa MD;  Location: [facility] MAIN OR;  Service:     GALLBLADDER SURGERY      HYSTERECTOMY      Total    JOINT REPLACEMENT      KNEE ARTHROSCOPY      KNEE SURGERY      arthroscopy    MASTECTOMY Right     rt breast mastectomy 2005    OK TOTAL KNEE ARTHROPLASTY Left 05/20/2021    Procedure: ARTHROPLASTY KNEE TOTAL;  Surgeon: Namita Luna MD;  Location: [address] [place] [address] MAIN OR;  Service: Orthopedics    TONSILLECTOMY AND ADENOIDECTOMY          11/18/22 0803   PT Last Visit   PT Visit Date 11/18/22   Note Type   Note Type Treatment   Pain Assessment   Pain Assessment Tool 0-10   Pain Score 8   Pain Location/Orientation Orientation: Right;Location: Knee   Pain Onset/Description Onset: Ongoing;Frequency: Constant/Continuous; Descriptor: Aching   Effect of Pain on Daily Activities yes   Patient's Stated Pain Goal No pain   Hospital Pain Intervention(s) Medication (See MAR); Repositioned; Ambulation/increased activity; Emotional support; Environmental changes   Multiple Pain Sites No   Precautions   Total Knee Replacement Other (Comment)  (CPM usage in place)   Restrictions/Precautions   Weight Bearing Precautions Per Order Yes   RLE Weight Bearing Per Order FWB   Other Precautions Cognitive; Chair Alarm; Bed Alarm; Fall Risk;O2  (1 L NC,surgical site R knee)   General   Chart Reviewed Yes   Additional Pertinent History Myrisa OT present for co-treatment due to medical complexity, required skilled interventions of 2 clinicians for care delivery  Response to Previous Treatment Patient unable to report, no changes reported from family or staff   Family/Caregiver Present Yes  (daughter Mireille Du arrived upon conclusion )   Cognition   Overall Cognitive Status Impaired   Arousal/Participation Cooperative;Responsive   Attention Attends with cues to redirect   Orientation Level Oriented to person;Disoriented to place;Oriented to time;Oriented to situation   Memory Decreased recall of recent events   Following Commands Follows one step commands with increased time or repetition   Subjective   Subjective "I was talking to my roommate that wasn't there"   Bed Mobility   Supine to Sit 3  Moderate assistance   Additional items Assist x 1;HOB elevated; Bedrails; Increased time required;Verbal cues;LE management   Sit to Supine   (DNT pt  was sitting out of bed on recliner upon conclusion )   Additional Comments Aneta Knott reported dizziness upon achieving static edge of bed sitting  BP taken supine BP 95/60->sitting 96/59  Increased time and CGA of 1 provided with symptomatic resolution with increased time  Transfers   Sit to Stand 3  Moderate assistance   Additional items Assist x 2;Bedrails; Increased time required;Verbal cues  (RW usage)   Stand to Sit 3  Moderate assistance   Additional items Assist x 2;Bedrails; Increased time required;Verbal cues   Stand pivot 3  Moderate assistance   Additional items Assist x 2; Increased time required;Verbal cues   Additional Comments Verbal cues for proper body mechanics, base of support widening for stability, safety awareness while turning  Ambulation/Elevation   Gait pattern Improper Weight shift; Antalgic;Narrow SUSSY; Forward Flexion;Decreased foot clearance; Short stride; Step to;Excessively slow   Gait Assistance 3  Moderate assist   Additional items Assist x 2;Verbal cues; Tactile cues   Assistive Device Rolling walker   Distance 3 feet  (to recliner, additional distance could not be assessed due to safety concerns)   Stair Management Assistance Not tested   Ambulation/Elevation Additional Comments Verbal cues for continuous steps, tactile cues for RW guidance and proper placement  Balance   Static Sitting Fair -   Dynamic Sitting Poor +   Static Standing Poor   Dynamic Standing Poor   Ambulatory Poor   Endurance Deficit   Endurance Deficit Yes   Activity Tolerance   Activity Tolerance Patient limited by fatigue;Patient limited by pain;Treatment limited secondary to medical complications (Comment)  (dizziness)   Nurse Made Aware Yes, Winsome Thomas RN and Lui naylor PA-C  Exercises   Quad Sets Sitting;20 reps;AROM; Bilateral   Glute Sets Sitting;20 reps;AROM; Bilateral   Ankle Pumps Sitting;20 reps;AROM; Bilateral   Assessment   Prognosis Fair   Problem List Decreased strength;Decreased endurance; Impaired balance;Pain;Orthopedic restrictions;Decreased skin integrity;Obesity; Decreased coordination;Decreased mobility   Assessment Pt seen for PT treatment session this date with interventions consisting of gait training to reduce the risk of medical complications and advance toward previous level of function w/ emphasis on improving pt's ability to ambulate level surfaces x 3 feet with mod A of 2 provided by therapist with RW and therapeutic activity consisting of training: supine<>sit transfers, sit<>stand transfers, static sitting tolerance at EOB for >5 minutes w/ B UE support, static standing tolerance for 1 minutes w/ B UE support, vc and tactile cues for static sitting posture faciliation, vc and tactile cues for static standing posture faciliation and stand pivot transfers towards L direction  Pt agreeable to PT treatment session upon arrival, pt found supine in bed w/ HOB elevated, responsive and cooperative  In comparison to previous session, pt with improvements in mobility out of bed to chair   Post session: pt returned back to recliner, chair alarm engaged, all needs in reach and RN notified of session findings/recommendations  Continue to recommend post acute rehabilitation services at time of d/c in order to maximize pt's functional independence and safety w/ mobility  Pt continues to be functioning below baseline level, and remains limited 2* factors listed above and including weakness, impaired balance,activity intolerance, gait deviations, pain, decreased endurance  PT will continue to see pt during current hospitalization in order to address the deficits listed above and provide interventions consistent w/ POC in effort to achieve STGs  Barriers to Discharge Inaccessible home environment   Goals   Patient Goals to get better soon   STG Expiration Date 11/26/22   Short Term Goal #1 STGs remain appropriate   PT Treatment Day 3   Plan   Treatment/Interventions Functional transfer training;Elevations; Therapeutic exercise; Endurance training;LE strengthening/ROM; Patient/family training;Equipment eval/education; Bed mobility;Gait training;Spoke to nursing;OT;Spoke to advanced practitioner   Progress Slow progress, decreased activity tolerance   PT Frequency Other (Comment)  (7x/week, 1-2x/day)   Recommendation   PT Discharge Recommendation Post acute rehabilitation services  (maintained recommendation)   Equipment Recommended [facility] Recommended Wheeled walker   Change/add to [employer]? No   Additional Comments Upon conclusion,pt  was sitting out of bed on recliner  Chair alarm engaged and all needs within reach     AM-PAC Basic Mobility Inpatient   Turning in Bed Without Bedrails 3   Lying on Back to Sitting on Edge of Flat Bed 2   Moving Bed to Chair 2   Standing Up From Chair 2   Walk in Room 1   Climb 3-5 Stairs 1   Basic Mobility Inpatient Raw Score 11   Basic Mobility Standardized Score 30 25   Highest Level Of Mobility   -[facility] Goal 4: Move to chair/commode   -HL Achieved 4: Move to chair/commode     Treatment Time: 0571-9585    Jackie Lozada PT

## 2022-11-18 NOTE — NURSING NOTE
Patient asleep but wakens easily to name, pain level R knee at 7  Repositioned to comfort   Call bell in reach

## 2022-11-18 NOTE — OCCUPATIONAL THERAPY NOTE
Occupational Therapy Treatment Note      Dileep Mariposa    11/18/2022    Principal Problem:    Arthritis of right knee  Active Problems:    Irritable bowel syndrome (IBS)    Mild intermittent asthma without complication    Recurrent major depressive disorder, in partial remission ([facility] [address] )    HX: breast cancer    Seasonal allergies    Mixed stress and urge urinary incontinence    Colitis      Past Medical History:   Diagnosis Date    Anxiety     Arthritis     Asthma     Breast cancer ([facility] [address] )     rt mastectomy 2005    Cancer [facility])     breast right    Cataract     CHF (congestive heart failure) (HCC)     Chronic headaches     Concussion     At age 7; Chronic headaches since then    Coronary artery disease     Depression     Dietary lactose intolerance     GERD (gastroesophageal reflux disease)     High cholesterol     History of chemotherapy     2005 rt breast cancer    History of transfusion     Hypertension     IBS (irritable bowel syndrome)     Irritable bowel syndrome (IBS) 05/31/2016    Kidney stone     Lymphedema of right arm     Obesity     PFO (patent foramen ovale)     PONV (postoperative nausea and vomiting)     Post-menopausal     Psychiatric disorder     Renal disorder     Shortness of breath     Vitamin D deficiency        Past Surgical History:   Procedure Laterality Date    BREAST SURGERY      CATARACT EXTRACTION Bilateral     CATARACT EXTRACTION, BILATERAL      CHOLECYSTECTOMY      COLONOSCOPY N/A 05/31/2016    Procedure: COLONOSCOPY;  Surgeon: Jihan Florentino MD;  Location: [facility] MAIN OR;  Service:     GALLBLADDER SURGERY      HYSTERECTOMY      Total    JOINT REPLACEMENT      KNEE ARTHROSCOPY      KNEE SURGERY      arthroscopy    MASTECTOMY Right     rt breast mastectomy 2005    CT TOTAL KNEE ARTHROPLASTY Left 05/20/2021    Procedure: ARTHROPLASTY KNEE TOTAL;  Surgeon: Freddie Salmon MD;  Location: [address] [place] [address] MAIN OR;  Service: Orthopedics    TONSILLECTOMY AND ADENOIDECTOMY          11/18/22 0816   Note Type   Note Type Treatment   Pain Assessment   Pain Assessment Tool 0-10   Pain Score 8   Pain Location/Orientation Orientation: Right;Location: Knee   Pain Onset/Description Onset: Ongoing;Frequency: Constant/Continuous; Descriptor: Aching   Effect of Pain on Daily Activities yes   Patient's Stated Pain Goal No pain   Hospital Pain Intervention(s) Medication (See MAR); Repositioned; Ambulation/increased activity; Emotional support; Environmental changes   Multiple Pain Sites No   Restrictions/Precautions   Weight Bearing Precautions Per Order Yes   RLE Weight Bearing Per Order FWB   Other Precautions Cognitive; Chair Alarm; Bed Alarm; Fall Risk;O2  (1L NC)   Bed Mobility   Supine to Sit 3  Moderate assistance   Additional items Assist x 1;HOB elevated; Bedrails; Increased time required;Verbal cues;LE management   Sit to Supine   (DNT pt  was sitting out of bed on recliner upon conclusion )   Additional Comments Supine BP 95/60->sitting 96/59   Transfers   Sit to Stand 3  Moderate assistance   Additional items Assist x 2;Bedrails; Increased time required;Verbal cues   Stand to Sit 3  Moderate assistance   Additional items Assist x 2;Bedrails; Increased time required;Verbal cues   Stand pivot 3  Moderate assistance   Additional items Assist x 2; Increased time required;Verbal cues  (RW)   Right Upper Extremity- Strength   R Elbow Elbow flexion;Elbow extension   R Position Seated   R Weight/Reps/Sets no weight, 20 reps, 1 set   Left Upper Extremity-Strength   L Elbow Elbow flexion;Elbow extension   L Position Seated   L Weights/Reps/Sets no weight, 20 reps, 1 set   Cognition   Overall Cognitive Status Impaired   Arousal/Participation Alert; Cooperative   Attention Attends with cues to redirect   Orientation Level Oriented to person;Disoriented to place;Oriented to time;Oriented to situation   Memory Decreased recall of recent events   Following Commands Follows one step commands with increased time or repetition   Comments "I was talking to my roommate that wasn't there"   Activity Tolerance   Activity Tolerance Patient limited by fatigue;Patient limited by pain   Medical Staff Made Aware KOURTNEY Ariza   Assessment   Assessment Patient participated in Skilled OT session this date with interventions consisting of therapeutic exercise to: increase functional use of BUEs, increase BUE muscle strength  and  therapeutic activities to: increase activity tolerance   Patient agreeable to OT treatment session, upon arrival patient was found supine in bed  In comparison to previous session, patient with improvements in functional transfers  Patient requiring one step directives and frequent rest periods  Patient continues to be functioning below baseline level, occupational performance remains limited secondary to factors listed above and increased risk for falls and injury  From OT standpoint, recommendation at time of d/c would be Short Term Rehab  Patient to benefit from continued Occupational Therapy treatment while in the hospital to address deficits as defined above and maximize level of functional independence with ADLs and functional mobility  Plan   Treatment Interventions ADL retraining;Functional transfer training;UE strengthening/ROM; Patient/family training;Equipment evaluation/education; Energy conservation; Activityengagement   Goal Expiration Date 11/27/22   OT Treatment Day 1   OT Frequency 3-5x/wk   Recommendation   OT Discharge Recommendation Post acute rehabilitation services   Additional Comments  Co treatment with PT completed secondary to complex medical condition of pt, Mod A of 2 required to achieve and maintain transitional movements, requiring the need of skilled therapeutic intervention of 2 therapists to achieve delivery of services  Additional Comments 2 The patient's raw score on the AM-PAC Daily Activity inpatient short form is 17, standardized score is 37 26, less than 39 4   Patients at this level are likely to benefit from discharge to post-acute rehabilitation services  Please refer to the recommendation of the Occupational Therapist for safe discharge planning     AM-PAC Daily Activity Inpatient   Lower Body Dressing 2   Bathing 2   Toileting 2   Upper Body Dressing 3   Grooming 4   Eating 4   Daily Activity Raw Score 17   Daily Activity Standardized Score (Calc for Raw Score >=11) 37 26   AM-PAC Applied Cognition Inpatient   Following a Speech/Presentation 3   Understanding Ordinary Conversation 3   Taking Medications 3   Remembering Where Things Are Placed or Put Away 3   Remembering List of 4-5 Errands 2   Taking Care of Complicated Tasks 2   Applied Cognition Raw Score 16   Applied Cognition Standardized Score 35 03     Brittaney Pollard MS, OTR/L

## 2022-11-18 NOTE — NURSING NOTE
Pt was bathed pt did oral care pt was up in recliner then after a bit asked to go back to bed RN aware

## 2022-11-18 NOTE — QUICK NOTE
BP appears to be on the lower side after initiated on lisinopril 10 mg  Will decrease dose to 5 mg daily

## 2022-11-18 NOTE — CASE MANAGEMENT
Case Management Discharge Planning Note    Patient name Johanna Payne  Location /-73 MRN [id number]  : 1950 Date 2022       Current Admission Date: 2022  Current Admission Diagnosis:Arthritis of right knee   Patient Active Problem List    Diagnosis Date Noted   • Arthritis of right knee 2022   • Colitis 2022   • Chronic constipation 10/11/2022   • Mixed stress and urge urinary incontinence 10/10/2022   • Renal cyst 10/10/2022   • Seasonal allergies 2022   • OAB (overactive bladder) 2022   • Continuous opioid dependence ([facility] [address] ) 01/10/2022   • Seborrheic keratoses 01/10/2022   • Age-related osteoporosis without current pathological fracture 2021   • Dermatitis 2021   • Acute metabolic encephalopathy    • Encounter for screening for COVID-19 2021   • S/P total knee arthroplasty, left 2021   • Sinus tachycardia 2021   • HX: breast cancer 10/12/2020   • Primary osteoarthritis of left knee 2020   • Nephrolithiasis 09/10/2020   • Scoliosis 09/10/2020   • Swelling of joint of left knee 2020   • Age-related osteoporosis without current pathological fracture 2020   • Primary insomnia 2019   • Neuropathy 2019   • Right foot pain 10/24/2019   • Tinea cruris 10/24/2019   • Recurrent major depressive disorder, in partial remission ([facility] [address] ) 2019   • Other chronic pancreatitis ([facility] [address] ) 2019   • Body mass index (BMI) of 40 0-44 9 in adult [facility]) 2019   • Bilateral carpal tunnel syndrome 2017   • Mild intermittent asthma without complication    • Lymphedema 2017   • Generalized anxiety disorder 06/10/2016   • Patent foramen ovale 06/10/2016   • Tension type headache 06/10/2016   • Essential hypertension 2016   • Irritable bowel syndrome (IBS) 2016   • Chronic musculoskeletal pain 2016   • Vitamin D deficiency 2016   • Hyperlipidemia LDL goal <130 12/07/2015      LOS (days): 1  Geometric Mean LOS (GMLOS) (days): 1 70  Days to GMLOS:0 8     OBJECTIVE:  Risk of Unplanned Readmission Score: 14 7         Current admission status: Inpatient   Preferred Pharmacy:   [id number] [place], [place] - [address] [address]  Phone: [phone] Fax: [phone] by [facility], [address] [place] [address]  Phone: [phone] Fax: [phone]    Primary Care Provider: Elizabeth Whitt PA-C    Primary Insurance: [facility]  Secondary Insurance: [facility] [place] DETAILS:    Discharge planning discussed with[de-identified] patient and daughter at the bedside     Comments - Freedom of Choice: recommendation rehab-  permission was granted to send to [facility] and  blanket snf referrals  CM contacted family/caregiver?: Yes             Contacts  Patient Contacts: Marquis Coins  Relationship to Patient[de-identified] Family (daughter)  Contact Method:  In Person  Reason/Outcome: Discharge [address]         Is the patient interested in [facility] at discharge?: No    DME Referral Provided  Referral made for DME?: No    Other Referral/Resources/Interventions Provided:  Referral Comments: pt was accepted to [facility] and this was pt's 1st choice  pt has a $1480 00 copay-  pt then declined-  [facility]   [facility] 's choice [facility]  auth has been started     pt will need a covid test and transportation & authorization    Would you like to participate in our [address] service program?  : No - Declined    Treatment Team Recommendation: Short Term Rehab (short term Rehab, needs covid test & authorization- tbd)

## 2022-11-18 NOTE — PLAN OF CARE
Problem: PHYSICAL THERAPY ADULT  Goal: Performs mobility at highest level of function for planned discharge setting  See evaluation for individualized goals  Description: Treatment/Interventions: Functional transfer training, LE strengthening/ROM, Elevations, Therapeutic exercise, Endurance training, Patient/family training, Equipment eval/education, Bed mobility, Gait training, Spoke to nursing          See flowsheet documentation for full assessment, interventions and recommendations  Outcome: Progressing  Note: Prognosis: Fair  Problem List: Decreased strength, Decreased endurance, Impaired balance, Pain, Orthopedic restrictions, Decreased skin integrity, Obesity, Decreased coordination, Decreased mobility  Assessment: Pt seen for PT treatment session this date with interventions consisting of gait training to reduce the risk of medical complications and advance toward previous level of function w/ emphasis on improving pt's ability to ambulate level surfaces x 3 feet with mod A of 2 provided by therapist with RW and therapeutic activity consisting of training: supine<>sit transfers, sit<>stand transfers, static sitting tolerance at EOB for >5 minutes w/ B UE support, static standing tolerance for 1 minutes w/ B UE support, vc and tactile cues for static sitting posture faciliation, vc and tactile cues for static standing posture faciliation and stand pivot transfers towards L direction  Pt agreeable to PT treatment session upon arrival, pt found supine in bed w/ HOB elevated, responsive and cooperative  In comparison to previous session, pt with improvements in mobility out of bed to chair  Post session: pt returned back to recliner, chair alarm engaged, all needs in reach and RN notified of session findings/recommendations  Continue to recommend post acute rehabilitation services at time of d/c in order to maximize pt's functional independence and safety w/ mobility   Pt continues to be functioning below baseline level, and remains limited 2* factors listed above and including weakness, impaired balance,activity intolerance, gait deviations, pain, decreased endurance  PT will continue to see pt during current hospitalization in order to address the deficits listed above and provide interventions consistent w/ POC in effort to achieve STGs  Barriers to Discharge: Inaccessible home environment     PT Discharge Recommendation: Post acute rehabilitation services (maintained recommendation)    See flowsheet documentation for full assessment

## 2022-11-18 NOTE — UTILIZATION REVIEW
Initial Clinical Review    Elective surgical procedure  Age/Sex: 81 y o  female  Surgery Date: 11/16/2022  Procedure: ARTHROPLASTY KNEE TOTAL (Right) using the [employer] Next[employer] system with the following sizes:  E LPS GSF femoral component, #3  Tibial tray, 10 mm polyarticular surface, and a 32 mm patella button  Anesthesia: Spinal with iPACK and adductor canal blocks      POD#1 Progress Note: OOB  Continue with PT/OT  Pain controlled  Weight bearing as tolerated  As per PT/OT eval, recommending post acute rehab  Case management working on safe discharge plan  [facility] referrals sent  NOted to have comorbid conditions including htn, ambulatory dysfunction   which will require management in the neo-operative period prior to safe discharge  POD #2 progress note: Continue with Arixtra  Pain controlled  Admission Orders: Date/Time/Statement:   Admission Orders (From admission, onward)     Ordered        11/17/22 1635  Inpatient Admission  Once                      Orders Placed This Encounter   Procedures   • Inpatient Admission     Standing Status:   Standing     Number of Occurrences:   1     Order Specific Question:   Level of Care     Answer:   Med Surg [16]     Order Specific Question:   Estimated length of stay     Answer:   More than 2 Midnights     Order Specific Question:   Certification     Answer:   I certify that inpatient services are medically necessary for this patient for a duration of greater than two midnights  See H&P and MD Progress Notes for additional information about the patient's course of treatment       Vital Signs: /63   Pulse 99   Temp 97 5 °F (36 4 °C)   Resp 21   Ht 4' 9" (1 448 m)   Wt 85 7 kg (189 lb)   LMP  (LMP Unknown)   SpO2 94%   BMI 40 90 kg/m²     Pertinent Labs/Diagnostic Test Results:   XR knee right 1 or 2 views   Final Result by Jl Warner MD (11/17 1042)      Right knee replacement            Workstation performed: [id number]               Results from last 7 days   Lab Units 11/18/22  0622 11/17/22  0453   WBC Thousand/uL 9 75 7 58   HEMOGLOBIN g/dL 13 9 15 2   HEMATOCRIT % 41 8 44 5   PLATELETS Thousands/uL 163 166         Results from last 7 days   Lab Units 11/18/22  0622 11/17/22  0453   SODIUM mmol/L 134* 135   POTASSIUM mmol/L 3 7 3 8   CHLORIDE mmol/L 100 101   CO2 mmol/L 29 28   ANION GAP mmol/L 5 6   BUN mg/dL 12 7   CREATININE mg/dL 0 71 0 70   EGFR ml/min/1 73sq m 86 88   CALCIUM mg/dL 8 8 8 4             Results from last 7 days   Lab Units 11/18/22  0622 11/17/22  0453   GLUCOSE RANDOM mg/dL 141* 164*         Diet: REgular  Mobility: UP and OOB  DVT Prophylaxis: SCDS, fondaparinux       Medications/Pain Control:   Scheduled Medications:  ascorbic acid, 500 mg, Oral, BID  atorvastatin, 20 mg, Oral, Daily  buPROPion, 300 mg, Oral, QAM  docusate sodium, 100 mg, Oral, BID  DULoxetine, 60 mg, Oral, BID  [START ON 11/22/2022] ergocalciferol, 50,000 Units, Oral, Weekly  ferrous sulfate, 325 mg, Oral, BID With Meals  folic acid, 1 mg, Oral, Daily  fondaparinux, 2 5 mg, Subcutaneous, Daily  lisinopril, 10 mg, Oral, Daily  loratadine, 10 mg, Oral, Daily  lubiprostone, 24 mcg, Oral, BID With Meals  melatonin, 9 mg, Oral, HS  metroNIDAZOLE, 250 mg, Oral, Q8H [facility]  multivitamin-minerals, 1 tablet, Oral, Daily  oxybutynin, 10 mg, Oral, HS      Continuous IV Infusions:     PRN Meds:  acetaminophen, 650 mg, Oral, Q4H PRN  albuterol, 2 puff, Inhalation, Q4H PRN  albuterol, 2 5 mg, Nebulization, Q6H PRN  aluminum-magnesium hydroxide-simethicone, 30 mL, Oral, Q6H PRN  butalbital-acetaminophen-caffeine, 1 tablet, Oral, TID PRN  hydrALAZINE, 10 mg, Intravenous, Q6H PRN  HYDROmorphone, 0 2 mg, Intravenous, Q3H PRN  ondansetron, 4 mg, Intravenous, Q6H PRN  oxyCODONE-acetaminophen, 1 tablet, Oral, Q4H PRN  oxyCODONE-acetaminophen, 2 tablet, Oral, Q6H PRN        Network Utilization Review Department  ATTENTION: Please call with any questions or concerns to [phone] and carefully listen to the prompts so that you are directed to the right person  All voicemails are confidential   Bandar Corea all requests for admission clinical reviews, approved or denied determinations and any other requests to dedicated fax number below belonging to the campus where the patient is receiving treatment   List of dedicated fax numbers for the Facilities:  [address] DENIALS (Administrative/Medical Necessity) [phone]   [address] (Maternity/NICU/Pediatrics) [phone]   1 [address] [place] [phone]   Lucioabhishek Honeycutt 77 [phone]   [address] [place] [address] [facility] 28 [phone]   1555 [facility] [place] jeff [facility] 134 815 [facility] [phone]

## 2022-11-18 NOTE — PLAN OF CARE
Problem: PAIN - ADULT  Goal: Verbalizes/displays adequate comfort level or baseline comfort level  Description: Interventions:  - Encourage patient to monitor pain and request assistance  - Assess pain using appropriate pain scale  - Administer analgesics based on type and severity of pain and evaluate response  - Implement non-pharmacological measures as appropriate and evaluate response  - Consider cultural and social influences on pain and pain management  - Notify physician/advanced practitioner if interventions unsuccessful or patient reports new pain  Outcome: Progressing     Problem: Knowledge Deficit  Goal: Patient/family/caregiver demonstrates understanding of disease process, treatment plan, medications, and discharge instructions  Description: Complete learning assessment and assess knowledge base    Interventions:  - Provide teaching at level of understanding  - Provide teaching via preferred learning methods  Outcome: Progressing     Problem: HEMATOLOGIC - ADULT  Goal: Maintains hematologic stability  Description: INTERVENTIONS  - Assess for signs and symptoms of bleeding or hemorrhage  - Monitor labs  - Administer supportive blood products/factors as ordered and appropriate  Outcome: Progressing     Problem: MUSCULOSKELETAL - ADULT  Goal: Maintain proper alignment of affected body part  Description: INTERVENTIONS:  - Support, maintain and protect limb and body alignment  - Provide patient/ family with appropriate education  Outcome: Progressing

## 2022-11-18 NOTE — PROGRESS NOTES
Progress Note - Orthopedics   Levon Davis 81 y o  female MRN: [id number]  Unit/Bed#: -Alejandra Encounter: [id number]    Assessment:  Postop day #2 , status post right total knee replacement     Plan:  Out of bed  Weightbearing as tolerated right lower extremity  Physical/occupational therapy  Arixtra for DVT prophylaxis  Will discharge home today with home health care  Follow-up office 2 weeks    Weight bearing:  As tolerated    VTE Pharmacologic Prophylaxis: Fondaparinux (Arixtra)  VTE Mechanical Prophylaxis: sequential compression device    Subjective:  Still complaining of right knee pain, albeit improved    Vitals: Blood pressure 124/74, pulse 96, temperature (!) 97 3 °F (36 3 °C), resp  rate 18, height 4' 9" (1 448 m), weight 85 7 kg (189 lb), SpO2 94 %, not currently breastfeeding  ,Body mass index is 40 9 kg/m²  Intake/Output Summary (Last 24 hours) at 11/18/2022 0732  Last data filed at 11/17/2022 1900  Gross per 24 hour   Intake 480 ml   Output 100 ml   Net 380 ml       Invasive Devices     Peripheral Intravenous Line  Duration           Peripheral IV 11/16/22 Left Hand 2 days    Peripheral IV 11/16/22 Left;Upper Forearm 1 day          Drain  Duration           Autologus Reinfusion/Drain Device Right Knee 1 day    External Urinary Catheter <1 day                Physical Exam:   Ortho Exam:     Right lower extremity is neurovascular intact  Toes are pink and mobile  Compartments are soft  Range of motion of her knee is from 5-100 degrees  Incision clean, dry, intact  Negative Homans  Drain was pulled    Good dorsiflexion of foot    Lab, Imaging and other studies:   CBC:   Lab Results   Component Value Date    WBC 9 75 11/18/2022    HGB 13 9 11/18/2022    HCT 41 8 11/18/2022    MCV 94 11/18/2022     11/18/2022    MCH 31 2 11/18/2022    MCHC 33 3 11/18/2022    RDW 12 4 11/18/2022    MPV 9 8 11/18/2022     CMP:   Lab Results   Component Value Date    SODIUM 134 (L) 11/18/2022     11/18/2022    CO2 29 11/18/2022    BUN 12 11/18/2022    CREATININE 0 71 11/18/2022    CALCIUM 8 8 11/18/2022    EGFR 86 11/18/2022

## 2022-11-18 NOTE — UTILIZATION REVIEW
Initial Clinical Review    Elective *** surgical procedure  Age/Sex: 81 y o  female  Surgery Date: ***  Procedure: ***  Anesthesia: ***  Operative Findings: ***    POD#1 Progress Note: ***    Admission Orders: Date/Time/Statement:   Admission Orders (From admission, onward)     Ordered        11/17/22 1635  Inpatient Admission  Once                      Orders Placed This Encounter   Procedures   • Inpatient Admission     Standing Status:   Standing     Number of Occurrences:   1     Order Specific Question:   Level of Care     Answer:   Med Surg [16]     Order Specific Question:   Estimated length of stay     Answer:   More than 2 Midnights     Order Specific Question:   Certification     Answer:   I certify that inpatient services are medically necessary for this patient for a duration of greater than two midnights  See H&P and MD Progress Notes for additional information about the patient's course of treatment       Vital Signs: /63   Pulse 99   Temp 97 5 °F (36 4 °C)   Resp 21   Ht 4' 9" (1 448 m)   Wt 85 7 kg (189 lb)   LMP  (LMP Unknown)   SpO2 94%   BMI 40 90 kg/m²     Pertinent Labs/Diagnostic Test Results:   XR knee right 1 or 2 views   Final Result by Mitchell Henley MD (11/17 1042)      Right knee replacement            Workstation performed: [id number]               Results from last 7 days   Lab Units 11/18/22 0622 11/17/22  0453   WBC Thousand/uL 9 75 7 58   HEMOGLOBIN g/dL 13 9 15 2   HEMATOCRIT % 41 8 44 5   PLATELETS Thousands/uL 163 166         Results from last 7 days   Lab Units 11/18/22 0622 11/17/22  0453   SODIUM mmol/L 134* 135   POTASSIUM mmol/L 3 7 3 8   CHLORIDE mmol/L 100 101   CO2 mmol/L 29 28   ANION GAP mmol/L 5 6   BUN mg/dL 12 7   CREATININE mg/dL 0 71 0 70   EGFR ml/min/1 73sq m 86 88   CALCIUM mg/dL 8 8 8 4             Results from last 7 days   Lab Units 11/18/22 0622 11/17/22  0453   GLUCOSE RANDOM mg/dL 141* 164*             No results found for: BETA-HYDROXYBUTYRATE                                                                                                                             Diet: ***  Mobility: ***  DVT Prophylaxis: ***    Medications/Pain Control:   Scheduled Medications:  ascorbic acid, 500 mg, Oral, BID  atorvastatin, 20 mg, Oral, Daily  buPROPion, 300 mg, Oral, QAM  docusate sodium, 100 mg, Oral, BID  DULoxetine, 60 mg, Oral, BID  [START ON 11/22/2022] ergocalciferol, 50,000 Units, Oral, Weekly  ferrous sulfate, 325 mg, Oral, BID With Meals  folic acid, 1 mg, Oral, Daily  fondaparinux, 2 5 mg, Subcutaneous, Daily  lisinopril, 10 mg, Oral, Daily  loratadine, 10 mg, Oral, Daily  lubiprostone, 24 mcg, Oral, BID With Meals  melatonin, 9 mg, Oral, HS  metroNIDAZOLE, 250 mg, Oral, Q8H [address]  multivitamin-minerals, 1 tablet, Oral, Daily  oxybutynin, 10 mg, Oral, HS      Continuous IV Infusions:     PRN Meds:  acetaminophen, 650 mg, Oral, Q4H PRN  albuterol, 2 puff, Inhalation, Q4H PRN  albuterol, 2 5 mg, Nebulization, Q6H PRN  aluminum-magnesium hydroxide-simethicone, 30 mL, Oral, Q6H PRN  butalbital-acetaminophen-caffeine, 1 tablet, Oral, TID PRN  hydrALAZINE, 10 mg, Intravenous, Q6H PRN  HYDROmorphone, 0 2 mg, Intravenous, Q3H PRN  ondansetron, 4 mg, Intravenous, Q6H PRN  oxyCODONE-acetaminophen, 1 tablet, Oral, Q4H PRN  oxyCODONE-acetaminophen, 2 tablet, Oral, Q6H PRN        Network Utilization Review Department  ATTENTION: Please call with any questions or concerns to [phone] and carefully listen to the prompts so that you are directed to the right person  All voicemails are confidential   Marcusotis Dirk all requests for admission clinical reviews, approved or denied determinations and any other requests to dedicated fax number below belonging to the campus where the patient is receiving treatment   List of dedicated fax numbers for the Facilities:  FACILITY NAME GAMALIEL [address] 25 DENIALS (Administrative/Medical Necessity) [phone] [address] (Maternity/NICU/Pediatrics) [address] [facility] [address] [place] [address] [phone]   [address] [place] [address] [facility] 28 U [facility] 310 [facility] [place] [address] [phone]

## 2022-11-19 LAB
ANION GAP SERPL CALCULATED.3IONS-SCNC: 3 MMOL/L (ref 4–13)
BUN SERPL-MCNC: 14 MG/DL (ref 5–25)
CALCIUM SERPL-MCNC: 8.6 MG/DL (ref 8.4–10.2)
CHLORIDE SERPL-SCNC: 101 MMOL/L (ref 96–108)
CO2 SERPL-SCNC: 31 MMOL/L (ref 21–32)
CREAT SERPL-MCNC: 0.71 MG/DL (ref 0.6–1.3)
ERYTHROCYTE [DISTWIDTH] IN BLOOD BY AUTOMATED COUNT: 12.5 % (ref 11.6–15.1)
GFR SERPL CREATININE-BSD FRML MDRD: 86 ML/MIN/1.73SQ M
GLUCOSE SERPL-MCNC: 142 MG/DL (ref 65–140)
HCT VFR BLD AUTO: 40.3 % (ref 34.8–46.1)
HGB BLD-MCNC: 13.2 G/DL (ref 11.5–15.4)
MCH RBC QN AUTO: 31.2 PG (ref 26.8–34.3)
MCHC RBC AUTO-ENTMCNC: 32.8 G/DL (ref 31.4–37.4)
MCV RBC AUTO: 95 FL (ref 82–98)
PLATELET # BLD AUTO: 176 THOUSANDS/UL (ref 149–390)
PMV BLD AUTO: 10 FL (ref 8.9–12.7)
POTASSIUM SERPL-SCNC: 3.7 MMOL/L (ref 3.5–5.3)
RBC # BLD AUTO: 4.23 MILLION/UL (ref 3.81–5.12)
SODIUM SERPL-SCNC: 135 MMOL/L (ref 135–147)
WBC # BLD AUTO: 8.73 THOUSAND/UL (ref 4.31–10.16)

## 2022-11-19 RX ADMIN — DULOXETINE 60 MG: 60 CAPSULE, DELAYED RELEASE ORAL at 17:16

## 2022-11-19 RX ADMIN — DOCUSATE SODIUM 100 MG: 100 CAPSULE, LIQUID FILLED ORAL at 09:22

## 2022-11-19 RX ADMIN — ATORVASTATIN CALCIUM 20 MG: 20 TABLET, FILM COATED ORAL at 16:05

## 2022-11-19 RX ADMIN — OXYCODONE HYDROCHLORIDE AND ACETAMINOPHEN 2 TABLET: 5; 325 TABLET ORAL at 08:22

## 2022-11-19 RX ADMIN — METRONIDAZOLE 250 MG: 500 TABLET ORAL at 06:24

## 2022-11-19 RX ADMIN — Medication 9 MG: at 21:58

## 2022-11-19 RX ADMIN — LISINOPRIL 5 MG: 5 TABLET ORAL at 09:22

## 2022-11-19 RX ADMIN — Medication 1 TABLET: at 09:22

## 2022-11-19 RX ADMIN — FERROUS SULFATE TAB 325 MG (65 MG ELEMENTAL FE) 325 MG: 325 (65 FE) TAB at 16:05

## 2022-11-19 RX ADMIN — OXYCODONE HYDROCHLORIDE AND ACETAMINOPHEN 1 TABLET: 5; 325 TABLET ORAL at 16:04

## 2022-11-19 RX ADMIN — FERROUS SULFATE TAB 325 MG (65 MG ELEMENTAL FE) 325 MG: 325 (65 FE) TAB at 08:21

## 2022-11-19 RX ADMIN — DULOXETINE 60 MG: 60 CAPSULE, DELAYED RELEASE ORAL at 09:22

## 2022-11-19 RX ADMIN — OXYCODONE HYDROCHLORIDE AND ACETAMINOPHEN 500 MG: 500 TABLET ORAL at 17:16

## 2022-11-19 RX ADMIN — LUBIPROSTONE 24 MCG: 8 CAPSULE, GELATIN COATED ORAL at 08:21

## 2022-11-19 RX ADMIN — LUBIPROSTONE 24 MCG: 8 CAPSULE, GELATIN COATED ORAL at 16:05

## 2022-11-19 RX ADMIN — OXYCODONE HYDROCHLORIDE AND ACETAMINOPHEN 500 MG: 500 TABLET ORAL at 09:22

## 2022-11-19 RX ADMIN — ACETAMINOPHEN 650 MG: 325 TABLET ORAL at 19:41

## 2022-11-19 RX ADMIN — FOLIC ACID 1 MG: 1 TABLET ORAL at 09:22

## 2022-11-19 RX ADMIN — BUPROPION HYDROCHLORIDE 300 MG: 150 TABLET, EXTENDED RELEASE ORAL at 09:22

## 2022-11-19 RX ADMIN — OXYBUTYNIN 10 MG: 5 TABLET, FILM COATED, EXTENDED RELEASE ORAL at 21:58

## 2022-11-19 RX ADMIN — LORATADINE 10 MG: 10 TABLET ORAL at 09:22

## 2022-11-19 RX ADMIN — DOCUSATE SODIUM 100 MG: 100 CAPSULE, LIQUID FILLED ORAL at 17:16

## 2022-11-19 RX ADMIN — FONDAPARINUX SODIUM 2.5 MG: 2.5 INJECTION, SOLUTION SUBCUTANEOUS at 09:23

## 2022-11-19 NOTE — PLAN OF CARE
Problem: PHYSICAL THERAPY ADULT  Goal: Performs mobility at highest level of function for planned discharge setting  See evaluation for individualized goals  Description: Treatment/Interventions: Functional transfer training, LE strengthening/ROM, Elevations, Therapeutic exercise, Endurance training, Patient/family training, Equipment eval/education, Bed mobility, Gait training, Spoke to nursing          See flowsheet documentation for full assessment, interventions and recommendations  Outcome: Progressing  Note: Prognosis: Guarded  Problem List: Decreased strength, Decreased endurance, Impaired balance, Pain, Orthopedic restrictions, Decreased skin integrity, Obesity, Decreased coordination, Decreased mobility  Assessment: Pt seen for PT treatment session this date with interventions consisting of therapeutic exercise to improve strength to improve functional mobility and therapeutic activity to improve transfers and increase activity tolerance with functional mobility to decrease fall risk  Pt agreeable to PT treatment session upon arrival, pt found supine in bed, in no apparent distress  Since previous session, pt has made no progress as evidenced by requiring increased amount of assistance with mobility  Barriers during this session include confusion and fatigue  Pt continues to be functioning below baseline level, and remains limited 2* factors listed above and including decreased strength, impaired activity tolerance, decreased balance and pain  Pt prognosis for achieving goals is fair, pending pt progress with hospitalization/medical status improvements, and indicated by static poor cognition and continued required assistance  PT will continue to see pt during current hospitalization in order to address the deficits listed above and provide interventions consistent w/ POC in effort to achieve goals   Current goals and POC remain appropriate, pt continues to have rehab potential  Continue to recommend post acute rehabilitation services at time of d/c in order to maximize pt's functional independence and safety w/ mobility  Upon conclusion pt supine in bed  The patient's AM-PAC Basic Mobility Inpatient Short Form Raw Score is 10  A Raw score of less than or equal to 16 suggests the patient may benefit from discharge to post-acute rehabilitation services  Please also refer to the recommendation of the Physical Therapist for safe discharge planning  This session, pt required and most appropriately benefited from skilled OT/PT co-treat due to extensive physical assistance of SKILLED therapists, significant regression from functional baseline and decreased activity tolerance  OT and PT goals were addressed separately as seen in documentation  Barriers to Discharge: Inaccessible home environment     PT Discharge Recommendation: Post acute rehabilitation services    See flowsheet documentation for full assessment

## 2022-11-19 NOTE — OCCUPATIONAL THERAPY NOTE
Occupational Therapy Treatment Note      Ricardo Okeefe    11/19/2022    Principal Problem:    Arthritis of right knee  Active Problems:    Irritable bowel syndrome (IBS)    Mild intermittent asthma without complication    Recurrent major depressive disorder, in partial remission ([facility] [address] )    HX: breast cancer    Seasonal allergies    Mixed stress and urge urinary incontinence    Colitis      Past Medical History:   Diagnosis Date    Anxiety     Arthritis     Asthma     Breast cancer ([facility] [address] )     rt mastectomy 2005    Cancer [facility])     breast right    Cataract     CHF (congestive heart failure) (HCC)     Chronic headaches     Concussion     At age 7; Chronic headaches since then    Coronary artery disease     Depression     Dietary lactose intolerance     GERD (gastroesophageal reflux disease)     High cholesterol     History of chemotherapy     2005 rt breast cancer    History of transfusion     Hypertension     IBS (irritable bowel syndrome)     Irritable bowel syndrome (IBS) 05/31/2016    Kidney stone     Lymphedema of right arm     Obesity     PFO (patent foramen ovale)     PONV (postoperative nausea and vomiting)     Post-menopausal     Psychiatric disorder     Renal disorder     Shortness of breath     Vitamin D deficiency        Past Surgical History:   Procedure Laterality Date    BREAST SURGERY      CATARACT EXTRACTION Bilateral     CATARACT EXTRACTION, BILATERAL      CHOLECYSTECTOMY      COLONOSCOPY N/A 05/31/2016    Procedure: COLONOSCOPY;  Surgeon: Xin Larkin MD;  Location: [facility] MAIN OR;  Service:     GALLBLADDER SURGERY      HYSTERECTOMY      Total    JOINT REPLACEMENT      KNEE ARTHROSCOPY      KNEE SURGERY      arthroscopy    MASTECTOMY Right     rt breast mastectomy 2005    [place] TOTAL KNEE ARTHROPLASTY Left 05/20/2021    Procedure: ARTHROPLASTY KNEE TOTAL;  Surgeon: Chary Monteiro MD;  Location: [facility] MAIN OR;  Service: Orthopedics    TONSILLECTOMY AND ADENOIDECTOMY          11/19/22 1029   OT Last Visit   OT Visit Date 11/19/22   Note Type   Note Type Treatment   Pain Assessment   Pain Assessment Tool 0-10   Pain Score 5   Pain Location/Orientation Orientation: Right;Location: Knee   Pain Onset/Description Descriptor: Sore   Restrictions/Precautions   Weight Bearing Precautions Per Order Yes   RLE Weight Bearing Per Order FWB   Other Precautions Chair Alarm;O2;Fall Risk;Pain  (1L/min)   ADL   Where Assessed Edge of bed   Grooming Comments Pt declines to complete oral care at this time   UB Bathing Assistance   (SBA)   UB Bathing Deficit Setup;Verbal cueing;Supervision/safety; Increased time to complete   LB Bathing Assistance 3  Moderate Assistance   LB Bathing Deficit Setup;Verbal cueing;Supervision/safety; Increased time to complete; Buttocks;Right lower leg including foot; Left lower leg including foot   UB Dressing Assistance 4  Minimal Assistance   UB Dressing Deficit Setup;Supervision/safety; Increased time to complete; Thread RUE; Thread LUE; Fasteners   LB Dressing Assistance 1  Total Assistance   LB Dressing Deficit Don/doff R sock; Don/doff L sock   LB Dressing Comments B socks only   Bed Mobility   Supine to Sit 3  Moderate assistance   Additional items Assist x 1;LE management;Verbal cues; Increased time required;HOB elevated   Sit to Supine 3  Moderate assistance   Additional items Assist x 2;LE management;Verbal cues; Increased time required   Transfers   Sit to Stand 2  Maximal assistance   Additional items Assist x 2;Verbal cues; Increased time required   Stand to Sit 3  Moderate assistance   Additional items Assist x 2;Verbal cues; Increased time required   Stand pivot Unable to assess  (attempted, however Pt unable to complete due to safety concerns)   Right Upper Extremity- Strength   R Shoulder Flexion;ABduction  (pro/ret)   R Elbow Elbow flexion;Elbow extension   R Position Seated   R Weight/Reps/Sets no weight, 15 reps, 1 set   Left Upper Extremity-Strength   L Shoulder Flexion;ABduction  (pro/ret)   L Elbow Elbow flexion;Elbow extension   L Position Supine   L Weights/Reps/Sets no weight, 15 reps, 1 set   Cognition   Overall Cognitive Status Impaired   Arousal/Participation Alert   Attention Attends with cues to redirect   Orientation Level Oriented to person;Disoriented to time;Oriented to place   Memory Decreased recall of recent events   Following Commands Follows one step commands inconsistently   Activity Tolerance   Activity Tolerance Patient limited by fatigue   Medical Staff Made Aware RN Anna   Assessment   Assessment Patient participated in Skilled OT session this date with interventions consisting of ADL re training with the use of correct body mechnaics and therapeutic exercise to: increase functional use of BUEs, increase BUE muscle strength    Patient agreeable to OT treatment session, upon arrival patient was found supine in bed  Patient requiring cognitive assistance to anticipate next step and frequent rest periods  Patient continues to be functioning below baseline level, occupational performance remains limited secondary to factors listed above and increased risk for falls and injury  From OT standpoint, recommendation at time of d/c would be Short Term Rehab  Patient to benefit from continued Occupational Therapy treatment while in the hospital to address deficits as defined above and maximize level of functional independence with ADLs and functional mobility  Plan   Treatment Interventions ADL retraining;Functional transfer training;UE strengthening/ROM; Patient/family training;Equipment evaluation/education; Energy conservation; Activityengagement   Goal Expiration Date 11/27/22   OT Treatment Day 2   OT Frequency 3-5x/wk   Recommendation   OT Discharge Recommendation Post acute rehabilitation services   Additional Comments  Co treatment with PT completed secondary to complex medical condition of pt, Mod A of 2 required to achieve and maintain transitional movements, requiring the need of skilled therapeutic intervention of 2 therapists to achieve delivery of services  Additional Comments 2 The patient's raw score on the -PAC Daily Activity inpatient short form is 15, standardized score is 34 69, less than 39 4  Patients at this level are likely to benefit from discharge to post-acute rehabilitation services  Please refer to the recommendation of the Occupational Therapist for safe discharge planning     AM-PAC Daily Activity Inpatient   Lower Body Dressing 1   Bathing 2   Toileting 1   Upper Body Dressing 3   Grooming 4   Eating 4   Daily Activity Raw Score 15   Daily Activity Standardized Score (Calc for Raw Score >=11) 34 69   -[facility] Applied Cognition Inpatient   Following a Speech/Presentation 3   Understanding Ordinary Conversation 3   Taking Medications 2   Remembering Where Things Are Placed or Put Away 2   Remembering List of 4-5 Errands 2   Taking Care of Complicated Tasks 2   Applied Cognition Raw Score 14   Applied Cognition Standardized Score 32 02     Brittaney Pollard MS, OTR/L

## 2022-11-19 NOTE — PROGRESS NOTES
Progress Note - Orthopedics   Juve Clanton 81 y o  female MRN: [id number]  Unit/Bed#: [facility]-01 Encounter: [id number]    Assessment:  POD 3 s/p right total knee replacement    Plan:  Continue physical therapy and occupational therapy weight-bearing as tolerated  Out of bed  Arixtra for DVT prophylaxis  Awaiting approval from insurance for discharge    Weight bearing:  Weight-bearing as tolerated right lower extremity    VTE Pharmacologic Prophylaxis: Fondaparinux (Arixtra)  VTE Mechanical Prophylaxis: sequential compression device    Subjective:  Pain seems better controlled today  She is resting in bed    Vitals: Blood pressure 126/75, pulse (!) 106, temperature 98 1 °F (36 7 °C), temperature source Oral, resp  rate 18, height 4' 9" (1 448 m), weight 85 7 kg (189 lb), SpO2 94 %, not currently breastfeeding  ,Body mass index is 40 9 kg/m²        Intake/Output Summary (Last 24 hours) at 11/19/2022 1258  Last data filed at 11/19/2022 0601  Gross per 24 hour   Intake 360 ml   Output 900 ml   Net -540 ml       Invasive Devices     Peripheral Intravenous Line  Duration           Peripheral IV 11/16/22 Left Hand 3 days    Peripheral IV 11/16/22 Left;Upper Forearm 3 days          Drain  Duration           Autologus Reinfusion/Drain Device Right Knee 3 days    External Urinary Catheter 1 day                Physical Exam:  Right lower extremity is neurovascularly intact  Toes are pink and mobile  Compartments are soft  Dressing is clean, dry and intact  Good dorsiflexion and plantar flexion of ankle  Brisk cap refill  Sensation intact  Negative Homans    Lab, Imaging and other studies:   CBC:   Lab Results   Component Value Date    WBC 8 73 11/19/2022    HGB 13 2 11/19/2022    HCT 40 3 11/19/2022    MCV 95 11/19/2022     11/19/2022    MCH 31 2 11/19/2022    MCHC 32 8 11/19/2022    RDW 12 5 11/19/2022    MPV 10 0 11/19/2022     CMP:   Lab Results   Component Value Date    SODIUM 135 11/19/2022     11/19/2022 CO2 31 11/19/2022    BUN 14 11/19/2022    CREATININE 0 71 11/19/2022    CALCIUM 8 6 11/19/2022    EGFR 86 11/19/2022

## 2022-11-19 NOTE — PLAN OF CARE
Problem: OCCUPATIONAL THERAPY ADULT  Goal: Performs self-care activities at highest level of function for planned discharge setting  See evaluation for individualized goals  Description: Treatment Interventions: ADL retraining, Functional transfer training, UE strengthening/ROM, Patient/family training, Equipment evaluation/education, Energy conservation, Activityengagement    See flowsheet documentation for full assessment, interventions and recommendations  Outcome: Not Progressing  Note: Limitation: Decreased ADL status, Decreased UE ROM, Decreased UE strength, Decreased Safe judgement during ADL, Decreased endurance  Prognosis: Good  Assessment: Patient participated in Skilled OT session this date with interventions consisting of ADL re training with the use of correct body mechnaics and therapeutic exercise to: increase functional use of BUEs, increase BUE muscle strength    Patient agreeable to OT treatment session, upon arrival patient was found supine in bed  Patient requiring cognitive assistance to anticipate next step and frequent rest periods  Patient continues to be functioning below baseline level, occupational performance remains limited secondary to factors listed above and increased risk for falls and injury  From OT standpoint, recommendation at time of d/c would be Short Term Rehab  Patient to benefit from continued Occupational Therapy treatment while in the hospital to address deficits as defined above and maximize level of functional independence with ADLs and functional mobility       OT Discharge Recommendation: Post acute rehabilitation services     [address] [facility], [place], OTR/L

## 2022-11-20 VITALS
BODY MASS INDEX: 40.78 KG/M2 | HEART RATE: 104 BPM | HEIGHT: 57 IN | DIASTOLIC BLOOD PRESSURE: 71 MMHG | SYSTOLIC BLOOD PRESSURE: 130 MMHG | WEIGHT: 189 LBS | RESPIRATION RATE: 18 BRPM | TEMPERATURE: 98.1 F | OXYGEN SATURATION: 95 %

## 2022-11-20 RX ORDER — GUAIFENESIN/DEXTROMETHORPHAN 100-10MG/5
10 SYRUP ORAL EVERY 4 HOURS PRN
Status: DISCONTINUED | OUTPATIENT
Start: 2022-11-20 | End: 2022-11-22 | Stop reason: HOSPADM

## 2022-11-20 RX ADMIN — LISINOPRIL 5 MG: 5 TABLET ORAL at 10:11

## 2022-11-20 RX ADMIN — Medication 9 MG: at 22:05

## 2022-11-20 RX ADMIN — Medication 1 TABLET: at 10:11

## 2022-11-20 RX ADMIN — OXYCODONE HYDROCHLORIDE AND ACETAMINOPHEN 500 MG: 500 TABLET ORAL at 10:10

## 2022-11-20 RX ADMIN — FERROUS SULFATE TAB 325 MG (65 MG ELEMENTAL FE) 325 MG: 325 (65 FE) TAB at 17:21

## 2022-11-20 RX ADMIN — FOLIC ACID 1 MG: 1 TABLET ORAL at 10:11

## 2022-11-20 RX ADMIN — DULOXETINE 60 MG: 60 CAPSULE, DELAYED RELEASE ORAL at 17:21

## 2022-11-20 RX ADMIN — OXYCODONE HYDROCHLORIDE AND ACETAMINOPHEN 500 MG: 500 TABLET ORAL at 17:21

## 2022-11-20 RX ADMIN — ALBUTEROL SULFATE 2 PUFF: 90 AEROSOL, METERED RESPIRATORY (INHALATION) at 10:09

## 2022-11-20 RX ADMIN — FERROUS SULFATE TAB 325 MG (65 MG ELEMENTAL FE) 325 MG: 325 (65 FE) TAB at 10:10

## 2022-11-20 RX ADMIN — LORATADINE 10 MG: 10 TABLET ORAL at 10:11

## 2022-11-20 RX ADMIN — DOCUSATE SODIUM 100 MG: 100 CAPSULE, LIQUID FILLED ORAL at 17:21

## 2022-11-20 RX ADMIN — DOCUSATE SODIUM 100 MG: 100 CAPSULE, LIQUID FILLED ORAL at 10:10

## 2022-11-20 RX ADMIN — LUBIPROSTONE 24 MCG: 8 CAPSULE, GELATIN COATED ORAL at 17:21

## 2022-11-20 RX ADMIN — ACETAMINOPHEN 650 MG: 325 TABLET ORAL at 10:09

## 2022-11-20 RX ADMIN — FONDAPARINUX SODIUM 2.5 MG: 2.5 INJECTION, SOLUTION SUBCUTANEOUS at 10:09

## 2022-11-20 RX ADMIN — LUBIPROSTONE 24 MCG: 8 CAPSULE, GELATIN COATED ORAL at 10:11

## 2022-11-20 RX ADMIN — ONDANSETRON 4 MG: 2 INJECTION INTRAMUSCULAR; INTRAVENOUS at 10:28

## 2022-11-20 RX ADMIN — DULOXETINE 60 MG: 60 CAPSULE, DELAYED RELEASE ORAL at 10:10

## 2022-11-20 RX ADMIN — OXYBUTYNIN 10 MG: 5 TABLET, FILM COATED, EXTENDED RELEASE ORAL at 22:05

## 2022-11-20 RX ADMIN — BUPROPION HYDROCHLORIDE 300 MG: 150 TABLET, EXTENDED RELEASE ORAL at 10:10

## 2022-11-20 RX ADMIN — ALBUTEROL SULFATE 2 PUFF: 90 AEROSOL, METERED RESPIRATORY (INHALATION) at 17:24

## 2022-11-20 RX ADMIN — ATORVASTATIN CALCIUM 20 MG: 20 TABLET, FILM COATED ORAL at 17:21

## 2022-11-20 RX ADMIN — GUAIFENESIN AND DEXTROMETHORPHAN 10 ML: 100; 10 SYRUP ORAL at 18:31

## 2022-11-20 NOTE — PLAN OF CARE
Problem: INFECTION - ADULT  Goal: Absence or prevention of progression during hospitalization  Description: INTERVENTIONS:  - Assess and monitor for signs and symptoms of infection  - Monitor lab/diagnostic results  - Monitor all insertion sites, i e  indwelling lines, tubes, and drains  - Monitor endotracheal if appropriate and nasal secretions for changes in amount and color  - Ragland appropriate cooling/warming therapies per order  - Administer medications as ordered  - Instruct and encourage patient and family to use good hand hygiene technique  - Identify and instruct in appropriate isolation precautions for identified infection/condition  Outcome: Progressing

## 2022-11-20 NOTE — PLAN OF CARE
Problem: PHYSICAL THERAPY ADULT  Goal: Performs mobility at highest level of function for planned discharge setting  See evaluation for individualized goals  Description: Treatment/Interventions: Functional transfer training, LE strengthening/ROM, Elevations, Therapeutic exercise, Endurance training, Patient/family training, Equipment eval/education, Bed mobility, Gait training, Spoke to nursing          See flowsheet documentation for full assessment, interventions and recommendations  Outcome: Progressing  Note: Prognosis: Fair  Problem List: Decreased strength, Decreased endurance, Impaired balance, Decreased mobility, Decreased coordination, Decreased cognition, Pain, Orthopedic restrictions, Decreased skin integrity  Assessment: Pt seen for PT treatment session this date with interventions consisting of gait training to reduce the risk of medical complications and advance toward previous level of function w/ emphasis on improving pt's ability to ambulate level surfaces x 4 feet total with mod A of 2 provided by therapist with RW, Therapeutic exercise to improve endurance and BLE stability in weight bearing consisting of: AROM 15 reps B LE in sitting position and therapeutic activity to reduce fall risk consisting of training: supine<>sit transfers, sit<>stand transfers, static sitting tolerance at EOB for 10 minutes w/ B UE support, static standing tolerance for 3 minutes w/ B UE support, vc and tactile cues for static sitting posture faciliation, vc and tactile cues for static standing posture faciliation, stand pivot transfers towards B direction and continued safety awareness education  Pt agreeable to PT treatment session upon arrival, pt found supine in bed w/ HOB elevated, A&O x 4  In comparison to previous session, pt with improvements in ambulatory distance  Post session: pt returned back to recliner, chair alarm engaged, all needs in reach and RN notified of session findings/recommendations  Continue to recommend post acute rehabilitation services at time of d/c in order to maximize pt's functional independence and safety w/ mobility  Pt continues to be functioning below baseline level, and remains limited 2* factors listed above and including weakness, pain,impaired balance, activity intolerance, gait deviations, decreased endurance  PT will continue to see pt during current hospitalization in order to address the deficits listed above and provide interventions consistent w/ POC in effort to achieve STGs  Barriers to Discharge: Inaccessible home environment     PT Discharge Recommendation: Post acute rehabilitation services (maintained recommendation)    See flowsheet documentation for full assessment

## 2022-11-20 NOTE — NURSING NOTE
Pt medicated as ordered for c/o right knee pain 6/10, and nausea  Pt given Tylenol and Zofran for same

## 2022-11-20 NOTE — PHYSICAL THERAPY NOTE
Physical Therapy Treatment Session Note    Patient's Name: Luke Livingston    Admitting Diagnosis  Primary osteoarthritis of right knee [M17 11]    Problem List  Patient Active Problem List   Diagnosis    Irritable bowel syndrome (IBS)    Mild intermittent asthma without complication    Bilateral carpal tunnel syndrome    Chronic musculoskeletal pain    Generalized anxiety disorder    Hyperlipidemia LDL goal <130    Essential hypertension    Lymphedema    Patent foramen ovale    Tension type headache    Vitamin D deficiency    Recurrent major depressive disorder, in partial remission (HCC)    Other chronic pancreatitis (HCC)    Body mass index (BMI) of 40 0-44 9 in adult [facility])    Right foot pain    Tinea cruris    Primary insomnia    Neuropathy    Age-related osteoporosis without current pathological fracture    Swelling of joint of left knee    Nephrolithiasis    Scoliosis    Primary osteoarthritis of left knee    HX: breast cancer    Sinus tachycardia    Acute metabolic encephalopathy    Encounter for screening for COVID-19    S/P total knee arthroplasty, left    Dermatitis    [facility]uous [facility] )    Seborrheic keratoses    Seasonal allergies    OAB (overactive bladder)    Age-related osteoporosis without current pathological fracture    Mixed stress and urge urinary incontinence    Renal cyst    Chronic constipation    Arthritis of right knee    Colitis       Past Medical History  Past Medical History:   Diagnosis Date    Anxiety     Arthritis     Asthma     Breast cancer ([facility] )     rt mastectomy 2005    Cancer [facility])     breast right    Cataract     CHF (congestive heart failure) ([facility])     Chronic headaches     Concussion     At age 7; Chronic headaches since then    Coronary artery disease     Depression     Dietary lactose intolerance     GERD (gastroesophageal reflux disease)     High cholesterol     History of chemotherapy     2005 rt breast cancer    History of transfusion     Hypertension IBS (irritable bowel syndrome)     Irritable bowel syndrome (IBS) 05/31/2016    Kidney stone     Lymphedema of right arm     Obesity     PFO (patent foramen ovale)     PONV (postoperative nausea and vomiting)     Post-menopausal     Psychiatric disorder     Renal disorder     Shortness of breath     Vitamin D deficiency        Past Surgical History  Past Surgical History:   Procedure Laterality Date    BREAST SURGERY      CATARACT EXTRACTION Bilateral     CATARACT EXTRACTION, BILATERAL      CHOLECYSTECTOMY      COLONOSCOPY N/A 05/31/2016    Procedure: COLONOSCOPY;  Surgeon: Xin Larkin MD;  Location: [facility];  Service:     GALLBLADDER SURGERY      HYSTERECTOMY      Total    JOINT REPLACEMENT      KNEE ARTHROSCOPY      KNEE SURGERY      arthroscopy    MASTECTOMY Right     rt breast mastectomy 2005    SC TOTAL KNEE ARTHROPLASTY Left 05/20/2021    Procedure: ARTHROPLASTY KNEE TOTAL;  Surgeon: Chary Monteiro MD;  Location: [address] [place], [place] [address] MAIN OR;  Service: Orthopedics    TONSILLECTOMY AND ADENOIDECTOMY          11/20/22 1012   PT Last Visit   PT Visit Date 11/20/22   Note Type   Note Type Treatment   Pain Assessment   Pain Assessment Tool 0-10   Pain Score 6   Pain Location/Orientation Orientation: Right;Location: Knee   Pain Onset/Description Onset: Ongoing;Frequency: Constant/Continuous; Descriptor: Aching;Descriptor: Sore   Effect of Pain on Daily Activities yes   Patient's Stated Pain Goal No pain   Hospital Pain Intervention(s) Medication (See MAR); Repositioned; Ambulation/increased activity   Multiple Pain Sites No   Precautions   Total Knee Replacement Other (Comment)  (CPM usage in place)   Restrictions/Precautions   Weight Bearing Precautions Per Order Yes   RLE Weight Bearing Per Order FWB   Other Precautions Chair Alarm; Bed Alarm;Cognitive; Fall Risk;Pain;O2  (surgical site R knee)   General   Chart Reviewed Yes   Response to Previous Treatment Patient unable to report, no changes reported from family or staff Family/Caregiver Present No   Cognition   Overall Cognitive Status Impaired   Arousal/Participation Alert; Cooperative   Attention Attends with cues to redirect   Orientation Level Oriented X4   Memory Decreased recall of precautions   Following Commands Follows one step commands with increased time or repetition   Comments Emir Reid was agreeable to PT session, pleasant  Bed Mobility   Supine to Sit 3  Moderate assistance   Additional items Assist x 1;HOB elevated; Bedrails; Increased time required;Verbal cues;LE management   Sit to Supine   (DNT pt  was sitting out of bed on recliner upon conclusion )   Additional Comments Verbal cues for bedrail utilization, proper body mechanics  Tactile cues for postural stability, Emir Reid denied lightheadedness/dizziness with positional changes  Transfers   Sit to Stand 3  Moderate assistance   Additional items Assist x 2;Bedrails; Increased time required;Verbal cues  (RW usage)   Stand to Sit 3  Moderate assistance   Additional items Assist x 2;Bedrails; Increased time required;Verbal cues   Stand pivot 3  Moderate assistance   Additional items Assist x 2; Increased time required;Verbal cues   Toilet transfer 3  Moderate assistance   Additional items Assist x 2;Armrests; Increased time required;Verbal cues; Commode   Additional Comments Verbal cues for appropriate BUE placement with transitional movements, safety while turning, and proper AD usage  Ambulation/Elevation   Gait pattern Improper Weight shift; Antalgic; Forward Flexion;Decreased foot clearance; Short stride; Step to;Excessively slow   Gait Assistance 3  Moderate assist   Additional items Assist x 2;Verbal cues; Tactile cues   Assistive Device Rolling walker   Distance 4 feet total  (bedside->commode->recliner)   Stair Management Assistance Not tested   Ambulation/Elevation Additional Comments Verbal cues for safety with directional changes, proper patterning of RW/RLE/LLE, and hand placement with descension to commode and recliner  Balance   Static Sitting Fair   Dynamic Sitting Fair -   Static Standing Poor +   Dynamic Standing Poor   Ambulatory Poor   Endurance Deficit   Endurance Deficit Yes   Activity Tolerance   Activity Tolerance Patient limited by fatigue;Patient limited by pain   Nurse Made Aware Yes, Belinda Ricks RN assisted with mobility  PT appreciated the care coordination  Exercises   Quad Sets Sitting;15 reps;AROM; Bilateral   Glute Sets Sitting;15 reps;AROM; Bilateral   Hip Flexion Sitting;15 reps;AROM; Bilateral   Hip Abduction Sitting;15 reps;AROM; Bilateral   Hip Adduction Sitting;15 reps;AROM; Bilateral   Knee AROM Long Arc Quad Sitting;15 reps;AROM; Bilateral   Ankle Pumps Sitting;15 reps;AROM; Bilateral   Assessment   Prognosis Fair   Problem List Decreased strength;Decreased endurance; Impaired balance;Decreased mobility; Decreased coordination;Decreased cognition;Pain;Orthopedic restrictions;Decreased skin integrity   Assessment Pt seen for PT treatment session this date with interventions consisting of gait training to reduce the risk of medical complications and advance toward previous level of function w/ emphasis on improving pt's ability to ambulate level surfaces x 4 feet total with mod A of 2 provided by therapist with RW, Therapeutic exercise to improve endurance and BLE stability in weight bearing consisting of: AROM 15 reps B LE in sitting position and therapeutic activity to reduce fall risk consisting of training: supine<>sit transfers, sit<>stand transfers, static sitting tolerance at EOB for 10 minutes w/ B UE support, static standing tolerance for 3 minutes w/ B UE support, vc and tactile cues for static sitting posture faciliation, vc and tactile cues for static standing posture faciliation, stand pivot transfers towards B direction and continued safety awareness education  Pt agreeable to PT treatment session upon arrival, pt found supine in bed w/ HOB elevated, A&O x 4   In comparison to previous session, pt with improvements in ambulatory distance  Post session: pt returned back to recliner, chair alarm engaged, all needs in reach and RN notified of session findings/recommendations  Continue to recommend post acute rehabilitation services at time of d/c in order to maximize pt's functional independence and safety w/ mobility  Pt continues to be functioning below baseline level, and remains limited 2* factors listed above and including weakness, pain,impaired balance, activity intolerance, gait deviations, decreased endurance  PT will continue to see pt during current hospitalization in order to address the deficits listed above and provide interventions consistent w/ POC in effort to achieve STGs  Barriers to Discharge Inaccessible home environment   Goals   Patient Goals to get to rehab soon   STG Expiration Date 11/26/22   Short Term Goal #1 STGs remain appropriate   PT Treatment Day 5   Plan   Treatment/Interventions Functional transfer training;LE strengthening/ROM; Elevations; Therapeutic exercise; Endurance training;Patient/family training;Cognitive reorientation;Equipment eval/education; Bed mobility;Gait training;Spoke to nursing   Progress Slow progress, decreased activity tolerance   PT Frequency Other (Comment)  (7x/week, 1-2x/day)   Recommendation   PT Discharge Recommendation Post acute rehabilitation services  (maintained recommendation)   Equipment Recommended [facility] Recommended Wheeled walker   Change/add to [employer]? No   Additional Comments Upon conclusion,pt  was sitting out of bed on recliner  Chair alarm engaged and all needs within reach     AM-PAC Basic Mobility Inpatient   Turning in Bed Without Bedrails 3   Lying on Back to Sitting on Edge of Flat Bed 2   Moving Bed to Chair 2   Standing Up From Chair 2   Walk in Room 2   Climb 3-5 Stairs 1   Basic Mobility Inpatient Raw Score 12   Basic Mobility Standardized Score 32 23   Highest Level Of Mobility GEMINI-HLM Goal 4: Move to chair/commode     Treatment Time: 3269-0153    Veronica Borjas, PT

## 2022-11-21 LAB
FLUAV RNA RESP QL NAA+PROBE: NEGATIVE
FLUBV RNA RESP QL NAA+PROBE: NEGATIVE
RSV RNA RESP QL NAA+PROBE: NEGATIVE
SARS-COV-2 RNA RESP QL NAA+PROBE: NEGATIVE

## 2022-11-21 RX ADMIN — DULOXETINE 60 MG: 60 CAPSULE, DELAYED RELEASE ORAL at 16:55

## 2022-11-21 RX ADMIN — DULOXETINE 60 MG: 60 CAPSULE, DELAYED RELEASE ORAL at 08:41

## 2022-11-21 RX ADMIN — LUBIPROSTONE 24 MCG: 8 CAPSULE, GELATIN COATED ORAL at 08:40

## 2022-11-21 RX ADMIN — BUPROPION HYDROCHLORIDE 300 MG: 150 TABLET, EXTENDED RELEASE ORAL at 08:39

## 2022-11-21 RX ADMIN — LORATADINE 10 MG: 10 TABLET ORAL at 08:40

## 2022-11-21 RX ADMIN — FERROUS SULFATE TAB 325 MG (65 MG ELEMENTAL FE) 325 MG: 325 (65 FE) TAB at 16:55

## 2022-11-21 RX ADMIN — FONDAPARINUX SODIUM 2.5 MG: 2.5 INJECTION, SOLUTION SUBCUTANEOUS at 08:41

## 2022-11-21 RX ADMIN — FERROUS SULFATE TAB 325 MG (65 MG ELEMENTAL FE) 325 MG: 325 (65 FE) TAB at 08:41

## 2022-11-21 RX ADMIN — DOCUSATE SODIUM 100 MG: 100 CAPSULE, LIQUID FILLED ORAL at 08:40

## 2022-11-21 RX ADMIN — DOCUSATE SODIUM 100 MG: 100 CAPSULE, LIQUID FILLED ORAL at 16:55

## 2022-11-21 RX ADMIN — OXYCODONE HYDROCHLORIDE AND ACETAMINOPHEN 500 MG: 500 TABLET ORAL at 08:41

## 2022-11-21 RX ADMIN — Medication 9 MG: at 22:44

## 2022-11-21 RX ADMIN — LISINOPRIL 5 MG: 5 TABLET ORAL at 08:40

## 2022-11-21 RX ADMIN — OXYBUTYNIN 10 MG: 5 TABLET, FILM COATED, EXTENDED RELEASE ORAL at 22:44

## 2022-11-21 RX ADMIN — LUBIPROSTONE 24 MCG: 8 CAPSULE, GELATIN COATED ORAL at 16:55

## 2022-11-21 RX ADMIN — FOLIC ACID 1 MG: 1 TABLET ORAL at 08:39

## 2022-11-21 RX ADMIN — ATORVASTATIN CALCIUM 20 MG: 20 TABLET, FILM COATED ORAL at 16:56

## 2022-11-21 RX ADMIN — OXYCODONE HYDROCHLORIDE AND ACETAMINOPHEN 500 MG: 500 TABLET ORAL at 16:56

## 2022-11-21 NOTE — QUICK NOTE
Patient with reported tachycardia, review of vital signs reveals heart rate 106 11/19, 110 11/20, and 103 11/21  She had similar tachycardia after her left knee replacement in May of 2021 which was suspected due to pain, anxiety, and mild dehydration  Upon examination today, patient is mildly tachycardic at rate of 105, she appears anxious, and also says that she is having some pain in the right knee  Right knee is intact with staples, no redness or drainage noted  + cms distally  Noted that patient recently had lisinopril added for blood pressure control then reduced due to soft blood pressures and does not appear to be on any beta-blockers  Patient has no complaints of shortness of breath, palpitations, chest pain, dizziness, diaphoresis, or nausea  Patient is medically cleared for rehab

## 2022-11-21 NOTE — CASE MANAGEMENT
Case Management Discharge Planning Note    Patient name Basilio Bright  Location /-75 MRN [id number]  : 1950 Date 2022       Current Admission Date: 2022  Current Admission Diagnosis:Arthritis of right knee   Patient Active Problem List    Diagnosis Date Noted   • Arthritis of right knee 2022   • Colitis 2022   • Chronic constipation 10/11/2022   • Mixed stress and urge urinary incontinence 10/10/2022   • Renal cyst 10/10/2022   • Seasonal allergies 2022   • OAB (overactive bladder) 2022   • Continuous opioid dependence ([facility] ) 01/10/2022   • Seborrheic keratoses 01/10/2022   • Age-related osteoporosis without current pathological fracture 2021   • Dermatitis 2021   • Acute metabolic encephalopathy 10/96/1456   • Encounter for screening for COVID-19 2021   • S/P total knee arthroplasty, left 2021   • Sinus tachycardia 2021   • HX: breast cancer 10/12/2020   • Primary osteoarthritis of left knee 2020   • Nephrolithiasis 09/10/2020   • Scoliosis 09/10/2020   • Swelling of joint of left knee 2020   • Age-related osteoporosis without current pathological fracture 2020   • Primary insomnia 2019   • Neuropathy 2019   • Right foot pain 10/24/2019   • Tinea cruris 10/24/2019   • Recurrent major depressive disorder, in partial remission ([facility] ) 2019   • Other chronic pancreatitis ([facility] ) 2019   • Body mass index (BMI) of 40 0-44 9 in adult [facility]) 2019   • Bilateral carpal tunnel syndrome 2017   • Mild intermittent asthma without complication    • Lymphedema 2017   • Generalized anxiety disorder 06/10/2016   • Patent foramen ovale 06/10/2016   • Tension type headache 06/10/2016   • Essential hypertension 2016   • Irritable bowel syndrome (IBS) 2016   • Chronic musculoskeletal pain 2016   • Vitamin D deficiency 2016   • Hyperlipidemia LDL goal <130 12/07/2015      LOS (days): 4  Geometric Mean LOS (GMLOS) (days): 1 70  Days to GMLOS:-2 3     OBJECTIVE:  Risk of Unplanned Readmission Score: 15 08         Current admission status: Inpatient   Preferred Pharmacy:   [id number] [place] [address]  Phone: [phone] Fax: [phone] by [address] [facility],  [address]  [address] [facility]  [address]  [facility] [address]  Phone: [phone] Fax: [phone]    Primary Care Provider: Doug Ross PA-C    Primary Insurance: [facility]  Secondary Insurance: [address]:    Discharge planning discussed with[de-identified] patient and Gómez Ahuja called at 12:58 pm & 15:06  Freedom of Choice: Yes  Comments - Freedom of Choice: recommendation is for rehab- list was rviewed with pt and daughter over the phone- pt and daughter's choice is [facility] [address] [place] [address] contacted family/caregiver?: Yes             Contacts  Patient Contacts: Ángel Burton  Relationship to Patient[de-identified] Family (daughter)  Contact Method: Phone  Phone Number: [phone]  Reason/Outcome: Discharge [address]         Is the patient interested in [facility] at discharge?: No    DME Referral Provided  Referral made for DME?: No    Other Referral/Resources/Interventions Provided:  Interventions: Short Term Rehab  Referral Comments: authorization has been started for [facility] for rehab, covid test needed  -pt does not need a booster as requested date of pt's last booster was obtianed from the daughter- record of Hue Cerna was sent to cm from [facility] 9/30/2022 Bivalent   snf ewas made aware and information was down loaded in [facility], covid test is needed-  15:00 w/c Anyi Jaimes set up for tomorrow if [address] [facility] [address] is received, rn does agree with mode of transport, pt is aware there is a fee for transport

## 2022-11-21 NOTE — PLAN OF CARE
Problem: PHYSICAL THERAPY ADULT  Goal: Performs mobility at highest level of function for planned discharge setting  See evaluation for individualized goals  Description: Treatment/Interventions: Functional transfer training, LE strengthening/ROM, Elevations, Therapeutic exercise, Endurance training, Patient/family training, Equipment eval/education, Bed mobility, Gait training, Spoke to nursing          See flowsheet documentation for full assessment, interventions and recommendations  Outcome: Progressing  Note: Prognosis: Fair  Problem List: Decreased strength, Decreased range of motion, Decreased endurance, Impaired balance, Decreased mobility, Decreased coordination, Decreased cognition, Decreased skin integrity, Orthopedic restrictions, Pain  Assessment: Pt seen for PT treatment session this date with interventions consisting of bed mobility tasks, transfer training, seated TE, neuromuscular re-education of movement to improve dynamic sitting balance, gait training, and education provided as needed for safety and direction to improve functional mobility and activity tolerance  Pt agreeable to PT treatment session upon arrival, pt found resting in bed  At end of session, pt left sitting OOB in recliner with chair alarm activated, positioned for comfort with BLE elevated and heels free floated for pressure relief, SCD's applied, and all needs in reach  In comparison to previous session, pt with improvements in amount of assistance required for mobility and ambulation distance   Continue to recommend STR at time of d/c in order to maximize pt's functional independence and safety w/ mobility  Pt continues to be functioning below baseline level  PT will continue to see pt while here in order to address the deficits listed above and provide interventions consistent w/ POC in effort to achieve STGs    Barriers to Discharge: Inaccessible home environment     PT Discharge Recommendation: Post acute rehabilitation services    See flowsheet documentation for full assessment

## 2022-11-21 NOTE — PLAN OF CARE
Problem: PAIN - ADULT  Goal: Verbalizes/displays adequate comfort level or baseline comfort level  Description: Interventions:  - Encourage patient to monitor pain and request assistance  - Assess pain using appropriate pain scale  - Administer analgesics based on type and severity of pain and evaluate response  - Implement non-pharmacological measures as appropriate and evaluate response  - Consider cultural and social influences on pain and pain management  - Notify physician/advanced practitioner if interventions unsuccessful or patient reports new pain  Outcome: Progressing     Problem: SAFETY ADULT  Goal: Patient will remain free of falls  Description: INTERVENTIONS:  - Educate patient/family on patient safety including physical limitations  - Instruct patient to call for assistance with activity   - Consult OT/PT to assist with strengthening/mobility   - Keep Call bell within reach  - Keep bed low and locked with side rails adjusted as appropriate  - Keep care items and personal belongings within reach  - Initiate and maintain comfort rounds  - Make Fall Risk Sign visible to staff  -- Apply yellow socks and bracelet for high fall risk patients  - Consider moving patient to room near nurses station  Outcome: Progressing  Goal: Maintain or return to baseline ADL function  Description: INTERVENTIONS:  -  Assess patient's ability to carry out ADLs; assess patient's baseline for ADL function and identify physical deficits which impact ability to perform ADLs (bathing, care of mouth/teeth, toileting, grooming, dressing, etc )  - Assess/evaluate cause of self-care deficits   - Assess range of motion  - Assess patient's mobility; develop plan if impaired  - Assess patient's need for assistive devices and provide as appropriate  - Encourage maximum independence but intervene and supervise when necessary  - Involve family in performance of ADLs  - Assess for home care needs following discharge   - Consider OT consult to assist with ADL evaluation and planning for discharge  - Provide patient education as appropriate  Outcome: Progressing  Goal: Maintains/Returns to pre admission functional level  Description: INTERVENTIONS:  - Perform BMAT or MOVE assessment daily    - Set and communicate daily mobility goal to care team and patient/family/caregiver     - Collaborate with rehabilitation services on mobility goals if consulted  - - Out of bed to chair for meals   - Out of bed for toileting  - Record patient progress and toleration of activity level   Outcome: Progressing

## 2022-11-21 NOTE — PLAN OF CARE
Problem: INFECTION - ADULT  Goal: Absence or prevention of progression during hospitalization  Description: INTERVENTIONS:  - Assess and monitor for signs and symptoms of infection  - Monitor lab/diagnostic results  - Monitor all insertion sites, i e  indwelling lines, tubes, and drains  - Monitor endotracheal if appropriate and nasal secretions for changes in amount and color  - [place] appropriate cooling/warming therapies per order  - Administer medications as ordered  - Instruct and encourage patient and family to use good hand hygiene technique  - Identify and instruct in appropriate isolation precautions for identified infection/condition  Outcome: Progressing

## 2022-11-21 NOTE — CASE MANAGEMENT
Case Management Discharge Planning Note    Patient name Imelda Wood  Location /-43 MRN [id number]  : 1950 Date 2022       Current Admission Date: 2022  Current Admission Diagnosis:Arthritis of right knee   Patient Active Problem List    Diagnosis Date Noted   • Arthritis of right knee 2022   • Colitis 2022   • Chronic constipation 10/11/2022   • Mixed stress and urge urinary incontinence 10/10/2022   • Renal cyst 10/10/2022   • Seasonal allergies 2022   • OAB (overactive bladder) 2022   • Continuous opioid dependence ([facility] ) 01/10/2022   • Seborrheic keratoses 01/10/2022   • Age-related osteoporosis without current pathological fracture 2021   • Dermatitis 2021   • Acute metabolic encephalopathy    • Encounter for screening for COVID-19 2021   • S/P total knee arthroplasty, left 2021   • Sinus tachycardia 2021   • HX: breast cancer 10/12/2020   • Primary osteoarthritis of left knee 2020   • Nephrolithiasis 09/10/2020   • Scoliosis 09/10/2020   • Swelling of joint of left knee 2020   • Age-related osteoporosis without current pathological fracture 2020   • Primary insomnia 2019   • Neuropathy 2019   • Right foot pain 10/24/2019   • Tinea cruris 10/24/2019   • Recurrent major depressive disorder, in partial remission ([facility] ) 2019   • Other [facility] 2019   • Body mass index (BMI) of 40 0-44 9 in adult [facility]) 2019   • Bilateral carpal tunnel syndrome 2017   • Mild intermittent asthma without complication    • Lymphedema 2017   • Generalized anxiety disorder 06/10/2016   • Patent foramen ovale 06/10/2016   • Tension type headache 06/10/2016   • Essential hypertension 2016   • Irritable bowel syndrome (IBS) 2016   • Chronic musculoskeletal pain 2016   • Vitamin D deficiency 2016   • Hyperlipidemia LDL goal <130 12/07/2015      LOS (days): 4  Geometric Mean LOS (GMLOS) (days): 1 70  Days to GMLOS:-2 1     OBJECTIVE:  Risk of Unplanned Readmission Score: 15 04         Current admission status: Inpatient   Preferred Pharmacy:   [id number] [place] - [facility] [place] [address]  Phone: [phone] Fax: [phone] by [address] [address]  Phone: [phone] Fax: [phone]    Primary Care Provider: Claudy Telles PA-C    Primary Insurance: [facility]  Secondary Insurance: [address] [place] [address]    DISCHARGE DETAILS:    Discharge planning discussed with[de-identified] patient and daughter  Freedom of Choice: Yes  Comments - Freedom of Choice: recommendation is for rehab - referrals have been resent with permission - pt needs authorization  CM contacted family/caregiver?: Yes             Contacts  Patient Contacts: Haider Puente  Relationship to Patient[de-identified] Family (daughter)  Contact Method:  In Person  Reason/Outcome: Discharge [address]         Is the patient interested in [facility] at discharge?: No    DME Referral Provided  Referral made for DME?: No    Other Referral/Resources/Interventions Provided:  Interventions: Short Term Rehab  Referral Comments: pt was accepted by [facility],  but the family is unable to afford the copay- [facility] but [facility] not able to accept now- permission was given to send additonal referrals - authorization is needed  family was made aware I did call [facility] for them and tehy are closed for admissions at present due to [address] [address]    Would you like to participate in our [address] service program?  : No - Declined    Treatment Team Recommendation:  (snf rehab  & [facility] is needed-tbd)                                   IMM Given (Date):: 11/21/22  IMM Given to[de-identified] Family

## 2022-11-21 NOTE — UTILIZATION REVIEW
Initial Clinical Review    Elective surgical procedure  Age/Sex: 81 y o  female  Surgery Date: 11/16/2022  Procedure: ARTHROPLASTY KNEE TOTAL (Right) using the [employer] system with the following sizes:  E LPS GSF femoral component, #3  Tibial tray, 10 mm polyarticular surface, and a 32 mm patella button  Anesthesia: Spinal with iPACK and adductor canal blocks      POD#1 Progress Note: OOB  Continue with PT/OT  Pain controlled  Weight bearing as tolerated  As per PT/OT eval, recommending post acute rehab  Case management working on safe discharge plan  [place] referrals sent  NOted to have comorbid conditions including htn, ambulatory dysfunction   which will require management in the neo-operative period prior to safe discharge  POD #2 progress note: Continue with Arixtra  Pain controlled  Admission Orders: Date/Time/Statement:   Admission Orders (From admission, onward)     Ordered        11/17/22 1635  Inpatient Admission  Once                      Orders Placed This Encounter   Procedures   • Inpatient Admission     Standing Status:   Standing     Number of Occurrences:   1     Order Specific Question:   Level of Care     Answer:   Med Surg [16]     Order Specific Question:   Estimated length of stay     Answer:   More than 2 Midnights     Order Specific Question:   Certification     Answer:   I certify that inpatient services are medically necessary for this patient for a duration of greater than two midnights  See H&P and MD Progress Notes for additional information about the patient's course of treatment       Vital Signs: /91   Pulse 103   Temp 98 1 °F (36 7 °C)   Resp 22   Ht 4' 9" (1 448 m)   Wt 85 7 kg (189 lb)   LMP  (LMP Unknown)   SpO2 93%   BMI 40 90 kg/m²     Pertinent Labs/Diagnostic Test Results:   XR knee right 1 or 2 views   Final Result by Griffin Hernandez MD (11/17 1042)      Right knee replacement            Workstation performed: [id number]               Results from last 7 days   Lab Units 11/19/22  0524 11/18/22  0622 11/17/22  0453   WBC Thousand/uL 8 73 9 75 7 58   HEMOGLOBIN g/dL 13 2 13 9 15 2   HEMATOCRIT % 40 3 41 8 44 5   PLATELETS Thousands/uL 176 163 166         Results from last 7 days   Lab Units 11/19/22  0524 11/18/22  0622 11/17/22  0453   SODIUM mmol/L 135 134* 135   POTASSIUM mmol/L 3 7 3 7 3 8   CHLORIDE mmol/L 101 100 101   CO2 mmol/L 31 29 28   ANION GAP mmol/L 3* 5 6   BUN mg/dL 14 12 7   CREATININE mg/dL 0 71 0 71 0 70   EGFR ml/min/1 73sq m 86 86 88   CALCIUM mg/dL 8 6 8 8 8 4             Results from last 7 days   Lab Units 11/19/22  0524 11/18/22 0622 11/17/22 0453   GLUCOSE RANDOM mg/dL 142* 141* 164*         Diet: REgular  Mobility: UP and OOB  DVT Prophylaxis: SCDS, fondaparinux       Medications/Pain Control:   Scheduled Medications:  ascorbic acid, 500 mg, Oral, BID  atorvastatin, 20 mg, Oral, Daily  buPROPion, 300 mg, Oral, QAM  docusate sodium, 100 mg, Oral, BID  DULoxetine, 60 mg, Oral, BID  [START ON 11/22/2022] ergocalciferol, 50,000 Units, Oral, Weekly  ferrous sulfate, 325 mg, Oral, BID With Meals  folic acid, 1 mg, Oral, Daily  fondaparinux, 2 5 mg, Subcutaneous, Daily  lisinopril, 5 mg, Oral, Daily  loratadine, 10 mg, Oral, Daily  lubiprostone, 24 mcg, Oral, BID With Meals  melatonin, 9 mg, Oral, HS  multivitamin-minerals, 1 tablet, Oral, Daily  oxybutynin, 10 mg, Oral, HS      Continuous IV Infusions:     PRN Meds:  acetaminophen, 650 mg, Oral, Q4H PRN  albuterol, 2 puff, Inhalation, Q4H PRN  albuterol, 2 5 mg, Nebulization, Q6H PRN  aluminum-magnesium hydroxide-simethicone, 30 mL, Oral, Q6H PRN  butalbital-acetaminophen-caffeine, 1 tablet, Oral, TID PRN  hydrALAZINE, 10 mg, Intravenous, Q6H PRN  HYDROmorphone, 0 2 mg, Intravenous, Q3H PRN  ondansetron, 4 mg, Intravenous, Q6H PRN  oxyCODONE-acetaminophen, 1 tablet, Oral, Q4H PRN  oxyCODONE-acetaminophen, 2 tablet, Oral, Q6H PRN        Network Utilization Review Department  ATTENTION: Please call with any questions or concerns to [phone] and carefully listen to the prompts so that you are directed to the right person  All voicemails are confidential   Angie Valdes all requests for admission clinical reviews, approved or denied determinations and any other requests to dedicated fax number below belonging to the campus where the patient is receiving treatment   List of dedicated fax numbers for the Facilities:  [address] DENIALS (Administrative/Medical Necessity) [phone]   [address] (Maternity/NICU/Pediatrics) [phone]   [address] [phone]   [facility] [phone]   [address] [facility] [phone]   [facility] 134 815 [facility] [phone]

## 2022-11-21 NOTE — PLAN OF CARE
Problem: OCCUPATIONAL THERAPY ADULT  Goal: Performs self-care activities at highest level of function for planned discharge setting  See evaluation for individualized goals  Description: Treatment Interventions: ADL retraining, Functional transfer training, UE strengthening/ROM, Patient/family training, Equipment evaluation/education, Energy conservation, Activityengagement    See flowsheet documentation for full assessment, interventions and recommendations  Outcome: Progressing  Note: Limitation: Decreased ADL status, Decreased UE ROM, Decreased UE strength, Decreased Safe judgement during ADL, Decreased endurance  Prognosis: Good  Assessment: Patient participated in Skilled OT session this date with interventions consisting of ADL re training with the use of correct body mechnaics and therapeutic exercise to: increase functional use of BUEs, increase BUE muscle strength    Patient agreeable to OT treatment session, upon arrival patient was found supine in bed  Patient requiring cognitive assistance to anticipate next step and frequent rest periods  Patient continues to be functioning below baseline level, occupational performance remains limited secondary to factors listed above and increased risk for falls and injury  From OT standpoint, recommendation at time of d/c would be Short Term Rehab  Patient to benefit from continued Occupational Therapy treatment while in the hospital to address deficits as defined above and maximize level of functional independence with ADLs and functional mobility       OT Discharge Recommendation: Post acute rehabilitation services

## 2022-11-21 NOTE — PROGRESS NOTES
Progress Note - Orthopedics   Jamison Luz 81 y o  female MRN: [id number]  Unit/Bed#: -01 Encounter: [id number]    Assessment:  Postop day #5 , status post right total knee replacement    Plan:  There is still difficulty finding this patient placement  After further discussion with the patient, if nothing will occur by tonight, she will be discharged to home tomorrow morning  Out of bed  Weightbearing as tolerated right lower extremity  Physical/occupational therapy  Encouraged ambulation  Spirometry  Discharge planning as above  Out of bed for all meals    Weight bearing:  As tolerated    VTE Pharmacologic Prophylaxis: Fondaparinux (Arixtra)  VTE Mechanical Prophylaxis: sequential compression device    Subjective:  Patient in bed and in no apparent distress  She states she was in a chair earlier today    Vitals: Blood pressure 159/91, pulse 103, temperature 98 1 °F (36 7 °C), resp  rate 22, height 4' 9" (1 448 m), weight 85 7 kg (189 lb), SpO2 93 %, not currently breastfeeding  ,Body mass index is 40 9 kg/m²  Intake/Output Summary (Last 24 hours) at 11/21/2022 1241  Last data filed at 11/21/2022 1229  Gross per 24 hour   Intake 180 ml   Output 500 ml   Net -320 ml       Invasive Devices     Peripheral Intravenous Line  Duration           Peripheral IV 11/16/22 Left Hand 5 days    Peripheral IV 11/21/22 Dorsal (posterior); Left Wrist <1 day          Drain  Duration           Autologus Reinfusion/Drain Device Right Knee 5 days    External Urinary Catheter 3 days                Physical Exam:   Ortho Exam:     Right lower extremity is neurovascular intact  Toes are pink and mobile  Compartments are soft  Incision is clean, dry, intact  Negative Homans  Arc of motion is approved  Good dorsiflexion of foot  Improved quad tone    Negative Homans    Lab, Imaging and other studies: CBC: No results found for: WBC, HGB, HCT, MCV, PLT, ADJUSTEDWBC, MCH, MCHC, RDW, MPV, NRBC  CMP: No results found for: SODIUM, CL, CO2, ANIONGAP, BUN, CREATININE, GLUCOSE, CALCIUM, AST, ALT, ALKPHOS, PROT, BILITOT, EGFR

## 2022-11-21 NOTE — PROGRESS NOTES
-- Patient:  -- MRN: [id number]  -- Aidin Request ID: [id number]  -- Level of care reserved: José Miguel Phan  -- Partner Reserved: [address] [facility], [address] [facility], [address] [facility] [phone]  -- Clinical needs requested:  -- Geography searched: 30 miles around [phone]  -- Start of Service:  -- Request sent: 3:46pm EST on 11/17/2022 by Alissa Ross  -- Partner reserved: 1:44pm EST on 11/21/2022 by Alissa Ross  -- Choice list shared: 1:44pm EST on 11/21/2022 by Alissa Ross

## 2022-11-21 NOTE — PROGRESS NOTES
-- Patient:  -- MRN: [id number]  -- Aidin Request ID: [id number]  -- Level of care reserved: José Miguel Phan  -- Partner Reserved: [address] [facility] [place], [place], [address] [place] [address] [phone]  -- Clinical needs requested:  -- Geography searched: 30 miles around [phone]  -- Start of Service:  -- Request sent: 3:46pm EST on 11/17/2022 by Skip Jarrett  -- Partner reserved: 1:44pm EST on 11/21/2022 by Skip Jarrett  -- Choice list shared: 1:44pm EST on 11/21/2022 by Skip Jarrett

## 2022-11-21 NOTE — PHYSICAL THERAPY NOTE
PHYSICAL THERAPY TREATMENT NOTE  NAME:  Christianne Cedeno  DATE: 11/21/22    Length Of Stay: 4  Performed at least 2 patient identifiers during session: Name and ID bracelet    TREATMENT FLOWSHEET:    11/21/22 1129   PT Last Visit   PT Visit Date 11/21/22   Note Type   Note Type Treatment   Pain Assessment   Pain Assessment Tool 0-10   Pain Score 5   Pain Location/Orientation Orientation: Right;Location: Knee   Pain Onset/Description Onset: Ongoing;Frequency: Constant/Continuous; Descriptor: Aching;Descriptor: Sore   Patient's Stated Pain Goal No pain   Hospital Pain Intervention(s) Ambulation/increased activity;Repositioned; Emotional support   Restrictions/Precautions   Weight Bearing Precautions Per Order Yes   RLE Weight Bearing Per Order FWB   Other Precautions Bed Alarm; Chair Alarm;Cognitive; Fall Risk  (Pt  with no suplemental O2 upon entering room vitals WNL)   General   Chart Reviewed Yes   Response to Previous Treatment Patient with no complaints from previous session  Family/Caregiver Present No   Cognition   Overall Cognitive Status Impaired   Arousal/Participation Alert; Cooperative   Attention Within functional limits   Orientation Level Oriented X4   Memory Decreased recall of precautions   Following Commands Follows one step commands with increased time or repetition   Subjective   Subjective "I would like to try, I feel pretty good today "   Bed Mobility   Rolling R 5  Supervision   Additional items Assist x 1;HOB elevated; Bedrails; Increased time required;Verbal cues   Rolling L 5  Supervision   Additional items Assist x 1;HOB elevated; Bedrails; Increased time required;Verbal cues   Supine to Sit 4  Minimal assistance   Additional items Assist x 1;HOB elevated; Bedrails; Increased time required;Verbal cues  (UB management)   Additional Comments Pt  tolerated sitting at EOB to perform seated TE BLE and dynamic sitting balance activites     Transfers   Sit to Stand 4  Minimal assistance   Additional items Assist x 1;Bedrails; Increased time required;Verbal cues  (RW)   Stand to Sit 4  Minimal assistance   Additional items Assist x 1; Armrests; Increased time required;Verbal cues   Stand pivot 4  Minimal assistance   Additional items Assist x 1; Armrests; Increased time required;Verbal cues  (RW)   Additional Comments VC's for proper hand placement   Ambulation/Elevation   Gait pattern Improper Weight shift;Decreased foot clearance;Decreased R stance; Antalgic; Short stride; Excessively slow;Decreased heel strike;Decreased toe off;Step to   Gait Assistance 4  Minimal assist   Additional items Assist x 1;Verbal cues   Assistive Device Rolling walker   Distance 10ft   Balance   Static Sitting Fair +   Dynamic Sitting Fair   Static Standing Fair -   Dynamic Standing Poor +   Ambulatory Poor +   Endurance Deficit   Endurance Deficit Yes   Activity Tolerance   Activity Tolerance Patient limited by fatigue;Patient limited by pain   Nurse Made Aware RN aware   Exercises   Quad Sets Sitting;15 reps;AROM; Bilateral   Heelslides Sitting;15 reps;AROM; Bilateral   Glute Sets Sitting;15 reps;AROM; Bilateral   Hip Flexion Sitting;15 reps;AROM; Bilateral   Hip Abduction Sitting;15 reps;AROM; Bilateral   Hip Adduction Sitting;15 reps;AROM; Bilateral   Knee AROM Long Arc Quad Sitting;15 reps;AROM; Bilateral   Ankle Pumps Sitting;15 reps;AROM; Bilateral   Marching Sitting;15 reps;AROM; Bilateral   Neuro re-ed dynamic sitting balance activities performed including scooting forward/backward and side to side, multidirectional weight shifting, and reaching all directions at EOB supported with at least 1UE on mattress and close S   Assessment   Prognosis Fair   Problem List Decreased strength;Decreased range of motion;Decreased endurance; Impaired balance;Decreased mobility; Decreased coordination;Decreased cognition;Decreased skin integrity;Orthopedic restrictions;Pain   Goals   Patient Goals to have less pain   PT Treatment Day 6   Plan Treatment/Interventions Functional transfer training;LE strengthening/ROM; Elevations; Therapeutic exercise; Endurance training;Cognitive reorientation;Patient/family training;Equipment eval/education; Bed mobility;Gait training;Spoke to nursing   Progress Progressing toward goals   PT Frequency Other (Comment)  (7x/wk,1-2x/day)   Recommendation   PT Discharge Recommendation Post acute rehabilitation services   AM-PAC Basic Mobility Inpatient   Turning in Bed Without Bedrails 3   Lying on Back to Sitting on Edge of Flat Bed 3   Moving Bed to Chair 3   Standing Up From Chair 3   Walk in Room 3   Climb 3-5 Stairs 1   Basic Mobility Inpatient Raw Score 16   Basic Mobility Standardized Score 38 32   Highest Level Of Mobility   -[facility] Goal 5: Stand one or more mins   -HL Achieved 6: Walk 10 steps or more       The patient's AM-PAC Basic Mobility Inpatient Short Form Raw Score is 16, Standardized Score is 38 32  A standardized score less than 42 9 suggests the patient may benefit from discharge to post-acute rehabilitation services  Please also refer to the recommendation of the Physical Therapist for safe discharge planning  Pt seen for PT treatment session this date with interventions consisting of bed mobility tasks, transfer training, seated TE, neuromuscular re-education of movement to improve dynamic sitting balance, gait training, and education provided as needed for safety and direction to improve functional mobility and activity tolerance  Pt agreeable to PT treatment session upon arrival, pt found resting in bed  At end of session, pt left sitting OOB in recliner with chair alarm activated, positioned for comfort with BLE elevated and heels free floated for pressure relief, SCD's applied, and all needs in reach  In comparison to previous session, pt with improvements in amount of assistance required for mobility and ambulation distance    Continue to recommend STR at time of d/c in order to maximize pt's functional independence and safety w/ mobility  Pt continues to be functioning below baseline level  PT will continue to see pt while here in order to address the deficits listed above and provide interventions consistent w/ POC in effort to achieve STGs      Marquis Layne [place], [place]

## 2022-11-22 VITALS
HEART RATE: 107 BPM | HEIGHT: 57 IN | WEIGHT: 189 LBS | SYSTOLIC BLOOD PRESSURE: 141 MMHG | OXYGEN SATURATION: 95 % | BODY MASS INDEX: 40.78 KG/M2 | DIASTOLIC BLOOD PRESSURE: 80 MMHG | TEMPERATURE: 98.2 F | RESPIRATION RATE: 18 BRPM

## 2022-11-22 RX ORDER — OXYCODONE HYDROCHLORIDE AND ACETAMINOPHEN 5; 325 MG/1; MG/1
1 TABLET ORAL EVERY 4 HOURS PRN
Qty: 28 TABLET | Refills: 0 | Status: SHIPPED | OUTPATIENT
Start: 2022-11-22 | End: 2022-12-02

## 2022-11-22 RX ORDER — LISINOPRIL 5 MG/1
5 TABLET ORAL DAILY
Refills: 0
Start: 2022-11-23

## 2022-11-22 RX ORDER — DOCUSATE SODIUM 100 MG/1
100 CAPSULE, LIQUID FILLED ORAL 2 TIMES DAILY
Qty: 60 CAPSULE | Refills: 0
Start: 2022-11-22 | End: 2022-12-22

## 2022-11-22 RX ORDER — CEPHALEXIN 500 MG/1
500 CAPSULE ORAL EVERY 8 HOURS SCHEDULED
Qty: 15 CAPSULE | Refills: 0
Start: 2022-11-22 | End: 2022-11-27

## 2022-11-22 RX ORDER — FONDAPARINUX SODIUM 2.5 MG/.5ML
2.5 INJECTION SUBCUTANEOUS DAILY
Refills: 0
Start: 2022-11-23 | End: 2022-12-07

## 2022-11-22 RX ADMIN — ERGOCALCIFEROL 50000 UNITS: 1.25 CAPSULE ORAL at 08:55

## 2022-11-22 RX ADMIN — OXYCODONE HYDROCHLORIDE AND ACETAMINOPHEN 500 MG: 500 TABLET ORAL at 08:55

## 2022-11-22 RX ADMIN — DULOXETINE 60 MG: 60 CAPSULE, DELAYED RELEASE ORAL at 08:57

## 2022-11-22 RX ADMIN — FOLIC ACID 1 MG: 1 TABLET ORAL at 08:57

## 2022-11-22 RX ADMIN — LUBIPROSTONE 24 MCG: 8 CAPSULE, GELATIN COATED ORAL at 08:55

## 2022-11-22 RX ADMIN — DOCUSATE SODIUM 100 MG: 100 CAPSULE, LIQUID FILLED ORAL at 08:57

## 2022-11-22 RX ADMIN — FONDAPARINUX SODIUM 2.5 MG: 2.5 INJECTION, SOLUTION SUBCUTANEOUS at 08:54

## 2022-11-22 RX ADMIN — LORATADINE 10 MG: 10 TABLET ORAL at 08:55

## 2022-11-22 RX ADMIN — BUPROPION HYDROCHLORIDE 300 MG: 150 TABLET, EXTENDED RELEASE ORAL at 08:54

## 2022-11-22 RX ADMIN — GUAIFENESIN AND DEXTROMETHORPHAN 10 ML: 100; 10 SYRUP ORAL at 08:54

## 2022-11-22 RX ADMIN — LISINOPRIL 5 MG: 5 TABLET ORAL at 08:57

## 2022-11-22 RX ADMIN — FERROUS SULFATE TAB 325 MG (65 MG ELEMENTAL FE) 325 MG: 325 (65 FE) TAB at 08:55

## 2022-11-22 NOTE — PROGRESS NOTES
-- Patient:  -- MRN: [id number]  -- Aidin Request ID: [id number]  -- Level of care reserved: José Miguel Phan  -- Partner Reserved: [address] [facility], [place], [place], [address] [place] [address] [phone]  -- Clinical needs requested:  -- Geography searched: 30 miles around [phone]  -- Start of Service:  -- Request sent: 3:46pm EST on 11/17/2022 by Bret Ying  -- Partner reserved: 1:44pm EST on 11/21/2022 by Bret Ying  -- Choice list shared: 1:44pm EST on 11/21/2022 by Bret Ying

## 2022-11-22 NOTE — CASE MANAGEMENT
Case Management Discharge Planning Note    Patient name Wilson Miguel Angel  Location /-25 MRN [id number]  : 1950 Date 2022       Current Admission Date: 2022  Current Admission Diagnosis:Arthritis of right knee   Patient Active Problem List    Diagnosis Date Noted   • Arthritis of right knee 2022   • Colitis 2022   • Chronic constipation 10/11/2022   • Mixed stress and urge urinary incontinence 10/10/2022   • Renal cyst 10/10/2022   • Seasonal allergies 2022   • OAB (overactive bladder) 2022   • Continuous opioid dependence ([facility] [address] ) 01/10/2022   • Seborrheic keratoses 01/10/2022   • Age-related osteoporosis without current pathological fracture 2021   • Dermatitis 2021   • Acute metabolic encephalopathy    • Encounter for screening for COVID-19 2021   • S/P total knee arthroplasty, left 2021   • Sinus tachycardia 2021   • HX: breast cancer 10/12/2020   • Primary osteoarthritis of left knee 2020   • Nephrolithiasis 09/10/2020   • Scoliosis 09/10/2020   • Swelling of joint of left knee 2020   • Age-related osteoporosis without current pathological fracture 2020   • Primary insomnia 2019   • Neuropathy 2019   • Right foot pain 10/24/2019   • Tinea cruris 10/24/2019   • Recurrent major depressive disorder, in partial remission ([facility] [address] [facility] ) 2019   • Other chronic pancreatitis ([facility] ) 2019   • Body mass index (BMI) of 40 0-44 9 in adult [facility]) 2019   • Bilateral carpal tunnel syndrome 2017   • Mild intermittent asthma without complication 1889   • Lymphedema 2017   • Generalized anxiety disorder 06/10/2016   • Patent foramen ovale 06/10/2016   • Tension type headache 06/10/2016   • Essential hypertension 2016   • Irritable bowel syndrome (IBS) 2016   • Chronic musculoskeletal pain 2016   • Vitamin D deficiency 2016   • Hyperlipidemia LDL goal <130 12/07/2015      LOS (days): 5  Geometric Mean LOS (GMLOS) (days): 1 70  Days to GMLOS:-3     OBJECTIVE:  Risk of Unplanned Readmission Score: 15 23         Current admission status: Inpatient   Preferred Pharmacy:   [facility][id number] [place] [id number]  Phone: [phone] Fax: [phone] by [address] [address]  Phone: [phone] Fax: [phone]    Primary Care Provider: Fady Norton PA-C    Primary Insurance: [address] : [address] Number: [id number]

## 2022-11-22 NOTE — CASE MANAGEMENT
Case Management Discharge Planning Note    Patient name Osmin Pan  Location /-73 MRN [id number]  : 1950 Date 2022       Current Admission Date: 2022  Current Admission Diagnosis:Arthritis of right knee   Patient Active Problem List    Diagnosis Date Noted   • Arthritis of right knee 2022   • Colitis 2022   • Chronic constipation 10/11/2022   • Mixed stress and urge urinary incontinence 10/10/2022   • Renal cyst 10/10/2022   • Seasonal allergies 2022   • OAB (overactive bladder) 2022   • Continuous opioid dependence ([facility] [address] ) 01/10/2022   • Seborrheic keratoses 01/10/2022   • Age-related osteoporosis without current pathological fracture 2021   • Dermatitis 2021   • Acute metabolic encephalopathy    • Encounter for screening for COVID-19 2021   • S/P total knee arthroplasty, left 2021   • Sinus tachycardia 2021   • HX: breast cancer 10/12/2020   • Primary osteoarthritis of left knee 2020   • Nephrolithiasis 09/10/2020   • Scoliosis 09/10/2020   • Swelling of joint of left knee 2020   • Age-related osteoporosis without current pathological fracture 2020   • Primary insomnia 2019   • Neuropathy 2019   • Right foot pain 10/24/2019   • Tinea cruris 10/24/2019   • Recurrent major depressive disorder, in partial remission ([facility] [address] [facility] ) 2019   • Other chronic pancreatitis ([facility] [address] ) 2019   • Body mass index (BMI) of 40 0-44 9 in adult [facility]) 2019   • Bilateral carpal tunnel syndrome 2017   • Mild intermittent asthma without complication    • Lymphedema 2017   • Generalized anxiety disorder 06/10/2016   • Patent foramen ovale 06/10/2016   • Tension type headache 06/10/2016   • Essential hypertension 2016   • Irritable bowel syndrome (IBS) 2016   • Chronic musculoskeletal pain 2016   • Vitamin D deficiency 2016   • Hyperlipidemia LDL goal <130 12/07/2015      LOS (days): 5  Geometric Mean LOS (GMLOS) (days): 1 70  Days to GMLOS:-3     OBJECTIVE:  Risk of Unplanned Readmission Score: 15 23         Current admission status: Inpatient   Preferred Pharmacy:   [id number]- [place] - [facility] [place] [address]  Phone: [phone] Fax:  [phone] by [address] [facility], [address] [place] [address]  Phone: [phone] Fax: [phone]    Primary Care Provider: Ramiro Flores PA-C    Primary Insurance: [facility]  Secondary Insurance: [address] [place] [address]    DISCHARGE DETAILS:    Discharge planning discussed with[de-identified] patient and daughter was called at 9:08 am  Freedom of Choice: Yes  Comments - Freedom of Choice: recommendation is for rehab- pt ;s [facility] received  CM contacted family/caregiver?: Yes  Were Treatment Team discharge recommendations reviewed with patient/caregiver?: Yes  Did patient/caregiver verbalize understanding of patient care needs?: Yes  Were patient/caregiver advised of the risks associated with not following Treatment Team discharge recommendations?: Yes    Contacts  Patient Contacts: Kris Epsteins  Relationship to Patient[de-identified] Family  Phone Number: [phone]  Reason/Outcome: Discharge [address]         Is the patient interested in [address] at discharge?: No    DME Referral Provided  Referral made for DME?: No    Other Referral/Resources/Interventions Provided:  Interventions: Short Term Rehab  Referral Comments: pt accepted to [facility] received, covid teat completed- pt does not need a booster- rn was lazaro the fax & report # and transport time, rn in agreement wiht mode of transport- pt and daughter are aware there is a fee for the transport at 15:00 w/c Casey Tang    Would you like to participate in our [address] service program?  : No - Declined    Treatment Team Recommendation: Short Term Rehab [facility] -15:00 w/c Kim Jimenez)  Discharge Destination Plan[de-identified] Short Term Rehab [facility] received)         pt and family are in agreement with the d/c and d/c plan                             Family notified[de-identified] daughter was called

## 2022-11-22 NOTE — CASE MANAGEMENT
Brielle Joshua 52 has received approved authorization from:   Matty Epstein in by Travis Emerson P#  [phone]  Authorization received for: [facility]  Facility: [facility] #: [id number] (plan [facility] [place] [facility] ID not yet generated)  Start of Care: 11/21  Next Review Date: 11/23  Care Coordinator: Giana JARVIS#: [phone]  Submit next review to: [phone]   Care Manager notified: Jeff Norris

## 2022-11-22 NOTE — PLAN OF CARE
Problem: PAIN - ADULT  Goal: Verbalizes/displays adequate comfort level or baseline comfort level  Description: Interventions:  - Encourage patient to monitor pain and request assistance  - Assess pain using appropriate pain scale  - Administer analgesics based on type and severity of pain and evaluate response  - Implement non-pharmacological measures as appropriate and evaluate response  - Consider cultural and social influences on pain and pain management  - Notify physician/advanced practitioner if interventions unsuccessful or patient reports new pain  Outcome: Progressing     Problem: INFECTION - ADULT  Goal: Absence or prevention of progression during hospitalization  Description: INTERVENTIONS:  - Assess and monitor for signs and symptoms of infection  - Monitor lab/diagnostic results  - Monitor all insertion sites, i e  indwelling lines, tubes, and drains  - Monitor endotracheal if appropriate and nasal secretions for changes in amount and color  - [place] appropriate cooling/warming therapies per order  - Administer medications as ordered  - Instruct and encourage patient and family to use good hand hygiene technique  - Identify and instruct in appropriate isolation precautions for identified infection/condition  Outcome: Progressing     Problem: SAFETY ADULT  Goal: Patient will remain free of falls  Description: INTERVENTIONS:  - Educate patient/family on patient safety including physical limitations  - Instruct patient to call for assistance with activity   - Consult OT/PT to assist with strengthening/mobility   - Keep Call bell within reach  - Keep bed low and locked with side rails adjusted as appropriate  - Keep care items and personal belongings within reach  - Initiate and maintain comfort rounds  - Make Fall Risk Sign visible to staff  - Offer Toileting every 2 Hours, in advance of need  - Initiate/Maintain bed/chair alarm  - Obtain necessary fall risk management equipment: yellow socks applied, bed in lowest and locked position, call bell within reach  - Apply yellow socks and bracelet for high fall risk patients  - Consider moving patient to room near nurses station  Outcome: Progressing  Goal: Maintain or return to baseline ADL function  Description: INTERVENTIONS:  -  Assess patient's ability to carry out ADLs; assess patient's baseline for ADL function and identify physical deficits which impact ability to perform ADLs (bathing, care of mouth/teeth, toileting, grooming, dressing, etc )  - Assess/evaluate cause of self-care deficits   - Assess range of motion  - Assess patient's mobility; develop plan if impaired  - Assess patient's need for assistive devices and provide as appropriate  - Encourage maximum independence but intervene and supervise when necessary  - Involve family in performance of ADLs  - Assess for home care needs following discharge   - Consider OT consult to assist with ADL evaluation and planning for discharge  - Provide patient education as appropriate  Outcome: Progressing  Goal: Maintains/Returns to pre admission functional level  Description: INTERVENTIONS:  - Perform BMAT or MOVE assessment daily    - Set and communicate daily mobility goal to care team and patient/family/caregiver  - Collaborate with rehabilitation services on mobility goals if consulted  - Perform Range of Motion 3 times a day  - Reposition patient every 2 hours    - Dangle patient 3 times a day  - Stand patient 3 times a day  - Ambulate patient 3 times a day  - Out of bed to chair 3 times a day   - Out of bed for meals 3 times a day  - Out of bed for toileting  - Record patient progress and toleration of activity level   Outcome: Progressing     Problem: DISCHARGE PLANNING  Goal: Discharge to home or other facility with appropriate resources  Description: INTERVENTIONS:  - Identify barriers to discharge w/patient and caregiver  - Arrange for needed discharge resources and transportation as appropriate  - Identify discharge learning needs (meds, wound care, etc )  - Arrange for interpretive services to assist at discharge as needed  - Refer to Case Management Department for coordinating discharge planning if the patient needs post-hospital services based on physician/advanced practitioner order or complex needs related to functional status, cognitive ability, or social support system  Outcome: Progressing     Problem: Knowledge Deficit  Goal: Patient/family/caregiver demonstrates understanding of disease process, treatment plan, medications, and discharge instructions  Description: Complete learning assessment and assess knowledge base    Interventions:  - Provide teaching at level of understanding  - Provide teaching via preferred learning methods  Outcome: Progressing     Problem: METABOLIC, FLUID AND ELECTROLYTES - ADULT  Goal: Electrolytes maintained within normal limits  Description: INTERVENTIONS:  - Monitor labs and assess patient for signs and symptoms of electrolyte imbalances  - Administer electrolyte replacement as ordered  - Monitor response to electrolyte replacements, including repeat lab results as appropriate  - Instruct patient on fluid and nutrition as appropriate  Outcome: Progressing  Goal: Fluid balance maintained  Description: INTERVENTIONS:  - Monitor labs   - Monitor I/O and WT  - Instruct patient on fluid and nutrition as appropriate  - Assess for signs & symptoms of volume excess or deficit  Outcome: Progressing  Goal: Glucose maintained within target range  Description: INTERVENTIONS:  - Monitor Blood Glucose as ordered  - Assess for signs and symptoms of hyperglycemia and hypoglycemia  - Administer ordered medications to maintain glucose within target range  - Assess nutritional intake and initiate nutrition service referral as needed  Outcome: Progressing     Problem: HEMATOLOGIC - ADULT  Goal: Maintains hematologic stability  Description: INTERVENTIONS  - Assess for signs and symptoms of bleeding or hemorrhage  - Monitor labs  - Administer supportive blood products/factors as ordered and appropriate  Outcome: Progressing     Problem: MUSCULOSKELETAL - ADULT  Goal: Maintain or return mobility to safest level of function  Description: INTERVENTIONS:  - Assess patient's ability to carry out ADLs; assess patient's baseline for ADL function and identify physical deficits which impact ability to perform ADLs (bathing, care of mouth/teeth, toileting, grooming, dressing, etc )  - Assess/evaluate cause of self-care deficits   - Assess range of motion  - Assess patient's mobility  - Assess patient's need for assistive devices and provide as appropriate  - Encourage maximum independence but intervene and supervise when necessary  - Involve family in performance of ADLs  - Assess for home care needs following discharge   - Consider OT consult to assist with ADL evaluation and planning for discharge  - Provide patient education as appropriate  Outcome: Progressing  Goal: Maintain proper alignment of affected body part  Description: INTERVENTIONS:  - Support, maintain and protect limb and body alignment  - Provide patient/ family with appropriate education  Outcome: Progressing     Problem: Prexisting or High Potential for Compromised Skin Integrity  Goal: Skin integrity is maintained or improved  Description: INTERVENTIONS:  - Identify patients at risk for skin breakdown  - Assess and monitor skin integrity  - Assess and monitor nutrition and hydration status  - Monitor labs   - Assess for incontinence   - Turn and reposition patient  - Assist with mobility/ambulation  - Relieve pressure over bony prominences  - Avoid friction and shearing  - Provide appropriate hygiene as needed including keeping skin clean and dry  - Evaluate need for skin moisturizer/barrier cream  - Collaborate with interdisciplinary team   - Patient/family teaching  - Consider wound care consult   Outcome: Progressing Problem: Potential for Falls  Goal: Patient will remain free of falls  Description: INTERVENTIONS:  - Educate patient/family on patient safety including physical limitations  - Instruct patient to call for assistance with activity   - Consult OT/PT to assist with strengthening/mobility   - Keep Call bell within reach  - Keep bed low and locked with side rails adjusted as appropriate  - Keep care items and personal belongings within reach  - Initiate and maintain comfort rounds  - Make Fall Risk Sign visible to staff  - Offer Toileting every 2 Hours, in advance of need  - Initiate/Maintain bed/chair alarm  - Obtain necessary fall risk management equipment: yellow socks applied, bed in lowest and locked position, call bell within reach   - Apply yellow socks and bracelet for high fall risk patients  - Consider moving patient to room near nurses station  Outcome: Progressing     Problem: MOBILITY - ADULT  Goal: Maintain or return to baseline ADL function  Description: INTERVENTIONS:  -  Assess patient's ability to carry out ADLs; assess patient's baseline for ADL function and identify physical deficits which impact ability to perform ADLs (bathing, care of mouth/teeth, toileting, grooming, dressing, etc )  - Assess/evaluate cause of self-care deficits   - Assess range of motion  - Assess patient's mobility; develop plan if impaired  - Assess patient's need for assistive devices and provide as appropriate  - Encourage maximum independence but intervene and supervise when necessary  - Involve family in performance of ADLs  - Assess for home care needs following discharge   - Consider OT consult to assist with ADL evaluation and planning for discharge  - Provide patient education as appropriate  Outcome: Progressing  Goal: Maintains/Returns to pre admission functional level  Description: INTERVENTIONS:  - Perform BMAT or MOVE assessment daily    - Set and communicate daily mobility goal to care team and patient/family/caregiver  - Collaborate with rehabilitation services on mobility goals if consulted  - Perform Range of Motion 3 times a day  - Reposition patient every 2 hours  - Dangle patient 3 times a day  - Stand patient 3 times a day  - Ambulate patient 3 times a day  - Out of bed to chair 3 times a day   - Out of bed for meals 3 times a day  - Out of bed for toileting  - Record patient progress and toleration of activity level   Outcome: Progressing     Problem: Nutrition/Hydration-ADULT  Goal: Nutrient/Hydration intake appropriate for improving, restoring or maintaining nutritional needs  Description: Monitor and assess patient's nutrition/hydration status for malnutrition  Collaborate with interdisciplinary team and initiate plan and interventions as ordered  Monitor patient's weight and dietary intake as ordered or per policy  Utilize nutrition screening tool and intervene as necessary  Determine patient's food preferences and provide high-protein, high-caloric foods as appropriate       INTERVENTIONS:  - Monitor oral intake, urinary output, labs, and treatment plans  - Assess nutrition and hydration status and recommend course of action  - Evaluate amount of meals eaten  - Assist patient with eating if necessary   - Allow adequate time for meals  - Recommend/ encourage appropriate diets, oral nutritional supplements, and vitamin/mineral supplements  - Order, calculate, and assess calorie counts as needed  - Recommend, monitor, and adjust tube feedings and TPN/PPN based on assessed needs  - Assess need for intravenous fluids  - Provide specific nutrition/hydration education as appropriate  - Include patient/family/caregiver in decisions related to nutrition  Outcome: Progressing

## 2022-11-22 NOTE — NURSING NOTE
Pt D/C to [address] [place] [address]  Report called to [facility]  All questions and concerns addressed  Provided callback number  IV removed  D/S/D and pressure applied  All paperwork went with daughter, including pain med script, who is meeting pt at [address] [place] [address]

## 2022-11-22 NOTE — PLAN OF CARE
Problem: PAIN - ADULT  Goal: Verbalizes/displays adequate comfort level or baseline comfort level  Description: Interventions:  - Encourage patient to monitor pain and request assistance  - Assess pain using appropriate pain scale  - Administer analgesics based on type and severity of pain and evaluate response  - Implement non-pharmacological measures as appropriate and evaluate response  - Consider cultural and social influences on pain and pain management  - Notify physician/advanced practitioner if interventions unsuccessful or patient reports new pain  11/22/2022 1423 by Earl Lagunas RN  Outcome: Adequate for Discharge  11/22/2022 1230 by Earl Lagunas RN  Outcome: Progressing     Problem: INFECTION - ADULT  Goal: Absence or prevention of progression during hospitalization  Description: INTERVENTIONS:  - Assess and monitor for signs and symptoms of infection  - Monitor lab/diagnostic results  - Monitor all insertion sites, i e  indwelling lines, tubes, and drains  - Monitor endotracheal if appropriate and nasal secretions for changes in amount and color  - [place] appropriate cooling/warming therapies per order  - Administer medications as ordered  - Instruct and encourage patient and family to use good hand hygiene technique  - Identify and instruct in appropriate isolation precautions for identified infection/condition  11/22/2022 1423 by Earl Lagunas RN  Outcome: Adequate for Discharge  11/22/2022 1230 by Earl Lagunas RN  Outcome: Progressing     Problem: SAFETY ADULT  Goal: Patient will remain free of falls  Description: INTERVENTIONS:  - Educate patient/family on patient safety including physical limitations  - Instruct patient to call for assistance with activity   - Consult OT/PT to assist with strengthening/mobility   - Keep Call bell within reach  - Keep bed low and locked with side rails adjusted as appropriate  - Keep care items and personal belongings within reach  - Initiate and maintain comfort rounds  - Make Fall Risk Sign visible to staff  - Offer Toileting every 2 Hours, in advance of need  - Initiate/Maintain bed/chair alarm  - Obtain necessary fall risk management equipment: yellow socks applied, bed in lowest and locked position, call bell within reach  - Apply yellow socks and bracelet for high fall risk patients  - Consider moving patient to room near nurses station  11/22/2022 1423 by Artem Medrano RN  Outcome: Adequate for Discharge  11/22/2022 1230 by Artem Medrano RN  Outcome: Progressing  Goal: Maintain or return to baseline ADL function  Description: INTERVENTIONS:  -  Assess patient's ability to carry out ADLs; assess patient's baseline for ADL function and identify physical deficits which impact ability to perform ADLs (bathing, care of mouth/teeth, toileting, grooming, dressing, etc )  - Assess/evaluate cause of self-care deficits   - Assess range of motion  - Assess patient's mobility; develop plan if impaired  - Assess patient's need for assistive devices and provide as appropriate  - Encourage maximum independence but intervene and supervise when necessary  - Involve family in performance of ADLs  - Assess for home care needs following discharge   - Consider OT consult to assist with ADL evaluation and planning for discharge  - Provide patient education as appropriate  11/22/2022 1423 by Artem Medrano RN  Outcome: Adequate for Discharge  11/22/2022 1230 by Artem Medrano RN  Outcome: Progressing  Goal: Maintains/Returns to pre admission functional level  Description: INTERVENTIONS:  - Perform BMAT or MOVE assessment daily    - Set and communicate daily mobility goal to care team and patient/family/caregiver  - Collaborate with rehabilitation services on mobility goals if consulted  - Perform Range of Motion 3 times a day  - Reposition patient every 2 hours    - Dangle patient 3 times a day  - Stand patient 3 times a day  - Ambulate patient 3 times a day  - Out of bed to chair 3 times a day   - Out of bed for meals 3 times a day  - Out of bed for toileting  - Record patient progress and toleration of activity level   11/22/2022 1423 by Tereso Wilcox RN  Outcome: Adequate for Discharge  11/22/2022 1230 by Tereso Wilcox RN  Outcome: Progressing     Problem: DISCHARGE PLANNING  Goal: Discharge to home or other facility with appropriate resources  Description: INTERVENTIONS:  - Identify barriers to discharge w/patient and caregiver  - Arrange for needed discharge resources and transportation as appropriate  - Identify discharge learning needs (meds, wound care, etc )  - Arrange for interpretive services to assist at discharge as needed  - Refer to Case Management Department for coordinating discharge planning if the patient needs post-hospital services based on physician/advanced practitioner order or complex needs related to functional status, cognitive ability, or social support system  11/22/2022 1423 by Tereso Wilcox RN  Outcome: Adequate for Discharge  11/22/2022 1230 by Tereso Wilcox RN  Outcome: Progressing     Problem: Knowledge Deficit  Goal: Patient/family/caregiver demonstrates understanding of disease process, treatment plan, medications, and discharge instructions  Description: Complete learning assessment and assess knowledge base    Interventions:  - Provide teaching at level of understanding  - Provide teaching via preferred learning methods  11/22/2022 1423 by Tereso Wilocx RN  Outcome: Adequate for Discharge  11/22/2022 1230 by Tereso Wilcox RN  Outcome: Progressing     Problem: METABOLIC, FLUID AND ELECTROLYTES - ADULT  Goal: Electrolytes maintained within normal limits  Description: INTERVENTIONS:  - Monitor labs and assess patient for signs and symptoms of electrolyte imbalances  - Administer electrolyte replacement as ordered  - Monitor response to electrolyte replacements, including repeat lab results as appropriate  - Instruct patient on fluid and nutrition as appropriate  11/22/2022 1423 by Ofelia Dorsey RN  Outcome: Adequate for Discharge  11/22/2022 1230 by Ofelia Dorsey RN  Outcome: Progressing  Goal: Fluid balance maintained  Description: INTERVENTIONS:  - Monitor labs   - Monitor I/O and WT  - Instruct patient on fluid and nutrition as appropriate  - Assess for signs & symptoms of volume excess or deficit  11/22/2022 1423 by Ofelia Dorsey RN  Outcome: Adequate for Discharge  11/22/2022 1230 by Ofelia Dorsey RN  Outcome: Progressing  Goal: Glucose maintained within target range  Description: INTERVENTIONS:  - Monitor Blood Glucose as ordered  - Assess for signs and symptoms of hyperglycemia and hypoglycemia  - Administer ordered medications to maintain glucose within target range  - Assess nutritional intake and initiate nutrition service referral as needed  11/22/2022 1423 by Ofelia Dorsey RN  Outcome: Adequate for Discharge  11/22/2022 1230 by Ofelia Dorsey RN  Outcome: Progressing     Problem: HEMATOLOGIC - ADULT  Goal: Maintains hematologic stability  Description: INTERVENTIONS  - Assess for signs and symptoms of bleeding or hemorrhage  - Monitor labs  - Administer supportive blood products/factors as ordered and appropriate  11/22/2022 1423 by Ofelia Dorsey RN  Outcome: Adequate for Discharge  11/22/2022 1230 by Ofelia Dorsey RN  Outcome: Progressing     Problem: MUSCULOSKELETAL - ADULT  Goal: Maintain or return mobility to safest level of function  Description: INTERVENTIONS:  - Assess patient's ability to carry out ADLs; assess patient's baseline for ADL function and identify physical deficits which impact ability to perform ADLs (bathing, care of mouth/teeth, toileting, grooming, dressing, etc )  - Assess/evaluate cause of self-care deficits   - Assess range of motion  - Assess patient's mobility  - Assess patient's need for assistive devices and provide as appropriate  - Encourage maximum independence but intervene and supervise when necessary  - Involve family in performance of ADLs  - Assess for home care needs following discharge   - Consider OT consult to assist with ADL evaluation and planning for discharge  - Provide patient education as appropriate  11/22/2022 1423 by Lazara Leyva RN  Outcome: Adequate for Discharge  11/22/2022 1230 by Lazara Leyva RN  Outcome: Progressing  Goal: Maintain proper alignment of affected body part  Description: INTERVENTIONS:  - Support, maintain and protect limb and body alignment  - Provide patient/ family with appropriate education  11/22/2022 1423 by Lazara Leyva RN  Outcome: Adequate for Discharge  11/22/2022 1230 by Lazara Leyva RN  Outcome: Progressing     Problem: Prexisting or High Potential for Compromised Skin Integrity  Goal: Skin integrity is maintained or improved  Description: INTERVENTIONS:  - Identify patients at risk for skin breakdown  - Assess and monitor skin integrity  - Assess and monitor nutrition and hydration status  - Monitor labs   - Assess for incontinence   - Turn and reposition patient  - Assist with mobility/ambulation  - Relieve pressure over bony prominences  - Avoid friction and shearing  - Provide appropriate hygiene as needed including keeping skin clean and dry  - Evaluate need for skin moisturizer/barrier cream  - Collaborate with interdisciplinary team   - Patient/family teaching  - Consider wound care consult   11/22/2022 1423 by Lazara Leyva RN  Outcome: Adequate for Discharge  11/22/2022 1230 by Lazara Leyva RN  Outcome: Progressing     Problem: Potential for Falls  Goal: Patient will remain free of falls  Description: INTERVENTIONS:  - Educate patient/family on patient safety including physical limitations  - Instruct patient to call for assistance with activity   - Consult OT/PT to assist with strengthening/mobility   - Keep Call bell within reach  - Keep bed low and locked with side rails adjusted as appropriate  - Keep care items and personal belongings within reach  - Initiate and maintain comfort rounds  - Make Fall Risk Sign visible to staff  - Offer Toileting every 2 Hours, in advance of need  - Initiate/Maintain bed/chair alarm  - Obtain necessary fall risk management equipment: yellow socks applied, bed in lowest and locked position  - Apply yellow socks and bracelet for high fall risk patients  - Consider moving patient to room near nurses station  11/22/2022 1423 by Ofelia Dorsey RN  Outcome: Adequate for Discharge  11/22/2022 1230 by Ofelia Dorsey RN  Outcome: Progressing     Problem: MOBILITY - ADULT  Goal: Maintain or return to baseline ADL function  Description: INTERVENTIONS:  -  Assess patient's ability to carry out ADLs; assess patient's baseline for ADL function and identify physical deficits which impact ability to perform ADLs (bathing, care of mouth/teeth, toileting, grooming, dressing, etc )  - Assess/evaluate cause of self-care deficits   - Assess range of motion  - Assess patient's mobility; develop plan if impaired  - Assess patient's need for assistive devices and provide as appropriate  - Encourage maximum independence but intervene and supervise when necessary  - Involve family in performance of ADLs  - Assess for home care needs following discharge   - Consider OT consult to assist with ADL evaluation and planning for discharge  - Provide patient education as appropriate  11/22/2022 1423 by Ofelia Dorsey RN  Outcome: Adequate for Discharge  11/22/2022 1230 by Ofelia Dorsey RN  Outcome: Progressing  Goal: Maintains/Returns to pre admission functional level  Description: INTERVENTIONS:  - Perform BMAT or MOVE assessment daily    - Set and communicate daily mobility goal to care team and patient/family/caregiver  - Collaborate with rehabilitation services on mobility goals if consulted  - Perform Range of Motion 3 times a day    - Reposition patient every 2 hours  - Dangle patient 3 times a day  - Stand patient 3 times a day  - Ambulate patient 3 times a day  - Out of bed to chair 3 times a day   - Out of bed for meals 3 times a day  - Out of bed for toileting  - Record patient progress and toleration of activity level   11/22/2022 1423 by Lupillo Black RN  Outcome: Adequate for Discharge  11/22/2022 1230 by Lupillo Black RN  Outcome: Progressing     Problem: Nutrition/Hydration-ADULT  Goal: Nutrient/Hydration intake appropriate for improving, restoring or maintaining nutritional needs  Description: Monitor and assess patient's nutrition/hydration status for malnutrition  Collaborate with interdisciplinary team and initiate plan and interventions as ordered  Monitor patient's weight and dietary intake as ordered or per policy  Utilize nutrition screening tool and intervene as necessary  Determine patient's food preferences and provide high-protein, high-caloric foods as appropriate       INTERVENTIONS:  - Monitor oral intake, urinary output, labs, and treatment plans  - Assess nutrition and hydration status and recommend course of action  - Evaluate amount of meals eaten  - Assist patient with eating if necessary   - Allow adequate time for meals  - Recommend/ encourage appropriate diets, oral nutritional supplements, and vitamin/mineral supplements  - Order, calculate, and assess calorie counts as needed  - Recommend, monitor, and adjust tube feedings and TPN/PPN based on assessed needs  - Assess need for intravenous fluids  - Provide specific nutrition/hydration education as appropriate  - Include patient/family/caregiver in decisions related to nutrition  11/22/2022 1423 by Lupillo Black RN  Outcome: Adequate for Discharge  11/22/2022 1230 by Lupillo Black RN  Outcome: Progressing

## 2022-11-22 NOTE — PLAN OF CARE
Problem: SAFETY ADULT  Goal: Patient will remain free of falls  Description: INTERVENTIONS:  - Educate patient/family on patient safety including physical limitations  - Instruct patient to call for assistance with activity   - Consult OT/PT to assist with strengthening/mobility   - Keep Call bell within reach  - Keep bed low and locked with side rails adjusted as appropriate  - Keep care items and personal belongings within reach  - Initiate and maintain comfort rounds  - Make Fall Risk Sign visible to staff  - Offer Toileting every 2 Hours, in advance of need  - Initiate/Maintain bed alarm  - Apply yellow socks and bracelet for high fall risk patients  - Consider moving patient to room near nurses station  Outcome: Progressing     Problem: SAFETY ADULT  Goal: Maintain or return to baseline ADL function  Description: INTERVENTIONS:  -  Assess patient's ability to carry out ADLs; assess patient's baseline for ADL function and identify physical deficits which impact ability to perform ADLs (bathing, care of mouth/teeth, toileting, grooming, dressing, etc )  - Assess/evaluate cause of self-care deficits   - Assess range of motion  - Assess patient's mobility; develop plan if impaired  - Assess patient's need for assistive devices and provide as appropriate  - Encourage maximum independence but intervene and supervise when necessary  - Involve family in performance of ADLs  - Assess for home care needs following discharge   - Consider OT consult to assist with ADL evaluation and planning for discharge  - Provide patient education as appropriate  Outcome: Progressing     Problem: SAFETY ADULT  Goal: Maintains/Returns to pre admission functional level  Description: INTERVENTIONS:  - Perform BMAT or MOVE assessment daily    - Set and communicate daily mobility goal to care team and patient/family/caregiver     - Collaborate with rehabilitation services on mobility goals if consulted  - Ambulate patient 2-3 times a day  - Out of bed to chair 2-3 times a day   - Out of bed for meals 2-3 times a day  - Out of bed for toileting  - Record patient progress and toleration of activity level   Outcome: Progressing

## 2022-11-22 NOTE — DISCHARGE SUMMARY
Discharge Summary - Noe Luna 81 y o  female MRN: [id number]    Unit/Bed#: -01 Encounter: [id number]    Admission Date:   Admission Orders (From admission, onward)     Ordered        11/17/22 1635  Inpatient Admission  Once                        Admitting Diagnosis: Primary osteoarthritis of right knee [M17 11]    Procedures Performed:  Elective right total knee replacement    Summary of Hospital Course:     28-year-old female presented to the hospital for an elective right total knee replacement  The patient tolerated the procedure quite well  She began working with Physical therapy and Occupational therapy weight-bearing as tolerated  On postop day 1, she was placed on Arixtra for DVT prophylaxis  On postop day 2, her leg drain was removed and her dressing was changed  Her incision was clean, dry and intact  The patient continued to work with therapy, however, it was deemed she would benefit from a discharge to a SNF for recovery  The patient's discharge  to an SNF was delayed secondary to insurance approval   On 11/22/2022, she was finally approved from her insurance for discharge to [facility] [address]  The patient was discharged with pain medication, vitamins, DVT prophylaxis; and also an elevated commode and walker if necessary  The patient was instructed to paint incision with betadine two times per day  Discharge Diagnosis:  Status post elective right total knee replacement, primary osteoarthritis right knee      Condition at Discharge: stable         Discharge instructions/Information to patient and family:   See after visit summary for information provided to patient and family  Provisions for Follow-Up Care:  See after visit summary for information related to follow-up care and any pertinent home health orders        PCP: Li Pelletier PA-C    Disposition: Skilled nursing facility at [address]    Planned Readmission: No      Discharge Statement   I spent 30 minutes discharging the patient  This time was spent on the day of discharge  I had direct contact with the patient on the day of discharge  Additional documentation is required if more than 30 minutes were spent on discharge  Discharge Medications:  See after visit summary for reconciled discharge medications provided to patient and family

## 2022-11-22 NOTE — PROGRESS NOTES
Progress Note - Orthopedics   Lakshmi Soares 81 y o  female MRN: [id number]  Unit/Bed#: -01 Encounter: [id number]    Assessment:  POD 6 s/p right total knee replacement    Plan:  Continue physical therapy and occupational therapy weight-bearing as tolerated  Discharge today to [facility]  Follow-up in 2 weeks with Dr Louie Brooks for staple removal and new x-rays  Arixtra for DVT prophylaxis    Weight bearing:  Weight-bearing as tolerated right lower extremity    VTE Pharmacologic Prophylaxis: Fondaparinux (Arixtra)  VTE Mechanical Prophylaxis: sequential compression device    Subjective: The patient is resting comfortably in bed  Her pain is improved  Vitals: Blood pressure 141/80, pulse (!) 107, temperature 98 2 °F (36 8 °C), resp  rate 18, height 4' 9" (1 448 m), weight 85 7 kg (189 lb), SpO2 95 %, not currently breastfeeding  ,Body mass index is 40 9 kg/m²  Intake/Output Summary (Last 24 hours) at 11/22/2022 0902  Last data filed at 11/22/2022 0649  Gross per 24 hour   Intake 180 ml   Output 1000 ml   Net -820 ml       Invasive Devices     Peripheral Intravenous Line  Duration           Peripheral IV 11/16/22 Left Hand 6 days    Peripheral IV 11/21/22 Dorsal (posterior); Left Wrist 1 day          Drain  Duration           Autologus Reinfusion/Drain Device Right Knee 5 days    External Urinary Catheter 4 days                Physical Exam:  Right lower extremity is neurovascularly intact  Toes are pink and mobile  Compartments are soft  Incision is clean, dry and intact  Brisk cap refill  Sensation intact  Negative Homans    Lab, Imaging and other studies: CBC: No results found for: WBC, HGB, HCT, MCV, PLT, ADJUSTEDWBC, MCH, MCHC, RDW, MPV, NRBC  CMP: No results found for: SODIUM, CL, CO2, ANIONGAP, BUN, CREATININE, GLUCOSE, CALCIUM, AST, ALT, ALKPHOS, PROT, BILITOT, EGFR

## 2022-11-23 ENCOUNTER — TRANSITIONAL CARE MANAGEMENT (OUTPATIENT)
Dept: INTERNAL MEDICINE CLINIC | Facility: CLINIC | Age: 72
End: 2022-11-23

## 2022-11-23 NOTE — UTILIZATION REVIEW
NOTIFICATION OF ADMISSION DISCHARGE   This is a Notification of Discharge from [address]  Please be advised that this patient has been discharge from our facility  Below you will find the admission and discharge date and time including the patient’s disposition  UTILIZATION REVIEW CONTACT:  Brett Paul  Utilization   Network Utilization Review Department  Phone: [phone] carefully listen to the prompts  All voicemails are confidential   Email: [email or web address]     ADMISSION INFORMATION  PRESENTATION DATE: 11/16/2022  6:26 AM  OBERVATION ADMISSION DATE:   INPATIENT ADMISSION DATE: 11/17/22  4:35 PM   DISCHARGE DATE: 11/22/2022  2:45 PM   DISPOSITION:Released to SNF/TCU/SNU Facility    IMPORTANT INFORMATION:  Send all requests for admission clinical reviews, approved or denied determinations and any other requests to dedicated fax number below belonging to the campus where the patient is receiving treatment   List of dedicated fax numbers:  [address] DENIALS (Administrative/Medical Necessity) [phone]   [address] (Maternity/NICU/Pediatrics) [phone]   [facility] [phone]   [place] [address] [phone]   [address] [phone]   [address] [facility] [phone] 90 [facility] [phone]   [address] [place] [address] [phone]   [facility]  [phone]   [address] [facility] [phone] [address] [facility] [address] [facility] [phone]

## 2022-11-28 NOTE — PROGRESS NOTES
I called and spoke to her daughter-she will call us to schedule a MARVIN appt after her mother is discharged from rehab-she has a scheduled appt 1-20-23 but this will probably be changed  Her daughter is aware of this   js

## 2022-12-06 ENCOUNTER — OFFICE VISIT (OUTPATIENT)
Dept: OBGYN CLINIC | Facility: CLINIC | Age: 72
End: 2022-12-06

## 2022-12-06 ENCOUNTER — APPOINTMENT (OUTPATIENT)
Dept: RADIOLOGY | Facility: CLINIC | Age: 72
End: 2022-12-06

## 2022-12-06 VITALS
DIASTOLIC BLOOD PRESSURE: 66 MMHG | HEART RATE: 109 BPM | HEIGHT: 57 IN | SYSTOLIC BLOOD PRESSURE: 96 MMHG | BODY MASS INDEX: 40.9 KG/M2

## 2022-12-06 DIAGNOSIS — Z96.651 S/P TOTAL KNEE REPLACEMENT, RIGHT: Primary | ICD-10-CM

## 2022-12-06 DIAGNOSIS — Z96.651 S/P TOTAL KNEE REPLACEMENT, RIGHT: ICD-10-CM

## 2022-12-06 NOTE — PROGRESS NOTES
Assessment:   Diagnosis ICD-10-CM Associated Orders   1  S/P total knee replacement, right  Z96 [id number] XR knee 3 vw right non injury          Plan:  Reviewed today's physical exam findings and x-ray findings with patient at time of visit  X-Ray of right knee taken on 12/06/2022 were reviewed and showed well-seated total knee prosthesis with maintained anatomical alignment and no signs of loosening  The patient is 3 weeks s/p right total knee arthroplasty  Staples were removed, patient tolerated the procedure well  Incisions are healing well with no signs of drainage  Begin formal physical therapy to improve ROM and Strength  Continue taking Aspirin 325 for 4 weeks for DVT prophylaxis  She will be seen for follow-up 4 weeks for re-evaluation and repeat x-rays, injections  Patient expresses understanding and is in agreement with this treatment plan  The patient was given the opportunity to ask questions or present concerns  The patient is doing quite well from a right total knee replacement  Flexion past 95°  No instability  Incision clean dry  She was instructed to start taking aspirin  She was instructed to call physical therapy to start rehabilitation  Return back in 1 month for re-evaluation      To do next visit:  Return in about 4 weeks (around 1/3/2023) for Recheck  The above stated was discussed in layman's terms and the patient expressed understanding  All questions were answered to the patient's satisfaction  Scribe Attestation    I,:  Humberto Meyer am acting as a scribe while in the presence of the attending physician :       I,:  Michelle Brown DO personally performed the services described in this documentation    as scribed in my presence :             Subjective:   Shilpa Echols is a 81 y o  female who presents today for a post-operative evaluation of her right knee  The patient is 3 weeks s/p right total knee arthroplasty   The patient is doing well today with slight improvement of ROM  Pain is well-controlled  Incisions are healing well with no signs of drainage  Patients presents today using a Wheelchair for ambulatory assistance  She denies any recent bruising, numbness, paresthesias, weakness, or feelings of instability  She denies any fevers, chills, dizziness, headaches, chest pain, shortness of breath, palpitations, abdominal pain, nausea, vomiting, diarrhea, lower extremity pain/swelling/edema  Review of systems negative unless otherwise specified in HPI  Review of Systems   Constitutional: Negative for chills and fever  HENT: Negative for ear pain and sore throat  Eyes: Negative for pain and visual disturbance  Respiratory: Negative for cough and shortness of breath  Cardiovascular: Negative for chest pain and palpitations  Gastrointestinal: Negative for abdominal pain and vomiting  Genitourinary: Negative for dysuria and hematuria  Musculoskeletal: Negative for arthralgias and back pain  Skin: Negative for color change and rash  Neurological: Negative for seizures and syncope  All other systems reviewed and are negative        Past Medical History:   Diagnosis Date   • Anxiety    • Arthritis    • Asthma    • Breast cancer ([facility] [address] )     rt mastectomy 2005   • Cancer [facility])     breast right   • Cataract    • CHF (congestive heart failure) (HCC)    • Chronic headaches    • Concussion     At age 7; Chronic headaches since then   • Coronary artery disease    • Depression    • Dietary lactose intolerance    • GERD (gastroesophageal reflux disease)    • High cholesterol    • History of chemotherapy     2005 rt breast cancer   • History of transfusion    • Hypertension    • IBS (irritable bowel syndrome)    • Irritable bowel syndrome (IBS) 05/31/2016   • Kidney stone    • Lymphedema of right arm    • Obesity    • PFO (patent foramen ovale)    • PONV (postoperative nausea and vomiting)    • Post-menopausal    • Psychiatric disorder    • Renal disorder    • Shortness of breath    • Vitamin D deficiency        Past Surgical History:   Procedure Laterality Date   • BREAST SURGERY     • CATARACT EXTRACTION Bilateral    • CATARACT EXTRACTION, BILATERAL     • CHOLECYSTECTOMY     • COLONOSCOPY N/A 05/31/2016    Procedure: COLONOSCOPY;  Surgeon: Chanda Patel MD;  Location: [facility] MAIN OR;  Service:    • GALLBLADDER SURGERY     • HYSTERECTOMY      Total   • JOINT REPLACEMENT     • KNEE ARTHROSCOPY     • KNEE SURGERY      arthroscopy   • MASTECTOMY Right     rt breast mastectomy 2005   • CO TOTAL KNEE ARTHROPLASTY Left 05/20/2021    Procedure: ARTHROPLASTY KNEE TOTAL;  Surgeon: Marcin Torres MD;  Location: [facility] MAIN OR;  Service: Orthopedics   • CO TOTAL KNEE ARTHROPLASTY Right 11/16/2022    Procedure: ARTHROPLASTY KNEE TOTAL;  Surgeon: Blu Younger DO;  Location: [facility] MA[facility];  Service: Orthopedics   • TONSILLECTOMY AND ADENOIDECTOMY         Family History   Adopted: Yes   Problem Relation Age of Onset   • Diabetes Mother    • Heart disease Mother    • Mental illness Mother    • Depression Mother    • Heart disease Brother    • Breast cancer Maternal Aunt    • Breast cancer Maternal Aunt    • Breast cancer Maternal Aunt    • Breast cancer Maternal Aunt    • Breast cancer Maternal Aunt    • No Known Problems Father    • No Known Problems Sister    • No Known Problems Daughter    • Breast cancer Maternal Grandmother    • No Known Problems Sister    • No Known Problems Sister    • No Known Problems Sister        Social History     Occupational History   • Occupation: Retired   Tobacco Use   • Smoking status: Never   • Smokeless tobacco: Never   Vaping Use   • Vaping Use: Never used   Substance and Sexual Activity   • Alcohol use:  Yes     Alcohol/week: 1 0 standard drink     Types: 1 Glasses of wine per week     Comment: socially   • Drug use: Not Currently     Types: Marijuana     Comment: In her youth   • Sexual activity: Not Currently         Current Outpatient Medications:   •  acetaminophen (TYLENOL) 650 mg CR tablet, Take 1 tablet (650 mg total) by mouth every 8 (eight) hours as needed for mild pain, Disp: 30 tablet, Rfl: 0  •  albuterol (2 5 mg/3 mL) 0 083 % nebulizer solution, Take 2 5 mg by nebulization every 6 (six) hours as needed for wheezing , Disp: , Rfl:   •  albuterol (PROAIR HFA) 90 mcg/act inhaler, Inhale 2 puffs every 4 (four) hours as needed for wheezing, Disp: 3 Inhaler, Rfl: 1  •  ascorbic acid (VITAMIN C) 500 MG tablet, Take 1 tablet (500 mg total) by mouth daily, Disp: 30 tablet, Rfl: 1  •  atorvastatin (LIPITOR) 20 mg tablet, Take 1 tablet by mouth daily  , Disp: 30 tablet, Rfl: 0  •  buPROPion (WELLBUTRIN XL) 300 mg 24 hr tablet, Take 1 tablet (300 mg total) by mouth every morning, Disp: 90 tablet, Rfl: 3  •  butalbital-acetaminophen-caffeine (FIORICET,ESGIC) -40 mg per tablet, Take 1 tablet by mouth 3 (three) times a day as needed for headaches, Disp: 90 tablet, Rfl: 1  •  docusate sodium (COLACE) 100 mg capsule, Take 1 capsule (100 mg total) by mouth 2 (two) times a day, Disp: 60 capsule, Rfl: 0  •  DULoxetine (CYMBALTA) 60 mg delayed release capsule, Take 1 capsule by mouth twice daily  , Disp: 180 capsule, Rfl: 0  •  ergocalciferol (VITAMIN D2) 50,000 units, Take 1 capsule (50,000 Units total) by mouth once a week, Disp: 4 capsule, Rfl: 3  •  ferrous sulfate 324 (65 Fe) mg, Take 1 tablet (324 mg total) by mouth 2 (two) times a day before meals, Disp: 60 tablet, Rfl: 1  •  folic acid (FOLVITE) 1 mg tablet, Take 1 tablet (1 mg total) by mouth daily, Disp: 30 tablet, Rfl: 1  •  fondaparinux (ARIXTRA) 2 5 mg/0 5 mL, Inject 2 5 mg under the skin daily for 14 days Do not start before November 23, 2022 , Disp: , Rfl: 0  •  linaCLOtide (Linzess) 145 MCG CAPS, Take 1 capsule (145 mcg total) by mouth in the morning, Disp: 30 capsule, Rfl: 2  •  lisinopril (ZESTRIL) 5 mg tablet, Take 1 tablet (5 mg total) by mouth daily Do not start before November 23, 2022 , Disp: , Rfl: 0  • loratadine (CLARITIN) 10 mg tablet, Take 10 mg by mouth daily, Disp: , Rfl:   •  Melatonin 10 MG TABS, Take by mouth, Disp: , Rfl:   •  Multiple Vitamins-Minerals (multivitamin with minerals) tablet, Take 1 tablet by mouth daily, Disp: 30 tablet, Rfl: 1  •  Nutritional Supplements (BOOST BREEZE PO), Take by mouth daily, Disp: , Rfl:   •  oxybutynin (DITROPAN-XL) 10 MG 24 hr tablet, Take 1 tablet (10 mg total) by mouth daily at bedtime, Disp: 30 tablet, Rfl: 12    Allergies   Allergen Reactions   • Ibuprofen Nausea Only, Rash, Dizziness and Syncope   • Morphine Other (See Comments) and Hallucinations     Intolerance   • Latex Dermatitis          Vitals:    12/06/22 0758   BP: 96/66   Pulse: (!) 109       Objective:                    Ortho Exam  right knee(s) -   Patient ambulates with antalgic gait pattern  Uses Wheelchair assistive device  No anatomical deformity  Skin is warm and dry to touch with no signs of erythema, ecchymosis, or infection  No soft tissue swelling or effusion noted  ROM (5° - 95°)  MMT: deferred  No tenderness to palpation on exam  Flexor and extensor mechanisms are intact   Knee is stable to varus and valgus stress  - Lachman's  - Anterior Drawer, - Posterior Drawer  - Medial Misty's, - Lateral Misty's  - Pivot Shift  Patella tracks centrally without palpable crepitus  Calf compartments are soft and supple  - Mary's sign  2+ DP and PT pulses with brisk capillary refill to the toes  Sural, saphenous, tibial, superficial, and deep peroneal motor and sensory distributions intact  Sensation light touch intact distally    Diagnostics, reviewed and taken today if performed as documented: The attending physician has personally reviewed the pertinent films in PACS and interpretation is as follows:    X-Ray of right knee taken on 12/06/2022 were reviewed and showed well-seated total knee prosthesis with maintained anatomical alignment and no signs of loosening      Procedures, if performed today:    None performed    Portions of the record may have been created with voice recognition software  Occasional wrong word or "sound a like" substitutions may have occurred due to the inherent limitations of voice recognition software  Read the chart carefully and recognize, using context, where substitutions have occurred

## 2022-12-09 DIAGNOSIS — E78.5 HYPERLIPIDEMIA, UNSPECIFIED HYPERLIPIDEMIA TYPE: ICD-10-CM

## 2022-12-09 RX ORDER — ATORVASTATIN CALCIUM 20 MG/1
TABLET, FILM COATED ORAL
Qty: 30 TABLET | Refills: 0 | Status: SHIPPED | OUTPATIENT
Start: 2022-12-09

## 2022-12-13 ENCOUNTER — TELEPHONE (OUTPATIENT)
Dept: OBGYN CLINIC | Facility: HOSPITAL | Age: 72
End: 2022-12-13

## 2022-12-13 DIAGNOSIS — Z96.651 S/P TOTAL KNEE REPLACEMENT, RIGHT: Primary | ICD-10-CM

## 2022-12-13 NOTE — TELEPHONE ENCOUNTER
Caller: [facility]    Doctor: Chelsea Sánchez    Reason for call: wanted to know if Dr Chelsea Sánchez does PT, advised he is the surgeon, patient would have to schedule with a physical therapist    Call back#: n/a

## 2022-12-13 NOTE — TELEPHONE ENCOUNTER
Caller: Deuce // [address] [place] [address]     Doctor/Office: Ijeoma Michael   CB : [phone]    Auth required for: PT // knee surgery     Company: [address] [place] [address]     Auth/Claim#:     Fax # : [phone]    Authorized Dates:

## 2022-12-14 ENCOUNTER — TELEPHONE (OUTPATIENT)
Dept: OBGYN CLINIC | Facility: HOSPITAL | Age: 72
End: 2022-12-14

## 2022-12-14 NOTE — TELEPHONE ENCOUNTER
Caller: A[facility]    Doctor: ovidio    Reason for call: Patient contacted [facility] to schedule PT. Stated her insurance provided her with [facility].  Please fax orders to [phone]    Call back#: [phone]

## 2022-12-21 NOTE — TELEPHONE ENCOUNTER
Caller: [facility]    Doctor: Sandeep Guajardo    Reason for call: No[facility] is stating they did not receive the fax.  Please refax to [phone]    Call back#: [phone]

## 2022-12-24 ENCOUNTER — HOSPITAL ENCOUNTER (INPATIENT)
Facility: HOSPITAL | Age: 72
LOS: 3 days | Discharge: HOME | End: 2022-12-28
Attending: EMERGENCY MEDICINE | Admitting: STUDENT IN AN ORGANIZED HEALTH CARE EDUCATION/TRAINING PROGRAM

## 2022-12-24 ENCOUNTER — APPOINTMENT (EMERGENCY)
Dept: RADIOLOGY | Facility: HOSPITAL | Age: 72
End: 2022-12-24

## 2022-12-24 ENCOUNTER — APPOINTMENT (EMERGENCY)
Dept: CT IMAGING | Facility: HOSPITAL | Age: 72
End: 2022-12-24

## 2022-12-24 DIAGNOSIS — R07.89 OTHER CHEST PAIN: ICD-10-CM

## 2022-12-24 DIAGNOSIS — I16.0 HYPERTENSIVE URGENCY: ICD-10-CM

## 2022-12-24 DIAGNOSIS — L74.0 HEAT RASH: ICD-10-CM

## 2022-12-24 DIAGNOSIS — R07.89 ATYPICAL CHEST PAIN: Primary | ICD-10-CM

## 2022-12-24 LAB
2HR DELTA HS TROPONIN: -3 NG/L
ALBUMIN SERPL BCP-MCNC: 3.8 G/DL (ref 3.5–5)
ALP SERPL-CCNC: 128 U/L (ref 34–104)
ALT SERPL W P-5'-P-CCNC: 16 U/L (ref 7–52)
ANION GAP SERPL CALCULATED.3IONS-SCNC: 9 MMOL/L (ref 4–13)
APTT PPP: 28 SECONDS (ref 23–37)
AST SERPL W P-5'-P-CCNC: 21 U/L (ref 13–39)
BASOPHILS # BLD AUTO: 0.04 THOUSANDS/ÂΜL (ref 0–0.1)
BASOPHILS NFR BLD AUTO: 1 % (ref 0–1)
BILIRUB SERPL-MCNC: 0.71 MG/DL (ref 0.2–1)
BILIRUB UR QL STRIP: NEGATIVE
BUN SERPL-MCNC: 9 MG/DL (ref 5–25)
CALCIUM SERPL-MCNC: 9.5 MG/DL (ref 8.4–10.2)
CARDIAC TROPONIN I PNL SERPL HS: 34 NG/L
CARDIAC TROPONIN I PNL SERPL HS: 37 NG/L
CHLORIDE SERPL-SCNC: 100 MMOL/L (ref 96–108)
CLARITY UR: CLEAR
CO2 SERPL-SCNC: 28 MMOL/L (ref 21–32)
COLOR UR: YELLOW
CREAT SERPL-MCNC: 0.56 MG/DL (ref 0.6–1.3)
EOSINOPHIL # BLD AUTO: 0.03 THOUSAND/ÂΜL (ref 0–0.61)
EOSINOPHIL NFR BLD AUTO: 0 % (ref 0–6)
ERYTHROCYTE [DISTWIDTH] IN BLOOD BY AUTOMATED COUNT: 12.2 % (ref 11.6–15.1)
GFR SERPL CREATININE-BSD FRML MDRD: 94 ML/MIN/1.73SQ M
GLUCOSE SERPL-MCNC: 141 MG/DL (ref 65–140)
GLUCOSE UR STRIP-MCNC: NEGATIVE MG/DL
HCT VFR BLD AUTO: 47.5 % (ref 34.8–46.1)
HGB BLD-MCNC: 15.6 G/DL (ref 11.5–15.4)
HGB UR QL STRIP.AUTO: NEGATIVE
IMM GRANULOCYTES # BLD AUTO: 0.03 THOUSAND/UL (ref 0–0.2)
IMM GRANULOCYTES NFR BLD AUTO: 0 % (ref 0–2)
INR PPP: 1.06 (ref 0.84–1.19)
KETONES UR STRIP-MCNC: ABNORMAL MG/DL
LEUKOCYTE ESTERASE UR QL STRIP: NEGATIVE
LIPASE SERPL-CCNC: <6 U/L (ref 11–82)
LYMPHOCYTES # BLD AUTO: 1.17 THOUSANDS/ÂΜL (ref 0.6–4.47)
LYMPHOCYTES NFR BLD AUTO: 17 % (ref 14–44)
MCH RBC QN AUTO: 30.1 PG (ref 26.8–34.3)
MCHC RBC AUTO-ENTMCNC: 32.8 G/DL (ref 31.4–37.4)
MCV RBC AUTO: 92 FL (ref 82–98)
MONOCYTES # BLD AUTO: 0.77 THOUSAND/ÂΜL (ref 0.17–1.22)
MONOCYTES NFR BLD AUTO: 11 % (ref 4–12)
NEUTROPHILS # BLD AUTO: 4.86 THOUSANDS/ÂΜL (ref 1.85–7.62)
NEUTS SEG NFR BLD AUTO: 71 % (ref 43–75)
NITRITE UR QL STRIP: NEGATIVE
NRBC BLD AUTO-RTO: 0 /100 WBCS
PH UR STRIP.AUTO: 6 [PH]
PLATELET # BLD AUTO: 257 THOUSANDS/UL (ref 149–390)
PMV BLD AUTO: 9.1 FL (ref 8.9–12.7)
POTASSIUM SERPL-SCNC: 3.8 MMOL/L (ref 3.5–5.3)
PROT SERPL-MCNC: 7 G/DL (ref 6.4–8.4)
PROT UR STRIP-MCNC: NEGATIVE MG/DL
PROTHROMBIN TIME: 13.8 SECONDS (ref 11.6–14.5)
RBC # BLD AUTO: 5.19 MILLION/UL (ref 3.81–5.12)
SODIUM SERPL-SCNC: 137 MMOL/L (ref 135–147)
SP GR UR STRIP.AUTO: 1.02
UROBILINOGEN UR QL STRIP.AUTO: 0.2 E.U./DL
WBC # BLD AUTO: 6.9 THOUSAND/UL (ref 4.31–10.16)

## 2022-12-24 RX ORDER — ONDANSETRON 2 MG/ML
4 INJECTION INTRAMUSCULAR; INTRAVENOUS ONCE
Status: COMPLETED | OUTPATIENT
Start: 2022-12-24 | End: 2022-12-24

## 2022-12-24 RX ORDER — MAGNESIUM HYDROXIDE/ALUMINUM HYDROXICE/SIMETHICONE 120; 1200; 1200 MG/30ML; MG/30ML; MG/30ML
30 SUSPENSION ORAL ONCE
Status: COMPLETED | OUTPATIENT
Start: 2022-12-24 | End: 2022-12-24

## 2022-12-24 RX ORDER — ASPIRIN 81 MG/1
324 TABLET, CHEWABLE ORAL ONCE
Status: COMPLETED | OUTPATIENT
Start: 2022-12-25 | End: 2022-12-25

## 2022-12-24 RX ORDER — HYDROMORPHONE HCL IN WATER/PF 6 MG/30 ML
0.2 PATIENT CONTROLLED ANALGESIA SYRINGE INTRAVENOUS ONCE
Status: COMPLETED | OUTPATIENT
Start: 2022-12-24 | End: 2022-12-24

## 2022-12-24 RX ORDER — LIDOCAINE HYDROCHLORIDE 20 MG/ML
15 SOLUTION OROPHARYNGEAL ONCE
Status: COMPLETED | OUTPATIENT
Start: 2022-12-24 | End: 2022-12-24

## 2022-12-24 RX ORDER — HYDROMORPHONE HCL/PF 1 MG/ML
0.5 SYRINGE (ML) INJECTION ONCE
Status: COMPLETED | OUTPATIENT
Start: 2022-12-24 | End: 2022-12-24

## 2022-12-24 RX ADMIN — LIDOCAINE HYDROCHLORIDE 15 ML: 20 SOLUTION ORAL at 22:55

## 2022-12-24 RX ADMIN — ONDANSETRON 4 MG: 2 INJECTION INTRAMUSCULAR; INTRAVENOUS at 21:56

## 2022-12-24 RX ADMIN — ALUMINUM HYDROXIDE, MAGNESIUM HYDROXIDE, AND SIMETHICONE 30 ML: 200; 200; 20 SUSPENSION ORAL at 22:55

## 2022-12-24 RX ADMIN — IOHEXOL 100 ML: 350 INJECTION, SOLUTION INTRAVENOUS at 21:58

## 2022-12-24 RX ADMIN — HYDROMORPHONE HYDROCHLORIDE 0.5 MG: 1 INJECTION, SOLUTION INTRAMUSCULAR; INTRAVENOUS; SUBCUTANEOUS at 21:06

## 2022-12-24 RX ADMIN — SODIUM CHLORIDE 1000 ML: 0.9 INJECTION, SOLUTION INTRAVENOUS at 21:07

## 2022-12-24 RX ADMIN — HYDROMORPHONE HYDROCHLORIDE 0.2 MG: 0.2 INJECTION, SOLUTION INTRAMUSCULAR; INTRAVENOUS; SUBCUTANEOUS at 22:55

## 2022-12-25 PROBLEM — I35.0 NONRHEUMATIC AORTIC (VALVE) STENOSIS: Status: ACTIVE | Noted: 2022-12-25

## 2022-12-25 PROBLEM — N39.46 MIXED STRESS AND URGE URINARY INCONTINENCE: Chronic | Status: ACTIVE | Noted: 2022-10-10

## 2022-12-25 PROBLEM — K52.9 COLITIS: Status: RESOLVED | Noted: 2022-11-16 | Resolved: 2022-12-25

## 2022-12-25 PROBLEM — M81.0 AGE-RELATED OSTEOPOROSIS WITHOUT CURRENT PATHOLOGICAL FRACTURE: Status: RESOLVED | Noted: 2021-06-09 | Resolved: 2022-12-25

## 2022-12-25 PROBLEM — F33.41 RECURRENT MAJOR DEPRESSIVE DISORDER, IN PARTIAL REMISSION (HCC): Chronic | Status: ACTIVE | Noted: 2019-02-22

## 2022-12-25 PROBLEM — R07.9 CHEST PAIN: Status: ACTIVE | Noted: 2022-12-25

## 2022-12-25 PROBLEM — G93.41 ACUTE METABOLIC ENCEPHALOPATHY: Status: RESOLVED | Noted: 2021-05-24 | Resolved: 2022-12-25

## 2022-12-25 PROBLEM — Z11.52 ENCOUNTER FOR SCREENING FOR COVID-19: Status: RESOLVED | Noted: 2021-05-24 | Resolved: 2022-12-25

## 2022-12-25 LAB
4HR DELTA HS TROPONIN: 3 NG/L
ATRIAL RATE: 100 BPM
ATRIAL RATE: 89 BPM
ATRIAL RATE: 92 BPM
ATRIAL RATE: 96 BPM
CARDIAC TROPONIN I PNL SERPL HS: 40 NG/L
FLUAV RNA RESP QL NAA+PROBE: NEGATIVE
FLUBV RNA RESP QL NAA+PROBE: NEGATIVE
P AXIS: 57 DEGREES
P AXIS: 64 DEGREES
P AXIS: 70 DEGREES
P AXIS: 85 DEGREES
PR INTERVAL: 172 MS
PR INTERVAL: 182 MS
PR INTERVAL: 188 MS
PR INTERVAL: 210 MS
QRS AXIS: 10 DEGREES
QRS AXIS: 17 DEGREES
QRS AXIS: 17 DEGREES
QRS AXIS: 38 DEGREES
QRSD INTERVAL: 94 MS
QRSD INTERVAL: 96 MS
QT INTERVAL: 374 MS
QT INTERVAL: 394 MS
QT INTERVAL: 400 MS
QT INTERVAL: 410 MS
QTC INTERVAL: 462 MS
QTC INTERVAL: 497 MS
QTC INTERVAL: 498 MS
QTC INTERVAL: 516 MS
RSV RNA RESP QL NAA+PROBE: NEGATIVE
SARS-COV-2 RNA RESP QL NAA+PROBE: NEGATIVE
T WAVE AXIS: 58 DEGREES
T WAVE AXIS: 69 DEGREES
T WAVE AXIS: 71 DEGREES
T WAVE AXIS: 82 DEGREES
VENTRICULAR RATE: 100 BPM
VENTRICULAR RATE: 89 BPM
VENTRICULAR RATE: 92 BPM
VENTRICULAR RATE: 96 BPM

## 2022-12-25 RX ORDER — MAGNESIUM HYDROXIDE/ALUMINUM HYDROXICE/SIMETHICONE 120; 1200; 1200 MG/30ML; MG/30ML; MG/30ML
30 SUSPENSION ORAL EVERY 4 HOURS PRN
Status: DISCONTINUED | OUTPATIENT
Start: 2022-12-25 | End: 2022-12-28 | Stop reason: HOSPADM

## 2022-12-25 RX ORDER — ASCORBIC ACID 500 MG
500 TABLET ORAL DAILY
Status: DISCONTINUED | OUTPATIENT
Start: 2022-12-25 | End: 2022-12-28 | Stop reason: HOSPADM

## 2022-12-25 RX ORDER — DULOXETIN HYDROCHLORIDE 60 MG/1
60 CAPSULE, DELAYED RELEASE ORAL DAILY
Status: DISCONTINUED | OUTPATIENT
Start: 2022-12-25 | End: 2022-12-28 | Stop reason: HOSPADM

## 2022-12-25 RX ORDER — DULOXETIN HYDROCHLORIDE 30 MG/1
30 CAPSULE, DELAYED RELEASE ORAL
Status: DISCONTINUED | OUTPATIENT
Start: 2022-12-25 | End: 2022-12-28 | Stop reason: HOSPADM

## 2022-12-25 RX ORDER — BUPROPION HYDROCHLORIDE 150 MG/1
300 TABLET ORAL EVERY MORNING
Status: DISCONTINUED | OUTPATIENT
Start: 2022-12-25 | End: 2022-12-28 | Stop reason: HOSPADM

## 2022-12-25 RX ORDER — ACETAMINOPHEN 325 MG/1
650 TABLET ORAL EVERY 6 HOURS PRN
Status: DISCONTINUED | OUTPATIENT
Start: 2022-12-25 | End: 2022-12-28 | Stop reason: HOSPADM

## 2022-12-25 RX ORDER — ATORVASTATIN CALCIUM 20 MG/1
20 TABLET, FILM COATED ORAL DAILY
Status: DISCONTINUED | OUTPATIENT
Start: 2022-12-25 | End: 2022-12-28 | Stop reason: HOSPADM

## 2022-12-25 RX ORDER — ENOXAPARIN SODIUM 100 MG/ML
40 INJECTION SUBCUTANEOUS 2 TIMES DAILY
Status: DISCONTINUED | OUTPATIENT
Start: 2022-12-25 | End: 2022-12-28 | Stop reason: HOSPADM

## 2022-12-25 RX ORDER — LUBIPROSTONE 8 UG/1
24 CAPSULE ORAL 2 TIMES DAILY WITH MEALS
Status: DISCONTINUED | OUTPATIENT
Start: 2022-12-25 | End: 2022-12-28 | Stop reason: HOSPADM

## 2022-12-25 RX ORDER — LORATADINE 10 MG/1
10 TABLET ORAL DAILY
Status: DISCONTINUED | OUTPATIENT
Start: 2022-12-25 | End: 2022-12-28 | Stop reason: HOSPADM

## 2022-12-25 RX ORDER — OXYCODONE HYDROCHLORIDE 5 MG/1
2.5 TABLET ORAL EVERY 4 HOURS PRN
Status: DISCONTINUED | OUTPATIENT
Start: 2022-12-25 | End: 2022-12-28 | Stop reason: HOSPADM

## 2022-12-25 RX ORDER — ONDANSETRON 2 MG/ML
4 INJECTION INTRAMUSCULAR; INTRAVENOUS EVERY 6 HOURS PRN
Status: DISCONTINUED | OUTPATIENT
Start: 2022-12-25 | End: 2022-12-28 | Stop reason: HOSPADM

## 2022-12-25 RX ORDER — OXYBUTYNIN CHLORIDE 5 MG/1
10 TABLET, EXTENDED RELEASE ORAL
Status: DISCONTINUED | OUTPATIENT
Start: 2022-12-25 | End: 2022-12-28 | Stop reason: HOSPADM

## 2022-12-25 RX ORDER — ALBUTEROL SULFATE 2.5 MG/3ML
2.5 SOLUTION RESPIRATORY (INHALATION) EVERY 6 HOURS PRN
Status: DISCONTINUED | OUTPATIENT
Start: 2022-12-25 | End: 2022-12-28

## 2022-12-25 RX ORDER — LANOLIN ALCOHOL/MO/W.PET/CERES
9 CREAM (GRAM) TOPICAL
Status: DISCONTINUED | OUTPATIENT
Start: 2022-12-25 | End: 2022-12-28 | Stop reason: HOSPADM

## 2022-12-25 RX ORDER — LISINOPRIL 5 MG/1
5 TABLET ORAL DAILY
Status: DISCONTINUED | OUTPATIENT
Start: 2022-12-25 | End: 2022-12-28 | Stop reason: HOSPADM

## 2022-12-25 RX ORDER — PANTOPRAZOLE SODIUM 40 MG/1
40 TABLET, DELAYED RELEASE ORAL
Status: DISCONTINUED | OUTPATIENT
Start: 2022-12-25 | End: 2022-12-26

## 2022-12-25 RX ADMIN — DULOXETINE 30 MG: 30 CAPSULE, DELAYED RELEASE ORAL at 21:20

## 2022-12-25 RX ADMIN — OXYCODONE HYDROCHLORIDE AND ACETAMINOPHEN 500 MG: 500 TABLET ORAL at 08:37

## 2022-12-25 RX ADMIN — LUBIPROSTONE 24 MCG: 24 CAPSULE, GELATIN COATED ORAL at 08:37

## 2022-12-25 RX ADMIN — LUBIPROSTONE 24 MCG: 24 CAPSULE, GELATIN COATED ORAL at 16:16

## 2022-12-25 RX ADMIN — ATORVASTATIN CALCIUM 20 MG: 20 TABLET, FILM COATED ORAL at 08:37

## 2022-12-25 RX ADMIN — OXYBUTYNIN 10 MG: 5 TABLET, FILM COATED, EXTENDED RELEASE ORAL at 21:21

## 2022-12-25 RX ADMIN — PANTOPRAZOLE SODIUM 40 MG: 40 TABLET, DELAYED RELEASE ORAL at 09:41

## 2022-12-25 RX ADMIN — NITROGLYCERIN 1 INCH: 20 OINTMENT TOPICAL at 00:29

## 2022-12-25 RX ADMIN — OXYBUTYNIN 10 MG: 5 TABLET, FILM COATED, EXTENDED RELEASE ORAL at 01:20

## 2022-12-25 RX ADMIN — OXYCODONE HYDROCHLORIDE 2.5 MG: 5 TABLET ORAL at 16:17

## 2022-12-25 RX ADMIN — ENOXAPARIN SODIUM 40 MG: 100 INJECTION SUBCUTANEOUS at 08:38

## 2022-12-25 RX ADMIN — ASPIRIN 81 MG CHEWABLE TABLET 324 MG: 81 TABLET CHEWABLE at 00:27

## 2022-12-25 RX ADMIN — OXYCODONE HYDROCHLORIDE 2.5 MG: 5 TABLET ORAL at 09:41

## 2022-12-25 RX ADMIN — Medication 9 MG: at 01:20

## 2022-12-25 RX ADMIN — DULOXETINE 60 MG: 60 CAPSULE, DELAYED RELEASE ORAL at 08:35

## 2022-12-25 RX ADMIN — SODIUM CHLORIDE 1000 ML: 0.9 INJECTION, SOLUTION INTRAVENOUS at 12:23

## 2022-12-25 RX ADMIN — Medication 9 MG: at 21:20

## 2022-12-25 RX ADMIN — BUPROPION HYDROCHLORIDE 300 MG: 150 TABLET, EXTENDED RELEASE ORAL at 08:35

## 2022-12-25 RX ADMIN — ENOXAPARIN SODIUM 40 MG: 100 INJECTION SUBCUTANEOUS at 17:50

## 2022-12-25 RX ADMIN — LISINOPRIL 5 MG: 5 TABLET ORAL at 08:37

## 2022-12-25 RX ADMIN — DULOXETINE 30 MG: 30 CAPSULE, DELAYED RELEASE ORAL at 01:20

## 2022-12-25 RX ADMIN — ACETAMINOPHEN 650 MG: 325 TABLET ORAL at 21:21

## 2022-12-25 RX ADMIN — LORATADINE 10 MG: 10 TABLET ORAL at 08:37

## 2022-12-25 NOTE — ED PROCEDURE NOTE
PROCEDURE  ECG 12 Lead Documentation Only    Date/Time: 12/24/2022 9:29 PM  Performed by: Yanelis Choe MD  Authorized by: Yanelis Choe MD     Indications / Diagnosis:  Cp, abdominal pain   ECG reviewed by me, the ED Provider: yes    Patient location:  ED  Interpretation:     Interpretation: non-specific    Rate:     ECG rate:  89    ECG rate assessment: normal    Rhythm:     Rhythm: sinus rhythm    Ectopy:     Ectopy: none    QRS:     QRS axis:  Normal    QRS intervals:  Normal  Conduction:     Conduction: normal    ST segments:     ST segments:  Non-specific  T waves:     T waves: non-specific    Other findings:     Other findings: prolonged qTc interval           Yanelis Choe MD  12/24/22 9629

## 2022-12-25 NOTE — ASSESSMENT & PLAN NOTE
· R/o ACS  · Trops, 37, 34 w/ delta -3  · Continue to trend  · BP was elevated in /79 and 187/78  · Has reproducible chest wall tenderness, small hiatal hernia on CT

## 2022-12-25 NOTE — ED PROVIDER NOTES
History  Chief Complaint   Patient presents with   • Chest Pain     Started Wednesday night/ pain under left breast/ indigestion/ pain 10/sharp shooting pain, travels down and around abdomen, - N/V     Patient is a 70-year-old female with history of hypertension, IBS, kidney stone, who presents for evaluation of left upper quadrant abdominal pain  Patient says she has had the symptoms since Wednesday  She says that they have been constant and got worse acutely tonight  She says that she feels like it radiates across her entire upper abdomen  She also admits to some associated chest discomfort  She denies any lightheadedness, dizziness, shortness of breath  She does admit to nausea but denies any vomiting  She denies any loose stools, fevers, chills, urinary symptoms  Prior to Admission Medications   Prescriptions Last Dose Informant Patient Reported? Taking? DULoxetine (CYMBALTA) 60 mg delayed release capsule   No No   Sig: Take 1 capsule by mouth twice daily  Melatonin 10 MG TABS   Yes No   Sig: Take by mouth   Multiple Vitamins-Minerals (multivitamin with minerals) tablet   No No   Sig: Take 1 tablet by mouth daily   Nutritional Supplements (BOOST BREEZE PO)   Yes No   Sig: Take by mouth daily   acetaminophen (TYLENOL) 650 mg CR tablet   No No   Sig: Take 1 tablet (650 mg total) by mouth every 8 (eight) hours as needed for mild pain   albuterol (2 5 mg/3 mL) 0 083 % nebulizer solution   Yes No   Sig: Take 2 5 mg by nebulization every 6 (six) hours as needed for wheezing  albuterol (PROAIR HFA) 90 mcg/act inhaler   No No   Sig: Inhale 2 puffs every 4 (four) hours as needed for wheezing   ascorbic acid (VITAMIN C) 500 MG tablet   No No   Sig: Take 1 tablet (500 mg total) by mouth daily   atorvastatin (LIPITOR) 20 mg tablet   No No   Sig: Take 1 tablet by mouth daily     buPROPion (WELLBUTRIN XL) 300 mg 24 hr tablet   No No   Sig: Take 1 tablet (300 mg total) by mouth every morning butalbital-acetaminophen-caffeine (FIORICET,ESGIC) -40 mg per tablet   No No   Sig: Take 1 tablet by mouth 3 (three) times a day as needed for headaches   docusate sodium (COLACE) 100 mg capsule   No No   Sig: Take 1 capsule (100 mg total) by mouth 2 (two) times a day   ergocalciferol (VITAMIN D2) 50,000 units   No No   Sig: Take 1 capsule (50,000 Units total) by mouth once a week   fondaparinux (ARIXTRA) 2 5 mg/0 5 mL   No No   Sig: Inject 2 5 mg under the skin daily for 14 days Do not start before November 23, 2022  linaCLOtide (Linzess) 145 MCG CAPS   No No   Sig: Take 1 capsule (145 mcg total) by mouth in the morning   lisinopril (ZESTRIL) 5 mg tablet   No No   Sig: Take 1 tablet (5 mg total) by mouth daily Do not start before November 23, 2022     loratadine (CLARITIN) 10 mg tablet   Yes No   Sig: Take 10 mg by mouth daily   oxybutynin (DITROPAN-XL) 10 MG 24 hr tablet   No No   Sig: Take 1 tablet (10 mg total) by mouth daily at bedtime      Facility-Administered Medications: None       Past Medical History:   Diagnosis Date   • Anxiety    • Arthritis    • Asthma    • Breast cancer ([facility] [address] )     rt mastectomy 2005   • Cancer [facility])     breast right   • Cataract    • CHF (congestive heart failure) ([facility])    • Chronic headaches    • Concussion     At age 7; Chronic headaches since then   • Coronary artery disease    • Depression    • Dietary lactose intolerance    • GERD (gastroesophageal reflux disease)    • High cholesterol    • History of chemotherapy     2005 rt breast cancer   • History of transfusion    • Hypertension    • IBS (irritable bowel syndrome)    • Irritable bowel syndrome (IBS) 05/31/2016   • Kidney stone    • Lymphedema of right arm    • Obesity    • PFO (patent foramen ovale)    • PONV (postoperative nausea and vomiting)    • Post-menopausal    • Psychiatric disorder    • Renal disorder    • Shortness of breath    • Vitamin D deficiency        Past Surgical History:   Procedure Laterality Date   • BREAST SURGERY     • CATARACT EXTRACTION Bilateral    • CATARACT EXTRACTION, BILATERAL     • CHOLECYSTECTOMY     • COLONOSCOPY N/A 05/31/2016    Procedure: COLONOSCOPY;  Surgeon: Kyle Howard MD;  Location: [facility] MAIN OR;  Service:    • GALLBLADDER SURGERY     • HYSTERECTOMY      Total   • JOINT REPLACEMENT     • KNEE ARTHROSCOPY     • KNEE SURGERY      arthroscopy   • MASTECTOMY Right     rt breast mastectomy 2005   • OR ARTHRP KNE CONDYLE&PLATU MEDIAL&LAT COMPARTMENTS Left 05/20/2021    Procedure: ARTHROPLASTY KNEE TOTAL;  Surgeon: Royce Rivero MD;  Location: [address] [place], [place] [address]o [place] MAIN OR;  Service: Orthopedics   • OR ARTHRP KNE CONDYLE&PLATU MEDIAL&LAT COMPARTMENTS Right 11/16/2022    Procedure: ARTHROPLASTY KNEE TOTAL;  Surgeon: Gerri Layne DO;  Location: [facility] MA[facility];  Service: Orthopedics   • TONSILLECTOMY AND ADENOIDECTOMY         Family History   Adopted: Yes   Problem Relation Age of Onset   • Diabetes Mother    • Heart disease Mother    • Mental illness Mother    • Depression Mother    • Heart disease Brother    • Breast cancer Maternal Aunt    • Breast cancer Maternal Aunt    • Breast cancer Maternal Aunt    • Breast cancer Maternal Aunt    • Breast cancer Maternal Aunt    • No Known Problems Father    • No Known Problems Sister    • No Known Problems Daughter    • Breast cancer Maternal Grandmother    • No Known Problems Sister    • No Known Problems Sister    • No Known Problems Sister      I have reviewed and agree with the history as documented  E-Cigarette/Vaping   • E-Cigarette Use Never User      E-Cigarette/Vaping Substances   • Nicotine No    • THC No    • CBD No    • Flavoring No    • Other No    • Unknown No      Social History     Tobacco Use   • Smoking status: Never   • Smokeless tobacco: Never   Vaping Use   • Vaping Use: Never used   Substance Use Topics   • Alcohol use:  Yes     Alcohol/week: 1 0 standard drink     Types: 1 Glasses of wine per week     Comment: socially   • Drug use: Not Currently     Types: Marijuana     Comment: In her youth       Review of Systems   Constitutional: Negative for chills, diaphoresis and fever  HENT: Negative for congestion, sinus pressure, sore throat and trouble swallowing  Eyes: Negative for pain, discharge and itching  Respiratory: Positive for chest tightness  Negative for cough, shortness of breath and wheezing  Cardiovascular: Negative for chest pain, palpitations and leg swelling  Gastrointestinal: Positive for abdominal pain and nausea  Negative for abdominal distention, blood in stool, diarrhea and vomiting  Endocrine: Negative for polyphagia and polyuria  Genitourinary: Negative for difficulty urinating, dysuria, flank pain, hematuria, pelvic pain and vaginal bleeding  Musculoskeletal: Negative for arthralgias and back pain  Skin: Negative for rash  Neurological: Negative for dizziness, syncope, weakness, light-headedness and headaches  Physical Exam  Physical Exam  Vitals and nursing note reviewed  Constitutional:       General: She is not in acute distress  Appearance: She is well-developed  HENT:      Head: Normocephalic and atraumatic  Right Ear: External ear normal       Left Ear: External ear normal       Nose: Nose normal       Mouth/Throat:      Mouth: Mucous membranes are moist       Pharynx: No oropharyngeal exudate  Eyes:      Conjunctiva/sclera: Conjunctivae normal       Pupils: Pupils are equal, round, and reactive to light  Cardiovascular:      Rate and Rhythm: Normal rate and regular rhythm  Heart sounds: Normal heart sounds  No murmur heard  No friction rub  No gallop  Pulmonary:      Effort: Pulmonary effort is normal  No respiratory distress  Breath sounds: Normal breath sounds  No wheezing or rales  Abdominal:      General: There is no distension  Palpations: Abdomen is soft  Tenderness: There is abdominal tenderness (LUQ, epigastric, RUQ)  There is no guarding  Musculoskeletal:         General: No swelling, tenderness or deformity  Normal range of motion  Cervical back: Normal range of motion and neck supple  Lymphadenopathy:      Cervical: No cervical adenopathy  Skin:     General: Skin is warm and dry  Neurological:      General: No focal deficit present  Mental Status: She is alert and oriented to person, place, and time  Mental status is at baseline  Cranial Nerves: No cranial nerve deficit  Sensory: No sensory deficit  Motor: No weakness or abnormal muscle tone        Coordination: Coordination normal          Vital Signs  ED Triage Vitals   Temperature Pulse Respirations Blood Pressure SpO2   12/24/22 2022 12/24/22 2022 12/24/22 2022 12/24/22 2022 12/24/22 2022   98 1 °F (36 7 °C) 86 16 (!) 171/90 98 %      Temp Source Heart Rate Source Patient Position - Orthostatic VS BP Location FiO2 (%)   12/24/22 2022 12/25/22 2100 12/25/22 0000 12/25/22 0000 --   Oral Monitor Lying Left arm       Pain Score       12/24/22 2106       10 - Worst Possible Pain           Vitals:    12/25/22 1330 12/25/22 1600 12/25/22 2100 12/26/22 0418   BP:  143/73 157/78 156/77   Pulse: 101 105 (!) 108 102   Patient Position - Orthostatic VS:             Visual Acuity  Visual Acuity    Flowsheet Row Most Recent Value   L Pupil Size (mm) 3   R Pupil Size (mm) 3   L Pupil Shape Round   R Pupil Shape Round          ED Medications  Medications   acetaminophen (TYLENOL) tablet 650 mg (650 mg Oral Given 12/25/22 2121)   ondansetron (ZOFRAN) injection 4 mg (has no administration in time range)   enoxaparin (LOVENOX) subcutaneous injection 40 mg (40 mg Subcutaneous Given 12/25/22 1750)   albuterol inhalation solution 2 5 mg (has no administration in time range)   ascorbic acid (VITAMIN C) tablet 500 mg (500 mg Oral Given 12/25/22 0837)   atorvastatin (LIPITOR) tablet 20 mg (20 mg Oral Given 12/25/22 0837)   buPROPion (WELLBUTRIN XL) 24 hr tablet 300 mg (300 mg Oral Given 12/25/22 0835)   DULoxetine (CYMBALTA) delayed release capsule 60 mg (60 mg Oral Given 12/25/22 0835)   DULoxetine (CYMBALTA) delayed release capsule 30 mg (30 mg Oral Given 12/25/22 2120)   lisinopril (ZESTRIL) tablet 5 mg (5 mg Oral Given 12/25/22 0837)   loratadine (CLARITIN) tablet 10 mg (10 mg Oral Given 12/25/22 0837)   melatonin tablet 9 mg (9 mg Oral Given 12/25/22 2120)   oxybutynin (DITROPAN-XL) 24 hr tablet 10 mg (10 mg Oral Given 12/25/22 2121)   lubiprostone (AMITIZA) capsule 24 mcg (24 mcg Oral Given 12/25/22 1616)   pantoprazole (PROTONIX) EC tablet 40 mg (40 mg Oral Given 12/26/22 0507)   oxyCODONE (ROXICODONE) IR tablet 2 5 mg (2 5 mg Oral Given 12/25/22 1617)   aluminum-magnesium hydroxide-simethicone (MYLANTA) oral suspension 30 mL (has no administration in time range)   sodium chloride 0 9 % bolus 1,000 mL (0 mL Intravenous Stopped 12/24/22 2207)   HYDROmorphone (DILAUDID) injection 0 5 mg (0 5 mg Intravenous Given 12/24/22 2106)   ondansetron (ZOFRAN) injection 4 mg (4 mg Intravenous Given 12/24/22 2156)   iohexol (OMNIPAQUE) 350 MG/ML injection (SINGLE-DOSE) 100 mL (100 mL Intravenous Given 12/24/22 2158)   HYDROmorphone HCl (DILAUDID) injection 0 2 mg (0 2 mg Intravenous Given 12/24/22 2255)   aluminum-magnesium hydroxide-simethicone (MYLANTA) oral suspension 30 mL (30 mL Oral Given 12/24/22 2255)   Lidocaine Viscous HCl (XYLOCAINE) 2 % mucosal solution 15 mL (15 mL Swish & Swallow Given 12/24/22 2255)   aspirin chewable tablet 324 mg (324 mg Oral Given 12/25/22 0027)   nitroglycerin (NITRO-BID) 2 % TD ointment 1 inch (1 inch Topical Given 12/25/22 0029)   sodium chloride 0 9 % bolus 1,000 mL (0 mL Intravenous Stopped 12/25/22 1423)       Diagnostic Studies  Results Reviewed     Procedure Component Value Units Date/Time    Comprehensive metabolic panel [[id number]]  (Abnormal) Collected: 12/26/22 0427    Lab Status: Final result Specimen: Blood from Hand, Left Updated: 12/26/22 2796     Sodium 136 mmol/L      Potassium 3 8 mmol/L      Chloride 101 mmol/L      CO2 28 mmol/L      ANION GAP 7 mmol/L      BUN 6 mg/dL      Creatinine 0 55 mg/dL      Glucose 126 mg/dL      Calcium 8 5 mg/dL      Corrected Calcium 9 1 mg/dL      AST 95 U/L      ALT 92 U/L      Alkaline Phosphatase 152 U/L      Total Protein 5 9 g/dL      Albumin 3 2 g/dL      Total Bilirubin 0 76 mg/dL      eGFR 95 ml/min/1 73sq m     Narrative:      National Kidney Disease Foundation guidelines for Chronic Kidney Disease (CKD):   •  Stage 1 with normal or high GFR (GFR > 90 mL/min/1 73 square meters)  •  Stage 2 Mild CKD (GFR = 60-89 mL/min/1 73 square meters)  •  Stage 3A Moderate CKD (GFR = 45-59 mL/min/1 73 square meters)  •  Stage 3B Moderate CKD (GFR = 30-44 mL/min/1 73 square meters)  •  Stage 4 Severe CKD (GFR = 15-29 mL/min/1 73 square meters)  •  Stage 5 End Stage CKD (GFR <15 mL/min/1 73 square meters)  Note: GFR calculation is accurate only with a steady state creatinine    Magnesium [[id number]]  (Normal) Collected: 12/26/22 0427    Lab Status: Final result Specimen: Blood from Hand, Left Updated: 12/26/22 0448     Magnesium 1 9 mg/dL     CBC and differential [[id number]]  (Abnormal) Collected: 12/26/22 0427    Lab Status: Final result Specimen: Blood from Hand, Left Updated: 12/26/22 0431     WBC 5 08 Thousand/uL      RBC 4 55 Million/uL      Hemoglobin 13 8 g/dL      Hematocrit 42 5 %      MCV 93 fL      MCH 30 3 pg      MCHC 32 5 g/dL      RDW 12 3 %      MPV 9 2 fL      Platelets 167 Thousands/uL      nRBC 0 /100 WBCs      Neutrophils Relative 71 %      Immat GRANS % 0 %      Lymphocytes Relative 13 %      Monocytes Relative 14 %      Eosinophils Relative 2 %      Basophils Relative 0 %      Neutrophils Absolute 3 62 Thousands/µL      Immature Grans Absolute 0 02 Thousand/uL      Lymphocytes Absolute 0 65 Thousands/µL      Monocytes Absolute 0 69 Thousand/µL      Eosinophils Absolute 0 08 Thousand/µL      Basophils Absolute 0 02 Thousands/µL     FLU/RSV/COVID - if FLU/RSV clinically relevant [[id number]]  (Normal) Collected: 12/25/22 0137    Lab Status: Final result Specimen: Nares from Nose Updated: 12/25/22 0220     SARS-CoV-2 Negative     INFLUENZA A PCR Negative     INFLUENZA B PCR Negative     RSV PCR Negative    Narrative:      FOR PEDIATRIC PATIENTS - copy/paste COVID Guidelines URL to browser: [email or web address]    SARS-CoV-2 assay is a Nucleic Acid Amplification assay intended for the  qualitative detection of nucleic acid from SARS-CoV-2 in nasopharyngeal  swabs  Results are for the presumptive identification of SARS-CoV-2 RNA  Positive results are indicative of infection with SARS-CoV-2, the virus  causing COVID-19, but do not rule out bacterial infection or co-infection  with other viruses  Laboratories within the [place] and its  territories are required to report all positive results to the appropriate  public health authorities  Negative results do not preclude SARS-CoV-2  infection and should not be used as the sole basis for treatment or other  patient management decisions  Negative results must be combined with  clinical observations, patient history, and epidemiological information  This test has not been FDA cleared or approved  This test has been authorized by FDA under an Emergency Use Authorization  (EUA)  This test is only authorized for the duration of time the  declaration that circumstances exist justifying the authorization of the  emergency use of an in vitro diagnostic tests for detection of SARS-CoV-2  virus and/or diagnosis of COVID-19 infection under section 564(b)(1) of  the Act, 21 U  S C  687RJC-3(F)(1), unless the authorization is terminated  or revoked sooner  The test has been validated but independent review by FDA  and CLIA is pending  Test performed using [employer]:  This RT-PCR assay targets N2,  a region unique to SARS-CoV-2  A conserved region in the E-gene was chosen  for pan-Sarbecovirus detection which includes SARS-CoV-2  According to CMS-2020-01-R, this platform meets the definition of high-throughput technology      HS Troponin I 4hr [[id number]]  (Normal) Collected: 12/25/22 0122    Lab Status: Final result Specimen: Blood from Arm, Left Updated: 12/25/22 0217     hs TnI 4hr 40 ng/L      Delta 4hr hsTnI 3 ng/L     HS Troponin I 2hr [[id number]]  (Normal) Collected: 12/24/22 2308    Lab Status: Final result Specimen: Blood from Hand, Left Updated: 12/24/22 2345     hs TnI 2hr 34 ng/L      Delta 2hr hsTnI -3 ng/L     Protime-INR [[id number]]  (Normal) Collected: 12/24/22 2130    Lab Status: Final result Specimen: Blood from Arm, Left Updated: 12/24/22 2148     Protime 13 8 seconds      INR 1 06    APTT [[id number]]  (Normal) Collected: 12/24/22 2130    Lab Status: Final result Specimen: Blood from Arm, Left Updated: 12/24/22 2148     PTT 28 seconds     HS Troponin 0hr (reflex protocol) [[id number]]  (Normal) Collected: 12/24/22 2058    Lab Status: Final result Specimen: Blood from Arm, Left Updated: 12/24/22 2129     hs TnI 0hr 37 ng/L     Comprehensive metabolic panel [[id number]]  (Abnormal) Collected: 12/24/22 2058    Lab Status: Final result Specimen: Blood from Arm, Left Updated: 12/24/22 2125     Sodium 137 mmol/L      Potassium 3 8 mmol/L      Chloride 100 mmol/L      CO2 28 mmol/L      ANION GAP 9 mmol/L      BUN 9 mg/dL      Creatinine 0 56 mg/dL      Glucose 141 mg/dL      Calcium 9 5 mg/dL      AST 21 U/L      ALT 16 U/L      Alkaline Phosphatase 128 U/L      Total Protein 7 0 g/dL      Albumin 3 8 g/dL      Total Bilirubin 0 71 mg/dL      eGFR 94 ml/min/1 73sq m     Narrative:      [place] guidelines for Chronic Kidney Disease (CKD):   •  Stage 1 with normal or high GFR (GFR > 90 mL/min/1 73 square meters)  •  Stage 2 Mild CKD (GFR = 60-89 mL/min/1 73 square meters)  •  Stage 3A Moderate CKD (GFR = 45-59 mL/min/1 73 square meters)  •  Stage 3B Moderate CKD (GFR = 30-44 mL/min/1 73 square meters)  •  Stage 4 Severe CKD (GFR = 15-29 mL/min/1 73 square meters)  •  Stage 5 End Stage CKD (GFR <15 mL/min/1 73 square meters)  Note: GFR calculation is accurate only with a steady state creatinine    Lipase [[id number]]  (Abnormal) Collected: 12/24/22 2058    Lab Status: Final result Specimen: Blood from Arm, Left Updated: 12/24/22 2125     Lipase <6 u/L     UA (URINE) with reflex to Scope [[id number]]  (Abnormal) Collected: 12/24/22 2058    Lab Status: Final result Specimen: Urine, Clean Catch Updated: 12/24/22 2113     Color, UA Yellow     Clarity, UA Clear     Specific Gravity, UA 1 025     pH, UA 6 0     Leukocytes, UA Negative     Nitrite, UA Negative     Protein, UA Negative mg/dl      Glucose, UA Negative mg/dl      Ketones, UA Trace mg/dl      Urobilinogen, UA 0 2 E U /dl      Bilirubin, UA Negative     Occult Blood, UA Negative    CBC and differential [[id number]]  (Abnormal) Collected: 12/24/22 2058    Lab Status: Final result Specimen: Blood from Arm, Left Updated: 12/24/22 2105     WBC 6 90 Thousand/uL      RBC 5 19 Million/uL      Hemoglobin 15 6 g/dL      Hematocrit 47 5 %      MCV 92 fL      MCH 30 1 pg      MCHC 32 8 g/dL      RDW 12 2 %      MPV 9 1 fL      Platelets 388 Thousands/uL      nRBC 0 /100 WBCs      Neutrophils Relative 71 %      Immat GRANS % 0 %      Lymphocytes Relative 17 %      Monocytes Relative 11 %      Eosinophils Relative 0 %      Basophils Relative 1 %      Neutrophils Absolute 4 86 Thousands/µL      Immature Grans Absolute 0 03 Thousand/uL      Lymphocytes Absolute 1 17 Thousands/µL      Monocytes Absolute 0 77 Thousand/µL      Eosinophils Absolute 0 03 Thousand/µL      Basophils Absolute 0 04 Thousands/µL                  CT abdomen pelvis with contrast   Final Result by Yahaira Nicholson MD (12/24 2225)      No bowel obstruction or definite evidence of acute inflammatory process in the abdomen or pelvis  Small hiatal hernia  Colonic diverticulosis  Workstation performed: [id number]         XR chest 1 view portable   Final Result by Ashely Ham MD (86/84 0228)      No acute cardiopulmonary disease  Workstation performed: [id number]                    Procedures  Procedures         ED Course             HEART Risk Score    Flowsheet Row Most Recent Value   Heart Score Risk Calculator    History 2 Filed at: 12/24/2022 2248   ECG 1 Filed at: 12/24/2022 2248   Age 2 Filed at: 12/24/2022 2248   Risk Factors 2 Filed at: 12/24/2022 2248   Troponin 1 Filed at: 12/24/2022 2248   HEART Score 8 Filed at: 12/24/2022 2248                        SBIRT 22yo+    Flowsheet Row Most Recent Value   SBIRT (23 yo +)    In order to provide better care to our patients, we are screening all of our patients for alcohol and drug use  Would it be okay to ask you these screening questions? Yes Filed at: 12/25/2022 2210   Initial Alcohol Screen: US AUDIT-C     1  How often do you have a drink containing alcohol? 0 Filed at: 12/25/2022 2210   2  How many drinks containing alcohol do you have on a typical day you are drinking? 0 Filed at: 12/25/2022 2210   3a  Male UNDER 65: How often do you have five or more drinks on one occasion? 0 Filed at: 12/25/2022 2210   3b  FEMALE Any Age, or MALE 65+: How often do you have 4 or more drinks on one occassion? 0 Filed at: 12/25/2022 2210   Audit-C Score 0 Filed at: 12/25/2022 2210   MICHELE: How many times in the past year have you    Used an illegal drug or used a prescription medication for non-medical reasons?  Never Filed at: 12/25/2022 2210        YOMI Risk Score    Flowsheet Row Most Recent Value   Age >= 72 1 Filed at: 12/25/2022 0012   Known CAD (stenosis >= 50%) 0 Filed at: 12/25/2022 0012   Recent (<=24 hrs) Service Angina 1 Filed at: 12/25/2022 0012   ST Deviation >= 0 5 mm 0 Filed at: 12/25/2022 0482 3+ CAD Risk Factors (FHx, HTN, HLP, DM, Smoker) 0 Filed at: 12/25/2022 0012   Aspirin Use Past 7 Days 0 Filed at: 12/25/2022 0012   Elevated Cardiac Markers 1 Filed at: 12/25/2022 0012   YOMI Risk Score (Calculated) 3 Filed at: 12/25/2022 1792                  MDM  Number of Diagnoses or Management Options  Atypical chest pain  Hypertensive urgency  Diagnosis management comments: 11year-old female presenting for upper abdominal pain since Wednesday  Constant worsening tonight  She has some chest discomfort as well  Denies shortness of breath, lightheadedness or dizziness  Has upper abdominal tenderness on exam  Obtained by labs, cardiac work-up, CT abdomen pelvis  Patient signed out to Dr Taylor Zhang who assisted in disposition      Disposition  Final diagnoses:   Atypical chest pain   Hypertensive urgency     Time reflects when diagnosis was documented in both MDM as applicable and the Disposition within this note     Time User Action Codes Description Comment    12/24/2022 10:50 PM Raul Ngo [R07 89] Atypical chest pain     12/24/2022 11:57 PM Adán Go [I16 0] Hypertensive urgency       ED Disposition     ED Disposition   Admit    Condition   Stable    Date/Time   Sun Dec 25, 2022 12:03 AM    Comment   Case was discussed with Lesa Montemayor and the patient's admission status was agreed to be Admission Status: observation status to the service of Dr Raquel Liu   Follow-up Information    None         Patient's Medications   Discharge Prescriptions    No medications on file       No discharge procedures on file      PDMP Review       Value Time User    PDMP Reviewed  Yes 12/25/2022 12:06 AM Aleda Deaner Lacinda Mohs, PA-C          ED Provider  Electronically Signed by           Armen Dowling DO  12/26/22 [id number]

## 2022-12-25 NOTE — H&P
[address] [facility] 1950, 81 y o  female MRN: [id number]  Unit/Bed#: ED 24 Encounter: [id number]  Primary Care Provider: Fidencio Stovall PA-C   Date and time admitted to hospital: 12/24/2022  8:17 PM    * Chest pain  Assessment & Plan  · R/o ACS  · Trops, 37, 34 w/ delta -3  · Continue to trend  · BP was elevated in /79 and 187/78  · Has reproducible chest wall tenderness, small hiatal hernia on CT  Mixed stress and urge urinary incontinence  Assessment & Plan  · Continue Ditropan    Body mass index (BMI) of 40 0-44 9 in adult [facility])  Assessment & Plan  · BMI 41  · Healthy diet recommended with lifestyle modification    Recurrent major depressive disorder, in partial remission (HCC)  Assessment & Plan  · Continue home Wellbutrin and Cymbalta    Irritable bowel syndrome (IBS)  Assessment & Plan  · Continue Amitiza    VTE Pharmacologic Prophylaxis: VTE Score: 7 High Risk (Score >/= 5) - Pharmacological DVT Prophylaxis Ordered: enoxaparin (Lovenox)  Sequential Compression Devices Ordered  Code Status: Level 1 - Full Code   Discussion with patient    Anticipated Length of Stay: Patient will be admitted on an observation basis with an anticipated length of stay of less than 2 midnights secondary to acs rule out  Chief Complaint: chest pain    History of Present Illness:  Karolyn Bauer is a 81 y o  female with a PMH of anxiety, CAD who presents with chest pain  Patient reports left side chest pain, belching  Notes pain has been constant since Wednesday, started mild thought was gas, by Saturday was intolerable  Had no appetite, but if she did eat, she didn't feel well  Was having chills and then heat flash and felt cold/clammy  Nothing improved/worsened the pain  Tried antacid at home no relief, then tried beano no relief  No radiation to jaw,neck, back, shoulder, arm    Reports pain was bad and could not tolerate anymore and told daughter something is not right and then daughter brought her to ED  Had some relief with the pain meds  ED treatment: Mylanta, dilaudid 0 2mg and 0 5mg, zofran 4mg, 1L IVF, viscous lidocaine 15ml, asa 324mg, nitro ointment    Review of Systems:  Review of Systems   Constitutional: Positive for appetite change, chills and diaphoresis  Negative for fever  HENT: Negative for rhinorrhea, sore throat and trouble swallowing  Eyes: Negative for discharge and redness  Respiratory: Positive for cough and shortness of breath  Cardiovascular: Positive for chest pain  Gastrointestinal: Positive for abdominal pain, constipation and nausea  Negative for diarrhea and vomiting  Genitourinary: Negative for dysuria and hematuria  Musculoskeletal: Positive for myalgias  Negative for back pain and neck pain  Skin: Negative for rash and wound  Neurological: Positive for dizziness, weakness and headaches  Psychiatric/Behavioral: Negative for agitation and confusion         Past Medical and Surgical History:   Past Medical History:   Diagnosis Date   • Anxiety    • Arthritis    • Asthma    • Breast cancer ([facility] [address] )     rt mastectomy 2005   • Cancer [facility])     breast right   • Cataract    • CHF (congestive heart failure) (HCC)    • Chronic headaches    • Concussion     At age 7; Chronic headaches since then   • Coronary artery disease    • Depression    • Dietary lactose intolerance    • GERD (gastroesophageal reflux disease)    • High cholesterol    • History of chemotherapy     2005 rt breast cancer   • History of transfusion    • Hypertension    • IBS (irritable bowel syndrome)    • Irritable bowel syndrome (IBS) 05/31/2016   • Kidney stone    • Lymphedema of right arm    • Obesity    • PFO (patent foramen ovale)    • PONV (postoperative nausea and vomiting)    • Post-menopausal    • Psychiatric disorder    • Renal disorder    • Shortness of breath    • Vitamin D deficiency        Past Surgical History:   Procedure Laterality Date   • BREAST SURGERY     • CATARACT EXTRACTION Bilateral    • CATARACT EXTRACTION, BILATERAL     • CHOLECYSTECTOMY     • COLONOSCOPY N/A 05/31/2016    Procedure: COLONOSCOPY;  Surgeon: Damaris Llanos MD;  Location: [facility] MAIN OR;  Service:    • GALLBLADDER SURGERY     • HYSTERECTOMY      Total   • JOINT REPLACEMENT     • KNEE ARTHROSCOPY     • KNEE SURGERY      arthroscopy   • MASTECTOMY Right     rt breast mastectomy 2005   • [place] ARTHRP KNE CONDYLE&PLATU MEDIAL&LAT COMPARTMENTS Left 05/20/2021    Procedure: ARTHROPLASTY KNEE TOTAL;  Surgeon: Eleanor Calvert MD;  Location: [facility] [address] MAIN OR;  Service: Orthopedics   • HI ARTHRP KNE CONDYLE&PLATU MEDIAL&LAT COMPARTMENTS Right 11/16/2022    Procedure: ARTHROPLASTY KNEE TOTAL;  Surgeon: Ronda Walker DO;  Location: [facility];  Service: Orthopedics   • TONSILLECTOMY AND ADENOIDECTOMY         Meds/Allergies:  Prior to Admission medications    Medication Sig Start Date End Date Taking? Authorizing Provider   acetaminophen (TYLENOL) 650 mg CR tablet Take 1 tablet (650 mg total) by mouth every 8 (eight) hours as needed for mild pain 5/24/21   Deborah Ennis PA-C   albuterol (2 5 mg/3 mL) 0 083 % nebulizer solution Take 2 5 mg by nebulization every 6 (six) hours as needed for wheezing  Historical Provider, MD   albuterol [facility]) 90 mcg/act inhaler Inhale 2 puffs every 4 (four) hours as needed for wheezing 10/5/18   Merced Alex PA-C   ascorbic acid (VITAMIN C) 500 MG tablet Take 1 tablet (500 mg total) by mouth daily 10/31/22   Marcell Vasquez PA-C   atorvastatin (LIPITOR) 20 mg tablet Take 1 tablet by mouth daily   12/9/22   Merced Alex PA-C   buPROPion (WELLBUTRIN XL) 300 mg 24 hr tablet Take 1 tablet (300 mg total) by mouth every morning 5/13/21   Merced Alex PA-C   butalbital-acetaminophen-caffeine (FIORICET,ESGIC) -40 mg per tablet Take 1 tablet by mouth 3 (three) times a day as needed for headaches 5/5/20   Merced Alex PA-C   docusate sodium (COLACE) 100 mg capsule Take 1 capsule (100 mg total) by mouth 2 (two) times a day 11/22/22 12/22/22  Saurav Larkin PA-C   DULoxetine (CYMBALTA) 60 mg delayed release capsule Take 1 capsule by mouth twice daily  10/7/22   Merced Alex PA-C   ergocalciferol (VITAMIN D2) 50,000 units Take 1 capsule (50,000 Units total) by mouth once a week 2/28/20   Merced Alex PA-C   ferrous sulfate 324 (65 Fe) mg Take 1 tablet (324 mg total) by mouth 2 (two) times a day before meals 10/31/22 12/30/22  Saurav Larkin PA-C   folic acid (FOLVITE) 1 mg tablet Take 1 tablet (1 mg total) by mouth daily 10/31/22   Saurav Larkin PA-C   fondaparinux (ARIXTRA) 2 5 mg/0 5 mL Inject 2 5 mg under the skin daily for 14 days Do not start before November 23, 2022 11/23/22 12/7/22  Saurav Larkin PA-C   linaCLOtide (Linzess) 145 MCG CAPS Take 1 capsule (145 mcg total) by mouth in the morning 10/11/22   Merced Alex PA-C   lisinopril (ZESTRIL) 5 mg tablet Take 1 tablet (5 mg total) by mouth daily Do not start before November 23, 2022 11/23/22   Saurav Larkin PA-C   loratadine (CLARITIN) 10 mg tablet Take 10 mg by mouth daily    Historical Provider, MD   Melatonin 10 MG TABS Take by mouth    Historical Provider, MD   Multiple Vitamins-Minerals (multivitamin with minerals) tablet Take 1 tablet by mouth daily 10/31/22   Saurav Larkin PA-C   Nutritional Supplements (BOOST BREEZE PO) Take by mouth daily 9/6/17   Historical Provider, MD   oxybutynin (DITROPAN-XL) 10 MG 24 hr tablet Take 1 tablet (10 mg total) by mouth daily at bedtime 10/10/22   JERARDO Vasquez     I have reviewed home medications with patient personally  Allergies:    Allergies   Allergen Reactions   • Ibuprofen Nausea Only, Rash, Dizziness and Syncope   • Morphine Other (See Comments) and Hallucinations     Intolerance   • Latex Dermatitis       Social History:  Marital Status:    Occupation: Retired  Patient Pre-hospital Living Situation: Home  Patient Pre-hospital Level of Mobility: walks with walker - rollator  Patient Pre-hospital Diet Restrictions: low fat, spice  Substance Use History:   Social History     Substance and Sexual Activity   Alcohol Use Yes   • Alcohol/week: 1 0 standard drink   • Types: 1 Glasses of wine per week    Comment: socially     Social History     Tobacco Use   Smoking Status Never   Smokeless Tobacco Never     Social History     Substance and Sexual Activity   Drug Use Not Currently   • Types: Marijuana    Comment: In her youth       Family History:  Family History   Adopted: Yes   Problem Relation Age of Onset   • Diabetes Mother    • Heart disease Mother    • Mental illness Mother    • Depression Mother    • Heart disease Brother    • Breast cancer Maternal Aunt    • Breast cancer Maternal Aunt    • Breast cancer Maternal Aunt    • Breast cancer Maternal Aunt    • Breast cancer Maternal Aunt    • No Known Problems Father    • No Known Problems Sister    • No Known Problems Daughter    • Breast cancer Maternal Grandmother    • No Known Problems Sister    • No Known Problems Sister    • No Known Problems Sister        Physical Exam:     Vitals:   Blood Pressure: (!) 187/78 (12/25/22 0000)  Pulse: (!) 106 (12/25/22 0000)  Temperature: 98 1 °F (36 7 °C) (12/24/22 2022)  Temp Source: Oral (12/24/22 2022)  Respirations: 16 (12/24/22 2022)  Height: 4' 8" (142 2 cm) (12/24/22 2022)  Weight - Scale: 83 5 kg (184 lb) (12/24/22 2022)  SpO2: 93 % (12/25/22 0000)    Physical Exam  Vitals reviewed  Constitutional:       Appearance: Normal appearance  Comments: Elderly  female, ill-appearing   HENT:      Head: Normocephalic and atraumatic  Nose: Nose normal    Eyes:      General:         Right eye: No discharge  Left eye: No discharge  Extraocular Movements: Extraocular movements intact        Conjunctiva/sclera: Conjunctivae normal    Cardiovascular:      Rate and Rhythm: Regular rhythm  Tachycardia present  Heart sounds: Murmur heard  Comments: Positive reproducible chest wall tenderness  Pulmonary:      Effort: Pulmonary effort is normal  No respiratory distress  Breath sounds: Normal breath sounds  No wheezing  Abdominal:      General: Bowel sounds are normal  There is no distension  Palpations: Abdomen is soft  Tenderness: There is no abdominal tenderness  There is no guarding  Musculoskeletal:         General: No swelling or tenderness  Normal range of motion  Cervical back: Normal range of motion  Right lower leg: No edema  Left lower leg: No edema  Skin:     General: Skin is warm and dry  Capillary Refill: Capillary refill takes less than 2 seconds  Coloration: Skin is pale  Neurological:      General: No focal deficit present  Mental Status: She is alert and oriented to person, place, and time  Mental status is at baseline  Psychiatric:         Mood and Affect: Mood normal          Behavior: Behavior normal          Thought Content:  Thought content normal          Judgment: Judgment normal           Additional Data:     Lab Results:  Results from last 7 days   Lab Units 12/24/22 2058   WBC Thousand/uL 6 90   HEMOGLOBIN g/dL 15 6*   HEMATOCRIT % 47 5*   PLATELETS Thousands/uL 257   NEUTROS PCT % 71   LYMPHS PCT % 17   MONOS PCT % 11   EOS PCT % 0     Results from last 7 days   Lab Units 12/24/22 2058   SODIUM mmol/L 137   POTASSIUM mmol/L 3 8   CHLORIDE mmol/L 100   CO2 mmol/L 28   BUN mg/dL 9   CREATININE mg/dL 0 56*   ANION GAP mmol/L 9   CALCIUM mg/dL 9 5   ALBUMIN g/dL 3 8   TOTAL BILIRUBIN mg/dL 0 71   ALK PHOS U/L 128*   ALT U/L 16   AST U/L 21   GLUCOSE RANDOM mg/dL 141*     Results from last 7 days   Lab Units 12/24/22  2130   INR  1 06     Lines/Drains:  Invasive Devices     Peripheral Intravenous Line  Duration           Peripheral IV 12/24/22 Left Hand <1 day          Drain  Duration Aut[employer]us Reinfusion/Drain Device Right Knee 38 days    External Urinary Catheter 37 days              Imaging: Reviewed radiology reports from this admission including: abdominal/pelvic CT  CT abdomen pelvis with contrast   Final Result by Lucian Munoz MD (12/24 2225)      No bowel obstruction or definite evidence of acute inflammatory process in the abdomen or pelvis  Small hiatal hernia  Colonic diverticulosis  Workstation performed: [id number]         XR chest 1 view portable    (Results Pending)       ** Please Note: This note has been constructed using a voice recognition system   **

## 2022-12-25 NOTE — ED PROCEDURE NOTE
PROCEDURE  ECG 12 Lead Documentation Only    Date/Time: 12/24/2022 11:19 PM  Performed by: Po Chawla MD  Authorized by: Po Chawla MD     Indications / Diagnosis:  Cp   ECG reviewed by me, the ED Provider: yes    Patient location:  ED  Interpretation:     Interpretation: abnormal    Rate:     ECG rate:  96    ECG rate assessment: normal    Rhythm:     Rhythm: sinus rhythm    Ectopy:     Ectopy: none    QRS:     QRS axis:  Normal  Conduction:     Conduction: normal    ST segments:     ST segments:  Non-specific  T waves:     T waves: non-specific           Po Chawla MD  12/24/22 6478

## 2022-12-25 NOTE — CONSULTS
[facility] [facility] 1950, 81 y o  female MRN: [id number]  Unit/Bed#: ED 24 Encounter: [id number]  Primary Care Provider: Jaz Radford PA-C   Date and time admitted to hospital: 12/24/2022  8:17 PM    Inpatient consult to Cardiology  Consult performed by: Keshia Robertson MD  Consult ordered by: Monika Carson,           Nonrheumatic aortic (valve) stenosis  Assessment & Plan  Mild by exam and by last pressure gradient on echo  Tachycardia  Assessment & Plan  Likely related to pain or to volume status  Will review with hospitalist service  * Chest pain  Assessment & Plan  Noncardiac  Has been present for several days and also involves the abdomen with anorexia  No further work-up cardiac wise is needed  Other summary comments: Will remain available  Outpatient Cardiologist: None    HPI: Scarlett Husbands is a 81y o  year old female who presented with several days of anorexia  There was diffuse abdominal pain that also was present in the chest   Here CAT scan of the abdomen did not reveal clear acute problems  She remains anorectic  Troponins are negative  I am asked to comment further because of chest pain  Troponins are normal and EKG does not appear ischemic  She does not know of any prior heart procedures nor of he abdominal surgery  Patient uses a walker  EKG:   Sinus rhythm  Nonspecific ST segment changes  MOST  RECENT CARDIAC IMAGING:   TTE 5/25/2021: Normal LV systolic function  Mild aortic stenosis with peak velocity of 2 5 m/s across the valve  Review of Systems: a 10 point review of systems was conducted and is negative except for as mentioned in the HPI or as below  Uses a walker          Historical Information   Past Medical History:   Diagnosis Date   • Anxiety    • Arthritis    • Asthma    • Breast cancer ([facility] [address] )     rt mastectomy 2005   • Cancer [facility])     breast right   • Cataract    • CHF (congestive heart failure) ([facility] )    • Chronic headaches    • Concussion     At age 7; Chronic headaches since then   • Coronary artery disease    • Depression    • Dietary lactose intolerance    • GERD (gastroesophageal reflux disease)    • High cholesterol    • History of chemotherapy     2005 rt breast cancer   • History of transfusion    • Hypertension    • IBS (irritable bowel syndrome)    • Irritable bowel syndrome (IBS) 05/31/2016   • Kidney stone    • Lymphedema of right arm    • Obesity    • PFO (patent foramen ovale)    • PONV (postoperative nausea and vomiting)    • Post-menopausal    • Psychiatric disorder    • Renal disorder    • Shortness of breath    • Vitamin D deficiency      Past Surgical History:   Procedure Laterality Date   • BREAST SURGERY     • CATARACT EXTRACTION Bilateral    • CATARACT EXTRACTION, BILATERAL     • CHOLECYSTECTOMY     • COLONOSCOPY N/A 05/31/2016    Procedure: COLONOSCOPY;  Surgeon: Kuldip Rowell MD;  Location: [facility] MAIN OR;  Service:    • GALLBLADDER SURGERY     • HYSTERECTOMY      Total   • JOINT REPLACEMENT     • KNEE ARTHROSCOPY     • KNEE SURGERY      arthroscopy   • MASTECTOMY Right     rt breast mastectomy 2005   • [place] ARTHRP KNE CONDYLE&PLATU MEDIAL&LAT COMPARTMENTS Left 05/20/2021    Procedure: ARTHROPLASTY KNEE TOTAL;  Surgeon: Bertha Eng MD;  Location: [facility] MAIN OR;  Service: Orthopedics   • WI ARTHRP KNE CONDYLE&PLATU MEDIAL&LAT COMPARTMENTS Right 11/16/2022    Procedure: ARTHROPLASTY KNEE TOTAL;  Surgeon: Umang Payne DO;  Location: [facility];  Service: Orthopedics   • TONSILLECTOMY AND ADENOIDECTOMY       Social History     Substance and Sexual Activity   Alcohol Use Yes   • Alcohol/week: 1 0 standard drink   • Types: 1 Glasses of wine per week    Comment: socially     Social History     Substance and Sexual Activity   Drug Use Not Currently   • Types: Marijuana    Comment: In her youth     Social History     Tobacco Use   Smoking Status Never   Smokeless Tobacco Never Family History:   No longer relevant  Meds/Allergies   all current active meds have been reviewed  (Not in a hospital admission)      Allergies   Allergen Reactions   • Ibuprofen Nausea Only, Rash, Dizziness and Syncope   • Morphine Other (See Comments) and Hallucinations     Intolerance   • Latex Dermatitis       Objective   Vitals: Blood pressure 152/70, pulse 94, temperature 98 1 °F (36 7 °C), temperature source Oral, resp  rate 22, height 4' 8" (1 422 m), weight 83 5 kg (184 lb), SpO2 90 %, not currently breastfeeding , Body mass index is 41 25 kg/m² ,   Orthostatic Blood Pressures    Flowsheet Row Most Recent Value   Blood Pressure 152/70 filed at 12/25/2022 2701   Patient Position - Orthostatic VS Lying filed at 12/25/2022 4747          Systolic (47QJZ), MARIELLA:534 , Min:142 , QNP:574     Diastolic (20EXE), KGJ:33, Min:70, Max:90              Physical Exam:    General:  Normal appearance in no distress  Eyes:  Anicteric  Oral mucosa:  Moist   Neck:  No JVD  Carotid upstrokes are brisk without bruits  No masses  Chest:  Clear to auscultation  Cardiac:  No palpable PMI  Normal S1 and S2   Grade 1-2 systolic murmur at the base  No  gallop or rub  Abdomen:  Soft but tender  No guarding  Diminished bowel sounds  No palpable organomegaly or aortic enlargement  Extremities:  No peripheral edema  Musculoskeletal:  Symmetric  Vascular:  Femoral pulses are brisk without bruits  Popliteal  pulses are intact bilaterally  Pedal pulses are intact  Neuro:  Grossly symmetric  Psych:  Alert and oriented x3          Lab Results:     Troponins:    Results from last 7 days   Lab Units 12/25/22  0122 12/24/22 2308 12/24/22 2058   HS TNI 0HR ng/L  --   --  37   HS TNI 2HR ng/L  --  34  --    HSTNI D2 ng/L  --  -3  --    HS TNI 4HR ng/L 40  --   --    HSTNI D4 ng/L 3  --   --      BNP:       CBC :   Results from last 7 days   Lab Units 12/24/22 2058   WBC Thousand/uL 6 90   HEMOGLOBIN g/dL 15 6* HEMATOCRIT % 47 5*   MCV fL 92   PLATELETS Thousands/uL 257     TSH:     CMP:   Results from last 7 days   Lab Units 12/24/22  2058   POTASSIUM mmol/L 3 8   CHLORIDE mmol/L 100   CO2 mmol/L 28   BUN mg/dL 9   CREATININE mg/dL 0 56*   AST U/L 21   ALT U/L 16   EGFR ml/min/1 73sq m 94     Lipid Profile:     Coags:   Results from last 7 days   Lab Units 12/24/22  2130   INR  1 06

## 2022-12-25 NOTE — ED PROCEDURE NOTE
PROCEDURE  ECG 12 Lead Documentation Only    Date/Time: 12/24/2022 11:03 PM  Performed by: Pro Stokes MD  Authorized by: Pro Stokes MD     Indications / Diagnosis:  Cp  Patient location:  ED  Interpretation:     Interpretation: normal    Rate:     ECG rate:  100    ECG rate assessment: normal    Rhythm:     Rhythm: sinus rhythm    Ectopy:     Ectopy: none    QRS:     QRS axis:  Normal    QRS intervals:  Normal  Conduction:     Conduction: normal    ST segments:     ST segments:  Non-specific  T waves:     T waves: non-specific           Pro Stokes MD  12/24/22 3751

## 2022-12-25 NOTE — ASSESSMENT & PLAN NOTE
Noncardiac  Has been present for several days and also involves the abdomen with anorexia  No further work-up cardiac wise is needed

## 2022-12-25 NOTE — ED CARE HANDOFF
Emergency Department Sign Out Note        Sign out and transfer of care from Dr Brandon Kc  See Separate Emergency Department note  The patient, Peter Medina, was evaluated by the previous provider for Upper abdominal pain, occasional CP  Workup Completed:  pending    ED Course / Workup Pending (followup): HEART Risk Score    Flowsheet Row Most Recent Value   Heart Score Risk Calculator    History 2 Filed at: 12/24/2022 2248   ECG 1 Filed at: 12/24/2022 2248   Age 2 Filed at: 12/24/2022 2248   Risk Factors 2 Filed at: 12/24/2022 2248   Troponin 1 Filed at: 12/24/2022 2248   HEART Score 8 Filed at: 12/24/2022 2248                      YOMI Risk Score    Flowsheet Row Most Recent Value   Age >= 72 1 Filed at: 12/25/2022 0012   Known CAD (stenosis >= 50%) 0 Filed at: 12/25/2022 9779   Recent (<=24 hrs) Service Angina 1 Filed at: 12/25/2022 0012   ST Deviation >= 0 5 mm 0 Filed at: 12/25/2022 0012   3+ CAD Risk Factors (FHx, HTN, HLP, DM, Smoker) 0 Filed at: 12/25/2022 0012   Aspirin Use Past 7 Days 0 Filed at: 12/25/2022 0012   Elevated Cardiac Markers 1 Filed at: 12/25/2022 0012   YOMI Risk Score (Calculated) 3 Filed at: 12/25/2022 0012                  ED Course as of 12/25/22 0126   Sat Dec 24, 2022   2052 Sign out:     80 yo F  Here for Upper abdominal pain   CP at times  Nausea, no vomiting    Pt is non toxic    Labs and CT ab pending for dispo      2107 CBC and differential(!)   2119 UA (URINE) with reflex to Scope(!)   2120 Pt seen and evaluated  Pt here w/ daughter at bedside    Agree w/ sign out    Pt has been ill since Wednesday     Pt improved since Dilaudid  Pt's pain is much improved  Pt understands results of work up at this point - CBC and UA    2128 Ekg reviewed  unremarkable     2130 Comprehensive metabolic panel(!)   3614 Lipase(!): <6   2130 hs TnI 0hr: 37   2229 CT ABDOMEN AND PELVIS WITH IV CONTRAST       FINDINGS:     ABDOMEN     LOWER CHEST:  Small hiatal hernia noted    Minimal bibasilar atelectasis      LIVER/BILIARY TREE:  There is prominence of common bile duct and central intrahepatic biliary tree most consistent with the sequela of prior cholecystectomy  Liver is otherwise unremarkable      GALLBLADDER:  Gallbladder is surgically absent      SPLEEN:  Unremarkable      PANCREAS:  Unremarkable      ADRENAL GLANDS:  Unremarkable      KIDNEYS/URETERS:  Stable bilateral exophytic and peripelvic cysts  Additional subcentimeter hypodensities too small to characterize, statistically benign  No hydronephrosis      STOMACH AND BOWEL:  No bowel obstruction  There is colonic diverticulosis without evidence of acute diverticulitis      APPENDIX:  A normal appendix was visualized      ABDOMINOPELVIC CAVITY:  No ascites  No pneumoperitoneum  No lymphadenopathy      VESSELS:  Unremarkable for patient's age      PELVIS     REPRODUCTIVE ORGANS:  Surgical changes of prior hysterectomy      URINARY BLADDER:  Underdistended limiting evaluation, grossly unremarkable  Trace dependent excreted contrast noted      ABDOMINAL WALL/INGUINAL REGIONS:  Unremarkable      OSSEOUS STRUCTURES:  No acute fracture or destructive osseous lesion      IMPRESSION:     No bowel obstruction or definite evidence of acute inflammatory process in the abdomen or pelvis      Small hiatal hernia      Colonic diverticulosis     5 Pt stable  I explained the work up results    During this period another episode of CP came on, epigastric and into the upper chest  No changes on monitor  EKG stable   I do have concerns based on her exam and hx that she could be having atypical ACS  I will obs for acute CP   Will need delta troponin    2353 hs TnI 2hr: 34  Texted Shwetha w/ [place] for Obs  Pt w/ Heart score 8   Sun Dec 25, 2022   0003 Dw Lesa Montemayor  Will obs for r/o ACS     Procedures  MDM        Disposition  Final diagnoses:   Atypical chest pain   Hypertensive urgency     Time reflects when diagnosis was documented in both MDM as applicable and the Disposition within this note     Time User Action Codes Description Comment    12/24/2022 10:50 PM Mehreen Dub Add [R07 89] Atypical chest pain     12/24/2022 11:57 PM Mehreen Dub Add [I16 0] Hypertensive urgency       ED Disposition     ED Disposition   Admit    Condition   Stable    Date/Time   Sun Dec 25, 2022 12:03 AM    Comment   Case was discussed with Elvie Lamb and the patient's admission status was agreed to be Admission Status: observation status to the service of Dr Storm Leon   Follow-up Information    None       Patient's Medications   Discharge Prescriptions    No medications on file     No discharge procedures on file         ED Provider  Electronically Signed by     Gema Nova MD  12/25/22 6659

## 2022-12-25 NOTE — UTILIZATION REVIEW
Initial Clinical Review    OBSERVATION WRITTEN 12/25/22 @ 0004 CONVERTED TO INPATIENT ADMISSION 12/25/22 @ 0920 DUE TO FURTHER DIAGNOSTIC WORKUP REQUIRED FOR CHEST PAIN, REQUIRING AT LEAST A 2 MIDNIGHT STAY  Admission: Date/Time/Statement:   Admission Orders (From admission, onward)     Ordered        12/25/22 0920  Inpatient Admission  Once            12/25/22 0004  Place in Observation  Once                      Orders Placed This Encounter   Procedures   • Inpatient Admission     Standing Status:   Standing     Number of Occurrences:   1     Order Specific Question:   Level of Care     Answer:   Med Surg [16]     Order Specific Question:   Estimated length of stay     Answer:   More than 2 Midnights     Order Specific Question:   Certification     Answer:   I certify that inpatient services are medically necessary for this patient for a duration of greater than two midnights  See H&P and MD Progress Notes for additional information about the patient's course of treatment  ED Arrival Information     Expected   -    Arrival   12/24/2022 19:58    Acuity   Urgent            Means of arrival   Walk-In    Escorted by   Family Member    Service   Hospitalist    Admission type   Emergency            Arrival complaint   Left side pain           Chief Complaint   Patient presents with   • Chest Pain     Started Wednesday night/ pain under left breast/ indigestion/ pain 10/sharp shooting pain, travels down and around abdomen, - N/V       Initial Presentation: 81 y o  female with PMH of  anxiety, CAD presents with c/o left-sided chest pain and belching since Wednesday  Also c/o decreased appetite and chills  No relief with antacids at home  In ED, pt hypertensive, trop 37-->40, given IVF, pain med, Mylanta, nitro paste applied  Chest wall tenderness noted, small hiatal hernia on CT   Initially admitted under Observation status changed to Inpatient dt chest pain: med surg telemetry, trend trops, EKG with 3rd trop, monitor BP, continue home meds  ED Triage Vitals   Temperature Pulse Respirations Blood Pressure SpO2   12/24/22 2022 12/24/22 2022 12/24/22 2022 12/24/22 2022 12/24/22 2022   98 1 °F (36 7 °C) 86 16 (!) 171/90 98 %      Temp Source Heart Rate Source Patient Position - Orthostatic VS BP Location FiO2 (%)   12/24/22 2022 -- 12/25/22 0000 12/25/22 0000 --   Oral  Lying Left arm       Pain Score       12/24/22 2106       10 - Worst Possible Pain          Wt Readings from Last 1 Encounters:   12/24/22 83 5 kg (184 lb)     Additional Vital Signs:   Date/Time Temp Pulse Resp BP MAP (mmHg) SpO2 O2 Device Patient Position - Orthostatic VS   12/25/22 0600 -- 92 30 Abnormal  165/77 109 90 % None (Room air) Lying   12/25/22 0500 -- 94 28 Abnormal  160/78 112 91 % None (Room air) Lying   12/25/22 0400 -- 97 24 Abnormal  165/85 118 90 % None (Room air) Lying   12/25/22 0300 -- 101 27 Abnormal  142/82 107 91 % None (Room air) Lying   12/25/22 0000 -- 106 Abnormal  -- 187/78 Abnormal  112 93 % None (Room air) Lying   12/24/22 2330 -- 103 -- 174/79 Abnormal  114 91 % None (Room air) --   12/24/22 2022 98 1 °F (36 7 °C) 86 16 171/90 Abnormal  -- 98 % None (Room air) --     Pertinent Labs/Diagnostic Test Results  12/24 EKG: SR WITH PROLONGED QTC INTERVAL    CT abdomen pelvis with contrast   Final Result by Vazquez Kelly MD (12/24 2225)      No bowel obstruction or definite evidence of acute inflammatory process in the abdomen or pelvis  Small hiatal hernia  Colonic diverticulosis  Workstation performed: [id number]         XR chest 1 view portable   Final Result by Cecilia Lance MD (27/30 4074)      No acute cardiopulmonary disease                    Workstation performed: [id number]           Results from last 7 days   Lab Units 12/25/22  0137   SARS-COV-2  Negative     Results from last 7 days   Lab Units 12/24/22 2058   WBC Thousand/uL 6 90   HEMOGLOBIN g/dL 15 6*   HEMATOCRIT % 47 5*   PLATELETS Thousands/uL 257   NEUTROS ABS Thousands/µL 4 86         Results from last 7 days   Lab Units 12/24/22 2058   SODIUM mmol/L 137   POTASSIUM mmol/L 3 8   CHLORIDE mmol/L 100   CO2 mmol/L 28   ANION GAP mmol/L 9   BUN mg/dL 9   CREATININE mg/dL 0 56*   EGFR ml/min/1 73sq m 94   CALCIUM mg/dL 9 5     Results from last 7 days   Lab Units 12/24/22 2058   AST U/L 21   ALT U/L 16   ALK PHOS U/L 128*   TOTAL PROTEIN g/dL 7 0   ALBUMIN g/dL 3 8   TOTAL BILIRUBIN mg/dL 0 71         Results from last 7 days   Lab Units 12/24/22 2058   GLUCOSE RANDOM mg/dL 141*     Results from last 7 days   Lab Units 12/25/22  0122 12/24/22 2308 12/24/22 2058   HS TNI 0HR ng/L  --   --  37   HS TNI 2HR ng/L  --  34  --    HSTNI D2 ng/L  --  -3  --    HS TNI 4HR ng/L 40  --   --    HSTNI D4 ng/L 3  --   --          Results from last 7 days   Lab Units 12/24/22 2130   PROTIME seconds 13 8   INR  1 06   PTT seconds 28     Results from last 7 days   Lab Units 12/24/22 2058   LIPASE u/L <6*     Results from last 7 days   Lab Units 12/24/22 2058   CLARITY UA  Clear   COLOR UA  Yellow   SPEC GRAV UA  1 025   PH UA  6 0   GLUCOSE UA mg/dl Negative   KETONES UA mg/dl Trace*   BLOOD UA  Negative   PROTEIN UA mg/dl Negative   NITRITE UA  Negative   BILIRUBIN UA  Negative   UROBILINOGEN UA E U /dl 0 2   LEUKOCYTES UA  Negative     Results from last 7 days   Lab Units 12/25/22  0137   INFLUENZA A PCR  Negative   INFLUENZA B PCR  Negative   RSV PCR  Negative     ED Treatment:   Medication Administration from 12/24/2022 1958 to 12/25/2022 6381       Date/Time Order Dose Route Action     12/24/2022 2107 EST sodium chloride 0 9 % bolus 1,000 mL 1,000 mL Intravenous New Bag     12/24/2022 2106 EST HYDROmorphone (DILAUDID) injection 0 5 mg 0 5 mg Intravenous Given     12/24/2022 2156 EST ondansetron (ZOFRAN) injection 4 mg 4 mg Intravenous Given     12/24/2022 2158 EST iohexol (OMNIPAQUE) 350 MG/ML injection (SINGLE-DOSE) 100 mL 100 mL Intravenous Given 12/24/2022 2255 EST HYDROmorphone HCl (DILAUDID) injection 0 2 mg 0 2 mg Intravenous Given     12/24/2022 2255 EST aluminum-magnesium hydroxide-simethicone (MYLANTA) oral suspension 30 mL 30 mL Oral Given     12/24/2022 2255 EST Lidocaine Viscous HCl (XYLOCAINE) 2 % mucosal solution 15 mL 15 mL Swish & Swallow Given     12/25/2022 8672 EST aspirin chewable tablet 324 mg 324 mg Oral Given     12/25/2022 0029 EST nitroglycerin (NITRO-BID) 2 % TD ointment 1 inch 1 inch Topical Given     12/25/2022 0120 EST DULoxetine (CYMBALTA) delayed release capsule 30 mg 30 mg Oral Given     12/25/2022 0120 EST melatonin tablet 9 mg 9 mg Oral Given     12/25/2022 0120 EST oxybutynin (DITROPAN-XL) 24 hr tablet 10 mg 10 mg Oral Given        Past Medical History:   Diagnosis Date   • Anxiety    • Arthritis    • Asthma    • [facility]     rt mastectomy 2005   • Cancer [facility])     breast right   • Cataract    • CHF (congestive heart failure) ([facility])    • Chronic headaches    • Concussion     At age 7; Chronic headaches since then   • Coronary artery disease    • Depression    • Dietary lactose intolerance    • GERD (gastroesophageal reflux disease)    • High cholesterol    • History of chemotherapy     2005 rt breast cancer   • History of transfusion    • Hypertension    • IBS (irritable bowel syndrome)    • Irritable bowel syndrome (IBS) 05/31/2016   • Kidney stone    • Lymphedema of right arm    • Obesity    • PFO (patent foramen ovale)    • PONV (postoperative nausea and vomiting)    • Post-menopausal    • Psychiatric disorder    • Renal disorder    • Shortness of breath    • Vitamin D deficiency      Present on Admission:  • Chest pain  • Recurrent major depressive disorder, in partial remission ([facility] )  • Irritable bowel syndrome (IBS)  • Mixed stress and urge urinary incontinence      Admitting Diagnosis: No admission diagnoses are documented for this encounter    Age/Sex: 81 y o  female  Admission Orders:  Scheduled Medications:  ascorbic acid, 500 mg, Oral, Daily  atorvastatin, 20 mg, Oral, Daily  buPROPion, 300 mg, Oral, QAM  DULoxetine, 30 mg, Oral, HS  DULoxetine, 60 mg, Oral, Daily  enoxaparin, 40 mg, Subcutaneous, BID  lisinopril, 5 mg, Oral, Daily  loratadine, 10 mg, Oral, Daily  lubiprostone, 24 mcg, Oral, BID With Meals  melatonin, 9 mg, Oral, HS  oxybutynin, 10 mg, Oral, HS  pantoprazole, 40 mg, Oral, Early Morning      Continuous IV Infusions: none     PRN Meds:  acetaminophen, 650 mg, Oral, Q6H PRN  albuterol, 2 5 mg, Nebulization, Q6H PRN  aluminum-magnesium hydroxide-simethicone, 30 mL, Oral, Q4H PRN  ondansetron, 4 mg, Intravenous, Q6H PRN  oxyCODONE, 2 5 mg, Oral, Q4H PRN      SCD    Network Utilization Review Department  ATTENTION: Please call with any questions or concerns to [phone] and carefully listen to the prompts so that you are directed to the right person  All voicemails are confidential   Ailyn Gracia all requests for admission clinical reviews, approved or denied determinations and any other requests to dedicated fax number below belonging to the campus where the patient is receiving treatment   List of dedicated fax numbers for the Facilities:  [address] DENIALS (Administrative/Medical Necessity) [phone]   [address] (Maternity/NICU/Pediatrics) [phone]   [address] [phone]   [facility]  [phone]   [address] [address] [address] [place] [address] [facility] [phone]   [address] [address] [place]gg Pending sale to [facility] 134 815 [facility] [phone]

## 2022-12-26 ENCOUNTER — APPOINTMENT (INPATIENT)
Dept: ULTRASOUND IMAGING | Facility: HOSPITAL | Age: 72
End: 2022-12-26

## 2022-12-26 PROBLEM — L74.0 HEAT RASH: Status: ACTIVE | Noted: 2022-12-26

## 2022-12-26 PROBLEM — R74.01 TRANSAMINITIS: Status: ACTIVE | Noted: 2022-12-26

## 2022-12-26 LAB
ALBUMIN SERPL BCP-MCNC: 3.2 G/DL (ref 3.5–5)
ALP SERPL-CCNC: 152 U/L (ref 34–104)
ALT SERPL W P-5'-P-CCNC: 92 U/L (ref 7–52)
ANION GAP SERPL CALCULATED.3IONS-SCNC: 7 MMOL/L (ref 4–13)
AST SERPL W P-5'-P-CCNC: 95 U/L (ref 13–39)
BASOPHILS # BLD AUTO: 0.02 THOUSANDS/ÂΜL (ref 0–0.1)
BASOPHILS NFR BLD AUTO: 0 % (ref 0–1)
BILIRUB SERPL-MCNC: 0.76 MG/DL (ref 0.2–1)
BUN SERPL-MCNC: 6 MG/DL (ref 5–25)
CALCIUM ALBUM COR SERPL-MCNC: 9.1 MG/DL (ref 8.3–10.1)
CALCIUM SERPL-MCNC: 8.5 MG/DL (ref 8.4–10.2)
CHLORIDE SERPL-SCNC: 101 MMOL/L (ref 96–108)
CO2 SERPL-SCNC: 28 MMOL/L (ref 21–32)
CREAT SERPL-MCNC: 0.55 MG/DL (ref 0.6–1.3)
EOSINOPHIL # BLD AUTO: 0.08 THOUSAND/ÂΜL (ref 0–0.61)
EOSINOPHIL NFR BLD AUTO: 2 % (ref 0–6)
ERYTHROCYTE [DISTWIDTH] IN BLOOD BY AUTOMATED COUNT: 12.3 % (ref 11.6–15.1)
GFR SERPL CREATININE-BSD FRML MDRD: 95 ML/MIN/1.73SQ M
GLUCOSE SERPL-MCNC: 126 MG/DL (ref 65–140)
HCT VFR BLD AUTO: 42.5 % (ref 34.8–46.1)
HGB BLD-MCNC: 13.8 G/DL (ref 11.5–15.4)
IMM GRANULOCYTES # BLD AUTO: 0.02 THOUSAND/UL (ref 0–0.2)
IMM GRANULOCYTES NFR BLD AUTO: 0 % (ref 0–2)
LYMPHOCYTES # BLD AUTO: 0.65 THOUSANDS/ÂΜL (ref 0.6–4.47)
LYMPHOCYTES NFR BLD AUTO: 13 % (ref 14–44)
MAGNESIUM SERPL-MCNC: 1.9 MG/DL (ref 1.9–2.7)
MCH RBC QN AUTO: 30.3 PG (ref 26.8–34.3)
MCHC RBC AUTO-ENTMCNC: 32.5 G/DL (ref 31.4–37.4)
MCV RBC AUTO: 93 FL (ref 82–98)
MONOCYTES # BLD AUTO: 0.69 THOUSAND/ÂΜL (ref 0.17–1.22)
MONOCYTES NFR BLD AUTO: 14 % (ref 4–12)
NEUTROPHILS # BLD AUTO: 3.62 THOUSANDS/ÂΜL (ref 1.85–7.62)
NEUTS SEG NFR BLD AUTO: 71 % (ref 43–75)
NRBC BLD AUTO-RTO: 0 /100 WBCS
PLATELET # BLD AUTO: 184 THOUSANDS/UL (ref 149–390)
PMV BLD AUTO: 9.2 FL (ref 8.9–12.7)
POTASSIUM SERPL-SCNC: 3.8 MMOL/L (ref 3.5–5.3)
PROT SERPL-MCNC: 5.9 G/DL (ref 6.4–8.4)
RBC # BLD AUTO: 4.55 MILLION/UL (ref 3.81–5.12)
SODIUM SERPL-SCNC: 136 MMOL/L (ref 135–147)
WBC # BLD AUTO: 5.08 THOUSAND/UL (ref 4.31–10.16)

## 2022-12-26 RX ORDER — POLYETHYLENE GLYCOL 3350 17 G/17G
17 POWDER, FOR SOLUTION ORAL DAILY PRN
Status: DISCONTINUED | OUTPATIENT
Start: 2022-12-26 | End: 2022-12-28 | Stop reason: HOSPADM

## 2022-12-26 RX ORDER — SUCRALFATE 1 G/1
1 TABLET ORAL
Status: DISCONTINUED | OUTPATIENT
Start: 2022-12-26 | End: 2022-12-28 | Stop reason: HOSPADM

## 2022-12-26 RX ORDER — OXYCODONE HCL 10 MG/1
10 TABLET, FILM COATED, EXTENDED RELEASE ORAL AS NEEDED
COMMUNITY

## 2022-12-26 RX ORDER — PANTOPRAZOLE SODIUM 40 MG/1
40 TABLET, DELAYED RELEASE ORAL
Status: DISCONTINUED | OUTPATIENT
Start: 2022-12-26 | End: 2022-12-28 | Stop reason: HOSPADM

## 2022-12-26 RX ADMIN — OXYCODONE HYDROCHLORIDE 2.5 MG: 5 TABLET ORAL at 21:09

## 2022-12-26 RX ADMIN — PANTOPRAZOLE SODIUM 40 MG: 40 TABLET, DELAYED RELEASE ORAL at 17:25

## 2022-12-26 RX ADMIN — PANTOPRAZOLE SODIUM 40 MG: 40 TABLET, DELAYED RELEASE ORAL at 05:07

## 2022-12-26 RX ADMIN — SUCRALFATE 1 G: 1 TABLET ORAL at 21:09

## 2022-12-26 RX ADMIN — Medication 9 MG: at 21:08

## 2022-12-26 RX ADMIN — OXYCODONE HYDROCHLORIDE AND ACETAMINOPHEN 500 MG: 500 TABLET ORAL at 08:20

## 2022-12-26 RX ADMIN — ENOXAPARIN SODIUM 40 MG: 100 INJECTION SUBCUTANEOUS at 08:21

## 2022-12-26 RX ADMIN — LUBIPROSTONE 24 MCG: 24 CAPSULE, GELATIN COATED ORAL at 17:25

## 2022-12-26 RX ADMIN — OXYCODONE HYDROCHLORIDE 2.5 MG: 5 TABLET ORAL at 10:46

## 2022-12-26 RX ADMIN — DULOXETINE 30 MG: 30 CAPSULE, DELAYED RELEASE ORAL at 21:08

## 2022-12-26 RX ADMIN — SUCRALFATE 1 G: 1 TABLET ORAL at 13:01

## 2022-12-26 RX ADMIN — BUPROPION HYDROCHLORIDE 300 MG: 150 TABLET, EXTENDED RELEASE ORAL at 08:19

## 2022-12-26 RX ADMIN — LORATADINE 10 MG: 10 TABLET ORAL at 08:21

## 2022-12-26 RX ADMIN — LUBIPROSTONE 24 MCG: 24 CAPSULE, GELATIN COATED ORAL at 08:21

## 2022-12-26 RX ADMIN — OXYBUTYNIN 10 MG: 5 TABLET, FILM COATED, EXTENDED RELEASE ORAL at 21:09

## 2022-12-26 RX ADMIN — ENOXAPARIN SODIUM 40 MG: 100 INJECTION SUBCUTANEOUS at 17:25

## 2022-12-26 RX ADMIN — DULOXETINE 60 MG: 60 CAPSULE, DELAYED RELEASE ORAL at 08:19

## 2022-12-26 RX ADMIN — LISINOPRIL 5 MG: 5 TABLET ORAL at 08:20

## 2022-12-26 RX ADMIN — ATORVASTATIN CALCIUM 20 MG: 20 TABLET, FILM COATED ORAL at 08:20

## 2022-12-26 NOTE — PROGRESS NOTES
[facility]  68   Progress Note - Lucy Tony 1950, 81 y o  female MRN: [id number]  Unit/Bed#: ED 24 Encounter: [id number]  Primary Care Provider: Mile Wheeler PA-C   Date and time admitted to hospital: 12/24/2022  8:17 PM    * Chest pain  Assessment & Plan  · ACS has been ruled out  · Appreciate cardiology input  · Most likely secondary to her variant of acid reflux disease, in the setting of having a hiatal hernia  · Appreciate GI input  · Protonix has been increased to twice a day, Carafate has also been started  · EGD is also being considered by GI at this time  · Will add MiraLAX, and or an enema if needed to help with constipation, constipation may be playing a factor in her abdominal/epigastric pain  · Anticipate discharge planning if cleared by GI on 12/27/2022    Transaminitis  Assessment & Plan  · Significant rise noted in AST, ALT, and alk phos  · T bili is normal  · We will check a hepatitis serology  · We will check her right upper quadrant ultrasound -of note patient is already status post a history of a cholecystectomy in the past, at this time looking for gallbladder sludge -may or may not need an MRCP depending on repeat LFTs in the a m  Recurrent major depressive disorder, in partial remission (HCC)  Assessment & Plan  · Continue home Wellbutrin and Cymbalta    Irritable bowel syndrome (IBS)  Assessment & Plan  · Continue Amitiza  · See above for additional constipation management    Mixed stress and urge urinary incontinence  Assessment & Plan  · Continue Ditropan    Body mass index (BMI) of 40 0-44 9 in adult [facility])  Assessment & Plan  · Actual BMI of 41 25  · Dietary, weight loss, and lifestyle modification counseling was provided    Heat rash  Assessment & Plan    · Has improved since this a m    · Continue supportive therapy    Essential hypertension  Assessment & Plan  · Continue lisinopril      VTE Prophylaxis:  Enoxaparin (Lovenox)    Patient Centered Rounds: I have performed bedside rounds with nursing staff today  Discussions with Specialists or Other Care Team Provider: GI, case management, nursing  Education and Discussions with Family / Patient: Patient's daughter was bedside at the time of my rounding evaluation, all questions answered to her satisfaction    Current Length of Stay: 1 day(s)    Current Patient Status: Inpatient   Certification Statement: The patient will continue to require additional inpatient hospital stay due to The need for hepatic ultrasound, and transaminitis management    Discharge Plan: Hopeful discharge planning for 2022    Code Status: Level 1 - Full Code    Subjective:   Patient seen and examined, reports feeling better than prior to coming into the hospital, she continues to have a vague epigastric discomfort that also has a component of reproducibility, and feels that the pain is worse with food    Objective:     Vitals:   Temp (24hrs), Av 2 °F (36 8 °C), Min:98 °F (36 7 °C), Max:98 6 °F (37 °C)    Temp:  [98 °F (36 7 °C)-98 6 °F (37 °C)] 98 6 °F (37 °C)  HR:  [] 93  Resp:  [18-22] 18  BP: (143-169)/(73-88) 149/73  SpO2:  [92 %-95 %] 93 %  Body mass index is 41 25 kg/m²  Input and Output Summary (last 24 hours): Intake/Output Summary (Last 24 hours) at 2022 1235  Last data filed at 2022 1423  Gross per 24 hour   Intake 1000 ml   Output --   Net 1000 ml       Physical Exam:   Physical Exam  Constitutional:       General: She is not in acute distress  Appearance: Normal appearance  She is normal weight  She is not ill-appearing  HENT:      Head: Normocephalic and atraumatic  Nose: Nose normal       Mouth/Throat:      Mouth: Mucous membranes are moist    Eyes:      Extraocular Movements: Extraocular movements intact  Pupils: Pupils are equal, round, and reactive to light  Cardiovascular:      Rate and Rhythm: Normal rate and regular rhythm  Pulses: Normal pulses        Heart sounds: Normal heart sounds  No murmur heard  No friction rub  No gallop  Pulmonary:      Effort: Pulmonary effort is normal  No respiratory distress  Breath sounds: Normal breath sounds  No wheezing, rhonchi or rales  Abdominal:      General: There is no distension  Palpations: Abdomen is soft  There is no mass  Tenderness: There is no abdominal tenderness  Hernia: No hernia is present  Musculoskeletal:         General: No swelling or tenderness  Normal range of motion  Cervical back: Normal range of motion and neck supple  No rigidity  Right lower leg: No edema  Left lower leg: No edema  Skin:     General: Skin is warm  Capillary Refill: Capillary refill takes less than 2 seconds  Findings: No erythema or rash  Comments: Heat rash noted above the left gluteal region-as above   Neurological:      General: No focal deficit present  Mental Status: She is alert and oriented to person, place, and time  Mental status is at baseline  Cranial Nerves: No cranial nerve deficit  Motor: No weakness  Psychiatric:         Mood and Affect: Mood normal          Behavior: Behavior normal          Additional Data:     Labs:    Results from last 7 days   Lab Units 12/26/22  0427   WBC Thousand/uL 5 08   HEMOGLOBIN g/dL 13 8   HEMATOCRIT % 42 5   PLATELETS Thousands/uL 184   NEUTROS PCT % 71   LYMPHS PCT % 13*   MONOS PCT % 14*   EOS PCT % 2     Results from last 7 days   Lab Units 12/26/22  0427   SODIUM mmol/L 136   POTASSIUM mmol/L 3 8   CHLORIDE mmol/L 101   CO2 mmol/L 28   BUN mg/dL 6   CREATININE mg/dL 0 55*   CALCIUM mg/dL 8 5   ALK PHOS U/L 152*   ALT U/L 92*   AST U/L 95*     Results from last 7 days   Lab Units 12/24/22  2130   INR  1 06               * I Have Reviewed All Lab Data Listed Above  * Additional Pertinent Lab Tests Reviewed:  All Labs For Current Hospital Admission  Reviewed    Imaging:  Imaging Reports Reviewed Today Include: None    Recent Cultures (last 7 days):           Last 24 Hours Medication List:   Current Facility-Administered Medications   Medication Dose Route Frequency Provider Last Rate   • acetaminophen  650 mg Oral Q6H PRN [facility]     • albuterol  2 5 mg Nebulization Q6H PRN [facility], KOFI     • aluminum-magnesium hydroxide-simethicone  30 mL Oral Q4H PRN Evern Bitter, DO     • ascorbic acid  500 mg Oral Daily [facility], KOFI     • atorvastatin  20 mg Oral Daily [facility]     • buPROPion  300 mg Oral QAM [facility]     • DULoxetine  30 mg Oral HS [facility], KOFI     • DULoxetine  60 mg Oral Daily [place], [place]     • enoxaparin  40 mg Subcutaneous BID [facility], KOFI     • lisinopril  5 mg Oral Daily [place], [place]     • loratadine  10 mg Oral Daily [place], [place]     • lubiprostone  24 mcg Oral BID With Meals [facility], KOFI     • melatonin  9 mg Oral HS [facility]     • ondansetron  4 mg Intravenous Q6H PRN [facility]     • oxybutynin  10 mg Oral HS [facility], KOFI     • oxyCODONE  2 5 mg Oral Q4H PRN Evern Bitter, DO     • pantoprazole  40 mg Oral BID AC Shadi Limon MD     • polyethylene glycol  17 g Oral Daily PRN Shadi Limon MD     • sucralfate  1 g Oral 4x Daily [facility] & HS) Shadi Limon MD          Today, Patient Was Seen By: Shadi Limon MD    ** Please Note: Dictation voice to text software may have been used in the creation of this document   **

## 2022-12-26 NOTE — ASSESSMENT & PLAN NOTE
· ACS has been ruled out  · Appreciate cardiology input  · Most likely secondary to her variant of acid reflux disease, in the setting of having a hiatal hernia  · Appreciate GI input  · Protonix has been increased to twice a day, Carafate has also been started  · Will add MiraLAX, and or an enema if needed to help with constipation, constipation may be playing a factor in her abdominal/epigastric pain  · Anticipate discharge planning if cleared by GI on 12/27/2022

## 2022-12-26 NOTE — UTILIZATION REVIEW
Continued Stay Review    Date 2: 12/26/2022                      Current Patient Class: Inpatient  Current Level of Care: Med/Surg    HPI:72 y o  female initially admitted on  12/25/2022   Chest Pain  Assessment/Plan:   Continues to have a vague epigastric discomfort that also has a component of reproducibility, and feels that the pain is worse with food  Protonix has been increased to twice a day, Carafate has also been started  EGD being considered by GI  Will add MiraLAX, and or an enema if needed to help with constipation, constipation may be playing a factor in her abdominal/epigastric pain  12/25/2022  Consult Cardio:  Chest pain  Assessment & Plan:  Noncardiac  Has been present for several days and also involves the abdomen with anorexia  No further work-up cardiac wise is needed        Vital Signs: /73   Pulse 93   Temp 98 6 °F (37 °C)   Resp 18   Ht 4' 8" (1 422 m)   Wt 83 5 kg (184 lb)   LMP  (LMP Unknown)   SpO2 93%   BMI 41 25 kg/m²   Date/Time Temp Pulse Resp BP MAP (mmHg) SpO2 O2 Device Patient Position - Orthostatic VS   12/26/22 1047 -- 93 18 149/73 95 93 % None (Room air) --   12/26/22 0724 98 6 °F (37 °C) 103 18 169/88 116 93 % None (Room air) --   12/26/22 0418 98 °F (36 7 °C) 102 18 156/77 -- 95 % None (Room air) --     Date and Time Eye Opening Best Verbal Response Best Motor Response [place] Coma Scale Score   12/26/22 0720 4 5 6 15   12/25/22 0849 4 5 6 15   12/25/22 0132 4 5 1 10       Pertinent Labs/Diagnostic Results:   Results from last 7 days   Lab Units 12/25/22 0137   SARS-COV-2  Negative     Results from last 7 days   Lab Units 12/26/22 0427 12/24/22  2058   WBC Thousand/uL 5 08 6 90   HEMOGLOBIN g/dL 13 8 15 6*   HEMATOCRIT % 42 5 47 5*   PLATELETS Thousands/uL 184 257   NEUTROS ABS Thousands/µL 3 62 4 86     Results from last 7 days   Lab Units 12/26/22 0427 12/24/22 2058   SODIUM mmol/L 136 137   POTASSIUM mmol/L 3 8 3 8   CHLORIDE mmol/L 101 100   CO2 mmol/L 28 28   ANION GAP mmol/L 7 9   BUN mg/dL 6 9   CREATININE mg/dL 0 55* 0 56*   EGFR ml/min/1 73sq m 95 94   CALCIUM mg/dL 8 5 9 5   MAGNESIUM mg/dL 1 9  --      Results from last 7 days   Lab Units 12/26/22 0427 12/24/22 2058   AST U/L 95* 21   ALT U/L 92* 16   ALK PHOS U/L 152* 128*   TOTAL PROTEIN g/dL 5 9* 7 0   ALBUMIN g/dL 3 2* 3 8   TOTAL BILIRUBIN mg/dL 0 76 0 71     Results from last 7 days   Lab Units 12/26/22 0427 12/24/22 2058   GLUCOSE RANDOM mg/dL 126 141*     Results from last 7 days   Lab Units 12/25/22  0122 12/24/22 2308 12/24/22 2058   HS TNI 0HR ng/L  --   --  37   HS TNI 2HR ng/L  --  34  --    HSTNI D2 ng/L  --  -3  --    HS TNI 4HR ng/L 40  --   --    HSTNI D4 ng/L 3  --   --      Results from last 7 days   Lab Units 12/24/22  2130   PROTIME seconds 13 8   INR  1 06   PTT seconds 28     Results from last 7 days   Lab Units 12/24/22 2058   LIPASE u/L <6*     Results from last 7 days   Lab Units 12/24/22 2058   CLARITY UA  Clear   COLOR UA  Yellow   SPEC GRAV UA  1 025   PH UA  6 0   GLUCOSE UA mg/dl Negative   KETONES UA mg/dl Trace*   BLOOD UA  Negative   PROTEIN UA mg/dl Negative   NITRITE UA  Negative   BILIRUBIN UA  Negative   UROBILINOGEN UA E U /dl 0 2   LEUKOCYTES UA  Negative     Results from last 7 days   Lab Units 12/25/22  0137   INFLUENZA A PCR  Negative   INFLUENZA B PCR  Negative   RSV PCR  Negative     Medications:   Scheduled Medications:  ascorbic acid, 500 mg, Oral, Daily  atorvastatin, 20 mg, Oral, Daily  buPROPion, 300 mg, Oral, QAM  DULoxetine, 30 mg, Oral, HS  DULoxetine, 60 mg, Oral, Daily  enoxaparin, 40 mg, Subcutaneous, BID  lisinopril, 5 mg, Oral, Daily  loratadine, 10 mg, Oral, Daily  lubiprostone, 24 mcg, Oral, BID With Meals  melatonin, 9 mg, Oral, HS  oxybutynin, 10 mg, Oral, HS  pantoprazole, 40 mg, Oral, BID AC  sucralfate, 1 g, Oral, 4x Daily (AC & HS)      Continuous IV Infusions:     PRN Meds:  acetaminophen, 650 mg, Oral, Q6H PRN  X 1 dose 12/25  albuterol, 2 5 mg, Nebulization, Q6H PRN  aluminum-magnesium hydroxide-simethicone, 30 mL, Oral, Q4H PRN  ondansetron, 4 mg, Intravenous, Q6H PRN  oxyCODONE, 2 5 mg, Oral, Q4H PRN  X 2 doses 12/25;  X 1 dose 12/26  polyethylene glycol, 17 g, Oral, Daily PRN        Discharge Plan: TBD    Network Utilization Review Department  ATTENTION: Please call with any questions or concerns to [phone] and carefully listen to the prompts so that you are directed to the right person  All voicemails are confidential   Ailyn Gracia all requests for admission clinical reviews, approved or denied determinations and any other requests to dedicated fax number below belonging to the campus where the patient is receiving treatment   List of dedicated fax numbers for the Facilities:  [address] DENIALS (Administrative/Medical Necessity) [phone]   [address] (Maternity/NICU/Pediatrics) [phone]   [address] [phone]   [facility] [phone]   [address] [place] [address] [place] [address] [phone] [address] [place] [address]le [place] [address] 815 [facility] [phone]

## 2022-12-26 NOTE — ASSESSMENT & PLAN NOTE
· Significant rise noted in AST, ALT, and alk phos  · T bili is normal  · We will check a hepatitis serology  · We will check her right upper quadrant ultrasound -of note patient is already status post a history of a cholecystectomy in the past, at this time looking for gallbladder sludge -may or may not need an MRCP depending on repeat LFTs in the a m

## 2022-12-26 NOTE — NUTRITION
12/26/22 1114   Current PO Intake   Current Diet Order Cardiac diet, no chocolate, no caffeine, lactose restricted   Current Meal Intake Other (Comment)  (Unable to assess at this time )   Estimated calorie intake compared to estimated need Unable to assess at this time  Per chart review pt has had a few days of poor PO with decreased appetite PTA  Anticipate nutrition needs not met at this time  PES Statement   Weight (3) Overweight/obesity NC-3 3   Related to Energy intake>energy output over time   As evidenced by: BMI   Recommendations/Interventions   Adult BMI Classifications Morbid Obesity 40-44 9   Summary Presents 12/24 with chest pain  Per 11/17 nutrition assessment, "Pt reports consistent "okay" appetite, as 3 meals/day; does not have pork products or milk " Recommend obtaining updated weight via standing scale as able  Weight appearing stable over time  Per chart review pt has had a few days of poor PO with decreased appetite PTA  RD to monitor PO intakes and assess for nutrition supplement need at follow up assessment  Interventions/Recommendations Continue current diet order;Monitor I & O's   Education Assessment   Education Education not indicated at this time   Patient Nutrition Goals   Goal Adequate hydration; Adequate intake

## 2022-12-26 NOTE — CONSULTS
Consultation - [facility]  Jackie Miner 81 y o  female MRN: [id number]  Unit/Bed#: ED 24 Encounter: [id number]        Consults    Reason for Consult / Principal Problem:     Abdominal pain  Chest pain  Abnormal LFTs      ASSESSMENT AND PLAN:      80 yo F with BMI 41 who was admitted 12/25 for chest and abdominal pain  ACS ruled out  GI consulted for abdominal pain  Etiology of pain unclear  Worsened symptoms with eating suggest there may be a peptic component to symptoms  Transaminases are mildly elevated but she is s/p CCY and CBD is WNL on CT making biliary obstruction unlikely  May also have MSK component as she is tender to palpation over the sternum    -Increase PPI to twice daily  -Add carafate QID  -Repeat LFTs tomorrow  If still elevated tomorrow, would get RUQ ultrasound  -Will make NPO at [facility] in case patient's symptoms are not improved, would consider EGD tomorrow  If patient's symptoms improve, she can be discharged and does not require EGD    ______________________________________________________________________    HPI:    Patient reports that starting Wednesday, she had LUQ pain that radiated all around her abdomen  Pain worse with eating but always there  Only improved with pain medications  No NSAID use  No nausea, vomiting, diarrhea  Reports she has been constipated for 1 week and that this is unusual for her as she reports having IBS-D (though med list has Linzess)  Had colonoscopy 2016 with no tubular adenomas, repeat 2026    REVIEW OF SYSTEMS:    CONSTITUTIONAL: Denies any fever, chills, rigors, and weight loss  HEENT: No earache or tinnitus  Denies hearing loss or visual disturbances  CARDIOVASCULAR: No chest pain or palpitations  RESPIRATORY: Denies any cough, hemoptysis, shortness of breath or dyspnea on exertion  GASTROINTESTINAL: As noted in the History of Present Illness  GENITOURINARY: No problems with urination   Denies any hematuria or dysuria  NEUROLOGIC: No dizziness or vertigo, denies headaches  MUSCULOSKELETAL: Denies any muscle or joint pain  SKIN: Denies skin rashes or itching  ENDOCRINE: Denies excessive thirst  Denies intolerance to heat or cold  PSYCHOSOCIAL: Denies depression or anxiety  Denies any recent memory loss         Historical Information   Past Medical History:   Diagnosis Date   • Anxiety    • Arthritis    • Asthma    • Breast cancer ([address] )     rt mastectomy 2005   • Cancer [facility])     breast right   • Cataract    • CHF (congestive heart failure) (HCC)    • Chronic headaches    • Concussion     At age 7; Chronic headaches since then   • Coronary artery disease    • Depression    • Dietary lactose intolerance    • GERD (gastroesophageal reflux disease)    • High cholesterol    • History of chemotherapy     2005 rt breast cancer   • History of transfusion    • Hypertension    • IBS (irritable bowel syndrome)    • Irritable bowel syndrome (IBS) 05/31/2016   • Kidney stone    • Lymphedema of right arm    • Obesity    • PFO (patent foramen ovale)    • PONV (postoperative nausea and vomiting)    • Post-menopausal    • Psychiatric disorder    • Renal disorder    • Shortness of breath    • Vitamin D deficiency      Past Surgical History:   Procedure Laterality Date   • BREAST SURGERY     • CATARACT EXTRACTION Bilateral    • CATARACT EXTRACTION, BILATERAL     • CHOLECYSTECTOMY     • COLONOSCOPY N/A 05/31/2016    Procedure: COLONOSCOPY;  Surgeon: Theresa Mcnulty MD;  Location: [facility] MAIN OR;  Service:    • GALLBLADDER SURGERY     • HYSTERECTOMY      Total   • JOINT REPLACEMENT     • KNEE ARTHROSCOPY     • KNEE SURGERY      arthroscopy   • MASTECTOMY Right     rt breast mastectomy 2005   • NM ARTHRP KNE CONDYLE&PLATU MEDIAL&LAT COMPARTMENTS Left 05/20/2021    Procedure: ARTHROPLASTY KNEE TOTAL;  Surgeon: Pema Bloom MD;  Location: [address] [place] [address] MAIN OR;  Service: Orthopedics   • NM ARTHRP KNE CONDYLE&PLATU MEDIAL&LAT COMPARTMENTS Right 11/16/2022 Procedure: ARTHROPLASTY KNEE TOTAL;  Surgeon: Dalton Reaves DO;  Location: [facility] MAIN OR;  Service: Orthopedics   • TONSILLECTOMY AND ADENOIDECTOMY       Social History   Social History     Substance and Sexual Activity   Alcohol Use Yes   • Alcohol/week: 1 0 standard drink   • Types: 1 Glasses of wine per week    Comment: socially     Social History     Substance and Sexual Activity   Drug Use Not Currently   • Types: Marijuana    Comment: In her youth     Social History     Tobacco Use   Smoking Status Never   Smokeless Tobacco Never     Family History   Adopted: Yes   Problem Relation Age of Onset   • Diabetes Mother    • Heart disease Mother    • Mental illness Mother    • Depression Mother    • Heart disease Brother    • Breast cancer Maternal Aunt    • Breast cancer Maternal Aunt    • Breast cancer Maternal Aunt    • Breast cancer Maternal Aunt    • Breast cancer Maternal Aunt    • No Known Problems Father    • No Known Problems Sister    • No Known Problems Daughter    • Breast cancer Maternal Grandmother    • No Known Problems Sister    • No Known Problems Sister    • No Known Problems Sister        Meds/Allergies     (Not in a hospital admission)    Current Facility-Administered Medications   Medication Dose Route Frequency   • acetaminophen (TYLENOL) tablet 650 mg  650 mg Oral Q6H PRN   • albuterol inhalation solution 2 5 mg  2 5 mg Nebulization Q6H PRN   • aluminum-magnesium hydroxide-simethicone (MYLANTA) oral suspension 30 mL  30 mL Oral Q4H PRN   • ascorbic acid (VITAMIN C) tablet 500 mg  500 mg Oral Daily   • atorvastatin (LIPITOR) tablet 20 mg  20 mg Oral Daily   • buPROPion (WELLBUTRIN XL) 24 hr tablet 300 mg  300 mg Oral QAM   • DULoxetine (CYMBALTA) delayed release capsule 30 mg  30 mg Oral HS   • DULoxetine (CYMBALTA) delayed release capsule 60 mg  60 mg Oral Daily   • enoxaparin (LOVENOX) subcutaneous injection 40 mg  40 mg Subcutaneous BID   • lisinopril (ZESTRIL) tablet 5 mg  5 mg Oral Daily • loratadine (CLARITIN) tablet 10 mg  10 mg Oral Daily   • lubiprostone (AMITIZA) capsule 24 mcg  24 mcg Oral BID With Meals   • melatonin tablet 9 mg  9 mg Oral HS   • ondansetron (ZOFRAN) injection 4 mg  4 mg Intravenous Q6H PRN   • oxybutynin (DITROPAN-XL) 24 hr tablet 10 mg  10 mg Oral HS   • oxyCODONE (ROXICODONE) IR tablet 2 5 mg  2 5 mg Oral Q4H PRN   • pantoprazole (PROTONIX) EC tablet 40 mg  40 mg Oral BID AC   • polyethylene glycol (MIRALAX) packet 17 g  17 g Oral Daily PRN   • sucralfate (CARAFATE) tablet 1 g  1 g Oral 4x Daily (AC & HS)       Allergies   Allergen Reactions   • Ibuprofen Nausea Only, Rash, Dizziness and Syncope   • Morphine Other (See Comments) and Hallucinations     Intolerance   • Latex Dermatitis           Objective     Blood pressure 149/73, pulse 93, temperature 98 6 °F (37 °C), resp  rate 18, height 4' 8" (1 422 m), weight 83 5 kg (184 lb), SpO2 93 %, not currently breastfeeding  Body mass index is 41 25 kg/m²  Intake/Output Summary (Last 24 hours) at 12/26/2022 1254  Last data filed at 12/25/2022 1423  Gross per 24 hour   Intake 1000 ml   Output --   Net 1000 ml         PHYSICAL EXAM:      General Appearance:   Alert, cooperative, no distress   HEENT:   Normocephalic, atraumatic, anicteric      Neck:  Supple, symmetrical, trachea midline   Lungs:   Clear to auscultation bilaterally; no rales, rhonchi or wheezing; respirations unlabored    Heart[de-identified]   Regular rate and rhythm; no murmur, rub, or gallop     Abdomen:   Soft, mild LUQ TTP, non-distended; normal bowel sounds; no masses, no organomegaly    Genitalia:   Deferred    Rectal:   Deferred    Extremities:  No cyanosis, clubbing or edema    Pulses:  2+ and symmetric all extremities    Skin:  No jaundice, rashes, or lesions    Lymph nodes:  No palpable cervical lymphadenopathy        Lab Results:   Admission on 12/24/2022   Component Date Value   • Protime 12/24/2022 13 8    • INR 12/24/2022 1 06    • PTT 12/24/2022 28    • WBC 12/24/2022 6 90    • RBC 12/24/2022 5 19 (H)    • Hemoglobin 12/24/2022 15 6 (H)    • Hematocrit 12/24/2022 47 5 (H)    • MCV 12/24/2022 92    • MCH 12/24/2022 30 1    • MCHC 12/24/2022 32 8    • RDW 12/24/2022 12 2    • MPV 12/24/2022 9 1    • Platelets 59/53/5832 257    • nRBC 12/24/2022 0    • Neutrophils Relative 12/24/2022 71    • Immat GRANS % 12/24/2022 0    • Lymphocytes Relative 12/24/2022 17    • Monocytes Relative 12/24/2022 11    • Eosinophils Relative 12/24/2022 0    • Basophils Relative 12/24/2022 1    • Neutrophils Absolute 12/24/2022 4 86    • Immature Grans Absolute 12/24/2022 0 03    • Lymphocytes Absolute 12/24/2022 1 17    • Monocytes Absolute 12/24/2022 0 77    • Eosinophils Absolute 12/24/2022 0 03    • Basophils Absolute 12/24/2022 0 04    • Sodium 12/24/2022 137    • Potassium 12/24/2022 3 8    • Chloride 12/24/2022 100    • CO2 12/24/2022 28    • ANION GAP 12/24/2022 9    • BUN 12/24/2022 9    • Creatinine 12/24/2022 0 56 (L)    • Glucose 12/24/2022 141 (H)    • Calcium 12/24/2022 9 5    • AST 12/24/2022 21    • ALT 12/24/2022 16    • Alkaline Phosphatase 12/24/2022 128 (H)    • Total Protein 12/24/2022 7 0    • Albumin 12/24/2022 3 8    • Total Bilirubin 12/24/2022 0 71    • eGFR 12/24/2022 94    • Lipase 12/24/2022 <6 (L)    • Color, UA 12/24/2022 Yellow    • Clarity, UA 12/24/2022 Clear    • Specific Gravity, UA 12/24/2022 1 025    • pH, UA 12/24/2022 6 0    • Leukocytes, UA 12/24/2022 Negative    • Nitrite, UA 12/24/2022 Negative    • Protein, UA 12/24/2022 Negative    • Glucose, UA 12/24/2022 Negative    • Ketones, UA 12/24/2022 Trace (A)    • Urobilinogen, UA 12/24/2022 0 2    • Bilirubin, UA 12/24/2022 Negative    • Occult Blood, UA 12/24/2022 Negative    • hs TnI 0hr 12/24/2022 37    • Ventricular Rate 12/24/2022 89    • Atrial Rate 12/24/2022 89    • OR Interval 12/24/2022 172    • QRSD Interval 12/24/2022 96    • QT Interval 12/24/2022 410    • QTC Interval 12/24/2022 498    • P Axis 12/24/2022 57    • QRS Axis 12/24/2022 17    • T Wave Axis 12/24/2022 69    • hs TnI 2hr 12/24/2022 34    • Delta 2hr hsTnI 12/24/2022 -3    • Ventricular Rate 12/24/2022 96    • Atrial Rate 12/24/2022 96    • TN Interval 12/24/2022 188    • QRSD Interval 12/24/2022 94    • QT Interval 12/24/2022 394    • QTC Interval 12/24/2022 497    • P Axis 12/24/2022 70    • QRS Axis 12/24/2022 38    • T Wave Axis 12/24/2022 71    • hs TnI 4hr 12/25/2022 40    • Delta 4hr hsTnI 12/25/2022 3    • Ventricular Rate 12/24/2022 100    • Atrial Rate 12/24/2022 100    • TN Interval 12/24/2022 210    • QRSD Interval 12/24/2022 96    • QT Interval 12/24/2022 400    • QTC Interval 12/24/2022 516    • P Axis 12/24/2022 85    • QRS Axis 12/24/2022 17    • T Wave Axis 12/24/2022 58    • SARS-CoV-2 12/25/2022 Negative    • INFLUENZA A PCR 12/25/2022 Negative    • INFLUENZA B PCR 12/25/2022 Negative    • RSV PCR 12/25/2022 Negative    • Ventricular Rate 12/25/2022 92    • Atrial Rate 12/25/2022 92    • TN Interval 12/25/2022 182    • QRSD Interval 12/25/2022 96    • QT Interval 12/25/2022 374    • QTC Interval 12/25/2022 462    • P Axis 12/25/2022 64    • QRS Axis 12/25/2022 10    • T Wave Axis 12/25/2022 82    • WBC 12/26/2022 5 08    • RBC 12/26/2022 4 55    • Hemoglobin 12/26/2022 13 8    • Hematocrit 12/26/2022 42 5    • MCV 12/26/2022 93    • MCH 12/26/2022 30 3    • MCHC 12/26/2022 32 5    • RDW 12/26/2022 12 3    • MPV 12/26/2022 9 2    • Platelets 04/02/0069 184    • nRBC 12/26/2022 0    • Neutrophils Relative 12/26/2022 71    • Immat GRANS % 12/26/2022 0    • Lymphocytes Relative 12/26/2022 13 (L)    • Monocytes Relative 12/26/2022 14 (H)    • Eosinophils Relative 12/26/2022 2    • Basophils Relative 12/26/2022 0    • Neutrophils Absolute 12/26/2022 3 62    • Immature Grans Absolute 12/26/2022 0 02    • Lymphocytes Absolute 12/26/2022 0 65    • Monocytes Absolute 12/26/2022 0 69    • Eosinophils Absolute 12/26/2022 0 08    • Basophils Absolute 12/26/2022 0 02    • Sodium 12/26/2022 136    • Potassium 12/26/2022 3 8    • Chloride 12/26/2022 101    • CO2 12/26/2022 28    • ANION GAP 12/26/2022 7    • BUN 12/26/2022 6    • Creatinine 12/26/2022 0 55 (L)    • Glucose 12/26/2022 126    • Calcium 12/26/2022 8 5    • Corrected Calcium 12/26/2022 9 1    • AST 12/26/2022 95 (H)    • ALT 12/26/2022 92 (H)    • Alkaline Phosphatase 12/26/2022 152 (H)    • Total Protein 12/26/2022 5 9 (L)    • Albumin 12/26/2022 3 2 (L)    • Total Bilirubin 12/26/2022 0 76    • eGFR 12/26/2022 95    • Magnesium 12/26/2022 1 9        Imaging Studies: I have personally reviewed pertinent imaging studies

## 2022-12-27 LAB
ALBUMIN SERPL BCP-MCNC: 3.1 G/DL (ref 3.5–5)
ALP SERPL-CCNC: 143 U/L (ref 34–104)
ALT SERPL W P-5'-P-CCNC: 63 U/L (ref 7–52)
ANION GAP SERPL CALCULATED.3IONS-SCNC: 6 MMOL/L (ref 4–13)
AST SERPL W P-5'-P-CCNC: 48 U/L (ref 13–39)
BASOPHILS # BLD AUTO: 0.02 THOUSANDS/ÂΜL (ref 0–0.1)
BASOPHILS NFR BLD AUTO: 1 % (ref 0–1)
BILIRUB SERPL-MCNC: 0.79 MG/DL (ref 0.2–1)
BUN SERPL-MCNC: 9 MG/DL (ref 5–25)
CALCIUM ALBUM COR SERPL-MCNC: 9.2 MG/DL (ref 8.3–10.1)
CALCIUM SERPL-MCNC: 8.5 MG/DL (ref 8.4–10.2)
CHLORIDE SERPL-SCNC: 100 MMOL/L (ref 96–108)
CO2 SERPL-SCNC: 29 MMOL/L (ref 21–32)
CREAT SERPL-MCNC: 0.6 MG/DL (ref 0.6–1.3)
EOSINOPHIL # BLD AUTO: 0.08 THOUSAND/ÂΜL (ref 0–0.61)
EOSINOPHIL NFR BLD AUTO: 2 % (ref 0–6)
ERYTHROCYTE [DISTWIDTH] IN BLOOD BY AUTOMATED COUNT: 12.4 % (ref 11.6–15.1)
GFR SERPL CREATININE-BSD FRML MDRD: 92 ML/MIN/1.73SQ M
GLUCOSE SERPL-MCNC: 113 MG/DL (ref 65–140)
HAV IGM SER QL: NORMAL
HBV CORE IGM SER QL: NORMAL
HBV SURFACE AG SER QL: NORMAL
HCT VFR BLD AUTO: 42.5 % (ref 34.8–46.1)
HCV AB SER QL: NORMAL
HGB BLD-MCNC: 13.6 G/DL (ref 11.5–15.4)
IMM GRANULOCYTES # BLD AUTO: 0.05 THOUSAND/UL (ref 0–0.2)
IMM GRANULOCYTES NFR BLD AUTO: 1 % (ref 0–2)
LYMPHOCYTES # BLD AUTO: 0.51 THOUSANDS/ÂΜL (ref 0.6–4.47)
LYMPHOCYTES NFR BLD AUTO: 14 % (ref 14–44)
MCH RBC QN AUTO: 29.8 PG (ref 26.8–34.3)
MCHC RBC AUTO-ENTMCNC: 32 G/DL (ref 31.4–37.4)
MCV RBC AUTO: 93 FL (ref 82–98)
MONOCYTES # BLD AUTO: 0.55 THOUSAND/ÂΜL (ref 0.17–1.22)
MONOCYTES NFR BLD AUTO: 15 % (ref 4–12)
NEUTROPHILS # BLD AUTO: 2.37 THOUSANDS/ÂΜL (ref 1.85–7.62)
NEUTS SEG NFR BLD AUTO: 67 % (ref 43–75)
NRBC BLD AUTO-RTO: 0 /100 WBCS
PLATELET # BLD AUTO: 170 THOUSANDS/UL (ref 149–390)
PMV BLD AUTO: 9.2 FL (ref 8.9–12.7)
POTASSIUM SERPL-SCNC: 3.6 MMOL/L (ref 3.5–5.3)
PROT SERPL-MCNC: 5.6 G/DL (ref 6.4–8.4)
RBC # BLD AUTO: 4.56 MILLION/UL (ref 3.81–5.12)
SODIUM SERPL-SCNC: 135 MMOL/L (ref 135–147)
WBC # BLD AUTO: 3.58 THOUSAND/UL (ref 4.31–10.16)

## 2022-12-27 RX ORDER — GABAPENTIN 100 MG/1
100 CAPSULE ORAL 3 TIMES DAILY
Status: DISCONTINUED | OUTPATIENT
Start: 2022-12-27 | End: 2022-12-28 | Stop reason: HOSPADM

## 2022-12-27 RX ORDER — VALACYCLOVIR HYDROCHLORIDE 500 MG/1
1000 TABLET, FILM COATED ORAL EVERY 8 HOURS SCHEDULED
Status: DISCONTINUED | OUTPATIENT
Start: 2022-12-27 | End: 2022-12-28 | Stop reason: HOSPADM

## 2022-12-27 RX ADMIN — LORATADINE 10 MG: 10 TABLET ORAL at 16:05

## 2022-12-27 RX ADMIN — LUBIPROSTONE 24 MCG: 24 CAPSULE, GELATIN COATED ORAL at 16:05

## 2022-12-27 RX ADMIN — SUCRALFATE 1 G: 1 TABLET ORAL at 22:27

## 2022-12-27 RX ADMIN — LISINOPRIL 5 MG: 5 TABLET ORAL at 16:04

## 2022-12-27 RX ADMIN — VALACYCLOVIR HYDROCHLORIDE 1000 MG: 500 TABLET, FILM COATED ORAL at 22:35

## 2022-12-27 RX ADMIN — Medication 9 MG: at 22:26

## 2022-12-27 RX ADMIN — OXYCODONE HYDROCHLORIDE 2.5 MG: 5 TABLET ORAL at 20:24

## 2022-12-27 RX ADMIN — ATORVASTATIN CALCIUM 20 MG: 20 TABLET, FILM COATED ORAL at 16:05

## 2022-12-27 RX ADMIN — VALACYCLOVIR HYDROCHLORIDE 1000 MG: 500 TABLET, FILM COATED ORAL at 17:22

## 2022-12-27 RX ADMIN — OXYCODONE HYDROCHLORIDE AND ACETAMINOPHEN 500 MG: 500 TABLET ORAL at 16:05

## 2022-12-27 RX ADMIN — GABAPENTIN 100 MG: 100 CAPSULE ORAL at 20:24

## 2022-12-27 RX ADMIN — PANTOPRAZOLE SODIUM 40 MG: 40 TABLET, DELAYED RELEASE ORAL at 16:04

## 2022-12-27 RX ADMIN — GABAPENTIN 100 MG: 100 CAPSULE ORAL at 17:22

## 2022-12-27 RX ADMIN — DULOXETINE 30 MG: 30 CAPSULE, DELAYED RELEASE ORAL at 22:27

## 2022-12-27 RX ADMIN — DULOXETINE 60 MG: 60 CAPSULE, DELAYED RELEASE ORAL at 16:10

## 2022-12-27 RX ADMIN — OXYBUTYNIN 10 MG: 5 TABLET, FILM COATED, EXTENDED RELEASE ORAL at 22:26

## 2022-12-27 RX ADMIN — SUCRALFATE 1 G: 1 TABLET ORAL at 16:05

## 2022-12-27 RX ADMIN — BUPROPION HYDROCHLORIDE 300 MG: 150 TABLET, EXTENDED RELEASE ORAL at 16:04

## 2022-12-27 RX ADMIN — ENOXAPARIN SODIUM 40 MG: 100 INJECTION SUBCUTANEOUS at 17:22

## 2022-12-27 NOTE — PROGRESS NOTES
[facility]  Progress Note - Leyla Jason 1950, 81 y o  female MRN: [id number]  Unit/Bed#: -01 Encounter: [id number]  Primary Care Provider: Mina Cano PA-C   Date and time admitted to hospital: 12/24/2022  8:17 PM    * Chest pain  Assessment & Plan  · ACS has been ruled out  · Appreciate cardiology input  · Most likely secondary to her variant of acid reflux disease, in the setting of having a hiatal hernia  · Appreciate GI input  · Protonix has been increased to twice a day, Carafate has also been started  · Reviewed with GI, patient to go for an endoscopy tomorrow  · N p o  past midnight    Transaminitis  Assessment & Plan  · LFTs are improved and downtrending  · Hepatitis serologies negative  · Right upper quadrant ultrasound revealed no significant pathology  · Continue to monitor for now    Recurrent major depressive disorder, in partial remission (HCC)  Assessment & Plan  · Continue home Wellbutrin and Cymbalta    Irritable bowel syndrome (IBS)  Assessment & Plan  · Continue Amitiza and as needed MiraLAX    Mixed stress and urge urinary incontinence  Assessment & Plan  · Continue Ditropan    Body mass index (BMI) of 40 0-44 9 in adult [facility])  Assessment & Plan  · Actual BMI of 41 25  · Dietary, weight loss, and lifestyle modification counseling was provided    Essential hypertension  Assessment & Plan  · Continue lisinopril    Heat rash  Assessment & Plan  · Initial consideration was for the possibility of a heat rash  · Over the past 24 hours the rash has progressed, and become painful and does not cross the midline posteriorly and is now redemonstrating itself in a dermatomal distribution  · Will start treatment for zoster with valacyclovir, and gabapentin for pain control, implement contact precautions -no open lesions seen  · Will consult dermatology        VTE Prophylaxis:  Enoxaparin (Lovenox)    Patient Centered Rounds: I have performed bedside rounds with nursing staff today  Discussions with Specialists or Other Care Team Provider: GI, case management, nursing  Education and Discussions with Family / Patient: Patient was brought up to par    Current Length of Stay: 2 day(s)    Current Patient Status: Inpatient   Certification Statement: The patient will continue to require additional inpatient hospital stay due to An EGD tomorrow    Discharge Plan: Potential discharge planning post EGD completion    Code Status: Level 1 - Full Code    Subjective:   Patient seen, resting in bed, complains of a 7 out of 10 discomfort in the area of her rash, describes it as burning pain  Objective:     Vitals:   Temp (24hrs), Av 4 °F (36 9 °C), Min:97 7 °F (36 5 °C), Max:99 1 °F (37 3 °C)    Temp:  [97 7 °F (36 5 °C)-99 1 °F (37 3 °C)] 97 7 °F (36 5 °C)  HR:  [] 104  Resp:  [18-20] 18  BP: (120-127)/(48-84) 120/74  SpO2:  [90 %-92 %] 92 %  Body mass index is 39 76 kg/m²  Input and Output Summary (last 24 hours): Intake/Output Summary (Last 24 hours) at 2022 1616  Last data filed at 2022 1700  Gross per 24 hour   Intake 480 ml   Output --   Net 480 ml       Physical Exam:   Physical Exam  Constitutional:       General: She is not in acute distress  Appearance: Normal appearance  She is normal weight  She is not ill-appearing  HENT:      Head: Normocephalic and atraumatic  Nose: Nose normal       Mouth/Throat:      Mouth: Mucous membranes are moist    Eyes:      Extraocular Movements: Extraocular movements intact  Pupils: Pupils are equal, round, and reactive to light  Cardiovascular:      Rate and Rhythm: Normal rate and regular rhythm  Pulses: Normal pulses  Heart sounds: Normal heart sounds  No murmur heard  No friction rub  No gallop  Pulmonary:      Effort: Pulmonary effort is normal  No respiratory distress  Breath sounds: Normal breath sounds  No wheezing, rhonchi or rales     Abdominal:      General: There is no distension  Palpations: Abdomen is soft  There is no mass  Tenderness: There is no abdominal tenderness  Hernia: No hernia is present  Musculoskeletal:         General: No swelling or tenderness  Normal range of motion  Cervical back: Normal range of motion and neck supple  No rigidity  Right lower leg: No edema  Left lower leg: No edema  Skin:     General: Skin is warm  Capillary Refill: Capillary refill takes less than 2 seconds  Findings: No erythema or rash  Comments: Left mid abdomen rash noted in dermatomal distribution, does not cross the midline   Neurological:      General: No focal deficit present  Mental Status: She is alert and oriented to person, place, and time  Mental status is at baseline  Cranial Nerves: No cranial nerve deficit  Motor: No weakness  Psychiatric:         Mood and Affect: Mood normal          Behavior: Behavior normal          Additional Data:     Labs:    Results from last 7 days   Lab Units 12/27/22  0433   WBC Thousand/uL 3 58*   HEMOGLOBIN g/dL 13 6   HEMATOCRIT % 42 5   PLATELETS Thousands/uL 170   NEUTROS PCT % 67   LYMPHS PCT % 14   MONOS PCT % 15*   EOS PCT % 2     Results from last 7 days   Lab Units 12/27/22  0433   SODIUM mmol/L 135   POTASSIUM mmol/L 3 6   CHLORIDE mmol/L 100   CO2 mmol/L 29   BUN mg/dL 9   CREATININE mg/dL 0 60   CALCIUM mg/dL 8 5   ALK PHOS U/L 143*   ALT U/L 63*   AST U/L 48*     Results from last 7 days   Lab Units 12/24/22  2130   INR  1 06               * I Have Reviewed All Lab Data Listed Above  * Additional Pertinent Lab Tests Reviewed:  [facility] Admission  Reviewed    Imaging:  Imaging Reports Reviewed Today Include: None    Recent Cultures (last 7 days):           Last 24 Hours Medication List:   Current Facility-Administered Medications   Medication Dose Route Frequency Provider Last Rate   • acetaminophen  650 mg Oral Q6H PRN Marguerite Lewis KOFI Cook     • albuterol  2 5 mg Nebulization Q6H PRN Gonzalo Lui PA-C     • aluminum-magnesium hydroxide-simethicone  30 mL Oral Q4H PRN [place] Every, DO     • ascorbic acid  500 mg Oral Daily Gonzalo Lui PA-C     • atorvastatin  20 mg Oral Daily Gonzalo [facility] [place]     • buPROPion  300 mg Oral QAM Gonzalo [facility] [place]     • DULoxetine  30 mg Oral HS Gonzalo Lui PA-C     • DULoxetine  60 mg Oral Daily [place], [place]     • enoxaparin  40 mg Subcutaneous BID Gonzalo Lui PA-C     • lisinopril  5 mg Oral Daily [place], [place]     • loratadine  10 mg Oral Daily [place], [place]     • lubiprostone  24 mcg Oral BID With Meals Gonzalo Lui PA-C     • melatonin  9 mg Oral HS Gonzalo Lui PA-C     • ondansetron  4 mg Intravenous Q6H PRN Gonzalo Lui PA-C     • oxybutynin  10 mg Oral HS Gonzalo Lui PA-C     • oxyCODONE  2 5 mg Oral Q4H PRN [place] Every, DO     • pantoprazole  40 mg Oral BID AC Sultana Fontaine MD     • polyethylene glycol  17 g Oral Daily PRN Sultana Fontaine MD     • sucralfate  1 g Oral 4x Daily [facility] ) Sultana Fontaine MD          Today, Patient Was Seen By: Sultana Fontaine MD    ** Please Note: Dictation voice to text software may have been used in the creation of this document   **

## 2022-12-27 NOTE — NURSING NOTE
Clarified egd with GI, not happening today, pt given water to drink, denies any pain, dr Jennifer West aware of rash, pt on contact isolation d/t ? ?? shingles

## 2022-12-27 NOTE — ASSESSMENT & PLAN NOTE
· LFTs are improved and downtrending  · Hepatitis serologies negative  · Right upper quadrant ultrasound revealed no significant pathology  · Continue to monitor for now

## 2022-12-27 NOTE — ASSESSMENT & PLAN NOTE
· Initial consideration was for the possibility of a heat rash  · Over the past 24 hours the rash has progressed, and become painful and does not cross the midline posteriorly and is now redemonstrating itself in a dermatomal distribution  · Will start treatment for zoster with valacyclovir, and gabapentin for pain control, implement contact precautions -no open lesions seen  · Will consult dermatology

## 2022-12-27 NOTE — PROGRESS NOTES
Progress Note- Mallory Pisano 81 y o  female MRN: [id number]    Unit/Bed#: -01 Encounter: [id number]      Assessment and Plan:    80 y/o F w/ pmhx sig for HTN, HLD, HLD, aortic stenosis, IBS, lactose intolerance, anxiety/depression  Hx of cholecystectomy  Presented to hospital on 12/25/22 for chest pain/abdominal pain of unclear etiology  ACS ruled out  Symptoms worsen with positional change, touch, but also post-prandial discomfort  Pt had been on arixtra and subsequently ASA 325mg daily following TKA in 11/2022  LFTs also elevated on admission, CBD wnl on RUQ US  Abdominal pain  Chest pain  Vesicular rash    · Pt with abdominal pain and chest pain of unclear etiology  · Suspect pt's symptoms are multifactorial, MSK component may be playing a role  Given post-prandial symptoms and ASA use, consider peptic component to etiology as well  · Continue PPI BID and scheduled carafate  · As of 12/27/22, there appears to be a uni-lateral left sided vesicular rash along the T5/6 dermatome  This is warm, hypersensitive, and TTP  There is some suspicion pt may have herpes zoster, and recommend Dermatology evaluation  Given this finding, do wonder if this could be contributing to pt's presentation of abdominal pain/chest pain  · Defer EGD at this time given modest improvement in symptoms with PPI BID and carafate, as well as given new finding of rash that requires additional evaluation  · Attending physician to evaluate pt later in day on 12/27/22 for final recs  Elevated LFTs    · No findings concerning for obstruction on RUQ US from 12/26/22  · LFTs improving as of 12/27/22 with AST 48, ALT 63, , T bili 0 79  · Follow up on results of acute hepatitis panel  · Continue to trend LFTs daily and avoid hepatotoxins  · If elevation persists, would recommend comprehensive serologic evaluation, though defer at present given improvement in values       GI will continue to follow at this time   ______________________________________________________________________    Subjective:     Patient is a 81 y o  female w/ pmhx sig for HTN, HLD, HLD, aortic stenosis, IBS, lactose intolerance, anxiety/depression  Presented to ER with acute onset of abdominal pain and chest pain  Shares she had been taking Scott  mg over the past few weeks following R TKA  Interval events:   Pt continues to have LUQ/epigastric pain that radiates up sternum  Modestly improved sine increasing PPI  Worse with positional change, though also notes poor appetite and post-prandial nausea  Afebrile, Tmax 99 1F in past 24 hours  Rash is worsening on left side of body, sensitive to the touch  Notes constipation, no BRBPR or melena       Medication Administration - last 24 hours from 12/26/2022 0744 to 12/27/2022 0744       Date/Time Order Dose Route Action Action by     12/26/2022 1725 EST enoxaparin (LOVENOX) subcutaneous injection 40 mg 40 mg Subcutaneous Given Sowmya aVldez RN     12/26/2022 3468 EST enoxaparin (LOVENOX) subcutaneous injection 40 mg 40 mg Subcutaneous Given Kate Fournier RN     12/26/2022 0820 EST ascorbic acid (VITAMIN C) tablet 500 mg 500 mg Oral Given Kate Fournier RN     12/26/2022 0820 EST atorvastatin (LIPITOR) tablet 20 mg 20 mg Oral Given Kate Fournier RN     12/26/2022 4332 EST buPROPion (WELLBUTRIN XL) 24 hr tablet 300 mg 300 mg Oral Given Kate Fournier RN     12/26/2022 6081 EST DULoxetine (CYMBALTA) delayed release capsule 60 mg 60 mg Oral Given Kate Fournier RN     12/26/2022 2108 EST DULoxetine (CYMBALTA) delayed release capsule 30 mg 30 mg Oral Given Kostas Langley RN     12/26/2022 0820 EST lisinopril (ZESTRIL) tablet 5 mg 5 mg Oral Given Kate Fournier RN     12/26/2022 8351 EST loratadine (CLARITIN) tablet 10 mg 10 mg Oral Given Kate Fournier RN     12/26/2022 2108 EST melatonin tablet 9 mg 9 mg Oral Given Kostas Langley RN     12/26/2022 2109 EST oxybutynin (DITROPAN-XL) 24 hr tablet 10 mg 10 mg Oral Given Trav Govea, KOURTNEY     12/26/2022 1725 EST lubiprostone (AMITIZA) capsule 24 mcg 24 mcg Oral Given Ran Verdin, KOURTNEY     12/26/2022 7378 EST lubiprostone (AMITIZA) capsule 24 mcg 24 mcg Oral Given Mariela Carson, KOURTNEY     12/26/2022 2109 EST oxyCODONE (ROXICODONE) IR tablet 2 5 mg 2 5 mg Oral Given Trav Govea, KOURTNEY     12/26/2022 1046 EST oxyCODONE (ROXICODONE) IR tablet 2 5 mg 2 5 mg Oral Given Mariela Carson, KOURTNEY     12/27/2022 0503 EST pantoprazole (PROTONIX) EC tablet 40 mg 40 mg Oral Not Given Trav Govea, KOURTNEY     12/26/2022 1725 EST pantoprazole (PROTONIX) EC tablet 40 mg 40 mg Oral Given Ran Verdin RN     12/27/2022 0503 EST sucralfate (CARAFATE) tablet 1 g 1 g Oral Not Given Trav Govea, KOURTNEY     12/26/2022 2109 EST sucralfate (CARAFATE) tablet 1 g 1 g Oral Given Trav Govea RN     12/26/2022 1523 EST sucralfate (CARAFATE) tablet 1 g 0 g Oral Hold Mariela Carson RN     12/26/2022 1301 EST sucralfate (CARAFATE) tablet 1 g 1 g Oral Given Mariela Carson RN        Review of Systems   Per HPI    Objective:     Vitals: Blood pressure 127/84, pulse 89, temperature 98 1 °F (36 7 °C), resp  rate 18, height 4' 8" (1 422 m), weight 80 4 kg (177 lb 5 8 oz), SpO2 92 %, not currently breastfeeding  ,Body mass index is 39 76 kg/m²  Intake/Output Summary (Last 24 hours) at 12/27/2022 0744  Last data filed at 12/26/2022 1700  Gross per 24 hour   Intake 480 ml   Output --   Net 480 ml     Physical Exam  Vitals and nursing note reviewed  Constitutional:       Appearance: She is obese  She is not ill-appearing or toxic-appearing  HENT:      Head: Normocephalic and atraumatic  Cardiovascular:      Rate and Rhythm: Normal rate  Heart sounds: Murmur heard  Pulmonary:      Effort: Pulmonary effort is normal    Abdominal:      General: Abdomen is protuberant  Bowel sounds are normal       Palpations: Abdomen is soft  Tenderness: There is abdominal tenderness   There is guarding (active)  There is no rebound  Skin:     General: Skin is warm  Findings: Rash present  Comments:     Neurological:      General: No focal deficit present  Mental Status: She is alert and oriented to person, place, and time     Psychiatric:         Mood and Affect: Mood normal          Behavior: Behavior normal        Invasive Devices     Peripheral Intravenous Line  Duration           Peripheral IV 12/24/22 Left Hand 2 days          Drain  Duration           Autologus Reinfusion/Drain Device Right Knee 40 days    External Urinary Catheter 39 days              Lab Results:  Admission on 12/24/2022   Component Date Value   • Protime 12/24/2022 13 8    • INR 12/24/2022 1 06    • PTT 12/24/2022 28    • WBC 12/24/2022 6 90    • RBC 12/24/2022 5 19 (H)    • Hemoglobin 12/24/2022 15 6 (H)    • Hematocrit 12/24/2022 47 5 (H)    • MCV 12/24/2022 92    • MCH 12/24/2022 30 1    • MCHC 12/24/2022 32 8    • RDW 12/24/2022 12 2    • MPV 12/24/2022 9 1    • Platelets 63/80/6605 257    • nRBC 12/24/2022 0    • Neutrophils Relative 12/24/2022 71    • Immat GRANS % 12/24/2022 0    • Lymphocytes Relative 12/24/2022 17    • Monocytes Relative 12/24/2022 11    • Eosinophils Relative 12/24/2022 0    • Basophils Relative 12/24/2022 1    • Neutrophils Absolute 12/24/2022 4 86    • Immature Grans Absolute 12/24/2022 0 03    • Lymphocytes Absolute 12/24/2022 1 17    • Monocytes Absolute 12/24/2022 0 77    • Eosinophils Absolute 12/24/2022 0 03    • Basophils Absolute 12/24/2022 0 04    • Sodium 12/24/2022 137    • Potassium 12/24/2022 3 8    • Chloride 12/24/2022 100    • CO2 12/24/2022 28    • ANION GAP 12/24/2022 9    • BUN 12/24/2022 9    • Creatinine 12/24/2022 0 56 (L)    • Glucose 12/24/2022 141 (H)    • Calcium 12/24/2022 9 5    • AST 12/24/2022 21    • ALT 12/24/2022 16    • Alkaline Phosphatase 12/24/2022 128 (H)    • Total Protein 12/24/2022 7 0    • Albumin 12/24/2022 3 8    • Total Bilirubin 12/24/2022 0 71    • eGFR 12/24/2022 94    • Lipase 12/24/2022 <6 (L)    • Color, UA 12/24/2022 Yellow    • Clarity, UA 12/24/2022 Clear    • Specific Gravity, UA 12/24/2022 1 025    • pH, UA 12/24/2022 6 0    • Leukocytes, UA 12/24/2022 Negative    • Nitrite, UA 12/24/2022 Negative    • Protein, UA 12/24/2022 Negative    • Glucose, UA 12/24/2022 Negative    • Ketones, UA 12/24/2022 Trace (A)    • Urobilinogen, UA 12/24/2022 0 2    • Bilirubin, UA 12/24/2022 Negative    • Occult Blood, UA 12/24/2022 Negative    • hs TnI 0hr 12/24/2022 37    • Ventricular Rate 12/24/2022 89    • Atrial Rate 12/24/2022 89    • NV Interval 12/24/2022 172    • QRSD Interval 12/24/2022 96    • QT Interval 12/24/2022 410    • QTC Interval 12/24/2022 498    • P Axis 12/24/2022 57    • QRS Axis 12/24/2022 17    • T Wave Axis 12/24/2022 69    • hs TnI 2hr 12/24/2022 34    • Delta 2hr hsTnI 12/24/2022 -3    • Ventricular Rate 12/24/2022 96    • Atrial Rate 12/24/2022 96    • NV Interval 12/24/2022 188    • QRSD Interval 12/24/2022 94    • QT Interval 12/24/2022 394    • QTC Interval 12/24/2022 497    • P Axis 12/24/2022 70    • QRS Axis 12/24/2022 38    • T Wave Axis 12/24/2022 71    • hs TnI 4hr 12/25/2022 40    • Delta 4hr hsTnI 12/25/2022 3    • Ventricular Rate 12/24/2022 100    • Atrial Rate 12/24/2022 100    • NV Interval 12/24/2022 210    • QRSD Interval 12/24/2022 96    • QT Interval 12/24/2022 400    • QTC Interval 12/24/2022 516    • P Axis 12/24/2022 85    • QRS Axis 12/24/2022 17    • T Wave Axis 12/24/2022 58    • SARS-CoV-2 12/25/2022 Negative    • INFLUENZA A PCR 12/25/2022 Negative    • INFLUENZA B PCR 12/25/2022 Negative    • RSV PCR 12/25/2022 Negative    • Ventricular Rate 12/25/2022 92    • Atrial Rate 12/25/2022 92    • NV Interval 12/25/2022 182    • QRSD Interval 12/25/2022 96    • QT Interval 12/25/2022 374    • QTC Interval 12/25/2022 462    • P Axis 12/25/2022 64    • QRS Axis 12/25/2022 10    • T Wave Axis 12/25/2022 82    • WBC 12/26/2022 5 08    • RBC 12/26/2022 4 55    • Hemoglobin 12/26/2022 13 8    • Hematocrit 12/26/2022 42 5    • MCV 12/26/2022 93    • MCH 12/26/2022 30 3    • MCHC 12/26/2022 32 5    • RDW 12/26/2022 12 3    • MPV 12/26/2022 9 2    • Platelets 51/82/5116 184    • nRBC 12/26/2022 0    • Neutrophils Relative 12/26/2022 71    • Immat GRANS % 12/26/2022 0    • Lymphocytes Relative 12/26/2022 13 (L)    • Monocytes Relative 12/26/2022 14 (H)    • Eosinophils Relative 12/26/2022 2    • Basophils Relative 12/26/2022 0    • Neutrophils Absolute 12/26/2022 3 62    • Immature Grans Absolute 12/26/2022 0 02    • Lymphocytes Absolute 12/26/2022 0 65    • Monocytes Absolute 12/26/2022 0 69    • Eosinophils Absolute 12/26/2022 0 08    • Basophils Absolute 12/26/2022 0 02    • Sodium 12/26/2022 136    • Potassium 12/26/2022 3 8    • Chloride 12/26/2022 101    • CO2 12/26/2022 28    • ANION GAP 12/26/2022 7    • BUN 12/26/2022 6    • Creatinine 12/26/2022 0 55 (L)    • Glucose 12/26/2022 126    • Calcium 12/26/2022 8 5    • Corrected Calcium 12/26/2022 9 1    • AST 12/26/2022 95 (H)    • ALT 12/26/2022 92 (H)    • Alkaline Phosphatase 12/26/2022 152 (H)    • Total Protein 12/26/2022 5 9 (L)    • Albumin 12/26/2022 3 2 (L)    • Total Bilirubin 12/26/2022 0 76    • eGFR 12/26/2022 95    • Magnesium 12/26/2022 1 9    • Sodium 12/27/2022 135    • Potassium 12/27/2022 3 6    • Chloride 12/27/2022 100    • CO2 12/27/2022 29    • ANION GAP 12/27/2022 6    • BUN 12/27/2022 9    • Creatinine 12/27/2022 0 60    • Glucose 12/27/2022 113    • Calcium 12/27/2022 8 5    • Corrected Calcium 12/27/2022 9 2    • AST 12/27/2022 48 (H)    • ALT 12/27/2022 63 (H)    • Alkaline Phosphatase 12/27/2022 143 (H)    • Total Protein 12/27/2022 5 6 (L)    • Albumin 12/27/2022 3 1 (L)    • Total Bilirubin 12/27/2022 0 79    • eGFR 12/27/2022 92    • WBC 12/27/2022 3 58 (L)    • RBC 12/27/2022 4 56    • Hemoglobin 12/27/2022 13 6    • Hematocrit 12/27/2022 42 5    • MCV 12/27/2022 93    • MCH 12/27/2022 29 8    • MCHC 12/27/2022 32 0    • RDW 12/27/2022 12 4    • MPV 12/27/2022 9 2    • Platelets 22/70/8797 170    • nRBC 12/27/2022 0    • Neutrophils Relative 12/27/2022 67    • Immat GRANS % 12/27/2022 1    • Lymphocytes Relative 12/27/2022 14    • Monocytes Relative 12/27/2022 15 (H)    • Eosinophils Relative 12/27/2022 2    • Basophils Relative 12/27/2022 1    • Neutrophils Absolute 12/27/2022 2 37    • Immature Grans Absolute 12/27/2022 0 05    • Lymphocytes Absolute 12/27/2022 0 51 (L)    • Monocytes Absolute 12/27/2022 0 55    • Eosinophils Absolute 12/27/2022 0 08    • Basophils Absolute 12/27/2022 0 02      Imaging Studies: I have personally reviewed pertinent imaging studies  Keisha Mane PA-C    **Please note:  Dictation voice to text software may have been used in the creation of this record  Occasional wrong word or “sound alike” substitutions may have occurred due to the inherent limitations of voice recognition software  Read the chart carefully and recognize, using context, where substitutions have occurred  **

## 2022-12-27 NOTE — ASSESSMENT & PLAN NOTE
· ACS has been ruled out  · Appreciate cardiology input  · Most likely secondary to her variant of acid reflux disease, in the setting of having a hiatal hernia  · Appreciate GI input  · Protonix has been increased to twice a day, Carafate has also been started  · Reviewed with GI, patient to go for an endoscopy tomorrow  · N p o  past midnight

## 2022-12-27 NOTE — UTILIZATION REVIEW
Initial Clinical Review    OBSERVATION WRITTEN 12/25/22 @ 0004 CONVERTED TO INPATIENT ADMISSION 12/25/22 @ 0920 DUE TO FURTHER DIAGNOSTIC WORKUP REQUIRED FOR CHEST PAIN, REQUIRING AT LEAST A 2 MIDNIGHT STAY  Admission: Date/Time/Statement:   Admission Orders (From admission, onward)     Ordered        12/25/22 0920  Inpatient Admission  Once            12/25/22 0004  Place in Observation  Once                      Orders Placed This Encounter   Procedures   • Inpatient Admission     Standing Status:   Standing     Number of Occurrences:   1     Order Specific Question:   Level of Care     Answer:   Med Surg [16]     Order Specific Question:   Estimated length of stay     Answer:   More than 2 Midnights     Order Specific Question:   Certification     Answer:   I certify that inpatient services are medically necessary for this patient for a duration of greater than two midnights  See H&P and MD Progress Notes for additional information about the patient's course of treatment  ED Arrival Information     Expected   -    Arrival   12/24/2022 19:58    Acuity   Urgent            Means of arrival   Walk-In    Escorted by   Family Member    Service   Hospitalist    Admission type   Emergency            Arrival complaint   Left side pain           Chief Complaint   Patient presents with   • Chest Pain     Started Wednesday night/ pain under left breast/ indigestion/ pain 10/sharp shooting pain, travels down and around abdomen, - N/V       Initial Presentation: 81 y o  female with PMH of  anxiety, CAD presents with c/o left-sided chest pain and belching since Wednesday  Also c/o decreased appetite and chills  No relief with antacids at home  In ED, pt hypertensive, trop 37-->40, given IVF, pain med, Mylanta, nitro paste applied  Chest wall tenderness noted, small hiatal hernia on CT   Initially admitted under Observation status changed to Inpatient dt chest pain: med surg telemetry, trend trops, EKG with 3rd trop, monitor BP, continue home meds  ED Triage Vitals   Temperature Pulse Respirations Blood Pressure SpO2   12/24/22 2022 12/24/22 2022 12/24/22 2022 12/24/22 2022 12/24/22 2022   98 1 °F (36 7 °C) 86 16 (!) 171/90 98 %      Temp Source Heart Rate Source Patient Position - Orthostatic VS BP Location FiO2 (%)   12/24/22 2022 12/25/22 2100 12/25/22 0000 12/25/22 0000 --   Oral Monitor Lying Left arm       Pain Score       12/24/22 2106       10 - Worst Possible Pain          Wt Readings from Last 1 Encounters:   12/26/22 80 4 kg (177 lb 5 8 oz)     Additional Vital Signs:   Date/Time Temp Pulse Resp BP MAP (mmHg) SpO2 O2 Device Patient Position - Orthostatic VS   12/25/22 0600 -- 92 30 Abnormal  165/77 109 90 % None (Room air) Lying   12/25/22 0500 -- 94 28 Abnormal  160/78 112 91 % None (Room air) Lying   12/25/22 0400 -- 97 24 Abnormal  165/85 118 90 % None (Room air) Lying   12/25/22 0300 -- 101 27 Abnormal  142/82 107 91 % None (Room air) Lying   12/25/22 0000 -- 106 Abnormal  -- 187/78 Abnormal  112 93 % None (Room air) Lying   12/24/22 2330 -- 103 -- 174/79 Abnormal  114 91 % None (Room air) --   12/24/22 2022 98 1 °F (36 7 °C) 86 16 171/90 Abnormal  -- 98 % None (Room air) --     Pertinent Labs/Diagnostic Test Results  12/24 EKG: SR WITH PROLONGED QTC INTERVAL    US abdomen complete   Final Result by Austin Villalobos MD (12/26 2119)      The common bile duct is normal in caliber  Increased echogenicity in the liver appears be related to mild fatty infiltration with superimposed artifacts  Bilateral renal cysts                  Workstation performed: [id number]         CT abdomen pelvis with contrast   Final Result by Ry Emerson MD (12/24 2225)      No bowel obstruction or definite evidence of acute inflammatory process in the abdomen or pelvis  Small hiatal hernia  Colonic diverticulosis              Workstation performed: [id number]         XR chest 1 view portable   Final Result by Jaylyn Barahona Bette Holder MD (12/25 6731)      No acute cardiopulmonary disease                    Workstation performed: [id number]           Results from last 7 days   Lab Units 12/25/22 0137   SARS-COV-2  Negative     Results from last 7 days   Lab Units 12/27/22 0433 12/26/22 0427 12/24/22 2058   WBC Thousand/uL 3 58* 5 08 6 90   HEMOGLOBIN g/dL 13 6 13 8 15 6*   HEMATOCRIT % 42 5 42 5 47 5*   PLATELETS Thousands/uL 170 184 257   NEUTROS ABS Thousands/µL 2 37 3 62 4 86         Results from last 7 days   Lab Units 12/27/22 0433 12/26/22 0427 12/24/22 2058   SODIUM mmol/L 135 136 137   POTASSIUM mmol/L 3 6 3 8 3 8   CHLORIDE mmol/L 100 101 100   CO2 mmol/L 29 28 28   ANION GAP mmol/L 6 7 9   BUN mg/dL 9 6 9   CREATININE mg/dL 0 60 0 55* 0 56*   EGFR ml/min/1 73sq m 92 95 94   CALCIUM mg/dL 8 5 8 5 9 5   MAGNESIUM mg/dL  --  1 9  --      Results from last 7 days   Lab Units 12/27/22  0433 12/26/22  0427 12/24/22  2058   AST U/L 48* 95* 21   ALT U/L 63* 92* 16   ALK PHOS U/L 143* 152* 128*   TOTAL PROTEIN g/dL 5 6* 5 9* 7 0   ALBUMIN g/dL 3 1* 3 2* 3 8   TOTAL BILIRUBIN mg/dL 0 79 0 76 0 71         Results from last 7 days   Lab Units 12/27/22 0433 12/26/22 0427 12/24/22 2058   GLUCOSE RANDOM mg/dL 113 126 141*     Results from last 7 days   Lab Units 12/25/22  0122 12/24/22 2308 12/24/22 2058   HS TNI 0HR ng/L  --   --  37   HS TNI 2HR ng/L  --  34  --    HSTNI D2 ng/L  --  -3  --    HS TNI 4HR ng/L 40  --   --    HSTNI D4 ng/L 3  --   --          Results from last 7 days   Lab Units 12/24/22 2130   PROTIME seconds 13 8   INR  1 06   PTT seconds 28     Results from last 7 days   Lab Units 12/24/22 2058   LIPASE u/L <6*     Results from last 7 days   Lab Units 12/24/22 2058   CLARITY UA  Clear   COLOR UA  Yellow   SPEC GRAV UA  1 025   PH UA  6 0   GLUCOSE UA mg/dl Negative   KETONES UA mg/dl Trace*   BLOOD UA  Negative   PROTEIN UA mg/dl Negative   NITRITE UA  Negative   BILIRUBIN UA  Negative   UROBILINOGEN UA E U /dl 0 2   LEUKOCYTES UA  Negative     Results from last 7 days   Lab Units 12/25/22  0137   INFLUENZA A PCR  Negative   INFLUENZA B PCR  Negative   RSV PCR  Negative     ED Treatment:   Medication Administration from 12/24/2022 1958 to 12/25/2022 5445       Date/Time Order Dose Route Action     12/24/2022 2107 EST sodium chloride 0 9 % bolus 1,000 mL 1,000 mL Intravenous New Bag     12/24/2022 2106 EST HYDROmorphone (DILAUDID) injection 0 5 mg 0 5 mg Intravenous Given     12/24/2022 2156 EST ondansetron (ZOFRAN) injection 4 mg 4 mg Intravenous Given     12/24/2022 2158 EST iohexol (OMNIPAQUE) 350 MG/ML injection (SINGLE-DOSE) 100 mL 100 mL Intravenous Given     12/24/2022 2255 EST HYDROmorphone HCl (DILAUDID) injection 0 2 mg 0 2 mg Intravenous Given     12/24/2022 2255 EST aluminum-magnesium hydroxide-simethicone (MYLANTA) oral suspension 30 mL 30 mL Oral Given     12/24/2022 2255 EST Lidocaine Viscous HCl (XYLOCAINE) 2 % mucosal solution 15 mL 15 mL Swish & Swallow Given     12/25/2022 1108 EST aspirin chewable tablet 324 mg 324 mg Oral Given     12/25/2022 0029 EST nitroglycerin (NITRO-BID) 2 % TD ointment 1 inch 1 inch Topical Given     12/25/2022 0120 EST DULoxetine (CYMBALTA) delayed release capsule 30 mg 30 mg Oral Given     12/25/2022 0120 EST melatonin tablet 9 mg 9 mg Oral Given     12/25/2022 0120 EST oxybutynin (DITROPAN-XL) 24 hr tablet 10 mg 10 mg Oral Given        Past Medical History:   Diagnosis Date   • Anxiety    • Arthritis    • Asthma    • Breast cancer ([address] )     rt mastectomy 2005   • Cancer [facility])     breast right   • Cataract    • CHF (congestive heart failure) (HCC)    • Chronic headaches    • Concussion     At age 7; Chronic headaches since then   • Coronary artery disease    • Depression    • Dietary lactose intolerance    • GERD (gastroesophageal reflux disease)    • High cholesterol    • History of chemotherapy     2005 rt breast cancer   • History of transfusion    • Hypertension • IBS (irritable bowel syndrome)    • Irritable bowel syndrome (IBS) 05/31/2016   • Kidney stone    • Lymphedema of right arm    • Obesity    • PFO (patent foramen ovale)    • PONV (postoperative nausea and vomiting)    • Post-menopausal    • Psychiatric disorder    • Renal disorder    • Shortness of breath    • Vitamin D deficiency      Present on Admission:  • Chest pain  • Recurrent major depressive disorder, in partial remission ([facility] [address] )  • Irritable bowel syndrome (IBS)  • Mixed stress and urge urinary incontinence  • Essential hypertension      Admitting Diagnosis: Atypical chest pain [R07 89]  Hypertensive urgency [I16 0]  Age/Sex: 81 y o  female  Admission Orders:  Scheduled Medications:  ascorbic acid, 500 mg, Oral, Daily  atorvastatin, 20 mg, Oral, Daily  buPROPion, 300 mg, Oral, QAM  DULoxetine, 30 mg, Oral, HS  DULoxetine, 60 mg, Oral, Daily  enoxaparin, 40 mg, Subcutaneous, BID  lisinopril, 5 mg, Oral, Daily  loratadine, 10 mg, Oral, Daily  lubiprostone, 24 mcg, Oral, BID With Meals  melatonin, 9 mg, Oral, HS  oxybutynin, 10 mg, Oral, HS  pantoprazole, 40 mg, Oral, BID AC  sucralfate, 1 g, Oral, 4x Daily (AC & HS)      Continuous IV Infusions: none     PRN Meds:  acetaminophen, 650 mg, Oral, Q6H PRN  albuterol, 2 5 mg, Nebulization, Q6H PRN  aluminum-magnesium hydroxide-simethicone, 30 mL, Oral, Q4H PRN  ondansetron, 4 mg, Intravenous, Q6H PRN  oxyCODONE, 2 5 mg, Oral, Q4H PRN  polyethylene glycol, 17 g, Oral, Daily PRN      SCD    Network Utilization Review Department  ATTENTION: Please call with any questions or concerns to [phone] and carefully listen to the prompts so that you are directed to the right person  All voicemails are confidential   Mike Puckett all requests for admission clinical reviews, approved or denied determinations and any other requests to dedicated fax number below belonging to the campus where the patient is receiving treatment   List of dedicated fax numbers for the Facilities:  FACILITY NAME UR FAX NUMBER   ADMISSION DENIALS (Administrative/Medical Necessity) [phone]   [address]  (Maternity/NICU/Pediatrics) [phone]   [address] [phone]   [address] [place] [place] [address] [address] [facility] [place] [address] [facility] [address] [facility] [phone]

## 2022-12-28 ENCOUNTER — ANESTHESIA (INPATIENT)
Dept: GASTROENTEROLOGY | Facility: HOSPITAL | Age: 72
End: 2022-12-28

## 2022-12-28 ENCOUNTER — ANESTHESIA EVENT (INPATIENT)
Dept: GASTROENTEROLOGY | Facility: HOSPITAL | Age: 72
End: 2022-12-28

## 2022-12-28 ENCOUNTER — APPOINTMENT (INPATIENT)
Dept: GASTROENTEROLOGY | Facility: HOSPITAL | Age: 72
End: 2022-12-28
Attending: INTERNAL MEDICINE

## 2022-12-28 VITALS
OXYGEN SATURATION: 92 % | HEIGHT: 56 IN | SYSTOLIC BLOOD PRESSURE: 118 MMHG | WEIGHT: 177.36 LBS | BODY MASS INDEX: 39.9 KG/M2 | DIASTOLIC BLOOD PRESSURE: 73 MMHG | HEART RATE: 92 BPM | TEMPERATURE: 97.9 F | RESPIRATION RATE: 16 BRPM

## 2022-12-28 LAB
ALBUMIN SERPL BCP-MCNC: 3.2 G/DL (ref 3.5–5)
ALP SERPL-CCNC: 121 U/L (ref 34–104)
ALT SERPL W P-5'-P-CCNC: 47 U/L (ref 7–52)
ANION GAP SERPL CALCULATED.3IONS-SCNC: 8 MMOL/L (ref 4–13)
AST SERPL W P-5'-P-CCNC: 27 U/L (ref 13–39)
BASOPHILS # BLD AUTO: 0.02 THOUSANDS/ÂΜL (ref 0–0.1)
BASOPHILS NFR BLD AUTO: 1 % (ref 0–1)
BILIRUB SERPL-MCNC: 0.66 MG/DL (ref 0.2–1)
BUN SERPL-MCNC: 12 MG/DL (ref 5–25)
CALCIUM ALBUM COR SERPL-MCNC: 9.3 MG/DL (ref 8.3–10.1)
CALCIUM SERPL-MCNC: 8.7 MG/DL (ref 8.4–10.2)
CHLORIDE SERPL-SCNC: 99 MMOL/L (ref 96–108)
CO2 SERPL-SCNC: 30 MMOL/L (ref 21–32)
CREAT SERPL-MCNC: 0.62 MG/DL (ref 0.6–1.3)
EOSINOPHIL # BLD AUTO: 0.14 THOUSAND/ÂΜL (ref 0–0.61)
EOSINOPHIL NFR BLD AUTO: 4 % (ref 0–6)
ERYTHROCYTE [DISTWIDTH] IN BLOOD BY AUTOMATED COUNT: 12.4 % (ref 11.6–15.1)
GFR SERPL CREATININE-BSD FRML MDRD: 91 ML/MIN/1.73SQ M
GLUCOSE SERPL-MCNC: 104 MG/DL (ref 65–140)
HCT VFR BLD AUTO: 41.7 % (ref 34.8–46.1)
HGB BLD-MCNC: 13.5 G/DL (ref 11.5–15.4)
IMM GRANULOCYTES # BLD AUTO: 0.05 THOUSAND/UL (ref 0–0.2)
IMM GRANULOCYTES NFR BLD AUTO: 1 % (ref 0–2)
LYMPHOCYTES # BLD AUTO: 0.72 THOUSANDS/ÂΜL (ref 0.6–4.47)
LYMPHOCYTES NFR BLD AUTO: 20 % (ref 14–44)
MCH RBC QN AUTO: 29.9 PG (ref 26.8–34.3)
MCHC RBC AUTO-ENTMCNC: 32.4 G/DL (ref 31.4–37.4)
MCV RBC AUTO: 93 FL (ref 82–98)
MONOCYTES # BLD AUTO: 0.57 THOUSAND/ÂΜL (ref 0.17–1.22)
MONOCYTES NFR BLD AUTO: 16 % (ref 4–12)
NEUTROPHILS # BLD AUTO: 2.1 THOUSANDS/ÂΜL (ref 1.85–7.62)
NEUTS SEG NFR BLD AUTO: 58 % (ref 43–75)
NRBC BLD AUTO-RTO: 0 /100 WBCS
PLATELET # BLD AUTO: 169 THOUSANDS/UL (ref 149–390)
PMV BLD AUTO: 9.9 FL (ref 8.9–12.7)
POTASSIUM SERPL-SCNC: 3.4 MMOL/L (ref 3.5–5.3)
PROT SERPL-MCNC: 6 G/DL (ref 6.4–8.4)
RBC # BLD AUTO: 4.51 MILLION/UL (ref 3.81–5.12)
SODIUM SERPL-SCNC: 137 MMOL/L (ref 135–147)
WBC # BLD AUTO: 3.6 THOUSAND/UL (ref 4.31–10.16)

## 2022-12-28 PROCEDURE — 0DB68ZX EXCISION OF STOMACH, VIA NATURAL OR ARTIFICIAL OPENING ENDOSCOPIC, DIAGNOSTIC: ICD-10-PCS | Performed by: INTERNAL MEDICINE

## 2022-12-28 RX ORDER — LIDOCAINE HYDROCHLORIDE 20 MG/ML
INJECTION, SOLUTION EPIDURAL; INFILTRATION; INTRACAUDAL; PERINEURAL AS NEEDED
Status: DISCONTINUED | OUTPATIENT
Start: 2022-12-28 | End: 2022-12-28

## 2022-12-28 RX ORDER — SODIUM CHLORIDE, SODIUM LACTATE, POTASSIUM CHLORIDE, CALCIUM CHLORIDE 600; 310; 30; 20 MG/100ML; MG/100ML; MG/100ML; MG/100ML
INJECTION, SOLUTION INTRAVENOUS CONTINUOUS PRN
Status: DISCONTINUED | OUTPATIENT
Start: 2022-12-28 | End: 2022-12-28

## 2022-12-28 RX ORDER — GABAPENTIN 100 MG/1
100 CAPSULE ORAL 3 TIMES DAILY
Qty: 90 CAPSULE | Refills: 0 | Status: SHIPPED | OUTPATIENT
Start: 2022-12-28 | End: 2023-01-04

## 2022-12-28 RX ORDER — PROPOFOL 10 MG/ML
INJECTION, EMULSION INTRAVENOUS AS NEEDED
Status: DISCONTINUED | OUTPATIENT
Start: 2022-12-28 | End: 2022-12-28

## 2022-12-28 RX ORDER — VALACYCLOVIR HYDROCHLORIDE 1 G/1
1000 TABLET, FILM COATED ORAL EVERY 8 HOURS SCHEDULED
Qty: 18 TABLET | Refills: 0 | Status: SHIPPED | OUTPATIENT
Start: 2022-12-28 | End: 2023-01-03

## 2022-12-28 RX ORDER — PANTOPRAZOLE SODIUM 40 MG/1
40 TABLET, DELAYED RELEASE ORAL
Qty: 60 TABLET | Refills: 0 | Status: SHIPPED | OUTPATIENT
Start: 2022-12-28 | End: 2023-01-27

## 2022-12-28 RX ADMIN — PROPOFOL 50 MG: 10 INJECTION, EMULSION INTRAVENOUS at 11:55

## 2022-12-28 RX ADMIN — PROPOFOL 100 MG: 10 INJECTION, EMULSION INTRAVENOUS at 11:51

## 2022-12-28 RX ADMIN — VALACYCLOVIR HYDROCHLORIDE 1000 MG: 500 TABLET, FILM COATED ORAL at 14:30

## 2022-12-28 RX ADMIN — SODIUM CHLORIDE, SODIUM LACTATE, POTASSIUM CHLORIDE, AND CALCIUM CHLORIDE: .6; .31; .03; .02 INJECTION, SOLUTION INTRAVENOUS at 11:42

## 2022-12-28 RX ADMIN — LIDOCAINE HYDROCHLORIDE 100 MG: 20 INJECTION, SOLUTION EPIDURAL; INFILTRATION; INTRACAUDAL; PERINEURAL at 11:51

## 2022-12-28 NOTE — ASSESSMENT & PLAN NOTE
· Case reviewed with dermatology-they are in agreement that this is a herpetic zoster  · Status post 1 day worth of Valtrex here in the hospital  · Rash has improved  · DC home on 6 more days worth of Valtrex, additionally a prescription for Neurontin has been provided for pain control  · Patient follow-up with PCP

## 2022-12-28 NOTE — ANESTHESIA POSTPROCEDURE EVALUATION
Post-Op Assessment Note    CV Status:  Stable  Pain Score: 0    Pain management: adequate     Mental Status:  Sleepy   Hydration Status:  Euvolemic   PONV Controlled:  Controlled   Airway Patency:  Patent      Post Op Vitals Reviewed: Yes      Staff: CRNA         No notable events documented      BP   99/65   Temp  96 9   Pulse  97   Resp   12   SpO2   98

## 2022-12-28 NOTE — DISCHARGE SUMMARY
[address]  Discharge- Leyla Jason 1950, 81 y o  female MRN: [id number]  Unit/Bed#: -01 Encounter: [id number]  Primary Care Provider: Mina Cano PA-C   Date and time admitted to hospital: 12/24/2022  8:17 PM    * Chest pain  Assessment & Plan  · Currently resolved  · ACS has been ruled out  · Appreciate cardiology input  · Status post an EGD earlier today- patient noted to have gastritis-, see the full EGD report  · Case reviewed with GI, patient is medically stable for discharge  · GI will be in touch with the patient in the near future to go over biopsy results from the EGD  · Protonix has been added and prescribed  · Outpatient follow-up with GI in 1 week    Transaminitis  Assessment & Plan  · LFTs are improved and downtrending  · Hepatitis serologies negative  · Right upper quadrant ultrasound revealed no significant pathology  · Okay for discharge  · Will need periodic outpatient CMP monitoring via her PCP    Recurrent major depressive disorder, in partial remission ([facility] [address] )  Assessment & Plan  · Continue home Wellbutrin and Cymbalta postdischarge    Irritable bowel syndrome (IBS)  Assessment & Plan  · Continue Amitiza and as needed MiraLAX postdischarge    Mixed stress and urge urinary incontinence  Assessment & Plan  · Continue Ditropan postdischarge    Body mass index (BMI) of 40 0-44 9 in [facility])  Assessment & Plan  · Actual BMI of 41 25  · Dietary, weight loss, and lifestyle modification counseling was provided    Essential hypertension  Assessment & Plan  · Continue lisinopril postdischarge    Heat rash  Assessment & Plan  · Case reviewed with dermatology-they are in agreement that this is a herpetic zoster  · Status post 1 day worth of Valtrex here in the hospital  · Rash has improved  · DC home on 6 more days worth of Valtrex, additionally a prescription for Neurontin has been provided for pain control  · Patient follow-up with PCP        Discharging Physician / Practitioner: Ileana Ernandez MD  PCP: Venu Mckinley PA-C  Admission Date:   Admission Orders (From admission, onward)     Ordered        12/25/22 0920  Inpatient Admission  Once            12/25/22 0004  Place in Observation  Once                      Discharge Date: 12/28/22    Medical Problems     Resolved Problems  Date Reviewed: 12/25/2022   None         Consultations During Hospital Stay:  · Gastroenterology  · Cardiology    Procedures Performed:   · 12/28/2022- EGD:Mild, patchy erythematous mucosa in the stomach; performed cold forceps biopsy to rule out H  pylori    Significant Findings / Test Results:   · Right upper quadrant ultrasound - The common bile duct is normal in caliber  Increased echogenicity in the liver appears be related to mild fatty infiltration with superimposed artifacts  Bilateral renal cysts   · CAT scan abdomen and pelvis - No bowel obstruction or definite evidence of acute inflammatory process in the abdomen or pelvis  Small hiatal hernia  Colonic diverticulosis  · X-ray chest-no acute cardiopulmonary disease      Incidental Findings:   · None    Test Results Pending at Discharge (will require follow up):   · EGD biopsy results that will be followed up upon by GI     Outpatient Tests Requested:  · None    Complications:    • None    Reason for Admission: Chest pain    Hospital Course:     Berna Abdullahi is a 81 y o  female patient who originally presented to the hospital on 12/24/2022 due to chest pain  Please refer to the initial history and physical examination completed by Cherylene Lazar for the initial presenting features and complaint  In brief, the patient is a 74-year-old female, who was admitted to the hospital after she came into the emergency room with a chief complaint of chest pain  She ultimately ruled out for the possibility of an acute coronary event with normal troponins  She was seen by cardiology who felt that the pain was noncardiac    Due to the persistence of pain, GI consultation was obtained  Patient was also noted to have some elevated liver function testing  Ultimately, decision was made to take the patient for an EGD  On 12/28/2022, she had an EGD with the results as outlined above  Also was found to have a herpetic zoster rash  She was started on valacyclovir, and gabapentin  She was ultimately deemed medically stable for discharge on 12/28/2022  Please refer to the assessment/plan portion of this discharge summary as outlined above for the remaining details  Please see above list of diagnoses and related plan for additional information  Condition at Discharge: good     Discharge Day Visit / Exam:     Subjective: Patient seen and examined, looks and feels a lot better, is ready to go home, no new complaints  Vitals: Blood Pressure: 118/73 (12/28/22 1344)  Pulse: 92 (12/28/22 1344)  Temperature: 97 9 °F (36 6 °C) (12/28/22 1344)  Temp Source: Temporal (12/28/22 1025)  Respirations: 16 (12/28/22 1344)  Height: 4' 8" (142 2 cm) (12/24/22 2022)  Weight - Scale: 80 4 kg (177 lb 5 8 oz) (12/26/22 1700)  SpO2: 92 % (12/28/22 1344)  Exam:   Physical Exam  Constitutional:       General: She is not in acute distress  Appearance: Normal appearance  She is normal weight  She is not ill-appearing  HENT:      Head: Normocephalic and atraumatic  Nose: Nose normal       Mouth/Throat:      Mouth: Mucous membranes are moist    Eyes:      Extraocular Movements: Extraocular movements intact  Pupils: Pupils are equal, round, and reactive to light  Cardiovascular:      Rate and Rhythm: Normal rate and regular rhythm  Pulses: Normal pulses  Heart sounds: Normal heart sounds  No murmur heard  No friction rub  No gallop  Pulmonary:      Effort: Pulmonary effort is normal  No respiratory distress  Breath sounds: Normal breath sounds  No wheezing, rhonchi or rales  Abdominal:      General: There is no distension  Palpations: Abdomen is soft  There is no mass  Tenderness: There is no abdominal tenderness  Hernia: No hernia is present  Musculoskeletal:         General: No swelling or tenderness  Normal range of motion  Cervical back: Normal range of motion and neck supple  No rigidity  Right lower leg: No edema  Left lower leg: No edema  Comments: Left posterior abdominal rash has significantly improved   Skin:     General: Skin is warm  Capillary Refill: Capillary refill takes less than 2 seconds  Findings: No erythema or rash  Neurological:      General: No focal deficit present  Mental Status: She is alert and oriented to person, place, and time  Mental status is at baseline  Cranial Nerves: No cranial nerve deficit  Motor: No weakness  Psychiatric:         Mood and Affect: Mood normal          Behavior: Behavior normal          Discussion with Family: No family members were present at the time of my discharge evaluation, nor did the patient ask and or  want me to call anyone    Discharge instructions/Information to patient and family:   See after visit summary for information provided to patient and family  Provisions for Follow-Up Care:  See after visit summary for information related to follow-up care and any pertinent home health orders  Disposition:     Home      Planned Readmission:   • None     Discharge Statement:  I spent 55 minutes discharging the patient  This time was spent on the day of discharge  I had direct contact with the patient on the day of discharge  Greater than 50% of the total time was spent examining patient, answering all patient questions, arranging and discussing plan of care with patient as well as directly providing post-discharge instructions  Additional time then spent on discharge activities  Discharge Medications:  See after visit summary for reconciled discharge medications provided to patient and family        ** Please Note: This note has been constructed using a voice recognition system **

## 2022-12-28 NOTE — PLAN OF CARE
Problem: Prexisting or High Potential for Compromised Skin Integrity  Goal: Skin integrity is maintained or improved  Description: INTERVENTIONS:  - Identify patients at risk for skin breakdown  - Assess and monitor skin integrity  - Assess and monitor nutrition and hydration status  - Monitor labs   - Assess for incontinence   - Turn and reposition patient  - Assist with mobility/ambulation  - Relieve pressure over bony prominences  - Avoid friction and shearing  - Provide appropriate hygiene as needed including keeping skin clean and dry  - Evaluate need for skin moisturizer/barrier cream  - Collaborate with interdisciplinary team   - Patient/family teaching  - Consider wound care consult   Outcome: Progressing     Problem: Nutrition/Hydration-ADULT  Goal: Nutrient/Hydration intake appropriate for improving, restoring or maintaining nutritional needs  Description: Monitor and assess patient's nutrition/hydration status for malnutrition  Collaborate with interdisciplinary team and initiate plan and interventions as ordered  Monitor patient's weight and dietary intake as ordered or per policy  Utilize nutrition screening tool and intervene as necessary  Determine patient's food preferences and provide high-protein, high-caloric foods as appropriate       INTERVENTIONS:  - Monitor oral intake, urinary output, labs, and treatment plans  - Assess nutrition and hydration status and recommend course of action  - Evaluate amount of meals eaten  - Assist patient with eating if necessary   - Allow adequate time for meals  - Recommend/ encourage appropriate diets, oral nutritional supplements, and vitamin/mineral supplements  - Order, calculate, and assess calorie counts as needed  - Recommend, monitor, and adjust tube feedings and TPN/PPN based on assessed needs  - Assess need for intravenous fluids  - Provide specific nutrition/hydration education as appropriate  - Include patient/family/caregiver in decisions related to nutrition  Outcome: Progressing     Problem: PAIN - ADULT  Goal: Verbalizes/displays adequate comfort level or baseline comfort level  Description: Interventions:  - Encourage patient to monitor pain and request assistance  - Assess pain using appropriate pain scale  - Administer analgesics based on type and severity of pain and evaluate response  - Implement non-pharmacological measures as appropriate and evaluate response  - Consider cultural and social influences on pain and pain management  - Notify physician/advanced practitioner if interventions unsuccessful or patient reports new pain  Outcome: Progressing     Problem: INFECTION - ADULT  Goal: Absence or prevention of progression during hospitalization  Description: INTERVENTIONS:  - Assess and monitor for signs and symptoms of infection  - Monitor lab/diagnostic results  - Monitor all insertion sites, i e  indwelling lines, tubes, and drains  - Monitor endotracheal if appropriate and nasal secretions for changes in amount and color  - [place] appropriate cooling/warming therapies per order  - Administer medications as ordered  - Instruct and encourage patient and family to use good hand hygiene technique  - Identify and instruct in appropriate isolation precautions for identified infection/condition  Outcome: Progressing  Goal: Absence of fever/infection during neutropenic period  Description: INTERVENTIONS:  - Monitor WBC    Outcome: Progressing     Problem: SAFETY ADULT  Goal: Patient will remain free of falls  Description: INTERVENTIONS:  - Educate patient/family on patient safety including physical limitations  - Instruct patient to call for assistance with activity   - Consult OT/PT to assist with strengthening/mobility   - Keep Call bell within reach  - Keep bed low and locked with side rails adjusted as appropriate  - Keep care items and personal belongings within reach  - Initiate and maintain comfort rounds  - Make Fall Risk Sign visible to staff  - Offer Toileting every 1 Hours, in advance of need  - Initiate/Maintain bed alarm  - Obtain necessary fall risk management equipment: bed   - Apply yellow socks and bracelet for high fall risk patients  - Consider moving patient to room near nurses station  Outcome: Progressing  Goal: Maintain or return to baseline ADL function  Description: INTERVENTIONS:  -  Assess patient's ability to carry out ADLs; assess patient's baseline for ADL function and identify physical deficits which impact ability to perform ADLs (bathing, care of mouth/teeth, toileting, grooming, dressing, etc )  - Assess/evaluate cause of self-care deficits   - Assess range of motion  - Assess patient's mobility; develop plan if impaired  - Assess patient's need for assistive devices and provide as appropriate  - Encourage maximum independence but intervene and supervise when necessary  - Involve family in performance of ADLs  - Assess for home care needs following discharge   - Consider OT consult to assist with ADL evaluation and planning for discharge  - Provide patient education as appropriate  Outcome: Progressing  Goal: Maintains/Returns to pre admission functional level  Description: INTERVENTIONS:  - Perform BMAT or MOVE assessment daily    - Set and communicate daily mobility goal to care team and patient/family/caregiver  - Collaborate with rehabilitation services on mobility goals if consulted  - Perform Range of Motion 3 times a day  - Reposition patient every 2 hours    - Dangle patient 3 times a day  - Stand patient 3 times a day  - Ambulate patient 3 times a day  - Out of bed to chair 3 times a day   - Out of bed for meals 3 times a day  - Out of bed for toileting  - Record patient progress and toleration of activity level   Outcome: Progressing

## 2022-12-28 NOTE — NURSING NOTE
hospitalist was in to see and eval the pt and she is ok for dc to home, iv site removed with the tip intact, avs reviewed with the pt, all questions answered to her satisfaction, taken to family car via wc and the pt was dcd from the hospital, all valuables sent with the pt

## 2022-12-28 NOTE — DISCHARGE INSTR - AVS FIRST PAGE
Dear Peter Medina,     It was our pleasure to care for you here at [facility] [place]  It is our hope that we were always able to exceed the expected standards for your care during your stay  You were hospitalized due to chest pain, and a zoster rash  Vladislav Gonzalez were cared for on the medical/surgical floor by Khadar Smiley MD with the [facility] who covers for your primary care physician (PCP), Calos White PA-C, while you were hospitalized  You were additionally seen by the [place] [address] [facility]-Dr Singh Gave  If you have any questions or concerns related to this hospitalization, you may contact us at [phone]  For follow up as well as any medication refills, we recommend that you follow up with your primary care physician  A registered nurse will reach out to you by phone within a few days after your discharge to answer any additional questions that you may have after going home    However, at this time we provide for you here, the most important instructions / recommendations at discharge:     Notable Medication Adjustments -   New prescription Valtrex-complete the course  New prescription gabapentin 100 mg-take 1 tablet 3 times a day  New prescription Protonix 40 mg take 1 tablet twice a day  Okay to resume all of the preadmission medications at the preadmission doses  Testing Required after Discharge -   To be further determined in the near future in the outpatient setting by your primary care provider  Important follow up information -   Please follow-up with the providers as outlined in this discharge packet  Other Instructions -   Please maintain a healthy diet  Please review this entire after visit summary as additional general instructions including medication list, appointments, activity, diet, any pertinent wound care, and other additional recommendations from your care team that may be provided for you       Sincerely,     Kateryna Lane MD

## 2022-12-28 NOTE — NURSING NOTE
Awake and alert/oriented x4  R/a status 02 sat 91%  Hr 100/min  Lungs clear and hr regular  No distress  Pain to rash site left back/flank/abdomen area  No distress   Call bell given

## 2022-12-28 NOTE — ASSESSMENT & PLAN NOTE
· Currently resolved  · ACS has been ruled out  · Appreciate cardiology input  · Status post an EGD earlier today- patient noted to have gastritis-, see the full EGD report  · Case reviewed with GI, patient is medically stable for discharge  · GI will be in touch with the patient in the near future to go over biopsy results from the EGD  · Protonix has been added and prescribed  · Outpatient follow-up with GI in 1 week

## 2022-12-28 NOTE — ANESTHESIA PREPROCEDURE EVALUATION
Procedure:  EGD    Relevant Problems   CARDIO   (+) Chest pain   (+) Essential hypertension   (+) Hyperlipidemia LDL goal <130   (+) Nonrheumatic aortic (valve) stenosis      GI/HEPATIC   (+) Other chronic pancreatitis (HCC)      /RENAL   (+) Nephrolithiasis   (+) Renal cyst      MUSCULOSKELETAL   (+) Arthritis of right knee   (+) Primary osteoarthritis of left knee   (+) Scoliosis      NEURO/PSYCH   (+) Chronic musculoskeletal pain   (+) Continuous opioid dependence (HCC)   (+) Generalized anxiety disorder   (+) HX: breast cancer   (+) Recurrent major depressive disorder, in partial remission (HCC)   (+) Tension type headache      PULMONARY   (+) Mild intermittent asthma without complication        Physical Exam    Airway    Mallampati score: II  TM Distance: >3 FB  Neck ROM: full     Dental       Cardiovascular  Rhythm: regular, Rate: normal,     Pulmonary  Breath sounds clear to auscultation,     Other Findings  Intercisor Distance > 3cm          Anesthesia Plan  ASA Score- 3     Anesthesia Type- IV sedation with anesthesia with ASA Monitors  Additional Monitors:   Airway Plan:     Comment: NPO appropriate  Discussed benefits/risks of monitored anesthetic care which involves providing a dynamic level of mild to deep sedation  Complications include awareness and/or airway obstruction/aspiration which may necessitate conversion to general anesthesia  All questions answered  Patient understands and wishes to proceed          Plan Factors-Exercise tolerance (METS): >4 METS  Chart reviewed  EKG reviewed  Existing labs reviewed  Induction-     Postoperative Plan- Plan for postoperative opioid use  Informed Consent- Anesthetic plan and risks discussed with patient  I personally reviewed this patient with the CRNA  Discussed and agreed on the Anesthesia Plan with the CRNA  Shefali Sosa

## 2022-12-28 NOTE — PLAN OF CARE
Problem: Prexisting or High Potential for Compromised Skin Integrity  Goal: Skin integrity is maintained or improved  Description: INTERVENTIONS:  - Identify patients at risk for skin breakdown  - Assess and monitor skin integrity  - Assess and monitor nutrition and hydration status  - Monitor labs   - Assess for incontinence   - Turn and reposition patient  - Assist with mobility/ambulation  - Relieve pressure over bony prominences  - Avoid friction and shearing  - Provide appropriate hygiene as needed including keeping skin clean and dry  - Evaluate need for skin moisturizer/barrier cream  - Collaborate with interdisciplinary team   - Patient/family teaching  - Consider wound care consult   12/28/2022 1433 by Cristiane Mccarthy RN  Outcome: Adequate for Discharge  12/28/2022 0728 by Cristiane Mccarthy RN  Outcome: Progressing     Problem: Nutrition/Hydration-ADULT  Goal: Nutrient/Hydration intake appropriate for improving, restoring or maintaining nutritional needs  Description: Monitor and assess patient's nutrition/hydration status for malnutrition  Collaborate with interdisciplinary team and initiate plan and interventions as ordered  Monitor patient's weight and dietary intake as ordered or per policy  Utilize nutrition screening tool and intervene as necessary  Determine patient's food preferences and provide high-protein, high-caloric foods as appropriate       INTERVENTIONS:  - Monitor oral intake, urinary output, labs, and treatment plans  - Assess nutrition and hydration status and recommend course of action  - Evaluate amount of meals eaten  - Assist patient with eating if necessary   - Allow adequate time for meals  - Recommend/ encourage appropriate diets, oral nutritional supplements, and vitamin/mineral supplements  - Order, calculate, and assess calorie counts as needed  - Recommend, monitor, and adjust tube feedings and TPN/PPN based on assessed needs  - Assess need for intravenous fluids  - Provide specific nutrition/hydration education as appropriate  - Include patient/family/caregiver in decisions related to nutrition  12/28/2022 1433 by Rosita Renae RN  Outcome: Adequate for Discharge  12/28/2022 0728 by Rosita Renae RN  Outcome: Progressing     Problem: PAIN - ADULT  Goal: Verbalizes/displays adequate comfort level or baseline comfort level  Description: Interventions:  - Encourage patient to monitor pain and request assistance  - Assess pain using appropriate pain scale  - Administer analgesics based on type and severity of pain and evaluate response  - Implement non-pharmacological measures as appropriate and evaluate response  - Consider cultural and social influences on pain and pain management  - Notify physician/advanced practitioner if interventions unsuccessful or patient reports new pain  12/28/2022 1433 by Rosita Renae RN  Outcome: Adequate for Discharge  12/28/2022 0728 by Rosita Renae RN  Outcome: Progressing     Problem: INFECTION - ADULT  Goal: Absence or prevention of progression during hospitalization  Description: INTERVENTIONS:  - Assess and monitor for signs and symptoms of infection  - Monitor lab/diagnostic results  - Monitor all insertion sites, i e  indwelling lines, tubes, and drains  - Monitor endotracheal if appropriate and nasal secretions for changes in amount and color  - [place] appropriate cooling/warming therapies per order  - Administer medications as ordered  - Instruct and encourage patient and family to use good hand hygiene technique  - Identify and instruct in appropriate isolation precautions for identified infection/condition  12/28/2022 1433 by Rosita Renae RN  Outcome: Adequate for Discharge  12/28/2022 0728 by Rosita Renae RN  Outcome: Progressing  Goal: Absence of fever/infection during neutropenic period  Description: INTERVENTIONS:  - Monitor WBC    12/28/2022 1433 by Rosita Renae RN  Outcome: Adequate for Discharge  12/28/2022 0728 by Bhavik Benavides RN  Outcome: Progressing     Problem: SAFETY ADULT  Goal: Patient will remain free of falls  Description: INTERVENTIONS:  - Educate patient/family on patient safety including physical limitations  - Instruct patient to call for assistance with activity   - Consult OT/PT to assist with strengthening/mobility   - Keep Call bell within reach  - Keep bed low and locked with side rails adjusted as appropriate  - Keep care items and personal belongings within reach  - Initiate and maintain comfort rounds  - Make Fall Risk Sign visible to staff  - Offer Toileting every 1 Hours, in advance of need  - Initiate/Maintain alarm  - Obtain necessary fall risk management equipment:   - Apply yellow socks and bracelet for high fall risk patients  - Consider moving patient to room near nurses station  12/28/2022 1433 by Bhavik Benavides RN  Outcome: Adequate for Discharge  12/28/2022 0568 by Bhavik Benavides RN  Outcome: Progressing  Goal: Maintain or return to baseline ADL function  Description: INTERVENTIONS:  -  Assess patient's ability to carry out ADLs; assess patient's baseline for ADL function and identify physical deficits which impact ability to perform ADLs (bathing, care of mouth/teeth, toileting, grooming, dressing, etc )  - Assess/evaluate cause of self-care deficits   - Assess range of motion  - Assess patient's mobility; develop plan if impaired  - Assess patient's need for assistive devices and provide as appropriate  - Encourage maximum independence but intervene and supervise when necessary  - Involve family in performance of ADLs  - Assess for home care needs following discharge   - Consider OT consult to assist with ADL evaluation and planning for discharge  - Provide patient education as appropriate  12/28/2022 1433 by Bhavik Benavides RN  Outcome: Adequate for Discharge  12/28/2022 0728 by Bhavik Benavides RN  Outcome: Progressing  Goal: Maintains/Returns to pre admission functional level  Description: INTERVENTIONS:  - Perform BMAT or MOVE assessment daily    - Set and communicate daily mobility goal to care team and patient/family/caregiver  - Collaborate with rehabilitation services on mobility goals if consulted  - Perform Range of Motion  times a day  - Reposition patient every  hours  - Dangle patient  times a day  - Stand patient  times a day  - Ambulate patient  times a day  - Out of bed to chair  times a day   - Out of bed for meals  times a day  - Out of bed for toileting  - Record patient progress and toleration of activity level   12/28/2022 1433 by Reagan Aranda RN  Outcome: Adequate for Discharge  12/28/2022 0728 by Reagan Aranda RN  Outcome: Progressing     Problem: DISCHARGE PLANNING  Goal: Discharge to home or other facility with appropriate resources  Description: INTERVENTIONS:  - Identify barriers to discharge w/patient and caregiver  - Arrange for needed discharge resources and transportation as appropriate  - Identify discharge learning needs (meds, wound care, etc )  - Arrange for interpretive services to assist at discharge as needed  - Refer to Case Management Department for coordinating discharge planning if the patient needs post-hospital services based on physician/advanced practitioner order or complex needs related to functional status, cognitive ability, or social support system  12/28/2022 1433 by Reagan Aranda RN  Outcome: Adequate for Discharge  12/28/2022 0728 by Reagan Aranda RN  Outcome: Progressing     Problem: Knowledge Deficit  Goal: Patient/family/caregiver demonstrates understanding of disease process, treatment plan, medications, and discharge instructions  Description: Complete learning assessment and assess knowledge base    Interventions:  - Provide teaching at level of understanding  - Provide teaching via preferred learning methods  12/28/2022 1433 by Reagan Aranda RN  Outcome: Adequate for Discharge  12/28/2022 0728 by Denis Borges RN  Outcome: Progressing     Problem: MOBILITY - ADULT  Goal: Maintain or return to baseline ADL function  Description: INTERVENTIONS:  -  Assess patient's ability to carry out ADLs; assess patient's baseline for ADL function and identify physical deficits which impact ability to perform ADLs (bathing, care of mouth/teeth, toileting, grooming, dressing, etc )  - Assess/evaluate cause of self-care deficits   - Assess range of motion  - Assess patient's mobility; develop plan if impaired  - Assess patient's need for assistive devices and provide as appropriate  - Encourage maximum independence but intervene and supervise when necessary  - Involve family in performance of ADLs  - Assess for home care needs following discharge   - Consider OT consult to assist with ADL evaluation and planning for discharge  - Provide patient education as appropriate  12/28/2022 1433 by Denis Borges RN  Outcome: Adequate for Discharge  12/28/2022 0728 by Denis Borges RN  Outcome: Progressing  Goal: Maintains/Returns to pre admission functional level  Description: INTERVENTIONS:  - Perform BMAT or MOVE assessment daily    - Set and communicate daily mobility goal to care team and patient/family/caregiver  - Collaborate with rehabilitation services on mobility goals if consulted  - Perform Range of Motion  times a day  - Reposition patient every  hours    - Dangle patient  times a day  - Stand patient  times a day  - Ambulate patient  times a day  - Out of bed to chair  times a day   - Out of bed for meals  times a day  - Out of bed for toileting  - Record patient progress and toleration of activity level   12/28/2022 1433 by Denis Borges RN  Outcome: Adequate for Discharge  12/28/2022 0728 by Denis Borges RN  Outcome: Progressing     Problem: Potential for Falls  Goal: Patient will remain free of falls  Description: INTERVENTIONS:  - Educate patient/family on patient safety including physical limitations  - Instruct patient to call for assistance with activity   - Consult OT/PT to assist with strengthening/mobility   - Keep Call bell within reach  - Keep bed low and locked with side rails adjusted as appropriate  - Keep care items and personal belongings within reach  - Initiate and maintain comfort rounds  - Make Fall Risk Sign visible to staff  - Offer Toileting every  Hours, in advance of need  - Initiate/Maintain alarm  - Obtain necessary fall risk management equipment: - Apply yellow socks and bracelet for high fall risk patients  - Consider moving patient to room near nurses station  12/28/2022 1433 by Ajit Ross, RN  Outcome: Adequate for Discharge  12/28/2022 0728 by Ajit Ross, RN  Outcome: Progressing

## 2022-12-28 NOTE — NURSING NOTE
No changes in status  Pain is better than prior evening and not requiring  pain meds  No distress  R/a status maintained

## 2022-12-28 NOTE — ASSESSMENT & PLAN NOTE
· LFTs are improved and downtrending  · Hepatitis serologies negative  · Right upper quadrant ultrasound revealed no significant pathology  · Okay for discharge  · Will need periodic outpatient CMP monitoring via her PCP

## 2022-12-28 NOTE — CASE MANAGEMENT
Case Management Assessment & Discharge Planning Note    Patient name Riley Shen  Location /-45 MRN [id number]  : 1950 Date 2022       Current Admission Date: 2022  Current Admission Diagnosis:Chest pain   Patient Active Problem List    Diagnosis Date Noted   • Transaminitis 2022   • Heat rash 2022   • Chest pain 2022   • Nonrheumatic aortic (valve) stenosis 2022   • Arthritis of right knee 2022   • Chronic constipation 10/11/2022   • Mixed stress and urge urinary incontinence 10/10/2022   • Renal cyst 10/10/2022   • Seasonal allergies 2022   • OAB (overactive bladder) 2022   • Continuous opioid dependence ([facility] [address] ) 01/10/2022   • Seborrheic keratoses 01/10/2022   • Dermatitis 2021   • S/P total knee arthroplasty, left 2021   • Tachycardia 2021   • HX: breast cancer 10/12/2020   • Primary osteoarthritis of left knee 2020   • Nephrolithiasis 09/10/2020   • Scoliosis 09/10/2020   • Swelling of joint of left knee 2020   • Age-related osteoporosis without current pathological fracture 2020   • Primary insomnia 2019   • Neuropathy 2019   • Right foot pain 10/24/2019   • Tinea cruris 10/24/2019   • Recurrent major depressive disorder, in partial remission ([facility] [address] ) 2019   • Other chronic pancreatitis ([facility] [address] ) 2019   • Body mass index (BMI) of 40 0-44 9 in adult [facility]) 2019   • Bilateral carpal tunnel syndrome 2017   • Mild intermittent asthma without complication    • Lymphedema 2017   • Generalized anxiety disorder 06/10/2016   • Patent foramen ovale 06/10/2016   • Tension type headache 06/10/2016   • Essential hypertension 2016   • Irritable bowel syndrome (IBS) 2016   • Chronic musculoskeletal pain 2016   • Vitamin D deficiency 2016   • Hyperlipidemia LDL goal <130 2015      LOS (days): 3  Geometric Mean LOS (GMLOS) (days): 2 60  Days to GMLOS:-0 6     OBJECTIVE:    Risk of Unplanned Readmission Score: 16 27         Current admission status: Inpatient  Referral Reason: Other (Discharge planning)    Preferred Pharmacy:   [id number] [place], [place] - [address]  Phone: [phone] Fax: [phone] by [facility], [address] [place] [address] [address] [place] [address],[address] [address]  Phone: [phone] Fax: [phone]    Primary Care Provider: Christine Deras PA-C    Primary Insurance: Mobile [facility]  Secondary Insurance: Kymberly Owens    ASSESSMENT:  [place], [address] Representative - Daughter   Primary Phone: [phone] (Mobile)  Home Phone: [phone]               Advance Directives  Does patient have a [address]?: Yes  Does patient have Advance Directives?: Yes  Advance Directives: Living will, Power of  for finance  Primary Contact: Kaitlyn Huynh - daughter         Readmission Root Cause  30 Day Readmission: No    Patient Information  Admitted from[de-identified] Home  Mental Status: Alert  During Assessment patient was accompanied by: Daughter  Assessment information provided by[de-identified] Patient, Daughter  Primary Caregiver: Self  Support Systems: Daughter  [place] of Residence: [address] do you live in?: [address] [facility] entry access options   Select all that apply : Stairs  Number of steps to enter home : 2  Do the steps have railings?: Yes  Type of Current Residence: [address] [place] home  Upon entering residence, is there a bedroom on the main floor (no further steps)?: No  A bedroom is located on the following floor levels of residence (select all that apply):: 2nd Floor  Upon entering residence, is there a bathroom on the main floor (no further steps)?: No  Indicate which floors of current residence have a bathroom (select all the apply):: 2nd Floor  Number of steps to 2nd floor from main floor:  (chair lift)  In the last 12 months, was there a time when you were not able to pay the mortgage or rent on time?: No  In the last 12 months, how many places have you lived?: 1  In the last 12 months, was there a time when you did not have a steady place to sleep or slept in a shelter (including now)?: No  Homeless/housing insecurity resource given?: N/A  Living Arrangements: Lives w/ Daughter  Is patient a ?: No    Activities of Daily Living Prior to Admission  Functional Status: Independent  Completes ADLs independently?: Yes  Ambulates independently?: No  Level of ambulatory dependence: Assistance  Does patient use assisted devices?: Yes  Assisted Devices (DME) used: Bedside Commode, Shower Chair, Straight Cane, Wheelchair, Milli Kanaris  Does patient currently own DME?: Yes  Does patient have a history of Outpatient Therapy (PT/OT)?: Yes  Does the patient have a history of Short-Term Rehab?: Yes ([facility])  Does patient have a history of [facility]?: Yes ([facility])  Does patient currently have [facility]?: No         Patient Information Continued  Income Source: Pension/detention  Does patient have prescription coverage?: Yes  Within the past 12 months, you worried that your food would run out before you got the money to buy more : Never true  Within the past 12 months, the food you bought just didn't last and you didn't have money to get more : Never true  Food insecurity resource given?: N/A  Does patient receive dialysis treatments?: No  Does patient have a history of substance abuse?: No  Does patient have a history of Mental Health Diagnosis?: No         Means of Transportation  Means of Transport to Appts[de-identified] Family transport  In the past 12 months, has lack of transportation kept you from medical appointments or from getting medications?: No  In the past 12 months, has lack of transportation kept you from meetings, work, or from getting things needed for daily living?: No  Was application for public transport provided?: N/A        DISCHARGE DETAILS:    Discharge planning discussed with[de-identified] Patient and daughter  Freedom of Choice: Yes  Comments - Freedom of Choice: Pt and family deny any current discharge needs  CM to follow as needed  CM contacted family/caregiver?: Yes  Were Treatment Team discharge recommendations reviewed with patient/caregiver?: Yes  Did patient/caregiver verbalize understanding of patient care needs?: Yes  Were patient/caregiver advised of the risks associated with not following Treatment Team discharge recommendations?: Yes    Contacts  Patient Contacts: Cinthia Neumann  Relationship to Patient[de-identified] Family  Contact Method:  In Person  Reason/Outcome: Discharge [address]         Is the patient interested in [address] at discharge?: No    DME Referral Provided  Referral made for DME?: No              Treatment Team Recommendation: Home  Discharge Destination Plan[de-identified] Home  Transport at Discharge : Family

## 2022-12-29 ENCOUNTER — TRANSITIONAL CARE MANAGEMENT (OUTPATIENT)
Dept: INTERNAL MEDICINE CLINIC | Facility: CLINIC | Age: 72
End: 2022-12-29

## 2022-12-29 NOTE — UTILIZATION REVIEW
Notification of Discharge   This is a Notification of Discharge from our facility [address]  Please be advised that this patient has been discharge from our facility  Below you will find the admission and discharge date and time including the patient’s disposition  UTILIZATION REVIEW CONTACT:  Erin Garcia  Utilization   Network Utilization Review Department  Phone: [phone] carefully listen to the prompts  All voicemails are confidential   Email: [email or web address]     PHYSICIAN ADVISORY SERVICES:  FOR HLYT-MS-KZYD REVIEW - MEDICAL NECESSITY DENIAL  Phone: [phone]  Fax: [phone]  Email: [email or web address]     PRESENTATION DATE: 12/24/2022  8:17 PM  OBERVATION ADMISSION DATE:  INPATIENT ADMISSION DATE: 12/25/22  9:20 AM   DISCHARGE DATE: 12/28/2022  3:13 PM  DISPOSITION: Home/Self Care Home/Self Care      IMPORTANT INFORMATION:  Send all requests for admission clinical reviews, approved or denied determinations and any other requests to dedicated fax number below belonging to the campus where the patient is receiving treatment   List of dedicated fax numbers:  [address] [place] [address] DENIALS (Administrative/Medical Necessity) [phone]   [address] [place] [address] (Maternity/NICU/Pediatrics) [phone]   [facility] [phone]   [place] [address] [phone]   [address] [phone]   [facility] [phone] 90 [facility] [phone]   [address] [place] [address] 119 [phone]   [facility]  [phone]   [facility] [phone]   [address] [facility] [address] [facility] [phone]

## 2022-12-31 LAB — VZV DNA SPEC QL NAA+PROBE: POSITIVE

## 2023-01-03 ENCOUNTER — TELEPHONE (OUTPATIENT)
Dept: OBGYN CLINIC | Facility: HOSPITAL | Age: 73
End: 2023-01-03

## 2023-01-03 ENCOUNTER — TELEPHONE (OUTPATIENT)
Dept: GASTROENTEROLOGY | Facility: CLINIC | Age: 73
End: 2023-01-03

## 2023-01-03 NOTE — TELEPHONE ENCOUNTER
----- Message from Tom Bradley MD sent at 12/30/2022 12:40 PM EST -----  Hi,    Can you please let her know her stomach biopsies came back and it looked good? No infection seen  Thank you!

## 2023-01-03 NOTE — TELEPHONE ENCOUNTER
Caller: Patient    Doctor: Dr Carey Yarbrough    Reason for call: Patient calling in and had to cancel her F/U appt for Right TKA on 1/4/23 secondary to her transportation falling through  She is rescheduled for 1/10/23 at 1:30  Patient states that the knee is doing well and she was wondering if the visit could be a virtual visit    Patient asking to speak to clinical team     Call back#: [phone]

## 2023-01-04 ENCOUNTER — OFFICE VISIT (OUTPATIENT)
Dept: INTERNAL MEDICINE CLINIC | Facility: CLINIC | Age: 73
End: 2023-01-04

## 2023-01-04 VITALS
WEIGHT: 180 LBS | TEMPERATURE: 97.7 F | DIASTOLIC BLOOD PRESSURE: 68 MMHG | HEART RATE: 105 BPM | HEIGHT: 56 IN | SYSTOLIC BLOOD PRESSURE: 136 MMHG | OXYGEN SATURATION: 98 % | BODY MASS INDEX: 40.49 KG/M2

## 2023-01-04 DIAGNOSIS — E55.9 VITAMIN D DEFICIENCY: ICD-10-CM

## 2023-01-04 DIAGNOSIS — R73.01 ELEVATED FASTING GLUCOSE: ICD-10-CM

## 2023-01-04 DIAGNOSIS — R79.89 ELEVATED LFTS: ICD-10-CM

## 2023-01-04 DIAGNOSIS — B02.9 HERPES ZOSTER WITHOUT COMPLICATION: Primary | ICD-10-CM

## 2023-01-04 DIAGNOSIS — K86.1 OTHER CHRONIC PANCREATITIS (HCC): ICD-10-CM

## 2023-01-04 DIAGNOSIS — Z13.29 SCREENING FOR THYROID DISORDER: ICD-10-CM

## 2023-01-04 DIAGNOSIS — F33.41 MAJOR DEPRESSIVE DISORDER, RECURRENT, IN PARTIAL REMISSION (HCC): ICD-10-CM

## 2023-01-04 DIAGNOSIS — R41.89 COGNITIVE DECLINE: ICD-10-CM

## 2023-01-04 DIAGNOSIS — F11.20 CONTINUOUS OPIOID DEPENDENCE (HCC): ICD-10-CM

## 2023-01-04 DIAGNOSIS — Z13.0 SCREENING FOR DEFICIENCY ANEMIA: ICD-10-CM

## 2023-01-04 DIAGNOSIS — E78.5 HYPERLIPIDEMIA LDL GOAL <130: ICD-10-CM

## 2023-01-04 RX ORDER — GABAPENTIN 300 MG/1
300 CAPSULE ORAL 2 TIMES DAILY
Qty: 60 CAPSULE | Refills: 2 | Status: SHIPPED | OUTPATIENT
Start: 2023-01-04

## 2023-01-05 DIAGNOSIS — F33.41 RECURRENT MAJOR DEPRESSIVE DISORDER, IN PARTIAL REMISSION (HCC): ICD-10-CM

## 2023-01-05 RX ORDER — DULOXETIN HYDROCHLORIDE 60 MG/1
CAPSULE, DELAYED RELEASE ORAL
Qty: 180 CAPSULE | Refills: 0 | Status: SHIPPED | OUTPATIENT
Start: 2023-01-05

## 2023-01-05 NOTE — PROGRESS NOTES
Transition of Care  Follow-up After Hospitalization    Deyanira Stokes 81 y o  female   Date:  1/5/2023    TCM Call     Date and time call was made  12/29/2022  2:29 PM    Hospital care reviewed  Records reviewed    Patient was hospitialized at  [address]    Date of Admission  12/24/22    Date of discharge  12/28/22    Diagnosis  Chest pain    Disposition  Home    Were the patients medications reviewed and updated  Yes    Current Symptoms  None      TCM Call     Post hospital issues  None    Should patient be enrolled in anticoag monitoring? No    Scheduled for follow up? Yes    Did you obtain your prescribed medications  Yes    Do you need help managing your prescriptions or medications  No    Is transportation to your appointment needed  No    I have advised the patient to call PCP with any new or worsening symptoms  jose r renae ma    Are you recieving any outpatient services  No    Are you recieving home care services  No    Are you using any community resources  No    Current waiver services  No    Have you fallen in the last 12 months  No    Interperter language line needed  No    Counseling  Patient    Counseling topics  Diagnostic results; instructions for management; Risk factor reduction; patient and family education; Activities of daily living; Prognosis        Admit Date: 12/24  Discharge Date: 12/28  Diagnosis: chest pain, herpes zoster  Location: [address] [place] [address] records were reviewed  Medications upon discharge reviewed/updated  Medication Changes: started on valtrex, gabapentin and protonix  Imaging: CT a/p, RUQ US, CXR, EGD,   Consults: GI, cardiology  Discharge Disposition:  home  Follow up visits with other specialists: none      Assessment and Plan:    1  Chest pain: Resolved  Cardiac origin ruled out and likely secondary to zoster infection  2  Herpes zoster: Continue valtrex  Continue gabapentin but increase to 300mg BID  3  Gastritis: Continue protonix   Will get repeat CMP to ensure improvement in LFTs  Deyanira was seen today for transition of care management  Diagnoses and all orders for this visit:    Herpes zoster without complication  -     gabapentin (Neurontin) 300 mg capsule; Take 1 capsule (300 mg total) by mouth 2 (two) times a day    Elevated LFTs  -     Comprehensive metabolic panel; Future    Continuous opioid dependence (HCC)    Major depressive disorder, recurrent, in partial remission (HCC)    Other chronic pancreatitis ([facility] [address] )    Body mass index (BMI) 40 0-44 9, adult (HCC)    Vitamin D deficiency  -     Vitamin D 25 hydroxy; Future    Screening for thyroid disorder  -     TSH, 3rd generation; Future    Screening for deficiency anemia    Hyperlipidemia LDL goal <130  -     Lipid panel; Future    Elevated fasting glucose  -     Hemoglobin A1C; Future    Cognitive decline  -     Ambulatory Referral to Neurology; Future          HPI:  Pt presents for TCM  She was evaluated at [facility] on 12/24 for chest pain  She had negative cardiac enzymes and normal EKG  Her LFTs were elevated  She underwent CT A/P and CXR that were normal  She was admitted  She had cardiology consult and they felt her pain was non cardiac  She had RUQ US that was normal  She had EGD that showed gastritits and she was started on protonix  She developed zoster rash and was then started on gabapentin and valtrex  She was discharged home on 12/28  She has impressive zoster rash along her left T7 dermatome  She is tolerating the gabapentin but feels she would benefit from a higher strength  ROS: Review of Systems   Constitutional: Negative for chills and fever  HENT: Negative for congestion, ear pain, hearing loss, postnasal drip, rhinorrhea, sinus pressure, sinus pain, sore throat and trouble swallowing  Eyes: Negative for pain and visual disturbance  Respiratory: Negative for cough, chest tightness, shortness of breath and wheezing  Cardiovascular: Negative    Negative for chest pain, palpitations and leg swelling  Gastrointestinal: Negative for abdominal pain, blood in stool, constipation, diarrhea, nausea and vomiting  Endocrine: Negative for cold intolerance, heat intolerance, polydipsia, polyphagia and polyuria  Genitourinary: Negative for difficulty urinating, dysuria, flank pain and urgency  Musculoskeletal: Negative for arthralgias, back pain, gait problem and myalgias  Skin: Positive for rash  Allergic/Immunologic: Negative  Neurological: Negative for dizziness, weakness, light-headedness and headaches  Hematological: Negative  Psychiatric/Behavioral: Negative for behavioral problems, dysphoric mood and sleep disturbance  The patient is not nervous/anxious          Past Medical History:   Diagnosis Date   • Anxiety    • Arthritis    • Asthma    • Breast cancer ([facility] [address] )     rt mastectomy 2005   • Cancer [facility])     breast right   • Cataract    • CHF (congestive heart failure) (HCC)    • Chronic headaches    • Concussion     At age 7; Chronic headaches since then   • Coronary artery disease    • Depression    • Dietary lactose intolerance    • GERD (gastroesophageal reflux disease)    • High cholesterol    • History of chemotherapy     2005 rt breast cancer   • History of transfusion    • Hypertension    • IBS (irritable bowel syndrome)    • Irritable bowel syndrome (IBS) 05/31/2016   • Kidney stone    • Lymphedema of right arm    • Obesity    • PFO (patent foramen ovale)    • PONV (postoperative nausea and vomiting)    • Post-menopausal    • Psychiatric disorder    • Renal disorder    • Shortness of breath    • Vitamin D deficiency        Past Surgical History:   Procedure Laterality Date   • BREAST SURGERY     • CATARACT EXTRACTION Bilateral    • CATARACT EXTRACTION, BILATERAL     • CHOLECYSTECTOMY     • COLONOSCOPY N/A 05/31/2016    Procedure: COLONOSCOPY;  Surgeon: Romaine Cook MD;  Location: [facility] MAIN OR;  Service:    • GALLBLADDER SURGERY     • HYSTERECTOMY      Total   • JOINT REPLACEMENT     • KNEE ARTHROSCOPY     • KNEE SURGERY      arthroscopy   • MASTECTOMY Right     rt breast mastectomy 2005   • [place] ARTHRP KNE CONDYLE&PLATU MEDIAL&LAT COMPARTMENTS Left 05/20/2021    Procedure: ARTHROPLASTY KNEE TOTAL;  Surgeon: Immanuel Robert MD;  Location: [facility] MAIN OR;  Service: Orthopedics   • [place] ARTHRP KNE CONDYLE&PLATU MEDIAL&LAT COMPARTMENTS Right 11/16/2022    Procedure: ARTHROPLASTY KNEE TOTAL;  Surgeon: Elise Bazan DO;  Location: [facility] MAIN OR;  Service: Orthopedics   • TONSILLECTOMY AND ADENOIDECTOMY         Social History     Socioeconomic History   • Marital status:      Spouse name: None   • Number of children: None   • Years of education: None   • Highest education level: None   Occupational History   • Occupation: Retired   Tobacco Use   • Smoking status: Never   • Smokeless tobacco: Never   Vaping Use   • Vaping Use: Never used   Substance and Sexual Activity   • Alcohol use: Yes     Alcohol/week: 1 0 standard drink     Types: 1 Glasses of wine per week     Comment: socially   • Drug use: Not Currently     Types: Marijuana     Comment: In her youth   • Sexual activity: Not Currently   Other Topics Concern   • None   Social History Narrative    Always uses seat belt    Occasional caffeine consumption         Retired    Lives with adult daughter     Social Determinants of Health     Financial Resource Strain: Not on file   Food Insecurity: No Food Insecurity   • Worried About [address] in the Last Year: Never true   • Ran Out of Food in the Last Year: Never true   Transportation Needs: No Transportation Needs   • Lack of Transportation (Medical): No   • Lack of Transportation (Non-Medical):  No   Physical Activity: Not on file   Stress: Not on file   Social Connections: Not on file   Intimate Partner Violence: Not on file   Housing Stability: Low Risk    • Unable to Pay for Housing in the Last Year: No   • Number of Places Lived in the Last Year: 1   • Unstable Housing in the Last Year: No       Family History   Adopted: Yes   Problem Relation Age of Onset   • Diabetes Mother    • Heart disease Mother    • Mental illness Mother    • Depression Mother    • Heart disease Brother    • Breast cancer Maternal Aunt    • Breast cancer Maternal Aunt    • Breast cancer Maternal Aunt    • Breast cancer Maternal Aunt    • Breast cancer Maternal Aunt    • No Known Problems Father    • No Known Problems Sister    • No Known Problems Daughter    • Breast cancer Maternal Grandmother    • No Known Problems Sister    • No Known Problems Sister    • No Known Problems Sister        Allergies   Allergen Reactions   • Ibuprofen Nausea Only, Rash, Dizziness and Syncope   • Morphine Other (See Comments) and Hallucinations     Intolerance   • Latex Dermatitis         Current Outpatient Medications:   •  acetaminophen (TYLENOL) 650 mg CR tablet, Take 1 tablet (650 mg total) by mouth every 8 (eight) hours as needed for mild pain, Disp: 30 tablet, Rfl: 0  •  albuterol (2 5 mg/3 mL) 0 083 % nebulizer solution, Take 2 5 mg by nebulization every 6 (six) hours as needed for wheezing, Disp: , Rfl:   •  albuterol (PROAIR HFA) 90 mcg/act inhaler, Inhale 2 puffs every 4 (four) hours as needed for wheezing, Disp: 3 Inhaler, Rfl: 1  •  ascorbic acid (VITAMIN C) 500 MG tablet, Take 1 tablet (500 mg total) by mouth daily, Disp: 30 tablet, Rfl: 1  •  atorvastatin (LIPITOR) 20 mg tablet, Take 1 tablet by mouth daily  , Disp: 30 tablet, Rfl: 0  •  buPROPion (WELLBUTRIN XL) 300 mg 24 hr tablet, Take 1 tablet (300 mg total) by mouth every morning, Disp: 90 tablet, Rfl: 3  •  butalbital-acetaminophen-caffeine (FIORICET,ESGIC) -40 mg per tablet, Take 1 tablet by mouth 3 (three) times a day as needed for headaches, Disp: 90 tablet, Rfl: 1  •  ergocalciferol (VITAMIN D2) 50,000 units, Take 1 capsule (50,000 Units total) by mouth once a week, Disp: 4 capsule, Rfl: 3  •  gabapentin (Neurontin) 300 mg capsule, Take 1 capsule (300 mg total) by mouth 2 (two) times a day, Disp: 60 capsule, Rfl: 2  •  linaCLOtide (Linzess) 145 MCG CAPS, Take 1 capsule (145 mcg total) by mouth in the morning, Disp: 30 capsule, Rfl: 2  •  lisinopril (ZESTRIL) 5 mg tablet, Take 1 tablet (5 mg total) by mouth daily Do not start before November 23, 2022 , Disp: , Rfl: 0  •  loratadine (CLARITIN) 10 mg tablet, Take 10 mg by mouth daily, Disp: , Rfl:   •  Melatonin 10 MG TABS, Take by mouth, Disp: , Rfl:   •  Multiple Vitamins-Minerals (multivitamin with minerals) tablet, Take 1 tablet by mouth daily, Disp: 30 tablet, Rfl: 1  •  Nutritional Supplements (BOOST BREEZE PO), Take by mouth daily, Disp: , Rfl:   •  oxybutynin (DITROPAN-XL) 10 MG 24 hr tablet, Take 1 tablet (10 mg total) by mouth daily at bedtime, Disp: 30 tablet, Rfl: 12  •  oxyCODONE (OxyCONTIN) 10 mg 12 hr tablet, Take 10 mg by mouth as needed, Disp: , Rfl:   •  pantoprazole (PROTONIX) 40 mg tablet, Take 1 tablet (40 mg total) by mouth 2 (two) times a day before meals, Disp: 60 tablet, Rfl: 0  •  docusate sodium (COLACE) 100 mg capsule, Take 1 capsule (100 mg total) by mouth 2 (two) times a day, Disp: 60 capsule, Rfl: 0  •  DULoxetine (CYMBALTA) 60 mg delayed release capsule, Take 1 capsule by mouth twice daily  , Disp: 180 capsule, Rfl: 0  •  valACYclovir (VALTREX) 1,000 mg tablet, Take 1 tablet (1,000 mg total) by mouth every 8 (eight) hours for 6 days, Disp: 18 tablet, Rfl: 0      Physical Exam:  /68 (BP Location: Left arm, Patient Position: Sitting)   Pulse 105   Temp 97 7 °F (36 5 °C)   Ht 4' 8" (1 422 m)   Wt 81 6 kg (180 lb)   LMP  (LMP Unknown)   SpO2 98%   BMI 40 36 kg/m²     Physical Exam  Constitutional:       General: She is not in acute distress  Appearance: She is well-developed  She is not diaphoretic  HENT:      Head: Normocephalic and atraumatic        Right Ear: External ear normal       Left Ear: External ear normal       Nose: Nose normal  Mouth/Throat:      Pharynx: No oropharyngeal exudate  Eyes:      General: No scleral icterus  Right eye: No discharge  Left eye: No discharge  Conjunctiva/sclera: Conjunctivae normal       Pupils: Pupils are equal, round, and reactive to light  Neck:      Thyroid: No thyromegaly  Cardiovascular:      Rate and Rhythm: Normal rate and regular rhythm  Heart sounds: Normal heart sounds  No murmur heard  No friction rub  No gallop  Pulmonary:      [place]: Pulmonary effort is normal  No respiratory distress  Breath sounds: Normal breath sounds  No wheezing or rales  Abdominal:      General: Bowel sounds are normal  There is no distension  Palpations: Abdomen is soft  Tenderness: There is no abdominal tenderness  Musculoskeletal:         General: No tenderness or deformity  Normal range of motion  Cervical back: Normal range of motion and neck supple  Skin:     General: Skin is warm and dry  Findings: Rash present  Rash is vesicular (left T7 dermatome)  Neurological:      Mental Status: She is alert and oriented to person, place, and time  Cranial Nerves: No cranial nerve deficit  Psychiatric:         Behavior: Behavior normal          Thought Content:  Thought content normal          Judgment: Judgment normal              Labs:  Lab Results   Component Value Date    WBC 3 60 (L) 12/28/2022    HGB 13 5 12/28/2022    HCT 41 7 12/28/2022    MCV 93 12/28/2022     12/28/2022     Lab Results   Component Value Date     12/02/2015    K 3 4 (L) 12/28/2022    CL 99 12/28/2022    CO2 30 12/28/2022    ANIONGAP 7 12/02/2015    BUN 12 12/28/2022    CREATININE 0 62 12/28/2022    GLUCOSE 79 12/02/2015    GLUF 164 (H) 11/17/2022    CALCIUM 8 7 12/28/2022    CORRECTEDCA 9 3 12/28/2022    AST 27 12/28/2022    ALT 47 12/28/2022    ALKPHOS 121 (H) 12/28/2022    PROT 6 9 12/02/2015    BILITOT 0 55 12/02/2015    EGFR 91 12/28/2022

## 2023-01-06 ENCOUNTER — TELEPHONE (OUTPATIENT)
Dept: INTERNAL MEDICINE CLINIC | Facility: CLINIC | Age: 73
End: 2023-01-06

## 2023-01-06 DIAGNOSIS — L03.90 CELLULITIS, UNSPECIFIED CELLULITIS SITE: Primary | ICD-10-CM

## 2023-01-06 RX ORDER — CLINDAMYCIN HYDROCHLORIDE 150 MG/1
150 CAPSULE ORAL EVERY 8 HOURS SCHEDULED
Qty: 30 CAPSULE | Refills: 0 | Status: SHIPPED | OUTPATIENT
Start: 2023-01-06 | End: 2023-01-16

## 2023-01-06 NOTE — TELEPHONE ENCOUNTER
Pt called and was under the impression that her shingle rash was infected and that you were calling in an antibiotic for her  She thinks it's gotten worse and is concerned

## 2023-01-09 ENCOUNTER — TELEMEDICINE (OUTPATIENT)
Dept: UROLOGY | Facility: CLINIC | Age: 73
End: 2023-01-09

## 2023-01-09 DIAGNOSIS — N39.46 MIXED STRESS AND URGE URINARY INCONTINENCE: Chronic | ICD-10-CM

## 2023-01-09 DIAGNOSIS — N32.81 OAB (OVERACTIVE BLADDER): Primary | ICD-10-CM

## 2023-01-09 DIAGNOSIS — N20.0 NEPHROLITHIASIS: ICD-10-CM

## 2023-01-09 DIAGNOSIS — N28.1 RENAL CYST: ICD-10-CM

## 2023-01-09 NOTE — ASSESSMENT & PLAN NOTE
· Begin Myrbetriq 25 mg daily  · Discontinue oxybutynin due to significant constipation  · If Myrbetriq is cost prohibitive she will let me know and we will try Vesicare  · Follow-up 6 months

## 2023-01-09 NOTE — ASSESSMENT & PLAN NOTE
• Hysterectomy and bladder surgery "only holds 1 ounce"  • Referral to pelvic floor physical therapy-did not complete  • Begin mirabegron 25mg daily  • Follow-up 6 months

## 2023-01-09 NOTE — PATIENT INSTRUCTIONS
Dietary Management of Kidney Stone Disease    The dietary recommendations for most people who make kidney stones (especially the most common calcium oxalate stones) are uncomplicated and are not too tedious or bland  Most importantly, the following recommendations also promote better health for a variety of reasons  FLUIDS:  The single most important change for the majority patients is the need to greatly increase fluid intake  You should at least produce two liters (about two quarts) of urine each day  Depending on the heat outdoors and your level of physical activity, this usually means consuming ten, 10 ounce glasses (100 ounces) of fluid per day  Water is always a good choice, but other drinks including tea, coffee, soda, and juice are also allowed as long as no one beverage becomes the sole source of fluid  CALCIUM:  There is excellent evidence that calcium should not be avoided, but instead moderated  A range of 600 to 1,100 mg of calcium per day, especially consumed at meals is probably a reasonable target  (i e  2-3 dairy servings per day) This might include small servings of yogurt, milk or ice cream   This amount helps avoid over-absorption of oxalate from the digestive tract and also allows for healthy bone maintenance  SODIUM (SALT): Too much salt in your diet (both from the shaker and in the prepared foods that we buy) is bad for your blood pressure, bad for your heart, and also increases the amount of calcium in your urine  A reasonable sodium restriction to 2,000-2,500mg/day (about the amount in one teaspoon) is an excellent target  You should get into the habit of reading the “Nutrients” labels on all the foods that you eat and watch out for the foods that have a high sodium content (snack foods, smoked or processed foods, caned foods)  Fresh and frozen foods usually have the least amount of sodium      PROTEIN:  High protein diets from animal meat (beef, chicken, pork, fish) also increases the rate of kidney stone formation and is equally unhealthy for your heart  All patients should moderate their meat intake to 3-7 ounces per day, and particularly stay away from red meat protein  OXALATE:  Most stone-formers should avoid heavy intake of oxalate-rich foods  These include green roughage (spinach, mustard, kale), strawberries, chocolate, tea, iced tea, and nuts  In addition, heavy, excess doses of Vitamin C can also produce surges in urinary oxalate levels and should be avoided  ** THESE FOODS ARE HIGH IN OXALATE - TRY TO LIMIT HOW MUCH OF THESE YOU EAT:  Coke and Pepsi  Nuts  Dark Leafy Greens:     Spinach and Kale, Rhubarb, Chard  Asparagus  Beets  Sweet potatoes   Blueberries, Strawberries   Dark tea (Green tea is okay)  Tofu      DRINK 3 QUARTS (96 Oz ) LIQUIDS EACH DAY - ALL LIQUIDS COUNT        ADD LEMON JUICE 3 OZ  DAILY - Fresh squeezed or lemon juice concentrate - Not Minute[employer]aid, [place]  [place] ([facility] Hill, Crystal Lite, etc         ** Recipe for AGARWAL'S OLDCRISELDA TYME LEMONADE:         One ounce of lemon juice, glass of water, sweetener of your choice    Coffee is okay! Cranberry juice is good to prevent infections, but does not help for stones  BARE-BONES RECOMMENDATIONS:  Fluids, fluids, fluids  Low salt diet (your primary care doctor will love you)  Moderate red meat intake  Moderate calcium (dairy products), especially with meals  BLADDER HEALTH    WHAT IS CONSIDERED NORMAL? The average bladder can hold about 2 cups of urine before it needs to be emptied  The normal range of voiding urine is 6 to 8 times during a 24 hour period  As we get older, our bladder capacity can get smaller and we may need to pass urine more frequently but usually not more than every 2 hours  Urine should flow easily without discomfort in a good, steady stream until the bladder is empty  No pushing or straining is necessary to empty the bladder      An urge is a signal that you feel as the bladder stretches to fill with urine  Urges can be felt even if the bladder is not full  Urges are not commands to go to the toilet, merely a signal and can be controlled  WHAT ARE GOOD BLADDER HABITS? Take your time when emptying your bladder  Don’t strain or push to empty your bladder  Make sure you empty your bladder completely each time you pass urine  Do not rush the process  Consistently ignoring the urge to go (waiting more than 4 hours between toileting) or urinating too infrequently may be convenient but not healthy for your bladder  Avoid going to the toilet “just in case” or more often than every 2 hours  It is usually not necessary to go when you feel the first urge  Try to go only when your bladder is full  Urgency and frequency of urination can be improved by retraining the bladder and spacing your fluid intake throughout the day  Practice good toilet habits  Don’t let your bladder control your life  TIPS TO MAINTAIN GOOD BLADDER HABITS  Maintain a good fluid intake  Depending on your body size and environment, drink 6 -8 cups (8 oz each) of fluid per day unless otherwise advised by your doctor  Not enough fluid creates a foul odor and dark color of the urine  Limit the amount of caffeine (coffee, cola, chocolate or tea) and citrus foods that you consume as these foods can be associated with increased sensation of urinary urgency and frequency  Limit the amount of alcohol you drink  Alcohol increases urine production and makes it difficult for the brain to coordinate bladder control  Avoid constipation by maintaining a balanced diet of dietary fiber  Cigarette smoking is also irritating to the bladder surface and is associated with bladder cancer  In addition, the coughing associated with smoking may lead to increased incontinent episodes because of the increased pressure      HOW DIET CAN AFFECT YOUR BLADDER  Although there is no particular "diet" that can cure bladder control, there are certain dietary suggestions you can use to help control the problem  There are 2 points to consider when evaluating how your habits and diet may affect your bladder:    Foods and fluids can irritate the bladder  Some foods and beverages are thought to contribute to bladder leakage and irritability  However their effect on the bladder is not completely understood and you may want to see if eliminating one or all of these items improves your bladder control  If you are unable to give them up completely, it is recommended that you use the following items in moderation:  Acidic beverages and foods (orange juice, grapefruit juice, lemonade etc)  Alcoholic beverages  Vinegar  Coffee (regular and decaf)  Tea (regular and decaf)  Caffeinated beverages  Carbonated beverages          Drinking enough and the right kinds of fluids  Many people with bladder control issues decrease their intake of liquids in hope that they will need to urinate less frequently or have less urinary leakage  You should not restrict fluids to control your bladder  While a decrease in liquid intake does result in a decrease in the volume of urine, the smaller amount of urine may be more highly concentrated  Highly concentrated, dark yellow urine is irritating to the bladder surface and may actually cause you to go to the bathroom more frequently  It also encourages the growth of bacteria, which may lead to infections resulting in incontinence  Substitutions for Bladder Irritants: water is always the best beverage choice  Grape and apple juice are thirst quenchers are good selections and are not as irritating to the bladder    Low acid fruits:  Pears, apricots, papaya, watermelon  For coffee drinkers: KAVA®, Postum®, Lucila®, Kaffree Exline®  For tea drinkers:  non-citrus or herbal and sun brewed tea

## 2023-01-09 NOTE — PROGRESS NOTES
Virtual Regular Visit    Verification of patient location:    Patient is located in the following state in which I hold an active license [place]      Assessment/Plan:    Problem List Items Addressed This Visit        Genitourinary    Nephrolithiasis     · US/ KUB 1 year  · Reviewed AUA dietary recommendations and hydration goals  · Follow up 1 year         Relevant Orders    US kidney and bladder    XR abdomen 1 view kub    OAB (overactive bladder) - Primary     · Begin Myrbetriq 25 mg daily  · Discontinue oxybutynin due to significant constipation  · If Myrbetriq is cost prohibitive she will let me know and we will try Vesicare  · Follow-up 6 months         Relevant Medications    Mirabegron ER 25 MG TB24    Renal cyst     • Bilateral renal cysts noted on prior ultrasound  • No further imaging needed              Other    Mixed stress and urge urinary incontinence (Chronic)     • Hysterectomy and bladder surgery "only holds 1 ounce"  • Referral to pelvic floor physical therapy-did not complete  • Begin mirabegron 25mg daily  • Follow-up 6 months         Relevant Medications    Mirabegron ER 25 MG TB24          Reason for visit is No chief complaint on file  Encounter provider Rosemarie Barakat    Provider located at [address] [address]  [phone]      Recent Visits  Date Type Provider Dept   01/04/23 Office Visit Warren Duncan PA-C [facility]   Showing recent visits within past 7 days and meeting all other requirements  Today's Visits  Date Type Provider Dept   01/09/23 Telemedicine Rafaela Valdivia, [address] [facility] [place]   Showing today's visits and meeting all other requirements  Future Appointments  No visits were found meeting these conditions    Showing future appointments within next 150 days and meeting all other requirements       The patient was identified by name and date of jael  Rosita Kraus was informed that this is a telemedicine visit and that the visit is being conducted through Telephone  My office door was closed  No one else was in the room  She acknowledged consent and understanding of privacy and security of the video platform  The patient has agreed to participate and understands they can discontinue the visit at any time  Patient is aware this is a billable service  Mary Patton is a 72 y o  female female presents for 3 month  Last seen in a telemedicine visit by me 2021  She is following up for urge incontinence as well as right ureteral calculus      She reports urinary frequency and urinary incontinence  She wears depends  She has some urgency and urge incontinence   She also has a several year history of urge incontinence  She reports hysterectomy for fibroids at age 47 with some type of bladder surgery performed at the time as "turns out my uterus and bladder were stuck together and he made my bladder new during the surgery but it's only the size of 1 oz " She's had urinary urgency for about 12 years, and incontinence for the past few- on the way to the toilet  She wears 2 briefs per day, 1 overnight  She's not had hematuria or UTIs  She drinks 30 ounces of water a day; she tried to increase this but felt it was too much, decreased tea and juice intake  Rare soda and no coffee  She was started on oxybutynin at her previous visit and felt this was helpful however during her recent hospitalization this was discontinued due to medication interactions  She would like to resume the medication  She did note significant constipation so I recommended beginning Myrbetriq  She will let me know if this is cost prohibitive and we will switch her to something for it      She reports having passed 2 kidney stones approximately 1 year ago  She does report some mild right-sided flank pain    Her recent imaging did not reveal any kidney stones  Had knee surgery  Was re-admitted to the hospital with shingles    HPI     Past Medical History:   Diagnosis Date   • Anxiety    • Arthritis    • Asthma    • Breast cancer ([address] )     rt mastectomy 2005   • Cancer [facility])     breast right   • Cataract    • CHF (congestive heart failure) (HCC)    • Chronic headaches    • Concussion     At age 7; Chronic headaches since then   • Coronary artery disease    • Depression    • Dietary lactose intolerance    • GERD (gastroesophageal reflux disease)    • High cholesterol    • History of chemotherapy     2005 rt breast cancer   • History of transfusion    • Hypertension    • IBS (irritable bowel syndrome)    • Irritable bowel syndrome (IBS) 05/31/2016   • Kidney stone    • Lymphedema of right arm    • Obesity    • PFO (patent foramen ovale)    • PONV (postoperative nausea and vomiting)    • Post-menopausal    • Psychiatric disorder    • Renal disorder    • Shortness of breath    • Vitamin D deficiency        Past Surgical History:   Procedure Laterality Date   • BREAST SURGERY     • CATARACT EXTRACTION Bilateral    • CATARACT EXTRACTION, BILATERAL     • CHOLECYSTECTOMY     • COLONOSCOPY N/A 05/31/2016    Procedure: COLONOSCOPY;  Surgeon: Mariela Sosa MD;  Location: [facility] MAIN OR;  Service:    • GALLBLADDER SURGERY     • HYSTERECTOMY      Total   • JOINT REPLACEMENT     • KNEE ARTHROSCOPY     • KNEE SURGERY      arthroscopy   • MASTECTOMY Right     rt breast mastectomy 2005   • UT ARTHRP KNE CONDYLE&PLATU MEDIAL&LAT COMPARTMENTS Left 05/20/2021    Procedure: ARTHROPLASTY KNEE TOTAL;  Surgeon: Priscilla Opitz, MD;  Location: [facility] MAIN OR;  Service: Orthopedics   • UT ARTHRP KNE CONDYLE&PLATU MEDIAL&LAT COMPARTMENTS Right 11/16/2022    Procedure: ARTHROPLASTY KNEE TOTAL;  Surgeon: Beny Wiggins DO;  Location: [facility] MAIN OR;  Service: Orthopedics   • TONSILLECTOMY AND ADENOIDECTOMY         Current Outpatient Medications   Medication Sig Dispense Refill   • Mirabegron ER 25 MG TB24 Take 25 mg by mouth in the morning 30 tablet 3   • acetaminophen (TYLENOL) 650 mg CR tablet Take 1 tablet (650 mg total) by mouth every 8 (eight) hours as needed for mild pain 30 tablet 0   • albuterol (2 5 mg/3 mL) 0 083 % nebulizer solution Take 2 5 mg by nebulization every 6 (six) hours as needed for wheezing     • albuterol (PROAIR HFA) 90 mcg/act inhaler Inhale 2 puffs every 4 (four) hours as needed for wheezing 3 Inhaler 1   • ascorbic acid (VITAMIN C) 500 MG tablet Take 1 tablet (500 mg total) by mouth daily 30 tablet 1   • atorvastatin (LIPITOR) 20 mg tablet Take 1 tablet by mouth daily  30 tablet 0   • buPROPion (WELLBUTRIN XL) 300 mg 24 hr tablet Take 1 tablet (300 mg total) by mouth every morning 90 tablet 3   • butalbital-acetaminophen-caffeine (FIORICET,ESGIC) -40 mg per tablet Take 1 tablet by mouth 3 (three) times a day as needed for headaches 90 tablet 1   • clindamycin (CLEOCIN) 150 mg capsule Take 1 capsule (150 mg total) by mouth every 8 (eight) hours for 10 days 30 capsule 0   • docusate sodium (COLACE) 100 mg capsule Take 1 capsule (100 mg total) by mouth 2 (two) times a day 60 capsule 0   • DULoxetine (CYMBALTA) 60 mg delayed release capsule Take 1 capsule by mouth twice daily   180 capsule 0   • ergocalciferol (VITAMIN D2) 50,000 units Take 1 capsule (50,000 Units total) by mouth once a week 4 capsule 3   • gabapentin (Neurontin) 300 mg capsule Take 1 capsule (300 mg total) by mouth 2 (two) times a day 60 capsule 2   • linaCLOtide (Linzess) 145 MCG CAPS Take 1 capsule (145 mcg total) by mouth in the morning 30 capsule 2   • lisinopril (ZESTRIL) 5 mg tablet Take 1 tablet (5 mg total) by mouth daily Do not start before November 23, 2022   0   • loratadine (CLARITIN) 10 mg tablet Take 10 mg by mouth daily     • Melatonin 10 MG TABS Take by mouth     • Multiple Vitamins-Minerals (multivitamin with minerals) tablet Take 1 tablet by mouth daily 30 tablet 1   • Nutritional Supplements (BOOST BREEZE PO) Take by mouth daily     • oxyCODONE (OxyCONTIN) 10 mg 12 hr tablet Take 10 mg by mouth as needed     • pantoprazole (PROTONIX) 40 mg tablet Take 1 tablet (40 mg total) by mouth 2 (two) times a day before meals 60 tablet 0   • valACYclovir (VALTREX) 1,000 mg tablet Take 1 tablet (1,000 mg total) by mouth every 8 (eight) hours for 6 days 18 tablet 0     No current facility-administered medications for this visit  Allergies   Allergen Reactions   • Ibuprofen Nausea Only, Rash, Dizziness and Syncope   • Morphine Other (See Comments) and Hallucinations     Intolerance   • Latex Dermatitis       Review of Systems   Constitutional: Negative for activity change, appetite change, chills, fatigue, fever and unexpected weight change  HENT: Negative for facial swelling  Congestion: mirabegron  Eyes: Negative for discharge  Respiratory: Negative  Negative for cough and shortness of breath  Cardiovascular: Negative for chest pain and leg swelling  Gastrointestinal: Positive for constipation  Negative for abdominal distention, abdominal pain, diarrhea, nausea and vomiting  Now on linzess   Endocrine: Negative  Genitourinary: Negative  Negative for decreased urine volume, difficulty urinating, dysuria, enuresis, flank pain, frequency, genital sores, hematuria and urgency  Musculoskeletal: Negative for back pain and myalgias  Skin: Negative for pallor and rash  Allergic/Immunologic: Negative  Negative for immunocompromised state  Neurological: Negative for facial asymmetry and speech difficulty  Psychiatric/Behavioral: Negative for agitation and confusion  Video Exam    There were no vitals filed for this visit  Physical Exam  Neurological:      Mental Status: She is alert  Psychiatric:         Mood and Affect: Mood normal          Behavior: Behavior normal          Thought Content:  Thought content normal          Judgment: Judgment normal           I spent 20 minutes with patient today in which greater than 50% of the time was spent in counseling/coordination of care regarding urinary incontinence and kidney stones    No results found for: PSA   Lab Results   Component Value Date    WBC 3 60 (L) 12/28/2022    HGB 13 5 12/28/2022    HCT 41 7 12/28/2022    MCV 93 12/28/2022     12/28/2022      Lab Results   Component Value Date     12/02/2015    SODIUM 137 12/28/2022    K 3 4 (L) 12/28/2022    CL 99 12/28/2022    CO2 30 12/28/2022    ANIONGAP 7 12/02/2015    AGAP 8 12/28/2022    BUN 12 12/28/2022    CREATININE 0 62 12/28/2022    GLUC 104 12/28/2022    GLUF 164 (H) 11/17/2022    CALCIUM 8 7 12/28/2022    AST 27 12/28/2022    ALT 47 12/28/2022    ALKPHOS 121 (H) 12/28/2022    PROT 6 9 12/02/2015    TP 6 0 (L) 12/28/2022    BILITOT 0 55 12/02/2015    TBILI 0 66 12/28/2022    EGFR 91 12/28/2022      CT ABDOMEN AND PELVIS WITH IV CONTRAST 12/24/2022     INDICATION:   LUQ abdominal pain      COMPARISON:  10/20/2022      TECHNIQUE:  CT examination of the abdomen and pelvis was performed  Axial, sagittal, and coronal 2D reformatted images were created from the source data and submitted for interpretation      Radiation dose length product (DLP) for this visit:  676 13 mGy-cm   This examination, like all CT scans performed in the [facility], was performed utilizing techniques to minimize radiation dose exposure, including the use of iterative   reconstruction and automated exposure control      IV Contrast:  100 mL of iohexol (OMNIPAQUE)  Enteric Contrast:  Enteric contrast was not administered      FINDINGS:     ABDOMEN     LOWER CHEST:  Small hiatal hernia noted  Minimal bibasilar atelectasis      LIVER/BILIARY TREE:  There is prominence of common bile duct and central intrahepatic biliary tree most consistent with the sequela of prior cholecystectomy    Liver is otherwise unremarkable      GALLBLADDER:  Gallbladder is surgically absent      SPLEEN: Unremarkable      PANCREAS:  Unremarkable      ADRENAL GLANDS:  Unremarkable      KIDNEYS/URETERS:  Stable bilateral exophytic and peripelvic cysts  Additional subcentimeter hypodensities too small to characterize, statistically benign  No hydronephrosis      STOMACH AND BOWEL:  No bowel obstruction  There is colonic diverticulosis without evidence of acute diverticulitis      APPENDIX:  A normal appendix was visualized      ABDOMINOPELVIC CAVITY:  No ascites  No pneumoperitoneum  No lymphadenopathy      VESSELS:  Unremarkable for patient's age      PELVIS     REPRODUCTIVE ORGANS:  Surgical changes of prior hysterectomy      URINARY BLADDER:  Underdistended limiting evaluation, grossly unremarkable  Trace dependent excreted contrast noted      ABDOMINAL WALL/INGUINAL REGIONS:  Unremarkable      OSSEOUS STRUCTURES:  No acute fracture or destructive osseous lesion      IMPRESSION:     No bowel obstruction or definite evidence of acute inflammatory process in the abdomen or pelvis      Small hiatal hernia      Colonic diverticulosis      CT ABDOMEN AND PELVIS WITHOUT IV CONTRAST - LOW DOSE RENAL STONE 10/20/2022     INDICATION:   N20 0: Calculus of kidney      COMPARISON:  CT abdomen/pelvis 9/25/2020  Renal ultrasound 3/30/2021  CT abdomen/pelvis 9/27/2017      TECHNIQUE:  Low radiation dose thin section CT examination of the abdomen and pelvis was performed without intravenous or oral contrast according to a protocol specifically designed to evaluate for urinary tract calculus  Axial, sagittal, and coronal 2D   reformatted images were created from the source data and submitted for interpretation  Evaluation for pathology in the abdomen and pelvis that is unrelated to urinary tract calculi is limited        Radiation dose length product (DLP) for this visit:  689 72 mGy-cm     This examination, like all CT scans performed in the [facility], was performed utilizing techniques to minimize radiation dose exposure, including the use of iterative   reconstruction and automated exposure control      URINARY TRACT FINDINGS:     RIGHT KIDNEY AND URETER:  Normal size and position  No gross solid mass on noncontrast study  No calcified stones or hydronephrosis  Ureter within normal limits  Exophytic simple cyst at the upper pole measures up to 3 2 cm (previously 2 7 cm 9/2020),   tiny peripelvic cysts at the lower pole       LEFT KIDNEY AND URETER:  Normal size and position  No gross solid mass on noncontrast study  No calcified stones or hydronephrosis  Ureter within normal limits  Multiple simple peripelvic cysts      URINARY BLADDER:  Underdistended limiting evaluation of wall thickness, but otherwise within normal limits  No calculi  No perivesical inflammatory stranding      ADDITIONAL FINDINGS:     LOWER CHEST: No consolidation  No pleural effusion  Mitral annular calcification  Small hiatal hernia      SOLID VISCERA:  Limited low radiation dose noncontrast CT evaluation demonstrates no clinically significant abnormality of the imaged portions of the liver, spleen, pancreas, or adrenal glands        BILIARY: No intrahepatic biliary ductal dilatation  Normal caliber common bile duct      GALLBLADDER: Gallbladder is surgically absent      STOMACH AND BOWEL: Stomach is grossly within normal limits  Normal caliber small bowel  Normal caliber large bowel  Colonic diverticulosis  There is very mild pericolonic fat stranding and vascular engorgement adjacent to the distal descending colon, new   from prior CT, which could represent acute uncomplicated diverticulitis or a nonspecific colitis  No evidence of active small or large bowel inflammatory process      APPENDIX: Normal appendix      ABDOMINOPELVIC CAVITY: No ascites  No intraperitoneal free air  No lymphadenopathy   No retroperitoneal hematoma      VESSELS: Normal caliber abdominal aorta with no significant atherosclerotic plaque       REPRODUCTIVE ORGANS: Hysterectomy  No evidence of pelvic or adnexal mass      ABDOMINAL WALL/INGUINAL REGIONS:  Diastases recti with small fat-containing umbilical hernia  Calcified right buttock granuloma      BONES:  Multilevel spondylosis with preserved vertebral body heights  No fracture or aggressive osseous lesion  Transitional lumbosacral anatomy with sacralization of L5  Scattered tiny bone islands unchanged since 2017      IMPRESSION:     1  No urinary tract calculi or hydronephrosis  2   Very mild inflammatory changes about the distal descending colon which could represent acute uncomplicated diverticulitis or a nonspecific colitis      The study was marked in EPIC for significant notification      EGD  Narrative: Table formatting from the original result was not included  [facility]  [address] [place] [place] [id number]  [address]:  12/28/22    PHYSICIAN(S):  Attending:   Debi Osgood, MD     Fellow:   No Staff Documented     INDICATION:  Other chest pain    POST-OP DIAGNOSIS:  See the impression below  PREPROCEDURE:  Informed consent was obtained for the procedure, including sedation  Risks of perforation, hemorrhage, adverse drug reaction and aspiration   were discussed  The patient was placed in the left lateral decubitus   position  Patient was explained about the risks and benefits of the procedure  Risks   including but not limited to bleeding, infection, and perforation were   explained in detail  Also explained about less than 100% sensitivity with   the exam and other alternatives  PROCEDURE: EGD    DETAILS OF PROCEDURE:   Patient was taken to the procedure room where a time out was performed to   confirm correct patient and correct procedure   The patient underwent   monitored anesthesia care, which was administered by an anesthesia   professional  The patient's blood pressure, heart rate, level of   consciousness, respirations, oxygen and ETCO2 were monitored throughout   the procedure  The scope was introduced through the mouth and advanced to   the second part of the duodenum  Retroflexion was performed in the fundus  The patient experienced no blood loss  The procedure was not difficult  The patient tolerated the procedure well  There were no apparent   complications  ANESTHESIA INFORMATION:  ASA: III  Anesthesia Type: IV Sedation with Anesthesia    MEDICATIONS:  No administrations occurring from 1148 to 1159 on 12/28/22     FINDINGS:  The esophagus appeared normal   Z-line 40 cm from the incisors  Mild, patchy erythematous mucosa in the stomach; performed cold forceps   biopsy to rule out H  pylori  The duodenum appeared normal     SPECIMENS:  ID Type Source Tests Collected by Time Destination   1 : r/o HP Tissue Stomach TISSUE EXAM Jerod Mathews MD 12/28/2022   11:56 AM    Impression: The esophagus appeared normal   Z-line 40 cm from the incisors  Mild, patchy erythematous mucosa in the stomach; performed cold forceps   biopsy to rule out H  pylori  The duodenum appeared normal     RECOMMENDATION:   Await pathology results             Jerod Mathews MD        The following portions of the patient's history were reviewed and updated as appropriate: allergies, current medications, past family history, past medical history, past social history, past surgical history and problem list    It was my intent to perform this visit via video technology but the patient was not able to do a video connection so the visit was completed via audio telephone only  Please note :  Voice dictation software has been used to create this document  There may be inadvertent transcription errors      [id number] [address]

## 2023-01-10 ENCOUNTER — OFFICE VISIT (OUTPATIENT)
Dept: OBGYN CLINIC | Facility: CLINIC | Age: 73
End: 2023-01-10

## 2023-01-10 VITALS
BODY MASS INDEX: 40.36 KG/M2 | HEART RATE: 53 BPM | DIASTOLIC BLOOD PRESSURE: 65 MMHG | SYSTOLIC BLOOD PRESSURE: 158 MMHG | HEIGHT: 56 IN

## 2023-01-10 DIAGNOSIS — M17.12 PRIMARY OSTEOARTHRITIS OF LEFT KNEE: Primary | ICD-10-CM

## 2023-01-10 DIAGNOSIS — M17.11 PRIMARY OSTEOARTHRITIS OF RIGHT KNEE: ICD-10-CM

## 2023-01-10 RX ORDER — BUPIVACAINE HYDROCHLORIDE 2.5 MG/ML
4 INJECTION, SOLUTION INFILTRATION; PERINEURAL
Status: COMPLETED | OUTPATIENT
Start: 2023-01-10 | End: 2023-01-10

## 2023-01-10 RX ORDER — TRIAMCINOLONE ACETONIDE 40 MG/ML
80 INJECTION, SUSPENSION INTRA-ARTICULAR; INTRAMUSCULAR
Status: COMPLETED | OUTPATIENT
Start: 2023-01-10 | End: 2023-01-10

## 2023-01-10 RX ADMIN — TRIAMCINOLONE ACETONIDE 80 MG: 40 INJECTION, SUSPENSION INTRA-ARTICULAR; INTRAMUSCULAR at 13:32

## 2023-01-10 RX ADMIN — BUPIVACAINE HYDROCHLORIDE 4 ML: 2.5 INJECTION, SOLUTION INFILTRATION; PERINEURAL at 13:32

## 2023-01-10 NOTE — PROGRESS NOTES
ASSESSMENT/PLAN:    Diagnoses and all orders for this visit:    Primary osteoarthritis of left knee    Primary osteoarthritis of right knee    Other orders  -     Large joint arthrocentesis        The patient is continuing to do well since surgery  She may continue a home exercise program for strengthening, stretching and range of motion  She will follow-up with her office in May 2023, she is going to be in [place] for several months  We will get new x-rays of her right knee 3 views  The patient is acceptable to this plan  Return in about 3 months (around 4/10/2023)  the patient is doing quite well from her right total knee replacement  Strength and motion nearly intact  Incision clean dry  Would recommend continuation of home exercise program   Follow up after her winter recess in [place]  If her condition changes, she will not hesitate to let us now prior to her next examination, she will get new x-rays of right knee-3 views      _____________________________________________________  CHIEF COMPLAINT:  Chief Complaint   Patient presents with   • Right Knee - Post-op, Follow-up         SUBJECTIVE:  Shilpa Echols is a 72 y o  female who presents to our office complaining of bilateral knee pain  The patient has a history of primary osteoarthritis of both knees  She would like corticosteroid injections today, as they provide her with adequate relief of her symptoms in the past   She denies any numbness or tingling  She denies any fever or chills      The following portions of the patient's history were reviewed and updated as appropriate: allergies, current medications, past family history, past medical history, past social history, past surgical history and problem list     PAST MEDICAL HISTORY:  Past Medical History:   Diagnosis Date   • Anxiety    • Arthritis    • Asthma    • Breast cancer ([address] )     rt mastectomy 2005   • Cancer [facility])     breast right   • Cataract    • CHF (congestive heart failure) ([facility] )    • Chronic headaches    • Concussion     At age 7; Chronic headaches since then   • Coronary artery disease    • Depression    • Dietary lactose intolerance    • GERD (gastroesophageal reflux disease)    • High cholesterol    • History of chemotherapy     2005 rt breast cancer   • History of transfusion    • Hypertension    • IBS (irritable bowel syndrome)    • Irritable bowel syndrome (IBS) 05/31/2016   • Kidney stone    • Lymphedema of right arm    • Obesity    • PFO (patent foramen ovale)    • PONV (postoperative nausea and vomiting)    • Post-menopausal    • Psychiatric disorder    • Renal disorder    • Shortness of breath    • Vitamin D deficiency        PAST SURGICAL HISTORY:  Past Surgical History:   Procedure Laterality Date   • BREAST SURGERY     • CATARACT EXTRACTION Bilateral    • CATARACT EXTRACTION, BILATERAL     • CHOLECYSTECTOMY     • COLONOSCOPY N/A 05/31/2016    Procedure: COLONOSCOPY;  Surgeon: Ramo Fritz MD;  Location: [facility] MAIN OR;  Service:    • GALLBLADDER SURGERY     • HYSTERECTOMY      Total   • JOINT REPLACEMENT     • KNEE ARTHROSCOPY     • KNEE SURGERY      arthroscopy   • MASTECTOMY Right     rt breast mastectomy 2005   • MS ARTHRP KNE CONDYLE&PLATU MEDIAL&LAT COMPARTMENTS Left 05/20/2021    Procedure: ARTHROPLASTY KNEE TOTAL;  Surgeon: Kathie Galloway MD;  Location: [facility] MAIN OR;  Service: Orthopedics   • MS ARTHRP KNE CONDYLE&PLATU MEDIAL&LAT COMPARTMENTS Right 11/16/2022    Procedure: ARTHROPLASTY KNEE TOTAL;  Surgeon: Rei Chandra DO;  Location: [facility] MAIN OR;  Service: Orthopedics   • TONSILLECTOMY AND ADENOIDECTOMY         FAMILY HISTORY:  Family History   Adopted: Yes   Problem Relation Age of Onset   • Diabetes Mother    • Heart disease Mother    • Mental illness Mother    • Depression Mother    • Heart disease Brother    • Breast cancer Maternal Aunt    • Breast cancer Maternal Aunt    • Breast cancer Maternal Aunt    • Breast cancer Maternal Aunt    • Breast cancer Maternal Aunt    • No Known Problems Father    • No Known Problems Sister    • No Known Problems Daughter    • Breast cancer Maternal Grandmother    • No Known Problems Sister    • No Known Problems Sister    • No Known Problems Sister        SOCIAL HISTORY:  Social History     Tobacco Use   • Smoking status: Never   • Smokeless tobacco: Never   Vaping Use   • Vaping Use: Never used   Substance Use Topics   • Alcohol use: Yes     Alcohol/week: 1 0 standard drink     Types: 1 Glasses of wine per week     Comment: socially   • Drug use: Not Currently     Types: Marijuana     Comment: In her youth       MEDICATIONS:    Current Outpatient Medications:   •  acetaminophen (TYLENOL) 650 mg CR tablet, Take 1 tablet (650 mg total) by mouth every 8 (eight) hours as needed for mild pain, Disp: 30 tablet, Rfl: 0  •  albuterol (2 5 mg/3 mL) 0 083 % nebulizer solution, Take 2 5 mg by nebulization every 6 (six) hours as needed for wheezing, Disp: , Rfl:   •  albuterol (PROAIR HFA) 90 mcg/act inhaler, Inhale 2 puffs every 4 (four) hours as needed for wheezing, Disp: 3 Inhaler, Rfl: 1  •  ascorbic acid (VITAMIN C) 500 MG tablet, Take 1 tablet (500 mg total) by mouth daily, Disp: 30 tablet, Rfl: 1  •  atorvastatin (LIPITOR) 20 mg tablet, Take 1 tablet by mouth daily  , Disp: 30 tablet, Rfl: 0  •  buPROPion (WELLBUTRIN XL) 300 mg 24 hr tablet, Take 1 tablet (300 mg total) by mouth every morning, Disp: 90 tablet, Rfl: 3  •  butalbital-acetaminophen-caffeine (FIORICET,ESGIC) -40 mg per tablet, Take 1 tablet by mouth 3 (three) times a day as needed for headaches, Disp: 90 tablet, Rfl: 1  •  clindamycin (CLEOCIN) 150 mg capsule, Take 1 capsule (150 mg total) by mouth every 8 (eight) hours for 10 days, Disp: 30 capsule, Rfl: 0  •  DULoxetine (CYMBALTA) 60 mg delayed release capsule, Take 1 capsule by mouth twice daily  , Disp: 180 capsule, Rfl: 0  •  ergocalciferol (VITAMIN D2) 50,000 units, Take 1 capsule (50,000 Units total) by mouth once a week, Disp: 4 capsule, Rfl: 3  •  gabapentin (Neurontin) 300 mg capsule, Take 1 capsule (300 mg total) by mouth 2 (two) times a day, Disp: 60 capsule, Rfl: 2  •  linaCLOtide (Linzess) 145 MCG CAPS, Take 1 capsule (145 mcg total) by mouth in the morning, Disp: 30 capsule, Rfl: 2  •  lisinopril (ZESTRIL) 5 mg tablet, Take 1 tablet (5 mg total) by mouth daily Do not start before November 23, 2022 , Disp: , Rfl: 0  •  loratadine (CLARITIN) 10 mg tablet, Take 10 mg by mouth daily, Disp: , Rfl:   •  Melatonin 10 MG TABS, Take by mouth, Disp: , Rfl:   •  Mirabegron ER 25 MG TB24, Take 25 mg by mouth in the morning, Disp: 30 tablet, Rfl: 3  •  Multiple Vitamins-Minerals (multivitamin with minerals) tablet, Take 1 tablet by mouth daily, Disp: 30 tablet, Rfl: 1  •  Nutritional Supplements (BOOST BREEZE PO), Take by mouth daily, Disp: , Rfl:   •  oxyCODONE (OxyCONTIN) 10 mg 12 hr tablet, Take 10 mg by mouth as needed, Disp: , Rfl:   •  pantoprazole (PROTONIX) 40 mg tablet, Take 1 tablet (40 mg total) by mouth 2 (two) times a day before meals, Disp: 60 tablet, Rfl: 0  •  docusate sodium (COLACE) 100 mg capsule, Take 1 capsule (100 mg total) by mouth 2 (two) times a day, Disp: 60 capsule, Rfl: 0  •  valACYclovir (VALTREX) 1,000 mg tablet, Take 1 tablet (1,000 mg total) by mouth every 8 (eight) hours for 6 days, Disp: 18 tablet, Rfl: 0    ALLERGIES:  Allergies   Allergen Reactions   • Ibuprofen Nausea Only, Rash, Dizziness and Syncope   • Morphine Other (See Comments) and Hallucinations     Intolerance   • Latex Dermatitis       ROS:  Review of Systems     Constitutional: Negative for fatigue, fever or loss of appetite  HENT: Negative  Respiratory: Negative for shortness of breath, dyspnea  Cardiovascular: Negative for chest pain/tightness  Gastrointestinal: Negative for abdominal pain, N/V  Endocrine: Negative for cold/heat intolerance, unexplained weight loss/gain     Genitourinary: Negative for flank pain, dysuria, hematuria  Musculoskeletal: Positive for arthralgia   Skin: Negative for rash  Neurological: Negative for numbness or tingling  Psychiatric/Behavioral: Negative for agitation  _____________________________________________________  PHYSICAL EXAMINATION:    Blood pressure 158/65, pulse (!) 53, height 4' 8" (1 422 m), not currently breastfeeding  Constitutional: Oriented to person, place, and time  Appears well-developed and well-nourished  No distress  HENT:   Head: Normocephalic  Eyes: Conjunctivae are normal  Right eye exhibits no discharge  Left eye exhibits no discharge  No scleral icterus  Cardiovascular: Normal rate  Pulmonary/Chest: Effort normal    Neurological: Alert and oriented to person, place, and time  Skin: Skin is warm and dry  No rash noted  Not diaphoretic  No erythema  No pallor  Psychiatric: Normal mood and affect  Behavior is normal  Judgment and thought content normal       MUSCULOSKELETAL EXAMINATION:   Physical Exam  Ortho Exam    Bilateral lower extremities are neurovascularly intact  Toes are pink and mobile   Compartments are soft  No warmth, erythema or ecchymosis  ROM of knees are from 5-115 degrees  Negative Lachman, drawer or pivot shift  No medial instability  Medial joint line tenderness, slight lateral joint line tenderness  Patellofemoral crepitation  Objective:  BP Readings from Last 1 Encounters:   01/10/23 158/65      Wt Readings from Last 1 Encounters:   01/04/23 81 6 kg (180 lb)        BMI:   Estimated body mass index is 40 36 kg/m² as calculated from the following:    Height as of this encounter: 4' 8" (1 422 m)  Weight as of 1/4/23: 81 6 kg (180 lb)  PROCEDURES PERFORMED:  Large joint arthrocentesis: bilateral knee  Universal Protocol:  Consent: Verbal consent obtained    Risks and benefits: risks, benefits and alternatives were discussed  Consent given by: patient  Patient understanding: patient states understanding of the procedure being performed  Site marked: the operative site was marked  Supporting Documentation  Indications: pain   Procedure Details  Location: knee - bilateral knee  Preparation: Patient was prepped and draped in the usual sterile fashion  Needle size: 22 G  Ultrasound guidance: no  Approach: lateral    Medications (Right): 4 mL bupivacaine 0 25 %; 80 mg triamcinolone acetonide 40 mg/mLMedications (Left): 4 mL bupivacaine 0 25 %; 80 mg triamcinolone acetonide 40 mg/mL   Patient tolerance: patient tolerated the procedure well with no immediate complications  Dressing:  Sterile dressing applied            Scribe Attestation    I,:  Ronda Herrera PA-C am acting as a scribe while in the presence of the attending physician :       I,:  Elen Carpenter DO personally performed the services described in this documentation    as scribed in my presence :

## 2023-01-19 ENCOUNTER — RA CDI HCC (OUTPATIENT)
Dept: OTHER | Facility: HOSPITAL | Age: 73
End: 2023-01-19

## 2023-01-19 DIAGNOSIS — M81.0 AGE-RELATED OSTEOPOROSIS WITHOUT CURRENT PATHOLOGICAL FRACTURE: Primary | ICD-10-CM

## 2023-01-19 NOTE — PROGRESS NOTES
[facility]  coding opportunities     E66 01, I50 9     Chart Reviewed number of suggestions sent to Provider: 2     Patients Insurance     Medicare Insurance: [employer]

## 2023-01-25 ENCOUNTER — TELEPHONE (OUTPATIENT)
Dept: OBGYN CLINIC | Facility: HOSPITAL | Age: 73
End: 2023-01-25

## 2023-01-25 NOTE — TELEPHONE ENCOUNTER
Caller: Rogerio Orozco    Doctor: na    Reason for call: called wrong number, given correct podiatry number    Call back#: na

## 2023-01-30 DIAGNOSIS — M81.0 AGE-RELATED OSTEOPOROSIS WITHOUT CURRENT PATHOLOGICAL FRACTURE: Primary | ICD-10-CM

## 2023-01-31 ENCOUNTER — HOSPITAL ENCOUNTER (OUTPATIENT)
Dept: INFUSION CENTER | Facility: HOSPITAL | Age: 73
Discharge: HOME | End: 2023-01-31
Attending: INTERNAL MEDICINE

## 2023-01-31 VITALS — TEMPERATURE: 97.4 F

## 2023-01-31 DIAGNOSIS — M81.0 AGE-RELATED OSTEOPOROSIS WITHOUT CURRENT PATHOLOGICAL FRACTURE: Primary | ICD-10-CM

## 2023-01-31 RX ADMIN — DENOSUMAB 60 MG: 60 INJECTION SUBCUTANEOUS at 13:06

## 2023-01-31 NOTE — PROGRESS NOTES
Patient denies recent or upcoming dental work  Recent labs reviewed  Prolia injection given without issue  Patient tolerated well  AVS given to patient  Patient ambulated off unit without incident  All personal belongings taken with patient  family member

## 2023-02-03 NOTE — CASE MANAGEMENT
Pt is being discharged today  Pt is [facility] to [facility]  [address] [facility] chair Elisa dominguez will pick the patient up at 1pm   I called patient's daughter Rufino Nyhan and notified her of transport time  I notified Jud of transport time  Pt does have a follow up appointment with Orthopedic surgeon already arranged  No

## 2023-02-11 DIAGNOSIS — E78.5 HYPERLIPIDEMIA, UNSPECIFIED HYPERLIPIDEMIA TYPE: ICD-10-CM

## 2023-02-13 RX ORDER — ATORVASTATIN CALCIUM 20 MG/1
TABLET, FILM COATED ORAL
Qty: 30 TABLET | Refills: 5 | Status: SHIPPED | OUTPATIENT
Start: 2023-02-13

## 2023-04-24 NOTE — CASE MANAGEMENT
Support Center received request for authorization from Care Manager  Authorization request for: SNF  Facility Name: [facility]  NPI: [id number]  Facility MD:  Albertina Acosta   NPI: [id number]  Authorization initiated by contacting: [facility] Via: Phone  Pending Reference #: [id number]  Clinicals submitted via:  Fax Brow Lift Text: A midfrontal incision was made medially to the defect to allow access to the tissues just superior to the left eyebrow. Following careful dissection inferiorly in a supraperiosteal plane to the level of the left eyebrow, several 3-0 monocryl sutures were used to resuspend the eyebrow orbicularis oculi muscular unit to the superior frontal bone periosteum. This resulted in an appropriate reapproximation of static eyebrow symmetry and correction of the left brow ptosis.

## 2023-05-01 NOTE — ASSESSMENT & PLAN NOTE
Department of Anesthesiology  Preprocedure Note       Name:  Gilda García   Age:  49 y.o.  :  1966                                          MRN:  [id number]         Date:  2023      Surgeon: Saniya Xie):  Fiordaliza Mitchell MD    Procedure: Procedure(s):  COLONOSCOPY DIAGNOSTIC  EGD ESOPHAGOGASTRODUODENOSCOPY    Medications prior to admission:   Prior to Admission medications    Medication Sig Start Date End Date Taking?  Authorizing Provider   carvedilol (COREG) 6.25 MG tablet Take 2 tablets by mouth in the morning and at bedtime 3/20/23   Historical Provider, MD   ENTRESTO 49-51 MG per tablet Take 1 tablet by mouth in the morning and at bedtime 3/16/23   Historical Provider, MD   ondansetron (ZOFRAN) 4 MG tablet Take 1 tablet by mouth every 8 hours as needed    Historical Provider, MD   Cholecalciferol (VITAMIN D3) 25 MCG (1000 UT) CAPS Take 1 tablet by mouth daily 3/28/23   Historical Provider, MD   magnesium oxide (MAG-OX) 400 MG tablet Take 1 tablet by mouth 2 times daily 3/30/23   Historical Provider, MD   polyethylene glycol (GLYCOLAX) 17 GM/SCOOP powder Take as directed for bowel prep 23   Fiordaliza Mitchell MD   docusate sodium (COLACE) 100 MG capsule Take 1 capsule by mouth 10/3/22   Historical Provider, MD   gentamicin (GARAMYCIN) 0.1 % cream Apply 1 application topically daily 10/3/22   Historical Provider, MD   Methoxy PEG-Epoetin Beta (MIRCERA) 200 MCG/0.3ML SOSY Inject 0.3 mLs into the skin 10/3/22   Historical Provider, MD   ALPRAZolam (XANAX) 0.5 MG tablet TAKE 1/2 TO 1 TABLET BY MOUTH ONCE A DAY AS NEEDED 22   Historical Provider, MD   predniSONE (DELTASONE) 5 MG tablet Take 1 tablet by mouth daily    Historical Provider, MD   FLUoxetine (PROZAC) 10 MG capsule Take 1 capsule by mouth daily 22   MILAGROS Lazar NP   melatonin 5 MG TABS tablet Take 1 tablet by mouth nightly as needed (sleep)  Patient taking differently: Take 2 tablets by mouth nightly as needed (sleep) 22 Will get xrays cervical and thoracic spine to better evalaute

## 2023-05-02 ENCOUNTER — APPOINTMENT (OUTPATIENT)
Dept: RADIOLOGY | Facility: CLINIC | Age: 73
End: 2023-05-02

## 2023-05-02 ENCOUNTER — OFFICE VISIT (OUTPATIENT)
Dept: OBGYN CLINIC | Facility: CLINIC | Age: 73
End: 2023-05-02

## 2023-05-02 VITALS
HEART RATE: 83 BPM | BODY MASS INDEX: 40.49 KG/M2 | SYSTOLIC BLOOD PRESSURE: 157 MMHG | HEIGHT: 56 IN | DIASTOLIC BLOOD PRESSURE: 76 MMHG | WEIGHT: 180 LBS

## 2023-05-02 DIAGNOSIS — Z96.651 S/P TOTAL KNEE REPLACEMENT, RIGHT: ICD-10-CM

## 2023-05-02 DIAGNOSIS — Z96.651 S/P TOTAL KNEE REPLACEMENT, RIGHT: Primary | ICD-10-CM

## 2023-05-02 NOTE — PROGRESS NOTES
Assessment/Plan:   Diagnoses and all orders for this visit:    S/P total knee replacement, right  -     XR knee 3 vw right non injury; Future       Reviewed physical exam and imaging with patient on today's visit  She is 6 months s/p TKA of her right knee  Her imaging demonstrates a well-seated total knee prosthesis with appropriate anatomical alignment  The patient is doing well overall  Continue weightbearing activity, as tolerated  She will follow-up in 1 year for an annual visit with repeat X-rays  The patient expresses understanding and is in agreement with today's treatment plan  The patient is doing quite well from a right total knee replacement  Strength and motion intact  X-rays show anatomic placement of the prosthesis  Continue home exercise program   Follow-up in 6 months evaluation with new x-rays of right knee- 3 views  If her condition changes, she would not hesitate to let us know      Subjective:   Patient ID: Gisselle Valenzuela  1950     HPI  Patient is a 72 y o  female who presents for follow-up evaluation of her right knee  She is 6 months s/p TKA of her right knee  The patient states that she is doing well overall  She presents today using a single point cane, due to systemic trembling  She stated she will be seeing neurology for her trembles  The patient states that her pain has resolved following surgery  She has returned to walking activity and resumed activities of daily living  On today's visit, she reports soreness and mild swelling due to a fall that she sustained  Approximately 1 week ago, she fell while walking downstairs and fell directly onto her right knee  She reports pain with flexion after falling  She denies numbness, tingling, and feelings of instability in her right lower extremity        The following portions of the patient's history were reviewed and updated as appropriate:  Past medical history, past surgical history, Family history, social history, current medications and allergies    Past Medical History:   Diagnosis Date    Anxiety     Arthritis     Asthma     Breast cancer ([address] )     rt mastectomy 2005    Cancer [facility])     breast right    Cataract     CHF (congestive heart failure) (HCC)     Chronic headaches     Concussion     At age 7; Chronic headaches since then    Coronary artery disease     Depression     Dietary lactose intolerance     GERD (gastroesophageal reflux disease)     High cholesterol     History of chemotherapy     2005 rt breast cancer    History of transfusion     Hypertension     IBS (irritable bowel syndrome)     Irritable bowel syndrome (IBS) 05/31/2016    Kidney stone     Lymphedema of right arm     Obesity     PFO (patent foramen ovale)     PONV (postoperative nausea and vomiting)     Post-menopausal     Psychiatric disorder     Renal disorder     Shortness of breath     Vitamin D deficiency        Past Surgical History:   Procedure Laterality Date    BREAST SURGERY      CATARACT EXTRACTION Bilateral     CATARACT EXTRACTION, BILATERAL      CHOLECYSTECTOMY      COLONOSCOPY N/A 05/31/2016    Procedure: COLONOSCOPY;  Surgeon: Parth Fletcher MD;  Location: [facility] MAIN OR;  Service:    [facility] GALLBLADDER SURGERY      HYSTERECTOMY      Total    JOINT REPLACEMENT      KNEE ARTHROSCOPY      KNEE SURGERY      arthroscopy    MASTECTOMY Right     rt breast mastectomy 2005    DE ARTHRP KNE CONDYLE&PLATU MEDIAL&LAT COMPARTMENTS Left 05/20/2021    Procedure: ARTHROPLASTY KNEE TOTAL;  Surgeon: Jennifer Wright MD;  Location[facility] MAIN OR;  Service: Orthopedics    DE ARTHRP KNE CONDYLE&PLATU MEDIAL&LAT COMPARTMENTS Right 11/16/2022    Procedure: ARTHROPLASTY KNEE TOTAL;  Surgeon: Belia King DO;  Location: [facility] MA[facility];  Service: Orthopedics    TONSILLECTOMY AND ADENOIDECTOMY         Family History   Adopted: Yes   Problem Relation Age of Onset    Diabetes Mother     Heart disease Mother     Mental illness Mother    [facility] Depression Mother     Heart disease Brother     Breast cancer Maternal Aunt     Breast cancer Maternal Aunt     Breast cancer Maternal Aunt     Breast cancer Maternal Aunt     Breast cancer Maternal Aunt     No Known Problems Father     No Known Problems Sister     No Known Problems Daughter     Breast cancer Maternal Grandmother     No Known Problems Sister     No Known Problems Sister     No Known Problems Sister        Social History     Socioeconomic History    Marital status:      Spouse name: None    Number of children: None    Years of education: None    Highest education level: None   Occupational History    Occupation: Retired   Tobacco Use    Smoking status: Never    Smokeless tobacco: Never   Vaping Use    Vaping Use: Never used   Substance and Sexual Activity    Alcohol use: Yes     Alcohol/week: 1 0 standard drink     Types: 1 Glasses of wine per week     Comment: socially    Drug use: Not Currently     Types: Marijuana     Comment: In her youth    Sexual activity: Not Currently   Other Topics Concern    None   Social History Narrative    Always uses seat belt    Occasional caffeine consumption         Retired    Lives with adult daughter     Social Determinants of Health     Financial Resource Strain: Not on file   Food Insecurity: No Food Insecurity    Worried About [address] in the Last Year: Never true    [place] of Food in the Last Year: Never true   Transportation Needs: No Transportation Needs    Lack of Transportation (Medical): No    Lack of Transportation (Non-Medical):  No   Physical Activity: Not on file   Stress: Not on file   Social Connections: Not on file   Intimate Partner Violence: Not on file   Housing Stability: Low Risk     Unable to Pay for Housing in the Last Year: No    Number of Places Lived in the Last Year: 1    Unstable Housing in the Last Year: No         Current Outpatient Medications:     acetaminophen (TYLENOL) 650 mg CR tablet, Take 1 tablet (650 mg total) by mouth every 8 (eight) hours as needed for mild pain, Disp: 30 tablet, Rfl: 0    albuterol (2 5 mg/3 mL) 0 083 % nebulizer solution, Take 2 5 mg by nebulization every 6 (six) hours as needed for wheezing, Disp: , Rfl:     albuterol (PROAIR HFA) 90 mcg/act inhaler, Inhale 2 puffs every 4 (four) hours as needed for wheezing, Disp: 3 Inhaler, Rfl: 1    ascorbic acid (VITAMIN C) 500 MG tablet, Take 1 tablet (500 mg total) by mouth daily, Disp: 30 tablet, Rfl: 1    atorvastatin (LIPITOR) 20 mg tablet, Take 1 tablet by mouth daily  , Disp: 30 tablet, Rfl: 5    buPROPion (WELLBUTRIN XL) 300 mg 24 hr tablet, Take 1 tablet (300 mg total) by mouth every morning, Disp: 90 tablet, Rfl: 3    butalbital-acetaminophen-caffeine (FIORICET,ESGIC) -40 mg per tablet, Take 1 tablet by mouth 3 (three) times a day as needed for headaches, Disp: 90 tablet, Rfl: 1    DULoxetine (CYMBALTA) 60 mg delayed release capsule, Take 1 capsule (60 mg total) by mouth 2 (two) times a day, Disp: 180 capsule, Rfl: 1    ergocalciferol (VITAMIN D2) 50,000 units, Take 1 capsule (50,000 Units total) by mouth once a week, Disp: 4 capsule, Rfl: 3    gabapentin (Neurontin) 300 mg capsule, Take 1 capsule (300 mg total) by mouth 2 (two) times a day, Disp: 60 capsule, Rfl: 2    lisinopril (ZESTRIL) 5 mg tablet, Take 1 tablet (5 mg total) by mouth daily Do not start before November 23, 2022 , Disp: , Rfl: 0    loratadine (CLARITIN) 10 mg tablet, Take 10 mg by mouth daily, Disp: , Rfl:     Melatonin 10 MG TABS, Take by mouth, Disp: , Rfl:     Mirabegron ER 25 MG TB24, Take 25 mg by mouth in the morning, Disp: 30 tablet, Rfl: 3    Multiple Vitamins-Minerals (multivitamin with minerals) tablet, Take 1 tablet by mouth daily, Disp: 30 tablet, Rfl: 1    Nutritional Supplements (BOOST BREEZE PO), Take by mouth daily, Disp: , Rfl:     oxyCODONE (OxyCONTIN) 10 mg 12 hr tablet, Take 10 mg by mouth as needed, "Disp: , Rfl:     docusate sodium (COLACE) 100 mg capsule, Take 1 capsule (100 mg total) by mouth 2 (two) times a day, Disp: 60 capsule, Rfl: 0    pantoprazole (PROTONIX) 40 mg tablet, Take 1 tablet (40 mg total) by mouth 2 (two) times a day before meals, Disp: 60 tablet, Rfl: 0    valACYclovir (VALTREX) 1,000 mg tablet, Take 1 tablet (1,000 mg total) by mouth every 8 (eight) hours for 6 days, Disp: 18 tablet, Rfl: 0    Allergies   Allergen Reactions    Ibuprofen Nausea Only, Rash, Dizziness and Syncope    Morphine Other (See Comments) and Hallucinations     Intolerance    Latex Dermatitis       Review of Systems   Constitutional: Negative for chills, fever and unexpected weight change  HENT: Negative for hearing loss, nosebleeds and sore throat  Eyes: Negative for pain, redness and visual disturbance  Respiratory: Negative for cough, shortness of breath and wheezing  Cardiovascular: Negative for chest pain, palpitations and leg swelling  Gastrointestinal: Negative for abdominal pain, nausea and vomiting  Endocrine: Negative for polydipsia and polyuria  Genitourinary: Negative for dysuria and hematuria  Skin: Negative for rash and wound  Neurological: Negative for dizziness, numbness and headaches  Psychiatric/Behavioral: Negative for decreased concentration and suicidal ideas  The patient is not nervous/anxious  All other systems reviewed and are negative         Objective:  /76   Pulse 83   Ht 4' 8\" (1 422 m)   Wt 81 6 kg (180 lb)   LMP  (LMP Unknown)   BMI 40 36 kg/m²     Ortho Exam  right knee(s) -   Patient ambulates with antalgic gait pattern  Uses Single Point Cane assistive device  No anatomical deformity  Skin is warm and dry to touch with no signs of erythema, ecchymosis, or infection   No soft tissue swelling or effusion noted  ROM (5° - 115°)   MMT: 5/5 throughout  No tenderness to palpation on exam  Flexor and extensor mechanisms are intact   Knee is stable to " varus and valgus stress  - Lachman's  - Anterior Drawer, - Posterior Drawer  Patella tracks centrally without palpable crepitus  Calf compartments are soft and supple  - Mary's sign  2+ DP and PT pulses with brisk capillary refill to the toes  Sural, saphenous, tibial, superficial, and deep peroneal motor and sensory distributions intact  Sensation light touch intact distally      Physical Exam  HENT:      Head: Normocephalic and atraumatic  Nose: Nose normal    Eyes:      Conjunctiva/sclera: Conjunctivae normal    Cardiovascular:      Rate and Rhythm: Normal rate  Pulmonary:      Effort: Pulmonary effort is normal    Musculoskeletal:      Cervical back: Neck supple  Skin:     General: Skin is warm and dry  Capillary Refill: Capillary refill takes less than 2 seconds  Neurological:      Mental Status: She is alert and oriented to person, place, and time  Psychiatric:         Mood and Affect: Mood normal          Behavior: Behavior normal           Diagnostic Test Review: The attending physician has personally reviewed the pertinent films in PACS and the interpretation is as follows:    X-Ray of right knee taken on 5/2/23 were reviewed and showed well-seated total knee prosthesis with maintained anatomical alignment and no signs of loosening  Procedures   None performed      Scribe Attestation    I,:  Daniela Salinas am acting as a scribe while in the presence of the attending physician :       I,:  Velia Vines, DO personally performed the services described in this documentation    as scribed in my presence :

## 2023-05-04 DIAGNOSIS — M81.0 AGE-RELATED OSTEOPOROSIS WITHOUT CURRENT PATHOLOGICAL FRACTURE: Primary | ICD-10-CM

## 2023-05-25 ENCOUNTER — RA CDI HCC (OUTPATIENT)
Dept: OTHER | Facility: HOSPITAL | Age: 73
End: 2023-05-25

## 2023-05-25 NOTE — PROGRESS NOTES
[facility]  coding opportunities     I50 [id number], [id number]     Chart Reviewed number of suggestions sent to Provider: 2     Patients Insurance     Medicare Insurance: [employer]

## 2023-05-26 ENCOUNTER — OFFICE VISIT (OUTPATIENT)
Dept: INTERNAL MEDICINE CLINIC | Facility: CLINIC | Age: 73
End: 2023-05-26

## 2023-05-26 ENCOUNTER — TELEPHONE (OUTPATIENT)
Dept: PODIATRY | Facility: CLINIC | Age: 73
End: 2023-05-26

## 2023-05-26 VITALS
SYSTOLIC BLOOD PRESSURE: 142 MMHG | TEMPERATURE: 97.6 F | BODY MASS INDEX: 40.72 KG/M2 | HEIGHT: 56 IN | DIASTOLIC BLOOD PRESSURE: 80 MMHG | WEIGHT: 181 LBS | HEART RATE: 102 BPM | OXYGEN SATURATION: 98 %

## 2023-05-26 DIAGNOSIS — R26.89 BALANCE DISORDER: ICD-10-CM

## 2023-05-26 DIAGNOSIS — Z78.0 POST-MENOPAUSAL: ICD-10-CM

## 2023-05-26 DIAGNOSIS — R25.1 TREMOR: ICD-10-CM

## 2023-05-26 DIAGNOSIS — I11.0 HYPERTENSIVE HEART DISEASE WITH HEART FAILURE (HCC): ICD-10-CM

## 2023-05-26 DIAGNOSIS — Z00.00 MEDICARE ANNUAL WELLNESS VISIT, SUBSEQUENT: ICD-10-CM

## 2023-05-26 DIAGNOSIS — R41.89 COGNITIVE DECLINE: Primary | ICD-10-CM

## 2023-05-26 PROBLEM — M79.671 RIGHT FOOT PAIN: Status: RESOLVED | Noted: 2019-10-24 | Resolved: 2023-05-26

## 2023-05-26 PROBLEM — R07.9 CHEST PAIN: Status: RESOLVED | Noted: 2022-12-25 | Resolved: 2023-05-26

## 2023-05-26 PROBLEM — R74.01 TRANSAMINITIS: Status: RESOLVED | Noted: 2022-12-26 | Resolved: 2023-05-26

## 2023-05-26 PROBLEM — B35.6 TINEA CRURIS: Status: RESOLVED | Noted: 2019-10-24 | Resolved: 2023-05-26

## 2023-05-26 PROBLEM — M25.462 SWELLING OF JOINT OF LEFT KNEE: Status: RESOLVED | Noted: 2020-05-19 | Resolved: 2023-05-26

## 2023-05-26 PROBLEM — L74.0 HEAT RASH: Status: RESOLVED | Noted: 2022-12-26 | Resolved: 2023-05-26

## 2023-05-26 RX ORDER — CETIRIZINE HYDROCHLORIDE 10 MG/1
10 TABLET ORAL DAILY
COMMUNITY

## 2023-05-26 RX ORDER — DONEPEZIL HYDROCHLORIDE 10 MG/1
10 TABLET, FILM COATED ORAL
Qty: 30 TABLET | Refills: 2 | Status: SHIPPED | OUTPATIENT
Start: 2023-05-26

## 2023-05-26 NOTE — ASSESSMENT & PLAN NOTE
Defers Pt at this time  Ambulate with gait  Continue to schedule with neurology  Will get MRI brain   Consider FELIPE scan

## 2023-05-26 NOTE — ASSESSMENT & PLAN NOTE
MMSE 22/30  Start aricept  Directions for use and possible side effects discussed and patient verbalized understanding of these

## 2023-05-26 NOTE — PROGRESS NOTES
Assessment and Plan:     Problem List Items Addressed This Visit        Cardiovascular and Mediastinum    Hypertensive heart disease with heart failure ([facility] [address] )       Other    Tremor    Relevant Medications    donepezil (Aricept) 10 mg tablet    Other Relevant Orders    MRI brain wo contrast    Balance disorder     Defers Pt at this time  Ambulate with gait  Continue to schedule with neurology  Will get MRI brain  Consider FELIPE scan         Relevant Medications    donepezil (Aricept) 10 mg tablet    Other Relevant Orders    MRI brain wo contrast    Cognitive decline - Primary     MMSE 22/30  Start aricept  Directions for use and possible side effects discussed and patient verbalized understanding of these  Relevant Medications    donepezil (Aricept) 10 mg tablet    Other Relevant Orders    MRI brain wo contrast   Other Visit Diagnoses     Post-menopausal        Relevant Orders    DXA bone density spine hip and pelvis    Medicare annual wellness visit, subsequent            BMI Counseling: Body mass index is 40 58 kg/m²  The BMI is above normal  Nutrition recommendations include decreasing portion sizes, consuming healthier snacks and limiting drinks that contain sugar  Rationale for BMI follow-up plan is due to patient being overweight or obese  Falls Plan of Care: balance, strength, and gait training instructions were provided  Preventive health issues were discussed with patient, and age appropriate screening tests were ordered as noted in patient's After Visit Summary  Personalized health advice and appropriate referrals for health education or preventive services given if needed, as noted in patient's After Visit Summary  History of Present Illness:     Patient presents for a Medicare Wellness Visit    Pt presents for routine visit  Subsequent AWV performed  She is due for labs and dexa scan  Mammogram and CRC screening are current   She is noting issues with her gait and balance and has been falling  She states this began 3 years ago but it is now more frequent and she falls multiple times per month  She has trouble initiating her gait, She notes a tremor in her hands and a constant head tilt to the left  She has cognitive decline with MMSE 22/30 and word finding difficulty  She notes a worsening of her handwriting as well  Family hx is unknown  She has been trying to schedule with neuro but they have been missing each other when calling  She defers PT at this time  Patient Care Team:  Mariana Leal PA-C as PCP - General (Internal Medicine)  Uche Lawler DO as PCP - [address] [place] [address] (RTE)  Akil Levine MD as Endoscopist  Maritza Stevens as Nurse Practitioner (Urology)  Ediwna Gutierrez PA-C (Physician Assistant)     Review of Systems:     Review of Systems   Constitutional: Negative for chills and fever  HENT: Negative for congestion, ear pain, hearing loss, postnasal drip, rhinorrhea, sinus pressure, sinus pain, sore throat and trouble swallowing  Eyes: Negative for pain and visual disturbance  Respiratory: Negative for cough, chest tightness, shortness of breath and wheezing  Cardiovascular: Negative  Negative for chest pain, palpitations and leg swelling  Gastrointestinal: Negative for abdominal pain, blood in stool, constipation, diarrhea, nausea and vomiting  Endocrine: Negative for cold intolerance, heat intolerance, polydipsia, polyphagia and polyuria  Genitourinary: Negative for difficulty urinating, dysuria, flank pain and urgency  Musculoskeletal: Positive for gait problem  Negative for arthralgias, back pain and myalgias  Skin: Negative for rash  Allergic/Immunologic: Negative  Neurological: Positive for dizziness, tremors, speech difficulty and weakness  Negative for light-headedness and headaches  Hematological: Negative  Psychiatric/Behavioral: Positive for confusion   Negative for behavioral problems, dysphoric mood and sleep disturbance  The patient is not nervous/anxious           Problem List:     Patient Active Problem List   Diagnosis   • Irritable bowel syndrome (IBS)   • Mild intermittent asthma without complication   • Bilateral carpal tunnel syndrome   • Chronic musculoskeletal pain   • Generalized anxiety disorder   • Hyperlipidemia LDL goal <130   • Essential hypertension   • Lymphedema   • Patent foramen ovale   • Tension type headache   • Vitamin D deficiency   • Recurrent major depressive disorder, in partial remission ([facility])   • Other chronic pancreatitis ([facility])   • Body mass index (BMI) of 40 0-44 9 in adult [facility])   • Primary insomnia   • Neuropathy   • Age-related osteoporosis without current pathological fracture   • Nephrolithiasis   • Scoliosis   • Primary osteoarthritis of left knee   • HX: breast cancer   • Tachycardia   • S/P total knee arthroplasty, left   • Dermatitis   • Continuous opioid dependence ([facility])   • Seborrheic keratoses   • Seasonal allergies   • OAB (overactive bladder)   • Mixed stress and urge urinary incontinence   • Renal cyst   • Chronic constipation   • Arthritis of right knee   • Nonrheumatic aortic (valve) stenosis   • Hypertensive heart disease with heart failure ([facility])   • Tremor   • Balance disorder   • Cognitive decline      Past Medical and Surgical History:     Past Medical History:   Diagnosis Date   • Anxiety    • Arthritis    • Asthma    • Breast cancer ([facility] [address] )     rt mastectomy 2005   • Cancer [facility])     breast right   • Cataract    • CHF (congestive heart failure) ([facility])    • Chronic headaches    • Concussion     At age 7; Chronic headaches since then   • Coronary artery disease    • Depression    • Dietary lactose intolerance    • GERD (gastroesophageal reflux disease)    • High cholesterol    • History of chemotherapy     2005 rt breast cancer   • History of transfusion    • Hypertension    • IBS (irritable bowel syndrome)    • Irritable bowel syndrome (IBS) 05/31/2016   • Kidney stone • Lymphedema of right arm    • Obesity    • PFO (patent foramen ovale)    • PONV (postoperative nausea and vomiting)    • Post-menopausal    • Psychiatric disorder    • Renal disorder    • Shortness of breath    • Vitamin D deficiency      Past Surgical History:   Procedure Laterality Date   • BREAST SURGERY     • CATARACT EXTRACTION Bilateral    • CATARACT EXTRACTION, BILATERAL     • CHOLECYSTECTOMY     • COLONOSCOPY N/A 05/31/2016    Procedure: COLONOSCOPY;  Surgeon: Laura Luque MD;  Location: [facility] MAIN OR;  Service:    • GALLBLADDER SURGERY     • HYSTERECTOMY      Total   • JOINT REPLACEMENT     • KNEE ARTHROSCOPY     • KNEE SURGERY      arthroscopy   • MASTECTOMY Right     rt breast mastectomy 2005   • WI ARTHRP KNE CONDYLE&PLATU MEDIAL&LAT COMPARTMENTS Left 05/20/2021    Procedure: ARTHROPLASTY KNEE TOTAL;  Surgeon: Romero Ralph MD;  Location: [facility] MAIN OR;  Service: Orthopedics   • WI ARTHRP KNE CONDYLE&PLATU MEDIAL&LAT COMPARTMENTS Right 11/16/2022    Procedure: ARTHROPLASTY KNEE TOTAL;  Surgeon: Charla Bass DO;  Location: [facility] MAIN OR;  Service: Orthopedics   • TONSILLECTOMY AND ADENOIDECTOMY        Family History:     Family History   Adopted: Yes   Problem Relation Age of Onset   • Diabetes Mother    • Heart disease Mother    • Mental illness Mother    • Depression Mother    • Heart disease Brother    • Breast cancer Maternal Aunt    • Breast cancer Maternal Aunt    • Breast cancer Maternal Aunt    • Breast cancer Maternal Aunt    • Breast cancer Maternal Aunt    • No Known Problems Father    • No Known Problems Sister    • No Known Problems Daughter    • Breast cancer Maternal Grandmother    • No Known Problems Sister    • No Known Problems Sister    • No Known Problems Sister       Social History:     Social History     Socioeconomic History   • Marital status:      Spouse name: None   • Number of children: None   • Years of education: None   • Highest education level: None   Occupational History   • Occupation: Retired   Tobacco Use   • Smoking status: Never   • Smokeless tobacco: Never   Vaping Use   • Vaping Use: Never used   Substance and Sexual Activity   • Alcohol use: Yes     Alcohol/week: 1 0 standard drink of alcohol     Types: 1 Glasses of wine per week     Comment: socially   • Drug use: Not Currently     Types: Marijuana     Comment: In her youth   • Sexual activity: Not Currently   Other Topics Concern   • None   Social History Narrative    Always uses seat belt    Occasional caffeine consumption         Retired    Lives with adult daughter     Social Determinants of Health     Financial Resource Strain: [address]  (5/26/2023)    Overall Financial Resource Strain (CARDIA)    • Difficulty of Paying Living Expenses: Not very hard   Food Insecurity: No Food Insecurity (12/28/2022)    Hunger Vital Sign    • Worried About Running Out of Food in the Last Year: Never true    • Ran Out of Food in the Last Year: Never true   Transportation Needs: No Transportation Needs (5/26/2023)    PRAPARE - Transportation    • Lack of Transportation (Medical): No    • Lack of Transportation (Non-Medical):  No   Physical Activity: Not on file   Stress: Not on file   Social Connections: Not on file   Intimate Partner Violence: Not on file   Housing Stability: Low Risk  (12/28/2022)    Housing Stability Vital Sign    • Unable to Pay for Housing in the Last Year: No    • Number of Places Lived in the Last Year: 1    • Unstable Housing in the Last Year: No      Medications and Allergies:     Current Outpatient Medications   Medication Sig Dispense Refill   • acetaminophen (TYLENOL) 650 mg CR tablet Take 1 tablet (650 mg total) by mouth every 8 (eight) hours as needed for mild pain 30 tablet 0   • albuterol (2 5 mg/3 mL) 0 083 % nebulizer solution Take 2 5 mg by nebulization every 6 (six) hours as needed for wheezing     • albuterol (PROAIR HFA) 90 mcg/act inhaler Inhale 2 puffs every 4 (four) hours as needed for wheezing 3 Inhaler 1   • atorvastatin (LIPITOR) 20 mg tablet Take 1 tablet by mouth daily   30 tablet 5   • buPROPion (WELLBUTRIN XL) 300 mg 24 hr tablet Take 1 tablet (300 mg total) by mouth every morning 90 tablet 3   • butalbital-acetaminophen-caffeine (FIORICET,ESGIC) -40 mg per tablet Take 1 tablet by mouth 3 (three) times a day as needed for headaches 90 tablet 1   • cetirizine (ZyrTEC) 10 mg tablet Take 10 mg by mouth daily     • donepezil (Aricept) 10 mg tablet Take 1 tablet (10 mg total) by mouth daily at bedtime 30 tablet 2   • DULoxetine (CYMBALTA) 60 mg delayed release capsule Take 1 capsule (60 mg total) by mouth 2 (two) times a day 180 capsule 1   • ergocalciferol (VITAMIN D2) 50,000 units Take 1 capsule (50,000 Units total) by mouth once a week 4 capsule 3   • lisinopril (ZESTRIL) 5 mg tablet Take 1 tablet (5 mg total) by mouth daily Do not start before November 23, 2022   0   • Melatonin 10 MG TABS Take by mouth     • Mirabegron ER 25 MG TB24 Take 25 mg by mouth in the morning 30 tablet 3   • Nutritional Supplements (BOOST BREEZE PO) Take by mouth daily     • oxyCODONE (OxyCONTIN) 10 mg 12 hr tablet Take 10 mg by mouth as needed     • ascorbic acid (VITAMIN C) 500 MG tablet Take 1 tablet (500 mg total) by mouth daily (Patient not taking: Reported on 5/26/2023) 30 tablet 1   • docusate sodium (COLACE) 100 mg capsule Take 1 capsule (100 mg total) by mouth 2 (two) times a day 60 capsule 0   • gabapentin (Neurontin) 300 mg capsule Take 1 capsule (300 mg total) by mouth 2 (two) times a day (Patient not taking: Reported on 5/26/2023) 60 capsule 2   • loratadine (CLARITIN) 10 mg tablet Take 10 mg by mouth daily (Patient not taking: Reported on 5/26/2023)     • Multiple Vitamins-Minerals (multivitamin with minerals) tablet Take 1 tablet by mouth daily (Patient not taking: Reported on 5/26/2023) 30 tablet 1   • pantoprazole (PROTONIX) 40 mg tablet Take 1 tablet (40 mg total) by mouth 2 (two) times a day before meals 60 tablet 0   • valACYclovir (VALTREX) 1,000 mg tablet Take 1 tablet (1,000 mg total) by mouth every 8 (eight) hours for 6 days 18 tablet 0     No current facility-administered medications for this visit  Allergies   Allergen Reactions   • Ibuprofen Nausea Only, Rash, Dizziness and Syncope   • Morphine Other (See Comments) and Hallucinations     Intolerance   • Latex Dermatitis      Immunizations:     Immunization History   Administered Date(s) Administered   • COVID-19 MODERNA VACC 0 5 ML IM 04/01/2021, 04/29/2021, 09/30/2022   • INFLUENZA 12/02/2015, 09/30/2022   • Influenza Quadrivalent Preservative Free 3 years and older IM 12/02/2015   • Influenza, high dose seasonal 0 7 mL 10/05/2018, 10/24/2019, 09/10/2020, 01/10/2022   • Pneumococcal Conjugate 13-Valent 06/07/2017   • Pneumococcal Polysaccharide PPV23 01/10/2022   • Tdap 01/20/2016      Health Maintenance:         Topic Date Due   • DXA SCAN  12/26/2021   • Breast Cancer Screening: Mammogram  08/04/2023   • Colorectal Cancer Screening  05/31/2026   • Hepatitis C Screening  Completed         Topic Date Due   • COVID-19 Vaccine (4 - Benitez Doole series) 11/25/2022      Medicare Screening Tests and Risk Assessments:     Last Medicare Wellness visit information reviewed, patient interviewed and updates made to the record today  Health Risk Assessment:   Patient rates overall health as good  Patient feels that their physical health rating is much better  Patient is very satisfied with their life  Eyesight was rated as same  Hearing was rated as slightly worse  Patient feels that their emotional and mental health rating is much better  Patients states they are never, rarely angry  Patient states they are always unusually tired/fatigued  Pain experienced in the last 7 days has been some  Patient's pain rating has been 6/10  Depression Screening:   PHQ-9 Score: 12      Fall Risk Screening:    In the past year, patient has experienced: history of falling in past year    Number of falls: 2 or more  Injured during fall?: Yes    Feels unsteady when standing or walking?: Yes    Worried about falling?: Yes      Urinary Incontinence Screening:   Patient has leaked urine accidently in the last six months  Home Safety:  Patient has trouble with stairs inside or outside of their home  Patient has working smoke alarms and has working carbon monoxide detector  Home safety hazards include: none  Nutrition:   Current diet is Regular  Partial vegetarian  Medications:   Patient is currently taking over-the-counter supplements  OTC medications include: see medication list  Patient is able to manage medications  Activities of Daily Living (ADLs)/Instrumental Activities of Daily Living (IADLs):   Walk and transfer into and out of bed and chair?: Yes  Dress and groom yourself?: Yes    Bathe or shower yourself?: Yes    Feed yourself? Yes  Do your laundry/housekeeping?: Yes  Manage your money, pay your bills and track your expenses?: No  Make your own meals?: Yes    Do your own shopping?: Yes    Previous Hospitalizations:   Any hospitalizations or ED visits within the last 12 months?: Yes    How many hospitalizations have you had in the last year?: more than 4    Advance Care Planning:   Living will: Yes    Durable POA for healthcare:  Yes    Advanced directive: Yes      Cognitive Screening:   Provider or family/friend/caregiver concerned regarding cognition?: No    PREVENTIVE SCREENINGS      Cardiovascular Screening:    General: Screening Not Indicated and History Lipid Disorder      Diabetes Screening:     General: Screening Current      Colorectal Cancer Screening:     General: Screening Current      Breast Cancer Screening:     General: History Breast Cancer      Cervical Cancer Screening:    General: Screening Not Indicated      Osteoporosis Screening:    General: Screening Not Indicated and History Osteoporosis      Abdominal Aortic Aneurysm (AAA) "Screening:        General: Screening Not Indicated      Lung Cancer Screening:     General: Screening Not Indicated      Hepatitis C Screening:    General: Screening Current    Screening, Brief Intervention, and Referral to Treatment (SBIRT)    Screening  Typical number of drinks in a day: 0  Typical number of drinks in a week: 0  Interpretation: Low risk drinking behavior  Single Item Drug Screening:  How often have you used an illegal drug (including marijuana) or a prescription medication for non-medical reasons in the past year? never    Single Item Drug Screen Score: 0  Interpretation: Negative screen for possible drug use disorder    Review of Current Opioid Use    Opioid Risk Tool (ORT) Interpretation: Complete Opioid Risk Tool (ORT)    No results found  Physical Exam:     /80 (BP Location: Left arm, Patient Position: Sitting)   Pulse 102   Temp 97 6 °F (36 4 °C)   Ht 4' 8\" (1 422 m)   Wt 82 1 kg (181 lb)   LMP  (LMP Unknown)   SpO2 98%   BMI 40 58 kg/m²     Physical Exam  Constitutional:       Appearance: She is well-developed  HENT:      Head: Normocephalic and atraumatic  Right Ear: External ear normal       Left Ear: External ear normal       Nose: Nose normal    Eyes:      Conjunctiva/sclera: Conjunctivae normal    Cardiovascular:      Rate and Rhythm: Normal rate and regular rhythm  Heart sounds: Normal heart sounds  Pulmonary:      Effort: Pulmonary effort is normal       Breath sounds: Normal breath sounds  Abdominal:      General: Bowel sounds are normal       Palpations: Abdomen is soft  Musculoskeletal:         General: Normal range of motion  Cervical back: Normal range of motion and neck supple  Skin:     General: Skin is warm and dry  Neurological:      Mental Status: She is alert and oriented to person, place, and time  Psychiatric:         Behavior: Behavior normal          Thought Content:  Thought content normal          Judgment: Judgment " normal           Merced Alex PA-C

## 2023-05-26 NOTE — TELEPHONE ENCOUNTER
Bed: ED16A  Expected date:   Expected time:   Means of arrival:   Comments:  Jazlyn  16y F  Behavioral issues   Message left to call back to schedule follow up appointment for the injection that is scheduled on 8/14/2023 at 2  In the [facility]

## 2023-05-26 NOTE — PATIENT INSTRUCTIONS
Medicare Preventive Visit Patient Instructions  Thank you for completing your Welcome to Medicare Visit or Medicare Annual Wellness Visit today  Your next wellness visit will be due in one year (5/26/2024)  The screening/preventive services that you may require over the next 5-10 years are detailed below  Some tests may not apply to you based off risk factors and/or age  Screening tests ordered at today's visit but not completed yet may show as past due  Also, please note that scanned in results may not display below  Preventive Screenings:  Service Recommendations Previous Testing/Comments   Colorectal Cancer Screening  * Colonoscopy    * Fecal Occult Blood Test (FOBT)/Fecal Immunochemical Test (FIT)  * Fecal DNA/Cologuard Test  * Flexible Sigmoidoscopy Age: 39-70 years old   Colonoscopy: every 10 years (may be performed more frequently if at higher risk)  OR  FOBT/FIT: every 1 year  OR  Cologuard: every 3 years  OR  Sigmoidoscopy: every 5 years  Screening may be recommended earlier than age 39 if at higher risk for colorectal cancer  Also, an individualized decision between you and your healthcare provider will decide whether screening between the ages of 74-80 would be appropriate  Colonoscopy: 05/31/2016  FOBT/FIT: Not on file  Cologuard: Not on file  Sigmoidoscopy: Not on file    Screening Current     Breast Cancer Screening Age: 36 years old  Frequency: every 1-2 years  Not required if history of left and right mastectomy Mammogram: 08/04/2022    History Breast Cancer   Cervical Cancer Screening Between the ages of 21-29, pap smear recommended once every 3 years  Between the ages of 33-67, can perform pap smear with HPV co-testing every 5 years     Recommendations may differ for women with a history of total hysterectomy, cervical cancer, or abnormal pap smears in past  Pap Smear: Not on file    Screening Not Indicated   Hepatitis C Screening Once for adults born between 1945 and 1965  More frequently in patients at high risk for Hepatitis C Hep C Antibody: 12/26/2022    Screening Current   Diabetes Screening 1-2 times per year if you're at risk for diabetes or have pre-diabetes Fasting glucose: 164 mg/dL (11/17/2022)  A1C: 5 8 % (11/1/2022)  Screening Current   Cholesterol Screening Once every 5 years if you don't have a lipid disorder  May order more often based on risk factors  Lipid panel: 01/10/2022    Screening Not Indicated  History Lipid Disorder     Other Preventive Screenings Covered by Medicare:  1  Abdominal Aortic Aneurysm (AAA) Screening: covered once if your at risk  You're considered to be at risk if you have a family history of AAA  2  Lung Cancer Screening: covers low dose CT scan once per year if you meet all of the following conditions: (1) Age 50-69; (2) No signs or symptoms of lung cancer; (3) Current smoker or have quit smoking within the last 15 years; (4) You have a tobacco smoking history of at least 20 pack years (packs per day multiplied by number of years you smoked); (5) You get a written order from a healthcare provider  3  Glaucoma Screening: covered annually if you're considered high risk: (1) You have diabetes OR (2) Family history of glaucoma OR (3)  aged 48 and older OR (3)  American aged 72 and older  3  Osteoporosis Screening: covered every 2 years if you meet one of the following conditions: (1) You're estrogen deficient and at risk for osteoporosis based off medical history and other findings; (2) Have a vertebral abnormality; (3) On glucocorticoid therapy for more than 3 months; (4) Have primary hyperparathyroidism; (5) On osteoporosis medications and need to assess response to drug therapy  · Last bone density test (DXA Scan): 12/26/2019   5  HIV Screening: covered annually if you're between the age of 15-65  Also covered annually if you are younger than 13 and older than 72 with risk factors for HIV infection   For pregnant patients, it is covered up to 3 times per pregnancy  Immunizations:  Immunization Recommendations   Influenza Vaccine Annual influenza vaccination during flu season is recommended for all persons aged >= 6 months who do not have contraindications   Pneumococcal Vaccine   * Pneumococcal conjugate vaccine = PCV13 (Prevnar 13), PCV15 (Vaxneuvance), PCV20 (Prevnar 20)  * Pneumococcal polysaccharide vaccine = PPSV23 (Pneumovax) Adults 25-60 years old: 1-3 doses may be recommended based on certain risk factors  Adults 72 years old: 1-2 doses may be recommended based off what pneumonia vaccine you previously received   Hepatitis B Vaccine 3 dose series if at intermediate or high risk (ex: diabetes, end stage renal disease, liver disease)   Tetanus (Td) Vaccine - COST NOT COVERED BY MEDICARE PART B Following completion of primary series, a booster dose should be given every 10 years to maintain immunity against tetanus  Td may also be given as tetanus wound prophylaxis  Tdap Vaccine - COST NOT COVERED BY MEDICARE PART B Recommended at least once for all adults  For pregnant patients, recommended with each pregnancy  Shingles Vaccine (Shingrix) - COST NOT COVERED BY MEDICARE PART B  2 shot series recommended in those aged 48 and above     Health Maintenance Due:      Topic Date Due   • DXA SCAN  12/26/2021   • Breast Cancer Screening: Mammogram  08/04/2023   • Colorectal Cancer Screening  05/31/2026   • Hepatitis C Screening  Completed     Immunizations Due:      Topic Date Due   • COVID-19 Vaccine (4 - [place] [facility] series) 11/25/2022     Advance Directives   What are advance directives? Advance directives are legal documents that state your wishes and plans for medical care  These plans are made ahead of time in case you lose your ability to make decisions for yourself  Advance directives can apply to any medical decision, such as the treatments you want, and if you want to donate organs  What are the types of advance directives?   There are many types of advance directives, and each state has rules about how to use them  You may choose a combination of any of the following:  · Living will: This is a written record of the treatment you want  You can also choose which treatments you do not want, which to limit, and which to stop at a certain time  This includes surgery, medicine, IV fluid, and tube feedings  · Durable power of  for healthcare [facility]): This is a written record that states who you want to make healthcare choices for you when you are unable to make them for yourself  This person, called a proxy, is usually a family member or a friend  You may choose more than 1 proxy  · Do not resuscitate (DNR) order:  A DNR order is used in case your heart stops beating or you stop breathing  It is a request not to have certain forms of treatment, such as CPR  A DNR order may be included in other types of advance directives  · Medical directive: This covers the care that you want if you are in a coma, near death, or unable to make decisions for yourself  You can list the treatments you want for each condition  Treatment may include pain medicine, surgery, blood transfusions, dialysis, IV or tube feedings, and a ventilator (breathing machine)  · Values history: This document has questions about your views, beliefs, and how you feel and think about life  This information can help others choose the care that you would choose  Why are advance directives important? An advance directive helps you control your care  Although spoken wishes may be used, it is better to have your wishes written down  Spoken wishes can be misunderstood, or not followed  Treatments may be given even if you do not want them  An advance directive may make it easier for your family to make difficult choices about your care  Fall Prevention    Fall prevention  includes ways to make your home and other areas safer   It also includes ways you can move more carefully to prevent a fall  Health conditions that cause changes in your blood pressure, vision, or muscle strength and coordination may increase your risk for falls  Medicines may also increase your risk for falls if they make you dizzy, weak, or sleepy  Fall prevention tips:   · Stand or sit up slowly  · Use assistive devices as directed  · Wear shoes that fit well and have soles that   · Wear a personal alarm  · Stay active  · Manage your medical conditions  Home Safety Tips:  · Add items to prevent falls in the bathroom  · Keep paths clear  · Install bright lights in your home  · Keep items you use often on shelves within reach  · Paint or place reflective tape on the edges of your stairs  Urinary Incontinence   Urinary incontinence (UI)  is when you lose control of your bladder  UI develops because your bladder cannot store or empty urine properly  The 3 most common types of UI are stress incontinence, urge incontinence, or both  Medicines:   · May be given to help strengthen your bladder control  Report any side effects of medication to your healthcare provider  Do pelvic muscle exercises often:  Your pelvic muscles help you stop urinating  Squeeze these muscles tight for 5 seconds, then relax for 5 seconds  Gradually work up to squeezing for 10 seconds  Do 3 sets of 15 repetitions a day, or as directed  This will help strengthen your pelvic muscles and improve bladder control  Train your bladder:  Go to the bathroom at set times, such as every 2 hours, even if you do not feel the urge to go  You can also try to hold your urine when you feel the urge to go  For example, hold your urine for 5 minutes when you feel the urge to go  As that becomes easier, hold your urine for 10 minutes  Self-care:   · Keep a UI record  Write down how often you leak urine and how much you leak  Make a note of what you were doing when you leaked urine  · Drink liquids as directed   You may need to limit the amount of liquid you drink to help control your urine leakage  Do not drink any liquid right before you go to bed  Limit or do not have drinks that contain caffeine or alcohol  · Prevent constipation  Eat a variety of high-fiber foods  Good examples are high-fiber cereals, beans, vegetables, and whole-grain breads  Walking is the best way to trigger your intestines to have a bowel movement  · Exercise regularly and maintain a healthy weight  Weight loss and exercise will decrease pressure on your bladder and help you control your leakage  · Use a catheter as directed  to help empty your bladder  A catheter is a tiny, plastic tube that is put into your bladder to drain your urine  · Go to behavior therapy as directed  Behavior therapy may be used to help you learn to control your urge to urinate  Weight Management   Why it is important to manage your weight:  Being overweight increases your risk of health conditions such as heart disease, high blood pressure, type 2 diabetes, and certain types of cancer  It can also increase your risk for osteoarthritis, sleep apnea, and other respiratory problems  Aim for a slow, steady weight loss  Even a small amount of weight loss can lower your risk of health problems  How to lose weight safely:  A safe and healthy way to lose weight is to eat fewer calories and get regular exercise  You can lose up about 1 pound a week by decreasing the number of calories you eat by 500 calories each day  Healthy meal plan for weight management:  A healthy meal plan includes a variety of foods, contains fewer calories, and helps you stay healthy  A healthy meal plan includes the following:  · Eat whole-grain foods more often  A healthy meal plan should contain fiber  Fiber is the part of grains, fruits, and vegetables that is not broken down by your body  Whole-grain foods are healthy and provide extra fiber in your diet   Some examples of whole-grain foods are whole-wheat breads and pastas, oatmeal, brown rice, and bulgur  · Eat a variety of vegetables every day  Include dark, leafy greens such as spinach, kale, florin greens, and mustard greens  Eat yellow and orange vegetables such as carrots, sweet potatoes, and winter squash  · Eat a variety of fruits every day  Choose fresh or canned fruit (canned in its own juice or light syrup) instead of juice  Fruit juice has very little or no fiber  · Eat low-fat dairy foods  Drink fat-free (skim) milk or 1% milk  Eat fat-free yogurt and low-fat cottage cheese  Try low-fat cheeses such as mozzarella and other reduced-fat cheeses  · Choose meat and other protein foods that are low in fat  Choose beans or other legumes such as split peas or lentils  Choose fish, skinless poultry (chicken or turkey), or lean cuts of red meat (beef or pork)  Before you cook meat or poultry, cut off any visible fat  · Use less fat and oil  Try baking foods instead of frying them  Add less fat, such as margarine, sour cream, regular salad dressing and mayonnaise to foods  Eat fewer high-fat foods  Some examples of high-fat foods include french fries, doughnuts, ice cream, and cakes  · Eat fewer sweets  Limit foods and drinks that are high in sugar  This includes candy, cookies, regular soda, and sweetened drinks  Exercise:  Exercise at least 30 minutes per day on most days of the week  Some examples of exercise include walking, biking, dancing, and swimming  You can also fit in more physical activity by taking the stairs instead of the elevator or parking farther away from stores  Ask your healthcare provider about the best exercise plan for you     Narcotic (Opioid) Safety    Use narcotics safely:  · Take prescribed narcotics exactly as directed  · Do not give narcotics to others or take narcotics that belong to someone else  · Do not mix narcotics without medicines or alcohol  · Do not drive or operate heavy machinery after you take the narcotic  · Monitor for side effects and notify your healthcare provider if you experienced side effects such as nausea, sleepiness, itching, or trouble thinking clearly  Manage constipation:    Constipation is the most common side effect of narcotic medicine  Constipation is when you have hard, dry bowel movements, or you go longer than usual between bowel movements  Tell your healthcare provider about all changes in your bowel movements while you are taking narcotics  He or she may recommend laxative medicine to help you have a bowel movement  He or she may also change the kind of narcotic you are taking, or change when you take it  The following are more ways you can prevent or relieve constipation:    · Drink liquids as directed  You may need to drink extra liquids to help soften and move your bowels  Ask how much liquid to drink each day and which liquids are best for you  · Eat high-fiber foods  This may help decrease constipation by adding bulk to your bowel movements  High-fiber foods include fruits, vegetables, whole-grain breads and cereals, and beans  Your healthcare provider or dietitian can help you create a high-fiber meal plan  Your provider may also recommend a fiber supplement if you cannot get enough fiber from food  · Exercise regularly  Regular physical activity can help stimulate your intestines  Walking is a good exercise to prevent or relieve constipation  Ask which exercises are best for you  · Schedule a time each day to have a bowel movement  This may help train your body to have regular bowel movements  Bend forward while you are on the toilet to help move the bowel movement out  Sit on the toilet for at least 10 minutes, even if you do not have a bowel movement  Store narcotics safely:   · Store narcotics where others cannot easily get them  Keep them in a locked cabinet or secure area  Do not  keep them in a purse or other bag you carry with you   A person may be looking for something else and find the narcotics  · Make sure narcotics are stored out of the reach of children  A child can easily overdose on narcotics  Narcotics may look like candy to a small child  The best way to dispose of narcotics: The laws vary by country and area  In the [place], the best way is to return the narcotics through a take-back program  This program is offered by the [employer]  The following are options for using the program:  · Take the narcotics to a NATALY collection site  The site is often a law enforcement center  Call your local law enforcement center for scheduled take-back days in your area  You will be given information on where to go if the collection site is in a different location  · Take the narcotics to an approved pharmacy or hospital   A pharmacy or hospital may be set up as a collection site  You will need to ask if it is a NATALY collection site if you were not directed there  A pharmacy or doctor's office may not be able to take back narcotics unless it is a NATALY site  · Use a mail-back system  This means you are given containers to put the narcotics into  You will then mail them in the containers  · Use a take-back drop box  This is a place to leave the narcotics at any time  People and animals will not be able to get into the box  Your local law enforcement agency can tell you where to find a drop box in your area  Other ways to manage pain:   · Ask your healthcare provider about non-narcotic medicines to control pain  Nonprescription medicines include NSAIDs (such as ibuprofen) and acetaminophen  Prescription medicines include muscle relaxers, antidepressants, and steroids  · Pain may be managed without any medicines  Some ways to relieve pain include massage, aromatherapy, or meditation  Physical or occupational therapy may also help      For more information:   · Drug Enforcement Administration  [address] [place] [address]  Phone: 3- 967 - 269-6448  Web Address: [facility] gov/drug_disposal/    · [address]  and Drug Administration  [facility] , [address]  Phone: 8- 755 - 553-5612  Web Address: http://[employer]/     © Copyright [employer] 2018 Information is for End User's use only and may not be sold, redistributed or otherwise used for commercial purposes   All illustrations and images included in CareNotes® are the copyrighted property of A D A M , Inc  or [address] [place] [address]

## 2023-06-23 ENCOUNTER — APPOINTMENT (OUTPATIENT)
Dept: LAB | Facility: HOSPITAL | Age: 73
End: 2023-06-23
Payer: COMMERCIAL

## 2023-06-23 ENCOUNTER — HOSPITAL ENCOUNTER (OUTPATIENT)
Dept: ULTRASOUND IMAGING | Facility: HOSPITAL | Age: 73
End: 2023-06-23
Payer: COMMERCIAL

## 2023-06-23 DIAGNOSIS — E55.9 VITAMIN D DEFICIENCY: ICD-10-CM

## 2023-06-23 DIAGNOSIS — Z13.29 SCREENING FOR THYROID DISORDER: ICD-10-CM

## 2023-06-23 DIAGNOSIS — R79.89 ELEVATED LFTS: ICD-10-CM

## 2023-06-23 DIAGNOSIS — R73.01 ELEVATED FASTING GLUCOSE: ICD-10-CM

## 2023-06-23 DIAGNOSIS — N20.0 NEPHROLITHIASIS: ICD-10-CM

## 2023-06-23 DIAGNOSIS — E78.5 HYPERLIPIDEMIA LDL GOAL <130: ICD-10-CM

## 2023-06-23 LAB
25(OH)D3 SERPL-MCNC: 19 NG/ML (ref 30–100)
ALBUMIN SERPL BCP-MCNC: 3.7 G/DL (ref 3.5–5)
ALP SERPL-CCNC: 80 U/L (ref 34–104)
ALT SERPL W P-5'-P-CCNC: 22 U/L (ref 7–52)
ANION GAP SERPL CALCULATED.3IONS-SCNC: 7 MMOL/L
AST SERPL W P-5'-P-CCNC: 26 U/L (ref 13–39)
BILIRUB SERPL-MCNC: 0.86 MG/DL (ref 0.2–1)
BUN SERPL-MCNC: 12 MG/DL (ref 5–25)
CALCIUM SERPL-MCNC: 9.2 MG/DL (ref 8.4–10.2)
CHLORIDE SERPL-SCNC: 104 MMOL/L (ref 96–108)
CHOLEST SERPL-MCNC: 143 MG/DL
CO2 SERPL-SCNC: 28 MMOL/L (ref 21–32)
CREAT SERPL-MCNC: 0.61 MG/DL (ref 0.6–1.3)
EST. AVERAGE GLUCOSE BLD GHB EST-MCNC: 114 MG/DL
GFR SERPL CREATININE-BSD FRML MDRD: 91 ML/MIN/1.73SQ M
GLUCOSE P FAST SERPL-MCNC: 102 MG/DL (ref 65–99)
HBA1C MFR BLD: 5.6 %
HDLC SERPL-MCNC: 52 MG/DL
LDLC SERPL CALC-MCNC: 68 MG/DL (ref 0–100)
NONHDLC SERPL-MCNC: 91 MG/DL
POTASSIUM SERPL-SCNC: 3.6 MMOL/L (ref 3.5–5.3)
PROT SERPL-MCNC: 6.5 G/DL (ref 6.4–8.4)
SODIUM SERPL-SCNC: 139 MMOL/L (ref 135–147)
TRIGL SERPL-MCNC: 116 MG/DL
TSH SERPL DL<=0.05 MIU/L-ACNC: 2.64 UIU/ML (ref 0.45–4.5)

## 2023-06-23 PROCEDURE — 82306 VITAMIN D 25 HYDROXY: CPT

## 2023-06-23 PROCEDURE — 36415 COLL VENOUS BLD VENIPUNCTURE: CPT

## 2023-06-23 PROCEDURE — 80061 LIPID PANEL: CPT

## 2023-06-23 PROCEDURE — 80053 COMPREHEN METABOLIC PANEL: CPT

## 2023-06-23 PROCEDURE — 84443 ASSAY THYROID STIM HORMONE: CPT

## 2023-06-23 PROCEDURE — 83036 HEMOGLOBIN GLYCOSYLATED A1C: CPT

## 2023-06-23 PROCEDURE — 76775 US EXAM ABDO BACK WALL LIM: CPT

## 2023-06-30 ENCOUNTER — TELEPHONE (OUTPATIENT)
Dept: UROLOGY | Facility: CLINIC | Age: 73
End: 2023-06-30

## 2023-06-30 DIAGNOSIS — N39.46 MIXED STRESS AND URGE URINARY INCONTINENCE: Chronic | ICD-10-CM

## 2023-06-30 DIAGNOSIS — N32.81 OAB (OVERACTIVE BLADDER): ICD-10-CM

## 2023-06-30 NOTE — TELEPHONE ENCOUNTER
Spoke with patient and reviewed results, patient requesting refill, I have entered refill below  Pharmacy confirmed also

## 2023-06-30 NOTE — RESULT ENCOUNTER NOTE
Please let Wilfred Thornton know that her ultrasound demonstrated simple cysts which are a benign finding  Are stable in size  No swelling of her kidneys or kidney stones were seen    She can follow-up as scheduled

## 2023-06-30 NOTE — TELEPHONE ENCOUNTER
----- Message from [facility] [employer] sent at 6/30/2023  8:58 AM EDT -----  Please let Joseluis Black know that her ultrasound demonstrated simple cysts which are a benign finding  Are stable in size  No swelling of her kidneys or kidney stones were seen    She can follow-up as scheduled

## 2023-07-17 ENCOUNTER — TELEPHONE (OUTPATIENT)
Dept: NON INVASIVE DIAGNOSTICS | Facility: HOSPITAL | Age: 73
End: 2023-07-17

## 2023-07-17 NOTE — TELEPHONE ENCOUNTER
Call placed to patient to discuss upcoming Right Foot injections at [facility]. Allergies reviewed and verified patient does not currently take any anticoagulant medications. Pre-procedure instructions discussed with patient. Patient instructed that she may eat normally and take medications as usual before the procedure. Procedure and post procedure expectations and instructions reviewed with the patient. Recommended to patient to have a  post procedure. Patient verbalizes understanding and denies any questions at this time.

## 2023-07-18 ENCOUNTER — APPOINTMENT (EMERGENCY)
Dept: CT IMAGING | Facility: HOSPITAL | Age: 73
End: 2023-07-18
Payer: COMMERCIAL

## 2023-07-18 ENCOUNTER — APPOINTMENT (OUTPATIENT)
Dept: RADIOLOGY | Facility: CLINIC | Age: 73
End: 2023-07-18
Payer: COMMERCIAL

## 2023-07-18 ENCOUNTER — HOSPITAL ENCOUNTER (EMERGENCY)
Facility: HOSPITAL | Age: 73
Discharge: HOME | End: 2023-07-18
Attending: EMERGENCY MEDICINE | Admitting: EMERGENCY MEDICINE
Payer: COMMERCIAL

## 2023-07-18 ENCOUNTER — OFFICE VISIT (OUTPATIENT)
Dept: URGENT CARE | Facility: CLINIC | Age: 73
End: 2023-07-18
Payer: COMMERCIAL

## 2023-07-18 VITALS — OXYGEN SATURATION: 97 % | RESPIRATION RATE: 18 BRPM | HEART RATE: 103 BPM | TEMPERATURE: 98.3 F

## 2023-07-18 VITALS
HEART RATE: 86 BPM | TEMPERATURE: 98.3 F | OXYGEN SATURATION: 98 % | SYSTOLIC BLOOD PRESSURE: 186 MMHG | DIASTOLIC BLOOD PRESSURE: 78 MMHG
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
## 2023-07-18 DIAGNOSIS — S63.502A SPRAIN OF LEFT WRIST, INITIAL ENCOUNTER: Primary | ICD-10-CM

## 2023-07-18 DIAGNOSIS — S63.502A SPRAIN OF LEFT WRIST, INITIAL ENCOUNTER: ICD-10-CM

## 2023-07-18 DIAGNOSIS — S09.90XA CLOSED HEAD INJURY, INITIAL ENCOUNTER: ICD-10-CM

## 2023-07-18 DIAGNOSIS — S09.90XA INJURY OF HEAD, INITIAL ENCOUNTER: ICD-10-CM

## 2023-07-18 DIAGNOSIS — S66.912A SPRAIN AND STRAIN OF LEFT WRIST: ICD-10-CM

## 2023-07-18 DIAGNOSIS — S63.502A SPRAIN AND STRAIN OF LEFT WRIST: ICD-10-CM

## 2023-07-18 PROCEDURE — 73110 X-RAY EXAM OF WRIST: CPT

## 2023-07-18 PROCEDURE — 99213 OFFICE O/P EST LOW 20 MIN: CPT | Performed by: PHYSICIAN ASSISTANT

## 2023-07-18 PROCEDURE — 29125 APPL SHORT ARM SPLINT STATIC: CPT | Performed by: PHYSICIAN ASSISTANT

## 2023-07-18 PROCEDURE — 72125 CT NECK SPINE W/O DYE: CPT

## 2023-07-18 PROCEDURE — 70450 CT HEAD/BRAIN W/O DYE: CPT

## 2023-07-18 PROCEDURE — G1004 CDSM NDSC: HCPCS

## 2023-07-18 NOTE — PROGRESS NOTES
[facility]        NAME: Jada Bhat is a 72 y.o. female  : 1950    MRN: [id number]  DATE: 2023  TIME: 3:16 PM    Assessment and Plan   Sprain of left wrist, initial encounter [S63.502A]  1. Sprain of left wrist, initial encounter  XR wrist 3+ vw left      2. Injury of head, initial encounter  Transfer to other facility        Patient has  to take her to ER. Requesting transfer to [facility]    Patient Instructions   Patient Instructions   Proceed to ER for further evaluation. Follow up with PCP in 3-5 days. Proceed to  ER if symptoms worsen. Chief Complaint     Chief Complaint   Patient presents with   • Wrist Pain     Patient tripped over a hose and hour ago and fell onto left wrist. Left wrist swollen. Patient reports hitting head but no LOC, not on blood thinners, no blurry vision. Mild headache. History of Present Illness       Patient presents with an injury to the left wrist occurring about an hour ago. Tripped and fell on an outstretched left hand and arm. Has some numbness/tingling in the left hand. Cant move the wrist very well due to the pain. Hit left side of head in fall as well. Has a mild headache over the left side of the head and is throbbing  Denies LOC, numbness/tingling anywhere besides the hand, visual disturbances, nausea, vomiting,       Review of Systems   Review of Systems   Constitutional: Negative for fatigue. HENT: Negative for ear discharge and rhinorrhea. Eyes: Negative for photophobia, pain and visual disturbance. Gastrointestinal: Negative for nausea and vomiting. Musculoskeletal: Positive for arthralgias and joint swelling. Negative for neck pain and neck stiffness. Skin: Positive for color change. Negative for wound. Neurological: Positive for numbness and headaches. Negative for dizziness, syncope, facial asymmetry and weakness. Psychiatric/Behavioral: Negative for confusion and sleep disturbance. Current Medications       Current Outpatient Medications:   •  acetaminophen (TYLENOL) 650 mg CR tablet, Take 1 tablet (650 mg total) by mouth every 8 (eight) hours as needed for mild pain, Disp: 30 tablet, Rfl: 0  •  albuterol (2.5 mg/3 mL) 0.083 % nebulizer solution, Take 2.5 mg by nebulization every 6 (six) hours as needed for wheezing, Disp: , Rfl:   •  albuterol (PROAIR HFA) 90 mcg/act inhaler, Inhale 2 puffs every 4 (four) hours as needed for wheezing, Disp: 3 Inhaler, Rfl: 1  •  atorvastatin (LIPITOR) 20 mg tablet, Take 1 tablet by mouth daily. , Disp: 30 tablet, Rfl: 5  •  buPROPion (WELLBUTRIN XL) 300 mg 24 hr tablet, Take 1 tablet (300 mg total) by mouth every morning, Disp: 90 tablet, Rfl: 3  •  cetirizine (ZyrTEC) 10 mg tablet, Take 10 mg by mouth daily, Disp: , Rfl:   •  donepezil (Aricept) 10 mg tablet, Take 1 tablet (10 mg total) by mouth daily at bedtime, Disp: 30 tablet, Rfl: 2  •  DULoxetine (CYMBALTA) 60 mg delayed release capsule, Take 1 capsule (60 mg total) by mouth 2 (two) times a day, Disp: 180 capsule, Rfl: 1  •  ergocalciferol (VITAMIN D2) 50,000 units, Take 1 capsule (50,000 Units total) by mouth once a week, Disp: 4 capsule, Rfl: 3  •  lisinopril (ZESTRIL) 5 mg tablet, Take 1 tablet (5 mg total) by mouth daily Do not start before November 23, 2022., Disp: , Rfl: 0  •  loratadine (CLARITIN) 10 mg tablet, Take 10 mg by mouth daily, Disp: , Rfl:   •  Melatonin 10 MG TABS, Take by mouth, Disp: , Rfl:   •  Mirabegron ER 25 MG TB24, Take 25 mg by mouth in the morning, Disp: 30 tablet, Rfl: 3  •  Nutritional Supplements (BOOST BREEZE PO), Take by mouth daily, Disp: , Rfl:   •  oxyCODONE (OxyCONTIN) 10 mg 12 hr tablet, Take 10 mg by mouth as needed, Disp: , Rfl:   •  ascorbic acid (VITAMIN C) 500 MG tablet, Take 1 tablet (500 mg total) by mouth daily (Patient not taking: Reported on 5/26/2023), Disp: 30 tablet, Rfl: 1  •  butalbital-acetaminophen-caffeine (FIORICET,ESGIC) -40 mg per tablet, Take 1 tablet by mouth 3 (three) times a day as needed for headaches (Patient not taking: Reported on 7/18/2023), Disp: 90 tablet, Rfl: 1  •  docusate sodium (COLACE) 100 mg capsule, Take 1 capsule (100 mg total) by mouth 2 (two) times a day (Patient not taking: Reported on 7/18/2023), Disp: 60 capsule, Rfl: 0  •  gabapentin (Neurontin) 300 mg capsule, Take 1 capsule (300 mg total) by mouth 2 (two) times a day (Patient not taking: Reported on 5/26/2023), Disp: 60 capsule, Rfl: 2  •  Multiple Vitamins-Minerals (multivitamin with minerals) tablet, Take 1 tablet by mouth daily (Patient not taking: Reported on 5/26/2023), Disp: 30 tablet, Rfl: 1  •  pantoprazole (PROTONIX) 40 mg tablet, Take 1 tablet (40 mg total) by mouth 2 (two) times a day before meals (Patient not taking: Reported on 7/18/2023), Disp: 60 tablet, Rfl: 0  •  valACYclovir (VALTREX) 1,000 mg tablet, Take 1 tablet (1,000 mg total) by mouth every 8 (eight) hours for 6 days (Patient not taking: Reported on 7/18/2023), Disp: 18 tablet, Rfl: 0    Current Allergies     Allergies as of 07/18/2023 - Reviewed 07/18/2023   Allergen Reaction Noted   • Ibuprofen Nausea Only, Rash, Dizziness, and Syncope 12/02/2015   • Morphine Other (See Comments) and Hallucinations 06/15/2021   • Latex Dermatitis 05/31/2016            The following portions of the patient's history were reviewed and updated as appropriate: allergies, current medications, past family history, past medical history, past social history, past surgical history and problem list.     Past Medical History:   Diagnosis Date   • Anxiety    • Arthritis    • Asthma    • Breast cancer ([address])     rt mastectomy 2005   • Cancer ([address])     breast right   • Cataract    • CHF (congestive heart failure) (HCC)    • Chronic headaches    • Concussion     At age 7; Chronic headaches since then   • Coronary artery disease    • Depression    • Dietary lactose intolerance    • GERD (gastroesophageal reflux disease) • High cholesterol    • History of chemotherapy     2005 rt breast cancer   • History of transfusion    • Hypertension    • IBS (irritable bowel syndrome)    • Irritable bowel syndrome (IBS) 05/31/2016   • Kidney stone    • Lymphedema of right arm    • Obesity    • PFO (patent foramen ovale)    • PONV (postoperative nausea and vomiting)    • Post-menopausal    • Psychiatric disorder    • Renal disorder    • Shortness of breath    • Vitamin D deficiency        Past Surgical History:   Procedure Laterality Date   • BREAST SURGERY     • CATARACT EXTRACTION Bilateral    • CATARACT EXTRACTION, BILATERAL     • CHOLECYSTECTOMY     • COLONOSCOPY N/A 05/31/2016    Procedure: COLONOSCOPY;  Surgeon: Daylin Aguirre MD;  Location: [facility];  Service:    • GALLBLADDER SURGERY     • HYSTERECTOMY      Total   • JOINT REPLACEMENT     • KNEE ARTHROSCOPY     • KNEE SURGERY      arthroscopy   • MASTECTOMY Right     rt breast mastectomy 2005   • MS ARTHRP KNE CONDYLE&PLATU MEDIAL&LAT COMPARTMENTS Left 05/20/2021    Procedure: ARTHROPLASTY KNEE TOTAL;  Surgeon: Elias Kovacs MD;  Location: [facility] OR;  Service: Orthopedics   • MS ARTHRP KNE CONDYLE&PLATU MEDIAL&LAT COMPARTMENTS Right 11/16/2022    Procedure: ARTHROPLASTY KNEE TOTAL;  Surgeon: Talya Morrow DO;  Location: [facility];  Service: Orthopedics   • TONSILLECTOMY AND ADENOIDECTOMY         Family History   Adopted: Yes   Problem Relation Age of Onset   • Diabetes Mother    • Heart disease Mother    • Mental illness Mother    • Depression Mother    • Heart disease Brother    • Breast cancer Maternal Aunt    • Breast cancer Maternal Aunt    • Breast cancer Maternal Aunt    • Breast cancer Maternal Aunt    • Breast cancer Maternal Aunt    • No Known Problems Father    • No Known Problems Sister    • No Known Problems Daughter    • Breast cancer Maternal Grandmother    • No Known Problems Sister    • No Known Problems Sister    • No Known Problems Sister          Medications have been verified. Objective   Pulse 103   Temp 98.3 °F (36.8 °C)   Resp 18   LMP  (LMP Unknown)   SpO2 97%        Physical Exam     Physical Exam  HENT:      Head: Normocephalic. Comments: TTP around left temporal area. No ecchymosis or swelling     Right Ear: Tympanic membrane, ear canal and external ear normal.      Left Ear: Tympanic membrane, ear canal and external ear normal.      Nose: Nose normal.      Mouth/Throat:      Mouth: Mucous membranes are moist.      Pharynx: Oropharynx is clear. Eyes:      Extraocular Movements: Extraocular movements intact. Conjunctiva/sclera: Conjunctivae normal.      Pupils: Pupils are equal, round, and reactive to light. Musculoskeletal:         General: Tenderness present. Cervical back: Normal range of motion. No tenderness. Comments: TTP all over the left wrist.  Ecchymosis over the dorsum of the left wrist.  NV intact distally. AROM of the wrist/hand severely limited due to pain. Skin:     Findings: Bruising present. Neurological:      General: No focal deficit present. Mental Status: She is alert and oriented to person, place, and time. Cranial Nerves: Cranial nerves 2-12 are intact. Sensory: Sensation is intact. Motor: Motor function is intact. Coordination: Finger-Nose-Finger Test abnormal.      Comments: Tremor of right hand noted with finger to nose test. Unable to assess left hand due to pain   Psychiatric:         Mood and Affect: Mood normal.         Behavior: Behavior normal.           Orthopedic injury treatment    Date/Time: 7/18/2023 2:30 PM    Performed by: Zak Bolton PA-C  Authorized by: Zak Bolton PA-C    Patient Location:  [facility] Protocol:  Consent: Verbal consent obtained.   Risks and benefits: risks, benefits and alternatives were discussed  Consent given by: patient  Patient understanding: patient states understanding of the procedure being performed  Patient consent: the patient's understanding of the procedure matches consent given  Procedure consent: procedure consent matches procedure scheduled  Patient identity confirmed: verbally with patient      Injury location:  Wrist  Location details:  Left wrist  Injury type:   Soft tissue  Neurovascular status: Neurovascularly intact    Distal perfusion: normal    Neurological function: normal    Range of motion: reduced    Immobilization:  Splint  Splint type:  Thumb spica  Supplies used:  Aluminum splint  Neurovascular status: Neurovascularly intact    Distal perfusion: normal    Neurological function: normal    Range of motion: unchanged    Patient tolerance:  Patient tolerated the procedure well with no immediate complications

## 2023-07-18 NOTE — ED PROVIDER NOTES
History  Chief Complaint   Patient presents with   • Fall     Fall, left wrist sprain treated by urgent care. Patient does report hitting head, no LOC. Mild headache       HPI    Is a very pleasant, nontoxic-appearing, 72-year-old female presents to emergency mechanical fall, arrives via private vehicle from a local urgent care center because there was a head strike, not on blood thinners. Mechanical fall was a tripped over a garden hose. She fell on outstretched hand was having significant left-sided wrist pain. Offers no other complaints at this time, no, no numbness and tingling in the extremities with exception over the wrist where she injured her wrist is currently in a black splint, and patient denies any muscle weakness. Remaining portion of 12 point review systems is negative. Patient reports mild headache global in nature, no photophobia, no nausea, no vomiting. Prior to Admission Medications   Prescriptions Last Dose Informant Patient Reported? Taking?    DULoxetine (CYMBALTA) 60 mg delayed release capsule   No No   Sig: Take 1 capsule (60 mg total) by mouth 2 (two) times a day   Melatonin 10 MG TABS   Yes No   Sig: Take by mouth   Mirabegron ER 25 MG TB24   No No   Sig: Take 25 mg by mouth in the morning   Multiple Vitamins-Minerals (multivitamin with minerals) tablet   No No   Sig: Take 1 tablet by mouth daily   Patient not taking: Reported on 5/26/2023   Nutritional Supplements (BOOST BREEZE PO)   Yes No   Sig: Take by mouth daily   acetaminophen (TYLENOL) 650 mg CR tablet   No No   Sig: Take 1 tablet (650 mg total) by mouth every 8 (eight) hours as needed for mild pain   albuterol (2.5 mg/3 mL) 0.083 % nebulizer solution   Yes No   Sig: Take 2.5 mg by nebulization every 6 (six) hours as needed for wheezing   albuterol (PROAIR HFA) 90 mcg/act inhaler   No No   Sig: Inhale 2 puffs every 4 (four) hours as needed for wheezing   ascorbic acid (VITAMIN C) 500 MG tablet   No No   Sig: Take 1 tablet (500 mg total) by mouth daily   Patient not taking: Reported on 5/26/2023   atorvastatin (LIPITOR) 20 mg tablet   No No   Sig: Take 1 tablet by mouth daily. buPROPion (WELLBUTRIN XL) 300 mg 24 hr tablet   No No   Sig: Take 1 tablet (300 mg total) by mouth every morning   butalbital-acetaminophen-caffeine (FIORICET,ESGIC) -40 mg per tablet   No No   Sig: Take 1 tablet by mouth 3 (three) times a day as needed for headaches   Patient not taking: Reported on 7/18/2023   cetirizine (ZyrTEC) 10 mg tablet   Yes No   Sig: Take 10 mg by mouth daily   docusate sodium (COLACE) 100 mg capsule   No No   Sig: Take 1 capsule (100 mg total) by mouth 2 (two) times a day   Patient not taking: Reported on 7/18/2023   donepezil (Aricept) 10 mg tablet   No No   Sig: Take 1 tablet (10 mg total) by mouth daily at bedtime   ergocalciferol (VITAMIN D2) 50,000 units   No No   Sig: Take 1 capsule (50,000 Units total) by mouth once a week   gabapentin (Neurontin) 300 mg capsule   No No   Sig: Take 1 capsule (300 mg total) by mouth 2 (two) times a day   Patient not taking: Reported on 5/26/2023   lisinopril (ZESTRIL) 5 mg tablet   No No   Sig: Take 1 tablet (5 mg total) by mouth daily Do not start before November 23, 2022.    loratadine (CLARITIN) 10 mg tablet   Yes No   Sig: Take 10 mg by mouth daily   oxyCODONE (OxyCONTIN) 10 mg 12 hr tablet  Self Yes No   Sig: Take 10 mg by mouth as needed   pantoprazole (PROTONIX) 40 mg tablet   No No   Sig: Take 1 tablet (40 mg total) by mouth 2 (two) times a day before meals   Patient not taking: Reported on 7/18/2023   valACYclovir (VALTREX) 1,000 mg tablet   No No   Sig: Take 1 tablet (1,000 mg total) by mouth every 8 (eight) hours for 6 days   Patient not taking: Reported on 7/18/2023      Facility-Administered Medications: None       Past Medical History:   Diagnosis Date   • Anxiety    • Arthritis    • Asthma    • Breast cancer ([address] )     rt mastectomy 2005   • Cancer [facility])     breast right   • Cataract    • CHF (congestive heart failure) (HCC)    • Chronic headaches    • Concussion     At age 7; Chronic headaches since then   • Coronary artery disease    • Depression    • Dietary lactose intolerance    • GERD (gastroesophageal reflux disease)    • High cholesterol    • History of chemotherapy     2005 rt breast cancer   • History of transfusion    • Hypertension    • IBS (irritable bowel syndrome)    • Irritable bowel syndrome (IBS) 05/31/2016   • Kidney stone    • Lymphedema of right arm    • Obesity    • PFO (patent foramen ovale)    • PONV (postoperative nausea and vomiting)    • Post-menopausal    • Psychiatric disorder    • Renal disorder    • Shortness of breath    • Vitamin D deficiency        Past Surgical History:   Procedure Laterality Date   • BREAST SURGERY     • CATARACT EXTRACTION Bilateral    • CATARACT EXTRACTION, BILATERAL     • CHOLECYSTECTOMY     • COLONOSCOPY N/A 05/31/2016    Procedure: COLONOSCOPY;  Surgeon: Elvina Peabody, MD;  Location: [facility] MAIN OR;  Service:    • GALLBLADDER SURGERY     • HYSTERECTOMY      Total   • JOINT REPLACEMENT     • KNEE ARTHROSCOPY     • KNEE SURGERY      arthroscopy   • MASTECTOMY Right     rt breast mastectomy 2005   • CT ARTHRP KNE CONDYLE&PLATU MEDIAL&LAT COMPARTMENTS Left 05/20/2021    Procedure: ARTHROPLASTY KNEE TOTAL;  Surgeon: Karlos Rodas MD;  Location: [facility] MAIN OR;  Service: Orthopedics   • CT ARTHRP KNE CONDYLE&PLATU MEDIAL&LAT COMPARTMENTS Right 11/16/2022    Procedure: ARTHROPLASTY KNEE TOTAL;  Surgeon: Shiloh Artis DO;  Location: [facility] MAIN OR;  Service: Orthopedics   • TONSILLECTOMY AND ADENOIDECTOMY         Family History   Adopted: Yes   Problem Relation Age of Onset   • Diabetes Mother    • Heart disease Mother    • Mental illness Mother    • Depression Mother    • Heart disease Brother    • Breast cancer Maternal Aunt    • Breast cancer Maternal Aunt    • Breast cancer Maternal Aunt    • Breast cancer Maternal Aunt    • Breast cancer Maternal Aunt    • No Known Problems Father    • No Known Problems Sister    • No Known Problems Daughter    • Breast cancer Maternal Grandmother    • No Known Problems Sister    • No Known Problems Sister    • No Known Problems Sister      I have reviewed and agree with the history as documented. E-Cigarette/Vaping   • E-Cigarette Use Never User      E-Cigarette/Vaping Substances   • Nicotine No    • THC No    • CBD No    • Flavoring No    • Other No    • Unknown No      Social History     Tobacco Use   • Smoking status: Never   • Smokeless tobacco: Never   Vaping Use   • Vaping Use: Never used   Substance Use Topics   • Alcohol use: Yes     Alcohol/week: 1.0 standard drink of alcohol     Types: 1 Glasses of wine per week     Comment: socially   • Drug use: Not Currently     Types: Marijuana     Comment: In her youth       Review of Systems   Constitutional: Negative. HENT: Negative. Eyes: Negative. Respiratory: Negative. Cardiovascular: Negative. Gastrointestinal: Negative. Endocrine: Negative. Genitourinary: Negative. Musculoskeletal: Negative. Skin: Negative. Allergic/Immunologic: Negative. Neurological: Positive for headaches. Negative for dizziness. Hematological: Negative. Psychiatric/Behavioral: Negative. Physical Exam  Physical Exam  Vitals and nursing note reviewed. Constitutional:       Appearance: Normal appearance. She is normal weight. HENT:      Head: Normocephalic and atraumatic. Right Ear: External ear normal.      Left Ear: External ear normal.      Nose: Nose normal.      Mouth/Throat:      Mouth: Mucous membranes are moist.      Pharynx: Oropharynx is clear. Eyes:      Extraocular Movements: Extraocular movements intact. Conjunctiva/sclera: Conjunctivae normal.      Pupils: Pupils are equal, round, and reactive to light. Cardiovascular:      Rate and Rhythm: Normal rate and regular rhythm. Pulses: Normal pulses.       Heart sounds: Normal heart sounds. Pulmonary:      Effort: Pulmonary effort is normal.      Breath sounds: Normal breath sounds. Abdominal:      General: Abdomen is flat. Bowel sounds are normal.      Palpations: Abdomen is soft. Musculoskeletal:         General: Swelling and tenderness present. Hands:       Cervical back: Normal range of motion. Skin:     General: Skin is warm. Neurological:      Mental Status: She is alert. Vital Signs  ED Triage Vitals [07/18/23 1602]   Temperature Pulse Resp Blood Pressure SpO2   98.3 °F (36.8 °C) 86 -- (!) 186/78 98 %      Temp Source Heart Rate Source Patient Position - Orthostatic VS BP Location FiO2 (%)   Tympanic Monitor Sitting Left arm --      Pain Score       3           Vitals:    07/18/23 1602   BP: (!) 186/78   Pulse: 86   Patient Position - Orthostatic VS: Sitting         Visual Acuity      ED Medications  Medications - No data to display    Diagnostic Studies  Results Reviewed     None                 CT spine cervical without contrast   Final Result by Shania Espinoza MD (07/18 1724)      No cervical spine fracture or traumatic malalignment. Workstation performed: [id number]         CT head without contrast   Final Result by Shania Espinoza MD (07/18 1726)      No acute intracranial abnormality. Workstation performed: [id number]                    Procedures  Procedures         ED Course  ED Course as of 07/18/23 2020 Tue Jul 18, 2023   1640 Patient seen and evaluated, orders placed, mechanical fall, will have the reading room reviewed the patient's x-ray results because patient has pain over the anatomic snuffbox of the left side, CT of the head and cervical spine are needed secondary to fall with head strike not on blood thinners. Medical Decision Making  - Patient is able to range neck to greater than 45 degrees to LEFT and RIGHT.  Patient is able to touch chin to chest and hyper-extend without eliciting pain. Patient has no midline tenderness.  5/5. No numbness/tingling in the arms. 2+ radials. No carotid bruit. Palpable carotid pulses that are equal.     X-rays officially read as negative the left wrist, advised patient need to follow-up with orthopedics, and referral made on the patient's behalf which was not made at urgent care, CT of the head and cervical spine negative, patient stable for discharge    Portions of the record may have been created with voice recognition software. Occasional wrong word or "sound a like" substitutions may have occurred due to the inherent limitations of voice recognition software. Read the chart carefully and recognize, using context, where substitutions have occurred. Counseling: I had a detailed discussion with the patient and/or guardian regarding: the historical points, exam findings, and any diagnostic results supporting the discharge diagnosis, lab results, radiology results, discharge instructions reviewed with patient and/or family/caregiver and understanding was verbalized. Instructions given to return to the emergency department if symptoms worsen or persist, or if there are any questions or concerns that arise at home.     Amount and/or Complexity of Data Reviewed  Radiology: ordered. Disposition  Final diagnoses:   Fall, initial encounter   Sprain and strain of left wrist   Closed head injury, initial encounter     Time reflects when diagnosis was documented in both MDM as applicable and the Disposition within this note     Time User Action Codes Description Comment    7/18/2023  5:28 PM Aden Tam Add [L22. EDTP] Fall, initial encounter     7/18/2023  5:29 PM Aden Tam Add [K56.388L,  D18.924F] Sprain and strain of left wrist     7/18/2023  5:29 PM Aden Tam Add [S09.90XA] Closed head injury, initial encounter       ED Disposition     ED Disposition   Discharge    Condition   Stable Date/Time   Tue Jul 18, 2023  5:28 PM    Comment   Agustin Kimbrough discharge to home/self care. Follow-up Information     Follow up With Specialties Details Why [facility],  Orthopedic Surgery   [address] [place] [address] Avenue  [phone]            Discharge Medication List as of 7/18/2023  5:36 PM      CONTINUE these medications which have NOT CHANGED    Details   acetaminophen (TYLENOL) 650 mg CR tablet Take 1 tablet (650 mg total) by mouth every 8 (eight) hours as needed for mild pain, Starting Mon 5/24/2021, No Print      albuterol (2.5 mg/3 mL) 0.083 % nebulizer solution Take 2.5 mg by nebulization every 6 (six) hours as needed for wheezing, Historical Med      albuterol (PROAIR HFA) 90 mcg/act inhaler Inhale 2 puffs every 4 (four) hours as needed for wheezing, Starting Fri 10/5/2018, Normal      ascorbic acid (VITAMIN C) 500 MG tablet Take 1 tablet (500 mg total) by mouth daily, Starting Mon 10/31/2022, Normal      atorvastatin (LIPITOR) 20 mg tablet Take 1 tablet by mouth daily. , Normal      buPROPion (WELLBUTRIN XL) 300 mg 24 hr tablet Take 1 tablet (300 mg total) by mouth every morning, Starting Thu 5/13/2021, Normal      butalbital-acetaminophen-caffeine (FIORICET,ESGIC) -40 mg per tablet Take 1 tablet by mouth 3 (three) times a day as needed for headaches, Starting Tue 5/5/2020, Normal      cetirizine (ZyrTEC) 10 mg tablet Take 10 mg by mouth daily, Historical Med      docusate sodium (COLACE) 100 mg capsule Take 1 capsule (100 mg total) by mouth 2 (two) times a day, Starting Tue 11/22/2022, Until Mon 12/26/2022, No Print      donepezil (Aricept) 10 mg tablet Take 1 tablet (10 mg total) by mouth daily at bedtime, Starting Fri 5/26/2023, Normal      DULoxetine (CYMBALTA) 60 mg delayed release capsule Take 1 capsule (60 mg total) by mouth 2 (two) times a day, Starting Mon 4/10/2023, Normal      ergocalciferol (VITAMIN D2) 50,000 units Take 1 capsule (50,000 Units total) by mouth once a week, Starting Fri 2/28/2020, Normal      gabapentin (Neurontin) 300 mg capsule Take 1 capsule (300 mg total) by mouth 2 (two) times a day, Starting Wed 1/4/2023, Normal      lisinopril (ZESTRIL) 5 mg tablet Take 1 tablet (5 mg total) by mouth daily Do not start before November 23, 2022., Starting Wed 11/23/2022, No Print      loratadine (CLARITIN) 10 mg tablet Take 10 mg by mouth daily, Historical Med      Melatonin 10 MG TABS Take by mouth, Historical Med      Mirabegron ER 25 MG TB24 Take 25 mg by mouth in the morning, Starting Fri 6/30/2023, Normal      Multiple Vitamins-Minerals (multivitamin with minerals) tablet Take 1 tablet by mouth daily, Starting Mon 10/31/2022, Normal      Nutritional Supplements (BOOST BREEZE PO) Take by mouth daily, Starting Wed 9/6/2017, Historical Med      oxyCODONE (OxyCONTIN) 10 mg 12 hr tablet Take 10 mg by mouth as needed, Historical Med      pantoprazole (PROTONIX) 40 mg tablet Take 1 tablet (40 mg total) by mouth 2 (two) times a day before meals, Starting Wed 12/28/2022, Until Fri 1/27/2023, Normal      valACYclovir (VALTREX) 1,000 mg tablet Take 1 tablet (1,000 mg total) by mouth every 8 (eight) hours for 6 days, Starting Wed 12/28/2022, Until Tue 1/3/2023, Normal                 PDMP Review       Value Time User    PDMP Reviewed  Yes 12/25/2022 12:06 AM Kayli Pelletier PA-C          ED Provider  Electronically Signed by           Hong Aldridge III,   07/18/23 2020

## 2023-07-20 ENCOUNTER — VBI (OUTPATIENT)
Dept: INTERNAL MEDICINE CLINIC | Facility: CLINIC | Age: 73
End: 2023-07-20

## 2023-07-20 NOTE — TELEPHONE ENCOUNTER
07/20/23 1:53 PM    Patient contacted post ED visit, [facility] department spoke with patient/caregiver and outreach was successful. Thank you.   Moshe Shi MA  [facility]

## 2023-07-31 ENCOUNTER — OFFICE VISIT (OUTPATIENT)
Dept: OBGYN CLINIC | Facility: CLINIC | Age: 73
End: 2023-07-31
Payer: COMMERCIAL

## 2023-07-31 ENCOUNTER — APPOINTMENT (OUTPATIENT)
Dept: RADIOLOGY | Facility: CLINIC | Age: 73
End: 2023-07-31
Payer: COMMERCIAL

## 2023-07-31 ENCOUNTER — HOSPITAL ENCOUNTER (OUTPATIENT)
Dept: INFUSION CENTER | Facility: HOSPITAL | Age: 73
Discharge: HOME | End: 2023-07-31
Attending: INTERNAL MEDICINE
Payer: COMMERCIAL

## 2023-07-31 VITALS
BODY MASS INDEX: 40.26 KG/M2 | SYSTOLIC BLOOD PRESSURE: 141 MMHG | DIASTOLIC BLOOD PRESSURE: 92 MMHG | HEART RATE: 105 BPM | HEIGHT: 56 IN | WEIGHT: 179 LBS

## 2023-07-31 DIAGNOSIS — S52.592A OTHER CLOSED FRACTURE OF DISTAL END OF LEFT RADIUS, INITIAL ENCOUNTER: ICD-10-CM

## 2023-07-31 DIAGNOSIS — M81.0 AGE-RELATED OSTEOPOROSIS WITHOUT CURRENT PATHOLOGICAL FRACTURE: Primary | ICD-10-CM

## 2023-07-31 DIAGNOSIS — S69.92XA WRIST INJURY, LEFT, INITIAL ENCOUNTER: Primary | ICD-10-CM
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
## 2023-07-31 DIAGNOSIS — S69.92XA WRIST INJURY, LEFT, INITIAL ENCOUNTER: ICD-10-CM

## 2023-07-31 PROCEDURE — 73110 X-RAY EXAM OF WRIST: CPT

## 2023-07-31 PROCEDURE — 1160F RVW MEDS BY RX/DR IN RCRD: CPT | Performed by: FAMILY MEDICINE

## 2023-07-31 PROCEDURE — 1159F MED LIST DOCD IN RCRD: CPT | Performed by: FAMILY MEDICINE

## 2023-07-31 PROCEDURE — 96372 THER/PROPH/DIAG INJ SC/IM: CPT

## 2023-07-31 PROCEDURE — 99214 OFFICE O/P EST MOD 30 MIN: CPT | Performed by: FAMILY MEDICINE

## 2023-07-31 RX ADMIN — DENOSUMAB 60 MG: 60 INJECTION SUBCUTANEOUS at 12:13

## 2023-07-31 NOTE — PATIENT INSTRUCTIONS
F/u after CT  CT L wrist-  L wrist pain/injury/fall/XR= possible distal radius fx with avulsion fx of radial styloid  Icing/heat/OTC pain meds as needed. Continue wearing wrist brace.

## 2023-07-31 NOTE — PROGRESS NOTES
[facility] [address] [id number] [phone]      Assessment:  1. Wrist injury, left, initial encounter  -     Ambulatory Referral to Orthopedic Surgery  -     XR wrist 3+ vw left; Future; Expected date: 07/31/2023  -     CT upper extremity wo contrast left; Future; Expected date: 07/31/2023  -     DXA bone density spine hip and pelvis; Future; Expected date: 07/31/2023    2. Fall, initial encounter  -     CT upper extremity wo contrast left; Future; Expected date: 07/31/2023  -     DXA bone density spine hip and pelvis; Future; Expected date: 07/31/2023    3. Other closed fracture of distal end of left radius, initial encounter  -     CT upper extremity wo contrast left; Future; Expected date: 07/31/2023  -     DXA bone density spine hip and pelvis; Future; Expected date: 07/31/2023        Plan:  Patient Instructions   F/u after CT  CT L wrist-  L wrist pain/injury/fall/XR= possible distal radius fx with avulsion fx of radial styloid  Icing/heat/OTC pain meds as needed. Continue wearing wrist brace. No follow-ups on file. Chief Complaint:  Chief Complaint   Patient presents with   • Left Wrist - Pain       Subjective:   HPI    Patient ID: Juvenal Neighbor is a 72 y.o. female     LEFT WRIST     INDICATION:   S63.502A: Unspecified sprain of left wrist, initial encounter. Injury status post fall with medial pain     COMPARISON:  None     VIEWS:  XR WRIST 3+ VW LEFT        FINDINGS:     There is no acute fracture or dislocation.     Severe degenerative changes of the [facility] joint is identified. More moderate changes of the scaphoid/greater multangular joint are identified.     No lytic or blastic osseous lesion.     Soft tissues are unremarkable.     IMPRESSION:     No acute osseous abnormality.     Degenerative changes.     If symptoms persist, a follow up exam is suggested in 7-14 days.     Review of Systems Constitutional: Negative for fatigue and fever. Respiratory: Negative for shortness of breath. Cardiovascular: Negative for chest pain. Gastrointestinal: Negative for abdominal pain and nausea. Genitourinary: Negative for dysuria. Musculoskeletal: Positive for arthralgias. Skin: Negative for rash and wound. Neurological: Negative for weakness and headaches. Objective:  Vitals:  /92 (BP Location: Left arm, Patient Position: Sitting, Cuff Size: Standard)   Pulse 105   Ht 4' 8" (1.422 m)   Wt 81.2 kg (179 lb) Comment: verbal from patient  LMP  (LMP Unknown)   BMI 40.13 kg/m²     The following portions of the patient's history were reviewed and updated as appropriate: allergies, current medications, past family history, past medical history, past social history, past surgical history, and problem list.    Physical exam:  Physical Exam  Ortho Exam    I have personally reviewed pertinent films in PACS and my interpretation is XR-  L wrist- distal radius- possible nondisplaced fx, avulsion fx of radial styloid. Lindsay Payne MD

## 2023-07-31 NOTE — PROGRESS NOTES
prolia given without incident. AVS given next appointment made.  Patient left unit instable condition

## 2023-08-04 ENCOUNTER — HOSPITAL ENCOUNTER (OUTPATIENT)
Dept: CT IMAGING | Facility: HOSPITAL | Age: 73
End: 2023-08-04
Attending: FAMILY MEDICINE
Payer: COMMERCIAL

## 2023-08-04 DIAGNOSIS — S52.592A OTHER CLOSED FRACTURE OF DISTAL END OF LEFT RADIUS, INITIAL ENCOUNTER: ICD-10-CM

## 2023-08-04 DIAGNOSIS — S69.92XA WRIST INJURY, LEFT, INITIAL ENCOUNTER: ICD-10-CM
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
## 2023-08-04 PROCEDURE — G1004 CDSM NDSC: HCPCS

## 2023-08-04 PROCEDURE — 73200 CT UPPER EXTREMITY W/O DYE: CPT

## 2023-08-08 DIAGNOSIS — R41.89 COGNITIVE DECLINE: ICD-10-CM

## 2023-08-08 DIAGNOSIS — R25.1 TREMOR: ICD-10-CM

## 2023-08-08 DIAGNOSIS — R26.89 BALANCE DISORDER: ICD-10-CM

## 2023-08-08 DIAGNOSIS — E78.5 HYPERLIPIDEMIA, UNSPECIFIED HYPERLIPIDEMIA TYPE: ICD-10-CM

## 2023-08-08 RX ORDER — ATORVASTATIN CALCIUM 20 MG/1
TABLET, FILM COATED ORAL
Qty: 30 TABLET | Refills: 2 | Status: SHIPPED | OUTPATIENT
Start: 2023-08-08

## 2023-08-08 RX ORDER — DONEPEZIL HYDROCHLORIDE 10 MG/1
10 TABLET, FILM COATED ORAL
Qty: 30 TABLET | Refills: 1 | Status: SHIPPED | OUTPATIENT
Start: 2023-08-08

## 2023-08-10 ENCOUNTER — TELEPHONE (OUTPATIENT)
Age: 73
End: 2023-08-10

## 2023-08-11 ENCOUNTER — OFFICE VISIT (OUTPATIENT)
Dept: OBGYN CLINIC | Facility: CLINIC | Age: 73
End: 2023-08-11
Payer: COMMERCIAL

## 2023-08-11 VITALS
SYSTOLIC BLOOD PRESSURE: 131 MMHG | HEIGHT: 56 IN | DIASTOLIC BLOOD PRESSURE: 77 MMHG | TEMPERATURE: 97.8 F | HEART RATE: 109 BPM | WEIGHT: 178.4 LBS | BODY MASS INDEX: 40.13 KG/M2

## 2023-08-11 DIAGNOSIS — S52.515A CLOSED NONDISPLACED FRACTURE OF STYLOID PROCESS OF LEFT RADIUS, INITIAL ENCOUNTER: Primary | ICD-10-CM

## 2023-08-11 DIAGNOSIS — S69.92XD WRIST INJURY, LEFT, SUBSEQUENT ENCOUNTER: ICD-10-CM

## 2023-08-11 PROCEDURE — 99214 OFFICE O/P EST MOD 30 MIN: CPT | Performed by: FAMILY MEDICINE

## 2023-08-11 NOTE — PATIENT INSTRUCTIONS
F/u 2 wks  Continue wearing wrist brace at all times- may take off for bathing. Icing/heat/OTC pain meds as needed.

## 2023-08-11 NOTE — PROGRESS NOTES
[facility] [address] [id number] [phone]      Assessment:  1. Closed nondisplaced fracture of styloid process of left radius, initial encounter    2. Wrist injury, left, subsequent encounter        Plan:  Patient Instructions   F/u 2 wks  Continue wearing wrist brace at all times- may take off for bathing. Icing/heat/OTC pain meds as needed. Return in about 2 weeks (around 8/25/2023) for Recheck. Chief Complaint:  Chief Complaint   Patient presents with   • Left Wrist - Follow-up     Follow up to CT scan       Subjective:   HPI    Patient ID: Agustin Kimbrough is a 72 y.o. female   Here for f/u L wrist pain/CT  Wearing brace  Having less pain. Taking tylenol PRN  She did fall last night-  Took off brace. CT left wrist without IV contrast     INDICATION: S69. 92XA: Unspecified injury of left wrist, hand and finger(s), initial encounter  W19. XXXA: Unspecified fall, initial encounter  S52.592A: Other fractures of lower end of left radius, initial encounter for closed fracture.     COMPARISON: Left wrist radiographs 7/31/2023     TECHNIQUE: CT examination of the above was performed. This examination, like all CT scans performed in the [facility], was performed utilizing techniques to minimize radiation dose exposure, including the use of iterative reconstruction   and automated exposure control software. Multiplanar 2D reformatted images were created from the source data.     Rad dose  421.9 mGy-cm     FINDINGS:     OSSEOUS STRUCTURES:  No fracture, dislocation or destructive osseous lesion. Punctate 2 mm densities in the soft tissues adjacent to the distal aspect of the radial styloid process. There are degenerative changes of the first carpometacarpal joint.     VISUALIZED MUSCULATURE:  Unremarkable.     SOFT TISSUES:  Unremarkable.     OTHER PERTINENT FINDINGS:  None.     IMPRESSION:  1.  Punctate density adjacent to the tip of the radial styloid process suspicious for small avulsion fracture. 2. Degenerative changes of the first carpometacarpal joint.          Review of Systems   Constitutional: Negative for fatigue and fever. Respiratory: Negative for shortness of breath. Cardiovascular: Negative for chest pain. Gastrointestinal: Negative for abdominal pain and nausea. Genitourinary: Negative for dysuria. Musculoskeletal: Positive for arthralgias. Skin: Negative for rash and wound. Neurological: Negative for weakness and headaches. Objective:  Vitals:  /77 (BP Location: Left arm, Patient Position: Sitting, Cuff Size: Standard)   Pulse (!) 109   Temp 97.8 °F (36.6 °C) (Tympanic)   Ht 4' 8" (1.422 m)   Wt 80.9 kg (178 lb 6.4 oz)   LMP  (LMP Unknown)   BMI 40.00 kg/m²     The following portions of the patient's history were reviewed and updated as appropriate: allergies, current medications, past family history, past medical history, past social history, past surgical history, and problem list.    Physical exam:  Physical Exam  Constitutional:       Appearance: Normal appearance. She is normal weight. HENT:      Head: Normocephalic. Eyes:      Extraocular Movements: Extraocular movements intact. Pulmonary:      Effort: Pulmonary effort is normal.   Musculoskeletal:         General: Tenderness present. Cervical back: Normal range of motion. Comments: L wrist- distal radius TTP  NVI   Skin:     General: Skin is warm and dry. Neurological:      General: No focal deficit present. Mental Status: She is alert and oriented to person, place, and time. Mental status is at baseline. Psychiatric:         Mood and Affect: Mood normal.         Behavior: Behavior normal.         Thought Content:  Thought content normal.         Judgment: Judgment normal.       Ortho Exam    I have personally reviewed pertinent films in PACS and my interpretation is CT_ L wrist- puntate radial styloid avulsion fx.       Scooter Loew MD

## 2023-08-14 ENCOUNTER — HOSPITAL ENCOUNTER (OUTPATIENT)
Dept: RADIOLOGY | Facility: HOSPITAL | Age: 73
Discharge: HOME | End: 2023-08-14
Attending: PODIATRIST
Payer: COMMERCIAL

## 2023-08-14 DIAGNOSIS — M79.671 RIGHT FOOT PAIN: ICD-10-CM

## 2023-08-14 DIAGNOSIS — M19.279 OSTEOARTHRITIS OF MIDFOOT DUE TO INFLAMMATORY ARTHRITIS: ICD-10-CM

## 2023-08-14 DIAGNOSIS — M19.90 OSTEOARTHRITIS OF MIDFOOT DUE TO INFLAMMATORY ARTHRITIS: ICD-10-CM

## 2023-08-14 PROCEDURE — 77002 NEEDLE LOCALIZATION BY XRAY: CPT

## 2023-08-14 RX ORDER — LIDOCAINE HYDROCHLORIDE 10 MG/ML
4 INJECTION, SOLUTION EPIDURAL; INFILTRATION; INTRACAUDAL; PERINEURAL
Status: COMPLETED | OUTPATIENT
Start: 2023-08-14 | End: 2023-08-14

## 2023-08-14 RX ORDER — BETAMETHASONE SODIUM PHOSPHATE AND BETAMETHASONE ACETATE 3; 3 MG/ML; MG/ML
6 INJECTION, SUSPENSION INTRA-ARTICULAR; INTRALESIONAL; INTRAMUSCULAR; SOFT TISSUE ONCE
Status: DISCONTINUED | OUTPATIENT
Start: 2023-08-14 | End: 2023-08-18 | Stop reason: HOSPADM

## 2023-08-14 RX ORDER — BUPIVACAINE HYDROCHLORIDE 2.5 MG/ML
2 INJECTION, SOLUTION EPIDURAL; INFILTRATION; INTRACAUDAL
Status: COMPLETED | OUTPATIENT
Start: 2023-08-14 | End: 2023-08-14

## 2023-08-14 RX ORDER — BETAMETHASONE SODIUM PHOSPHATE AND BETAMETHASONE ACETATE 3; 3 MG/ML; MG/ML
6 INJECTION, SUSPENSION INTRA-ARTICULAR; INTRALESIONAL; INTRAMUSCULAR; SOFT TISSUE ONCE
Status: COMPLETED | OUTPATIENT
Start: 2023-08-14 | End: 2023-08-14

## 2023-08-14 RX ADMIN — BUPIVACAINE HYDROCHLORIDE 2 ML: 2.5 INJECTION, SOLUTION EPIDURAL; INFILTRATION; INTRACAUDAL at 14:51

## 2023-08-14 RX ADMIN — IOHEXOL 1 ML: 300 INJECTION, SOLUTION INTRAVENOUS at 14:51

## 2023-08-14 RX ADMIN — BETAMETHASONE SODIUM PHOSPHATE AND BETAMETHASONE ACETATE 2 MG: 3; 3 INJECTION, SUSPENSION INTRA-ARTICULAR; INTRALESIONAL; INTRAMUSCULAR; SOFT TISSUE at 14:50

## 2023-08-14 RX ADMIN — LIDOCAINE HYDROCHLORIDE 4 ML: 10 INJECTION, SOLUTION EPIDURAL; INFILTRATION; INTRACAUDAL; PERINEURAL at 14:51

## 2023-08-22 ENCOUNTER — HOSPITAL ENCOUNTER (OUTPATIENT)
Dept: BONE DENSITY | Facility: HOSPITAL | Age: 73
Discharge: HOME | End: 2023-08-22
Attending: FAMILY MEDICINE
Payer: COMMERCIAL

## 2023-08-22 VITALS — BODY MASS INDEX: 38.19 KG/M2 | HEIGHT: 57 IN | WEIGHT: 177 LBS

## 2023-08-22 DIAGNOSIS — S52.592A OTHER CLOSED FRACTURE OF DISTAL END OF LEFT RADIUS, INITIAL ENCOUNTER: ICD-10-CM
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
## 2023-08-22 DIAGNOSIS — S69.92XA WRIST INJURY, LEFT, INITIAL ENCOUNTER: ICD-10-CM

## 2023-08-22 PROCEDURE — 77080 DXA BONE DENSITY AXIAL: CPT

## 2023-08-25 ENCOUNTER — OFFICE VISIT (OUTPATIENT)
Dept: OBGYN CLINIC | Facility: CLINIC | Age: 73
End: 2023-08-25
Payer: COMMERCIAL

## 2023-08-25 VITALS
DIASTOLIC BLOOD PRESSURE: 79 MMHG | WEIGHT: 177 LBS | HEIGHT: 57 IN | HEART RATE: 78 BPM | BODY MASS INDEX: 38.19 KG/M2 | SYSTOLIC BLOOD PRESSURE: 130 MMHG

## 2023-08-25 DIAGNOSIS — S52.515D CLOSED NONDISPLACED FRACTURE OF STYLOID PROCESS OF LEFT RADIUS WITH ROUTINE HEALING, SUBSEQUENT ENCOUNTER: Primary | ICD-10-CM

## 2023-08-25 PROCEDURE — 99213 OFFICE O/P EST LOW 20 MIN: CPT | Performed by: FAMILY MEDICINE

## 2023-08-25 NOTE — PATIENT INSTRUCTIONS
F/u 2 wks  XR-  L wrist 2 wks  Continue wearing wrist brace  Icing/heat/OTC pain meds as needed. Infliximab Counseling:  I discussed with the patient the risks of infliximab including but not limited to myelosuppression, immunosuppression, autoimmune hepatitis, demyelinating diseases, lymphoma, and serious infections.  The patient understands that monitoring is required including a PPD at baseline and must alert us or the primary physician if symptoms of infection or other concerning signs are noted.

## 2023-08-25 NOTE — PROGRESS NOTES
[facility] [address] [id number] [phone]      Assessment:  1. Closed nondisplaced fracture of styloid process of left radius with routine healing, subsequent encounter        Plan:  Patient Instructions   F/u 2 wks  XR-  L wrist 2 wks  Continue wearing wrist brace  Icing/heat/OTC pain meds as needed. Return in about 2 weeks (around 9/8/2023) for Recheck. Chief Complaint:  Chief Complaint   Patient presents with   • Left Wrist - Follow-up       Subjective:   HPI    Patient ID: Chintan Hurtado is a 72 y.o. female     Here for f/u L wrist pain/distal radius fx  Wearing brace  Having less pain. No pain meds      Review of Systems   Constitutional: Negative for fatigue and fever. Respiratory: Negative for shortness of breath. Cardiovascular: Negative for chest pain. Gastrointestinal: Negative for abdominal pain and nausea. Genitourinary: Negative for dysuria. Musculoskeletal: Positive for arthralgias. Skin: Negative for rash and wound. Neurological: Negative for weakness and headaches. Objective:  Vitals:  /79   Pulse 78   Ht 4' 9" (1.448 m)   Wt 80.3 kg (177 lb)   LMP  (LMP Unknown)   BMI 38.30 kg/m²     The following portions of the patient's history were reviewed and updated as appropriate: allergies, current medications, past family history, past medical history, past social history, past surgical history, and problem list.    Physical exam:  Physical Exam  Constitutional:       Appearance: Normal appearance. She is normal weight. HENT:      Head: Normocephalic. Eyes:      Extraocular Movements: Extraocular movements intact. Pulmonary:      Effort: Pulmonary effort is normal.   Musculoskeletal:         General: Tenderness present. Cervical back: Normal range of motion. Comments: L distal radius-  TTP  NVI   Skin:     General: Skin is warm and dry.    Neurological: General: No focal deficit present. Mental Status: She is alert and oriented to person, place, and time. Mental status is at baseline. Psychiatric:         Mood and Affect: Mood normal.         Behavior: Behavior normal.         Thought Content:  Thought content normal.         Judgment: Judgment normal.       Ortho Exam          Jose Camacho MD

## 2023-08-28 ENCOUNTER — RA CDI HCC (OUTPATIENT)
Dept: OTHER | Facility: HOSPITAL | Age: 73
End: 2023-08-28

## 2023-08-28 NOTE — PROGRESS NOTES
[facility] [address] [facility] coding opportunities     I50.9, E66.01     Chart Reviewed number of suggestions sent to Provider: 2     Patients Insurance     Medicare Insurance: [employer]

## 2023-09-07 ENCOUNTER — OFFICE VISIT (OUTPATIENT)
Dept: PODIATRY | Facility: CLINIC | Age: 73
End: 2023-09-07
Payer: COMMERCIAL

## 2023-09-07 VITALS
WEIGHT: 177.6 LBS | HEIGHT: 57 IN | SYSTOLIC BLOOD PRESSURE: 134 MMHG | BODY MASS INDEX: 38.32 KG/M2 | DIASTOLIC BLOOD PRESSURE: 88 MMHG | HEART RATE: 102 BPM

## 2023-09-07 DIAGNOSIS — M79.671 RIGHT FOOT PAIN: ICD-10-CM

## 2023-09-07 DIAGNOSIS — M19.079 ARTHRITIS OF MIDFOOT: ICD-10-CM

## 2023-09-07 DIAGNOSIS — S92.901D CLOSED FRACTURE OF RIGHT FOOT WITH ROUTINE HEALING, SUBSEQUENT ENCOUNTER: Primary | ICD-10-CM

## 2023-09-07 DIAGNOSIS — M19.90 OSTEOARTHRITIS OF MIDFOOT DUE TO INFLAMMATORY ARTHRITIS: ICD-10-CM

## 2023-09-07 DIAGNOSIS — M79.672 LEFT FOOT PAIN: ICD-10-CM

## 2023-09-07 DIAGNOSIS — M19.279 OSTEOARTHRITIS OF MIDFOOT DUE TO INFLAMMATORY ARTHRITIS: ICD-10-CM

## 2023-09-07 PROCEDURE — 99214 OFFICE O/P EST MOD 30 MIN: CPT | Performed by: PODIATRIST

## 2023-09-07 NOTE — PROGRESS NOTES
Assessment/Plan:    No problem-specific Assessment & Plan notes found for this encounter. 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    Right foot pain    Left foot pain  -     FL guided needle plac bx/asp/inj; Future    Arthritis of midfoot  -     FL guided needle plac bx/asp/inj; Future      -She is very happy with the pain relief from the right foot status post TMT injections 1 through 5, due to continued pain and issues on the left foot we will send her for TMT injections on the left for 1 through 5  - We discussed how to elongate the relief from these injections at length, I advised use of carbon fiber footplate and both of her shoes with substantial and supportive shoe gear at all points in time throughout the day  - She is return in 3 months for repeat assessment of her pain, I have prescribed her diclofenac gel as well to attempt to control the inflammation across the TMT J's  - Radiology note reviewed  Subjective:      Patient ID: Chiquis Fletcher is a 72 y.o. female. Patient presents for evaluation management of her bilateral feet, she is complaining of continued and worsening pain in the left foot. She states that the pain has started to come back still on the right foot, she presents today ambulating in sandals. She does not wear shoes in the house and does not avoid going barefoot happy with the relief from the injection cannot take anti-inflammatories orally      The following portions of the patient's history were reviewed and updated as appropriate: allergies, current medications, past family history, past medical history, past social history, past surgical history and problem list.    Review of Systems   Constitutional: Negative for chills and fever. HENT: Negative for ear pain and sore throat. Eyes: Negative for pain and visual disturbance. Respiratory: Negative for cough and shortness of breath. Cardiovascular: Negative for chest pain and palpitations. Gastrointestinal: Negative for abdominal pain and vomiting. Genitourinary: Negative for dysuria and hematuria. Musculoskeletal: Negative for arthralgias and back pain. Skin: Negative for color change and rash. Neurological: Negative for seizures and syncope. All other systems reviewed and are negative. Objective:      /88 (BP Location: Left arm, Patient Position: Sitting, Cuff Size: Standard)   Pulse 102   Ht 4' 9" (1.448 m)   Wt 80.6 kg (177 lb 9.6 oz)   LMP  (LMP Unknown)   BMI 38.43 kg/m²          Physical Exam  Vitals reviewed. Constitutional:       Appearance: Normal appearance. HENT:      Head: Normocephalic and atraumatic. Nose: Nose normal.      Mouth/Throat:      Mouth: Mucous membranes are moist.      Pharynx: Oropharynx is clear. Eyes:      Pupils: Pupils are equal, round, and reactive to light. Pulmonary:      Effort: Pulmonary effort is normal.   Musculoskeletal:         General: Swelling and tenderness present. Comments: Improved warmth to the right foot, minimal pain with ROM. TTP with ROMof the L 1,2,3,4,5 TMTJs   Skin:     Capillary Refill: Capillary refill takes less than 2 seconds. Neurological:      General: No focal deficit present. Mental Status: She is alert and oriented to person, place, and time. Mental status is at baseline.

## 2023-09-11 ENCOUNTER — OFFICE VISIT (OUTPATIENT)
Dept: UROLOGY | Facility: CLINIC | Age: 73
End: 2023-09-11
Payer: COMMERCIAL

## 2023-09-11 VITALS
BODY MASS INDEX: 38.09 KG/M2 | HEART RATE: 68 BPM | DIASTOLIC BLOOD PRESSURE: 78 MMHG | WEIGHT: 176 LBS | SYSTOLIC BLOOD PRESSURE: 132 MMHG

## 2023-09-11 DIAGNOSIS — N39.46 MIXED STRESS AND URGE URINARY INCONTINENCE: ICD-10-CM

## 2023-09-11 DIAGNOSIS — N32.81 OAB (OVERACTIVE BLADDER): Primary | ICD-10-CM

## 2023-09-11 LAB — POST-VOID RESIDUAL VOLUME, ML POC: 26 ML

## 2023-09-11 PROCEDURE — 51798 US URINE CAPACITY MEASURE: CPT

## 2023-09-11 PROCEDURE — 99213 OFFICE O/P EST LOW 20 MIN: CPT

## 2023-09-11 NOTE — PROGRESS NOTES
9/11/2023    No chief complaint on file. Assessment and Plan    72 y.o. female manage by    1. Mixed urinary incontinence with OAB  · Doing well since starting Myrbetriq 25 mg daily. She does experience urinary frequency and nocturia but has seen a noticeable reduction in her incontinence any experiences this rarely. She is satisfied with her current symptoms and does not wish to make any changes today. · PVR 26 mL  · Continue Myrbetriq 25 mg daily  · Follow-up January 2024. Subjective:    She presents today reporting overall satisfaction since starting Myrbetriq 25 mg daily in January. She still experiencing urine frequency during the daytime and nocturia several times per night. She is however no longer experiencing urinary incontinence. She is comfortable with her current symptoms and management. Denies adverse side effects from Myrbetriq and BP are normal.    She informed me that her brother was recently diagnosed with end-stage renal disease and has been on dialysis. They are considering renal transplant for him. She asked if she would be a potential candidate to donate a kidney to her brother. I informed her that the decision would ultimately be up to her brothers transplant team. She does have good renal function for her age, but I would not recommend kidney donation given her age and high risk surgical candidate. History of Present Illness  Deyanira Roth is a 72 y.o. female here for follow-up evaluation of mixed stress and urge urinary incontinence. Last seen by JERARDO Valenzuela via telemedicine visit on 1/09/2023. She has history of mixed urinary incontinence as nephrolithiasis. She wears depends. She has some urgency and urge incontinence.  She also has a several year history of urge incontinence.  She reports hysterectomy for fibroids at age 47 with some type of bladder surgery performed at the time as "turns out my uterus and bladder were stuck together and he made my bladder new during the surgery but it's only the size of 1 oz." She's had urinary urgency for about 12 years, and incontinence for the past few- on the way to the toilet. She wears 2 briefs per day, 1 overnight. She's not had hematuria or UTIs. She drinks 30 ounces of water a day; she tried to increase this but felt it was too much, decreased tea and juice intake. Rare soda and no coffee. She was tried on oxybutynin in the past, but this was discontinued due to constipation and switched to Myrbetriq 25 mg during her telemedicine visit in January. She had US of Kidney and bladder on 6/23/2023 which showed no evidence of urolithiasis or obstructive uropathy. She does have small stable simple appearing bilateral renal cyst.              Review of Systems   Constitutional: Negative for chills and fever. HENT: Negative for ear pain and sore throat. Eyes: Negative for pain and visual disturbance. Respiratory: Negative for cough and shortness of breath. Cardiovascular: Negative for chest pain and palpitations. Gastrointestinal: Negative for abdominal pain, diarrhea, nausea and vomiting. Genitourinary: Positive for frequency and urgency. Negative for dysuria and hematuria. Musculoskeletal: Negative for arthralgias and back pain. Skin: Negative for color change and rash. Neurological: Negative for dizziness, seizures, syncope and weakness. All other systems reviewed and are negative. Vitals  Vitals:    09/11/23 1339   BP: 132/78   Pulse: 68   Weight: 79.8 kg (176 lb)       Physical Exam  Vitals reviewed. Constitutional:       General: She is not in acute distress. Appearance: Normal appearance. She is normal weight. She is not ill-appearing or toxic-appearing. HENT:      Head: Normocephalic and atraumatic. Nose: Nose normal.   Eyes:      General: No scleral icterus. Conjunctiva/sclera: Conjunctivae normal.   Cardiovascular:      Rate and Rhythm: Normal rate.       Pulses: Normal pulses. Pulmonary:      Effort: Pulmonary effort is normal. No respiratory distress. Abdominal:      General: Abdomen is flat. Palpations: Abdomen is soft. Tenderness: There is no abdominal tenderness. There is no right CVA tenderness or left CVA tenderness. Hernia: No hernia is present. Musculoskeletal:         General: Normal range of motion. Cervical back: Normal range of motion. Skin:     General: Skin is warm and dry. Neurological:      General: No focal deficit present. Mental Status: She is alert and oriented to person, place, and time. Mental status is at baseline. Psychiatric:         Mood and Affect: Mood normal.         Behavior: Behavior normal.         Thought Content:  Thought content normal.         Judgment: Judgment normal.         Past History  Past Medical History:   Diagnosis Date   • Anxiety    • Arthritis    • Asthma    • Breast cancer ([address])     rt mastectomy 2005   • Cancer [facility])     breast right   • Cataract    • CHF (congestive heart failure) (HCC)    • Chronic headaches    • Concussion     At age 7; Chronic headaches since then   • Coronary artery disease    • Depression    • Dietary lactose intolerance    • GERD (gastroesophageal reflux disease)    • High cholesterol    • History of chemotherapy     2005 rt breast cancer   • History of transfusion    • Hypertension    • IBS (irritable bowel syndrome)    • Irritable bowel syndrome (IBS) 05/31/2016   • Kidney stone    • Lymphedema of right arm    • Obesity    • PFO (patent foramen ovale)    • PONV (postoperative nausea and vomiting)    • Post-menopausal    • Psychiatric disorder    • Renal disorder    • Shortness of breath    • Vitamin D deficiency      Social History     Socioeconomic History   • Marital status:      Spouse name: None   • Number of children: None   • Years of education: None   • Highest education level: None   Occupational History   • Occupation: Retired   Tobacco Use   • Smoking status: Never   • Smokeless tobacco: Never   Vaping Use   • Vaping Use: Never used   Substance and Sexual Activity   • Alcohol use: Yes     Alcohol/week: 1.0 standard drink of alcohol     Types: 1 Glasses of wine per week     Comment: socially   • Drug use: Not Currently     Types: Marijuana     Comment: In her youth   • Sexual activity: Not Currently   Other Topics Concern   • None   Social History Narrative    Always uses seat belt    Occasional caffeine consumption         Retired    Lives with adult daughter     Social Determinants of Health     Financial Resource Strain: [address]  (5/26/2023)    Overall Financial Resource Strain (CARDIA)    • Difficulty of Paying Living Expenses: Not very hard   Food Insecurity: No Food Insecurity (12/28/2022)    Hunger Vital Sign    • Worried About Running Out of Food in the Last Year: Never true    • Ran Out of Food in the Last Year: Never true   Transportation Needs: No Transportation Needs (5/26/2023)    PRAPARE - Transportation    • Lack of Transportation (Medical): No    • Lack of Transportation (Non-Medical):  No   Physical Activity: Not on file   Stress: Not on file   Social Connections: Not on file   Intimate Partner Violence: Not on file   Housing Stability: Low Risk  (12/28/2022)    Housing Stability Vital Sign    • Unable to Pay for Housing in the Last Year: No    • Number of Places Lived in the Last Year: 1    • Unstable Housing in the Last Year: No     Social History     Tobacco Use   Smoking Status Never   Smokeless Tobacco Never     Family History   Adopted: Yes   Problem Relation Age of Onset   • Diabetes Mother    • Heart disease Mother    • Mental illness Mother    • Depression Mother    • Heart disease Brother    • Breast cancer Maternal Aunt    • Breast cancer Maternal Aunt    • Breast cancer Maternal Aunt    • Breast cancer Maternal Aunt    • Breast cancer Maternal Aunt    • No Known Problems Father    • No Known Problems Sister    • No Known Problems Daughter    • Breast cancer Maternal Grandmother    • No Known Problems Sister    • No Known Problems Sister    • No Known Problems Sister        The following portions of the patient's history were reviewed and updated as appropriate allergies, current medications, past medical history, past social history, past surgical history and problem list    [id number]  RENAL ULTRASOUND     INDICATION:   N20.0: Calculus of kidney.     COMPARISON: Ultrasound 2022; CT 2022     TECHNIQUE:   Ultrasound of the retroperitoneum was performed with a curvilinear transducer utilizing volumetric sweeps and still imaging techniques.     FINDINGS:     KIDNEYS:  Symmetric and normal size. Right kidney:  10.0 x 5.5 x 4.7 cm. Volume 135.8 mL  Left kidney:  10.3 x 6.6 x 4.7 cm. Volume 166.0 mL     Right kidney  Normal echogenicity and contour. Simple cyst arising exophytically from the upper pole the right kidney measures 3.5 x 2.7 x 2.7 cm, unchanged from the prior examination. No hydronephrosis. No shadowing calculi. No perinephric fluid collections.     Left kidney  Normal echogenicity and contour. Simple appearing bilobed cyst in the lower pole the left kidney extending into the renal pelvis questionably parenchymal versus parapelvic, measuring 3.6 x 2.1 x 4.4 cm, unchanged from the prior examination. No hydronephrosis. No shadowing calculi. No perinephric fluid collections.     URETERS:  Nonvisualized.     BLADDER:  Normally distended. No focal thickening or mass lesions. Bilateral ureteral jets detected.           IMPRESSION:        1. Small, stable simple appearing bilateral renal cysts. Otherwise normal kidneys. 2. No urolithiasis or obstructive uropathy.             Results  Recent Results (from the past 1 hour(s))   POCT Measure PVR    Collection Time: 23  1:45 PM   Result Value Ref Range    POST-VOID RESIDUAL VOLUME, ML POC 26 mL   ]  No results found for: "PSA"  Lab Results Component Value Date    GLUCOSE 79 12/02/2015    CALCIUM 9.2 06/23/2023     12/02/2015    K 3.6 06/23/2023    CO2 28 06/23/2023     06/23/2023    BUN 12 06/23/2023    CREATININE 0.61 06/23/2023     Lab Results   Component Value Date    WBC 3.60 (L) 12/28/2022    HGB 13.5 12/28/2022    HCT 41.7 12/28/2022    MCV 93 12/28/2022     12/28/2022       Please Note:  Voice dictation software has been used to create this document. There may be inadvertent transcriptions errors.      Allison Head, JERARDO 09/11/23

## 2023-09-15 ENCOUNTER — APPOINTMENT (OUTPATIENT)
Dept: RADIOLOGY | Facility: CLINIC | Age: 73
End: 2023-09-15
Payer: COMMERCIAL

## 2023-09-15 ENCOUNTER — OFFICE VISIT (OUTPATIENT)
Dept: OBGYN CLINIC | Facility: CLINIC | Age: 73
End: 2023-09-15
Payer: COMMERCIAL

## 2023-09-15 VITALS
WEIGHT: 177.8 LBS | DIASTOLIC BLOOD PRESSURE: 88 MMHG | SYSTOLIC BLOOD PRESSURE: 145 MMHG | BODY MASS INDEX: 38.36 KG/M2 | HEART RATE: 114 BPM | HEIGHT: 57 IN | TEMPERATURE: 98.4 F

## 2023-09-15 DIAGNOSIS — S52.515D: ICD-10-CM

## 2023-09-15 DIAGNOSIS — S52.515D: Primary | ICD-10-CM

## 2023-09-15 PROCEDURE — 73110 X-RAY EXAM OF WRIST: CPT

## 2023-09-15 PROCEDURE — 99214 OFFICE O/P EST MOD 30 MIN: CPT | Performed by: FAMILY MEDICINE

## 2023-09-15 NOTE — PROGRESS NOTES
[facility] [address] [id number] [phone]      Assessment:  1. Closed traumatic nondisplaced fracture of styloid process of left radius with routine healing, subsequent encounter        Plan:  Patient Instructions   F/u as needed  Brace as needed. Return if symptoms worsen or fail to improve. Chief Complaint:  Chief Complaint   Patient presents with   • Left Wrist - Follow-up       Subjective:   HPI    Patient ID: Alyson Roche is a 72 y.o. female     Here for f/u L wrist pain/distal radius fx  Wearing brace  Having less pain. No pain at rest.  Mild pain with lifting heavy pot. No pain meds      Review of Systems   Constitutional: Negative for fatigue and fever. Respiratory: Negative for shortness of breath. Cardiovascular: Negative for chest pain. Gastrointestinal: Negative for abdominal pain and nausea. Genitourinary: Negative for dysuria. Musculoskeletal: Positive for arthralgias. Skin: Negative for rash and wound. Neurological: Negative for weakness and headaches. Objective:  Vitals:  /88 (BP Location: Left arm, Patient Position: Sitting, Cuff Size: Standard)   Pulse (!) 114   Temp 98.4 °F (36.9 °C) (Tympanic)   Ht 4' 9" (1.448 m)   Wt 80.6 kg (177 lb 12.8 oz)   LMP  (LMP Unknown)   BMI 38.48 kg/m²     The following portions of the patient's history were reviewed and updated as appropriate: allergies, current medications, past family history, past medical history, past social history, past surgical history, and problem list.    Physical exam:  Physical Exam  Constitutional:       Appearance: Normal appearance. She is normal weight. Eyes:      Extraocular Movements: Extraocular movements intact. Pulmonary:      Effort: Pulmonary effort is normal.   Musculoskeletal:         General: Tenderness present. Cervical back: Normal range of motion.       Comments: L distal radius mild TTP  FROM motor 5/5   Skin:     General: Skin is warm and dry. Neurological:      General: No focal deficit present. Mental Status: She is alert and oriented to person, place, and time. Mental status is at baseline. Psychiatric:         Mood and Affect: Mood normal.         Behavior: Behavior normal.         Thought Content: Thought content normal.         Judgment: Judgment normal.       Ortho Exam    I have personally reviewed pertinent films in PACS and my interpretation is XR-  L wrist-  nml study. no avulsion fx seen. Duong Haywood MD

## 2023-09-17 NOTE — PROGRESS NOTES
PT Evaluation     Today's date: 22  Patient name: Wendi Feldman  : 1950  MRN: [id number]  Referring provider: Zainab Farrell PA-C  Dx:   Encounter Diagnosis     ICD-10-CM    1. Lymphedema  I89.0                      Assessment  Assessment details: Patient presents to OPPT with s/s consistent with right UE lymphedema. PT interventions to include CDT (complete decongestive therapy) including MLD (manual lymphatic drainage) and compression using short stretch bandages as able. PT to further provide extensive patient education on self bandaging, self MLD techniques, and skin care. Patient will transition patient to long term maintenance with compression plan for both morning and evening wear once optimal decongestive and volume reduction of limb has been achieved. Thank you for this referral and the opportunity to participate in the care of this patient. Impairments: abnormal gait, abnormal or restricted ROM, abnormal movement, activity intolerance and lacks appropriate home exercise program  Other impairment: RUE and scapular and abdominal edema  Understanding of Dx/Px/POC: good   Prognosis: good  Prognosis details: Positive prognostic indicators include positive attitude toward recovery and good understanding of diagnosis and treatment plan options. Negative prognostic indicators include chronicity of symptoms.         Goals  STGs to be achieved in 2 - 4 weeks  Demonstrate at least 75% compliance with self MLD  Demonstrate at least 75% compliance with self compression  Demonstrate at least 75% compliance with self skin and nail inspection  Free from s/s of infection  Demonstrate understanding of importance of compliance with POC    LTGs to be achieved in 4 - 6 weeks  Demonstrate at least 100% compliance with self MLD  Demonstrate at least 100% compliance with self compression  Demonstrate at least 100% compliance with self skin and nail inspection  Free from s/s of infection  Demonstrate understanding of day/night compression wearing schedule  Obtain alternative compression to ensure independence with self management      Plan  Patient would benefit from: skilled physical therapy  Other planned modality interventions: Modalities prn for symptom management  Planned therapy interventions: manual therapy, neuromuscular re-education, therapeutic activities, therapeutic exercise, home exercise program and gait training  Frequency: 2x week  Duration in weeks: 6  Plan of Care beginning date: 2023  Plan of Care expiration date: 10/30/2023  Treatment plan discussed with: patient and PTA        Subjective Evaluation    History of Present Illness  Mechanism of injury: surgery  Mechanism of injury: 82 yo female with hx of R breast CA  And subsequent R mastectomy with removal of 25 lymph nodes, of which 12 were cancerous. Pt began with lymphedema following surgery. States she has a lymphedema pump at home which she has not been using . Wears a compression sleeve daily which she removes for sleep. Also has compression T-shirt and bra  Pt feels she has increased swelling in her RUE which prompted her to return for lymphedema management. Pt notes she has pins and needles in fingertips and that is a sign of increased swelling. Pt states she had a R TKR by Dr Alie Nelson on 10/16/22. Recurrent probem    Quality of life: fair    Patient Goals  Patient goals for therapy: decreased edema, improved balance, increased motion, increased strength and independence with ADLs/IADLs    Pain  Current pain ratin  At best pain ratin  At worst pain ratin  Quality: dull ache, tight and needle-like  Relieving factors: ice, rest and relaxation  Exacerbated by: humidity, heat. Progression: worsening    Social Support  Steps to enter house: yes  Stairs in house: yes   Lives in: multiple-level home  Lives with: adult daughter.     Employment status: not working  Hand dominance: right  Exercise history: 3x per week at [facility] for balance and ROM ex    Treatments  Previous treatment: physical therapy  Current treatment: physical therapy        Objective Lymphedema Evaluation    Medical/MLD Precautions/Contraindications:  _N__  Cardiac/CHF/Cardiac Edema/Cardiac arrhythmia  _Y__  Pulmonary--asthma  N___  Kidney  _N__  Liver  _N__  Current Fever/Infection  _N__  Current/Recent Wounds  _N__  Current/Recent Treatments/Interventions/Port Access/PICC Line/[place] filter  _N__  Current/Recent/History of DVT or Clotting issues/Pelvic DVT  _Y__  Age > 60 y/o  _Y_  HTN  _Y__  Malignancy--hx of R breast CA  N___  Hyper/hypothyroidism  _N__  AAA  _Y__  GI disorder (Crohn's Diverticulitis, IBD)---diverticulitis, IBS  _N_  Liver disease  N___  Recent abdominal surgery  N___  Pregnancy  N___  Abdominal pain    Bandaging Precautions/Contraindications:  _N__  Diabetes  _Y__  Neuropathy  _N__  Vascular problems/insufficiency  _N__  Arterial Disease    Other:  Y___  Latex allergies  _N__  Pacemaker    CA treatment:  N__  Chemo-- hx of  _N__  XRT-- hx of      ROM Considerations:  end range R shld flex and abd limitations    Strength Considerations: RUE weaker than L, but functional      Gait Considerations: Pt amb with SPC with steady waddle gait    Skin Considerations/Scars: No abnormal skin issues noted upon eval except firmness in R subaxilla area with tight cord    Patient presents with skin inspection as follows:   No skin issues at present time  Hemosiderin staining/skin discoloration  Finger/Toe Nails  Open Wounds/Size/Color  S/S infection  Firm/Sponge/Hard  Peau D' Orange/Papillomas  Lymphorrhea/Weeping  Skin mobility  Skin temperature  Fungal infection  Lymph fistula  Lipodermatosclerosis    Girth:  Upcoming volumetrics                 Precautions: hx of R breast CA with mastectomy, psych disorder, renal disorder, recent R foot fx with removal of boot 10/1/22, fall risk, imbalance    Re-eval Date: 11/3/22    Date   9/18       Visit Count 1       FOTO        Pain In  see eval       Pain Out  see eval               Manuals        MLD                                Neuro Re-Ed                                                                Ther Ex                                                                        Ther Activity                        Gait Training                        Modalities

## 2023-09-18 ENCOUNTER — EVALUATION (OUTPATIENT)
Dept: PHYSICAL THERAPY | Facility: CLINIC | Age: 73
End: 2023-09-18
Payer: COMMERCIAL

## 2023-09-18 DIAGNOSIS — I89.0 LYMPHEDEMA: Primary | ICD-10-CM

## 2023-09-18 PROCEDURE — 97163 PT EVAL HIGH COMPLEX 45 MIN: CPT

## 2023-09-25 ENCOUNTER — APPOINTMENT (OUTPATIENT)
Dept: PHYSICAL THERAPY | Facility: CLINIC | Age: 73
End: 2023-09-25
Payer: COMMERCIAL

## 2023-09-25 NOTE — PROGRESS NOTES
Daily Note     Today's date: 2023  Patient name: Ariel Kingsley  : 1950  MRN: [id number]  Referring provider: Eleanor Cooper PA-C  Dx: No diagnosis found. Subjective: ***      Objective: See treatment diary below      Assessment: Tolerated treatment {Tolerated treatment :}.  Patient {assessment:}      Plan: {PLAN:4536840249}     Precautions: hx of R breast CA with mastectomy, psych disorder, renal disorder, recent R foot fx with removal of boot 10/1/22, fall risk, imbalance    Re-eval Date:  10/18/23    Date          Visit Count 1       FOTO        Pain In  see eval       Pain Out  see eval               Manuals        MLD                                Neuro Re-Ed                                                                Ther Ex                                                                        Ther Activity                        Gait Training                        Modalities

## 2023-09-26 ENCOUNTER — TELEPHONE (OUTPATIENT)
Dept: INTERNAL MEDICINE CLINIC | Facility: CLINIC | Age: 73
End: 2023-09-26

## 2023-09-26 DIAGNOSIS — Z12.31 VISIT FOR SCREENING MAMMOGRAM: Primary | ICD-10-CM

## 2023-09-28 ENCOUNTER — TELEPHONE (OUTPATIENT)
Dept: PODIATRY | Facility: CLINIC | Age: 73
End: 2023-09-28

## 2023-09-28 NOTE — TELEPHONE ENCOUNTER
[facility]    Doctor/Office: Aryan Farmer DPM    CB#: [phone], opt 2    Escalation: The Radiologist is requesting that the injection be done by Ultrasound, not Fluro. Will need a new order for this, addressed to [facility].   Fax to:  [phone]

## 2023-10-02 ENCOUNTER — APPOINTMENT (OUTPATIENT)
Dept: PHYSICAL THERAPY | Facility: CLINIC | Age: 73
End: 2023-10-02
Payer: COMMERCIAL

## 2023-10-02 NOTE — PROGRESS NOTES
Daily Note     Today's date: 10/2/2023  Patient name: Neli Camacho  : 1950  MRN: [id number]  Referring provider: Toni Puga PA-C  Dx: No diagnosis found. Subjective: ***      Objective: See treatment diary below      Assessment: Tolerated treatment {Tolerated treatment :}.  Patient {assessment:}      Plan: {PLAN:2237704148}     Precautions: hx of R breast CA with mastectomy, psych disorder, renal disorder, recent R foot fx with removal of boot 10/1/22, fall risk, imbalance    Re-eval Date:  10/18/23    Date          Visit Count 1       FOTO        Pain In  see eval       Pain Out  see eval               Manuals        MLD                                Neuro Re-Ed                                                                Ther Ex                                                                        Ther Activity                        Gait Training                        Modalities

## 2023-10-03 ENCOUNTER — OFFICE VISIT (OUTPATIENT)
Dept: PHYSICAL THERAPY | Facility: CLINIC | Age: 73
End: 2023-10-03
Payer: COMMERCIAL

## 2023-10-03 DIAGNOSIS — I89.0 LYMPHEDEMA: Primary | ICD-10-CM

## 2023-10-03 PROCEDURE — 97140 MANUAL THERAPY 1/> REGIONS: CPT

## 2023-10-03 PROCEDURE — 97110 THERAPEUTIC EXERCISES: CPT

## 2023-10-03 NOTE — PROGRESS NOTES
Daily Note     Today's date: 10/3/2023  Patient name: Montana Aguiar  : 1950  MRN: [id number]  Referring provider: Renae Blackburn PA-C  Dx:   Encounter Diagnosis     ICD-10-CM    1. Lymphedema  I89.0                      Subjective: I feel my right side of my abdomin than my left side, swollen in my right shoulder/arm        Objective: See treatment diary below      Assessment: Tolerated MLD  treatment well. Pt compliant past week or so with pneumatic pump  Wearing compression bra/denise/UE sleeve   Demon overall min > volumetrics with > edema proximal R UE  Pt demon good skin integrity with pt indicating < redness  To benefit with trial of self massage/ AIA R  Patient would benefit from continued PT with pt limited to 1x/week 2* to taking course work      Plan: Continue per plan of care.       Precautions: hx of R breast CA with mastectomy, psych disorder, renal disorder, recent R foot fx with removal of boot 10/1/22, fall risk, imbalance    Re-eval Date:  10/18/23    Date   9/18 10/3      Visit Count 1 2      FOTO        Pain In  see eval       Pain Out  see eval               Manuals  Volumetrics      CDT  MLD  - Short neck  - Diaph breath  - R UE      Pt education w/ handouts provided  - What is CDT/ benefits with compliancy  - What is lymphedema  - Prevention  - Skin care, signs and sx's infection/cellulitis  - Self wrap/michelle/doffing compression, care management, replace every 6 months    Upcoming review- remedial exer    45 min       - Self lymphedema massage/MLD with sequence                             Neuro Re-Ed                                                                Ther Ex                                                                        Ther Activity                        Gait Training                        Modalities

## 2023-10-05 ENCOUNTER — RA CDI HCC (OUTPATIENT)
Dept: OTHER | Facility: HOSPITAL | Age: 73
End: 2023-10-05

## 2023-10-05 DIAGNOSIS — R25.1 TREMOR: ICD-10-CM

## 2023-10-05 DIAGNOSIS — R26.89 BALANCE DISORDER: ICD-10-CM

## 2023-10-05 DIAGNOSIS — R41.89 COGNITIVE DECLINE: ICD-10-CM

## 2023-10-05 DIAGNOSIS — F33.41 RECURRENT MAJOR DEPRESSIVE DISORDER, IN PARTIAL REMISSION (HCC): ICD-10-CM

## 2023-10-05 RX ORDER — DONEPEZIL HYDROCHLORIDE 10 MG/1
10 TABLET, FILM COATED ORAL
Qty: 30 TABLET | Refills: 0 | Status: SHIPPED | OUTPATIENT
Start: 2023-10-05

## 2023-10-05 RX ORDER — DULOXETIN HYDROCHLORIDE 60 MG/1
60 CAPSULE, DELAYED RELEASE ORAL 2 TIMES DAILY
Qty: 180 CAPSULE | Refills: 0 | Status: SHIPPED | OUTPATIENT
Start: 2023-10-05

## 2023-10-05 NOTE — PROGRESS NOTES
[address] [facility] coding opportunities     I50.9, E66.01     Chart Reviewed number of suggestions sent to Provider: 2     Patients Insurance     Medicare Insurance: [employer]

## 2023-10-09 ENCOUNTER — APPOINTMENT (OUTPATIENT)
Dept: PHYSICAL THERAPY | Facility: CLINIC | Age: 73
End: 2023-10-09
Payer: COMMERCIAL

## 2023-10-10 ENCOUNTER — OFFICE VISIT (OUTPATIENT)
Dept: INTERNAL MEDICINE CLINIC | Facility: CLINIC | Age: 73
End: 2023-10-10
Payer: COMMERCIAL

## 2023-10-10 VITALS
HEIGHT: 57 IN | BODY MASS INDEX: 38.7 KG/M2 | WEIGHT: 179.4 LBS | TEMPERATURE: 97.3 F | OXYGEN SATURATION: 98 % | SYSTOLIC BLOOD PRESSURE: 146 MMHG | HEART RATE: 87 BPM | DIASTOLIC BLOOD PRESSURE: 88 MMHG

## 2023-10-10 DIAGNOSIS — Z23 ENCOUNTER FOR IMMUNIZATION: ICD-10-CM

## 2023-10-10 DIAGNOSIS — Z12.31 ENCOUNTER FOR SCREENING MAMMOGRAM FOR MALIGNANT NEOPLASM OF BREAST: ICD-10-CM

## 2023-10-10 DIAGNOSIS — R07.89 OTHER CHEST PAIN: ICD-10-CM

## 2023-10-10 DIAGNOSIS — F33.41 RECURRENT MAJOR DEPRESSIVE DISORDER, IN PARTIAL REMISSION (HCC): Chronic | ICD-10-CM

## 2023-10-10 DIAGNOSIS — I10 ESSENTIAL HYPERTENSION: Primary | ICD-10-CM

## 2023-10-10 DIAGNOSIS — M17.11 PRIMARY OSTEOARTHRITIS OF RIGHT KNEE: ICD-10-CM

## 2023-10-10 PROBLEM — R00.0 TACHYCARDIA: Status: RESOLVED | Noted: 2021-05-22 | Resolved: 2023-10-10

## 2023-10-10 PROBLEM — L30.9 DERMATITIS: Status: RESOLVED | Noted: 2021-05-25 | Resolved: 2023-10-10

## 2023-10-10 PROBLEM — N28.1 RENAL CYST: Status: RESOLVED | Noted: 2022-10-10 | Resolved: 2023-10-10

## 2023-10-10 PROCEDURE — G0008 ADMIN INFLUENZA VIRUS VAC: HCPCS | Performed by: PHYSICIAN ASSISTANT

## 2023-10-10 PROCEDURE — 99214 OFFICE O/P EST MOD 30 MIN: CPT | Performed by: PHYSICIAN ASSISTANT

## 2023-10-10 PROCEDURE — 90662 IIV NO PRSV INCREASED AG IM: CPT | Performed by: PHYSICIAN ASSISTANT

## 2023-10-10 RX ORDER — PANTOPRAZOLE SODIUM 40 MG/1
40 TABLET, DELAYED RELEASE ORAL DAILY
Qty: 90 TABLET | Refills: 1 | Status: SHIPPED | OUTPATIENT
Start: 2023-10-10 | End: 2023-11-09

## 2023-10-10 RX ORDER — LISINOPRIL 10 MG/1
10 TABLET ORAL DAILY
Qty: 90 TABLET | Refills: 1 | Status: SHIPPED | OUTPATIENT
Start: 2023-10-10

## 2023-10-10 NOTE — PROGRESS NOTES
Name: Thee Hua      : 1950      MRN: [id number]  Encounter Provider: Nenita Bay PA-C  Encounter Date: 10/10/2023   Encounter department: [id number] [place] [address] [place]     1. Essential hypertension  Assessment & Plan:  Not at goal. Continue lisinopril but increase to 10mg daily. Recheck 3 months      2. Encounter for immunization  -     influenza vaccine, high-dose, PF 0.7 mL (FLUZONE HIGH-DOSE)    3. Encounter for screening mammogram for malignant neoplasm of breast    4. Primary osteoarthritis of right knee  -     lisinopril (ZESTRIL) 10 mg tablet; Take 1 tablet (10 mg total) by mouth daily    5. Other chest pain  -     pantoprazole (PROTONIX) 40 mg tablet; Take 1 tablet (40 mg total) by mouth daily    6. Recurrent major depressive disorder, in partial remission [facility])  Assessment & Plan:  Recently worsened due to situational stressors. Pt is in the process of scheduling with a therapist.              Subjective      Pt presents for routine visit. She is up to date with labs, dexa scan and CRC screening. She desires flu vaccine today. BP is elevated today and was at previous visit also. She denies any symptoms and is agreeable to dose adjustment of her lisinopril. She is also noting some issues with increased anxiety and is in the process of scheduling with a therapist. She is noting increased acid reflux and would like to restart protonix that she was on previously with good success. Review of Systems   Constitutional: Negative for chills and fever. HENT: Negative for congestion, ear pain, hearing loss, postnasal drip, rhinorrhea, sinus pressure, sinus pain, sore throat and trouble swallowing. Eyes: Negative for pain and visual disturbance. Respiratory: Negative for cough, chest tightness, shortness of breath and wheezing. Cardiovascular: Negative. Negative for chest pain, palpitations and leg swelling. Gastrointestinal: Positive for abdominal pain. Negative for blood in stool, constipation, diarrhea, nausea and vomiting. Endocrine: Negative for cold intolerance, heat intolerance, polydipsia, polyphagia and polyuria. Genitourinary: Negative for difficulty urinating, dysuria, flank pain and urgency. Musculoskeletal: Negative for arthralgias, back pain, gait problem and myalgias. Skin: Negative for rash. Allergic/Immunologic: Negative. Neurological: Negative for dizziness, weakness, light-headedness and headaches. Hematological: Negative. Psychiatric/Behavioral: Negative for behavioral problems, dysphoric mood and sleep disturbance. The patient is nervous/anxious. Current Outpatient Medications on File Prior to Visit   Medication Sig   • acetaminophen (TYLENOL) 650 mg CR tablet Take 1 tablet (650 mg total) by mouth every 8 (eight) hours as needed for mild pain   • albuterol (2.5 mg/3 mL) 0.083 % nebulizer solution Take 2.5 mg by nebulization every 6 (six) hours as needed for wheezing   • albuterol (PROAIR HFA) 90 mcg/act inhaler Inhale 2 puffs every 4 (four) hours as needed for wheezing   • atorvastatin (LIPITOR) 20 mg tablet Take 1 tablet by mouth daily. • buPROPion (WELLBUTRIN XL) 300 mg 24 hr tablet Take 1 tablet (300 mg total) by mouth every morning   • butalbital-acetaminophen-caffeine (FIORICET,ESGIC) -40 mg per tablet Take 1 tablet by mouth 3 (three) times a day as needed for headaches   • Diclofenac Sodium (VOLTAREN) 1 % Apply 2 g topically 4 (four) times a day   • donepezil (ARICEPT) 10 mg tablet Take 1 tablet by mouth daily at bedtime. • DULoxetine (CYMBALTA) 60 mg delayed release capsule Take 1 capsule by mouth twice daily.    • ergocalciferol (VITAMIN D2) 50,000 units Take 1 capsule (50,000 Units total) by mouth once a week   • Melatonin 10 MG TABS Take by mouth   • Mirabegron ER 25 MG TB24 Take 25 mg by mouth in the morning   • Nutritional Supplements (BOOST BREEZE PO) Take by mouth daily   • oxyCODONE (OxyCONTIN) 10 mg 12 hr tablet Take 10 mg by mouth as needed   • [DISCONTINUED] cetirizine (ZyrTEC) 10 mg tablet Take 10 mg by mouth daily   • [DISCONTINUED] lisinopril (ZESTRIL) 5 mg tablet Take 1 tablet (5 mg total) by mouth daily Do not start before November 23, 2022. • [DISCONTINUED] loratadine (CLARITIN) 10 mg tablet Take 10 mg by mouth daily   • [DISCONTINUED] ascorbic acid (VITAMIN C) 500 MG tablet Take 1 tablet (500 mg total) by mouth daily (Patient not taking: Reported on 5/26/2023)   • [DISCONTINUED] docusate sodium (COLACE) 100 mg capsule Take 1 capsule (100 mg total) by mouth 2 (two) times a day (Patient not taking: Reported on 7/18/2023)   • [DISCONTINUED] gabapentin (Neurontin) 300 mg capsule Take 1 capsule (300 mg total) by mouth 2 (two) times a day (Patient not taking: Reported on 10/10/2023)   • [DISCONTINUED] Multiple Vitamins-Minerals (multivitamin with minerals) tablet Take 1 tablet by mouth daily (Patient not taking: Reported on 10/10/2023)   • [DISCONTINUED] pantoprazole (PROTONIX) 40 mg tablet Take 1 tablet (40 mg total) by mouth 2 (two) times a day before meals (Patient not taking: Reported on 7/18/2023)   • [DISCONTINUED] valACYclovir (VALTREX) 1,000 mg tablet Take 1 tablet (1,000 mg total) by mouth every 8 (eight) hours for 6 days (Patient not taking: Reported on 7/18/2023)       Objective     /88   Pulse 87   Temp (!) 97.3 °F (36.3 °C)   Ht 4' 9" (1.448 m)   Wt 81.4 kg (179 lb 6.4 oz)   LMP  (LMP Unknown)   SpO2 98%   BMI 38.82 kg/m²     Physical Exam  Vitals and nursing note reviewed. Constitutional:       General: She is not in acute distress. Appearance: She is well-developed. She is not diaphoretic. HENT:      Head: Normocephalic and atraumatic. Right Ear: External ear normal.      Left Ear: External ear normal.      Nose: Nose normal.      Mouth/Throat:      Pharynx: No oropharyngeal exudate. Eyes:      General: No scleral icterus. Right eye: No discharge. Left eye: No discharge. Conjunctiva/sclera: Conjunctivae normal.      Pupils: Pupils are equal, round, and reactive to light. Neck:      Thyroid: No thyromegaly. Cardiovascular:      Rate and Rhythm: Normal rate and regular rhythm. Heart sounds: Normal heart sounds. No murmur heard. No friction rub. No gallop. Pulmonary:      Effort: Pulmonary effort is normal. No respiratory distress. Breath sounds: Normal breath sounds. No wheezing or rales. Abdominal:      General: Bowel sounds are normal. There is no distension. Palpations: Abdomen is soft. Tenderness: There is no abdominal tenderness. Musculoskeletal:         General: No tenderness or deformity. Normal range of motion. Cervical back: Normal range of motion and neck supple. Skin:     General: Skin is warm and dry. Neurological:      Mental Status: She is alert and oriented to person, place, and time. Cranial Nerves: No cranial nerve deficit. Psychiatric:         Behavior: Behavior normal.         Thought Content:  Thought content normal.         Judgment: Judgment normal.       Merced Alex PA-C

## 2023-10-10 NOTE — ASSESSMENT & PLAN NOTE
Recently worsened due to situational stressors.  Pt is in the process of scheduling with a therapist. · Per primary team, d/w nursing today  Pt currently sedated due to agitation

## 2023-10-16 ENCOUNTER — APPOINTMENT (OUTPATIENT)
Dept: PHYSICAL THERAPY | Facility: CLINIC | Age: 73
End: 2023-10-16
Payer: COMMERCIAL

## 2023-10-16 NOTE — PROGRESS NOTES
Daily Note     Today's date: 10/16/2023  Patient name: Young Hagan  : 1950  MRN: [id number]  Referring provider: Viri Morales PA-C  Dx: No diagnosis found. Subjective: ***      Objective: See treatment diary below      Assessment: Tolerated treatment {Tolerated treatment :}.  Patient {assessment:}      Plan: {PLAN:0221279897}     Precautions: hx of R breast CA with mastectomy, psych disorder, renal disorder, recent R foot fx with removal of boot 10/1/22, fall risk, imbalance    Re-eval Date:  10/18/23    Date   9/18 10/3      Visit Count 1 2      FOTO        Pain In  see eval       Pain Out  see eval               Manuals  Volumetrics      CDT  MLD  - Short neck  - Diaph breath  - R UE      Pt education w/ handouts provided  - What is CDT/ benefits with compliancy  - What is lymphedema  - Prevention  - Skin care, signs and sx's infection/cellulitis  - Self wrap/michelle/doffing compression, care management, replace every 6 months    Upcoming review- remedial exer    45 min       - Self lymphedema massage/MLD with sequence                             Neuro Re-Ed                                                                Ther Ex                                                                        Ther Activity                        Gait Training                        Modalities

## 2023-10-19 DIAGNOSIS — M81.0 AGE-RELATED OSTEOPOROSIS WITHOUT CURRENT PATHOLOGICAL FRACTURE: Primary | ICD-10-CM

## 2023-10-20 ENCOUNTER — OFFICE VISIT (OUTPATIENT)
Dept: PHYSICAL THERAPY | Facility: CLINIC | Age: 73
End: 2023-10-20
Payer: COMMERCIAL

## 2023-10-20 DIAGNOSIS — I89.0 LYMPHEDEMA: Primary | ICD-10-CM

## 2023-10-20 PROCEDURE — 97140 MANUAL THERAPY 1/> REGIONS: CPT

## 2023-10-20 PROCEDURE — 97110 THERAPEUTIC EXERCISES: CPT

## 2023-10-20 NOTE — PROGRESS NOTES
Daily Note     Today's date: 10/20/2023  Patient name: Young Hagan  : 1950  MRN: [id number]  Referring provider: Viri Morales PA-C  Dx:   Encounter Diagnosis     ICD-10-CM    1. Lymphedema  I89.0                      Subjective: I feel like I am for first time dealing with my lymphedema  Feel I was in a state of denial  Have help with donning compression sleeve R UE  cont with swelling R abdomen area        Objective: See treatment diary below      Assessment: Tolerated MLD treatment well. 1* edema R UE, lat thorax/abdomen region  Demon decongestion/<firmness end rx session To benefit with > consistency /compliancy with CDT as cx several apts  Noted random red spots varying in size R UE, denies any itching at this time  Pt discussion of lotions/creams, garment care to r/o allergy/reaction   Patient would benefit from continued PT      Plan: Continue per plan of care.       Precautions: hx of R breast CA with mastectomy, psych disorder, renal disorder, recent R foot fx with removal of boot 10/1/22, fall risk, imbalance    Re-eval Date:  10/18/23    Date   9/18 10/3 10/20     Visit Count 1 2 3     FOTO        Pain In  see eval       Pain Out  see eval               Manuals  Volumetrics      CDT  MLD  - Short neck  - Diaph breath  - R UE      Pt education w/ handouts provided  - What is CDT/ benefits with compliancy  - What is lymphedema  - Prevention  - Skin care, signs and sx's infection/cellulitis  - Self wrap/michelle/doffing compression, care management, replace every 6 months    Upcoming review- remedial exer    45 min       MLD  - Short neck  - Diaph breath/deep viseral  - R UE  - R AIA      - Self lymphedema massage/MLD with sequence    Review  Donning/doffing compression sleeve with pt's daughter,  discussed additional compression options to consider compression denise    Pt placed in 20-30 mmHg  R UE compression  55 min                             Neuro Re-Ed Ther Ex                                                                        Ther Activity                        Gait Training                        Modalities

## 2023-10-30 ENCOUNTER — HOSPITAL ENCOUNTER (OUTPATIENT)
Dept: RADIOLOGY | Facility: HOSPITAL | Age: 73
Discharge: HOME | End: 2023-10-30
Attending: PODIATRIST
Payer: COMMERCIAL

## 2023-10-30 DIAGNOSIS — M79.672 LEFT FOOT PAIN: ICD-10-CM

## 2023-10-30 DIAGNOSIS — M19.079 ARTHRITIS OF MIDFOOT: ICD-10-CM

## 2023-10-30 PROCEDURE — 77002 NEEDLE LOCALIZATION BY XRAY: CPT

## 2023-10-30 PROCEDURE — 20600 DRAIN/INJ JOINT/BURSA W/O US: CPT

## 2023-10-30 RX ORDER — LIDOCAINE HYDROCHLORIDE 10 MG/ML
10 INJECTION, SOLUTION EPIDURAL; INFILTRATION; INTRACAUDAL; PERINEURAL
Status: COMPLETED | OUTPATIENT
Start: 2023-10-30 | End: 2023-10-30

## 2023-10-30 RX ORDER — BUPIVACAINE HYDROCHLORIDE 2.5 MG/ML
10 INJECTION, SOLUTION EPIDURAL; INFILTRATION; INTRACAUDAL
Status: COMPLETED | OUTPATIENT
Start: 2023-10-30 | End: 2023-10-30

## 2023-10-30 RX ORDER — BETAMETHASONE SODIUM PHOSPHATE AND BETAMETHASONE ACETATE 3; 3 MG/ML; MG/ML
6 INJECTION, SUSPENSION INTRA-ARTICULAR; INTRALESIONAL; INTRAMUSCULAR; SOFT TISSUE ONCE
Status: COMPLETED | OUTPATIENT
Start: 2023-10-30 | End: 2023-10-30

## 2023-10-30 RX ADMIN — LIDOCAINE HYDROCHLORIDE 6 ML: 10 INJECTION, SOLUTION EPIDURAL; INFILTRATION; INTRACAUDAL; PERINEURAL at 09:20

## 2023-10-30 RX ADMIN — BUPIVACAINE HYDROCHLORIDE 2 ML: 2.5 INJECTION, SOLUTION EPIDURAL; INFILTRATION; INTRACAUDAL; PERINEURAL at 09:20

## 2023-10-30 RX ADMIN — BETAMETHASONE SODIUM PHOSPHATE AND BETAMETHASONE ACETATE 1 MG: 3; 3 INJECTION, SUSPENSION INTRA-ARTICULAR; INTRALESIONAL; INTRAMUSCULAR at 09:20

## 2023-10-30 RX ADMIN — IOHEXOL 1 ML: 300 INJECTION, SOLUTION INTRAVENOUS at 09:20

## 2023-11-01 ENCOUNTER — OFFICE VISIT (OUTPATIENT)
Dept: PHYSICAL THERAPY | Facility: CLINIC | Age: 73
End: 2023-11-01
Payer: COMMERCIAL

## 2023-11-01 DIAGNOSIS — I89.0 LYMPHEDEMA: Primary | ICD-10-CM

## 2023-11-01 PROCEDURE — 97140 MANUAL THERAPY 1/> REGIONS: CPT

## 2023-11-01 PROCEDURE — 97110 THERAPEUTIC EXERCISES: CPT

## 2023-11-01 NOTE — PROGRESS NOTES
Daily Note     Today's date: 2023  Patient name: Young Hagan  : 1950  MRN: [id number]  Referring provider: Viri Morales PA-C  Dx:   Encounter Diagnosis     ICD-10-CM    1. Lymphedema  I89.0                      Subjective: I had bloodwork done the other day, they had a hard time getting 2* too thick they said and recommend I f/u with my PCP  I cut myself and it clotted quickly  My right arm feels like a stuff sausage  I have been have not been wearing compression sleeve and pump maybe 1x/day        Objective: See treatment diary below      Assessment: Tolerated MLD R UE treatment well. Demon < firmness/decongestion end rx session  Noted > volumetrics today  reviewed importance with compliancy with compression/exer/self massage  Pt mentioned would f/u with ordering new compression sleeve/bra 2* > 6 months  Skin integrity intact with noted good warmth/circulation in all extremities  Patient would benefit from continued PT      Plan: Continue per plan of care.       Precautions: hx of R breast CA with mastectomy, psych disorder, renal disorder, recent R foot fx with removal of boot 10/1/22, fall risk, imbalance    Re-eval Date:  10/18/23    Date   9/18 10/3 10/20 11/1    Visit Count 1 2 3 4    FOTO        Pain In  see eval       Pain Out  see eval               Manuals  Volumetrics      CDT  MLD  - Short neck  - Diaph breath  - R UE      Pt education w/ handouts provided  - What is CDT/ benefits with compliancy  - What is lymphedema  - Prevention  - Skin care, signs and sx's infection/cellulitis  - Self wrap/michelle/doffing compression, care management, replace every 6 months    Upcoming review- remedial exer    45 min       MLD  - Short neck  - Diaph breath/deep viseral  - R UE  - R AIA      - Self lymphedema massage/MLD with sequence    Review  Donning/doffing compression sleeve with pt's daughter,  discussed additional compression options to consider compression denise    Pt placed in 20-30 mmHg  R UE compression  55 min MLD  - Short neck  - Diaph breath/deep viseral  - R UE  - R AIA    Trial 20 min  Pneumatic pump R UE during R AIA    Pt did not have compression sleeve this date/encourage to bring in NV    Review  - Compresison compliancy  - daily pneumatic pump  - self massage/sequence  - UE Lymph exer  - nutrition    60 min                            Neuro Re-Ed                                                                Ther Ex                                                                        Ther Activity                        Gait Training                        Modalities

## 2023-11-04 DIAGNOSIS — R25.1 TREMOR: ICD-10-CM

## 2023-11-04 DIAGNOSIS — R41.89 COGNITIVE DECLINE: ICD-10-CM

## 2023-11-04 DIAGNOSIS — E78.5 HYPERLIPIDEMIA, UNSPECIFIED HYPERLIPIDEMIA TYPE: ICD-10-CM

## 2023-11-04 DIAGNOSIS — R26.89 BALANCE DISORDER: ICD-10-CM

## 2023-11-04 RX ORDER — DONEPEZIL HYDROCHLORIDE 10 MG/1
10 TABLET, FILM COATED ORAL
Qty: 30 TABLET | Refills: 0 | Status: SHIPPED | OUTPATIENT
Start: 2023-11-04

## 2023-11-04 RX ORDER — ATORVASTATIN CALCIUM 20 MG/1
TABLET, FILM COATED ORAL
Qty: 30 TABLET | Refills: 1 | Status: SHIPPED | OUTPATIENT
Start: 2023-11-04

## 2023-11-07 ENCOUNTER — HOSPITAL ENCOUNTER (OUTPATIENT)
Dept: RADIOLOGY | Facility: HOSPITAL | Age: 73
Discharge: HOME | End: 2023-11-07
Attending: PODIATRIST

## 2023-11-07 ENCOUNTER — APPOINTMENT (OUTPATIENT)
Dept: PHYSICAL THERAPY | Facility: CLINIC | Age: 73
End: 2023-11-07
Payer: COMMERCIAL

## 2023-11-08 ENCOUNTER — OFFICE VISIT (OUTPATIENT)
Dept: PHYSICAL THERAPY | Facility: CLINIC | Age: 73
End: 2023-11-08
Payer: COMMERCIAL

## 2023-11-08 DIAGNOSIS — I89.0 LYMPHEDEMA: Primary | ICD-10-CM

## 2023-11-08 PROCEDURE — 97110 THERAPEUTIC EXERCISES: CPT

## 2023-11-08 PROCEDURE — 97140 MANUAL THERAPY 1/> REGIONS: CPT

## 2023-11-08 NOTE — PROGRESS NOTES
Daily Note     Today's date: 2023  Patient name: Janneth Blake  : 1950  MRN: [id number]  Referring provider: Charlotte Mabry PA-C  Dx:   Encounter Diagnosis     ICD-10-CM    1. Lymphedema  I89.0                      Subjective: R arm is always achy and at times feels restricted/tightness l1* at wrist area  Feels like I have a sleeve on it at all times even when I dont have it on Going through all my compression sleeves/ throwing old out  Have trouble turning in bed over onto my L side but put something near me to help adjust      Objective: See treatment diary below      Assessment: Tolerated MLD treatment well. Noted < firmness/decongestion R UE end rx session  conts with > edema R anter/lateral thorax region  Patient would benefit from continued PT      Plan: Continue per plan of care.       Precautions: hx of R breast CA with mastectomy, psych disorder, renal disorder, recent R foot fx with removal of boot 10/1/22, fall risk, imbalance    Re-eval Date:  10/30/23    Date   9/18 10/3 10/20 11/1 11/8   Visit Count 1 2 3 4 5   FOTO        Pain In  see eval       Pain Out  see eval               Manuals  Volumetrics  volumetrics    CDT  MLD  - Short neck  - Diaph breath  - R UE      Pt education w/ handouts provided  - What is CDT/ benefits with compliancy  - What is lymphedema  - Prevention  - Skin care, signs and sx's infection/cellulitis  - Self wrap/michelle/doffing compression, care management, replace every 6 months    Upcoming review- remedial exer    45 min       MLD  - Short neck  - Diaph breath/deep viseral  - R UE  - R AIA      - Self lymphedema massage/MLD with sequence    Review  Donning/doffing compression sleeve with pt's daughter,  discussed additional compression options to consider compression denise    Pt placed in 20-30 mmHg  R UE compression  55 min MLD  - Short neck  - Diaph breath/deep viseral  - R UE  - R AIA    Trial 20 min  Pneumatic pump R UE during R AIA    Pt did not have compression sleeve this date/encourage to bring in NV    Review  - Compresison compliancy  - daily pneumatic pump  - self massage/sequence  - UE Lymph exer  - nutrition    60 min MLD  - Short neck  - Diaph breath/deep viseral  - R UE  - R AIA    Trial 20 min  Pneumatic pump R UE during R AIA    Pt placed in 20-30 mm Hg compression R UE garment    HEP:          Lymphedema exercises  Handouts issued  Cues prn for proper form/technique    10x ea    55 min                             Neuro Re-Ed                                                                Ther Ex                                                                        Ther Activity                        Gait Training                        Modalities

## 2023-11-10 ENCOUNTER — OFFICE VISIT (OUTPATIENT)
Dept: PHYSICAL THERAPY | Facility: CLINIC | Age: 73
End: 2023-11-10
Payer: COMMERCIAL

## 2023-11-10 DIAGNOSIS — I89.0 LYMPHEDEMA: Primary | ICD-10-CM

## 2023-11-10 PROCEDURE — 97110 THERAPEUTIC EXERCISES: CPT

## 2023-11-10 PROCEDURE — 97140 MANUAL THERAPY 1/> REGIONS: CPT

## 2023-11-10 NOTE — PROGRESS NOTES
Daily Note     Today's date: 11/10/2023  Patient name: Marcio Urbina  : 1950  MRN: [id number]  Referring provider: Moustapha Madison PA-C  Dx:   Encounter Diagnosis     ICD-10-CM    1. Lymphedema  I89.0                      Subjective: I am feeling less heaviness R UE but continue to feel full/heavy R abdomen region      Objective: See treatment diary below      Assessment: Tolerated MLD treatment well. Noted skin irritation R jaw line with pt reporting from chest pump garment/sleeve  Pt provided grey foam/tubigrip segment / filler to trial to reduce friction / irritation during pneumatic pump use  noted < volume/decongestion R UE   Pt encourage to upgrade compression R UE garment for appropriate compression as pt resolving with MLD/decongestion  Pt indicates occ struggles with michelle pneumatic pump chest/jacket portion 2* UE weakness, but has assistance in home for donning/doffing   Patient would benefit from continued PT      Plan: Continue per plan of care.       Precautions: hx of R breast CA with mastectomy, psych disorder, renal disorder, recent R foot fx with removal of boot 10/1/22, fall risk, imbalance    Re-eval Date:  10/30/23    Date 11/10       Visit Count 6       FOTO        Pain In        Pain Out                Manuals        CDT MLD  - Short neck  - Diaph breath/deep viseral  - R UE  - R AIA    Trial 20 min  Pneumatic pump R UE during R AIA    Pt placed in 20-30 mm Hg compression R UE garment    HEP:          Lymphedema exercises  Handouts issued  Cues prn for proper form/technique  10-20 x ea        55 min                                 Neuro Re-Ed                                                                Ther Ex                                                                        Ther Activity                        Gait Training                        Modalities

## 2023-11-15 ENCOUNTER — OFFICE VISIT (OUTPATIENT)
Dept: PHYSICAL THERAPY | Facility: CLINIC | Age: 73
End: 2023-11-15
Payer: COMMERCIAL

## 2023-11-15 DIAGNOSIS — I89.0 LYMPHEDEMA: Primary | ICD-10-CM

## 2023-11-15 PROCEDURE — 97140 MANUAL THERAPY 1/> REGIONS: CPT

## 2023-11-15 NOTE — PROGRESS NOTES
Daily Note     Today's date: 11/15/2023  Patient name: Eleonora Flash  : 1950  MRN: [id number]  Referring provider: Leah Preciado PA-C  Dx:   Encounter Diagnosis     ICD-10-CM    1. Lymphedema  I89.0                      Subjective: I am hurting all over but it is normal early morning sx's  My right arm is hurting today as 6/10  not sleeping well lately have trouble finding comfortable place R UE        Objective: See treatment diary below      Assessment: Tolerated MLD treatment well. Pt encourage to bring in SSB upcoming for further volume reduction  Pt utilize pneumatic pump R UE/trunk but indicates does not make contact in specific places where she would like it to/benefit from  Examples she given Sub R axilla region/ R anterior chest and too much contact R SCF region  Pt indicated foam pad provided last PT session help < discmfort R SCF during pneumatic pump use  Trial IASTM R UE with pt indicating < pain end rx session  Noted < firmness/decongestion end rx session  Patient would benefit from continued PT      Plan: Continue per plan of care.       Precautions: hx of R breast CA with mastectomy, psych disorder, renal disorder, recent R foot fx with removal of boot 10/1/22, fall risk, imbalance    Re-eval Date:  10/30/23    Date 11/10 11/15      Visit Count 6 7      FOTO        Pain In        Pain Out                Manuals        CDT MLD  - Short neck  - Diaph breath/deep viseral  - R UE  - R AIA    Trial 20 min  Pneumatic pump R UE during R AIA    Pt placed in 20-30 mm Hg compression R UE garment    HEP:          Lymphedema exercises  Handouts issued  Cues prn for proper form/technique  10-20 x ea        55 min   MLD  - Short neck  - Diaph breath/deep viseral  - R UE  - R AIA  Trial IASTM R UE w/ focus deltoid/ forearm    Motion check R shoulder, gentle pec stretching to pt rashel    Trial 20 min  Pneumatic pump R UE during R AIA    Pt placed in 20-30 mm Hg compression R UE garment      55 min Neuro Re-Ed                                                                Ther Ex                                                                        Ther Activity                        Gait Training                        Modalities

## 2023-11-17 ENCOUNTER — OFFICE VISIT (OUTPATIENT)
Dept: PHYSICAL THERAPY | Facility: CLINIC | Age: 73
End: 2023-11-17
Payer: COMMERCIAL

## 2023-11-17 DIAGNOSIS — I89.0 LYMPHEDEMA: Primary | ICD-10-CM

## 2023-11-17 PROCEDURE — 97140 MANUAL THERAPY 1/> REGIONS: CPT

## 2023-11-17 NOTE — PROGRESS NOTES
Daily Note     Today's date: 2023  Patient name: Eleonora Flash  : 1950  MRN: [id number]  Referring provider: Leah Preciado PA-C  Dx:   Encounter Diagnosis     ICD-10-CM    1. Lymphedema  I89.0                      Subjective: No new co's  I am noticing < firmness/redness R UE      Objective: See treatment diary below      Assessment: Tolerated MLD treatment well. Trial fibrotic massage R UE /MFR R anterior thorax region to pt rashel  Resumed SSB pt encourage to remove after 24 hours / pending sx's- intact wrap  pt educ s/s's to remove bandages/pt verbally stated understanding  Patient would benefit from continued PT      Plan: Continue per plan of care.       Precautions: hx of R breast CA with mastectomy, psych disorder, renal disorder, recent R foot fx with removal of boot 10/1/22, fall risk, imbalance    Re-eval Date:  10/30/23    Date 11/10 11/15 11/17     Visit Count 6 7 8     FOTO        Pain In        Pain Out                Manuals        CDT MLD  - Short neck  - Diaph breath/deep viseral  - R UE  - R AIA    Trial 20 min  Pneumatic pump R UE during R AIA    Pt placed in 20-30 mm Hg compression R UE garment    HEP:          Lymphedema exercises  Handouts issued  Cues prn for proper form/technique  10-20 x ea        55 min   MLD  - Short neck  - Diaph breath/deep viseral  - R UE  - R AIA  Trial IASTM R UE w/ focus deltoid/ forearm    Motion check R shoulder, gentle pec stretching to pt rashel    Trial 20 min  Pneumatic pump R UE during R AIA    Pt placed in 20-30 mm Hg compression R UE garment      55 min MLD  - Short neck  - Diaph breath/deep viseral  - R UE  - R AIA  Trial IASTM R UE w/ focus deltoid/ forearm  -MFR R anter thorax    Trial 20 min  Pneumatic pump R UE during R AIA      Pt placed in SSB  /2- 10's with white foam/ soft tubi    60 min                             Neuro Re-Ed                                                                Ther Ex Ther Activity                        Gait Training                        Modalities

## 2023-11-28 ENCOUNTER — APPOINTMENT (OUTPATIENT)
Dept: PHYSICAL THERAPY | Facility: CLINIC | Age: 73
End: 2023-11-28
Payer: COMMERCIAL

## 2023-11-30 ENCOUNTER — OFFICE VISIT (OUTPATIENT)
Dept: PHYSICAL THERAPY | Facility: CLINIC | Age: 73
End: 2023-11-30
Payer: COMMERCIAL

## 2023-11-30 DIAGNOSIS — I89.0 LYMPHEDEMA: Primary | ICD-10-CM

## 2023-11-30 PROCEDURE — 97140 MANUAL THERAPY 1/> REGIONS: CPT

## 2023-11-30 NOTE — PROGRESS NOTES
Daily Note     Today's date: 2023  Patient name: Ritesh Morrow  : 1950  MRN: [id number]  Referring provider: Adriano Kamara PA-C  Dx:   Encounter Diagnosis     ICD-10-CM    1. Lymphedema  I89.0                      Subjective: I have been run down for > 1 week showing signs of COVID but tested (-)  Feeling better now  I was using my pump every day and use compression daily  Christopher SSB last PT session 24 hours      Objective: See treatment diary below      Assessment: Tolerated MLD treatment well. Pt demon overall > congestion R UE 1* > proximally  Pt without edema R hand with good rashel with SSB  Pt trial home pneumatic pump R UE /chest sleeve with noted poor fitting  Trial 60 mm Hg with good rashel, pt indicating better decongestion with elevated mmHg  However, there was no contact in R shoulder, pec/SCF region  PTA to f/u with pump/vendor re: possible changing to < size or custom sleeve Patient would benefit from continued PT      Plan: Continue per plan of care.       Precautions: hx of R breast CA with mastectomy, psych disorder, renal disorder, recent R foot fx with removal of boot 10/1/22, fall risk, imbalance    Re-eval Date:  10/30/23    Date 11/10 11/15 11/17 11/30    Visit Count 6 7 8 9    FOTO        Pain In        Pain Out                Manuals        CDT MLD  - Short neck  - Diaph breath/deep viseral  - R UE  - R AIA    Trial 20 min  Pneumatic pump R UE during R AIA    Pt placed in 20-30 mm Hg compression R UE garment    HEP:          Lymphedema exercises  Handouts issued  Cues prn for proper form/technique  10-20 x ea        55 min   MLD  - Short neck  - Diaph breath/deep viseral  - R UE  - R AIA  Trial IASTM R UE w/ focus deltoid/ forearm    Motion check R shoulder, gentle pec stretching to pt rashel    Trial 20 min  Pneumatic pump R UE during R AIA    Pt placed in 20-30 mm Hg compression R UE garment      55 min MLD  - Short neck  - Diaph breath/deep viseral  - R UE  - R AIA  Trial IASTM R UE w/ focus deltoid/ forearm  -MFR R anter thorax    Trial 20 min  Pneumatic pump R UE during R AIA      Pt placed in [facility]  6/8/2- 10's with white foam/ soft tubi    60 min MLD  - Short neck  - Diaph breath/deep viseral  - R UE  - R AIA  Trial IASTM R UE w/ focus deltoid/ forearm  -MFR R anter thorax    Trial 20 min  W/ pt home Pneumatic pump / chest/R UE sleeve prior to PT session      Pt placed in [facility]  6/8/2- 10's with white foam/ soft tubi    80 min                            Neuro Re-Ed                                                                Ther Ex                                                                        Ther Activity                        Gait Training                        Modalities

## 2023-12-04 DIAGNOSIS — R25.1 TREMOR: ICD-10-CM

## 2023-12-04 DIAGNOSIS — R26.89 BALANCE DISORDER: ICD-10-CM

## 2023-12-04 DIAGNOSIS — R41.89 COGNITIVE DECLINE: ICD-10-CM

## 2023-12-04 RX ORDER — DONEPEZIL HYDROCHLORIDE 10 MG/1
10 TABLET, FILM COATED ORAL
Qty: 30 TABLET | Refills: 0 | Status: SHIPPED | OUTPATIENT
Start: 2023-12-04

## 2023-12-05 ENCOUNTER — OFFICE VISIT (OUTPATIENT)
Dept: PHYSICAL THERAPY | Facility: CLINIC | Age: 73
End: 2023-12-05
Payer: COMMERCIAL

## 2023-12-05 DIAGNOSIS — I89.0 LYMPHEDEMA: Primary | ICD-10-CM

## 2023-12-05 PROCEDURE — 97140 MANUAL THERAPY 1/> REGIONS: CPT

## 2023-12-05 PROCEDURE — 97110 THERAPEUTIC EXERCISES: CPT

## 2023-12-05 NOTE — PROGRESS NOTES
Daily Note     Today's date: 2023  Patient name: Ariel Kingsley  : 1950  MRN: [id number]  Referring provider: Eleanor Cooper PA-C  Dx:   Encounter Diagnosis     ICD-10-CM    1. Lymphedema  I89.0                      Subjective: SSB/ wrap felt good after last PT session  use pneumatic pump daily but unable to feel compression over R shoulder/upper chest area      Objective: See treatment diary below      Assessment: Tolerated MLD treatment well. Pt demon < congestion/firmness/volume end PT session  pt with good fit of PT/UE pneumatic pump with 55 mmHg  issued dysum with pt educ of self MFR R ant thorax/encourage gentle approach  Patient would benefit from continued PT      Plan: Continue per plan of care.       Precautions: hx of R breast CA with mastectomy, psych disorder, renal disorder, recent R foot fx with removal of boot 10/1/22, fall risk, imbalance    Re-eval Date:  10/30/23    Date 11/10 11/15 11/17 11/30 12/5   Visit Count 6 7 8 9 10   FOTO        Pain In        Pain Out                Manuals        CDT MLD  - Short neck  - Diaph breath/deep viseral  - R UE  - R AIA    Trial 20 min  Pneumatic pump R UE during R AIA    Pt placed in 20-30 mm Hg compression R UE garment    HEP:          Lymphedema exercises  Handouts issued  Cues prn for proper form/technique  10-20 x ea        55 min   MLD  - Short neck  - Diaph breath/deep viseral  - R UE  - R AIA  Trial IASTM R UE w/ focus deltoid/ forearm    Motion check R shoulder, gentle pec stretching to pt rashel    Trial 20 min  Pneumatic pump R UE during R AIA    Pt placed in 20-30 mm Hg compression R UE garment      55 min MLD  - Short neck  - Diaph breath/deep viseral  - R UE  - R AIA  Trial IASTM R UE w/ focus deltoid/ forearm  -MFR R anter thorax    Trial 20 min  Pneumatic pump R UE during R AIA      Pt placed in SSB  6/8/2- 10's with white foam/ soft tubi    60 min MLD  - Short neck  - Diaph breath/deep viseral  - R UE  - R AIA  Trial IASTM R UE w/ focus deltoid/ forearm  -MFR R anter thorax    Trial 20 min  W/ pt home Pneumatic pump / chest/R UE sleeve prior to PT session      Pt placed in [facility]  6/8/2- 10's with white foam/ soft tubi    80 min MLD  - Short neck  - Diaph breath/deep viseral  - R UE  - R AIA  Trial IASTM R UE w/ focus deltoid/ forearm  -MFR R anter thorax    Trial 20 min  Pneumatic pump R UE during R AIA    Pt educ:  - self MFR w/dysum  - review R shoulder self stretches      Pt placed in [facility]  6/8/2- 10's with white foam/ soft tubi    55 min                           Neuro Re-Ed                                                                Ther Ex                                                                        Ther Activity                        Gait Training                        Modalities

## 2023-12-12 ENCOUNTER — OFFICE VISIT (OUTPATIENT)
Dept: PHYSICAL THERAPY | Facility: CLINIC | Age: 73
End: 2023-12-12
Payer: COMMERCIAL

## 2023-12-12 DIAGNOSIS — I89.0 LYMPHEDEMA: Primary | ICD-10-CM

## 2023-12-12 PROCEDURE — 97140 MANUAL THERAPY 1/> REGIONS: CPT

## 2023-12-12 PROCEDURE — 97110 THERAPEUTIC EXERCISES: CPT

## 2023-12-12 NOTE — PROGRESS NOTES
Daily Note     Today's date: 2023  Patient name: Montana Aguiar  : 1950  MRN: [id number]  Referring provider: Renae Blackburn PA-C  Dx:   Encounter Diagnosis     ICD-10-CM    1. Lymphedema  I89.0                      Subjective: I had an emotional situation with my daughter last Thursday  I plan to f/u with my MD re: situation  Denies harm to self/others  Swelling in my R has overall improved  Having more pain/ NT in R wrist        Objective: See treatment diary below      Assessment: Tolerated MLD R UE/anter thoracic  treatment well.  Demon good skin integrity, ROM R shoulder flex WFL, limited scap/pec mm with pt review gentle self stretching and encourage carryover with remedial lymph exer 2-3x/week  Patient independent with self donning/doffing compression  pt to consider night time compression with focus to incorporate R hand/wrist  pt compliant with home pneumatic pump daily        Plan: DC to self maintenance lymph program.  Pt to benefit with f/u PT/CDT 6 months pending sx's       Precautions: hx of R breast CA with mastectomy, psych disorder, renal disorder, recent R foot fx with removal of boot 10/1/22, fall risk, imbalance    Re-eval Date:  10/30/23    Date        Visit Count 11       FOTO        Pain In        Pain Out                Manuals volumetrics       CDT MLD  - Short neck  - Diaph breath/deep viseral  - R UE  - R AIA  Trial IASTM R UE w/ focus deltoid/ forearm  -MFR R anter thorax    Trial 20 min  Pneumatic pump R UE during R AIA    Review nail care management to help < snags/tears in compression, self massage    Pt review/performed HEP:        Remedial lymphedema exercises  Handouts issued  Cues prn for proper form/technique 20x ea    Pt discussion of day vs night time options R UE garments, updating compression denise, replacement of day time garments every 6 months, care maintenance    Pt I with self donning R UE compression sleeve          60 min Neuro Re-Ed                                                                Ther Ex                                                                        Ther Activity                        Gait Training                        Modalities

## 2024-01-05 DIAGNOSIS — F33.41 RECURRENT MAJOR DEPRESSIVE DISORDER, IN PARTIAL REMISSION (HCC): ICD-10-CM

## 2024-01-05 DIAGNOSIS — E78.5 HYPERLIPIDEMIA, UNSPECIFIED HYPERLIPIDEMIA TYPE: ICD-10-CM

## 2024-01-05 RX ORDER — DULOXETIN HYDROCHLORIDE 60 MG/1
60 CAPSULE, DELAYED RELEASE ORAL 2 TIMES DAILY
Qty: 180 CAPSULE | Refills: 0 | Status: SHIPPED | OUTPATIENT
Start: 2024-01-05

## 2024-01-05 RX ORDER — ATORVASTATIN CALCIUM 20 MG/1
TABLET, FILM COATED ORAL
Qty: 90 TABLET | Refills: 2 | Status: SHIPPED | OUTPATIENT
Start: 2024-01-05

## 2024-01-08 ENCOUNTER — OFFICE VISIT (OUTPATIENT)
Dept: UROLOGY | Facility: CLINIC | Age: 74
End: 2024-01-08
Payer: COMMERCIAL

## 2024-01-08 VITALS
BODY MASS INDEX: 39.18 KG/M2 | OXYGEN SATURATION: 95 % | DIASTOLIC BLOOD PRESSURE: 80 MMHG | SYSTOLIC BLOOD PRESSURE: 138 MMHG | HEART RATE: 102 BPM | WEIGHT: 181.6 LBS | HEIGHT: 57 IN

## 2024-01-08 DIAGNOSIS — E78.5 HYPERLIPIDEMIA, UNSPECIFIED HYPERLIPIDEMIA TYPE: ICD-10-CM

## 2024-01-08 DIAGNOSIS — N32.81 OAB (OVERACTIVE BLADDER): Primary | ICD-10-CM

## 2024-01-08 DIAGNOSIS — N39.46 MIXED STRESS AND URGE URINARY INCONTINENCE: ICD-10-CM

## 2024-01-08 PROCEDURE — 99213 OFFICE O/P EST LOW 20 MIN: CPT

## 2024-01-08 RX ORDER — ATORVASTATIN CALCIUM 20 MG/1
20 TABLET, FILM COATED ORAL DAILY
Qty: 90 TABLET | Refills: 2 | Status: SHIPPED | OUTPATIENT
Start: 2024-01-08

## 2024-01-08 NOTE — PROGRESS NOTES
"1/8/2024    Chief Complaint   Patient presents with    Follow-up       Assessment and Plan    74 y.o. female     1.Mixed urinary incontinence with OAB   She had initial improvement with Myrbetriq 25 mg daily regarding her incontinence but was still experiencing frequency and urgency. Since her last office visit she has had worsening symptoms and has had to go back to using incontinence briefs during the daytime and nighttime.  We will try increasing her Myrbetriq to 50 mg daily. We talked about possible Botox, but she has friends who reported poor response so she is reluctant to try that. We also talked about PTNS, but due to transportation issues that is not an option at this time.  We will schedule her for follow-up after she returns from [place] at the end of April       Subjective:    History of Present Illness  Deyanira Stokes is a 74 y.o. female here for follow-up evaluation of mixed stress and urge urinary incontinence.     Established patient with history of mixed urinary incontinence and nephrolithiasis. She wears depends. She has some urgency and urge incontinence.  She also has a several year history of urge incontinence. She reports hysterectomy for fibroids at age 40 with some type of bladder surgery performed at the time as \"turns out my uterus and bladder were stuck together and he made my bladder new during the surgery but it's only the size of 1 oz.\" She's had urinary urgency for about 12 years, and incontinence for the past few- on the way to the toilet. She wears 2 briefs per day, 1 overnight. She's not had hematuria or UTIs. She drinks 30 ounces of water a day; she tried to increase this but felt it was too much, decreased tea and juice intake. Rare soda and no coffee. She was tried on oxybutynin in the past, but this was discontinued due to constipation and switched to Myrbetriq 25 mg during her telemedicine visit in January.     She had US of Kidney and bladder on 6/23/2023 which showed no " "evidence of urolithiasis or obstructive uropathy. She does have small stable simple appearing bilateral renal cyst.      She was last seen by me in follow-up in September 2023 and reported satisfactory improvement in her lower urinary tract symptoms since starting Myrbetriq 25 mg daily.  She was still experiencing urinary frequency and nocturia several times per night however was no longer experiencing urinary incontinence.  She was comfortable with her current symptoms and denied any adverse side effects from Myrbetriq and her BP was normal.      Review of Systems   Constitutional:  Negative for chills and fever.   HENT:  Negative for ear pain and sore throat.    Eyes:  Negative for pain and visual disturbance.   Respiratory:  Negative for cough and shortness of breath.    Cardiovascular:  Negative for chest pain and palpitations.   Gastrointestinal:  Negative for abdominal pain and vomiting.   Genitourinary:  Positive for frequency and urgency. Negative for dysuria and hematuria.        Urge incontinence    Musculoskeletal:  Negative for arthralgias and back pain.   Skin:  Negative for color change and rash.   Neurological:  Negative for seizures and syncope.   All other systems reviewed and are negative.              Vitals  Vitals:    01/08/24 1104   BP: 138/80   Pulse: 102   SpO2: 95%   Weight: 82.4 kg (181 lb 9.6 oz)   Height: 4' 9\" (1.448 m)       Physical Exam  Vitals reviewed.   Constitutional:       General: She is not in acute distress.     Appearance: Normal appearance. She is normal weight. She is not ill-appearing or toxic-appearing.   HENT:      Head: Normocephalic and atraumatic.      Nose: Nose normal.   Eyes:      General: No scleral icterus.     Conjunctiva/sclera: Conjunctivae normal.   Cardiovascular:      Rate and Rhythm: Normal rate.      Pulses: Normal pulses.   Pulmonary:      Effort: Pulmonary effort is normal. No respiratory distress.   Abdominal:      General: Abdomen is flat.      " Palpations: Abdomen is soft.      Tenderness: There is no abdominal tenderness. There is no right CVA tenderness or left CVA tenderness.      Hernia: No hernia is present.   Musculoskeletal:         General: Normal range of motion.      Cervical back: Normal range of motion.   Skin:     General: Skin is warm and dry.   Neurological:      General: No focal deficit present.      Mental Status: She is alert and oriented to person, place, and time. Mental status is at baseline.      Gait: Gait abnormal (ambulates with walker).   Psychiatric:         Mood and Affect: Mood normal.         Behavior: Behavior normal.         Thought Content: Thought content normal.         Judgment: Judgment normal.         Past History  Past Medical History:   Diagnosis Date    Anxiety     Arthritis     Asthma     Breast cancer (HCC)     rt mastectomy 2005    Cancer (HCC)     breast right    Cataract     CHF (congestive heart failure) (HCC)     Chronic headaches     Concussion     At age 11; Chronic headaches since then    Coronary artery disease     Depression     Dietary lactose intolerance     GERD (gastroesophageal reflux disease)     High cholesterol     History of chemotherapy     2005 rt breast cancer    History of transfusion     Hypertension     IBS (irritable bowel syndrome)     Irritable bowel syndrome (IBS) 05/31/2016    Kidney stone     Lymphedema of right arm     Obesity     PFO (patent foramen ovale)     PONV (postoperative nausea and vomiting)     Post-menopausal     Psychiatric disorder     Renal disorder     Shortness of breath     Vitamin D deficiency      Social History     Socioeconomic History    Marital status:      Spouse name: None    Number of children: None    Years of education: None    Highest education level: None   Occupational History    Occupation: Retired   Tobacco Use    Smoking status: Never    Smokeless tobacco: Never   Vaping Use    Vaping status: Never Used   Substance and Sexual Activity     Alcohol use: Yes     Alcohol/week: 1.0 standard drink of alcohol     Types: 1 Glasses of wine per week     Comment: socially    Drug use: Not Currently     Types: Marijuana     Comment: In her youth    Sexual activity: Not Currently   Other Topics Concern    None   Social History Narrative    Always uses seat belt    Occasional caffeine consumption         Retired    Lives with adult daughter     Social Determinants of Health     Financial Resource Strain: Low Risk  (5/26/2023)    Overall Financial Resource Strain (CARDIA)     Difficulty of Paying Living Expenses: Not very hard   Food Insecurity: No Food Insecurity (12/28/2022)    Hunger Vital Sign     Worried About Running Out of Food in the Last Year: Never true     Ran Out of Food in the Last Year: Never true   Transportation Needs: No Transportation Needs (5/26/2023)    PRAPARE - Transportation     Lack of Transportation (Medical): No     Lack of Transportation (Non-Medical): No   Physical Activity: Not on file   Stress: Not on file   Social Connections: Not on file   Intimate Partner Violence: Not on file   Housing Stability: Low Risk  (12/28/2022)    Housing Stability Vital Sign     Unable to Pay for Housing in the Last Year: No     Number of Places Lived in the Last Year: 1     Unstable Housing in the Last Year: No     Social History     Tobacco Use   Smoking Status Never   Smokeless Tobacco Never     Family History   Adopted: Yes   Problem Relation Age of Onset    Diabetes Mother     Heart disease Mother     Mental illness Mother     Depression Mother     Heart disease Brother     Breast cancer Maternal Aunt     Breast cancer Maternal Aunt     Breast cancer Maternal Aunt     Breast cancer Maternal Aunt     Breast cancer Maternal Aunt     No Known Problems Father     No Known Problems Sister     No Known Problems Daughter     Breast cancer Maternal Grandmother     No Known Problems Sister     No Known Problems Sister     No Known Problems Sister   "      The following portions of the patient's history were reviewed and updated as appropriate allergies, current medications, past medical history, past social history, past surgical history and problem list    Imaging:    Results  No results found for this or any previous visit (from the past 1 hour(s)).]  No results found for: \"PSA\"  Lab Results   Component Value Date    GLUCOSE 79 12/02/2015    CALCIUM 9.2 06/23/2023     12/02/2015    K 3.6 06/23/2023    CO2 28 06/23/2023     06/23/2023    BUN 12 06/23/2023    CREATININE 0.61 06/23/2023     Lab Results   Component Value Date    WBC 3.60 (L) 12/28/2022    HGB 13.5 12/28/2022    HCT 41.7 12/28/2022    MCV 93 12/28/2022     12/28/2022       Please Note:  Voice dictation software has been used to create this document. There may be inadvertent transcriptions errors.     JERARDO Hernandez 01/08/24   "

## 2024-01-11 DIAGNOSIS — R26.89 BALANCE DISORDER: ICD-10-CM

## 2024-01-11 DIAGNOSIS — R41.89 COGNITIVE DECLINE: ICD-10-CM

## 2024-01-11 DIAGNOSIS — R25.1 TREMOR: ICD-10-CM

## 2024-01-11 RX ORDER — DONEPEZIL HYDROCHLORIDE 10 MG/1
10 TABLET, FILM COATED ORAL
Qty: 30 TABLET | Refills: 3 | Status: SHIPPED | OUTPATIENT
Start: 2024-01-11

## 2024-01-18 ENCOUNTER — TELEPHONE (OUTPATIENT)
Dept: INTERNAL MEDICINE CLINIC | Facility: CLINIC | Age: 74
End: 2024-01-18

## 2024-01-18 NOTE — TELEPHONE ENCOUNTER
Pt called wanting to know if you would be able to do a phone call visit with her tomorrow? She cannot come into the office due to the snow they are calling for and she is unable to do the visit over [employer] or [employer]. Are you able to do just a phone call visit with her? She states there are important things she wants to talk to you about. I told her I would need to ask you first.

## 2024-01-19 ENCOUNTER — TELEMEDICINE (OUTPATIENT)
Dept: INTERNAL MEDICINE CLINIC | Facility: CLINIC | Age: 74
End: 2024-01-19
Payer: COMMERCIAL

## 2024-01-19 ENCOUNTER — RA CDI HCC (OUTPATIENT)
Dept: OTHER | Facility: HOSPITAL | Age: 74
End: 2024-01-19

## 2024-01-19 ENCOUNTER — TELEPHONE (OUTPATIENT)
Dept: INTERNAL MEDICINE CLINIC | Facility: CLINIC | Age: 74
End: 2024-01-19

## 2024-01-19 DIAGNOSIS — F33.41 RECURRENT MAJOR DEPRESSIVE DISORDER, IN PARTIAL REMISSION (HCC): Primary | Chronic | ICD-10-CM

## 2024-01-19 DIAGNOSIS — Z12.31 ENCOUNTER FOR SCREENING MAMMOGRAM FOR BREAST CANCER: ICD-10-CM

## 2024-01-19 PROCEDURE — 99213 OFFICE O/P EST LOW 20 MIN: CPT | Performed by: PHYSICIAN ASSISTANT

## 2024-01-19 RX ORDER — FLUOXETINE 20 MG/1
20 TABLET, FILM COATED ORAL DAILY
Qty: 30 TABLET | Refills: 5 | Status: SHIPPED | OUTPATIENT
Start: 2024-01-19

## 2024-01-19 RX ORDER — QUETIAPINE FUMARATE 50 MG/1
50 TABLET, EXTENDED RELEASE ORAL
Qty: 30 TABLET | Refills: 1 | Status: SHIPPED | OUTPATIENT
Start: 2024-01-19 | End: 2024-01-25

## 2024-01-19 NOTE — ASSESSMENT & PLAN NOTE
Taper and discontinue cymbalta in favor of prozac. Cross taper instructions given and pt verbalized understanding of this. Add seroquel XR to augment prozac as well as improve insomnia and mood swings.

## 2024-01-19 NOTE — PROGRESS NOTES
General Adult HPI





- General


Chief complaint: Extremity Problem,Nontraumatic


Stated complaint: Chest pain/detox


Time Seen by Provider: 05/30/22 22:46


Source: patient


Mode of arrival: wheelchair


Limitations: no limitations





- History of Present Illness


Initial comments: 





This 56-year-old female presents with a complaint of some left arm pain.  She 

states that this came on earlier today.  It seems to occur more with exertion 

like walking or going up some steps.  She states that it is into her arm.  She 

denies any actual injury or overuse.  She states that it feels similar to her 

prior myocardial infarction.  She said that the pain and her arm is essentially 

what she had when she had a previous myocardial infarction.  She did not have 

chest pain at that time.  She apparently had a myocardial infarction 14 years 

ago with subsequent three-vessel bypass.  She states that she does follow up 

regularly with her cardiologist.  Her last stress test was approximately one 

year ago and was normal.  She also is currently in rehabilitation for alcohol 

abuse.  She has been in rehabilitation for 2 weeks and this is been going well. 

She denies any actual chest pain or shortness of breath.  She describes the pain

in her arm as an achiness and it only occurred on 2 occasions earlier and seemed

to resolve after approximately 30 seconds.  She denies any leg pain or swelling.

 She denies any other complaints or modifying factors.  In addition, she later 

relates having some urinary frequency but denies any dysuria.





- Related Data


                                  Previous Rx's











 Medication  Instructions  Recorded


 


Sulfamethox-Tmp 800-160Mg [Bactrim 1 tab PO Q12HR #14 tab 05/31/22





-160 mg]  











                                    Allergies











Allergy/AdvReac Type Severity Reaction Status Date / Time


 


No Known Allergies Allergy   Verified 05/30/22 19:11














Review of Systems


ROS Statement: 


Those systems with pertinent positive or pertinent negative responses have been 

documented in the HPI.





ROS Other: All systems not noted in ROS Statement are negative.





Past Medical History


Past Medical History: Chest Pain / Angina, Hypertension


History of Any Multi-Drug Resistant Organisms: None Reported


Past Surgical History: Coronary Bypass/CABG, Heart Catheterization With Stent


Past Psychological History: No Psychological Hx Reported


Smoking Status: Current every day smoker


Past Alcohol Use History: Daily


Past Drug Use History: None Reported





General Exam





- General Exam Comments


Initial Comments: 





GENERAL: The patient is well nourished and well hydrated. 


VITAL SIGNS: Heart rate, blood pressure, respiratory rate reviewed as recorded 

in nurse's notes. 


EYES: Pupils are round and reactive. Extraocular movements are intact. No 

conjunctival / lid redness or swelling. 


ENT: No external evidence of injury, swelling, or ecchymosis. Airway is patent. 

Throat is clear. 


NECK: Nontender. No swelling or evidence of injury. No subcutaneous emphysema. 

Trachea is midline. No thyroid mass. 


HEART: Regular rate and rhythm. Good peripheral pulses. 


LUNGS/CHEST: Breath sounds clear and equal bilaterally. No rales, rhonchi, or 

wheezes. No ecchymosis, subcutaneous emphysema, or tenderness. 


ABDOMEN: Abdomen soft without tenderness. No palpable masses or organomegaly. No

peritoneal signs. No abdominal wall swelling or ecchymosis. 


EXTREMITIES: No extremity tenderness. Normal muscle tone and function. No 

thoracolumbar tenderness.  No swelling identified.


NEUROLOGIC: Sensation is grossly intact. Cranial nerve exam reveals face is 

symmetrical, tongue is midline, speech is clear. 


SKIN: No abrasions or ecchymosis is noted. No induration or masses noted. 


PSYCHIATRIC: Alert and oriented. Appropriate behavior and judgment.





Limitations: no limitations





Course


                                   Vital Signs











  05/30/22 05/30/22





  19:08 23:52


 


Temperature 98.1 F 


 


Pulse Rate 79 80


 


Respiratory 16 17





Rate  


 


Blood Pressure 142/86 152/82


 


O2 Sat by Pulse 96 98





Oximetry  














Medical Decision Making





- Medical Decision Making





Patient was seen and examined.  All diagnostics were reviewed.  The EKG shows a 

normal sinus rhythm at a rate of 80.  There is no acute ST-T wave changes noted.

 The NM intervals 151, QRS duration is 102, and the QTC intervals 436.  Patient 

does receive aspirin.  The laboratory shows mild elevation of the liver function

studies likely due to her history of alcohol abuse.  Troponin is negative.  

Urinalysis shows evidence of urinary tract infection and she is given some 

Bactrim DS.  Her symptomatology appears to be fairly atypical for cardiac 

disease.  Her workup is negative at this point.  It is felt as though she is 

stable for discharge back to the rehabilitation facility.  Close follow-up with 

her cardiologist is recommended.  Return parameters are discussed.  She will be 

placed on antibiotics for her urinary tract infection.





- Lab Data


Result diagrams: 


                                 05/30/22 23:54





                                 05/30/22 23:54


                                   Lab Results











  05/30/22 05/30/22 05/30/22 Range/Units





  23:54 23:54 23:54 


 


WBC  8.0    (3.8-10.6)  k/uL


 


RBC  4.86    (3.80-5.40)  m/uL


 


Hgb  13.3    (11.4-16.0)  gm/dL


 


Hct  42.9    (34.0-46.0)  %


 


MCV  88.3    (80.0-100.0)  fL


 


MCH  27.4    (25.0-35.0)  pg


 


MCHC  31.0    (31.0-37.0)  g/dL


 


RDW  14.8    (11.5-15.5)  %


 


Plt Count  229    (150-450)  k/uL


 


MPV  8.2    


 


Neutrophils %  48    %


 


Lymphocytes %  39    %


 


Monocytes %  5    %


 


Eosinophils %  5    %


 


Basophils %  2    %


 


Neutrophils #  3.8    (1.3-7.7)  k/uL


 


Lymphocytes #  3.1    (1.0-4.8)  k/uL


 


Monocytes #  0.4    (0-1.0)  k/uL


 


Eosinophils #  0.4    (0-0.7)  k/uL


 


Basophils #  0.1    (0-0.2)  k/uL


 


PT   9.7   (9.0-12.0)  sec


 


INR   0.9   (<1.2)  


 


APTT   29.2   (22.0-30.0)  sec


 


Sodium    136 L  (137-145)  mmol/L


 


Potassium      (3.5-5.1)  mmol/L


 


Chloride    109 H  ()  mmol/L


 


Carbon Dioxide    22  (22-30)  mmol/L


 


Anion Gap    5  mmol/L


 


BUN    20 H  (7-17)  mg/dL


 


Creatinine    0.90  (0.52-1.04)  mg/dL


 


Est GFR (CKD-EPI)AfAm    84  (>60 ml/min/1.73 sqM)  


 


Est GFR (CKD-EPI)NonAf    73  (>60 ml/min/1.73 sqM)  


 


Glucose    89  (74-99)  mg/dL


 


Calcium    8.9  (8.4-10.2)  mg/dL


 


Magnesium    2.2  (1.6-2.3)  mg/dL


 


Total Bilirubin    1.7 H  (0.2-1.3)  mg/dL


 


AST    46 H  (14-36)  U/L


 


ALT    21  (4-34)  U/L


 


Alkaline Phosphatase    65  ()  U/L


 


Troponin I     (0.000-0.034)  ng/mL


 


Total Protein    7.6  (6.3-8.2)  g/dL


 


Albumin    4.4  (3.5-5.0)  g/dL


 


Urine Color     


 


Urine Appearance     (Clear)  


 


Urine pH     (5.0-8.0)  


 


Ur Specific Gravity     (1.001-1.035)  


 


Urine Protein     (Negative)  


 


Urine Glucose (UA)     (Negative)  


 


Urine Ketones     (Negative)  


 


Urine Blood     (Negative)  


 


Urine Nitrite     (Negative)  


 


Urine Bilirubin     (Negative)  


 


Urine Urobilinogen     (<2.0)  mg/dL


 


Ur Leukocyte Esterase     (Negative)  


 


Urine RBC     (0-5)  /hpf


 


Urine WBC     (0-5)  /hpf


 


Ur Squamous Epith Cells     (0-4)  /hpf


 


Urine Bacteria     (None)  /hpf


 


Hyaline Casts     (0-2)  /lpf


 


Urine Mucus     (None)  /hpf














  05/30/22 05/30/22 Range/Units





  23:54 Unknown 


 


WBC    (3.8-10.6)  k/uL


 


RBC    (3.80-5.40)  m/uL


 


Hgb    (11.4-16.0)  gm/dL


 


Hct    (34.0-46.0)  %


 


MCV    (80.0-100.0)  fL


 


MCH    (25.0-35.0)  pg


 


MCHC    (31.0-37.0)  g/dL


 


RDW    (11.5-15.5)  %


 


Plt Count    (150-450)  k/uL


 


MPV    


 


Neutrophils %    %


 


Lymphocytes %    %


 


Monocytes %    %


 


Eosinophils %    %


 


Basophils %    %


 


Neutrophils #    (1.3-7.7)  k/uL


 


Lymphocytes #    (1.0-4.8)  k/uL


 


Monocytes #    (0-1.0)  k/uL


 


Eosinophils #    (0-0.7)  k/uL


 


Basophils #    (0-0.2)  k/uL


 


PT    (9.0-12.0)  sec


 


INR    (<1.2)  


 


APTT    (22.0-30.0)  sec


 


Sodium    (137-145)  mmol/L


 


Potassium    (3.5-5.1)  mmol/L


 


Chloride    ()  mmol/L


 


Carbon Dioxide    (22-30)  mmol/L


 


Anion Gap    mmol/L


 


BUN    (7-17)  mg/dL


 


Creatinine    (0.52-1.04)  mg/dL


 


Est GFR (CKD-EPI)AfAm    (>60 ml/min/1.73 sqM)  


 


Est GFR (CKD-EPI)NonAf    (>60 ml/min/1.73 sqM)  


 


Glucose    (74-99)  mg/dL


 


Calcium    (8.4-10.2)  mg/dL


 


Magnesium    (1.6-2.3)  mg/dL


 


Total Bilirubin    (0.2-1.3)  mg/dL


 


AST    (14-36)  U/L


 


ALT    (4-34)  U/L


 


Alkaline Phosphatase    ()  U/L


 


Troponin I  <0.012   (0.000-0.034)  ng/mL


 


Total Protein    (6.3-8.2)  g/dL


 


Albumin    (3.5-5.0)  g/dL


 


Urine Color   Yellow  


 


Urine Appearance   Cloudy H  (Clear)  


 


Urine pH   5.5  (5.0-8.0)  


 


Ur Specific Gravity   1.031  (1.001-1.035)  


 


Urine Protein   1+ H  (Negative)  


 


Urine Glucose (UA)   Negative  (Negative)  


 


Urine Ketones   Negative  (Negative)  


 


Urine Blood   Negative  (Negative)  


 


Urine Nitrite   Negative  (Negative)  


 


Urine Bilirubin   Negative  (Negative)  


 


Urine Urobilinogen   2.0  (<2.0)  mg/dL


 


Ur Leukocyte Esterase   Large H  (Negative)  


 


Urine RBC   2  (0-5)  /hpf


 


Urine WBC   14 H  (0-5)  /hpf


 


Ur Squamous Epith Cells   6 H  (0-4)  /hpf


 


Urine Bacteria   Rare H  (None)  /hpf


 


Hyaline Casts   3 H  (0-2)  /lpf


 


Urine Mucus   Rare H  (None)  /hpf














Disposition


Clinical Impression: 


 Left arm pain, UTI (urinary tract infection), History of alcohol abuse





Disposition: HOME SELF-CARE


Condition: Good


Instructions (If sedation given, give patient instructions):  Arm Pain (ED), 

Urinary Tract Infection in Women (ED)


Additional Instructions: 


Please follow-up with your cardiologist once done with rehabilitation.


Prescriptions: 


Sulfamethox-Tmp 800-160Mg [Bactrim -160 mg] 1 tab PO Q12HR #14 tab


Is patient prescribed a controlled substance at d/c from ED?: No


Referrals: 


None,Stated [Primary Care Provider] - 1-2 days


Time of Disposition: 00:59 HCC coding opportunities     E66.01, I50.9     Chart Reviewed number of suggestions sent to Provider: 2     Patients Insurance     Medicare Insurance: [employer] Advantage

## 2024-01-19 NOTE — PROGRESS NOTES
Virtual Brief Visit    This Visit is being completed by telephone. The Patient is located at Home and in the following state in which I hold an active license [place]    The patient was identified by name and date of birth. Deyanira Stokes was informed that this is a telemedicine visit and that the visit is being conducted through Telephone.  My office door was closed. No one else was in the room.  She acknowledged consent and understanding of privacy and security of the video platform. The patient has agreed to participate and understands they can discontinue the visit at any time.    Patient is aware this is a billable service.   Pt is having virtual telephone visit to discuss her depression. She feels the cymbalta has stopped working. She is noting irritability, agitation, depression, loss of interest, frequent crying spells, as well as insomnia. She has been seeing a therapist which helps. She previously also tried wellbutrin.           Assessment/Plan:      Problem List Items Addressed This Visit       Recurrent major depressive disorder, in partial remission (HCC) - Primary (Chronic)     Taper and discontinue cymbalta in favor of prozac. Cross taper instructions given and pt verbalized understanding of this. Add seroquel XR to augment prozac as well as improve insomnia and mood swings.          Relevant Medications    FLUoxetine (PROzac) 20 MG tablet    QUEtiapine (SEROquel XR) 50 mg     Other Visit Diagnoses       Encounter for screening mammogram for breast cancer                Recent Visits  Date Type Provider Dept   01/18/24 Telephone [facility]   Showing recent visits within past 7 days and meeting all other requirements  Today's Visits  Date Type Provider Dept   01/19/24 Telemedicine [facility] Alex [facility]   Showing today's visits and meeting all other requirements  Future Appointments  No visits were found meeting these conditions.  Showing future  appointments within next 150 days and meeting all other requirements         Visit Time  Total Visit Duration: 15

## 2024-01-25 ENCOUNTER — TELEPHONE (OUTPATIENT)
Dept: INTERNAL MEDICINE CLINIC | Facility: CLINIC | Age: 74
End: 2024-01-25

## 2024-01-25 DIAGNOSIS — F41.1 GENERALIZED ANXIETY DISORDER: Primary | ICD-10-CM

## 2024-01-25 RX ORDER — QUETIAPINE FUMARATE 25 MG/1
25 TABLET, FILM COATED ORAL
Qty: 30 TABLET | Refills: 2 | Status: SHIPPED | OUTPATIENT
Start: 2024-01-25

## 2024-01-25 NOTE — TELEPHONE ENCOUNTER
Hello, I am still writing calling on behalf of my mom, Deyanira Stokes. Her YOB: 1950. I wanted to check in about the effects of the new medications that she's taking. She has greatly slurred speech and is not very aware of what's going on around her, and I just wanted to see if that was normal. I believe it's for depression. And she just started taking it last night. I can be reached at [phone]. She typically sees Kayleen Feliz. Thanks. mEilia.

## 2024-01-31 ENCOUNTER — APPOINTMENT (OUTPATIENT)
Dept: LAB | Facility: HOSPITAL | Age: 74
End: 2024-01-31
Payer: COMMERCIAL

## 2024-01-31 DIAGNOSIS — M81.0 AGE-RELATED OSTEOPOROSIS WITHOUT CURRENT PATHOLOGICAL FRACTURE: ICD-10-CM

## 2024-01-31 LAB
ALBUMIN SERPL BCP-MCNC: 4 G/DL (ref 3.5–5)
ALP SERPL-CCNC: 76 U/L (ref 34–104)
ALT SERPL W P-5'-P-CCNC: 17 U/L (ref 7–52)
ANION GAP SERPL CALCULATED.3IONS-SCNC: 7 MMOL/L
AST SERPL W P-5'-P-CCNC: 19 U/L (ref 13–39)
BILIRUB SERPL-MCNC: 0.69 MG/DL (ref 0.2–1)
BUN SERPL-MCNC: 18 MG/DL (ref 5–25)
CALCIUM SERPL-MCNC: 9.6 MG/DL (ref 8.4–10.2)
CHLORIDE SERPL-SCNC: 104 MMOL/L (ref 96–108)
CO2 SERPL-SCNC: 32 MMOL/L (ref 21–32)
CREAT SERPL-MCNC: 0.77 MG/DL (ref 0.6–1.3)
GFR SERPL CREATININE-BSD FRML MDRD: 77 ML/MIN/1.73SQ M
GLUCOSE P FAST SERPL-MCNC: 106 MG/DL (ref 65–99)
POTASSIUM SERPL-SCNC: 4.2 MMOL/L (ref 3.5–5.3)
PROT SERPL-MCNC: 6.8 G/DL (ref 6.4–8.4)
SODIUM SERPL-SCNC: 143 MMOL/L (ref 135–147)

## 2024-01-31 PROCEDURE — 36415 COLL VENOUS BLD VENIPUNCTURE: CPT

## 2024-01-31 PROCEDURE — 80053 COMPREHEN METABOLIC PANEL: CPT

## 2024-02-02 ENCOUNTER — HOSPITAL ENCOUNTER (OUTPATIENT)
Dept: INFUSION CENTER | Facility: HOSPITAL | Age: 74
End: 2024-02-02
Payer: COMMERCIAL

## 2024-02-02 VITALS — TEMPERATURE: 97.7 F

## 2024-02-02 DIAGNOSIS — M81.0 AGE-RELATED OSTEOPOROSIS WITHOUT CURRENT PATHOLOGICAL FRACTURE: Primary | ICD-10-CM

## 2024-02-02 PROCEDURE — 96372 THER/PROPH/DIAG INJ SC/IM: CPT

## 2024-02-02 RX ADMIN — DENOSUMAB 60 MG: 60 INJECTION SUBCUTANEOUS at 10:57

## 2024-02-02 NOTE — PROGRESS NOTES
Deyanira Stokes tolerated Prolia injection well with no complications. Blood work reviewed and within parameters for treatment today. Pt denies any recent or upcoming dental work/issues. Pt reports right limb restriction d/t lymphedema.    Deyanira Stokes is aware of future appt on Wednesday 7/31/24 at 11AM.    AVS printed and given to Deyanira Stokes.

## 2024-02-14 DIAGNOSIS — F33.41 RECURRENT MAJOR DEPRESSIVE DISORDER, IN PARTIAL REMISSION (HCC): Chronic | ICD-10-CM

## 2024-02-14 RX ORDER — FLUOXETINE HYDROCHLORIDE 20 MG/1
20 CAPSULE ORAL DAILY
Qty: 90 CAPSULE | Refills: 1 | Status: SHIPPED | OUTPATIENT
Start: 2024-02-14

## 2024-02-21 ENCOUNTER — TELEPHONE (OUTPATIENT)
Dept: INTERNAL MEDICINE CLINIC | Facility: CLINIC | Age: 74
End: 2024-02-21

## 2024-02-21 NOTE — TELEPHONE ENCOUNTER
Patient is calling because she has had a runny nose for the last couple of months. She has been using tissues constantly and now her nose is very red and raw. She would like to know what she can use for her nose or if there is anything that can be prescribed.     Patient also was taking lisinopril that was sent to her from the [facility] in January but she did not get it in her delivery for February. When I went over her medication list I did not see lisinopril listed as a current medication.

## 2024-02-22 ENCOUNTER — OFFICE VISIT (OUTPATIENT)
Dept: INTERNAL MEDICINE CLINIC | Facility: CLINIC | Age: 74
End: 2024-02-22
Payer: COMMERCIAL

## 2024-02-22 VITALS
HEIGHT: 57 IN | BODY MASS INDEX: 39.7 KG/M2 | TEMPERATURE: 97.2 F | DIASTOLIC BLOOD PRESSURE: 68 MMHG | WEIGHT: 184 LBS | HEART RATE: 126 BPM | SYSTOLIC BLOOD PRESSURE: 132 MMHG | OXYGEN SATURATION: 94 %

## 2024-02-22 DIAGNOSIS — J30.2 SEASONAL ALLERGIES: Primary | ICD-10-CM

## 2024-02-22 DIAGNOSIS — R09.82 PND (POST-NASAL DRIP): ICD-10-CM

## 2024-02-22 DIAGNOSIS — J45.20 MILD INTERMITTENT ASTHMA WITHOUT COMPLICATION: ICD-10-CM

## 2024-02-22 PROCEDURE — 99213 OFFICE O/P EST LOW 20 MIN: CPT | Performed by: INTERNAL MEDICINE

## 2024-02-22 RX ORDER — MONTELUKAST SODIUM 10 MG/1
10 TABLET ORAL
Qty: 90 TABLET | Refills: 3 | Status: SHIPPED | OUTPATIENT
Start: 2024-02-22

## 2024-02-22 RX ORDER — LEVOCETIRIZINE DIHYDROCHLORIDE 5 MG/1
5 TABLET, FILM COATED ORAL EVERY EVENING
Qty: 90 TABLET | Refills: 1 | Status: SHIPPED | OUTPATIENT
Start: 2024-02-22

## 2024-02-22 RX ORDER — FLUTICASONE PROPIONATE 50 MCG
1 SPRAY, SUSPENSION (ML) NASAL DAILY
Qty: 16 G | Refills: 0 | Status: SHIPPED | OUTPATIENT
Start: 2024-02-22

## 2024-02-22 NOTE — PATIENT INSTRUCTIONS
Cold Symptoms   AMBULATORY CARE:   Cold symptoms  include sneezing, dry throat, a stuffy nose, headache, watery eyes, and a cough. Your cough may be dry, or you may cough up mucus. You may also have muscle aches, joint pain, and tiredness. Rarely, you may have a fever. Cold symptoms occur from inflammation in your upper respiratory system caused by a virus. Most colds go away without treatment.  Seek care immediately if:   You have increased tiredness and weakness.    You are unable to eat.     Your heart is beating much faster than usual for you.     You see white spots in the back of your throat and your neck is swollen and sore to the touch.     You see pinpoint or larger reddish-purple dots on your skin.    Contact your healthcare provider if:   You have a fever higher than 102°F (38.9°C).    You have new or worsening shortness of breath.     You have thick nasal drainage for more than 2 days.     Your symptoms do not improve or get worse within 5 days.     You have questions or concerns about your condition or care.    Treatment for cold symptoms  may include NSAIDS to decrease muscle aches and fever. Cold medicines may also be given to decrease coughing, nasal stuffiness, sneezing, and a runny nose.   Manage your cold symptoms:  The following may help relieve cold symptoms, such as a dry throat and congestion:  Gargle with mouthwash or warm salt water as directed.     Suck on throat lozenges or hard candy.     Use a cold or warm vaporizer or humidifier to ease your breathing.     Rest for at least 2 days and then as needed to decrease tiredness and weakness.     Use petroleum based jelly around your nostrils to decrease irritation from blowing your nose.     Drink plenty of liquids. Liquids will help thin and loosen thick mucus so you can cough it up. Liquids will also keep you hydrated. Ask your healthcare provider which liquids are best for you and how much to drink each day.    Prevent the spread of germs   Meghna called back to advise they are going to ER.    Merary Kiran, RANJEET, RN, [facility]  [facility]) [phone]       by washing your hands often. You can spread your cold germs to others for at least 3 days after your symptoms start. Do not share items, such as eating utensils. Cover your nose and mouth when you cough or sneeze using the crook of your elbow instead of your hands. Throw used tissues in the garbage.  Do not smoke:  Smoking may worsen your symptoms and increase the length of time you feel sick. Talk with your healthcare provider if you need help to stop smoking.  Follow up with your doctor as directed:  Write down your questions so you remember to ask them during your visits.  © Copyright [employer] 2023 Information is for End User's use only and may not be sold, redistributed or otherwise used for commercial purposes.  The above information is an  only. It is not intended as medical advice for individual conditions or treatments. Talk to your doctor, nurse or pharmacist before following any medical regimen to see if it is safe and effective for you.

## 2024-02-22 NOTE — PROGRESS NOTES
Assessment/Plan:  Problem List Items Addressed This Visit          Respiratory    Mild intermittent asthma without complication    Relevant Medications    levocetirizine (XYZAL) 5 MG tablet    montelukast (SINGULAIR) 10 mg tablet    fluticasone (FLONASE) 50 mcg/act nasal spray       Other    Seasonal allergies - Primary    Relevant Medications    levocetirizine (XYZAL) 5 MG tablet    montelukast (SINGULAIR) 10 mg tablet    fluticasone (FLONASE) 50 mcg/act nasal spray     Other Visit Diagnoses       PND (post-nasal drip)        Relevant Medications    levocetirizine (XYZAL) 5 MG tablet    montelukast (SINGULAIR) 10 mg tablet    fluticasone (FLONASE) 50 mcg/act nasal spray             Diagnoses and all orders for this visit:    Seasonal allergies  -     levocetirizine (XYZAL) 5 MG tablet; Take 1 tablet (5 mg total) by mouth every evening  -     montelukast (SINGULAIR) 10 mg tablet; Take 1 tablet (10 mg total) by mouth daily at bedtime  -     fluticasone (FLONASE) 50 mcg/act nasal spray; 1 spray into each nostril daily    PND (post-nasal drip)  -     levocetirizine (XYZAL) 5 MG tablet; Take 1 tablet (5 mg total) by mouth every evening  -     montelukast (SINGULAIR) 10 mg tablet; Take 1 tablet (10 mg total) by mouth daily at bedtime  -     fluticasone (FLONASE) 50 mcg/act nasal spray; 1 spray into each nostril daily    Mild intermittent asthma without complication  -     levocetirizine (XYZAL) 5 MG tablet; Take 1 tablet (5 mg total) by mouth every evening  -     montelukast (SINGULAIR) 10 mg tablet; Take 1 tablet (10 mg total) by mouth daily at bedtime  -     fluticasone (FLONASE) 50 mcg/act nasal spray; 1 spray into each nostril daily        No problem-specific Assessment & Plan notes found for this encounter.    A/P: Suspect PND and allergies. Causing skin irritation from the d/c. Will start INS, singulair for RAD and allergies, and xyzal. HOld on abx and imaging. Skin should heal if we stop the runny nose. If  persists, refer to ENT for scoping.     Subjective:      Patient ID: Deyanira Stokes is a 74 y.o. female.     Asthmatic WF presents with five month h/o runny nose with subsequent nasal irritation internally and externally. Some sinus pressure. Taking Claritin. No URI, bloody nasal d/c, etc.         The following portions of the patient's history were reviewed and updated as appropriate:   She has a past medical history of Anxiety, Arthritis, Asthma, Breast cancer (HCC), Cancer (HCC), Cataract, CHF (congestive heart failure) (HCC), Chronic headaches, Concussion, Coronary artery disease, Depression, Dietary lactose intolerance, GERD (gastroesophageal reflux disease), High cholesterol, History of chemotherapy, History of transfusion, Hypertension, IBS (irritable bowel syndrome), Irritable bowel syndrome (IBS) (05/31/2016), Kidney stone, Lymphedema of right arm, Obesity, PFO (patent foramen ovale), PONV (postoperative nausea and vomiting), Post-menopausal, Psychiatric disorder, Renal disorder, Shortness of breath, and Vitamin D deficiency.,  does not have any pertinent problems on file.,   has a past surgical history that includes Tonsillectomy and adenoidectomy; Breast surgery; Cholecystectomy; Colonoscopy (N/A, 05/31/2016); Gallbladder surgery; Hysterectomy; Cataract extraction, bilateral; Mastectomy (Right); Knee arthroscopy; Knee surgery; Cataract extraction (Bilateral); pr arthrp kne condyle&platu medial&lat compartments (Left, 05/20/2021); Joint replacement; pr arthrp kne condyle&platu medial&lat compartments (Right, 11/16/2022); FL guided needle plac bx/asp/inj (8/14/2023); and FL guided needle plac bx/asp/inj (10/30/2023).,  family history includes Breast cancer in her maternal aunt, maternal aunt, maternal aunt, maternal aunt, maternal aunt, and maternal grandmother; Depression in her mother; Diabetes in her mother; Heart disease in her brother and mother; Mental illness in her mother; No Known Problems in her  daughter, father, sister, sister, sister, and sister. She was adopted.,   reports that she has never smoked. She has never been exposed to tobacco smoke. She has never used smokeless tobacco. She reports current alcohol use of about 1.0 standard drink of alcohol per week. She reports that she does not currently use drugs after having used the following drugs: Marijuana.,  is allergic to ibuprofen, morphine, and latex..  Current Outpatient Medications   Medication Sig Dispense Refill    fluticasone (FLONASE) 50 mcg/act nasal spray 1 spray into each nostril daily 16 g 0    levocetirizine (XYZAL) 5 MG tablet Take 1 tablet (5 mg total) by mouth every evening 90 tablet 1    montelukast (SINGULAIR) 10 mg tablet Take 1 tablet (10 mg total) by mouth daily at bedtime 90 tablet 3    acetaminophen (TYLENOL) 650 mg CR tablet Take 1 tablet (650 mg total) by mouth every 8 (eight) hours as needed for mild pain 30 tablet 0    albuterol (2.5 mg/3 mL) 0.083 % nebulizer solution Take 2.5 mg by nebulization every 6 (six) hours as needed for wheezing      albuterol (PROAIR HFA) 90 mcg/act inhaler Inhale 2 puffs every 4 (four) hours as needed for wheezing 3 Inhaler 1    atorvastatin (LIPITOR) 20 mg tablet Take 1 tablet (20 mg total) by mouth daily 90 tablet 2    butalbital-acetaminophen-caffeine (FIORICET,ESGIC) -40 mg per tablet Take 1 tablet by mouth 3 (three) times a day as needed for headaches 90 tablet 1    Diclofenac Sodium (VOLTAREN) 1 % Apply 2 g topically 4 (four) times a day 60 g 3    donepezil (ARICEPT) 10 mg tablet Take 1 tablet by mouth daily at bedtime. 30 tablet 3    FLUoxetine (PROzac) 20 mg capsule TAKE 1 CAPSULE BY MOUTH EVERY DAY 90 capsule 1    Melatonin 10 MG TABS Take by mouth      Mirabegron ER 25 MG TB24 Take 50 mg by mouth in the morning 180 tablet 3    Nutritional Supplements (BOOST BREEZE PO) Take by mouth daily      oxyCODONE (OxyCONTIN) 10 mg 12 hr tablet Take 10 mg by mouth as needed      pantoprazole  "(PROTONIX) 40 mg tablet Take 1 tablet (40 mg total) by mouth daily 90 tablet 1    QUEtiapine (SEROquel) 25 mg tablet Take 1 tablet (25 mg total) by mouth daily at bedtime 30 tablet 2     No current facility-administered medications for this visit.       Review of Systems   Constitutional:  Negative for activity change, chills, diaphoresis, fatigue and fever.   HENT:  Positive for congestion, postnasal drip, rhinorrhea and sinus pressure. Negative for ear discharge, ear pain, facial swelling, sinus pain and sore throat.    Respiratory:  Negative for cough, chest tightness, shortness of breath and wheezing.    Cardiovascular:  Negative for chest pain, palpitations and leg swelling.   Gastrointestinal:  Negative for abdominal pain, constipation, diarrhea, nausea and vomiting.   Genitourinary:  Negative for difficulty urinating, dysuria and frequency.   Musculoskeletal:  Negative for arthralgias, gait problem and myalgias.   Neurological:  Negative for light-headedness and headaches.   Psychiatric/Behavioral:  Negative for confusion. The patient is not nervous/anxious.        PHQ-2/9 Depression Screening            Objective:  Vitals:    02/22/24 1316   BP: 132/68   Pulse: (!) 126   Temp: (!) 97.2 °F (36.2 °C)   SpO2: 94%   Weight: 83.5 kg (184 lb)   Height: 4' 9\" (1.448 m)     Body mass index is 39.82 kg/m².     Physical Exam  Vitals and nursing note reviewed.   Constitutional:       General: She is not in acute distress.     Appearance: Normal appearance. She is not ill-appearing.   HENT:      Head: Normocephalic and atraumatic.      Right Ear: Tympanic membrane, ear canal and external ear normal. There is no impacted cerumen.      Left Ear: Tympanic membrane, ear canal and external ear normal. There is no impacted cerumen.      Nose: Congestion and rhinorrhea present.      Comments: Eternal nares red and erythematous. Turbinates red and inflamed. Sinus tenderness.      Mouth/Throat:      Mouth: Mucous membranes are " moist.      Pharynx: Posterior oropharyngeal erythema present. No oropharyngeal exudate.   Eyes:      Extraocular Movements: Extraocular movements intact.      Conjunctiva/sclera: Conjunctivae normal.      Pupils: Pupils are equal, round, and reactive to light.   Cardiovascular:      Rate and Rhythm: Normal rate and regular rhythm.      Heart sounds: Normal heart sounds. No murmur heard.  Pulmonary:      Effort: Pulmonary effort is normal. No respiratory distress.      Breath sounds: Normal breath sounds. No wheezing or rales.   Musculoskeletal:      Cervical back: Neck supple. No tenderness.   Lymphadenopathy:      Cervical: No cervical adenopathy.   Neurological:      General: No focal deficit present.      Mental Status: She is alert and oriented to person, place, and time. Mental status is at baseline.   Psychiatric:         Mood and Affect: Mood normal.         Behavior: Behavior normal.         Thought Content: Thought content normal.         Judgment: Judgment normal.

## 2024-03-13 DIAGNOSIS — R06.02 SHORTNESS OF BREATH: ICD-10-CM

## 2024-03-13 DIAGNOSIS — F33.41 RECURRENT MAJOR DEPRESSIVE DISORDER, IN PARTIAL REMISSION (HCC): Chronic | ICD-10-CM

## 2024-03-13 RX ORDER — FLUOXETINE HYDROCHLORIDE 20 MG/1
20 CAPSULE ORAL DAILY
Qty: 90 CAPSULE | Refills: 1 | Status: SHIPPED | OUTPATIENT
Start: 2024-03-13

## 2024-03-13 RX ORDER — ALBUTEROL SULFATE 90 UG/1
2 AEROSOL, METERED RESPIRATORY (INHALATION) EVERY 4 HOURS PRN
Qty: 16 G | Refills: 0 | Status: SHIPPED | OUTPATIENT
Start: 2024-03-13

## 2024-03-14 ENCOUNTER — TELEPHONE (OUTPATIENT)
Dept: INTERNAL MEDICINE CLINIC | Facility: CLINIC | Age: 74
End: 2024-03-14

## 2024-03-19 ENCOUNTER — VBI (OUTPATIENT)
Dept: ADMINISTRATIVE | Facility: OTHER | Age: 74
End: 2024-03-19

## 2024-04-02 DIAGNOSIS — R06.02 SHORTNESS OF BREATH: ICD-10-CM

## 2024-04-02 RX ORDER — ALBUTEROL SULFATE 90 UG/1
AEROSOL, METERED RESPIRATORY (INHALATION)
Qty: 8.5 G | Refills: 2 | Status: SHIPPED | OUTPATIENT
Start: 2024-04-02

## 2024-04-03 DIAGNOSIS — F41.1 GENERALIZED ANXIETY DISORDER: ICD-10-CM

## 2024-04-03 DIAGNOSIS — R07.89 OTHER CHEST PAIN: ICD-10-CM

## 2024-04-03 RX ORDER — PANTOPRAZOLE SODIUM 40 MG/1
40 TABLET, DELAYED RELEASE ORAL DAILY
Qty: 90 TABLET | Refills: 1 | Status: SHIPPED | OUTPATIENT
Start: 2024-04-03

## 2024-04-03 RX ORDER — QUETIAPINE FUMARATE 25 MG/1
25 TABLET, FILM COATED ORAL
Qty: 90 TABLET | Refills: 1 | Status: SHIPPED | OUTPATIENT
Start: 2024-04-03

## 2024-04-04 DIAGNOSIS — M81.0 AGE-RELATED OSTEOPOROSIS WITHOUT CURRENT PATHOLOGICAL FRACTURE: Primary | ICD-10-CM

## 2024-04-11 ENCOUNTER — TELEPHONE (OUTPATIENT)
Age: 74
End: 2024-04-11

## 2024-04-11 NOTE — TELEPHONE ENCOUNTER
Called pt to r/s appt due to provider schedule change. Pt VM not set up please set r/s letter for new appt 10/3/24 @ 9:45am

## 2024-04-19 ENCOUNTER — TELEPHONE (OUTPATIENT)
Dept: INTERNAL MEDICINE CLINIC | Facility: CLINIC | Age: 74
End: 2024-04-19

## 2024-04-19 NOTE — TELEPHONE ENCOUNTER
Called patient, received a denial of benefits that [facility] is out of network, she has to use [facility] or [facility]

## 2024-04-29 DIAGNOSIS — R26.89 BALANCE DISORDER: ICD-10-CM

## 2024-04-29 DIAGNOSIS — R25.1 TREMOR: ICD-10-CM

## 2024-04-29 DIAGNOSIS — R41.89 COGNITIVE DECLINE: ICD-10-CM

## 2024-04-29 RX ORDER — DONEPEZIL HYDROCHLORIDE 10 MG/1
10 TABLET, FILM COATED ORAL
Qty: 30 TABLET | Refills: 2 | Status: SHIPPED | OUTPATIENT
Start: 2024-04-29

## 2024-06-03 ENCOUNTER — RA CDI HCC (OUTPATIENT)
Dept: OTHER | Facility: HOSPITAL | Age: 74
End: 2024-06-03

## 2024-06-10 ENCOUNTER — OFFICE VISIT (OUTPATIENT)
Dept: INTERNAL MEDICINE CLINIC | Facility: CLINIC | Age: 74
End: 2024-06-10
Payer: COMMERCIAL

## 2024-06-10 VITALS
HEART RATE: 73 BPM | DIASTOLIC BLOOD PRESSURE: 78 MMHG | WEIGHT: 167.25 LBS | HEIGHT: 57 IN | SYSTOLIC BLOOD PRESSURE: 180 MMHG | BODY MASS INDEX: 36.08 KG/M2 | OXYGEN SATURATION: 97 % | TEMPERATURE: 97.7 F

## 2024-06-10 DIAGNOSIS — E55.9 VITAMIN D DEFICIENCY: ICD-10-CM

## 2024-06-10 DIAGNOSIS — D68.9 CLOTTING DISORDER (HCC): ICD-10-CM

## 2024-06-10 DIAGNOSIS — I10 ESSENTIAL HYPERTENSION: ICD-10-CM

## 2024-06-10 DIAGNOSIS — Z13.29 SCREENING FOR THYROID DISORDER: ICD-10-CM

## 2024-06-10 DIAGNOSIS — E78.5 HYPERLIPIDEMIA LDL GOAL <130: ICD-10-CM

## 2024-06-10 DIAGNOSIS — I35.0 NONRHEUMATIC AORTIC (VALVE) STENOSIS: ICD-10-CM

## 2024-06-10 DIAGNOSIS — Z59.82 INABILITY TO ACQUIRE TRANSPORTATION: ICD-10-CM

## 2024-06-10 DIAGNOSIS — Z59.41 FOOD INSECURITY: ICD-10-CM

## 2024-06-10 DIAGNOSIS — F33.41 RECURRENT MAJOR DEPRESSIVE DISORDER, IN PARTIAL REMISSION (HCC): ICD-10-CM

## 2024-06-10 DIAGNOSIS — F11.20 CONTINUOUS OPIOID DEPENDENCE (HCC): ICD-10-CM

## 2024-06-10 DIAGNOSIS — I11.0 HYPERTENSIVE HEART DISEASE WITH HEART FAILURE (HCC): Primary | Chronic | ICD-10-CM

## 2024-06-10 DIAGNOSIS — Z12.31 ENCOUNTER FOR SCREENING MAMMOGRAM FOR BREAST CANCER: ICD-10-CM

## 2024-06-10 DIAGNOSIS — Z00.00 MEDICARE ANNUAL WELLNESS VISIT, SUBSEQUENT: ICD-10-CM

## 2024-06-10 DIAGNOSIS — Z13.0 SCREENING FOR DEFICIENCY ANEMIA: ICD-10-CM

## 2024-06-10 DIAGNOSIS — R73.01 ELEVATED FASTING GLUCOSE: ICD-10-CM

## 2024-06-10 PROCEDURE — G0439 PPPS, SUBSEQ VISIT: HCPCS | Performed by: PHYSICIAN ASSISTANT

## 2024-06-10 PROCEDURE — 99214 OFFICE O/P EST MOD 30 MIN: CPT | Performed by: PHYSICIAN ASSISTANT

## 2024-06-10 RX ORDER — LISINOPRIL 5 MG/1
5 TABLET ORAL DAILY
Qty: 90 TABLET | Refills: 1 | Status: SHIPPED | OUTPATIENT
Start: 2024-06-10

## 2024-06-10 SDOH — ECONOMIC STABILITY - FOOD INSECURITY: FOOD INSECURITY: Z59.41

## 2024-06-10 SDOH — ECONOMIC STABILITY - TRANSPORTATION SECURITY: TRANSPORTATION INSECURITY: Z59.82

## 2024-06-10 NOTE — PATIENT INSTRUCTIONS
Medicare Preventive Visit Patient Instructions  Thank you for completing your Welcome to Medicare Visit or Medicare Annual Wellness Visit today. Your next wellness visit will be due in one year (6/11/2025).  The screening/preventive services that you may require over the next 5-10 years are detailed below. Some tests may not apply to you based off risk factors and/or age. Screening tests ordered at today's visit but not completed yet may show as past due. Also, please note that scanned in results may not display below.  Preventive Screenings:  Service Recommendations Previous Testing/Comments   Colorectal Cancer Screening  * Colonoscopy    * Fecal Occult Blood Test (FOBT)/Fecal Immunochemical Test (FIT)  * Fecal DNA/Cologuard Test  * Flexible Sigmoidoscopy Age: 45-75 years old   Colonoscopy: every 10 years (may be performed more frequently if at higher risk)  OR  FOBT/FIT: every 1 year  OR  Cologuard: every 3 years  OR  Sigmoidoscopy: every 5 years  Screening may be recommended earlier than age 45 if at higher risk for colorectal cancer. Also, an individualized decision between you and your healthcare provider will decide whether screening between the ages of 76-85 would be appropriate. Colonoscopy: 05/31/2016  FOBT/FIT: Not on file  Cologuard: Not on file  Sigmoidoscopy: Not on file    Screening Current     Breast Cancer Screening Age: 40+ years old  Frequency: every 1-2 years  Not required if history of left and right mastectomy Mammogram: 08/04/2022    History Breast Cancer   Cervical Cancer Screening Between the ages of 21-29, pap smear recommended once every 3 years.   Between the ages of 30-65, can perform pap smear with HPV co-testing every 5 years.   Recommendations may differ for women with a history of total hysterectomy, cervical cancer, or abnormal pap smears in past. Pap Smear: Not on file    Screening Not Indicated   Hepatitis C Screening Once for adults born between 1945 and 1965  More frequently in  patients at high risk for Hepatitis C Hep C Antibody: 12/26/2022    Screening Current   Diabetes Screening 1-2 times per year if you're at risk for diabetes or have pre-diabetes Fasting glucose: 106 mg/dL (1/31/2024)  A1C: 5.6 % (6/23/2023)  Screening Current   Cholesterol Screening Once every 5 years if you don't have a lipid disorder. May order more often based on risk factors. Lipid panel: 06/23/2023    Screening Not Indicated  History Lipid Disorder     Other Preventive Screenings Covered by Medicare:  Abdominal Aortic Aneurysm (AAA) Screening: covered once if your at risk. You're considered to be at risk if you have a family history of AAA.  Lung Cancer Screening: covers low dose CT scan once per year if you meet all of the following conditions: (1) Age 55-77; (2) No signs or symptoms of lung cancer; (3) Current smoker or have quit smoking within the last 15 years; (4) You have a tobacco smoking history of at least 20 pack years (packs per day multiplied by number of years you smoked); (5) You get a written order from a healthcare provider.  Glaucoma Screening: covered annually if you're considered high risk: (1) You have diabetes OR (2) Family history of glaucoma OR (3)  aged 50 and older OR (4)  American aged 65 and older  Osteoporosis Screening: covered every 2 years if you meet one of the following conditions: (1) You're estrogen deficient and at risk for osteoporosis based off medical history and other findings; (2) Have a vertebral abnormality; (3) On glucocorticoid therapy for more than 3 months; (4) Have primary hyperparathyroidism; (5) On osteoporosis medications and need to assess response to drug therapy.   Last bone density test (DXA Scan): 08/22/2023.  HIV Screening: covered annually if you're between the age of 15-65. Also covered annually if you are younger than 15 and older than 65 with risk factors for HIV infection. For pregnant patients, it is covered up to 3 times per  pregnancy.    Immunizations:  Immunization Recommendations   Influenza Vaccine Annual influenza vaccination during flu season is recommended for all persons aged >= 6 months who do not have contraindications   Pneumococcal Vaccine   * Pneumococcal conjugate vaccine = PCV13 (Prevnar 13), PCV15 (Vaxneuvance), PCV20 (Prevnar 20)  * Pneumococcal polysaccharide vaccine = PPSV23 (Pneumovax) Adults 19-63 yo with certain risk factors or if 65+ yo  If never received any pneumonia vaccine: recommend Prevnar 20 (PCV20)  Give PCV20 if previously received 1 dose of PCV13 or PPSV23   Hepatitis B Vaccine 3 dose series if at intermediate or high risk (ex: diabetes, end stage renal disease, liver disease)   Respiratory syncytial virus (RSV) Vaccine - COVERED BY MEDICARE PART D  * RSVPreF3 (Arexvy) CDC recommends that adults 60 years of age and older may receive a single dose of RSV vaccine using shared clinical decision-making (SCDM)   Tetanus (Td) Vaccine - COST NOT COVERED BY MEDICARE PART B Following completion of primary series, a booster dose should be given every 10 years to maintain immunity against tetanus. Td may also be given as tetanus wound prophylaxis.   Tdap Vaccine - COST NOT COVERED BY MEDICARE PART B Recommended at least once for all adults. For pregnant patients, recommended with each pregnancy.   Shingles Vaccine (Shingrix) - COST NOT COVERED BY MEDICARE PART B  2 shot series recommended in those 19 years and older who have or will have weakened immune systems or those 50 years and older     Health Maintenance Due:      Topic Date Due   • Breast Cancer Screening: Mammogram  08/04/2023   • DXA SCAN  08/22/2025   • Colorectal Cancer Screening  05/31/2026   • Hepatitis C Screening  Completed     Immunizations Due:      Topic Date Due   • COVID-19 Vaccine (4 - 2023-24 season) 09/01/2023     Advance Directives   What are advance directives?  Advance directives are legal documents that state your wishes and plans for  medical care. These plans are made ahead of time in case you lose your ability to make decisions for yourself. Advance directives can apply to any medical decision, such as the treatments you want, and if you want to donate organs.   What are the types of advance directives?  There are many types of advance directives, and each state has rules about how to use them. You may choose a combination of any of the following:  Living will:  This is a written record of the treatment you want. You can also choose which treatments you do not want, which to limit, and which to stop at a certain time. This includes surgery, medicine, IV fluid, and tube feedings.   Durable power of  for healthcare (DPAHC):  This is a written record that states who you want to make healthcare choices for you when you are unable to make them for yourself. This person, called a proxy, is usually a family member or a friend. You may choose more than 1 proxy.  Do not resuscitate (DNR) order:  A DNR order is used in case your heart stops beating or you stop breathing. It is a request not to have certain forms of treatment, such as CPR. A DNR order may be included in other types of advance directives.  Medical directive:  This covers the care that you want if you are in a coma, near death, or unable to make decisions for yourself. You can list the treatments you want for each condition. Treatment may include pain medicine, surgery, blood transfusions, dialysis, IV or tube feedings, and a ventilator (breathing machine).  Values history:  This document has questions about your views, beliefs, and how you feel and think about life. This information can help others choose the care that you would choose.  Why are advance directives important?  An advance directive helps you control your care. Although spoken wishes may be used, it is better to have your wishes written down. Spoken wishes can be misunderstood, or not followed. Treatments may be given  even if you do not want them. An advance directive may make it easier for your family to make difficult choices about your care.   Fall Prevention    Fall prevention  includes ways to make your home and other areas safer. It also includes ways you can move more carefully to prevent a fall. Health conditions that cause changes in your blood pressure, vision, or muscle strength and coordination may increase your risk for falls. Medicines may also increase your risk for falls if they make you dizzy, weak, or sleepy.   Fall prevention tips:   Stand or sit up slowly.    Use assistive devices as directed.    Wear shoes that fit well and have soles that .    Wear a personal alarm.    Stay active.    Manage your medical conditions.    Home Safety Tips:  Add items to prevent falls in the bathroom.    Keep paths clear.    Install bright lights in your home.    Keep items you use often on shelves within reach.    Paint or place reflective tape on the edges of your stairs.    Urinary Incontinence   Urinary incontinence (UI)  is when you lose control of your bladder. UI develops because your bladder cannot store or empty urine properly. The 3 most common types of UI are stress incontinence, urge incontinence, or both.  Medicines:   May be given to help strengthen your bladder control. Report any side effects of medication to your healthcare provider.  Do pelvic muscle exercises often:  Your pelvic muscles help you stop urinating. Squeeze these muscles tight for 5 seconds, then relax for 5 seconds. Gradually work up to squeezing for 10 seconds. Do 3 sets of 15 repetitions a day, or as directed. This will help strengthen your pelvic muscles and improve bladder control.  Train your bladder:  Go to the bathroom at set times, such as every 2 hours, even if you do not feel the urge to go. You can also try to hold your urine when you feel the urge to go. For example, hold your urine for 5 minutes when you feel the urge to go. As  that becomes easier, hold your urine for 10 minutes.   Self-care:   Keep a UI record.  Write down how often you leak urine and how much you leak. Make a note of what you were doing when you leaked urine.  Drink liquids as directed. You may need to limit the amount of liquid you drink to help control your urine leakage. Do not drink any liquid right before you go to bed. Limit or do not have drinks that contain caffeine or alcohol.   Prevent constipation.  Eat a variety of high-fiber foods. Good examples are high-fiber cereals, beans, vegetables, and whole-grain breads. Walking is the best way to trigger your intestines to have a bowel movement.  Exercise regularly and maintain a healthy weight.  Weight loss and exercise will decrease pressure on your bladder and help you control your leakage.   Use a catheter as directed  to help empty your bladder. A catheter is a tiny, plastic tube that is put into your bladder to drain your urine.   Go to behavior therapy as directed.  Behavior therapy may be used to help you learn to control your urge to urinate.    Weight Management   Why it is important to manage your weight:  Being overweight increases your risk of health conditions such as heart disease, high blood pressure, type 2 diabetes, and certain types of cancer. It can also increase your risk for osteoarthritis, sleep apnea, and other respiratory problems. Aim for a slow, steady weight loss. Even a small amount of weight loss can lower your risk of health problems.  How to lose weight safely:  A safe and healthy way to lose weight is to eat fewer calories and get regular exercise. You can lose up about 1 pound a week by decreasing the number of calories you eat by 500 calories each day.   Healthy meal plan for weight management:  A healthy meal plan includes a variety of foods, contains fewer calories, and helps you stay healthy. A healthy meal plan includes the following:  Eat whole-grain foods more often.  A  healthy meal plan should contain fiber. Fiber is the part of grains, fruits, and vegetables that is not broken down by your body. Whole-grain foods are healthy and provide extra fiber in your diet. Some examples of whole-grain foods are whole-wheat breads and pastas, oatmeal, brown rice, and bulgur.  Eat a variety of vegetables every day.  Include dark, leafy greens such as spinach, kale, florin greens, and mustard greens. Eat yellow and orange vegetables such as carrots, sweet potatoes, and winter squash.   Eat a variety of fruits every day.  Choose fresh or canned fruit (canned in its own juice or light syrup) instead of juice. Fruit juice has very little or no fiber.  Eat low-fat dairy foods.  Drink fat-free (skim) milk or 1% milk. Eat fat-free yogurt and low-fat cottage cheese. Try low-fat cheeses such as mozzarella and other reduced-fat cheeses.  Choose meat and other protein foods that are low in fat.  Choose beans or other legumes such as split peas or lentils. Choose fish, skinless poultry (chicken or turkey), or lean cuts of red meat (beef or pork). Before you cook meat or poultry, cut off any visible fat.   Use less fat and oil.  Try baking foods instead of frying them. Add less fat, such as margarine, sour cream, regular salad dressing and mayonnaise to foods. Eat fewer high-fat foods. Some examples of high-fat foods include french fries, doughnuts, ice cream, and cakes.  Eat fewer sweets.  Limit foods and drinks that are high in sugar. This includes candy, cookies, regular soda, and sweetened drinks.  Exercise:  Exercise at least 30 minutes per day on most days of the week. Some examples of exercise include walking, biking, dancing, and swimming. You can also fit in more physical activity by taking the stairs instead of the elevator or parking farther away from stores. Ask your healthcare provider about the best exercise plan for you.   Narcotic (Opioid) Safety    Use narcotics safely:  Take prescribed  narcotics exactly as directed  Do not give narcotics to others or take narcotics that belong to someone else  Do not mix narcotics without medicines or alcohol  Do not drive or operate heavy machinery after you take the narcotic  Monitor for side effects and notify your healthcare provider if you experienced side effects such as nausea, sleepiness, itching, or trouble thinking clearly.    Manage constipation:    Constipation is the most common side effect of narcotic medicine. Constipation is when you have hard, dry bowel movements, or you go longer than usual between bowel movements. Tell your healthcare provider about all changes in your bowel movements while you are taking narcotics. He or she may recommend laxative medicine to help you have a bowel movement. He or she may also change the kind of narcotic you are taking, or change when you take it. The following are more ways you can prevent or relieve constipation:    Drink liquids as directed.  You may need to drink extra liquids to help soften and move your bowels. Ask how much liquid to drink each day and which liquids are best for you.  Eat high-fiber foods.  This may help decrease constipation by adding bulk to your bowel movements. High-fiber foods include fruits, vegetables, whole-grain breads and cereals, and beans. Your healthcare provider or dietitian can help you create a high-fiber meal plan. Your provider may also recommend a fiber supplement if you cannot get enough fiber from food.  Exercise regularly.  Regular physical activity can help stimulate your intestines. Walking is a good exercise to prevent or relieve constipation. Ask which exercises are best for you.  Schedule a time each day to have a bowel movement.  This may help train your body to have regular bowel movements. Bend forward while you are on the toilet to help move the bowel movement out. Sit on the toilet for at least 10 minutes, even if you do not have a bowel movement.    Store  narcotics safely:   Store narcotics where others cannot easily get them.  Keep them in a locked cabinet or secure area. Do not  keep them in a purse or other bag you carry with you. A person may be looking for something else and find the narcotics.  Make sure narcotics are stored out of the reach of children.  A child can easily overdose on narcotics. Narcotics may look like candy to a small child.    The best way to dispose of narcotics:      The laws vary by country and area. In the [place], the best way is to return the narcotics through a take-back program. This program is offered by the [place] Drug Enforcement Agency ([employer]). The following are options for using the program:  Take the narcotics to a [employer] collection site.  The site is often a law enforcement center. Call your local law enforcement center for scheduled take-back days in your area. You will be given information on where to go if the collection site is in a different location.  Take the narcotics to an approved pharmacy or hospital.  A pharmacy or hospital may be set up as a collection site. You will need to ask if it is a [employer] collection site if you were not directed there. A pharmacy or doctor's office may not be able to take back narcotics unless it is a [employer] site.  Use a mail-back system.  This means you are given containers to put the narcotics into. You will then mail them in the containers.  Use a take-back drop box.  This is a place to leave the narcotics at any time. People and animals will not be able to get into the box. Your local law enforcement agency can tell you where to find a drop box in your area.    Other ways to manage pain:   Ask your healthcare provider about non-narcotic medicines to control pain.  Nonprescription medicines include NSAIDs (such as ibuprofen) and acetaminophen. Prescription medicines include muscle relaxers, antidepressants, and steroids.  Pain may be managed without any medicines.  Some ways to relieve pain  include massage, aromatherapy, or meditation. Physical or occupational therapy may also help.    For more information:   Drug Enforcement Administration  [address] [place] [address]  Phone: 1- 377 - 456-5890  Web Address: https://www.deadiversion.[facility].gov/drug_disposal/    [place] Food and Drug Administration  [address] [place] [address]  Phone: 6- 814 - 308-4709  Web Address: http://www.fda.gov     © Copyright [employer] 2018 Information is for End User's use only and may not be sold, redistributed or otherwise used for commercial purposes. All illustrations and images included in CareNotes® are the copyrighted property of [employer] or [employer]

## 2024-06-10 NOTE — PROGRESS NOTES
Ambulatory Visit  Name: Deyanira Stokes      : 1950      MRN: [id number]  Encounter Provider: Merced Alex PA-C  Encounter Date: 6/10/2024   Encounter department: [facility]    Assessment & Plan   1. Hypertensive heart disease with heart failure (HCC)  2. Encounter for screening mammogram for breast cancer  3. Food insecurity  -     Ambulatory referral to social work care management program; Future; Expected date: 06/10/2024  4. Inability to acquire transportation  -     Ambulatory referral to social work care management program; Future; Expected date: 06/10/2024  5. Vitamin D deficiency  -     Vitamin D 25 hydroxy; Future  6. Hyperlipidemia LDL goal <130  -     Lipid panel; Future  7. Screening for deficiency anemia  -     CBC and differential; Future  8. Screening for thyroid disorder  -     TSH, 3rd generation; Future  9. Essential hypertension  Assessment & Plan:  Not at goal and pt is symptomatic with headache. Restart lisinopril. Directions for use and possible side effects discussed and patient verbalized understanding of these.    Orders:  -     Comprehensive metabolic panel; Future  -     lisinopril (ZESTRIL) 5 mg tablet; Take 1 tablet (5 mg total) by mouth daily  10. Elevated fasting glucose  -     Hemoglobin A1C; Future  11. Clotting disorder (HCC)  -     Protime-INR; Future  12. Continuous opioid dependence (HCC)  13. Recurrent major depressive disorder, in partial remission (HCC)  Assessment & Plan:  Pts symptoms are stable with current regime. No changes at present.     14. Body mass index (BMI) 40.0-44.9, adult (HCC)  15. Nonrheumatic aortic (valve) stenosis  Assessment & Plan:  Will get updated echo as last one was 2021. Treat according to results  Orders:  -     Echo complete w/ contrast if indicated; Future; Expected date: 06/10/2024  16. Medicare annual wellness visit, subsequent      Depression Screening and Follow-up Plan: Patient was screened for depression  during today's encounter. They screened negative with a PHQ-9 score of 3.    Falls Plan of Care: balance, strength, and gait training instructions were provided.       Preventive health issues were discussed with patient, and age appropriate screening tests were ordered as noted in patient's After Visit Summary. Personalized health advice and appropriate referrals for health education or preventive services given if needed, as noted in patient's After Visit Summary.    History of Present Illness     Pt presents for routine visit. She is doing much better in regards to her depression and anxiety. She is sleeping better, having more productive days and feeling the best she has in a long time. She feels her combination is beneficial and tolerates it well. She is due for labs. CRC screening and dexa are current. BP is elevated and she is having headaches. She was previously on lisinopril and is agreeable to restarting this. She is noting issues when she gets labs where her blood is clotting as they try to do the blood draw and was told to bring this up.        Patient Care Team:  Merced Alex PA-C as PCP - General (Internal Medicine)  Aiden Perry DO as PCP - PCP-[facility]  Colton Moralez MD as Endoscopist  JERARDO Ascencio as Nurse Practitioner (Urology)  Tiffani Cool PA-C (Physician Assistant)  JERARDO Hernandez as Nurse Practitioner (Nurse Practitioner)    Review of Systems   Constitutional:  Negative for chills and fever.   HENT:  Negative for congestion, ear pain, hearing loss, postnasal drip, rhinorrhea, sinus pressure, sinus pain, sore throat and trouble swallowing.    Eyes:  Negative for pain and visual disturbance.   Respiratory:  Negative for cough, chest tightness, shortness of breath and wheezing.    Cardiovascular: Negative.  Negative for chest pain, palpitations and leg swelling.   Gastrointestinal:  Negative for abdominal pain, blood in stool, constipation, diarrhea, nausea and  vomiting.   Endocrine: Negative for cold intolerance, heat intolerance, polydipsia, polyphagia and polyuria.   Genitourinary:  Negative for difficulty urinating, dysuria, flank pain and urgency.   Musculoskeletal:  Negative for arthralgias, back pain, gait problem and myalgias.   Skin:  Negative for rash.   Allergic/Immunologic: Negative.    Neurological:  Positive for headaches. Negative for dizziness, weakness and light-headedness.   Hematological: Negative.    Psychiatric/Behavioral:  Negative for behavioral problems, dysphoric mood and sleep disturbance. The patient is not nervous/anxious.      Medical History Reviewed by provider this encounter:       Annual Wellness Visit Questionnaire   Deyanira is here for her Subsequent Wellness visit.     Health Risk Assessment:   Patient rates overall health as good. Patient feels that their physical health rating is slightly better. Patient is satisfied with their life. Eyesight was rated as same. Hearing was rated as slightly worse. Patient feels that their emotional and mental health rating is slightly better. Patients states they are never, rarely angry. Patient states they are sometimes unusually tired/fatigued. Pain experienced in the last 7 days has been some. Patient's pain rating has been 6/10. Patient states that she has experienced no weight loss or gain in last 6 months. pain is due to fibromyalgia, spine twisting, and scar tissue on breastplate    Depression Screening:   PHQ-9 Score: 3      Fall Risk Screening:   In the past year, patient has experienced: history of falling in past year    Number of falls: 1  Injured during fall?: No    Feels unsteady when standing or walking?: Yes    Worried about falling?: Yes      Urinary Incontinence Screening:   Patient has leaked urine accidently in the last six months.     Home Safety:  Patient does not have trouble with stairs inside or outside of their home. Patient has working smoke alarms and has working carbon  monoxide detector. Home safety hazards include: loose rugs on the floor, household clutter and medications that cause fatigue. most of my falls are at night; taken steps (such as laying a patterned rug from my bed to the door) to address this; household clutter is in my room    Nutrition:   Current diet is Diabetic, Low Cholesterol, Low Saturated Fat and No Added Salt. i'm eating more plant-based food now, limiting my protein to tofu, chicken, and fish    Medications:   Patient is currently taking over-the-counter supplements. OTC medications include: see medication list. Patient is able to manage medications. i'm inconsistent in taking anything that's not part of my pill pack    Activities of Daily Living (ADLs)/Instrumental Activities of Daily Living (IADLs):   Walk and transfer into and out of bed and chair?: Yes  Dress and groom yourself?: Yes    Bathe or shower yourself?: Yes    Feed yourself? Yes  Do your laundry/housekeeping?: Yes  Manage your money, pay your bills and track your expenses?: No  Make your own meals?: Yes    Do your own shopping?: Yes    Durable Medical Equipment Suppliers  don't know    Previous Hospitalizations:   Any hospitalizations or ED visits within the last 12 months?: No      Advance Care Planning:   Living will: No    Durable POA for healthcare: Yes    Advanced directive: No    ACP document given: Yes      Cognitive Screening:   Provider or family/friend/caregiver concerned regarding cognition?: No    PREVENTIVE SCREENINGS      Cardiovascular Screening:    General: Screening Not Indicated and History Lipid Disorder      Diabetes Screening:     General: Screening Current      Colorectal Cancer Screening:     General: Screening Current      Breast Cancer Screening:     General: History Breast Cancer    Due for: Mammogram        Cervical Cancer Screening:    General: Screening Not Indicated      Osteoporosis Screening:    General: Screening Not Indicated and History Osteoporosis       Abdominal Aortic Aneurysm (AAA) Screening:        General: Screening Not Indicated      Lung Cancer Screening:     General: Screening Not Indicated      Hepatitis C Screening:    General: Screening Current    Screening, Brief Intervention, and Referral to Treatment (SBIRT)    Screening  Typical number of drinks in a day: 0  Typical number of drinks in a week: 0  Interpretation: Low risk drinking behavior.    AUDIT-C Screenin) How often did you have a drink containing alcohol in the past year? monthly or less  2) How many drinks did you have on a typical day when you were drinking in the past year? 1 to 2  3) How often did you have 6 or more drinks on one occasion in the past year? never    AUDIT-C Score: 1  Interpretation: Score 0-2 (female): Negative screen for alcohol misuse    Single Item Drug Screening:  How often have you used an illegal drug (including marijuana) or a prescription medication for non-medical reasons in the past year? never    Single Item Drug Screen Score: 0  Interpretation: Negative screen for possible drug use disorder    SDOH Risk Assessment  Social determinants of health (SDOH) risk assesment tool was completed. The tool at a minimum covered housing stability, food insecurity, transportation needs, and utility difficulty. Patient had at risk responses for the following SDOH domains: food insecurity and transportation needs.     Review of Current Opioid Use    Opioid Risk Tool (ORT) Interpretation: Complete Opioid Risk Tool (ORT)    Social Determinants of Health     Financial Resource Strain: Low Risk  (2023)    Overall Financial Resource Strain (CARDIA)     Difficulty of Paying Living Expenses: Not very hard   Food Insecurity: Food Insecurity Present (2024)    Hunger Vital Sign     Worried About Running Out of Food in the Last Year: Sometimes true     Ran Out of Food in the Last Year: Sometimes true   Transportation Needs: Unmet Transportation Needs (2024)    PRAPARE -  "Transportation     Lack of Transportation (Medical): Yes     Lack of Transportation (Non-Medical): Yes   Housing Stability: Low Risk  (6/6/2024)    Housing Stability Vital Sign     Unable to Pay for Housing in the Last Year: No     Number of Times Moved in the Last Year: 0     Homeless in the Last Year: No   Utilities: Not At Risk (6/6/2024)    [facility] Utilities     Threatened with loss of utilities: No     No results found.    Objective     BP (!) 180/78 (BP Location: Left arm, Patient Position: Sitting)   Pulse 73   Temp 97.7 °F (36.5 °C) (Tympanic)   Ht 4' 9.09\" (1.45 m)   Wt 75.9 kg (167 lb 4 oz)   LMP  (LMP Unknown)   SpO2 97%   BMI 36.08 kg/m²     Physical Exam  Vitals and nursing note reviewed.   Constitutional:       General: She is not in acute distress.     Appearance: Normal appearance. She is well-developed. She is not diaphoretic.   HENT:      Head: Normocephalic and atraumatic.      Right Ear: External ear normal.      Left Ear: External ear normal.      Nose: Nose normal.      Mouth/Throat:      Pharynx: No oropharyngeal exudate.   Eyes:      General: No scleral icterus.        Right eye: No discharge.         Left eye: No discharge.      Conjunctiva/sclera: Conjunctivae normal.      Pupils: Pupils are equal, round, and reactive to light.   Neck:      Thyroid: No thyromegaly.   Cardiovascular:      Rate and Rhythm: Normal rate and regular rhythm.      Heart sounds: Murmur heard.      No friction rub. No gallop.   Pulmonary:      Effort: Pulmonary effort is normal. No respiratory distress.      Breath sounds: Normal breath sounds. No wheezing or rales.   Abdominal:      General: Bowel sounds are normal. There is no distension.      Palpations: Abdomen is soft.      Tenderness: There is no abdominal tenderness.   Musculoskeletal:         General: No tenderness or deformity. Normal range of motion.      Cervical back: Normal range of motion and neck supple.   Skin:     General: Skin is warm and dry. "   Neurological:      Mental Status: She is alert and oriented to person, place, and time.      Cranial Nerves: No cranial nerve deficit.   Psychiatric:         Behavior: Behavior normal.         Thought Content: Thought content normal.         Judgment: Judgment normal.       Administrative Statements   I have spent a total time of 25 minutes on 06/10/24 In caring for this patient including Risks and benefits of tx options, Instructions for management, Patient and family education, Importance of tx compliance, Risk factor reductions, Documenting in the medical record, and Reviewing / ordering tests, medicine, procedures  .

## 2024-06-10 NOTE — ASSESSMENT & PLAN NOTE
Not at goal and pt is symptomatic with headache. Restart lisinopril. Directions for use and possible side effects discussed and patient verbalized understanding of these.

## 2024-06-11 ENCOUNTER — APPOINTMENT (OUTPATIENT)
Dept: LAB | Facility: HOSPITAL | Age: 74
End: 2024-06-11
Payer: COMMERCIAL

## 2024-06-11 ENCOUNTER — PATIENT OUTREACH (OUTPATIENT)
Dept: INTERNAL MEDICINE CLINIC | Facility: CLINIC | Age: 74
End: 2024-06-11

## 2024-06-11 DIAGNOSIS — M81.0 AGE-RELATED OSTEOPOROSIS WITHOUT CURRENT PATHOLOGICAL FRACTURE: ICD-10-CM

## 2024-06-11 DIAGNOSIS — Z13.29 SCREENING FOR THYROID DISORDER: ICD-10-CM

## 2024-06-11 DIAGNOSIS — E78.5 HYPERLIPIDEMIA LDL GOAL <130: ICD-10-CM

## 2024-06-11 DIAGNOSIS — I10 ESSENTIAL HYPERTENSION: ICD-10-CM

## 2024-06-11 DIAGNOSIS — D68.9 CLOTTING DISORDER (HCC): ICD-10-CM

## 2024-06-11 DIAGNOSIS — E55.9 VITAMIN D DEFICIENCY: ICD-10-CM

## 2024-06-11 DIAGNOSIS — R73.01 ELEVATED FASTING GLUCOSE: ICD-10-CM

## 2024-06-11 DIAGNOSIS — Z13.0 SCREENING FOR DEFICIENCY ANEMIA: ICD-10-CM

## 2024-06-11 LAB
25(OH)D3 SERPL-MCNC: 23.2 NG/ML (ref 30–100)
ALBUMIN SERPL BCP-MCNC: 3.8 G/DL (ref 3.5–5)
ALP SERPL-CCNC: 64 U/L (ref 34–104)
ALT SERPL W P-5'-P-CCNC: 16 U/L (ref 7–52)
ANION GAP SERPL CALCULATED.3IONS-SCNC: 7 MMOL/L (ref 4–13)
AST SERPL W P-5'-P-CCNC: 17 U/L (ref 13–39)
BASOPHILS # BLD AUTO: 0.02 THOUSANDS/ÂΜL (ref 0–0.1)
BASOPHILS NFR BLD AUTO: 0 % (ref 0–1)
BILIRUB SERPL-MCNC: 0.64 MG/DL (ref 0.2–1)
BUN SERPL-MCNC: 8 MG/DL (ref 5–25)
CALCIUM SERPL-MCNC: 8.9 MG/DL (ref 8.4–10.2)
CHLORIDE SERPL-SCNC: 105 MMOL/L (ref 96–108)
CHOLEST SERPL-MCNC: 131 MG/DL
CO2 SERPL-SCNC: 30 MMOL/L (ref 21–32)
CREAT SERPL-MCNC: 0.65 MG/DL (ref 0.6–1.3)
EOSINOPHIL # BLD AUTO: 0.1 THOUSAND/ÂΜL (ref 0–0.61)
EOSINOPHIL NFR BLD AUTO: 2 % (ref 0–6)
ERYTHROCYTE [DISTWIDTH] IN BLOOD BY AUTOMATED COUNT: 12.2 % (ref 11.6–15.1)
GFR SERPL CREATININE-BSD FRML MDRD: 88 ML/MIN/1.73SQ M
GLUCOSE P FAST SERPL-MCNC: 98 MG/DL (ref 65–99)
HCT VFR BLD AUTO: 46.7 % (ref 34.8–46.1)
HDLC SERPL-MCNC: 52 MG/DL
HGB BLD-MCNC: 15.1 G/DL (ref 11.5–15.4)
IMM GRANULOCYTES # BLD AUTO: 0.01 THOUSAND/UL (ref 0–0.2)
IMM GRANULOCYTES NFR BLD AUTO: 0 % (ref 0–2)
INR PPP: 0.98 (ref 0.84–1.19)
LDLC SERPL CALC-MCNC: 56 MG/DL (ref 0–100)
LYMPHOCYTES # BLD AUTO: 1.42 THOUSANDS/ÂΜL (ref 0.6–4.47)
LYMPHOCYTES NFR BLD AUTO: 31 % (ref 14–44)
MCH RBC QN AUTO: 30.3 PG (ref 26.8–34.3)
MCHC RBC AUTO-ENTMCNC: 32.3 G/DL (ref 31.4–37.4)
MCV RBC AUTO: 94 FL (ref 82–98)
MONOCYTES # BLD AUTO: 0.53 THOUSAND/ÂΜL (ref 0.17–1.22)
MONOCYTES NFR BLD AUTO: 11 % (ref 4–12)
NEUTROPHILS # BLD AUTO: 2.58 THOUSANDS/ÂΜL (ref 1.85–7.62)
NEUTS SEG NFR BLD AUTO: 56 % (ref 43–75)
NONHDLC SERPL-MCNC: 79 MG/DL
NRBC BLD AUTO-RTO: 0 /100 WBCS
PLATELET # BLD AUTO: 178 THOUSANDS/UL (ref 149–390)
PMV BLD AUTO: 10.2 FL (ref 8.9–12.7)
POTASSIUM SERPL-SCNC: 3.8 MMOL/L (ref 3.5–5.3)
PROT SERPL-MCNC: 6.4 G/DL (ref 6.4–8.4)
PROTHROMBIN TIME: 13.1 SECONDS (ref 11.6–14.5)
RBC # BLD AUTO: 4.99 MILLION/UL (ref 3.81–5.12)
SODIUM SERPL-SCNC: 142 MMOL/L (ref 135–147)
TRIGL SERPL-MCNC: 117 MG/DL
TSH SERPL DL<=0.05 MIU/L-ACNC: 2.21 UIU/ML (ref 0.45–4.5)
WBC # BLD AUTO: 4.66 THOUSAND/UL (ref 4.31–10.16)

## 2024-06-11 PROCEDURE — 36415 COLL VENOUS BLD VENIPUNCTURE: CPT

## 2024-06-11 PROCEDURE — 84443 ASSAY THYROID STIM HORMONE: CPT

## 2024-06-11 PROCEDURE — 85025 COMPLETE CBC W/AUTO DIFF WBC: CPT

## 2024-06-11 PROCEDURE — 83036 HEMOGLOBIN GLYCOSYLATED A1C: CPT

## 2024-06-11 PROCEDURE — 80061 LIPID PANEL: CPT

## 2024-06-11 PROCEDURE — 85610 PROTHROMBIN TIME: CPT

## 2024-06-11 PROCEDURE — 82306 VITAMIN D 25 HYDROXY: CPT

## 2024-06-11 PROCEDURE — 80053 COMPREHEN METABOLIC PANEL: CPT

## 2024-06-11 NOTE — PROGRESS NOTES
OPAL JUDGE received referral Pt stated she does not have transportation to go to the grocery store and would have to use [employer].    Pt stated she is on a very restrictive diet and cannot get meals from aging. Plant based diet. Pt lives with her daughter. Daughter travels for work and pt worries about being a bother to her. OPAL JUDGE asked if pt would like dtr to be involved in discussion and pt stated not at this time. OPAL JUDGE explained that pt may want to call the local grocery store to discuss delivery if she is able to.     Pt feels supported by her children and family. Stated she recently stayed with her son's family and felt it was beneficial for her mental health.    Pt stated that transportation is her biggest concern. Stated that she is set up with [employer]. Stated she has had trouble getting to some appointments out of the county. OPAL JUDGE explained [employer]'s out of county process. Encouraged pt to call should she need assistance with the out of county request.     Pt wrote down POAL JUDGE's phone number and stated she will call if she has any needs. Referral closed at this time.

## 2024-06-12 LAB
EST. AVERAGE GLUCOSE BLD GHB EST-MCNC: 105 MG/DL
HBA1C MFR BLD: 5.3 %

## 2024-06-14 ENCOUNTER — VBI (OUTPATIENT)
Dept: ADMINISTRATIVE | Facility: OTHER | Age: 74
End: 2024-06-14

## 2024-06-14 NOTE — TELEPHONE ENCOUNTER
06/14/24 12:22 PM     Chart reviewed for Mammogram ; nothing is submitted to the patient's insurance at this time.     Sushila Rendon MA   [facility]

## 2024-06-17 NOTE — RESULT ENCOUNTER NOTE
Amarjit Goodman  I reviewed your labs and all look good but you were slightly low in vitamin D. Id recommend starting vitamin d3 2000iu daily. Have a great day  -Merced

## 2024-06-17 NOTE — PROGRESS NOTES
PT Evaluation     Today's date: 24  Patient name: Deyanira Stokes  : 1950  MRN: [id number]  Referring provider: Merced Alex PA-C  Dx:   Encounter Diagnosis     ICD-10-CM    1. Lymphedema  I89.0                        Assessment  Impairments: abnormal gait, abnormal or restricted ROM, abnormal movement, activity intolerance and lacks appropriate home exercise program  Other impairment: RUE and scapular and abdominal edema    Assessment details: Patient presents to OPPT with s/s consistent with right UE lymphedema.  PT interventions to include CDT (complete decongestive therapy) including MLD (manual lymphatic drainage) and compression using decongestive garments as able.  PT to further provide extensive patient education on self bandaging, self MLD techniques, and skin care.  Patient will transition  to long term maintenance with compression plan for both morning and evening wear once optimal decongestive and volume reduction of limb has been achieved.  Thank you for this referral and the opportunity to participate in the care of this patient.    Understanding of Dx/Px/POC: good     Prognosis: good  Prognosis details: Positive prognostic indicators include positive attitude toward recovery and good understanding of diagnosis and treatment plan options.  Negative prognostic indicators include chronicity of symptoms.        Goals  STGs to be achieved in 2 - 4 weeks  Demonstrate at least 75% compliance with self MLD  Demonstrate at least 75% compliance with self compression  Demonstrate at least 75% compliance with self skin and nail inspection  Free from s/s of infection  Demonstrate understanding of importance of compliance with POC    LTGs to be achieved in 4 - 6 weeks  Demonstrate at least 100% compliance with self MLD  Demonstrate at least 100% compliance with self compression  Demonstrate at least 100% compliance with self skin and nail inspection  Free from s/s of infection  Demonstrate  understanding of day/night compression wearing schedule  Obtain alternative compression to ensure independence with self management      Plan  Patient would benefit from: skilled physical therapy  Other planned modality interventions: Modalities prn for symptom management    Planned therapy interventions: manual therapy, neuromuscular re-education, therapeutic activities, therapeutic exercise and home exercise program    Frequency: 1-2x/week.  Duration in weeks: 8  Plan of Care beginning date: 2024  Plan of Care expiration date: 2024  Treatment plan discussed with: patient and PTA        Subjective Evaluation    History of Present Illness  Mechanism of injury: surgery  Mechanism of injury: 73 yo female with hx of R breast CA  And subsequent R mastectomy with removal of 25 lymph nodes, of which 12 were cancerous. Pt began with lymphedema following surgery. States she has a lymphedema pump at home which she has not been using .  Wears a compression sleeve daily which she removes for sleep. Also has compression  bra.   Pt notes she has pins and needles in fingertips and that is a sign of increased swelling and she then uses her pump. Self MLD throughout the day as needed. Notes she is starting to drop things from the R hand. Pt notes she recently lost 18# and RUE garment is now too big.           Recurrent probem    Quality of life: fair    Patient Goals  Patient goals for therapy: decreased edema, improved balance, increased motion, increased strength and independence with ADLs/IADLs    Pain  Current pain ratin  At best pain ratin  At worst pain ratin  Quality: dull ache, tight and needle-like  Relieving factors: ice, rest and relaxation  Exacerbated by: humidity, heat.  Progression: no change    Social Support  Steps to enter house: yes  Stairs in house: yes   Lives in: multiple-level home  Lives with: adult daughter.    Employment status: not working  Hand dominance: right  Exercise history: 3x  per week at [facility] for balance and ROM ex    Treatments  Previous treatment: physical therapy  Current treatment: physical therapy        Objective Lymphedema Evaluation    Medical/MLD Precautions/Contraindications:  _Y_  Cardiac/CHF/Cardiac Edema/Cardiac arrhythmia-- heart murmur, aortic valve issue  _Y__  Pulmonary--asthma  N___  Kidney  _N__  Liver  _N__  Current Fever/Infection  _N__  Current/Recent Wounds  _N__  Current/Recent Treatments/Interventions/Port Access/PICC Line/Malone filter  _Y__  Current/Recent/History of DVT or Clotting issues/Pelvic DVT--difficulty with blood draw  _Y__  Age > 59 y/o  _Y_  HTN  _Y__  Malignancy--hx of R breast CA  N___  Hyper/hypothyroidism  _N__  AAA  _Y__  GI disorder (Crohn's Diverticulitis, IBD)---diverticulitis, IBS  _N_  Liver disease  N___  Recent abdominal surgery  N___  Pregnancy  Y__  Abdominal pain--w/IBS, pt on a new diet    Bandaging Precautions/Contraindications:  _N__  Diabetes  _Y__  Neuropathy  _N__  Vascular problems/insufficiency  _N__  Arterial Disease    Other:  Y___  Latex allergies  _N__  Pacemaker    CA treatment:  N__  Chemo-- hx of  _N__  XRT-- hx of      ROM Considerations:  end range R shld flex 105*, abd 90*    Strength Considerations: RUE weaker than L, but functional      Gait Considerations: Pt amb with SPC with steady waddle gait    Skin Considerations/Scars: No abnormal skin issues noted upon eval except firmness in R subaxilla area with tight cord    Patient presents with skin inspection as follows:  No skin issues at present time  Hemosiderin staining/skin discoloration  Finger/Toe Nails  Open Wounds/Size/Color  S/S infection  Firm/Sponge/Hard  Peau D' Orange/Papillomas  Lymphorrhea/Weeping  Skin mobility  Skin temperature  Fungal infection  Lymph fistula  Lipodermatosclerosis    Girth:  Upcoming volumetrics    Stg 1 lymphedema             Precautions: hx of R breast CA with mastectomy, psych disorder, renal disorder, recent R foot  fx with removal of boot 10/1/22, fall risk, imbalance    Re-eval Date: 11/3/22    Date   6/18       Visit Count 1       FOTO        Pain In  see eval       Pain Out  see eval               Manuals  Volumetrics      CDT 15 min  CV/PTA-CLT    Pt educ   CDT                               Neuro Re-Ed                                                                Ther Ex                                                                        Ther Activity                        Gait Training                        Modalities

## 2024-06-18 ENCOUNTER — EVALUATION (OUTPATIENT)
Dept: PHYSICAL THERAPY | Facility: CLINIC | Age: 74
End: 2024-06-18
Payer: COMMERCIAL

## 2024-06-18 DIAGNOSIS — R07.89 OTHER CHEST PAIN: ICD-10-CM

## 2024-06-18 DIAGNOSIS — I89.0 LYMPHEDEMA: Primary | ICD-10-CM

## 2024-06-18 DIAGNOSIS — F41.1 GENERALIZED ANXIETY DISORDER: ICD-10-CM

## 2024-06-18 PROCEDURE — 97163 PT EVAL HIGH COMPLEX 45 MIN: CPT

## 2024-06-18 PROCEDURE — 97110 THERAPEUTIC EXERCISES: CPT

## 2024-06-18 RX ORDER — PANTOPRAZOLE SODIUM 40 MG/1
40 TABLET, DELAYED RELEASE ORAL DAILY
Qty: 90 TABLET | Refills: 1 | Status: SHIPPED | OUTPATIENT
Start: 2024-06-18

## 2024-06-18 RX ORDER — QUETIAPINE FUMARATE 25 MG/1
25 TABLET, FILM COATED ORAL
Qty: 90 TABLET | Refills: 1 | Status: SHIPPED | OUTPATIENT
Start: 2024-06-18

## 2024-06-20 NOTE — TELEPHONE ENCOUNTER
"Patient called and is at her son's in [place].  Her refill cannot be filled for the Prozac until May because the pharmacy states it is too soon.  She has only 4 pills left.  She stated she \"thinks\" she was cleaning out her old prescriptions and threw away the pills.  She asked that someone call the pharmacy in [place] where RX was sent.   She is at her son's and the number where she can be reached is [phone].  "
I called patient and she is aware she needs to call her rx plan .  
Patient has to call her insurance and let them know what happened so they can override this process for her to get it sooner. There is unfortunately nothing we can do she has to call her insurance.   
[FreeTextEntry1] : Patient:     RICHIE LEMOS   Accession:                             [id number]  Collected Date/Time:                   3/1/2024 13:42 EST Received Date/Time:                    3/4/2024 09:02 EST  Surgical Pathology Report - Auth (Verified)  Specimen(s) Submitted Left axillary cyst  Final Diagnosis Left axillary cyst, excision: - Fibroadipose tissue with focal hemorrhage. - No cyst identified.  Deeper levels examined.  Note:  Above findings may represent a lipoma.  Clinical correlation  recommended. Verified by: Maricarmen Flynn M.D. (Electronic Signature) Reported on: 03/08/24 11:42 EST, [facility], [place] [address] Histology technical [place] [address] Phone: [phone]   Fax: [phone] _______________________________________________________________

## 2024-07-02 ENCOUNTER — OFFICE VISIT (OUTPATIENT)
Dept: PHYSICAL THERAPY | Facility: CLINIC | Age: 74
End: 2024-07-02
Payer: COMMERCIAL

## 2024-07-02 DIAGNOSIS — R09.82 PND (POST-NASAL DRIP): ICD-10-CM

## 2024-07-02 DIAGNOSIS — I89.0 LYMPHEDEMA: Primary | ICD-10-CM

## 2024-07-02 DIAGNOSIS — J30.2 SEASONAL ALLERGIES: ICD-10-CM

## 2024-07-02 DIAGNOSIS — J45.20 MILD INTERMITTENT ASTHMA WITHOUT COMPLICATION: ICD-10-CM

## 2024-07-02 DIAGNOSIS — F33.41 RECURRENT MAJOR DEPRESSIVE DISORDER, IN PARTIAL REMISSION (HCC): Chronic | ICD-10-CM

## 2024-07-02 PROCEDURE — 97140 MANUAL THERAPY 1/> REGIONS: CPT

## 2024-07-02 PROCEDURE — 97110 THERAPEUTIC EXERCISES: CPT

## 2024-07-02 RX ORDER — FLUTICASONE PROPIONATE 50 MCG
1 SPRAY, SUSPENSION (ML) NASAL DAILY
Qty: 16 ML | Refills: 1 | Status: SHIPPED | OUTPATIENT
Start: 2024-07-02

## 2024-07-02 NOTE — TELEPHONE ENCOUNTER
PT called in requesting medication refill as she is currently out of the following medication: FLUoxetine (PROzac) 20 mg capsule     Script to go to the following pharmacy:  [facility]/pharmacy #[id number] - [place], [place] - [address] [phone]     Please advise & call pt with update on her script. Thank you!    Maxine Stokes   [phone]

## 2024-07-02 NOTE — PROGRESS NOTES
Daily Note     Today's date: 2024  Patient name: Deyanira Stokes  : 1950  MRN: [id number]  Referring provider: Merced Alex PA-C  Dx:   Encounter Diagnosis     ICD-10-CM    1. Lymphedema  I89.0           Start Time: 1345  Stop Time: 1445  Total time in clinic (min): 60 minutes    Subjective: I got new compression I go to [facility] 1-2 x/day for exer  working balance and motion  I have been having > swelling in my R wrist area, under R chest area / below axilla area and R belly region     Objective: See treatment diary below      Assessment: Tolerated MLD treatment well. Pt demon < congestion/firmness/volume end PT session   Pt demon limited R shoulder ROM with focus on HEP / educ Pt with significant wt loss 2* IBS  pt reports daily diarrhea and under medical advise  pt requires asst with michelle/doffing compression garments prn / product  Patient would benefit from continued PT      Plan: Continue per plan of care.          Precautions: hx of R breast CA with mastectomy, psych disorder, renal disorder, recent R foot fx with removal of boot 10/1/22, fall risk, imbalance    Re-eval Date: 11/3/22           Order Issued   Compression Garment(s)     Bra/андрей    Abdominal    R UE compression sleeve    Pneumatic pump Pt has  ?        Date         Visit Count 1 2      FOTO        Pain In  see eval       Pain Out  see eval               Manuals  Volumetrics  R shoulder  Flex 128*  Scap 98*    Wt: 167 lbs  BMI 36.08      CDT 15 min  CV/PTA-CLT    Pt educ   CDT MLD  - short neck  - diag breath  - R UE    R shoulder PROM flex/ scap  GENTLE    HEP educ/issued      Pt education w/ handouts provided  - What is CDT  - What is lymphedema  - Prevention  - Skin / nail care management     - Stages of lymphedema  - Nutrition/hydration    - REVIEW  Compression: day/night, self wrap/michelle/doffing, care management, replace every 6 months  - REVIEW  Pneumatic pump - contraindications/precautions and instructions  to change mmHg as appropriate/prn for UE    Pt asst  R UE compression sleeve 20-30 mmHg, abdominal compression, compression bra with prosthetic, denise compression       Pt educ/issued HEP:    Remedial lymphedema exercises  Handouts issued  Cues prn for proper form/technique  - Self MLD - sequence    - Signs/symptoms of infection/cellulitis                             Neuro Re-Ed                                                                Ther Ex                                                                        Ther Activity                        Gait Training                        Modalities                          Access Code: [email or web address]  URL: [email or web address]  Date: 07/02/2024  Prepared by: Candace Yarbrough    Exercises  - Supine Shoulder Flexion Extension AAROM with Dowel  - 1 x daily - 7 x weekly - 10 reps - 10 hold  - Seated Shoulder Flexion AAROM with Dowel  - 1 x daily - 7 x weekly - 10 reps - 10 hold  - Standing Shoulder Abduction AAROM with Dowel  - 1 x daily - 7 x weekly - 10 reps - 10 hold  - Doorway Pec Stretch at 60 Degrees Abduction with Arm Straight  - 1 x daily - 7 x weekly - 3 sets - 30 hold  - Standing Shoulder Extension with Dowel  - 1 x daily - 7 x weekly - 3 sets - 10 reps  - Wall Michie  - 1 x daily - 7 x weekly - 10 reps

## 2024-07-09 ENCOUNTER — HOSPITAL ENCOUNTER (OUTPATIENT)
Dept: NON INVASIVE DIAGNOSTICS | Facility: CLINIC | Age: 74
Discharge: HOME | End: 2024-07-09
Payer: COMMERCIAL

## 2024-07-09 VITALS
HEIGHT: 57 IN | HEART RATE: 70 BPM | BODY MASS INDEX: 36.03 KG/M2 | WEIGHT: 167 LBS | SYSTOLIC BLOOD PRESSURE: 160 MMHG | DIASTOLIC BLOOD PRESSURE: 80 MMHG

## 2024-07-09 DIAGNOSIS — I35.0 NONRHEUMATIC AORTIC (VALVE) STENOSIS: ICD-10-CM

## 2024-07-09 LAB
AORTIC VALVE MEAN VELOCITY: 27.8 M/S
APICAL FOUR CHAMBER EJECTION FRACTION: 52 %
ASCENDING AORTA: 2.9 CM
AV AREA BY CONTINUOUS VTI: 0.9 CM2
AV AREA PEAK VELOCITY: 0.9 CM2
AV LVOT MEAN GRADIENT: 2 MMHG
AV LVOT PEAK GRADIENT: 4 MMHG
AV MEAN GRADIENT: 34 MMHG
AV PEAK GRADIENT: 57 MMHG
AV REGURGITATION PRESSURE HALF TIME: 425 MS
AV VALVE AREA: 0.91 CM2
AV VELOCITY RATIO: 0.26
BSA FOR ECHO PROCEDURE: 1.67 M2
DOP CALC AO PEAK VEL: 3.9 M/S
DOP CALC AO VTI: 91.55 CM
DOP CALC LVOT AREA: 3.14 CM2
DOP CALC LVOT DIAMETER: 2 CM
DOP CALC LVOT PEAK VEL VTI: 26.39 CM
DOP CALC LVOT PEAK VEL: 1.03 M/S
DOP CALC LVOT STROKE INDEX: 50.3 ML/M2
DOP CALC LVOT STROKE VOLUME: 84
DOP CALC MV VTI: 35.29 CM
E WAVE DECELERATION TIME: 265 MS
E/A RATIO: 0.75
FRACTIONAL SHORTENING: 38 (ref 28–44)
INTERVENTRICULAR SEPTUM IN DIASTOLE (PARASTERNAL SHORT AXIS VIEW): 1.4 CM
INTERVENTRICULAR SEPTUM: 1.4 CM (ref 0.6–1.1)
IVC: 15 MM
LAAS-AP2: 20.7 CM2
LAAS-AP4: 14 CM2
LEFT ATRIUM SIZE: 3.3 CM
LEFT ATRIUM VOLUME (MOD BIPLANE): 49 ML
LEFT ATRIUM VOLUME INDEX (MOD BIPLANE): 29.3 ML/M2
LEFT INTERNAL DIMENSION IN SYSTOLE: 2.5 CM (ref 2.1–4)
LEFT VENTRICULAR INTERNAL DIMENSION IN DIASTOLE: 4 CM (ref 3.5–6)
LEFT VENTRICULAR POSTERIOR WALL IN END DIASTOLE: 1.4 CM
LEFT VENTRICULAR STROKE VOLUME: 46 ML
LVSV (TEICH): 46 ML
MV MEAN GRADIENT: 2 MMHG
MV PEAK A VEL: 1.25 M/S
MV PEAK E VEL: 94 CM/S
MV PEAK GRADIENT: 4 MMHG
MV VALVE AREA BY CONTINUITY EQUATION: 2.35 CM2
RA PRESSURE ESTIMATED: 8 MMHG
RIGHT ATRIUM AREA SYSTOLE A4C: 8.7 CM2
RIGHT VENTRICLE ID DIMENSION: 2.8 CM
RV PSP: 29 MMHG
SL CV AV DECELERATION TIME RETROGRADE: 1465 MS
SL CV AV PEAK GRADIENT RETROGRADE: 92 MMHG
SL CV LEFT ATRIUM LENGTH A2C: 5 CM
SL CV LV EF: 60
SL CV PED ECHO LEFT VENTRICLE DIASTOLIC VOLUME (MOD BIPLANE) 2D: 68 ML
SL CV PED ECHO LEFT VENTRICLE SYSTOLIC VOLUME (MOD BIPLANE) 2D: 22 ML
TR MAX PG: 21 MMHG
TR PEAK VELOCITY: 2.3 M/S
TRICUSPID ANNULAR PLANE SYSTOLIC EXCURSION: 2.6 CM
TRICUSPID VALVE PEAK REGURGITATION VELOCITY: 2.27 M/S

## 2024-07-09 PROCEDURE — 93306 TTE W/DOPPLER COMPLETE: CPT

## 2024-07-09 PROCEDURE — 93306 TTE W/DOPPLER COMPLETE: CPT | Performed by: INTERNAL MEDICINE

## 2024-07-16 ENCOUNTER — OFFICE VISIT (OUTPATIENT)
Dept: PHYSICAL THERAPY | Facility: CLINIC | Age: 74
End: 2024-07-16
Payer: COMMERCIAL

## 2024-07-16 DIAGNOSIS — J45.20 MILD INTERMITTENT ASTHMA WITHOUT COMPLICATION: ICD-10-CM

## 2024-07-16 DIAGNOSIS — R06.02 SHORTNESS OF BREATH: ICD-10-CM

## 2024-07-16 DIAGNOSIS — J30.2 SEASONAL ALLERGIES: ICD-10-CM

## 2024-07-16 DIAGNOSIS — I89.0 LYMPHEDEMA: Primary | ICD-10-CM

## 2024-07-16 DIAGNOSIS — F33.41 RECURRENT MAJOR DEPRESSIVE DISORDER, IN PARTIAL REMISSION (HCC): Chronic | ICD-10-CM

## 2024-07-16 DIAGNOSIS — R09.82 PND (POST-NASAL DRIP): ICD-10-CM

## 2024-07-16 PROCEDURE — 97140 MANUAL THERAPY 1/> REGIONS: CPT

## 2024-07-16 PROCEDURE — 97110 THERAPEUTIC EXERCISES: CPT

## 2024-07-16 RX ORDER — FLUOXETINE HYDROCHLORIDE 20 MG/1
20 CAPSULE ORAL DAILY
Qty: 90 CAPSULE | Refills: 1 | Status: SHIPPED | OUTPATIENT
Start: 2024-07-16

## 2024-07-16 RX ORDER — ALBUTEROL SULFATE 90 UG/1
2 AEROSOL, METERED RESPIRATORY (INHALATION) EVERY 4 HOURS PRN
Qty: 8.5 G | Refills: 2 | Status: SHIPPED | OUTPATIENT
Start: 2024-07-16

## 2024-07-16 RX ORDER — MONTELUKAST SODIUM 10 MG/1
10 TABLET ORAL
Qty: 90 TABLET | Refills: 1 | Status: SHIPPED | OUTPATIENT
Start: 2024-07-16

## 2024-07-16 NOTE — PROGRESS NOTES
Daily Note     Today's date: 2024  Patient name: Deyanira Stokes  : 1950  MRN: [id number]  Referring provider: Merced Alex PA-C  Dx:   Encounter Diagnosis     ICD-10-CM    1. Lymphedema  I89.0                      Subjective: I had swelling in my right wrist a lot yesterday  Use compression sleeve daily  Having > LBP 2* arthritis  < mobility with > LBP      Objective: See treatment diary below      Assessment: Pt tolerated MLD/clinic pneumatic pump R UE treatment well. Demon overall < firmness/decongestion R UE end rx session Trial clinic pneumatic pump this date.  Pt reports with recent wt loss, current home pump garment being too large  CLT to look into alter options for improve fitting  Pt indicates benefit with abd compression binder , helps with LBP   Patient would benefit from continued PT      Plan: Continue per plan of care.      Precautions: hx of R breast CA with mastectomy, psych disorder, renal disorder, recent R foot fx with removal of boot 10/1/22, fall risk, imbalance    Re-eval Date: 11/3/22           Order Issued   Compression Garment(s)     Bra/denise    Abdominal    R UE compression sleeve    Pneumatic pump Pt has  ?        Date        Visit Count 1 2 3     FOTO        Pain In  see eval       Pain Out  see eval               Manuals  Volumetrics  R shoulder  Flex 128*  Scap 98*    Wt: 167 lbs  BMI 36.08      CDT 15 min  CV/PTA-CLT    Pt educ   CDT MLD  - short neck  - diag breath  - R UE    R shoulder PROM flex/ scap  GENTLE    HEP educ/issued      Pt education w/ handouts provided  - What is CDT  - What is lymphedema  - Prevention  - Skin / nail care management     - Stages of lymphedema  - Nutrition/hydration    - REVIEW  Compression: day/night, self wrap/michelle/doffing, care management, replace every 6 months  - REVIEW  Pneumatic pump - contraindications/precautions and instructions to change mmHg as appropriate/prn for UE    Pt asst  R UE compression sleeve 20-30  mmHg, abdominal compression, compression bra with prosthetic, denise compression       MLD  - short neck  - diag breath  - R UE    R shoulder PROM flex/ scap  GENTLE    Trial clinic pneumatic pump R UE  45 mm Hg  20 min during pt educ    Pt educ  - Self MLD - sequence    - Signs/symptoms of infection/cellulitis    Pt asst  R UE compression sleeve 20-30 mmHg, abdominal compression, compression bra with prosthetic, denise compression    55 min Pt educ/issued HEP:    Remedial lymphedema exercises  Handouts issued  Cues prn for proper form/technique                            Neuro Re-Ed                                                                Ther Ex                                                                        Ther Activity                        Gait Training                        Modalities                          Access Code: [email or web address]  URL: [email or web address]  Date: 07/02/2024  Prepared by: Candace Yarbrough    Exercises  - Supine Shoulder Flexion Extension AAROM with Dowel  - 1 x daily - 7 x weekly - 10 reps - 10 hold  - Seated Shoulder Flexion AAROM with Dowel  - 1 x daily - 7 x weekly - 10 reps - 10 hold  - Standing Shoulder Abduction AAROM with Dowel  - 1 x daily - 7 x weekly - 10 reps - 10 hold  - Doorway Pec Stretch at 60 Degrees Abduction with Arm Straight  - 1 x daily - 7 x weekly - 3 sets - 30 hold  - Standing Shoulder Extension with Dowel  - 1 x daily - 7 x weekly - 3 sets - 10 reps  - Wall [place]  - 1 x daily - 7 x weekly - 10 reps

## 2024-07-17 ENCOUNTER — TELEPHONE (OUTPATIENT)
Age: 74
End: 2024-07-17

## 2024-07-17 NOTE — TELEPHONE ENCOUNTER
"Patient calling regarding her refill of montelukast (SINGULAIR) 10 mg tablet that was sent to [facility] yesterday.  She states the pharmacy told her they could not fill the medication because it is on hold by our office and it needs to be released.  I informed patient this medication was dispensed on 7/2/24 and it is a 90-day supply.  She never received this medication and never got notification from the pharmacy that it was filled.  She has since run out in early July and needs it filled asap.  Patient would like a call made to the pharmacy to explain so she can get her medication as she states she is \"really struggling.\"  She requests a call back with any questions/concerns and to let her know the situation has been handled.  "

## 2024-07-17 NOTE — TELEPHONE ENCOUNTER
It does not look like we have this medication on hold at all I states that the pharmacy has received it. Do you know something on your end about this if not I will call the pharmacy

## 2024-07-18 NOTE — TELEPHONE ENCOUNTER
I called the pharmacy and was told the patient was given a 90 day supply on 07/5/2024. I called the patient and informed. She doesn't remember but she will be looking at home to see if she finds her medication.

## 2024-07-23 ENCOUNTER — OFFICE VISIT (OUTPATIENT)
Dept: OBGYN CLINIC | Facility: CLINIC | Age: 74
End: 2024-07-23
Payer: COMMERCIAL

## 2024-07-23 ENCOUNTER — APPOINTMENT (OUTPATIENT)
Dept: RADIOLOGY | Facility: CLINIC | Age: 74
End: 2024-07-23
Payer: COMMERCIAL

## 2024-07-23 ENCOUNTER — OFFICE VISIT (OUTPATIENT)
Dept: PHYSICAL THERAPY | Facility: CLINIC | Age: 74
End: 2024-07-23
Payer: COMMERCIAL

## 2024-07-23 VITALS
TEMPERATURE: 98.4 F | WEIGHT: 163 LBS | HEART RATE: 71 BPM | SYSTOLIC BLOOD PRESSURE: 156 MMHG | OXYGEN SATURATION: 97 % | BODY MASS INDEX: 35.17 KG/M2 | HEIGHT: 57 IN | DIASTOLIC BLOOD PRESSURE: 78 MMHG

## 2024-07-23 DIAGNOSIS — Z96.651 S/P TOTAL KNEE REPLACEMENT, RIGHT: ICD-10-CM

## 2024-07-23 DIAGNOSIS — I89.0 LYMPHEDEMA: Primary | ICD-10-CM

## 2024-07-23 DIAGNOSIS — Z96.651 S/P TOTAL KNEE REPLACEMENT, RIGHT: Primary | ICD-10-CM

## 2024-07-23 PROCEDURE — 97110 THERAPEUTIC EXERCISES: CPT

## 2024-07-23 PROCEDURE — 99213 OFFICE O/P EST LOW 20 MIN: CPT | Performed by: ORTHOPAEDIC SURGERY

## 2024-07-23 PROCEDURE — 73562 X-RAY EXAM OF KNEE 3: CPT

## 2024-07-23 PROCEDURE — 97140 MANUAL THERAPY 1/> REGIONS: CPT

## 2024-07-23 NOTE — PROGRESS NOTES
Daily Note     Today's date: 2024  Patient name: Deyanira Stokes  : 1950  MRN: [id number]  Referring provider: Merced Alex PA-C  Dx:   Encounter Diagnosis     ICD-10-CM    1. Lymphedema  I89.0                      Subjective: No new co's  R shoulder improving with ROM  Still struggle with compression garment working with rep to resolve/improve proper fitting      Objective: See treatment diary below      Assessment: Tolerated MLD treatment well. Pt demon overall < congestion end rx session  Noted congestion R shoulder/elbow and abdominal region  Pt working with pump vendor for alt garment/improve fit  pt compliant with daily use of compression  Indicates carryover with remedial lymph exer  Patient would benefit from continued PT      Plan: Continue per plan of care.      Precautions: hx of R breast CA with mastectomy, psych disorder, renal disorder, recent R foot fx with removal of boot 10/1/22, fall risk, imbalance    Re-eval -            Order Issued   Compression Garment(s)     Bra/denise    Abdominal    R UE compression sleeve    Pneumatic pump Pt has  ?        Date       Visit Count 1 2 3 4    FOTO        Pain In  see eval       Pain Out  see eval               Manuals  Volumetrics  R shoulder  Flex 128*  Scap 98*    Wt: 167 lbs  BMI 36.08      CDT 15 min  CV/PTA-CLT    Pt educ   CDT MLD  - short neck  - diag breath  - R UE    R shoulder PROM flex/ scap  GENTLE    HEP educ/issued      Pt education w/ handouts provided  - What is CDT  - What is lymphedema  - Prevention  - Skin / nail care management     - Stages of lymphedema  - Nutrition/hydration    - REVIEW  Compression: day/night, self wrap/michelle/doffing, care management, replace every 6 months  - REVIEW  Pneumatic pump - contraindications/precautions and instructions to change mmHg as appropriate/prn for UE    Pt asst  R UE compression sleeve 20-30 mmHg, abdominal compression, compression bra with prosthetic, denise  compression       MLD  - short neck  - diag breath  - R UE    R shoulder PROM flex/ scap  GENTLE    Trial clinic pneumatic pump R UE  45 mm Hg  20 min during pt educ    Pt educ  - Self MLD - sequence    - Signs/symptoms of infection/cellulitis    Pt asst  R UE compression sleeve 20-30 mmHg, abdominal compression, compression bra with prosthetic, denise compression    55 min MLD  - short neck  - diag breath  - R UE    R shoulder PROM flex/ scap  GENTLE    Trial clinic pneumatic pump R UE  45 mm Hg  20 min during pt educ    Pt educ/issued HEP:    Remedial lymphedema exercises  Handouts issued  Cues prn for proper form/technique    Pt asst  R UE compression sleeve 20-30 mmHg, abdominal compression, compression bra with prosthetic, denise compression    55 min                            Neuro Re-Ed                                                                Ther Ex                                                                        Ther Activity                        Gait Training                        Modalities                          Access Code: [email or web address]  URL: [email or web address]  Date: 07/02/2024  Prepared by: Candace Yarbrough    Exercises  - Supine Shoulder Flexion Extension AAROM with Dowel  - 1 x daily - 7 x weekly - 10 reps - 10 hold  - Seated Shoulder Flexion AAROM with Dowel  - 1 x daily - 7 x weekly - 10 reps - 10 hold  - Standing Shoulder Abduction AAROM with Dowel  - 1 x daily - 7 x weekly - 10 reps - 10 hold  - Doorway Pec Stretch at 60 Degrees Abduction with Arm Straight  - 1 x daily - 7 x weekly - 3 sets - 30 hold  - Standing Shoulder Extension with Dowel  - 1 x daily - 7 x weekly - 3 sets - 10 reps  - Wall [place]  - 1 x daily - 7 x weekly - 10 reps

## 2024-07-23 NOTE — PROGRESS NOTES
ASSESSMENT/PLAN:    Diagnoses and all orders for this visit:    S/P total knee replacement, right  -     XR knee 3 vw right non injury; Future        X-rays of the patient's right knee are consistent with a well-seated right total knee replacement.  The prosthesis is in good alignment.  There are no signs of loosening.  There are no fractures or dislocations.  Overall, the patient is continuing to do well since her surgery.  She will follow-up with our office in 1 year with new x-rays of her right knee 3 views.  She is acceptable to this plan.    Return in about 1 year (around 7/23/2025) for Annual Recheck.    The patient is doing quite well in regards to her right total knee replacement that was replaced 2 years ago.  There is full strength full motion.  Incision clean and dry.  X-rays show anatomic placement the prosthesis.  Continue home exercise program.  Follow-up 1 year for evaluation with new x-rays right knee-3 views      _____________________________________________________  CHIEF COMPLAINT:  Chief Complaint   Patient presents with    Right Knee - Follow-up         SUBJECTIVE:  Deyanira Stokes is a 74 y.o. female who presents to our office for an annual visit.  The patient is status post right total knee replacement from 11/16/2022.  She denies any significant right knee pain.  She denies any numbness or tingling.  She denies any fever or chills.    The following portions of the patient's history were reviewed and updated as appropriate: allergies, current medications, past family history, past medical history, past social history, past surgical history and problem list.    PAST MEDICAL HISTORY:  Past Medical History:   Diagnosis Date    Allergic     this is an ongoing problem    Anxiety     Arthritis     Asthma     Breast cancer (HCC)     rt mastectomy 2005    Cancer (HCC)     breast right    Cataract     CHF (congestive heart failure) (HCC)     Chronic headaches     Concussion     At age 11; Chronic  headaches since then    Coronary artery disease     Dementia (HCC)     see chart for more details    Depression     Dietary lactose intolerance     GERD (gastroesophageal reflux disease)     High cholesterol     History of chemotherapy     2005 rt breast cancer    History of transfusion     HL (hearing loss)     it's difficult to hear when there are lots of distractions    Hypertension     IBS (irritable bowel syndrome)     Irritable bowel syndrome (IBS) 05/31/2016    Kidney stone     Lymphedema of right arm     Obesity     Osteoporosis     osteoarthritis, see chart    PFO (patent foramen ovale)     Pneumonia     receive annual vaccine shots    PONV (postoperative nausea and vomiting)     Post-menopausal     Psychiatric disorder     Renal disorder     Scoliosis     tail bone, see chart for more details    Shingles     see chart for more details    Shortness of breath     Vitamin D deficiency        PAST SURGICAL HISTORY:  Past Surgical History:   Procedure Laterality Date    BREAST SURGERY      CATARACT EXTRACTION Bilateral     CATARACT EXTRACTION, BILATERAL      CHOLECYSTECTOMY      COLONOSCOPY N/A 05/31/2016    Procedure: COLONOSCOPY;  Surgeon: Colton Moralez MD;  Location: [facility];  Service:     FL GUIDED NEEDLE PLAC BX/ASP/INJ  08/14/2023    FL GUIDED NEEDLE PLAC BX/ASP/INJ  10/30/2023    GALLBLADDER SURGERY      HYSTERECTOMY      Total    JOINT REPLACEMENT      KNEE ARTHROSCOPY      KNEE SURGERY      arthroscopy    LYMPH NODE BIOPSY      as part of the breast cancer, see chart for more details    MASTECTOMY Right     rt breast mastectomy 2005    WI ARTHRP KNE CONDYLE&PLATU MEDIAL&LAT COMPARTMENTS Left 05/20/2021    Procedure: ARTHROPLASTY KNEE TOTAL;  Surgeon: Monica Cox MD;  Location:  MAIN OR;  Service: Orthopedics    WI ARTHRP KNE CONDYLE&PLATU MEDIAL&LAT COMPARTMENTS Right 11/16/2022    Procedure: ARTHROPLASTY KNEE TOTAL;  Surgeon: Marcos Arcos DO;  Location: [facility];  Service: Orthopedics     TONSILLECTOMY AND ADENOIDECTOMY         FAMILY HISTORY:  Family History   Adopted: Yes   Problem Relation Age of Onset    Diabetes Mother     Heart disease Mother     Mental illness Mother     Depression Mother     Heart disease Brother     Breast cancer Maternal Aunt     Breast cancer Maternal Aunt     Breast cancer Maternal Aunt     Breast cancer Maternal Aunt     Breast cancer Maternal Aunt     No Known Problems Father     No Known Problems Sister     No Known Problems Daughter     Breast cancer Maternal Grandmother     No Known Problems Sister     No Known Problems Sister     No Known Problems Sister        SOCIAL HISTORY:  Social History     Tobacco Use    Smoking status: Never     Passive exposure: Never    Smokeless tobacco: Never   Vaping Use    Vaping status: Never Used   Substance Use Topics    Alcohol use: Yes     Alcohol/week: 1.0 standard drink of alcohol     Types: 1 Glasses of wine per week     Comment: this is less than once/mo, more like 1-2 drinks every 6 mo    Drug use: Not Currently     Types: Marijuana     Comment: when i was in my teens       MEDICATIONS:    Current Outpatient Medications:     acetaminophen (TYLENOL) 650 mg CR tablet, Take 1 tablet (650 mg total) by mouth every 8 (eight) hours as needed for mild pain, Disp: 30 tablet, Rfl: 0    albuterol (2.5 mg/3 mL) 0.083 % nebulizer solution, Take 2.5 mg by nebulization every 6 (six) hours as needed for wheezing, Disp: , Rfl:     albuterol (PROVENTIL HFA,VENTOLIN HFA) 90 mcg/act inhaler, Inhale 2 puffs every 4 (four) hours as needed for wheezing, Disp: 8.5 g, Rfl: 2    atorvastatin (LIPITOR) 20 mg tablet, Take 1 tablet (20 mg total) by mouth daily, Disp: 90 tablet, Rfl: 2    butalbital-acetaminophen-caffeine (FIORICET,ESGIC) -40 mg per tablet, Take 1 tablet by mouth 3 (three) times a day as needed for headaches, Disp: 90 tablet, Rfl: 1    donepezil (ARICEPT) 10 mg tablet, Take 1 tablet by mouth daily at bedtime., Disp: 30 tablet, Rfl:  2    FLUoxetine (PROzac) 20 mg capsule, Take 1 capsule (20 mg total) by mouth daily, Disp: 90 capsule, Rfl: 1    fluticasone (FLONASE) 50 mcg/act nasal spray, INSTILL 1 SPRAY INTO EACH NOSTRIL ONCE DAILY, Disp: 16 mL, Rfl: 1    levocetirizine (XYZAL) 5 MG tablet, Take 1 tablet (5 mg total) by mouth every evening, Disp: 90 tablet, Rfl: 1    lisinopril (ZESTRIL) 5 mg tablet, Take 1 tablet (5 mg total) by mouth daily, Disp: 90 tablet, Rfl: 1    Melatonin 10 MG TABS, Take by mouth, Disp: , Rfl:     montelukast (SINGULAIR) 10 mg tablet, Take 1 tablet (10 mg total) by mouth daily at bedtime, Disp: 90 tablet, Rfl: 1    Nutritional Supplements (BOOST BREEZE PO), Take by mouth daily, Disp: , Rfl:     oxyCODONE (OxyCONTIN) 10 mg 12 hr tablet, Take 10 mg by mouth as needed, Disp: , Rfl:     QUEtiapine (SEROquel) 25 mg tablet, Take 1 tablet (25 mg total) by mouth daily at bedtime, Disp: 90 tablet, Rfl: 1    Diclofenac Sodium (VOLTAREN) 1 %, Apply 2 g topically 4 (four) times a day (Patient not taking: Reported on 7/23/2024), Disp: 60 g, Rfl: 3    Mirabegron ER 25 MG TB24, Take 50 mg by mouth in the morning (Patient not taking: Reported on 7/23/2024), Disp: 180 tablet, Rfl: 3    pantoprazole (PROTONIX) 40 mg tablet, Take 1 tablet (40 mg total) by mouth daily, Disp: 90 tablet, Rfl: 1    ALLERGIES:  Allergies   Allergen Reactions    Ibuprofen Nausea Only, Rash, Dizziness and Syncope    Morphine Other (See Comments) and Hallucinations     Intolerance    Latex Dermatitis       ROS:  Review of Systems     Constitutional: Negative for fatigue, fever or loss of appetite.   HENT: Negative.    Respiratory: Negative for shortness of breath, dyspnea.    Cardiovascular: Negative for chest pain/tightness.   Gastrointestinal: Negative for abdominal pain, N/V.   Endocrine: Negative for cold/heat intolerance, unexplained weight loss/gain.   Genitourinary: Negative for flank pain, dysuria, hematuria.   Musculoskeletal: Negative for arthralgia  "  Skin: Negative for rash.    Neurological: Negative for numbness or tingling  Psychiatric/Behavioral: Negative for agitation.  _____________________________________________________  PHYSICAL EXAMINATION:    Blood pressure 156/78, pulse 71, temperature 98.4 °F (36.9 °C), temperature source Tympanic, height 4' 9\" (1.448 m), weight 73.9 kg (163 lb), SpO2 97%, not currently breastfeeding.    Constitutional: Oriented to person, place, and time. Appears well-developed and well-nourished. No distress.   HENT:   Head: Normocephalic.   Eyes: Conjunctivae are normal. Right eye exhibits no discharge. Left eye exhibits no discharge. No scleral icterus.   Cardiovascular: Normal rate.    Pulmonary/Chest: Effort normal.   Neurological: Alert and oriented to person, place, and time.   Skin: Skin is warm and dry. No rash noted. Not diaphoretic. No erythema. No pallor.   Psychiatric: Normal mood and affect. Behavior is normal. Judgment and thought content normal.      MUSCULOSKELETAL EXAMINATION:   Physical Exam  Ortho Exam    Right lower extremity is neurovascularly intact  Toes are pink and mobile  Compartments are soft  Range of motion of the knee is from 0 to 120 degrees  No ligament laxity  Brisk cap refill  Sensation intact  Objective:  BP Readings from Last 1 Encounters:   07/23/24 156/78      Wt Readings from Last 1 Encounters:   07/23/24 73.9 kg (163 lb)        BMI:   Estimated body mass index is 35.27 kg/m² as calculated from the following:    Height as of this encounter: 4' 9\" (1.448 m).    Weight as of this encounter: 73.9 kg (163 lb).            Scribe Attestation      I,:  Real Ga PA-C am acting as a scribe while in the presence of the attending physician.:       I,:  Marcos Arcos, DO personally performed the services described in this documentation    as scribed in my presence.:            "

## 2024-07-30 ENCOUNTER — OFFICE VISIT (OUTPATIENT)
Dept: PHYSICAL THERAPY | Facility: CLINIC | Age: 74
End: 2024-07-30
Payer: COMMERCIAL

## 2024-07-30 DIAGNOSIS — I89.0 LYMPHEDEMA: Primary | ICD-10-CM

## 2024-07-30 PROCEDURE — 97140 MANUAL THERAPY 1/> REGIONS: CPT

## 2024-07-30 PROCEDURE — 97110 THERAPEUTIC EXERCISES: CPT

## 2024-07-30 NOTE — PROGRESS NOTES
Daily Note     Today's date: 2024  Patient name: Deyanira Stokes  : 1950  MRN: [id number]  Referring provider: Merced Alex PA-C  Dx:   Encounter Diagnosis     ICD-10-CM    1. Lymphedema  I89.0                      Subjective: No new co's  Most of my swelling has been in my right hand, wrist has been clearing up      Objective: See treatment diary below      Assessment: Tolerated MLD R UE treatment well. Trial clinic pneumatic pump R UE with pt indicating poor fitting home UE/chest compression garment  PTA/CLT to look into alt options  Pt to benefit with updating compression with overall < wt loss  Pt demon decongestion R UE/ant thorax region end rx session  Patient would benefit from continued PT      Plan: Continue per plan of care.      Precautions: hx of R breast CA with mastectomy, psych disorder, renal disorder, recent R foot fx with removal of boot 10/1/22, fall risk, imbalance    Re-eval -            Order Issued   Compression Garment(s)     Bra/denise    Abdominal    R UE compression sleeve and glove ? 2024    ? 2024    ? 2024   Pneumatic pump Pt has  ?        Date      Visit Count 1 2 3 4 5   FOTO        Pain In  see eval       Pain Out  see eval               Manuals  Volumetrics  R shoulder  Flex 128*  Scap 98*    Wt: 167 lbs  BMI 36.08      CDT 15 min  CV/PTA-CLT    Pt educ   CDT MLD  - short neck  - diag breath  - R UE    R shoulder PROM flex/ scap  GENTLE    HEP educ/issued      Pt education w/ handouts provided  - What is CDT  - What is lymphedema  - Prevention  - Skin / nail care management     - Stages of lymphedema  - Nutrition/hydration    - REVIEW  Compression: day/night, self wrap/michelle/doffing, care management, replace every 6 months  - REVIEW  Pneumatic pump - contraindications/precautions and instructions to change mmHg as appropriate/prn for UE    Pt asst  R UE compression sleeve 20-30 mmHg, abdominal compression, compression bra with  prosthetic, denise compression       MLD  - short neck  - diag breath  - R UE    R shoulder PROM flex/ scap  GENTLE    Trial clinic pneumatic pump R UE  45 mm Hg  20 min during pt educ    Pt educ  - Self MLD - sequence    - Signs/symptoms of infection/cellulitis    Pt asst  R UE compression sleeve 20-30 mmHg, abdominal compression, compression bra with prosthetic, denise compression    55 min MLD  - short neck  - diag breath  - R UE    R shoulder PROM flex/ scap  GENTLE    Trial clinic pneumatic pump R UE  45 mm Hg  20 min during pt educ    Pt educ/issued HEP:    Remedial lymphedema exercises  Handouts issued  Cues prn for proper form/technique    Pt asst  R UE compression sleeve 20-30 mmHg, abdominal compression, compression bra with prosthetic, deinse compression    55 min MLD  - short neck  - diag breath  - R UE    R shoulder PROM flex/ scap  GENTLE    Trial clinic pneumatic pump R UE  45 mm Hg  20 min during pt educ    Pt educ/issued HEP:    Remedial lymphedema exercises  Handouts issued  Cues prn for proper form/technique    Pt asst  R UE compression sleeve 20-30 mmHg, abdominal compression, compression bra with prosthetic, denise compression    55 min                           Neuro Re-Ed                                                                Ther Ex                                                                        Ther Activity                        Gait Training                        Modalities                          Access Code: [email or web address]  URL: [email or web address]  Date: 07/02/2024  Prepared by: Candace Yarbrough    Exercises  - Supine Shoulder Flexion Extension AAROM with Dowel  - 1 x daily - 7 x weekly - 10 reps - 10 hold  - Seated Shoulder Flexion AAROM with Dowel  - 1 x daily - 7 x weekly - 10 reps - 10 hold  - Standing Shoulder Abduction AAROM with Dowel  - 1 x daily - 7 x weekly - 10 reps - 10 hold  - Doorway Pec Stretch at 60 Degrees Abduction with Arm Straight  - 1 x  daily - 7 x weekly - 3 sets - 30 hold  - Standing Shoulder Extension with Dowel  - 1 x daily - 7 x weekly - 3 sets - 10 reps  - Wall [place]  - 1 x daily - 7 x weekly - 10 reps

## 2024-07-31 ENCOUNTER — HOSPITAL ENCOUNTER (OUTPATIENT)
Dept: INFUSION CENTER | Facility: HOSPITAL | Age: 74
Discharge: HOME | End: 2024-07-31
Attending: INTERNAL MEDICINE
Payer: COMMERCIAL

## 2024-07-31 VITALS — TEMPERATURE: 96.9 F

## 2024-07-31 DIAGNOSIS — R26.89 BALANCE DISORDER: ICD-10-CM

## 2024-07-31 DIAGNOSIS — M81.0 AGE-RELATED OSTEOPOROSIS WITHOUT CURRENT PATHOLOGICAL FRACTURE: Primary | ICD-10-CM

## 2024-07-31 DIAGNOSIS — R41.89 COGNITIVE DECLINE: ICD-10-CM

## 2024-07-31 DIAGNOSIS — R25.1 TREMOR: ICD-10-CM

## 2024-07-31 PROCEDURE — 96372 THER/PROPH/DIAG INJ SC/IM: CPT

## 2024-07-31 RX ORDER — DONEPEZIL HYDROCHLORIDE 10 MG/1
10 TABLET, FILM COATED ORAL
Qty: 100 TABLET | Refills: 1 | Status: SHIPPED | OUTPATIENT
Start: 2024-07-31

## 2024-07-31 RX ADMIN — DENOSUMAB 60 MG: 60 INJECTION SUBCUTANEOUS at 10:26

## 2024-07-31 NOTE — PROGRESS NOTES
Deyanira Stokes  tolerated prolia injection well with no complications.      Deyanira Stokes is aware of future appt on 1/27 0900    AVS printed and given to Deyanira Stokes:  No (Declined by Deyanira Stokes)  card provided

## 2024-08-06 ENCOUNTER — OFFICE VISIT (OUTPATIENT)
Dept: PHYSICAL THERAPY | Facility: CLINIC | Age: 74
End: 2024-08-06
Payer: COMMERCIAL

## 2024-08-06 DIAGNOSIS — I89.0 LYMPHEDEMA: Primary | ICD-10-CM

## 2024-08-06 PROCEDURE — 97140 MANUAL THERAPY 1/> REGIONS: CPT

## 2024-08-06 PROCEDURE — 97110 THERAPEUTIC EXERCISES: CPT

## 2024-08-06 NOTE — PROGRESS NOTES
Daily Note     Today's date: 2024  Patient name: Deyanira Stokes  : 1950  MRN: [id number]  Referring provider: Merced Alex PA-C  Dx:   Encounter Diagnosis     ICD-10-CM    1. Lymphedema  I89.0                      Subjective: My LBP is horrible today  Just transitioning in/out of bed  swelling R UE not painful today    I fell again this morning I was able to get up on my own  did not have anyone home at the time to help me  Denies any > pain R UE      Objective: See treatment diary below      Assessment: Tolerated MLD R UE/thorax treatment well. Pt with poor fitting home pneumatic pump with recent wt loss  pt compliant with compression garments daily  pt limited mobility 2* LBP  Pt performed remedial lymph exer w/o difficulty/denies > LBP  Pt demon < R shoulder abd PROM this date with report of > LBP  Pt encourage sub max with AAROM   Patient would benefit from continued PT      Plan: Continue per plan of care.      Precautions: hx of R breast CA with mastectomy, psych disorder, renal disorder, recent R foot fx with removal of boot 10/1/22, fall risk, imbalance    Re-eval -            Order Issued   Compression Garment(s)     Bra/denise    Abdominal    R UE compression sleeve and glove ? 2024    ? 2024    ? 2024   Pneumatic pump Pt has  ?        Date        Visit Count 6       FOTO        Pain In        Pain Out                Manuals   R shoulder  Flex 135*  Scap 97*  Wt: 155 lbs  BMI < 35 kg/m2 Volumetrics         CDT MLD  - short neck  - diag breath  - R UE    R shoulder PROM flex/ scap  GENTLE    Trial clinic pneumatic pump R UE  45 mm Hg  20 min during pt educ    Pt performed    Remedial lymphedema exercises  20x ea    Pt asst  R UE compression sleeve 20-30 mmHg, abdominal compression, compression bra with prosthetic, denise compression    60 min                               Neuro Re-Ed                                                                Ther Ex                                                                         Ther Activity                        Gait Training                        Modalities                          Access Code: [email or web address]  URL: [email or web address]  Date: 07/02/2024  Prepared by: Candace Yarbrough    Exercises  - Supine Shoulder Flexion Extension AAROM with Dowel  - 1 x daily - 7 x weekly - 10 reps - 10 hold  - Seated Shoulder Flexion AAROM with Dowel  - 1 x daily - 7 x weekly - 10 reps - 10 hold  - Standing Shoulder Abduction AAROM with Dowel  - 1 x daily - 7 x weekly - 10 reps - 10 hold  - Doorway Pec Stretch at 60 Degrees Abduction with Arm Straight  - 1 x daily - 7 x weekly - 3 sets - 30 hold  - Standing Shoulder Extension with Dowel  - 1 x daily - 7 x weekly - 3 sets - 10 reps  - Wall [place]  - 1 x daily - 7 x weekly - 10 reps

## 2024-08-13 ENCOUNTER — APPOINTMENT (OUTPATIENT)
Dept: PHYSICAL THERAPY | Facility: CLINIC | Age: 74
End: 2024-08-13
Payer: COMMERCIAL

## 2024-08-20 ENCOUNTER — OFFICE VISIT (OUTPATIENT)
Dept: PHYSICAL THERAPY | Facility: CLINIC | Age: 74
End: 2024-08-20
Payer: COMMERCIAL

## 2024-08-20 DIAGNOSIS — I89.0 LYMPHEDEMA: Primary | ICD-10-CM

## 2024-08-20 PROCEDURE — 97110 THERAPEUTIC EXERCISES: CPT

## 2024-08-20 PROCEDURE — 97140 MANUAL THERAPY 1/> REGIONS: CPT

## 2024-08-20 NOTE — PROGRESS NOTES
Daily Note     Today's date: 2024  Patient name: Deyanira Stokes  : 1950  MRN: [id number]  Referring provider: Merced Alex PA-C  Dx:   Encounter Diagnosis     ICD-10-CM    1. Lymphedema  I89.0           Start Time: 0800  Stop Time: 0900  Total time in clinic (min): 60 minutes    Subjective: I felt good after last PT session  no new co's      Objective: See treatment diary below      Assessment: Pt tolerated MLD/clinic pneumatic pump R UE treatment well. Demon overall < firmness/decongestion R UE end rx session   Pt util home pneumatic pump ~3x/week with poor fitting garment 2nd to wt loss   Patient I and compliant with carry over with remedial lymph exer, demon good skin integrity, compliant compression daily, I with self MLD  Pt demon limited R shoulder ROM Demon overall < volume/decongestion R UE  Pt 1* co's of swelling R hand/forearm/R abdominal region  Pt to update compression garment  next month      Plan: DC to self maintenance lymph program  To benefit with f/u in 6 months     Precautions: hx of R breast CA with mastectomy, psych disorder, renal disorder, recent R foot fx with removal of boot 10/1/22, fall risk, imbalance    Re-eval -            Order Issued   Compression Garment(s)     Bra/denise    Abdominal    R UE compression sleeve and glove ? 2024    ? 2024    ? 2024   Pneumatic pump Pt has  ?        Date       Visit Count 6 7      FOTO        Pain In        Pain Out                Manuals   R shoulder  Flex 135*  Scap 97*  Wt: 155 lbs  BMI < 35 kg/m2 Volumetrics         CDT MLD  - short neck  - diag breath  - R UE    R shoulder PROM flex/ scap  GENTLE    Trial clinic pneumatic pump R UE  45 mm Hg  20 min during pt educ    Pt performed    Remedial lymphedema exercises  20x ea    Pt asst  R UE compression sleeve 20-30 mmHg, abdominal compression, compression bra with prosthetic, denise compression    60 min MLD  - short neck  - diag breath  - R UE    R shoulder  PROM flex/ scap  GENTLE    Trial clinic pneumatic pump R UE  45 mm Hg  20 min during pt educ    Pt performed    Remedial lymphedema exercises  20x ea    Pt educ   - self maintenance lymph program    Pt asst  R UE compression sleeve 20-30 mmHg, abdominal compression, compression bra with prosthetic, denise compression    60 min                              Neuro Re-Ed                                                                Ther Ex                                                                        Ther Activity                        Gait Training                        Modalities                          Access Code: [email or web address]  URL: [email or web address]  Date: 07/02/2024  Prepared by: Candace Yarbrough    Exercises  - Supine Shoulder Flexion Extension AAROM with Dowel  - 1 x daily - 7 x weekly - 10 reps - 10 hold  - Seated Shoulder Flexion AAROM with Dowel  - 1 x daily - 7 x weekly - 10 reps - 10 hold  - Standing Shoulder Abduction AAROM with Dowel  - 1 x daily - 7 x weekly - 10 reps - 10 hold  - Doorway Pec Stretch at 60 Degrees Abduction with Arm Straight  - 1 x daily - 7 x weekly - 3 sets - 30 hold  - Standing Shoulder Extension with Dowel  - 1 x daily - 7 x weekly - 3 sets - 10 reps  - Wall [place]  - 1 x daily - 7 x weekly - 10 reps

## 2024-09-08 DIAGNOSIS — J30.2 SEASONAL ALLERGIES: ICD-10-CM

## 2024-09-08 DIAGNOSIS — J45.20 MILD INTERMITTENT ASTHMA WITHOUT COMPLICATION: ICD-10-CM

## 2024-09-08 DIAGNOSIS — R09.82 PND (POST-NASAL DRIP): ICD-10-CM

## 2024-09-08 RX ORDER — FLUTICASONE PROPIONATE 50 MCG
1 SPRAY, SUSPENSION (ML) NASAL DAILY
Qty: 16 ML | Refills: 1 | Status: SHIPPED | OUTPATIENT
Start: 2024-09-08

## 2024-09-17 ENCOUNTER — OFFICE VISIT (OUTPATIENT)
Dept: CARDIOLOGY CLINIC | Facility: CLINIC | Age: 74
End: 2024-09-17
Payer: COMMERCIAL

## 2024-09-17 VITALS
HEART RATE: 76 BPM | WEIGHT: 162.8 LBS | BODY MASS INDEX: 35.12 KG/M2 | SYSTOLIC BLOOD PRESSURE: 128 MMHG | DIASTOLIC BLOOD PRESSURE: 82 MMHG | HEIGHT: 57 IN

## 2024-09-17 DIAGNOSIS — R06.09 DOE (DYSPNEA ON EXERTION): ICD-10-CM

## 2024-09-17 DIAGNOSIS — I10 ESSENTIAL HYPERTENSION: ICD-10-CM

## 2024-09-17 DIAGNOSIS — I35.0 NONRHEUMATIC AORTIC (VALVE) STENOSIS: ICD-10-CM

## 2024-09-17 DIAGNOSIS — R07.89 OTHER CHEST PAIN: ICD-10-CM

## 2024-09-17 DIAGNOSIS — E78.5 HYPERLIPIDEMIA LDL GOAL <130: Primary | ICD-10-CM

## 2024-09-17 DIAGNOSIS — I11.0 HYPERTENSIVE HEART DISEASE WITH HEART FAILURE (HCC): ICD-10-CM

## 2024-09-17 PROCEDURE — 99214 OFFICE O/P EST MOD 30 MIN: CPT | Performed by: PHYSICIAN ASSISTANT

## 2024-09-17 PROCEDURE — 93000 ELECTROCARDIOGRAM COMPLETE: CPT | Performed by: PHYSICIAN ASSISTANT

## 2024-09-17 NOTE — PATIENT INSTRUCTIONS
Discontinue lisinopril     Start Lasix 20 mg daily     Check blood work in 1 week     Schedule a nuclear stress test     Schedule appointment with the Valve clinic     Return in one month     Call in the meantime if you have any problems    Detail Level: Detailed Size Of Lesion: 2.5mm x 3mm

## 2024-09-17 NOTE — ASSESSMENT & PLAN NOTE
Breathlessness with exertion especially when walking up an incline or climbing stairs and doing any physical activity.  This is associated with chest discomfort.  See additional comments  We will obtain an nonwalking nuclear stress test to ensure that there is no ischemic cause.    BNP and BMP will be checked as well.

## 2024-09-17 NOTE — ASSESSMENT & PLAN NOTE
Wt Readings from Last 3 Encounters:   07/23/24 73.9 kg (163 lb)   07/09/24 75.8 kg (167 lb)   06/10/24 75.9 kg (167 lb 4 oz)   Preserved EF  G1 DD on echocardiogram 7/9/2024  Volume stable   Will start on small dose of lasix and discontinue lisinopril in the setting of AS

## 2024-09-17 NOTE — ASSESSMENT & PLAN NOTE
Chest pain that is described as a pressure that occurs when walking up an incline or climbing stairs.  Associated with dyspnea on exertion.  Alleviated with rest.  Will obtain a nonwalking nuclear stress test to ensure that there is no ischemic cause.

## 2024-09-17 NOTE — PROGRESS NOTES
[facility]   Outpatient Note  Deyanira Stokes  1950  [id number]  [facility] [address] [address] [phone]    Deyanira Stokes is a 74 y.o. female    Assessment and Plan:   Hyperlipidemia LDL goal <130  Tolerating statin therapy  Continue atorvastatin 20 mg daily    Nonrheumatic aortic (valve) stenosis  Moderate to severe stenosis on echocardiogram 7/9/2024  Peak velocity 3.9 m/s JOSEFINA 0.91 cm²    Hypertensive heart disease with heart failure (HCC)  Wt Readings from Last 3 Encounters:   07/23/24 73.9 kg (163 lb)   07/09/24 75.8 kg (167 lb)   06/10/24 75.9 kg (167 lb 4 oz)   Preserved EF  G1 DD on echocardiogram 7/9/2024  Volume stable   Will start on small dose of lasix and discontinue lisinopril in the setting of AS               Essential hypertension  Controlled on current medical regimen  See additional comments     QUILES (dyspnea on exertion)  Breathlessness with exertion especially when walking up an incline or climbing stairs and doing any physical activity.  This is associated with chest discomfort.  See additional comments  We will obtain an nonwalking nuclear stress test to ensure that there is no ischemic cause.    BNP and BMP will be checked as well.    Other chest pain  Chest pain that is described as a pressure that occurs when walking up an incline or climbing stairs.  Associated with dyspnea on exertion.  Alleviated with rest.  Will obtain a nonwalking nuclear stress test to ensure that there is no ischemic cause.      Additional Plan:   Medications as detailed above.    Pertinent testing orders as outlined.      Available lab and test results are reviewed with the patient and any additional required labs are ordered as noted.     Return visit will be in one month or earlier if there are problems.     The patient is encouraged to call in the meantime if there are questions or  concerns.      Subjective:   The patient is seen in follow-up today regarding AS HTN and HLD.  Aortic stenosis has progressed to moderate to severe on echocardiogram 7/9/2024.  Patient is complaining of dyspnea on exertion specifically when walking up an incline or climbing stairs.  She has difficulty exercising as she experiences chest discomfort associated with her activity.  She states that she experiences chest pressure with dyspnea on exertion that lasts minutes in duration and is alleviated with rest.  She is concerned about this.  She also experiences some mild edema that is alleviated when she lays flat in bed.  She also has a nagging cough that is worsened when she lays flat in bed.  She is unable to take a deep breath when she lays flat.  EKG today shows normal sinus rhythm.  She denies any palpitations dizziness lightheadedness or syncope.  She denies any PND.  She has no TIA or CVA symptoms.        Social History  Social History     Tobacco Use   Smoking Status Never    Passive exposure: Never   Smokeless Tobacco Never   ,   Social History     Substance and Sexual Activity   Alcohol Use Yes    Alcohol/week: 1.0 standard drink of alcohol    Types: 1 Glasses of wine per week    Comment: this is less than once/mo, more like 1-2 drinks every 6 mo   ,   Social History     Substance and Sexual Activity   Drug Use Not Currently    Types: Marijuana    Comment: when i was in my teens     Family History   Adopted: Yes   Problem Relation Age of Onset    Diabetes Mother     Heart disease Mother     Mental illness Mother     Depression Mother     Heart disease Brother     Breast cancer Maternal Aunt     Breast cancer Maternal Aunt     Breast cancer Maternal Aunt     Breast cancer Maternal Aunt     Breast cancer Maternal Aunt     No Known Problems Father     No Known Problems Sister     No Known Problems Daughter     Breast cancer Maternal Grandmother     No Known Problems Sister     No Known Problems Sister     No  Known Problems Sister        Medical and Surgical History  Past Medical History:   Diagnosis Date    Allergic     this is an ongoing problem    Anxiety     Arthritis     Asthma     Breast cancer (HCC)     rt mastectomy 2005    Cancer (HCC)     breast right    Cataract     CHF (congestive heart failure) (HCC)     Chronic headaches     Concussion     At age 11; Chronic headaches since then    Coronary artery disease     Dementia (HCC)     see chart for more details    Depression     Dietary lactose intolerance     GERD (gastroesophageal reflux disease)     High cholesterol     History of chemotherapy     2005 rt breast cancer    History of transfusion     HL (hearing loss)     it's difficult to hear when there are lots of distractions    Hypertension     IBS (irritable bowel syndrome)     Irritable bowel syndrome (IBS) 05/31/2016    Kidney stone     Lymphedema of right arm     Obesity     Osteoporosis     osteoarthritis, see chart    PFO (patent foramen ovale)     Pneumonia     receive annual vaccine shots    PONV (postoperative nausea and vomiting)     Post-menopausal     Psychiatric disorder     Renal disorder     Scoliosis     tail bone, see chart for more details    Shingles     see chart for more details    Shortness of breath     Vitamin D deficiency      Past Surgical History:   Procedure Laterality Date    BREAST SURGERY      CATARACT EXTRACTION Bilateral     CATARACT EXTRACTION, BILATERAL      CHOLECYSTECTOMY      COLONOSCOPY N/A 05/31/2016    Procedure: COLONOSCOPY;  Surgeon: Colton Moralez MD;  Location: [facility];  Service:     FL GUIDED NEEDLE PLAC BX/ASP/INJ  08/14/2023    FL GUIDED NEEDLE PLAC BX/ASP/INJ  10/30/2023    GALLBLADDER SURGERY      HYSTERECTOMY      Total    JOINT REPLACEMENT      KNEE ARTHROSCOPY      KNEE SURGERY      arthroscopy    LYMPH NODE BIOPSY      as part of the breast cancer, see chart for more details    MASTECTOMY Right     rt breast mastectomy 2005    LA ARTHRP KNE CONDYLE&PLATU  MEDIAL&LAT COMPARTMENTS Left 05/20/2021    Procedure: ARTHROPLASTY KNEE TOTAL;  Surgeon: Monica Cox MD;  Location:  MAIN OR;  Service: Orthopedics    CA ARTHRP KNE CONDYLE&PLATU MEDIAL&LAT COMPARTMENTS Right 11/16/2022    Procedure: ARTHROPLASTY KNEE TOTAL;  Surgeon: Marcos Arcos DO;  Location: [facility];  Service: Orthopedics    TONSILLECTOMY AND ADENOIDECTOMY           Current Outpatient Medications:     acetaminophen (TYLENOL) 650 mg CR tablet, Take 1 tablet (650 mg total) by mouth every 8 (eight) hours as needed for mild pain, Disp: 30 tablet, Rfl: 0    albuterol (2.5 mg/3 mL) 0.083 % nebulizer solution, Take 2.5 mg by nebulization every 6 (six) hours as needed for wheezing, Disp: , Rfl:     albuterol (PROVENTIL HFA,VENTOLIN HFA) 90 mcg/act inhaler, Inhale 2 puffs every 4 (four) hours as needed for wheezing, Disp: 8.5 g, Rfl: 2    atorvastatin (LIPITOR) 20 mg tablet, Take 1 tablet (20 mg total) by mouth daily, Disp: 90 tablet, Rfl: 2    butalbital-acetaminophen-caffeine (FIORICET,ESGIC) -40 mg per tablet, Take 1 tablet by mouth 3 (three) times a day as needed for headaches, Disp: 90 tablet, Rfl: 1    Diclofenac Sodium (VOLTAREN) 1 %, Apply 2 g topically 4 (four) times a day, Disp: 60 g, Rfl: 3    donepezil (ARICEPT) 10 mg tablet, Take 1 tablet by mouth daily at bedtime., Disp: 100 tablet, Rfl: 1    FLUoxetine (PROzac) 20 mg capsule, Take 1 capsule (20 mg total) by mouth daily, Disp: 90 capsule, Rfl: 1    fluticasone (FLONASE) 50 mcg/act nasal spray, INSTILL 1 SPRAY INTO EACH NOSTRIL ONCE DAILY, Disp: 16 mL, Rfl: 1    levocetirizine (XYZAL) 5 MG tablet, Take 1 tablet (5 mg total) by mouth every evening, Disp: 90 tablet, Rfl: 1    lisinopril (ZESTRIL) 5 mg tablet, Take 1 tablet (5 mg total) by mouth daily, Disp: 90 tablet, Rfl: 1    Mirabegron ER 25 MG TB24, Take 50 mg by mouth in the morning, Disp: 180 tablet, Rfl: 3    montelukast (SINGULAIR) 10 mg tablet, Take 1 tablet (10 mg total) by mouth  "daily at bedtime, Disp: 90 tablet, Rfl: 1    Nutritional Supplements (BOOST BREEZE PO), Take by mouth daily, Disp: , Rfl:     oxyCODONE (OxyCONTIN) 10 mg 12 hr tablet, Take 10 mg by mouth as needed, Disp: , Rfl:     pantoprazole (PROTONIX) 40 mg tablet, Take 1 tablet (40 mg total) by mouth daily, Disp: 90 tablet, Rfl: 1    QUEtiapine (SEROquel) 25 mg tablet, Take 1 tablet (25 mg total) by mouth daily at bedtime, Disp: 90 tablet, Rfl: 1  Allergies   Allergen Reactions    Ibuprofen Nausea Only, Rash, Dizziness and Syncope    Morphine Other (See Comments) and Hallucinations     Intolerance    Latex Dermatitis       Review of Systems   Constitutional: Negative.   HENT: Negative.     Eyes: Negative.    Cardiovascular:  Positive for chest pain, dyspnea on exertion and leg swelling. Negative for claudication, cyanosis, irregular heartbeat, near-syncope, orthopnea, palpitations, paroxysmal nocturnal dyspnea and syncope.   Respiratory: Negative.  Negative for cough, hemoptysis, shortness of breath, sleep disturbances due to breathing, snoring, sputum production and wheezing.    Endocrine: Negative.    Hematologic/Lymphatic: Negative.    Skin: Negative.    Musculoskeletal: Negative.    Gastrointestinal: Negative.    Genitourinary: Negative.    Neurological: Negative.    Psychiatric/Behavioral: Negative.     Allergic/Immunologic: Negative.        Objective:   /82   Pulse 76   Ht 4' 9\" (1.448 m)   Wt 73.8 kg (162 lb 12.8 oz)   LMP  (LMP Unknown)   BMI 35.23 kg/m²   Physical Exam  Vitals and nursing note reviewed.   Constitutional:       Appearance: She is well-developed. She is obese.   HENT:      Head: Normocephalic and atraumatic.      Mouth/Throat:      Mouth: Mucous membranes are moist.   Eyes:      General: No scleral icterus.     Conjunctiva/sclera: Conjunctivae normal.   Neck:      Vascular: No JVD.      Trachea: No tracheal deviation.   Cardiovascular:      Rate and Rhythm: Normal rate and regular rhythm.     "  Pulses: Intact distal pulses.      Heart sounds: S1 normal and S2 normal. Murmur heard.      Harsh systolic murmur is present with a grade of 3/6 radiating to the neck.      No friction rub. No gallop.   Pulmonary:      Effort: Pulmonary effort is normal. No respiratory distress.      Breath sounds: Normal breath sounds. No wheezing or rales.   Chest:      Chest wall: No tenderness.   Abdominal:      General: Bowel sounds are normal. There is no distension.      Palpations: Abdomen is soft.      Tenderness: There is no abdominal tenderness.      Comments: Aorta not palpable    Musculoskeletal:         General: No tenderness. Normal range of motion.      Cervical back: Normal range of motion and neck supple.      Right lower leg: No edema.      Left lower leg: No edema.   Skin:     General: Skin is warm and dry.      Coloration: Skin is not pale.      Findings: No erythema or rash.   Neurological:      General: No focal deficit present.      Mental Status: She is alert and oriented to person, place, and time.   Psychiatric:         Mood and Affect: Mood normal.         Behavior: Behavior normal.         Judgment: Judgment normal.         Lab Review:   Lab Results   Component Value Date    CHOL 234 12/02/2015     Lab Results   Component Value Date    HDL 52 06/11/2024     Lab Results   Component Value Date    LDLCALC 56 06/11/2024     Lab Results   Component Value Date    TRIG 117 06/11/2024     Results Reviewed       None          Results Reviewed       None          Results Reviewed       None            Recent Cardiovascular Testing:   Echo 7/9/2024: Normal LVEF 60% G1 DD AS moderate to severe peak velocity 3.9 m/s JOSEFINA 0.91 cm² mild MAC mild MR mild TR    ECG Review:   9/17/2024 normal sinus rhythm    12/25/2022Normal sinus rhythm  Minor  Nonspecific ST-t wave changes

## 2024-09-23 ENCOUNTER — OFFICE VISIT (OUTPATIENT)
Dept: CARDIAC SURGERY | Facility: CLINIC | Age: 74
End: 2024-09-23
Payer: COMMERCIAL

## 2024-09-23 ENCOUNTER — TELEPHONE (OUTPATIENT)
Dept: CARDIOLOGY CLINIC | Facility: CLINIC | Age: 74
End: 2024-09-23

## 2024-09-23 VITALS
OXYGEN SATURATION: 97 % | HEART RATE: 60 BPM | BODY MASS INDEX: 34.58 KG/M2 | DIASTOLIC BLOOD PRESSURE: 77 MMHG | SYSTOLIC BLOOD PRESSURE: 179 MMHG | HEIGHT: 57 IN | WEIGHT: 160.3 LBS

## 2024-09-23 DIAGNOSIS — I10 ESSENTIAL HYPERTENSION: ICD-10-CM

## 2024-09-23 DIAGNOSIS — R07.89 OTHER CHEST PAIN: ICD-10-CM

## 2024-09-23 DIAGNOSIS — I35.0 NONRHEUMATIC AORTIC (VALVE) STENOSIS: Primary | ICD-10-CM

## 2024-09-23 DIAGNOSIS — Q21.12 PATENT FORAMEN OVALE: ICD-10-CM

## 2024-09-23 DIAGNOSIS — Z01.810 PRE-OPERATIVE CARDIOVASCULAR EXAMINATION: ICD-10-CM

## 2024-09-23 PROCEDURE — 99204 OFFICE O/P NEW MOD 45 MIN: CPT | Performed by: THORACIC SURGERY (CARDIOTHORACIC VASCULAR SURGERY)

## 2024-09-23 RX ORDER — ASPIRIN 81 MG/1
81 TABLET, CHEWABLE ORAL DAILY
COMMUNITY

## 2024-09-23 RX ORDER — FUROSEMIDE 20 MG
20 TABLET ORAL 2 TIMES DAILY
COMMUNITY

## 2024-09-23 NOTE — LETTER
9/25/2024      MRN: [id number]        Deyanira Stokes  [address]  [facility] [address]      You have been referred to our Cardiology department to be scheduled for a RIGHT & LEFT  Heart Catheterization procedure.     I have been unable to contact you.     Please call me at [phone] to schedule your procedure.       Thank you,   Kayleen Harrell  Surgery Coordinator  [facility]   [address] [place] [address] [place] [address]  Teams: [phone]

## 2024-09-23 NOTE — LETTER
10/2/2024      MRN: [id number]        Deyanira Stokes  [address]  [facility] [address]      You have been referred to our Cardiology department to be scheduled for a RIGHT & LEFT  Heart Catheterization procedure.     I have been unable to contact you.     Please call me at [phone] to schedule your procedure.       Thank you,   Kayleen Harrell  Surgery Coordinator  [facility]   [address] [place] [address] [place] [address]  Teams: [phone]

## 2024-09-23 NOTE — TELEPHONE ENCOUNTER
----- Message from Bella ZHOU sent at 9/23/2024  1:28 PM EDT -----  Regarding: Cath  Please schedule patient for:     Pre TAVR Cardiac Cath: to be scheduled in the next few weeks. Patient has [facility] as his primary insurance and is getting bloodwork done.    Please schedule with either Dr. Cantrell, Dr. Wilburn, Dr. Myers or Dr. Grubbs     To be done at: [facility]     To be done by:     Please address any questions regarding this request to Bella Galvin or Mariely Gipson,     Thank you.

## 2024-09-23 NOTE — PROGRESS NOTES
Consultation - Cardiac Surgery   Deyanira Stokes 74 y.o. female MRN: [id number]    Physician Requesting Consult: Jordyn    Reason for Consult / Principal Problem: Aortic stenosis, Non-Rheumatic    History of Present Illness: Deyanira Stokes is a 74 y.o. year old female with known aortic stenosis dating back to at 2022.  At that time she sustained a fall and presented for evaluation at [facility].  Echocardiogram was completed and mild aortic stenosis was identified.  Since that time she has been followed with serial surveillance echocardiography.  In retrospect, she notes lifelong heart murmur dating back to her childhood.    During recent surveillance echocardiography known aortic stenosis has progressed into the severe range (with surveillance echo completed on July 9, 2024) symptomatically she notes progressive exertional dyspnea specifically with ambulating up an incline or climbing stairs.  She also notes significant orthopnea which has preceded for several years.  Upon further questioning she notes occasional angina as well.    Her past medical history is otherwise significant for anxiety, arthritis, asthma, right breast cancer diagnosed in 2005 (treated with right mastectomy, chemotherapy, and radiation), CAD, dementia, depression, GERD, hyperlipidemia, hypertension, kidney stone, hearing loss, IBS, obesity, osteoporosis, PFO, scoliosis, and gait dysfunction.    Prior to her consultation today, records were reviewed, specifically was recent consultation with her primary cardiologist.        Past Medical History:  Past Medical History:   Diagnosis Date    Allergic     this is an ongoing problem    Anxiety     Arthritis     Asthma     Breast cancer (HCC)     rt mastectomy 2005    Cancer (HCC)     breast right    Cataract     CHF (congestive heart failure) (HCC)     Chronic headaches     Concussion     At age 11; Chronic headaches since then    Coronary artery disease     Dementia (HCC)     see chart  for more details    Depression     Dietary lactose intolerance     GERD (gastroesophageal reflux disease)     High cholesterol     History of chemotherapy     2005 rt breast cancer    History of transfusion     HL (hearing loss)     it's difficult to hear when there are lots of distractions    Hypertension     IBS (irritable bowel syndrome)     Irritable bowel syndrome (IBS) 05/31/2016    Kidney stone     Lymphedema of right arm     Obesity     Osteoporosis     osteoarthritis, see chart    PFO (patent foramen ovale)     Pneumonia     receive annual vaccine shots    PONV (postoperative nausea and vomiting)     Post-menopausal     Psychiatric disorder     Renal disorder     Scoliosis     tail bone, see chart for more details    Shingles     see chart for more details    Shortness of breath     Vitamin D deficiency          Past Surgical History:   Past Surgical History:   Procedure Laterality Date    BREAST SURGERY      CATARACT EXTRACTION Bilateral     CATARACT EXTRACTION, BILATERAL      CHOLECYSTECTOMY      COLONOSCOPY N/A 05/31/2016    Procedure: COLONOSCOPY;  Surgeon: Colton Moralez MD;  Location: [facility];  Service:     FL GUIDED NEEDLE PLAC BX/ASP/INJ  08/14/2023    FL GUIDED NEEDLE PLAC BX/ASP/INJ  10/30/2023    GALLBLADDER SURGERY      HYSTERECTOMY      Total    JOINT REPLACEMENT      KNEE ARTHROSCOPY      KNEE SURGERY      arthroscopy    LYMPH NODE BIOPSY      as part of the breast cancer, see chart for more details    MASTECTOMY Right     rt breast mastectomy 2005    CT ARTHRP KNE CONDYLE&PLATU MEDIAL&LAT COMPARTMENTS Left 05/20/2021    Procedure: ARTHROPLASTY KNEE TOTAL;  Surgeon: Monica Cox MD;  Location:  MAIN OR;  Service: Orthopedics    CT ARTHRP KNE CONDYLE&PLATU MEDIAL&LAT COMPARTMENTS Right 11/16/2022    Procedure: ARTHROPLASTY KNEE TOTAL;  Surgeon: Marcos Arcos DO;  Location: [facility];  Service: Orthopedics    TONSILLECTOMY AND ADENOIDECTOMY           Family History:  Family History    Adopted: Yes   Problem Relation Age of Onset    Diabetes Mother     Heart disease Mother     Mental illness Mother     Depression Mother     Heart disease Brother     Breast cancer Maternal Aunt     Breast cancer Maternal Aunt     Breast cancer Maternal Aunt     Breast cancer Maternal Aunt     Breast cancer Maternal Aunt     No Known Problems Father     No Known Problems Sister     No Known Problems Daughter     Breast cancer Maternal Grandmother     No Known Problems Sister     No Known Problems Sister     No Known Problems Sister          Social History:      Social History     Substance and Sexual Activity   Alcohol Use Yes    Alcohol/week: 1.0 standard drink of alcohol    Types: 1 Glasses of wine per week    Comment: this is less than once/mo, more like 1-2 drinks every 6 mo     Social History     Substance and Sexual Activity   Drug Use Not Currently    Types: Marijuana    Comment: when i was in my teens     Social History     Tobacco Use   Smoking Status Never    Passive exposure: Never   Smokeless Tobacco Never       Home Medications:   Prior to Admission medications    Medication Sig Start Date End Date Taking? Authorizing Provider   acetaminophen (TYLENOL) 650 mg CR tablet Take 1 tablet (650 mg total) by mouth every 8 (eight) hours as needed for mild pain 5/24/21   Berta Murphy PA-C   albuterol (2.5 mg/3 mL) 0.083 % nebulizer solution Take 2.5 mg by nebulization every 6 (six) hours as needed for wheezing    Historical Provider, MD   albuterol (PROVENTIL HFA,VENTOLIN HFA) 90 mcg/act inhaler Inhale 2 puffs every 4 (four) hours as needed for wheezing 7/16/24   Merced Alex PA-C   atorvastatin (LIPITOR) 20 mg tablet Take 1 tablet (20 mg total) by mouth daily 1/8/24   Merced Alex PA-C   butalbital-acetaminophen-caffeine (FIORICET,ESGIC) -40 mg per tablet Take 1 tablet by mouth 3 (three) times a day as needed for headaches 5/5/20   Merced Alex PA-C   Diclofenac Sodium (VOLTAREN) 1 % Apply  2 g topically 4 (four) times a day 9/7/23   Ashu Rayna Luis, DPML   donepezil (ARICEPT) 10 mg tablet Take 1 tablet by mouth daily at bedtime. 7/31/24   Merced Alex PA-C   FLUoxetine (PROzac) 20 mg capsule Take 1 capsule (20 mg total) by mouth daily 7/16/24   Merced Alex PA-C   fluticasone (FLONASE) 50 mcg/act nasal spray INSTILL 1 SPRAY INTO EACH NOSTRIL ONCE DAILY 9/8/24   Marcos Lara DO   levocetirizine (XYZAL) 5 MG tablet Take 1 tablet (5 mg total) by mouth every evening 2/22/24   Marcos Lara DO   lisinopril (ZESTRIL) 5 mg tablet Take 1 tablet (5 mg total) by mouth daily 6/10/24   Merced Alex PA-C   Mirabegron ER 25 MG TB24 Take 50 mg by mouth in the morning 1/8/24   JERARDO Hernandez   montelukast (SINGULAIR) 10 mg tablet Take 1 tablet (10 mg total) by mouth daily at bedtime 7/16/24   Merced Alex PA-C   Nutritional Supplements (BOOST BREEZE PO) Take by mouth daily 9/6/17   Historical Provider, MD   oxyCODONE (OxyCONTIN) 10 mg 12 hr tablet Take 10 mg by mouth as needed    Historical Provider, MD   pantoprazole (PROTONIX) 40 mg tablet Take 1 tablet (40 mg total) by mouth daily 6/18/24   Merced Alex PA-C   QUEtiapine (SEROquel) 25 mg tablet Take 1 tablet (25 mg total) by mouth daily at bedtime 6/18/24   Merced Alex PA-C       Allergies:  Allergies   Allergen Reactions    Ibuprofen Nausea Only, Rash, Dizziness and Syncope    Morphine Other (See Comments) and Hallucinations     Intolerance    Latex Dermatitis       Review of Systems:     Review of Systems   Constitutional:  Positive for activity change, appetite change and fatigue.   HENT: Negative.     Eyes: Negative.    Respiratory:  Positive for chest tightness and shortness of breath.    Cardiovascular:  Positive for chest pain.   Endocrine: Negative.    Genitourinary: Negative.    Musculoskeletal:  Positive for arthralgias and back pain.   Neurological:  Positive for weakness and light-headedness.  "  Psychiatric/Behavioral: Negative.         Vital Signs:     Vitals:    09/23/24 0908   BP: (!) 179/77   BP Location: Left arm   Patient Position: Sitting   Cuff Size: Standard   Pulse: 60   SpO2: 97%   Weight: 72.7 kg (160 lb 4.8 oz)   Height: 4' 9\" (1.448 m)       Physical Exam:     Physical Exam  Constitutional:       Appearance: Normal appearance. She is obese.   HENT:      Head: Normocephalic and atraumatic.   Eyes:      Conjunctiva/sclera: Conjunctivae normal.   Neck:      Thyroid: No thyromegaly.      Vascular: No carotid bruit or JVD.      Trachea: No tracheal deviation.   Cardiovascular:      Rate and Rhythm: Normal rate and regular rhythm.      Pulses:           Carotid pulses are 2+ on the right side and 2+ on the left side.       Dorsalis pedis pulses are 2+ on the right side and 2+ on the left side.        Posterior tibial pulses are 2+ on the right side and 2+ on the left side.      Heart sounds: S1 normal and S2 normal. Murmur heard.   Pulmonary:      Effort: No accessory muscle usage or respiratory distress.      Breath sounds: No wheezing or rales.   Chest:      Chest wall: No tenderness.   Abdominal:      General: Bowel sounds are normal.      Palpations: Abdomen is soft.      Tenderness: There is no abdominal tenderness.   Musculoskeletal:         General: Normal range of motion.      Cervical back: Full passive range of motion without pain and normal range of motion.      Right lower leg: Edema present.      Left lower leg: Edema present.   Skin:     General: Skin is warm and dry.   Neurological:      Mental Status: She is alert and oriented to person, place, and time.      Cranial Nerves: No cranial nerve deficit.      Sensory: No sensory deficit.   Psychiatric:         Speech: Speech normal.         Behavior: Behavior normal.         Lab Results:               Invalid input(s): \"LABGLOM\"      Lab Results   Component Value Date    HGBA1C 5.3 06/11/2024     Lab Results   Component Value Date    " TROPONINI <0.02 05/24/2021       Imaging Studies:     Cardiac Catheterization: Has been ordered during the visit today    Echocardiogram:       Left Ventricle: Left ventricular cavity size is normal. Wall thickness is moderately increased. The left ventricular ejection fraction is 60%. Systolic function is normal. Wall motion is normal. Diastolic function is mildly abnormal, consistent with grade I (abnormal) relaxation.    Left Atrium: The atrium is mildly dilated.    Aortic Valve: The leaflets are thickened. The leaflets are calcified. There is reduced mobility. There is mild regurgitation. There is moderate to severe stenosis. The aortic valve peak velocity is 3.9 m/s. The aortic valve peak gradient is 57 mmHg. The aortic valve mean gradient is 34 mmHg. The dimensionless velocity index is 0.26. The aortic valve area is 0.91 cm2.    Mitral Valve: There is annular calcification. There is mild regurgitation.    Tricuspid Valve: There is mild regurgitation.    Reviewed radiology reports from this admission including: Echocardiogram.    Assessment:  Patient Active Problem List    Diagnosis Date Noted    QUILES (dyspnea on exertion) 09/17/2024    Hypertensive heart disease with heart failure (HCC) 05/26/2023    Tremor 05/26/2023    Balance disorder 05/26/2023    Cognitive decline 05/26/2023    Other chest pain 12/25/2022    Nonrheumatic aortic (valve) stenosis 12/25/2022    Arthritis of right knee 11/16/2022    Chronic constipation 10/11/2022    Mixed stress and urge urinary incontinence 10/10/2022    Seasonal allergies 07/21/2022    OAB (overactive bladder) 07/21/2022    Continuous opioid dependence (HCC) 01/10/2022    Seborrheic keratoses 01/10/2022    S/P total knee arthroplasty, left 05/24/2021    HX: breast cancer 10/12/2020    Primary osteoarthritis of left knee 09/22/2020    Nephrolithiasis 09/10/2020    Scoliosis 09/10/2020    Age-related osteoporosis without current pathological fracture 02/26/2020    Primary  insomnia 12/03/2019    Neuropathy 12/03/2019    Recurrent major depressive disorder, in partial remission (HCC) 02/22/2019    Other chronic pancreatitis (HCC) 02/22/2019    Body mass index (BMI) of 40.0-44.9 in adult (HCC) 02/22/2019    Bilateral carpal tunnel syndrome 06/19/2017    Mild intermittent asthma without complication 06/07/2017    Lymphedema 06/07/2017    Generalized anxiety disorder 06/10/2016    Patent foramen ovale 06/10/2016    Tension type headache 06/10/2016    Essential hypertension 06/07/2016    Irritable bowel syndrome (IBS) 05/31/2016    Chronic musculoskeletal pain 01/29/2016    Vitamin D deficiency 01/20/2016    Hyperlipidemia LDL goal <130 12/07/2015     Severe aortic stenosis; Ongoing TAVR workup    Plan:  Risks and benefits of transcatheter aortic valve replacement were discussed in detail today with the patient.  They understand and wish to proceed with further workup and ultimately surgical intervention.  We have ordered routine preoperative laboratory and vascular studies.  Pending the results of these tests, they will return to our office to review these results, and plan for surgery.     Deyanira Stokes was comfortable with our recommendations, and their questions were answered to their satisfaction.  Thank you for allowing us to participate in the care of this patient.     The patient recently had a screening colonoscopy in 2016 (Scheduled for repeat study in 2026).  Therefore GI referral is not indicated at this time.     SIGNATURE: Aiden Bai PA-C  DATE: 09/23/24  TIME: 9:45 AM    * This note was completed in part utilizing [employer] direct voice recognition software.   Grammatical errors, random word insertion, spelling mistakes, and incomplete sentences may be an occasional consequence of the system secondary to software limitations, ambient noise and hardware issues. At the time of dictation, efforts were made to edit, clarify and /or correct errors. Please read the  chart carefully and recognize, using context, where substitutions have occurred.  If you have any questions or concerns about the context, text or information contained within the body of this dictation, please contact myself, the provider, for further clarification.

## 2024-09-25 DIAGNOSIS — M81.0 AGE-RELATED OSTEOPOROSIS WITHOUT CURRENT PATHOLOGICAL FRACTURE: Primary | ICD-10-CM

## 2024-09-25 NOTE — TELEPHONE ENCOUNTER
Tried calling pt & no answer. Pt needs to be scheduled for a right & left heart cath. Mailed pt a letter asking her to call the office to schedule.

## 2024-10-01 ENCOUNTER — APPOINTMENT (OUTPATIENT)
Dept: LAB | Facility: CLINIC | Age: 74
End: 2024-10-01
Payer: COMMERCIAL

## 2024-10-01 ENCOUNTER — HOSPITAL ENCOUNTER (OUTPATIENT)
Dept: NON INVASIVE DIAGNOSTICS | Facility: HOSPITAL | Age: 74
Discharge: HOME | End: 2024-10-01
Payer: COMMERCIAL

## 2024-10-01 ENCOUNTER — HOSPITAL ENCOUNTER (OUTPATIENT)
Dept: RADIOLOGY | Facility: HOSPITAL | Age: 74
Discharge: HOME | End: 2024-10-01
Payer: COMMERCIAL

## 2024-10-01 DIAGNOSIS — R07.89 OTHER CHEST PAIN: ICD-10-CM

## 2024-10-01 DIAGNOSIS — I35.0 NONRHEUMATIC AORTIC (VALVE) STENOSIS: ICD-10-CM

## 2024-10-01 DIAGNOSIS — Q21.12 PATENT FORAMEN OVALE: ICD-10-CM

## 2024-10-01 DIAGNOSIS — Z01.810 PRE-OPERATIVE CARDIOVASCULAR EXAMINATION: ICD-10-CM

## 2024-10-01 DIAGNOSIS — I11.0 HYPERTENSIVE HEART DISEASE WITH HEART FAILURE (HCC): ICD-10-CM

## 2024-10-01 DIAGNOSIS — I10 ESSENTIAL HYPERTENSION: ICD-10-CM

## 2024-10-01 LAB
ANION GAP SERPL CALCULATED.3IONS-SCNC: 4 MMOL/L (ref 4–13)
BNP SERPL-MCNC: 143 PG/ML (ref 0–100)
BUN SERPL-MCNC: 16 MG/DL (ref 5–25)
CALCIUM SERPL-MCNC: 8.9 MG/DL (ref 8.4–10.2)
CHLORIDE SERPL-SCNC: 108 MMOL/L (ref 96–108)
CO2 SERPL-SCNC: 30 MMOL/L (ref 21–32)
CREAT SERPL-MCNC: 0.65 MG/DL (ref 0.6–1.3)
ERYTHROCYTE [DISTWIDTH] IN BLOOD BY AUTOMATED COUNT: 12 % (ref 11.6–15.1)
GFR SERPL CREATININE-BSD FRML MDRD: 88 ML/MIN/1.73SQ M
GLUCOSE P FAST SERPL-MCNC: 87 MG/DL (ref 65–99)
HCT VFR BLD AUTO: 47.9 % (ref 34.8–46.1)
HGB BLD-MCNC: 15.5 G/DL (ref 11.5–15.4)
MCH RBC QN AUTO: 29.9 PG (ref 26.8–34.3)
MCHC RBC AUTO-ENTMCNC: 32.4 G/DL (ref 31.4–37.4)
MCV RBC AUTO: 92 FL (ref 82–98)
PLATELET # BLD AUTO: 206 THOUSANDS/UL (ref 149–390)
PMV BLD AUTO: 10.3 FL (ref 8.9–12.7)
POTASSIUM SERPL-SCNC: 4.3 MMOL/L (ref 3.5–5.3)
RBC # BLD AUTO: 5.19 MILLION/UL (ref 3.81–5.12)
SODIUM SERPL-SCNC: 142 MMOL/L (ref 135–147)
WBC # BLD AUTO: 5.2 THOUSAND/UL (ref 4.31–10.16)

## 2024-10-01 PROCEDURE — 74174 CTA ABD&PLVS W/CONTRAST: CPT

## 2024-10-01 PROCEDURE — 85027 COMPLETE CBC AUTOMATED: CPT

## 2024-10-01 PROCEDURE — 36415 COLL VENOUS BLD VENIPUNCTURE: CPT

## 2024-10-01 PROCEDURE — 83880 ASSAY OF NATRIURETIC PEPTIDE: CPT

## 2024-10-01 PROCEDURE — 80048 BASIC METABOLIC PNL TOTAL CA: CPT

## 2024-10-01 PROCEDURE — 93880 EXTRACRANIAL BILAT STUDY: CPT | Performed by: SURGERY

## 2024-10-01 PROCEDURE — 75572 CT HRT W/3D IMAGE: CPT

## 2024-10-01 PROCEDURE — 93880 EXTRACRANIAL BILAT STUDY: CPT

## 2024-10-01 RX ADMIN — IOHEXOL 85 ML: 350 INJECTION, SOLUTION INTRAVENOUS at 17:25

## 2024-10-02 NOTE — PROGRESS NOTES
UROLOGY PROGRESS NOTE   [facility]  [address] [place] [address] [place]  [address]  [place] [address]  [phone]  Fax:[phone]  [email or web address]      NAME: Deyanira Stokes  AGE: 74 y.o. SEX: female  : 1950   MRN: [id number]    DATE: 10/3/2024  TIME: 9:44 AM    Assessment and Plan:    Mixed urinary incontinence and OAB-continue with Myrbetriq 50 mg daily indefinitely, she is doing quite well with this.  She is to undergo TAVR pretty soon.  Follow-up 1 year or as needed with us.                   Chief Complaint     Chief Complaint   Patient presents with    Follow-up       History of Present Illness   74-year-old woman, established patient but new to me-previously seen by the [facility] service only.  Last seen by Mr. Mccarty 2024 for mixed urinary incontinence with OAB.  Had initial improvement with Myrbetriq 25 mg daily for her incontinence but was still experiencing frequency and urgency.  This was increased to 50 mg daily.  They discussed Botox but she had friends reported poor response show she was reluctant to try that.  PTNS not an option because of transportation issues.  She had hysterectomy age 40 with some type of bladder surgery performed at the time and has had urinary urgency for about 12 years and incontinence for the past few, leaking on the way to the toilet.  She is undergoing preoperative evaluation for TAVR and had a CT scan of the chest abdomen pelvis yesterday.  There is an exophytic cyst of the lateral upper pole the right kidney and cortical cyst in the posterior interpolar left kidney with no abnormal features.  No stones or hydronephrosis.   standpoint she thinks she is doing quite well.  She is having problems with diarrhea-if she waits too long she will actually soil herself.  She still leaks in urine but now she can lease hold it before she gets there.  Before she did not hold it at all.  Definitely having a good response to the Myrbetriq.  She has reduced her  consumption of oxycodone and only takes it absolutely when needed and this may be a cause of the diarrhea as well.  No recent UTI.      The following portions of the patient's history were reviewed and updated as appropriate: allergies, current medications, past family history, past medical history, past social history, past surgical history and problem list.  Past Medical History:   Diagnosis Date    Allergic     this is an ongoing problem    Anxiety     Arthritis     Asthma     Breast cancer (HCC)     rt mastectomy 2005    Cancer (HCC)     breast right    Cataract     CHF (congestive heart failure) (HCC)     Chronic headaches     Concussion     At age 11; Chronic headaches since then    Coronary artery disease     Dementia (HCC)     see chart for more details    Depression     Dietary lactose intolerance     GERD (gastroesophageal reflux disease)     High cholesterol     History of chemotherapy     2005 rt breast cancer    History of transfusion     HL (hearing loss)     it's difficult to hear when there are lots of distractions    Hypertension     IBS (irritable bowel syndrome)     Irritable bowel syndrome (IBS) 05/31/2016    Kidney stone     Lymphedema of right arm     Obesity     Osteoporosis     osteoarthritis, see chart    PFO (patent foramen ovale)     Pneumonia     receive annual vaccine shots    PONV (postoperative nausea and vomiting)     Post-menopausal     Psychiatric disorder     Renal disorder     Scoliosis     tail bone, see chart for more details    Shingles     see chart for more details    Shortness of breath     Vitamin D deficiency      Past Surgical History:   Procedure Laterality Date    BREAST SURGERY      CATARACT EXTRACTION Bilateral     CATARACT EXTRACTION, BILATERAL      CHOLECYSTECTOMY      COLONOSCOPY N/A 05/31/2016    Procedure: COLONOSCOPY;  Surgeon: Colton Moralez MD;  Location: [facility];  Service:     FL GUIDED NEEDLE PLAC BX/ASP/INJ  08/14/2023    FL GUIDED NEEDLE PLAC BX/ASP/INJ   10/30/2023    GALLBLADDER SURGERY      HYSTERECTOMY      Total    JOINT REPLACEMENT      KNEE ARTHROSCOPY      KNEE SURGERY      arthroscopy    LYMPH NODE BIOPSY      as part of the breast cancer, see chart for more details    MASTECTOMY Right     rt breast mastectomy 2005    [place] ARTHRP KNE CONDYLE&PLATU MEDIAL&LAT COMPARTMENTS Left 05/20/2021    Procedure: ARTHROPLASTY KNEE TOTAL;  Surgeon: Monica Cox MD;  Location:  MAIN OR;  Service: Orthopedics    [place] ARTHRP KNE CONDYLE&PLATU MEDIAL&LAT COMPARTMENTS Right 11/16/2022    Procedure: ARTHROPLASTY KNEE TOTAL;  Surgeon: Marcos Arcos DO;  Location: [facility];  Service: Orthopedics    TONSILLECTOMY AND ADENOIDECTOMY       shoulder  Review of Systems   Review of Systems   Gastrointestinal:  Positive for diarrhea.   Genitourinary:         As per HPI.  No gross hematuria.       Active Problem List     Patient Active Problem List   Diagnosis    Irritable bowel syndrome (IBS)    Mild intermittent asthma without complication    Bilateral carpal tunnel syndrome    Chronic musculoskeletal pain    Generalized anxiety disorder    Hyperlipidemia LDL goal <130    Essential hypertension    Lymphedema    Patent foramen ovale    Tension type headache    Vitamin D deficiency    Recurrent major depressive disorder, in partial remission (HCC)    Other chronic pancreatitis (HCC)    Body mass index (BMI) of 40.0-44.9 in adult (HCC)    Primary insomnia    Neuropathy    Age-related osteoporosis without current pathological fracture    Nephrolithiasis    Scoliosis    Primary osteoarthritis of left knee    HX: breast cancer    S/P total knee arthroplasty, left    Continuous opioid dependence (HCC)    Seborrheic keratoses    Seasonal allergies    OAB (overactive bladder)    Mixed stress and urge urinary incontinence    Chronic constipation    Arthritis of right knee    Other chest pain    Nonrheumatic aortic (valve) stenosis    Hypertensive heart disease with heart failure (HCC)     "Tremor    Balance disorder    Cognitive decline    QUILES (dyspnea on exertion)       Objective   /72 (BP Location: Left arm, Patient Position: Sitting, Cuff Size: Adult)   Pulse 72   Temp 97.5 °F (36.4 °C) (Temporal)   Resp 16   Ht 4' 9\" (1.448 m)   Wt 73.8 kg (162 lb 9.6 oz)   LMP  (LMP Unknown)   SpO2 96%   BMI 35.19 kg/m²     Physical Exam  Vitals reviewed.   Constitutional:       Appearance: Normal appearance.   HENT:      Head: Normocephalic and atraumatic.   Eyes:      Extraocular Movements: Extraocular movements intact.   Pulmonary:      Effort: Pulmonary effort is normal.   Musculoskeletal:         General: Normal range of motion.      Cervical back: Normal range of motion.   Skin:     Coloration: Skin is not jaundiced or pale.   Neurological:      General: No focal deficit present.      Mental Status: She is alert and oriented to person, place, and time.   Psychiatric:         Mood and Affect: Mood normal.         Behavior: Behavior normal.         Thought Content: Thought content normal.         Judgment: Judgment normal.             Current Medications     Current Outpatient Medications:     acetaminophen (TYLENOL) 650 mg CR tablet, Take 1 tablet (650 mg total) by mouth every 8 (eight) hours as needed for mild pain, Disp: 30 tablet, Rfl: 0    albuterol (2.5 mg/3 mL) 0.083 % nebulizer solution, Take 2.5 mg by nebulization every 6 (six) hours as needed for wheezing, Disp: , Rfl:     albuterol (PROVENTIL HFA,VENTOLIN HFA) 90 mcg/act inhaler, Inhale 2 puffs every 4 (four) hours as needed for wheezing, Disp: 8.5 g, Rfl: 2    aspirin 81 mg chewable tablet, Chew 81 mg daily, Disp: , Rfl:     atorvastatin (LIPITOR) 20 mg tablet, Take 1 tablet (20 mg total) by mouth daily, Disp: 90 tablet, Rfl: 2    butalbital-acetaminophen-caffeine (FIORICET,ESGIC) -40 mg per tablet, Take 1 tablet by mouth 3 (three) times a day as needed for headaches, Disp: 90 tablet, Rfl: 1    donepezil (ARICEPT) 10 mg tablet, " Take 1 tablet by mouth daily at bedtime., Disp: 100 tablet, Rfl: 1    FLUoxetine (PROzac) 20 mg capsule, Take 1 capsule (20 mg total) by mouth daily, Disp: 90 capsule, Rfl: 1    fluticasone (FLONASE) 50 mcg/act nasal spray, INSTILL 1 SPRAY INTO EACH NOSTRIL ONCE DAILY, Disp: 16 mL, Rfl: 1    furosemide (LASIX) 20 mg tablet, Take 20 mg by mouth 2 (two) times a day Waiting for pharmacy to call her. Updated 9/23, Disp: , Rfl:     levocetirizine (XYZAL) 5 MG tablet, Take 1 tablet (5 mg total) by mouth every evening, Disp: 90 tablet, Rfl: 1    Mirabegron ER 25 MG TB24, Take 50 mg by mouth in the morning, Disp: 180 tablet, Rfl: 3    montelukast (SINGULAIR) 10 mg tablet, Take 1 tablet (10 mg total) by mouth daily at bedtime, Disp: 90 tablet, Rfl: 1    Nutritional Supplements (BOOST BREEZE PO), Take by mouth daily, Disp: , Rfl:     oxyCODONE (OxyCONTIN) 10 mg 12 hr tablet, Take 10 mg by mouth as needed, Disp: , Rfl:     pantoprazole (PROTONIX) 40 mg tablet, Take 1 tablet (40 mg total) by mouth daily, Disp: 90 tablet, Rfl: 1    QUEtiapine (SEROquel) 25 mg tablet, Take 1 tablet (25 mg total) by mouth daily at bedtime, Disp: 90 tablet, Rfl: 1    Diclofenac Sodium (VOLTAREN) 1 %, Apply 2 g topically 4 (four) times a day (Patient not taking: Reported on 9/23/2024), Disp: 60 g, Rfl: 3    lisinopril (ZESTRIL) 5 mg tablet, Take 1 tablet (5 mg total) by mouth daily (Patient not taking: Reported on 9/23/2024), Disp: 90 tablet, Rfl: 1        Lucian Aponte MD

## 2024-10-03 ENCOUNTER — TELEPHONE (OUTPATIENT)
Age: 74
End: 2024-10-03

## 2024-10-03 ENCOUNTER — OFFICE VISIT (OUTPATIENT)
Dept: UROLOGY | Facility: CLINIC | Age: 74
End: 2024-10-03
Payer: COMMERCIAL

## 2024-10-03 VITALS
SYSTOLIC BLOOD PRESSURE: 132 MMHG | HEIGHT: 57 IN | WEIGHT: 162.6 LBS | RESPIRATION RATE: 16 BRPM | HEART RATE: 72 BPM | OXYGEN SATURATION: 96 % | TEMPERATURE: 97.5 F | DIASTOLIC BLOOD PRESSURE: 72 MMHG | BODY MASS INDEX: 35.08 KG/M2

## 2024-10-03 DIAGNOSIS — N39.41 URGE URINARY INCONTINENCE: ICD-10-CM

## 2024-10-03 DIAGNOSIS — N39.46 MIXED STRESS AND URGE URINARY INCONTINENCE: Primary | ICD-10-CM

## 2024-10-03 PROCEDURE — 99213 OFFICE O/P EST LOW 20 MIN: CPT | Performed by: UROLOGY

## 2024-10-03 NOTE — TELEPHONE ENCOUNTER
Patient called today stating that she left her purse in the waiting room.    Please check and contact the patient.    Call back [phone]

## 2024-10-03 NOTE — TELEPHONE ENCOUNTER
Pt called and stated that she was returning a call to our office about her purse. She said that she needs to speak with someone that 4 will not work .        Please call pt back at call pt back at [phone]

## 2024-10-08 ENCOUNTER — TELEPHONE (OUTPATIENT)
Dept: CARDIOLOGY CLINIC | Facility: CLINIC | Age: 74
End: 2024-10-08

## 2024-10-08 ENCOUNTER — PREP FOR PROCEDURE (OUTPATIENT)
Dept: CARDIOLOGY CLINIC | Facility: CLINIC | Age: 74
End: 2024-10-08

## 2024-10-08 DIAGNOSIS — I35.0 AORTIC VALVE STENOSIS, ETIOLOGY OF CARDIAC VALVE DISEASE UNSPECIFIED: Primary | ICD-10-CM

## 2024-10-08 NOTE — TELEPHONE ENCOUNTER
Patient scheduled for RIGHT & LEFT  Heart Cath on 10/2924 at  [facility] with Dr. VARGHESE.      Instructions sent to patient through [employer].      Patient aware of all general instructions.    Medication holds:   LASIX: DO NOT take this medication the morning of the procedure.     Labs to be done on 10/1/24  CMP / CBC (fasting 8 hours)        Mirta, can you please place prep for procedure.   Thank you,   Kayleen

## 2024-10-08 NOTE — TELEPHONE ENCOUNTER
----- Message from Mariely MAJANO sent at 10/8/2024 10:56 AM EDT -----  Regarding: pre TAVR testing  Good morning Mirta, Patient was seen in [facility] and began the pre TAVR work up. Multiple attempts have been made to contact patient (both via telephone and mail) to schedule pre TAVR cath. To date patient has not responded. Patient is scheduled to see you on 10/15/24. Can you please request patient contact our office to discuss scheduling of cardiac cath. Thank you, Mariely

## 2024-10-16 NOTE — TELEPHONE ENCOUNTER
Caller: Chris Frye    Doctor: Erika Crisostomo    Reason for call: patient is calling to ask that we fax a current dated PT order to [facility] at [phone]    Call back#: [phone] no edema, no murmurs, regular rate and rhythm

## 2024-10-23 ENCOUNTER — TELEPHONE (OUTPATIENT)
Age: 74
End: 2024-10-23

## 2024-10-23 NOTE — TELEPHONE ENCOUNTER
Mabel from [facility] requesting a order for colonoscopy if further questions office can call [phone]

## 2024-10-29 ENCOUNTER — HOSPITAL ENCOUNTER (OUTPATIENT)
Facility: HOSPITAL | Age: 74
Setting detail: OUTPATIENT SURGERY
Discharge: HOME | End: 2024-10-29
Attending: INTERNAL MEDICINE | Admitting: INTERNAL MEDICINE
Payer: COMMERCIAL

## 2024-10-29 VITALS
SYSTOLIC BLOOD PRESSURE: 132 MMHG | TEMPERATURE: 97.6 F | WEIGHT: 160 LBS | RESPIRATION RATE: 16 BRPM | DIASTOLIC BLOOD PRESSURE: 63 MMHG | OXYGEN SATURATION: 100 % | HEART RATE: 71 BPM | BODY MASS INDEX: 34.52 KG/M2 | HEIGHT: 57 IN

## 2024-10-29 DIAGNOSIS — M17.12 PRIMARY OSTEOARTHRITIS OF LEFT KNEE: ICD-10-CM

## 2024-10-29 DIAGNOSIS — I35.0 AORTIC VALVE STENOSIS, ETIOLOGY OF CARDIAC VALVE DISEASE UNSPECIFIED: ICD-10-CM

## 2024-10-29 PROBLEM — Z98.890 S/P CARDIAC CATH: Status: ACTIVE | Noted: 2024-10-29

## 2024-10-29 LAB
ATRIAL RATE: 64 BPM
P AXIS: 52 DEGREES
PR INTERVAL: 192 MS
QRS AXIS: 4 DEGREES
QRSD INTERVAL: 104 MS
QT INTERVAL: 448 MS
QTC INTERVAL: 462 MS
T WAVE AXIS: 97 DEGREES
VENTRICULAR RATE: 64 BPM

## 2024-10-29 PROCEDURE — NC001 PR NO CHARGE: Performed by: INTERNAL MEDICINE

## 2024-10-29 PROCEDURE — C1769 GUIDE WIRE: HCPCS | Performed by: INTERNAL MEDICINE

## 2024-10-29 PROCEDURE — 99152 MOD SED SAME PHYS/QHP 5/>YRS: CPT | Performed by: INTERNAL MEDICINE

## 2024-10-29 PROCEDURE — 93454 CORONARY ARTERY ANGIO S&I: CPT | Performed by: INTERNAL MEDICINE

## 2024-10-29 PROCEDURE — 93005 ELECTROCARDIOGRAM TRACING: CPT

## 2024-10-29 PROCEDURE — C1894 INTRO/SHEATH, NON-LASER: HCPCS | Performed by: INTERNAL MEDICINE

## 2024-10-29 PROCEDURE — 93010 ELECTROCARDIOGRAM REPORT: CPT | Performed by: INTERNAL MEDICINE

## 2024-10-29 RX ORDER — SODIUM CHLORIDE 9 MG/ML
125 INJECTION, SOLUTION INTRAVENOUS CONTINUOUS
Status: DISPENSED | OUTPATIENT
Start: 2024-10-29 | End: 2024-10-29

## 2024-10-29 RX ORDER — VERAPAMIL HCL 2.5 MG/ML
AMPUL (ML) INTRAVENOUS CODE/TRAUMA/SEDATION MEDICATION
Status: DISCONTINUED | OUTPATIENT
Start: 2024-10-29 | End: 2024-10-29 | Stop reason: HOSPADM

## 2024-10-29 RX ORDER — FENTANYL CITRATE 50 UG/ML
INJECTION, SOLUTION INTRAMUSCULAR; INTRAVENOUS CODE/TRAUMA/SEDATION MEDICATION
Status: DISCONTINUED | OUTPATIENT
Start: 2024-10-29 | End: 2024-10-29 | Stop reason: HOSPADM

## 2024-10-29 RX ORDER — NITROGLYCERIN 20 MG/100ML
INJECTION INTRAVENOUS CODE/TRAUMA/SEDATION MEDICATION
Status: DISCONTINUED | OUTPATIENT
Start: 2024-10-29 | End: 2024-10-29 | Stop reason: HOSPADM

## 2024-10-29 RX ORDER — CLOPIDOGREL BISULFATE 75 MG/1
600 TABLET ORAL ONCE
Status: COMPLETED | OUTPATIENT
Start: 2024-10-29 | End: 2024-10-29

## 2024-10-29 RX ORDER — HEPARIN SODIUM 1000 [USP'U]/ML
INJECTION, SOLUTION INTRAVENOUS; SUBCUTANEOUS CODE/TRAUMA/SEDATION MEDICATION
Status: DISCONTINUED | OUTPATIENT
Start: 2024-10-29 | End: 2024-10-29 | Stop reason: HOSPADM

## 2024-10-29 RX ORDER — ACETAMINOPHEN 325 MG/1
975 TABLET ORAL ONCE AS NEEDED
Status: DISCONTINUED | OUTPATIENT
Start: 2024-10-29 | End: 2024-10-29 | Stop reason: HOSPADM

## 2024-10-29 RX ORDER — ASPIRIN 81 MG/1
324 TABLET, CHEWABLE ORAL ONCE
Status: COMPLETED | OUTPATIENT
Start: 2024-10-29 | End: 2024-10-29

## 2024-10-29 RX ORDER — SENNOSIDES 8.6 MG
650 CAPSULE ORAL EVERY 8 HOURS PRN
Start: 2024-10-29

## 2024-10-29 RX ORDER — MIDAZOLAM HYDROCHLORIDE 2 MG/2ML
INJECTION, SOLUTION INTRAMUSCULAR; INTRAVENOUS CODE/TRAUMA/SEDATION MEDICATION
Status: DISCONTINUED | OUTPATIENT
Start: 2024-10-29 | End: 2024-10-29 | Stop reason: HOSPADM

## 2024-10-29 RX ORDER — ONDANSETRON 2 MG/ML
INJECTION INTRAMUSCULAR; INTRAVENOUS CODE/TRAUMA/SEDATION MEDICATION
Status: DISCONTINUED | OUTPATIENT
Start: 2024-10-29 | End: 2024-10-29 | Stop reason: HOSPADM

## 2024-10-29 RX ORDER — SODIUM CHLORIDE 9 MG/ML
125 INJECTION, SOLUTION INTRAVENOUS CONTINUOUS
Status: DISCONTINUED | OUTPATIENT
Start: 2024-10-29 | End: 2024-10-29 | Stop reason: HOSPADM

## 2024-10-29 RX ADMIN — CLOPIDOGREL BISULFATE 600 MG: 75 TABLET, FILM COATED ORAL at 08:08

## 2024-10-29 RX ADMIN — ASPIRIN 81 MG CHEWABLE TABLET 324 MG: 81 TABLET CHEWABLE at 08:08

## 2024-10-29 RX ADMIN — SODIUM CHLORIDE 125 ML/HR: 0.9 INJECTION, SOLUTION INTRAVENOUS at 09:15

## 2024-10-29 NOTE — DISCHARGE INSTR - AVS FIRST PAGE
1. Please see the post cardiac catheterization dishcarge instructions.   No heavy lifting, greater than 10 lbs. or strenuous  activity for 48 hrs.    2.Remove band aid tomorrow.  Shower and wash area- wrist gently with soap and water- beginning tomorrow. Rinse and pat dry.  Apply new water seal band aid.  Repeat this process for 5 days. No powders, creams lotions or antibiotic ointments  for 5 days.  No tub baths, hot tubs or swimming for 5 days.     3. Please call our office ([phone]) if you have any fever, redness, swelling, discharge from your wrist access site.    4.No driving for 1 day

## 2024-10-29 NOTE — H&P
"H&P Exam - Cardiology   Deyanira Stokes 74 y.o. female MRN: [id number]  Unit/Bed#: BE CATH LAB ROOM Encounter: [id number]     Office cardiologist: Dr. Cobb    PCP:eMrced Alex PA-C    Assessment & Plan   Severe symptomatic aortic stenosis  Hypertension  Hyperlipidemia  Gait dysfunction  History of breast cancer    History of Present Illness   HPI:  Deyanira Stokes is a 74 y.o. female history of severe symptomatic aortic stenosis.  Patient complains of intermittent angina.  She has lightheadedness which she associates with her frequent falls and shortness of breath.  Her past medical history includes anxiety, or anxiety, arthritis, asthma, right breast cancer diagnosed in 2005 (treated with right mastectomy, chemotherapy, and radiation), CAD, dementia, depression, GERD, hyperlipidemia, hypertension, kidney stone, hearing loss, IBS, obesity, osteoporosis, PFO, scoliosis, and gait dysfunction.  Her VIOLA revealed heavily calcified aortic valve with a aortic Valve area of 0.91 and a peak velocity of 3.9 ms.  Patient was seen by Dr. Sarah for surgical evaluation for TAVR.  Patient is electively admitted for cardiac catheterization to identify her coronary anatomy and ischemic burden  BP (!) 197/84 (BP Location: Left arm)   Pulse 63   Temp (!) 97.4 °F (36.3 °C) (Temporal)   Resp 18   Ht 4' 9\" (1.448 m)   Wt 72.6 kg (160 lb)   LMP  (LMP Unknown)   SpO2 98%   BMI 34.62 kg/m²       Historical Information   Past Medical History:   Diagnosis Date    Allergic     this is an ongoing problem    Anxiety     Arthritis     Asthma     Breast cancer (HCC)     rt mastectomy 2005    Cancer (HCC)     breast right    Cataract     CHF (congestive heart failure) (HCC)     Chronic headaches     Concussion     At age 11; Chronic headaches since then    Coronary artery disease     Dementia (HCC)     see chart for more details    Depression     Dietary lactose intolerance     GERD (gastroesophageal reflux disease)     High " cholesterol     History of chemotherapy     2005 rt breast cancer    History of transfusion     HL (hearing loss)     it's difficult to hear when there are lots of distractions    Hypertension     IBS (irritable bowel syndrome)     Irritable bowel syndrome (IBS) 05/31/2016    Kidney stone     Lymphedema of right arm     Obesity     Osteoporosis     osteoarthritis, see chart    PFO (patent foramen ovale)     Pneumonia     receive annual vaccine shots    PONV (postoperative nausea and vomiting)     Post-menopausal     Psychiatric disorder     Renal disorder     Scoliosis     tail bone, see chart for more details    Shingles     see chart for more details    Shortness of breath     Vitamin D deficiency      Past Surgical History:   Procedure Laterality Date    BREAST SURGERY      CATARACT EXTRACTION Bilateral     CATARACT EXTRACTION, BILATERAL      CHOLECYSTECTOMY      COLONOSCOPY N/A 05/31/2016    Procedure: COLONOSCOPY;  Surgeon: Colton Moralez MD;  Location: [facility];  Service:     FL GUIDED NEEDLE PLAC BX/ASP/INJ  08/14/2023    FL GUIDED NEEDLE PLAC BX/ASP/INJ  10/30/2023    GALLBLADDER SURGERY      HYSTERECTOMY      Total    JOINT REPLACEMENT      KNEE ARTHROSCOPY      KNEE SURGERY      arthroscopy    LYMPH NODE BIOPSY      as part of the breast cancer, see chart for more details    MASTECTOMY Right     rt breast mastectomy 2005    [place] ARTHRP KNE CONDYLE&PLATU MEDIAL&LAT COMPARTMENTS Left 05/20/2021    Procedure: ARTHROPLASTY KNEE TOTAL;  Surgeon: Monica Cox MD;  Location:  MAIN OR;  Service: Orthopedics    [place] ARTHRP KNE CONDYLE&PLATU MEDIAL&LAT COMPARTMENTS Right 11/16/2022    Procedure: ARTHROPLASTY KNEE TOTAL;  Surgeon: Marcos Arcos DO;  Location: [facility];  Service: Orthopedics    TONSILLECTOMY AND ADENOIDECTOMY       Social History   Social History     Substance and Sexual Activity   Alcohol Use Yes    Alcohol/week: 1.0 standard drink of alcohol    Types: 1 Glasses of wine per week    Comment: this  is less than once/mo, more like 1-2 drinks every 3 mo     Social History     Substance and Sexual Activity   Drug Use Not Currently    Types: Marijuana    Comment: when i was in my teens     Social History     Tobacco Use   Smoking Status Never    Passive exposure: Never   Smokeless Tobacco Never     Family History:   Family History   Adopted: Yes   Problem Relation Age of Onset    Diabetes Mother     Heart disease Mother     Mental illness Mother     Depression Mother     Heart disease Brother     Breast cancer Maternal Aunt     Breast cancer Maternal Aunt     Breast cancer Maternal Aunt     Breast cancer Maternal Aunt     Breast cancer Maternal Aunt     No Known Problems Father     No Known Problems Sister     No Known Problems Daughter     Breast cancer Maternal Grandmother     No Known Problems Sister     No Known Problems Sister     No Known Problems Sister        Meds/Allergies   PTA meds:   Prior to Admission Medications   Prescriptions Last Dose Informant Patient Reported? Taking?   Diclofenac Sodium (VOLTAREN) 1 %  Self No No   Sig: Apply 2 g topically 4 (four) times a day   Patient not taking: Reported on 9/23/2024   FLUoxetine (PROzac) 20 mg capsule  Self No No   Sig: Take 1 capsule (20 mg total) by mouth daily   Mirabegron ER 25 MG TB24  Self No No   Sig: Take 50 mg by mouth in the morning   Nutritional Supplements (BOOST BREEZE PO)  Self Yes No   Sig: Take by mouth daily   QUEtiapine (SEROquel) 25 mg tablet  Self No No   Sig: Take 1 tablet (25 mg total) by mouth daily at bedtime   acetaminophen (TYLENOL) 650 mg CR tablet  Self No No   Sig: Take 1 tablet (650 mg total) by mouth every 8 (eight) hours as needed for mild pain   albuterol (2.5 mg/3 mL) 0.083 % nebulizer solution  Self Yes No   Sig: Take 2.5 mg by nebulization every 6 (six) hours as needed for wheezing   albuterol (PROVENTIL HFA,VENTOLIN HFA) 90 mcg/act inhaler  Self No No   Sig: Inhale 2 puffs every 4 (four) hours as needed for wheezing    aspirin 81 mg chewable tablet  Self Yes No   Sig: Chew 81 mg daily   atorvastatin (LIPITOR) 20 mg tablet  Self No No   Sig: Take 1 tablet (20 mg total) by mouth daily   butalbital-acetaminophen-caffeine (FIORICET,ESGIC) -40 mg per tablet  Self No No   Sig: Take 1 tablet by mouth 3 (three) times a day as needed for headaches   donepezil (ARICEPT) 10 mg tablet  Self No No   Sig: Take 1 tablet by mouth daily at bedtime.   fluticasone (FLONASE) 50 mcg/act nasal spray  Self No No   Sig: INSTILL 1 SPRAY INTO EACH NOSTRIL ONCE DAILY   furosemide (LASIX) 20 mg tablet  Self Yes No   Sig: Take 20 mg by mouth 2 (two) times a day Waiting for pharmacy to call her. Updated 9/23   levocetirizine (XYZAL) 5 MG tablet  Self No No   Sig: Take 1 tablet (5 mg total) by mouth every evening   lisinopril (ZESTRIL) 5 mg tablet  Self No No   Sig: Take 1 tablet (5 mg total) by mouth daily   Patient not taking: Reported on 9/23/2024   montelukast (SINGULAIR) 10 mg tablet  Self No No   Sig: Take 1 tablet (10 mg total) by mouth daily at bedtime   oxyCODONE (OxyCONTIN) 10 mg 12 hr tablet  Self Yes No   Sig: Take 10 mg by mouth as needed   pantoprazole (PROTONIX) 40 mg tablet  Self No No   Sig: Take 1 tablet (40 mg total) by mouth daily      Facility-Administered Medications: None     Allergies   Allergen Reactions    Ibuprofen Nausea Only, Rash, Dizziness and Syncope    Morphine Other (See Comments) and Hallucinations     Intolerance    Latex Dermatitis       Review of Systems   All other systems reviewed and are negative.      Physical Exam  Constitutional:       Appearance: Normal appearance.   HENT:      Head: Normocephalic and atraumatic.      Mouth/Throat:      Mouth: Mucous membranes are dry.   Cardiovascular:      Rate and Rhythm: Regular rhythm.      Pulses: Normal pulses.      Heart sounds: Normal heart sounds.   Pulmonary:      Effort: Pulmonary effort is normal.      Breath sounds: Normal breath sounds.   Musculoskeletal:       Right lower leg: No edema.      Left lower leg: No edema.   Skin:     General: Skin is warm and dry.   Neurological:      Mental Status: She is alert and oriented to person, place, and time.         EKG: Sinus rhythm  ECHO: 7/2024 normal ejection fraction of 60% no significant wall motion abnormality.  Aortic valve is heavily calcified with a peak gradient of 57 mmHg and her aortic valve area 0.91 cm².

## 2024-10-30 ENCOUNTER — LAB REQUISITION (OUTPATIENT)
Dept: LAB | Facility: HOSPITAL | Age: 74
End: 2024-10-30
Payer: COMMERCIAL

## 2024-10-30 ENCOUNTER — APPOINTMENT (OUTPATIENT)
Dept: LAB | Facility: CLINIC | Age: 74
End: 2024-10-30
Payer: COMMERCIAL

## 2024-10-30 ENCOUNTER — OFFICE VISIT (OUTPATIENT)
Dept: CARDIAC SURGERY | Facility: CLINIC | Age: 74
End: 2024-10-30
Payer: COMMERCIAL

## 2024-10-30 VITALS
WEIGHT: 166.6 LBS | SYSTOLIC BLOOD PRESSURE: 127 MMHG | OXYGEN SATURATION: 94 % | DIASTOLIC BLOOD PRESSURE: 60 MMHG | HEART RATE: 74 BPM | BODY MASS INDEX: 35.94 KG/M2 | HEIGHT: 57 IN | TEMPERATURE: 98.1 F

## 2024-10-30 DIAGNOSIS — I35.0 NONRHEUMATIC AORTIC VALVE STENOSIS: ICD-10-CM

## 2024-10-30 DIAGNOSIS — I35.0 NONRHEUMATIC AORTIC (VALVE) STENOSIS: ICD-10-CM

## 2024-10-30 DIAGNOSIS — I35.0 NONRHEUMATIC AORTIC VALVE STENOSIS: Primary | ICD-10-CM

## 2024-10-30 PROBLEM — Z48.89 ENCOUNTER FOR POSTOPERATIVE CARE: Status: ACTIVE | Noted: 2024-10-30

## 2024-10-30 LAB
ABO GROUP BLD: NORMAL
ALBUMIN SERPL BCG-MCNC: 3.5 G/DL (ref 3.5–5)
ALP SERPL-CCNC: 63 U/L (ref 34–104)
ALT SERPL W P-5'-P-CCNC: 11 U/L (ref 7–52)
ANION GAP SERPL CALCULATED.3IONS-SCNC: 8 MMOL/L (ref 4–13)
AST SERPL W P-5'-P-CCNC: 15 U/L (ref 13–39)
BILIRUB SERPL-MCNC: 0.45 MG/DL (ref 0.2–1)
BLD GP AB SCN SERPL QL: NEGATIVE
BUN SERPL-MCNC: 12 MG/DL (ref 5–25)
CALCIUM SERPL-MCNC: 8.6 MG/DL (ref 8.4–10.2)
CHLORIDE SERPL-SCNC: 106 MMOL/L (ref 96–108)
CO2 SERPL-SCNC: 26 MMOL/L (ref 21–32)
CREAT SERPL-MCNC: 0.55 MG/DL (ref 0.6–1.3)
ERYTHROCYTE [DISTWIDTH] IN BLOOD BY AUTOMATED COUNT: 12.1 % (ref 11.6–15.1)
GFR SERPL CREATININE-BSD FRML MDRD: 94 ML/MIN/1.73SQ M
GLUCOSE SERPL-MCNC: 82 MG/DL (ref 65–140)
HCT VFR BLD AUTO: 42.1 % (ref 34.8–46.1)
HGB BLD-MCNC: 14 G/DL (ref 11.5–15.4)
INR PPP: 1.03 (ref 0.85–1.19)
MCH RBC QN AUTO: 30.2 PG (ref 26.8–34.3)
MCHC RBC AUTO-ENTMCNC: 33.3 G/DL (ref 31.4–37.4)
MCV RBC AUTO: 91 FL (ref 82–98)
PLATELET # BLD AUTO: 191 THOUSANDS/UL (ref 149–390)
PMV BLD AUTO: 10.5 FL (ref 8.9–12.7)
POTASSIUM SERPL-SCNC: 3.8 MMOL/L (ref 3.5–5.3)
PROT SERPL-MCNC: 6.4 G/DL (ref 6.4–8.4)
PROTHROMBIN TIME: 13.8 SECONDS (ref 12.3–15)
RBC # BLD AUTO: 4.64 MILLION/UL (ref 3.81–5.12)
RH BLD: POSITIVE
SODIUM SERPL-SCNC: 140 MMOL/L (ref 135–147)
SPECIMEN EXPIRATION DATE: NORMAL
WBC # BLD AUTO: 5.45 THOUSAND/UL (ref 4.31–10.16)

## 2024-10-30 PROCEDURE — 85610 PROTHROMBIN TIME: CPT

## 2024-10-30 PROCEDURE — 87081 CULTURE SCREEN ONLY: CPT

## 2024-10-30 PROCEDURE — 36415 COLL VENOUS BLD VENIPUNCTURE: CPT

## 2024-10-30 PROCEDURE — 86900 BLOOD TYPING SEROLOGIC ABO: CPT | Performed by: NURSE PRACTITIONER

## 2024-10-30 PROCEDURE — 80053 COMPREHEN METABOLIC PANEL: CPT

## 2024-10-30 PROCEDURE — 85027 COMPLETE CBC AUTOMATED: CPT

## 2024-10-30 PROCEDURE — 86901 BLOOD TYPING SEROLOGIC RH(D): CPT | Performed by: NURSE PRACTITIONER

## 2024-10-30 PROCEDURE — 99215 OFFICE O/P EST HI 40 MIN: CPT | Performed by: THORACIC SURGERY (CARDIOTHORACIC VASCULAR SURGERY)

## 2024-10-30 PROCEDURE — 86850 RBC ANTIBODY SCREEN: CPT | Performed by: NURSE PRACTITIONER

## 2024-10-30 RX ORDER — CHLORHEXIDINE GLUCONATE ORAL RINSE 1.2 MG/ML
15 SOLUTION DENTAL ONCE
Status: CANCELLED | OUTPATIENT
Start: 2024-10-30 | End: 2024-10-30

## 2024-10-30 RX ORDER — CEFAZOLIN SODIUM 2 G/50ML
2000 SOLUTION INTRAVENOUS ONCE
Status: CANCELLED | OUTPATIENT
Start: 2024-10-30 | End: 2024-10-30

## 2024-10-30 RX ORDER — MUPIROCIN 20 MG/G
OINTMENT TOPICAL 2 TIMES DAILY
Qty: 22 G | Refills: 0 | Status: SHIPPED | OUTPATIENT
Start: 2024-10-30 | End: 2024-11-08

## 2024-10-30 NOTE — H&P
Pre-Op History & Physical- Cardiothoracic Surgery   Deyanira Stokes 74 y.o. female MRN: [id number]    Physician Requesting Consult: Dr. Cobb  PCP: Merced Alex PA-C    Reason for Consult / Principal Problem: Aortic stenosis, Non-Rheumatic    History of Present Illness: Deyanira Stokes is a 74 y.o. year old female who was previously evaluated in our office by Godwin Sarah D.O. for transcatheter aortic valve replacement.  During this initial consultation, arrangements were made for the following studies to be completed: Gated CTA of the chest/abdomen/pelvis,  cardiac catheterization and carotid artery ultrasound.    Deyanira Stokes now presents to review the results of these tests and obtain a second surgeon to confirm the suitability of proceeding with transcatheter aortic valve replacment.    In review patient's PMHx is notable for AS, HTN, HLD, asthma,  right breast cancer diagnosed in 2005 (s/p R mastectomy, chemotherapy, and radiation), anxiety, dementia, depression, GERD, kidney stone, hearing loss, IBS, obesity (BMI 36), osteoporosis, arthritis, scoliosis, and gait dysfunction.   He AS was identified in 2022.  At that time she sustained a fall and presented for evaluation at [facility].  Echocardiogram was completed and mild aortic stenosis was identified.  Since that time she has been followed with serial surveillance echocardiography.  In retrospect, she notes lifelong heart murmur dating back to her childhood.     During recent surveillance echocardiography known aortic stenosis has progressed into the severe range.  She reports progressive exertional dyspnea specifically with ambulating up an incline or climbing stairs, worsening fatigue and orthopnea with persistent dry cough.    Patient is accompanied today by her daughter Veronica.  Patient denies tobacco, alcohol or drug use.  She is edentulous.     Past Medical History:  Past Medical History:   Diagnosis Date    Allergic     this is  an ongoing problem    Anxiety     Arthritis     Asthma     Breast cancer (HCC)     rt mastectomy 2005    Cancer (HCC)     breast right    Cataract     CHF (congestive heart failure) (HCC)     Chronic headaches     Concussion     At age 11; Chronic headaches since then    Coronary artery disease     Dementia (HCC)     see chart for more details    Depression     Dietary lactose intolerance     GERD (gastroesophageal reflux disease)     High cholesterol     History of chemotherapy     2005 rt breast cancer    History of transfusion     HL (hearing loss)     it's difficult to hear when there are lots of distractions    Hypertension     IBS (irritable bowel syndrome)     Irritable bowel syndrome (IBS) 05/31/2016    Kidney stone     Lymphedema of right arm     Obesity     Osteoporosis     osteoarthritis, see chart    PFO (patent foramen ovale)     Pneumonia     receive annual vaccine shots    PONV (postoperative nausea and vomiting)     Post-menopausal     Psychiatric disorder     Renal disorder     Scoliosis     tail bone, see chart for more details    Shingles     see chart for more details    Shortness of breath     Vitamin D deficiency          Past Surgical History:   Past Surgical History:   Procedure Laterality Date    BREAST SURGERY      CARDIAC CATHETERIZATION N/A 10/29/2024    Procedure: LHC and RHC-Cardiac catheterization;  Surgeon: Alexi Myers MD;  Location:  CARDIAC CATH LAB;  Service: Cardiology    CARDIAC CATHETERIZATION N/A 10/29/2024    Procedure: Cardiac Coronary Angiogram;  Surgeon: Alexi Myers MD;  Location:  CARDIAC CATH LAB;  Service: Cardiology    CATARACT EXTRACTION Bilateral     CATARACT EXTRACTION, BILATERAL      CHOLECYSTECTOMY      COLONOSCOPY N/A 05/31/2016    Procedure: COLONOSCOPY;  Surgeon: Colton Moralez MD;  Location: [facility];  Service:     FL GUIDED NEEDLE PLAC BX/ASP/INJ  08/14/2023    FL GUIDED NEEDLE PLAC BX/ASP/INJ  10/30/2023    GALLBLADDER SURGERY      HYSTERECTOMY      Total     JOINT REPLACEMENT      KNEE ARTHROSCOPY      KNEE SURGERY      arthroscopy    LYMPH NODE BIOPSY      as part of the breast cancer, see chart for more details    MASTECTOMY Right     rt breast mastectomy 2005    FL ARTHRP KNE CONDYLE&PLATU MEDIAL&LAT COMPARTMENTS Left 05/20/2021    Procedure: ARTHROPLASTY KNEE TOTAL;  Surgeon: Monica Cox MD;  Location:  MAIN OR;  Service: Orthopedics    FL ARTHRP KNE CONDYLE&PLATU MEDIAL&LAT COMPARTMENTS Right 11/16/2022    Procedure: ARTHROPLASTY KNEE TOTAL;  Surgeon: Marcos Arcos DO;  Location: [facility];  Service: Orthopedics    TONSILLECTOMY AND ADENOIDECTOMY           Family History:  Family History   Adopted: Yes   Problem Relation Age of Onset    Diabetes Mother     Heart disease Mother     Mental illness Mother     Depression Mother     Heart disease Brother     Breast cancer Maternal Aunt     Breast cancer Maternal Aunt     Breast cancer Maternal Aunt     Breast cancer Maternal Aunt     Breast cancer Maternal Aunt     No Known Problems Father     No Known Problems Sister     No Known Problems Daughter     Breast cancer Maternal Grandmother     No Known Problems Sister     No Known Problems Sister     No Known Problems Sister          Social History:    Social History     Substance and Sexual Activity   Alcohol Use Yes    Alcohol/week: 1.0 standard drink of alcohol    Types: 1 Glasses of wine per week    Comment: this is less than once/mo, more like 1-2 drinks every 3 mo     Social History     Substance and Sexual Activity   Drug Use Not Currently    Types: Marijuana    Comment: when i was in my teens     Social History     Tobacco Use   Smoking Status Never    Passive exposure: Never   Smokeless Tobacco Never       Home Medications:   Prior to Admission medications    Medication Sig Start Date End Date Taking? Authorizing Provider   acetaminophen (TYLENOL) 650 mg CR tablet Take 1 tablet (650 mg total) by mouth every 8 (eight) hours as needed for mild pain  Patient takes 500mg 10/29/24  Yes JERARDO Lopez   albuterol (2.5 mg/3 mL) 0.083 % nebulizer solution Take 2.5 mg by nebulization every 6 (six) hours as needed for wheezing   Yes Historical Provider, MD   albuterol (PROVENTIL HFA,VENTOLIN HFA) 90 mcg/act inhaler Inhale 2 puffs every 4 (four) hours as needed for wheezing 7/16/24  Yes Merced Alex PA-C   aspirin 81 mg chewable tablet Chew 81 mg daily   Yes Historical Provider, MD   atorvastatin (LIPITOR) 20 mg tablet Take 1 tablet (20 mg total) by mouth daily 1/8/24  Yes Merced Alex PA-C   butalbital-acetaminophen-caffeine (FIORICET,ESGIC) -40 mg per tablet Take 1 tablet by mouth 3 (three) times a day as needed for headaches 5/5/20  Yes Merced Alex PA-C   donepezil (ARICEPT) 10 mg tablet Take 1 tablet by mouth daily at bedtime. 7/31/24  Yes Merced Alex PA-C   FLUoxetine (PROzac) 20 mg capsule Take 1 capsule (20 mg total) by mouth daily 7/16/24  Yes Merced Alex PA-C   fluticasone (FLONASE) 50 mcg/act nasal spray INSTILL 1 SPRAY INTO EACH NOSTRIL ONCE DAILY 9/8/24  Yes Marcos Lara DO   furosemide (LASIX) 20 mg tablet Take 20 mg by mouth 2 (two) times a day Waiting for pharmacy to call her. Updated 9/23   Yes Historical Provider, MD   levocetirizine (XYZAL) 5 MG tablet Take 1 tablet (5 mg total) by mouth every evening 2/22/24  Yes Marcos Lara DO   Mirabegron ER 25 MG TB24 Take 50 mg by mouth in the morning 1/8/24  Yes JERARDO Hernandez   montelukast (SINGULAIR) 10 mg tablet Take 1 tablet (10 mg total) by mouth daily at bedtime 7/16/24  Yes Merced Alex PA-C   Nutritional Supplements (BOOST BREEZE PO) Take by mouth daily Nutritional drink   3 times a week. 9/6/17  Yes Historical Provider, MD   oxyCODONE (OxyCONTIN) 10 mg 12 hr tablet Take 10 mg by mouth as needed   Yes Historical Provider, MD   pantoprazole (PROTONIX) 40 mg tablet Take 1 tablet (40 mg total) by mouth daily 6/18/24  Yes Merced Alex PA-C   QUEtiapine  "(SEROquel) 25 mg tablet Take 1 tablet (25 mg total) by mouth daily at bedtime 6/18/24  Yes Merced Alex PA-C       Allergies:  Allergies   Allergen Reactions    Ibuprofen Nausea Only, Rash, Dizziness and Syncope    Morphine Other (See Comments) and Hallucinations     Intolerance    Latex Dermatitis       Review of Systems:  Review of Systems - History obtained from chart review and the patient  General ROS: positive for  - fatigue and change in activity tolerance   negative for - chills or fever  Psychological ROS: negative  Ophthalmic ROS: negative  ENT ROS: negative  Allergy and Immunology ROS: negative  Hematological and Lymphatic ROS: negative  Endocrine ROS: negative  Breast ROS: negative  Respiratory ROS: positive for - cough, orthopnea, and shortness of breath  negative for - hemoptysis, pleuritic pain, sputum changes, stridor, tachypnea, or wheezing  Cardiovascular ROS: positive for - dyspnea on exertion, edema, murmur, and orthopnea  negative for - chest pain, irregular heartbeat, or loss of consciousness  Gastrointestinal ROS: no abdominal pain, change in bowel habits, or black or bloody stools  Genito-Urinary ROS: no dysuria, trouble voiding, or hematuria  Musculoskeletal ROS: negative  Neurological ROS: no TIA or stroke symptoms  Dermatological ROS: negative    Vital Signs:     Vitals:    10/30/24 1444   BP: 127/60   BP Location: Left arm   Patient Position: Sitting   Cuff Size: Standard   Pulse: 74   Temp: 98.1 °F (36.7 °C)   TempSrc: Tympanic   SpO2: 94%   Weight: 75.6 kg (166 lb 9.6 oz)   Height: 4' 9\" (1.448 m)       Physical Exam:    General: Alert, oriented, obese, no acute distress  HEENT/NECK:  PERRLA.  No jugular venous distention.    Cardiac:Regular rate and rhythm, III/VI systolic murmur RUSB.  Carotids: 1+ pulses, cardiac murmur radiates to neck   Pulmonary:  Breath sounds clear bilaterally.  Abdomen:  Non-tender, Non-distended.  Positive bowel sounds.  Upper extremities: 2+ radial " pulses; brisk capillary refill  Lower extremities: Extremities warm/dry. PT/DP pulses 2+ bilaterally.  Trace edema B/L  Neuro: Alert and oriented X 3.  Sensation is grossly intact.  No focal deficits.  Musculoskeletal: MAEE, stable gait  Skin: Warm/Dry, without rashes or lesions.    Lab Results:     Lab Results   Component Value Date    WBC 5.20 10/01/2024    HGB 15.5 (H) 10/01/2024    HCT 47.9 (H) 10/01/2024    MCV 92 10/01/2024     10/01/2024     Lab Results   Component Value Date     12/02/2015    SODIUM 142 10/01/2024    K 4.3 10/01/2024     10/01/2024    CO2 30 10/01/2024    ANIONGAP 7 12/02/2015    AGAP 4 10/01/2024    BUN 16 10/01/2024    CREATININE 0.65 10/01/2024    GLUC 104 12/28/2022    GLUF 87 10/01/2024    CALCIUM 8.9 10/01/2024    AST 17 06/11/2024    ALT 16 06/11/2024    ALKPHOS 64 06/11/2024    PROT 6.9 12/02/2015    TP 6.4 06/11/2024    BILITOT 0.55 12/02/2015    TBILI 0.64 06/11/2024    EGFR 88 10/01/2024     Lab Results   Component Value Date    CHOLESTEROL 131 06/11/2024    CHOLESTEROL 143 06/23/2023    CHOLESTEROL 161 01/10/2022     Lab Results   Component Value Date    HDL 52 06/11/2024    HDL 52 06/23/2023    HDL 54 01/10/2022     Lab Results   Component Value Date    TRIG 117 06/11/2024    TRIG 116 06/23/2023    TRIG 139 01/10/2022     Lab Results   Component Value Date    NONHDLC 79 06/11/2024    NONHDLC 91 06/23/2023    NONHDLC 107 01/10/2022     Lab Results   Component Value Date    HGBA1C 5.3 06/11/2024     Lab Results   Component Value Date    TROPONINI <0.02 05/24/2021       Imaging Studies:     Echocardiogram: 7/9/24      Left Ventricle: Left ventricular cavity size is normal. Wall thickness is moderately increased. The left ventricular ejection fraction is 60%. Systolic function is normal. Wall motion is normal. Diastolic function is mildly abnormal, consistent with grade I (abnormal) relaxation.    Left Atrium: The atrium is mildly dilated.    Aortic Valve: The  leaflets are thickened. The leaflets are calcified. There is reduced mobility. There is mild regurgitation. There is moderate to severe stenosis. The aortic valve peak velocity is 3.9 m/s. The aortic valve peak gradient is 57 mmHg. The aortic valve mean gradient is 34 mmHg. The dimensionless velocity index is 0.26. The aortic valve area is 0.91 cm2.    Mitral Valve: There is annular calcification. There is mild regurgitation.    Tricuspid Valve: There is mild regurgitation.     Findings    Left Ventricle Left ventricular cavity size is normal. Wall thickness is moderately increased. The left ventricular ejection fraction is 60%. Systolic function is normal. Wall motion is normal. Diastolic function is mildly abnormal, consistent with grade I (abnormal) relaxation.   Right Ventricle Right ventricular cavity size is normal. Systolic function is normal. Normal tricuspid annular plane systolic excursion (TAPSE) > 1.7 cm.   Left Atrium The atrium is mildly dilated.   Right Atrium The atrium is normal in size.   Aortic Valve The aortic valve was not well visualized. The leaflets are thickened. The leaflets are calcified. There is reduced mobility. There is mild regurgitation. There is moderate to severe stenosis. The aortic valve peak velocity is 3.9 m/s. The aortic valve peak gradient is 57 mmHg. The aortic valve mean gradient is 34 mmHg. The dimensionless velocity index is 0.26. The aortic valve area is 0.91 cm2.   Mitral Valve There is annular calcification.  There is mild regurgitation. There is no evidence of stenosis.   Tricuspid Valve Tricuspid valve structure is normal. There is mild regurgitation. There is no evidence of stenosis. The estimated right ventricular systolic pressure is 29.00 mmHg.   Pulmonic Valve The pulmonic valve was not well visualized. There is trace regurgitation. There is no evidence of stenosis.   Ascending Aorta The aortic root is normal in size.   IVC/SVC The inferior vena cava size is  15.0 mm. The right atrial pressure is estimated at 8.0 mmHg. The inferior vena cava is normal in size.   Pericardium There is no significant pericardial effusion appreciated.     Left Ventricle Measurements    Function/Volumes   A4C EF 52 %         LVOT stroke volume 84         LVOT stroke volume index 50.3 ml/m2         Left ventricular stroke volume (2D) 46 mL         Dimensions   LVIDd 4 cm         LVIDS 2.5 cm         IVSd 1.4 cm         LVPWd 1.4 cm         LVOT area 3.14 cm2         FS 38         Diastolic Filling   LA Volume Index (BP) 29.3 mL/m2         E/A ratio 0.75         E wave deceleration time 265 ms         MV Peak E Johnnie 94 cm/s         MV Peak A Johnnie 1.25 m/s          Report Measurements   AV LVOT peak gradient 4 mmHg              Interventricular Septum Measurements    Shunt Ratio   LVOT peak VTI 26.39 cm         LVOT peak johnnie 1.03 m/s              Right Ventricle Measurements    Dimensions   RVID d 2.8 cm         Tricuspid annular plane systolic excursion 2.6 cm               Left Atrium Measurements    Dimensions   LA size 3.3 cm         LA length (A2C) 5 cm         Volumes   LA volume (BP) 49 mL         LA Volume Index (BP) 29.3 mL/m2               Right Atrium Measurements    Dimensions   RAA A4C 8.7 cm2               Atrial Septum Measurements    Shunt Ratio   LVOT peak VTI 26.39 cm         LVOT peak johnnie 1.03 m/s               Aortic Valve Measurements    Stenosis   Aortic valve peak velocity 3.9 m/s         LVOT peak johnnie 1.03 m/s         Ao VTI 91.55 cm         LVOT peak VTI 26.39 cm         AV mean gradient 34 mmHg         LVOT mn grad 2 mmHg         AV peak gradient 57 mmHg         AV LVOT peak gradient 4 mmHg         Regurgitation   AV peak gradient 92 mmHg         AV Deceleration Time 1,465 ms         AV regurgitation pressure 1/2 time 425 ms         Area/Dimensions   DVI 0.26         AV valve area 0.91 cm2         AV area by cont VTI 0.9 cm2         AV area peak johnnie 0.9 cm2         LVOT  diameter 2 cm         LVOT area 3.14 cm2               Mitral Valve Measurements    Stenosis   MV mean gradient antegrade 2 mmHg         MV peak gradient antegrade 4 mmHg         MV VTI 35.29 cm         MV valve area by continuity eq 2.35 cm2               Tricuspid Valve Measurements    RVSP Parameters   TR Peak Johnnie 2.3 m/s         Est. RA pres 8 mmHg         Triscuspid Valve Regurgitation Peak Gradient 21 mmHg         Right Ventricular Peak Systolic Pressure 29 mmHg               Aorta Measurements    Aortic Dimensions   Asc Ao 2.9 cm               IVC/SVC Measurements    IVC/SVC   IVC 15 mm         Est. RA pres 8 mmHg           Gated CTA of the chest/abdomen/pelvis: 10/1/24    VASCULAR FINDINGS:     Central pulmonary artery is not abnormally enlarged.  Right atrium and right ventricle are normal in size.  Left atrial cavity is not abnormally dilated.  Left ventricular myocardium is normal.  Mild mitral annular calcification.     Coronary artery origins are in typical position.  Mild coronary artery calcification.     Annulus, LVOT and aorta analysis:     Typical trileaflet aortic valve morphology.        Optimal CT aortic valve plane angles:  3 cusp view: JON 3, cranial 7  Cusp overlap view JON 31, cranial 29     Measurements:     Annulus diameter (max x min): 27 x 22 mm.  Annulus Area 464 mm2.  Annulus Perimeter 77 mm.     Annulus to left main coronary ostium: 11 mm.  Annulus to right main coronary ostium: 16 mm.  Sinus of Valsalva height: 19 mm.     Aortic sinus of Valsalva diameter (max x min): 31 x 29 mm.  LVOT minimal diameter: 21 mm.     Sino-tubular junction minimal diameter: 27 mm.  Ascending thoracic aorta maximal diameter: 32 mm.     Valve Calcification:     Aortic valve calcium score is 465.  Volume of 1433 mm3.     Thoracic Aorta:     Porcelain aorta = no.  Aortic root and ascending thoracic aorta free of significant calcification beyond the sino-tubular junction.  Aortic arch is normal in course and  caliber with insignificant calcification.  Descending thoracic aorta is normal in course, caliber, and contour.     Abdominal aorta and pelvic artery measurements:     Abdominal aorta:  Minimal diameter above aortic bifurcation: 16 mm  Calcification: none  Tortuosity: none     Right iliofemoral segment:  Minimal diameter: 6 mm  Calcification: none  Tortuosity: none     Left iliofemoral segment:  Minimal diameter: 7 mm  Calcification: none  Tortuosity: none    Cardiac catheterization: 10/30/24    Normal coronary angiography.     Carotid artery ultrasound: 10/1/24    RIGHT:  There is <50% stenosis noted in the internal carotid artery. Plaque is  heterogenous and smooth.  Vertebral artery flow is antegrade. There is no significant subclavian artery  disease.  LEFT:  There is <50% stenosis noted in the internal carotid artery. Plaque is  heterogenous and irregular.  Vertebral artery flow is antegrade. There is no significant subclavian artery  disease.      Results Review Statement: I personally reviewed the following image studies in PACS and associated radiology reports: cardiac cath, echo, carotid duplex, CTA c/a/p. My interpretation of the radiology images/reports is: AS.    TAVR evaluation Assessment:     5 Meter Walk Test:      Attempt 1: 12 sec   Attempt 2: 26 sec   Attempt 3: 18 sec    STS Risk Score: 3.7%    Aortic Stenosis Stage: D1    [facility]: III    [facility]-12 completed      Impression/Plan:    Deyanira Stokes has symptomatic severe aortic stenosis. They have undergone testing for transcatheter aortic valve replacement.  The results of these studies have been interpreted by the multidisciplinary TAVR team who have determined the patient to be Intermediate risk for open aortic valve replacement based on other pre-existing comobidities not reflected in the STS risk score.     The risks, benefits, and alternatives to TAVR were discussed in detail with the patient today. They understand and wish to proceed with  transfemoral transcatheter aortic valve replacement.  Informed consent was obtained and a date for the intervention has been set.    Pre-op instructions reviewed with patient and daughter    Deyanira Stokes was comfortable with our recommendations, and their questions were answered to their satisfaction.       SIGNATURE: JERARDO Galvan  DATE: October 30, 2024  TIME: 3:25 PM

## 2024-10-30 NOTE — H&P (VIEW-ONLY)
Pre-Op History & Physical- Cardiothoracic Surgery   Deyanira Stokes 74 y.o. female MRN: [id number]    Physician Requesting Consult: Dr. Cobb  PCP: Merced Alex PA-C    Reason for Consult / Principal Problem: Aortic stenosis, Non-Rheumatic    History of Present Illness: Deyanira Stokes is a 74 y.o. year old female who was previously evaluated in our office by Godwin Sarah D.O. for transcatheter aortic valve replacement.  During this initial consultation, arrangements were made for the following studies to be completed: Gated CTA of the chest/abdomen/pelvis,  cardiac catheterization and carotid artery ultrasound.    Deyanira Stokes now presents to review the results of these tests and obtain a second surgeon to confirm the suitability of proceeding with transcatheter aortic valve replacment.    In review patient's PMHx is notable for AS, HTN, HLD, asthma,  right breast cancer diagnosed in 2005 (s/p R mastectomy, chemotherapy, and radiation), anxiety, dementia, depression, GERD, kidney stone, hearing loss, IBS, obesity (BMI 36), osteoporosis, arthritis, scoliosis, and gait dysfunction.   He AS was identified in 2022.  At that time she sustained a fall and presented for evaluation at [facility].  Echocardiogram was completed and mild aortic stenosis was identified.  Since that time she has been followed with serial surveillance echocardiography.  In retrospect, she notes lifelong heart murmur dating back to her childhood.     During recent surveillance echocardiography known aortic stenosis has progressed into the severe range.  She reports progressive exertional dyspnea specifically with ambulating up an incline or climbing stairs, worsening fatigue and orthopnea with persistent dry cough.    Patient is accompanied today by her daughter Veronica.  Patient denies tobacco, alcohol or drug use.  She is edentulous.     Past Medical History:  Past Medical History:   Diagnosis Date    Allergic     this is  an ongoing problem    Anxiety     Arthritis     Asthma     Breast cancer (HCC)     rt mastectomy 2005    Cancer (HCC)     breast right    Cataract     CHF (congestive heart failure) (HCC)     Chronic headaches     Concussion     At age 11; Chronic headaches since then    Coronary artery disease     Dementia (HCC)     see chart for more details    Depression     Dietary lactose intolerance     GERD (gastroesophageal reflux disease)     High cholesterol     History of chemotherapy     2005 rt breast cancer    History of transfusion     HL (hearing loss)     it's difficult to hear when there are lots of distractions    Hypertension     IBS (irritable bowel syndrome)     Irritable bowel syndrome (IBS) 05/31/2016    Kidney stone     Lymphedema of right arm     Obesity     Osteoporosis     osteoarthritis, see chart    PFO (patent foramen ovale)     Pneumonia     receive annual vaccine shots    PONV (postoperative nausea and vomiting)     Post-menopausal     Psychiatric disorder     Renal disorder     Scoliosis     tail bone, see chart for more details    Shingles     see chart for more details    Shortness of breath     Vitamin D deficiency          Past Surgical History:   Past Surgical History:   Procedure Laterality Date    BREAST SURGERY      CARDIAC CATHETERIZATION N/A 10/29/2024    Procedure: LHC and RHC-Cardiac catheterization;  Surgeon: Alexi Myers MD;  Location:  CARDIAC CATH LAB;  Service: Cardiology    CARDIAC CATHETERIZATION N/A 10/29/2024    Procedure: Cardiac Coronary Angiogram;  Surgeon: Alexi Myers MD;  Location:  CARDIAC CATH LAB;  Service: Cardiology    CATARACT EXTRACTION Bilateral     CATARACT EXTRACTION, BILATERAL      CHOLECYSTECTOMY      COLONOSCOPY N/A 05/31/2016    Procedure: COLONOSCOPY;  Surgeon: Colton Moralez MD;  Location: [facility];  Service:     FL GUIDED NEEDLE PLAC BX/ASP/INJ  08/14/2023    FL GUIDED NEEDLE PLAC BX/ASP/INJ  10/30/2023    GALLBLADDER SURGERY      HYSTERECTOMY      Total     JOINT REPLACEMENT      KNEE ARTHROSCOPY      KNEE SURGERY      arthroscopy    LYMPH NODE BIOPSY      as part of the breast cancer, see chart for more details    MASTECTOMY Right     rt breast mastectomy 2005    CO ARTHRP KNE CONDYLE&PLATU MEDIAL&LAT COMPARTMENTS Left 05/20/2021    Procedure: ARTHROPLASTY KNEE TOTAL;  Surgeon: Monica Cox MD;  Location:  MAIN OR;  Service: Orthopedics    CO ARTHRP KNE CONDYLE&PLATU MEDIAL&LAT COMPARTMENTS Right 11/16/2022    Procedure: ARTHROPLASTY KNEE TOTAL;  Surgeon: Marcos Arcos DO;  Location: [facility];  Service: Orthopedics    TONSILLECTOMY AND ADENOIDECTOMY           Family History:  Family History   Adopted: Yes   Problem Relation Age of Onset    Diabetes Mother     Heart disease Mother     Mental illness Mother     Depression Mother     Heart disease Brother     Breast cancer Maternal Aunt     Breast cancer Maternal Aunt     Breast cancer Maternal Aunt     Breast cancer Maternal Aunt     Breast cancer Maternal Aunt     No Known Problems Father     No Known Problems Sister     No Known Problems Daughter     Breast cancer Maternal Grandmother     No Known Problems Sister     No Known Problems Sister     No Known Problems Sister          Social History:    Social History     Substance and Sexual Activity   Alcohol Use Yes    Alcohol/week: 1.0 standard drink of alcohol    Types: 1 Glasses of wine per week    Comment: this is less than once/mo, more like 1-2 drinks every 3 mo     Social History     Substance and Sexual Activity   Drug Use Not Currently    Types: Marijuana    Comment: when i was in my teens     Social History     Tobacco Use   Smoking Status Never    Passive exposure: Never   Smokeless Tobacco Never       Home Medications:   Prior to Admission medications    Medication Sig Start Date End Date Taking? Authorizing Provider   acetaminophen (TYLENOL) 650 mg CR tablet Take 1 tablet (650 mg total) by mouth every 8 (eight) hours as needed for mild pain  Patient takes 500mg 10/29/24  Yes JERARDO Lopez   albuterol (2.5 mg/3 mL) 0.083 % nebulizer solution Take 2.5 mg by nebulization every 6 (six) hours as needed for wheezing   Yes Historical Provider, MD   albuterol (PROVENTIL HFA,VENTOLIN HFA) 90 mcg/act inhaler Inhale 2 puffs every 4 (four) hours as needed for wheezing 7/16/24  Yes Merced Alex PA-C   aspirin 81 mg chewable tablet Chew 81 mg daily   Yes Historical Provider, MD   atorvastatin (LIPITOR) 20 mg tablet Take 1 tablet (20 mg total) by mouth daily 1/8/24  Yes Merced Alex PA-C   butalbital-acetaminophen-caffeine (FIORICET,ESGIC) -40 mg per tablet Take 1 tablet by mouth 3 (three) times a day as needed for headaches 5/5/20  Yes Merced Alex PA-C   donepezil (ARICEPT) 10 mg tablet Take 1 tablet by mouth daily at bedtime. 7/31/24  Yes Merced Alex PA-C   FLUoxetine (PROzac) 20 mg capsule Take 1 capsule (20 mg total) by mouth daily 7/16/24  Yes Merced Alex PA-C   fluticasone (FLONASE) 50 mcg/act nasal spray INSTILL 1 SPRAY INTO EACH NOSTRIL ONCE DAILY 9/8/24  Yes Marcos Lara DO   furosemide (LASIX) 20 mg tablet Take 20 mg by mouth 2 (two) times a day Waiting for pharmacy to call her. Updated 9/23   Yes Historical Provider, MD   levocetirizine (XYZAL) 5 MG tablet Take 1 tablet (5 mg total) by mouth every evening 2/22/24  Yes Marcos Lara DO   Mirabegron ER 25 MG TB24 Take 50 mg by mouth in the morning 1/8/24  Yes JERARDO Hernandez   montelukast (SINGULAIR) 10 mg tablet Take 1 tablet (10 mg total) by mouth daily at bedtime 7/16/24  Yes Merced Alex PA-C   Nutritional Supplements (BOOST BREEZE PO) Take by mouth daily Nutritional drink   3 times a week. 9/6/17  Yes Historical Provider, MD   oxyCODONE (OxyCONTIN) 10 mg 12 hr tablet Take 10 mg by mouth as needed   Yes Historical Provider, MD   pantoprazole (PROTONIX) 40 mg tablet Take 1 tablet (40 mg total) by mouth daily 6/18/24  Yes Merced Alex PA-C   QUEtiapine  "(SEROquel) 25 mg tablet Take 1 tablet (25 mg total) by mouth daily at bedtime 6/18/24  Yes Merced Alex PA-C       Allergies:  Allergies   Allergen Reactions    Ibuprofen Nausea Only, Rash, Dizziness and Syncope    Morphine Other (See Comments) and Hallucinations     Intolerance    Latex Dermatitis       Review of Systems:  Review of Systems - History obtained from chart review and the patient  General ROS: positive for  - fatigue and change in activity tolerance   negative for - chills or fever  Psychological ROS: negative  Ophthalmic ROS: negative  ENT ROS: negative  Allergy and Immunology ROS: negative  Hematological and Lymphatic ROS: negative  Endocrine ROS: negative  Breast ROS: negative  Respiratory ROS: positive for - cough, orthopnea, and shortness of breath  negative for - hemoptysis, pleuritic pain, sputum changes, stridor, tachypnea, or wheezing  Cardiovascular ROS: positive for - dyspnea on exertion, edema, murmur, and orthopnea  negative for - chest pain, irregular heartbeat, or loss of consciousness  Gastrointestinal ROS: no abdominal pain, change in bowel habits, or black or bloody stools  Genito-Urinary ROS: no dysuria, trouble voiding, or hematuria  Musculoskeletal ROS: negative  Neurological ROS: no TIA or stroke symptoms  Dermatological ROS: negative    Vital Signs:     Vitals:    10/30/24 1444   BP: 127/60   BP Location: Left arm   Patient Position: Sitting   Cuff Size: Standard   Pulse: 74   Temp: 98.1 °F (36.7 °C)   TempSrc: Tympanic   SpO2: 94%   Weight: 75.6 kg (166 lb 9.6 oz)   Height: 4' 9\" (1.448 m)       Physical Exam:    General: Alert, oriented, obese, no acute distress  HEENT/NECK:  PERRLA.  No jugular venous distention.    Cardiac:Regular rate and rhythm, III/VI systolic murmur RUSB.  Carotids: 1+ pulses, cardiac murmur radiates to neck   Pulmonary:  Breath sounds clear bilaterally.  Abdomen:  Non-tender, Non-distended.  Positive bowel sounds.  Upper extremities: 2+ radial " pulses; brisk capillary refill  Lower extremities: Extremities warm/dry. PT/DP pulses 2+ bilaterally.  Trace edema B/L  Neuro: Alert and oriented X 3.  Sensation is grossly intact.  No focal deficits.  Musculoskeletal: MAEE, stable gait  Skin: Warm/Dry, without rashes or lesions.    Lab Results:     Lab Results   Component Value Date    WBC 5.20 10/01/2024    HGB 15.5 (H) 10/01/2024    HCT 47.9 (H) 10/01/2024    MCV 92 10/01/2024     10/01/2024     Lab Results   Component Value Date     12/02/2015    SODIUM 142 10/01/2024    K 4.3 10/01/2024     10/01/2024    CO2 30 10/01/2024    ANIONGAP 7 12/02/2015    AGAP 4 10/01/2024    BUN 16 10/01/2024    CREATININE 0.65 10/01/2024    GLUC 104 12/28/2022    GLUF 87 10/01/2024    CALCIUM 8.9 10/01/2024    AST 17 06/11/2024    ALT 16 06/11/2024    ALKPHOS 64 06/11/2024    PROT 6.9 12/02/2015    TP 6.4 06/11/2024    BILITOT 0.55 12/02/2015    TBILI 0.64 06/11/2024    EGFR 88 10/01/2024     Lab Results   Component Value Date    CHOLESTEROL 131 06/11/2024    CHOLESTEROL 143 06/23/2023    CHOLESTEROL 161 01/10/2022     Lab Results   Component Value Date    HDL 52 06/11/2024    HDL 52 06/23/2023    HDL 54 01/10/2022     Lab Results   Component Value Date    TRIG 117 06/11/2024    TRIG 116 06/23/2023    TRIG 139 01/10/2022     Lab Results   Component Value Date    NONHDLC 79 06/11/2024    NONHDLC 91 06/23/2023    NONHDLC 107 01/10/2022     Lab Results   Component Value Date    HGBA1C 5.3 06/11/2024     Lab Results   Component Value Date    TROPONINI <0.02 05/24/2021       Imaging Studies:     Echocardiogram: 7/9/24      Left Ventricle: Left ventricular cavity size is normal. Wall thickness is moderately increased. The left ventricular ejection fraction is 60%. Systolic function is normal. Wall motion is normal. Diastolic function is mildly abnormal, consistent with grade I (abnormal) relaxation.    Left Atrium: The atrium is mildly dilated.    Aortic Valve: The  leaflets are thickened. The leaflets are calcified. There is reduced mobility. There is mild regurgitation. There is moderate to severe stenosis. The aortic valve peak velocity is 3.9 m/s. The aortic valve peak gradient is 57 mmHg. The aortic valve mean gradient is 34 mmHg. The dimensionless velocity index is 0.26. The aortic valve area is 0.91 cm2.    Mitral Valve: There is annular calcification. There is mild regurgitation.    Tricuspid Valve: There is mild regurgitation.     Findings    Left Ventricle Left ventricular cavity size is normal. Wall thickness is moderately increased. The left ventricular ejection fraction is 60%. Systolic function is normal. Wall motion is normal. Diastolic function is mildly abnormal, consistent with grade I (abnormal) relaxation.   Right Ventricle Right ventricular cavity size is normal. Systolic function is normal. Normal tricuspid annular plane systolic excursion (TAPSE) > 1.7 cm.   Left Atrium The atrium is mildly dilated.   Right Atrium The atrium is normal in size.   Aortic Valve The aortic valve was not well visualized. The leaflets are thickened. The leaflets are calcified. There is reduced mobility. There is mild regurgitation. There is moderate to severe stenosis. The aortic valve peak velocity is 3.9 m/s. The aortic valve peak gradient is 57 mmHg. The aortic valve mean gradient is 34 mmHg. The dimensionless velocity index is 0.26. The aortic valve area is 0.91 cm2.   Mitral Valve There is annular calcification.  There is mild regurgitation. There is no evidence of stenosis.   Tricuspid Valve Tricuspid valve structure is normal. There is mild regurgitation. There is no evidence of stenosis. The estimated right ventricular systolic pressure is 29.00 mmHg.   Pulmonic Valve The pulmonic valve was not well visualized. There is trace regurgitation. There is no evidence of stenosis.   Ascending Aorta The aortic root is normal in size.   IVC/SVC The inferior vena cava size is  15.0 mm. The right atrial pressure is estimated at 8.0 mmHg. The inferior vena cava is normal in size.   Pericardium There is no significant pericardial effusion appreciated.     Left Ventricle Measurements    Function/Volumes   A4C EF 52 %         LVOT stroke volume 84         LVOT stroke volume index 50.3 ml/m2         Left ventricular stroke volume (2D) 46 mL         Dimensions   LVIDd 4 cm         LVIDS 2.5 cm         IVSd 1.4 cm         LVPWd 1.4 cm         LVOT area 3.14 cm2         FS 38         Diastolic Filling   LA Volume Index (BP) 29.3 mL/m2         E/A ratio 0.75         E wave deceleration time 265 ms         MV Peak E Johnnie 94 cm/s         MV Peak A Johnnie 1.25 m/s          Report Measurements   AV LVOT peak gradient 4 mmHg              Interventricular Septum Measurements    Shunt Ratio   LVOT peak VTI 26.39 cm         LVOT peak johnnie 1.03 m/s              Right Ventricle Measurements    Dimensions   RVID d 2.8 cm         Tricuspid annular plane systolic excursion 2.6 cm               Left Atrium Measurements    Dimensions   LA size 3.3 cm         LA length (A2C) 5 cm         Volumes   LA volume (BP) 49 mL         LA Volume Index (BP) 29.3 mL/m2               Right Atrium Measurements    Dimensions   RAA A4C 8.7 cm2               Atrial Septum Measurements    Shunt Ratio   LVOT peak VTI 26.39 cm         LVOT peak johnnie 1.03 m/s               Aortic Valve Measurements    Stenosis   Aortic valve peak velocity 3.9 m/s         LVOT peak johnnie 1.03 m/s         Ao VTI 91.55 cm         LVOT peak VTI 26.39 cm         AV mean gradient 34 mmHg         LVOT mn grad 2 mmHg         AV peak gradient 57 mmHg         AV LVOT peak gradient 4 mmHg         Regurgitation   AV peak gradient 92 mmHg         AV Deceleration Time 1,465 ms         AV regurgitation pressure 1/2 time 425 ms         Area/Dimensions   DVI 0.26         AV valve area 0.91 cm2         AV area by cont VTI 0.9 cm2         AV area peak johnnie 0.9 cm2         LVOT  diameter 2 cm         LVOT area 3.14 cm2               Mitral Valve Measurements    Stenosis   MV mean gradient antegrade 2 mmHg         MV peak gradient antegrade 4 mmHg         MV VTI 35.29 cm         MV valve area by continuity eq 2.35 cm2               Tricuspid Valve Measurements    RVSP Parameters   TR Peak Johnnie 2.3 m/s         Est. RA pres 8 mmHg         Triscuspid Valve Regurgitation Peak Gradient 21 mmHg         Right Ventricular Peak Systolic Pressure 29 mmHg               Aorta Measurements    Aortic Dimensions   Asc Ao 2.9 cm               IVC/SVC Measurements    IVC/SVC   IVC 15 mm         Est. RA pres 8 mmHg           Gated CTA of the chest/abdomen/pelvis: 10/1/24    VASCULAR FINDINGS:     Central pulmonary artery is not abnormally enlarged.  Right atrium and right ventricle are normal in size.  Left atrial cavity is not abnormally dilated.  Left ventricular myocardium is normal.  Mild mitral annular calcification.     Coronary artery origins are in typical position.  Mild coronary artery calcification.     Annulus, LVOT and aorta analysis:     Typical trileaflet aortic valve morphology.        Optimal CT aortic valve plane angles:  3 cusp view: JON 3, cranial 7  Cusp overlap view JON 31, cranial 29     Measurements:     Annulus diameter (max x min): 27 x 22 mm.  Annulus Area 464 mm2.  Annulus Perimeter 77 mm.     Annulus to left main coronary ostium: 11 mm.  Annulus to right main coronary ostium: 16 mm.  Sinus of Valsalva height: 19 mm.     Aortic sinus of Valsalva diameter (max x min): 31 x 29 mm.  LVOT minimal diameter: 21 mm.     Sino-tubular junction minimal diameter: 27 mm.  Ascending thoracic aorta maximal diameter: 32 mm.     Valve Calcification:     Aortic valve calcium score is 465.  Volume of 1433 mm3.     Thoracic Aorta:     Porcelain aorta = no.  Aortic root and ascending thoracic aorta free of significant calcification beyond the sino-tubular junction.  Aortic arch is normal in course and  caliber with insignificant calcification.  Descending thoracic aorta is normal in course, caliber, and contour.     Abdominal aorta and pelvic artery measurements:     Abdominal aorta:  Minimal diameter above aortic bifurcation: 16 mm  Calcification: none  Tortuosity: none     Right iliofemoral segment:  Minimal diameter: 6 mm  Calcification: none  Tortuosity: none     Left iliofemoral segment:  Minimal diameter: 7 mm  Calcification: none  Tortuosity: none    Cardiac catheterization: 10/30/24    Normal coronary angiography.     Carotid artery ultrasound: 10/1/24    RIGHT:  There is <50% stenosis noted in the internal carotid artery. Plaque is  heterogenous and smooth.  Vertebral artery flow is antegrade. There is no significant subclavian artery  disease.  LEFT:  There is <50% stenosis noted in the internal carotid artery. Plaque is  heterogenous and irregular.  Vertebral artery flow is antegrade. There is no significant subclavian artery  disease.      Results Review Statement: I personally reviewed the following image studies in PACS and associated radiology reports: cardiac cath, echo, carotid duplex, CTA c/a/p. My interpretation of the radiology images/reports is: AS.    TAVR evaluation Assessment:     5 Meter Walk Test:      Attempt 1: 12 sec   Attempt 2: 26 sec   Attempt 3: 18 sec    STS Risk Score: 3.7%    Aortic Stenosis Stage: D1    [facility]: III    [facility]Q-12 completed      Impression/Plan:    Deyanira Stokes has symptomatic severe aortic stenosis. They have undergone testing for transcatheter aortic valve replacement.  The results of these studies have been interpreted by the multidisciplinary TAVR team who have determined the patient to be Intermediate risk for open aortic valve replacement based on other pre-existing comobidities not reflected in the STS risk score.     The risks, benefits, and alternatives to TAVR were discussed in detail with the patient today. They understand and wish to proceed with  transfemoral transcatheter aortic valve replacement.  Informed consent was obtained and a date for the intervention has been set.    Pre-op instructions reviewed with patient and daughter    Deyanira Stokes was comfortable with our recommendations, and their questions were answered to their satisfaction.       SIGNATURE: JERARDO Galvan  DATE: October 30, 2024  TIME: 3:25 PM

## 2024-10-30 NOTE — PROGRESS NOTES
Pre-Op History & Physical- Cardiothoracic Surgery   Deyanira Stokes 74 y.o. female MRN: [id number]    Physician Requesting Consult: Dr. Cobb  PCP: Merced Alex PA-C    Reason for Consult / Principal Problem: Aortic stenosis, Non-Rheumatic    History of Present Illness: Deyanira Stokes is a 74 y.o. year old female who was previously evaluated in our office by Gowdin Sarah D.O. for transcatheter aortic valve replacement.  During this initial consultation, arrangements were made for the following studies to be completed: Gated CTA of the chest/abdomen/pelvis,  cardiac catheterization and carotid artery ultrasound.    Deyanira Stokes now presents to review the results of these tests and obtain a second surgeon to confirm the suitability of proceeding with transcatheter aortic valve replacment.    In review patient's PMHx is notable for AS, HTN, HLD, asthma,  right breast cancer diagnosed in 2005 (s/p R mastectomy, chemotherapy, and radiation), anxiety, dementia, depression, GERD, kidney stone, hearing loss, IBS, obesity (BMI 36), osteoporosis, arthritis, scoliosis, and gait dysfunction.   He AS was identified in 2022.  At that time she sustained a fall and presented for evaluation at [facility].  Echocardiogram was completed and mild aortic stenosis was identified.  Since that time she has been followed with serial surveillance echocardiography.  In retrospect, she notes lifelong heart murmur dating back to her childhood.     During recent surveillance echocardiography known aortic stenosis has progressed into the severe range.  She reports progressive exertional dyspnea specifically with ambulating up an incline or climbing stairs, worsening fatigue and orthopnea with persistent dry cough.    Patient is accompanied today by her daughter Veronica.  Patient denies tobacco, alcohol or drug use.  She is edentulous.     Past Medical History:  Past Medical History:   Diagnosis Date    Allergic     this is  an ongoing problem    Anxiety     Arthritis     Asthma     Breast cancer (HCC)     rt mastectomy 2005    Cancer (HCC)     breast right    Cataract     CHF (congestive heart failure) (HCC)     Chronic headaches     Concussion     At age 11; Chronic headaches since then    Coronary artery disease     Dementia (HCC)     see chart for more details    Depression     Dietary lactose intolerance     GERD (gastroesophageal reflux disease)     High cholesterol     History of chemotherapy     2005 rt breast cancer    History of transfusion     HL (hearing loss)     it's difficult to hear when there are lots of distractions    Hypertension     IBS (irritable bowel syndrome)     Irritable bowel syndrome (IBS) 05/31/2016    Kidney stone     Lymphedema of right arm     Obesity     Osteoporosis     osteoarthritis, see chart    PFO (patent foramen ovale)     Pneumonia     receive annual vaccine shots    PONV (postoperative nausea and vomiting)     Post-menopausal     Psychiatric disorder     Renal disorder     Scoliosis     tail bone, see chart for more details    Shingles     see chart for more details    Shortness of breath     Vitamin D deficiency          Past Surgical History:   Past Surgical History:   Procedure Laterality Date    BREAST SURGERY      CARDIAC CATHETERIZATION N/A 10/29/2024    Procedure: LHC and RHC-Cardiac catheterization;  Surgeon: Alexi Myers MD;  Location:  CARDIAC CATH LAB;  Service: Cardiology    CARDIAC CATHETERIZATION N/A 10/29/2024    Procedure: Cardiac Coronary Angiogram;  Surgeon: Alexi Myers MD;  Location:  CARDIAC CATH LAB;  Service: Cardiology    CATARACT EXTRACTION Bilateral     CATARACT EXTRACTION, BILATERAL      CHOLECYSTECTOMY      COLONOSCOPY N/A 05/31/2016    Procedure: COLONOSCOPY;  Surgeon: Colton Moralez MD;  Location: [facility];  Service:     FL GUIDED NEEDLE PLAC BX/ASP/INJ  08/14/2023    FL GUIDED NEEDLE PLAC BX/ASP/INJ  10/30/2023    GALLBLADDER SURGERY      HYSTERECTOMY      Total     JOINT REPLACEMENT      KNEE ARTHROSCOPY      KNEE SURGERY      arthroscopy    LYMPH NODE BIOPSY      as part of the breast cancer, see chart for more details    MASTECTOMY Right     rt breast mastectomy 2005    FL ARTHRP KNE CONDYLE&PLATU MEDIAL&LAT COMPARTMENTS Left 05/20/2021    Procedure: ARTHROPLASTY KNEE TOTAL;  Surgeon: Monica Cox MD;  Location:  MAIN OR;  Service: Orthopedics    FL ARTHRP KNE CONDYLE&PLATU MEDIAL&LAT COMPARTMENTS Right 11/16/2022    Procedure: ARTHROPLASTY KNEE TOTAL;  Surgeon: Marcos Arcos DO;  Location: [facility];  Service: Orthopedics    TONSILLECTOMY AND ADENOIDECTOMY           Family History:  Family History   Adopted: Yes   Problem Relation Age of Onset    Diabetes Mother     Heart disease Mother     Mental illness Mother     Depression Mother     Heart disease Brother     Breast cancer Maternal Aunt     Breast cancer Maternal Aunt     Breast cancer Maternal Aunt     Breast cancer Maternal Aunt     Breast cancer Maternal Aunt     No Known Problems Father     No Known Problems Sister     No Known Problems Daughter     Breast cancer Maternal Grandmother     No Known Problems Sister     No Known Problems Sister     No Known Problems Sister          Social History:    Social History     Substance and Sexual Activity   Alcohol Use Yes    Alcohol/week: 1.0 standard drink of alcohol    Types: 1 Glasses of wine per week    Comment: this is less than once/mo, more like 1-2 drinks every 3 mo     Social History     Substance and Sexual Activity   Drug Use Not Currently    Types: Marijuana    Comment: when i was in my teens     Social History     Tobacco Use   Smoking Status Never    Passive exposure: Never   Smokeless Tobacco Never       Home Medications:   Prior to Admission medications    Medication Sig Start Date End Date Taking? Authorizing Provider   acetaminophen (TYLENOL) 650 mg CR tablet Take 1 tablet (650 mg total) by mouth every 8 (eight) hours as needed for mild pain  Patient takes 500mg 10/29/24  Yes JERARDO Lopez   albuterol (2.5 mg/3 mL) 0.083 % nebulizer solution Take 2.5 mg by nebulization every 6 (six) hours as needed for wheezing   Yes Historical Provider, MD   albuterol (PROVENTIL HFA,VENTOLIN HFA) 90 mcg/act inhaler Inhale 2 puffs every 4 (four) hours as needed for wheezing 7/16/24  Yes Merced Alex PA-C   aspirin 81 mg chewable tablet Chew 81 mg daily   Yes Historical Provider, MD   atorvastatin (LIPITOR) 20 mg tablet Take 1 tablet (20 mg total) by mouth daily 1/8/24  Yes Merced Alex PA-C   butalbital-acetaminophen-caffeine (FIORICET,ESGIC) -40 mg per tablet Take 1 tablet by mouth 3 (three) times a day as needed for headaches 5/5/20  Yes Merced Alex PA-C   donepezil (ARICEPT) 10 mg tablet Take 1 tablet by mouth daily at bedtime. 7/31/24  Yes Merced Alex PA-C   FLUoxetine (PROzac) 20 mg capsule Take 1 capsule (20 mg total) by mouth daily 7/16/24  Yes Merced Alex PA-C   fluticasone (FLONASE) 50 mcg/act nasal spray INSTILL 1 SPRAY INTO EACH NOSTRIL ONCE DAILY 9/8/24  Yes Marcos Lara DO   furosemide (LASIX) 20 mg tablet Take 20 mg by mouth 2 (two) times a day Waiting for pharmacy to call her. Updated 9/23   Yes Historical Provider, MD   levocetirizine (XYZAL) 5 MG tablet Take 1 tablet (5 mg total) by mouth every evening 2/22/24  Yes Marcos Lara DO   Mirabegron ER 25 MG TB24 Take 50 mg by mouth in the morning 1/8/24  Yes JERARDO Hernandez   montelukast (SINGULAIR) 10 mg tablet Take 1 tablet (10 mg total) by mouth daily at bedtime 7/16/24  Yes Merced Alex PA-C   Nutritional Supplements (BOOST BREEZE PO) Take by mouth daily Nutritional drink   3 times a week. 9/6/17  Yes Historical Provider, MD   oxyCODONE (OxyCONTIN) 10 mg 12 hr tablet Take 10 mg by mouth as needed   Yes Historical Provider, MD   pantoprazole (PROTONIX) 40 mg tablet Take 1 tablet (40 mg total) by mouth daily 6/18/24  Yes Merced Alex PA-C   QUEtiapine  "(SEROquel) 25 mg tablet Take 1 tablet (25 mg total) by mouth daily at bedtime 6/18/24  Yes Merced Alex PA-C       Allergies:  Allergies   Allergen Reactions    Ibuprofen Nausea Only, Rash, Dizziness and Syncope    Morphine Other (See Comments) and Hallucinations     Intolerance    Latex Dermatitis       Review of Systems:  Review of Systems - History obtained from chart review and the patient  General ROS: positive for  - fatigue and change in activity tolerance   negative for - chills or fever  Psychological ROS: negative  Ophthalmic ROS: negative  ENT ROS: negative  Allergy and Immunology ROS: negative  Hematological and Lymphatic ROS: negative  Endocrine ROS: negative  Breast ROS: negative  Respiratory ROS: positive for - cough, orthopnea, and shortness of breath  negative for - hemoptysis, pleuritic pain, sputum changes, stridor, tachypnea, or wheezing  Cardiovascular ROS: positive for - dyspnea on exertion, edema, murmur, and orthopnea  negative for - chest pain, irregular heartbeat, or loss of consciousness  Gastrointestinal ROS: no abdominal pain, change in bowel habits, or black or bloody stools  Genito-Urinary ROS: no dysuria, trouble voiding, or hematuria  Musculoskeletal ROS: negative  Neurological ROS: no TIA or stroke symptoms  Dermatological ROS: negative    Vital Signs:     Vitals:    10/30/24 1444   BP: 127/60   BP Location: Left arm   Patient Position: Sitting   Cuff Size: Standard   Pulse: 74   Temp: 98.1 °F (36.7 °C)   TempSrc: Tympanic   SpO2: 94%   Weight: 75.6 kg (166 lb 9.6 oz)   Height: 4' 9\" (1.448 m)       Physical Exam:    General: Alert, oriented, obese, no acute distress  HEENT/NECK:  PERRLA.  No jugular venous distention.    Cardiac:Regular rate and rhythm, III/VI systolic murmur RUSB.  Carotids: 1+ pulses, cardiac murmur radiates to neck   Pulmonary:  Breath sounds clear bilaterally.  Abdomen:  Non-tender, Non-distended.  Positive bowel sounds.  Upper extremities: 2+ radial " pulses; brisk capillary refill  Lower extremities: Extremities warm/dry. PT/DP pulses 2+ bilaterally.  Trace edema B/L  Neuro: Alert and oriented X 3.  Sensation is grossly intact.  No focal deficits.  Musculoskeletal: MAEE, stable gait  Skin: Warm/Dry, without rashes or lesions.    Lab Results:     Lab Results   Component Value Date    WBC 5.20 10/01/2024    HGB 15.5 (H) 10/01/2024    HCT 47.9 (H) 10/01/2024    MCV 92 10/01/2024     10/01/2024     Lab Results   Component Value Date     12/02/2015    SODIUM 142 10/01/2024    K 4.3 10/01/2024     10/01/2024    CO2 30 10/01/2024    ANIONGAP 7 12/02/2015    AGAP 4 10/01/2024    BUN 16 10/01/2024    CREATININE 0.65 10/01/2024    GLUC 104 12/28/2022    GLUF 87 10/01/2024    CALCIUM 8.9 10/01/2024    AST 17 06/11/2024    ALT 16 06/11/2024    ALKPHOS 64 06/11/2024    PROT 6.9 12/02/2015    TP 6.4 06/11/2024    BILITOT 0.55 12/02/2015    TBILI 0.64 06/11/2024    EGFR 88 10/01/2024     Lab Results   Component Value Date    CHOLESTEROL 131 06/11/2024    CHOLESTEROL 143 06/23/2023    CHOLESTEROL 161 01/10/2022     Lab Results   Component Value Date    HDL 52 06/11/2024    HDL 52 06/23/2023    HDL 54 01/10/2022     Lab Results   Component Value Date    TRIG 117 06/11/2024    TRIG 116 06/23/2023    TRIG 139 01/10/2022     Lab Results   Component Value Date    NONHDLC 79 06/11/2024    NONHDLC 91 06/23/2023    NONHDLC 107 01/10/2022     Lab Results   Component Value Date    HGBA1C 5.3 06/11/2024     Lab Results   Component Value Date    TROPONINI <0.02 05/24/2021       Imaging Studies:     Echocardiogram: 7/9/24      Left Ventricle: Left ventricular cavity size is normal. Wall thickness is moderately increased. The left ventricular ejection fraction is 60%. Systolic function is normal. Wall motion is normal. Diastolic function is mildly abnormal, consistent with grade I (abnormal) relaxation.    Left Atrium: The atrium is mildly dilated.    Aortic Valve: The  leaflets are thickened. The leaflets are calcified. There is reduced mobility. There is mild regurgitation. There is moderate to severe stenosis. The aortic valve peak velocity is 3.9 m/s. The aortic valve peak gradient is 57 mmHg. The aortic valve mean gradient is 34 mmHg. The dimensionless velocity index is 0.26. The aortic valve area is 0.91 cm2.    Mitral Valve: There is annular calcification. There is mild regurgitation.    Tricuspid Valve: There is mild regurgitation.     Findings    Left Ventricle Left ventricular cavity size is normal. Wall thickness is moderately increased. The left ventricular ejection fraction is 60%. Systolic function is normal. Wall motion is normal. Diastolic function is mildly abnormal, consistent with grade I (abnormal) relaxation.   Right Ventricle Right ventricular cavity size is normal. Systolic function is normal. Normal tricuspid annular plane systolic excursion (TAPSE) > 1.7 cm.   Left Atrium The atrium is mildly dilated.   Right Atrium The atrium is normal in size.   Aortic Valve The aortic valve was not well visualized. The leaflets are thickened. The leaflets are calcified. There is reduced mobility. There is mild regurgitation. There is moderate to severe stenosis. The aortic valve peak velocity is 3.9 m/s. The aortic valve peak gradient is 57 mmHg. The aortic valve mean gradient is 34 mmHg. The dimensionless velocity index is 0.26. The aortic valve area is 0.91 cm2.   Mitral Valve There is annular calcification.  There is mild regurgitation. There is no evidence of stenosis.   Tricuspid Valve Tricuspid valve structure is normal. There is mild regurgitation. There is no evidence of stenosis. The estimated right ventricular systolic pressure is 29.00 mmHg.   Pulmonic Valve The pulmonic valve was not well visualized. There is trace regurgitation. There is no evidence of stenosis.   Ascending Aorta The aortic root is normal in size.   IVC/SVC The inferior vena cava size is  15.0 mm. The right atrial pressure is estimated at 8.0 mmHg. The inferior vena cava is normal in size.   Pericardium There is no significant pericardial effusion appreciated.     Left Ventricle Measurements    Function/Volumes   A4C EF 52 %         LVOT stroke volume 84         LVOT stroke volume index 50.3 ml/m2         Left ventricular stroke volume (2D) 46 mL         Dimensions   LVIDd 4 cm         LVIDS 2.5 cm         IVSd 1.4 cm         LVPWd 1.4 cm         LVOT area 3.14 cm2         FS 38         Diastolic Filling   LA Volume Index (BP) 29.3 mL/m2         E/A ratio 0.75         E wave deceleration time 265 ms         MV Peak E Johnnie 94 cm/s         MV Peak A Johnnie 1.25 m/s          Report Measurements   AV LVOT peak gradient 4 mmHg              Interventricular Septum Measurements    Shunt Ratio   LVOT peak VTI 26.39 cm         LVOT peak johnnie 1.03 m/s              Right Ventricle Measurements    Dimensions   RVID d 2.8 cm         Tricuspid annular plane systolic excursion 2.6 cm               Left Atrium Measurements    Dimensions   LA size 3.3 cm         LA length (A2C) 5 cm         Volumes   LA volume (BP) 49 mL         LA Volume Index (BP) 29.3 mL/m2               Right Atrium Measurements    Dimensions   RAA A4C 8.7 cm2               Atrial Septum Measurements    Shunt Ratio   LVOT peak VTI 26.39 cm         LVOT peak johnnie 1.03 m/s               Aortic Valve Measurements    Stenosis   Aortic valve peak velocity 3.9 m/s         LVOT peak johnnie 1.03 m/s         Ao VTI 91.55 cm         LVOT peak VTI 26.39 cm         AV mean gradient 34 mmHg         LVOT mn grad 2 mmHg         AV peak gradient 57 mmHg         AV LVOT peak gradient 4 mmHg         Regurgitation   AV peak gradient 92 mmHg         AV Deceleration Time 1,465 ms         AV regurgitation pressure 1/2 time 425 ms         Area/Dimensions   DVI 0.26         AV valve area 0.91 cm2         AV area by cont VTI 0.9 cm2         AV area peak johnnie 0.9 cm2         LVOT  diameter 2 cm         LVOT area 3.14 cm2               Mitral Valve Measurements    Stenosis   MV mean gradient antegrade 2 mmHg         MV peak gradient antegrade 4 mmHg         MV VTI 35.29 cm         MV valve area by continuity eq 2.35 cm2               Tricuspid Valve Measurements    RVSP Parameters   TR Peak Johnnie 2.3 m/s         Est. RA pres 8 mmHg         Triscuspid Valve Regurgitation Peak Gradient 21 mmHg         Right Ventricular Peak Systolic Pressure 29 mmHg               Aorta Measurements    Aortic Dimensions   Asc Ao 2.9 cm               IVC/SVC Measurements    IVC/SVC   IVC 15 mm         Est. RA pres 8 mmHg           Gated CTA of the chest/abdomen/pelvis: 10/1/24    VASCULAR FINDINGS:     Central pulmonary artery is not abnormally enlarged.  Right atrium and right ventricle are normal in size.  Left atrial cavity is not abnormally dilated.  Left ventricular myocardium is normal.  Mild mitral annular calcification.     Coronary artery origins are in typical position.  Mild coronary artery calcification.     Annulus, LVOT and aorta analysis:     Typical trileaflet aortic valve morphology.        Optimal CT aortic valve plane angles:  3 cusp view: JON 3, cranial 7  Cusp overlap view JON 31, cranial 29     Measurements:     Annulus diameter (max x min): 27 x 22 mm.  Annulus Area 464 mm2.  Annulus Perimeter 77 mm.     Annulus to left main coronary ostium: 11 mm.  Annulus to right main coronary ostium: 16 mm.  Sinus of Valsalva height: 19 mm.     Aortic sinus of Valsalva diameter (max x min): 31 x 29 mm.  LVOT minimal diameter: 21 mm.     Sino-tubular junction minimal diameter: 27 mm.  Ascending thoracic aorta maximal diameter: 32 mm.     Valve Calcification:     Aortic valve calcium score is 465.  Volume of 1433 mm3.     Thoracic Aorta:     Porcelain aorta = no.  Aortic root and ascending thoracic aorta free of significant calcification beyond the sino-tubular junction.  Aortic arch is normal in course and  caliber with insignificant calcification.  Descending thoracic aorta is normal in course, caliber, and contour.     Abdominal aorta and pelvic artery measurements:     Abdominal aorta:  Minimal diameter above aortic bifurcation: 16 mm  Calcification: none  Tortuosity: none     Right iliofemoral segment:  Minimal diameter: 6 mm  Calcification: none  Tortuosity: none     Left iliofemoral segment:  Minimal diameter: 7 mm  Calcification: none  Tortuosity: none    Cardiac catheterization: 10/30/24    Normal coronary angiography.     Carotid artery ultrasound: 10/1/24    RIGHT:  There is <50% stenosis noted in the internal carotid artery. Plaque is  heterogenous and smooth.  Vertebral artery flow is antegrade. There is no significant subclavian artery  disease.  LEFT:  There is <50% stenosis noted in the internal carotid artery. Plaque is  heterogenous and irregular.  Vertebral artery flow is antegrade. There is no significant subclavian artery  disease.      Results Review Statement: I personally reviewed the following image studies in PACS and associated radiology reports: cardiac cath, echo, carotid duplex, CTA c/a/p. My interpretation of the radiology images/reports is: AS.    TAVR evaluation Assessment:     5 Meter Walk Test:      Attempt 1: 12 sec   Attempt 2: 26 sec   Attempt 3: 18 sec    STS Risk Score: 3.7%    Aortic Stenosis Stage: D1    [facility]: III    [facility]Q-12 completed      Impression/Plan:    Deyanira Stokes has symptomatic severe aortic stenosis. They have undergone testing for transcatheter aortic valve replacement.  The results of these studies have been interpreted by the multidisciplinary TAVR team who have determined the patient to be Intermediate risk for open aortic valve replacement based on other pre-existing comobidities not reflected in the STS risk score.     The risks, benefits, and alternatives to TAVR were discussed in detail with the patient today. They understand and wish to proceed with  transfemoral transcatheter aortic valve replacement.  Informed consent was obtained and a date for the intervention has been set.    Pre-op instructions reviewed with patient and daughter    Deyanira Stokes was comfortable with our recommendations, and their questions were answered to their satisfaction.       SIGNATURE: JERARDO Galvan  DATE: October 30, 2024  TIME: 3:25 PM

## 2024-10-31 LAB — MRSA NOSE QL CULT: NORMAL

## 2024-11-04 ENCOUNTER — ANESTHESIA EVENT (OUTPATIENT)
Dept: PERIOP | Facility: HOSPITAL | Age: 74
DRG: 267 | End: 2024-11-04
Payer: COMMERCIAL

## 2024-11-04 PROBLEM — Z96.652 S/P TOTAL KNEE ARTHROPLASTY, LEFT: Status: RESOLVED | Noted: 2021-05-24 | Resolved: 2024-11-04

## 2024-11-04 PROBLEM — G56.03 BILATERAL CARPAL TUNNEL SYNDROME: Status: RESOLVED | Noted: 2017-06-19 | Resolved: 2024-11-04

## 2024-11-04 PROBLEM — N20.0 NEPHROLITHIASIS: Status: RESOLVED | Noted: 2020-09-10 | Resolved: 2024-11-04

## 2024-11-04 RX ORDER — PHENYLEPHRINE HCL IN 0.9% NACL 1 MG/10 ML
200-2000 SYRINGE (ML) INTRAVENOUS ONCE
Status: CANCELLED | OUTPATIENT
Start: 2024-11-05

## 2024-11-05 ENCOUNTER — APPOINTMENT (INPATIENT)
Dept: RADIOLOGY | Facility: HOSPITAL | Age: 74
DRG: 267 | End: 2024-11-05
Payer: COMMERCIAL

## 2024-11-05 ENCOUNTER — APPOINTMENT (OUTPATIENT)
Dept: NON INVASIVE DIAGNOSTICS | Facility: HOSPITAL | Age: 74
DRG: 267 | End: 2024-11-05
Payer: COMMERCIAL

## 2024-11-05 ENCOUNTER — HOSPITAL ENCOUNTER (INPATIENT)
Facility: HOSPITAL | Age: 74
LOS: 3 days | Discharge: HOME HEALTH | DRG: 267 | End: 2024-11-08
Attending: THORACIC SURGERY (CARDIOTHORACIC VASCULAR SURGERY) | Admitting: THORACIC SURGERY (CARDIOTHORACIC VASCULAR SURGERY)
Payer: COMMERCIAL

## 2024-11-05 ENCOUNTER — ANESTHESIA (OUTPATIENT)
Dept: PERIOP | Facility: HOSPITAL | Age: 74
DRG: 267 | End: 2024-11-05
Payer: COMMERCIAL

## 2024-11-05 DIAGNOSIS — I35.0 NONRHEUMATIC AORTIC VALVE STENOSIS: Primary | ICD-10-CM

## 2024-11-05 DIAGNOSIS — I35.9 NONRHEUMATIC AORTIC VALVE DISORDER: ICD-10-CM

## 2024-11-05 DIAGNOSIS — Z95.2 S/P TAVR (TRANSCATHETER AORTIC VALVE REPLACEMENT): Primary | ICD-10-CM

## 2024-11-05 DIAGNOSIS — I44.7 LBBB (LEFT BUNDLE BRANCH BLOCK): ICD-10-CM

## 2024-11-05 DIAGNOSIS — Z95.2 S/P TAVR (TRANSCATHETER AORTIC VALVE REPLACEMENT): ICD-10-CM

## 2024-11-05 DIAGNOSIS — I35.0 NONRHEUMATIC AORTIC VALVE STENOSIS: ICD-10-CM

## 2024-11-05 PROBLEM — E83.51 HYPOCALCEMIA: Status: ACTIVE | Noted: 2024-11-05

## 2024-11-05 PROBLEM — D69.6 THROMBOCYTOPENIA (HCC): Status: ACTIVE | Noted: 2024-11-05

## 2024-11-05 LAB
ANION GAP SERPL CALCULATED.3IONS-SCNC: 4 MMOL/L (ref 4–13)
ATRIAL RATE: 84 BPM
ATRIAL RATE: 87 BPM
BASE EXCESS BLDA CALC-SCNC: 0 MMOL/L (ref -2–3)
BASE EXCESS BLDA CALC-SCNC: 1 MMOL/L (ref -2–3)
BASE EXCESS BLDA CALC-SCNC: 1 MMOL/L (ref -2–3)
BUN SERPL-MCNC: 12 MG/DL (ref 5–25)
CA-I BLD-SCNC: 1.1 MMOL/L (ref 1.12–1.32)
CA-I BLD-SCNC: 1.12 MMOL/L (ref 1.12–1.32)
CA-I BLD-SCNC: 1.23 MMOL/L (ref 1.12–1.32)
CALCIUM SERPL-MCNC: 8.3 MG/DL (ref 8.4–10.2)
CHLORIDE SERPL-SCNC: 106 MMOL/L (ref 96–108)
CO2 SERPL-SCNC: 29 MMOL/L (ref 21–32)
CREAT SERPL-MCNC: 0.6 MG/DL (ref 0.6–1.3)
GFR SERPL CREATININE-BSD FRML MDRD: 91 ML/MIN/1.73SQ M
GLUCOSE SERPL-MCNC: 117 MG/DL (ref 65–140)
GLUCOSE SERPL-MCNC: 120 MG/DL (ref 65–140)
GLUCOSE SERPL-MCNC: 126 MG/DL (ref 65–140)
GLUCOSE SERPL-MCNC: 132 MG/DL (ref 65–140)
GLUCOSE SERPL-MCNC: 138 MG/DL (ref 65–140)
GLUCOSE SERPL-MCNC: 144 MG/DL (ref 65–140)
GLUCOSE SERPL-MCNC: 98 MG/DL (ref 65–140)
HCO3 BLDA-SCNC: 25.6 MMOL/L (ref 22–28)
HCO3 BLDA-SCNC: 26.7 MMOL/L (ref 22–28)
HCO3 BLDA-SCNC: 29.4 MMOL/L (ref 24–30)
HCT VFR BLD AUTO: 41.8 % (ref 34.8–46.1)
HCT VFR BLD CALC: 35 % (ref 34.8–46.1)
HCT VFR BLD CALC: 36 % (ref 34.8–46.1)
HCT VFR BLD CALC: 41 % (ref 34.8–46.1)
HGB BLD-MCNC: 14.8 G/DL (ref 11.5–15.4)
HGB BLDA-MCNC: 11.9 G/DL (ref 11.5–15.4)
HGB BLDA-MCNC: 12.2 G/DL (ref 11.5–15.4)
HGB BLDA-MCNC: 13.9 G/DL (ref 11.5–15.4)
KCT BLD-ACNC: 116 SEC (ref 89–137)
KCT BLD-ACNC: 129 SEC (ref 89–137)
KCT BLD-ACNC: 261 SEC (ref 89–137)
P AXIS: 59 DEGREES
P AXIS: 63 DEGREES
PCO2 BLD: 27 MMOL/L (ref 21–32)
PCO2 BLD: 28 MMOL/L (ref 21–32)
PCO2 BLD: 31 MMOL/L (ref 21–32)
PCO2 BLD: 44.2 MM HG (ref 36–44)
PCO2 BLD: 46.4 MM HG (ref 36–44)
PCO2 BLD: 65.8 MM HG (ref 42–50)
PH BLD: 7.26 [PH] (ref 7.3–7.4)
PH BLD: 7.37 [PH] (ref 7.35–7.45)
PH BLD: 7.37 [PH] (ref 7.35–7.45)
PLATELET # BLD AUTO: 148 THOUSANDS/UL (ref 149–390)
PMV BLD AUTO: 9.7 FL (ref 8.9–12.7)
PO2 BLD: 216 MM HG (ref 75–129)
PO2 BLD: 376 MM HG (ref 75–129)
PO2 BLD: 54 MM HG (ref 35–45)
POTASSIUM BLD-SCNC: 3.3 MMOL/L (ref 3.5–5.3)
POTASSIUM BLD-SCNC: 3.4 MMOL/L (ref 3.5–5.3)
POTASSIUM BLD-SCNC: 3.7 MMOL/L (ref 3.5–5.3)
POTASSIUM SERPL-SCNC: 3.7 MMOL/L (ref 3.5–5.3)
PR INTERVAL: 198 MS
PR INTERVAL: 200 MS
QRS AXIS: 13 DEGREES
QRS AXIS: 21 DEGREES
QRSD INTERVAL: 120 MS
QRSD INTERVAL: 98 MS
QT INTERVAL: 418 MS
QT INTERVAL: 424 MS
QTC INTERVAL: 493 MS
QTC INTERVAL: 510 MS
SAO2 % BLD FROM PO2: 100 % (ref 60–85)
SAO2 % BLD FROM PO2: 100 % (ref 60–85)
SAO2 % BLD FROM PO2: 81 % (ref 60–85)
SODIUM BLD-SCNC: 141 MMOL/L (ref 136–145)
SODIUM BLD-SCNC: 141 MMOL/L (ref 136–145)
SODIUM BLD-SCNC: 142 MMOL/L (ref 136–145)
SODIUM SERPL-SCNC: 139 MMOL/L (ref 135–147)
SPECIMEN SOURCE: ABNORMAL
SPECIMEN SOURCE: NORMAL
SPECIMEN SOURCE: NORMAL
T WAVE AXIS: 177 DEGREES
T WAVE AXIS: 83 DEGREES
VENTRICULAR RATE: 84 BPM
VENTRICULAR RATE: 87 BPM

## 2024-11-05 PROCEDURE — NC001 PR NO CHARGE: Performed by: PHYSICIAN ASSISTANT

## 2024-11-05 PROCEDURE — 84132 ASSAY OF SERUM POTASSIUM: CPT

## 2024-11-05 PROCEDURE — 80048 BASIC METABOLIC PNL TOTAL CA: CPT | Performed by: PHYSICIAN ASSISTANT

## 2024-11-05 PROCEDURE — C1894 INTRO/SHEATH, NON-LASER: HCPCS | Performed by: THORACIC SURGERY (CARDIOTHORACIC VASCULAR SURGERY)

## 2024-11-05 PROCEDURE — C1769 GUIDE WIRE: HCPCS | Performed by: THORACIC SURGERY (CARDIOTHORACIC VASCULAR SURGERY)

## 2024-11-05 PROCEDURE — 82947 ASSAY GLUCOSE BLOOD QUANT: CPT

## 2024-11-05 PROCEDURE — C1781 MESH (IMPLANTABLE): HCPCS | Performed by: THORACIC SURGERY (CARDIOTHORACIC VASCULAR SURGERY)

## 2024-11-05 PROCEDURE — 93010 ELECTROCARDIOGRAM REPORT: CPT | Performed by: INTERNAL MEDICINE

## 2024-11-05 PROCEDURE — 93005 ELECTROCARDIOGRAM TRACING: CPT

## 2024-11-05 PROCEDURE — 02RF38Z REPLACEMENT OF AORTIC VALVE WITH ZOOPLASTIC TISSUE, PERCUTANEOUS APPROACH: ICD-10-PCS | Performed by: THORACIC SURGERY (CARDIOTHORACIC VASCULAR SURGERY)

## 2024-11-05 PROCEDURE — 86920 COMPATIBILITY TEST SPIN: CPT

## 2024-11-05 PROCEDURE — 71045 X-RAY EXAM CHEST 1 VIEW: CPT

## 2024-11-05 PROCEDURE — 02H633Z INSERTION OF INFUSION DEVICE INTO RIGHT ATRIUM, PERCUTANEOUS APPROACH: ICD-10-PCS | Performed by: GENERAL PRACTICE

## 2024-11-05 PROCEDURE — 85347 COAGULATION TIME ACTIVATED: CPT

## 2024-11-05 PROCEDURE — 85014 HEMATOCRIT: CPT

## 2024-11-05 PROCEDURE — 85018 HEMOGLOBIN: CPT | Performed by: PHYSICIAN ASSISTANT

## 2024-11-05 PROCEDURE — 82330 ASSAY OF CALCIUM: CPT

## 2024-11-05 PROCEDURE — 85049 AUTOMATED PLATELET COUNT: CPT | Performed by: PHYSICIAN ASSISTANT

## 2024-11-05 PROCEDURE — 94664 DEMO&/EVAL PT USE INHALER: CPT

## 2024-11-05 PROCEDURE — 85014 HEMATOCRIT: CPT | Performed by: PHYSICIAN ASSISTANT

## 2024-11-05 PROCEDURE — C1751 CATH, INF, PER/CENT/MIDLINE: HCPCS | Performed by: THORACIC SURGERY (CARDIOTHORACIC VASCULAR SURGERY)

## 2024-11-05 PROCEDURE — 33361 REPLACE AORTIC VALVE PERQ: CPT | Performed by: THORACIC SURGERY (CARDIOTHORACIC VASCULAR SURGERY)

## 2024-11-05 PROCEDURE — 84295 ASSAY OF SERUM SODIUM: CPT

## 2024-11-05 PROCEDURE — 82803 BLOOD GASES ANY COMBINATION: CPT

## 2024-11-05 PROCEDURE — 33361 REPLACE AORTIC VALVE PERQ: CPT | Performed by: INTERNAL MEDICINE

## 2024-11-05 PROCEDURE — 82948 REAGENT STRIP/BLOOD GLUCOSE: CPT

## 2024-11-05 PROCEDURE — C1760 CLOSURE DEV, VASC: HCPCS | Performed by: THORACIC SURGERY (CARDIOTHORACIC VASCULAR SURGERY)

## 2024-11-05 PROCEDURE — 93355 ECHO TRANSESOPHAGEAL (TEE): CPT

## 2024-11-05 DEVICE — SAPIEN 3 ULTRA RESILIA TRANSCATHETER HEART VALVE, 26MM
Type: IMPLANTABLE DEVICE | Site: HEART | Status: FUNCTIONAL
Brand: SAPIEN 3 ULTRA RESILIA

## 2024-11-05 DEVICE — PERCLOSE™ PROSTYLE™ SUTURE-MEDIATED CLOSURE AND REPAIR SYSTEM
Type: IMPLANTABLE DEVICE | Site: GROIN | Status: FUNCTIONAL
Brand: PERCLOSE™ PROSTYLE™

## 2024-11-05 RX ORDER — CHLORHEXIDINE GLUCONATE ORAL RINSE 1.2 MG/ML
15 SOLUTION DENTAL 2 TIMES DAILY
Status: DISCONTINUED | OUTPATIENT
Start: 2024-11-05 | End: 2024-11-08 | Stop reason: HOSPADM

## 2024-11-05 RX ORDER — POTASSIUM CHLORIDE 29.8 MG/ML
40 INJECTION INTRAVENOUS ONCE
Status: COMPLETED | OUTPATIENT
Start: 2024-11-05 | End: 2024-11-05

## 2024-11-05 RX ORDER — PANTOPRAZOLE SODIUM 40 MG/1
40 TABLET, DELAYED RELEASE ORAL
Status: DISCONTINUED | OUTPATIENT
Start: 2024-11-05 | End: 2024-11-08 | Stop reason: HOSPADM

## 2024-11-05 RX ORDER — FENTANYL CITRATE 50 UG/ML
INJECTION, SOLUTION INTRAMUSCULAR; INTRAVENOUS AS NEEDED
Status: DISCONTINUED | OUTPATIENT
Start: 2024-11-05 | End: 2024-11-05

## 2024-11-05 RX ORDER — ONDANSETRON 2 MG/ML
4 INJECTION INTRAMUSCULAR; INTRAVENOUS EVERY 6 HOURS PRN
Status: DISCONTINUED | OUTPATIENT
Start: 2024-11-05 | End: 2024-11-08 | Stop reason: HOSPADM

## 2024-11-05 RX ORDER — EPHEDRINE SULFATE 50 MG/ML
INJECTION INTRAVENOUS AS NEEDED
Status: DISCONTINUED | OUTPATIENT
Start: 2024-11-05 | End: 2024-11-05

## 2024-11-05 RX ORDER — BISACODYL 10 MG
10 SUPPOSITORY, RECTAL RECTAL DAILY PRN
Status: DISCONTINUED | OUTPATIENT
Start: 2024-11-05 | End: 2024-11-08 | Stop reason: HOSPADM

## 2024-11-05 RX ORDER — CEFAZOLIN SODIUM 2 G/50ML
2000 SOLUTION INTRAVENOUS EVERY 8 HOURS
Status: COMPLETED | OUTPATIENT
Start: 2024-11-05 | End: 2024-11-06

## 2024-11-05 RX ORDER — SODIUM CHLORIDE, SODIUM LACTATE, POTASSIUM CHLORIDE, CALCIUM CHLORIDE 600; 310; 30; 20 MG/100ML; MG/100ML; MG/100ML; MG/100ML
INJECTION, SOLUTION INTRAVENOUS CONTINUOUS PRN
Status: DISCONTINUED | OUTPATIENT
Start: 2024-11-05 | End: 2024-11-05

## 2024-11-05 RX ORDER — FENTANYL CITRATE/PF 50 MCG/ML
50 SYRINGE (ML) INJECTION
Status: DISCONTINUED | OUTPATIENT
Start: 2024-11-05 | End: 2024-11-05 | Stop reason: HOSPADM

## 2024-11-05 RX ORDER — QUETIAPINE FUMARATE 25 MG/1
25 TABLET, FILM COATED ORAL
Status: DISCONTINUED | OUTPATIENT
Start: 2024-11-05 | End: 2024-11-05

## 2024-11-05 RX ORDER — ONDANSETRON 2 MG/ML
4 INJECTION INTRAMUSCULAR; INTRAVENOUS ONCE AS NEEDED
Status: COMPLETED | OUTPATIENT
Start: 2024-11-05 | End: 2024-11-05

## 2024-11-05 RX ORDER — ACETAMINOPHEN 325 MG/1
650 TABLET ORAL EVERY 4 HOURS PRN
Status: DISCONTINUED | OUTPATIENT
Start: 2024-11-05 | End: 2024-11-08 | Stop reason: HOSPADM

## 2024-11-05 RX ORDER — INSULIN LISPRO 100 [IU]/ML
1-5 INJECTION, SOLUTION INTRAVENOUS; SUBCUTANEOUS
Status: DISCONTINUED | OUTPATIENT
Start: 2024-11-05 | End: 2024-11-08 | Stop reason: HOSPADM

## 2024-11-05 RX ORDER — FUROSEMIDE 20 MG/1
20 TABLET ORAL 2 TIMES DAILY
Status: DISCONTINUED | OUTPATIENT
Start: 2024-11-05 | End: 2024-11-08 | Stop reason: HOSPADM

## 2024-11-05 RX ORDER — DONEPEZIL HYDROCHLORIDE 10 MG/1
10 TABLET, FILM COATED ORAL
Status: DISCONTINUED | OUTPATIENT
Start: 2024-11-05 | End: 2024-11-05

## 2024-11-05 RX ORDER — HEPARIN SODIUM 1000 [USP'U]/ML
INJECTION, SOLUTION INTRAVENOUS; SUBCUTANEOUS AS NEEDED
Status: DISCONTINUED | OUTPATIENT
Start: 2024-11-05 | End: 2024-11-05

## 2024-11-05 RX ORDER — FLUTICASONE PROPIONATE 50 MCG
1 SPRAY, SUSPENSION (ML) NASAL DAILY
Status: DISCONTINUED | OUTPATIENT
Start: 2024-11-05 | End: 2024-11-08 | Stop reason: HOSPADM

## 2024-11-05 RX ORDER — OXYBUTYNIN CHLORIDE 10 MG/1
10 TABLET, EXTENDED RELEASE ORAL DAILY
Status: DISCONTINUED | OUTPATIENT
Start: 2024-11-05 | End: 2024-11-06

## 2024-11-05 RX ORDER — CLOPIDOGREL BISULFATE 75 MG/1
75 TABLET ORAL DAILY
Status: DISCONTINUED | OUTPATIENT
Start: 2024-11-05 | End: 2024-11-08 | Stop reason: HOSPADM

## 2024-11-05 RX ORDER — HEPARIN SODIUM 1000 [USP'U]/ML
400 INJECTION, SOLUTION INTRAVENOUS; SUBCUTANEOUS ONCE
Status: DISCONTINUED | OUTPATIENT
Start: 2024-11-05 | End: 2024-11-05

## 2024-11-05 RX ORDER — PROTAMINE SULFATE 10 MG/ML
INJECTION, SOLUTION INTRAVENOUS AS NEEDED
Status: DISCONTINUED | OUTPATIENT
Start: 2024-11-05 | End: 2024-11-05

## 2024-11-05 RX ORDER — SODIUM CHLORIDE, SODIUM GLUCONATE, SODIUM ACETATE, POTASSIUM CHLORIDE, MAGNESIUM CHLORIDE, SODIUM PHOSPHATE, DIBASIC, AND POTASSIUM PHOSPHATE .53; .5; .37; .037; .03; .012; .00082 G/100ML; G/100ML; G/100ML; G/100ML; G/100ML; G/100ML; G/100ML
INJECTION, SOLUTION INTRAVENOUS AS NEEDED
Status: DISCONTINUED | OUTPATIENT
Start: 2024-11-05 | End: 2024-11-05

## 2024-11-05 RX ORDER — HEPARIN SODIUM 5000 [USP'U]/ML
5000 INJECTION, SOLUTION INTRAVENOUS; SUBCUTANEOUS EVERY 8 HOURS SCHEDULED
Status: DISCONTINUED | OUTPATIENT
Start: 2024-11-06 | End: 2024-11-08 | Stop reason: HOSPADM

## 2024-11-05 RX ORDER — CALCIUM GLUCONATE 20 MG/ML
2 INJECTION, SOLUTION INTRAVENOUS ONCE AS NEEDED
Status: DISCONTINUED | OUTPATIENT
Start: 2024-11-05 | End: 2024-11-06

## 2024-11-05 RX ORDER — HYDRALAZINE HYDROCHLORIDE 20 MG/ML
10 INJECTION INTRAMUSCULAR; INTRAVENOUS EVERY 6 HOURS PRN
Status: DISCONTINUED | OUTPATIENT
Start: 2024-11-05 | End: 2024-11-08 | Stop reason: HOSPADM

## 2024-11-05 RX ORDER — HYDROMORPHONE HCL/PF 1 MG/ML
0.5 SYRINGE (ML) INJECTION
Status: DISCONTINUED | OUTPATIENT
Start: 2024-11-05 | End: 2024-11-05 | Stop reason: HOSPADM

## 2024-11-05 RX ORDER — ALBUTEROL SULFATE 0.83 MG/ML
2.5 SOLUTION RESPIRATORY (INHALATION) EVERY 6 HOURS PRN
Status: DISCONTINUED | OUTPATIENT
Start: 2024-11-05 | End: 2024-11-06

## 2024-11-05 RX ORDER — ALBUTEROL SULFATE 90 UG/1
2 INHALANT RESPIRATORY (INHALATION) EVERY 4 HOURS PRN
Status: DISCONTINUED | OUTPATIENT
Start: 2024-11-05 | End: 2024-11-08 | Stop reason: HOSPADM

## 2024-11-05 RX ORDER — PANTOPRAZOLE SODIUM 40 MG/1
40 TABLET, DELAYED RELEASE ORAL DAILY
Status: DISCONTINUED | OUTPATIENT
Start: 2024-11-05 | End: 2024-11-05

## 2024-11-05 RX ORDER — CHLORHEXIDINE GLUCONATE ORAL RINSE 1.2 MG/ML
15 SOLUTION DENTAL ONCE
Status: COMPLETED | OUTPATIENT
Start: 2024-11-05 | End: 2024-11-05

## 2024-11-05 RX ORDER — LIDOCAINE HYDROCHLORIDE 20 MG/ML
INJECTION, SOLUTION EPIDURAL; INFILTRATION; INTRACAUDAL; PERINEURAL AS NEEDED
Status: DISCONTINUED | OUTPATIENT
Start: 2024-11-05 | End: 2024-11-05

## 2024-11-05 RX ORDER — ONDANSETRON 2 MG/ML
INJECTION INTRAMUSCULAR; INTRAVENOUS AS NEEDED
Status: DISCONTINUED | OUTPATIENT
Start: 2024-11-05 | End: 2024-11-05

## 2024-11-05 RX ORDER — ASPIRIN 81 MG/1
81 TABLET, CHEWABLE ORAL DAILY
Status: DISCONTINUED | OUTPATIENT
Start: 2024-11-05 | End: 2024-11-08 | Stop reason: HOSPADM

## 2024-11-05 RX ORDER — ATORVASTATIN CALCIUM 20 MG/1
20 TABLET, FILM COATED ORAL
Status: DISCONTINUED | OUTPATIENT
Start: 2024-11-05 | End: 2024-11-08 | Stop reason: HOSPADM

## 2024-11-05 RX ORDER — CEFAZOLIN SODIUM 2 G/50ML
2000 SOLUTION INTRAVENOUS ONCE
Status: COMPLETED | OUTPATIENT
Start: 2024-11-05 | End: 2024-11-05

## 2024-11-05 RX ORDER — ROCURONIUM BROMIDE 10 MG/ML
INJECTION, SOLUTION INTRAVENOUS AS NEEDED
Status: DISCONTINUED | OUTPATIENT
Start: 2024-11-05 | End: 2024-11-05

## 2024-11-05 RX ORDER — MONTELUKAST SODIUM 10 MG/1
10 TABLET ORAL
Status: DISCONTINUED | OUTPATIENT
Start: 2024-11-05 | End: 2024-11-08 | Stop reason: HOSPADM

## 2024-11-05 RX ORDER — PROMETHAZINE HYDROCHLORIDE 25 MG/ML
12.5 INJECTION, SOLUTION INTRAMUSCULAR; INTRAVENOUS ONCE AS NEEDED
Status: DISCONTINUED | OUTPATIENT
Start: 2024-11-05 | End: 2024-11-05 | Stop reason: HOSPADM

## 2024-11-05 RX ORDER — POLYETHYLENE GLYCOL 3350 17 G/17G
17 POWDER, FOR SOLUTION ORAL DAILY
Status: DISCONTINUED | OUTPATIENT
Start: 2024-11-05 | End: 2024-11-08 | Stop reason: HOSPADM

## 2024-11-05 RX ORDER — LORATADINE 10 MG/1
10 TABLET ORAL DAILY
Status: DISCONTINUED | OUTPATIENT
Start: 2024-11-05 | End: 2024-11-05

## 2024-11-05 RX ADMIN — CHLORHEXIDINE GLUCONATE 0.12% ORAL RINSE 15 ML: 1.2 LIQUID ORAL at 07:51

## 2024-11-05 RX ADMIN — EPHEDRINE SULFATE 10 MG: 50 INJECTION, SOLUTION INTRAVENOUS at 09:24

## 2024-11-05 RX ADMIN — SODIUM CHLORIDE, SODIUM GLUCONATE, SODIUM ACETATE, POTASSIUM CHLORIDE, MAGNESIUM CHLORIDE, SODIUM PHOSPHATE, DIBASIC, AND POTASSIUM PHOSPHATE 500 ML: .53; .5; .37; .037; .03; .012; .00082 INJECTION, SOLUTION INTRAVENOUS at 08:34

## 2024-11-05 RX ADMIN — LIDOCAINE HYDROCHLORIDE 80 MG: 20 INJECTION, SOLUTION EPIDURAL; INFILTRATION; INTRACAUDAL; PERINEURAL at 09:06

## 2024-11-05 RX ADMIN — Medication 1 EACH: at 08:34

## 2024-11-05 RX ADMIN — ATORVASTATIN CALCIUM 20 MG: 20 TABLET, FILM COATED ORAL at 17:08

## 2024-11-05 RX ADMIN — SODIUM CHLORIDE, SODIUM LACTATE, POTASSIUM CHLORIDE, AND CALCIUM CHLORIDE: .6; .31; .03; .02 INJECTION, SOLUTION INTRAVENOUS at 08:58

## 2024-11-05 RX ADMIN — CEFAZOLIN SODIUM 2000 MG: 2 SOLUTION INTRAVENOUS at 17:07

## 2024-11-05 RX ADMIN — NICARDIPINE HYDROCHLORIDE 500 MCG: 2.5 INJECTION, SOLUTION INTRAVENOUS at 09:58

## 2024-11-05 RX ADMIN — NICARDIPINE HYDROCHLORIDE 400 MCG: 2.5 INJECTION, SOLUTION INTRAVENOUS at 09:47

## 2024-11-05 RX ADMIN — FLUOXETINE HYDROCHLORIDE 20 MG: 20 CAPSULE ORAL at 12:20

## 2024-11-05 RX ADMIN — NICARDIPINE HYDROCHLORIDE 5 MG/HR: 2.5 INJECTION, SOLUTION INTRAVENOUS at 09:47

## 2024-11-05 RX ADMIN — FENTANYL CITRATE 100 MCG: 50 INJECTION INTRAMUSCULAR; INTRAVENOUS at 09:06

## 2024-11-05 RX ADMIN — FUROSEMIDE 20 MG: 20 TABLET ORAL at 12:20

## 2024-11-05 RX ADMIN — SUGAMMADEX 200 MG: 100 INJECTION, SOLUTION INTRAVENOUS at 09:57

## 2024-11-05 RX ADMIN — HEPARIN SODIUM 10000 UNITS: 1000 INJECTION INTRAVENOUS; SUBCUTANEOUS at 09:38

## 2024-11-05 RX ADMIN — MONTELUKAST 10 MG: 10 TABLET, FILM COATED ORAL at 21:02

## 2024-11-05 RX ADMIN — SUGAMMADEX 200 MG: 100 INJECTION, SOLUTION INTRAVENOUS at 09:50

## 2024-11-05 RX ADMIN — LORATADINE 10 MG: 10 TABLET ORAL at 12:20

## 2024-11-05 RX ADMIN — CLOPIDOGREL BISULFATE 75 MG: 75 TABLET ORAL at 12:24

## 2024-11-05 RX ADMIN — ONDANSETRON 4 MG: 2 INJECTION INTRAMUSCULAR; INTRAVENOUS at 10:33

## 2024-11-05 RX ADMIN — CEFAZOLIN SODIUM 2000 MG: 2 SOLUTION INTRAVENOUS at 09:14

## 2024-11-05 RX ADMIN — ONDANSETRON 4 MG: 2 INJECTION INTRAMUSCULAR; INTRAVENOUS at 09:06

## 2024-11-05 RX ADMIN — ONDANSETRON 4 MG: 2 INJECTION INTRAMUSCULAR; INTRAVENOUS at 23:25

## 2024-11-05 RX ADMIN — PROTAMINE SULFATE 50 MG: 10 INJECTION, SOLUTION INTRAVENOUS at 09:49

## 2024-11-05 RX ADMIN — MUPIROCIN 1 APPLICATION: 20 OINTMENT TOPICAL at 07:51

## 2024-11-05 RX ADMIN — PANTOPRAZOLE SODIUM 40 MG: 40 TABLET, DELAYED RELEASE ORAL at 12:20

## 2024-11-05 RX ADMIN — POTASSIUM CHLORIDE 40 MEQ: 29.8 INJECTION, SOLUTION INTRAVENOUS at 12:19

## 2024-11-05 RX ADMIN — CHLORHEXIDINE GLUCONATE 0.12% ORAL RINSE 15 ML: 1.2 LIQUID ORAL at 17:07

## 2024-11-05 RX ADMIN — MUPIROCIN 1 APPLICATION: 20 OINTMENT TOPICAL at 21:02

## 2024-11-05 RX ADMIN — FUROSEMIDE 20 MG: 20 TABLET ORAL at 17:08

## 2024-11-05 RX ADMIN — NICARDIPINE HYDROCHLORIDE 200 MCG: 2.5 INJECTION, SOLUTION INTRAVENOUS at 09:55

## 2024-11-05 RX ADMIN — ROCURONIUM BROMIDE 50 MG: 10 INJECTION, SOLUTION INTRAVENOUS at 09:06

## 2024-11-05 RX ADMIN — ASPIRIN 81 MG CHEWABLE TABLET 81 MG: 81 TABLET CHEWABLE at 12:19

## 2024-11-05 NOTE — Clinical Note
The PACER GENERATOR MERLY XT DR YEIMY CHACON - [id number] device was inserted. The leads were placed into the connector and visually verified to be in correct position. Injury current obtained.

## 2024-11-05 NOTE — CONSULTS
Post TAVR Treatment Plan - Electrophysiology  Deyanira Stokes 74 y.o. female MRN: [id number]  Unit/Bed#: PPHP-306-01 Encounter: [id number]    Electrophysiology was consulted concerning EKG changes after undergoing TAVR today. Patient's pre, immediately post, and three hour EKGs were reviewed.    Plan:  FORMAL NOT NEEDED: After reviewing EKG's, patien'ts QRS has narrowed back from 200 msec to 98 msec. Therefore, formal EP consult is not needed and we will not formally see.     Of noted QTC was prolonged at > 500 ms. She is on multiple Qtc prolonging meds. Will hold overnight and order EKG for the AM and the QTC can be reassessed at that time. Will hold denepezil 10 mg, seroquel 25 mg, fluoxetine 20 mg overnight.      Please reach out with any further questions, concerns, or any changes on telemetry/repeat EKG's. If patient on beta blocker therapy as an outpatient, can resume so long as heart rate and blood pressure allow.      Studies Reviewed:  EKG prior to TAVR:     EKG immediately post TAVR:     EKG three hours post TAVR:

## 2024-11-05 NOTE — OP NOTE
OPERATIVE REPORT  PATIENT NAME: Deyanira Stokes    :  1950  MRN: [id number]  Pt Location: [facility] OR ROOM 02    SURGERY DATE: 2024    SURGEON: Godwin Sarah DO     ASSISTANT: Scarlett Richards PA-C    CO-SURGEON: Dr. Sahil Wilburn     PREOPERATIVE DIAGNOSIS Symptomatic severe aortic stenosis.     POSTOPERATIVE DIAGNOSIS Symptomatic severe aortic stenosis.     NYHA CLASS: 3    CCS CLASS: 2    PROCEDURE Transcatheter aortic valve replacement with a 26 mm [place] FRANCISCO 3 Ultra Resilia bioprosthetic valve via a percutaneous left transfemoral approach.     ANESTHESIA Dr. Marcos Rolon, general endotracheal anesthesia with transesophageal echocardiogram guidance.     CARDIOPULMONARY BYPASS TIME 0.    PACKS/TUBES/DRAINS None.     ESTIMATED BLOOD LOSS: 10 mL    OPERATIVE TECHNIQUE The patient was taken to the operating room and placed supine on the operating table. Following the satisfactory induction of general anesthesia and placement of monitoring lines, the patient was prepped and draped in the usual sterile fashion. A time-out procedure was performed.     The left common femoral artery was accessed percutaneously using Seldinger technique and fluoroscopy.  Two (2) Perclose sutures were deployed in the standard fashion. The left common femoral vein was accessed in a similar fashion and was cannulated with a 6 [place] sheath. The femoral artery was cannulated with a 7 [place] sheath. A pigtail catheter was inserted and advanced through the left common femoral artery sheath into the right coronary cusp. Using a series of injections, the angle of deployment was determined. Through the left common femoral vein sheath, a balloon tip temporary pacing catheter was inserted into the right ventricle and its capture was confirmed.     The patient was systemically heparinized. The left common femoral artery sheath was then removed over an extra-stiff wire and the delivery sheath was inserted through the left common femoral  artery and advanced into the aorta. The aortic valve was crossed with a wire.    A 26 mm FRANCISCO 3 Ultra Resilia valve was then advanced through the sheath into the aorta and positioned onto the deployment balloon in the aorta and then advanced around the aortic arch and through the annulus of the aortic valve to the appropriate level. At this point, the catheter was desheathed in the standard fashion. The valve was positioned appropriately using a combination of fluoroscopy and transesophageal echocardiogram guidance. During an episode of rapid pacing, balloon deployment of the 26 mm FRANCISCO 3 Ultra Resilia valve was performed. Following deployment, the position of the valve was confirmed by fluoroscopy and echocardiography and its position appeared appropriate with trace perivalvular leak.     The valve delivery system was subsequently removed and the sheath was removed from the left common femoral artery while the Perclose sutures were secured and direct pressure was held. Protamine was administered with normalization of the ACT. Pressure was released from the left groin and there was no active bleeding and no evidence of hematoma development. The common femoral vein sheath was removed from the left and pressure was held.     Sponge, needle, and instrument counts were reported as correct by the nursing staff. As the attending surgeon, I was present and scrubbed for all critical portions of this procedure. Final transesophageal echocardiogram demonstrated a well-positioned bioprosthetic transcatheter aortic valve with normal function and trace perivalvular leak.     The case required the expertise of a cardiac surgeon as well as an interventional cardiologist to act as co-surgeons per CMS requirements.      SIGNATURE: Godwin Sarah DO  DATE: November 5, 2024  TIME: 9:52 AM

## 2024-11-05 NOTE — ANESTHESIA PROCEDURE NOTES
Central Line Insertion    Performed by: Polina Tucker CRNA  Authorized by: Marcos Rolon MD    Date/Time: 11/5/2024 9:13 AM  Catheter Type:  triple lumen  Indications: vascular access  Catheter size: 7 Fr  Patient position: Trendelenburg  Assessment: blood return through all ports  Preparation: skin prepped with 2% chlorhexidine  Skin prep agent dried: skin prep agent completely dried prior to procedure  Sterile barriers: all five maximum sterile barriers used - cap, mask, sterile gown, sterile gloves, and large sterile sheet  Hand hygiene: hand hygiene performed prior to central venous catheter insertion  sterile gel and probe cover used in ultrasound-guided central venous catheter insertionultrasound permanent image saved  Pre-procedure: landmarks identified  Number of attempts: 1  Successful placement: yes  Post-procedure: line sutured and dressing applied  Patient tolerance: Patient tolerated the procedure well with no immediate complications

## 2024-11-05 NOTE — INTERVAL H&P NOTE
H&P reviewed. After examining the patient I find no changes in the patients condition since the H&P had been written.  Anticipated Length of Stay:  Patient will be admitted on an Inpatient basis with an anticipated length of stay of  greater than 2 midnights.       Justification for Hospital Stay:  Post surgical recovery following open heart surgery.      Vitals:    11/05/24 0741   BP: (!) 192/91   Pulse: 67   Resp: 18   Temp: (!) 97.1 °F (36.2 °C)   SpO2: 96%

## 2024-11-05 NOTE — PLAN OF CARE
Problem: Prexisting or High Potential for Compromised Skin Integrity  Goal: Skin integrity is maintained or improved  Description: INTERVENTIONS:  - Identify patients at risk for skin breakdown  - Assess and monitor skin integrity  - Assess and monitor nutrition and hydration status  - Monitor labs   - Assess for incontinence   - Turn and reposition patient  - Assist with mobility/ambulation  - Relieve pressure over bony prominences  - Avoid friction and shearing  - Provide appropriate hygiene as needed including keeping skin clean and dry  - Evaluate need for skin moisturizer/barrier cream  - Collaborate with interdisciplinary team   - Patient/family teaching  - Consider wound care consult   Outcome: Progressing     Problem: PAIN - ADULT  Goal: Verbalizes/displays adequate comfort level or baseline comfort level  Description: Interventions:  - Encourage patient to monitor pain and request assistance  - Assess pain using appropriate pain scale  - Administer analgesics based on type and severity of pain and evaluate response  - Implement non-pharmacological measures as appropriate and evaluate response  - Consider cultural and social influences on pain and pain management  - Notify physician/advanced practitioner if interventions unsuccessful or patient reports new pain  Outcome: Progressing     Problem: SAFETY ADULT  Goal: Patient will remain free of falls  Description: INTERVENTIONS:  - Educate patient/family on patient safety including physical limitations  - Instruct patient to call for assistance with activity   - Consult OT/PT to assist with strengthening/mobility   - Keep Call bell within reach  - Keep bed low and locked with side rails adjusted as appropriate  - Keep care items and personal belongings within reach  - Initiate and maintain comfort rounds  - Make Fall Risk Sign visible to staff  - Initiate/Maintain bed alarm  - Apply yellow socks and bracelet for high fall risk patients  - Consider moving  patient to room near nurses station  Outcome: Progressing  Goal: Maintain or return to baseline ADL function  Description: INTERVENTIONS:  -  Assess patient's ability to carry out ADLs; assess patient's baseline for ADL function and identify physical deficits which impact ability to perform ADLs (bathing, care of mouth/teeth, toileting, grooming, dressing, etc.)  - Assess/evaluate cause of self-care deficits   - Assess range of motion  - Assess patient's mobility; develop plan if impaired  - Assess patient's need for assistive devices and provide as appropriate  - Encourage maximum independence but intervene and supervise when necessary  - Involve family in performance of ADLs  - Assess for home care needs following discharge   - Consider OT consult to assist with ADL evaluation and planning for discharge  - Provide patient education as appropriate  Outcome: Progressing  Goal: Maintains/Returns to pre admission functional level  Description: INTERVENTIONS:  - Perform AM-PAC 6 Click Basic Mobility/ Daily Activity assessment daily.  - Set and communicate daily mobility goal to care team and patient/family/caregiver.   - Collaborate with rehabilitation services on mobility goals if consulted  - Reposition patient every 2 hours.  - Out of bed for toileting  - Record patient progress and toleration of activity level   Outcome: Progressing     Problem: DISCHARGE PLANNING  Goal: Discharge to home or other facility with appropriate resources  Description: INTERVENTIONS:  - Identify barriers to discharge w/patient and caregiver  - Arrange for needed discharge resources and transportation as appropriate  - Identify discharge learning needs (meds, wound care, etc.)  - Arrange for interpretive services to assist at discharge as needed  - Refer to Case Management Department for coordinating discharge planning if the patient needs post-hospital services based on physician/advanced practitioner order or complex needs related to  functional status, cognitive ability, or social support system  Outcome: Progressing     Problem: Knowledge Deficit  Goal: Patient/family/caregiver demonstrates understanding of disease process, treatment plan, medications, and discharge instructions  Description: Complete learning assessment and assess knowledge base.  Interventions:  - Provide teaching at level of understanding  - Provide teaching via preferred learning methods  Outcome: Progressing     Problem: CARDIOVASCULAR - ADULT  Goal: Maintains optimal cardiac output and hemodynamic stability  Description: INTERVENTIONS:  - Monitor I/O, vital signs and rhythm  - Monitor for S/S and trends of decreased cardiac output  - Administer and titrate ordered vasoactive medications to optimize hemodynamic stability  - Assess quality of pulses, skin color and temperature  - Assess for signs of decreased coronary artery perfusion  - Instruct patient to report change in severity of symptoms  Outcome: Progressing  Goal: Absence of cardiac dysrhythmias or at baseline rhythm  Description: INTERVENTIONS:  - Continuous cardiac monitoring, vital signs, obtain 12 lead EKG if ordered  - Administer antiarrhythmic and heart rate control medications as ordered  - Monitor electrolytes and administer replacement therapy as ordered  Outcome: Progressing

## 2024-11-05 NOTE — RESPIRATORY THERAPY NOTE
RT Protocol Note  Deyanira Stokes 74 y.o. female MRN: [id number]  Unit/Bed#: PPHP-306-01 Encounter: [id number]    Assessment    Principal Problem:    Severe aortic stenosis  Active Problems:    Hypertension    Asthma    CHF (congestive heart failure) (HCC)    Coronary artery disease    Obesity    S/P TAVR (transcatheter aortic valve replacement)    LBBB (left bundle branch block)    Thrombocytopenia (HCC)    Hypocalcemia      Home Pulmonary Medications:  Albuterol        Past Medical History:   Diagnosis Date    Anxiety     Arthritis     Asthma     CHF (congestive heart failure) ([facility])     Chronic headaches     Coronary artery disease     Dementia (HCC)     Depression     Dietary lactose intolerance     GERD (gastroesophageal reflux disease)     History of breast cancer     right, s/p mastectomy, s/p chemoc/radiation    History of nephrolithiasis     History of pneumonia     History of shingles     HL (hearing loss)     Hypertension     IBS (irritable bowel syndrome)     Lymphedema of right arm     Obesity     Osteoporosis     PFO (patent foramen ovale)     PONV (postoperative nausea and vomiting)     Scoliosis     Severe aortic stenosis     Vitamin D deficiency      Social History     Socioeconomic History    Marital status:      Spouse name: None    Number of children: None    Years of education: None    Highest education level: None   Occupational History    Occupation: Retired   Tobacco Use    Smoking status: Never     Passive exposure: Never    Smokeless tobacco: Never   Vaping Use    Vaping status: Never Used   Substance and Sexual Activity    Alcohol use: Yes     Alcohol/week: 1.0 standard drink of alcohol     Types: 1 Glasses of wine per week     Comment: this is less than once/mo, more like 1-2 drinks every 3 mo    Drug use: Not Currently     Types: Marijuana     Comment: when i was in my teens    Sexual activity: Not Currently     Partners: Male   Other Topics Concern    None   Social History  "Narrative    Always uses seat belt    Occasional caffeine consumption         Retired    Lives with adult daughter     Social Determinants of Health     Financial Resource Strain: Low Risk  (2023)    Overall Financial Resource Strain (CARDIA)     Difficulty of Paying Living Expenses: Not very hard   Food Insecurity: No Food Insecurity (2024)    Nursing - Inadequate Food Risk Classification     Worried About Running Out of Food in the Last Year: Sometimes true     Ran Out of Food in the Last Year: Sometimes true     Ran Out of Food in the Last Year: 1   Transportation Needs: No Transportation Needs (2024)    Nursing - Transportation Risk Classification     Lack of Transportation: Not on file     Lack of Transportation: 2   Physical Activity: Not on file   Stress: Not on file   Social Connections: Not on file   Intimate Partner Violence: Unknown (2024)    Nursing IPS     Feels Physically and Emotionally Safe: Not on file     Physically Hurt by Someone: Not on file     Humiliated or Emotionally Abused by Someone: Not on file     Physically Hurt by Someone: 2     Hurt or Threatened by Someone: 2   Housing Stability: Unknown (2024)    Nursing: Inadequate Housing Risk Classification     Has Housing: Not on file     Worried About Losing Housing: Not on file     Unable to Get Utilities: Not on file     Unable to Pay for Housing in the Last Year: 2     Has Housin       Subjective         Objective    Physical Exam:   Assessment Type: Assess only  General Appearance: Alert, Awake  Respiratory Pattern: Normal  Chest Assessment: Chest expansion symmetrical  Bilateral Breath Sounds: Clear, Diminished    Vitals:  Blood pressure 123/66, pulse 86, temperature 97.7 °F (36.5 °C), resp. rate (!) 31, height 4' 9\" (1.448 m), weight 73.1 kg (161 lb 3.2 oz), SpO2 98%, not currently breastfeeding.          Imaging and other studies: Results Review Statement: I reviewed radiology reports from this " admission including: chest xray.          Plan    Respiratory Plan: Home Bronchodilator Patient pathway  Airway Clearance Plan: Incentive Spirometer     Resp Comments: pt assessed for RCP and ACP,pt has clear bs. pt states no mucus with cough. spo2 99% on 2l n/c. pt has hx of asthma.cxr reviewed.will continue with home regimen with PRN albuterol. pt had Transcatheter aortic valve replacement will provide IS.

## 2024-11-05 NOTE — ANESTHESIA PREPROCEDURE EVALUATION
Procedure:  REPLACEMENT AORTIC VALVE TRANSCATHETER (TAVR) TRANSFEMORAL W/ 26MM VALVE(ACCESS ON LEFT) VIOLA (Chest)  Cardiac tavr (Chest)    Relevant Problems   CARDIO   (+) Aortic valve stenosis   (+) Essential hypertension   (+) Hyperlipidemia LDL goal <130      GI/HEPATIC   (+) Other chronic pancreatitis (HCC)      MUSCULOSKELETAL   (+) Arthritis of right knee   (+) Scoliosis      NEURO/PSYCH   (+) Chronic musculoskeletal pain   (+) Continuous opioid dependence (HCC)   (+) Generalized anxiety disorder   (+) Recurrent major depressive disorder, in partial remission (HCC)      PULMONARY   (+) Mild intermittent asthma without complication      Echo 07/09/2024:    Left Ventricle: Left ventricular cavity size is normal. Wall thickness is moderately increased. The left ventricular ejection fraction is 60%. Systolic function is normal. Wall motion is normal. Diastolic function is mildly abnormal, consistent with grade I (abnormal) relaxation.    Left Atrium: The atrium is mildly dilated.    Aortic Valve: The leaflets are thickened. The leaflets are calcified. There is reduced mobility. There is mild regurgitation. There is moderate to severe stenosis. The aortic valve peak velocity is 3.9 m/s. The aortic valve peak gradient is 57 mmHg. The aortic valve mean gradient is 34 mmHg. The dimensionless velocity index is 0.26. The aortic valve area is 0.91 cm2.    Mitral Valve: There is annular calcification. There is mild regurgitation.    Tricuspid Valve: There is mild regurgitation.     [facility] 10/29/2024:  Normal  coronary anigography  Physical Exam    Airway    Mallampati score: II  TM Distance: >3 FB  Neck ROM: full     Dental    lower dentures and upper dentures    Cardiovascular      Pulmonary      Other Findings  post-pubertal.      Anesthesia Plan  ASA Score- 4     Anesthesia Type- general with ASA Monitors.         Additional Monitors: arterial line and central venous line.    Airway Plan: ETT.    Comment: VIOLA.       Plan  Factors-Exercise tolerance (METS): >4 METS.    Chart reviewed. EKG reviewed.  Existing labs reviewed. Patient summary reviewed.                  Induction- intravenous.    Postoperative Plan- Plan for postoperative opioid use. Planned trial extubation        Informed Consent- Anesthetic plan and risks discussed with patient.  I personally reviewed this patient with the CRNA. Discussed and agreed on the Anesthesia Plan with the CRNA..

## 2024-11-05 NOTE — ANESTHESIA PROCEDURE NOTES
Arterial Line Insertion    Performed by: Polina Tucker CRNA  Authorized by: Marcos Rolon MD  Preparation: Patient was prepped and draped in the usual sterile fashion.  Indications: hemodynamic monitoring  Orientation:  Left  Location: radial artery  Procedure Details:  Needle gauge: 20  Number of attempts: 1    Post-procedure:  Post-procedure: dressing applied  Waveform: good waveform  Post-procedure CNS: unchanged  Patient tolerance: Patient tolerated the procedure well with no immediate complications  Comments: Ultrasound used

## 2024-11-05 NOTE — ANESTHESIA PROCEDURE NOTES
Procedure Performed: VIOLA Anesthesia  Start Time:  11/5/2024 9:08 AM        Preanesthesia Checklist    Patient identified, IV assessed, risks and benefits discussed, monitors and equipment assessed, procedure being performed at surgeon's request and anesthesia consent obtained.      Procedure    Diagnostic Indications for VIOLA:  assessment of surgical repair and hemodynamic monitoring. Type of VIOLA: complete VIOLA with interpretation. Images Saved: ultrasound permanent image saved. Physician Requesting Echo: Godwin Sarah DO.  Location performed: OR. Intubated.  Heart visualized. Insertion of VIOLA Probe:  Easy. Probe Type:  Epiaortic and multiplane. Modalities:  Color flow mapping, 3D, pulse wave Doppler and continuous wave Doppler.      Echocardiographic and Doppler Measurements    PREPROCEDURE    LEFT VENTRICLE:  Systolic Function: normal. Ejection Fraction: >55%. Cavity size: normal.       RIGHT VENTRICLE:  Systolic Function: normal.  Cavity size normal.              AORTIC VALVE:  Leaflets: trileaflet. Leaflet motions restricted. Stenosis: severe and dimensionless index: 0.22. Mean Gradient: 23 mmHg. Peak Gradient: 44 mmHg. Maximum velocity (cm/s): 3.3.  Regurgitation: mild.      MITRAL VALVE:  Leaflets: normal. Leaflet Motions: normal. Regurgitation: mild.   Stenosis: none.       TRICUSPID VALVE:  Leaflets: normal. Leaflet Motions: normal. Stenosis: none. Regurgitation: mild.      PULMONIC VALVE:  Leaflets: normal.          ASCENDING AORTA:  Size:  normal. Diameter: 3 cm. Dissection not present.      AORTIC ARCH:  Size:  normal.  dissection not present.     DESCENDING AORTA:  Size: normal.  Dissection not present.         RIGHT ATRIUM:  Size:  normal.     LEFT ATRIUM:  Size: dilated.     LEFT ATRIAL APPENDAGE:  Size: normal.          ATRIAL SEPTUM:  Intra-atrial septal morphology: normal.          VENTRICULAR SEPTUM:  Intra-ventricular septum morphology: normal.             OTHER FINDINGS:  Pericardium:  normal.  Pleural Effusion:  none.        POSTPROCEDURE    LEFT VENTRICLE: Unchanged .         RIGHT VENTRICLE: Unchanged .           AORTIC VALVE:   Leaflets: bioprosthetic.  Mean Gradient: 3 mmHg. Regurgitation: PVL and trace.  Valve Size: 26 mm.    MITRAL VALVE: Unchanged .         TRICUSPID VALVE: Unchanged .        PULMONIC VALVE: Unchanged             ATRIA: Unchanged .          AORTA: Unchanged .        REMOVAL:  Probe Removal: atraumatic.

## 2024-11-05 NOTE — ANESTHESIA POSTPROCEDURE EVALUATION
Post-Op Assessment Note    CV Status:  Stable    Pain management: adequate       Mental Status:  Sleepy   Hydration Status:  Euvolemic   PONV Controlled:  Controlled   Airway Patency:  Patent     Post Op Vitals Reviewed: Yes    No anethesia notable event occurred.    Staff: CRNA       Last Filed PACU Vitals:  Vitals Value Taken Time   Temp     Pulse 89 11/05/24 1005   BP     Resp     SpO2 100 % 11/05/24 1005   Vitals shown include unfiled device data.    Modified Audrey:  No data recorded

## 2024-11-05 NOTE — ANESTHESIA POSTPROCEDURE EVALUATION
Post-Op Assessment Note    Last Filed PACU Vitals:  Vitals Value Taken Time   Temp 97.7 °F (36.5 °C) 11/05/24 1100   Pulse 78 11/05/24 1123   /64 11/05/24 1100   Resp 19 11/05/24 1109   SpO2 99 % 11/05/24 1109   Vitals shown include unfiled device data.    Modified Audrey:  Activity: 2 (11/5/2024 11:00 AM)  Respiration: 2 (11/5/2024 11:00 AM)  Circulation: 2 (11/5/2024 11:00 AM)  Consciousness: 1 (11/5/2024 11:00 AM)  Oxygen Saturation: 1 (11/5/2024 11:00 AM)  Modified Audrey Score: 8 (11/5/2024 11:00 AM)

## 2024-11-06 ENCOUNTER — APPOINTMENT (INPATIENT)
Dept: NON INVASIVE DIAGNOSTICS | Facility: HOSPITAL | Age: 74
DRG: 267 | End: 2024-11-06
Payer: COMMERCIAL

## 2024-11-06 ENCOUNTER — APPOINTMENT (INPATIENT)
Dept: RADIOLOGY | Facility: HOSPITAL | Age: 74
DRG: 267 | End: 2024-11-06
Payer: COMMERCIAL

## 2024-11-06 PROBLEM — I49.5 SINUS NODE DYSFUNCTION (HCC): Status: ACTIVE | Noted: 2024-11-06

## 2024-11-06 PROBLEM — R54 FRAILTY SYNDROME IN GERIATRIC PATIENT: Status: ACTIVE | Noted: 2024-11-06

## 2024-11-06 PROBLEM — R26.2 AMBULATORY DYSFUNCTION: Status: ACTIVE | Noted: 2024-11-06

## 2024-11-06 PROBLEM — F32.A DEPRESSION: Status: ACTIVE | Noted: 2024-11-06

## 2024-11-06 PROBLEM — F03.90 DEMENTIA (HCC): Status: ACTIVE | Noted: 2024-11-06

## 2024-11-06 PROBLEM — R94.31 QT PROLONGATION: Status: ACTIVE | Noted: 2024-11-06

## 2024-11-06 LAB
ABO GROUP BLD BPU: NORMAL
ANION GAP SERPL CALCULATED.3IONS-SCNC: 8 MMOL/L (ref 4–13)
AORTIC ROOT: 2.3 CM
AORTIC VALVE MEAN VELOCITY: 13.5 M/S
AORTIC VALVE MEAN VELOCITY: 14.5 M/S
APICAL FOUR CHAMBER EJECTION FRACTION: 59 %
ASCENDING AORTA: 3 CM
ASCENDING AORTA: 3 CM
AV AREA BY CONTINUOUS VTI: 1.6 CM2
AV AREA PEAK VELOCITY: 1.6 CM2
AV LVOT MEAN GRADIENT: 1 MMHG
AV LVOT MEAN GRADIENT: 2 MMHG
AV LVOT PEAK GRADIENT: 2 MMHG
AV LVOT PEAK GRADIENT: 3 MMHG
AV MEAN GRADIENT: 13 MMHG
AV MEAN GRADIENT: 8 MMHG
AV PEAK GRADIENT: 13 MMHG
AV PEAK GRADIENT: 22 MMHG
AV VALVE AREA: 2.24 CM2
AV VELOCITY RATIO: 0.47
BPU ID: NORMAL
BSA FOR ECHO PROCEDURE: 1.63 M2
BUN SERPL-MCNC: 11 MG/DL (ref 5–25)
CALCIUM SERPL-MCNC: 7.9 MG/DL (ref 8.4–10.2)
CHLORIDE SERPL-SCNC: 101 MMOL/L (ref 96–108)
CO2 SERPL-SCNC: 28 MMOL/L (ref 21–32)
CREAT SERPL-MCNC: 0.7 MG/DL (ref 0.6–1.3)
CROSSMATCH: NORMAL
DOP CALC AO PEAK VEL: 1.82 M/S
DOP CALC AO VTI: 35.36 CM
DOP CALC AO VTI: 57.4 CM
DOP CALC LVOT AREA: 4.91 CM2
DOP CALC LVOT CARDIAC INDEX: 2.32 L/MIN/M2
DOP CALC LVOT CARDIAC OUTPUT: 3.8 L/MIN
DOP CALC LVOT DIAMETER: 2.5 CM
DOP CALC LVOT PEAK VEL VTI: 15.3 CM
DOP CALC LVOT PEAK VEL VTI: 16.11 CM
DOP CALC LVOT PEAK VEL: 0.74 M/S
DOP CALC LVOT PEAK VEL: 0.85 M/S
DOP CALC LVOT STROKE INDEX: 34.1 ML/M2
DOP CALC LVOT STROKE VOLUME: 79.04 CM3
E WAVE DECELERATION TIME: 223 MS
E WAVE DECELERATION TIME: 239 MS
E/A RATIO: 0.74
E/A RATIO: 1.38
ERYTHROCYTE [DISTWIDTH] IN BLOOD BY AUTOMATED COUNT: 12.1 % (ref 11.6–15.1)
FRACTIONAL SHORTENING: 36 (ref 28–44)
GFR SERPL CREATININE-BSD FRML MDRD: 86 ML/MIN/1.73SQ M
GLUCOSE SERPL-MCNC: 123 MG/DL (ref 65–140)
GLUCOSE SERPL-MCNC: 134 MG/DL (ref 65–140)
GLUCOSE SERPL-MCNC: 140 MG/DL (ref 65–140)
GLUCOSE SERPL-MCNC: 141 MG/DL (ref 65–140)
GLUCOSE SERPL-MCNC: 95 MG/DL (ref 65–140)
HCT VFR BLD AUTO: 40.8 % (ref 34.8–46.1)
HGB BLD-MCNC: 13.5 G/DL (ref 11.5–15.4)
INTERVENTRICULAR SEPTUM IN DIASTOLE (PARASTERNAL SHORT AXIS VIEW): 1.5 CM
INTERVENTRICULAR SEPTUM: 1.5 CM (ref 0.6–1.1)
LAAS-AP2: 19.2 CM2
LAAS-AP4: 20.9 CM2
LEFT ATRIUM SIZE: 3.8 CM
LEFT ATRIUM VOLUME (MOD BIPLANE): 65 ML
LEFT ATRIUM VOLUME INDEX (MOD BIPLANE): 39.6 ML/M2
LEFT INTERNAL DIMENSION IN SYSTOLE: 2.5 CM (ref 2.1–4)
LEFT VENTRICLE DIASTOLIC VOLUME (MOD BIPLANE): 48 ML
LEFT VENTRICLE DIASTOLIC VOLUME INDEX (MOD BIPLANE): 29.4 ML/M2
LEFT VENTRICLE SYSTOLIC VOLUME (MOD BIPLANE): 21 ML
LEFT VENTRICLE SYSTOLIC VOLUME INDEX (MOD BIPLANE): 12.9 ML/M2
LEFT VENTRICULAR INTERNAL DIMENSION IN DIASTOLE: 3.9 CM (ref 3.5–6)
LEFT VENTRICULAR POSTERIOR WALL IN END DIASTOLE: 1.4 CM
LEFT VENTRICULAR STROKE VOLUME: 45 ML
LV EF: 57 %
LVSV (TEICH): 45 ML
MAGNESIUM SERPL-MCNC: 1.8 MG/DL (ref 1.9–2.7)
MCH RBC QN AUTO: 30.4 PG (ref 26.8–34.3)
MCHC RBC AUTO-ENTMCNC: 33.1 G/DL (ref 31.4–37.4)
MCV RBC AUTO: 92 FL (ref 82–98)
MV E'TISSUE VEL-LAT: 8 CM/S
MV E'TISSUE VEL-SEP: 8 CM/S
MV PEAK A VEL: 0.64 M/S
MV PEAK A VEL: 1.18 M/S
MV PEAK E VEL: 87 CM/S
MV PEAK E VEL: 88 CM/S
MV STENOSIS PRESSURE HALF TIME: 65 MS
MV STENOSIS PRESSURE HALF TIME: 70 MS
MV VALVE AREA P 1/2 METHOD: 3.1
MV VALVE AREA P 1/2 METHOD: 3.38
PLATELET # BLD AUTO: 158 THOUSANDS/UL (ref 149–390)
PMV BLD AUTO: 10.1 FL (ref 8.9–12.7)
POTASSIUM SERPL-SCNC: 3.9 MMOL/L (ref 3.5–5.3)
RA PRESSURE ESTIMATED: 3 MMHG
RBC # BLD AUTO: 4.44 MILLION/UL (ref 3.81–5.12)
RIGHT ATRIUM AREA SYSTOLE A4C: 10 CM2
RIGHT VENTRICLE ID DIMENSION: 3.1 CM
RV PSP: 20 MMHG
SL CV LEFT ATRIUM LENGTH A2C: 5 CM
SL CV LV EF: 58
SL CV PED ECHO LEFT VENTRICLE DIASTOLIC VOLUME (MOD BIPLANE) 2D: 68 ML
SL CV PED ECHO LEFT VENTRICLE SYSTOLIC VOLUME (MOD BIPLANE) 2D: 23 ML
SODIUM SERPL-SCNC: 137 MMOL/L (ref 135–147)
TR MAX PG: 17 MMHG
TR PEAK VELOCITY: 2.1 M/S
TRICUSPID ANNULAR PLANE SYSTOLIC EXCURSION: 1.9 CM
TRICUSPID VALVE PEAK REGURGITATION VELOCITY: 2.09 M/S
UNIT DISPENSE STATUS: NORMAL
UNIT PRODUCT CODE: NORMAL
UNIT PRODUCT VOLUME: 350 ML
UNIT RH: NORMAL
WBC # BLD AUTO: 8.27 THOUSAND/UL (ref 4.31–10.16)

## 2024-11-06 PROCEDURE — 93306 TTE W/DOPPLER COMPLETE: CPT | Performed by: STUDENT IN AN ORGANIZED HEALTH CARE EDUCATION/TRAINING PROGRAM

## 2024-11-06 PROCEDURE — 85027 COMPLETE CBC AUTOMATED: CPT | Performed by: PHYSICIAN ASSISTANT

## 2024-11-06 PROCEDURE — 99222 1ST HOSP IP/OBS MODERATE 55: CPT | Performed by: NURSE PRACTITIONER

## 2024-11-06 PROCEDURE — 93306 TTE W/DOPPLER COMPLETE: CPT

## 2024-11-06 PROCEDURE — 82948 REAGENT STRIP/BLOOD GLUCOSE: CPT

## 2024-11-06 PROCEDURE — 80048 BASIC METABOLIC PNL TOTAL CA: CPT | Performed by: PHYSICIAN ASSISTANT

## 2024-11-06 PROCEDURE — 83735 ASSAY OF MAGNESIUM: CPT | Performed by: PHYSICIAN ASSISTANT

## 2024-11-06 PROCEDURE — 99233 SBSQ HOSP IP/OBS HIGH 50: CPT | Performed by: THORACIC SURGERY (CARDIOTHORACIC VASCULAR SURGERY)

## 2024-11-06 PROCEDURE — 97530 THERAPEUTIC ACTIVITIES: CPT

## 2024-11-06 PROCEDURE — 97167 OT EVAL HIGH COMPLEX 60 MIN: CPT

## 2024-11-06 PROCEDURE — 97163 PT EVAL HIGH COMPLEX 45 MIN: CPT

## 2024-11-06 PROCEDURE — 71045 X-RAY EXAM CHEST 1 VIEW: CPT

## 2024-11-06 PROCEDURE — 99232 SBSQ HOSP IP/OBS MODERATE 35: CPT | Performed by: INTERNAL MEDICINE

## 2024-11-06 PROCEDURE — 93005 ELECTROCARDIOGRAM TRACING: CPT

## 2024-11-06 RX ORDER — DIPHENHYDRAMINE HCL 25 MG
50 TABLET ORAL
Status: DISCONTINUED | OUTPATIENT
Start: 2024-11-07 | End: 2024-11-07

## 2024-11-06 RX ORDER — ACETAMINOPHEN 650 MG/1
650 SUPPOSITORY RECTAL EVERY 4 HOURS PRN
Status: DISCONTINUED | OUTPATIENT
Start: 2024-11-06 | End: 2024-11-08 | Stop reason: HOSPADM

## 2024-11-06 RX ORDER — POTASSIUM CHLORIDE 750 MG/1
10 TABLET, EXTENDED RELEASE ORAL 2 TIMES DAILY
Status: DISCONTINUED | OUTPATIENT
Start: 2024-11-06 | End: 2024-11-08 | Stop reason: HOSPADM

## 2024-11-06 RX ORDER — METOCLOPRAMIDE HYDROCHLORIDE 5 MG/ML
10 INJECTION INTRAMUSCULAR; INTRAVENOUS EVERY 6 HOURS PRN
Status: DISCONTINUED | OUTPATIENT
Start: 2024-11-06 | End: 2024-11-08 | Stop reason: HOSPADM

## 2024-11-06 RX ORDER — METHYLPREDNISOLONE 16 MG/1
32 TABLET ORAL
Status: COMPLETED | OUTPATIENT
Start: 2024-11-06 | End: 2024-11-07

## 2024-11-06 RX ADMIN — HEPARIN SODIUM 5000 UNITS: 5000 INJECTION, SOLUTION INTRAVENOUS; SUBCUTANEOUS at 05:47

## 2024-11-06 RX ADMIN — POTASSIUM CHLORIDE 10 MEQ: 750 TABLET, EXTENDED RELEASE ORAL at 17:26

## 2024-11-06 RX ADMIN — FLUTICASONE PROPIONATE 1 SPRAY: 50 SPRAY, METERED NASAL at 08:39

## 2024-11-06 RX ADMIN — MUPIROCIN 1 APPLICATION: 20 OINTMENT TOPICAL at 08:38

## 2024-11-06 RX ADMIN — CEFAZOLIN SODIUM 2000 MG: 2 SOLUTION INTRAVENOUS at 00:46

## 2024-11-06 RX ADMIN — MUPIROCIN 1 APPLICATION: 20 OINTMENT TOPICAL at 20:07

## 2024-11-06 RX ADMIN — MONTELUKAST 10 MG: 10 TABLET, FILM COATED ORAL at 21:56

## 2024-11-06 RX ADMIN — HEPARIN SODIUM 5000 UNITS: 5000 INJECTION, SOLUTION INTRAVENOUS; SUBCUTANEOUS at 14:33

## 2024-11-06 RX ADMIN — PANTOPRAZOLE SODIUM 40 MG: 40 TABLET, DELAYED RELEASE ORAL at 05:47

## 2024-11-06 RX ADMIN — POTASSIUM CHLORIDE 10 MEQ: 750 TABLET, EXTENDED RELEASE ORAL at 08:38

## 2024-11-06 RX ADMIN — ASPIRIN 81 MG CHEWABLE TABLET 81 MG: 81 TABLET CHEWABLE at 08:38

## 2024-11-06 RX ADMIN — FUROSEMIDE 20 MG: 20 TABLET ORAL at 17:26

## 2024-11-06 RX ADMIN — FUROSEMIDE 20 MG: 20 TABLET ORAL at 08:38

## 2024-11-06 RX ADMIN — HEPARIN SODIUM 5000 UNITS: 5000 INJECTION, SOLUTION INTRAVENOUS; SUBCUTANEOUS at 21:56

## 2024-11-06 RX ADMIN — METHYLPREDNISOLONE 32 MG: 16 TABLET ORAL at 20:07

## 2024-11-06 RX ADMIN — CLOPIDOGREL BISULFATE 75 MG: 75 TABLET ORAL at 08:38

## 2024-11-06 RX ADMIN — CEFAZOLIN SODIUM 2000 MG: 2 SOLUTION INTRAVENOUS at 08:38

## 2024-11-06 RX ADMIN — ATORVASTATIN CALCIUM 20 MG: 20 TABLET, FILM COATED ORAL at 17:26

## 2024-11-06 RX ADMIN — CHLORHEXIDINE GLUCONATE 0.12% ORAL RINSE 15 ML: 1.2 LIQUID ORAL at 20:07

## 2024-11-06 NOTE — UTILIZATION REVIEW
Initial Clinical Review    Elective Inpatient surgical procedure  Age/Sex: 74 y.o. female  Surgery Date: 11/5/24  Procedure: Transcatheter aortic valve replacement with a 26 mm Barahona FRANCISCO 3 Ultra Resilia bioprosthetic valve via a percutaneous left transfemoral approach.   Anesthesia: general endotracheal anesthesia with transesophageal echocardiogram guidance     POD#1 Progress Note:  Evaluated by EP yesterday as QRS had narrowed back from 200 to 98msec. PT's Donepezil, Seroquel and Fluoxetine held due to QTC of 493 yesterday, 534 today. Cardene weaned off yesterday afternoon. Episode of bradycardia overnight with HR 43 and wide complex QRS, now resolved. Pt report feeling well overall. Denies CP or SOB. Groin puncture sites clean, dry, and intact without hematoma. Order echo for today, continue statin, inc spirometry, monitor IO, pain control, GI ppx, acucchecks w/ SSI, PT OT eval.     Admission Orders: Date/Time/Statement:   Admission Orders (From admission, onward)       Ordered        11/05/24 0906  Inpatient Admission  Once                          Orders Placed This Encounter   Procedures    Inpatient Admission     Standing Status:   Standing     Number of Occurrences:   1     Order Specific Question:   Level of Care     Answer:   Level 1 Stepdown [13]     Order Specific Question:   Estimated length of stay     Answer:   More than 2 Midnights     Order Specific Question:   Certification     Answer:   I certify that inpatient services are medically necessary for this patient for a duration of greater than two midnights. See H&P and MD Progress Notes for additional information about the patient's course of treatment.     Diet: Cardiac; Fluid Restriction 1800 ML   Mobility: OOB and ambulatory  DVT Prophylaxis: heparin, scd, ambulation    Medications/Pain Control:   Scheduled Medications:  aspirin, 81 mg, Oral, Daily  atorvastatin, 20 mg, Oral, Daily With Dinner  cefazolin, 2,000 mg, Intravenous,  Q8H  chlorhexidine, 15 mL, Mouth/Throat, BID  clopidogrel, 75 mg, Oral, Daily  fluticasone, 1 spray, Each Nare, Daily  furosemide, 20 mg, Oral, BID  heparin (porcine), 5,000 Units, Subcutaneous, Q8H ALPHONSE  insulin lispro, 1-5 Units, Subcutaneous, TID AC  insulin lispro, 1-5 Units, Subcutaneous, HS  montelukast, 10 mg, Oral, HS  mupirocin, 1 Application, Nasal, Q12H ALPHONSE  oxybutynin, 10 mg, Oral, Daily  pantoprazole, 40 mg, Oral, Early Morning  polyethylene glycol, 17 g, Oral, Daily  potassium chloride, 10 mEq, Oral, BID      Continuous IV Infusions:     PRN Meds:  acetaminophen, 650 mg, Rectal, Q4H PRN  acetaminophen, 650 mg, Oral, Q4H PRN  albuterol, 2 puff, Inhalation, Q4H PRN  albuterol, 2.5 mg, Nebulization, Q6H PRN  bisacodyl, 10 mg, Rectal, Daily PRN  hydrALAZINE, 10 mg, Intravenous, Q6H PRN  metoclopramide, 10 mg, Intravenous, Q6H PRN  ondansetron, 4 mg, Intravenous, Q6H PRN      Vital Signs (last 3 days)       Date/Time Temp Pulse Resp BP MAP (mmHg) Arterial Line BP MAP SpO2 Calculated FIO2 (%) - Nasal Cannula O2 Flow Rate (L/min) Nasal Cannula O2 Flow Rate (L/min) O2 Device Patient Position - Orthostatic VS Salvo Coma Scale Score Pain    11/06/24 0718 98.2 °F (36.8 °C) 73 30 130/63 90 -- -- 94 % -- -- -- None (Room air) Lying -- --    11/06/24 0600 -- 72 25 118/56 81 -- -- 94 % -- -- -- None (Room air) Sitting -- --    11/06/24 0500 -- 69 29 118/59 83 -- -- 91 % -- -- -- None (Room air) Lying -- --    11/06/24 0400 -- 73 25 124/65 89 -- -- 94 % -- -- -- None (Room air) Lying 15 --    11/06/24 0300 98.4 °F (36.9 °C) 85 29 119/59 84 128/63 85 mmHg 94 % -- -- -- None (Room air) Lying -- --    11/06/24 0200 -- 74 26 118/58 83 127/63 84 mmHg 93 % -- -- -- None (Room air) Lying -- --    11/06/24 0100 -- 73 27 124/58 84 122/61 84 mmHg 92 % -- -- -- None (Room air) Lying -- --    11/06/24 0000 -- 76 26 126/61 88 132/64 88 mmHg 92 % -- -- -- None (Room air) Lying 15 --    11/05/24 2330 -- 92 22 115/56 76 125/68 90  mmHg 96 % -- -- -- -- -- -- --    11/05/24 2300 98.6 °F (37 °C) 80 26 122/58 84 122/53 77 mmHg 93 % -- -- -- None (Room air) Lying -- --    11/05/24 2200 -- 81 27 135/64 92 129/56 83 mmHg 92 % -- -- -- None (Room air) Lying -- --    11/05/24 2100 -- 80 27 128/62 89 125/58 83 mmHg 96 % -- -- -- None (Room air) Lying -- --    11/05/24 2000 -- 86 31 135/63 91 125/58 82 mmHg 97 % -- -- -- None (Room air) Lying -- --    11/05/24 1954 -- -- -- -- -- -- -- 98 % -- -- -- None (Room air) -- 15 No Pain    11/05/24 1900 -- 82 26 -- -- 124/57 82 mmHg 97 % 28 -- 2 L/min Nasal cannula -- -- --    11/05/24 1800 -- 79 25 -- -- 131/59 85 mmHg 97 % -- -- -- -- -- -- --    11/05/24 1700 -- 82 22 -- -- 126/60 83 mmHg 98 % -- -- -- -- -- -- --    11/05/24 1600 98.2 °F (36.8 °C) 83 25 143/67 97 141/68 96 mmHg 99 % 28 -- 2 L/min High flow nasal cannula -- 15 No Pain    11/05/24 1500 -- 79 21 -- -- 134/66 91 mmHg 98 % 28 -- 2 L/min Nasal cannula -- -- --    11/05/24 1400 -- 82 22 -- -- 131/62 86 mmHg 99 % 28 -- 2 L/min Nasal cannula -- -- No Pain    11/05/24 1330 -- 82 23 -- -- 141/71 97 mmHg 99 % 28 -- 2 L/min Nasal cannula -- -- No Pain    11/05/24 1300 -- 86 31 -- -- 136/67 91 mmHg 98 % 28 -- 2 L/min Nasal cannula -- -- No Pain    11/05/24 1230 -- 87 17 -- -- 121/58 80 mmHg 99 % 28 -- 2 L/min Nasal cannula -- -- No Pain    11/05/24 1200 -- 77 20 123/66 89 131/64 88 mmHg 99 % 28 -- 2 L/min Nasal cannula -- -- No Pain    11/05/24 1130 -- 77 15 -- -- 150/68 102 mmHg -- 28 -- 2 L/min Nasal cannula -- 15 No Pain    11/05/24 1100 97.7 °F (36.5 °C) 74 20 128/64 -- 129/59 87 mmHg 98 % 28 -- 2 L/min Nasal cannula -- 14 No Pain    11/05/24 1045 -- 76 19 120/60 86 124/60 84 mmHg 97 % 28 -- 2 L/min Nasal cannula -- 15 No Pain    11/05/24 1030 -- 81 16 124/65 89 124/61 85 mmHg 97 % 28 -- 2 L/min Nasal cannula -- 15 No Pain    11/05/24 1015 -- 88 21 119/63 86 118/58 81 mmHg 100 % 28 -- 2 L/min Nasal cannula -- 15 No Pain    11/05/24 1005 97.5 °F  (36.4 °C) 89 18 -- -- 136/23 77 mmHg 100 % -- 6 L/min -- Simple mask -- -- --    11/05/24 0807 -- -- -- -- -- -- -- -- 28 -- 2 L/min Nasal cannula -- -- --    11/05/24 0741 97.1 °F (36.2 °C) 67 18 192/91 -- -- -- 96 % -- -- -- None (Room air) -- -- No Pain          Weight (last 2 days)       Date/Time Weight    11/06/24 0718 72.1 (159)    11/06/24 0600 72.5 (159.83)    11/05/24 0741 73.1 (161.2)            Pertinent Labs/Diagnostic Test Results:   Radiology:  XR chest portable ICU   Final Interpretation by Rai Choen MD (11/05 0482)      No acute pathology seen. TAVR. Right IJ line with tip in the upper right atrium.            Workstation performed: [id number]         XR chest portable    (Results Pending)     Cardiology:  VIOLA intraop interventional w/realtime guidance of cardiac procedures   Final Result by SYSTEMGENERATED, DOCUMENTATION (11/06 0754)   This order contains the linked images for the VIOLA that was performed by    the Anesthesiologist.  Please see the  CARDIAC VIOLA ANESTHESIA procedure    for results.      ECG 12 lead   Final Result by Salvatore Dover MD (11/05 6076)   Normal sinus rhythm   Moderate voltage criteria for LVH, may be normal variant   Prolonged QT   Abnormal ECG   When compared with ECG of 05-Nov-2024 10:08,   QRS duration has decreased   Minimal criteria for Septal infarct are no longer Present   ST no longer depressed in Lateral leads   T wave inversion no longer evident in Inferior leads   Confirmed by Salvatore Dover ([id number]) on 11/5/2024 3:52:11 PM      ECG 12 lead   Final Result by Salvatore Dover MD (11/05 9976)   Normal sinus rhythm   Left ventricular hypertrophy with QRS widening and repolarization    abnormality   Cannot rule out Septal infarct , age undetermined   Abnormal ECG   When compared with ECG of 29-Oct-2024 07:49,   Minimal criteria for Septal infarct are now Present   T wave inversion now evident in Inferior leads   T wave inversion more evident in  "Lateral leads   Confirmed by Salvatore Dover ([id number]) on 11/5/2024 12:47:07 PM        GI:  No orders to display           Results from last 7 days   Lab Units 11/06/24 0449 11/05/24  1006 11/05/24 0957 11/05/24 0945 11/05/24  0919 10/30/24  1547   WBC Thousand/uL 8.27  --   --   --   --  5.45   HEMOGLOBIN g/dL 13.5 14.8  --   --   --  14.0   I STAT HEMOGLOBIN g/dl  --   --  12.2 11.9 13.9  --    HEMATOCRIT % 40.8 41.8  --   --   --  42.1   HEMATOCRIT, ISTAT %  --   --  36 35 41  --    PLATELETS Thousands/uL 158 148*  --   --   --  191         Results from last 7 days   Lab Units 11/06/24 0449 11/05/24 1006 11/05/24 0957 11/05/24 0945 11/05/24  0919 10/30/24  1547   SODIUM mmol/L 137 139  --   --   --  140   POTASSIUM mmol/L 3.9 3.7  --   --   --  3.8   CHLORIDE mmol/L 101 106  --   --   --  106   CO2 mmol/L 28 29  --   --   --  26   CO2, I-STAT mmol/L  --   --  28 27 31  --    ANION GAP mmol/L 8 4  --   --   --  8   BUN mg/dL 11 12  --   --   --  12   CREATININE mg/dL 0.70 0.60  --   --   --  0.55*   EGFR ml/min/1.73sq m 86 91  --   --   --  94   CALCIUM mg/dL 7.9* 8.3*  --   --   --  8.6   CALCIUM, IONIZED, ISTAT mmol/L  --   --  1.10* 1.12 1.23  --    MAGNESIUM mg/dL 1.8*  --   --   --   --   --      Results from last 7 days   Lab Units 10/30/24  1547   AST U/L 15   ALT U/L 11   ALK PHOS U/L 63   TOTAL PROTEIN g/dL 6.4   ALBUMIN g/dL 3.5   TOTAL BILIRUBIN mg/dL 0.45     Results from last 7 days   Lab Units 11/06/24  0548 11/05/24  2032 11/05/24  1650 11/05/24  1009   POC GLUCOSE mg/dl 134 144* 117 138     Results from last 7 days   Lab Units 11/06/24  0449 11/05/24  1006 10/30/24  1547   GLUCOSE RANDOM mg/dL 123 132 82             No results found for: \"BETA-HYDROXYBUTYRATE\"           Results from last 7 days   Lab Units 11/05/24  0957 11/05/24  0945 11/05/24  0919   PH, MAURICIO I-STAT   --   --  7.258*   PCO2, MAURICIO ISTAT mm HG  --   --  65.8*   PO2, MAURICIO ISTAT mm HG  --   --  54.0*   HCO3, MAURICIO ISTAT mmol/L  --   " --  29.4   I STAT BASE EXC mmol/L 1 0 1   I STAT O2 SAT % 100* 100* 81   ISTAT PH ART  7.367 7.371  --    I STAT ART PCO2 mm HG 46.4* 44.2*  --    I STAT ART PO2 mm .0* 216.0*  --    I STAT ART HCO3 mmol/L 26.7 25.6  --      Results from last 7 days   Lab Units 10/30/24  1547   PROTIME seconds 13.8   INR  1.03     Results from last 7 days   Lab Units 11/06/24  0744   UNIT PRODUCT CODE  [id number]  [id number]  [id number]  [id number]   UNIT NUMBER  [id number]  [id number]  [id number]  [id number]   UNITABO  A  A  A  A   UNITRH  POS  POS  POS  POS   CROSSMATCH  Compatible  Compatible  Compatible  Compatible   UNIT DISPENSE STATUS  Return to Inv  Return to Inv  Return to Inv  Return to Inv   UNIT PRODUCT VOL mL 350  350  350  350     Network Utilization Review Department  ATTENTION: Please call with any questions or concerns to [phone] and carefully listen to the prompts so that you are directed to the right person. All voicemails are confidential.   For Discharge needs, contact Care Management DC Support Team at [phone] opt. 2  Send all requests for admission clinical reviews, approved or denied determinations and any other requests to dedicated fax number below belonging to the campus where the patient is receiving treatment. List of dedicated fax numbers for the Facilities:  FACILITY NAME UR FAX NUMBER   ADMISSION DENIALS (Administrative/Medical Necessity) [phone]   DISCHARGE SUPPORT TEAM (NETWORK) [phone]   PARENT CHILD HEALTH (Maternity/NICU/Pediatrics) [phone]   General acute hospital [phone]   [facility] [phone]   [facility] [phone]   [facility] [phone]   [facility] [phone]   [facility] [phone]   [facility] [phone]   [facility]  [facility] [phone]   [facility] [phone]   [facility] [phone]   [facility] [phone]   [facility] [phone]

## 2024-11-06 NOTE — UTILIZATION REVIEW
"NOTIFICATION OF INPATIENT ADMISSION   AUTHORIZATION REQUEST   SERVICING FACILITY:   [facility]  Address: [address] [place] [address]  Tax ID: [id number]  NPI: [id number] ATTENDING PROVIDER:  Attending Name and NPI#: Godwin Sarah Do [[id number]]  Address: [address] [place] [address]  Phone: [phone]   ADMISSION INFORMATION:  Place of Service: Inpatient [facility]  Place of Service Code: 21  Inpatient Admission Date/Time: 11/5/24  9:06 AM  Discharge Date/Time: No discharge date for patient encounter.  Admitting Diagnosis Code/Description:  Nonrheumatic aortic valve stenosis [I35.0]     UTILIZATION REVIEW CONTACT:  Mary Serrano"Nora\"Toño Utilization   Network Utilization Review Department  Phone: [phone]  Fax: [phone]  Email: [email or web address]  Contact for approvals/pending authorizations, clinical reviews, and discharge.     PHYSICIAN ADVISORY SERVICES:  Medical Necessity Denial & [place] Review  Phone: [phone]  Fax: [phone]  Email: [email or web address]     DISCHARGE SUPPORT TEAM:  For Patients Discharge Needs & Updates  Phone: [phone] opt. 2 Fax: [phone]  Email: [email or web address]     "

## 2024-11-06 NOTE — ASSESSMENT & PLAN NOTE
- has had intermittent LBBB noted along with junctional rhythm early this morning when awake with symptoms of shortness of breath   - not on AV matilde blocking agents as an outpatient   - EF of 60% per echo 7/2024    Discussed with the patient this morning and again when patient's daughter is at bedside about recommendation for pacemaker implantation given symptomatic SSS. The procedure and recuperation afterwards were discussed at length. They are in agreement with proceeding tomorrow and will therefore be made NPO after midnight for left sided dual chamber pacemaker implantation.    Patient is not on anticoagulation that needs to be held, is right handed and has history of only right sided breast cancer. She reports having an allergy to do though not listed but was prepped prior to cath so will order dye prep.

## 2024-11-06 NOTE — RESTORATIVE TECHNICIAN NOTE
Restorative Technician Note      Patient Name: Deyanira Stokes     Restorative Tech Visit Date: 11/06/24  Note Type: Mobility  Patient Position Upon Consult: Bedside chair  Activity Performed: Ambulated  Assistive Device: Roller walker  Patient Position at End of Consult: Bed/Chair alarm activated; All needs within reach; Bedside chair

## 2024-11-06 NOTE — ASSESSMENT & PLAN NOTE
Clinical Frail Scale: 5- Mildly Frail  More evident slowing, needs help high order IADLs (transport, bills, medications)  Has supportive daughter who she lives with  Keep physically, mentally and socially active

## 2024-11-06 NOTE — ASSESSMENT & PLAN NOTE
Chronic use of prozac 20 mg po daily and seroquel 25 mg po QHS, on hold secondary to prolonged Qtc  Monitor for behaviors off medications

## 2024-11-06 NOTE — PROGRESS NOTES
Progress Note - Cardiac Surgery   Deyanira Stokes 74 y.o. female MRN: [id number]  Unit/Bed#: PPHP-306-01 Encounter: [id number]    Aortic stenosis, Non-Rheumatic. S/P transfemoral transcatheter aortic valve replacement; POD # 1    24 Hour Events: Evaluated by EP yesterday - no formal consult completed, as QRS had narrowed back from 200 to 98msec. PT's Donepezil, Seroquel and Fluoxetine held due to QTC of 493 yesterday, 534 today.   Cardene weaned off yesterday afternoon.    Episode of bradycardia overnight with HR 43 and wide complex QRS, now resolved. Pt report feeling well overall. Denies CP or SOB.     Medications:   Scheduled Meds:  Current Facility-Administered Medications   Medication Dose Route Frequency Provider Last Rate    acetaminophen  650 mg Rectal Q4H PRN Scarlett Richards PA-C      acetaminophen  650 mg Oral Q4H PRN Scarlett Richards PA-C      albuterol  2 puff Inhalation Q4H PRN Scarlett Richards PA-C      albuterol  2.5 mg Nebulization Q6H PRN Scarlett Richards PA-C      aspirin  81 mg Oral Daily Scarlett Richards PA-C      atorvastatin  20 mg Oral Daily With Dinner Scarlett Rihcards PA-C      bisacodyl  10 mg Rectal Daily PRN Scarlett Richards PA-C      calcium gluconate  2 g Intravenous Once PRN Scarlett Richards PA-C      cefazolin  2,000 mg Intravenous Q8H Scarlett Richards PA-C 2,000 mg (11/06/24 0046)    chlorhexidine  15 mL Mouth/Throat BID Scarlett Richards PA-C      clopidogrel  75 mg Oral Daily Scarlett Richards PA-C      fluticasone  1 spray Each Nare Daily Scarlett Richards PA-C      furosemide  20 mg Oral BID Scarlett Richards PA-C      heparin (porcine)  5,000 Units Subcutaneous Q8H ALPHONSE Scarlett Richards PA-C      hydrALAZINE  10 mg Intravenous Q6H PRN Scarlett Richards PA-C      insulin lispro  1-5 Units Subcutaneous TID AC Scarlett Richards PA-C      insulin lispro  1-5 Units Subcutaneous HS Scarlett Richards PA-C      metoclopramide  10 mg Intravenous Q6H PRN Kymberly Melo PA-C      montelukast  10 mg Oral  HS Scarlett Richards PA-C      mupirocin  1 Application Nasal Q12H ALPHONSE Scarlett Richards PA-C      niCARdipine  1-15 mg/hr Intravenous Titrated Scarlett Richards PA-C Stopped (11/05/24 1400)    ondansetron  4 mg Intravenous Q6H PRN cSarlett Richards PA-C      oxybutynin  10 mg Oral Daily Scarlett Richards PA-C      pantoprazole  40 mg Oral Early Morning Scarlett Richards PA-C      polyethylene glycol  17 g Oral Daily Scarlett Richards PA-C       Continuous Infusions:niCARdipine, 1-15 mg/hr, Last Rate: Stopped (11/05/24 1400)      PRN Meds:.  acetaminophen    acetaminophen    albuterol    albuterol    bisacodyl    calcium gluconate    hydrALAZINE    metoclopramide    ondansetron    Vitals:   Vitals:    11/06/24 0300 11/06/24 0400 11/06/24 0500 11/06/24 0600   BP: 119/59 124/65 118/59 118/56   BP Location: Left arm Left arm     Pulse: 85 73 69 72   Resp: (!) 29 (!) 25 (!) 29 (!) 25   Temp: 98.4 °F (36.9 °C)      TempSrc: Oral      SpO2: 94% 94% 91% 94%   Weight:    72.5 kg (159 lb 13.3 oz)   Height:           Telemetry: NSR; Heart Rate: 82    Respiratory:   SpO2: SpO2: 94 %; Room Air    Intake/Output:     Intake/Output Summary (Last 24 hours) at 11/6/2024 0713  Last data filed at 11/6/2024 0601  Gross per 24 hour   Intake 1743 ml   Output 2562 ml   Net -819 ml        Weights:   Weight (last 2 days)       Date/Time Weight    11/06/24 0600 72.5 (159.83)    11/05/24 0741 73.1 (161.2)            Results:   Results from last 7 days   Lab Units 11/06/24  0449 11/05/24  1006 11/05/24  0957 11/05/24  0919 10/30/24  1547   WBC Thousand/uL 8.27  --   --   --  5.45   HEMOGLOBIN g/dL 13.5 14.8  --   --  14.0   I STAT HEMOGLOBIN g/dl  --   --  12.2   < >  --    HEMATOCRIT % 40.8 41.8  --   --  42.1   HEMATOCRIT, ISTAT %  --   --  36   < >  --    PLATELETS Thousands/uL 158 148*  --   --  191    < > = values in this interval not displayed.     Results from last 7 days   Lab Units 11/06/24  0449 11/05/24  1006 11/05/24  0957 11/05/24  0919  10/30/24  1547   POTASSIUM mmol/L 3.9 3.7  --   --  3.8   CHLORIDE mmol/L 101 106  --   --  106   CO2 mmol/L 28 29  --   --  26   CO2, I-STAT mmol/L  --   --  28   < >  --    BUN mg/dL 11 12  --   --  12   CREATININE mg/dL 0.70 0.60  --   --  0.55*   GLUCOSE, ISTAT mg/dl  --   --  120   < >  --    CALCIUM mg/dL 7.9* 8.3*  --   --  8.6    < > = values in this interval not displayed.     Results from last 7 days   Lab Units 10/30/24  1547   INR  1.03     Recent Labs     11/06/24  0449   MG 1.8*     Point of care glucose:  - 144  No SSIC/24 hrs    Studies:    ECG 11/6/24: NSR with PVCs or fusion complexes. Prolonged  (418)    PCXR 11/6/24: final report pending. Mild pulm vascular congestion, improved in comparison to yesterday. Elevated right hemidiaphragm. No ptx or effusion    Echocardiogram: pending    Results Review Statement: I personally reviewed the following image studies in PACS and associated radiology reports: EKG and chest xray. My interpretation of the radiology images/reports is: as noted above.    Invasive Lines/Tubes:  Invasive Devices       Central Venous Catheter Line  Duration             CVC Central Lines 11/05/24 <1 day              Peripheral Intravenous Line  Duration             Peripheral IV 11/05/24 Left;Dorsal (posterior) Hand <1 day                    Physical Exam:    General: No acute distress, Alert, and Normal appearance  HEENT/NECK:  Normocephalic. Atraumatic.  NO jugular venous distention.    Cardiac: Regular rate and rhythm and No murmurs/rubs/gallops  Pulmonary:  Breath sounds clear bilaterally and No rales/rhonchi/wheezes  Abdomen:  Non-tender, Non-distended, and Normal bowel sounds  Incisions: Groin puncture sites clean, dry, and intact without hematoma  Extremities: Extremities warm/dry and No edema B/L  Neuro: Alert and oriented X 3, Sensation is grossly intact, and No focal deficits  Skin: Warm/Dry, without rashes or lesions.    Assessment:  Principal Problem:     Severe aortic stenosis  Active Problems:    Hypertension    Asthma    CHF (congestive heart failure) (HCC)    Coronary artery disease    Obesity    S/P TAVR (transcatheter aortic valve replacement)    LBBB (left bundle branch block)    Thrombocytopenia (HCC)    Hypocalcemia       Aortic stenosis, Non-Rheumatic. S/P transfemoral transcatheter aortic valve replacement; POD # 1    Plan:    Cardiac:     Normal ventricular systolic function, EF 60%    NSR; HR well-controlled  BP well-controlled    HTN Regimen: none, was not on any anti-HTN prior to procedure    Prolonged QTC - home Donepezil, Fluoxetine and Seroquel held yesterday due to prolonged QTC. QTC increased today. Continue to hold meds pending EP recommendations.    Beta blocker not indicated/prescribed prior to TAVR; Will not initiate during this admission secondary to post TAVR risk of heart block    TAVR anticoagulation regimen: ASA/Plavix      Postoperative transthoracic echocardiogram:  Echo to be completed today    Continue Statin therapy    Maintain central IV access today for lack of peripheral access    Continue Subcutaneous Heparin for DVT prophylaxis    Pulmonary:     Good Room air oxygen saturation; Continue incentive spirometry/Coughing/Deep breathing exercises    Renal:     Normal preoperative renal function  Creatinine 0.70 today, from 0.6    Intake/Output net: -819 mL/24 hours    Diuretic Regimen:  Continue PO Lasix, 20 mg BID  Start Potassium Chloride 10 mEq PO BID    Neuro:    Neurologically intact; No active issues     Incisional pain well-controlled; Continue prn Tylenol    GI:    Cardiac diet, with 1800 mL fluid restriction    Tolerating diet without complaint    Continue stool softeners and prn suppository    Continue GI prophylaxis    Endo:     Glucose well-controlled with insulin sliding scale coverage    7.  Hematology:     Post-operative blood count acceptable; Trend prn    8.  Disposition:    Gerontology consultation ordered for  routine assessment of TAVR patient over 65 years old    Following daily PT/OT recommendations regarding home vs. rehab when medically cleared for discharge  Not yet medically cleared for discharge, pending echo and EP recommendations      VTE Pharmacologic Prophylaxis: Heparin  VTE Mechanical Prophylaxis: sequential compression device    Collaborative rounds completed with supervising physician  Plan of care discussed with bedside nurse    SIGNATURE: Judie Eubanks PA-C  DATE: November 6, 2024  TIME: 7:13 AM

## 2024-11-06 NOTE — OCCUPATIONAL THERAPY NOTE
Occupational Therapy Evaluation     Patient Name: Deyanira Stokes  Today's Date: 11/6/2024  Problem List  Principal Problem:    Severe aortic stenosis  Active Problems:    Hypertension    Asthma    CHF (congestive heart failure) (HCC)    Coronary artery disease    Obesity    S/P TAVR (transcatheter aortic valve replacement)    LBBB (left bundle branch block)    Thrombocytopenia (HCC)    Hypocalcemia    QT prolongation    Dementia (HCC)    Depression    Past Medical History  Past Medical History:   Diagnosis Date    Anxiety     Arthritis     Asthma     CHF (congestive heart failure) (HCC)     Chronic headaches     Coronary artery disease     Dementia (HCC)     Depression     Dietary lactose intolerance     GERD (gastroesophageal reflux disease)     History of breast cancer     right, s/p mastectomy, s/p chemoc/radiation    History of nephrolithiasis     History of pneumonia     History of shingles     HL (hearing loss)     Hypertension     IBS (irritable bowel syndrome)     Lymphedema of right arm     Obesity     Osteoporosis     PFO (patent foramen ovale)     PONV (postoperative nausea and vomiting)     Scoliosis     Severe aortic stenosis     Vitamin D deficiency      Past Surgical History  Past Surgical History:   Procedure Laterality Date    CARDIAC CATHETERIZATION N/A 10/29/2024    Procedure: LHC and RHC-Cardiac catheterization;  Surgeon: Alexi Myers MD;  Location:  CARDIAC CATH LAB;  Service: Cardiology    CARDIAC CATHETERIZATION N/A 10/29/2024    Procedure: Cardiac Coronary Angiogram;  Surgeon: Alexi Myers MD;  Location:  CARDIAC CATH LAB;  Service: Cardiology    CATARACT EXTRACTION, BILATERAL      CHOLECYSTECTOMY      COLONOSCOPY N/A 05/31/2016    Procedure: COLONOSCOPY;  Surgeon: Colton Moralez MD;  Location: [facility] OR;  Service:     FL GUIDED NEEDLE PLAC BX/ASP/INJ  08/14/2023    FL GUIDED NEEDLE PLAC BX/ASP/INJ  10/30/2023    GALLBLADDER SURGERY      HYSTERECTOMY      Total    KNEE ARTHROSCOPY       LYMPH NODE BIOPSY      as part of the breast cancer, see chart for more details    MASTECTOMY Right     rt breast mastectomy 2005    OK ARTHRP KNE CONDYLE&PLATU MEDIAL&LAT COMPARTMENTS Left 05/20/2021    Procedure: ARTHROPLASTY KNEE TOTAL;  Surgeon: Monica Cox MD;  Location:  MAIN OR;  Service: Orthopedics    OK ARTHRP KNE CONDYLE&PLATU MEDIAL&LAT COMPARTMENTS Right 11/16/2022    Procedure: ARTHROPLASTY KNEE TOTAL;  Surgeon: Marcos Arcos DO;  Location: [facility];  Service: Orthopedics    TONSILLECTOMY AND ADENOIDECTOMY           11/06/24 0831   OT Last Visit   OT Visit Date 11/06/24   Note Type   Note type Evaluation   Pain Assessment   Pain Assessment Tool 0-10   Pain Score 4   Patient's Stated Pain Goal No pain   Restrictions/Precautions   Weight Bearing Precautions Per Order No   Other Precautions Cardiac/sternal;Multiple lines;Telemetry;Fall Risk;O2   Home Living   Type of Home House   Home Layout Two level;Stairs to enter with rails  (3 chato)   Bathroom Shower/Tub Walk-in shower   Bathroom Toilet Raised   Bathroom Equipment Grab bars in shower;Shower chair;Grab bars around toilet   Bathroom Accessibility Accessible   Home Equipment Cane;Walker;Stair glide  (was using rw prior to admission)   Additional Comments pt shares her dtr has built an in-law suite, pt no longer has to use stair glide to go to second floor bed/bath   Prior Function   Level of [place] Independent with functional mobility;Independent with ADLs;Independent with IADLS   Lives With Daughter   Receives Help From Family   IADLs Independent with medication management;Independent with meal prep;Family/Friend/Other provides transportation   Falls in the last 6 months (S)  >10  (pt reports bouts of dizziness w head turning that results in falls. BPPV?)   Vocational Retired   Lifestyle   Autonomy pta, pt was I W ADL/IADL, spc mobility. - , dtr provides transport   Reciprocal Relationships supportive dtr who is home 24/7   Service  to Others retired   Intrinsic Gratification spending time w family.   ADL   Where Assessed Edge of bed   Eating Assistance 6  Modified independent   Grooming Assistance 5  Supervision/Setup   UB Bathing Assistance 4  Minimal Assistance   LB Bathing Assistance 5  Supervision/Setup   UB Dressing Assistance 4  Minimal Assistance   LB Dressing Assistance 4  Minimal Assistance   Toileting Assistance  4  Minimal Assistance   Functional Assistance 4  Minimal Assistance   Bed Mobility   Supine to Sit 5  Supervision   Additional items Assist x 1;Increased time required;Verbal cues;LE management   Additional Comments found in bed, left in chair w all needs in reach   Transfers   Sit to Stand 4  Minimal assistance   Additional items Assist x 1;Increased time required;Verbal cues   Stand to Sit 5  Supervision   Additional items Increased time required;Verbal cues   Toilet transfer 5  Supervision   Additional items Commode;Increased time required   Additional Comments required min a overall for sts from bed, supervision to stand from commode. vc for hand placement   Functional Mobility   Functional Mobility 4  Minimal assistance   Additional Comments household distance. several lines; 2nd A for line mgmt   Additional items Rolling walker   Balance   Static Sitting Good   Dynamic Sitting Fair +   Static Standing Fair   Dynamic Standing Fair -   Ambulatory Poor +   Activity Tolerance   Activity Tolerance Patient tolerated treatment well   Medical Staff Made Aware dpt 2' pts med complexity, comorbidities and regression from baseline.   Nurse Made Aware cleared   RUE Assessment   RUE Assessment WFL   LUE Assessment   LUE Assessment WFL   Hand Function   Gross Motor Coordination Functional   Fine Motor Coordination Functional   Cognition   Overall Cognitive Status WFL   Arousal/Participation Alert;Responsive;Cooperative   Attention Within functional limits   Orientation Level Oriented X4   Memory Within functional limits   Following  Commands Follows all commands and directions without difficulty   Comments pt cooperative w G safety awareness and insight to condition t/o session. Provided pt w TAVR packet, pt w G understanding.   Assessment   Limitation Decreased ADL status;Decreased endurance;Decreased self-care trans;Decreased high-level ADLs   Prognosis Good   Assessment Pt is a 74 y.o. female  admitted 11/5/24 w severe aortic stenosis. Pt underwent TAVR 11/5/24, pod 1 w active OT eval and treat orders. PMH includes  has a past medical history of Anxiety, Arthritis, Asthma, CHF (congestive heart failure) (HCC), Chronic headaches, Coronary artery disease, Dementia (HCC), Depression, Dietary lactose intolerance, GERD (gastroesophageal reflux disease), History of breast cancer, History of nephrolithiasis, History of pneumonia, History of shingles, HL (hearing loss), Hypertension, IBS (irritable bowel syndrome), Lymphedema of right arm, Obesity, Osteoporosis, PFO (patent foramen ovale), PONV (postoperative nausea and vomiting), Scoliosis, Severe aortic stenosis, and Vitamin D deficiency. Pt lives w dtr  in a 2 sh w 3 chato, has walk in shower with grab bars and shower chair, raised toilet w grab bars. Pta, pt was independent w/ ADL/IADL and functional mobility, was  driving and was not using any DME at baseline. Currently, pt is Supervision for UB ADL, Min Ax1 for LB ADL, and completed transfers/FM w Min Ax1. Pt is limited at this time 2* decreased endurance/activtiy tolerance, decreased cognition, decreased ADL/High-level ADL status, decreased self-care trans, decreased safety awareness, limited home support and is a fall risk. This impacts pt's ability to complete UB and LB dressing and bathing, toileting, transfers, functional mobility, community mobility, home and health maintenance, and safe engagement in typical daily routine. The patient's raw score on the AM-PAC Daily Activity inpatient short form is 20, standardized score is 42.03, greater  than 39.4. Patients at this level are likely to benefit from discharge to home. Please refer to the recommendation of the Occupational Therapist for safe discharge planning.  From OT standpoint, pt should D/C to level 3  when medically stable. Pt will benefit from continued acute OT services 2-3 x/wk for 10-14 days to meet goals.   Goals   Patient Goals get home   LTG Time Frame 10-14   Plan   Treatment Interventions ADL retraining;Functional transfer training;Endurance training;Patient/family training;Equipment evaluation/education;Compensatory technique education;Activityengagement;Energy conservation   Goal Expiration Date 11/20/24   OT Frequency 2-3x/wk   Discharge Recommendation   Rehab Resource Intensity Level, OT III (Minimum Resource Intensity)   AM-PAC Daily Activity Inpatient   Lower Body Dressing 2   Bathing 3   Toileting 3   Upper Body Dressing 4   Grooming 4   Eating 4   Daily Activity Raw Score 20   Daily Activity Standardized Score (Calc for Raw Score >=11) 42.03   AM-PAC Applied Cognition Inpatient   Following a Speech/Presentation 4   Understanding Ordinary Conversation 4   Taking Medications 4   Remembering Where Things Are Placed or Put Away 4   Remembering List of 4-5 Errands 3   Taking Care of Complicated Tasks 3   Applied Cognition Raw Score 22   Applied Cognition Standardized Score 47.83   Modified [place] Scale   Modified [place] Scale 3   End of Consult   Education Provided Yes   Patient Position at End of Consult Bedside chair;Bed/Chair alarm activated;All needs within reach   Nurse Communication Nurse aware of consult     Pt will complete functional mobility with Mod I using appropriate DME as needed.     Pt will complete UB dressing and bathing with Mod I using appropriate DME as needed.     Pt will complete LB dressing and bathing with Mod I using appropriate DME as needed.    Pt will complete transfers with Mod I using appropriate DME as needed.     Pt will complete toileting with Mod I  using appropriate DME as needed.     Pt will complete home maintenance task with Mod I using appropriate DME as needed.     Pt will utilize energy conservation techniques throughout functional activity/ADL s/p skilled education.     Pt will demonstrate increased safety awareness during functional tasks/ADL's s/p skilled education.     Pt will increase activity tolerance to 30 minutes in order to complete ADL's/ functional tasks, using appropriate DME as needed.       Jodi David, MOT, OTR/L

## 2024-11-06 NOTE — PROGRESS NOTES
Progress Note - Electrophysiology   Name: Deyanira Stokes 74 y.o. female I MRN: [id number]  Unit/Bed#: PPHP-306-01 I Date of Admission: 11/5/2024   Date of Service: 11/6/2024 I Hospital Day: 1     Assessment & Plan  Cardiac conduction disease  - has had intermittent LBBB noted along with junctional rhythm early this morning when awake with symptoms of shortness of breath   - not on AV matilde blocking agents as an outpatient   - EF of 60% per echo 7/2024    Discussed with the patient this morning and again when patient's daughter is at bedside about recommendation for pacemaker implantation given symptomatic SSS. The procedure and recuperation afterwards were discussed at length. They are in agreement with proceeding tomorrow and will therefore be made NPO after midnight for left sided dual chamber pacemaker implantation.    Patient is not on anticoagulation that needs to be held, is right handed and has history of only right sided breast cancer. She reports having an allergy to do though not listed but was prepped prior to cath so will order dye prep.   LBBB (left bundle branch block)  - noted immediately post TAVR with resolution at 3 hour post EKG, then intermittently last night  QT prolongation  - maintained on 3 QT prolonging medications (Aricept, fluoxetine, and seroquel) as an outpatient  - currently on hold, will continue to hold until device is in place  - will need repeat EKG's after reinitiating and low threshold to consult psych to determine if any medication changes can be made  - will continue to monitor potassium and magnesium (replete magnesium today as it was 1.8)  Severe aortic stenosis  - status post TAVR 11/5/2024 with transcatheter aortic valve replacement with 26mm [place] FRANCISCO 3 Ultra Resilia bioprosthetic valve  S/P TAVR (transcatheter aortic valve replacement)    Hypertension    CHF (congestive heart failure) (HCC)  - diuretic regimen of Lasix as an outpatient  Thrombocytopenia  "(HCC)    Hypocalcemia    Dementia (HCC)  - maintained on Aricept 10mg daily as an outpatient  Depression  - maintained on fluoxetine and seroquel as an outpatient  Obesity  - BMI of 34  Ambulatory dysfunction    Frailty syndrome in geriatric patient    HX: breast cancer  - reports right sided breast cancer with no interventions on the left    Subjective/Objective   Subjective: Patient did require formal EP evaluation as QT still was read as long on EKG and patient had episode early this morning of bradycardia. Patient reports that even at home she has an unsettling feeling when she goes to bed that she may not wake up, feels short of breath. Also admits to some lightheadedness.     TELE:       EKG:         Objective:  Vitals: /63 (BP Location: Left arm)   Pulse 73   Temp 98.2 °F (36.8 °C) (Oral)   Resp (!) 30   Ht 4' 9\" (1.448 m)   Wt 72.1 kg (159 lb)   LMP  (LMP Unknown)   SpO2 94%   BMI 34.41 kg/m²     Vitals:    11/06/24 0600 11/06/24 0718   Weight: 72.5 kg (159 lb 13.3 oz) 72.1 kg (159 lb)     Orthostatic Blood Pressures      Flowsheet Row Most Recent Value   Blood Pressure 130/63 filed at 11/06/2024 0718   Patient Position - Orthostatic VS Lying filed at 11/06/2024 0718              Intake/Output Summary (Last 24 hours) at 11/6/2024 1429  Last data filed at 11/6/2024 0900  Gross per 24 hour   Intake 812 ml   Output 1412 ml   Net -600 ml       Invasive Devices       Central Venous Catheter Line  Duration             CVC Central Lines 11/05/24 1 day              Peripheral Intravenous Line  Duration             Peripheral IV 11/05/24 Left;Dorsal (posterior) Hand 1 day                              Scheduled Meds:  Current Facility-Administered Medications   Medication Dose Route Frequency Provider Last Rate    acetaminophen  650 mg Rectal Q4H PRN Scarlett Richards PA-C      acetaminophen  650 mg Oral Q4H PRN Scarlett Richards PA-C      albuterol  2 puff Inhalation Q4H PRN Scarlett Richards PA-C      " albuterol  2.5 mg Nebulization Q6H PRN Scarlett Richards PA-C      aspirin  81 mg Oral Daily Scarlett Richards PA-C      atorvastatin  20 mg Oral Daily With Dinner Scarlett Richards PA-C      bisacodyl  10 mg Rectal Daily PRN Scarlett Richards PA-C      chlorhexidine  15 mL Mouth/Throat BID Scarlett Richards PA-C      clopidogrel  75 mg Oral Daily Scarlett Richards PA-C      fluticasone  1 spray Each Nare Daily Scarlett Richards PA-C      furosemide  20 mg Oral BID Scarlett Richards PA-C      heparin (porcine)  5,000 Units Subcutaneous Q8H ALPHONSE Scarlett Richards PA-C      hydrALAZINE  10 mg Intravenous Q6H PRN Scarlett Richards PA-C      insulin lispro  1-5 Units Subcutaneous TID AC Scarlett Richards PA-C      insulin lispro  1-5 Units Subcutaneous HS Scarlett Richards PA-C      metoclopramide  10 mg Intravenous Q6H PRN Judie Eubanks PA-C      montelukast  10 mg Oral HS Scarlett Richards PA-C      mupirocin  1 Application Nasal Q12H ALPHONSE Scarlett Richards PA-C      ondansetron  4 mg Intravenous Q6H PRN Scarlett Richards PA-C      pantoprazole  40 mg Oral Early Morning Scarlett Richards PA-C      polyethylene glycol  17 g Oral Daily Scarlett Richards PA-C      potassium chloride  10 mEq Oral BID Judie Eubanks PA-C       Continuous Infusions:   PRN Meds:.  acetaminophen    acetaminophen    albuterol    albuterol    bisacodyl    hydrALAZINE    metoclopramide    ondansetron    Review of Systems:  ROS  ROS as noted above, otherwise 12 point review of systems was performed and is negative.     Physical Exam:   GEN: NAD, alert and oriented, well appearing  SKIN: dry without significant lesions or rashes  HEENT: NCAT, PERRL, EOMs intact  NECK: No JVD appreciated  CARDIOVASCULAR: regular  LUNGS: Good respiratory effort with no audible wheezes  PSYCH: Normal mood and affect  NEURO: CN ll-Xll grossly intact    Physical Exam              Lab Results: I have personally reviewed pertinent lab results.    Results from last 7 days   Lab Units 11/06/24  8636  24  1006 24  0957 24  0919 10/30/24  1547   WBC Thousand/uL 8.27  --   --   --  5.45   HEMOGLOBIN g/dL 13.5 14.8  --   --  14.0   I STAT HEMOGLOBIN g/dl  --   --  12.2   < >  --    HEMATOCRIT % 40.8 41.8  --   --  42.1   HEMATOCRIT, ISTAT %  --   --  36   < >  --    PLATELETS Thousands/uL 158 148*  --   --  191    < > = values in this interval not displayed.     Results from last 7 days   Lab Units 24  0449 24  1006 24  0957 24  0919 10/30/24  1547   POTASSIUM mmol/L 3.9 3.7  --   --  3.8   CHLORIDE mmol/L 101 106  --   --  106   CO2 mmol/L 28 29  --   --  26   CO2, I-STAT mmol/L  --   --  28   < >  --    BUN mg/dL 11 12  --   --  12   CREATININE mg/dL 0.70 0.60  --   --  0.55*   GLUCOSE, ISTAT mg/dl  --   --  120   < >  --    CALCIUM mg/dL 7.9* 8.3*  --   --  8.6    < > = values in this interval not displayed.     Results from last 7 days   Lab Units 10/30/24  1547   INR  1.03     Results from last 7 days   Lab Units 24  0449   MAGNESIUM mg/dL 1.8*       Imaging: Results Review Statement: I personally reviewed the following image studies/reports in PACS and discussed pertinent findings with Radiology: Echocardiogram. My interpretation of the radiology images/reports is: echo.  Results for orders placed during the hospital encounter of 21    Echo complete with contrast if indicated    Narrative  [address] [address] [phone]    Transthoracic Echocardiogram  2D, M-mode, Doppler, and Color Doppler    Study date:  25-May-2021    Patient: EDDIE HEARN  MR number: [id number]  Account number: [id number]  : 1950  Age: 71 years  Gender: Female  Status: Inpatient  Location: [facility]  Height: 58 in  Weight: 193 lb  BP: 136/ 80 mmHg    Indications: Delerium    Diagnoses: R41.82 - Altered mental status, unspecified    Sonographer:  Luma Louie [facility], CCT  Referring Physician:  Manish Choi,  MD  Group:  [facility]  Interpreting Physician:  Hailee Kim DO    SUMMARY    PROCEDURE INFORMATION:  This was a technically difficult study.    LEFT VENTRICLE:  Systolic function was normal. Ejection fraction was estimated to be 60 %.  Although no diagnostic regional wall motion abnormality was identified, this possibility cannot be completely excluded on the basis of this study.  Wall thickness was mildly increased.  The changes were consistent with concentric remodeling (increased wall thickness with normal wall mass).    AORTIC VALVE:  The valve was trileaflet. Leaflets exhibited mildly to moderately increased thickness, moderate calcification, and normal cuspal separation.  Transaortic velocity was increased due to valvular stenosis.  There was moderate stenosis.  Valve mean gradient was 17 mmHg.  Aortic valve area was 1.2 cmï¾² by the continuity equation.  The aortic valve obstructive index (by VTI) was 0.29.    TRICUSPID VALVE:  There was mild regurgitation.    HISTORY: PRIOR HISTORY: S/P Total knee replacement, confusion    PROCEDURE: The study was performed in the [facility]. This was a routine study. The transthoracic approach was used. The study included complete 2D imaging, M-mode, complete spectral Doppler, and color Doppler. The heart rate was 80  bpm, at the start of the study. This was a technically difficult study.    LEFT VENTRICLE: Size was normal. Systolic function was normal. Ejection fraction was estimated to be 60 %. Although no diagnostic regional wall motion abnormality was identified, this possibility cannot be completely excluded on the basis  of this study. Wall thickness was mildly increased. The changes were consistent with concentric remodeling (increased wall thickness with normal wall mass). DOPPLER: Due to tachycardia, there was fusion of early and atrial contributions to  ventricular filling.    RIGHT VENTRICLE: Not well visualized.    LEFT ATRIUM: Size was  normal.    RIGHT ATRIUM: Size was normal.    MITRAL VALVE: There was mild annular calcification. There was normal leaflet separation. DOPPLER: The transmitral velocity was within the normal range. There was no evidence for stenosis. There was trace regurgitation.    AORTIC VALVE: The valve was trileaflet. Leaflets exhibited mildly to moderately increased thickness, moderate calcification, and normal cuspal separation. DOPPLER: Transaortic velocity was increased due to valvular stenosis. There was  moderate stenosis. There was no regurgitation.    TRICUSPID VALVE: The valve structure was normal. There was normal leaflet separation. DOPPLER: The transtricuspid velocity was within the normal range. There was no evidence for stenosis. There was mild regurgitation. The tricuspid jet  envelope definition was inadequate for estimation of RV systolic pressure.    PULMONIC VALVE: Leaflets exhibited normal thickness, no calcification, and normal cuspal separation. DOPPLER: The transpulmonic velocity was within the normal range. There was no regurgitation.    PERICARDIUM: There was no pericardial effusion. The pericardium was normal in appearance.    AORTA: The root exhibited normal size.    SYSTEMIC VEINS: IVC: The inferior vena cava was not well visualized.    MEASUREMENT TABLES    DOPPLER MEASUREMENTS  Aortic valve   (Reference normals)  Mean gradient   17 mmHg   (--)  Valve area, cont   1.2 cmï¾²   (--)  Obstr index, VTI   0.29    (--)    SYSTEM MEASUREMENT TABLES    2D  %FS: 31.03 %  Ao Diam: 2.96 cm  EDV(Teich): 118.84 ml  EF(Teich): 58.47 %  ESV(Teich): 49.35 ml  IVSd: 1 cm  LA Area: 13.35 cm2  LA Diam: 3.87 cm  LVEDV MOD A4C: 70.33 ml  LVEF MOD A4C: 53.31 %  LVESV MOD A4C: 32.84 ml  LVIDd: 5.01 cm  LVIDs: 3.46 cm  LVLd A4C: 6.74 cm  LVLs A4C: 5.86 cm  LVOT Diam: 2 cm  LVPWd: 1 cm  RA Area: 12.14 cm2  RVIDd: 2.8 cm  RWT: 0.55  SV MOD A4C: 37.5 ml  SV(Teich): 69.49 ml    CW  AV Env.Ti: 267.3 ms  AV VTI: 47.62 cm  AV  Vmax: 2.44 m/s  AV Vmean: 1.78 m/s  AV maxP.01 mmHg  AV meanP.1 mmHg  RAP: 0 mmHg  TR Vmax: 2.71 m/s  TR maxP.5 mmHg    MM  TAPSE: 2.91 cm    PW  JOSEFINA (VTI): 0.83 cm2  JOSEFINA Vmax: 1.05 cm2  E' Sept: 0.11 m/s  LVOT Env.Ti: 241.52 ms  LVOT VTI: 12.7 cm  LVOT Vmax: 0.82 m/s  LVOT Vmean: 0.53 m/s  LVOT maxP.73 mmHg  LVOT meanP.32 mmHg  LVSV Dopp: 39.69 ml  MV E Johnnie: 1.44 m/s  RVSP: 29.5 mmHg    [facility]    Prepared and electronically signed by    Hailee Kim DO  Signed 25-May-2021 09:13:33      VTE Pharmacologic Prophylaxis: VTE covered by:  heparin (porcine), Subcutaneous, 5,000 Units at 24 1433     VTE Mechanical Prophylaxis: sequential compression device

## 2024-11-06 NOTE — ASSESSMENT & PLAN NOTE
On myrbetriq as outpatient, non formulary in the hospital  Do not recommend oxybutynin as substitute secondary tp side effect of increased confusion, delirium.  Urinary frequency  Monitor for urinary retention  Limit caffiene intake  Avoid alcohol, anticholinergics, opoids, sedatives, benzodiazepines  Encourage scheduled tolieting q2 hours  May benefit from bladder training  Monitor for constipation

## 2024-11-06 NOTE — PHYSICAL THERAPY NOTE
Physical Therapy Evaluation and Treatment Note    Patient's Name: Deyanira Stokes    Admitting Diagnosis  Nonrheumatic aortic valve stenosis [I35.0]    Problem List  Patient Active Problem List   Diagnosis    Irritable bowel syndrome (IBS)    Mild intermittent asthma without complication    Chronic musculoskeletal pain    Generalized anxiety disorder    Hyperlipidemia LDL goal <130    Hypertension    Lymphedema    Patent foramen ovale    Vitamin D deficiency    Recurrent major depressive disorder, in partial remission (HCC)    Other chronic pancreatitis (HCC)    Primary insomnia    Neuropathy    Age-related osteoporosis without current pathological fracture    Scoliosis    HX: breast cancer    Continuous opioid dependence (HCC)    Seborrheic keratoses    Seasonal allergies    OAB (overactive bladder)    Mixed stress and urge urinary incontinence    Chronic constipation    Arthritis of right knee    Severe aortic stenosis    Hypertensive heart disease with heart failure (HCC)    Balance disorder    Cognitive decline    Asthma    CHF (congestive heart failure) (HCC)    Coronary artery disease    Obesity    S/P TAVR (transcatheter aortic valve replacement)    LBBB (left bundle branch block)    Thrombocytopenia (HCC)    Hypocalcemia    QT prolongation    Dementia (HCC)    Depression       Past Medical History  Past Medical History:   Diagnosis Date    Anxiety     Arthritis     Asthma     CHF (congestive heart failure) (HCC)     Chronic headaches     Coronary artery disease     Dementia (HCC)     Depression     Dietary lactose intolerance     GERD (gastroesophageal reflux disease)     History of breast cancer     right, s/p mastectomy, s/p chemoc/radiation    History of nephrolithiasis     History of pneumonia     History of shingles     HL (hearing loss)     Hypertension     IBS (irritable bowel syndrome)     Lymphedema of right arm     Obesity     Osteoporosis     PFO (patent foramen ovale)     PONV (postoperative  nausea and vomiting)     Scoliosis     Severe aortic stenosis     Vitamin D deficiency        Past Surgical History  Past Surgical History:   Procedure Laterality Date    CARDIAC CATHETERIZATION N/A 10/29/2024    Procedure: LHC and RHC-Cardiac catheterization;  Surgeon: Alexi Myers MD;  Location: [facility];  Service: Cardiology    CARDIAC CATHETERIZATION N/A 10/29/2024    Procedure: Cardiac Coronary Angiogram;  Surgeon: Alexi Myers MD;  Location: [facility];  Service: Cardiology    CATARACT EXTRACTION, BILATERAL      CHOLECYSTECTOMY      COLONOSCOPY N/A 05/31/2016    Procedure: COLONOSCOPY;  Surgeon: Colton Moralez MD;  Location: [facility];  Service:     FL GUIDED NEEDLE PLAC BX/ASP/INJ  08/14/2023    FL GUIDED NEEDLE PLAC BX/ASP/INJ  10/30/2023    GALLBLADDER SURGERY      HYSTERECTOMY      Total    KNEE ARTHROSCOPY      LYMPH NODE BIOPSY      as part of the breast cancer, see chart for more details    MASTECTOMY Right     rt breast mastectomy 2005    RI ARTHRP KNE CONDYLE&PLATU MEDIAL&LAT COMPARTMENTS Left 05/20/2021    Procedure: ARTHROPLASTY KNEE TOTAL;  Surgeon: Monica Cox MD;  Location:  MAIN OR;  Service: Orthopedics    RI ARTHRP KNE CONDYLE&PLATU MEDIAL&LAT COMPARTMENTS Right 11/16/2022    Procedure: ARTHROPLASTY KNEE TOTAL;  Surgeon: Marcos Arcos DO;  Location: [facility];  Service: Orthopedics    TONSILLECTOMY AND ADENOIDECTOMY            11/06/24 0835   PT Last Visit   PT Visit Date 11/06/24   Note Type   Note type Evaluation  (and treatment)   Pain Assessment   Pain Assessment Tool 0-10   Pain Score 4   Pain Location/Orientation Location: Groin;Location: Generalized   Patient's Stated Pain Goal No pain   Hospital Pain Intervention(s) Ambulation/increased activity   Restrictions/Precautions   Weight Bearing Precautions Per Order No   Other Precautions Cardiac/sternal;Multiple lines;Telemetry;Fall Risk;Pain   Home Living   Type of Home House   Additional Comments Resides w/ dtr  in 2 story home, few ARTUR w stairglide.  Indep self care, ambulates w/ rollator   Prior Function   Level of [place] Independent with ADLs;Independent with functional mobility   Falls in the last 6 months >10   General   Family/Caregiver Present No   Cognition   Overall Cognitive Status WFL   Arousal/Participation Responsive   Orientation Level Oriented X4   Memory Unable to assess   Following Commands Follows one step commands without difficulty   Subjective   Subjective states she has some pain and fatigue.  cooperative w/ session   RLE Assessment   RLE Assessment   (strength grossly 4-/5, assessed w/ mobility)   LLE Assessment   LLE Assessment   (strength grossly 4-/5)   Coordination   Movements are Fluid and Coordinated 0   Coordination and Movement Description mild ataxia   Bed Mobility   Supine to Sit 5  Supervision   Additional items Assist x 1;Increased time required;Verbal cues   Transfers   Sit to Stand 4  Minimal assistance   Additional items Assist x 1;Increased time required;Verbal cues   Stand to Sit 5  Supervision   Additional items Assist x 1;Increased time required;Verbal cues   Toilet transfer 5  Supervision   Additional items Assist x 1;Increased time required;Verbal cues   Ambulation/Elevation   Gait pattern   (slow, antalgic, short step length, forward flexion)   Gait Assistance 4  Minimal assist   Additional items Assist x 1   Assistive Device Rolling walker   Distance during evaluation, 80'x1 w/ standing rest.  had treatment time of seated rest, gait for 10'x1 from chair to BR, followed by seated time to use same, then additional 15'x1 from toilet to sink to chair.   Balance   Static Sitting Good   Dynamic Sitting Fair   Static Standing Fair -   Dynamic Standing Fair -   Ambulatory Fair -   Endurance Deficit   Endurance Deficit Yes   Endurance Deficit Description weakness, fatigue, pain   Activity Tolerance   Activity Tolerance Patient tolerated treatment well;Patient limited by  fatigue;Patient limited by pain;Treatment limited secondary to medical complications (Comment)   Nurse Made Aware yes   Assessment   Prognosis Good   Problem List Decreased strength;Decreased endurance;Impaired balance;Decreased mobility;Pain;Decreased coordination   Assessment Pt seen for physical therapy evaluation and treatment as above, portion of which was performed as a co-evaluation w/ OT due to concerns re: medical stability.  PT portion of evaluation focused on mobility assessment where OT portion focused more on ADLs, self care.  Pt is a 73 y/o female w/ history/comorbidities of AS, HTN, HLD, asthma, R breast CA w/ mastectomy, anxiety, dementia, depression, GERD, OP, scoliosis who is now admitted w/. known AS for elective TAVR, performed 11/5/24.  Due to acute medical issues, acute procedure, pain, fall risk, note unstable clinical picture.  PT consulted to assess mobility, d/c needs.  Pt presents w/ decreased functional mob, standing balance, endurance, B LE strength, barriers at home.  Pt will benefit from skilled PT to correct for the above problems.  When stable, anticipate d/c home w/ family support, Level III (minimal) post acute care therapy needs at d/c   Goals   Patient Goals to go home   STG Expiration Date 11/20/24   Short Term Goal #1 1-2 wks: bed mob and transfers w/ indep, standing balance to good/miguel; w/ device, ambulate 200-300 ft w/ RW and mod I, increase B LE strength by 1/2-1 grade, ambulate 2-3 stairs w/ S   PT Treatment Day 0   Plan   Treatment/Interventions Functional transfer training;Elevations;LE strengthening/ROM;Therapeutic exercise;Endurance training;Patient/family training;Bed mobility;Gait training;Equipment eval/education   PT Frequency 4-6x/wk   Discharge Recommendation   Rehab Resource Intensity Level, PT III (Minimum Resource Intensity)   AM-PAC Basic Mobility Inpatient   Turning in Flat Bed Without Bedrails 3   Lying on Back to Sitting on Edge of Flat Bed Without  Bedrails 3   Moving Bed to Chair 3   Standing Up From Chair Using Arms 3   Walk in Room 3   Climb 3-5 Stairs With Railing 2   Basic Mobility Inpatient Raw Score 17   Basic Mobility Standardized Score 39.67   [facility] Highest Level Of Mobility   -[facility] Goal 5: Stand one or more mins   -HLM Achieved 7: Walk 25 feet or more         Trenton Mccoy PT, DPT, CSRS

## 2024-11-06 NOTE — CASE MANAGEMENT
Case Management Assessment & Discharge Planning Note    Patient name Deyanira Stokes  [id number]  : 1950 Date 2024       Current Admission Date: 2024  Current Admission Diagnosis:Severe aortic stenosis   Patient Active Problem List    Diagnosis Date Noted Date Diagnosed    QT prolongation 2024     Dementia ([facility]) 2024     Depression 2024     Ambulatory dysfunction 2024     Frailty syndrome in geriatric patient 2024     Cardiac conduction disease 2024     S/P TAVR (transcatheter aortic valve replacement) 2024     LBBB (left bundle branch block) 2024     Thrombocytopenia ([facility]) 2024     Hypocalcemia 2024     Asthma      CHF (congestive heart failure) ([facility])      Coronary artery disease      Obesity      Hypertensive heart disease with heart failure ([facility]) 2023     Balance disorder 2023     Cognitive decline 2023     Severe aortic stenosis 2022     Arthritis of right knee 2022     Chronic constipation 10/11/2022     Mixed stress and urge urinary incontinence 10/10/2022     Seasonal allergies 2022     Overactive bladder 2022     Continuous opioid dependence ([facility]) 01/10/2022     Seborrheic keratoses 01/10/2022     HX: breast cancer 10/12/2020     Scoliosis 09/10/2020     Age-related osteoporosis without current pathological fracture 2020     Primary insomnia 2019     Neuropathy 2019     Recurrent major depressive disorder, in partial remission (HCC) 2019     Other chronic pancreatitis ([facility]) 2019     Mild intermittent asthma without complication 2017     Lymphedema 2017     Generalized anxiety disorder 06/10/2016     Patent foramen ovale 06/10/2016     Hypertension 2016     Irritable bowel syndrome (IBS) 2016     Chronic musculoskeletal pain 2016     Vitamin D deficiency 2016     Hyperlipidemia LDL goal  <130 12/07/2015       LOS (days): 1  Geometric Mean LOS (GMLOS) (days): 1.3  Days to GMLOS:0     OBJECTIVE:    Risk of Unplanned Readmission Score: 21.45         Current admission status: Inpatient       Preferred Pharmacy:   [facility] [place], [place] - [address]  [address]  [facility] [address]  Phone: [phone] Fax: [phone]    [id number] [place] - [address]  [address]  [place] [address]  Phone: [phone] Fax: [phone]    [id number] - [place] - [address]  [address] [place] [address]  Phone: [phone] Fax: [phone]    [facility] [place] - [address]  [address]  [place] [address]  Phone: [phone] Fax: [phone]    Primary Care Provider: Merced Alex PA-C    Primary Insurance: [facility] [place]  REP  Secondary Insurance: [facility]    ASSESSMENT:  Active Health Care Proxies       Veronica Stokes [facility] Representative - Daughter   Primary Phone: [phone] (Home)  Mobile Phone: [phone]                 Advance Directives  Does patient have Advance Directives?: Yes  Advance Directives: Living will, Power of  for health care  Primary Contact: Veronicajade Stokes (Daughter)         Readmission Root Cause  30 Day Readmission: No    Patient Information  Admitted from:: Home  Mental Status: Alert  During Assessment patient was accompanied by: Daughter  Assessment information provided by:: Patient, Daughter  Primary Caregiver: Self  Support Systems: Self, Daughter (two older grandchildren)  County of Residence: [place]  What city do you live in?: [place]  Home entry access options. Select all that apply.: Stairs  Number of steps to enter home.: 3  Do the steps have railings?: Yes  Type of Current Residence: 2 story home (currently an in-law-suite is under construction)  Upon entering  residence, is there a bedroom on the main floor (no further steps)?: No  A bedroom is located on the following floor levels of residence (select all that apply):: 2nd Floor  Upon entering residence, is there a bathroom on the main floor (no further steps)?: Yes (pr)  Number of steps to 2nd floor from main floor: One Flight  Living Arrangements: Lives w/ Daughter, Other (Comment) (two grandchildren)    Activities of Daily Living Prior to Admission  Functional Status: Independent  Completes ADLs independently?: Yes  Ambulates independently?: Yes  Does patient use assisted devices?: Yes  Assisted Devices (DME) used: Straight Cane, Walker  Does patient currently own DME?: Yes  What DME does the patient currently own?: Straight Cane, Walker, Wheelchair  Does patient have a history of Outpatient Therapy (PT/OT)?: Yes  Does the patient have a history of Short-Term Rehab?: Yes ([place] [facility] and [facility])  Does patient have a history of HHC?: No  Does patient currently have HHC?: No    Patient Information Continued  Income Source: Pension/alf  Does patient have prescription coverage?: Yes  Does patient receive dialysis treatments?: No  Does patient have a history of substance abuse?: No  Does patient have a history of Mental Health Diagnosis?: No      Means of Transportation  Means of Transport to Appts:: Family transport      Social Determinants of Health (SDOH)      Flowsheet Row Most Recent Value   Housing Stability    In the last 12 months, was there a time when you were not able to pay the mortgage or rent on time? N   In the past 12 months, how many times have you moved where you were living? 0   At any time in the past 12 months, were you homeless or living in a shelter (including now)? N   Transportation Needs    In the past 12 months, has lack of transportation kept you from medical appointments or from getting medications? no   In the past 12 months, has lack of transportation kept you from meetings, work, or from  getting things needed for daily living? No   Food Insecurity    Within the past 12 months, you worried that your food would run out before you got the money to buy more. Never true   Within the past 12 months, the food you bought just didn't last and you didn't have money to get more. Never true   Utilities    In the past 12 months has the electric, gas, oil, or water company threatened to shut off services in your home? No            DISCHARGE DETAILS:    Discharge planning discussed with:: patient and daughterBrisa at bedside  Freedom of Choice: Yes  Comments - Freedom of Choice: HHC blanket referral sent  CM contacted family/caregiver?: Yes  Were Treatment Team discharge recommendations reviewed with patient/caregiver?: Yes  Did patient/caregiver verbalize understanding of patient care needs?: Yes  Were patient/caregiver advised of the risks associated with not following Treatment Team discharge recommendations?: Yes    Contacts  Patient Contacts: Veronica Stokes  Relationship to Patient:: Family  Contact Method: In Person, Phone  Phone Number: [phone] (H)  [phone] (M)  Reason/Outcome: Continuity of Care, Emergency Contact, Discharge Planning    Requested Home Health Care         Is the patient interested in HHC at discharge?: Yes  Home Health Discipline requested:: Nursing, Occupational Therapy, Physical Therapy  Home Health Follow-Up Provider:: PCP  Home Health Services Needed:: Evaluate Functional Status and Safety, Gait/ADL Training, Heart Failure Management  Homebound Criteria Met:: Uses an Assist Device (i.e. cane, walker, etc)  Supporting Clincal Findings:: Limited Endurance    Other Referral/Resources/Interventions Provided:  Interventions: HHC  Treatment Team Recommendation: Home with Home Health Care  Discharge Destination Plan:: Home with Home Health Care     Patient lives with daughter and two older grandchildren in a two story home with 3 steps to enter and a flight of stairs to the  BR/Ba.  Patient uses a cane/walker prn, retired, doesn't drive.  Hx of STR and OP services.  No hx of SA, MI or VNA services.  Daughter conveys that an attached one level in-law-suite is under construction.  [place] referral for C pending acceptance.  CM to follow. ..CM reviewed d/c planning process including the following: identifying help at home, patient preference for d/c planning needs, Discharge Lounge, [facility] Med[facility] to [facility] program, availability of treatment team to discuss questions or concerns patient and/or family may have regarding understanding medications and recognizing signs and symptoms once discharged.  CM also encouraged patient to follow up with all recommended appointments after discharge. Patient advised of importance for patient and family to participate in managing patient’s medical well being.

## 2024-11-06 NOTE — ASSESSMENT & PLAN NOTE
- maintained on 3 QT prolonging medications (Aricept, fluoxetine, and seroquel) as an outpatient  - currently on hold, will continue to hold until device is in place  - will need repeat EKG's after reinitiating and low threshold to consult psych to determine if any medication changes can be made  - will continue to monitor potassium and magnesium (replete magnesium today as it was 1.8)

## 2024-11-06 NOTE — PLAN OF CARE
Problem: PHYSICAL THERAPY ADULT  Goal: Performs mobility at highest level of function for planned discharge setting.  See evaluation for individualized goals.  Description: Treatment/Interventions: Functional transfer training, Elevations, LE strengthening/ROM, Therapeutic exercise, Endurance training, Patient/family training, Bed mobility, Gait training, Equipment eval/education          See flowsheet documentation for full assessment, interventions and recommendations.  Note: Prognosis: Good  Problem List: Decreased strength, Decreased endurance, Impaired balance, Decreased mobility, Pain, Decreased coordination  Assessment: Pt seen for physical therapy evaluation and treatment as above, portion of which was performed as a co-evaluation w/ OT due to concerns re: medical stability.  PT portion of evaluation focused on mobility assessment where OT portion focused more on ADLs, self care.  Pt is a 75 y/o female w/ history/comorbidities of AS, HTN, HLD, asthma, R breast CA w/ mastectomy, anxiety, dementia, depression, GERD, OP, scoliosis who is now admitted w/. known AS for elective TAVR, performed 11/5/24.  Due to acute medical issues, acute procedure, pain, fall risk, note unstable clinical picture.  PT consulted to assess mobility, d/c needs.  Pt presents w/ decreased functional mob, standing balance, endurance, B LE strength, barriers at home.  Pt will benefit from skilled PT to correct for the above problems.  When stable, anticipate d/c home w/ family support, Level III (minimal) post acute care therapy needs at d/c        Rehab Resource Intensity Level, PT: III (Minimum Resource Intensity)    See flowsheet documentation for full assessment.

## 2024-11-06 NOTE — PLAN OF CARE
Problem: Prexisting or High Potential for Compromised Skin Integrity  Goal: Skin integrity is maintained or improved  Description: INTERVENTIONS:  - Identify patients at risk for skin breakdown  - Assess and monitor skin integrity  - Assess and monitor nutrition and hydration status  - Monitor labs   - Assess for incontinence   - Turn and reposition patient  - Assist with mobility/ambulation  - Relieve pressure over bony prominences  - Avoid friction and shearing  - Provide appropriate hygiene as needed including keeping skin clean and dry  - Evaluate need for skin moisturizer/barrier cream  - Collaborate with interdisciplinary team   - Patient/family teaching  - Consider wound care consult   Outcome: Progressing     Problem: PAIN - ADULT  Goal: Verbalizes/displays adequate comfort level or baseline comfort level  Description: Interventions:  - Encourage patient to monitor pain and request assistance  - Assess pain using appropriate pain scale  - Administer analgesics based on type and severity of pain and evaluate response  - Implement non-pharmacological measures as appropriate and evaluate response  - Consider cultural and social influences on pain and pain management  - Notify physician/advanced practitioner if interventions unsuccessful or patient reports new pain  Outcome: Progressing     Problem: SAFETY ADULT  Goal: Patient will remain free of falls  Description: INTERVENTIONS:  - Educate patient/family on patient safety including physical limitations  - Instruct patient to call for assistance with activity   - Consult OT/PT to assist with strengthening/mobility   - Keep Call bell within reach  - Keep bed low and locked with side rails adjusted as appropriate  - Keep care items and personal belongings within reach  - Initiate and maintain comfort rounds  - Make Fall Risk Sign visible to staff  - Offer Toileting every 2 Hours, in advance of need  - Initiate/Maintain bed/chair alarm  - Apply yellow socks and  bracelet for high fall risk patients  - Consider moving patient to room near nurses station  Outcome: Progressing  Goal: Maintain or return to baseline ADL function  Description: INTERVENTIONS:  -  Assess patient's ability to carry out ADLs; assess patient's baseline for ADL function and identify physical deficits which impact ability to perform ADLs (bathing, care of mouth/teeth, toileting, grooming, dressing, etc.)  - Assess/evaluate cause of self-care deficits   - Assess range of motion  - Assess patient's mobility; develop plan if impaired  - Assess patient's need for assistive devices and provide as appropriate  - Encourage maximum independence but intervene and supervise when necessary  - Involve family in performance of ADLs  - Assess for home care needs following discharge   - Consider OT consult to assist with ADL evaluation and planning for discharge  - Provide patient education as appropriate  Outcome: Progressing  Goal: Maintains/Returns to pre admission functional level  Description: INTERVENTIONS:  - Perform AM-PAC 6 Click Basic Mobility/ Daily Activity assessment daily.  - Set and communicate daily mobility goal to care team and patient/family/caregiver.   - Collaborate with rehabilitation services on mobility goals if consulted  - Stand patient 3 times a day  - Ambulate patient 3 times a day  - Out of bed to chair 3 times a day   - Out of bed for meals 3 times a day  - Out of bed for toileting  - Record patient progress and toleration of activity level   Outcome: Progressing     Problem: DISCHARGE PLANNING  Goal: Discharge to home or other facility with appropriate resources  Description: INTERVENTIONS:  - Identify barriers to discharge w/patient and caregiver  - Arrange for needed discharge resources and transportation as appropriate  - Identify discharge learning needs (meds, wound care, etc.)  - Arrange for interpretive services to assist at discharge as needed  - Refer to Case Management  Department for coordinating discharge planning if the patient needs post-hospital services based on physician/advanced practitioner order or complex needs related to functional status, cognitive ability, or social support system  Outcome: Progressing     Problem: Knowledge Deficit  Goal: Patient/family/caregiver demonstrates understanding of disease process, treatment plan, medications, and discharge instructions  Description: Complete learning assessment and assess knowledge base.  Interventions:  - Provide teaching at level of understanding  - Provide teaching via preferred learning methods  Outcome: Progressing     Problem: CARDIOVASCULAR - ADULT  Goal: Maintains optimal cardiac output and hemodynamic stability  Description: INTERVENTIONS:  - Monitor I/O, vital signs and rhythm  - Monitor for S/S and trends of decreased cardiac output  - Administer and titrate ordered vasoactive medications to optimize hemodynamic stability  - Assess quality of pulses, skin color and temperature  - Assess for signs of decreased coronary artery perfusion  - Instruct patient to report change in severity of symptoms  Outcome: Progressing  Goal: Absence of cardiac dysrhythmias or at baseline rhythm  Description: INTERVENTIONS:  - Continuous cardiac monitoring, vital signs, obtain 12 lead EKG if ordered  - Administer antiarrhythmic and heart rate control medications as ordered  - Monitor electrolytes and administer replacement therapy as ordered  Outcome: Progressing

## 2024-11-06 NOTE — CONSULTS
Consultation - Geriatric Medicine   Name: Deyanira Stokes 74 y.o. female I MRN: [id number]  Unit/Bed#: [facility]-306-01 I Date of Admission: 11/5/2024   Date of Service: 11/6/2024 I Hospital Day: 1   Inpatient consult to [facility]ontology for [facility]  Consult performed by: JERARDO Joseph  Consult ordered by: Scarlett Richards PA-C        Physician Requesting Evaluation: Godwin Sarah DO   Reason for Evaluation / Principal Problem: aortic stenosis    Assessment & Plan  Severe aortic stenosis  S/p TAVR  Echo pending  EP on consult  LBBB (left bundle branch block)  EP on consult  QT prolongation  Qtc 510 (11/5/24) with lorena cardia  Qtc prolonging meds on hold (seroquel, aricept, prozac)  Magnesium 1.8 (11/6/24)   K 3.9 (11/6/24)   Monitor for behaviors off medications  Dementia (HCC)  Word finding, tremor, frequent falls  MOCA 22/30 (5/26/23)  Aricept 10 mg po   Referred to neuro for work up in feb 2023  CT head (7/18/23) chronic microangiopathic changes  Recommend follow up as outpatient for formal comprehensive assessment and supportive resources  Depression  Chronic use of prozac 20 mg po daily and seroquel 25 mg po QHS, on hold secondary to prolonged Qtc  Monitor for behaviors off medications  Ambulatory dysfunction  Hx of frequent falls  Ambulates with a roller walker  Fall precautions  PT/OT  Frailty syndrome in geriatric patient  Clinical Frail Scale: 5- Mildly Frail  More evident slowing, needs help high order IADLs (transport, bills, medications)  Has supportive daughter who she lives with  Keep physically, mentally and socially active  Overactive bladder  On myrbetriq as outpatient, non formulary in the hospital  Do not recommend oxybutynin as substitute secondary tp side effect of increased confusion, delirium.  Urinary frequency  Monitor for urinary retention  Limit caffiene intake  Avoid alcohol, anticholinergics, opoids, sedatives, benzodiazepines  Encourage scheduled tolieting q2 hours  May benefit  from bladder training  Monitor for constipation       History of Present Illness   Hx and PE limited by: NA  HPI: Deyanira Stokes is a 74 y.o. year old female who presents with aortic stenosis.    She has anxiety, asthma, HF, dementia, depression, GERD, HTN, dementia    Prior to arrival she lives at home with her daughter. Her daughter takes care of IADLs. She is independent with ADLs. She ambulates with a roller walker. She has frequent falls. She has urinary incontinence.    She is oob in recliner chair, alert and oriented x 3. Calm and cooperative.     Review of Systems   Constitutional:  Negative for unexpected weight change.   HENT:  Positive for hearing loss.    Eyes:  Negative for visual disturbance.   Respiratory:  Negative for cough.    Cardiovascular:  Negative for chest pain.   Gastrointestinal:  Negative for constipation.   Genitourinary:         Incontinence   Musculoskeletal:  Positive for gait problem.   Neurological:  Negative for weakness.   Psychiatric/Behavioral:  Positive for sleep disturbance.            I have reviewed the patient's PMH, PSH, Social History, Family History, Meds, and Allergies  Historical Information   Past Medical History:   Diagnosis Date    Anxiety     Arthritis     Asthma     CHF (congestive heart failure) (HCC)     Chronic headaches     Coronary artery disease     Dementia (HCC)     Depression     Dietary lactose intolerance     GERD (gastroesophageal reflux disease)     History of breast cancer     right, s/p mastectomy, s/p chemoc/radiation    History of nephrolithiasis     History of pneumonia     History of shingles     HL (hearing loss)     Hypertension     IBS (irritable bowel syndrome)     Lymphedema of right arm     Obesity     Osteoporosis     PFO (patent foramen ovale)     PONV (postoperative nausea and vomiting)     Scoliosis     Severe aortic stenosis     Vitamin D deficiency      Past Surgical History:   Procedure Laterality Date    CARDIAC CATHETERIZATION N/A  10/29/2024    Procedure: LHC and RHC-Cardiac catheterization;  Surgeon: Alexi Myers MD;  [facility];  Service: Cardiology    CARDIAC CATHETERIZATION N/A 10/29/2024    Procedure: Cardiac Coronary Angiogram;  Surgeon: Alexi Myers MD;  Location: [facility];  Service: Cardiology    CATARACT EXTRACTION, BILATERAL      CHOLECYSTECTOMY      COLONOSCOPY N/A 05/31/2016    Procedure: COLONOSCOPY;  Surgeon: Colton Moralez MD;  Location: [facility];  Service:     FL GUIDED NEEDLE PLAC BX/ASP/INJ  08/14/2023    FL GUIDED NEEDLE PLAC BX/ASP/INJ  10/30/2023    GALLBLADDER SURGERY      HYSTERECTOMY      Total    KNEE ARTHROSCOPY      LYMPH NODE BIOPSY      as part of the breast cancer, see chart for more details    MASTECTOMY Right     rt breast mastectomy 2005    MA ARTHRP KNE CONDYLE&PLATU MEDIAL&LAT COMPARTMENTS Left 05/20/2021    Procedure: ARTHROPLASTY KNEE TOTAL;  Surgeon: Monica Cox MD;  Location:  MAIN OR;  Service: Orthopedics    MA ARTHRP KNE CONDYLE&PLATU MEDIAL&LAT COMPARTMENTS Right 11/16/2022    Procedure: ARTHROPLASTY KNEE TOTAL;  Surgeon: Marcos Arcos DO;  Location: [facility];  Service: Orthopedics    TONSILLECTOMY AND ADENOIDECTOMY       Social History     Tobacco Use    Smoking status: Never     Passive exposure: Never    Smokeless tobacco: Never   Vaping Use    Vaping status: Never Used   Substance and Sexual Activity    Alcohol use: Yes     Alcohol/week: 1.0 standard drink of alcohol     Types: 1 Glasses of wine per week     Comment: this is less than once/mo, more like 1-2 drinks every 3 mo    Drug use: Not Currently     Types: Marijuana     Comment: when i was in my teens    Sexual activity: Not Currently     Partners: Male     E-Cigarette/Vaping    E-Cigarette Use Never User      E-Cigarette/Vaping Substances    Nicotine No     THC No     CBD No     Flavoring No     Other No     Unknown No      Family History   Adopted: Yes   Problem Relation Age of Onset    Diabetes  Mother     Heart disease Mother     Mental illness Mother     Depression Mother     Heart disease Brother     Breast cancer Maternal Aunt     Breast cancer Maternal Aunt     Breast cancer Maternal Aunt     Breast cancer Maternal Aunt     Breast cancer Maternal Aunt     No Known Problems Father     No Known Problems Sister     No Known Problems Daughter     Breast cancer Maternal Grandmother     No Known Problems Sister     No Known Problems Sister     No Known Problems Sister        Meds/Allergies   Home medication review  Flonase 50 mcg/act 1 spray daily, last refill 10/15/24  Aricept 10 mg po daily, last refill 10/23/24  Prozac 20 mg po daily, last refill 10/11/24 # 90 days  Montelukast 10 mg po daily, last refill 89/28/24 # 90 days  Albuterol 90 mch inhaler Q 4 prn, last refill 9/12/24  Pantoprazole 40 mg po daily, last refill 10/23/24  Seroquel 25 mg po daily, last refill 10/23/24 # 30 days  Lipitor 20 mg po daily, last refill 10/23/24  Myrbetriq 25 mg po daily, last refill 10/23/24        Objective :  Temp:  [98.2 °F (36.8 °C)-98.6 °F (37 °C)] 98.2 °F (36.8 °C)  HR:  [69-92] 73  BP: (115-143)/(56-67) 130/63  Resp:  [15-31] 30  SpO2:  [91 %-99 %] 94 %  O2 Device: None (Room air)  Nasal Cannula O2 Flow Rate (L/min):  [2 L/min] 2 L/min    Physical Exam  Vitals and nursing note reviewed.   HENT:      Head: Normocephalic.      Nose: No congestion.      Mouth/Throat:      Mouth: Mucous membranes are moist.   Eyes:      General:         Right eye: No discharge.         Left eye: No discharge.   Cardiovascular:      Rate and Rhythm: Normal rate and regular rhythm.      Pulses: Normal pulses.   Pulmonary:      Effort: Pulmonary effort is normal.      Breath sounds: Normal breath sounds.   Abdominal:      General: Bowel sounds are normal.      Palpations: Abdomen is soft.   Musculoskeletal:         General: Normal range of motion.      Cervical back: Normal range of motion.   Skin:     General: Skin is warm and dry.    Neurological:      Mental Status: She is alert and oriented to person, place, and time. Mental status is at baseline.   Psychiatric:         Mood and Affect: Mood normal.         Lab Results: I have reviewed the following results:CBC/BMP:   .     11/06/24  0449   WBC 8.27   HGB 13.5   HCT 40.8      SODIUM 137   K 3.9      CO2 28   BUN 11   CREATININE 0.70   GLUC 123   MG 1.8*        Imaging Results Review: I reviewed radiology reports from this admission including: CT head.  Other Study Results Review: EKG was reviewed.     Therapies:   Basic Mobility Inpatient Raw Score: 14  -[facility] Goal: 4: Move to chair/commode  -[facility] Achieved: 6: Walk 10 steps or more      VTE Prophylaxis: Sequential compression device (Venodyne)     Code Status: Level 1 - Full Code  Advance Directive and Living Will: Yes        Family and Social Support: daughter  No data recorded      I have spent a total time of 75 minutes in caring for this patient on the day of the visit/encounter including Diagnostic results, Prognosis, Risks and benefits of tx options, Instructions for management, Patient and family education, Importance of tx compliance, Risk factor reductions, Impressions, Counseling / Coordination of care, Documenting in the medical record, Reviewing / ordering tests, medicine, procedures  , Obtaining or reviewing history  , and Communicating with other healthcare professionals .

## 2024-11-06 NOTE — ASSESSMENT & PLAN NOTE
Word finding, tremor, frequent falls  MOCA 22/30 (5/26/23)  Aricept 10 mg po   Referred to neuro for work up in feb 2023  CT head (7/18/23) chronic microangiopathic changes  Recommend follow up as outpatient for formal comprehensive assessment and supportive resources

## 2024-11-06 NOTE — ASSESSMENT & PLAN NOTE
Qtc 510 (11/5/24) with lorena cardia  Qtc prolonging meds on hold (seroquel, aricept, prozac)  Magnesium 1.8 (11/6/24)   K 3.9 (11/6/24)   Monitor for behaviors off medications

## 2024-11-06 NOTE — ASSESSMENT & PLAN NOTE
- status post TAVR 11/5/2024 with transcatheter aortic valve replacement with 26mm Barahona FRANCISCO 3 Ultra Resilia bioprosthetic valve

## 2024-11-06 NOTE — ANESTHESIA PREPROCEDURE EVALUATION
Procedure:  Cardiac pacer implant DC (Left: Chest)    Relevant Problems   CARDIO   (+) CHF (congestive heart failure) (HCC)   (+) Cardiac conduction disease   (+) Coronary artery disease   (+) Hyperlipidemia LDL goal <130   (+) Hypertension   (+) LBBB (left bundle branch block)   (+) Severe aortic stenosis      GI/HEPATIC   (+) Other chronic pancreatitis (HCC)      HEMATOLOGY   (+) Thrombocytopenia (HCC)      MUSCULOSKELETAL   (+) Arthritis of right knee   (+) Scoliosis      NEURO/PSYCH   (+) Chronic musculoskeletal pain   (+) Continuous opioid dependence (HCC)   (+) Dementia (HCC)   (+) Depression   (+) Generalized anxiety disorder   (+) Recurrent major depressive disorder, in partial remission (HCC)      PULMONARY   (+) Asthma   (+) Mild intermittent asthma without complication      Left Ventricle: Left ventricular cavity size is normal. Wall thickness is moderately increased. There is moderate concentric hypertrophy. The left ventricular ejection fraction is 58% by biplane measurement. Systolic function is normal. Wall motion is normal. Diastolic function is mildly abnormal, consistent with grade I (abnormal) relaxation.    Right Ventricle: Right ventricular cavity size is normal. Systolic function is normal.    Left Atrium: The atrium is mildly dilated.    Aortic Valve: There is an Barahona FRANCISCO 3 Ultra Resilia 26 mm TAVR bioprosthetic valve. There is no evidence of paravalvular regurgitation. There is no evidence of transvalvular regurgitation. The gradient recorded across the prosthetic aortic valve is within the expected range. The aortic valve peak velocity is 1.82 m/s. The aortic valve mean gradient is 8 mmHg. The dimensionless velocity index is 0.47. The aortic valve area is 2.24 cm2.       Physical Exam    Airway    Mallampati score: II  TM Distance: >3 FB  Neck ROM: full     Dental       Cardiovascular      Pulmonary      Other Findings  post-pubertal.      Anesthesia Plan  ASA Score- 3     Anesthesia  Type- IV sedation with anesthesia with ASA Monitors.         Additional Monitors:     Airway Plan:            Plan Factors-    Chart reviewed.                      Induction- intravenous.    Postoperative Plan-     Perioperative Resuscitation Plan - Level 1 - Full Code.       Informed Consent- Anesthetic plan and risks discussed with patient.  I personally reviewed this patient with the CRNA. Discussed and agreed on the Anesthesia Plan with the CRNA..

## 2024-11-06 NOTE — ASSESSMENT & PLAN NOTE
Wt Readings from Last 3 Encounters:   11/06/24 72.1 kg (159 lb)   10/30/24 75.6 kg (166 lb 9.6 oz)   10/29/24 72.6 kg (160 lb)

## 2024-11-06 NOTE — PLAN OF CARE
Problem: OCCUPATIONAL THERAPY ADULT  Goal: Performs self-care activities at highest level of function for planned discharge setting.  See evaluation for individualized goals.  Description: Treatment Interventions: ADL retraining, Functional transfer training, Endurance training, Patient/family training, Equipment evaluation/education, Compensatory technique education, Activityengagement, Energy conservation          See flowsheet documentation for full assessment, interventions and recommendations.   Note: Limitation: Decreased ADL status, Decreased endurance, Decreased self-care trans, Decreased high-level ADLs  Prognosis: Good  Assessment: Pt is a 74 y.o. female  admitted 11/5/24 w severe aortic stenosis. Pt underwent TAVR 11/5/24, pod 1 w active OT eval and treat orders. PMH includes  has a past medical history of Anxiety, Arthritis, Asthma, CHF (congestive heart failure) (HCC), Chronic headaches, Coronary artery disease, Dementia (HCC), Depression, Dietary lactose intolerance, GERD (gastroesophageal reflux disease), History of breast cancer, History of nephrolithiasis, History of pneumonia, History of shingles, HL (hearing loss), Hypertension, IBS (irritable bowel syndrome), Lymphedema of right arm, Obesity, Osteoporosis, PFO (patent foramen ovale), PONV (postoperative nausea and vomiting), Scoliosis, Severe aortic stenosis, and Vitamin D deficiency. Pt lives w dtr  in a 2 sh w 3 chato, has walk in shower with grab bars and shower chair, raised toilet w grab bars. Pta, pt was independent w/ ADL/IADL and functional mobility, was  driving and was not using any DME at baseline. Currently, pt is Supervision for UB ADL, Min Ax1 for LB ADL, and completed transfers/FM w Min Ax1. Pt is limited at this time 2* decreased endurance/activtiy tolerance, decreased cognition, decreased ADL/High-level ADL status, decreased self-care trans, decreased safety awareness, limited home support and is a fall risk. This impacts pt's  ability to complete UB and LB dressing and bathing, toileting, transfers, functional mobility, community mobility, home and health maintenance, and safe engagement in typical daily routine. The patient's raw score on the AM-PAC Daily Activity inpatient short form is 20, standardized score is 42.03, greater than 39.4. Patients at this level are likely to benefit from discharge to home. Please refer to the recommendation of the Occupational Therapist for safe discharge planning.  From OT standpoint, pt should D/C to level 3  when medically stable. Pt will benefit from continued acute OT services 2-3 x/wk for 10-14 days to meet goals.     Rehab Resource Intensity Level, OT: III (Minimum Resource Intensity)

## 2024-11-07 ENCOUNTER — APPOINTMENT (INPATIENT)
Dept: RADIOLOGY | Facility: HOSPITAL | Age: 74
DRG: 267 | End: 2024-11-07
Payer: COMMERCIAL

## 2024-11-07 ENCOUNTER — ANESTHESIA (INPATIENT)
Dept: NON INVASIVE DIAGNOSTICS | Facility: HOSPITAL | Age: 74
DRG: 267 | End: 2024-11-07
Payer: COMMERCIAL

## 2024-11-07 LAB
ANION GAP SERPL CALCULATED.3IONS-SCNC: 7 MMOL/L (ref 4–13)
ATRIAL RATE: 108 BPM
ATRIAL RATE: 67 BPM
ATRIAL RATE: 68 BPM
ATRIAL RATE: 73 BPM
ATRIAL RATE: 76 BPM
ATRIAL RATE: 90 BPM
ATRIAL RATE: 96 BPM
BASOPHILS # BLD AUTO: 0.01 THOUSANDS/ΜL (ref 0–0.1)
BASOPHILS NFR BLD AUTO: 0 % (ref 0–1)
BUN SERPL-MCNC: 16 MG/DL (ref 5–25)
CALCIUM SERPL-MCNC: 8.5 MG/DL (ref 8.4–10.2)
CHLORIDE SERPL-SCNC: 102 MMOL/L (ref 96–108)
CO2 SERPL-SCNC: 30 MMOL/L (ref 21–32)
CREAT SERPL-MCNC: 0.67 MG/DL (ref 0.6–1.3)
EOSINOPHIL # BLD AUTO: 0.02 THOUSAND/ΜL (ref 0–0.61)
EOSINOPHIL NFR BLD AUTO: 0 % (ref 0–6)
ERYTHROCYTE [DISTWIDTH] IN BLOOD BY AUTOMATED COUNT: 12.1 % (ref 11.6–15.1)
GFR SERPL CREATININE-BSD FRML MDRD: 88 ML/MIN/1.73SQ M
GLUCOSE SERPL-MCNC: 106 MG/DL (ref 65–140)
GLUCOSE SERPL-MCNC: 129 MG/DL (ref 65–140)
GLUCOSE SERPL-MCNC: 140 MG/DL (ref 65–140)
GLUCOSE SERPL-MCNC: 207 MG/DL (ref 65–140)
HCT VFR BLD AUTO: 41.7 % (ref 34.8–46.1)
HGB BLD-MCNC: 13.8 G/DL (ref 11.5–15.4)
IMM GRANULOCYTES # BLD AUTO: 0.03 THOUSAND/UL (ref 0–0.2)
IMM GRANULOCYTES NFR BLD AUTO: 1 % (ref 0–2)
LYMPHOCYTES # BLD AUTO: 0.79 THOUSANDS/ΜL (ref 0.6–4.47)
LYMPHOCYTES NFR BLD AUTO: 12 % (ref 14–44)
MCH RBC QN AUTO: 30.1 PG (ref 26.8–34.3)
MCHC RBC AUTO-ENTMCNC: 33.1 G/DL (ref 31.4–37.4)
MCV RBC AUTO: 91 FL (ref 82–98)
MONOCYTES # BLD AUTO: 0.2 THOUSAND/ΜL (ref 0.17–1.22)
MONOCYTES NFR BLD AUTO: 3 % (ref 4–12)
NEUTROPHILS # BLD AUTO: 5.32 THOUSANDS/ΜL (ref 1.85–7.62)
NEUTS SEG NFR BLD AUTO: 84 % (ref 43–75)
NRBC BLD AUTO-RTO: 0 /100 WBCS
P AXIS: 42 DEGREES
P AXIS: 45 DEGREES
P AXIS: 47 DEGREES
P AXIS: 50 DEGREES
P AXIS: 52 DEGREES
P AXIS: 55 DEGREES
P AXIS: 56 DEGREES
PLATELET # BLD AUTO: 143 THOUSANDS/UL (ref 149–390)
PMV BLD AUTO: 10 FL (ref 8.9–12.7)
POTASSIUM SERPL-SCNC: 4 MMOL/L (ref 3.5–5.3)
PR INTERVAL: 174 MS
PR INTERVAL: 180 MS
PR INTERVAL: 182 MS
PR INTERVAL: 200 MS
PR INTERVAL: 204 MS
PR INTERVAL: 210 MS
PR INTERVAL: 222 MS
QRS AXIS: -2 DEGREES
QRS AXIS: 0 DEGREES
QRS AXIS: 1 DEGREES
QRS AXIS: 15 DEGREES
QRS AXIS: 20 DEGREES
QRS AXIS: 5 DEGREES
QRS AXIS: 9 DEGREES
QRSD INTERVAL: 106 MS
QRSD INTERVAL: 118 MS
QRSD INTERVAL: 126 MS
QRSD INTERVAL: 130 MS
QRSD INTERVAL: 92 MS
QRSD INTERVAL: 94 MS
QRSD INTERVAL: 94 MS
QT INTERVAL: 376 MS
QT INTERVAL: 404 MS
QT INTERVAL: 408 MS
QT INTERVAL: 422 MS
QT INTERVAL: 434 MS
QT INTERVAL: 442 MS
QT INTERVAL: 452 MS
QTC INTERVAL: 458 MS
QTC INTERVAL: 474 MS
QTC INTERVAL: 480 MS
QTC INTERVAL: 486 MS
QTC INTERVAL: 494 MS
QTC INTERVAL: 503 MS
QTC INTERVAL: 515 MS
RBC # BLD AUTO: 4.59 MILLION/UL (ref 3.81–5.12)
SODIUM SERPL-SCNC: 139 MMOL/L (ref 135–147)
T WAVE AXIS: 105 DEGREES
T WAVE AXIS: 111 DEGREES
T WAVE AXIS: 127 DEGREES
T WAVE AXIS: 129 DEGREES
T WAVE AXIS: 164 DEGREES
T WAVE AXIS: 165 DEGREES
T WAVE AXIS: 67 DEGREES
VENTRICULAR RATE: 108 BPM
VENTRICULAR RATE: 67 BPM
VENTRICULAR RATE: 68 BPM
VENTRICULAR RATE: 73 BPM
VENTRICULAR RATE: 76 BPM
VENTRICULAR RATE: 90 BPM
VENTRICULAR RATE: 96 BPM
WBC # BLD AUTO: 6.37 THOUSAND/UL (ref 4.31–10.16)

## 2024-11-07 PROCEDURE — 0JH606Z INSERTION OF PACEMAKER, DUAL CHAMBER INTO CHEST SUBCUTANEOUS TISSUE AND FASCIA, OPEN APPROACH: ICD-10-PCS | Performed by: INTERNAL MEDICINE

## 2024-11-07 PROCEDURE — C1898 LEAD, PMKR, OTHER THAN TRANS: HCPCS | Performed by: INTERNAL MEDICINE

## 2024-11-07 PROCEDURE — 93010 ELECTROCARDIOGRAM REPORT: CPT | Performed by: INTERNAL MEDICINE

## 2024-11-07 PROCEDURE — 02H63JZ INSERTION OF PACEMAKER LEAD INTO RIGHT ATRIUM, PERCUTANEOUS APPROACH: ICD-10-PCS | Performed by: INTERNAL MEDICINE

## 2024-11-07 PROCEDURE — C1785 PMKR, DUAL, RATE-RESP: HCPCS | Performed by: INTERNAL MEDICINE

## 2024-11-07 PROCEDURE — 93005 ELECTROCARDIOGRAM TRACING: CPT

## 2024-11-07 PROCEDURE — 85025 COMPLETE CBC W/AUTO DIFF WBC: CPT | Performed by: PHYSICIAN ASSISTANT

## 2024-11-07 PROCEDURE — C1887 CATHETER, GUIDING: HCPCS | Performed by: INTERNAL MEDICINE

## 2024-11-07 PROCEDURE — 3E0102A INTRODUCTION OF ANTI-INFECTIVE ENVELOPE INTO SUBCUTANEOUS TISSUE, OPEN APPROACH: ICD-10-PCS | Performed by: INTERNAL MEDICINE

## 2024-11-07 PROCEDURE — 82948 REAGENT STRIP/BLOOD GLUCOSE: CPT

## 2024-11-07 PROCEDURE — 80048 BASIC METABOLIC PNL TOTAL CA: CPT | Performed by: PHYSICIAN ASSISTANT

## 2024-11-07 PROCEDURE — C1769 GUIDE WIRE: HCPCS | Performed by: INTERNAL MEDICINE

## 2024-11-07 PROCEDURE — 33208 INSRT HEART PM ATRIAL & VENT: CPT | Performed by: INTERNAL MEDICINE

## 2024-11-07 PROCEDURE — 02HK3JZ INSERTION OF PACEMAKER LEAD INTO RIGHT VENTRICLE, PERCUTANEOUS APPROACH: ICD-10-PCS | Performed by: INTERNAL MEDICINE

## 2024-11-07 PROCEDURE — 71045 X-RAY EXAM CHEST 1 VIEW: CPT

## 2024-11-07 PROCEDURE — C1892 INTRO/SHEATH,FIXED,PEEL-AWAY: HCPCS | Performed by: INTERNAL MEDICINE

## 2024-11-07 PROCEDURE — 99232 SBSQ HOSP IP/OBS MODERATE 35: CPT | Performed by: THORACIC SURGERY (CARDIOTHORACIC VASCULAR SURGERY)

## 2024-11-07 DEVICE — ENVELOPE CMRM6122 ABSORB MED MR
Type: IMPLANTABLE DEVICE | Site: CHEST  WALL | Status: FUNCTIONAL
Brand: TYRX™

## 2024-11-07 DEVICE — LEAD 457453 MRI US BI RCMCRD MVC
Type: IMPLANTABLE DEVICE | Site: HEART | Status: FUNCTIONAL
Brand: CAPSURE SENSE MRI™ SURESCAN™

## 2024-11-07 DEVICE — IPG W1DR01 AZURE XT DR MRI USA
Type: IMPLANTABLE DEVICE | Site: CHEST  WALL | Status: FUNCTIONAL
Brand: AZURE™ XT DR MRI SURESCAN™

## 2024-11-07 DEVICE — LEAD 3830 US MKT/ 69CM MRI LBBAP
Type: IMPLANTABLE DEVICE | Site: HEART | Status: FUNCTIONAL
Brand: SELECTSECURE™ MRI SURESCAN™

## 2024-11-07 RX ORDER — LIDOCAINE HYDROCHLORIDE 10 MG/ML
INJECTION, SOLUTION EPIDURAL; INFILTRATION; INTRACAUDAL; PERINEURAL CODE/TRAUMA/SEDATION MEDICATION
Status: DISCONTINUED | OUTPATIENT
Start: 2024-11-07 | End: 2024-11-07 | Stop reason: HOSPADM

## 2024-11-07 RX ORDER — SODIUM CHLORIDE 9 MG/ML
10 INJECTION, SOLUTION INTRAVENOUS CONTINUOUS
Status: CANCELLED | OUTPATIENT
Start: 2024-11-07

## 2024-11-07 RX ORDER — PROPOFOL 10 MG/ML
INJECTION, EMULSION INTRAVENOUS AS NEEDED
Status: DISCONTINUED | OUTPATIENT
Start: 2024-11-07 | End: 2024-11-07

## 2024-11-07 RX ORDER — MIDAZOLAM HYDROCHLORIDE 2 MG/2ML
INJECTION, SOLUTION INTRAMUSCULAR; INTRAVENOUS AS NEEDED
Status: DISCONTINUED | OUTPATIENT
Start: 2024-11-07 | End: 2024-11-07

## 2024-11-07 RX ORDER — FENTANYL CITRATE 50 UG/ML
INJECTION, SOLUTION INTRAMUSCULAR; INTRAVENOUS AS NEEDED
Status: DISCONTINUED | OUTPATIENT
Start: 2024-11-07 | End: 2024-11-07

## 2024-11-07 RX ORDER — PROPOFOL 10 MG/ML
INJECTION, EMULSION INTRAVENOUS CONTINUOUS PRN
Status: DISCONTINUED | OUTPATIENT
Start: 2024-11-07 | End: 2024-11-07

## 2024-11-07 RX ORDER — PHENYLEPHRINE HCL IN 0.9% NACL 1 MG/10 ML
SYRINGE (ML) INTRAVENOUS AS NEEDED
Status: DISCONTINUED | OUTPATIENT
Start: 2024-11-07 | End: 2024-11-07

## 2024-11-07 RX ORDER — QUETIAPINE FUMARATE 25 MG/1
25 TABLET, FILM COATED ORAL
Status: DISCONTINUED | OUTPATIENT
Start: 2024-11-07 | End: 2024-11-08 | Stop reason: HOSPADM

## 2024-11-07 RX ORDER — CEFAZOLIN SODIUM 2 G/50ML
SOLUTION INTRAVENOUS AS NEEDED
Status: DISCONTINUED | OUTPATIENT
Start: 2024-11-07 | End: 2024-11-07

## 2024-11-07 RX ORDER — GENTAMICIN 40 MG/ML
INJECTION, SOLUTION INTRAMUSCULAR; INTRAVENOUS CODE/TRAUMA/SEDATION MEDICATION
Status: DISCONTINUED | OUTPATIENT
Start: 2024-11-07 | End: 2024-11-07 | Stop reason: HOSPADM

## 2024-11-07 RX ORDER — SODIUM CHLORIDE 9 MG/ML
INJECTION, SOLUTION INTRAVENOUS CONTINUOUS PRN
Status: DISCONTINUED | OUTPATIENT
Start: 2024-11-07 | End: 2024-11-07

## 2024-11-07 RX ORDER — DONEPEZIL HYDROCHLORIDE 10 MG/1
10 TABLET, FILM COATED ORAL
Status: DISCONTINUED | OUTPATIENT
Start: 2024-11-07 | End: 2024-11-08 | Stop reason: HOSPADM

## 2024-11-07 RX ADMIN — CHLORHEXIDINE GLUCONATE 0.12% ORAL RINSE 15 ML: 1.2 LIQUID ORAL at 17:46

## 2024-11-07 RX ADMIN — PROPOFOL 20 MG: 10 INJECTION, EMULSION INTRAVENOUS at 08:40

## 2024-11-07 RX ADMIN — FUROSEMIDE 20 MG: 20 TABLET ORAL at 10:35

## 2024-11-07 RX ADMIN — MONTELUKAST 10 MG: 10 TABLET, FILM COATED ORAL at 21:14

## 2024-11-07 RX ADMIN — DONEPEZIL HYDROCHLORIDE 10 MG: 10 TABLET ORAL at 21:14

## 2024-11-07 RX ADMIN — POTASSIUM CHLORIDE 10 MEQ: 750 TABLET, EXTENDED RELEASE ORAL at 10:32

## 2024-11-07 RX ADMIN — HEPARIN SODIUM 5000 UNITS: 5000 INJECTION, SOLUTION INTRAVENOUS; SUBCUTANEOUS at 21:15

## 2024-11-07 RX ADMIN — Medication 100 MCG: at 09:04

## 2024-11-07 RX ADMIN — POTASSIUM CHLORIDE 10 MEQ: 750 TABLET, EXTENDED RELEASE ORAL at 17:47

## 2024-11-07 RX ADMIN — MUPIROCIN 1 APPLICATION: 20 OINTMENT TOPICAL at 21:14

## 2024-11-07 RX ADMIN — PANTOPRAZOLE SODIUM 40 MG: 40 TABLET, DELAYED RELEASE ORAL at 06:11

## 2024-11-07 RX ADMIN — Medication 50 MCG: at 09:13

## 2024-11-07 RX ADMIN — FLUOXETINE HYDROCHLORIDE 20 MG: 20 CAPSULE ORAL at 10:42

## 2024-11-07 RX ADMIN — SODIUM CHLORIDE: 0.9 INJECTION, SOLUTION INTRAVENOUS at 07:30

## 2024-11-07 RX ADMIN — CEFAZOLIN SODIUM 2000 MG: 2 SOLUTION INTRAVENOUS at 08:26

## 2024-11-07 RX ADMIN — HEPARIN SODIUM 5000 UNITS: 5000 INJECTION, SOLUTION INTRAVENOUS; SUBCUTANEOUS at 06:11

## 2024-11-07 RX ADMIN — METHYLPREDNISOLONE 32 MG: 16 TABLET ORAL at 06:11

## 2024-11-07 RX ADMIN — CLOPIDOGREL BISULFATE 75 MG: 75 TABLET ORAL at 10:32

## 2024-11-07 RX ADMIN — FENTANYL CITRATE 25 MCG: 50 INJECTION INTRAMUSCULAR; INTRAVENOUS at 08:24

## 2024-11-07 RX ADMIN — FLUTICASONE PROPIONATE 1 SPRAY: 50 SPRAY, METERED NASAL at 10:49

## 2024-11-07 RX ADMIN — POLYETHYLENE GLYCOL 3350 17 G: 17 POWDER, FOR SOLUTION ORAL at 10:35

## 2024-11-07 RX ADMIN — CHLORHEXIDINE GLUCONATE 0.12% ORAL RINSE 15 ML: 1.2 LIQUID ORAL at 10:32

## 2024-11-07 RX ADMIN — INSULIN LISPRO 1 UNITS: 100 INJECTION, SOLUTION INTRAVENOUS; SUBCUTANEOUS at 17:48

## 2024-11-07 RX ADMIN — PROPOFOL 20 MG: 10 INJECTION, EMULSION INTRAVENOUS at 08:24

## 2024-11-07 RX ADMIN — FENTANYL CITRATE 25 MCG: 50 INJECTION INTRAMUSCULAR; INTRAVENOUS at 08:38

## 2024-11-07 RX ADMIN — ATORVASTATIN CALCIUM 20 MG: 20 TABLET, FILM COATED ORAL at 17:46

## 2024-11-07 RX ADMIN — PROPOFOL 50 MCG/KG/MIN: 10 INJECTION, EMULSION INTRAVENOUS at 08:24

## 2024-11-07 RX ADMIN — QUETIAPINE FUMARATE 25 MG: 25 TABLET ORAL at 21:16

## 2024-11-07 RX ADMIN — MUPIROCIN 1 APPLICATION: 20 OINTMENT TOPICAL at 10:32

## 2024-11-07 RX ADMIN — ASPIRIN 81 MG CHEWABLE TABLET 81 MG: 81 TABLET CHEWABLE at 10:32

## 2024-11-07 RX ADMIN — FUROSEMIDE 20 MG: 20 TABLET ORAL at 17:47

## 2024-11-07 RX ADMIN — ACETAMINOPHEN 650 MG: 325 TABLET, FILM COATED ORAL at 21:14

## 2024-11-07 RX ADMIN — MIDAZOLAM 1 MG: 1 INJECTION INTRAMUSCULAR; INTRAVENOUS at 08:24

## 2024-11-07 RX ADMIN — HEPARIN SODIUM 5000 UNITS: 5000 INJECTION, SOLUTION INTRAVENOUS; SUBCUTANEOUS at 13:40

## 2024-11-07 NOTE — CASE MANAGEMENT
Case Management Discharge Planning Note    Patient name Deyanira Stokes  Location [facility] MRN [id number]  : 1950 Date 2024       Current Admission Date: 2024  Current Admission Diagnosis:Severe aortic stenosis   Patient Active Problem List    Diagnosis Date Noted Date Diagnosed    QT prolongation 2024     Dementia ([facility]) 2024     Depression 2024     Ambulatory dysfunction 2024     Frailty syndrome in geriatric patient 2024     Cardiac conduction disease 2024     S/P TAVR (transcatheter aortic valve replacement) 2024     LBBB (left bundle branch block) 2024     Thrombocytopenia ([facility]) 2024     Hypocalcemia 2024     Asthma      CHF (congestive heart failure) ([facility])      Coronary artery disease      Obesity      Hypertensive heart disease with heart failure ([facility]) 2023     Balance disorder 2023     Cognitive decline 2023     Severe aortic stenosis 2022     Arthritis of right knee 2022     Chronic constipation 10/11/2022     Mixed stress and urge urinary incontinence 10/10/2022     Seasonal allergies 2022     Overactive bladder 2022     Continuous opioid dependence ([facility]) 01/10/2022     Seborrheic keratoses 01/10/2022     HX: breast cancer 10/12/2020     Scoliosis 09/10/2020     Age-related osteoporosis without current pathological fracture 2020     Primary insomnia 2019     Neuropathy 2019     Recurrent major depressive disorder, in partial remission (HCC) 2019     Other chronic pancreatitis ([facility]) 2019     Mild intermittent asthma without complication 2017     Lymphedema 2017     Generalized anxiety disorder 06/10/2016     Patent foramen ovale 06/10/2016     Hypertension 2016     Irritable bowel syndrome (IBS) 2016     Chronic musculoskeletal pain 2016     Vitamin D deficiency 2016     Hyperlipidemia LDL goal <130 2015        LOS (days): 2  Geometric Mean LOS (GMLOS) (days): 1.3  Days to GMLOS:-1     OBJECTIVE:  Risk of Unplanned Readmission Score: 18.62         Current admission status: Inpatient   Preferred Pharmacy:   [facility] [place], [place] - [address]  [address]  [address]  [facility] [address]  Phone: [phone] Fax: [phone]    [id number] [place] - [address]  [address]  [place] [address]  Phone: [phone] Fax: [phone]    [id number] - [place] - [address]  [address] [place] [address]  Phone: [phone] Fax: [phone]    [facility] - [place] - [address]  [address]  [place] [address]  Phone: [phone] Fax: [phone]    Primary Care Provider: Merced Alex PA-C    Primary Insurance: [facility] [place] D[place] COMPLETE [place] RE[place]  Secondary Insurance: [facility]    DISCHARGE DETAILS:    Discharge planning discussed with:: patient                           Requested Home Health Care         Is the patient interested in HHC at discharge?: Yes  Home Health Discipline requested:: Home Health Aide, Nursing, Occupational Therapy, Physical Therapy  Home Health Follow-Up Provider:: PCP  Homebound Criteria Met:: Requires the Assistance of Another Person for Safe Ambulation or to Leave the Home, Uses an Assist Device (i.e. cane, walker, etc)                       Additional Comments: Patient is in agreement to HHC referral and says she DOES NOT want [facility] if possible.  HHC referral made via [facility] and awaiting determination.  CM to be available        It was determined HHC referral made yesterday so referral made today was cancelled.  [facility]  [facility] and was [facility] in [facility]  CM to be available

## 2024-11-07 NOTE — CASE MANAGEMENT
Case Management Discharge Planning Note    Patient name Deyanira Stokes  Location [facility] MRN [id number]  : 1950 Date 2024       Current Admission Date: 2024  Current Admission Diagnosis:Severe aortic stenosis   Patient Active Problem List    Diagnosis Date Noted Date Diagnosed    QT prolongation 2024     Dementia ([facility]) 2024     Depression 2024     Ambulatory dysfunction 2024     Frailty syndrome in geriatric patient 2024     Cardiac conduction disease 2024     S/P TAVR (transcatheter aortic valve replacement) 2024     LBBB (left bundle branch block) 2024     Thrombocytopenia ([facility]) 2024     Hypocalcemia 2024     Asthma      CHF (congestive heart failure) ([facility])      Coronary artery disease      Obesity      Hypertensive heart disease with heart failure ([facility]) 2023     Balance disorder 2023     Cognitive decline 2023     Severe aortic stenosis 2022     Arthritis of right knee 2022     Chronic constipation 10/11/2022     Mixed stress and urge urinary incontinence 10/10/2022     Seasonal allergies 2022     Overactive bladder 2022     Continuous opioid dependence ([facility]) 01/10/2022     Seborrheic keratoses 01/10/2022     HX: breast cancer 10/12/2020     Scoliosis 09/10/2020     Age-related osteoporosis without current pathological fracture 2020     Primary insomnia 2019     Neuropathy 2019     Recurrent major depressive disorder, in partial remission (HCC) 2019     Other chronic pancreatitis ([facility]) 2019     Mild intermittent asthma without complication 2017     Lymphedema 2017     Generalized anxiety disorder 06/10/2016     Patent foramen ovale 06/10/2016     Hypertension 2016     Irritable bowel syndrome (IBS) 2016     Chronic musculoskeletal pain 2016     Vitamin D deficiency 2016     Hyperlipidemia LDL goal <130 2015        LOS (days): 2  Geometric Mean LOS (GMLOS) (days): 1.3  Days to GMLOS:-1     OBJECTIVE:  Risk of Unplanned Readmission Score: 18.62         Current admission status: Inpatient   Preferred Pharmacy:   [facility] [place], [place] - [address]  [address]  [address]  [facility] [address]  Phone: [phone] Fax: [phone]    [id number] [place] - [address]  [address]  [place] [address]  Phone: [phone] Fax: [phone]    [id number] - [place] - [address]  [address] [place] [address]  Phone: [phone] Fax: [phone]    [facility] [place] - [place] - [address]  [address]  [place] [address]  Phone: [phone] Fax: [phone]    Primary Care Provider: Merced Alex PA-C    Primary Insurance: [facility] [place]TE [place] RE[place]  Secondary Insurance: [facility]    DISCHARGE DETAILS:    Discharge planning discussed with:: patient                           Requested Home Health Care         Is the patient interested in HHC at discharge?: Yes  Home Health Discipline requested:: Home Health Aide, Nursing, Occupational Therapy, Physical Therapy  Home Health Agency Name:: [facility]  Home Health Follow-Up Provider:: PCP  Homebound Criteria Met:: Requires the Assistance of Another Person for Safe Ambulation or to Leave the Home, Uses an Assist Device (i.e. cane, walker, etc)                    Additional Comments: Patient is in agreement to HHC referral and says she DOES NOT want [facility] if possible.  HHC referral made via [facility] and awaiting determination.  CM to be available

## 2024-11-07 NOTE — ANESTHESIA POSTPROCEDURE EVALUATION
Post-Op Assessment Note    CV Status:  Stable  Pain Score: 0    Pain management: adequate       Mental Status:  Alert and awake   Hydration Status:  Euvolemic   PONV Controlled:  Controlled   Airway Patency:  Patent     Post Op Vitals Reviewed: Yes    No anethesia notable event occurred.    Staff: CRNA           Last Filed PACU Vitals:  Vitals Value Taken Time   Temp Rn note    Pulse 76    BP 99/61    Resp 15    SpO2 96% RA        Modified Audrey:  No data recorded

## 2024-11-07 NOTE — OCCUPATIONAL THERAPY NOTE
Occupational Therapy Cancel Note        Patient Name: Deyanira Stokes  Today's Date: 11/7/2024 11/07/24 0755   OT Last Visit   OT Visit Date 11/07/24   Note Type   Note Type Cancelled Session   Cancel Reasons Patient to operating room       Chart reviewed, pt off the floor at Cath lab for PPM placement. Will hold at this time and continue to follow as pt is medically appropriate for therapy.      Patricia Flores, SPRING, OTR/L

## 2024-11-07 NOTE — PHYSICAL THERAPY NOTE
Physical Therapy Cancellation Note         11/07/24 0810   Note Type   Note Type Cancelled Session   Cancel Reasons Patient to operating room     Pt is in cath lab this AM; will follow.    Nathaniel Quezada

## 2024-11-07 NOTE — PROGRESS NOTES
Progress Note - Cardiac Surgery   Deyanira Stokes 74 y.o. female MRN: [id number]  Unit/Bed#: [facility]01 Encounter: [id number]    Aortic stenosis, Non-Rheumatic. S/P transfemoral transcatheter aortic valve replacement; POD # 2    24 Hour Events: EP following - symptomatic SSS, s/p dual chamber PPM today. Will resume Aricept and Prozac and recheck ECG later today to check Qtc.     Medications:   Scheduled Meds:  Current Facility-Administered Medications   Medication Dose Route Frequency Provider Last Rate    acetaminophen  650 mg Rectal Q4H PRN Scarlett Richards PA-C      acetaminophen  650 mg Oral Q4H PRN Scarlett Richards PA-C      albuterol  2 puff Inhalation Q4H PRN Scarlett Richards PA-C      aspirin  81 mg Oral Daily Scarlett Richards PA-C      atorvastatin  20 mg Oral Daily With Dinner Scarlett Richards PA-C      bisacodyl  10 mg Rectal Daily PRN Scarlett Richards PA-C      chlorhexidine  15 mL Mouth/Throat BID Scarlett Richards PA-C      clopidogrel  75 mg Oral Daily Scarlett Richards PA-C      diphenhydrAMINE  50 mg Oral 60 Min Pre-Op Puja SodenKOFI      fluticasone  1 spray Each Nare Daily Scarlett Richards PA-C      furosemide  20 mg Oral BID Scarlett Richards PA-C      heparin (porcine)  5,000 Units Subcutaneous Q8H ALPHONSE Scarlett Richards PA-C      hydrALAZINE  10 mg Intravenous Q6H PRN Scarlett Richards PA-C      insulin lispro  1-5 Units Subcutaneous TID AC Scarlett Richards PA-C      insulin lispro  1-5 Units Subcutaneous HS Scarlett Richards PA-C      metoclopramide  10 mg Intravenous Q6H PRN Judie Eubanks PA-C      montelukast  10 mg Oral HS Scarlett Richards PA-C      mupirocin  1 Application Nasal Q12H ALPHONSE Scarlett Richards PA-C      ondansetron  4 mg Intravenous Q6H PRN Scarlett Richards PA-C      pantoprazole  40 mg Oral Early Morning Scarlett Richards PA-C      polyethylene glycol  17 g Oral Daily Scarlett Richards PA-C      potassium chloride  10 mEq Oral BID Judie Eubanks PA-C       Continuous Infusions:     PRN Meds:.   acetaminophen    acetaminophen    albuterol    bisacodyl    hydrALAZINE    metoclopramide    ondansetron    Vitals:   Vitals:    11/06/24 1917 11/06/24 2215 11/06/24 2328 11/07/24 0532   BP: 138/76  123/72    BP Location:       Pulse: 80  96    Resp:       Temp: 98.4 °F (36.9 °C)  98.5 °F (36.9 °C)    TempSrc:       SpO2: 92% 91% 92%    Weight:    71.4 kg (157 lb 6.5 oz)   Height:           Telemetry: NSR; Heart Rate: 70s, no lorena overnight, intermittent LBBB    Respiratory:   SpO2: SpO2: 92 %; Room Air    Intake/Output:     Intake/Output Summary (Last 24 hours) at 11/7/2024 0706  Last data filed at 11/7/2024 0413  Gross per 24 hour   Intake 620 ml   Output 1500 ml   Net -880 ml        Weights:   Weight (last 2 days)       Date/Time Weight    11/07/24 0532 71.4 (157.41)    11/06/24 0718 72.1 (159)    11/06/24 0600 72.5 (159.83)    11/05/24 0741 73.1 (161.2)            Results:   Results from last 7 days   Lab Units 11/07/24  0434 11/06/24 0449 11/05/24  1006   WBC Thousand/uL 6.37 8.27  --    HEMOGLOBIN g/dL 13.8 13.5 14.8   HEMATOCRIT % 41.7 40.8 41.8   PLATELETS Thousands/uL 143* 158 148*     Results from last 7 days   Lab Units 11/07/24  0434 11/06/24  0449 11/05/24  1006 11/05/24  0957   POTASSIUM mmol/L 4.0 3.9 3.7  --    CHLORIDE mmol/L 102 101 106  --    CO2 mmol/L 30 28 29  --    CO2, I-STAT mmol/L  --   --   --  28   BUN mg/dL 16 11 12  --    CREATININE mg/dL 0.67 0.70 0.60  --    GLUCOSE, ISTAT mg/dl  --   --   --  120   CALCIUM mg/dL 8.5 7.9* 8.3*  --            Recent Labs     11/06/24 0449   MG 1.8*     Point of care glucose: BS 95 - 141    Studies:    ECG 11/6/24: NSR with PVCs or fusion complexes. Prolonged  (418)    PCXR 11/6/24: final report pending. Mild pulm vascular congestion, improved in comparison to yesterday. Elevated right hemidiaphragm. No ptx or effusion    Echocardiogram:     Left Ventricle: Left ventricular cavity size is normal. Wall thickness is moderately increased. There is  moderate concentric hypertrophy. The left ventricular ejection fraction is 58% by biplane measurement. Systolic function is normal. Wall motion is normal. Diastolic function is mildly abnormal, consistent with grade I (abnormal) relaxation.    Right Ventricle: Right ventricular cavity size is normal. Systolic function is normal.    Left Atrium: The atrium is mildly dilated.    Aortic Valve: There is an Barahona FRANCISCO 3 Ultra Resilia 26 mm TAVR bioprosthetic valve. There is no evidence of paravalvular regurgitation. There is no evidence of transvalvular regurgitation. The gradient recorded across the prosthetic aortic valve is within the expected range. The aortic valve peak velocity is 1.82 m/s. The aortic valve mean gradient is 8 mmHg. The dimensionless velocity index is 0.47. The aortic valve area is 2.24 cm2.       Results Review Statement: I personally reviewed the following image studies in PACS and associated radiology reports: EKG and chest xray. My interpretation of the radiology images/reports is: as noted above.    Invasive Lines/Tubes:  Invasive Devices       Central Venous Catheter Line  Duration             CVC Central Lines 11/05/24 1 day              Peripheral Intravenous Line  Duration             Peripheral IV 11/05/24 Left;Dorsal (posterior) Hand 1 day                    Physical Exam:    General: No acute distress, Alert, and Normal appearance  HEENT/NECK:  Normocephalic. Atraumatic.  no jugular venous distention.    Cardiac: Regular rate and rhythm and No murmurs/rubs/gallops  Pulmonary:  Breath sounds clear bilaterally and No rales/rhonchi/wheezes  Abdomen:  Non-tender, Non-distended, and Normal bowel sounds  Incisions: Groin puncture sites clean, dry, and intact without hematoma and Pacemaker incision dressed with Acticoat.  No erythema or drainage  Extremities: Extremities warm/dry and No edema B/L  Neuro: Alert and oriented X 3, Sensation is grossly intact, and No focal deficits  Skin:  Warm/Dry, without rashes or lesions.     Assessment:  Principal Problem:    Severe aortic stenosis  Active Problems:    Hypertension    HX: breast cancer    Overactive bladder    Asthma    CHF (congestive heart failure) (HCC)    Coronary artery disease    Obesity    S/P TAVR (transcatheter aortic valve replacement)    LBBB (left bundle branch block)    Thrombocytopenia (HCC)    Hypocalcemia    QT prolongation    Dementia (HCC)    Depression    Ambulatory dysfunction    Frailty syndrome in geriatric patient    Cardiac conduction disease       Aortic stenosis, Non-Rheumatic. S/P transfemoral transcatheter aortic valve replacement; POD # 2    Plan:    Cardiac:     Normal ventricular systolic function, EF 60%    NSR; HR well-controlled  BP well-controlled    Symptomatic SSS, intermittent LBBB, phase 4 black - EP following, for PPM today    HTN Regimen: none, was not on any anti-HTN prior to procedure    Prolonged QTC - home Donepezil, Fluoxetine and Seroquel held yesterday due to prolonged QTC. Continue to hold pending PPM    Beta blocker not indicated/prescribed prior to TAVR; Will not initiate during this admission secondary to post TAVR risk of heart block    TAVR anticoagulation regimen: ASA/Plavix      Postoperative transthoracic echocardiogram:  Echo completed; Well seated AV, without PVL    Continue Statin therapy    Maintain central IV access today for lack of peripheral access    Continue Subcutaneous Heparin for DVT prophylaxis    Pulmonary:     Good Room air oxygen saturation; Continue incentive spirometry/Coughing/Deep breathing exercises    Renal:     Normal preoperative renal function  Creatinine 0.67 today, from 0.7    Intake/Output net: -880 mL/24 hours    Diuretic Regimen:  Continue PO Lasix, 20 mg BID  Continue Potassium Chloride 10 mEq PO BID    Neuro:    Neurologically intact; No active issues     Incisional pain well-controlled; Continue prn Tylenol    GI:    Cardiac diet, with 1800 mL fluid  restriction    Tolerating diet without complaint - currently NPO for PPM today    Continue stool softeners and prn suppository    Continue GI prophylaxis    Endo:     Glucose well-controlled with insulin sliding scale coverage    7.  Hematology:     Post-operative blood count acceptable; Trend prn    8.  Disposition:    Gerontology consultation already completed for routine assessment of TAVR patient over 65 years old    Following daily PT/OT recommendations regarding home vs. rehab when medically cleared for discharge - home with home PT/OT when medically ready  Not yet medically cleared for discharge, pending PPM today      VTE Pharmacologic Prophylaxis: Heparin  VTE Mechanical Prophylaxis: sequential compression device    Collaborative rounds completed with supervising physician  Plan of care discussed with bedside nurse    SIGNATURE: Kim Zhang PA-C  DATE: November 7, 2024  TIME: 7:06 AM

## 2024-11-08 ENCOUNTER — APPOINTMENT (INPATIENT)
Dept: RADIOLOGY | Facility: HOSPITAL | Age: 74
DRG: 267 | End: 2024-11-08
Payer: COMMERCIAL

## 2024-11-08 ENCOUNTER — HOME HEALTH ADMISSION (OUTPATIENT)
Dept: HOME HEALTH SERVICES | Facility: HOME HEALTHCARE | Age: 74
End: 2024-11-08
Payer: COMMERCIAL

## 2024-11-08 VITALS
HEIGHT: 57 IN | BODY MASS INDEX: 34.58 KG/M2 | DIASTOLIC BLOOD PRESSURE: 66 MMHG | WEIGHT: 160.27 LBS | TEMPERATURE: 97.8 F | HEART RATE: 78 BPM | RESPIRATION RATE: 16 BRPM | SYSTOLIC BLOOD PRESSURE: 124 MMHG | OXYGEN SATURATION: 96 %

## 2024-11-08 DIAGNOSIS — R09.82 PND (POST-NASAL DRIP): ICD-10-CM

## 2024-11-08 DIAGNOSIS — J45.20 MILD INTERMITTENT ASTHMA WITHOUT COMPLICATION: ICD-10-CM

## 2024-11-08 DIAGNOSIS — J30.2 SEASONAL ALLERGIES: ICD-10-CM

## 2024-11-08 LAB
ANION GAP SERPL CALCULATED.3IONS-SCNC: 5 MMOL/L (ref 4–13)
BUN SERPL-MCNC: 22 MG/DL (ref 5–25)
CALCIUM SERPL-MCNC: 8.2 MG/DL (ref 8.4–10.2)
CHLORIDE SERPL-SCNC: 106 MMOL/L (ref 96–108)
CO2 SERPL-SCNC: 30 MMOL/L (ref 21–32)
CREAT SERPL-MCNC: 0.63 MG/DL (ref 0.6–1.3)
ERYTHROCYTE [DISTWIDTH] IN BLOOD BY AUTOMATED COUNT: 12.3 % (ref 11.6–15.1)
GFR SERPL CREATININE-BSD FRML MDRD: 89 ML/MIN/1.73SQ M
GLUCOSE SERPL-MCNC: 114 MG/DL (ref 65–140)
GLUCOSE SERPL-MCNC: 96 MG/DL (ref 65–140)
HCT VFR BLD AUTO: 36.9 % (ref 34.8–46.1)
HGB BLD-MCNC: 12.4 G/DL (ref 11.5–15.4)
MCH RBC QN AUTO: 31.2 PG (ref 26.8–34.3)
MCHC RBC AUTO-ENTMCNC: 33.6 G/DL (ref 31.4–37.4)
MCV RBC AUTO: 93 FL (ref 82–98)
PLATELET # BLD AUTO: 135 THOUSANDS/UL (ref 149–390)
PMV BLD AUTO: 10.1 FL (ref 8.9–12.7)
POTASSIUM SERPL-SCNC: 4.1 MMOL/L (ref 3.5–5.3)
RBC # BLD AUTO: 3.98 MILLION/UL (ref 3.81–5.12)
SODIUM SERPL-SCNC: 141 MMOL/L (ref 135–147)
WBC # BLD AUTO: 8.83 THOUSAND/UL (ref 4.31–10.16)

## 2024-11-08 PROCEDURE — 71046 X-RAY EXAM CHEST 2 VIEWS: CPT

## 2024-11-08 PROCEDURE — 99024 POSTOP FOLLOW-UP VISIT: CPT | Performed by: PHYSICIAN ASSISTANT

## 2024-11-08 PROCEDURE — 82948 REAGENT STRIP/BLOOD GLUCOSE: CPT

## 2024-11-08 PROCEDURE — 99238 HOSP IP/OBS DSCHRG MGMT 30/<: CPT | Performed by: PHYSICIAN ASSISTANT

## 2024-11-08 PROCEDURE — 97530 THERAPEUTIC ACTIVITIES: CPT

## 2024-11-08 PROCEDURE — 85027 COMPLETE CBC AUTOMATED: CPT | Performed by: PHYSICIAN ASSISTANT

## 2024-11-08 PROCEDURE — NC001 PR NO CHARGE: Performed by: THORACIC SURGERY (CARDIOTHORACIC VASCULAR SURGERY)

## 2024-11-08 PROCEDURE — 97535 SELF CARE MNGMENT TRAINING: CPT

## 2024-11-08 PROCEDURE — 80048 BASIC METABOLIC PNL TOTAL CA: CPT | Performed by: PHYSICIAN ASSISTANT

## 2024-11-08 PROCEDURE — 97116 GAIT TRAINING THERAPY: CPT

## 2024-11-08 RX ORDER — FLUTICASONE PROPIONATE 50 MCG
1 SPRAY, SUSPENSION (ML) NASAL DAILY
Qty: 48 ML | Refills: 1 | Status: SHIPPED | OUTPATIENT
Start: 2024-11-08

## 2024-11-08 RX ORDER — CLOPIDOGREL BISULFATE 75 MG/1
75 TABLET ORAL DAILY
Qty: 90 TABLET | Refills: 0 | Status: SHIPPED | OUTPATIENT
Start: 2024-11-09 | End: 2025-02-07

## 2024-11-08 RX ADMIN — HEPARIN SODIUM 5000 UNITS: 5000 INJECTION, SOLUTION INTRAVENOUS; SUBCUTANEOUS at 08:57

## 2024-11-08 RX ADMIN — CLOPIDOGREL BISULFATE 75 MG: 75 TABLET ORAL at 08:55

## 2024-11-08 RX ADMIN — FLUTICASONE PROPIONATE 1 SPRAY: 50 SPRAY, METERED NASAL at 09:04

## 2024-11-08 RX ADMIN — FLUOXETINE HYDROCHLORIDE 20 MG: 20 CAPSULE ORAL at 08:56

## 2024-11-08 RX ADMIN — CHLORHEXIDINE GLUCONATE 0.12% ORAL RINSE 15 ML: 1.2 LIQUID ORAL at 08:55

## 2024-11-08 RX ADMIN — ACETAMINOPHEN 650 MG: 325 TABLET, FILM COATED ORAL at 05:12

## 2024-11-08 RX ADMIN — PANTOPRAZOLE SODIUM 40 MG: 40 TABLET, DELAYED RELEASE ORAL at 05:12

## 2024-11-08 RX ADMIN — POLYETHYLENE GLYCOL 3350 17 G: 17 POWDER, FOR SOLUTION ORAL at 08:58

## 2024-11-08 RX ADMIN — FUROSEMIDE 20 MG: 20 TABLET ORAL at 08:57

## 2024-11-08 RX ADMIN — POTASSIUM CHLORIDE 10 MEQ: 750 TABLET, EXTENDED RELEASE ORAL at 08:56

## 2024-11-08 RX ADMIN — ASPIRIN 81 MG CHEWABLE TABLET 81 MG: 81 TABLET CHEWABLE at 08:56

## 2024-11-08 NOTE — DISCHARGE SUMMARY
Discharge Summary - Cardiac Surgery   Deyanira Stokes 74 y.o. female MRN: [id number]  Unit/Bed#: [facility]01 Encounter: [id number]    Admission Date: 11/5/2024     Discharge Date: 11/08/24    Admitting Diagnosis: Nonrheumatic aortic valve stenosis [I35.0]    Primary Discharge Diagnosis:   Aortic stenosis, Non-Rheumatic. S/P transfemoral transcatheter aortic valve replacement;    Secondary Discharge Diagnosis:   obesity (BMI 34.6), CHF, HTN, HL, arthritis, anxiety, GERD, dementia, IBS, h/o nephrolithiasis, ambulatory dysfunction, PFO, scoliosis, asthma, RUE lymphedema, h/o breast ca (s/p right mastectomy, s/p chemo/radiation), osteoporosis, chronic headaches, CAD, h/o PNA, h/o shingles, vitamin D deficiency, seasonal allergies, seborrheic keratoses, insomnia, chronic pancreatitis, neuropathy, OAB     Attending: Godwin Sarah D.O.    Consulting Physician(s):   Electrophysiology  Gerontology    Procedures Performed:   Procedure(s):  Cardiac pacer implant DC     Hospital Course:   The patient was seen in consultation prior to this admission for evaluation of Aortic stenosis, Non-Rheumatic.  Risks and benefits of transfemoral transcatheter aortic valve replacement were discussed in detail, and patient was agreeable.  Routine preoperative evaluation was completed and informed consent was obtained prior to admission.    11/5: Elective admission for TF TAVR #26mm Sharmaine 3 UR via L approach. Trace PVl on intra-op VIOLA s/p valve deployment. Extubated and transferred to PACU supported with no gtts. DP pulses palpable b/l. Restarted on home Lasix. ECG shows NSR w/ new LBBB, repeat EKG for 1315, EP notified. Gerontology and cardiology consulted.     11/6: Evaluated by EP yesterday, no formal consult completed. , held Prozac, Seroquel and Aricept as a result. Episode of lorena with HR 40’s and wide QRS complex overnight. EKG today NSR 84, no LBBB, and QRS increased to 534. Recontact EP to reeval recommendations.  On RA. -819  ml/24 hrs. Labs stable. K+ 3.9, start KCl 10meq PO BID. Echo completed this morning, pending results. PM: Plan for PPM tomorrow, continue to hold meds per EP til tomorrow s/p PPM.     11/7: underwent PPM this morning. SR 70s. No BB. On no antihypertensive meds. Restart Prozac and Aricept. Repeat EKG this afternoon acceptable to restart home Seroquel. Cont lasix 20 po bid. Labs stable. SSI. Home tomorrow     11/8: PPM interrogated this morning, Atrial lead pulled back and intermittently capturing, no plans to revise surgically. No anti-htn meds. Cont asa/plavix. Cont home lasix. SSI. Labs stable. D/c home today after PPM re-interrogated and reprogramed per EP. Patient in good condition for discharge. Patient in agreement with plan and follow-up appointments.      Condition at Discharge:   good     Discharge Physical Exam:    Please see the documented physical exam from this morning's progress note for details.    Discharge Data:  Results from last 7 days   Lab Units 11/08/24  0450 11/07/24  0434 11/06/24  0449   WBC Thousand/uL 8.83 6.37 8.27   HEMOGLOBIN g/dL 12.4 13.8 13.5   HEMATOCRIT % 36.9 41.7 40.8   PLATELETS Thousands/uL 135* 143* 158     Results from last 7 days   Lab Units 11/08/24  0450 11/07/24  0434 11/06/24  0449 11/05/24  1006 11/05/24  0957   POTASSIUM mmol/L 4.1 4.0 3.9   < >  --    CHLORIDE mmol/L 106 102 101   < >  --    CO2 mmol/L 30 30 28   < >  --    CO2, I-STAT mmol/L  --   --   --   --  28   BUN mg/dL 22 16 11   < >  --    CREATININE mg/dL 0.63 0.67 0.70   < >  --    GLUCOSE, ISTAT mg/dl  --   --   --   --  120   CALCIUM mg/dL 8.2* 8.5 7.9*   < >  --     < > = values in this interval not displayed.           Discharge instructions/Information to patient and family:   See after visit summary for information provided to patient and family.      Deyanira E Divya was educated on restrictions regarding driving and lifting, and techniques of proper incisional care.  They were specifically counselled  on signs and symptoms of an incisional infection, and advised to contact our service immediately should they develop fevers, sweats, chill, redness or drainage at the site of any incisions.    Provisions for Follow-Up Care:  See after visit summary for information related to follow-up care and any pertinent home health orders.      Disposition:  See After Visit Summary for discharge disposition information.    Planned Readmission:   No    Discharge Medications:  See after visit summary for reconciled discharge medications provided to patient and family.      Manlius E Clermont was provided contact information and scheduled a follow up appointment with Godwin Sarah D.O.  Additionally, follow up appointments have been scheduled for their primary care physician and primary cardiologist.  Contact information was provided.      Deyanira Stokes was counseled on the importance of avoiding tobacco products.  As with all patients whom have undergone open heart surgery, tobacco cessation medication was contraindicated at the time of discharge.     ACE/ARB was Contraindicated secondary to hypotension    Beta Blocker was Contraindicated secondary to hypotension    Aspirin was Prescribed at discharge    Statin was Prescribed at discharge      The patient was discharged on ongoing (home regimen) diuretic therapy with Furosemide, 20 mg, PO BID.  They were advised to continue these medications, unless otherwise directed.     The patient was informed that following their postoperative surgical evaluation, they will be referred to outpatient cardiac rehabilitation.  They were counseled that this program is run by specialists who will help them safely strengthen their heart and prevent more heart disease.  Cardiac rehabilitation will include exercise, relaxation, stress management, and heart-healthy nutrition.  Caregivers will also check to make sure their medication regimen is working.    During this admission, the patient was  questioned on their use of tobacco, alcohol, and illicit/non-prescription drug use in the  previous 24 months. Deyanira Stokes states that they have not used any of these substances in this time frame.    I spent 30 minutes discharging the patient. This time was spent on the day of discharge. I had direct contact with the patient on the day of discharge. Additional documentation is required if more than 30 minutes were spent on discharge.     SIGNATURE: Jodi Umana PA-C  DATE: November 8, 2024  TIME: 11:30 AM

## 2024-11-08 NOTE — PLAN OF CARE
Problem: PHYSICAL THERAPY ADULT  Goal: Performs mobility at highest level of function for planned discharge setting.  See evaluation for individualized goals.  Description: Treatment/Interventions: Functional transfer training, Elevations, LE strengthening/ROM, Therapeutic exercise, Endurance training, Patient/family training, Bed mobility, Gait training, Equipment eval/education          See flowsheet documentation for full assessment, interventions and recommendations.  Outcome: Progressing  Note: Prognosis: Good  Problem List: Decreased strength, Decreased endurance, Impaired balance, Decreased mobility  Assessment: Pt presents s/p PPM placement yesterday (initially s/p TAVR POD #3) and currently exhibits min postop guarding and overall weakness and deconditioning; pt was still able to progress to (S) level w/ transfers and amb w/ rw and min (A) on the steps; rest periods provided as needed and pt remained in NAD. overall, cont to anticipate pt will return home w/ available family support upon D/C when medically cleared; D/C recommendations are listed below; will follow until then.        Rehab Resource Intensity Level, PT: III (Minimum Resource Intensity) ((A) on ARTUR)    See flowsheet documentation for full assessment.

## 2024-11-08 NOTE — PHYSICAL THERAPY NOTE
PHYSICAL THERAPY NOTE          Patient Name: Deyanira Stokes  Today's Date: 11/8/2024 11/08/24 1106   PT Last Visit   PT Visit Date 11/08/24   Note Type   Note Type Treatment   Pain Assessment   Pain Assessment Tool 0-10   Pain Score No Pain   Restrictions/Precautions   Braces or Orthoses Splint  ((L) UE post PPM placement)   Other Precautions Cardiac/sternal;Multiple lines;Telemetry;Chair Alarm  ((L) UE post PPM placement)   General   Chart Reviewed Yes   Additional Pertinent History Pt underwent PPM placement on 11/7/2024; cleared for Tx session by nsted   Response to Previous Treatment Patient with no complaints from previous session.   Cognition   Overall Cognitive Status WFL   Arousal/Participation Alert;Cooperative   Attention Within functional limits   Orientation Level Oriented to person;Oriented to place;Oriented to situation   Memory Decreased recall of precautions   Following Commands Follows one step commands without difficulty   Subjective   Subjective Alert; in the chair; agreeable to mobilize   Transfers   Sit to Stand 5  Supervision   Additional items Verbal cues   Stand to Sit 5  Supervision   Additional items Verbal cues   Ambulation/Elevation   Gait pattern Excessively slow;Short stride;Inconsistent eloisa  (no overt uncorrected LOB, knee buckling or excessive swaying)   Gait Assistance 5  Supervision  (after initial min (A))   Additional items Assist x 1;Verbal cues   Assistive Device Rolling walker   Distance 20 ft + 100 ft + 120 ft w/ extended seated rest periods and steps negotiation in between   Stair Management Assistance 4  Minimal assist  (after initial mod (A))   Additional items Assist x 1;Verbal cues;Tactile cues  (discussed sequencing and need for (A) on ARTUR at home;)   Stair Management Technique One rail R;Step to pattern;Foreward;Backward;Nonreciprocal   Number of Stairs 3  (1 step x 3 trials)    Balance   Static Sitting Good   Dynamic Sitting Fair +   Static Standing Fair   Dynamic Standing Fair -   Ambulatory Fair -   Activity Tolerance   Activity Tolerance Patient tolerated treatment well   Medical Staff Made Aware Co-Tx performed w/ OTR due to complexity of medical status   Nurse Made Aware spoke to KOURTNEY Rivera   Assessment   Prognosis Good   Problem List Decreased strength;Decreased endurance;Impaired balance;Decreased mobility   Assessment Pt presents s/p PPM placement yesterday (initially s/p TAVR POD #3) and currently exhibits min postop guarding and overall weakness and deconditioning; pt was still able to progress to (S) level w/ transfers and amb w/ rw and min (A) on the steps; rest periods provided as needed and pt remained in NAD. overall, cont to anticipate pt will return home w/ available family support upon D/C when medically cleared; D/C recommendations are listed below; will follow until then.   Goals   Patient Goals to go home   STG Expiration Date 11/20/24   PT Treatment Day 1   Plan   Treatment/Interventions Functional transfer training;LE strengthening/ROM;Elevations;Therapeutic exercise;Endurance training;Bed mobility;Gait training;Spoke to nursing;Spoke to case management;OT;Spoke to advanced practitioner   Progress Improving as expected   PT Frequency 4-6x/wk   Discharge Recommendation   Rehab Resource Intensity Level, PT III (Minimum Resource Intensity)  ((A) on ARTUR)   Equipment Recommended Walker   Walker Package Recommended Wheeled walker   Change/add to Walker Package? No   AM-PAC Basic Mobility Inpatient   Turning in Flat Bed Without Bedrails 3   Lying on Back to Sitting on Edge of Flat Bed Without Bedrails 3   Moving Bed to Chair 3   Standing Up From Chair Using Arms 3   Walk in Room 3   Climb 3-5 Stairs With Railing 3   Basic Mobility Inpatient Raw Score 18   Basic Mobility Standardized Score 41.05   [facility] Highest Level Of Mobility   [facility] Goal 6: Walk 10 steps or  more   -HLM Achieved 7: Walk 25 feet or more   Education   Education Provided Mobility training;Assistive device   Patient Demonstrates verbal understanding   End of Consult   Patient Position at End of Consult Bedside chair;Bed/Chair alarm activated;All needs within reach     Nathaniel Quezada

## 2024-11-08 NOTE — OCCUPATIONAL THERAPY NOTE
Occupational Therapy Progress Note     Patient Name: Deyanira Stokes  Today's Date: 11/8/2024  Problem List  Principal Problem:    Severe aortic stenosis  Active Problems:    Hypertension    HX: breast cancer    Overactive bladder    Asthma    CHF (congestive heart failure) (HCC)    Coronary artery disease    Obesity    S/P TAVR (transcatheter aortic valve replacement)    LBBB (left bundle branch block)    Thrombocytopenia (HCC)    Hypocalcemia    QT prolongation    Dementia (HCC)    Depression    Ambulatory dysfunction    Frailty syndrome in geriatric patient    Cardiac conduction disease          11/08/24 1105   OT Last Visit   OT Visit Date 11/08/24   Note Type   Note Type Treatment   Pain Assessment   Pain Assessment Tool 0-10   Pain Score No Pain   Restrictions/Precautions   Braces or Orthoses Sling  (s/p LUE PPM placement)   Other Precautions Cardiac/sternal;Chair Alarm;Multiple lines;Telemetry;Fall Risk  (LUE PPM pxns)   Lifestyle   Autonomy pta, pt was I W ADL/IADL, spc mobility. - , dtr provides transport   Reciprocal Relationships supportive dtr who is home 24/7   Service to Others retired   Intrinsic Gratification spending time w family.   ADL   Where Assessed Chair   LB Dressing Assistance 3  Moderate Assistance   LB Dressing Deficit Don/doff R sock;Don/doff L sock   LB Dressing Comments Pt unable to achieve full seated figure 4 technique, reports her dtr will assist as needed.   Bed Mobility   Additional Comments Pt greeted OOB in chair, left in chair with alarm on and all needs within reach.   Transfers   Sit to Stand 5  Supervision   Additional items Verbal cues   Stand to Sit 5  Supervision   Additional items Verbal cues   Additional Comments with RW   Functional Mobility   Functional Mobility 5  Supervision   Additional Comments Pt performs household distance mobility with supervisio with RW, chair follow + 1 seated rest break.   Additional items Rolling walker   Cognition   Overall  Cognitive Status WFL   Arousal/Participation Cooperative;Alert   Attention Within functional limits   Orientation Level Oriented X4   Memory Decreased recall of precautions   Following Commands Follows one step commands without difficulty   Comments Pt cooperative to therapy, vc for PPM pxns   Activity Tolerance   Activity Tolerance Patient limited by fatigue   Medical Staff Made Aware RN cleared, co-treat with DPT due to medical complexity.   Assessment   Assessment Pt seen for OT treatment session day 1 on this date focused on ADL retraining, functional transfers and mobility, energy conservation, functional endurance, recall of safety precautions, and patient education. Pt was greeted in chair and was cooperative throughout session. Following session, pt was left in chair with chair alarm on and all needs within reach. Pt continues to be limited by functional status related to ADLs and transfers requiring MOD A for LB dressing, although demonstrates improvements in functional transfers and mobility performing with supervision with RW.   The patient's raw score on the AM-PAC Daily Activity Inpatient Short Form is 19. A raw score of greater than or equal to 19 suggests the patient may benefit from discharge to home. Please refer to the recommendation of the Occupational Therapist for safe discharge planning. Pt would benefit from continued acute OT intervention with plan to continue OT treatment sessions 2-3x per week. Recommend d/c to home with increased social support, level III services.   Plan   Treatment Interventions ADL retraining;Functional transfer training;Endurance training;Continued evaluation;Energy conservation   Goal Expiration Date 11/20/24   OT Treatment Day 1   OT Frequency 2-3x/wk   Discharge Recommendation   Rehab Resource Intensity Level, OT III (Minimum Resource Intensity)   -PAC Daily Activity Inpatient   Lower Body Dressing 2   Bathing 3   Toileting 3   Upper Body Dressing 3   Grooming 4    Eating 4   Daily Activity Raw Score 19   Daily Activity Standardized Score (Calc for Raw Score >=11) 40.22   AM-PAC Applied Cognition Inpatient   Following a Speech/Presentation 4   Understanding Ordinary Conversation 4   Taking Medications 4   Remembering Where Things Are Placed or Put Away 4   Remembering List of 4-5 Errands 3   Taking Care of Complicated Tasks 3   Applied Cognition Raw Score 22   Applied Cognition Standardized Score 47.83   End of Consult   Education Provided Yes   Patient Position at End of Consult Bedside chair;Bed/Chair alarm activated;All needs within reach   Nurse Communication Nurse aware of consult         SPRING Willis, OTR/L

## 2024-11-08 NOTE — PLAN OF CARE
Problem: Prexisting or High Potential for Compromised Skin Integrity  Goal: Skin integrity is maintained or improved  Description: INTERVENTIONS:  - Identify patients at risk for skin breakdown  - Assess and monitor skin integrity  - Assess and monitor nutrition and hydration status  - Monitor labs   - Assess for incontinence   - Turn and reposition patient  - Assist with mobility/ambulation  - Relieve pressure over bony prominences  - Avoid friction and shearing  - Provide appropriate hygiene as needed including keeping skin clean and dry  - Evaluate need for skin moisturizer/barrier cream  - Collaborate with interdisciplinary team   - Patient/family teaching  - Consider wound care consult   Outcome: Progressing     Problem: PAIN - ADULT  Goal: Verbalizes/displays adequate comfort level or baseline comfort level  Description: Interventions:  - Encourage patient to monitor pain and request assistance  - Assess pain using appropriate pain scale  - Administer analgesics based on type and severity of pain and evaluate response  - Implement non-pharmacological measures as appropriate and evaluate response  - Consider cultural and social influences on pain and pain management  - Notify physician/advanced practitioner if interventions unsuccessful or patient reports new pain  Outcome: Progressing     Problem: Knowledge Deficit  Goal: Patient/family/caregiver demonstrates understanding of disease process, treatment plan, medications, and discharge instructions  Description: Complete learning assessment and assess knowledge base.  Interventions:  - Provide teaching at level of understanding  - Provide teaching via preferred learning methods  Outcome: Progressing     Problem: CARDIOVASCULAR - ADULT  Goal: Maintains optimal cardiac output and hemodynamic stability  Description: INTERVENTIONS:  - Monitor I/O, vital signs and rhythm  - Monitor for S/S and trends of decreased cardiac output  - Administer and titrate ordered  vasoactive medications to optimize hemodynamic stability  - Assess quality of pulses, skin color and temperature  - Assess for signs of decreased coronary artery perfusion  - Instruct patient to report change in severity of symptoms  Outcome: Progressing  Goal: Absence of cardiac dysrhythmias or at baseline rhythm  Description: INTERVENTIONS:  - Continuous cardiac monitoring, vital signs, obtain 12 lead EKG if ordered  - Administer antiarrhythmic and heart rate control medications as ordered  - Monitor electrolytes and administer replacement therapy as ordered  Outcome: Progressing

## 2024-11-08 NOTE — PROGRESS NOTES
Progress Note - Cardiac Surgery   Deyanira Stokes 74 y.o. female MRN: [id number]  Unit/Bed#: PPHP-301-01 Encounter: [id number]    Aortic stenosis, Non-Rheumatic. S/P transfemoral transcatheter aortic valve replacement; POD # 3    24 Hour Events: doing well this morning, PPM interrogated, not working appropriately, Atrial lead pulled back slightly, EP thinks it will be ok and should still be ok to go home today     Medications:   Scheduled Meds:  Current Facility-Administered Medications   Medication Dose Route Frequency Provider Last Rate    acetaminophen  650 mg Rectal Q4H PRN Kim Zhang PA-C      acetaminophen  650 mg Oral Q4H PRN Scarlett Richards PA-C      albuterol  2 puff Inhalation Q4H PRN Scarlett Richards PA-C      aspirin  81 mg Oral Daily Scarlett Richards PA-C      atorvastatin  20 mg Oral Daily With Dinner Scarlett Richards PA-C      bisacodyl  10 mg Rectal Daily PRN Scarlett Richards PA-C      chlorhexidine  15 mL Mouth/Throat BID Scarlett Richadrs PA-C      clopidogrel  75 mg Oral Daily Scarlett Richards PA-C      donepezil  10 mg Oral HS Kim Zhang PA-C      FLUoxetine  20 mg Oral Daily Kim Zhang PA-C      fluticasone  1 spray Each Nare Daily Scarlett Richards PA-C      furosemide  20 mg Oral BID Scarlett Richards PA-C      heparin (porcine)  5,000 Units Subcutaneous Q8H ALPHONSE Scarlett Richards PA-C      hydrALAZINE  10 mg Intravenous Q6H PRN Scarlett Richards PA-C      insulin lispro  1-5 Units Subcutaneous TID AC Scarlett Richards PA-C      insulin lispro  1-5 Units Subcutaneous HS Scarlett Richards PA-C      metoclopramide  10 mg Intravenous Q6H PRN Judie Eubanks PA-C      montelukast  10 mg Oral HS Scarlett Richards PA-C      ondansetron  4 mg Intravenous Q6H PRN Scarlett Richards PA-C      pantoprazole  40 mg Oral Early Morning Scarlett Richards PA-C      polyethylene glycol  17 g Oral Daily Scarlett Richards PA-C      potassium chloride  10 mEq Oral BID Kim Zhang PA-C      QUEtiapine  25 mg  Oral HS Judie Eubanks PA-C       Continuous Infusions:     PRN Meds:.  acetaminophen    acetaminophen    albuterol    bisacodyl    hydrALAZINE    metoclopramide    ondansetron    Vitals:   Vitals:    11/08/24 0449 11/08/24 0449 11/08/24 0451 11/08/24 0512   BP: 96/55 96/55 96/55    BP Location:   Left arm    Pulse: 70 74 71    Resp:   18    Temp: 97.8 °F (36.6 °C) 97.8 °F (36.6 °C) 97.8 °F (36.6 °C)    TempSrc:   Oral    SpO2: 95% 95% 95%    Weight:    72.7 kg (160 lb 4.4 oz)   Height:           Telemetry: NSR; Heart Rate: 70s, LBBB with occasional pacing    Respiratory:   SpO2: SpO2: 95 %; Room Air    Intake/Output:     Intake/Output Summary (Last 24 hours) at 11/8/2024 0725  Last data filed at 11/7/2024 2135  Gross per 24 hour   Intake 572.42 ml   Output 550 ml   Net 22.42 ml        Weights:   Weight (last 2 days)       Date/Time Weight    11/08/24 0512 72.7 (160.27)    11/07/24 0532 71.4 (157.41)    11/06/24 0718 72.1 (159)    11/06/24 0600 72.5 (159.83)            Results:   Results from last 7 days   Lab Units 11/08/24 0450 11/07/24 0434 11/06/24  0449   WBC Thousand/uL 8.83 6.37 8.27   HEMOGLOBIN g/dL 12.4 13.8 13.5   HEMATOCRIT % 36.9 41.7 40.8   PLATELETS Thousands/uL 135* 143* 158     Results from last 7 days   Lab Units 11/08/24  0450 11/07/24  0434 11/06/24  0449 11/05/24  1006 11/05/24  0957   POTASSIUM mmol/L 4.1 4.0 3.9   < >  --    CHLORIDE mmol/L 106 102 101   < >  --    CO2 mmol/L 30 30 28   < >  --    CO2, I-STAT mmol/L  --   --   --   --  28   BUN mg/dL 22 16 11   < >  --    CREATININE mg/dL 0.63 0.67 0.70   < >  --    GLUCOSE, ISTAT mg/dl  --   --   --   --  120   CALCIUM mg/dL 8.2* 8.5 7.9*   < >  --     < > = values in this interval not displayed.           Recent Labs     11/06/24  0449   MG 1.8*     Point of care glucose:  - 207    Studies:    ECG 11/6/24: NSR with PVCs or fusion complexes. Prolonged  (418)    PCXR 11/6/24: final report pending. Mild pulm vascular congestion,  improved in comparison to yesterday. Elevated right hemidiaphragm. No ptx or effusion    Echocardiogram:     Left Ventricle: Left ventricular cavity size is normal. Wall thickness is moderately increased. There is moderate concentric hypertrophy. The left ventricular ejection fraction is 58% by biplane measurement. Systolic function is normal. Wall motion is normal. Diastolic function is mildly abnormal, consistent with grade I (abnormal) relaxation.    Right Ventricle: Right ventricular cavity size is normal. Systolic function is normal.    Left Atrium: The atrium is mildly dilated.    Aortic Valve: There is an Barahona FRANCISCO 3 Ultra Resilia 26 mm TAVR bioprosthetic valve. There is no evidence of paravalvular regurgitation. There is no evidence of transvalvular regurgitation. The gradient recorded across the prosthetic aortic valve is within the expected range. The aortic valve peak velocity is 1.82 m/s. The aortic valve mean gradient is 8 mmHg. The dimensionless velocity index is 0.47. The aortic valve area is 2.24 cm2.       Results Review Statement: I personally reviewed the following image studies in PACS and associated radiology reports: EKG and chest xray. My interpretation of the radiology images/reports is: as noted above.    Invasive Lines/Tubes:  Invasive Devices       Central Venous Catheter Line  Duration             CVC Central Lines 11/05/24 2 days              Peripheral Intravenous Line  Duration             Peripheral IV 11/05/24 Left;Dorsal (posterior) Hand 2 days                    Physical Exam:    General: No acute distress, Alert, and Normal appearance  HEENT/NECK:  Normocephalic. Atraumatic.  no jugular venous distention.    Cardiac: Regular rate and rhythm and No murmurs/rubs/gallops  Pulmonary:  Breath sounds clear bilaterally and No rales/rhonchi/wheezes  Abdomen:  Non-tender, Non-distended, and Normal bowel sounds  Incisions: Groin puncture sites clean, dry, and intact without hematoma  and Pacemaker incision is clean, dry, and intact. Minor swelling  Extremities: Extremities warm/dry and No edema B/L  Neuro: Alert and oriented X 3, Sensation is grossly intact, and No focal deficits  Skin: Warm/Dry, without rashes or lesions.        Assessment:  Principal Problem:    Severe aortic stenosis  Active Problems:    Hypertension    HX: breast cancer    Overactive bladder    Asthma    CHF (congestive heart failure) (HCC)    Coronary artery disease    Obesity    S/P TAVR (transcatheter aortic valve replacement)    LBBB (left bundle branch block)    Thrombocytopenia (HCC)    Hypocalcemia    QT prolongation    Dementia (HCC)    Depression    Ambulatory dysfunction    Frailty syndrome in geriatric patient    Cardiac conduction disease       Aortic stenosis, Non-Rheumatic. S/P transfemoral transcatheter aortic valve replacement; POD # 3    Plan:    Cardiac:     Normal ventricular systolic function, EF 60%    NSR; HR well-controlled  BP well-controlled    Symptomatic SSS, intermittent LBBB, phase 4 black - s/p PPM, interrogated this morning and not working appropriately, await EP plan    HTN Regimen: none, was not on any anti-HTN prior to procedure    Prolonged QTC - home Donepezil, Fluoxetine and Seroquel restarted    Beta blocker not indicated/prescribed prior to TAVR; Will not initiate during this admission secondary to post TAVR risk of heart block    TAVR anticoagulation regimen: ASA/Plavix      Postoperative transthoracic echocardiogram:  Echo completed; Well seated AV, without PVL    Continue Statin therapy    D/c central line    Continue Subcutaneous Heparin for DVT prophylaxis    Pulmonary:     Good Room air oxygen saturation; Continue incentive spirometry/Coughing/Deep breathing exercises    Renal:     Normal preoperative renal function  Creatinine 0.63 today, from 0.67    Intake/Output net: not accurate    Diuretic Regimen:  Continue PO Lasix, 20 mg BID (hoem dose)  Continue Potassium Chloride 10 mEq  PO BID    Neuro:    Neurologically intact; No active issues     Incisional pain well-controlled; Continue prn Tylenol    GI:    Cardiac diet, with 1800 mL fluid restriction    Tolerating diet without complaint     Continue stool softeners and prn suppository    Continue GI prophylaxis    Endo:     Glucose well-controlled with insulin sliding scale coverage    7.  Hematology:     Post-operative blood count acceptable; Trend prn    8.  Disposition:    Gerontology consultation already completed for routine assessment of TAVR patient over 65 years old    Following daily PT/OT recommendations regarding home vs. rehab when medically cleared for discharge - home with home PT/OT today      VTE Pharmacologic Prophylaxis: Heparin  VTE Mechanical Prophylaxis: sequential compression device    Collaborative rounds completed with supervising physician  Plan of care discussed with bedside nurse    SIGNATURE: Kim Zhang PA-C  DATE: November 8, 2024  TIME: 7:25 AM

## 2024-11-08 NOTE — PLAN OF CARE
Problem: OCCUPATIONAL THERAPY ADULT  Goal: Performs self-care activities at highest level of function for planned discharge setting.  See evaluation for individualized goals.  Description: Treatment Interventions: ADL retraining, Functional transfer training, Endurance training, Patient/family training, Equipment evaluation/education, Compensatory technique education, Activityengagement, Energy conservation          See flowsheet documentation for full assessment, interventions and recommendations.   Outcome: Progressing  Note: Limitation: Decreased ADL status, Decreased endurance, Decreased self-care trans, Decreased high-level ADLs  Prognosis: Good  Assessment: Pt seen for OT treatment session day 1 on this date focused on ADL retraining, functional transfers and mobility, energy conservation, functional endurance, recall of safety precautions, and patient education. Pt was greeted in chair and was cooperative throughout session. Following session, pt was left in chair with chair alarm on and all needs within reach. Pt continues to be limited by functional status related to ADLs and transfers requiring MOD A for LB dressing, although demonstrates improvements in functional transfers and mobility performing with supervision with RW.   The patient's raw score on the -PAC Daily Activity Inpatient Short Form is 19. A raw score of greater than or equal to 19 suggests the patient may benefit from discharge to home. Please refer to the recommendation of the Occupational Therapist for safe discharge planning. Pt would benefit from continued acute OT intervention with plan to continue OT treatment sessions 2-3x per week. Recommend d/c to home with increased social support, level III services.     Rehab Resource Intensity Level, OT: III (Minimum Resource Intensity)

## 2024-11-08 NOTE — PROGRESS NOTES
Progress Note - Electrophysiology   Name: Deyanira Stokes 74 y.o. female I MRN: [id number]  Unit/Bed#: PPHP-301-01 I Date of Admission: 11/5/2024   Date of Service: 11/8/2024 I Hospital Day: 3   { ?Quick Links I Problem List I PEYTON MERINO Billing Tip:[id number]}  Assessment & Plan  Cardiac conduction disease  - has had intermittent LBBB noted along with junctional rhythm early this morning when awake with symptoms of shortness of breath   - not on AV matilde blocking agents as an outpatient   - EF of 60% per echo 7/2024    Discussed with the patient this morning and again when patient's daughter is at bedside about recommendation for pacemaker implantation given symptomatic SSS. The procedure and recuperation afterwards were discussed at length. They are in agreement with proceeding tomorrow and will therefore be made NPO after midnight for left sided dual chamber pacemaker implantation.    Patient is not on anticoagulation that needs to be held, is right handed and has history of only right sided breast cancer. She reports having an allergy to do though not listed but was prepped prior to cath so will order dye prep.   LBBB (left bundle branch block)  - noted immediately post TAVR with resolution at 3 hour post EKG, then intermittently last night  QT prolongation  - maintained on 3 QT prolonging medications (Aricept, fluoxetine, and seroquel) as an outpatient  - currently on hold, will continue to hold until device is in place  - will need repeat EKG's after reinitiating and low threshold to consult psych to determine if any medication changes can be made  - will continue to monitor potassium and magnesium (replete magnesium today as it was 1.8)  Severe aortic stenosis  - status post TAVR 11/5/2024 with transcatheter aortic valve replacement with 26mm Barahona FRANCISCO 3 Ultra Resilia bioprosthetic valve  S/P TAVR (transcatheter aortic valve replacement)    Hypertension    CHF (congestive heart failure) ([facility])  - diuretic regimen  of Lasix as an outpatient  Thrombocytopenia (HCC)    Hypocalcemia    Dementia (HCC)  - maintained on Aricept 10mg daily as an outpatient  Depression  - maintained on fluoxetine and seroquel as an outpatient  Obesity  - BMI of 34  Ambulatory dysfunction    Frailty syndrome in geriatric patient    HX: breast cancer  - reports right sided breast cancer with no interventions on the left  Overactive bladder    Asthma    Coronary artery disease      {MAGALI IP Progress note specialty templates:93760}   "rhythm on her own.    Objective:  Vitals: /66   Pulse 78   Temp 97.8 °F (36.6 °C)   Resp 16   Ht 4' 9\" (1.448 m)   Wt 72.7 kg (160 lb 4.4 oz)   LMP  (LMP Unknown)   SpO2 96%   BMI 34.68 kg/m²     Vitals:    11/07/24 0532 11/08/24 0512   Weight: 71.4 kg (157 lb 6.5 oz) 72.7 kg (160 lb 4.4 oz)     Orthostatic Blood Pressures      Flowsheet Row Most Recent Value   Blood Pressure 124/66 filed at 11/08/2024 1141   Patient Position - Orthostatic VS Lying filed at 11/08/2024 0451            No intake or output data in the 24 hours ending 11/12/24 1204    Invasive Devices       None                             Scheduled Meds:  Continuous Infusions:No current facility-administered medications for this encounter.    PRN Meds:.    Review of Systems:  ROS as noted above, otherwise 12 point review of systems was performed and is negative.     Physical Exam:   GEN: NAD, alert and oriented, well appearing  SKIN: dry without significant lesions or rashes  HEENT: NCAT, PERRL, EOMs intact  NECK: No JVD or carotid bruits appreciated  CARDIOVASCULAR: RRR, normal S1, S2 without murmurs, rubs, or gallops appreciated  LUNGS: Clear to auscultation bilaterally without wheezes, rhonchi, or rales  ABDOMEN: Soft, nontender, nondistended  EXTREMITIES/VASCULAR: perfused without clubbing, cyanosis, or edema b/l  PSYCH: Normal mood and affect  NEURO: CN ll-Xll grossly intact              Lab Results: I have personally reviewed pertinent lab results.    Results from last 7 days   Lab Units 11/08/24 0450 11/07/24 0434 11/06/24  0449   WBC Thousand/uL 8.83 6.37 8.27   HEMOGLOBIN g/dL 12.4 13.8 13.5   HEMATOCRIT % 36.9 41.7 40.8   PLATELETS Thousands/uL 135* 143* 158     Results from last 7 days   Lab Units 11/08/24 0450 11/07/24  0434 11/06/24  0449   POTASSIUM mmol/L 4.1 4.0 3.9   CHLORIDE mmol/L 106 102 101   CO2 mmol/L 30 30 28   BUN mg/dL 22 16 11   CREATININE mg/dL 0.63 0.67 0.70   CALCIUM mg/dL 8.2* 8.5 7.9*         Results " from last 7 days   Lab Units 24  0449   MAGNESIUM mg/dL 1.8*       Imaging: Results Review Statement: I personally reviewed the following image studies/reports in PACS and discussed pertinent findings with Radiology: chest xray. My interpretation of the radiology images/reports is: Lead placement.  Results for orders placed during the hospital encounter of 21    Echo complete with contrast if indicated    [place] [address]  [phone]    Transthoracic Echocardiogram  2D, M-mode, Doppler, and Color Doppler    Study date:  25-May-2021    Patient: EDDIE HEARN  MR number: [id number]  Account number: [id number]  : 1950  Age: 71 years  Gender: Female  Status: Inpatient  Location: [facility]  Height: 58 in  Weight: 193 lb  BP: 136/ 80 mmHg    Indications: Delerium    Diagnoses: R41.82 - Altered mental status, unspecified    Sonographer:  [facility]  Referring Physician:  Manish Choi MD  Group:  [facility]  Interpreting Physician:  Hailee Kim DO    SUMMARY    PROCEDURE INFORMATION:  This was a technically difficult study.    LEFT VENTRICLE:  Systolic function was normal. Ejection fraction was estimated to be 60 %.  Although no diagnostic regional wall motion abnormality was identified, this possibility cannot be completely excluded on the basis of this study.  Wall thickness was mildly increased.  The changes were consistent with concentric remodeling (increased wall thickness with normal wall mass).    AORTIC VALVE:  The valve was trileaflet. Leaflets exhibited mildly to moderately increased thickness, moderate calcification, and normal cuspal separation.  Transaortic velocity was increased due to valvular stenosis.  There was moderate stenosis.  Valve mean gradient was 17 mmHg.  Aortic valve area was 1.2 cmï¾² by the continuity equation.  The aortic valve obstructive index (by VTI)  was 0.29.    TRICUSPID VALVE:  There was mild regurgitation.    HISTORY: PRIOR HISTORY: S/P Total knee replacement, confusion    PROCEDURE: The study was performed in the [facility]. This was a routine study. The transthoracic approach was used. The study included complete 2D imaging, M-mode, complete spectral Doppler, and color Doppler. The heart rate was 80  bpm, at the start of the study. This was a technically difficult study.    LEFT VENTRICLE: Size was normal. Systolic function was normal. Ejection fraction was estimated to be 60 %. Although no diagnostic regional wall motion abnormality was identified, this possibility cannot be completely excluded on the basis  of this study. Wall thickness was mildly increased. The changes were consistent with concentric remodeling (increased wall thickness with normal wall mass). DOPPLER: Due to tachycardia, there was fusion of early and atrial contributions to  ventricular filling.    RIGHT VENTRICLE: Not well visualized.    LEFT ATRIUM: Size was normal.    RIGHT ATRIUM: Size was normal.    MITRAL VALVE: There was mild annular calcification. There was normal leaflet separation. DOPPLER: The transmitral velocity was within the normal range. There was no evidence for stenosis. There was trace regurgitation.    AORTIC VALVE: The valve was trileaflet. Leaflets exhibited mildly to moderately increased thickness, moderate calcification, and normal cuspal separation. DOPPLER: Transaortic velocity was increased due to valvular stenosis. There was  moderate stenosis. There was no regurgitation.    TRICUSPID VALVE: The valve structure was normal. There was normal leaflet separation. DOPPLER: The transtricuspid velocity was within the normal range. There was no evidence for stenosis. There was mild regurgitation. The tricuspid jet  envelope definition was inadequate for estimation of RV systolic pressure.    PULMONIC VALVE: Leaflets exhibited normal thickness, no calcification,  and normal cuspal separation. DOPPLER: The transpulmonic velocity was within the normal range. There was no regurgitation.    PERICARDIUM: There was no pericardial effusion. The pericardium was normal in appearance.    AORTA: The root exhibited normal size.    SYSTEMIC VEINS: IVC: The inferior vena cava was not well visualized.    MEASUREMENT TABLES    DOPPLER MEASUREMENTS  Aortic valve   (Reference normals)  Mean gradient   17 mmHg   (--)  Valve area, cont   1.2 cmï¾²   (--)  Obstr index, VTI   0.29    (--)    SYSTEM MEASUREMENT TABLES    2D  %FS: 31.03 %  Ao Diam: 2.96 cm  EDV(Teich): 118.84 ml  EF(Teich): 58.47 %  ESV(Teich): 49.35 ml  IVSd: 1 cm  LA Area: 13.35 cm2  LA Diam: 3.87 cm  LVEDV MOD A4C: 70.33 ml  LVEF MOD A4C: 53.31 %  LVESV MOD A4C: 32.84 ml  LVIDd: 5.01 cm  LVIDs: 3.46 cm  LVLd A4C: 6.74 cm  LVLs A4C: 5.86 cm  LVOT Diam: 2 cm  LVPWd: 1 cm  RA Area: 12.14 cm2  RVIDd: 2.8 cm  RWT: 0.55  SV MOD A4C: 37.5 ml  SV(Teich): 69.49 ml    CW  AV Env.Ti: 267.3 ms  AV VTI: 47.62 cm  AV Vmax: 2.44 m/s  AV Vmean: 1.78 m/s  AV maxP.01 mmHg  AV meanP.1 mmHg  RAP: 0 mmHg  TR Vmax: 2.71 m/s  TR maxP.5 mmHg    MM  TAPSE: 2.91 cm    PW  JOSEFINA (VTI): 0.83 cm2  JOSEFINA Vmax: 1.05 cm2  E' Sept: 0.11 m/s  LVOT Env.Ti: 241.52 ms  LVOT VTI: 12.7 cm  LVOT Vmax: 0.82 m/s  LVOT Vmean: 0.53 m/s  LVOT maxP.73 mmHg  LVOT meanP.32 mmHg  LVSV Dopp: 39.69 ml  MV E Johnnie: 1.44 m/s  RVSP: 29.5 mmHg    [employer] Accredited Echocardiography Laboratory    Prepared and electronically signed by    Hailee Kim DO  Signed 25-May-2021 09:13:33      VTE Pharmacologic Prophylaxis: VTE covered by:    None     VTE Mechanical Prophylaxis: sequential compression device

## 2024-11-08 NOTE — ANESTHESIA POSTPROCEDURE EVALUATION
Post-Op Assessment Note    CV Status:  Stable  Pain Score: 0    Pain management: adequate       Mental Status:  Alert and awake   Hydration Status:  Euvolemic   PONV Controlled:  Controlled   Airway Patency:  Patent     Post Op Vitals Reviewed: Yes    No anethesia notable event occurred.    Staff: CRNA           Last Filed PACU Vitals:  Vitals Value Taken Time   Temp Rn note    Pulse 76    BP 99/61    Resp 15    SpO2 96% RA        Modified Audrey:  No data recorded  Post-Op Assessment Note    Last Filed PACU Vitals:  Vitals Value Taken Time   Temp 97.6 °F (36.4 °C) 11/08/24 0900   Pulse 78 11/08/24 1104   /70 11/08/24 0900   Resp 18 11/08/24 0451   SpO2 96 % 11/08/24 1104   Vitals shown include unfiled device data.    Modified Audrey:  No data recorded

## 2024-11-10 ENCOUNTER — HOME CARE VISIT (OUTPATIENT)
Dept: HOME HEALTH SERVICES | Facility: HOME HEALTHCARE | Age: 74
End: 2024-11-10
Payer: COMMERCIAL

## 2024-11-10 VITALS
DIASTOLIC BLOOD PRESSURE: 62 MMHG | TEMPERATURE: 97.6 F | SYSTOLIC BLOOD PRESSURE: 104 MMHG | OXYGEN SATURATION: 97 % | RESPIRATION RATE: 20 BRPM | HEART RATE: 86 BPM

## 2024-11-10 PROCEDURE — G0299 HHS/HOSPICE OF RN EA 15 MIN: HCPCS

## 2024-11-10 PROCEDURE — 400013 VN SOC

## 2024-11-11 ENCOUNTER — TRANSITIONAL CARE MANAGEMENT (OUTPATIENT)
Dept: INTERNAL MEDICINE CLINIC | Facility: CLINIC | Age: 74
End: 2024-11-11

## 2024-11-11 NOTE — CASE COMMUNICATION
Medication discrepancies or Major drug interactions - patient does not have Lasix in her home as of SOC. No longer taking Xyzal. CC sent to PCP re same for clarification.   Drug-Drug: pantoprazole and clopidogrel  Abnormal clinical findings none  This report is informational only, no response is needed  [facility] has Admitted your patient to Home Health service with the following disciplines: SN, PT and OT  Patient stated goals of  care increase strength and heal from surgery   Potential barriers to goal achievement comorbidites  Primary focus of home health care:Cardiac Circulatory  Anticipated visit pattern and next visit date 2w4 next visit planned for Thurs 11/14  Thank you for allowing us to participate in the care of your patient.

## 2024-11-11 NOTE — CASE COMMUNICATION
Patient seen for Start of Care for home health services today. Patient has a few medication questions regarding what medications she has in her home and what is listed on her most recent AVS from her hospital DC. She has furosemide 20mg BID listed but patient does not recall taking this prior to hospitalization and it is not in her home. If she is to be on this medication, she will need prescription sent.   She is also not taking Xyzal  5mg daily.     She has some medications prefilled in blister packs. Her most recent refill contains Lisinopril 5mg daily. Patient was instructed by this skilled nurse to remove Lisinopril and not take it as it is not listed on AVS until clarification can be completed.    Patient also questioning if she should continue her Atorvastatin. Patient stated she was told by Cardiology that she should not be taking it, however it is still listed  on her active medication list and most recent AVS.     She has a follow up appt scheduled for Wed 11/13 with your office. Please address above for ruth. Thank you in advance!

## 2024-11-12 PROBLEM — Z95.0 STATUS POST PLACEMENT OF CARDIAC PACEMAKER: Status: ACTIVE | Noted: 2024-11-12

## 2024-11-12 NOTE — ASSESSMENT & PLAN NOTE
1. Device - Patient is doing well status post pacemaker implantation. Her incision is clean, dry, and intact without evidence ecchymosis or signs of infection. Vital signs and labs were reviewed. Assessment of chest x-rays show no evidence of pneumothorax.     **Device interrogation showed appropriate device function with RV lead parameters such as sensing, threshold, and impedance being tested.  However, there was intermittent capture of right atrial lead and on chest x-ray the lead had pulled back.  This was discussed with attending and device programming was changed and it was felt that she did not need a lead revision.    **There was some swelling at the incision site for which pressure bandage was saline bag was placed.  Patient reported improvement in swelling with removal of this but it was felt that she would need something moving forward.  Therefore, safeguard pressure bandage was placed (did utilize the wrapping as she has allergies to adhesives) and she was advised to maintain this for 3 days after discharge at which point this can be removed.  She was advised on how to remove the air if it becomes painful.    2. Post care - Discharge instructions and restrictions were discussed with the patient in detail. She will also be provided with written instructions detailing what was discussed. Patient also requires a 2 week device and site check which has already been arranged and is in Epic.     3. Medications - In terms of medications, no changes were made from an EP standpoint besides reinitiation of QT prolonging medications without any lengthening of QT interval noted afterwards.    4. Patient is stable from an EP standpoint for discharge at this time.

## 2024-11-13 ENCOUNTER — TELEPHONE (OUTPATIENT)
Dept: INTERNAL MEDICINE CLINIC | Facility: CLINIC | Age: 74
End: 2024-11-13

## 2024-11-13 ENCOUNTER — OFFICE VISIT (OUTPATIENT)
Dept: INTERNAL MEDICINE CLINIC | Facility: CLINIC | Age: 74
End: 2024-11-13
Payer: COMMERCIAL

## 2024-11-13 VITALS
WEIGHT: 160.3 LBS | DIASTOLIC BLOOD PRESSURE: 84 MMHG | HEART RATE: 83 BPM | OXYGEN SATURATION: 98 % | HEIGHT: 57 IN | TEMPERATURE: 97.3 F | SYSTOLIC BLOOD PRESSURE: 124 MMHG | BODY MASS INDEX: 34.58 KG/M2

## 2024-11-13 DIAGNOSIS — R94.31 QT PROLONGATION: ICD-10-CM

## 2024-11-13 DIAGNOSIS — I35.0 SEVERE AORTIC STENOSIS: Primary | ICD-10-CM

## 2024-11-13 DIAGNOSIS — Z95.2 S/P TAVR (TRANSCATHETER AORTIC VALVE REPLACEMENT): ICD-10-CM

## 2024-11-13 DIAGNOSIS — E83.51 HYPOCALCEMIA: ICD-10-CM

## 2024-11-13 DIAGNOSIS — I49.5 SINUS NODE DYSFUNCTION (HCC): ICD-10-CM

## 2024-11-13 DIAGNOSIS — Z12.31 ENCOUNTER FOR SCREENING MAMMOGRAM FOR BREAST CANCER: ICD-10-CM

## 2024-11-13 DIAGNOSIS — I10 PRIMARY HYPERTENSION: ICD-10-CM

## 2024-11-13 PROCEDURE — 99496 TRANSJ CARE MGMT HIGH F2F 7D: CPT | Performed by: INTERNAL MEDICINE

## 2024-11-13 NOTE — PROGRESS NOTES
Transition of Care Visit  Name: Deyanira Stokes      : 1950      MRN: [id number]  Encounter Provider: Marcos Lara DO  Encounter Date: 2024   Encounter department: [facility]    Assessment & Plan  Encounter for screening mammogram for breast cancer         Severe aortic stenosis         S/P TAVR (transcatheter aortic valve replacement)         Cardiac conduction disease         QT prolongation         Primary hypertension         Hypocalcemia          A/P: Stable and VSS ok. Tolerating diet, meds, and activity increasing. Wounds healing and w/o s/s of infection. Went through her meds and marked her d/c reconciliation list and update EHR list given as well. Continue off ACEI and on the ASA, statin, and lasix for now. Continue plavix for now as well. . Will call [employer] to stop her ACEI.  Continue current treatment and Keep f/u with cards, CTS, and EPS. To start cardiopulmonary rehab in several weeks. RTC as scheduled.     History of Present Illness     Transitional Care Management Review:   Deyanira Stokes is a 74 y.o. female here for TCM follow up.     During the TCM phone call patient stated:  TCM Call       Date and time call was made  2024  8:58 AM    Hospital care reviewed  Records reviewed    Patient was hospitialized at  [facility]    Date of Admission  24    Date of discharge  24    Diagnosis  Severe aortic stenosis    Disposition  Home    Were the patients medications reviewed and updated  Yes    Current Symptoms  None          TCM Call       Post hospital issues  None    Should patient be enrolled in anticoag monitoring?  No    Scheduled for follow up?  Yes    Did you obtain your prescribed medications  Yes    Do you need help managing your prescriptions or medications  No    Is transportation to your appointment needed  No    I have advised the patient to call PCP with any new or worsening symptoms  herve collins    Are you recieving any  outpatient services  No    Are you recieving home care services  No    Are you using any community resources  No    Current waiver services  No    Have you fallen in the last 12 months  No    Interperter language line needed  No    Counseling  Patient    Counseling topics  Diagnostic results; instructions for management; Risk factor reduction; patient and family education; Activities of daily living; Prognosis          WF seen after a brief admission for elective AVR due to severe AS. Underwent successful AVR. Post op course complicated by QT prolongation and bradycardia requiting a pacemake to be placed. Rest of post op course uncomplicated and pt restarted on her OP meds. D/c labs ok except for slight low calcium. Pt d/c'd to home with meds adjusted. On lasix,ASA,  and a statin. Beta blocker and ACEI on hold. . Since d/c, doing ok. Activity level increasing. Minimal pain. Appetite improving. Tolerating her meds. Wounds healing. MS back to baseline. Only c/o is pt is confused about her meds.       Review of Systems   Constitutional:  Negative for activity change, chills, diaphoresis, fatigue and fever.   HENT: Negative.     Eyes:  Negative for visual disturbance.   Respiratory:  Negative for cough, chest tightness, shortness of breath and wheezing.    Cardiovascular:  Negative for chest pain, palpitations and leg swelling.   Gastrointestinal:  Negative for abdominal pain, constipation, diarrhea, nausea and vomiting.   Endocrine: Negative for cold intolerance and heat intolerance.   Genitourinary:  Negative for difficulty urinating, dysuria and frequency.   Musculoskeletal:  Negative for arthralgias, gait problem and myalgias.   Neurological:  Negative for dizziness, seizures, syncope, weakness, light-headedness and headaches.   Psychiatric/Behavioral:  Negative for confusion, dysphoric mood and sleep disturbance. The patient is not nervous/anxious.      Objective     /84   Pulse 83   Temp (!) 97.3 °F (36.3  "°C) (Tympanic)   Ht 4' 9\" (1.448 m)   Wt 72.7 kg (160 lb 4.8 oz)   LMP  (LMP Unknown)   SpO2 98%   BMI 34.69 kg/m²     Physical Exam  Constitutional:       General: She is not in acute distress.     Appearance: Normal appearance. She is not ill-appearing.   HENT:      Head: Normocephalic and atraumatic.      Mouth/Throat:      Mouth: Mucous membranes are moist.   Eyes:      Extraocular Movements: Extraocular movements intact.      Conjunctiva/sclera: Conjunctivae normal.      Pupils: Pupils are equal, round, and reactive to light.   Neck:      Vascular: No carotid bruit.   Cardiovascular:      Rate and Rhythm: Normal rate and regular rhythm.      Heart sounds: Normal heart sounds. No murmur heard.  Pulmonary:      Effort: Pulmonary effort is normal. No respiratory distress.      Breath sounds: Normal breath sounds. No wheezing, rhonchi or rales.   Abdominal:      General: Bowel sounds are normal. There is no distension.      Palpations: Abdomen is soft.      Tenderness: There is no abdominal tenderness.   Musculoskeletal:      Cervical back: Neck supple.      Right lower leg: No edema.      Left lower leg: No edema.   Neurological:      General: No focal deficit present.      Mental Status: She is alert and oriented to person, place, and time. Mental status is at baseline.   Psychiatric:         Mood and Affect: Mood normal.         Behavior: Behavior normal.         Thought Content: Thought content normal.         Judgment: Judgment normal.       Medications have been reviewed by provider in current encounter      "

## 2024-11-13 NOTE — TELEPHONE ENCOUNTER
----- Message from Marcos Lara DO sent at 11/13/2024 11:25 AM EST -----  Pt gets meds form [employer] and is no longer on lisinopril and need to have the stop placing this in her pill packs.

## 2024-11-14 ENCOUNTER — HOME CARE VISIT (OUTPATIENT)
Dept: HOME HEALTH SERVICES | Facility: HOME HEALTHCARE | Age: 74
End: 2024-11-14
Payer: COMMERCIAL

## 2024-11-14 VITALS
HEART RATE: 80 BPM | SYSTOLIC BLOOD PRESSURE: 140 MMHG | TEMPERATURE: 97.9 F | RESPIRATION RATE: 20 BRPM | DIASTOLIC BLOOD PRESSURE: 80 MMHG | OXYGEN SATURATION: 95 %

## 2024-11-14 VITALS
WEIGHT: 159 LBS | SYSTOLIC BLOOD PRESSURE: 142 MMHG | OXYGEN SATURATION: 97 % | HEART RATE: 80 BPM | BODY MASS INDEX: 34.41 KG/M2 | DIASTOLIC BLOOD PRESSURE: 84 MMHG

## 2024-11-14 PROCEDURE — G0151 HHCP-SERV OF PT,EA 15 MIN: HCPCS

## 2024-11-14 PROCEDURE — G0299 HHS/HOSPICE OF RN EA 15 MIN: HCPCS

## 2024-11-14 NOTE — CASE COMMUNICATION
[facility] PT eval complete s/p TAVR, pacer implant.  Patient generally doing well on level home surfaces amb with no device supervision although gait slow and guarded. She demo reduced endurance with SOB after approx 30ft gait and demo inability to complete stairs to second floor of home.  [facility] PT warranted to work to improve home safety and ability to negotiate stairs to second floor to allow acces to bathroom adn bedroom.

## 2024-11-15 ENCOUNTER — TELEPHONE (OUTPATIENT)
Dept: HOME HEALTH SERVICES | Facility: HOME HEALTHCARE | Age: 74
End: 2024-11-15

## 2024-11-15 ENCOUNTER — TELEPHONE (OUTPATIENT)
Dept: CARDIAC SURGERY | Facility: CLINIC | Age: 74
End: 2024-11-15

## 2024-11-18 ENCOUNTER — TELEPHONE (OUTPATIENT)
Dept: CARDIAC REHAB | Facility: HOSPITAL | Age: 74
End: 2024-11-18

## 2024-11-18 ENCOUNTER — HOME CARE VISIT (OUTPATIENT)
Dept: HOME HEALTH SERVICES | Facility: HOME HEALTHCARE | Age: 74
End: 2024-11-18
Payer: COMMERCIAL

## 2024-11-18 VITALS
TEMPERATURE: 97.3 F | DIASTOLIC BLOOD PRESSURE: 76 MMHG | RESPIRATION RATE: 20 BRPM | SYSTOLIC BLOOD PRESSURE: 128 MMHG | OXYGEN SATURATION: 96 % | HEART RATE: 76 BPM

## 2024-11-18 PROCEDURE — G0299 HHS/HOSPICE OF RN EA 15 MIN: HCPCS

## 2024-11-18 PROCEDURE — G0180 MD CERTIFICATION HHA PATIENT: HCPCS | Performed by: THORACIC SURGERY (CARDIOTHORACIC VASCULAR SURGERY)

## 2024-11-19 ENCOUNTER — APPOINTMENT (OUTPATIENT)
Dept: CARDIAC REHAB | Facility: HOSPITAL | Age: 74
End: 2024-11-19
Payer: COMMERCIAL

## 2024-11-19 ENCOUNTER — HOME CARE VISIT (OUTPATIENT)
Dept: HOME HEALTH SERVICES | Facility: HOME HEALTHCARE | Age: 74
End: 2024-11-19
Payer: COMMERCIAL

## 2024-11-20 ENCOUNTER — CLINICAL SUPPORT (OUTPATIENT)
Dept: CARDIOLOGY CLINIC | Facility: CLINIC | Age: 74
End: 2024-11-20

## 2024-11-20 ENCOUNTER — HOME CARE VISIT (OUTPATIENT)
Dept: HOME HEALTH SERVICES | Facility: HOME HEALTHCARE | Age: 74
End: 2024-11-20
Payer: COMMERCIAL

## 2024-11-20 VITALS — DIASTOLIC BLOOD PRESSURE: 76 MMHG | OXYGEN SATURATION: 95 % | SYSTOLIC BLOOD PRESSURE: 112 MMHG | HEART RATE: 83 BPM

## 2024-11-20 DIAGNOSIS — Z95.0 CARDIAC PACEMAKER IN SITU: Primary | ICD-10-CM

## 2024-11-20 PROCEDURE — 99024 POSTOP FOLLOW-UP VISIT: CPT | Performed by: PHYSICIAN ASSISTANT

## 2024-11-20 PROCEDURE — G0151 HHCP-SERV OF PT,EA 15 MIN: HCPCS

## 2024-11-20 NOTE — PROGRESS NOTES
The patient is seen in the office for a post device wound check.   The site is clean and dry without erythema or swelling.  There is no discharge or tenderness  The patient is afebrile, normocardic and normotensive.     The patient is instructed to return to normal activity, normal ROM and normal bathing, and to report any sign or symptom of infection.

## 2024-11-21 ENCOUNTER — HOME CARE VISIT (OUTPATIENT)
Dept: HOME HEALTH SERVICES | Facility: HOME HEALTHCARE | Age: 74
End: 2024-11-21
Payer: COMMERCIAL

## 2024-11-21 VITALS
DIASTOLIC BLOOD PRESSURE: 60 MMHG | OXYGEN SATURATION: 95 % | HEART RATE: 78 BPM | SYSTOLIC BLOOD PRESSURE: 128 MMHG | BODY MASS INDEX: 33.97 KG/M2 | RESPIRATION RATE: 18 BRPM | WEIGHT: 157 LBS | TEMPERATURE: 98.6 F

## 2024-11-21 PROCEDURE — G0152 HHCP-SERV OF OT,EA 15 MIN: HCPCS | Performed by: OCCUPATIONAL THERAPIST

## 2024-11-21 PROCEDURE — G0300 HHS/HOSPICE OF LPN EA 15 MIN: HCPCS

## 2024-11-22 ENCOUNTER — HOME CARE VISIT (OUTPATIENT)
Dept: HOME HEALTH SERVICES | Facility: HOME HEALTHCARE | Age: 74
End: 2024-11-22
Payer: COMMERCIAL

## 2024-11-22 VITALS
HEART RATE: 78 BPM | SYSTOLIC BLOOD PRESSURE: 128 MMHG | DIASTOLIC BLOOD PRESSURE: 60 MMHG | OXYGEN SATURATION: 95 % | TEMPERATURE: 98.6 F

## 2024-11-22 PROCEDURE — G0151 HHCP-SERV OF PT,EA 15 MIN: HCPCS

## 2024-11-22 NOTE — CASE COMMUNICATION
[facility] OT evaluation 11/21/24.      OT evaluation only-No further OT services are planned at this time as patient is independent with performance of daily activities has good understanding of pacemaker safety precautions and patient is planning to initiate cardiac rehab on 11/ 26/24.

## 2024-11-23 VITALS
HEART RATE: 71 BPM | BODY MASS INDEX: 33.76 KG/M2 | WEIGHT: 156 LBS | OXYGEN SATURATION: 98 % | DIASTOLIC BLOOD PRESSURE: 88 MMHG | SYSTOLIC BLOOD PRESSURE: 162 MMHG

## 2024-11-23 NOTE — CASE COMMUNICATION
Patient is discharged from [facility] PT with all goals met. She amb and transfers indep with no device in home. Supervision for shower transfer and car transfer needed only.  She uses [facility] outside of home.  She is indep in HEP and cardiac knowledge improved with good understanding of precautions, limitations, signs and symtpoms to report to MD.  Cardiac rehab to begin next week.

## 2024-11-25 ENCOUNTER — HOME CARE VISIT (OUTPATIENT)
Dept: HOME HEALTH SERVICES | Facility: HOME HEALTHCARE | Age: 74
End: 2024-11-25
Payer: COMMERCIAL

## 2024-11-25 VITALS
HEART RATE: 72 BPM | DIASTOLIC BLOOD PRESSURE: 80 MMHG | OXYGEN SATURATION: 95 % | TEMPERATURE: 97.3 F | RESPIRATION RATE: 20 BRPM | SYSTOLIC BLOOD PRESSURE: 136 MMHG

## 2024-11-25 DIAGNOSIS — N32.81 OAB (OVERACTIVE BLADDER): ICD-10-CM

## 2024-11-25 DIAGNOSIS — F41.1 GENERALIZED ANXIETY DISORDER: ICD-10-CM

## 2024-11-25 DIAGNOSIS — N39.46 MIXED STRESS AND URGE URINARY INCONTINENCE: ICD-10-CM

## 2024-11-25 DIAGNOSIS — E78.5 HYPERLIPIDEMIA, UNSPECIFIED HYPERLIPIDEMIA TYPE: ICD-10-CM

## 2024-11-25 DIAGNOSIS — R07.89 OTHER CHEST PAIN: ICD-10-CM

## 2024-11-25 PROCEDURE — G0299 HHS/HOSPICE OF RN EA 15 MIN: HCPCS

## 2024-11-26 ENCOUNTER — CLINICAL SUPPORT (OUTPATIENT)
Dept: CARDIAC REHAB | Facility: HOSPITAL | Age: 74
End: 2024-11-26
Payer: COMMERCIAL

## 2024-11-26 DIAGNOSIS — I44.7 LBBB (LEFT BUNDLE BRANCH BLOCK): ICD-10-CM

## 2024-11-26 DIAGNOSIS — I35.9 NONRHEUMATIC AORTIC VALVE DISORDER: ICD-10-CM

## 2024-11-26 DIAGNOSIS — Z95.2 S/P TAVR (TRANSCATHETER AORTIC VALVE REPLACEMENT): Primary | ICD-10-CM

## 2024-11-26 PROCEDURE — 93797 PHYS/QHP OP CAR RHAB WO ECG: CPT

## 2024-11-26 RX ORDER — PANTOPRAZOLE SODIUM 40 MG/1
40 TABLET, DELAYED RELEASE ORAL DAILY
Qty: 90 TABLET | Refills: 1 | Status: SHIPPED | OUTPATIENT
Start: 2024-11-26

## 2024-11-26 RX ORDER — QUETIAPINE FUMARATE 25 MG/1
25 TABLET, FILM COATED ORAL
Qty: 90 TABLET | Refills: 0 | Status: SHIPPED | OUTPATIENT
Start: 2024-11-26

## 2024-11-26 RX ORDER — ATORVASTATIN CALCIUM 20 MG/1
20 TABLET, FILM COATED ORAL DAILY
Qty: 90 TABLET | Refills: 1 | Status: SHIPPED | OUTPATIENT
Start: 2024-11-26

## 2024-11-26 RX ORDER — MIRABEGRON 25 MG/1
50 TABLET, FILM COATED, EXTENDED RELEASE ORAL EVERY MORNING
Qty: 180 TABLET | Refills: 1 | Status: SHIPPED | OUTPATIENT
Start: 2024-11-26

## 2024-11-26 NOTE — PROGRESS NOTES
CARDIAC REHABILITATION   ASSESSMENT AND INDIVIDUALIZED TREATMENT PLAN  INITIAL           Today's date: 2024   # of Exercise Sessions Completed: Initial Visit  Patient name: Deyanira Stokes      : 1950  Age: 74 y.o.       MRN: [id number]  Referring Physician: Dr. Godwin Sarah DO  Cardiologist: Dr. Godwin Cobb MD  Provider: Nuvia  Clinician: STAN Cotton        Treatment is tailored to this patient's individual needs.  The ITP was reviewed with the patient and all questions were answered to their satisfaction.  Additional ITP documentation can be found electronically including daily and monthly exercise summaries, daily session notes with ECG summaries, education notes, daily medication reconciliation, and daily physician supervision.      Comments: Patient seen for initial evaluation this date. She performed a 6 MWT that was discontinued at the 5 minute geneva due to QUILES and RPE rating of 6/10. Patient currently ambulating w/spc. Mobility is slow and labored. Patient c/o extreme fatigue. She had correct hemodynamic responses to the test. Patient requires skilled CR interventions in order to attain her goals and maximal level of function. CR is medically necessary for secondary prevention and avoiding hospital readmission.         Dx: S/P TAVR 2024  S/P Pacemaker Insertion 2024    Description of Diagnosis: Deyanira Stokes is a 74 y.o. year old female who was evaluated by Godwin Sarah D.O. for transcatheter aortic valve replacement. She had the following studies  completed: Gated CTA of the chest/abdomen/pelvis,  cardiac catheterization and carotid artery ultrasound.review patient's PMHx is notable for AS, HTN, HLD, asthma, right breast cancer diagnosed in  (s/p R mastectomy, chemotherapy, and radiation), anxiety, dementia, depression, GERD, kidney stone, hearing loss, IBS, obesity (BMI 36), osteoporosis, arthritis, scoliosis, and gait dysfunction.      Date of onset:  10/30/2024  Other Cardiac History: HTN, CHF, LBBB, CAD, cardiac conduction disease and patent foramen ovale.         ASSESSMENT    Medical History:   Past Medical History:   Diagnosis Date    Anxiety     Arthritis     Asthma     CHF (congestive heart failure) (HCC)     Chronic headaches     Coronary artery disease     Dementia (HCC)     Depression     Dietary lactose intolerance     GERD (gastroesophageal reflux disease)     History of breast cancer     right, s/p mastectomy, s/p chemoc/radiation    History of nephrolithiasis     History of pneumonia     History of shingles     HL (hearing loss)     Hypertension     IBS (irritable bowel syndrome)     Lymphedema of right arm     Obesity     Osteoporosis     PFO (patent foramen ovale)     PONV (postoperative nausea and vomiting)     Scoliosis     Severe aortic stenosis     Vitamin D deficiency        Family History:  Family History   Adopted: Yes   Problem Relation Age of Onset    Diabetes Mother     Heart disease Mother     Mental illness Mother     Depression Mother     Heart disease Brother     Breast cancer Maternal Aunt     Breast cancer Maternal Aunt     Breast cancer Maternal Aunt     Breast cancer Maternal Aunt     Breast cancer Maternal Aunt     No Known Problems Father     No Known Problems Sister     No Known Problems Daughter     Breast cancer Maternal Grandmother     No Known Problems Sister     No Known Problems Sister     No Known Problems Sister        Allergies:   Ibuprofen, Morphine, and Latex    Current Medications:   Current Outpatient Medications   Medication Sig Dispense Refill    acetaminophen (TYLENOL) 650 mg CR tablet Take 1 tablet (650 mg total) by mouth every 8 (eight) hours as needed for mild pain Patient takes 500mg      albuterol (2.5 mg/3 mL) 0.083 % nebulizer solution Take 2.5 mg by nebulization every 6 (six) hours as needed for wheezing      albuterol (PROVENTIL HFA,VENTOLIN HFA) 90 mcg/act inhaler Inhale 2 puffs every 4 (four) hours  as needed for wheezing 8.5 g 2    aspirin 81 mg chewable tablet Chew 81 mg daily      atorvastatin (LIPITOR) 20 mg tablet Take 1 tablet (20 mg total) by mouth daily 90 tablet 2    butalbital-acetaminophen-caffeine (FIORICET,ESGIC) -40 mg per tablet Take 1 tablet by mouth 3 (three) times a day as needed for headaches 90 tablet 1    clopidogrel (PLAVIX) 75 mg tablet Take 1 tablet (75 mg total) by mouth daily 90 tablet 0    donepezil (ARICEPT) 10 mg tablet Take 1 tablet by mouth daily at bedtime. 100 tablet 1    FLUoxetine (PROzac) 20 mg capsule Take 1 capsule (20 mg total) by mouth daily 90 capsule 1    fluticasone (FLONASE) 50 mcg/act nasal spray INSTILL 1 SPRAY INTO EACH NOSTRIL ONCE DAILY 48 mL 1    furosemide (LASIX) 20 mg tablet Take 20 mg by mouth 2 (two) times a day Waiting for pharmacy to call her. Updated 9/23      Mirabegron ER 25 MG TB24 Take 50 mg by mouth in the morning 180 tablet 3    montelukast (SINGULAIR) 10 mg tablet Take 1 tablet (10 mg total) by mouth daily at bedtime 90 tablet 1    Nutritional Supplements (BOOST BREEZE PO) Take by mouth daily Nutritional drink   3 times a week.      oxyCODONE (OxyCONTIN) 10 mg 12 hr tablet Take 10 mg by mouth as needed for moderate pain      pantoprazole (PROTONIX) 40 mg tablet Take 1 tablet (40 mg total) by mouth daily 90 tablet 1    QUEtiapine (SEROquel) 25 mg tablet Take 1 tablet (25 mg total) by mouth daily at bedtime 90 tablet 1     No current facility-administered medications for this visit.       Medication compliance: Yes   Comments: Pt reports to be compliant with medications    Physical Limitations: UE restrictions due to recent pacemaker insertion. Gait dysfunction,, ambulates w/spc due to history of BTKRs.     Fall Risk: Moderate   Comments: Patient uses walking assist device (walker/cane/rollator) and notes dizziness when upright for prolonged periods.     Cultural needs: none      CAD Risk Factors:  Cholesterol: No  HTN: Yes  DM: No  Obesity:  Yes   Inactivity: Yes      EXERCISE ASSESSMENT:     Initial Fitness Assessment: 6 MWT   Resting Vitals: HR 74, /66, 02 Sat 97%; QUILES 0/10   Peak Vitals: HR 92, /68, 02 Sat 98%, RPE 6/10, QUILES 4/10- complete 422 feet - 1.61 MET Level   Recovery Vitals: HR 76, /60, 02 Sat 98%, QUILES 0/10    ECG INTERPRETATION:  NSR, Paced    Current Functional Status:  Occupation: retired  Recreation/Physical Activity: Sedentary at this time  ADL’s:able to perform self-care  Storey: Capable of performing light ADLs only  Home exercise: none, currently participating w/[facility].   Other Comments:       SMART Exercise Goals:   10% improvement in functional capacity based on max METs achieved in initial fitness assessment  reduced dyspnea with physical activity    improved DASI score by 10%  increased exercise capacity by 40% based on peak METs tolerated in cardiac rehab exercise session  maintain > 150 minutes per week of moderate intensity exercise    Patient Specific EXERCISE GOALS:       Ambulate long distances w/out symptoms  Ability to perform chores around the home w/out extreme fatigue  Increase standing balance and tolerance w/out dizziness    Functional Capacity Screening Tool:  [facility] Activity Status Index: 4.64 METs      PSYCHOSOCIAL ASSESSMENT:    Date of last Assessment:  11/26/2024  Depression screening:  PHQ-9 = 9    Interpretation:  5-9 = Mild Depression  Anxiety screening:  ELINOR-7 = 4    Interpretation: 0-4  = Not anxious    Pt self-report of depression and anxiety   Patient reports feelings of depression   Reports sufficient emotional support   Patient takes Prozac and Seroquel for this issue.     Self-reported stress level:  8/10   Stressors: Health and lack of independence  Stress Management Tools: practice Relaxation Techniques, exercise, enjoy a hobby, and pray/meditate    SMART Psychosocial Goals:     Reduce perceived stress to 1-3/10, PHQ-9 - reduced severity by one level, Quality of Life in  "[place] Score < 3 , feel less tired with more energy, and reduced incidence of restlessness and/or lethargy    Patient Specific PSYCHOCOSOCIAL GOALS:    Reduce stress level from 8/10 to 3/10  Medication compliance w/Seroquel and Prozac  Initiate a hobby in order to avoid triggers    Quality of Life Screen:  (Higher score indicates disease impact on QOL)  [facility] COOP score: 32/45     Social Support:   children-patient lives w/her daughter  Community/Social Activities: None at this time     Psychosocial Assessment as it relates to rehabilitation:   Patient denies issues with his/her family or home life that may affect their rehabilitation efforts.       NUTRITION ASSESSMENT:    Initial Weight:  156 -patient states prior to hospitalization she was 180#.  Current Weight: 156    Height:   Ht Readings from Last 1 Encounters:   11/13/24 4' 9\" (1.448 m)       Rate Your Plate Score: 71/81    Diabetes: N/A  A1c: 5.3    last measured: 6/11/2024    Lipid management: Discussed diet and lipid management and Last lipid profile 6/11/202  Chol 131    HDL 52  LDL 56    Current Dietary Habits:  Patient states prior to her surgery she started improving her eating habits and trying to follow a plant based diet. Recently lost approximately 24#.     SMART Nutrition Goals:   eat whole grain breads, brown rice and whole grain cereals, eat 5 or more servings of fruits and vegetables a day, Do not eat fried foods, never add salt to food when cooking or at the table, choose low sodium canned, frozen/packaged foods or rarely/never eat, and drink less than 8oz of soda and sweetened drinks per day    Patient Specific NUTRITION GOALS:     1. Attain a healthy weight and BMI   2. Regularly hydrate during the day   3. Reduce intake of fried foods and desserts    Drug/Alcohol Use:   No      OTHER CORE COMPONENT ASSESSMENT:    Tobacco Use:     N/A:  Patient is a non-smoker     Anginal Symptoms:  QUIELS, excessive fatigue, and " lightheadedness   NTG use: No prescription    SMART Goals:   consistent, controlled resting BP < 130/80, medication compliance, and avoid smoke and other irritants    Patient Specific CORE COMPONENT GOALS:    Reduce reliance on medication  Daily vital monitoring at home        INDIVIDUALIZED TREATMENT PLAN      EXERCISE GOALS and PLAN      Progress toward Exercise goals:   Reviewed Pt goals and determined plan of care, Will continue to educate and progress as tolerated.    Exercise Intervention/plan:    education on home exercise guidelines, home exercise 30+ mins 2 days opposite CR, Group class: Risk Factors for Heart Disease, and Patient education:   Exercise After Cardiac Rehabilitation    The patient was counseled on exercise guidelines to achieve a minimum of 150 mins/wk of moderate intensity (RPE 4-6)   exercise and encouraged to add 1-2 days of exercise on opposite days of cardiac rehab as tolerated.       PHYSICIAN PRESCRIBED EXERCISE:    Current Aerobic Exercise Prescription:      Frequency: 2-3 days/week   Supplement with home exercise 2+ days/wk as tolerated       Minutes: 30- 40         METS: 2.50-3.50            HR: RHR +30-40bpm   RPE: 4-6/10         Modalities: Treadmill, UBE, NuStep, and Recumbent bike     Exercise workloads will be progressed gradually as tolerated, within limits of patient's ability, and according to the patient's   response to the exercise program.      Aerobic Exercise Prescription Plan for Progression   Frequency: 2-3 days/week of cardiac rehab       Supplement with home exercise 2+ days/wk as tolerated    Minutes: 40 -45      >150 mins/wk of moderate intensity exercise   METS: 3.50-4.0   HR: RHR +30-40bpm     RPE: 4-6/10   Modalities: Treadmill, UBE, NuStep, Recumbent bike, and Room walking    Strength trainin-3 days / week  12-15 repetitions  1-2 sets per modality    Modalities:  UE free weights    Home Exercise: none, Has been encouraged to walk daily    Exercise  Education: benefit of exercise for CAD risk factors, home exercise guidelines, AHA guidelines to achieve >150 mins/wk of moderate exercise, and RPE scale     Readiness to change: Preparation:  (Getting ready to change)       NUTRITION GOALS AND PLAN      Nutritional   Reviewed patient's Rate your Plate. Discussed key elements of heart healthy eating. Reviewed patient goals for dietary modifications and their clinical implications.  Reviewed most recent lipid profile.     Patient's progress toward Nutrition goals:    Reviewed Pt goals and determined plan of care, Will continue to educate and progress as tolerated.      Nutrition Intervention/plan:   replace refined flours with whole grains, increase daily intake of fruits and vegetables, never/rarely eat fried foods, never/rarely add salt to food when cooking or at the table, choose low sodium canned, frozen, packaged foods or rarely eat these foods, and rarely/never eat salty snacks    Measurable goals were based Rate Your Plate Dietary Self-Assessment. These are the areas in which the patient could score higher on the assessment.  Goals include recommendations for a heart healthy diet based on American Heart Association.    Nutrition Education:   low sodium diet  maintaining hydration  nutrition for  lipid management    Readiness to change: Preparation:  (Getting ready to change)       PSYCHOSOCIAL GOALS AND PLAN    Psychosocial Assessment as it relates to rehabilitation:   Patient denies issues with his/her family or home life that may affect their rehabilitation efforts.     Patient's progress toward Psychosocial goals:    Reviewed Pt goals and determined plan of care, Will continue to educate and progress as tolerated.    Psychosocial Intervention/plan:   Practice relaxation techniques and Keep a positive mindset    Psychosocial Education: signs/sxs of depression and depression and CAD    Information to utilize [employer] was provided as well as contact  information for counseling through  Behavioral [facility] and group psychotherapy groups available.    Readiness to change: Preparation:  (Getting ready to change)       OTHER CORE COMPONENTS GOALS and PLAN      Blood Pressure will be monitored throughout the program and cardiologist will be notified of elevated trends.    Pt will be encouraged to monitor home BP if advised by cardiologist.    Tobacco Intervention/plan:   N/A:  Pt is a non-smoker    Progress toward Core Component goals:   Reviewed Pt goals and determined plan of care, Will continue to educate and progress as tolerated.    Other Core Components Intervention:   medication compliance, avoid places with second hand smoke, avoid processed foods, engage in regular exercise, and eliminate salt shaker at the table    Group and Individual Education:  understanding high blood pressure and it's relationship to CAD and group class: Understanding Heart Disease    Readiness to change: Preparation:  (Getting ready to change)

## 2024-11-27 ENCOUNTER — HOME CARE VISIT (OUTPATIENT)
Dept: HOME HEALTH SERVICES | Facility: HOME HEALTHCARE | Age: 74
End: 2024-11-27
Payer: COMMERCIAL

## 2024-11-27 PROCEDURE — G0299 HHS/HOSPICE OF RN EA 15 MIN: HCPCS

## 2024-11-28 VITALS
HEART RATE: 76 BPM | DIASTOLIC BLOOD PRESSURE: 80 MMHG | SYSTOLIC BLOOD PRESSURE: 136 MMHG | TEMPERATURE: 98 F | OXYGEN SATURATION: 95 % | RESPIRATION RATE: 20 BRPM

## 2024-11-29 ENCOUNTER — APPOINTMENT (OUTPATIENT)
Dept: LAB | Facility: HOSPITAL | Age: 74
End: 2024-11-29
Payer: COMMERCIAL

## 2024-11-29 DIAGNOSIS — I35.0 NONRHEUMATIC AORTIC VALVE STENOSIS: ICD-10-CM

## 2024-11-29 DIAGNOSIS — Z95.2 S/P TAVR (TRANSCATHETER AORTIC VALVE REPLACEMENT): ICD-10-CM

## 2024-11-29 LAB
ANION GAP SERPL CALCULATED.3IONS-SCNC: 2 MMOL/L (ref 4–13)
BASOPHILS # BLD AUTO: 0.04 THOUSANDS/ΜL (ref 0–0.1)
BASOPHILS NFR BLD AUTO: 1 % (ref 0–1)
BUN SERPL-MCNC: 13 MG/DL (ref 5–25)
CALCIUM SERPL-MCNC: 8.7 MG/DL (ref 8.4–10.2)
CHLORIDE SERPL-SCNC: 108 MMOL/L (ref 96–108)
CO2 SERPL-SCNC: 31 MMOL/L (ref 21–32)
CREAT SERPL-MCNC: 0.65 MG/DL (ref 0.6–1.3)
EOSINOPHIL # BLD AUTO: 0.15 THOUSAND/ΜL (ref 0–0.61)
EOSINOPHIL NFR BLD AUTO: 3 % (ref 0–6)
ERYTHROCYTE [DISTWIDTH] IN BLOOD BY AUTOMATED COUNT: 12 % (ref 11.6–15.1)
GFR SERPL CREATININE-BSD FRML MDRD: 88 ML/MIN/1.73SQ M
GLUCOSE SERPL-MCNC: 80 MG/DL (ref 65–140)
HCT VFR BLD AUTO: 40.6 % (ref 34.8–46.1)
HGB BLD-MCNC: 13.2 G/DL (ref 11.5–15.4)
IMM GRANULOCYTES # BLD AUTO: 0.03 THOUSAND/UL (ref 0–0.2)
IMM GRANULOCYTES NFR BLD AUTO: 1 % (ref 0–2)
LYMPHOCYTES # BLD AUTO: 1.35 THOUSANDS/ΜL (ref 0.6–4.47)
LYMPHOCYTES NFR BLD AUTO: 27 % (ref 14–44)
MCH RBC QN AUTO: 30.4 PG (ref 26.8–34.3)
MCHC RBC AUTO-ENTMCNC: 32.5 G/DL (ref 31.4–37.4)
MCV RBC AUTO: 94 FL (ref 82–98)
MONOCYTES # BLD AUTO: 0.56 THOUSAND/ΜL (ref 0.17–1.22)
MONOCYTES NFR BLD AUTO: 11 % (ref 4–12)
NEUTROPHILS # BLD AUTO: 2.83 THOUSANDS/ΜL (ref 1.85–7.62)
NEUTS SEG NFR BLD AUTO: 57 % (ref 43–75)
NRBC BLD AUTO-RTO: 0 /100 WBCS
PLATELET # BLD AUTO: 174 THOUSANDS/UL (ref 149–390)
PMV BLD AUTO: 9.9 FL (ref 8.9–12.7)
POTASSIUM SERPL-SCNC: 4.5 MMOL/L (ref 3.5–5.3)
RBC # BLD AUTO: 4.34 MILLION/UL (ref 3.81–5.12)
SODIUM SERPL-SCNC: 141 MMOL/L (ref 135–147)
WBC # BLD AUTO: 4.96 THOUSAND/UL (ref 4.31–10.16)

## 2024-11-29 PROCEDURE — 36415 COLL VENOUS BLD VENIPUNCTURE: CPT

## 2024-11-29 PROCEDURE — 80048 BASIC METABOLIC PNL TOTAL CA: CPT

## 2024-11-29 PROCEDURE — 85025 COMPLETE CBC W/AUTO DIFF WBC: CPT

## 2024-12-02 ENCOUNTER — HOSPITAL ENCOUNTER (OUTPATIENT)
Dept: NON INVASIVE DIAGNOSTICS | Facility: HOSPITAL | Age: 74
Discharge: HOME | End: 2024-12-02
Payer: COMMERCIAL

## 2024-12-02 ENCOUNTER — APPOINTMENT (OUTPATIENT)
Dept: CARDIAC REHAB | Facility: HOSPITAL | Age: 74
End: 2024-12-02
Payer: COMMERCIAL

## 2024-12-02 ENCOUNTER — OFFICE VISIT (OUTPATIENT)
Dept: CARDIAC SURGERY | Facility: CLINIC | Age: 74
End: 2024-12-02
Payer: COMMERCIAL

## 2024-12-02 VITALS
SYSTOLIC BLOOD PRESSURE: 169 MMHG | WEIGHT: 165.2 LBS | HEIGHT: 57 IN | HEART RATE: 68 BPM | DIASTOLIC BLOOD PRESSURE: 74 MMHG | BODY MASS INDEX: 35.64 KG/M2 | OXYGEN SATURATION: 98 %

## 2024-12-02 VITALS
HEART RATE: 76 BPM | BODY MASS INDEX: 33.66 KG/M2 | HEIGHT: 57 IN | WEIGHT: 156 LBS | SYSTOLIC BLOOD PRESSURE: 136 MMHG | DIASTOLIC BLOOD PRESSURE: 80 MMHG

## 2024-12-02 DIAGNOSIS — Z48.89 ENCOUNTER FOR POSTOPERATIVE CARE: ICD-10-CM

## 2024-12-02 DIAGNOSIS — Z95.2 S/P TAVR (TRANSCATHETER AORTIC VALVE REPLACEMENT): ICD-10-CM

## 2024-12-02 DIAGNOSIS — Z95.2 S/P TAVR (TRANSCATHETER AORTIC VALVE REPLACEMENT): Primary | ICD-10-CM

## 2024-12-02 DIAGNOSIS — I35.0 NONRHEUMATIC AORTIC VALVE STENOSIS: ICD-10-CM

## 2024-12-02 DIAGNOSIS — I10 PRIMARY HYPERTENSION: ICD-10-CM

## 2024-12-02 LAB
AORTIC ROOT: 2.8 CM
AORTIC VALVE MEAN VELOCITY: 12.9 M/S
APICAL FOUR CHAMBER EJECTION FRACTION: 57 %
ASCENDING AORTA: 3 CM
AV AREA BY CONTINUOUS VTI: 2.6 CM2
AV AREA PEAK VELOCITY: 2.5 CM2
AV LVOT MEAN GRADIENT: 2 MMHG
AV LVOT PEAK GRADIENT: 3 MMHG
AV MEAN GRADIENT: 7 MMHG
AV PEAK GRADIENT: 13 MMHG
AV VALVE AREA: 2.64 CM2
AV VELOCITY RATIO: 0.52
BSA FOR ECHO PROCEDURE: 1.62 M2
DOP CALC AO PEAK VEL: 1.8 M/S
DOP CALC AO VTI: 37.93 CM
DOP CALC LVOT AREA: 4.91 CM2
DOP CALC LVOT CARDIAC INDEX: 4.14 L/MIN/M2
DOP CALC LVOT CARDIAC OUTPUT: 6.71 L/MIN
DOP CALC LVOT DIAMETER: 2.5 CM
DOP CALC LVOT PEAK VEL VTI: 20.39 CM
DOP CALC LVOT PEAK VEL: 0.93 M/S
DOP CALC LVOT STROKE INDEX: 61.7 ML/M2
DOP CALC LVOT STROKE VOLUME: 100.04
E WAVE DECELERATION TIME: 283 MS
E/A RATIO: 0.8
FRACTIONAL SHORTENING: 32 (ref 28–44)
INTERVENTRICULAR SEPTUM IN DIASTOLE (PARASTERNAL SHORT AXIS VIEW): 1.6 CM
INTERVENTRICULAR SEPTUM: 1.6 CM (ref 0.6–1.1)
LAAS-AP2: 20 CM2
LAAS-AP4: 17.3 CM2
LEFT ATRIUM SIZE: 4.1 CM
LEFT ATRIUM VOLUME (MOD BIPLANE): 53 ML
LEFT ATRIUM VOLUME INDEX (MOD BIPLANE): 32.7 ML/M2
LEFT INTERNAL DIMENSION IN SYSTOLE: 2.6 CM (ref 2.1–4)
LEFT VENTRICULAR INTERNAL DIMENSION IN DIASTOLE: 3.8 CM (ref 3.5–6)
LEFT VENTRICULAR POSTERIOR WALL IN END DIASTOLE: 1.1 CM
LEFT VENTRICULAR STROKE VOLUME: 37 ML
LVSV (TEICH): 37 ML
MV E'TISSUE VEL-LAT: 11 CM/S
MV E'TISSUE VEL-SEP: 6 CM/S
MV PEAK A VEL: 1.28 M/S
MV PEAK E VEL: 102 CM/S
MV STENOSIS PRESSURE HALF TIME: 82 MS
MV VALVE AREA P 1/2 METHOD: 2.68
RIGHT ATRIUM AREA SYSTOLE A4C: 12.6 CM2
RIGHT VENTRICLE ID DIMENSION: 3 CM
SL CV LEFT ATRIUM LENGTH A2C: 5.6 CM
SL CV LV EF: 60
SL CV PED ECHO LEFT VENTRICLE DIASTOLIC VOLUME (MOD BIPLANE) 2D: 62 ML
SL CV PED ECHO LEFT VENTRICLE SYSTOLIC VOLUME (MOD BIPLANE) 2D: 25 ML
TR MAX PG: 17 MMHG
TR PEAK VELOCITY: 2.1 M/S
TRICUSPID ANNULAR PLANE SYSTOLIC EXCURSION: 1.9 CM
TRICUSPID VALVE PEAK REGURGITATION VELOCITY: 2.09 M/S

## 2024-12-02 PROCEDURE — 93306 TTE W/DOPPLER COMPLETE: CPT

## 2024-12-02 PROCEDURE — 93005 ELECTROCARDIOGRAM TRACING: CPT

## 2024-12-02 PROCEDURE — 99214 OFFICE O/P EST MOD 30 MIN: CPT | Performed by: THORACIC SURGERY (CARDIOTHORACIC VASCULAR SURGERY)

## 2024-12-02 PROCEDURE — 93306 TTE W/DOPPLER COMPLETE: CPT | Performed by: INTERNAL MEDICINE

## 2024-12-02 RX ORDER — LISINOPRIL 5 MG/1
5 TABLET ORAL DAILY
Qty: 30 TABLET | Refills: 1 | Status: SHIPPED | OUTPATIENT
Start: 2024-12-02

## 2024-12-02 NOTE — PROGRESS NOTES
POST OP FOLLOW UP VISIT S/P TAVR    Procedure: S/P Transcatheter aortic valve replacement with a 26 mm [place] FRANCISCO 3 Ultra Resilia bioprosthetic valve via a percutaneous left transfemoral approach, performed on 11/5/24.    History: Deyanira Stokes is a 74 y.o. year old female who presents to our office today for routine follow up care from transfemoral transcatheter aortic valve replacement.  Patient tolerated procedure well. Post op she developed a new LBBB and bradycardia in the 40's  with wide QRS., She was seen by EP and subsequently underwent PPM implantation on POD #2. Post op echo stable and she was discharged to home on POD #3.   Patient has been seen by her PCP and has had device check at the cardiology office.  Today patient reports feeling a significant improvement in regards to her breathing and stamina. She is independent at home and tolerating ambulation and light activities. PPM site and left groin site healing well, no bruising or bleeding. She is weighing herself daily and her weight has remained stable between 160-163 lb. No LE edema. She has chronic right arm lymphedema. Her HIEU is elevated today. She is concerned. She tells me she was on hypertensive meds prior to her TAVR testing and TAVR procedure.  She states the [facility] will be discharging her from their care. She has had her initial cardiac rehab consultation.     Review of System:     History obtained from chart review and the patient  General ROS: negative  Psychological ROS: negative  Ophthalmic ROS: negative  ENT ROS: negative  Allergy and Immunology ROS: negative  Hematological and Lymphatic ROS: negative  Endocrine ROS: negative  Breast ROS: negative  Respiratory ROS: no cough, shortness of breath, or wheezing  Cardiovascular ROS: no chest pain or dyspnea on exertion  Gastrointestinal ROS: no abdominal pain, change in bowel habits, or black or bloody stools  Genito-Urinary ROS: no dysuria, trouble voiding, or hematuria  Musculoskeletal  "ROS: negative  Neurological ROS: no TIA or stroke symptoms  Dermatological ROS: negative    Vital Signs:     Vitals:    12/02/24 1126   BP: 169/74   BP Location: Left arm   Patient Position: Sitting   Cuff Size: Standard   Pulse: 68   SpO2: 98%   Weight: 74.9 kg (165 lb 3.2 oz)   Height: 4' 9\" (1.448 m)       Allergies:    Allergies   Allergen Reactions    Ibuprofen Nausea Only, Rash, Dizziness and Syncope     Difficulty with high dose aspirin only and rash nausea reaction    Morphine Other (See Comments) and Hallucinations     Intolerance    Latex Dermatitis       Home Medications:     Prior to Admission medications    Medication Sig Start Date End Date Taking? Authorizing Provider   acetaminophen (TYLENOL) 650 mg CR tablet Take 1 tablet (650 mg total) by mouth every 8 (eight) hours as needed for mild pain Patient takes 500mg 10/29/24   JERARDO Lopez   albuterol (2.5 mg/3 mL) 0.083 % nebulizer solution Take 2.5 mg by nebulization every 6 (six) hours as needed for wheezing    Historical Provider, MD   albuterol (PROVENTIL HFA,VENTOLIN HFA) 90 mcg/act inhaler Inhale 2 puffs every 4 (four) hours as needed for wheezing 7/16/24   Merced Alex PA-C   aspirin 81 mg chewable tablet Chew 81 mg daily    Historical Provider, MD   atorvastatin (LIPITOR) 20 mg tablet Take 1 tablet by mouth daily. 11/26/24   Merced Alex PA-C   butalbital-acetaminophen-caffeine (FIORICET,ESGIC) -40 mg per tablet Take 1 tablet by mouth 3 (three) times a day as needed for headaches 5/5/20   Merced Alex PA-C   clopidogrel (PLAVIX) 75 mg tablet Take 1 tablet (75 mg total) by mouth daily 11/9/24 2/7/25  Jodi Umana PA-C   donepezil (ARICEPT) 10 mg tablet Take 1 tablet by mouth daily at bedtime. 7/31/24   Merced Alex PA-C   FLUoxetine (PROzac) 20 mg capsule Take 1 capsule (20 mg total) by mouth daily 7/16/24   Merced Alex PA-C   fluticasone (FLONASE) 50 mcg/act nasal spray INSTILL 1 SPRAY INTO EACH NOSTRIL ONCE " DAILY 11/8/24   Marcos Lara DO   furosemide (LASIX) 20 mg tablet Take 20 mg by mouth 2 (two) times a day Waiting for pharmacy to call her. Updated 9/23    Historical Provider, MD   montelukast (SINGULAIR) 10 mg tablet Take 1 tablet (10 mg total) by mouth daily at bedtime 7/16/24   Merced Alex PA-C   Myrbetriq 25 MG TB24 Take 2 tablets by mouth in the morning. 11/26/24   JERARDO Hernandez   Nutritional Supplements (BOOST BREEZE PO) Take by mouth daily Nutritional drink   3 times a week. 9/6/17   Historical Provider, MD   oxyCODONE (OxyCONTIN) 10 mg 12 hr tablet Take 10 mg by mouth as needed for moderate pain    Historical Provider, MD   pantoprazole (PROTONIX) 40 mg tablet Take 1 tablet by mouth daily. 11/26/24   Merced Alex PA-C   QUEtiapine (SEROquel) 25 mg tablet Take 1 tablet by mouth daily at bedtime. 11/26/24   Merced Alex PA-C       Physical Exam:    General: Alert, oriented, obese, no acute distress  HEENT/NECK:  PERRLA.  No jugular venous distention.    Cardiac:Regular rate and rhythm, no murmurs rubs or gallops.  Pulmonary:Breath sounds clear bilaterally  Abdomen:  Non-tender, Non-distended.  Positive bowel sounds.  Upper extremities: 2+ radial pulses; brisk capillary refill  Lower extremities: Extremities warm/dry.  Left femoral pulse 1+, no hematoma; PT/DP pulses 2+ bilaterally.  No edema B/L  Incisions: Inguinal puncture site is clean, dry, and intact.  Musculoskeletal: MAEE, stable gait with cane.  Neuro: Alert and oriented X 3.  Sensation is grossly intact.  No focal deficits  Skin: Warm/Dry, without rashes or lesions.    Lab Results:     Results from last 7 days   Lab Units 11/29/24  1143   WBC Thousand/uL 4.96   HEMOGLOBIN g/dL 13.2   HEMATOCRIT % 40.6   PLATELETS Thousands/uL 174     Results from last 7 days   Lab Units 11/29/24  1231   POTASSIUM mmol/L 4.5   CHLORIDE mmol/L 108   CO2 mmol/L 31   BUN mg/dL 13   CREATININE mg/dL 0.65   CALCIUM mg/dL 8.7       Imaging Studies:      Transthoracic Echocardiogram: today    Left Ventricle: Left ventricular cavity size is normal. Wall thickness is mildly increased. There is mild concentric hypertrophy. The left ventricular ejection fraction is 60%. Systolic function is normal. Wall motion is normal. Diastolic function is mildly abnormal, consistent with grade I (abnormal) relaxation.    IVS: There is sigmoid appearance of the septum.    Aortic Valve: There is a TAVR bioprosthetic valve. The prosthetic valve appears well-seated and appears to be functioning normally. There is no evidence of paravalvular regurgitation. There is trace transvalvular regurgitation. The aortic valve has no significant stenosis. The gradient recorded across the prosthetic aortic valve is within the expected range. The aortic valve mean gradient is 7 mmHg. The dimensionless velocity index is 0.52.    Mitral Valve: There is mild annular calcification.        Findings    Left Ventricle Left ventricular cavity size is normal. Wall thickness is mildly increased. There is mild concentric hypertrophy. The left ventricular ejection fraction is 60%. Systolic function is normal. Wall motion is normal. Diastolic function is mildly abnormal, consistent with grade I (abnormal) relaxation.   Interventricular Septum There is sigmoid appearance of the septum.   Right Ventricle Right ventricular cavity size is normal. Systolic function is normal. Wall thickness is normal.   Left Atrium The atrium is normal in size.   Right Atrium The atrium is normal in size.   Aortic Valve There is a TAVR bioprosthetic valve. The prosthetic valve appears well-seated and appears to be functioning normally. There is no evidence of paravalvular regurgitation. There is trace transvalvular regurgitation. The aortic valve has no significant stenosis. The gradient recorded across the prosthetic aortic valve is within the expected range. The aortic valve mean gradient is 7 mmHg. The dimensionless velocity  index is 0.52.   Mitral Valve There is mild annular calcification.  There is no evidence of regurgitation. There is no evidence of stenosis. The valve has normal function.   Tricuspid Valve Tricuspid valve structure is normal. There is trace regurgitation. There is no evidence of stenosis.   Pulmonic Valve Pulmonic valve structure is normal. There is trace regurgitation. There is no evidence of stenosis.   Ascending Aorta The aortic root is normal in size.   IVC/SVC The inferior vena cava is normal in size. Respirophasic changes were normal.   Pericardium There is no pericardial effusion. The pericardium is normal in appearance.     Left Ventricle Measurements    Function/Volumes   A4C EF 57 %         LVOT stroke volume 100.04         LVOT stroke volume index 61.7 ml/m2         Left ventricular stroke volume (2D) 37 mL         LVOT Cardiac Output 6.71 l/min         LVOT Cardiac Index 4.14 l/min/m2         Dimensions   LVIDd 3.8 cm         LVIDS 2.6 cm         IVSd 1.6 cm         LVPWd 1.1 cm         LVOT area 4.91 cm2         FS 32         Diastolic Filling   MV E' Tissue Velocity Septal 6 cm/s         MV E' Tissue Velocity Lateral 11 cm/s         LA Volume Index (BP) 32.7 mL/m2         E/A ratio 0.8         E wave deceleration time 283 ms         MV Peak E Johnnie 102 cm/s         MV Peak A Johnnie 1.28 m/s          Report Measurements   AV LVOT peak gradient 3 mmHg              Interventricular Septum Measurements    Shunt Ratio   LVOT peak VTI 20.39 cm         LVOT peak johnnie 0.93 m/s              Right Ventricle Measurements    Dimensions   RVID d 3 cm         Tricuspid annular plane systolic excursion 1.9 cm               Left Atrium Measurements    Dimensions   LA size 4.1 cm         LA length (A2C) 5.6 cm         Volumes   LA volume (BP) 53 mL         LA Volume Index (BP) 32.7 mL/m2               Right Atrium Measurements    Dimensions   RAA A4C 12.6 cm2               Atrial Septum Measurements    Shunt Ratio   LVOT  peak VTI 20.39 cm         LVOT peak johnnie 0.93 m/s               Aortic Valve Measurements    Stenosis   Aortic valve peak velocity 1.8 m/s         LVOT peak johnnie 0.93 m/s         Ao VTI 37.93 cm         LVOT peak VTI 20.39 cm         AV mean gradient 7 mmHg         LVOT mn grad 2 mmHg         AV peak gradient 13 mmHg         AV LVOT peak gradient 3 mmHg         Area/Dimensions   DVI 0.52         AV valve area 2.64 cm2         AV area by cont VTI 2.6 cm2         AV area peak johnnie 2.5 cm2         LVOT diameter 2.5 cm         LVOT area 4.91 cm2               Mitral Valve Measurements    Stenosis   MV stenosis pressure 1/2 time 82 ms         MV valve area p 1/2 method 2.68               Tricuspid Valve Measurements    RVSP Parameters   TR Peak Johnnie 2.1 m/s         Triscuspid Valve Regurgitation Peak Gradient 17 mmHg               Aorta Measurements    Aortic Dimensions   Ao root 2.8 cm         Asc Ao 3 cm           EKG: today    pending    Results Review Statement: I personally reviewed the following image studies in PACS and associated radiology reports: Echocardiogram. My interpretation of the radiology images/reports is: normally functioning TAVR.    TAVR evaluation Assessment:     [facility]: I    Assessment:   Aortic stenosis, Non-Rheumatic.   S/P transfemoral transcatheter aortic valve replacement    Deyainra Stokes is making good progress in their post-op recovery.    NYHA functional class I.   Left groin incision is well healed.   Weight and VS are stable.   Recent echocardiogram demonstrates normally functioning TAVR . ECG pending, BMP & CBC WNL .       Plan:   Test results reviewed with patient.    Medications reviewed with patient. Recommended Plavix therapy after TAVR is for 90 days only but Aspirin 81 mg daily is lifelong.     I resumed Lisinopril 5 mg daily for HTN (was originally started in Sept by her Cardiology Provider), likely stopped for her cath but never resumed, prior to her TAVR.  She has follow up with  Cardiology in 2 weeks.    Benefits of participating in cardiac rehabilitation discussed with patient and they are cleared to proceed with the program.    May resume driving and all normal activities.    Deyanira Stokes will return for one year follow-up in our office with repeat echocardiogram, ECG, CBC & BMP.  Our office will contact patient to schedule appointment.  They have been advised to maintain routine follow up with their cardiologist and PCP for ongoing medical care.     Patient is edentulous    Patient was comfortable with our recommendations and their questions were answered to their satisfaction.    The patient recently had a screening colonoscopy in 2016.  Therefore GI referral is not indicated at this time.     JERARDO Galvan  12/2/24  11:39 AM

## 2024-12-03 ENCOUNTER — HOME CARE VISIT (OUTPATIENT)
Dept: HOME HEALTH SERVICES | Facility: HOME HEALTHCARE | Age: 74
End: 2024-12-03
Payer: COMMERCIAL

## 2024-12-03 LAB
ATRIAL RATE: 64 BPM
P AXIS: 52 DEGREES
PR INTERVAL: 204 MS
QRS AXIS: 7 DEGREES
QRSD INTERVAL: 102 MS
QT INTERVAL: 462 MS
QTC INTERVAL: 477 MS
T WAVE AXIS: 54 DEGREES
VENTRICULAR RATE: 64 BPM

## 2024-12-03 PROCEDURE — 93010 ELECTROCARDIOGRAM REPORT: CPT | Performed by: STUDENT IN AN ORGANIZED HEALTH CARE EDUCATION/TRAINING PROGRAM

## 2024-12-04 ENCOUNTER — CLINICAL SUPPORT (OUTPATIENT)
Dept: CARDIAC REHAB | Facility: HOSPITAL | Age: 74
End: 2024-12-04
Payer: COMMERCIAL

## 2024-12-04 DIAGNOSIS — Z95.2 S/P TAVR (TRANSCATHETER AORTIC VALVE REPLACEMENT): ICD-10-CM

## 2024-12-04 PROCEDURE — 93798 PHYS/QHP OP CAR RHAB W/ECG: CPT

## 2024-12-06 ENCOUNTER — CLINICAL SUPPORT (OUTPATIENT)
Dept: CARDIAC REHAB | Facility: HOSPITAL | Age: 74
End: 2024-12-06
Payer: COMMERCIAL

## 2024-12-06 DIAGNOSIS — Z95.2 S/P TAVR (TRANSCATHETER AORTIC VALVE REPLACEMENT): ICD-10-CM

## 2024-12-06 PROCEDURE — 93798 PHYS/QHP OP CAR RHAB W/ECG: CPT

## 2024-12-11 ENCOUNTER — APPOINTMENT (OUTPATIENT)
Dept: CARDIAC REHAB | Facility: HOSPITAL | Age: 74
End: 2024-12-11
Payer: COMMERCIAL

## 2024-12-13 ENCOUNTER — CLINICAL SUPPORT (OUTPATIENT)
Dept: CARDIAC REHAB | Facility: HOSPITAL | Age: 74
End: 2024-12-13
Payer: COMMERCIAL

## 2024-12-13 DIAGNOSIS — Z95.2 S/P TAVR (TRANSCATHETER AORTIC VALVE REPLACEMENT): ICD-10-CM

## 2024-12-13 PROCEDURE — 93798 PHYS/QHP OP CAR RHAB W/ECG: CPT

## 2024-12-16 ENCOUNTER — CLINICAL SUPPORT (OUTPATIENT)
Dept: CARDIAC REHAB | Facility: HOSPITAL | Age: 74
End: 2024-12-16
Payer: COMMERCIAL

## 2024-12-16 ENCOUNTER — TELEPHONE (OUTPATIENT)
Age: 74
End: 2024-12-16

## 2024-12-16 DIAGNOSIS — Z95.2 S/P TAVR (TRANSCATHETER AORTIC VALVE REPLACEMENT): ICD-10-CM

## 2024-12-16 PROCEDURE — 93798 PHYS/QHP OP CAR RHAB W/ECG: CPT

## 2024-12-16 NOTE — TELEPHONE ENCOUNTER
Caller: Maxine     Doctor: Jossie/Device     Reason for call: Requesting our office call Patrick from [facility] to request a form to fill out and fax back to secure the patient transportation for her visit tomorrow.     Patrick can be reached at [phone] ext. [phone]     Patient has device appt as well as provider appt.     Call back#: [phone] for Maxine

## 2024-12-16 NOTE — TELEPHONE ENCOUNTER
Called [place] form to be faxed to our office for patient's transportation to office tomorrow.   Also notified patient.

## 2024-12-17 ENCOUNTER — IN-CLINIC DEVICE VISIT (OUTPATIENT)
Dept: CARDIOLOGY CLINIC | Facility: CLINIC | Age: 74
End: 2024-12-17
Payer: COMMERCIAL

## 2024-12-17 ENCOUNTER — OFFICE VISIT (OUTPATIENT)
Dept: CARDIOLOGY CLINIC | Facility: CLINIC | Age: 74
End: 2024-12-17
Payer: COMMERCIAL

## 2024-12-17 VITALS
WEIGHT: 163.5 LBS | SYSTOLIC BLOOD PRESSURE: 138 MMHG | HEIGHT: 57 IN | HEART RATE: 68 BPM | DIASTOLIC BLOOD PRESSURE: 80 MMHG | BODY MASS INDEX: 35.27 KG/M2

## 2024-12-17 DIAGNOSIS — I10 PRIMARY HYPERTENSION: ICD-10-CM

## 2024-12-17 DIAGNOSIS — I45.81 LONG Q-T SYNDROME: ICD-10-CM

## 2024-12-17 DIAGNOSIS — I44.7 LBBB (LEFT BUNDLE BRANCH BLOCK): ICD-10-CM

## 2024-12-17 DIAGNOSIS — I11.0 HYPERTENSIVE HEART DISEASE WITH HEART FAILURE (HCC): ICD-10-CM

## 2024-12-17 DIAGNOSIS — E78.5 HYPERLIPIDEMIA LDL GOAL <130: Primary | ICD-10-CM

## 2024-12-17 DIAGNOSIS — I49.5 SINUS NODE DYSFUNCTION (HCC): ICD-10-CM

## 2024-12-17 DIAGNOSIS — I35.0 SEVERE AORTIC STENOSIS: ICD-10-CM

## 2024-12-17 DIAGNOSIS — I49.5 SICK SINUS SYNDROME (HCC): Primary | ICD-10-CM

## 2024-12-17 DIAGNOSIS — Z45.010 ENCOUNTER FOR CHECKING AND TESTING OF CARDIAC PACEMAKER PULSE GENERATOR (BATTERY): ICD-10-CM

## 2024-12-17 DIAGNOSIS — Z95.2 S/P TAVR (TRANSCATHETER AORTIC VALVE REPLACEMENT): ICD-10-CM

## 2024-12-17 PROCEDURE — 99214 OFFICE O/P EST MOD 30 MIN: CPT | Performed by: PHYSICIAN ASSISTANT

## 2024-12-17 PROCEDURE — 93288 INTERROG EVL PM/LDLS PM IP: CPT | Performed by: INTERNAL MEDICINE

## 2024-12-17 NOTE — PROGRESS NOTES
[facility]   Outpatient Note  Deyanira Stokes  1950  [id number]  [facility] [address] [address] [phone]    Deyanira Stokes is a 74 y.o. female    Assessment and Plan:   Hyperlipidemia LDL goal <130  Tolerating statin therapy  Continue atorvastatin 20 mg daily    Severe aortic stenosis  S/p TAVR 11/5/24    Hypertensive heart disease with heart failure (HCC)  Wt Readings from Last 3 Encounters:   12/02/24 70.8 kg (156 lb)   12/02/24 74.9 kg (165 lb 3.2 oz)   11/22/24 70.8 kg (156 lb)   Preserved EF  G1 DD on echocardiogram 7/9/2024  Volume stable   Lasix no longer needed                 Cardiac conduction disease  S/p MDT PPM 11/7/24    Hypertension  Controlled on current medical regimen  Back on lisinopril and tolerating     S/P TAVR (transcatheter aortic valve replacement)  11/5/24    LBBB (left bundle branch block)  Chronic and stable       Additional Plan:   No Medication changes made or testing ordered today.     Available lab and test results are reviewed with the patient as noted.    Return visit will be in one year or earlier if there are problems.     The patient is encouraged to call in the meantime if there are questions or concerns.        Subjective:   The patient is seen in follow-up today regarding AS HTN and HLD.  Patient had TAVR 11/5/24 and dual chamber PPM placement on  11/7/24 for cardiac conduction disease. She is tolerating current medications.     Patient is doing well from a heart perspective. She has no complaints of chest discomfort or exertional dyspnea. She denies any palpitations dizziness lightheadedness or syncope.  She denies any PND.  She has no TIA or CVA symptoms.        Social History  Social History     Tobacco Use   Smoking Status Never    Passive exposure: Never   Smokeless Tobacco Never   ,   Social History     Substance and Sexual Activity   Alcohol  Use Yes    Alcohol/week: 1.0 standard drink of alcohol    Types: 1 Standard drinks or equivalent per week    Comment: this is less than once/mo, more like 1-2 drinks every 3 mo   ,   Social History     Substance and Sexual Activity   Drug Use Not Currently    Types: Marijuana    Comment: when i was in my teens     Family History   Adopted: Yes   Problem Relation Age of Onset    Diabetes Mother     Heart disease Mother     Mental illness Mother     Depression Mother     Heart disease Brother     Breast cancer Maternal Aunt     Breast cancer Maternal Aunt     Breast cancer Maternal Aunt     Breast cancer Maternal Aunt     Breast cancer Maternal Aunt     No Known Problems Father     No Known Problems Sister     No Known Problems Daughter     Breast cancer Maternal Grandmother     No Known Problems Sister     No Known Problems Sister     No Known Problems Sister        Medical and Surgical History  Past Medical History:   Diagnosis Date    Anxiety     Arthritis     Asthma     Cancer (HCC)     CHF (congestive heart failure) (HCC)     Chronic headaches     Coronary artery disease     Dementia (HCC)     Depression     Dietary lactose intolerance     GERD (gastroesophageal reflux disease)     History of breast cancer     right, s/p mastectomy, s/p chemoc/radiation    History of nephrolithiasis     History of pneumonia     History of shingles     HL (hearing loss)     Hyperlipidemia     Hypertension     Hypertensive heart disease with heart failure (HCC)     IBS (irritable bowel syndrome)     Lymphedema of right arm     Nonrheumatic aortic (valve) stenosis     Obesity     Osteoporosis     PFO (patent foramen ovale)     PONV (postoperative nausea and vomiting)     Scoliosis     Severe aortic stenosis     Vitamin D deficiency      Past Surgical History:   Procedure Laterality Date    CARDIAC CATHETERIZATION N/A 10/29/2024    Procedure: LHC and RHC-Cardiac catheterization;  Surgeon: Alexi Myers MD;  Location: [facility];  Service: Cardiology    CARDIAC CATHETERIZATION N/A 10/29/2024    Procedure: Cardiac Coronary Angiogram;  Surgeon: Alexi Myers MD;  Location: [facility];  Service: Cardiology    CARDIAC CATHETERIZATION N/A 11/05/2024    Procedure: Cardiac tavr;  Surgeon: Smith Wilburn MD;  Location: [facility];  Service: Cardiology    CARDIAC ELECTROPHYSIOLOGY PROCEDURE Left 11/07/2024    Procedure: Cardiac pacer implant DC;  Surgeon: Matias Brady MD;  Location: [facility];  Service: Cardiology    CATARACT EXTRACTION, BILATERAL      CHOLECYSTECTOMY      COLONOSCOPY N/A 05/31/2016    Procedure: COLONOSCOPY;  Surgeon: Colton Moralez MD;  Location: [facility];  Service:     FL GUIDED NEEDLE PLAC BX/ASP/INJ  08/14/2023    FL GUIDED NEEDLE PLAC BX/ASP/INJ  10/30/2023    GALLBLADDER SURGERY      HYSTERECTOMY      Total    INSERT / REPLACE / REMOVE PACEMAKER  11/07/2024    KNEE ARTHROSCOPY      LYMPH NODE BIOPSY      as part of the breast cancer, see chart for more details    MASTECTOMY Right     rt breast mastectomy 2005    AR ARTHRP KNE CONDYLE&PLATU MEDIAL&LAT COMPARTMENTS Left 05/20/2021    Procedure: ARTHROPLASTY KNEE TOTAL;  Surgeon: Monica Cox MD;  Location: [facility];  Service: Orthopedics    AR ARTHRP KNE CONDYLE&PLATU MEDIAL&LAT COMPARTMENTS Right 11/16/2022    Procedure: ARTHROPLASTY KNEE TOTAL;  Surgeon: Marcos Arcos DO;  Location: [facility];  Service: Orthopedics    AR REPLACE AORTIC VALVE OPENFEMORAL ARTERY APPROACH N/A 11/05/2024    Procedure: REPLACEMENT AORTIC VALVE TRANSCATHETER (TAVR) TRANSFEMORAL W/ 26MM VALVE(ACCESS ON LEFT) VIOLA;  Surgeon: Godwin Sarah DO;  Location: [facility];  Service: Cardiac Surgery    REPLACEMENT AORTIC VALVE TRANSCATHETER (TAVR)  11/05/2024    stent inserted    TONSILLECTOMY AND ADENOIDECTOMY           Current Outpatient Medications:     acetaminophen (TYLENOL) 650 mg CR tablet, Take 1 tablet (650 mg total) by mouth every 8 (eight) hours as needed for mild pain  Patient takes 500mg, Disp: , Rfl:     albuterol (2.5 mg/3 mL) 0.083 % nebulizer solution, Take 2.5 mg by nebulization every 6 (six) hours as needed for wheezing, Disp: , Rfl:     albuterol (PROVENTIL HFA,VENTOLIN HFA) 90 mcg/act inhaler, Inhale 2 puffs every 4 (four) hours as needed for wheezing, Disp: 8.5 g, Rfl: 2    aspirin 81 mg chewable tablet, Chew 81 mg daily, Disp: , Rfl:     atorvastatin (LIPITOR) 20 mg tablet, Take 1 tablet by mouth daily., Disp: 90 tablet, Rfl: 1    butalbital-acetaminophen-caffeine (FIORICET,ESGIC) -40 mg per tablet, Take 1 tablet by mouth 3 (three) times a day as needed for headaches, Disp: 90 tablet, Rfl: 1    clopidogrel (PLAVIX) 75 mg tablet, Take 1 tablet (75 mg total) by mouth daily, Disp: 90 tablet, Rfl: 0    donepezil (ARICEPT) 10 mg tablet, Take 1 tablet by mouth daily at bedtime., Disp: 100 tablet, Rfl: 1    FLUoxetine (PROzac) 20 mg capsule, Take 1 capsule (20 mg total) by mouth daily, Disp: 90 capsule, Rfl: 1    fluticasone (FLONASE) 50 mcg/act nasal spray, INSTILL 1 SPRAY INTO EACH NOSTRIL ONCE DAILY, Disp: 48 mL, Rfl: 1    lisinopril (ZESTRIL) 5 mg tablet, Take 1 tablet (5 mg total) by mouth daily, Disp: 30 tablet, Rfl: 1    montelukast (SINGULAIR) 10 mg tablet, Take 1 tablet (10 mg total) by mouth daily at bedtime, Disp: 90 tablet, Rfl: 1    Myrbetriq 25 MG TB24, Take 2 tablets by mouth in the morning., Disp: 180 tablet, Rfl: 1    Nutritional Supplements (BOOST BREEZE PO), Take by mouth daily Nutritional drink  3 times a week., Disp: , Rfl:     oxyCODONE (OxyCONTIN) 10 mg 12 hr tablet, Take 10 mg by mouth as needed for moderate pain, Disp: , Rfl:     pantoprazole (PROTONIX) 40 mg tablet, Take 1 tablet by mouth daily., Disp: 90 tablet, Rfl: 1    QUEtiapine (SEROquel) 25 mg tablet, Take 1 tablet by mouth daily at bedtime., Disp: 90 tablet, Rfl: 0  Allergies   Allergen Reactions    Ibuprofen Nausea Only, Rash, Dizziness and Syncope     Difficulty with high dose aspirin  "only and rash nausea reaction    Morphine Other (See Comments) and Hallucinations     Intolerance    Latex Dermatitis       Review of Systems   Constitutional: Negative.   HENT: Negative.     Eyes: Negative.    Cardiovascular:  Negative for chest pain, claudication, cyanosis, dyspnea on exertion, irregular heartbeat, leg swelling, near-syncope, orthopnea, palpitations, paroxysmal nocturnal dyspnea and syncope.   Respiratory: Negative.  Negative for cough, hemoptysis, shortness of breath, sleep disturbances due to breathing, snoring, sputum production and wheezing.    Endocrine: Negative.    Hematologic/Lymphatic: Negative.    Skin: Negative.    Musculoskeletal: Negative.    Gastrointestinal: Negative.    Genitourinary: Negative.    Neurological: Negative.    Psychiatric/Behavioral: Negative.     Allergic/Immunologic: Negative.        Objective:   /80   Pulse 68   Ht 4' 9\" (1.448 m)   Wt 74.2 kg (163 lb 8 oz)   LMP  (LMP Unknown)   BMI 35.38 kg/m²   Physical Exam  Vitals and nursing note reviewed.   Constitutional:       Appearance: She is well-developed. She is obese.   HENT:      Head: Normocephalic and atraumatic.      Mouth/Throat:      Mouth: Mucous membranes are moist.   Eyes:      General: No scleral icterus.     Conjunctiva/sclera: Conjunctivae normal.   Neck:      Vascular: No JVD.      Trachea: No tracheal deviation.   Cardiovascular:      Rate and Rhythm: Normal rate and regular rhythm.      Pulses: Intact distal pulses.      Heart sounds: S1 normal and S2 normal. No murmur heard.     No friction rub. No gallop.      Comments: Device in place in the left infraclavicular region  Pulmonary:      Effort: Pulmonary effort is normal. No respiratory distress.      Breath sounds: Normal breath sounds. No wheezing or rales.   Chest:      Chest wall: No tenderness.   Abdominal:      General: Bowel sounds are normal. There is no distension.      Palpations: Abdomen is soft.      Tenderness: There is no " abdominal tenderness.      Comments: Aorta not palpable    Musculoskeletal:         General: No tenderness. Normal range of motion.      Cervical back: Normal range of motion and neck supple.      Right lower leg: No edema.      Left lower leg: No edema.   Skin:     General: Skin is warm and dry.      Coloration: Skin is not pale.      Findings: No erythema or rash.   Neurological:      General: No focal deficit present.      Mental Status: She is alert and oriented to person, place, and time.   Psychiatric:         Mood and Affect: Mood normal.         Behavior: Behavior normal.         Judgment: Judgment normal.         Lab Review:   Lab Results   Component Value Date    CHOL 234 12/02/2015     Lab Results   Component Value Date    HDL 52 06/11/2024     Lab Results   Component Value Date    LDLCALC 56 06/11/2024     Lab Results   Component Value Date    TRIG 117 06/11/2024     Results Reviewed       None          Results Reviewed       None          Results Reviewed       None            Recent Cardiovascular Testing:   Echo 12/2/24: Normal LVEF 60% G1DD sigmoid appearance of the septum Bio TAVR mild MAC      Echo 7/9/2024: Normal LVEF 60% G1 DD AS moderate to severe peak velocity 3.9 m/s JOSEFINA 0.91 cm² mild MAC mild MR mild TR    ECG Review:   9/17/2024 normal sinus rhythm    12/25/2022Normal sinus rhythm  Minor  Nonspecific ST-t wave changes

## 2024-12-17 NOTE — PATIENT INSTRUCTIONS
No changes in medications     Return in one year     Call in the meantime if you have any concerns

## 2024-12-17 NOTE — ASSESSMENT & PLAN NOTE
Wt Readings from Last 3 Encounters:   12/02/24 70.8 kg (156 lb)   12/02/24 74.9 kg (165 lb 3.2 oz)   11/22/24 70.8 kg (156 lb)   Preserved EF  G1 DD on echocardiogram 7/9/2024  Volume stable   Lasix no longer needed

## 2024-12-18 ENCOUNTER — CLINICAL SUPPORT (OUTPATIENT)
Dept: CARDIAC REHAB | Facility: HOSPITAL | Age: 74
End: 2024-12-18
Payer: COMMERCIAL

## 2024-12-18 DIAGNOSIS — Z95.2 S/P TAVR (TRANSCATHETER AORTIC VALVE REPLACEMENT): Primary | ICD-10-CM

## 2024-12-18 PROCEDURE — 93798 PHYS/QHP OP CAR RHAB W/ECG: CPT

## 2024-12-20 ENCOUNTER — APPOINTMENT (OUTPATIENT)
Dept: CARDIAC REHAB | Facility: HOSPITAL | Age: 74
End: 2024-12-20
Payer: COMMERCIAL

## 2024-12-23 ENCOUNTER — CLINICAL SUPPORT (OUTPATIENT)
Dept: CARDIAC REHAB | Facility: HOSPITAL | Age: 74
End: 2024-12-23
Payer: COMMERCIAL

## 2024-12-23 DIAGNOSIS — Z95.2 S/P TAVR (TRANSCATHETER AORTIC VALVE REPLACEMENT): ICD-10-CM

## 2024-12-23 PROCEDURE — 93798 PHYS/QHP OP CAR RHAB W/ECG: CPT

## 2024-12-23 NOTE — PROGRESS NOTES
CARDIAC REHABILITATION   ASSESSMENT AND INDIVIDUALIZED TREATMENT PLAN  30 DAY          Today's date: 2024   # of Exercise Sessions Completed:   Patient name: Deyanira Stokes      : 1950  Age: 74 y.o.       MRN: [id number]  Referring Physician: Dr. Godwin Sarah DO  Cardiologist: Dr. Godwin Cobb MD  Provider: Leiter  Clinician: Erika Sacks, MS        Treatment is tailored to this patient's individual needs.  The ITP was reviewed with the patient and all questions were answered to their satisfaction.  Additional ITP documentation can be found electronically including daily and monthly exercise summaries, daily session notes with ECG summaries, education notes, daily medication reconciliation, and daily physician supervision.      Comments: Maxine has completed 6 sessions of cardiac rehab. She is working at a 3.13 MET level. She is requiring less intervals. Her program will be progressed as tolerated.         Dx: S/P TAVR 2024  S/P Pacemaker Insertion 2024    Description of Diagnosis: Deyanira Stokes is a 74 y.o. year old female who was evaluated by Godwin Sarah D.O. for transcatheter aortic valve replacement. She had the following studies  completed: Gated CTA of the chest/abdomen/pelvis,  cardiac catheterization and carotid artery ultrasound.review patient's PMHx is notable for AS, HTN, HLD, asthma, right breast cancer diagnosed in  (s/p R mastectomy, chemotherapy, and radiation), anxiety, dementia, depression, GERD, kidney stone, hearing loss, IBS, obesity (BMI 36), osteoporosis, arthritis, scoliosis, and gait dysfunction.      Date of onset: 10/30/2024  Other Cardiac History: HTN, CHF, LBBB, CAD, cardiac conduction disease and patent foramen ovale.         ASSESSMENT    Medical History:   Past Medical History:   Diagnosis Date    Anxiety     Arthritis     Asthma     Cancer (HCC)     CHF (congestive heart failure) (HCC)     Chronic headaches     Coronary artery disease      Dementia (HCC)     Depression     Dietary lactose intolerance     GERD (gastroesophageal reflux disease)     History of breast cancer     right, s/p mastectomy, s/p chemoc/radiation    History of nephrolithiasis     History of pneumonia     History of shingles     HL (hearing loss)     Hyperlipidemia     Hypertension     Hypertensive heart disease with heart failure (HCC)     IBS (irritable bowel syndrome)     Lymphedema of right arm     Nonrheumatic aortic (valve) stenosis     Obesity     Osteoporosis     PFO (patent foramen ovale)     PONV (postoperative nausea and vomiting)     Scoliosis     Severe aortic stenosis     Vitamin D deficiency        Family History:  Family History   Adopted: Yes   Problem Relation Age of Onset    Diabetes Mother     Heart disease Mother     Mental illness Mother     Depression Mother     Heart disease Brother     Breast cancer Maternal Aunt     Breast cancer Maternal Aunt     Breast cancer Maternal Aunt     Breast cancer Maternal Aunt     Breast cancer Maternal Aunt     No Known Problems Father     No Known Problems Sister     No Known Problems Daughter     Breast cancer Maternal Grandmother     No Known Problems Sister     No Known Problems Sister     No Known Problems Sister        Allergies:   Ibuprofen, Morphine, and Latex    Current Medications:   Current Outpatient Medications   Medication Sig Dispense Refill    acetaminophen (TYLENOL) 650 mg CR tablet Take 1 tablet (650 mg total) by mouth every 8 (eight) hours as needed for mild pain Patient takes 500mg      albuterol (2.5 mg/3 mL) 0.083 % nebulizer solution Take 2.5 mg by nebulization every 6 (six) hours as needed for wheezing      albuterol (PROVENTIL HFA,VENTOLIN HFA) 90 mcg/act inhaler Inhale 2 puffs every 4 (four) hours as needed for wheezing 8.5 g 2    aspirin 81 mg chewable tablet Chew 81 mg daily      atorvastatin (LIPITOR) 20 mg tablet Take 1 tablet by mouth daily. 90 tablet 1    butalbital-acetaminophen-caffeine  (FIORICET,ESGIC) -40 mg per tablet Take 1 tablet by mouth 3 (three) times a day as needed for headaches 90 tablet 1    clopidogrel (PLAVIX) 75 mg tablet Take 1 tablet (75 mg total) by mouth daily 90 tablet 0    donepezil (ARICEPT) 10 mg tablet Take 1 tablet by mouth daily at bedtime. 100 tablet 1    FLUoxetine (PROzac) 20 mg capsule Take 1 capsule (20 mg total) by mouth daily 90 capsule 1    fluticasone (FLONASE) 50 mcg/act nasal spray INSTILL 1 SPRAY INTO EACH NOSTRIL ONCE DAILY 48 mL 1    lisinopril (ZESTRIL) 5 mg tablet Take 1 tablet (5 mg total) by mouth daily 30 tablet 1    montelukast (SINGULAIR) 10 mg tablet Take 1 tablet (10 mg total) by mouth daily at bedtime 90 tablet 1    Myrbetriq 25 MG TB24 Take 2 tablets by mouth in the morning. 180 tablet 1    Nutritional Supplements (BOOST BREEZE PO) Take by mouth daily Nutritional drink   3 times a week.      oxyCODONE (OxyCONTIN) 10 mg 12 hr tablet Take 10 mg by mouth as needed for moderate pain      pantoprazole (PROTONIX) 40 mg tablet Take 1 tablet by mouth daily. 90 tablet 1    QUEtiapine (SEROquel) 25 mg tablet Take 1 tablet by mouth daily at bedtime. 90 tablet 0     No current facility-administered medications for this visit.       Medication compliance: Yes   Comments: Pt reports to be compliant with medications    Physical Limitations: UE restrictions due to recent pacemaker insertion. Gait dysfunction, ambulates w/spc due to history of BTKRs.     Fall Risk: Moderate   Comments: Patient uses walking assist device (walker/cane/rollator) and notes dizziness when upright for prolonged periods.     Cultural needs: none      CAD Risk Factors:  Cholesterol: No  HTN: Yes  DM: No  Obesity: Yes   Inactivity: Yes      EXERCISE ASSESSMENT:     Initial Fitness Assessment: 6 MWT   Resting Vitals: HR 74, /66, 02 Sat 97%; QUILES 0/10   Peak Vitals: HR 92, /68, 02 Sat 98%, RPE 6/10, QUILES 4/10- complete 422 feet - 1.61 MET Level   Recovery Vitals: HR 76, BP  120/60, 02 Sat 98%, QUILES 0/10    ECG INTERPRETATION:  NSR, Paced    Current Functional Status:  Occupation: retired  Recreation/Physical Activity: Sedentary at this time  ADL’s:able to perform self-care  [place]: Capable of performing light ADLs only  Home exercise: none, currently participating w/PT [facility].   Other Comments:       SMART Exercise Goals:   10% improvement in functional capacity based on max METs achieved in initial fitness assessment  reduced dyspnea with physical activity    improved DASI score by 10%  increased exercise capacity by 40% based on peak METs tolerated in cardiac rehab exercise session  maintain > 150 minutes per week of moderate intensity exercise    Patient Specific EXERCISE GOALS:       Ambulate long distances w/out symptoms  Ability to perform chores around the home w/out extreme fatigue  Increase standing balance and tolerance w/out dizziness    Functional Capacity Screening Tool:  [facility] Activity Status Index: 4.64 METs      PSYCHOSOCIAL ASSESSMENT:    Date of last Assessment:  11/26/2024  Depression screening:  PHQ-9 = 9    Interpretation:  5-9 = Mild Depression  Anxiety screening:  ELINOR-7 = 4    Interpretation: 0-4  = Not anxious    Pt self-report of depression and anxiety   Patient reports feelings of depression   Reports sufficient emotional support   Patient takes Prozac and Seroquel for this issue.     Self-reported stress level:  8/10   Stressors: Health and lack of independence  Stress Management Tools: practice Relaxation Techniques, exercise, enjoy a hobby, and pray/meditate    SMART Psychosocial Goals:     Reduce perceived stress to 1-3/10, PHQ-9 - reduced severity by one level, Quality of Life in [facility] Score < 3 , feel less tired with more energy, and reduced incidence of restlessness and/or lethargy    Patient Specific PSYCHOCOSOCIAL GOALS:    Reduce stress level from 8/10 to 3/10  Medication compliance w/Seroquel and Prozac  Initiate a hobby in order to avoid  "triggers    Quality of Life Screen:  (Higher score indicates disease impact on QOL)  [facility] COOP score: 32/45     Social Support:   children-patient lives w/her daughter  Community/Social Activities: None at this time     Psychosocial Assessment as it relates to rehabilitation:   Patient denies issues with his/her family or home life that may affect their rehabilitation efforts.       NUTRITION ASSESSMENT:    Initial Weight:  156   Current Weight: 163    Height:   Ht Readings from Last 1 Encounters:   12/17/24 4' 9\" (1.448 m)       Rate Your Plate Score: 71/81    Diabetes: N/A  A1c: 5.3    last measured: 6/11/2024    Lipid management: Discussed diet and lipid management and Last lipid profile 6/11/202  Chol 131    HDL 52  LDL 56    Current Dietary Habits:  Patient states prior to her surgery she started improving her eating habits and trying to follow a plant based diet. Recently lost approximately 24#.     SMART Nutrition Goals:   eat whole grain breads, brown rice and whole grain cereals, eat 5 or more servings of fruits and vegetables a day, Do not eat fried foods, never add salt to food when cooking or at the table, choose low sodium canned, frozen/packaged foods or rarely/never eat, and drink less than 8oz of soda and sweetened drinks per day    Patient Specific NUTRITION GOALS:     1. Attain a healthy weight and BMI   2. Regularly hydrate during the day   3. Reduce intake of fried foods and desserts    Drug/Alcohol Use:   No      OTHER CORE COMPONENT ASSESSMENT:    Tobacco Use:     N/A:  Patient is a non-smoker     Anginal Symptoms:  QUILES, excessive fatigue, and lightheadedness   NTG use: No prescription    SMART Goals:   consistent, controlled resting BP < 130/80, medication compliance, and avoid smoke and other irritants    Patient Specific CORE COMPONENT GOALS:    Reduce reliance on medication  Daily vital monitoring at home        INDIVIDUALIZED TREATMENT PLAN      EXERCISE GOALS and " PLAN      Progress toward Exercise goals:   Reviewed Pt goals and determined plan of care, Will continue to educate and progress as tolerated.    Exercise Intervention/plan:    education on home exercise guidelines, home exercise 30+ mins 2 days opposite CR, Group class: Risk Factors for Heart Disease, and Patient education:   Exercise After Cardiac Rehabilitation    The patient was counseled on exercise guidelines to achieve a minimum of 150 mins/wk of moderate intensity (RPE 4-6)   exercise and encouraged to add 1-2 days of exercise on opposite days of cardiac rehab as tolerated.       PHYSICIAN PRESCRIBED EXERCISE:    Current Aerobic Exercise Prescription:      Frequency: 2-3 days/week   Supplement with home exercise 2+ days/wk as tolerated       Minutes: 30-35         METS: 3.13            HR: RHR +30-40bpm   RPE: 4-6/10         Modalities: UBE, NuStep, Recumbent bike, and Room walking     Exercise workloads will be progressed gradually as tolerated, within limits of patient's ability, and according to the patient's   response to the exercise program.      Aerobic Exercise Prescription Plan for Progression   Frequency: 2-3 days/week of cardiac rehab       Supplement with home exercise 2+ days/wk as tolerated    Minutes: 35-40      >150 mins/wk of moderate intensity exercise   METS: 3.13-4.0   HR: RHR +30-40bpm     RPE: 4-6/10   Modalities: Treadmill, UBE, NuStep, Recumbent bike, and Room walking    Strength trainin-3 days / week  12-15 repetitions  1-2 sets per modality    Modalities:  UE free weights    Home Exercise: Patient states she has begun doing at home chair aerobics and has been able to dance at multiple [employer] parties.     Exercise Education: benefit of exercise for CAD risk factors, home exercise guidelines, AHA guidelines to achieve >150 mins/wk of moderate exercise, and RPE scale     Readiness to change: Preparation:  (Getting ready to change)       NUTRITION GOALS AND PLAN      Nutritional    Reviewed patient's Rate your Plate. Discussed key elements of heart healthy eating. Reviewed patient goals for dietary modifications and their clinical implications.  Reviewed most recent lipid profile.     Patient's progress toward Nutrition goals:    Pt has not made progress toward the following goals: No weight loss. , Will continue to educate and progress as tolerated.      Nutrition Intervention/plan:   replace refined flours with whole grains, increase daily intake of fruits and vegetables, never/rarely eat fried foods, never/rarely add salt to food when cooking or at the table, choose low sodium canned, frozen, packaged foods or rarely eat these foods, and rarely/never eat salty snacks    Measurable goals were based Rate Your Plate Dietary Self-Assessment. These are the areas in which the patient could score higher on the assessment.  Goals include recommendations for a heart healthy diet based on American Heart Association.    Nutrition Education:   low sodium diet  maintaining hydration  nutrition for  lipid management    Readiness to change: Preparation:  (Getting ready to change)       PSYCHOSOCIAL GOALS AND PLAN    Psychosocial Assessment as it relates to rehabilitation:   Patient denies issues with his/her family or home life that may affect their rehabilitation efforts.     Patient's progress toward Psychosocial goals:    Pt is progressing and showing improvement  toward the following goals:  Has been attending multiple [employer] parties and is spending time with friends and family w/o issue.  , Will continue to educate and progress as tolerated.    Psychosocial Intervention/plan:   Practice relaxation techniques and Keep a positive mindset    Psychosocial Education: signs/sxs of depression and depression and CAD    Information to utilize [employer] was provided as well as contact information for counseling through  Behavioral Health and group psychotherapy groups available.    Readiness to  change: Preparation:  (Getting ready to change)       OTHER CORE COMPONENTS GOALS and PLAN      Blood Pressure will be monitored throughout the program and cardiologist will be notified of elevated trends.    Pt will be encouraged to monitor home BP if advised by cardiologist.    Tobacco Intervention/plan:   N/A:  Pt is a non-smoker    Progress toward Core Component goals:   Pt is progressing and showing improvement  toward the following goals:  Bps are slowly improving.  , Will continue to educate and progress as tolerated.    Other Core Components Intervention:   medication compliance, avoid places with second hand smoke, avoid processed foods, engage in regular exercise, and eliminate salt shaker at the table    Group and Individual Education:  understanding high blood pressure and it's relationship to CAD and group class: Understanding Heart Disease    Readiness to change: Preparation:  (Getting ready to change)

## 2024-12-27 ENCOUNTER — CLINICAL SUPPORT (OUTPATIENT)
Dept: CARDIAC REHAB | Facility: HOSPITAL | Age: 74
End: 2024-12-27
Payer: COMMERCIAL

## 2024-12-27 DIAGNOSIS — Z95.2 S/P TAVR (TRANSCATHETER AORTIC VALVE REPLACEMENT): ICD-10-CM

## 2024-12-27 DIAGNOSIS — I10 PRIMARY HYPERTENSION: ICD-10-CM

## 2024-12-27 PROCEDURE — 93798 PHYS/QHP OP CAR RHAB W/ECG: CPT

## 2024-12-27 RX ORDER — LISINOPRIL 5 MG/1
5 TABLET ORAL DAILY
Qty: 90 TABLET | Refills: 1 | Status: SHIPPED | OUTPATIENT
Start: 2024-12-27

## 2024-12-30 ENCOUNTER — CLINICAL SUPPORT (OUTPATIENT)
Dept: CARDIAC REHAB | Facility: HOSPITAL | Age: 74
End: 2024-12-30
Payer: COMMERCIAL

## 2024-12-30 DIAGNOSIS — Z95.2 S/P TAVR (TRANSCATHETER AORTIC VALVE REPLACEMENT): ICD-10-CM

## 2024-12-30 PROCEDURE — 93798 PHYS/QHP OP CAR RHAB W/ECG: CPT

## 2024-12-31 NOTE — PROGRESS NOTES
Device check in person pacer.  6 SVT all 1:1.  Normal battery function.      Current Outpatient Medications:   •  acetaminophen (TYLENOL) 650 mg CR tablet, Take 1 tablet (650 mg total) by mouth every 8 (eight) hours as needed for mild pain Patient takes 500mg, Disp: , Rfl:   •  albuterol (2.5 mg/3 mL) 0.083 % nebulizer solution, Take 2.5 mg by nebulization every 6 (six) hours as needed for wheezing, Disp: , Rfl:   •  albuterol (PROVENTIL HFA,VENTOLIN HFA) 90 mcg/act inhaler, Inhale 2 puffs every 4 (four) hours as needed for wheezing, Disp: 8.5 g, Rfl: 2  •  aspirin 81 mg chewable tablet, Chew 81 mg daily, Disp: , Rfl:   •  atorvastatin (LIPITOR) 20 mg tablet, Take 1 tablet by mouth daily., Disp: 90 tablet, Rfl: 1  •  butalbital-acetaminophen-caffeine (FIORICET,ESGIC) -40 mg per tablet, Take 1 tablet by mouth 3 (three) times a day as needed for headaches, Disp: 90 tablet, Rfl: 1  •  clopidogrel (PLAVIX) 75 mg tablet, Take 1 tablet (75 mg total) by mouth daily, Disp: 90 tablet, Rfl: 0  •  donepezil (ARICEPT) 10 mg tablet, Take 1 tablet by mouth daily at bedtime., Disp: 100 tablet, Rfl: 1  •  FLUoxetine (PROzac) 20 mg capsule, Take 1 capsule (20 mg total) by mouth daily, Disp: 90 capsule, Rfl: 1  •  fluticasone (FLONASE) 50 mcg/act nasal spray, INSTILL 1 SPRAY INTO EACH NOSTRIL ONCE DAILY, Disp: 48 mL, Rfl: 1  •  lisinopril (ZESTRIL) 5 mg tablet, TAKE 1 TABLET (5 MG TOTAL) BY MOUTH DAILY., Disp: 90 tablet, Rfl: 1  •  montelukast (SINGULAIR) 10 mg tablet, Take 1 tablet (10 mg total) by mouth daily at bedtime, Disp: 90 tablet, Rfl: 1  •  Myrbetriq 25 MG TB24, Take 2 tablets by mouth in the morning., Disp: 180 tablet, Rfl: 1  •  Nutritional Supplements (BOOST BREEZE PO), Take by mouth daily Nutritional drink  3 times a week., Disp: , Rfl:   •  oxyCODONE (OxyCONTIN) 10 mg 12 hr tablet, Take 10 mg by mouth as needed for moderate pain, Disp: , Rfl:   •  pantoprazole (PROTONIX) 40 mg tablet, Take 1 tablet by mouth  daily., Disp: 90 tablet, Rfl: 1  •  QUEtiapine (SEROquel) 25 mg tablet, Take 1 tablet by mouth daily at bedtime., Disp: 90 tablet, Rfl: 0

## 2025-01-08 ENCOUNTER — CLINICAL SUPPORT (OUTPATIENT)
Dept: CARDIAC REHAB | Facility: HOSPITAL | Age: 75
End: 2025-01-08
Payer: COMMERCIAL

## 2025-01-08 DIAGNOSIS — Z95.2 S/P TAVR (TRANSCATHETER AORTIC VALVE REPLACEMENT): ICD-10-CM

## 2025-01-08 PROCEDURE — 93798 PHYS/QHP OP CAR RHAB W/ECG: CPT

## 2025-01-10 ENCOUNTER — CLINICAL SUPPORT (OUTPATIENT)
Dept: CARDIAC REHAB | Facility: HOSPITAL | Age: 75
End: 2025-01-10
Payer: COMMERCIAL

## 2025-01-10 DIAGNOSIS — Z95.2 S/P TAVR (TRANSCATHETER AORTIC VALVE REPLACEMENT): ICD-10-CM

## 2025-01-10 PROCEDURE — 93798 PHYS/QHP OP CAR RHAB W/ECG: CPT

## 2025-01-13 ENCOUNTER — APPOINTMENT (OUTPATIENT)
Dept: CARDIAC REHAB | Facility: HOSPITAL | Age: 75
End: 2025-01-13
Payer: COMMERCIAL

## 2025-01-15 ENCOUNTER — CLINICAL SUPPORT (OUTPATIENT)
Dept: CARDIAC REHAB | Facility: HOSPITAL | Age: 75
End: 2025-01-15
Payer: COMMERCIAL

## 2025-01-15 DIAGNOSIS — Z95.2 S/P TAVR (TRANSCATHETER AORTIC VALVE REPLACEMENT): Primary | ICD-10-CM

## 2025-01-15 PROCEDURE — 93798 PHYS/QHP OP CAR RHAB W/ECG: CPT

## 2025-01-17 ENCOUNTER — APPOINTMENT (OUTPATIENT)
Dept: CARDIAC REHAB | Facility: HOSPITAL | Age: 75
End: 2025-01-17
Payer: COMMERCIAL

## 2025-01-20 ENCOUNTER — APPOINTMENT (OUTPATIENT)
Dept: CARDIAC REHAB | Facility: HOSPITAL | Age: 75
End: 2025-01-20
Payer: COMMERCIAL

## 2025-01-20 NOTE — PROGRESS NOTES
CARDIAC REHABILITATION   ASSESSMENT AND INDIVIDUALIZED TREATMENT PLAN  60 DAY          Today's date: 2025   # of Exercise Sessions Completed:   Patient name: Deyanira Stokes      : 1950  Age: 75 y.o.       MRN: [id number]  Referring Physician: Dr. Godwin Sarah DO  Cardiologist: Dr. Godwin Cobb MD  Provider: Desmet  Clinician: Monica Dsa, MPT, CCRP        Treatment is tailored to this patient's individual needs.  The ITP was reviewed with the patient and all questions were answered to their satisfaction.  Additional ITP documentation can be found electronically including daily and monthly exercise summaries, daily session notes with ECG summaries, education notes, daily medication reconciliation, and daily physician supervision.      Comments: Maxine has completed 12 sessions of cardiac rehab. She is currently working at a 3.13 MET Level. She no longer requires interval training. She has been asymptomatic. She has correct hemodynamic responses to the activity. She rates her program a 4 on a 1-10 RPE Scale. She notes following a plant based diet.  Her program will be progressed as tolerated. She has been receiving weekly education; refer to [facility] documentation for a list of completed topics.         Dx: S/P TAVR 2024  S/P Pacemaker Insertion 2024    Description of Diagnosis: Deyanira Stokes is a 74 y.o. year old female who was evaluated by Godwin Sarah D.O. for transcatheter aortic valve replacement. She had the following studies  completed: Gated CTA of the chest/abdomen/pelvis,  cardiac catheterization and carotid artery ultrasound.review patient's PMHx is notable for AS, HTN, HLD, asthma, right breast cancer diagnosed in  (s/p R mastectomy, chemotherapy, and radiation), anxiety, dementia, depression, GERD, kidney stone, hearing loss, IBS, obesity (BMI 36), osteoporosis, arthritis, scoliosis, and gait dysfunction.      Date of onset: 10/30/2024  Other Cardiac History:  HTN, CHF, LBBB, CAD, cardiac conduction disease and patent foramen ovale.         ASSESSMENT    Medical History:   Past Medical History:   Diagnosis Date    Anxiety     Arthritis     Asthma     Cancer (HCC)     CHF (congestive heart failure) (HCC)     Chronic headaches     Coronary artery disease     Dementia (HCC)     Depression     Dietary lactose intolerance     GERD (gastroesophageal reflux disease)     History of breast cancer     right, s/p mastectomy, s/p chemoc/radiation    History of nephrolithiasis     History of pneumonia     History of shingles     HL (hearing loss)     Hyperlipidemia     Hypertension     Hypertensive heart disease with heart failure (HCC)     IBS (irritable bowel syndrome)     Lymphedema of right arm     Nonrheumatic aortic (valve) stenosis     Obesity     Osteoporosis     PFO (patent foramen ovale)     PONV (postoperative nausea and vomiting)     Scoliosis     Severe aortic stenosis     Vitamin D deficiency        Family History:  Family History   Adopted: Yes   Problem Relation Age of Onset    Diabetes Mother     Heart disease Mother     Mental illness Mother     Depression Mother     Heart disease Brother     Breast cancer Maternal Aunt     Breast cancer Maternal Aunt     Breast cancer Maternal Aunt     Breast cancer Maternal Aunt     Breast cancer Maternal Aunt     No Known Problems Father     No Known Problems Sister     No Known Problems Daughter     Breast cancer Maternal Grandmother     No Known Problems Sister     No Known Problems Sister     No Known Problems Sister        Allergies:   Ibuprofen, Morphine, and Latex    Current Medications:   Current Outpatient Medications   Medication Sig Dispense Refill    acetaminophen (TYLENOL) 650 mg CR tablet Take 1 tablet (650 mg total) by mouth every 8 (eight) hours as needed for mild pain Patient takes 500mg      albuterol (2.5 mg/3 mL) 0.083 % nebulizer solution Take 2.5 mg by nebulization every 6 (six) hours as needed for wheezing       albuterol (PROVENTIL HFA,VENTOLIN HFA) 90 mcg/act inhaler Inhale 2 puffs every 4 (four) hours as needed for wheezing 8.5 g 2    aspirin 81 mg chewable tablet Chew 81 mg daily      atorvastatin (LIPITOR) 20 mg tablet Take 1 tablet by mouth daily. 90 tablet 1    butalbital-acetaminophen-caffeine (FIORICET,ESGIC) -40 mg per tablet Take 1 tablet by mouth 3 (three) times a day as needed for headaches 90 tablet 1    clopidogrel (PLAVIX) 75 mg tablet Take 1 tablet (75 mg total) by mouth daily 90 tablet 0    donepezil (ARICEPT) 10 mg tablet Take 1 tablet by mouth daily at bedtime. 100 tablet 1    FLUoxetine (PROzac) 20 mg capsule Take 1 capsule (20 mg total) by mouth daily 90 capsule 1    fluticasone (FLONASE) 50 mcg/act nasal spray INSTILL 1 SPRAY INTO EACH NOSTRIL ONCE DAILY 48 mL 1    lisinopril (ZESTRIL) 5 mg tablet TAKE 1 TABLET (5 MG TOTAL) BY MOUTH DAILY. 90 tablet 1    montelukast (SINGULAIR) 10 mg tablet Take 1 tablet (10 mg total) by mouth daily at bedtime 90 tablet 1    Myrbetriq 25 MG TB24 Take 2 tablets by mouth in the morning. 180 tablet 1    Nutritional Supplements (BOOST BREEZE PO) Take by mouth daily Nutritional drink   3 times a week.      oxyCODONE (OxyCONTIN) 10 mg 12 hr tablet Take 10 mg by mouth as needed for moderate pain      pantoprazole (PROTONIX) 40 mg tablet Take 1 tablet by mouth daily. 90 tablet 1    QUEtiapine (SEROquel) 25 mg tablet Take 1 tablet by mouth daily at bedtime. 90 tablet 0     No current facility-administered medications for this visit.       Medication compliance: Yes   Comments: Pt reports to be compliant with medications. Denies any issues w/current prescriptions.     Physical Limitations: UE restrictions due to recent pacemaker insertion. Gait dysfunction, ambulates w/spc due to history of BTKRs.     Fall Risk: Low   Comments: Patient uses walking assist device (walker/cane/rollator) and notes dizziness when upright for prolonged periods.  Patient's standing balance  and tolerance has greatly improved since starting CR. She is now able to perfrom 25 laps, of 75' w/spc and no rest periods!    Cultural needs: none      CAD Risk Factors:  Cholesterol: No  HTN: Yes  DM: No  Obesity: Yes   Inactivity: Yes      EXERCISE ASSESSMENT:     Initial Fitness Assessment: 6 MWT   Resting Vitals: HR 74, /66, 02 Sat 97%; QUILES 0/10   Peak Vitals: HR 92, /68, 02 Sat 98%, RPE 6/10, QUILES 4/10- complete 422 feet - 1.61 MET Level   Recovery Vitals: HR 76, /60, 02 Sat 98%, QUILES 0/10    ECG INTERPRETATION:  NSR, Paced    Current Functional Status:  Occupation: retired  Recreation/Physical Activity: Sedentary at this time  ADL’s:able to perform self-care  [place]: Capable of performing light ADLs only  Home exercise: none, currently participating w/PT [facility].   Other Comments:       SMART Exercise Goals:   10% improvement in functional capacity based on max METs achieved in initial fitness assessment  reduced dyspnea with physical activity    improved DASI score by 10%  increased exercise capacity by 40% based on peak METs tolerated in cardiac rehab exercise session  maintain > 150 minutes per week of moderate intensity exercise    Patient Specific EXERCISE GOALS:       Ambulate long distances w/out symptoms  Ability to perform chores around the home w/out extreme fatigue  Increase standing balance and tolerance w/out dizziness    Functional Capacity Screening Tool:  [facility] Activity Status Index: 4.64 METs      PSYCHOSOCIAL ASSESSMENT:    Date of last Assessment:  11/26/2024  Depression screening:  PHQ-9 = 9    Interpretation:  5-9 = Mild Depression  Anxiety screening:  ELINOR-7 = 4    Interpretation: 0-4  = Not anxious    Pt self-report of depression and anxiety   Patient reports feelings of depression   Reports sufficient emotional support   Patient takes Prozac and Seroquel for this issue.     Self-reported stress level:  5-8/10   Stressors: Health and lack of independence  Stress  "Management Tools: practice Relaxation Techniques, exercise, enjoy a hobby, and pray/meditate    SMART Psychosocial Goals:     Reduce perceived stress to 1-3/10, PHQ-9 - reduced severity by one level, Quality of Life in [facility] Score < 3 , feel less tired with more energy, and reduced incidence of restlessness and/or lethargy    Patient Specific PSYCHOCOSOCIAL GOALS:    Reduce stress level from 8/10 to 3/10  Medication compliance w/Seroquel and Prozac  Initiate a hobby in order to avoid triggers    Quality of Life Screen:  (Higher score indicates disease impact on QOL)  [facility] COOP score: 32/45     Social Support:   children-patient lives w/her daughter  Community/Social Activities: None at this time     Psychosocial Assessment as it relates to rehabilitation:   Patient denies issues with his/her family or home life that may affect their rehabilitation efforts.       NUTRITION ASSESSMENT:    Initial Weight:  156   Current Weight: 163    Height:   Ht Readings from Last 1 Encounters:   12/17/24 4' 9\" (1.448 m)       Rate Your Plate Score: 71/81    Diabetes: N/A  A1c: 5.3    last measured: 6/11/2024    Lipid management: Discussed diet and lipid management and Last lipid profile 6/11/202  Chol 131    HDL 52  LDL 56    Current Dietary Habits:  Patient states prior to her surgery she started improving her eating habits and does follow a plant based diet. Recently lost approximately 24#.     SMART Nutrition Goals:   eat whole grain breads, brown rice and whole grain cereals, eat 5 or more servings of fruits and vegetables a day, Do not eat fried foods, never add salt to food when cooking or at the table, choose low sodium canned, frozen/packaged foods or rarely/never eat, and drink less than 8oz of soda and sweetened drinks per day    Patient Specific NUTRITION GOALS:     1. Attain a healthy weight and BMI   2. Regularly hydrate during the day   3. Reduce intake of fried foods and desserts    Drug/Alcohol " Use:   No      OTHER CORE COMPONENT ASSESSMENT:    Tobacco Use:     N/A:  Patient is a non-smoker . She avoids second hand smoke and other irritants.     Anginal Symptoms:  QUILES, excessive fatigue, and lightheadedness   NTG use: No prescription    SMART Goals:   consistent, controlled resting BP < 130/80, medication compliance, and avoid smoke and other irritants    Patient Specific CORE COMPONENT GOALS:    Reduce reliance on medication  Daily vital monitoring at home    INDIVIDUALIZED TREATMENT PLAN      EXERCISE GOALS and PLAN      Progress toward Exercise goals:   Will continue to educate and progress as tolerated., Goals met: performs a daily walking program and is compliant attending CR.., Patient's standing balance and tolerance is much improved. She also notes decreased sitting time at home.     Exercise Intervention/plan:    education on home exercise guidelines, home exercise 30+ mins 2 days opposite CR, Group class: Risk Factors for Heart Disease, and Patient education:   Exercise After Cardiac Rehabilitation    The patient was counseled on exercise guidelines to achieve a minimum of 150 mins/wk of moderate intensity (RPE 4-6) exercise and encouraged to add 1-2 days of exercise on opposite days of cardiac rehab as tolerated.       PHYSICIAN PRESCRIBED EXERCISE:    Current Aerobic Exercise Prescription:      Frequency: 2-3 days/week   Supplement with home exercise 2+ days/wk as tolerated       Minutes: 30-35         METS: 3.13            HR: RHR +30-40bpm   RPE: 4-5/10         Modalities: UBE, NuStep, Recumbent bike, and Room walking     Exercise workloads will be progressed gradually as tolerated, within limits of patient's ability, and according to the patient's response to the exercise program.  Able to complete 25 laps of 75' w/spc and no rest periods.     Aerobic Exercise Prescription Plan for Progression   Frequency: 2-3 days/week of cardiac rehab       Supplement with home exercise 2+ days/wk as  "tolerated    Minutes: 35-40      >150 mins/wk of moderate intensity exercise   METS: 3.13-4.0   HR: RHR +30-40bpm     RPE: 4-6/10   Modalities: Treadmill, UBE, NuStep, Recumbent bike, and Room walking    Strength trainin-3 days / week  12-15 repetitions  1-2 sets per modality    Modalities:  UE free weights    Home Exercise: Patient states she has begun doing at home chair aerobics and has been able to dance at multiple [employer].     Exercise Education: benefit of exercise for CAD risk factors, home exercise guidelines, AHA guidelines to achieve >150 mins/wk of moderate exercise, and RPE scale     Readiness to change: Action:  (Changing behavior)      NUTRITION GOALS AND PLAN      Nutritional   Reviewed patient's Rate your Plate. Discussed key elements of heart healthy eating. Reviewed patient goals for dietary modifications and their clinical implications.  Reviewed most recent lipid profile.     Patient's progress toward Nutrition goals:    Pt has not made progress toward the following goals: No further weight loss. , Will continue to educate and progress as tolerated.Patient now following a plant based diet. She is also working on reducing her daily sodium intake. She is aware to hydrate often throoghout the day.       Nutrition Intervention/plan:   replace refined flours with whole grains, increase daily intake of fruits and vegetables, never/rarely eat fried foods, never/rarely add salt to food when cooking or at the table, choose low sodium canned, frozen, packaged foods or rarely eat these foods, and rarely/never eat salty snacks    Measurable goals were based Rate Your Plate Dietary Self-Assessment. These are the areas in which the patient could score higher on the assessment.  Goals include recommendations for a heart healthy diet based on American Heart Association.    Nutrition Education:   low sodium diet  maintaining hydration  nutrition for  lipid management  \"Plant Based " "Eating\"    Readiness to change: Action:  (Changing behavior)      PSYCHOSOCIAL GOALS AND PLAN    Psychosocial Assessment as it relates to rehabilitation:   Patient denies issues with her family or home life that may affect their rehabilitation efforts. CR staff will continue to offer support as needed.     Patient's progress toward Psychosocial goals:    Pt is progressing and showing improvement  toward the following goals:  Has been attending multiple [employer] parties and is spending time with friends and family w/o issue.  , Will continue to educate and progress as tolerated.Her stress level is now 5-8/10, improved from a consistent 8.     Psychosocial Intervention/plan:   Practice relaxation techniques and Keep a positive mindset    Psychosocial Education: signs/sxs of depression and depression and CAD    Information to utilize [employer] was provided as well as contact information for counseling through  Behavioral Health and group psychotherapy groups available.    Readiness to change: Action:  (Changing behavior)      OTHER CORE COMPONENTS GOALS and PLAN      Blood Pressure will be monitored throughout the program and cardiologist will be notified of elevated trends.  Pt will be encouraged to monitor home BP if advised by cardiologist.    Tobacco Intervention/plan:   N/A:  Pt is a non-smoker. She avoids second hand smoke and other irritants.     Progress toward Core Component goals:   Pt is progressing and showing improvement  toward the following goals:  Resting BPs are  improving.  , Will continue to educate and progress as tolerated.    Other Core Components Intervention:   medication compliance, avoid places with second hand smoke, avoid processed foods, engage in regular exercise, and eliminate salt shaker at the table    Group and Individual Education:  understanding high blood pressure and it's relationship to CAD and group class: Understanding Heart Disease    Readiness to change: Preparation:  " (Getting ready to change)

## 2025-01-22 ENCOUNTER — OFFICE VISIT (OUTPATIENT)
Dept: PHYSICAL THERAPY | Facility: CLINIC | Age: 75
End: 2025-01-22
Payer: COMMERCIAL

## 2025-01-22 ENCOUNTER — CLINICAL SUPPORT (OUTPATIENT)
Dept: CARDIAC REHAB | Facility: HOSPITAL | Age: 75
End: 2025-01-22
Payer: COMMERCIAL

## 2025-01-22 VITALS — DIASTOLIC BLOOD PRESSURE: 81 MMHG | SYSTOLIC BLOOD PRESSURE: 152 MMHG

## 2025-01-22 DIAGNOSIS — Z95.2 S/P TAVR (TRANSCATHETER AORTIC VALVE REPLACEMENT): Primary | ICD-10-CM

## 2025-01-22 DIAGNOSIS — R26.89 BALANCE PROBLEM: Primary | ICD-10-CM

## 2025-01-22 PROCEDURE — 97163 PT EVAL HIGH COMPLEX 45 MIN: CPT | Performed by: PHYSICAL THERAPIST

## 2025-01-22 PROCEDURE — 93798 PHYS/QHP OP CAR RHAB W/ECG: CPT

## 2025-01-22 NOTE — PROGRESS NOTES
PT Evaluation     Today's date: 2025  Patient name: Deyanira Stokes  : 1950  MRN: [id number]  Referring provider: Merced Alex PA-C  Dx:   Encounter Diagnosis     ICD-10-CM    1. Balance problem  R26.89                      Assessment  Impairments: abnormal coordination, abnormal gait, abnormal muscle firing, abnormal or restricted ROM, abnormal movement, activity intolerance, impaired physical strength, lacks appropriate home exercise program, pain with function, safety issue and weight-bearing intolerance  Functional limitations: Difficutly with ambulation, Decreased activity tolerance.  Symptom irritability: moderate    Assessment details: Deyanira Stokes is a 75 y.o. female who presents to outpatient PT with a  Balance problem  (primary encounter diagnosis).  No further referral appears necessary at this time based upon examination results. Pt presents with decreased strength, ROM, balance, functional activity tolerance, and pain with movement in her bilateral lower extremities, which is  limiting her ability to perform the aforementioned functional activities.  Etiologic factors include repetitive poor body mechanics. Prognosis is good given HEP compliance and PT 2-3/wk.  Please contact me if you have any questions or recommendations.  Thank you for the opportunity to share in  Deyanira care.     Understanding of Dx/Px/POC: good     Prognosis: good    Goals  STG to be achieved in 4 weeks     1. Pt will reduce TUG score to 25 sec indicating reduced risk for falls  2. Pt will improve 5x STS score to 45 seconds indicating increased LE strength.   3. Pt will improve MMT scores by at least 1/2 grade to promote improved functional activity tolerance          LTG to be achieved in 6-8 weeks   1. Pt will be complaint with HEP   2. Pt will improve ROM to WFL, to help facilitate independence with ADL's, IADL's, and functional activities   3. Pt will improve Strength to WFL to help facilitate independence  with ADL's, IADL's, and functional activities   4. Pt will have no limitations with ambulation to help facilitate independence at home and in the community.   5. Pt will have no limitations with stair negotiation to help facilitate independence at home and in the community           Plan  Patient would benefit from: skilled physical therapy    Planned therapy interventions: ADL training, balance, balance/weight bearing training, flexibility, functional ROM exercises, gait training, graded activity, graded exercise, graded motor, home exercise program, joint mobilization, manual therapy, neuromuscular re-education, patient education, postural training, strengthening, stretching, therapeutic activities and therapeutic exercise    Frequency: 2x week  Duration in weeks: 12  Plan of Care beginning date: 2025  Plan of Care expiration date: 2025  Treatment plan discussed with: patient, PTA and referring physician        Subjective Evaluation    History of Present Illness  Mechanism of injury: The patient is a 75 year old female who presents to outpatient PT with a CC of balance difficulties for the past 5 years. She reports increasing balance deficits, that also interfere with her ability to safely ambulate and complete transfers She notes she has difficulty with feeling her feet on the floor. She is currently attending cardiac rehab as well and notes that it is going well. She recently had a TAVR procedure on . PT Eval only performed this date.   Patient Goals  Patient goals for therapy: increased strength, decreased pain and increased motion  Patient goal: Goals for PT are to improve balance.  Pain  Current pain ratin  At best pain rating: 3  At worst pain ratin  Quality: pinching.        Objective     Active Range of Motion   Left Hip   Flexion: WFL  Abduction: WFL  Adduction: WFL    Right Hip   Flexion: WFL  Abduction: WFL  Adduction: WFL  Left Knee   Flexion: WFL  Extension: WFL    Right Knee    Flexion: WFL  Extension: WFL  Left Ankle/Foot   Dorsiflexion (kf): WFL  Plantar flexion: WFL    Right Ankle/Foot   Dorsiflexion (kf): WFL  Plantar flexion: WFL    Strength/Myotome Testing     Left Hip   Planes of Motion   Flexion: 3+  Abduction: 3+  Adduction: 3+    Right Hip   Planes of Motion   Flexion: 3+  Abduction: 3+  Adduction: 3+    Left Knee   Flexion: 3+  Extension: 3+    Right Knee   Flexion: 3+  Extension: 3+    Left Ankle/Foot   Dorsiflexion: 3+  Plantar flexion: 3+    Right Ankle/Foot   Dorsiflexion: 3+  Plantar flexion: 3+    Ambulation     Comments   5x STS only able to complete 2 reps - 26 seconds, Stopped d/t increased back pain     CTSIB -  EC firm EC foam Severe sway              Precautions:TAVR 11/5, R breast CA with mastectomy, psych disorder, renal disorder. Pt is currently attending cardiac rehab x 2 per week      Manuals                                                                 Neuro Re-Ed             Hip add             Hip abd              LAQ              Tandem/ Tandem foam              EO EC foam              Manuelito walks Side step hurdles                           Ther Ex             Nu step warm up              HR TR              Mini Squat              Step Up                                                                  Ther Activity                                       Gait Training                                       Modalities

## 2025-01-23 DIAGNOSIS — M81.0 AGE-RELATED OSTEOPOROSIS WITHOUT CURRENT PATHOLOGICAL FRACTURE: Primary | ICD-10-CM

## 2025-01-24 ENCOUNTER — TELEPHONE (OUTPATIENT)
Age: 75
End: 2025-01-24

## 2025-01-24 ENCOUNTER — CLINICAL SUPPORT (OUTPATIENT)
Dept: CARDIAC REHAB | Facility: HOSPITAL | Age: 75
End: 2025-01-24
Payer: COMMERCIAL

## 2025-01-24 DIAGNOSIS — R26.89 BALANCE DISORDER: ICD-10-CM

## 2025-01-24 DIAGNOSIS — R25.1 TREMOR: ICD-10-CM

## 2025-01-24 DIAGNOSIS — Z95.2 S/P TAVR (TRANSCATHETER AORTIC VALVE REPLACEMENT): ICD-10-CM

## 2025-01-24 DIAGNOSIS — R41.89 COGNITIVE DECLINE: ICD-10-CM

## 2025-01-24 PROCEDURE — 93798 PHYS/QHP OP CAR RHAB W/ECG: CPT

## 2025-01-24 RX ORDER — DONEPEZIL HYDROCHLORIDE 10 MG/1
10 TABLET, FILM COATED ORAL
Qty: 100 TABLET | Refills: 1 | Status: SHIPPED | OUTPATIENT
Start: 2025-01-24

## 2025-01-24 NOTE — TELEPHONE ENCOUNTER
Dodie with [facility] ([phone]) called regarding patient's infusion appointment on 1/27/25.    Patient is schedule for Prolia Infustion.  Medication needs a prior authorization.    Prior Authorization needs to be received no later than 2pm today, 1/24/25, or patient's appointment will need to be rescheduled.    Please Dodie back at [phone] with an update.

## 2025-01-24 NOTE — TELEPHONE ENCOUNTER
Dodie called back stating the patient will need to be rescheduled for her Prolia since a prior auth has not been obtained.    CTS spoke with office clinical who states the prior auth pod takes care of this but the prior auth pod is saying the office takes care of this.    Warm transfer to office clinical for further assistance.

## 2025-01-24 NOTE — TELEPHONE ENCOUNTER
[facility] is requesting new electronic order for Prolia. Current order states it is for 1 day while caller is expecting to see 1 injection per 180 days.

## 2025-01-27 ENCOUNTER — CLINICAL SUPPORT (OUTPATIENT)
Dept: CARDIAC REHAB | Facility: HOSPITAL | Age: 75
End: 2025-01-27
Payer: COMMERCIAL

## 2025-01-27 ENCOUNTER — HOSPITAL ENCOUNTER (OUTPATIENT)
Dept: INFUSION CENTER | Facility: HOSPITAL | Age: 75
Discharge: HOME | End: 2025-01-27
Attending: INTERNAL MEDICINE

## 2025-01-27 DIAGNOSIS — Z95.2 S/P TAVR (TRANSCATHETER AORTIC VALVE REPLACEMENT): ICD-10-CM

## 2025-01-27 PROCEDURE — 93798 PHYS/QHP OP CAR RHAB W/ECG: CPT

## 2025-01-27 NOTE — TELEPHONE ENCOUNTER
- with [facility] called stating they will be faxing over a form requesting additional information.       Reference# [id number]      [facility] Phone number: [phone]

## 2025-01-28 ENCOUNTER — OFFICE VISIT (OUTPATIENT)
Dept: PHYSICAL THERAPY | Facility: CLINIC | Age: 75
End: 2025-01-28
Payer: COMMERCIAL

## 2025-01-28 DIAGNOSIS — R26.89 BALANCE PROBLEM: Primary | ICD-10-CM

## 2025-01-28 PROCEDURE — 97112 NEUROMUSCULAR REEDUCATION: CPT | Performed by: PHYSICAL THERAPIST

## 2025-01-28 PROCEDURE — 97110 THERAPEUTIC EXERCISES: CPT | Performed by: PHYSICAL THERAPIST

## 2025-01-28 NOTE — PROGRESS NOTES
Daily Note     Today's date: 2025  Patient name: Deyanira Stokes  : 1950  MRN: [id number]  Referring provider: Merced Alex PA-C  Dx:   Encounter Diagnosis     ICD-10-CM    1. Balance problem  R26.89                      Subjective: Reports a fall over the weekend. Pt is doing well today. She would like to trial PT. Received signed POC  from MD       Objective: See treatment diary below      Assessment: Tolerated treatment well. Patient exhibited good technique with therapeutic exercises and would benefit from continued PT. Frequent rest breaks this session, d/t fatigue and weakness. Tolerated initial treatment well with no adverse effects       Plan: Continue per plan of care.      Precautions:TAVR , R breast CA with mastectomy, psych disorder, renal disorder. Pt is currently attending cardiac rehab x 2 per week      Manuals                                                                 Neuro Re-Ed             Hip add 5''15x            Hip abd  5''15x            LAQ  5''15x            Tandem/ Tandem foam  20''3x            EO EC foam  EO firm 20''3x            Manuelito walks Side step hurdles                           Ther Ex             Nu step warm up  L3 x 10 min 30 canales             HR TR  20x            Mini Squat              Step Up              Three way  10x                                                    Ther Activity                                       Gait Training                                       Modalities

## 2025-01-29 ENCOUNTER — CLINICAL SUPPORT (OUTPATIENT)
Dept: CARDIAC REHAB | Facility: HOSPITAL | Age: 75
End: 2025-01-29
Payer: COMMERCIAL

## 2025-01-29 DIAGNOSIS — Z95.2 S/P TAVR (TRANSCATHETER AORTIC VALVE REPLACEMENT): ICD-10-CM

## 2025-01-29 PROCEDURE — 93798 PHYS/QHP OP CAR RHAB W/ECG: CPT

## 2025-01-30 ENCOUNTER — OFFICE VISIT (OUTPATIENT)
Dept: PHYSICAL THERAPY | Facility: CLINIC | Age: 75
End: 2025-01-30
Payer: COMMERCIAL

## 2025-01-30 DIAGNOSIS — R26.89 BALANCE PROBLEM: Primary | ICD-10-CM

## 2025-01-30 PROCEDURE — 97112 NEUROMUSCULAR REEDUCATION: CPT | Performed by: PHYSICAL THERAPIST

## 2025-01-30 PROCEDURE — 97110 THERAPEUTIC EXERCISES: CPT | Performed by: PHYSICAL THERAPIST

## 2025-01-30 NOTE — PROGRESS NOTES
Daily Note     Today's date: 2025  Patient name: Deyanira Stokes  : 1950  MRN: [id number]  Referring provider: Merced Alex PA-C  Dx:   Encounter Diagnosis     ICD-10-CM    1. Balance problem  R26.89                      Subjective: Pt reports she is doing well today. She was ok after her initial PT treatment. She  Progressed to L4 on the nu step at cardiac rehab. Reports cardiac rehab is going well.       Objective: See treatment diary below      Assessment: Tolerated treatment well. Patient exhibited good technique with therapeutic exercises and would benefit from continued PT      Plan: Continue per plan of care.      Precautions:TAVR , R breast CA with mastectomy, psych disorder, renal disorder. Pt is currently attending cardiac rehab x 2 per week      Manuals                                                                Neuro Re-Ed             Hip add 5''15x 5''2x10           Hip abd  5''15x 5''2x10           LAQ  5''15x 5''2x10           Tandem/ Tandem foam  20''3x 30''3x           EO EC foam  EO firm 20''3x EO firm 20''3x           Manuelito walks Side step hurdles                           Ther Ex             Nu step warm up  L3 x 10 min 30 canales  L3 x 10 min     30 canales            HR TR  20x 20x           Mini Squat              Step Up              Three way  10x  10x                                                  Ther Activity                                       Gait Training                                       Modalities

## 2025-01-31 ENCOUNTER — TELEPHONE (OUTPATIENT)
Dept: INTERNAL MEDICINE CLINIC | Facility: CLINIC | Age: 75
End: 2025-01-31

## 2025-01-31 ENCOUNTER — CLINICAL SUPPORT (OUTPATIENT)
Dept: CARDIAC REHAB | Facility: HOSPITAL | Age: 75
End: 2025-01-31
Payer: COMMERCIAL

## 2025-01-31 DIAGNOSIS — Z95.2 S/P TAVR (TRANSCATHETER AORTIC VALVE REPLACEMENT): ICD-10-CM

## 2025-01-31 PROCEDURE — 93798 PHYS/QHP OP CAR RHAB W/ECG: CPT

## 2025-01-31 NOTE — TELEPHONE ENCOUNTER
Call received in regards to patient's appt for prolia on 2/3/25. This med was denied due to prior auth being submitted with incorrect SIG. Faxed appeal with office notes 1/31/25 as Urgent. Notified patient that the appt was cancelled due to no active authorization from insurance. Patient made aware, will call her with an update on this authorization.

## 2025-02-03 ENCOUNTER — APPOINTMENT (OUTPATIENT)
Dept: CARDIAC REHAB | Facility: HOSPITAL | Age: 75
End: 2025-02-03
Payer: COMMERCIAL

## 2025-02-03 ENCOUNTER — TELEPHONE (OUTPATIENT)
Dept: INTERNAL MEDICINE CLINIC | Facility: CLINIC | Age: 75
End: 2025-02-03

## 2025-02-03 ENCOUNTER — HOSPITAL ENCOUNTER (OUTPATIENT)
Dept: INFUSION CENTER | Facility: HOSPITAL | Age: 75
Discharge: HOME | End: 2025-02-03
Attending: INTERNAL MEDICINE

## 2025-02-03 NOTE — PROGRESS NOTES
PT Evaluation     Today's date: 25  Patient name: Deyanira Stokes  : 1950  MRN: [id number]  Referring provider: Merced lAex PA-C  Dx:   Encounter Diagnosis     ICD-10-CM    1. Lymphedema  I89.0                          Assessment  Impairments: abnormal gait, abnormal or restricted ROM, abnormal movement, activity intolerance and lacks appropriate home exercise program  Other impairment: RUE and scapular and abdominal edema  Symptom irritability: low    Assessment details: Patient presents to OPPT with s/s consistent with right UE lymphedema.  PT interventions to include CDT (complete decongestive therapy) including MLD (manual lymphatic drainage) and compression using decongestive garments as able.  PT to further provide extensive patient education on self bandaging, self MLD techniques, and skin care.  Patient will transition  to long term maintenance with compression plan for both morning and evening wear once optimal decongestive and volume reduction of limb has been achieved.  Thank you for this referral and the opportunity to participate in the care of this patient.    Understanding of Dx/Px/POC: good     Prognosis: good  Prognosis details: Positive prognostic indicators include positive attitude toward recovery and good understanding of diagnosis and treatment plan options.  Negative prognostic indicators include chronicity of symptoms.        Goals  STGs to be achieved in 2 - 4 weeks  Demonstrate at least 75% compliance with self MLD  Demonstrate at least 75% compliance with self compression  Demonstrate at least 75% compliance with self skin and nail inspection  Free from s/s of infection  Demonstrate understanding of importance of compliance with POC    LTGs to be achieved in 4 - 6 weeks  Demonstrate at least 100% compliance with self MLD  Demonstrate at least 100% compliance with self compression  Demonstrate at least 100% compliance with self skin and nail inspection  Free from s/s of  infection  Demonstrate understanding of day/night compression wearing schedule  Obtain alternative compression to ensure independence with self management      Plan  Patient would benefit from: skilled physical therapy  Other planned modality interventions: Modalities prn for symptom management    Planned therapy interventions: manual therapy, neuromuscular re-education, therapeutic activities, therapeutic exercise and home exercise program    Frequency: 1x week (1-2x/week)  Duration in weeks: 8  Plan of Care beginning date: 2025  Plan of Care expiration date: 2025  Treatment plan discussed with: patient and PTA      Subjective Evaluation    History of Present Illness  Mechanism of injury: surgery  Mechanism of injury: 75 yo female with hx of R breast CA  And subsequent R mastectomy with removal of 25 lymph nodes, of which 12 were cancerous. Pt began with lymphedema following surgery. States she has a lymphedema pump at home which she has  been using about 2x/month .  Wears a compression sleeve daily which she removes for sleep. Also has compression  bra.   Pt notes she has pins and needles in fingertips and that is a sign of increased swelling and she then uses her pump. Self MLD throughout the day as needed. Notes she is starting to drop things from the R hand. Pt notes she has lost some weight due to IBS. Pt feels she is managing symptoms well.             Recurrent probem    Quality of life: fair    Patient Goals  Patient goals for therapy: decreased edema, improved balance, increased motion, increased strength and independence with ADLs/IADLs    Pain  Current pain ratin  At best pain ratin  At worst pain ratin  Quality: dull ache, tight and needle-like  Relieving factors: ice, rest and relaxation  Exacerbated by: humidity, heat.  Progression: no change    Social Support  Steps to enter house: yes  Stairs in house: yes   Lives in: multiple-level home  Lives with: adult daughter.    Employment  status: not working  Hand dominance: right  Exercise history: pt also attending cardiac rehab and balance ex at PT in [place]    Treatments  Previous treatment: physical therapy  Current treatment: physical therapy      Objective Lymphedema Evaluation    Medical/MLD Precautions/Contraindications:  _Y_  Cardiac/CHF/Cardiac Edema/Cardiac arrhythmia-- heart murmur, aortic valve issue                                                        Aortic valve replacement, pacemaker  _Y__  Pulmonary--asthma  N___  Kidney  _N__  Liver  _N__  Current Fever/Infection  _N__  Current/Recent Wounds  _N__  Current/Recent Treatments/Interventions/Port Access/PICC Line/[place] filter  _Y__  Current/Recent/History of DVT or Clotting issues/Pelvic DVT--difficulty with blood draw  _Y__  Age > 59 y/o  _Y_  HTN  _Y__  Malignancy--hx of R breast CA  N___  Hyper/hypothyroidism  _N__  AAA  _Y__  GI disorder (Crohn's Diverticulitis, IBD)---diverticulitis, IBS  _N_  Liver disease  N___  Recent abdominal surgery  N___  Pregnancy  Y__  Abdominal pain--w/IBS--plant based diet    Bandaging Precautions/Contraindications:  _N__  Diabetes  _Y__  Neuropathy  _N__  Vascular problems/insufficiency  _Y__  Arterial Disease    Other:  Y___  Latex allergies  _Y  _  Pacemaker    CA treatment:  N__  Chemo-- hx of  _N__  XRT-- hx of      ROM Considerations:  end range R shld flex 105*, abd 90*,   L shld 100, abd 90*    Strength Considerations: RUE weaker than L, but functional      Gait Considerations: Pt amb with SPC with steady waddle gait    Skin Considerations/Scars: No abnormal skin issues noted upon eval except firmness in R subaxilla area with tight cord    Patient presents with skin inspection as follows:  No skin issues at present time  Hemosiderin staining/skin discoloration  Finger/Toe Nails  Open Wounds/Size/Color  S/S infection  Firm/Sponge/Hard  Peau D' Orange/Papillomas  Lymphorrhea/Weeping  Skin mobility  Skin temperature  Fungal  infection  Lymph fistula  Lipodermatosclerosis    Girth:  Upcoming volumetrics    Stg 1 lymphedema  Neg stemmer RUE  R lower abdominal swelling         Precautions: hx of R breast CA with mastectomy, psych disorder, renal disorder, recent R foot fx with removal of boot 10/1/22, fall risk, imbalance    Re-eval Date: 4/1/25    Date   2/4/25       Visit Count 1       FOTO        Pain In  see eval       Pain Out  see eval               Manuals  Volumetrics      CDT 15 min  CV/PTA-CLT    Pt educ   CDT                               Neuro Re-Ed                                                                Ther Ex                                                                        Ther Activity                        Gait Training                        Modalities

## 2025-02-03 NOTE — TELEPHONE ENCOUNTER
Estela called to make sure we received the expedited appeal form for patient's prolia. I informed her that we did. I will place the form in the providers folder.

## 2025-02-03 NOTE — PROGRESS NOTES
"Daily Note     Today's date: 2/3/2025  Patient name: Deyanira Stokes  : 1950  MRN: [id number]  Referring provider: Merced Alex PA-C  Dx:   Encounter Diagnosis     ICD-10-CM    1. Balance problem  R26.89                      Subjective:       Objective: See treatment diary below      Assessment: Tolerated treatment {Tolerated treatment :[id number]}. Patient would benefit from continued PT      Plan: Continue per plan of care.      Precautions:TAVR , R breast CA with mastectomy, psych disorder, renal disorder. Pt is currently attending cardiac rehab x 2 per week      Manuals                                                               Neuro Re-Ed             Hip add 5''15x 5''2x10 5\" 2x10          Hip abd  5''15x 5''2x10 5\" 2x10          LAQ  5''15x 5''2x10 5\" 2x10          Tandem/ Tandem foam  20''3x 30''3x 30\"x3          EO EC foam  EO firm 20''3x EO firm 20''3x EO   Firm  20\"x3          Manuelito walks Side step hurdles                           Ther Ex             Nu step warm up  L3 x 10 min 30 canales  L3 x 10 min     30 canales  L3   10 min 30 canales          HR TR  20x 20x 20x ea          Mini Squat              Step Up              Three way  10x  10x 10x ea                                                 Ther Activity                                       Gait Training                                       Modalities                                                "

## 2025-02-04 ENCOUNTER — EVALUATION (OUTPATIENT)
Dept: PHYSICAL THERAPY | Facility: CLINIC | Age: 75
End: 2025-02-04
Payer: COMMERCIAL

## 2025-02-04 ENCOUNTER — APPOINTMENT (OUTPATIENT)
Dept: PHYSICAL THERAPY | Facility: CLINIC | Age: 75
End: 2025-02-04
Payer: COMMERCIAL

## 2025-02-04 ENCOUNTER — OFFICE VISIT (OUTPATIENT)
Dept: PHYSICAL THERAPY | Facility: CLINIC | Age: 75
End: 2025-02-04
Payer: COMMERCIAL

## 2025-02-04 DIAGNOSIS — I89.0 LYMPHEDEMA: Primary | ICD-10-CM

## 2025-02-04 DIAGNOSIS — R26.89 BALANCE PROBLEM: Primary | ICD-10-CM

## 2025-02-04 PROCEDURE — 97110 THERAPEUTIC EXERCISES: CPT

## 2025-02-04 PROCEDURE — 97112 NEUROMUSCULAR REEDUCATION: CPT

## 2025-02-04 PROCEDURE — 97163 PT EVAL HIGH COMPLEX 45 MIN: CPT | Performed by: PHYSICAL MEDICINE & REHABILITATION

## 2025-02-04 NOTE — PROGRESS NOTES
"Daily Note     Today's date: 2025  Patient name: Deyanira Stokes  : 1950  MRN: [id number]  Referring provider: Merced Alex PA-C  Dx:   Encounter Diagnosis     ICD-10-CM    1. Balance problem  R26.89                      Subjective: Pt reports she is very tired today, had lymphadema therapy this morning prior to PT. Pt states she has been up since 5 in order to be ready for bus transport.       Objective: See treatment diary below      Assessment: Tolerated treatment well. Program modified secondary to c/o fatigue today prior to treatment. Patient demonstrated fatigue post treatment, exhibited good technique with therapeutic exercises, and would benefit from continued PT      Plan: Continue per plan of care.      Precautions:TAVR , R breast CA with mastectomy, psych disorder, renal disorder. Pt is currently attending cardiac rehab x 2 per week      Manuals                                                               Neuro Re-Ed             Hip add 5''15x 5''2x10 5\" x10          Hip abd  5''15x 5''2x10 RTB  5\" x10          LAQ  5''15x 5''2x10 5\" 2x10          Tandem/ Tandem foam  20''3x 30''3x           EO EC foam  EO firm 20''3x EO firm 20''3x           Manuelito walks Side step hurdles                           Ther Ex             Nu step warm up  L3 x 10 min 30 canales  L3 x 10 min     30 canales  L 3 X10 min  30 canales            HR TR  20x 20x x20          Mini Squat              Step Up              Three way  10x  10x                                                  Ther Activity                                       Gait Training                                       Modalities                                                   "

## 2025-02-05 ENCOUNTER — CLINICAL SUPPORT (OUTPATIENT)
Dept: CARDIAC REHAB | Facility: HOSPITAL | Age: 75
End: 2025-02-05
Payer: COMMERCIAL

## 2025-02-05 DIAGNOSIS — M81.0 AGE-RELATED OSTEOPOROSIS WITHOUT CURRENT PATHOLOGICAL FRACTURE: Primary | ICD-10-CM

## 2025-02-05 DIAGNOSIS — Z95.2 S/P TAVR (TRANSCATHETER AORTIC VALVE REPLACEMENT): ICD-10-CM

## 2025-02-05 PROCEDURE — 93798 PHYS/QHP OP CAR RHAB W/ECG: CPT

## 2025-02-06 ENCOUNTER — TELEPHONE (OUTPATIENT)
Age: 75
End: 2025-02-06

## 2025-02-06 ENCOUNTER — APPOINTMENT (OUTPATIENT)
Dept: PHYSICAL THERAPY | Facility: CLINIC | Age: 75
End: 2025-02-06
Payer: COMMERCIAL

## 2025-02-06 NOTE — TELEPHONE ENCOUNTER
Merced Alex Stated: This was denied because it was not completed appropriately. In the screenshot the directions put were for 1ml/day. This is only indicated as 1ml every 6months. If resubmitted with corrected directions it should be covered without issue     So I resubmitted this with appeal paperwork and marked as urgent, requesting a response within 72 hours. Did not hear anything so I reached out to appeal department, spoke to a tier 2 specialist, quantity that patient is to get every 6 mo is approved and it needs ran through her medicare part D benefits and it will be covered. Relayed this message to appropriate party.

## 2025-02-06 NOTE — TELEPHONE ENCOUNTER
Janae bragg stating  pt Infusion was not approved for tomorrow , her appointment needs to be canceled & kal   Ph [phone]

## 2025-02-07 ENCOUNTER — APPOINTMENT (OUTPATIENT)
Dept: CARDIAC REHAB | Facility: HOSPITAL | Age: 75
End: 2025-02-07
Payer: COMMERCIAL

## 2025-02-07 ENCOUNTER — HOSPITAL ENCOUNTER (OUTPATIENT)
Dept: INFUSION CENTER | Facility: HOSPITAL | Age: 75
Discharge: HOME | End: 2025-02-07
Attending: INTERNAL MEDICINE

## 2025-02-07 ENCOUNTER — TELEPHONE (OUTPATIENT)
Dept: INTERNAL MEDICINE CLINIC | Facility: CLINIC | Age: 75
End: 2025-02-07

## 2025-02-07 NOTE — TELEPHONE ENCOUNTER
Can we please start an auth for patient's prolia? Scheduled 2/12/25. Patient has medicare and a secondary. There was previously a denial because the quantity was submitted incorrectly.

## 2025-02-10 ENCOUNTER — CLINICAL SUPPORT (OUTPATIENT)
Dept: CARDIAC REHAB | Facility: HOSPITAL | Age: 75
End: 2025-02-10
Payer: COMMERCIAL

## 2025-02-10 DIAGNOSIS — Z95.2 S/P TAVR (TRANSCATHETER AORTIC VALVE REPLACEMENT): ICD-10-CM

## 2025-02-10 PROCEDURE — 93798 PHYS/QHP OP CAR RHAB W/ECG: CPT

## 2025-02-11 ENCOUNTER — APPOINTMENT (OUTPATIENT)
Dept: PHYSICAL THERAPY | Facility: CLINIC | Age: 75
End: 2025-02-11
Payer: COMMERCIAL

## 2025-02-11 NOTE — TELEPHONE ENCOUNTER
Called [facility]aki barrios to approval for this med - Approval number [id number], approved for 2 visits through 2/11/25 - 2/11/26, Prolia 60mg/mL. This auth info was forwarded to pre encounter team and verified via phone. Maki will be faxing me the auth letter.

## 2025-02-12 ENCOUNTER — HOSPITAL ENCOUNTER (OUTPATIENT)
Dept: INFUSION CENTER | Facility: HOSPITAL | Age: 75
Discharge: HOME | End: 2025-02-12
Attending: INTERNAL MEDICINE
Payer: COMMERCIAL

## 2025-02-12 ENCOUNTER — CLINICAL SUPPORT (OUTPATIENT)
Dept: CARDIAC REHAB | Facility: HOSPITAL | Age: 75
End: 2025-02-12
Payer: COMMERCIAL

## 2025-02-12 VITALS — TEMPERATURE: 96.8 F

## 2025-02-12 DIAGNOSIS — M81.0 AGE-RELATED OSTEOPOROSIS WITHOUT CURRENT PATHOLOGICAL FRACTURE: Primary | ICD-10-CM

## 2025-02-12 DIAGNOSIS — Z95.2 S/P TAVR (TRANSCATHETER AORTIC VALVE REPLACEMENT): ICD-10-CM

## 2025-02-12 PROCEDURE — 93798 PHYS/QHP OP CAR RHAB W/ECG: CPT

## 2025-02-12 PROCEDURE — 96372 THER/PROPH/DIAG INJ SC/IM: CPT

## 2025-02-12 RX ADMIN — DENOSUMAB 60 MG: 60 INJECTION SUBCUTANEOUS at 13:58

## 2025-02-12 NOTE — PROGRESS NOTES
Deyanira Stokes  tolerated treatment well with no complications.  Prolia to [facility].    Deyanira Stokes is aware of future appt on 8/13 at 2 pm.     AVS declined by Deyanira Stokes.  Appt card provided to pt.

## 2025-02-13 ENCOUNTER — APPOINTMENT (OUTPATIENT)
Dept: PHYSICAL THERAPY | Facility: CLINIC | Age: 75
End: 2025-02-13
Payer: COMMERCIAL

## 2025-02-14 ENCOUNTER — APPOINTMENT (OUTPATIENT)
Dept: CARDIAC REHAB | Facility: HOSPITAL | Age: 75
End: 2025-02-14
Payer: COMMERCIAL

## 2025-02-14 ENCOUNTER — CLINICAL SUPPORT (OUTPATIENT)
Dept: CARDIAC REHAB | Facility: HOSPITAL | Age: 75
End: 2025-02-14
Payer: COMMERCIAL

## 2025-02-14 DIAGNOSIS — Z95.2 S/P TAVR (TRANSCATHETER AORTIC VALVE REPLACEMENT): Primary | ICD-10-CM

## 2025-02-14 PROCEDURE — 93798 PHYS/QHP OP CAR RHAB W/ECG: CPT

## 2025-02-17 ENCOUNTER — CLINICAL SUPPORT (OUTPATIENT)
Dept: CARDIAC REHAB | Facility: HOSPITAL | Age: 75
End: 2025-02-17
Payer: COMMERCIAL

## 2025-02-17 ENCOUNTER — APPOINTMENT (OUTPATIENT)
Dept: CARDIAC REHAB | Facility: HOSPITAL | Age: 75
End: 2025-02-17
Payer: COMMERCIAL

## 2025-02-17 DIAGNOSIS — Z95.2 S/P TAVR (TRANSCATHETER AORTIC VALVE REPLACEMENT): ICD-10-CM

## 2025-02-17 PROCEDURE — 93798 PHYS/QHP OP CAR RHAB W/ECG: CPT

## 2025-02-17 NOTE — PROGRESS NOTES
CARDIAC REHABILITATION   ASSESSMENT AND INDIVIDUALIZED TREATMENT PLAN  90 DAY          Today's date: 2025   # of Exercise Sessions Completed:   Patient name: Deyanira Stokes      : 1950  Age: 75 y.o.       MRN: [id number]  Referring Physician: Dr. Godwin Sarah DO  Cardiologist: Dr. Godwin Cobb MD  Provider: [place]  Clinician: Monica Das, MPT, CCRP        Treatment is tailored to this patient's individual needs.  The ITP was reviewed with the patient and all questions were answered to their satisfaction.  Additional ITP documentation can be found electronically including daily and monthly exercise summaries, daily session notes with ECG summaries, education notes, daily medication reconciliation, and daily physician supervision.      Comments: Maxine has completed 21 sessions of cardiac rehab. She is currently working at a 3.59 MET Level. She no longer requires interval training. She has been asymptomatic. She has correct hemodynamic responses to the activity. She rates her program a 4-5/10 on a 1-10 RPE Scale. She notes following a plant based diet.  Her program will be progressed as tolerated. She has been receiving weekly education; refer to [facility] documentation for a list of completed topics.       SUMMARY 2025    Resting BP  122/60 - 130/68,  HR 74 - 91  Exercise /68- 148/74.   - 125  Exercise session details:  35-45 minutes,  3.59 METs  Telemetry:  NSR  Symptoms: None  Home exercise/ADLs: Has resumed all ADLs at home.  Patient's subjective report of progress: Ability to ambualte longer distances w/out QUILES. Improved standing balance and tolerance. Increased BLE strength. No longer utilizing AD w/in the home or CR department. Reports exercising daily.       Dx: S/P TAVR 2024  S/P Pacemaker Insertion 2024    Description of Diagnosis: Deyanira Stokes is a 74 y.o. year old female who was evaluated by Godwin Sarah D.O. for transcatheter aortic valve  replacement. She had the following studies  completed: Gated CTA of the chest/abdomen/pelvis,  cardiac catheterization and carotid artery ultrasound.review patient's PMHx is notable for AS, HTN, HLD, asthma, right breast cancer diagnosed in 2005 (s/p R mastectomy, chemotherapy, and radiation), anxiety, dementia, depression, GERD, kidney stone, hearing loss, IBS, obesity (BMI 36), osteoporosis, arthritis, scoliosis, and gait dysfunction.      Date of onset: 10/30/2024  Other Cardiac History: HTN, CHF, LBBB, CAD, cardiac conduction disease and patent foramen ovale.         ASSESSMENT    Medical History:   Past Medical History:   Diagnosis Date    Anxiety     Arthritis     Asthma     Cancer (HCC)     CHF (congestive heart failure) (HCC)     Chronic headaches     Coronary artery disease     Dementia (HCC)     Depression     Dietary lactose intolerance     GERD (gastroesophageal reflux disease)     History of breast cancer     right, s/p mastectomy, s/p chemoc/radiation    History of nephrolithiasis     History of pneumonia     History of shingles     HL (hearing loss)     Hyperlipidemia     Hypertension     Hypertensive heart disease with heart failure (HCC)     IBS (irritable bowel syndrome)     Lymphedema of right arm     Nonrheumatic aortic (valve) stenosis     Obesity     Osteoporosis     PFO (patent foramen ovale)     PONV (postoperative nausea and vomiting)     Scoliosis     Severe aortic stenosis     Vitamin D deficiency        Family History:  Family History   Adopted: Yes   Problem Relation Age of Onset    Diabetes Mother     Heart disease Mother     Mental illness Mother     Depression Mother     Heart disease Brother     Breast cancer Maternal Aunt     Breast cancer Maternal Aunt     Breast cancer Maternal Aunt     Breast cancer Maternal Aunt     Breast cancer Maternal Aunt     No Known Problems Father     No Known Problems Sister     No Known Problems Daughter     Breast cancer Maternal Grandmother     No  Known Problems Sister     No Known Problems Sister     No Known Problems Sister        Allergies:   Ibuprofen, Morphine, and Latex    Current Medications:   Current Outpatient Medications   Medication Sig Dispense Refill    acetaminophen (TYLENOL) 650 mg CR tablet Take 1 tablet (650 mg total) by mouth every 8 (eight) hours as needed for mild pain Patient takes 500mg      albuterol (2.5 mg/3 mL) 0.083 % nebulizer solution Take 2.5 mg by nebulization every 6 (six) hours as needed for wheezing      albuterol (PROVENTIL HFA,VENTOLIN HFA) 90 mcg/act inhaler Inhale 2 puffs every 4 (four) hours as needed for wheezing 8.5 g 2    aspirin 81 mg chewable tablet Chew 81 mg daily      atorvastatin (LIPITOR) 20 mg tablet Take 1 tablet by mouth daily. 90 tablet 1    butalbital-acetaminophen-caffeine (FIORICET,ESGIC) -40 mg per tablet Take 1 tablet by mouth 3 (three) times a day as needed for headaches 90 tablet 1    clopidogrel (PLAVIX) 75 mg tablet Take 1 tablet (75 mg total) by mouth daily 90 tablet 0    donepezil (ARICEPT) 10 mg tablet Take 1 tablet by mouth daily at bedtime. 100 tablet 1    FLUoxetine (PROzac) 20 mg capsule Take 1 capsule (20 mg total) by mouth daily 90 capsule 1    fluticasone (FLONASE) 50 mcg/act nasal spray INSTILL 1 SPRAY INTO EACH NOSTRIL ONCE DAILY 48 mL 1    lisinopril (ZESTRIL) 5 mg tablet TAKE 1 TABLET (5 MG TOTAL) BY MOUTH DAILY. 90 tablet 1    montelukast (SINGULAIR) 10 mg tablet Take 1 tablet (10 mg total) by mouth daily at bedtime 90 tablet 1    Myrbetriq 25 MG TB24 Take 2 tablets by mouth in the morning. 180 tablet 1    Nutritional Supplements (BOOST BREEZE PO) Take by mouth daily Nutritional drink   3 times a week.      oxyCODONE (OxyCONTIN) 10 mg 12 hr tablet Take 10 mg by mouth as needed for moderate pain      pantoprazole (PROTONIX) 40 mg tablet Take 1 tablet by mouth daily. 90 tablet 1    QUEtiapine (SEROquel) 25 mg tablet Take 1 tablet by mouth daily at bedtime. 90 tablet 0     No  current facility-administered medications for this visit.       Medication compliance: Yes   Comments: Pt reports to be compliant with medications. Denies any issues w/current prescriptions.     Physical Limitations: UE restrictions due to recent pacemaker insertion. Gait dysfunction, ambulates w/out AD in the home. 4WW for long distances, primarily utilizes fro seat compartment/carrying items.  due to history of BTKRs.     Fall Risk: Low   Comments: Patient uses walking assist device (walker/cane/rollator) and notes dizziness when upright for prolonged periods.  Patient's standing balance and tolerance has greatly improved since starting CR. She is now able to perfrom 25-50 laps, of 75' w/spc and no rest periods!    Cultural needs: none      CAD Risk Factors:  Cholesterol: No  HTN: Yes  DM: No  Obesity: Yes   Inactivity: Yes      EXERCISE ASSESSMENT:     Initial Fitness Assessment: 6 MWT   Resting Vitals: HR 74, /66, 02 Sat 97%; QUILES 0/10   Peak Vitals: HR 92, /68, 02 Sat 98%, RPE 6/10, QUILES 4/10- complete 422 feet - 1.61 MET Level   Recovery Vitals: HR 76, /60, 02 Sat 98%, QUILES 0/10    ECG INTERPRETATION:  NSR, Paced    Current Functional Status:  Occupation: retired  Recreation/Physical Activity: Sedentary at this time  ADL’s:able to perform self-care  [place]: Capable of performing light ADLs only  Home exercise: none, currently participating w/PT C.   Other Comments:       SMART Exercise Goals:   10% improvement in functional capacity based on max METs achieved in initial fitness assessment  reduced dyspnea with physical activity    improved DASI score by 10%  increased exercise capacity by 40% based on peak METs tolerated in cardiac rehab exercise session  maintain > 150 minutes per week of moderate intensity exercise    Patient Specific EXERCISE GOALS:       Ambulate long distances w/out symptoms  Ability to perform chores around the home w/out extreme fatigue  Increase standing balance  "and tolerance w/out dizziness    Functional Capacity Screening Tool:  [facility] Activity Status Index: 4.64 METs      PSYCHOSOCIAL ASSESSMENT:    Date of last Assessment:  11/26/2024  Depression screening:  PHQ-9 = 9    Interpretation:  5-9 = Mild Depression  Anxiety screening:  ELINOR-7 = 4    Interpretation: 0-4  = Not anxious    Pt self-report of depression and anxiety   Patient reports feelings of depression   Reports sufficient emotional support   Patient takes Prozac and Seroquel for this issue.     Self-reported stress level:  5-6/10   Stressors: Health and lack of independence  Stress Management Tools: practice Relaxation Techniques, exercise, enjoy a hobby, and pray/meditate    SMART Psychosocial Goals:     Reduce perceived stress to 1-3/10, PHQ-9 - reduced severity by one level, Quality of Life in [facility] Score < 3 , feel less tired with more energy, and reduced incidence of restlessness and/or lethargy    Patient Specific PSYCHOCOSOCIAL GOALS:    Reduce stress level from 8/10 to 3/10  Medication compliance w/Seroquel and Prozac  Initiate a hobby in order to avoid triggers    Quality of Life Screen:  (Higher score indicates disease impact on QOL)  [facility] COOP score: 32/45     Social Support:   children-patient lives w/her daughter  Community/Social Activities: None at this time     Psychosocial Assessment as it relates to rehabilitation:   Patient denies issues with his/her family or home life that may affect their rehabilitation efforts.       NUTRITION ASSESSMENT:    Initial Weight:  156   Current Weight: 163    Height:   Ht Readings from Last 1 Encounters:   12/17/24 4' 9\" (1.448 m)       Rate Your Plate Score: 71/81    Diabetes: N/A  A1c: 5.3    last measured: 6/11/2024    Lipid management: Discussed diet and lipid management and Last lipid profile 6/11/202  Chol 131    HDL 52  LDL 56    Current Dietary Habits:  Patient states prior to her surgery she started improving her eating habits and does " follow a plant based diet. Recently lost approximately 24#.     SMART Nutrition Goals:   eat whole grain breads, brown rice and whole grain cereals, eat 5 or more servings of fruits and vegetables a day, Do not eat fried foods, never add salt to food when cooking or at the table, choose low sodium canned, frozen/packaged foods or rarely/never eat, and drink less than 8oz of soda and sweetened drinks per day    Patient Specific NUTRITION GOALS:     1. Attain a healthy weight and BMI   2. Regularly hydrate during the day   3. Reduce intake of fried foods and desserts    Drug/Alcohol Use:   No      OTHER CORE COMPONENT ASSESSMENT:    Tobacco Use:     N/A:  Patient is a non-smoker . She avoids second hand smoke and other irritants.     Anginal Symptoms:  QUILES, excessive fatigue, and lightheadedness   NTG use: No prescription    SMART Goals:   consistent, controlled resting BP < 130/80, medication compliance, and avoid smoke and other irritants    Patient Specific CORE COMPONENT GOALS:    Reduce reliance on medication  Daily vital monitoring at home    INDIVIDUALIZED TREATMENT PLAN      EXERCISE GOALS and PLAN      Progress toward Exercise goals:   Will continue to educate and progress as tolerated., Goals met: performs a daily walking program and is compliant attending CR.., Patient's standing balance and tolerance is much improved. She also notes decreased sitting time at home. She has resumed all ADLs at home.      Exercise Intervention/plan:    education on home exercise guidelines, home exercise 30+ mins 2 days opposite CR, Group class: Risk Factors for Heart Disease, and Patient education:   Exercise After Cardiac Rehabilitation    The patient was counseled on exercise guidelines to achieve a minimum of 150 mins/wk of moderate intensity (RPE 4-6) exercise and encouraged to add 1-2 days of exercise on opposite days of cardiac rehab as tolerated.       PHYSICIAN PRESCRIBED EXERCISE:    Current Aerobic Exercise  Prescription:      Frequency: 2-3 days/week   Supplement with home exercise 2+ days/wk as tolerated       Minutes: 30-45         METS: 3.59           HR: RHR +30-40bpm   RPE: 4-5/10         Modalities: UBE, NuStep, Recumbent bike, and Room walking     Exercise workloads will be progressed gradually as tolerated, within limits of patient's ability, and according to the patient's response to the exercise program.  Able to complete 25 to 30 laps of 75' w/spc and no rest periods.     Aerobic Exercise Prescription Plan for Progression   Frequency: 2-3 days/week of cardiac rehab       Supplement with home exercise 2+ days/wk as tolerated    Minutes: 40-45     >150 mins/wk of moderate intensity exercise   METS: 3.59-4.50   HR: RHR +30-40bpm     RPE: 4-6/10   Modalities: Treadmill, UBE, NuStep, Recumbent bike, and Room walking    Strength trainin-3 days / week  12-15 repetitions  1-2 sets per modality    Modalities:  UE free weights    Home Exercise: Patient states she has begun doing at home chair aerobics and has been able to dance at multiple [employer] parties. She rep[orts exercising daily.     Exercise Education: benefit of exercise for CAD risk factors, home exercise guidelines, AHA guidelines to achieve >150 mins/wk of moderate exercise, and RPE scale     Readiness to change: Action:  (Changing behavior)      NUTRITION GOALS AND PLAN      Nutritional   Reviewed patient's Rate your Plate. Discussed key elements of heart healthy eating. Reviewed patient goals for dietary modifications and their clinical implications.  Reviewed most recent lipid profile.     Patient's progress toward Nutrition goals:    Pt has not made progress toward the following goals: No further weight loss. , Will continue to educate and progress as tolerated.Patient now following a plant based diet. She is also working on reducing her daily sodium intake. She is aware to hydrate often throoghout the day.       Nutrition Intervention/plan:  "  replace refined flours with whole grains, increase daily intake of fruits and vegetables, never/rarely eat fried foods, never/rarely add salt to food when cooking or at the table, choose low sodium canned, frozen, packaged foods or rarely eat these foods, and rarely/never eat salty snacks    Measurable goals were based Rate Your Plate Dietary Self-Assessment. These are the areas in which the patient could score higher on the assessment.  Goals include recommendations for a heart healthy diet based on American Heart Association.    Nutrition Education:   low sodium diet  maintaining hydration  nutrition for  lipid management  \"Plant Based Eating\"    Readiness to change: Action:  (Changing behavior)      PSYCHOSOCIAL GOALS AND PLAN    Psychosocial Assessment as it relates to rehabilitation:   Patient denies issues with her family or home life that may affect their rehabilitation efforts. CR staff will continue to offer support as needed.     Patient's progress toward Psychosocial goals:    Pt is progressing and showing improvement  toward the following goals:  Has been attending multiple [employer] parties and is spending time with friends and family w/o issue.  , Will continue to educate and progress as tolerated.Her stress level is now 5-6/10, improved from a consistent 8/10.     Psychosocial Intervention/plan:   Practice relaxation techniques and Keep a positive mindset    Psychosocial Education: signs/sxs of depression and depression and CAD    Information to utilize [employer] was provided as well as contact information for counseling through  Behavioral Health and group psychotherapy groups available.    Readiness to change: Action:  (Changing behavior)      OTHER CORE COMPONENTS GOALS and PLAN      Blood Pressure will be monitored throughout the program and cardiologist will be notified of elevated trends.  Pt will be encouraged to monitor home BP if advised by cardiologist.    Tobacco " Intervention/plan:   N/A:  Pt is a non-smoker. She avoids second hand smoke and other irritants.     Progress toward Core Component goals:   Pt is progressing and showing improvement  toward the following goals:  Resting BPs are  now consistently < 130/80. .  , Will continue to educate and progress as tolerated.    Other Core Components Intervention:   medication compliance, avoid places with second hand smoke, avoid processed foods, engage in regular exercise, and eliminate salt shaker at the table    Group and Individual Education:  understanding high blood pressure and it's relationship to CAD and group class: Understanding Heart Disease    Readiness to change: Action:  (Changing behavior)

## 2025-02-18 ENCOUNTER — OFFICE VISIT (OUTPATIENT)
Dept: PHYSICAL THERAPY | Facility: CLINIC | Age: 75
End: 2025-02-18
Payer: COMMERCIAL

## 2025-02-18 DIAGNOSIS — R26.89 BALANCE PROBLEM: Primary | ICD-10-CM

## 2025-02-18 DIAGNOSIS — I89.0 LYMPHEDEMA: ICD-10-CM

## 2025-02-18 PROCEDURE — 97110 THERAPEUTIC EXERCISES: CPT | Performed by: PHYSICAL THERAPIST

## 2025-02-18 PROCEDURE — 97112 NEUROMUSCULAR REEDUCATION: CPT | Performed by: PHYSICAL THERAPIST

## 2025-02-18 NOTE — PROGRESS NOTES
"Daily Note     Today's date: 2025  Patient name: Deyanira Stokes  : 1950  MRN: [id number]  Referring provider: Merced Alex PA-C  Dx:   Encounter Diagnosis     ICD-10-CM    1. Balance problem  R26.89       2. Lymphedema  I89.0                      Subjective: Pt reports she is doing really well. Progressing well at cardiac rehab as well as PT.       Objective: See treatment diary below      Assessment: Tolerated treatment well. Patient exhibited good technique with therapeutic exercises and would benefit from continued PT      Plan: Continue per plan of care.      Precautions:TAVR , R breast CA with mastectomy, psych disorder, renal disorder. Pt is currently attending cardiac rehab x 2 per week      Manuals                                                              Neuro Re-Ed             Hip add 5''15x 5''2x10 5\" x10 5''20x         Hip abd  5''15x 5''2x10 RTB  5\" x10 Red 5''20x         LAQ  5''15x 5''2x10 5\" 2x10 5''20x         Tandem/ Tandem foam  20''3x 30''3x  30''3x         EO EC foam  EO firm 20''3x EO firm 20''3x  EO firm 20''3x         Manuelito walks Side step hurdles                           Ther Ex             Nu step warm up  L3 x 10 min 30 canales  L3 x 10 min     30 canales  L 3 X10 min  30 canales   L 3 X10 min  30 canales         HR TR  20x 20x x20 x20         Mini Squat              Step Up     B 10x each          Three way  10x  10x  10x                                                 Ther Activity                                       Gait Training                                       Modalities                                                     "

## 2025-02-19 ENCOUNTER — CLINICAL SUPPORT (OUTPATIENT)
Dept: CARDIAC REHAB | Facility: HOSPITAL | Age: 75
End: 2025-02-19
Payer: COMMERCIAL

## 2025-02-19 ENCOUNTER — APPOINTMENT (OUTPATIENT)
Dept: CARDIAC REHAB | Facility: HOSPITAL | Age: 75
End: 2025-02-19
Payer: COMMERCIAL

## 2025-02-19 DIAGNOSIS — Z95.2 S/P TAVR (TRANSCATHETER AORTIC VALVE REPLACEMENT): ICD-10-CM

## 2025-02-19 PROCEDURE — 93798 PHYS/QHP OP CAR RHAB W/ECG: CPT

## 2025-02-20 ENCOUNTER — OFFICE VISIT (OUTPATIENT)
Dept: PHYSICAL THERAPY | Facility: CLINIC | Age: 75
End: 2025-02-20
Payer: COMMERCIAL

## 2025-02-20 DIAGNOSIS — R26.89 BALANCE PROBLEM: Primary | ICD-10-CM

## 2025-02-20 PROCEDURE — 97112 NEUROMUSCULAR REEDUCATION: CPT

## 2025-02-20 PROCEDURE — 97110 THERAPEUTIC EXERCISES: CPT

## 2025-02-20 NOTE — PROGRESS NOTES
"Daily Note     Today's date: 2025  Patient name: Deyanira Stokes  : 1950  MRN: [id number]  Referring provider: Merced Alex PA-C  Dx:   Encounter Diagnosis     ICD-10-CM    1. Balance problem  R26.89           Start Time: 1255          Subjective: Pt reports she feels her balance is improving.       Objective: See treatment diary below      Assessment: Tolerated treatment well. Gradual progression with exercise as documented for LE strengthening and balance training. Patient demonstrated fatigue post treatment, exhibited good technique with therapeutic exercises, and would benefit from continued PT      Plan: Continue per plan of care.      Precautions:TAVR , R breast CA with mastectomy, psych disorder, renal disorder. Pt is currently attending cardiac rehab x 2 per week      Manuals                                                             Neuro Re-Ed             Hip add 5''15x 5''2x10 5\" x10 5''20x 5\" x20        Hip abd  5''15x 5''2x10 RTB  5\" x10 Red 5''20x GTB  5\" x20        LAQ  5''15x 5''2x10 5\" 2x10 5''20x         Tandem/ Tandem foam  20''3x 30''3x  30''3x 20\"x3   On foam        EO EC foam  EO firm 20''3x EO firm 20''3x  EO firm 20''3x EO   Firm20\" x2  EC 20\" x1        Manuelito walks Side step hurdles                           Ther Ex             Nu step warm up  L3 x 10 min 30 canales  L3 x 10 min     30 canales  L 3 X10 min  30 canales   L 3 X10 min  30 canales L3 x10 min        HR TR  20x 20x x20 x20 x20        Mini Squat              Step Up     B 10x each  \"B\" x10 ea        Three way  10x  10x  10x  X10                                                Ther Activity                                       Gait Training                                       Modalities                                                       "

## 2025-02-21 ENCOUNTER — APPOINTMENT (OUTPATIENT)
Dept: CARDIAC REHAB | Facility: HOSPITAL | Age: 75
End: 2025-02-21
Payer: COMMERCIAL

## 2025-02-21 ENCOUNTER — CLINICAL SUPPORT (OUTPATIENT)
Dept: CARDIAC REHAB | Facility: HOSPITAL | Age: 75
End: 2025-02-21
Payer: COMMERCIAL

## 2025-02-21 DIAGNOSIS — Z95.2 S/P TAVR (TRANSCATHETER AORTIC VALVE REPLACEMENT): Primary | ICD-10-CM

## 2025-02-21 PROCEDURE — 93798 PHYS/QHP OP CAR RHAB W/ECG: CPT

## 2025-02-23 DIAGNOSIS — F41.1 GENERALIZED ANXIETY DISORDER: ICD-10-CM

## 2025-02-24 ENCOUNTER — CLINICAL SUPPORT (OUTPATIENT)
Dept: CARDIAC REHAB | Facility: HOSPITAL | Age: 75
End: 2025-02-24
Payer: COMMERCIAL

## 2025-02-24 DIAGNOSIS — Z95.2 S/P TAVR (TRANSCATHETER AORTIC VALVE REPLACEMENT): ICD-10-CM

## 2025-02-24 PROCEDURE — 93798 PHYS/QHP OP CAR RHAB W/ECG: CPT

## 2025-02-24 RX ORDER — QUETIAPINE FUMARATE 25 MG/1
25 TABLET, FILM COATED ORAL
Qty: 90 TABLET | Refills: 0 | Status: SHIPPED | OUTPATIENT
Start: 2025-02-24

## 2025-02-25 ENCOUNTER — OFFICE VISIT (OUTPATIENT)
Dept: PHYSICAL THERAPY | Facility: CLINIC | Age: 75
End: 2025-02-25
Payer: COMMERCIAL

## 2025-02-25 DIAGNOSIS — R26.89 BALANCE PROBLEM: Primary | ICD-10-CM

## 2025-02-25 PROCEDURE — 97110 THERAPEUTIC EXERCISES: CPT

## 2025-02-25 PROCEDURE — 97112 NEUROMUSCULAR REEDUCATION: CPT

## 2025-02-25 NOTE — PROGRESS NOTES
"Daily Note     Today's date: 2025  Patient name: Deyanira Stokes  : 1950  MRN: [id number]  Referring provider: Merced Alex PA-C  Dx:   Encounter Diagnosis     ICD-10-CM    1. Balance problem  R26.89           Start Time: 1100  Stop Time: 1145  Total time in clinic (min): 45 minutes    Subjective: Patient reports that her right knee is bothering her.        Objective: See treatment diary below      Assessment: Tolerated treatment well.   Patient participated in skilled PT session focused on balance, gait, and endurance.  Patient able to complete exercise program with no issues.  Patient challenged on compliant surfaces having increased swaying needing occasional 1 UE support.  Patient would continue to benefit from skilled PT interventions to address deficits with balance, gait, and endurance. Patient demonstrated fatigue post treatment      Plan: Continue per plan of care.      Precautions:TAVR , R breast CA with mastectomy, psych disorder, renal disorder. Pt is currently attending cardiac rehab x 2 per week      Manuals                                                            Neuro Re-Ed             Hip add 5''15x 5''2x10 5\" x10 5''20x 5\" x20 5\" 20x       Hip abd  5''15x 5''2x10 RTB  5\" x10 Red 5''20x GTB  5\" x20 GTB 5\" 20x       LAQ  5''15x 5''2x10 5\" 2x10 5''20x  5\" 20x ea       Tandem/ Tandem foam  20''3x 30''3x  30''3x 20\"x3   On foam On airex 20\" 3x ea       EO EC foam  EO firm 20''3x EO firm 20''3x  EO firm 20''3x EO   Firm20\" x2  EC 20\" x1 Firm FT/EC/EC 20\" 2x ea    Airex FA/EO/EC 20\" ea       Manuelito walks Side step hurdles                           Ther Ex             Nu step warm up  L3 x 10 min 30 canales  L3 x 10 min     30 canales  L 3 X10 min  30 canales   L 3 X10 min  30 canales L3 x10 min L3 x 10 min       HR TR  20x 20x x20 x20 x20 20x ea       Mini Squat              Step Up     B 10x each  \"B\" x10 ea 6\" step 10x ea       Three way  10x  10x  10x  X10  10x " ea                                              Ther Activity                                       Gait Training                                       Modalities

## 2025-02-26 ENCOUNTER — CLINICAL SUPPORT (OUTPATIENT)
Dept: CARDIAC REHAB | Facility: HOSPITAL | Age: 75
End: 2025-02-26
Payer: COMMERCIAL

## 2025-02-26 ENCOUNTER — OFFICE VISIT (OUTPATIENT)
Dept: PHYSICAL THERAPY | Facility: CLINIC | Age: 75
End: 2025-02-26
Payer: COMMERCIAL

## 2025-02-26 DIAGNOSIS — I89.0 LYMPHEDEMA: Primary | ICD-10-CM

## 2025-02-26 DIAGNOSIS — Z95.2 S/P TAVR (TRANSCATHETER AORTIC VALVE REPLACEMENT): ICD-10-CM

## 2025-02-26 PROCEDURE — 97140 MANUAL THERAPY 1/> REGIONS: CPT

## 2025-02-26 PROCEDURE — 93798 PHYS/QHP OP CAR RHAB W/ECG: CPT

## 2025-02-26 PROCEDURE — 97110 THERAPEUTIC EXERCISES: CPT

## 2025-02-26 NOTE — PROGRESS NOTES
Daily Note     Today's date: 2025  Patient name: Deyanira Stokes  : 1950  MRN: [id number]  Referring provider: Merced Alex PA-C  Dx:   Encounter Diagnosis     ICD-10-CM    1. Lymphedema  I89.0                      Subjective: Having more noticeable N&T R hand/fingers I have been dropping things with my right hand, <  strength  Overall reaching is ok but can be better  I need my compression bra/denise  I can feel heat/cold temps       Objective: See treatment diary below      Assessment: Tolerated MLD/MT treatment well. Demon > volumetrics R UE  Demon abdominal swelling  Demon limited R shoulder ROM limit into flex / scap  Noted R pec mm tightness Pt has abdominal congestion/swelling R>L pt compliant with compression day    Pt util home pump 3x/week   Patient would benefit from continued PT      Plan: Continue per plan of care.      Precautions: hx of R breast CA with mastectomy, psych disorder, renal disorder, recent R foot fx with removal of boot 10/1/22, fall risk, imbalance,  Pacemaker    Re-eval Date: 25     Order Issued   Compression Garment(s)     25    R UE compression sleeve 20-30 mm Hg    - Bra/denise     Pneumatic pump Pt has ?           Date   25      Visit Count 1 2      FOTO        Pain In  see eval       Pain Out  see eval               Manuals  Volumetrics  RUE        CDT 15 min  CV/PTA-CLT    Pt educ   CDT MLD  - Short neck  - Diaphragmatic Breath  - RUE    - Gentle ROM R shoulder        Pt education - handouts provided  - What is CDT  - What is lymphedema  - Compression: day and/or night, self wrap/michelle/doffing, care management, replace every 6 months    Upcoming    - Stages of lymphedema  - Prevention  - Skin / nail care management   - Signs/symptoms of infection/cellulitis  - Nutrition/hydration  - Self MLD - sequence    - Pneumatic pump - contraindications/precautions and instructions to change mmHg as appropriate/prn    Pt educ/issued HEP:    Remedial  lymphedema exercises - cues prn for proper form/technique    55 min                                  Neuro Re-Ed                                                                Ther Ex                                                                        Ther Activity                        Gait Training                        Modalities

## 2025-02-27 ENCOUNTER — OFFICE VISIT (OUTPATIENT)
Dept: PHYSICAL THERAPY | Facility: CLINIC | Age: 75
End: 2025-02-27
Payer: COMMERCIAL

## 2025-02-27 DIAGNOSIS — R26.89 BALANCE PROBLEM: Primary | ICD-10-CM

## 2025-02-27 PROCEDURE — 97110 THERAPEUTIC EXERCISES: CPT | Performed by: PHYSICAL THERAPIST

## 2025-02-27 PROCEDURE — 97112 NEUROMUSCULAR REEDUCATION: CPT | Performed by: PHYSICAL THERAPIST

## 2025-02-27 NOTE — PROGRESS NOTES
"Daily Note     Today's date: 2025  Patient name: Deyanira Stokes  : 1950  MRN: [id number]  Referring provider: Merced Alex PA-C  Dx:   Encounter Diagnosis     ICD-10-CM    1. Balance problem  R26.89                      Subjective: Pt is noticing improvements with skilled PT       Objective: See treatment diary below      Assessment: Tolerated treatment well. Patient exhibited good technique with therapeutic exercises and would benefit from continued PT      Plan: Continue per plan of care.      Precautions: hx of R breast CA with mastectomy, psych disorder, renal disorder, recent R foot fx with removal of boot 10/1/22, fall risk, imbalance,  Pacemaker    Re-eval Date: 25  Manuals                                                           Neuro Re-Ed             Hip add 5''15x 5''2x10 5\" x10 5''20x 5\" x20 5\" 20x 5\" 20x      Hip abd  5''15x 5''2x10 RTB  5\" x10 Red 5''20x GTB  5\" x20 GTB 5\" 20x GTB 5\" 20x      LAQ  5''15x 5''2x10 5\" 2x10 5''20x  5\" 20x ea 5\" 20x ea      Tandem/ Tandem foam  20''3x 30''3x  30''3x 20\"x3   On foam On airex 20\" 3x ea On airex 20\" 3x ea      EO EC foam  EO firm 20''3x EO firm 20''3x  EO firm 20''3x EO   Firm20\" x2  EC 20\" x1 Firm FT/EC/EC 20\" 2x ea    Airex FA/EO/EC 20\" ea Airex FA/EO/EC 20\" ea      Manuelito walks Side step hurdles                           Ther Ex             Nu step warm up  L3 x 10 min 30 canales  L3 x 10 min     30 canales  L 3 X10 min  30 canales   L 3 X10 min  30 canales L3 x10 min L3 x 10 min L3 x 10 min      HR TR  20x 20x x20 x20 x20 20x ea 20x ea      Mini Squat              Step Up     B 10x each  \"B\" x10 ea 6\" step 10x ea 6\" step 10x ea      Three way  10x  10x  10x  X10  10x ea 10x ea                                             Ther Activity                                       Gait Training                                       Modalities                                            "

## 2025-02-28 ENCOUNTER — CLINICAL SUPPORT (OUTPATIENT)
Dept: CARDIAC REHAB | Facility: HOSPITAL | Age: 75
End: 2025-02-28
Payer: COMMERCIAL

## 2025-02-28 DIAGNOSIS — Z95.2 S/P TAVR (TRANSCATHETER AORTIC VALVE REPLACEMENT): ICD-10-CM

## 2025-02-28 PROCEDURE — 93798 PHYS/QHP OP CAR RHAB W/ECG: CPT

## 2025-03-03 ENCOUNTER — CLINICAL SUPPORT (OUTPATIENT)
Dept: CARDIAC REHAB | Facility: HOSPITAL | Age: 75
End: 2025-03-03
Payer: COMMERCIAL

## 2025-03-03 DIAGNOSIS — Z95.2 S/P TAVR (TRANSCATHETER AORTIC VALVE REPLACEMENT): ICD-10-CM

## 2025-03-03 PROCEDURE — 93798 PHYS/QHP OP CAR RHAB W/ECG: CPT

## 2025-03-04 ENCOUNTER — OFFICE VISIT (OUTPATIENT)
Dept: PHYSICAL THERAPY | Facility: CLINIC | Age: 75
End: 2025-03-04
Payer: COMMERCIAL

## 2025-03-04 DIAGNOSIS — R26.89 BALANCE PROBLEM: Primary | ICD-10-CM

## 2025-03-04 PROCEDURE — 97110 THERAPEUTIC EXERCISES: CPT | Performed by: PHYSICAL THERAPIST

## 2025-03-04 PROCEDURE — 97112 NEUROMUSCULAR REEDUCATION: CPT | Performed by: PHYSICAL THERAPIST

## 2025-03-04 NOTE — PROGRESS NOTES
"Daily Note     Today's date: 3/4/2025  Patient name: Deyanira Stokes  : 1950  MRN: [id number]  Referring provider: Merced Alex PA-C  Dx:   Encounter Diagnosis     ICD-10-CM    1. Balance problem  R26.89                      Subjective: Pt reports her BP was elevated at cardiac rehab yesterday. She is anxious to start PT today Feels better when she is done.       Objective: See treatment diary below      Assessment: Tolerated treatment well. Patient exhibited good technique with therapeutic exercises and would benefit from continued PT. 142/79 BP prior to the start of the session. Cardiac rehab notes reviewed. Pt was symptomatic t/o her session yesterday so treatment was held. She was able to perform all outlined PT this date without incident. Good tolerance to treatment, no adverse effects.       Plan: Continue per plan of care.      Precautions: hx of R breast CA with mastectomy, psych disorder, renal disorder, recent R foot fx with removal of boot 10/1/22, fall risk, imbalance,  Pacemaker    Re-eval Date: 25  Manuals 1/28 1/30 2/4 2/18 2/20 2/25 2/27 3/4                                                         Neuro Re-Ed             Hip add 5''15x 5''2x10 5\" x10 5''20x 5\" x20 5\" 20x 5\" 20x 5''20x     Hip abd  5''15x 5''2x10 RTB  5\" x10 Red 5''20x GTB  5\" x20 GTB 5\" 20x GTB 5\" 20x Black 5''20x     LAQ  5''15x 5''2x10 5\" 2x10 5''20x  5\" 20x ea 5\" 20x ea 5''20x     Tandem/ Tandem foam  20''3x 30''3x  30''3x 20\"x3   On foam On airex 20\" 3x ea On airex 20\" 3x ea On airex 20\" 3x ea     EO EC foam  EO firm 20''3x EO firm 20''3x  EO firm 20''3x EO   Firm20\" x2  EC 20\" x1 Firm FT/EC/EC 20\" 2x ea    Airex FA/EO/EC 20\" ea Airex FA/EO/EC 20\" ea Airex FA/EO/EC 20\" ea     Manuelito walks Side step hurdles                           Ther Ex             Nu step warm up  L3 x 10 min 30 canales  L3 x 10 min     30 canales  L 3 X10 min  30 canales   L 3 X10 min  30 canales L3 x10 min L3 x 10 min L3 x 10 min L3 x 10 min     HR TR " " 20x 20x x20 x20 x20 20x ea 20x ea 20x ea     Mini Squat              Step Up     B 10x each  \"B\" x10 ea 6\" step 10x ea 6\" step 10x ea 6\" step 10x ea     Three way  10x  10x  10x  X10  10x ea 10x ea 10x ea                                            Ther Activity                                       Gait Training                                       Modalities                                              "

## 2025-03-05 ENCOUNTER — CLINICAL SUPPORT (OUTPATIENT)
Dept: CARDIAC REHAB | Facility: HOSPITAL | Age: 75
End: 2025-03-05
Payer: COMMERCIAL

## 2025-03-05 ENCOUNTER — APPOINTMENT (OUTPATIENT)
Dept: PHYSICAL THERAPY | Facility: CLINIC | Age: 75
End: 2025-03-05
Payer: COMMERCIAL

## 2025-03-05 DIAGNOSIS — Z95.2 S/P TAVR (TRANSCATHETER AORTIC VALVE REPLACEMENT): ICD-10-CM

## 2025-03-05 PROCEDURE — 93798 PHYS/QHP OP CAR RHAB W/ECG: CPT

## 2025-03-06 ENCOUNTER — OFFICE VISIT (OUTPATIENT)
Dept: PHYSICAL THERAPY | Facility: CLINIC | Age: 75
End: 2025-03-06
Payer: COMMERCIAL

## 2025-03-06 DIAGNOSIS — I89.0 LYMPHEDEMA: Primary | ICD-10-CM

## 2025-03-06 DIAGNOSIS — R26.89 BALANCE PROBLEM: Primary | ICD-10-CM

## 2025-03-06 PROCEDURE — 97110 THERAPEUTIC EXERCISES: CPT

## 2025-03-06 PROCEDURE — 97112 NEUROMUSCULAR REEDUCATION: CPT

## 2025-03-06 PROCEDURE — 97140 MANUAL THERAPY 1/> REGIONS: CPT

## 2025-03-06 NOTE — PROGRESS NOTES
Daily Note     Today's date: 3/6/2025  Patient name: Deyanira Stokes  : 1950  MRN: [id number]  Referring provider: Merced Alex PA-C  Dx:   Encounter Diagnosis     ICD-10-CM    1. Lymphedema  I89.0                      Subjective: I had schedule conflict with PT today  No new co's  I still have a lot of tightness across my right chest/shoulder  I am still losing wt but not as quickly as I was  I am still feeling constipated  MD does not want me on stool softener  I usually have 2 BM's / day  I did not have a BM yet today  Drinking lots of water, but slowly over the course of a day        Objective: See treatment diary below      Assessment: Tolerated MLD / MT  treatment well. Pt demon limited R shoulder ROM, limited with functional reach activ  Noted mm tightness/fibrosis R SCF region  Noted R Pec mm tightness  demon > firmness/congestion R vs L abdomen region  Pt reports tenderness R lower abdominal region/proximal R ASIS  Pt with stage 1 lymphedema with > congestion proximal R UE/GHJ, ant thoracic /abdominal region  Pt compliant with daily use of compression bra/denise with prosthetic/and UE compression sleeve 20-30 mm Hg  Pt occ requires asst with donning UE garment  Current garments > 6 months and pt to f/u with DME rep to update  Patient would benefit from continued PT      Plan: Continue per plan of care.      Precautions: hx of R breast CA with mastectomy, psych disorder, renal disorder, recent R foot fx with removal of boot 10/1/22, fall risk, imbalance,  Pacemaker    Re-eval Date: 25     Order Issued   Compression Garment(s)     25    R UE compression sleeve 20-30 mm Hg    - Bra/denise     Pneumatic pump Pt has ?           Date   2/4/25 2/26 3/6     Visit Count 1 2 3     FOTO        Pain In  see eval       Pain Out  see eval               Manuals  Volumetrics  RUE        CDT 15 min  CV/PTA-CLT    Pt educ   CDT MLD  - Short neck  - Diaphragmatic Breath  - RUE    - Gentle ROM R shoulder  -  MFR R pec  -gentle pec stretch        Pt education - handouts provided  - What is CDT  - What is lymphedema  - Compression: day and/or night, self wrap/michelle/doffing, care management, replace every 6 months    Upcoming    - Stages of lymphedema  - Prevention  - Skin / nail care management   - Signs/symptoms of infection/cellulitis  - Nutrition/hydration  - Self MLD - sequence    - Pneumatic pump - contraindications/precautions and instructions to change mmHg as appropriate/prn    Pt educ/issued HEP:    Remedial lymphedema exercises - cues prn for proper form/technique    55 min     MLD  - Short neck  - Diaphragmatic Breath  - RUE    - Gentle ROM R shoulder    Pt education - handouts provided    - Stages of lymphedema  - Prevention  - Skin / nail care management   - Signs/symptoms of infection/cellulitis  - Nutrition/hydration      Upcoming    - Self MLD - sequence    - Pneumatic pump - contraindications/precautions and instructions to change mmHg as appropriate/prn    Pt educ/issued HEP:    Remedial lymphedema exercises - cues prn for proper form/technique    55 min                             Neuro Re-Ed                                                                Ther Ex                                                                        Ther Activity                        Gait Training                        Modalities

## 2025-03-06 NOTE — PROGRESS NOTES
"Daily Note     Today's date: 3/6/2025  Patient name: Deyanira Stokes  : 1950  MRN: [id number]  Referring provider: Merced Alex PA-C  Dx:   Encounter Diagnosis     ICD-10-CM    1. Balance problem  R26.89                      Subjective: Pt denied any recent falls.       Objective: See treatment diary below      Assessment: Tolerated treatment well. Patient demonstrated fatigue post treatment, exhibited good technique with therapeutic exercises, and would benefit from continued PT      Plan: Continue per plan of care.  Progress treatment as tolerated.       Precautions: hx of R breast CA with mastectomy, psych disorder, renal disorder, recent R foot fx with removal of boot 10/1/22, fall risk, imbalance,  Pacemaker    Re-eval Date: 25  Manuals 1/28 1/30 2/4 2/18 2/20 2/25 2/27 3/4 3/6                                                        Neuro Re-Ed             Hip add 5''15x 5''2x10 5\" x10 5''20x 5\" x20 5\" 20x 5\" 20x 5''20x X20 ea    Hip abd  5''15x 5''2x10 RTB  5\" x10 Red 5''20x GTB  5\" x20 GTB 5\" 20x GTB 5\" 20x Black 5''20x Black 5''20x    LAQ  5''15x 5''2x10 5\" 2x10 5''20x  5\" 20x ea 5\" 20x ea 5''20x 5\" x20    Tandem/ Tandem foam  20''3x 30''3x  30''3x 20\"x3   On foam On airex 20\" 3x ea On airex 20\" 3x ea On airex 20\" 3x ea On airex 20\" 3x ea    EO EC foam  EO firm 20''3x EO firm 20''3x  EO firm 20''3x EO   Firm20\" x2  EC 20\" x1 Firm FT/EC/EC 20\" 2x ea    Airex FA/EO/EC 20\" ea Airex FA/EO/EC 20\" ea Airex FA/EO/EC 20\" ea Airex FA/EO/EC 20\" ea    Manuelito walks Side step hurdles                           Ther Ex             Nu step warm up  L3 x 10 min 30 canales  L3 x 10 min     30 canales  L 3 X10 min  30 canales   L 3 X10 min  30 canales L3 x10 min L3 x 10 min L3 x 10 min L3 x 10 min L3 x 10 min    HR TR  20x 20x x20 x20 x20 20x ea 20x ea 20x ea X20 ea    Mini Squat              Step Up     B 10x each  \"B\" x10 ea 6\" step 10x ea 6\" step 10x ea 6\" step 10x ea 6\" step 10x ea    Three way  10x  10x  10x  X10  " 10x ea 10x ea 10x ea 2x10 ea                                           Ther Activity                                       Gait Training                                       Modalities

## 2025-03-07 ENCOUNTER — CLINICAL SUPPORT (OUTPATIENT)
Dept: CARDIAC REHAB | Facility: HOSPITAL | Age: 75
End: 2025-03-07
Payer: COMMERCIAL

## 2025-03-07 DIAGNOSIS — Z95.2 S/P TAVR (TRANSCATHETER AORTIC VALVE REPLACEMENT): ICD-10-CM

## 2025-03-07 PROCEDURE — 93798 PHYS/QHP OP CAR RHAB W/ECG: CPT

## 2025-03-10 ENCOUNTER — CLINICAL SUPPORT (OUTPATIENT)
Dept: CARDIAC REHAB | Facility: HOSPITAL | Age: 75
End: 2025-03-10
Payer: COMMERCIAL

## 2025-03-10 DIAGNOSIS — Z95.2 S/P TAVR (TRANSCATHETER AORTIC VALVE REPLACEMENT): ICD-10-CM

## 2025-03-10 PROCEDURE — 93798 PHYS/QHP OP CAR RHAB W/ECG: CPT

## 2025-03-11 ENCOUNTER — OFFICE VISIT (OUTPATIENT)
Dept: PHYSICAL THERAPY | Facility: CLINIC | Age: 75
End: 2025-03-11
Payer: COMMERCIAL

## 2025-03-11 DIAGNOSIS — R26.89 BALANCE PROBLEM: Primary | ICD-10-CM

## 2025-03-11 PROCEDURE — 97110 THERAPEUTIC EXERCISES: CPT | Performed by: PHYSICAL THERAPIST

## 2025-03-11 PROCEDURE — 97112 NEUROMUSCULAR REEDUCATION: CPT | Performed by: PHYSICAL THERAPIST

## 2025-03-11 NOTE — PROGRESS NOTES
"Daily Note     Today's date: 3/11/2025  Patient name: Deyanira Stokes  : 1950  MRN: [id number]  Referring provider: Merced Alex PA-C  Dx:   Encounter Diagnosis     ICD-10-CM    1. Balance problem  R26.89                      Subjective: Pt doing well. She ambulated 0.7 miles at cardiac rehab yesterday       Objective: See treatment diary below      Assessment: Tolerated treatment well. Patient demonstrated fatigue post treatment      Plan: Continue per plan of care.      Precautions: hx of R breast CA with mastectomy, psych disorder, renal disorder, recent R foot fx with removal of boot 10/1/22, fall risk, imbalance,  Pacemaker      Manuals 1/28 1/30 2/4 2/18 2/20 2/25 2/27 3/4 3/6 3/11                                                       Neuro Re-Ed             Hip add 5''15x 5''2x10 5\" x10 5''20x 5\" x20 5\" 20x 5\" 20x 5''20x X20 ea X20 each    Hip abd  5''15x 5''2x10 RTB  5\" x10 Red 5''20x GTB  5\" x20 GTB 5\" 20x GTB 5\" 20x Black 5''20x Black 5''20x Black 5''20x   LAQ  5''15x 5''2x10 5\" 2x10 5''20x  5\" 20x ea 5\" 20x ea 5''20x 5\" x20 5''20x   Tandem/ Tandem foam  20''3x 30''3x  30''3x 20\"x3   On foam On airex 20\" 3x ea On airex 20\" 3x ea On airex 20\" 3x ea On airex 20\" 3x ea On airex 20\" 3x ea   EO EC foam  EO firm 20''3x EO firm 20''3x  EO firm 20''3x EO   Firm20\" x2  EC 20\" x1 Firm FT/EC/EC 20\" 2x ea    Airex FA/EO/EC 20\" ea Airex FA/EO/EC 20\" ea Airex FA/EO/EC 20\" ea Airex FA/EO/EC 20\" ea Airex FA/EO/EC 20\" ea   Manuelito walks Side step hurdles                           Ther Ex             Nu step warm up  L3 x 10 min 30 canales  L3 x 10 min     30 canales  L 3 X10 min  30 canales   L 3 X10 min  30 canales L3 x10 min L3 x 10 min L3 x 10 min L3 x 10 min L3 x 10 min L3 x 15 min 1495 steps    HR TR  20x 20x x20 x20 x20 20x ea 20x ea 20x ea X20 ea X20 ea   Mini Squat              Step Up     B 10x each  \"B\" x10 ea 6\" step 10x ea 6\" step 10x ea 6\" step 10x ea 6\" step 10x ea 6\" step 10x ea   Three way  10x  10x  10x  " X10  10x ea 10x ea 10x ea 2x10 ea 2x10 ea                                          Ther Activity                                       Gait Training                                       Modalities

## 2025-03-12 ENCOUNTER — APPOINTMENT (OUTPATIENT)
Dept: PHYSICAL THERAPY | Facility: CLINIC | Age: 75
End: 2025-03-12
Payer: COMMERCIAL

## 2025-03-12 ENCOUNTER — CLINICAL SUPPORT (OUTPATIENT)
Dept: CARDIAC REHAB | Facility: HOSPITAL | Age: 75
End: 2025-03-12
Payer: COMMERCIAL

## 2025-03-12 DIAGNOSIS — Z95.2 S/P TAVR (TRANSCATHETER AORTIC VALVE REPLACEMENT): ICD-10-CM

## 2025-03-12 PROCEDURE — 93798 PHYS/QHP OP CAR RHAB W/ECG: CPT

## 2025-03-13 ENCOUNTER — OFFICE VISIT (OUTPATIENT)
Dept: PHYSICAL THERAPY | Facility: CLINIC | Age: 75
End: 2025-03-13
Payer: COMMERCIAL

## 2025-03-13 DIAGNOSIS — I89.0 LYMPHEDEMA: ICD-10-CM

## 2025-03-13 DIAGNOSIS — R26.89 BALANCE PROBLEM: Primary | ICD-10-CM

## 2025-03-13 PROCEDURE — 97112 NEUROMUSCULAR REEDUCATION: CPT | Performed by: PHYSICAL THERAPIST

## 2025-03-13 PROCEDURE — 97140 MANUAL THERAPY 1/> REGIONS: CPT

## 2025-03-13 PROCEDURE — 97110 THERAPEUTIC EXERCISES: CPT | Performed by: PHYSICAL THERAPIST

## 2025-03-13 PROCEDURE — 97110 THERAPEUTIC EXERCISES: CPT

## 2025-03-13 NOTE — PROGRESS NOTES
"Daily Note     Today's date: 3/13/2025  Patient name: Deyanira Stokes  : 1950  MRN: [id number]  Referring provider: Merced Alex PA-C  Dx:   Encounter Diagnosis     ICD-10-CM    1. Balance problem  R26.89                      Subjective: Pt reports she is doing well today       Objective: See treatment diary below      Assessment: Tolerated treatment well. Patient exhibited good technique with therapeutic exercises and would benefit from continued PT      Plan: Continue per plan of care.      Precautions: hx of R breast CA with mastectomy, psych disorder, renal disorder, recent R foot fx with removal of boot 10/1/22, fall risk, imbalance,  Pacemaker      Manuals   3/13 2/18 2/20 2/25 2/27 3/4 3/6 3/11                                                       Neuro Re-Ed             Hip add   5\" x10 5''20x 5\" x20 5\" 20x 5\" 20x 5''20x X20 ea X20 each    Hip abd    Black TB  5\" x10 Red 5''20x GTB  5\" x20 GTB 5\" 20x GTB 5\" 20x Black 5''20x Black 5''20x Black 5''20x   LAQ    5\" 2x10 5''20x  5\" 20x ea 5\" 20x ea 5''20x 5\" x20 5''20x   Tandem/ Tandem foam    On airex 20\" 3x ea 30''3x 20\"x3   On foam On airex 20\" 3x ea On airex 20\" 3x ea On airex 20\" 3x ea On airex 20\" 3x ea On airex 20\" 3x ea   EO EC foam    Airex FA/EO/EC 20\" ea EO firm 20''3x EO   Firm20\" x2  EC 20\" x1 Firm FT/EC/EC 20\" 2x ea    Airex FA/EO/EC 20\" ea Airex FA/EO/EC 20\" ea Airex FA/EO/EC 20\" ea Airex FA/EO/EC 20\" ea Airex FA/EO/EC 20\" ea   Manuelito walks Side step hurdles                           Ther Ex             Nu step warm up    L 3 X10 min  30 canales   L 3 X10 min  30 canales L3 x10 min L3 x 10 min L3 x 10 min L3 x 10 min L3 x 10 min L3 x 15 min 1495 steps    HR TR    x20 x20 x20 20x ea 20x ea 20x ea X20 ea X20 ea   Mini Squat              Step Up    6\" step 10x ea B 10x each  \"B\" x10 ea 6\" step 10x ea 6\" step 10x ea 6\" step 10x ea 6\" step 10x ea 6\" step 10x ea   Three way    2x10 ea 10x  X10  10x ea 10x ea 10x ea 2x10 ea 2x10 ea                  "                         Ther Activity                                       Gait Training                                       Modalities

## 2025-03-13 NOTE — PROGRESS NOTES
Daily Note     Today's date: 3/13/2025  Patient name: Deyanira Stokes  : 1950  MRN: [id number]  Referring provider: Merced Alex PA-C  Dx:   Encounter Diagnosis     ICD-10-CM    1. Balance problem  R26.89       2. Lymphedema  I89.0                      Subjective: I am feeling good no new co's        Objective: See treatment diary below      Assessment: Tolerated MLD treatment well.  Demon limited R shoulder ROM limited with functional ADL /reaching activ  Pt with MFR R ant thorax  Pt demon R>L abdominal congestion  Pt responds well with MLD pt reporting < fullness   Patient would benefit from continued PT      Plan: Continue per plan of care.      Precautions: hx of R breast CA with mastectomy, psych disorder, renal disorder, recent R foot fx with removal of boot 10/1/22, fall risk, imbalance,  Pacemaker    Re-eval Date: 25     Order Issued   Compression Garment(s)     25    R UE compression sleeve 20-30 mm Hg    - Bra/denise      Received          Received   Pneumatic pump Pt has ?           Date   2/4/25 2/26 3/6 3/13    Visit Count 1 2 3 4    FOTO        Pain In  see eval       Pain Out  see eval               Manuals  Volumetrics  RUE        CDT 15 min  CV/PTA-CLT    Pt educ   CDT MLD  - Short neck  - Diaphragmatic Breath  - RUE    - Gentle ROM R shoulder  - MFR R pec  -gentle pec stretch        Pt education - handouts provided  - What is CDT  - What is lymphedema  - Compression: day and/or night, self wrap/michelle/doffing, care management, replace every 6 months    Upcoming    - Stages of lymphedema  - Prevention  - Skin / nail care management   - Signs/symptoms of infection/cellulitis  - Nutrition/hydration  - Self MLD - sequence    - Pneumatic pump - contraindications/precautions and instructions to change mmHg as appropriate/prn    Pt educ/issued HEP:    Remedial lymphedema exercises - cues prn for proper form/technique    55 min     MLD  - Short neck  - Diaphragmatic Breath  - RUE    -  Gentle ROM R shoulder    Pt education - handouts provided    - Stages of lymphedema  - Prevention  - Skin / nail care management   - Signs/symptoms of infection/cellulitis  - Nutrition/hydration      Upcoming    - Self MLD - sequence    - Pneumatic pump - contraindications/precautions and instructions to change mmHg as appropriate/prn    Pt educ/issued HEP:    Remedial lymphedema exercises - cues prn for proper form/technique    55 min MLD  - Short neck  - Diaphragmatic Breath/deep viseral    - RUE    - Gentle ROM R shoulder  - MFR R ant thorax  - R AIA    Pt education - handouts provided      - Self MLD - sequence    - Pneumatic pump - contraindications/precautions and instructions to change mmHg as appropriate/prn    Pt educ/issued HEP:    Remedial lymphedema exercises - cues prn for proper form/technique    55 min                            Neuro Re-Ed                                                                Ther Ex                                                                        Ther Activity                        Gait Training                        Modalities

## 2025-03-14 ENCOUNTER — CLINICAL SUPPORT (OUTPATIENT)
Dept: CARDIAC REHAB | Facility: HOSPITAL | Age: 75
End: 2025-03-14
Payer: COMMERCIAL

## 2025-03-14 DIAGNOSIS — Z95.2 S/P TAVR (TRANSCATHETER AORTIC VALVE REPLACEMENT): ICD-10-CM

## 2025-03-14 PROCEDURE — 93798 PHYS/QHP OP CAR RHAB W/ECG: CPT

## 2025-03-17 ENCOUNTER — CLINICAL SUPPORT (OUTPATIENT)
Dept: CARDIAC REHAB | Facility: HOSPITAL | Age: 75
End: 2025-03-17
Payer: COMMERCIAL

## 2025-03-17 DIAGNOSIS — Z95.2 S/P TAVR (TRANSCATHETER AORTIC VALVE REPLACEMENT): ICD-10-CM

## 2025-03-17 PROCEDURE — 93798 PHYS/QHP OP CAR RHAB W/ECG: CPT

## 2025-03-18 ENCOUNTER — OFFICE VISIT (OUTPATIENT)
Dept: INTERNAL MEDICINE CLINIC | Facility: CLINIC | Age: 75
End: 2025-03-18
Payer: COMMERCIAL

## 2025-03-18 ENCOUNTER — OFFICE VISIT (OUTPATIENT)
Dept: PHYSICAL THERAPY | Facility: CLINIC | Age: 75
End: 2025-03-18
Payer: COMMERCIAL

## 2025-03-18 VITALS
WEIGHT: 165.38 LBS | HEART RATE: 72 BPM | TEMPERATURE: 96.6 F | OXYGEN SATURATION: 98 % | DIASTOLIC BLOOD PRESSURE: 78 MMHG | BODY MASS INDEX: 35.68 KG/M2 | SYSTOLIC BLOOD PRESSURE: 122 MMHG | HEIGHT: 57 IN

## 2025-03-18 DIAGNOSIS — I10 PRIMARY HYPERTENSION: ICD-10-CM

## 2025-03-18 DIAGNOSIS — R09.82 PND (POST-NASAL DRIP): ICD-10-CM

## 2025-03-18 DIAGNOSIS — Z13.0 SCREENING FOR DEFICIENCY ANEMIA: ICD-10-CM

## 2025-03-18 DIAGNOSIS — Z13.29 SCREENING FOR THYROID DISORDER: ICD-10-CM

## 2025-03-18 DIAGNOSIS — R42 DIZZINESS: Primary | ICD-10-CM

## 2025-03-18 DIAGNOSIS — Z13.1 SCREENING FOR DIABETES MELLITUS: ICD-10-CM

## 2025-03-18 DIAGNOSIS — J30.2 SEASONAL ALLERGIES: ICD-10-CM

## 2025-03-18 DIAGNOSIS — Z12.31 ENCOUNTER FOR SCREENING MAMMOGRAM FOR BREAST CANCER: ICD-10-CM

## 2025-03-18 DIAGNOSIS — J45.20 MILD INTERMITTENT ASTHMA WITHOUT COMPLICATION: ICD-10-CM

## 2025-03-18 DIAGNOSIS — I89.0 LYMPHEDEMA: ICD-10-CM

## 2025-03-18 DIAGNOSIS — E78.5 HYPERLIPIDEMIA LDL GOAL <130: ICD-10-CM

## 2025-03-18 DIAGNOSIS — F11.20 CONTINUOUS OPIOID DEPENDENCE (HCC): ICD-10-CM

## 2025-03-18 DIAGNOSIS — R26.89 BALANCE PROBLEM: Primary | ICD-10-CM

## 2025-03-18 DIAGNOSIS — R51.9 NONINTRACTABLE HEADACHE, UNSPECIFIED CHRONICITY PATTERN, UNSPECIFIED HEADACHE TYPE: ICD-10-CM

## 2025-03-18 PROBLEM — I49.5 SINUS NODE DYSFUNCTION (HCC): Status: RESOLVED | Noted: 2024-11-06 | Resolved: 2025-03-18

## 2025-03-18 PROBLEM — I11.0 HYPERTENSIVE HEART DISEASE WITH HEART FAILURE (HCC): Status: RESOLVED | Noted: 2023-05-26 | Resolved: 2025-03-18

## 2025-03-18 PROBLEM — K86.1 OTHER CHRONIC PANCREATITIS (HCC): Status: RESOLVED | Noted: 2019-02-22 | Resolved: 2025-03-18

## 2025-03-18 PROBLEM — F03.90 DEMENTIA (HCC): Status: RESOLVED | Noted: 2024-11-06 | Resolved: 2025-03-18

## 2025-03-18 PROCEDURE — 99214 OFFICE O/P EST MOD 30 MIN: CPT | Performed by: PHYSICIAN ASSISTANT

## 2025-03-18 PROCEDURE — 97112 NEUROMUSCULAR REEDUCATION: CPT | Performed by: PHYSICAL THERAPIST

## 2025-03-18 PROCEDURE — 97110 THERAPEUTIC EXERCISES: CPT | Performed by: PHYSICAL THERAPIST

## 2025-03-18 RX ORDER — MONTELUKAST SODIUM 10 MG/1
10 TABLET ORAL
Qty: 90 TABLET | Refills: 1 | Status: SHIPPED | OUTPATIENT
Start: 2025-03-18

## 2025-03-18 RX ORDER — BUTALBITAL, ACETAMINOPHEN AND CAFFEINE 50; 325; 40 MG/1; MG/1; MG/1
1 TABLET ORAL 3 TIMES DAILY PRN
Qty: 90 TABLET | Refills: 1 | Status: SHIPPED | OUTPATIENT
Start: 2025-03-18

## 2025-03-18 RX ORDER — OXYCODONE HCL 10 MG/1
10 TABLET, FILM COATED, EXTENDED RELEASE ORAL AS NEEDED
Qty: 30 TABLET | Refills: 0 | Status: SHIPPED | OUTPATIENT
Start: 2025-03-18

## 2025-03-18 NOTE — PROGRESS NOTES
Name: Deyanira Stokes      : 1950      MRN: [id number]  Encounter Provider: Merced Alex PA-C  Encounter Date: 3/18/2025   Encounter department: [facility]  :  Assessment & Plan  Encounter for screening mammogram for breast cancer    Orders:  •  Mammo screening left w 3d and cad; Future    Nonintractable headache, unspecified chronicity pattern, unspecified headache type    Orders:  •  butalbital-acetaminophen-caffeine (FIORICET,ESGIC) -40 mg per tablet; Take 1 tablet by mouth 3 (three) times a day as needed for headaches  •  oxyCODONE (OxyCONTIN) 10 mg 12 hr tablet; Take 1 tablet (10 mg total) by mouth as needed for moderate pain    PND (post-nasal drip)    Orders:  •  montelukast (SINGULAIR) 10 mg tablet; Take 1 tablet (10 mg total) by mouth daily at bedtime    Seasonal allergies    Orders:  •  montelukast (SINGULAIR) 10 mg tablet; Take 1 tablet (10 mg total) by mouth daily at bedtime    Mild intermittent asthma without complication    Orders:  •  montelukast (SINGULAIR) 10 mg tablet; Take 1 tablet (10 mg total) by mouth daily at bedtime    Primary hypertension    Orders:  •  Comprehensive metabolic panel; Future    Hyperlipidemia LDL goal <130    Orders:  •  Lipid panel; Future    Screening for deficiency anemia    Orders:  •  CBC and differential; Future    Screening for thyroid disorder    Orders:  •  TSH, 3rd generation; Future    Screening for diabetes mellitus    Orders:  •  Hemoglobin A1C; Future    Continuous opioid dependence (HCC)         Dizziness  Intermittent for a few months. Pt believes it may be hypoglycemia. Discussed only way to know definitively would be to check during an episode. We discussed other possible etiologies including hypotension, hyperglycemia or cardiac. Pt has cardiology appt 3/21 and will discuss with them also. She will also be getting pacemaker interrogation at that moment. PE unremarkable. Will order labs to better evaluate.     "          Falls Plan of Care: balance, strength, and gait training instructions were provided.       History of Present Illness   Pt presents for routine visit. She is doing well overall. She recently underwent aortic valve replacement and pacemaker placement. She is noting improved energy levels and resolution of her dyspnea. She remains in cardiac rehab. She notes issues with weekly occurrence of randomly feeling dizzy and lightheaded. She is concerned she may be having episodes of hypoglycemia. Episodes last several minutes and have resolved with eating. BP is stable.       Review of Systems   Constitutional:  Negative for chills and fever.   HENT:  Negative for congestion, ear pain, hearing loss, postnasal drip, rhinorrhea, sinus pressure, sinus pain, sore throat and trouble swallowing.    Eyes:  Negative for pain and visual disturbance.   Respiratory:  Negative for cough, chest tightness, shortness of breath and wheezing.    Cardiovascular: Negative.  Negative for chest pain, palpitations and leg swelling.   Gastrointestinal:  Negative for abdominal pain, blood in stool, constipation, diarrhea, nausea and vomiting.   Endocrine: Negative for cold intolerance, heat intolerance, polydipsia, polyphagia and polyuria.   Genitourinary:  Negative for difficulty urinating, dysuria, flank pain and urgency.   Musculoskeletal:  Negative for arthralgias, back pain, gait problem and myalgias.   Skin:  Negative for rash.   Allergic/Immunologic: Negative.    Neurological:  Positive for dizziness and light-headedness. Negative for weakness and headaches.   Hematological: Negative.    Psychiatric/Behavioral:  Negative for behavioral problems, dysphoric mood and sleep disturbance. The patient is not nervous/anxious.        Objective   /78 (BP Location: Left arm, Patient Position: Sitting)   Pulse 72   Temp (!) 96.6 °F (35.9 °C) (Tympanic)   Ht 4' 9\" (1.448 m)   Wt 75 kg (165 lb 6 oz)   LMP  (LMP Unknown)   SpO2 98%   " BMI 35.79 kg/m²      Physical Exam  Vitals and nursing note reviewed.   Constitutional:       Appearance: She is well-developed.   HENT:      Head: Normocephalic and atraumatic.      Right Ear: External ear normal.      Left Ear: External ear normal.      Nose: Nose normal.   Eyes:      Conjunctiva/sclera: Conjunctivae normal.   Cardiovascular:      Rate and Rhythm: Normal rate and regular rhythm.      Heart sounds: Normal heart sounds.   Pulmonary:      Effort: Pulmonary effort is normal.      Breath sounds: Normal breath sounds.   Abdominal:      General: Bowel sounds are normal.      Palpations: Abdomen is soft.   Musculoskeletal:         General: Normal range of motion.      Cervical back: Normal range of motion and neck supple.   Skin:     General: Skin is warm and dry.   Neurological:      Mental Status: She is alert and oriented to person, place, and time.   Psychiatric:         Behavior: Behavior normal.         Thought Content: Thought content normal.         Judgment: Judgment normal.

## 2025-03-18 NOTE — PROGRESS NOTES
"Daily Note     Today's date: 3/18/2025  Patient name: Deyanira Stokes  : 1950  MRN: [id number]  Referring provider: Merced Alex PA-C  Dx:   Encounter Diagnosis     ICD-10-CM    1. Balance problem  R26.89       2. Lymphedema  I89.0                      Subjective: pt reports she is doing ok today. She feels more stable since starting PT       Objective: See treatment diary below      Assessment: Tolerated treatment well. Patient exhibited good technique with therapeutic exercises and would benefit from continued PT      Plan: Continue per plan of care.      Precautions: hx of R breast CA with mastectomy, psych disorder, renal disorder, recent R foot fx with removal of boot 10/1/22, fall risk, imbalance,  Pacemaker    Re-eval Date: 25    Manuals   3/13 3/18 2/20 2/25 2/27 3/4 3/6 3/11                                                       Neuro Re-Ed             Hip add   5\" x10 5''20x 5\" x20 5\" 20x 5\" 20x 5''20x X20 ea X20 each    Hip abd    Black TB  5\" x10 Black 5''20x GTB  5\" x20 GTB 5\" 20x GTB 5\" 20x Black 5''20x Black 5''20x Black 5''20x   LAQ    5\" 2x10 5''20x 2#   5\" 20x ea 5\" 20x ea 5''20x 5\" x20 5''20x   Tandem/ Tandem foam    On airex 20\" 3x ea NP time  20\"x3   On foam On airex 20\" 3x ea On airex 20\" 3x ea On airex 20\" 3x ea On airex 20\" 3x ea On airex 20\" 3x ea   EO EC foam    Airex FA/EO/EC 20\" ea NP time  EO   Firm20\" x2  EC 20\" x1 Firm FT/EC/EC 20\" 2x ea    Airex FA/EO/EC 20\" ea Airex FA/EO/EC 20\" ea Airex FA/EO/EC 20\" ea Airex FA/EO/EC 20\" ea Airex FA/EO/EC 20\" ea   Manuelito walks Side step hurdles                           Ther Ex             Nu step warm up    L 3 X10 min  30 canales   L 3 X10 min  30 canales L3 x10 min L3 x 10 min L3 x 10 min L3 x 10 min L3 x 10 min L3 x 15 min 1495 steps    HR TR    x20 x20 x20 20x ea 20x ea 20x ea X20 ea X20 ea   Mini Squat              Step Up    6\" step 10x ea B 10x each 2#  \"B\" x10 ea 6\" step 10x ea 6\" step 10x ea 6\" step 10x ea 6\" step 10x ea 6\" step " 10x ea   Three way    2x10 ea 10x  2#  X10  10x ea 10x ea 10x ea 2x10 ea 2x10 ea                                          Ther Activity                                       Gait Training                                       Modalities

## 2025-03-18 NOTE — ASSESSMENT & PLAN NOTE
Intermittent for a few months. Pt believes it may be hypoglycemia. Discussed only way to know definitively would be to check during an episode. We discussed other possible etiologies including hypotension, hyperglycemia or cardiac. Pt has cardiology appt 3/21 and will discuss with them also. She will also be getting pacemaker interrogation at that moment. PE unremarkable. Will order labs to better evaluate.

## 2025-03-19 ENCOUNTER — CLINICAL SUPPORT (OUTPATIENT)
Dept: CARDIAC REHAB | Facility: HOSPITAL | Age: 75
End: 2025-03-19
Payer: COMMERCIAL

## 2025-03-19 DIAGNOSIS — Z95.2 S/P TAVR (TRANSCATHETER AORTIC VALVE REPLACEMENT): ICD-10-CM

## 2025-03-19 PROCEDURE — 93798 PHYS/QHP OP CAR RHAB W/ECG: CPT

## 2025-03-20 ENCOUNTER — OFFICE VISIT (OUTPATIENT)
Dept: PHYSICAL THERAPY | Facility: CLINIC | Age: 75
End: 2025-03-20
Payer: COMMERCIAL

## 2025-03-20 DIAGNOSIS — I89.0 LYMPHEDEMA: ICD-10-CM

## 2025-03-20 DIAGNOSIS — R26.89 BALANCE PROBLEM: Primary | ICD-10-CM

## 2025-03-20 PROCEDURE — 97110 THERAPEUTIC EXERCISES: CPT

## 2025-03-20 PROCEDURE — 97112 NEUROMUSCULAR REEDUCATION: CPT

## 2025-03-20 PROCEDURE — 97140 MANUAL THERAPY 1/> REGIONS: CPT

## 2025-03-20 NOTE — PROGRESS NOTES
"Daily Note     Today's date: 3/20/2025  Patient name: Deyanira Stokes  : 1950  MRN: [id number]  Referring provider: Merced Alex PA-C  Dx:   Encounter Diagnosis     ICD-10-CM    1. Balance problem  R26.89       2. Lymphedema  I89.0                      Subjective:  Pt reports she had a fall at home 2 days ago, denies injury. States she just lost her balance while turning, helped up by grandson. Reports she is packing to move. Also stated that she did use a step ladder, but did not fall while on that.       Objective: See treatment diary below      Assessment: Tolerated treatment well. Encouraged patient to not use step stool at home secondary to safety concerns. Patient demonstrated fatigue post treatment, exhibited good technique with therapeutic exercises, and would benefit from continued PT      Plan: Continue per plan of care.      Precautions: hx of R breast CA with mastectomy, psych disorder, renal disorder, recent R foot fx with removal of boot 10/1/22, fall risk, imbalance,  Pacemaker    Re-eval Date: 25    Manuals   3/13 3/18 3/20 2/25 2/27 3/4 3/6 3/11                                                       Neuro Re-Ed             Hip add   5\" x10 5''20x 5\" x30 5\" 20x 5\" 20x 5''20x X20 ea X20 each    Hip abd    Black TB  5\" x10 Black 5''20x Black 5''20x GTB 5\" 20x GTB 5\" 20x Black 5''20x Black 5''20x Black 5''20x   LAQ    5\" 2x10 5''20x 2#  5 x20  2# 5\" 20x ea 5\" 20x ea 5''20x 5\" x20 5''20x   Tandem/ Tandem foam    On airex 20\" 3x ea NP time  20\"x3 ea  On foam On airex 20\" 3x ea On airex 20\" 3x ea On airex 20\" 3x ea On airex 20\" 3x ea On airex 20\" 3x ea   EO EC foam    Airex FA/EO/EC 20\" ea NP time  EO   Firm  20\" x2  EC 20\" x2 Firm FT/EC/EC 20\" 2x ea    Airex FA/EO/EC 20\" ea Airex FA/EO/EC 20\" ea Airex FA/EO/EC 20\" ea Airex FA/EO/EC 20\" ea Airex FA/EO/EC 20\" ea   Manuelito walks Side step hurdles                           Ther Ex             Nu step warm up    L 3 X10 min  30 canales   L 3 X10 " "min  30 canales L3 x10 min L3 x 10 min L3 x 10 min L3 x 10 min L3 x 10 min L3 x 15 min 1495 steps    HR TR    x20 x20 x20 20x ea 20x ea 20x ea X20 ea X20 ea   Mini Squat              Step Up    6\" step 10x ea B 10x each 2#  \"B\" 2# x10 ea 6\" step 10x ea 6\" step 10x ea 6\" step 10x ea 6\" step 10x ea 6\" step 10x ea   Three way    2x10 ea 10x  2#  2# X10  10x ea 10x ea 10x ea 2x10 ea 2x10 ea                                          Ther Activity                                       Gait Training                                       Modalities                                            "

## 2025-03-20 NOTE — PROGRESS NOTES
Daily Note     Today's date: 3/20/2025  Patient name: Deyanira Stokes  : 1950  MRN: [id number]  Referring provider: Merced Alex PA-C  Dx:   Encounter Diagnosis     ICD-10-CM    1. Balance problem  R26.89                      Subjective:I fell on Monday while unpacking [place] on my R side  Having > discomfort with reaching R arm/shoulder  I did not f/u with MD  My grandson helped me to my feet  I plan to join [facility] after DC PT and want to have a goal of walking > distance, get away from [facility] possible        Objective: See treatment diary below    R shoulder  Flex 3+/5  Abd 3/5  IR 4/5  ER 3+/5  Bicep 4-/5  Tricep 4/5      Assessment: Tolerated MLD treatment well.  Provided gentle ROM R shoulder to pt rashel  Pt report < pain end rx session  Pt compliant with daily use of compression R UE / bra  Limited R shoulder ROM all planes  Noted tenderness R ant GHJ with light palpation demon > congestion proximal R UE  No visible bruising at this time  Pt encourage to f/u with MD re: decline functional status R UE   Pt with 90* flex/ 55* scap AROM R shoulder  Decline R shoulder strength  Patient would benefit from continued PT      Plan: Continue per plan of care.      Precautions: hx of R breast CA with mastectomy, psych disorder, renal disorder, recent R foot fx with removal of boot 10/1/22, fall risk, imbalance,  Pacemaker    Re-eval Date: 25     Order Issued   Compression Garment(s)     25    R UE compression sleeve 20-30 mm Hg    - Bra/denise      Received          Received   Pneumatic pump Pt has ?           Date   25 3/6 3/13 3/20   Visit Count 1 2 3 4 5   FOTO        Pain In  see eval       Pain Out  see eval               Manuals  Volumetrics  RUE        CDT 15 min  CV/PTA-CLT    Pt educ   CDT MLD  - Short neck  - Diaphragmatic Breath  - RUE    - Gentle ROM R shoulder  - MFR R pec  -gentle pec stretch        Pt education - handouts provided  - What is CDT  - What is lymphedema  -  Compression: day and/or night, self wrap/michelle/doffing, care management, replace every 6 months    Upcoming    - Stages of lymphedema  - Prevention  - Skin / nail care management   - Signs/symptoms of infection/cellulitis  - Nutrition/hydration  - Self MLD - sequence    - Pneumatic pump - contraindications/precautions and instructions to change mmHg as appropriate/prn    Pt educ/issued HEP:    Remedial lymphedema exercises - cues prn for proper form/technique    55 min     MLD  - Short neck  - Diaphragmatic Breath  - RUE    - Gentle ROM R shoulder    Pt education - handouts provided    - Stages of lymphedema  - Prevention  - Skin / nail care management   - Signs/symptoms of infection/cellulitis  - Nutrition/hydration      Upcoming    - Self MLD - sequence    - Pneumatic pump - contraindications/precautions and instructions to change mmHg as appropriate/prn    Pt educ/issued HEP:    Remedial lymphedema exercises - cues prn for proper form/technique    55 min MLD  - Short neck  - Diaphragmatic Breath/deep viseral    - RUE    - Gentle ROM R shoulder  - MFR R ant thorax  - R AIA    Pt education - handouts provided      - Self MLD - sequence    - Pneumatic pump - contraindications/precautions and instructions to change mmHg as appropriate/prn    Pt educ/issued HEP:    Remedial lymphedema exercises - cues prn for proper form/technique    55 min MLD  - Short neck  - Diaphragmatic Breath/deep viseral    - RUE    - Gentle ROM R shoulder  - MFR R ant thorax  - R AIA    Pt   Educ  issued HEP    Pt performed as outlined below      Pt asst into compression bra/denise/shirt and compression R UE garment 2* R shoulder pain    55 min                           Neuro Re-Ed                                                                Ther Ex                                                                        Ther Activity                        Gait Training                        Modalities                        Access Code:  [email or web address]  URL: [email or web address]  Date: 03/20/2025  Prepared by: Candace Yarbrough    Exercises  - Supine Shoulder Press AAROM in Abduction with Dowel  - 1 x daily - 7 x weekly - 3 sets - 10 reps  - Supine Shoulder Flexion Extension AAROM with Dowel  - 1 x daily - 7 x weekly - 3 sets - 10 reps  - Standing Shoulder Abduction AAROM with Dowel  - 1 x daily - 7 x weekly - 3 sets - 10 reps  - Standing Shoulder External Rotation with Resistance  - 1 x daily - 7 x weekly - 3 sets - 10 reps  - Corner Pec Minor Stretch  - 1 x daily - 7 x weekly - 3 sets - 20-3 hold  - Standing Pec Stretch at Wall  - 1 x daily - 7 x weekly - 3 sets - 20-30 hold

## 2025-03-21 ENCOUNTER — CLINICAL SUPPORT (OUTPATIENT)
Dept: CARDIAC REHAB | Facility: HOSPITAL | Age: 75
End: 2025-03-21
Payer: COMMERCIAL

## 2025-03-21 DIAGNOSIS — Z95.2 S/P TAVR (TRANSCATHETER AORTIC VALVE REPLACEMENT): Primary | ICD-10-CM

## 2025-03-21 PROCEDURE — 93798 PHYS/QHP OP CAR RHAB W/ECG: CPT

## 2025-03-21 NOTE — PROGRESS NOTES
CARDIAC REHABILITATION   ASSESSMENT AND INDIVIDUALIZED TREATMENT PLAN  DISCHARGE            Today's date: 3/21/2025   # of Exercise Sessions Completed:   Patient name: Deyanira Stokes      : 1950  Age: 75 y.o.       MRN: [id number]  Referring Physician: Dr. Godwin Sarah DO  Cardiologist: Dr. Godwin Cobb MD  Provider: [place]  Clinician: Erika Sacks, MS        Treatment is tailored to this patient's individual needs.  The ITP was reviewed with the patient and all questions were answered to their satisfaction.  Additional ITP documentation can be found electronically including daily and monthly exercise summaries, daily session notes with ECG summaries, education notes, daily medication reconciliation, and daily physician supervision.      Comments: DISCHARGE SUMMARY  2025    Completed 36/36 exercise sessions  Functional capacity:   Pre: 1.61 METs, Post: 2.31 METs  Max METs tolerated in cardiac rehab:  3.59  [facility] QOL:  Pre: 23, Post: 19  PHQ-9:  Pre: 9, Post: 4  Weight:  Pre: 156,  Post: 165  Exercise session details:  40-50 minutes,  3.59 METs  Resting BP  110/60 - 162/66,  HR 67 - 96  Exercise /64 - 166/74, HR 92 - 134  Telemetry:  Intermittent pacing/NSR  Symptoms: None  Discharge Plans: Patient plans to continue PT and then join a fitness facility.   Patient's subjective report of progress: Patient is very pleased with her progression through CR.   Clinical Comments: Patient progressed through CR well. She often required intervals.       Dx: S/P TAVR 2024  S/P Pacemaker Insertion 2024    Description of Diagnosis: REPLACEMENT AORTIC VALVE TRANSCATHETER (TAVR) TRANSFEMORAL W/ 26MM VALVE(ACCESS ON LEFT)   Date of onset: 10/30/2024  Other Cardiac History: HTN, CHF, LBBB, CAD, cardiac conduction disease and patent foramen ovale.         ASSESSMENT    Medical History:   Past Medical History:   Diagnosis Date    Anxiety     Arthritis     Asthma     Cancer (HCC)     CHF  (congestive heart failure) (HCC)     Chronic headaches     Coronary artery disease     Dementia (HCC)     Depression     Dietary lactose intolerance     GERD (gastroesophageal reflux disease)     History of breast cancer     right, s/p mastectomy, s/p chemoc/radiation    History of nephrolithiasis     History of pneumonia     History of shingles     HL (hearing loss)     Hyperlipidemia     Hypertension     Hypertensive heart disease with heart failure (HCC)     IBS (irritable bowel syndrome)     Lymphedema of right arm     Nonrheumatic aortic (valve) stenosis     Obesity     Osteoporosis     PFO (patent foramen ovale)     PONV (postoperative nausea and vomiting)     Scoliosis     Severe aortic stenosis     Vitamin D deficiency        Family History:  Family History   Adopted: Yes   Problem Relation Age of Onset    Diabetes Mother     Heart disease Mother     Mental illness Mother     Depression Mother     Heart disease Brother     Breast cancer Maternal Aunt     Breast cancer Maternal Aunt     Breast cancer Maternal Aunt     Breast cancer Maternal Aunt     Breast cancer Maternal Aunt     No Known Problems Father     No Known Problems Sister     No Known Problems Daughter     Breast cancer Maternal Grandmother     No Known Problems Sister     No Known Problems Sister     No Known Problems Sister        Allergies:   Ibuprofen, Morphine, and Latex    Current Medications:   Current Outpatient Medications   Medication Sig Dispense Refill    acetaminophen (TYLENOL) 650 mg CR tablet Take 1 tablet (650 mg total) by mouth every 8 (eight) hours as needed for mild pain Patient takes 500mg      albuterol (2.5 mg/3 mL) 0.083 % nebulizer solution Take 2.5 mg by nebulization every 6 (six) hours as needed for wheezing      albuterol (PROVENTIL HFA,VENTOLIN HFA) 90 mcg/act inhaler Inhale 2 puffs every 4 (four) hours as needed for wheezing 8.5 g 2    aspirin 81 mg chewable tablet Chew 81 mg daily      atorvastatin (LIPITOR) 20 mg  tablet Take 1 tablet by mouth daily. 90 tablet 1    butalbital-acetaminophen-caffeine (FIORICET,ESGIC) -40 mg per tablet Take 1 tablet by mouth 3 (three) times a day as needed for headaches 90 tablet 1    clopidogrel (PLAVIX) 75 mg tablet Take 1 tablet (75 mg total) by mouth daily 90 tablet 0    donepezil (ARICEPT) 10 mg tablet Take 1 tablet by mouth daily at bedtime. 100 tablet 1    FLUoxetine (PROzac) 20 mg capsule Take 1 capsule (20 mg total) by mouth daily 90 capsule 1    fluticasone (FLONASE) 50 mcg/act nasal spray INSTILL 1 SPRAY INTO EACH NOSTRIL ONCE DAILY 48 mL 1    lisinopril (ZESTRIL) 5 mg tablet TAKE 1 TABLET (5 MG TOTAL) BY MOUTH DAILY. 90 tablet 1    montelukast (SINGULAIR) 10 mg tablet Take 1 tablet (10 mg total) by mouth daily at bedtime 90 tablet 1    Myrbetriq 25 MG TB24 Take 2 tablets by mouth in the morning. 180 tablet 1    Nutritional Supplements (BOOST BREEZE PO) Take by mouth daily Nutritional drink   3 times a week.      oxyCODONE (OxyCONTIN) 10 mg 12 hr tablet Take 1 tablet (10 mg total) by mouth as needed for moderate pain 30 tablet 0    pantoprazole (PROTONIX) 40 mg tablet Take 1 tablet by mouth daily. 90 tablet 1    QUEtiapine (SEROquel) 25 mg tablet Take 1 tablet by mouth daily at bedtime. 90 tablet 0     No current facility-administered medications for this visit.       Medication compliance: Yes   Comments: Pt reports to be compliant with medications. Denies any issues w/current prescriptions.     Physical Limitations: UE restrictions due to recent pacemaker insertion. Gait dysfunction, ambulates w/out AD in the home. 4WW for long distances, primarily utilizes fro seat compartment/carrying items.  due to history of BTKRs.     Fall Risk: Low   Comments: Patient uses walking assist device (walker/cane/rollator) and notes dizziness when upright for prolonged periods.     Cultural needs: none      CAD Risk Factors:  Cholesterol: No  HTN: Yes  DM: No  Obesity: Yes   Inactivity:  Yes      EXERCISE ASSESSMENT:    Initial Fitness Assessment: 6 MWT   Resting Vitals: HR 74, /66, 02 Sat 97%; QUILES 0/10   Peak Vitals: HR 92, /68, 02 Sat 98%, RPE 6/10, QUILES 4/10- complete 422 feet - 1.61 MET Level   Recovery Vitals: HR 76, /60, 02 Sat 98%, QUILES 0/10    Final Fitness Assessment: 6MWT  6MWT:  900 ft = 2.31 METs  Rest: HR 86, /64, 0/10 SOB  Exercise: HR 94, /72, 2/10 QUILES  Recovery: HR 85, /64, 0/10 SOB    ECG INTERPRETATION:  NSR, Paced    Current Functional Status:  Occupation: retired  Recreation/Physical Activity: Sedentary at this time  ADL’s:able to perform self-care  [place]: Capable of performing light ADLs only  Home exercise: none, currently participating w/PT [facility].   Other Comments:       SMART Exercise Goals:   10% improvement in functional capacity based on max METs achieved in initial fitness assessment  reduced dyspnea with physical activity    improved DASI score by 10%  increased exercise capacity by 40% based on peak METs tolerated in cardiac rehab exercise session  maintain > 150 minutes per week of moderate intensity exercise    Patient Specific EXERCISE GOALS:       Ambulate long distances w/out symptoms  Ability to perform chores around the home w/out extreme fatigue  Increase standing balance and tolerance w/out dizziness    Functional Capacity Screening Tool:  [facility] Activity Status Index: 4.64 METs at initial evaluation, 5.07 METs at discharge      PSYCHOSOCIAL ASSESSMENT:    Date of Initial Assessment:  11/26/2024  Depression screening:  PHQ-9 = 9    Interpretation:  5-9 = Mild Depression  Anxiety screening:  ELINOR-7 = 4    Interpretation: 0-4  = Not anxious    Date of Final Assessment:  3/21/2025  Depression screening:  PHQ-9 = 4    Interpretation:  1-4 = Minimal Depression  Anxiety screening:  ELINOR-7 = 2    Interpretation: 0-4  = Not anxious    Pt self-report of depression and anxiety   Patient reports feelings of depression   Reports  "sufficient emotional support   Patient takes Prozac and Seroquel for this issue.     Self-reported stress level:  4/510   Stressors: Health and lack of independence  Stress Management Tools: practice Relaxation Techniques, exercise, enjoy a hobby, and pray/meditate    SMART Psychosocial Goals:     Reduce perceived stress to 1-3/10, PHQ-9 - reduced severity by one level, Quality of Life in [facility] Score < 3 , feel less tired with more energy, and reduced incidence of restlessness and/or lethargy    Patient Specific PSYCHOCOSOCIAL GOALS:    Reduce stress level from 8/10 to 3/10  Medication compliance w/Seroquel and Prozac  Initiate a hobby in order to avoid triggers    Quality of Life Screen:  (Higher score indicates disease impact on QOL)  [facility] COOP score: 32/45 at initial evaluation, 19/45 at discharge     Social Support:   children-patient lives w/her daughter  Community/Social Activities: None at this time     Psychosocial Assessment as it relates to rehabilitation:   Patient denies issues with his/her family or home life that may affect their rehabilitation efforts.       NUTRITION ASSESSMENT:    Initial Weight:  156   Current Weight: 165    Height:   Ht Readings from Last 1 Encounters:   03/18/25 4' 9\" (1.448 m)       Rate Your Plate Score: 71/81 at initial evaluation, 76/81 at discharge    Diabetes: N/A  A1c: 5.3    last measured: 6/11/2024    Lipid management: Discussed diet and lipid management and Last lipid profile 6/11/202  Chol 131    HDL 52  LDL 56    Current Dietary Habits:  Patient states prior to her surgery she started improving her eating habits and does follow a plant based diet.     SMART Nutrition Goals:   eat whole grain breads, brown rice and whole grain cereals, eat 5 or more servings of fruits and vegetables a day, Do not eat fried foods, never add salt to food when cooking or at the table, choose low sodium canned, frozen/packaged foods or rarely/never eat, and drink less than " 8oz of soda and sweetened drinks per day    Patient Specific NUTRITION GOALS:     1. Attain a healthy weight and BMI   2. Regularly hydrate during the day   3. Reduce intake of fried foods and desserts    Drug/Alcohol Use:   No      OTHER CORE COMPONENT ASSESSMENT:    Tobacco Use:     N/A:  Patient is a non-smoker . She avoids second hand smoke and other irritants.     Anginal Symptoms:  QUILES, excessive fatigue, and lightheadedness   NTG use: No prescription    SMART Goals:   consistent, controlled resting BP < 130/80, medication compliance, and avoid smoke and other irritants    Patient Specific CORE COMPONENT GOALS:    Reduce reliance on medication  Daily vital monitoring at home    INDIVIDUALIZED TREATMENT PLAN      EXERCISE GOALS and PLAN      Progress toward Exercise goals:   Goals met: Attended rehab regularly, completed 36 sessions, improved 6MWT from 422 ft (1.61 METs) to 900 ft (2.31 METs)., Patient will be encouraged to focus on lifestyle modifications following discharge.    Exercise Intervention/plan:    education on home exercise guidelines, home exercise 30+ mins 2 days opposite CR, Group class: Risk Factors for Heart Disease, and Patient education:   Exercise After Cardiac Rehabilitation    The patient was counseled on exercise guidelines to achieve a minimum of 150 mins/wk of moderate intensity (RPE 4-6) exercise and encouraged to add 1-2 days of exercise on opposite days of cardiac rehab as tolerated.       PHYSICIAN PRESCRIBED EXERCISE:    Current Aerobic Exercise Prescription:      Frequency: 2-3 days/week   Supplement with home exercise 2+ days/wk as tolerated       Minutes: 45-50         METS: 3.59           HR: RHR +30-40bpm   RPE: 4-5/10         Modalities: UBE, NuStep, Recumbent bike, and Room walking       Strength trainin-3 days / week  12-15 repetitions  1-2 sets per modality    Modalities:  UE free weights    Home Exercise: Patient states she has begun doing at home chair aerobics and  "has been able to dance at multiple [employer] parties. She reports exercising daily.     Exercise Education: benefit of exercise for CAD risk factors, home exercise guidelines, AHA guidelines to achieve >150 mins/wk of moderate exercise, and RPE scale     Readiness to change: Action:  (Changing behavior)      NUTRITION GOALS AND PLAN      Nutritional   Reviewed patient's Rate your Plate. Discussed key elements of heart healthy eating. Reviewed patient goals for dietary modifications and their clinical implications.  Reviewed most recent lipid profile.     Patient's progress toward Nutrition goals:    Goals not met: No weight loss (156 lb to 165 lb).  , Goals met: RYP improved from 71 to 76, FBG improved from 114 to 80, admits to eating a plant based diet., Patient will be encouraged to focus on lifestyle modifications following discharge.      Nutrition Intervention/plan:   replace refined flours with whole grains, increase daily intake of fruits and vegetables, never/rarely eat fried foods, never/rarely add salt to food when cooking or at the table, choose low sodium canned, frozen, packaged foods or rarely eat these foods, and rarely/never eat salty snacks    Measurable goals were based Rate Your Plate Dietary Self-Assessment. These are the areas in which the patient could score higher on the assessment.  Goals include recommendations for a heart healthy diet based on American Heart Association.    Nutrition Education:   low sodium diet  maintaining hydration  nutrition for  lipid management  \"Plant Based Eating\"    Readiness to change: Action:  (Changing behavior)      PSYCHOSOCIAL GOALS AND PLAN    Psychosocial Assessment as it relates to rehabilitation:   Patient denies issues with her family or home life that may affect their rehabilitation efforts. CR staff will continue to offer support as needed.     Patient's progress toward Psychosocial goals:    Goals met: Stress level down to a 4/10, PHQ9 improved from 9 " to 4, [facility] improved from 23 to 19., Patient will be encouraged to focus on lifestyle modifications following discharge.    Psychosocial Intervention/plan:   Practice relaxation techniques and Keep a positive mindset    Psychosocial Education: signs/sxs of depression and depression and CAD    Information to utilize [employer] was provided as well as contact information for counseling through  Behavioral Health and group psychotherapy groups available.    Readiness to change: Action:  (Changing behavior)      OTHER CORE COMPONENTS GOALS and PLAN      Blood Pressure will be monitored throughout the program and cardiologist will be notified of elevated trends.  Pt will be encouraged to monitor home BP if advised by cardiologist.    Tobacco Intervention/plan:   N/A:  Pt is a non-smoker. She avoids second hand smoke and other irritants.     Progress toward Core Component goals:   Goals met: Avoids secondhand smoke, states she is no longer on BP meds, improved resting BP., Patient will be encouraged to focus on lifestyle modifications following discharge.    Other Core Components Intervention:   medication compliance, avoid places with second hand smoke, avoid processed foods, engage in regular exercise, and eliminate salt shaker at the table    Group and Individual Education:  understanding high blood pressure and it's relationship to CAD and group class: Understanding Heart Disease    Readiness to change: Action:  (Changing behavior)

## 2025-03-24 ENCOUNTER — TELEPHONE (OUTPATIENT)
Dept: CARDIOLOGY CLINIC | Facility: CLINIC | Age: 75
End: 2025-03-24

## 2025-03-24 ENCOUNTER — REMOTE DEVICE CLINIC VISIT (OUTPATIENT)
Dept: CARDIOLOGY CLINIC | Facility: CLINIC | Age: 75
End: 2025-03-24
Payer: COMMERCIAL

## 2025-03-24 DIAGNOSIS — I49.5 SSS (SICK SINUS SYNDROME) (HCC): Primary | ICD-10-CM

## 2025-03-24 PROCEDURE — 93296 REM INTERROG EVL PM/IDS: CPT | Performed by: STUDENT IN AN ORGANIZED HEALTH CARE EDUCATION/TRAINING PROGRAM

## 2025-03-24 PROCEDURE — 93294 REM INTERROG EVL PM/LDLS PM: CPT | Performed by: STUDENT IN AN ORGANIZED HEALTH CARE EDUCATION/TRAINING PROGRAM

## 2025-03-24 NOTE — PROGRESS NOTES
Results for orders placed or performed in visit on 03/24/25   Cardiac EP device report    Narrative    CARELINK TRANSMISSION: BATTERY VOLTAGE ADEQUATE (15 YRS). AP: 2.8%. : <0.1% (MVP-ON). ALL AVAILABLE LEAD PARAMETERS WITHIN NORMAL LIMITS. 1 FAST A& V EPISODE W/ EGM SHOWING SVT-ST @ 162-167 BPM, MAX DURATION 8 SECS. PT DOES NOT TAKE ANY BB, PT TAKES ASA 81MG, PLAVIX. EF: 60% (ECHO 12/2/24). NORMAL DEVICE FUNCTION. CH

## 2025-03-25 ENCOUNTER — RESULTS FOLLOW-UP (OUTPATIENT)
Dept: NON INVASIVE DIAGNOSTICS | Facility: HOSPITAL | Age: 75
End: 2025-03-25

## 2025-03-25 ENCOUNTER — OFFICE VISIT (OUTPATIENT)
Dept: PHYSICAL THERAPY | Facility: CLINIC | Age: 75
End: 2025-03-25
Payer: COMMERCIAL

## 2025-03-25 DIAGNOSIS — R26.89 BALANCE PROBLEM: Primary | ICD-10-CM

## 2025-03-25 DIAGNOSIS — I89.0 LYMPHEDEMA: ICD-10-CM

## 2025-03-25 PROCEDURE — 97112 NEUROMUSCULAR REEDUCATION: CPT

## 2025-03-25 PROCEDURE — 97110 THERAPEUTIC EXERCISES: CPT

## 2025-03-25 NOTE — PROGRESS NOTES
"Daily Note     Today's date: 3/25/2025  Patient name: Deyanira Stokes  : 1950  MRN: [id number]  Referring provider: Merced Alex PA-C  Dx:   Encounter Diagnosis     ICD-10-CM    1. Balance problem  R26.89       2. Lymphedema  I89.0                      Subjective: Pt denied any recent falls.      Objective: See treatment diary below      Assessment: Tolerated treatment well. Patient demonstrated fatigue post treatment, exhibited good technique with therapeutic exercises, and would benefit from continued PT      Plan: Continue per plan of care.  Progress treatment as tolerated.       Precautions: hx of R breast CA with mastectomy, psych disorder, renal disorder, recent R foot fx with removal of boot 10/1/22, fall risk, imbalance,  Pacemaker    Re-eval Date: 25    Manuals 3/25  3/13 3/18 3/20 2/25 2/27 3/4 3/6 3/11                                                       Neuro Re-Ed             Hip add 5''20x  5\" x10 5''20x 5\" x30 5\" 20x 5\" 20x Black 5''20x X20 ea X20 each    Hip abd  Black 5''20x  Black TB  5\" x10 Black 5''20x Black 5''20x GTB 5\" 20x GTB 5\" 20x Black 5''20x Black 5''20x Black 5''20x   LAQ  5\" x20  2#  5\" 2x10 5''20x 2#  5 x20  2# 5\" 20x ea 5\" 20x ea 5''20x 5\" x20 5''20x   Tandem/ Tandem foam  On airex 20\" 3x ea  On airex 20\" 3x ea NP time  20\"x3 ea  On foam On airex 20\" 3x ea On airex 20\" 3x ea On airex 20\" 3x ea On airex 20\" 3x ea On airex 20\" 3x ea   EO EC foam  Airex FA/EO/EC 2x20\" ea  Airex FA/EO/EC 20\" ea NP time  EO   Firm  20\" x2  EC 20\" x2 Firm FT/EC/EC 20\" 2x ea    Airex FA/EO/EC 20\" ea Airex FA/EO/EC 20\" ea Airex FA/EO/EC 20\" ea Airex FA/EO/EC 20\" ea Airex FA/EO/EC 20\" ea   Manuelito walks Side step hurdles                           Ther Ex             Nu step warm up  L3 x 15 min   L 3 X10 min  30 canales   L 3 X10 min  30 canales L3 x10 min L3 x 10 min L3 x 10 min L3 x 10 min L3 x 10 min L3 x 15 min 1495 steps    HR TR  X20 ea  x20 x20 x20 20x ea 20x ea 20x ea X20 ea X20 ea   Mini " "Squat              Step Up  \"B\" 2# 2x10 ea  6\" step 10x ea B 10x each 2#  \"B\" 2# x10 ea 6\" step 10x ea 6\" step 10x ea 6\" step 10x ea 6\" step 10x ea 6\" step 10x ea   Three way  2# 2X10   2x10 ea 10x  2#  2# X10  10x ea 10x ea 10x ea 2x10 ea 2x10 ea                                          Ther Activity                                       Gait Training                                       Modalities                                              "

## 2025-03-27 ENCOUNTER — OFFICE VISIT (OUTPATIENT)
Dept: PHYSICAL THERAPY | Facility: CLINIC | Age: 75
End: 2025-03-27
Payer: COMMERCIAL

## 2025-03-27 DIAGNOSIS — R26.89 BALANCE PROBLEM: Primary | ICD-10-CM

## 2025-03-27 PROCEDURE — 97110 THERAPEUTIC EXERCISES: CPT | Performed by: PHYSICAL THERAPIST

## 2025-03-27 PROCEDURE — 97112 NEUROMUSCULAR REEDUCATION: CPT | Performed by: PHYSICAL THERAPIST

## 2025-03-27 NOTE — PROGRESS NOTES
"Daily Note     Today's date: 3/27/2025  Patient name: Deyanira Stokes  : 1950  MRN: [id number]  Referring provider: Merced Alex PA-C  Dx:   Encounter Diagnosis     ICD-10-CM    1. Balance problem  R26.89                      Subjective: Pt reports  she is doing well. She has finished Cardiac Rehab. Ambulates with no Device today       Objective: See treatment diary below      Assessment: Tolerated treatment well. Patient exhibited good technique with therapeutic exercises and would benefit from continued PT      Plan: Continue per plan of care.      Precautions: hx of R breast CA with mastectomy, psych disorder, renal disorder, recent R foot fx with removal of boot 10/1/22, fall risk, imbalance,  Pacemaker    Re-eval Date: 25    Manuals 3/25  3/13 3/18 3/20 3/27 2/27 3/4 3/6 3/11                                                       Neuro Re-Ed             Hip add 5''20x  5\" x10 5''20x 5\" x30 5\" 30x 5\" 20x Black 5''20x X20 ea X20 each    Hip abd  Black 5''20x  Black TB  5\" x10 Black 5''20x Black 5''20x Black5\" 30x GTB 5\" 20x Black 5''20x Black 5''20x Black 5''20x   LAQ  5\" x20  2#  5\" 2x10 5''20x 2#  5 x20  2# 5\" 30x ea 2#  5\" 20x ea 5''20x 5\" x20 5''20x   Tandem/ Tandem foam  On airex 20\" 3x ea  On airex 20\" 3x ea NP time  20\"x3 ea  On foam  On airex 20\" 3x ea On airex 20\" 3x ea On airex 20\" 3x ea On airex 20\" 3x ea   EO EC foam  Airex FA/EO/EC 2x20\" ea  Airex FA/EO/EC 20\" ea NP time  EO   Firm  20\" x2  EC 20\" x2  Airex FA/EO/EC 20\" ea Airex FA/EO/EC 20\" ea Airex FA/EO/EC 20\" ea Airex FA/EO/EC 20\" ea   Manuelito walks Side step hurdles                           Ther Ex             Nu step warm up  L3 x 15 min   L 3 X10 min  30 canales   L 3 X10 min  30 canales L3 x10 min L3 x 10 min L3 x 10 min L3 x 10 min L3 x 10 min L3 x 15 min 1495 steps    HR TR  X20 ea  x20 x20 x20 30x ea 20x ea 20x ea X20 ea X20 ea   Mini Squat              Step Up  \"B\" 2# 2x10 ea  6\" step 10x ea B 10x each 2#  \"B\" 2# x10 ea 6\" step " "20x ea 2#  6\" step 10x ea 6\" step 10x ea 6\" step 10x ea 6\" step 10x ea   Three way  2# 2X10   2x10 ea 10x  2#  2# X10  30x ea 2#  10x ea 10x ea 2x10 ea 2x10 ea                                          Ther Activity                                       Gait Training                                       Modalities                                                "

## 2025-04-01 ENCOUNTER — OFFICE VISIT (OUTPATIENT)
Dept: PHYSICAL THERAPY | Facility: CLINIC | Age: 75
End: 2025-04-01
Payer: COMMERCIAL

## 2025-04-01 DIAGNOSIS — R26.89 BALANCE PROBLEM: Primary | ICD-10-CM

## 2025-04-01 PROCEDURE — 97110 THERAPEUTIC EXERCISES: CPT | Performed by: PHYSICAL THERAPIST

## 2025-04-01 PROCEDURE — 97112 NEUROMUSCULAR REEDUCATION: CPT | Performed by: PHYSICAL THERAPIST

## 2025-04-01 NOTE — PROGRESS NOTES
"Daily Note     Today's date: 2025  Patient name: Deyanira Stokes  : 1950  MRN: [id number]  Referring provider: Merced Alex PA-C  Dx:   Encounter Diagnosis     ICD-10-CM    1. Balance problem  R26.89                      Subjective: Pt doing well. She feels much better since starting skilled PT       Objective: See treatment diary below      Assessment: Tolerated treatment well. Patient exhibited good technique with therapeutic exercises and would benefit from continued PT      Plan: Continue per plan of care.      Precautions: hx of R breast CA with mastectomy, psych disorder, renal disorder, recent R foot fx with removal of boot 10/1/22, fall risk, imbalance,  Pacemaker    Re-eval Date: 25    Manuals 3/25  3/13 3/18 3/20 3/27 4/1 3/4 3/6 3/11                                                       Neuro Re-Ed             Hip add 5''20x  5\" x10 5''20x 5\" x30 5\" 30x 5\" 20x Black 5''20x X20 ea X20 each    Hip abd  Black 5''20x  Black TB  5\" x10 Black 5''20x Black 5''20x Black5\" 30x BTB 5\" 30x Black 5''20x Black 5''20x Black 5''20x   LAQ  5\" x20  2#  5\" 2x10 5''20x 2#  5 x20  2# 5\" 30x ea 2#  5\" 20x ea 2#  5''20x 5\" x20 5''20x   Tandem/ Tandem foam  On airex 20\" 3x ea  On airex 20\" 3x ea NP time  20\"x3 ea  On foam   On airex 20\" 3x ea On airex 20\" 3x ea On airex 20\" 3x ea   EO EC foam  Airex FA/EO/EC 2x20\" ea  Airex FA/EO/EC 20\" ea NP time  EO   Firm  20\" x2  EC 20\" x2   Airex FA/EO/EC 20\" ea Airex FA/EO/EC 20\" ea Airex FA/EO/EC 20\" ea   Manuelito walks Side step hurdles                           Ther Ex             Nu step warm up  L3 x 15 min   L 3 X10 min  30 canales   L 3 X10 min  30 canales L3 x10 min L3 x 10 min L3 x 10 min L3 x 10 min L3 x 10 min L3 x 15 min 1495 steps    HR TR  X20 ea  x20 x20 x20 30x ea 20x ea 20x ea X20 ea X20 ea   Mini Squat              Step Up  \"B\" 2# 2x10 ea  6\" step 10x ea B 10x each 2#  \"B\" 2# x10 ea 6\" step 20x ea 2#  6\" step 10x ea  2#  6\" step 10x ea 6\" step 10x ea 6\" step " Rx Controlled Refill Request Telephone Encounter    Name: Misty Rider  :  1999    Medication Name:   ADDERALL XR  Dose (Optional):   25 MG  Quantity (Optional):   30  Directions (Optional):   1 CAPSULE DAILY    ALLERGIES:    ON FILE    LAST DRUG SCREEN:     23  LAST MED CONTRACT:    [id number]    Specific Pharmacy location:    [facility]    Date of last appointment:    23  Date of next appointment:    NONE       10x ea   Three way  2# 2X10   2x10 ea 10x  2#  2# X10  30x ea 2#  10x ea 2#  10x ea 2x10 ea 2x10 ea                                          Ther Activity                                       Gait Training                                       Modalities

## 2025-04-03 ENCOUNTER — OFFICE VISIT (OUTPATIENT)
Dept: PHYSICAL THERAPY | Facility: CLINIC | Age: 75
End: 2025-04-03
Payer: COMMERCIAL

## 2025-04-03 ENCOUNTER — APPOINTMENT (OUTPATIENT)
Dept: PHYSICAL THERAPY | Facility: CLINIC | Age: 75
End: 2025-04-03
Payer: COMMERCIAL

## 2025-04-03 DIAGNOSIS — R26.89 BALANCE PROBLEM: Primary | ICD-10-CM

## 2025-04-03 PROCEDURE — 97110 THERAPEUTIC EXERCISES: CPT

## 2025-04-03 PROCEDURE — 97112 NEUROMUSCULAR REEDUCATION: CPT

## 2025-04-03 NOTE — PROGRESS NOTES
"Daily Note     Today's date: 4/3/2025  Patient name: Deyanira Stokes  : 1950  MRN: [id number]  Referring provider: Merced Alex PA-C  Dx:   Encounter Diagnosis     ICD-10-CM    1. Balance problem  R26.89                        Subjective: Notes that she had HS and calf cramp this last night and this AM. States that cramping has calmed. States \" I have been doing 30x of everything since last week.\" Patient self selected to advance against initial advice of PTA.    At end of session notes that cramping has returned. Would like to hold on balance activities.      Objective: See treatment diary below. Post TE vitals:    Taken on LUE in seated    98% O2Sat  89 BPM  140/80mmHg    Denies dizziness or nausea.      Assessment: Good tolerance to strengthening exercise, but mod fatigue post. Held on balance activities 2* to fatigue. Patient assisted to front of clinic with HHA of 1. Patient would benefit from continued PT to improve function.    Plan: Continue per plan of care.      Precautions: hx of R breast CA with mastectomy, psych disorder, renal disorder, recent R foot fx with removal of boot 10/1/22, fall risk, imbalance,  Pacemaker    Re-eval Date: 25    Manuals 3/25 4/3  3/18 3/20 3/27 4/1 3/4 3/6 3/11                                                       Neuro Re-Ed             Hip add 5''20x 5''30x  5''20x 5\" x30 5\" 30x 5\" 20x Black 5''20x X20 ea X20 each    Hip abd  Black 5''20x Black 5''30x  Black 5''20x Black 5''20x Black5\" 30x BTB 5\" 30x Black 5''20x Black 5''20x Black 5''20x   LAQ  5\" x20  2# 5\" x30  2#  5''20x 2#  5 x20  2# 5\" 30x ea 2#  5\" 20x ea 2#  5''20x 5\" x20 5''20x   Tandem/ Tandem foam  On airex 20\" 3x ea HELD  NP time  20\"x3 ea  On foam   On airex 20\" 3x ea On airex 20\" 3x ea On airex 20\" 3x ea   EO EC foam  Airex FA/EO/EC 2x20\" ea HELD  NP time  EO   Firm  20\" x2  EC 20\" x2   Airex FA/EO/EC 20\" ea Airex FA/EO/EC 20\" ea Airex FA/EO/EC 20\" ea   Manuelito walks Side step hurdles            " "               Ther Ex             Nu step warm up  L3 x 15 min  L3 x 17 min  1/2 mile   L 3 X10 min  30 canales L3 x10 min L3 x 10 min L3 x 10 min L3 x 10 min L3 x 10 min L3 x 15 min 1495 steps    HR TR  X20 ea x30ea  x20 x20 30x ea 20x ea 20x ea X20 ea X20 ea   Mini Squat              Step Up  \"B\" 2# 2x10 ea \"B\" 3# 2x10 ea  B 10x each 2#  \"B\" 2# x10 ea 6\" step 20x ea 2#  6\" step 10x ea  2#  6\" step 10x ea 6\" step 10x ea 6\" step 10x ea   Three way  2# 2X10  2# 3X10   10x  2#  2# X10  30x ea 2#  10x ea 2#  10x ea 2x10 ea 2x10 ea                                          Ther Activity                                       Gait Training                                       Modalities                                                  "

## 2025-04-05 DIAGNOSIS — J30.2 SEASONAL ALLERGIES: ICD-10-CM

## 2025-04-05 DIAGNOSIS — J45.20 MILD INTERMITTENT ASTHMA WITHOUT COMPLICATION: ICD-10-CM

## 2025-04-05 DIAGNOSIS — R09.82 PND (POST-NASAL DRIP): ICD-10-CM

## 2025-04-07 RX ORDER — MONTELUKAST SODIUM 10 MG/1
10 TABLET ORAL
Qty: 90 TABLET | Refills: 1 | Status: SHIPPED | OUTPATIENT
Start: 2025-04-07

## 2025-04-08 ENCOUNTER — APPOINTMENT (OUTPATIENT)
Dept: PHYSICAL THERAPY | Facility: CLINIC | Age: 75
End: 2025-04-08
Payer: COMMERCIAL

## 2025-04-10 ENCOUNTER — OFFICE VISIT (OUTPATIENT)
Dept: PHYSICAL THERAPY | Facility: CLINIC | Age: 75
End: 2025-04-10
Payer: COMMERCIAL

## 2025-04-10 DIAGNOSIS — R26.89 BALANCE PROBLEM: Primary | ICD-10-CM

## 2025-04-10 PROCEDURE — 97112 NEUROMUSCULAR REEDUCATION: CPT

## 2025-04-10 PROCEDURE — 97110 THERAPEUTIC EXERCISES: CPT

## 2025-04-10 NOTE — PROGRESS NOTES
"Daily Note     Today's date: 4/10/2025  Patient name: Deyanira Stokes  : 1950  MRN: [id number]  Referring provider: Merced Alex PA-C  Dx:   Encounter Diagnosis     ICD-10-CM    1. Balance problem  R26.89                      Subjective: Maxine reports falling last week as she was turning and denies tripping on anything. She denies hitting her head. She reports compliance with HEP.       Objective: See treatment diary below      Assessment: Visual and VC to ensure correct exercise technique. CSA to ensure patient safety. Denied any increases in pain with exercises performed but muscular fatigue reported from strengthening exercises. Pt reports that she would like some stretches for her LB NV that she reports her primary therapist wanted to try. Pt would continue to benefit from skilled PT.       Plan: Continue with current POC to address pt deficits.      Precautions: hx of R breast CA with mastectomy, psych disorder, renal disorder, recent R foot fx with removal of boot 10/1/22, fall risk, imbalance,  Pacemaker    Re-eval Date: 25    Manuals 3/25 4/3 4/10 3/18 3/20 3/27 4/1 3/4 3/6 3/11                                                       Neuro Re-Ed             Hip add 5''20x 5''30x 5\" 30x 5''20x 5\" x30 5\" 30x 5\" 20x Black 5''20x X20 ea X20 each    Hip abd  Black 5''20x Black 5''30x Black 5\" x30 Black 5''20x Black 5''20x Black5\" 30x BTB 5\" 30x Black 5''20x Black 5''20x Black 5''20x   LAQ  5\" x20  2# 5\" x30  2# 5\" x30 2# keily  5''20x 2#  5 x20  2# 5\" 30x ea 2#  5\" 20x ea 2#  5''20x 5\" x20 5''20x   Tandem/ Tandem foam  On airex 20\" 3x ea HELD  NP time  20\"x3 ea  On foam   On airex 20\" 3x ea On airex 20\" 3x ea On airex 20\" 3x ea   EO EC foam  Airex FA/EO/EC 2x20\" ea HELD  NP time  EO   Firm  20\" x2  EC 20\" x2   Airex FA/EO/EC 20\" ea Airex FA/EO/EC 20\" ea Airex FA/EO/EC 20\" ea   Manuelito walks Side step hurdles                           Ther Ex             Nu step warm up  L3 x 15 min  L3 x 17 min  1/2 " "mile  L3 x 19' L 3 X10 min  30 canales L3 x10 min L3 x 10 min L3 x 10 min L3 x 10 min L3 x 10 min L3 x 15 min 1495 steps    HR TR  X20 ea x30ea X30 ea x20 x20 30x ea 20x ea 20x ea X20 ea X20 ea   Mini Squat              Step Up  \"B\" 2# 2x10 ea \"B\" 3# 2x10 ea \"B\" 2# 2x10 ea B 10x each 2#  \"B\" 2# x10 ea 6\" step 20x ea 2#  6\" step 10x ea  2#  6\" step 10x ea 6\" step 10x ea 6\" step 10x ea   Three way  2# 2X10  2# 3X10  2# 3x10 keily 10x  2#  2# X10  30x ea 2#  10x ea 2#  10x ea 2x10 ea 2x10 ea                                          Ther Activity                                       Gait Training                                       Modalities                                                    "

## 2025-04-15 ENCOUNTER — OFFICE VISIT (OUTPATIENT)
Dept: PHYSICAL THERAPY | Facility: CLINIC | Age: 75
End: 2025-04-15
Payer: COMMERCIAL

## 2025-04-15 DIAGNOSIS — I89.0 LYMPHEDEMA: ICD-10-CM

## 2025-04-15 DIAGNOSIS — R26.89 BALANCE PROBLEM: Primary | ICD-10-CM

## 2025-04-15 PROCEDURE — 97110 THERAPEUTIC EXERCISES: CPT | Performed by: PHYSICAL THERAPIST

## 2025-04-15 PROCEDURE — 97112 NEUROMUSCULAR REEDUCATION: CPT | Performed by: PHYSICAL THERAPIST

## 2025-04-15 NOTE — PROGRESS NOTES
"Daily Note     Today's date: 4/15/2025  Patient name: Deyanira Stokes  : 1950  MRN: [id number]  Referring provider: Merced Alex PA-C  Dx:   Encounter Diagnosis     ICD-10-CM    1. Balance problem  R26.89       2. Lymphedema  I89.0                      Subjective: Pt doing well. She has no new complaints       Objective: See treatment diary below      Assessment: Tolerated treatment well. Patient exhibited good technique with therapeutic exercises and would benefit from continued PT      Plan: Continue per plan of care.      Precautions: hx of R breast CA with mastectomy, psych disorder, renal disorder, recent R foot fx with removal of boot 10/1/22, fall risk, imbalance,  Pacemaker    Re-eval Date: 25    Manuals 3/25 4/3 4/10 4/15 3/20 3/27 4/1 3/4 3/6 3/11                                                       Neuro Re-Ed             Hip add 5''20x 5''30x 5\" 30x 5''20x 5\" x30 5\" 30x 5\" 20x Black 5''20x X20 ea X20 each    Hip abd  Black 5''20x Black 5''30x Black 5\" x30 Grey 5''20x Black 5''20x Black5\" 30x BTB 5\" 30x Black 5''20x Black 5''20x Black 5''20x   LAQ  5\" x20  2# 5\" x30  2# 5\" x30 2# keily  5''20x 2#  5 x20  2# 5\" 30x ea 2#  5\" 20x ea 2#  5''20x 5\" x20 5''20x   Tandem/ Tandem foam  On airex 20\" 3x ea HELD  NP time  20\"x3 ea  On foam   On airex 20\" 3x ea On airex 20\" 3x ea On airex 20\" 3x ea   EO EC foam  Airex FA/EO/EC 2x20\" ea HELD  NP time  EO   Firm  20\" x2  EC 20\" x2   Airex FA/EO/EC 20\" ea Airex FA/EO/EC 20\" ea Airex FA/EO/EC 20\" ea   Manuelito walks Side step hurdles                           Ther Ex             Nu step warm up  L3 x 15 min  L3 x 17 min  1/2 mile  L3 x 19' L 3 X10 min  30 canales L3 x10 min L3 x 10 min L3 x 10 min L3 x 10 min L3 x 10 min L3 x 15 min 1495 steps    HR TR  X20 ea x30ea X30 ea x20 x20 30x ea 20x ea 20x ea X20 ea X20 ea   Mini Squat              Step Up  \"B\" 2# 2x10 ea \"B\" 3# 2x10 ea \"B\" 2# 2x10 ea B 10x each 2#  \"B\" 2# x10 ea 6\" step 20x ea 2#  6\" step 10x ea  2#  " "6\" step 10x ea 6\" step 10x ea 6\" step 10x ea   Three way  2# 2X10  2# 3X10  2# 3x10 keily 10x  2#  2# X10  30x ea 2#  10x ea 2#  10x ea 2x10 ea 2x10 ea                                          Ther Activity                                       Gait Training                                       Modalities                                                      "

## 2025-04-17 ENCOUNTER — OFFICE VISIT (OUTPATIENT)
Dept: PHYSICAL THERAPY | Facility: CLINIC | Age: 75
End: 2025-04-17
Payer: COMMERCIAL

## 2025-04-17 DIAGNOSIS — R26.89 BALANCE PROBLEM: Primary | ICD-10-CM

## 2025-04-17 DIAGNOSIS — J45.20 MILD INTERMITTENT ASTHMA WITHOUT COMPLICATION: ICD-10-CM

## 2025-04-17 PROCEDURE — 97112 NEUROMUSCULAR REEDUCATION: CPT

## 2025-04-17 PROCEDURE — 97110 THERAPEUTIC EXERCISES: CPT

## 2025-04-17 NOTE — PROGRESS NOTES
"Daily Note     Today's date: 2025  Patient name: Deyanira Stokes  : 1950  MRN: [id number]  Referring provider: Merced Alex PA-C  Dx:   Encounter Diagnosis     ICD-10-CM    1. Balance problem  R26.89                      Subjective: Pt denies any falls since she was here last.       Objective: See treatment diary below      Assessment: Tolerated treatment well. Gradual progression with exercise for LE strengthening. Patient demonstrated fatigue post treatment, exhibited good technique with therapeutic exercises, and would benefit from continued PT      Plan: Continue per plan of care.      Precautions: hx of R breast CA with mastectomy, psych disorder, renal disorder, recent R foot fx with removal of boot 10/1/22, fall risk, imbalance,  Pacemaker    Re-eval Date: 25    Manuals 3/25 4/3 4/10 4/15 4/17 3/27 4/1 3/4 3/6 3/11                                                       Neuro Re-Ed             Hip add 5''20x 5''30x 5\" 30x 5''20x 5\" x30 5\" 30x 5\" 20x Black 5''20x X20 ea X20 each    Hip abd  Black 5''20x Black 5''30x Black 5\" x30 Grey 5''20x Grey  5''30x Black5\" 30x BTB 5\" 30x Black 5''20x Black 5''20x Black 5''20x   LAQ  5\" x20  2# 5\" x30  2# 5\" x30 2# keily  5''20x 2#  5 x30  2# 5\" 30x ea 2#  5\" 20x ea 2#  5''20x 5\" x20 5''20x   Tandem/ Tandem foam  On airex 20\" 3x ea HELD  NP time  20\"x3 ea  On foam   On airex 20\" 3x ea On airex 20\" 3x ea On airex 20\" 3x ea   EO EC foam  Airex FA/EO/EC 2x20\" ea HELD  NP time  EO   Firm  20\" x2  EC 20\" x2   Airex FA/EO/EC 20\" ea Airex FA/EO/EC 20\" ea Airex FA/EO/EC 20\" ea   Manuelito walks Side step hurdles                           Ther Ex             Nu step warm up  L3 x 15 min  L3 x 17 min  1/2 mile  L3 x 19' L 3 X10 min  30 canales L3 x20 min  1/2 mile L3 x 10 min L3 x 10 min L3 x 10 min L3 x 10 min L3 x 15 min 1495 steps    HR TR  X20 ea x30ea X30 ea x20 x20 30x ea 20x ea 20x ea X20 ea X20 ea   Mini Squat              Step Up  \"B\" 2# 2x10 ea \"B\" 3# 2x10 ea " "\"B\" 2# 2x10 ea B 10x each 2#  \"B\" 2# 3x10 ea 6\" step 20x ea 2#  6\" step 10x ea  2#  6\" step 10x ea 6\" step 10x ea 6\" step 10x ea   Three way  2# 2X10  2# 3X10  2# 3x10 keily 10x  2#  2# 3X10  10x ea 2#  30x ea 2#  10x ea 2x10 ea 2x10 ea                                          Ther Activity                                       Gait Training                                       Modalities                                                        "

## 2025-04-20 ENCOUNTER — HOSPITAL ENCOUNTER (EMERGENCY)
Facility: HOSPITAL | Age: 75
Discharge: HOME | End: 2025-04-20
Attending: EMERGENCY MEDICINE
Payer: COMMERCIAL

## 2025-04-20 ENCOUNTER — APPOINTMENT (EMERGENCY)
Dept: CT IMAGING | Facility: HOSPITAL | Age: 75
End: 2025-04-20
Payer: COMMERCIAL

## 2025-04-20 ENCOUNTER — APPOINTMENT (EMERGENCY)
Dept: RADIOLOGY | Facility: HOSPITAL | Age: 75
End: 2025-04-20
Payer: COMMERCIAL

## 2025-04-20 VITALS
DIASTOLIC BLOOD PRESSURE: 88 MMHG | BODY MASS INDEX: 35.45 KG/M2 | WEIGHT: 163.8 LBS | SYSTOLIC BLOOD PRESSURE: 171 MMHG | RESPIRATION RATE: 19 BRPM | TEMPERATURE: 98.7 F | HEART RATE: 78 BPM | OXYGEN SATURATION: 98 %

## 2025-04-20 DIAGNOSIS — S09.90XA CLOSED HEAD INJURY, INITIAL ENCOUNTER: Primary | ICD-10-CM

## 2025-04-20 DIAGNOSIS — S00.03XA HEMATOMA OF SCALP, INITIAL ENCOUNTER: ICD-10-CM

## 2025-04-20 LAB
ABO GROUP BLD: NORMAL
ALBUMIN SERPL BCG-MCNC: 3.8 G/DL (ref 3.5–5)
ALP SERPL-CCNC: 72 U/L (ref 34–104)
ALT SERPL W P-5'-P-CCNC: 18 U/L (ref 7–52)
ANION GAP SERPL CALCULATED.3IONS-SCNC: 6 MMOL/L (ref 4–13)
APTT PPP: 25 SECONDS (ref 23–34)
AST SERPL W P-5'-P-CCNC: 27 U/L (ref 13–39)
BASOPHILS # BLD AUTO: 0.04 THOUSANDS/ÂΜL (ref 0–0.1)
BASOPHILS NFR BLD AUTO: 1 % (ref 0–1)
BILIRUB SERPL-MCNC: 0.62 MG/DL (ref 0.2–1)
BLD GP AB SCN SERPL QL: NEGATIVE
BUN SERPL-MCNC: 13 MG/DL (ref 5–25)
CALCIUM SERPL-MCNC: 8.4 MG/DL (ref 8.4–10.2)
CHLORIDE SERPL-SCNC: 110 MMOL/L (ref 96–108)
CO2 SERPL-SCNC: 25 MMOL/L (ref 21–32)
CREAT SERPL-MCNC: 0.64 MG/DL (ref 0.6–1.3)
EOSINOPHIL # BLD AUTO: 0.1 THOUSAND/ÂΜL (ref 0–0.61)
EOSINOPHIL NFR BLD AUTO: 2 % (ref 0–6)
ERYTHROCYTE [DISTWIDTH] IN BLOOD BY AUTOMATED COUNT: 12.4 % (ref 11.6–15.1)
GFR SERPL CREATININE-BSD FRML MDRD: 88 ML/MIN/1.73SQ M
GLUCOSE SERPL-MCNC: 89 MG/DL (ref 65–140)
HCT VFR BLD AUTO: 42.4 % (ref 34.8–46.1)
HGB BLD-MCNC: 14.1 G/DL (ref 11.5–15.4)
IMM GRANULOCYTES # BLD AUTO: 0.01 THOUSAND/UL (ref 0–0.2)
IMM GRANULOCYTES NFR BLD AUTO: 0 % (ref 0–2)
INR PPP: 0.86 (ref 0.85–1.19)
LYMPHOCYTES # BLD AUTO: 1.88 THOUSANDS/ÂΜL (ref 0.6–4.47)
LYMPHOCYTES NFR BLD AUTO: 35 % (ref 14–44)
MCH RBC QN AUTO: 30.1 PG (ref 26.8–34.3)
MCHC RBC AUTO-ENTMCNC: 33.3 G/DL (ref 31.4–37.4)
MCV RBC AUTO: 90 FL (ref 82–98)
MONOCYTES # BLD AUTO: 0.71 THOUSAND/ÂΜL (ref 0.17–1.22)
MONOCYTES NFR BLD AUTO: 13 % (ref 4–12)
NEUTROPHILS # BLD AUTO: 2.63 THOUSANDS/ÂΜL (ref 1.85–7.62)
NEUTS SEG NFR BLD AUTO: 49 % (ref 43–75)
NRBC BLD AUTO-RTO: 0 /100 WBCS
PLATELET # BLD AUTO: 153 THOUSANDS/UL (ref 149–390)
PMV BLD AUTO: 10.2 FL (ref 8.9–12.7)
POTASSIUM SERPL-SCNC: 4 MMOL/L (ref 3.5–5.3)
PROT SERPL-MCNC: 6.8 G/DL (ref 6.4–8.4)
PROTHROMBIN TIME: 12.2 SECONDS (ref 12.3–15)
RBC # BLD AUTO: 4.69 MILLION/UL (ref 3.81–5.12)
RH BLD: POSITIVE
SODIUM SERPL-SCNC: 141 MMOL/L (ref 135–147)
SPECIMEN EXPIRATION DATE: NORMAL
WBC # BLD AUTO: 5.37 THOUSAND/UL (ref 4.31–10.16)

## 2025-04-20 PROCEDURE — 80053 COMPREHEN METABOLIC PANEL: CPT | Performed by: EMERGENCY MEDICINE

## 2025-04-20 PROCEDURE — 99284 EMERGENCY DEPT VISIT MOD MDM: CPT

## 2025-04-20 PROCEDURE — 99285 EMERGENCY DEPT VISIT HI MDM: CPT | Performed by: EMERGENCY MEDICINE

## 2025-04-20 PROCEDURE — 85025 COMPLETE CBC W/AUTO DIFF WBC: CPT | Performed by: EMERGENCY MEDICINE

## 2025-04-20 PROCEDURE — 85730 THROMBOPLASTIN TIME PARTIAL: CPT | Performed by: EMERGENCY MEDICINE

## 2025-04-20 PROCEDURE — 85610 PROTHROMBIN TIME: CPT | Performed by: EMERGENCY MEDICINE

## 2025-04-20 PROCEDURE — 36415 COLL VENOUS BLD VENIPUNCTURE: CPT | Performed by: EMERGENCY MEDICINE

## 2025-04-20 PROCEDURE — 70450 CT HEAD/BRAIN W/O DYE: CPT

## 2025-04-20 PROCEDURE — 86900 BLOOD TYPING SEROLOGIC ABO: CPT | Performed by: EMERGENCY MEDICINE

## 2025-04-20 PROCEDURE — 72125 CT NECK SPINE W/O DYE: CPT

## 2025-04-20 PROCEDURE — 86850 RBC ANTIBODY SCREEN: CPT | Performed by: EMERGENCY MEDICINE

## 2025-04-20 PROCEDURE — 71045 X-RAY EXAM CHEST 1 VIEW: CPT

## 2025-04-20 PROCEDURE — 86901 BLOOD TYPING SEROLOGIC RH(D): CPT | Performed by: EMERGENCY MEDICINE

## 2025-04-20 RX ORDER — ACETAMINOPHEN 325 MG/1
650 TABLET ORAL ONCE
Status: COMPLETED | OUTPATIENT
Start: 2025-04-20 | End: 2025-04-20

## 2025-04-20 RX ADMIN — ACETAMINOPHEN 650 MG: 325 TABLET ORAL at 21:00

## 2025-04-20 NOTE — ED PROVIDER NOTES
"Time reflects when diagnosis was documented in both MDM as applicable and the Disposition within this note       Time User Action Codes Description Comment    4/20/2025  8:49 PM Tenzin Pacheco Add [S09.90XA] Closed head injury, initial encounter     4/20/2025  8:50 PM Tenzin Pacheco Add [S00.03XA] Hematoma of scalp, initial encounter           ED Disposition       ED Disposition   Discharge    Condition   Stable    Date/Time   Sun Apr 20, 2025  8:29 PM    Comment   Deyanira ABURTO Elem discharge to home/self care.                   Assessment & Plan       Medical Decision Making  Amount and/or Complexity of Data Reviewed  Labs: ordered.  Radiology: ordered and independent interpretation performed.    Risk  OTC drugs.      Isolated head trauma secondary to blunt force trauma to the head from a small metal pipe from the wintertime, CT imaging negative, neurovascularly intact motor and sensory intact in the upper and lower extremities C-spine cleared after imaging reviewed, CT of head personally reviewed me, reassurance given to pt and daughter at bedside.    Portions of the record may have been created with voice recognition software. Occasional wrong word or \"sound a like\" substitutions may have occurred due to the inherent limitations of voice recognition software. Read the chart carefully and recognize, using context, where substitutions have occurred.   ED Course as of 04/21/25 1324   [place] Apr 20, 2025   1942 Patient seen, examined, evaluate, chart reviewed, trauma eval initiated.  On blood thinners, large windchimes bar hit pt in head while hanging on smith hook.  No LOC, happened just prior to arrival.    Brief focused differential dx in this patient is as follows: Closed head injury versus CNS hemorrhage secondary to be on blood thinners versus isolated scalp hematoma.       Medications       ED Risk Strat Scores                    No data recorded                            History of Present Illness "       Chief Complaint   Patient presents with    Head Injury     Was hanging a wind chime with a tea pot and it hit her in the head; paradoxa and aspirin use        Past Medical History:   Diagnosis Date    Anxiety     Arthritis     Asthma     Cancer (HCC)     CHF (congestive heart failure) (HCC)     Chronic headaches     Coronary artery disease     Dementia (HCC)     Depression     Dietary lactose intolerance     GERD (gastroesophageal reflux disease)     History of breast cancer     right, s/p mastectomy, s/p chemoc/radiation    History of nephrolithiasis     History of pneumonia     History of shingles     HL (hearing loss)     Hyperlipidemia     Hypertension     Hypertensive heart disease with heart failure (HCC)     IBS (irritable bowel syndrome)     Lymphedema of right arm     Nonrheumatic aortic (valve) stenosis     Obesity     Osteoporosis     PFO (patent foramen ovale)     PONV (postoperative nausea and vomiting)     Scoliosis     Severe aortic stenosis     Vitamin D deficiency       Past Surgical History:   Procedure Laterality Date    CARDIAC CATHETERIZATION N/A 10/29/2024    Procedure: LHC and RHC-Cardiac catheterization;  Surgeon: Alexi Myers MD;  Location: [facility];  Service: Cardiology    CARDIAC CATHETERIZATION N/A 10/29/2024    Procedure: Cardiac Coronary Angiogram;  Surgeon: Alexi Myers MD;  [facility]: [facility];  Service: Cardiology    CARDIAC CATHETERIZATION N/A 11/05/2024    Procedure: Cardiac tavr;  Surgeon: Smith Wilburn MD;  Location:  MAIN OR;  Service: Cardiology    CARDIAC ELECTROPHYSIOLOGY PROCEDURE Left 11/07/2024    Procedure: Cardiac pacer implant DC;  Surgeon: Matias Brady MD;  Location: [facility];  Service: Cardiology    CATARACT EXTRACTION, BILATERAL      CHOLECYSTECTOMY      COLONOSCOPY N/A 05/31/2016    Procedure: COLONOSCOPY;  Surgeon: Colton Moralez MD;  Location: [facility];  Service:     FL GUIDED NEEDLE PLAC BX/ASP/INJ  08/14/2023    FL GUIDED  NEEDLE PLAC BX/ASP/INJ  10/30/2023    GALLBLADDER SURGERY      HYSTERECTOMY      Total    INSERT / REPLACE / REMOVE PACEMAKER  11/07/2024    KNEE ARTHROSCOPY      LYMPH NODE BIOPSY      as part of the breast cancer, see chart for more details    MASTECTOMY Right     rt breast mastectomy 2005    MA ARTHRP KNE CONDYLE&PLATU MEDIAL&LAT COMPARTMENTS Left 05/20/2021    Procedure: ARTHROPLASTY KNEE TOTAL;  Surgeon: Monica Cox MD;  Location:  MAIN OR;  Service: Orthopedics    MA ARTHRP KNE CONDYLE&PLATU MEDIAL&LAT COMPARTMENTS Right 11/16/2022    Procedure: ARTHROPLASTY KNEE TOTAL;  Surgeon: Marcos Arcos DO;  Location: [facility];  Service: Orthopedics    MA REPLACE AORTIC VALVE OPENFEMORAL ARTERY APPROACH N/A 11/05/2024    Procedure: REPLACEMENT AORTIC VALVE TRANSCATHETER (TAVR) TRANSFEMORAL W/ 26MM VALVE(ACCESS ON LEFT) VIOLA;  Surgeon: Godwin Sarah DO;  Location: [facility];  Service: Cardiac Surgery    REPLACEMENT AORTIC VALVE TRANSCATHETER (TAVR)  11/05/2024    stent inserted    TONSILLECTOMY AND ADENOIDECTOMY        Family History   Adopted: Yes   Problem Relation Age of Onset    Diabetes Mother     Heart disease Mother     Mental illness Mother     Depression Mother     Heart disease Brother     Breast cancer Maternal Aunt     Breast cancer Maternal Aunt     Breast cancer Maternal Aunt     Breast cancer Maternal Aunt     Breast cancer Maternal Aunt     No Known Problems Father     No Known Problems Sister     No Known Problems Daughter     Breast cancer Maternal Grandmother     No Known Problems Sister     No Known Problems Sister     No Known Problems Sister       Social History     Tobacco Use    Smoking status: Never     Passive exposure: Never    Smokeless tobacco: Never   Vaping Use    Vaping status: Never Used   Substance Use Topics    Alcohol use: Yes     Alcohol/week: 1.0 standard drink of alcohol     Types: 1 Standard drinks or equivalent per week     Comment: this is less than once/mo, more like  1-2 drinks every 3 mo    Drug use: Not Currently     Types: Marijuana     Comment: when i was in my teens      E-Cigarette/Vaping    E-Cigarette Use Never User       E-Cigarette/Vaping Substances    Nicotine No     THC No     CBD No     Flavoring No     Other No     Unknown No       I have reviewed and agree with the history as documented.     HPI    Nontoxic-appearing, 75-year-old female arrival via POV, with a history of status post TAVR on aspirin and Pradaxa, hit her head while trying a hanging a wench time with a teapot on it which was attached to a [employer] hook, no LOC, occurred just prior to arrival, patient does have a history of balance problems and was instructed not to be hanging any objects by her daughter who works for [employer] who accompanied patient to the emergency department both of which are reliable competent historian.    No prodrome prior to falling.  No vomiting, no nausea.  Remaining 12 point review of systems is unremarkable.      Review of Systems   Constitutional: Negative.  Negative for chills, fatigue and fever.   HENT: Negative.     Eyes: Negative.    Respiratory: Negative.  Negative for chest tightness, shortness of breath and wheezing.    Cardiovascular: Negative.  Negative for chest pain, palpitations and leg swelling.   Gastrointestinal: Negative.  Negative for abdominal pain, nausea and vomiting.   Endocrine: Negative.    Genitourinary: Negative.    Allergic/Immunologic: Negative.    Neurological: Negative.    Hematological: Negative.    Psychiatric/Behavioral: Negative.             Objective       ED Triage Vitals [04/20/25 1937]   Temperature Pulse Blood Pressure Respirations SpO2 Patient Position - Orthostatic VS   98.7 °F (37.1 °C) 82 (!) 181/79 19 98 % --      Temp src Heart Rate Source BP Location FiO2 (%) Pain Score    -- Monitor Left arm -- No Pain      Vitals      Date and Time Temp Pulse SpO2 Resp BP Pain Score FACES Pain Rating User   04/20/25 2100 -- -- -- -- -- 3 -- EZ    04/20/25 2046 98.7 °F (37.1 °C) 78 -- 19 171/88 3 -- EZ   04/20/25 1956 -- -- -- -- -- No Pain -- EZ   04/20/25 1937 98.7 °F (37.1 °C) 82 98 % 19 181/79 No Pain -- AF            Physical Exam  Vitals and nursing note reviewed.   Constitutional:       General: She is not in acute distress.     Appearance: Normal appearance. She is normal weight. She is not ill-appearing, toxic-appearing or diaphoretic.   HENT:      Head: Normocephalic. No right periorbital erythema or left periorbital erythema.        Comments: 1 cm x 1 cm hematoma over the occiput: L sided, mild breakdown of the skin, no laceration.    AIRWAY: Patient maintaining airway maintaining secretions.  No stridor.  No brawniness under the tongue.  Uvula midline without edema.  Phonation normal, trachea midline, no trismus, no tenderness along the sternocleidomastoid muscle group, patient is appropriately masked.  No tenderness along the platysmas muscle group, no injuries noted in the zone 1, 2, 3 of the neck.     No altered sensation over supraorbital, infraorbital, submental, or inferior alveolar nerve distributions. EOM intact, no evidence of ocular palsy testing, no need for forced duction testing to occur, no pain upon palpation of the medial / lateal buttress w/in oral cavity bilaterally, nasal exam reveals no septal hematomas bilaterally.  No [place] instability. Examination of the patient from a vertical posterior perspective does not reveal any zygoma asymmetry or evidence of enophthalmos.      Right Ear: Tympanic membrane, ear canal and external ear normal.      Left Ear: Tympanic membrane, ear canal and external ear normal.      Nose: Nose normal.      Mouth/Throat:      Mouth: Mucous membranes are moist.      Pharynx: Oropharynx is clear.   Eyes:      Extraocular Movements: Extraocular movements intact.      Conjunctiva/sclera: Conjunctivae normal.      Pupils: Pupils are equal, round, and reactive to light.   Cardiovascular:      Rate and  Rhythm: Normal rate and regular rhythm.      Pulses: Normal pulses.      Heart sounds: Normal heart sounds.      Comments: CARDIOVASCULAR / CIRCULATORY: Peripheral pulses intact in upper and lower extremities.  Pulmonary:      Effort: Pulmonary effort is normal.      Breath sounds: No stridor.   Abdominal:      General: Abdomen is flat. Bowel sounds are normal.      Palpations: Abdomen is soft.   Musculoskeletal:         General: No swelling or tenderness. Normal range of motion.      Cervical back: Normal range of motion.      Comments: DEFORMITY / DEFICIT: GCS: 15, BATRES x 3.    EXPOSURE: Full body exposure.    Skin:     General: Skin is warm.      Capillary Refill: Capillary refill takes less than 2 seconds.      Coloration: Skin is not jaundiced or pale.   Neurological:      General: No focal deficit present.      Mental Status: She is alert and oriented to person, place, and time. Mental status is at baseline.   Psychiatric:         Mood and Affect: Mood normal.         Behavior: Behavior normal.         Thought Content: Thought content normal.         Judgment: Judgment normal.         Results Reviewed       Procedure Component Value Units Date/Time    CBC and differential [[id number]]  (Abnormal) Collected: 04/20/25 2033    Lab Status: Final result Specimen: Blood from Hand, Left Updated: 04/20/25 2041     WBC 5.37 Thousand/uL      RBC 4.69 Million/uL      Hemoglobin 14.1 g/dL      Hematocrit 42.4 %      MCV 90 fL      MCH 30.1 pg      MCHC 33.3 g/dL      RDW 12.4 %      MPV 10.2 fL      Platelets 153 Thousands/uL      nRBC 0 /100 WBCs      Segmented % 49 %      Immature Grans % 0 %      Lymphocytes % 35 %      Monocytes % 13 %      Eosinophils Relative 2 %      Basophils Relative 1 %      Absolute Neutrophils 2.63 Thousands/µL      Absolute Immature Grans 0.01 Thousand/uL      Absolute Lymphocytes 1.88 Thousands/µL      Absolute Monocytes 0.71 Thousand/µL      Eosinophils Absolute 0.10 Thousand/µL       Basophils Absolute 0.04 Thousands/µL     Protime-INR [[id number]]  (Abnormal) Collected: 04/20/25 1956    Lab Status: Final result Specimen: Blood from Arm, Left Updated: 04/20/25 2021     Protime 12.2 seconds      INR 0.86    Narrative:      INR Therapeutic Range    Indication                                             INR Range      Atrial Fibrillation                                               2.0-3.0  Hypercoagulable State                                    2.0.2.3  Left Ventricular Asist Device                            2.0-3.0  Mechanical Heart Valve                                  -    Aortic(with afib, MI, embolism, HF, LA enlargement,    and/or coagulopathy)                                     2.0-3.0 (2.5-3.5)     Mitral                                                             2.5-3.5  Prosthetic/Bioprosthetic Heart Valve               2.0-3.0  Venous thromboembolism (VTE: VT, PE        2.0-3.0    APTT [[id number]]  (Normal) Collected: 04/20/25 1956    Lab Status: Final result Specimen: Blood from Arm, Left Updated: 04/20/25 2021     PTT 25 seconds     Comprehensive metabolic panel [[id number]]  (Abnormal) Collected: 04/20/25 1956    Lab Status: Final result Specimen: Blood from Arm, Left Updated: 04/20/25 2018     Sodium 141 mmol/L      Potassium 4.0 mmol/L      Chloride 110 mmol/L      CO2 25 mmol/L      ANION GAP 6 mmol/L      BUN 13 mg/dL      Creatinine 0.64 mg/dL      Glucose 89 mg/dL      Calcium 8.4 mg/dL      AST 27 U/L      ALT 18 U/L      Alkaline Phosphatase 72 U/L      Total Protein 6.8 g/dL      Albumin 3.8 g/dL      Total Bilirubin 0.62 mg/dL      eGFR 88 ml/min/1.73sq m     Narrative:      National Kid[employer] Disease Foundation guidelines for Chronic Kidney Disease (CKD):     Stage 1 with normal or high GFR (GFR > 90 mL/min/1.73 square meters)    Stage 2 Mild CKD (GFR = 60-89 mL/min/1.73 square meters)    Stage 3A Moderate CKD (GFR = 45-59 mL/min/1.73 square meters)    Stage 3B Moderate  CKD (GFR = 30-44 mL/min/1.73 square meters)    Stage 4 Severe CKD (GFR = 15-29 mL/min/1.73 square meters)    Stage 5 End Stage CKD (GFR <15 mL/min/1.73 square meters)  Note: GFR calculation is accurate only with a steady state creatinine            TRAUMA - CT spine cervical wo contrast   Final Interpretation by Devon Spann MD (04/20 2038)      No cervical spine fracture or traumatic malalignment. Loss of the normal cervical lordosis which is likely secondary to patient positioning, cervical collar, or muscle spasm.      The study was marked in EPIC for immediate notification.            Workstation performed: [id number]         TRAUMA - CT head wo contrast   Final Interpretation by Devon Spann MD (04/20 2024)      No acute intracranial abnormality.  Chronic microangiopathic changes.      The study was marked in EPIC for immediate notification.                  Workstation performed: [id number]         XR Trauma chest portable   ED Interpretation by Tenzin Pacheco III,  (04/20 2023)   Stat portable trauma x-ray shows no acute fractures or dislocations or osseous abnormalities infiltrates or occult pneumothoraces, right upper quadrant surgical clips.  Rounded density seen projecting over the right lung, seen on prior imaging.       Final Interpretation by Devon Spann MD (04/20 2041)      No acute cardiopulmonary disease.            Workstation performed: [id number]             Procedures    ED Medication and Procedure Management   Prior to Admission Medications   Prescriptions Last Dose Informant Patient Reported? Taking?   FLUoxetine (PROzac) 20 mg capsule  Self No No   Sig: Take 1 capsule (20 mg total) by mouth daily   Myrbetriq 25 MG TB24   No No   Sig: Take 2 tablets by mouth in the morning.   Nutritional Supplements (BOOST BREEZE PO)  Self Yes No   Sig: Take by mouth daily Nutritional drink   3 times a week.   QUEtiapine (SEROquel) 25 mg tablet   No No   Sig: Take 1 tablet by mouth daily  at bedtime.   acetaminophen (TYLENOL) 650 mg CR tablet  Self No No   Sig: Take 1 tablet (650 mg total) by mouth every 8 (eight) hours as needed for mild pain Patient takes 500mg   albuterol (2.5 mg/3 mL) 0.083 % nebulizer solution  Self Yes No   Sig: Take 2.5 mg by nebulization every 6 (six) hours as needed for wheezing   albuterol (PROVENTIL HFA,VENTOLIN HFA) 90 mcg/act inhaler  Self No No   Sig: Inhale 2 puffs every 4 (four) hours as needed for wheezing   aspirin 81 mg chewable tablet  Self Yes No   Sig: Chew 81 mg daily   atorvastatin (LIPITOR) 20 mg tablet   No No   Sig: Take 1 tablet by mouth daily.   butalbital-acetaminophen-caffeine (FIORICET,ESGIC) -40 mg per tablet   No No   Sig: Take 1 tablet by mouth 3 (three) times a day as needed for headaches   clopidogrel (PLAVIX) 75 mg tablet   No No   Sig: Take 1 tablet (75 mg total) by mouth daily   donepezil (ARICEPT) 10 mg tablet   No No   Sig: Take 1 tablet by mouth daily at bedtime.   fluticasone (FLONASE) 50 mcg/act nasal spray   No No   Sig: INSTILL 1 SPRAY INTO EACH NOSTRIL ONCE DAILY   lisinopril (ZESTRIL) 5 mg tablet   No No   Sig: TAKE 1 TABLET (5 MG TOTAL) BY MOUTH DAILY.   montelukast (SINGULAIR) 10 mg tablet   No No   Sig: TAKE 1 TABLET BY MOUTH DAILY AT BEDTIME   oxyCODONE (OxyCONTIN) 10 mg 12 hr tablet   No No   Sig: Take 1 tablet (10 mg total) by mouth as needed for moderate pain   pantoprazole (PROTONIX) 40 mg tablet   No No   Sig: Take 1 tablet by mouth daily.      Facility-Administered Medications: None     Discharge Medication List as of 4/20/2025  8:50 PM        CONTINUE these medications which have NOT CHANGED    Details   acetaminophen (TYLENOL) 650 mg CR tablet Take 1 tablet (650 mg total) by mouth every 8 (eight) hours as needed for mild pain Patient takes 500mg, Starting Tue 10/29/2024, No Print      albuterol (2.5 mg/3 mL) 0.083 % nebulizer solution Take 2.5 mg by nebulization every 6 (six) hours as needed for wheezing, Historical  Med      albuterol (PROVENTIL HFA,VENTOLIN HFA) 90 mcg/act inhaler Inhale 2 puffs every 4 (four) hours as needed for wheezing, Starting Tue 7/16/2024, Normal      aspirin 81 mg chewable tablet Chew 81 mg daily, Historical Med      atorvastatin (LIPITOR) 20 mg tablet Take 1 tablet by mouth daily., Starting Tue 11/26/2024, Normal      butalbital-acetaminophen-caffeine (FIORICET,ESGIC) -40 mg per tablet Take 1 tablet by mouth 3 (three) times a day as needed for headaches, Starting Tue 3/18/2025, Normal      clopidogrel (PLAVIX) 75 mg tablet Take 1 tablet (75 mg total) by mouth daily, Starting Sat 11/9/2024, Until Tue 3/18/2025, Normal      donepezil (ARICEPT) 10 mg tablet Take 1 tablet by mouth daily at bedtime., Starting Fri 1/24/2025, Normal      FLUoxetine (PROzac) 20 mg capsule Take 1 capsule (20 mg total) by mouth daily, Starting Tue 7/16/2024, Normal      fluticasone (FLONASE) 50 mcg/act nasal spray INSTILL 1 SPRAY INTO EACH NOSTRIL ONCE DAILY, Starting Fri 11/8/2024, Normal      lisinopril (ZESTRIL) 5 mg tablet TAKE 1 TABLET (5 MG TOTAL) BY MOUTH DAILY., Starting Fri 12/27/2024, Normal      montelukast (SINGULAIR) 10 mg tablet TAKE 1 TABLET BY MOUTH DAILY AT BEDTIME, Starting Mon 4/7/2025, Normal      Myrbetriq 25 MG TB24 Take 2 tablets by mouth in the morning., Starting Tue 11/26/2024, Normal      Nutritional Supplements (BOOST BREEZE PO) Take by mouth daily Nutritional drink   3 times a week., Starting Wed 9/6/2017, Historical Med      oxyCODONE (OxyCONTIN) 10 mg 12 hr tablet Take 1 tablet (10 mg total) by mouth as needed for moderate pain, Starting Tue 3/18/2025, Normal      pantoprazole (PROTONIX) 40 mg tablet Take 1 tablet by mouth daily., Starting Tue 11/26/2024, Normal      QUEtiapine (SEROquel) 25 mg tablet Take 1 tablet by mouth daily at bedtime., Starting Mon 2/24/2025, Normal           No discharge procedures on file.  ED SEPSIS DOCUMENTATION   Time reflects when diagnosis was documented in both  MDM as applicable and the Disposition within this note       Time User Action Codes Description Comment    4/20/2025  8:49 PM Tenzin Pacheco Add [S09.90XA] Closed head injury, initial encounter     4/20/2025  8:50 PM Tenzin Pacheco [S00.03XA] Hematoma of scalp, initial encounter                  Tenzin Pacheco III, DO  04/21/25 0738

## 2025-04-21 ENCOUNTER — OFFICE VISIT (OUTPATIENT)
Dept: PODIATRY | Facility: CLINIC | Age: 75
End: 2025-04-21
Payer: COMMERCIAL

## 2025-04-21 VITALS — HEIGHT: 57 IN | BODY MASS INDEX: 35.17 KG/M2 | WEIGHT: 163 LBS

## 2025-04-21 DIAGNOSIS — R23.4 SCAB: Primary | ICD-10-CM

## 2025-04-21 PROCEDURE — 99213 OFFICE O/P EST LOW 20 MIN: CPT | Performed by: PODIATRIST

## 2025-04-21 NOTE — PROGRESS NOTES
"Name: Deyanira Stokes      : 1950      MRN: [id number]  Encounter Provider: Ashu Smith DPM  Encounter Date: 2025   Encounter department: [facility] [place]  :  Assessment & Plan  Scab         - Will need at risk care in the future  - This scab been slow healing since February, no drainage today via tissue test  - Left hallux remodeled to tolerance due to thickness.   - discussed vascular testing in the future if there was any further ulceration or nonhealing skin insult.     History of Present Illness   HPI  Deyanira Stokes is a 75 y.o. female who presents  for evaluation and management of her left foot. She stubbed her toe and is having some pain.       Review of Systems   Constitutional:  Negative for chills and fever.   HENT:  Negative for ear pain and sore throat.    Eyes:  Negative for pain and visual disturbance.   Respiratory:  Negative for cough and shortness of breath.    Cardiovascular:  Negative for chest pain and palpitations.   Gastrointestinal:  Negative for abdominal pain and vomiting.   Genitourinary:  Negative for dysuria and hematuria.   Musculoskeletal:  Negative for arthralgias and back pain.   Skin:  Negative for color change and rash.   Neurological:  Negative for seizures and syncope.   All other systems reviewed and are negative.         Objective   Ht 4' 9\" (1.448 m)   Wt 73.9 kg (163 lb)   LMP  (LMP Unknown)   BMI 35.27 kg/m²      Physical Exam  Constitutional:       Appearance: Normal appearance.   HENT:      Head: Normocephalic and atraumatic.      Nose: Nose normal.      Mouth/Throat:      Mouth: Mucous membranes are moist.   Eyes:      Pupils: Pupils are equal, round, and reactive to light.   Pulmonary:      Effort: Pulmonary effort is normal.   Skin:     Comments: Intact scab on the top of the left 2nd toe. No drainage vis the tissue test.    Neurological:      General: No focal deficit present.      Mental Status: She is alert and oriented to " person, place, and time. Mental status is at baseline.

## 2025-04-22 ENCOUNTER — APPOINTMENT (OUTPATIENT)
Dept: PHYSICAL THERAPY | Facility: CLINIC | Age: 75
End: 2025-04-22
Payer: COMMERCIAL

## 2025-04-22 ENCOUNTER — EVALUATION (OUTPATIENT)
Dept: PHYSICAL THERAPY | Facility: CLINIC | Age: 75
End: 2025-04-22
Attending: PHYSICAL THERAPIST
Payer: COMMERCIAL

## 2025-04-22 DIAGNOSIS — R26.89 BALANCE PROBLEM: Primary | ICD-10-CM

## 2025-04-22 PROCEDURE — 97110 THERAPEUTIC EXERCISES: CPT | Performed by: PHYSICAL THERAPIST

## 2025-04-22 PROCEDURE — 97112 NEUROMUSCULAR REEDUCATION: CPT | Performed by: PHYSICAL THERAPIST

## 2025-04-22 NOTE — PROGRESS NOTES
PT Progress Note     Today's date: 2025  Patient name: Deyanira Stokes  : 1950  MRN: [id number]  Referring provider: Merced Alex PA-C  Dx:   Encounter Diagnosis     ICD-10-CM    1. Balance problem  R26.89               Assessment  Impairments: abnormal coordination, abnormal gait, abnormal muscle firing, abnormal or restricted ROM, abnormal movement, activity intolerance, impaired physical strength, lacks appropriate home exercise program, pain with function, safety issue and weight-bearing intolerance  Functional limitations: Difficutly with ambulation, Decreased activity tolerance.  Symptom irritability: moderate    Assessment details: Deyanira Stokes is a 75 y.o. female who presents to outpatient PT with a  Balance problem  (primary encounter diagnosis).  No further referral appears necessary at this time based upon examination results. Pt presents with decreased strength, ROM, balance, functional activity tolerance, and pain with movement in her bilateral lower extremities, which is  limiting her ability to perform the aforementioned functional activities.  Etiologic factors include repetitive poor body mechanics. Prognosis is good given HEP compliance and PT 2-3/wk.  Please contact me if you have any questions or recommendations.  Thank you for the opportunity to share in  Deyanira care.       RA     Deyanira Stokes has demonstrated decreased pain, increased strength, increased range of motion, and increased activity tolerance since starting physical therapy services. She reports an overall improvement of 80% thus far.  She continues to present with pain, decreased strength, decreased range of motion, and decreased activity tolerance and would benefit from additional skilled physical therapy interventions to address impairments and maximize function.      Understanding of Dx/Px/POC: good     Prognosis: good    Goals  STG to be achieved in 4 weeks       1. Pt will improve 5x STS score to 45  seconds indicating increased LE strength  Met   2. Pt will improve MMT scores by at least 1/2 grade to promote improved functional activity tolerance   Met          LTG to be achieved in 6-8 weeks   1. Pt will be complaint with HEP   Met   2. Pt will improve ROM to WFL, to help facilitate independence with ADL's, IADL's, and functional activities   Met   3. Pt will improve Strength to WFL to help facilitate independence with ADL's, IADL's, and functional activities   Progressing   4. Pt will have no limitations with ambulation to help facilitate independence at home and in the community.   Progressing   5. Pt will have no limitations with stair negotiation to help facilitate independence at home and in the community  Progressing            Plan  Patient would benefit from: skilled physical therapy    Planned therapy interventions: ADL training, balance, balance/weight bearing training, flexibility, functional ROM exercises, gait training, graded activity, graded exercise, graded motor, home exercise program, joint mobilization, manual therapy, neuromuscular re-education, patient education, postural training, strengthening, stretching, therapeutic activities and therapeutic exercise    Frequency: 2x week  Duration in weeks: 12  Plan of Care beginning date: 4/22/2025  Plan of Care expiration date: 7/22/2025  Treatment plan discussed with: patient, PTA and referring physician        Subjective Evaluation    History of Present Illness  Mechanism of injury: The patient is a 75 year old female who presents to outpatient PT with a CC of balance difficulties for the past 5 years. She reports increasing balance deficits, that also interfere with her ability to safely ambulate and complete transfers She notes she has difficulty with feeling her feet on the floor. She is currently attending cardiac rehab as well and notes that it is going well. She recently had a TAVR procedure on 11/5. PT Eval only performed this date.  "  Patient Goals  Patient goals for therapy: increased strength, decreased pain and increased motion  Patient goal: Goals for PT are to improve balance.  Pain    RA   Current pain ratin           0  At best pain rating: 3  0  At worst pain ratin  0  Quality: pinching.        Objective     Active Range of Motion   Left Hip   Flexion: WFL  Abduction: WFL  Adduction: WFL    Right Hip   Flexion: WFL  Abduction: WFL  Adduction: WFL  Left Knee   Flexion: WFL  Extension: WFL    Right Knee   Flexion: WFL  Extension: WFL  Left Ankle/Foot   Dorsiflexion (kf): WFL  Plantar flexion: WFL    Right Ankle/Foot   Dorsiflexion (kf): WFL  Plantar flexion: WFL    Strength/Myotome Testing     Left Hip   Planes of Motion   Flexion: 3+  Abduction: 3+  Adduction: 3+    Right Hip   Planes of Motion   Flexion: 3+  Abduction: 3+  Adduction: 3+    Left Knee   Flexion: 3+  Extension: 3+    Right Knee   Flexion: 3+  Extension: 3+    Left Ankle/Foot   Dorsiflexion: 3+  Plantar flexion: 3+    Right Ankle/Foot   Dorsiflexion: 3+  Plantar flexion: 3+    Ambulation     Comments   5x STS only able to complete 2 reps - 26 seconds, Stopped d/t increased back pain       RA   5 reps 23 seconds, No arms.     CTSIB -  EC firm EC foam Severe sway                Precautions: hx of R breast CA with mastectomy, psych disorder, renal disorder, recent R foot fx with removal of boot 10/1/22, fall risk, imbalance,  Pacemaker    Manuals 3/25 4/3 4/10 4/15 4/17 4/22 3/6 3/11                                               Neuro Re-Ed           Hip add 5''20x 5''30x 5\" 30x 5''20x 5\" x30 5''20x X20 ea X20 each    Hip abd  Black 5''20x Black 5''30x Black 5\" x30 Grey 5''20x Grey  5''30x Black 5''20x Black 5''20x Black 5''20x   LAQ  5\" x20  2# 5\" x30  2# 5\" x30 2# keily  5''20x 2#  5 x30  2# 5''20x 2# 5\" x20 5''20x   Tandem/ Tandem foam  On airex 20\" 3x ea HELD  NP time  20\"x3 ea  On foam  On airex 20\" 3x ea On airex 20\" 3x ea   EO EC foam  Airex FA/EO/EC " "2x20\" ea HELD  NP time  EO   Firm  20\" x2  EC 20\" x2  Airex FA/EO/EC 20\" ea Airex FA/EO/EC 20\" ea   Manuelito walks Side step hurdles                       Ther Ex           Nu step warm up  L3 x 15 min  L3 x 17 min  1/2 mile  L3 x 19' L 3 X10 min  30 canales L3 x20 min  1/2 mile L3 x 10 min L3 x 10 min L3 x 15 min 1495 steps    HR TR  X20 ea x30ea X30 ea x20 x20 20x ea X20 ea X20 ea   Mini Squat            Step Up  \"B\" 2# 2x10 ea \"B\" 3# 2x10 ea \"B\" 2# 2x10 ea B 10x each 2#  \"B\" 2# 3x10 ea 6\" step 10x ea  2.5#  6\" step 10x ea 6\" step 10x ea   Three way  2# 2X10  2# 3X10  2# 3x10 keily 10x  2#  2# 3X10  10x ea 2.5#  2x10 ea 2x10 ea                                    Ther Activity                                 Gait Training                                 Modalities                                                    "

## 2025-04-24 ENCOUNTER — OFFICE VISIT (OUTPATIENT)
Dept: PHYSICAL THERAPY | Facility: CLINIC | Age: 75
End: 2025-04-24
Payer: COMMERCIAL

## 2025-04-24 DIAGNOSIS — R26.89 BALANCE PROBLEM: Primary | ICD-10-CM

## 2025-04-24 DIAGNOSIS — I89.0 LYMPHEDEMA: Primary | ICD-10-CM

## 2025-04-24 PROCEDURE — 97110 THERAPEUTIC EXERCISES: CPT

## 2025-04-24 PROCEDURE — 97112 NEUROMUSCULAR REEDUCATION: CPT

## 2025-04-24 PROCEDURE — 97140 MANUAL THERAPY 1/> REGIONS: CPT

## 2025-04-24 NOTE — PROGRESS NOTES
"Daily Note     Today's date: 2025  Patient name: Deyanira Stokes  : 1950  MRN: [id number]  Referring provider: Merced Alex PA-C  Dx:   Encounter Diagnosis     ICD-10-CM    1. Balance problem  R26.89           Start Time: 1053  Stop Time: 1148  Total time in clinic (min): 55 minutes    Subjective: Pt reports she is scheduled to join gym thru [employer] when she is D/c'd from PT.       Objective: See treatment diary below      Assessment: Tolerated treatment well. Patient demonstrated fatigue post treatment, exhibited good technique with therapeutic exercises, and would benefit from continued PT.       Plan: Continue per plan of care.      Precautions: hx of R breast CA with mastectomy, psych disorder, renal disorder, recent R foot fx with removal of boot 10/1/22, fall risk, imbalance,  Pacemaker    Manuals 3/25 4/3 4/10 4/15 4/17 4/22 4/24 3/11                                               Neuro Re-Ed           Hip add 5''20x 5''30x 5\" 30x 5''20x 5\" x30 5''20x 5\"X20 ea X20 each    Hip abd  Black 5''20x Black 5''30x Black 5\" x30 Grey 5''20x Grey  5''30x Black 5''20x Grey 5''20x Black 5''20x   LAQ  5\" x20  2# 5\" x30  2# 5\" x30 2# keily  5''20x 2#  5 x30  2# 5''20x 2# 5\" x20  2#  5''20x   Tandem/ Tandem foam  On airex 20\" 3x ea HELD  NP time  20\"x3 ea  On foam  20\" x2 firm On airex 20\" 3x ea   EO EC foam  Airex FA/EO/EC 2x20\" ea HELD  NP time  EO   Firm  20\" x2  EC 20\" x2  EO   Firm  20\" x2  EC 20\" x2 Airex FA/EO/EC 20\" ea   Manuelito walks Side step hurdles                       Ther Ex           Nu step warm up  L3 x 15 min  L3 x 17 min  1/2 mile  L3 x 19' L 3 X10 min  30 canales L3 x20 min  1/2 mile L3 x 10 min L3 x 13 min  1/2 mile L3 x 15 min 1495 steps    HR TR  X20 ea x30ea X30 ea x20 x20 20x ea X20 ea X20 ea   Mini Squat            Step Up  \"B\" 2# 2x10 ea \"B\" 3# 2x10 ea \"B\" 2# 2x10 ea B 10x each 2#  \"B\" 2# 3x10 ea 6\" step 10x ea  2.5#  6\" step 2x10  2.5# ea 6\" step 10x ea   Three way  2# 2X10  2# " 3X10  2# 3x10 keily 10x  2#  2# 3X10  10x ea 2.5#  3x10 ea  2.5 # 2x10 ea                                    Ther Activity                                 Gait Training                                 Modalities

## 2025-04-24 NOTE — PROGRESS NOTES
Daily Note     Today's date: 2025  Patient name: Deyanira Stokes  : 1950  MRN: [id number]  Referring provider: Merced Alex PA-C  Dx:   Encounter Diagnosis     ICD-10-CM    1. Lymphedema  I89.0           Start Time: 1335  Stop Time: 1430  Total time in clinic (min): 55 minutes    Subjective: I have good/bad days with swelling  Today I feel more swollen R UE I have not been able to order updated compression garments 2* to attending PT 2 location      Objective: See treatment diary below    Goals 25   STGs to be achieved in 2 - 4 weeks  - MET ALL  Demonstrate at least 75% compliance with self MLD  Demonstrate at least 75% compliance with self compression  Demonstrate at least 75% compliance with self skin and nail inspection  Free from s/s of infection  Demonstrate understanding of importance of compliance with POC    LTGs to be achieved in 4 - 6 weeks  Demonstrate at least 100% compliance with self MLD ongoing  Demonstrate at least 100% compliance with self compression -- MET  Demonstrate at least 100% compliance with self skin and nail inspection--MET  Free from s/s of infection-- MET  Demonstrate understanding of day/night compression wearing schedule-- MET  Obtain alternative compression to ensure independence with self management-- pt to update compression / order products        Assessment: Pt tolerated MLD RUE treatment well. Demon overall < firmness/decongestion RUE end rx session . Patient review self lymph program / HEP for UE ROM/strength Pt util home pnumatic pump avg 2-3x/week  Review HEP/ to improve ROM /strength R shoulder   Pt demon overall Min > volumetrics R UE and abdominal region Pt with recent ER visit   Pt compliant with compression 100%        Plan: DC to self maintenance lymph program/ HEP  To benefit with f/u in 6 months     Precautions: hx of R breast CA with mastectomy, psych disorder, renal disorder, recent R foot fx with removal of boot 10/1/22, fall risk, imbalance,   Pacemaker    Re-eval Date: 4/1/25     Order Issued   Compression Garment(s)     2/26/25    R UE compression sleeve 20-30 mm Hg    - Bra/denise      Received          Received   Pneumatic pump Pt has ? 2023          Date 4/24       Visit Count 6       FOTO        Pain In        Pain Out                Manuals Volumetrics  RUE         CDT MLD  - Short neck  - Diaphragmatic Breath/deep viseral    - RUE    - Gentle ROM R shoulder  - MFR R ant thorax  - R AIA    Pt   Educ  issued HEP    Pt performed as outlined below      Pt asst into compression bra/denise/shirt and compression R UE garment     55 min                               Neuro Re-Ed                                                                Ther Ex                                                                        Ther Activity                        Gait Training                        Modalities                        Access Code: [email or web address]  URL: [email or web address]  Date: 03/20/2025  Prepared by: Candace Yarbrough    Exercises  - Supine Shoulder Press AAROM in Abduction with Dowel  - 1 x daily - 7 x weekly - 3 sets - 10 reps  - Supine Shoulder Flexion Extension AAROM with Dowel  - 1 x daily - 7 x weekly - 3 sets - 10 reps  - Standing Shoulder Abduction AAROM with Dowel  - 1 x daily - 7 x weekly - 3 sets - 10 reps  - Standing Shoulder External Rotation with Resistance  - 1 x daily - 7 x weekly - 3 sets - 10 reps  - Corner Pec Minor Stretch  - 1 x daily - 7 x weekly - 3 sets - 20-3 hold  - Standing Pec Stretch at Wall  - 1 x daily - 7 x weekly - 3 sets - 20-30 hold

## 2025-04-29 ENCOUNTER — APPOINTMENT (OUTPATIENT)
Dept: PHYSICAL THERAPY | Facility: CLINIC | Age: 75
End: 2025-04-29
Payer: COMMERCIAL

## 2025-05-01 ENCOUNTER — OFFICE VISIT (OUTPATIENT)
Dept: PHYSICAL THERAPY | Facility: CLINIC | Age: 75
End: 2025-05-01
Attending: PHYSICAL THERAPIST
Payer: COMMERCIAL

## 2025-05-01 ENCOUNTER — APPOINTMENT (OUTPATIENT)
Dept: PHYSICAL THERAPY | Facility: CLINIC | Age: 75
End: 2025-05-01
Payer: COMMERCIAL

## 2025-05-01 DIAGNOSIS — R26.89 BALANCE PROBLEM: Primary | ICD-10-CM

## 2025-05-01 PROCEDURE — 97110 THERAPEUTIC EXERCISES: CPT | Performed by: PHYSICAL THERAPIST

## 2025-05-01 PROCEDURE — 97112 NEUROMUSCULAR REEDUCATION: CPT | Performed by: PHYSICAL THERAPIST

## 2025-05-01 NOTE — PROGRESS NOTES
"Daily Note     Today's date: 2025  Patient name: Deyanira Stokes  : 1950  MRN: [id number]  Referring provider: Merced Alex PA-C  Dx:   Encounter Diagnosis     ICD-10-CM    1. Balance problem  R26.89                      Subjective: Pt reports she is scheduled to join gym thru [employer] when she is D/c'd from PT.       Objective: See treatment diary below      Assessment: Tolerated treatment well. Patient demonstrated fatigue post treatment, exhibited good technique with therapeutic exercises, and would benefit from continued PT. At this time, patient has achieved their maximum functional benefit from skilled physical therapy services and will be discharged to their HEP.  Patient is in agreement with the plan of care.  As a result, patient is discharged from physical therapy        Plan: Continue per plan of care.      Precautions: hx of R breast CA with mastectomy, psych disorder, renal disorder, recent R foot fx with removal of boot 10/1/22, fall risk, imbalance,  Pacemaker    Manuals 3/25 4/3 4/10 4/15 4/17 4/22 4/24 3/11                                               Neuro Re-Ed           Hip add 5''20x 5''30x 5\" 30x 5''20x 5\" x30 5''20x 5\"X20 ea X20 each    Hip abd  Black 5''20x Black 5''30x Black 5\" x30 Grey 5''20x Grey  5''30x Black 5''20x Grey 5''20x Black 5''20x   LAQ  5\" x20  2# 5\" x30  2# 5\" x30 2# keily  5''20x 2#  5 x30  2# 5''20x 2# 5\" x20  2#  5''20x 2#   Tandem/ Tandem foam  On airex 20\" 3x ea HELD  NP time  20\"x3 ea  On foam  20\" x2 firm    EO EC foam  Airex FA/EO/EC 2x20\" ea HELD  NP time  EO   Firm  20\" x2  EC 20\" x2  EO   Firm  20\" x2  EC 20\" x2    Manuelito walks Side step hurdles                       Ther Ex           Nu step warm up  L3 x 15 min  L3 x 17 min  1/2 mile  L3 x 19' L 3 X10 min  30 canales L3 x20 min  1/2 mile L3 x 10 min L3 x 13 min  1/2 mile L3 x 15 min 1495 steps    HR TR  X20 ea x30ea X30 ea x20 x20 20x ea X20 ea X20 ea   Mini Squat            Step Up  \"B\" 2# 2x10 " "ea \"B\" 3# 2x10 ea \"B\" 2# 2x10 ea B 10x each 2#  \"B\" 2# 3x10 ea 6\" step 10x ea  2.5#  6\" step 2x10  2.5# ea 6\" step 10x ea 2.5#    Three way  2# 2X10  2# 3X10  2# 3x10 keily 10x  2#  2# 3X10  10x ea 2.5#  3x10 ea  2.5 # 2x10 ea 2.5#                                     Ther Activity                                 Gait Training                                 Modalities                                                      "

## 2025-05-24 DIAGNOSIS — R07.89 OTHER CHEST PAIN: ICD-10-CM

## 2025-05-24 DIAGNOSIS — E78.5 HYPERLIPIDEMIA, UNSPECIFIED HYPERLIPIDEMIA TYPE: ICD-10-CM

## 2025-05-24 DIAGNOSIS — F41.1 GENERALIZED ANXIETY DISORDER: ICD-10-CM

## 2025-05-25 RX ORDER — ATORVASTATIN CALCIUM 20 MG/1
20 TABLET, FILM COATED ORAL DAILY
Qty: 90 TABLET | Refills: 1 | Status: SHIPPED | OUTPATIENT
Start: 2025-05-25

## 2025-05-25 RX ORDER — PANTOPRAZOLE SODIUM 40 MG/1
40 TABLET, DELAYED RELEASE ORAL DAILY
Qty: 90 TABLET | Refills: 1 | Status: SHIPPED | OUTPATIENT
Start: 2025-05-25

## 2025-05-27 RX ORDER — QUETIAPINE FUMARATE 25 MG/1
25 TABLET, FILM COATED ORAL
Qty: 90 TABLET | Refills: 0 | Status: SHIPPED | OUTPATIENT
Start: 2025-05-27

## 2025-06-05 ENCOUNTER — RA CDI HCC (OUTPATIENT)
Dept: OTHER | Facility: HOSPITAL | Age: 75
End: 2025-06-05

## 2025-06-09 ENCOUNTER — APPOINTMENT (OUTPATIENT)
Dept: LAB | Facility: HOSPITAL | Age: 75
End: 2025-06-09
Payer: COMMERCIAL

## 2025-06-09 ENCOUNTER — RESULTS FOLLOW-UP (OUTPATIENT)
Dept: INTERNAL MEDICINE CLINIC | Facility: CLINIC | Age: 75
End: 2025-06-09

## 2025-06-09 DIAGNOSIS — I10 PRIMARY HYPERTENSION: ICD-10-CM

## 2025-06-09 DIAGNOSIS — Z13.0 SCREENING FOR DEFICIENCY ANEMIA: ICD-10-CM

## 2025-06-09 DIAGNOSIS — Z13.1 SCREENING FOR DIABETES MELLITUS: ICD-10-CM

## 2025-06-09 DIAGNOSIS — E78.5 HYPERLIPIDEMIA LDL GOAL <130: ICD-10-CM

## 2025-06-09 DIAGNOSIS — M81.0 AGE-RELATED OSTEOPOROSIS WITHOUT CURRENT PATHOLOGICAL FRACTURE: ICD-10-CM

## 2025-06-09 DIAGNOSIS — Z13.29 SCREENING FOR THYROID DISORDER: ICD-10-CM

## 2025-06-09 LAB
ALBUMIN SERPL BCG-MCNC: 3.7 G/DL (ref 3.5–5)
ALP SERPL-CCNC: 74 U/L (ref 34–104)
ALT SERPL W P-5'-P-CCNC: 18 U/L (ref 7–52)
ANION GAP SERPL CALCULATED.3IONS-SCNC: 4 MMOL/L (ref 4–13)
AST SERPL W P-5'-P-CCNC: 20 U/L (ref 13–39)
BASOPHILS # BLD AUTO: 0.03 THOUSANDS/ÂΜL (ref 0–0.1)
BASOPHILS NFR BLD AUTO: 1 % (ref 0–1)
BILIRUB SERPL-MCNC: 0.68 MG/DL (ref 0.2–1)
BUN SERPL-MCNC: 12 MG/DL (ref 5–25)
CALCIUM SERPL-MCNC: 9.1 MG/DL (ref 8.4–10.2)
CHLORIDE SERPL-SCNC: 107 MMOL/L (ref 96–108)
CHOLEST SERPL-MCNC: 153 MG/DL (ref ?–200)
CO2 SERPL-SCNC: 32 MMOL/L (ref 21–32)
CREAT SERPL-MCNC: 0.61 MG/DL (ref 0.6–1.3)
EOSINOPHIL # BLD AUTO: 0.08 THOUSAND/ÂΜL (ref 0–0.61)
EOSINOPHIL NFR BLD AUTO: 2 % (ref 0–6)
ERYTHROCYTE [DISTWIDTH] IN BLOOD BY AUTOMATED COUNT: 11.9 % (ref 11.6–15.1)
GFR SERPL CREATININE-BSD FRML MDRD: 90 ML/MIN/1.73SQ M
GLUCOSE P FAST SERPL-MCNC: 94 MG/DL (ref 65–99)
HCT VFR BLD AUTO: 46.7 % (ref 34.8–46.1)
HDLC SERPL-MCNC: 57 MG/DL
HGB BLD-MCNC: 14.8 G/DL (ref 11.5–15.4)
IMM GRANULOCYTES # BLD AUTO: 0.01 THOUSAND/UL (ref 0–0.2)
IMM GRANULOCYTES NFR BLD AUTO: 0 % (ref 0–2)
LDLC SERPL CALC-MCNC: 77 MG/DL (ref 0–100)
LYMPHOCYTES # BLD AUTO: 1.44 THOUSANDS/ÂΜL (ref 0.6–4.47)
LYMPHOCYTES NFR BLD AUTO: 33 % (ref 14–44)
MCH RBC QN AUTO: 29.1 PG (ref 26.8–34.3)
MCHC RBC AUTO-ENTMCNC: 31.7 G/DL (ref 31.4–37.4)
MCV RBC AUTO: 92 FL (ref 82–98)
MONOCYTES # BLD AUTO: 0.49 THOUSAND/ÂΜL (ref 0.17–1.22)
MONOCYTES NFR BLD AUTO: 11 % (ref 4–12)
NEUTROPHILS # BLD AUTO: 2.32 THOUSANDS/ÂΜL (ref 1.85–7.62)
NEUTS SEG NFR BLD AUTO: 53 % (ref 43–75)
NONHDLC SERPL-MCNC: 96 MG/DL
NRBC BLD AUTO-RTO: 0 /100 WBCS
PLATELET # BLD AUTO: 156 THOUSANDS/UL (ref 149–390)
PMV BLD AUTO: 10.4 FL (ref 8.9–12.7)
POTASSIUM SERPL-SCNC: 4.6 MMOL/L (ref 3.5–5.3)
PROT SERPL-MCNC: 6.5 G/DL (ref 6.4–8.4)
RBC # BLD AUTO: 5.08 MILLION/UL (ref 3.81–5.12)
SODIUM SERPL-SCNC: 143 MMOL/L (ref 135–147)
TRIGL SERPL-MCNC: 97 MG/DL (ref ?–150)
TSH SERPL DL<=0.05 MIU/L-ACNC: 1.44 UIU/ML (ref 0.45–4.5)
WBC # BLD AUTO: 4.37 THOUSAND/UL (ref 4.31–10.16)

## 2025-06-09 PROCEDURE — 84443 ASSAY THYROID STIM HORMONE: CPT

## 2025-06-09 PROCEDURE — 36415 COLL VENOUS BLD VENIPUNCTURE: CPT

## 2025-06-09 PROCEDURE — 83036 HEMOGLOBIN GLYCOSYLATED A1C: CPT

## 2025-06-09 PROCEDURE — 80053 COMPREHEN METABOLIC PANEL: CPT

## 2025-06-09 PROCEDURE — 80061 LIPID PANEL: CPT

## 2025-06-09 PROCEDURE — 85025 COMPLETE CBC W/AUTO DIFF WBC: CPT

## 2025-06-10 LAB
EST. AVERAGE GLUCOSE BLD GHB EST-MCNC: 108 MG/DL
HBA1C MFR BLD: 5.4 %

## 2025-06-11 ENCOUNTER — OFFICE VISIT (OUTPATIENT)
Dept: INTERNAL MEDICINE CLINIC | Facility: CLINIC | Age: 75
End: 2025-06-11
Payer: COMMERCIAL

## 2025-06-11 VITALS
TEMPERATURE: 97.5 F | DIASTOLIC BLOOD PRESSURE: 80 MMHG | BODY MASS INDEX: 35.38 KG/M2 | HEART RATE: 94 BPM | OXYGEN SATURATION: 95 % | SYSTOLIC BLOOD PRESSURE: 130 MMHG | HEIGHT: 57 IN | WEIGHT: 164 LBS

## 2025-06-11 DIAGNOSIS — R51.9 NONINTRACTABLE HEADACHE, UNSPECIFIED CHRONICITY PATTERN, UNSPECIFIED HEADACHE TYPE: ICD-10-CM

## 2025-06-11 DIAGNOSIS — F33.41 RECURRENT MAJOR DEPRESSIVE DISORDER, IN PARTIAL REMISSION (HCC): Primary | Chronic | ICD-10-CM

## 2025-06-11 DIAGNOSIS — E28.39 PRIMARY OVARIAN FAILURE: ICD-10-CM

## 2025-06-11 DIAGNOSIS — Z00.00 MEDICARE ANNUAL WELLNESS VISIT, SUBSEQUENT: ICD-10-CM

## 2025-06-11 PROCEDURE — G0439 PPPS, SUBSEQ VISIT: HCPCS | Performed by: PHYSICIAN ASSISTANT

## 2025-06-11 PROCEDURE — 99213 OFFICE O/P EST LOW 20 MIN: CPT | Performed by: PHYSICIAN ASSISTANT

## 2025-06-11 RX ORDER — FLUOXETINE HYDROCHLORIDE 40 MG/1
40 CAPSULE ORAL DAILY
Qty: 90 CAPSULE | Refills: 1 | Status: SHIPPED | OUTPATIENT
Start: 2025-06-11

## 2025-06-11 NOTE — PROGRESS NOTES
Name: Deyanira Stokes      : 1950      MRN: [id number]  Encounter Provider: Merced Alex PA-C  Encounter Date: 2025   Encounter department: [facility]  :  Assessment & Plan  Primary ovarian failure    Orders:  •  DXA bone density spine hip and pelvis; Future    Recurrent major depressive disorder, in partial remission (HCC)  Continue seroquel at present dose. Continue prozac but increase to 40mg daily. Directions for use and possible side effects discussed and patient verbalized understanding of these.    Orders:  •  FLUoxetine (PROzac) 40 MG capsule; Take 1 capsule (40 mg total) by mouth daily    Nonintractable headache, unspecified chronicity pattern, unspecified headache type    Orders:  •  butalbital-acetaminophen-caffeine (FIORICET,ESGIC) -40 mg per tablet; Take 1 tablet by mouth 3 (three) times a day as needed for headaches  •  oxyCODONE (OxyCONTIN) 10 mg 12 hr tablet; Take 1 tablet (10 mg total) by mouth as needed for moderate pain    Medicare annual wellness visit, subsequent           Depression Screening and Follow-up Plan: Patient was screened for depression during today's encounter. They screened negative with a PHQ-9 score of 1.        Preventive health issues were discussed with patient, and age appropriate screening tests were ordered as noted in patient's After Visit Summary. Personalized health advice and appropriate referrals for health education or preventive services given if needed, as noted in patient's After Visit Summary.    History of Present Illness     Pt presents for subsequent AWV. She is noting issues with some ongoing irritability and dysphoric mood and desires medication adjustment to help.        Patient Care Team:  Merced Alex PA-C as PCP - General (Internal Medicine)  Aiden Perry DO as PCP - PCP-[facility] ([facility])  Colton Moralez MD as Endoscopist  JERARDO Ascencio as Nurse Practitioner (Urology)  Tiffani Cool PA-C  (Physician Assistant)  JERARDO Hernandez as Nurse Practitioner (Nurse Practitioner)  Lucian Aponte MD (Urology)    Review of Systems   Constitutional:  Negative for chills and fever.   HENT:  Negative for congestion, ear pain, hearing loss, postnasal drip, rhinorrhea, sinus pressure, sinus pain, sore throat and trouble swallowing.    Eyes:  Negative for pain and visual disturbance.   Respiratory:  Negative for cough, chest tightness, shortness of breath and wheezing.    Cardiovascular: Negative.  Negative for chest pain, palpitations and leg swelling.   Gastrointestinal:  Negative for abdominal pain, blood in stool, constipation, diarrhea, nausea and vomiting.   Endocrine: Negative for cold intolerance, heat intolerance, polydipsia, polyphagia and polyuria.   Genitourinary:  Negative for difficulty urinating, dysuria, flank pain and urgency.   Musculoskeletal:  Negative for arthralgias, back pain, gait problem and myalgias.   Skin:  Negative for rash.   Allergic/Immunologic: Negative.    Neurological:  Negative for dizziness, weakness, light-headedness and headaches.   Hematological: Negative.    Psychiatric/Behavioral:  Negative for behavioral problems, dysphoric mood and sleep disturbance. The patient is not nervous/anxious.      Medical History Reviewed by provider this encounter:  Tobacco  Allergies  Meds  Problems  Med Hx  Surg Hx  Fam Hx       Annual Wellness Visit Questionnaire   Deyanira is here for her Subsequent Wellness visit. Last Medicare Wellness visit information reviewed, patient interviewed and updates made to the record today.      Health Risk Assessment:   Patient rates overall health as good. Patient feels that their physical health rating is much better. Patient is satisfied with their life. Eyesight was rated as same. Hearing was rated as slightly worse. Patient feels that their emotional and mental health rating is much better. Patients states they are often angry. Patient states they are  sometimes unusually tired/fatigued. Pain experienced in the last 7 days has been some. Patient's pain rating has been 5/10. Patient states that she has experienced weight loss or gain in last 6 months.     Depression Screening:   PHQ-9 Score: 1      Fall Risk Screening:   In the past year, patient has experienced: history of falling in past year    Number of falls: 2 or more  Injured during fall?: Yes    Feels unsteady when standing or walking?: No    Worried about falling?: Yes      Urinary Incontinence Screening:   Patient has leaked urine accidently in the last six months.     Home Safety:  Patient has trouble with stairs inside or outside of their home. Patient has working smoke alarms and has working carbon monoxide detector. Home safety hazards include: none.     Nutrition:   Current diet is Regular.     Medications:   Patient is not currently taking any over-the-counter supplements. Patient is able to manage medications.     Activities of Daily Living (ADLs)/Instrumental Activities of Daily Living (IADLs):   Walk and transfer into and out of bed and chair?: Yes  Dress and groom yourself?: Yes    Bathe or shower yourself?: Yes    Feed yourself? Yes  Do your laundry/housekeeping?: No  Manage your money, pay your bills and track your expenses?: No  Make your own meals?: No    Do your own shopping?: No    Previous Hospitalizations:   Any hospitalizations or ED visits within the last 12 months?: No      Advance Care Planning:   Living will: No    Durable POA for healthcare: Yes    Advanced directive: No      Cognitive Screening:   Provider or family/friend/caregiver concerned regarding cognition?: No    Preventive Screenings      Cardiovascular Screening:    General: Screening Not Indicated and History Lipid Disorder      Diabetes Screening:     General: Screening Current      Colorectal Cancer Screening:     General: Screening Current      Breast Cancer Screening:     General: History Breast Cancer      Cervical  Cancer Screening:    General: Screening Not Indicated      Osteoporosis Screening:    General: Screening Not Indicated and History Osteoporosis      Lung Cancer Screening:     General: Screening Not Indicated      Hepatitis C Screening:    General: Screening Current    Immunizations:  - Immunizations due: Zoster (Shingrix)    Screening, Brief Intervention, and Referral to Treatment (SBIRT)     Screening  Typical number of drinks in a day: 0  Typical number of drinks in a week: 0  Interpretation: Low risk drinking behavior.    Single Item Drug Screening:  How often have you used an illegal drug (including marijuana) or a prescription medication for non-medical reasons in the past year? never    Single Item Drug Screen Score: 0  Interpretation: Negative screen for possible drug use disorder    Review of Current Opioid Use  Opioid Risk Tool (ORT) Score: 0  Opioid Risk Tool (ORT) Interpretation: Score 0-3: Low risk for opioid misuse    Social Drivers of Health     Financial Resource Strain: Low Risk  (5/26/2023)    Overall Financial Resource Strain (CARDIA)    • Difficulty of Paying Living Expenses: Not very hard   Food Insecurity: No Food Insecurity (6/11/2025)    Nursing - Inadequate Food Risk Classification    • Worried About Running Out of Food in the Last Year: Never true    • Ran Out of Food in the Last Year: Never true    • Ran Out of Food in the Last Year: Never true   Transportation Needs: No Transportation Needs (6/11/2025)    PRAPARE - Transportation    • Lack of Transportation (Medical): No    • Lack of Transportation (Non-Medical): No   Housing Stability: Low Risk  (6/11/2025)    Housing Stability Vital Sign    • Unable to Pay for Housing in the Last Year: No    • Number of Times Moved in the Last Year: 0    • Homeless in the Last Year: No   Utilities: Not At Risk (6/11/2025)    [facility] Utilities    • Threatened with loss of utilities: No     No results found.    Objective   /80 (BP Location: Left arm,  "Patient Position: Sitting)   Pulse 94   Temp 97.5 °F (36.4 °C) (Tympanic)   Ht 4' 9\" (1.448 m)   Wt 74.4 kg (164 lb)   LMP  (LMP Unknown)   SpO2 95%   BMI 35.49 kg/m²     Physical Exam  Constitutional:       Appearance: She is well-developed.   HENT:      Head: Normocephalic and atraumatic.      Right Ear: External ear normal.      Left Ear: External ear normal.      Nose: Nose normal.     Eyes:      Conjunctiva/sclera: Conjunctivae normal.       Cardiovascular:      Rate and Rhythm: Normal rate and regular rhythm.      Heart sounds: Normal heart sounds.   Pulmonary:      Effort: Pulmonary effort is normal.      Breath sounds: Normal breath sounds.   Abdominal:      General: Bowel sounds are normal.      Palpations: Abdomen is soft.     Musculoskeletal:         General: Normal range of motion.      Cervical back: Normal range of motion and neck supple.     Skin:     General: Skin is warm and dry.     Neurological:      Mental Status: She is alert and oriented to person, place, and time.     Psychiatric:         Behavior: Behavior normal.         Thought Content: Thought content normal.         Judgment: Judgment normal.         "

## 2025-06-12 RX ORDER — BUTALBITAL, ACETAMINOPHEN AND CAFFEINE 50; 325; 40 MG/1; MG/1; MG/1
1 TABLET ORAL 3 TIMES DAILY PRN
Qty: 90 TABLET | Refills: 1 | Status: SHIPPED | OUTPATIENT
Start: 2025-06-12

## 2025-06-12 RX ORDER — OXYCODONE HCL 10 MG/1
10 TABLET, FILM COATED, EXTENDED RELEASE ORAL AS NEEDED
Qty: 30 TABLET | Refills: 0 | Status: SHIPPED | OUTPATIENT
Start: 2025-06-12

## 2025-06-12 NOTE — ASSESSMENT & PLAN NOTE
Continue seroquel at present dose. Continue prozac but increase to 40mg daily. Directions for use and possible side effects discussed and patient verbalized understanding of these.    Orders:  •  FLUoxetine (PROzac) 40 MG capsule; Take 1 capsule (40 mg total) by mouth daily

## 2025-06-12 NOTE — PATIENT INSTRUCTIONS
Medicare Preventive Visit Patient Instructions  Thank you for completing your Welcome to Medicare Visit or Medicare Annual Wellness Visit today. Your next wellness visit will be due in one year (6/13/2026).  The screening/preventive services that you may require over the next 5-10 years are detailed below. Some tests may not apply to you based off risk factors and/or age. Screening tests ordered at today's visit but not completed yet may show as past due. Also, please note that scanned in results may not display below.  Preventive Screenings:  Service Recommendations Previous Testing/Comments   Colorectal Cancer Screening  * Colonoscopy    * Fecal Occult Blood Test (FOBT)/Fecal Immunochemical Test (FIT)  * Fecal DNA/Cologuard Test  * Flexible Sigmoidoscopy Age: 45-75 years old   Colonoscopy: every 10 years (may be performed more frequently if at higher risk)  OR  FOBT/FIT: every 1 year  OR  Cologuard: every 3 years  OR  Sigmoidoscopy: every 5 years  Screening may be recommended earlier than age 45 if at higher risk for colorectal cancer. Also, an individualized decision between you and your healthcare provider will decide whether screening between the ages of 76-85 would be appropriate. Colonoscopy: 05/31/2016  FOBT/FIT: Not on file  Cologuard: Not on file  Sigmoidoscopy: Not on file    Screening Current     Breast Cancer Screening Age: 40+ years old  Frequency: every 1-2 years  Not required if history of left and right mastectomy Mammogram: 08/04/2022    History Breast Cancer   Cervical Cancer Screening Between the ages of 21-29, pap smear recommended once every 3 years.   Between the ages of 30-65, can perform pap smear with HPV co-testing every 5 years.   Recommendations may differ for women with a history of total hysterectomy, cervical cancer, or abnormal pap smears in past. Pap Smear: Not on file    Screening Not Indicated   Hepatitis C Screening Once for adults born between 1945 and 1965  More frequently in  patients at high risk for Hepatitis C Hep C Antibody: 12/26/2022    Screening Current   Diabetes Screening 1-2 times per year if you're at risk for diabetes or have pre-diabetes Fasting glucose: 94 mg/dL (6/9/2025)  A1C: 5.4 % (6/9/2025)  Screening Current   Cholesterol Screening Once every 5 years if you don't have a lipid disorder. May order more often based on risk factors. Lipid panel: 06/09/2025    Screening Not Indicated  History Lipid Disorder     Other Preventive Screenings Covered by Medicare:  Abdominal Aortic Aneurysm (AAA) Screening: covered once if your at risk. You're considered to be at risk if you have a family history of AAA.  Lung Cancer Screening: covers low dose CT scan once per year if you meet all of the following conditions: (1) Age 55-77; (2) No signs or symptoms of lung cancer; (3) Current smoker or have quit smoking within the last 15 years; (4) You have a tobacco smoking history of at least 20 pack years (packs per day multiplied by number of years you smoked); (5) You get a written order from a healthcare provider.  Glaucoma Screening: covered annually if you're considered high risk: (1) You have diabetes OR (2) Family history of glaucoma OR (3)  aged 50 and older OR (4)  American aged 65 and older  Osteoporosis Screening: covered every 2 years if you meet one of the following conditions: (1) You're estrogen deficient and at risk for osteoporosis based off medical history and other findings; (2) Have a vertebral abnormality; (3) On glucocorticoid therapy for more than 3 months; (4) Have primary hyperparathyroidism; (5) On osteoporosis medications and need to assess response to drug therapy.   Last bone density test (DXA Scan): 08/22/2023.  HIV Screening: covered annually if you're between the age of 15-65. Also covered annually if you are younger than 15 and older than 65 with risk factors for HIV infection. For pregnant patients, it is covered up to 3 times per  pregnancy.    Immunizations:  Immunization Recommendations   Influenza Vaccine Annual influenza vaccination during flu season is recommended for all persons aged >= 6 months who do not have contraindications   Pneumococcal Vaccine   * Pneumococcal conjugate vaccine = PCV13 (Prevnar 13), PCV15 (Vaxneuvance), PCV20 (Prevnar 20)  * Pneumococcal polysaccharide vaccine = PPSV23 (Pneumovax) Adults 19-65 yo with certain risk factors or if 65+ yo  If never received any pneumonia vaccine: recommend Prevnar 20 (PCV20)  Give PCV20 if previously received 1 dose of PCV13 or PPSV23   Hepatitis B Vaccine 3 dose series if at intermediate or high risk (ex: diabetes, end stage renal disease, liver disease)   Respiratory syncytial virus (RSV) Vaccine - COVERED BY MEDICARE PART D  * RSVPreF3 (Arexvy) CDC recommends that adults 60 years of age and older may receive a single dose of RSV vaccine using shared clinical decision-making (SCDM)   Tetanus (Td) Vaccine - COST NOT COVERED BY MEDICARE PART B Following completion of primary series, a booster dose should be given every 10 years to maintain immunity against tetanus. Td may also be given as tetanus wound prophylaxis.   Tdap Vaccine - COST NOT COVERED BY MEDICARE PART B Recommended at least once for all adults. For pregnant patients, recommended with each pregnancy.   Shingles Vaccine (Shingrix) - COST NOT COVERED BY MEDICARE PART B  2 shot series recommended in those 19 years and older who have or will have weakened immune systems or those 50 years and older     Health Maintenance Due:      Topic Date Due   • Breast Cancer Screening: Mammogram  08/04/2023   • DXA SCAN  08/22/2025   • Colorectal Cancer Screening  05/31/2026   • Hepatitis C Screening  Completed     Immunizations Due:      Topic Date Due   • COVID-19 Vaccine (5 - 2024-25 season) 09/27/2024     Advance Directives   What are advance directives?  Advance directives are legal documents that state your wishes and plans for  medical care. These plans are made ahead of time in case you lose your ability to make decisions for yourself. Advance directives can apply to any medical decision, such as the treatments you want, and if you want to donate organs.   What are the types of advance directives?  There are many types of advance directives, and each state has rules about how to use them. You may choose a combination of any of the following:  Living will:  This is a written record of the treatment you want. You can also choose which treatments you do not want, which to limit, and which to stop at a certain time. This includes surgery, medicine, IV fluid, and tube feedings.   Durable power of  for healthcare (DPAHC):  This is a written record that states who you want to make healthcare choices for you when you are unable to make them for yourself. This person, called a proxy, is usually a family member or a friend. You may choose more than 1 proxy.  Do not resuscitate (DNR) order:  A DNR order is used in case your heart stops beating or you stop breathing. It is a request not to have certain forms of treatment, such as CPR. A DNR order may be included in other types of advance directives.  Medical directive:  This covers the care that you want if you are in a coma, near death, or unable to make decisions for yourself. You can list the treatments you want for each condition. Treatment may include pain medicine, surgery, blood transfusions, dialysis, IV or tube feedings, and a ventilator (breathing machine).  Values history:  This document has questions about your views, beliefs, and how you feel and think about life. This information can help others choose the care that you would choose.  Why are advance directives important?  An advance directive helps you control your care. Although spoken wishes may be used, it is better to have your wishes written down. Spoken wishes can be misunderstood, or not followed. Treatments may be given  even if you do not want them. An advance directive may make it easier for your family to make difficult choices about your care.   Fall Prevention    Fall prevention  includes ways to make your home and other areas safer. It also includes ways you can move more carefully to prevent a fall. Health conditions that cause changes in your blood pressure, vision, or muscle strength and coordination may increase your risk for falls. Medicines may also increase your risk for falls if they make you dizzy, weak, or sleepy.   Fall prevention tips:   Stand or sit up slowly.    Use assistive devices as directed.    Wear shoes that fit well and have soles that .    Wear a personal alarm.    Stay active.    Manage your medical conditions.    Home Safety Tips:  Add items to prevent falls in the bathroom.    Keep paths clear.    Install bright lights in your home.    Keep items you use often on shelves within reach.    Paint or place reflective tape on the edges of your stairs.    Urinary Incontinence   Urinary incontinence (UI)  is when you lose control of your bladder. UI develops because your bladder cannot store or empty urine properly. The 3 most common types of UI are stress incontinence, urge incontinence, or both.  Medicines:   May be given to help strengthen your bladder control. Report any side effects of medication to your healthcare provider.  Do pelvic muscle exercises often:  Your pelvic muscles help you stop urinating. Squeeze these muscles tight for 5 seconds, then relax for 5 seconds. Gradually work up to squeezing for 10 seconds. Do 3 sets of 15 repetitions a day, or as directed. This will help strengthen your pelvic muscles and improve bladder control.  Train your bladder:  Go to the bathroom at set times, such as every 2 hours, even if you do not feel the urge to go. You can also try to hold your urine when you feel the urge to go. For example, hold your urine for 5 minutes when you feel the urge to go. As  that becomes easier, hold your urine for 10 minutes.   Self-care:   Keep a UI record.  Write down how often you leak urine and how much you leak. Make a note of what you were doing when you leaked urine.  Drink liquids as directed. You may need to limit the amount of liquid you drink to help control your urine leakage. Do not drink any liquid right before you go to bed. Limit or do not have drinks that contain caffeine or alcohol.   Prevent constipation.  Eat a variety of high-fiber foods. Good examples are high-fiber cereals, beans, vegetables, and whole-grain breads. Walking is the best way to trigger your intestines to have a bowel movement.  Exercise regularly and maintain a healthy weight.  Weight loss and exercise will decrease pressure on your bladder and help you control your leakage.   Use a catheter as directed  to help empty your bladder. A catheter is a tiny, plastic tube that is put into your bladder to drain your urine.   Go to behavior therapy as directed.  Behavior therapy may be used to help you learn to control your urge to urinate.    Weight Management   Why it is important to manage your weight:  Being overweight increases your risk of health conditions such as heart disease, high blood pressure, type 2 diabetes, and certain types of cancer. It can also increase your risk for osteoarthritis, sleep apnea, and other respiratory problems. Aim for a slow, steady weight loss. Even a small amount of weight loss can lower your risk of health problems.  How to lose weight safely:  A safe and healthy way to lose weight is to eat fewer calories and get regular exercise. You can lose up about 1 pound a week by decreasing the number of calories you eat by 500 calories each day.   Healthy meal plan for weight management:  A healthy meal plan includes a variety of foods, contains fewer calories, and helps you stay healthy. A healthy meal plan includes the following:  Eat whole-grain foods more often.  A  healthy meal plan should contain fiber. Fiber is the part of grains, fruits, and vegetables that is not broken down by your body. Whole-grain foods are healthy and provide extra fiber in your diet. Some examples of whole-grain foods are whole-wheat breads and pastas, oatmeal, brown rice, and bulgur.  Eat a variety of vegetables every day.  Include dark, leafy greens such as spinach, kale, florin greens, and mustard greens. Eat yellow and orange vegetables such as carrots, sweet potatoes, and winter squash.   Eat a variety of fruits every day.  Choose fresh or canned fruit (canned in its own juice or light syrup) instead of juice. Fruit juice has very little or no fiber.  Eat low-fat dairy foods.  Drink fat-free (skim) milk or 1% milk. Eat fat-free yogurt and low-fat cottage cheese. Try low-fat cheeses such as mozzarella and other reduced-fat cheeses.  Choose meat and other protein foods that are low in fat.  Choose beans or other legumes such as split peas or lentils. Choose fish, skinless poultry (chicken or turkey), or lean cuts of red meat (beef or pork). Before you cook meat or poultry, cut off any visible fat.   Use less fat and oil.  Try baking foods instead of frying them. Add less fat, such as margarine, sour cream, regular salad dressing and mayonnaise to foods. Eat fewer high-fat foods. Some examples of high-fat foods include french fries, doughnuts, ice cream, and cakes.  Eat fewer sweets.  Limit foods and drinks that are high in sugar. This includes candy, cookies, regular soda, and sweetened drinks.  Exercise:  Exercise at least 30 minutes per day on most days of the week. Some examples of exercise include walking, biking, dancing, and swimming. You can also fit in more physical activity by taking the stairs instead of the elevator or parking farther away from stores. Ask your healthcare provider about the best exercise plan for you.   Narcotic (Opioid) Safety    Use narcotics safely:  Take prescribed  narcotics exactly as directed  Do not give narcotics to others or take narcotics that belong to someone else  Do not mix narcotics without medicines or alcohol  Do not drive or operate heavy machinery after you take the narcotic  Monitor for side effects and notify your healthcare provider if you experienced side effects such as nausea, sleepiness, itching, or trouble thinking clearly.    Manage constipation:    Constipation is the most common side effect of narcotic medicine. Constipation is when you have hard, dry bowel movements, or you go longer than usual between bowel movements. Tell your healthcare provider about all changes in your bowel movements while you are taking narcotics. He or she may recommend laxative medicine to help you have a bowel movement. He or she may also change the kind of narcotic you are taking, or change when you take it. The following are more ways you can prevent or relieve constipation:    Drink liquids as directed.  You may need to drink extra liquids to help soften and move your bowels. Ask how much liquid to drink each day and which liquids are best for you.  Eat high-fiber foods.  This may help decrease constipation by adding bulk to your bowel movements. High-fiber foods include fruits, vegetables, whole-grain breads and cereals, and beans. Your healthcare provider or dietitian can help you create a high-fiber meal plan. Your provider may also recommend a fiber supplement if you cannot get enough fiber from food.  Exercise regularly.  Regular physical activity can help stimulate your intestines. Walking is a good exercise to prevent or relieve constipation. Ask which exercises are best for you.  Schedule a time each day to have a bowel movement.  This may help train your body to have regular bowel movements. Bend forward while you are on the toilet to help move the bowel movement out. Sit on the toilet for at least 10 minutes, even if you do not have a bowel movement.    Store  narcotics safely:   Store narcotics where others cannot easily get them.  Keep them in a locked cabinet or secure area. Do not  keep them in a purse or other bag you carry with you. A person may be looking for something else and find the narcotics.  Make sure narcotics are stored out of the reach of children.  A child can easily overdose on narcotics. Narcotics may look like candy to a small child.    The best way to dispose of narcotics:      The laws vary by country and area. In the [place], the best way is to return the narcotics through a take-back program. This program is offered by the [place] Drug Enforcement Agency ([employer]). The following are options for using the program:  Take the narcotics to a [employer] collection site.  The site is often a law enforcement center. Call your local law enforcement center for scheduled take-back days in your area. You will be given information on where to go if the collection site is in a different location.  Take the narcotics to an approved pharmacy or hospital.  A pharmacy or hospital may be set up as a collection site. You will need to ask if it is a [employer] collection site if you were not directed there. A pharmacy or doctor's office may not be able to take back narcotics unless it is a [employer] site.  Use a mail-back system.  This means you are given containers to put the narcotics into. You will then mail them in the containers.  Use a take-back drop box.  This is a place to leave the narcotics at any time. People and animals will not be able to get into the box. Your local law enforcement agency can tell you where to find a drop box in your area.    Other ways to manage pain:   Ask your healthcare provider about non-narcotic medicines to control pain.  Nonprescription medicines include NSAIDs (such as ibuprofen) and acetaminophen. Prescription medicines include muscle relaxers, antidepressants, and steroids.  Pain may be managed without any medicines.  Some ways to relieve pain  include massage, aromatherapy, or meditation. Physical or occupational therapy may also help.    For more information:   Drug Enforcement Administration  [address] [place] [address]  Phone: 6- 315 - 932-9269  Web Address: https://www.deadiversion.[facility].gov/drug_disposal/    [place] Food and Drug Administration  [address] [place] [address]  Phone: 5- 536 - 921-5542  Web Address: http://www.fda.gov   © Copyright [employer] 2018 Information is for End User's use only and may not be sold, redistributed or otherwise used for commercial purposes. All illustrations and images included in CareNotes® are the copyrighted property of [employer] Inc[employer] or [employer]

## 2025-06-16 DIAGNOSIS — R25.1 TREMOR: ICD-10-CM

## 2025-06-16 DIAGNOSIS — R26.89 BALANCE DISORDER: ICD-10-CM

## 2025-06-16 DIAGNOSIS — R41.89 COGNITIVE DECLINE: ICD-10-CM

## 2025-06-16 RX ORDER — DONEPEZIL HYDROCHLORIDE 10 MG/1
10 TABLET, FILM COATED ORAL
Qty: 100 TABLET | Refills: 1 | Status: SHIPPED | OUTPATIENT
Start: 2025-06-16

## 2025-06-26 ENCOUNTER — TELEPHONE (OUTPATIENT)
Dept: ADMINISTRATIVE | Facility: HOSPITAL | Age: 75
End: 2025-06-26

## 2025-07-01 ENCOUNTER — REMOTE DEVICE CLINIC VISIT (OUTPATIENT)
Dept: CARDIOLOGY CLINIC | Facility: CLINIC | Age: 75
End: 2025-07-01
Payer: COMMERCIAL

## 2025-07-01 ENCOUNTER — PROCEDURE VISIT (OUTPATIENT)
Dept: PODIATRY | Facility: CLINIC | Age: 75
End: 2025-07-01
Payer: COMMERCIAL

## 2025-07-01 VITALS — WEIGHT: 163 LBS | BODY MASS INDEX: 35.17 KG/M2 | HEIGHT: 57 IN

## 2025-07-01 DIAGNOSIS — M19.072 ARTHRITIS OF BOTH MIDFEET: ICD-10-CM

## 2025-07-01 DIAGNOSIS — R60.1 GENERALIZED EDEMA: ICD-10-CM

## 2025-07-01 DIAGNOSIS — I49.5 SSS (SICK SINUS SYNDROME) (HCC): Primary | ICD-10-CM

## 2025-07-01 DIAGNOSIS — M19.071 ARTHRITIS OF BOTH MIDFEET: ICD-10-CM

## 2025-07-01 DIAGNOSIS — M79.671 PAIN IN BOTH FEET: ICD-10-CM

## 2025-07-01 DIAGNOSIS — M79.672 PAIN IN BOTH FEET: ICD-10-CM

## 2025-07-01 DIAGNOSIS — B35.1 ONYCHOMYCOSIS: Primary | ICD-10-CM

## 2025-07-01 DIAGNOSIS — Z79.01 LONG TERM CURRENT USE OF ANTICOAGULANT: ICD-10-CM

## 2025-07-01 PROCEDURE — 93296 REM INTERROG EVL PM/IDS: CPT | Performed by: STUDENT IN AN ORGANIZED HEALTH CARE EDUCATION/TRAINING PROGRAM

## 2025-07-01 PROCEDURE — 11721 DEBRIDE NAIL 6 OR MORE: CPT | Performed by: PODIATRIST

## 2025-07-01 PROCEDURE — 93294 REM INTERROG EVL PM/LDLS PM: CPT | Performed by: STUDENT IN AN ORGANIZED HEALTH CARE EDUCATION/TRAINING PROGRAM

## 2025-07-01 NOTE — PROGRESS NOTES
Results for orders placed or performed in visit on 07/01/25   Cardiac EP device report    Narrative    CARELINK TRANSMISSION: BATTERY VOLTAGE ADEQUATE (14.7 YRS). AP: 2.5%. : <0.1% (MVP-ON). ALL AVAILABLE LEAD PARAMETERS WITHIN NORMAL LIMITS. NO SIGNIFICANT HIGH RATE EPISODES. NORMAL DEVICE FUNCTION. CH

## 2025-07-01 NOTE — PROGRESS NOTES
"Name: Deyanira Stokes      : 1950      MRN: [id number]  Encounter Provider: Breezy Farr DPM  Encounter Date: 2025   Encounter department: [facility] [place]  :  Assessment & Plan  Onychomycosis       Debride mycotic nails and thin the nail plates x 6 with the use of a nail nipper manually and an electric Dremel bur was used to reduce the thickness of the nail beds and smoothed the distal aspect of the nails.   Generalized edema         Arthritis of both midfeet       Recommended that she use some type of topical NSAIDs such as Voltaren gel, IcyHot, or some type of other product.  Recommend that she use some type of over-the-counter insole to support  Pain in both feet         Long term current use of anticoagulant         Reviewed last primary care doctor's note from 2025.  Discussed proper shoe gear, daily inspections of feet, and general foot health with patient. Patient has Q8  findings and is recommended for at risk foot care every 9-10 weeks.    Patients most recent complete clinical foot exam was on: 2025    Return in about 10 weeks (around 2025).      History of Present Illness   HPI  Deyanira Stokes is a 75 y.o. female who presents with chief complaint of painful thick nails on both feet.  She relates that she has a secondary complaint of pain on the top of both feet secondary to arthritis affecting the joints in the midfoot.  She has had shots in the area in the past and has noticed that the shots have now worn off.  Patient presents for at-risk foot care.  Patient has no acute concerns today.  Patient has significant lower extremity risk due to neuropathy, parasthesia, edema, and trophic skin changes to the lower extremity.   History obtained from: patient    Review of Systems  Medical History Reviewed by provider this encounter:     .  Medications Ordered Prior to Encounter[1]   Social History[2]     Objective   Ht 4' 9\" (1.448 m)   Wt 73.9 kg (163 lb)   LMP  " (LMP Unknown)   BMI 35.27 kg/m²      Physical Exam  Vascular status is 1/4 DP PT negative digital hair, normal distal cooling, immediate capillary refill with slight edema present bilaterally.  Capillary refill is approximately 2 to 3 seconds.  The edema is +1 pitting.    Derm nails are brittle elongated hypertrophic white-yellow discoloration with subungual debris x 6.  There is an increased thickness in the nails of approximately 1 to 2 mm.  There is small amount of white flaky tissue present on the dorsal aspect of both feet.  There is loss of turgor noted bilaterally and thinning of the skin.    Ortho mild exostosis are noted in the midfoot area with palpation.  Minor hammertoe deformity present as of digits 2 3 and 4 bilaterally.    Neuro is intact and equal bilaterally.         [1]   Current Outpatient Medications on File Prior to Visit   Medication Sig Dispense Refill    acetaminophen (TYLENOL) 650 mg CR tablet Take 1 tablet (650 mg total) by mouth every 8 (eight) hours as needed for mild pain Patient takes 500mg      albuterol (2.5 mg/3 mL) 0.083 % nebulizer solution Take 2.5 mg by nebulization every 6 (six) hours as needed for wheezing      albuterol (PROVENTIL HFA,VENTOLIN HFA) 90 mcg/act inhaler Inhale 2 puffs every 4 (four) hours as needed for wheezing 8.5 g 2    aspirin 81 mg chewable tablet Chew 81 mg in the morning.      atorvastatin (LIPITOR) 20 mg tablet Take 1 tablet by mouth daily. 90 tablet 1    butalbital-acetaminophen-caffeine (FIORICET,ESGIC) -40 mg per tablet Take 1 tablet by mouth 3 (three) times a day as needed for headaches 90 tablet 1    donepezil (ARICEPT) 10 mg tablet Take 1 tablet (10 mg total) by mouth daily at bedtime 100 tablet 1    FLUoxetine (PROzac) 40 MG capsule Take 1 capsule (40 mg total) by mouth daily 90 capsule 1    fluticasone (FLONASE) 50 mcg/act nasal spray INSTILL 1 SPRAY INTO EACH NOSTRIL ONCE DAILY 48 mL 1    lisinopril (ZESTRIL) 5 mg tablet TAKE 1 TABLET (5 MG  TOTAL) BY MOUTH DAILY. 90 tablet 1    montelukast (SINGULAIR) 10 mg tablet TAKE 1 TABLET BY MOUTH DAILY AT BEDTIME 90 tablet 1    Myrbetriq 25 MG TB24 Take 2 tablets by mouth in the morning. 180 tablet 1    Nutritional Supplements (BOOST BREEZE PO) Take by mouth in the morning. Nutritional drink   3 times a week. .      oxyCODONE (OxyCONTIN) 10 mg 12 hr tablet Take 1 tablet (10 mg total) by mouth as needed for moderate pain 30 tablet 0    pantoprazole (PROTONIX) 40 mg tablet Take 1 tablet by mouth daily. 90 tablet 1    QUEtiapine (SEROquel) 25 mg tablet Take 1 tablet by mouth daily at bedtime. 90 tablet 0    clopidogrel (PLAVIX) 75 mg tablet Take 1 tablet (75 mg total) by mouth daily (Patient not taking: Reported on 7/1/2025) 90 tablet 0     No current facility-administered medications on file prior to visit.   [2]   Social History  Tobacco Use    Smoking status: Never     Passive exposure: Never    Smokeless tobacco: Never   Vaping Use    Vaping status: Never Used   Substance and Sexual Activity    Alcohol use: Yes     Alcohol/week: 1.0 standard drink of alcohol     Types: 1 Standard drinks or equivalent per week     Comment: this is less than once/mo, more like 1-2 drinks every 3 mo    Drug use: Not Currently     Types: Marijuana     Comment: when i was in my teens    Sexual activity: Not Currently     Partners: Male

## 2025-07-22 ENCOUNTER — OFFICE VISIT (OUTPATIENT)
Dept: OBGYN CLINIC | Facility: CLINIC | Age: 75
End: 2025-07-22
Payer: COMMERCIAL

## 2025-07-22 ENCOUNTER — APPOINTMENT (OUTPATIENT)
Dept: RADIOLOGY | Facility: CLINIC | Age: 75
End: 2025-07-22
Attending: ORTHOPAEDIC SURGERY
Payer: COMMERCIAL

## 2025-07-22 VITALS — WEIGHT: 165.8 LBS | HEIGHT: 57 IN | BODY MASS INDEX: 35.77 KG/M2

## 2025-07-22 DIAGNOSIS — Z96.651 S/P TOTAL KNEE REPLACEMENT, RIGHT: Primary | ICD-10-CM

## 2025-07-22 DIAGNOSIS — Z96.651 S/P TOTAL KNEE REPLACEMENT, RIGHT: ICD-10-CM

## 2025-07-22 PROCEDURE — 73562 X-RAY EXAM OF KNEE 3: CPT

## 2025-07-22 PROCEDURE — 99213 OFFICE O/P EST LOW 20 MIN: CPT | Performed by: ORTHOPAEDIC SURGERY

## 2025-07-22 NOTE — PROGRESS NOTES
Assessment & Plan  S/P total knee replacement, right    Orders:    XR knee 3 vw right non injury; Future  Reviewed physical exam and imaging with patient at time of visit. The patient is 2 year(s) s/p Right total knee arthroplasty. Radiographic findings demonstrate a well-seated prosthesis, with no signs of loosening. She continues to do well in regard to the knee. Continue weightbearing activities. She will follow-up in 1 year for an annual appointment, with repeat XR of her Right knee. The patient expresses understanding and is in agreement with today's treatment plan.       The patient is doing quite well from a right total knee replacement that was replaced 2 years ago.  There is full strength full motion.  No instability.  X-rays show anatomic placement of prosthesis.  Continue home exercise program.  Follow-up 12 months for evaluation with new x-rays of right knee-3 views    Subjective:   Patient ID: Deyanira Stokes  1950     HPI  Patient is a 75 y.o. female who presents for annual evaluation of her right knee. The patient is approximately 2 years s/p right total knee replacement, from 11/16/2022. The patient states that she does not experience any pain in the right knee. She states that the range of motion in the right knee is much better than the left. She has a history of a left total knee replacement with Dr. Cox.    The following portions of the patient's history were reviewed and updated as appropriate:  Past medical history, past surgical history, Family history, social history, current medications and allergies    Past Medical History[1]    Past Surgical History[2]    Family History[3]    Social History[4]    Current Medications[5]    Allergies   Allergen Reactions    Ibuprofen Nausea Only, Rash, Dizziness and Syncope     Difficulty with high dose aspirin only and rash nausea reaction    Morphine Other (See Comments) and Hallucinations     Intolerance    Latex Dermatitis       Review of Systems  "  Constitutional:  Negative for chills, fever and unexpected weight change.   HENT:  Negative for hearing loss, nosebleeds and sore throat.    Eyes:  Negative for pain, redness and visual disturbance.   Respiratory:  Negative for cough, shortness of breath and wheezing.    Cardiovascular:  Negative for chest pain, palpitations and leg swelling.   Gastrointestinal:  Negative for abdominal pain, nausea and vomiting.   Endocrine: Negative for polydipsia and polyuria.   Genitourinary:  Negative for dysuria and hematuria.   Skin:  Negative for rash and wound.   Neurological:  Negative for dizziness, numbness and headaches.   Psychiatric/Behavioral:  Negative for decreased concentration and suicidal ideas. The patient is not nervous/anxious.    All other systems reviewed and are negative.       Objective:  Ht 4' 9\" (1.448 m)   Wt 75.2 kg (165 lb 12.8 oz)   LMP  (LMP Unknown)   BMI 35.88 kg/m²     Ortho Exam  right knee(s) -   Patient ambulates with steady gait pattern  Uses Single Point Cane assistive device  No anatomical deformity  Skin is warm and dry to touch with no signs of erythema, ecchymosis, or infection   No soft tissue swelling or effusion noted  ROM (0° - 120°)   Strength: 5/5 throughout  No tenderness to palpation on exam  Flexor and extensor mechanisms are intact   Knee is stable to varus and valgus stress  - Lachman's  - Anterior Drawer, - Posterior Drawer  Patella tracks centrally without palpable crepitus  Calf compartments are soft and supple  - Mary's sign  2+ DP and PT pulses with brisk capillary refill to the toes  Sural, saphenous, tibial, superficial, and deep peroneal motor and sensory distributions intact  Sensation light touch intact distally      Physical Exam  HENT:      Head: Normocephalic and atraumatic.      Nose: Nose normal.     Eyes:      Conjunctiva/sclera: Conjunctivae normal.       Cardiovascular:      Rate and Rhythm: Normal rate.   Pulmonary:      Effort: Pulmonary effort is " normal.     Musculoskeletal:      Cervical back: Neck supple.     Skin:     General: Skin is warm and dry.      Capillary Refill: Capillary refill takes less than 2 seconds.     Neurological:      Mental Status: She is alert and oriented to person, place, and time.     Psychiatric:         Mood and Affect: Mood normal.         Behavior: Behavior normal.          Diagnostic Test Review:  X-Ray of right knee obtained on 7/22/2025 were reviewed and demonstrate well-seated total knee prosthesis with maintained anatomical alignment and no signs of loosening.      Procedures   None performed.       Scribe Attestation      I,:  Daniela Benz am acting as a scribe while in the presence of the attending physician.:       I,:  Marcos Arcos DO personally performed the services described in this documentation    as scribed in my presence.:                   [1]   Past Medical History:  Diagnosis Date    Anxiety     Arthritis     Asthma     Cancer (HCC)     CHF (congestive heart failure) (HCC)     Chronic headaches     Coronary artery disease     Dementia (HCC)     Depression     Dietary lactose intolerance     Difficulty walking     GERD (gastroesophageal reflux disease)     History of breast cancer     right, s/p mastectomy, s/p chemoc/radiation    History of nephrolithiasis     History of pneumonia     History of shingles     HL (hearing loss)     Hyperlipidemia     Hypertension     Hypertensive heart disease with heart failure (HCC)     IBS (irritable bowel syndrome)     Lymphedema of right arm     Nonrheumatic aortic (valve) stenosis     Obesity     Osteoporosis     PFO (patent foramen ovale)     Pneumonia     receive annual vaccine shots    PONV (postoperative nausea and vomiting)     Scoliosis     Severe aortic stenosis     Vitamin D deficiency    [2]   Past Surgical History:  Procedure Laterality Date    CARDIAC CATHETERIZATION N/A 10/29/2024    Procedure: LHC and RHC-Cardiac catheterization;  Surgeon: Alexi Myers,  MD;  Location: [facility];  Service: Cardiology    CARDIAC CATHETERIZATION N/A 10/29/2024    Procedure: Cardiac Coronary Angiogram;  Surgeon: Alexi Myers MD;  Location: [facility];  Service: Cardiology    CARDIAC CATHETERIZATION N/A 11/05/2024    Procedure: Cardiac tavr;  Surgeon: Smith Wilburn MD;  Location: [facility];  Service: Cardiology    CARDIAC ELECTROPHYSIOLOGY PROCEDURE Left 11/07/2024    Procedure: Cardiac pacer implant DC;  Surgeon: Matias Brady MD;  Location: [facility];  Service: Cardiology    CATARACT EXTRACTION, BILATERAL      CHOLECYSTECTOMY      COLONOSCOPY N/A 05/31/2016    Procedure: COLONOSCOPY;  Surgeon: Colton Moralez MD;  Location: [facility];  Service:     FL GUIDED NEEDLE PLAC BX/ASP/INJ  08/14/2023    FL GUIDED NEEDLE PLAC BX/ASP/INJ  10/30/2023    GALLBLADDER SURGERY      HYSTERECTOMY      Total    INSERT / REPLACE / REMOVE PACEMAKER  11/07/2024    KNEE ARTHROSCOPY      LYMPH NODE BIOPSY      as part of the breast cancer, see chart for more details    MASTECTOMY Right     rt breast mastectomy 2005    KS ARTHRP KNE CONDYLE&PLATU MEDIAL&LAT COMPARTMENTS Left 05/20/2021    Procedure: ARTHROPLASTY KNEE TOTAL;  Surgeon: Monica Cox MD;  Location: [facility];  Service: Orthopedics    KS ARTHRP KNE CONDYLE&PLATU MEDIAL&LAT COMPARTMENTS Right 11/16/2022    Procedure: ARTHROPLASTY KNEE TOTAL;  Surgeon: Marcos Arcos DO;  Location: [facility];  Service: Orthopedics    KS REPLACE AORTIC VALVE OPENFEMORAL ARTERY APPROACH N/A 11/05/2024    Procedure: REPLACEMENT AORTIC VALVE TRANSCATHETER (TAVR) TRANSFEMORAL W/ 26MM VALVE(ACCESS ON LEFT) VIOLA;  Surgeon: Godwin Sarah DO;  Location: [facility];  Service: Cardiac Surgery    REPLACEMENT AORTIC VALVE TRANSCATHETER (TAVR)  11/05/2024    stent inserted    TONSILLECTOMY AND ADENOIDECTOMY     [3]   Family History  Adopted: Yes   Problem Relation Name Age of Onset    Diabetes Mother Mom     Heart disease Mother Mom     Mental  illness Mother Mom     Depression Mother Mom     Heart disease Brother Scott     Breast cancer Maternal Aunt aunt     Breast cancer Maternal Aunt aunt2     Breast cancer Maternal Aunt      Breast cancer Maternal Aunt      Breast cancer Maternal Aunt      No Known Problems Father      No Known Problems Sister romana     No Known Problems Daughter      Breast cancer Maternal Grandmother      No Known Problems Sister bill     No Known Problems Sister lito     No Known Problems Sister ranjan    [4]   Social History  Socioeconomic History    Marital status:    Occupational History    Occupation: Retired   Tobacco Use    Smoking status: Never     Passive exposure: Never    Smokeless tobacco: Never   Vaping Use    Vaping status: Never Used   Substance and Sexual Activity    Alcohol use: Yes     Alcohol/week: 1.0 standard drink of alcohol     Types: 1 Standard drinks or equivalent per week     Comment: this is less than once/mo, more like 1-2 drinks every 3 mo    Drug use: Not Currently     Types: Marijuana     Comment: when i was in my teens    Sexual activity: Not Currently     Partners: Male   Social History Narrative    Always uses seat belt    Occasional caffeine consumption         Retired    Lives with adult daughter     Social Drivers of Health     Financial Resource Strain: Low Risk  (5/26/2023)    Overall Financial Resource Strain (CARDIA)     Difficulty of Paying Living Expenses: Not very hard   Food Insecurity: No Food Insecurity (6/11/2025)    Nursing - Inadequate Food Risk Classification     Worried About Running Out of Food in the Last Year: Never true     Ran Out of Food in the Last Year: Never true     Ran Out of Food in the Last Year: Never true   Transportation Needs: No Transportation Needs (6/11/2025)    PRAPARE - Transportation     Lack of Transportation (Medical): No     Lack of Transportation (Non-Medical): No   Intimate Partner Violence: Unknown (11/5/2024)    Nursing IPS      Physically Hurt by Someone: No     Hurt or Threatened by Someone: No   Housing Stability: Low Risk  (6/11/2025)    Housing Stability Vital Sign     Unable to Pay for Housing in the Last Year: No     Number of Times Moved in the Last Year: 0     Homeless in the Last Year: No   [5]   Current Outpatient Medications:     acetaminophen (TYLENOL) 650 mg CR tablet, Take 1 tablet (650 mg total) by mouth every 8 (eight) hours as needed for mild pain Patient takes 500mg, Disp: , Rfl:     albuterol (2.5 mg/3 mL) 0.083 % nebulizer solution, Take 2.5 mg by nebulization every 6 (six) hours as needed for wheezing, Disp: , Rfl:     albuterol (PROVENTIL HFA,VENTOLIN HFA) 90 mcg/act inhaler, Inhale 2 puffs every 4 (four) hours as needed for wheezing, Disp: 8.5 g, Rfl: 2    aspirin 81 mg chewable tablet, Chew 81 mg in the morning., Disp: , Rfl:     atorvastatin (LIPITOR) 20 mg tablet, Take 1 tablet by mouth daily., Disp: 90 tablet, Rfl: 1    butalbital-acetaminophen-caffeine (FIORICET,ESGIC) -40 mg per tablet, Take 1 tablet by mouth 3 (three) times a day as needed for headaches, Disp: 90 tablet, Rfl: 1    donepezil (ARICEPT) 10 mg tablet, Take 1 tablet (10 mg total) by mouth daily at bedtime, Disp: 100 tablet, Rfl: 1    FLUoxetine (PROzac) 40 MG capsule, Take 1 capsule (40 mg total) by mouth daily, Disp: 90 capsule, Rfl: 1    fluticasone (FLONASE) 50 mcg/act nasal spray, INSTILL 1 SPRAY INTO EACH NOSTRIL ONCE DAILY, Disp: 48 mL, Rfl: 1    lisinopril (ZESTRIL) 5 mg tablet, TAKE 1 TABLET (5 MG TOTAL) BY MOUTH DAILY., Disp: 90 tablet, Rfl: 1    montelukast (SINGULAIR) 10 mg tablet, TAKE 1 TABLET BY MOUTH DAILY AT BEDTIME, Disp: 90 tablet, Rfl: 1    Myrbetriq 25 MG TB24, Take 2 tablets by mouth in the morning., Disp: 180 tablet, Rfl: 1    Nutritional Supplements (BOOST BREEZE PO), Take by mouth in the morning. Nutritional drink  3 times a week. ., Disp: , Rfl:     oxyCODONE (OxyCONTIN) 10 mg 12 hr tablet, Take 1 tablet (10 mg total)  by mouth as needed for moderate pain, Disp: 30 tablet, Rfl: 0    pantoprazole (PROTONIX) 40 mg tablet, Take 1 tablet by mouth daily., Disp: 90 tablet, Rfl: 1    QUEtiapine (SEROquel) 25 mg tablet, Take 1 tablet by mouth daily at bedtime., Disp: 90 tablet, Rfl: 0    clopidogrel (PLAVIX) 75 mg tablet, Take 1 tablet (75 mg total) by mouth daily (Patient not taking: Reported on 7/22/2025), Disp: 90 tablet, Rfl: 0

## 2025-07-24 DIAGNOSIS — M81.0 AGE-RELATED OSTEOPOROSIS WITHOUT CURRENT PATHOLOGICAL FRACTURE: Primary | ICD-10-CM

## 2025-08-05 ENCOUNTER — APPOINTMENT (OUTPATIENT)
Dept: RADIOLOGY | Facility: CLINIC | Age: 75
End: 2025-08-05
Attending: ORTHOPAEDIC SURGERY
Payer: COMMERCIAL

## 2025-08-05 ENCOUNTER — OFFICE VISIT (OUTPATIENT)
Dept: OBGYN CLINIC | Facility: CLINIC | Age: 75
End: 2025-08-05
Payer: COMMERCIAL

## 2025-08-05 VITALS — BODY MASS INDEX: 35.6 KG/M2 | WEIGHT: 165 LBS | HEIGHT: 57 IN

## 2025-08-05 DIAGNOSIS — M25.562 CHRONIC KNEE PAIN AFTER TOTAL REPLACEMENT OF LEFT KNEE JOINT: ICD-10-CM

## 2025-08-05 DIAGNOSIS — M25.562 LEFT KNEE PAIN, UNSPECIFIED CHRONICITY: Primary | ICD-10-CM

## 2025-08-05 DIAGNOSIS — G89.29 CHRONIC KNEE PAIN AFTER TOTAL REPLACEMENT OF LEFT KNEE JOINT: ICD-10-CM

## 2025-08-05 DIAGNOSIS — M25.562 LEFT KNEE PAIN, UNSPECIFIED CHRONICITY: ICD-10-CM

## 2025-08-05 DIAGNOSIS — Z96.652 CHRONIC KNEE PAIN AFTER TOTAL REPLACEMENT OF LEFT KNEE JOINT: ICD-10-CM

## 2025-08-05 PROCEDURE — 73562 X-RAY EXAM OF KNEE 3: CPT

## 2025-08-05 PROCEDURE — 99213 OFFICE O/P EST LOW 20 MIN: CPT | Performed by: ORTHOPAEDIC SURGERY

## 2025-08-12 ENCOUNTER — HOSPITAL ENCOUNTER (OUTPATIENT)
Dept: NUCLEAR MEDICINE | Facility: HOSPITAL | Age: 75
Discharge: HOME | End: 2025-08-12
Attending: ORTHOPAEDIC SURGERY
Payer: COMMERCIAL

## 2025-08-12 ENCOUNTER — APPOINTMENT (OUTPATIENT)
Dept: LAB | Facility: HOSPITAL | Age: 75
End: 2025-08-12
Payer: COMMERCIAL

## 2025-08-13 ENCOUNTER — HOSPITAL ENCOUNTER (OUTPATIENT)
Dept: INFUSION CENTER | Facility: HOSPITAL | Age: 75
Discharge: HOME | End: 2025-08-13
Attending: INTERNAL MEDICINE
Payer: COMMERCIAL

## 2025-08-13 DIAGNOSIS — M81.0 AGE-RELATED OSTEOPOROSIS WITHOUT CURRENT PATHOLOGICAL FRACTURE: Primary | ICD-10-CM

## 2025-08-13 PROCEDURE — 96372 THER/PROPH/DIAG INJ SC/IM: CPT

## 2025-08-13 RX ADMIN — DENOSUMAB 60 MG: 60 INJECTION SUBCUTANEOUS at 14:00

## (undated) DEVICE — PLUMEPEN PRO 10FT

## (undated) DEVICE — INTENDED FOR TISSUE SEPARATION, AND OTHER PROCEDURES THAT REQUIRE A SHARP SURGICAL BLADE TO PUNCTURE OR CUT.: Brand: BARD-PARKER ® CARBON RIB-BACK BLADES

## (undated) DEVICE — ACE WRAP 6 IN STERILE

## (undated) DEVICE — GLOVE SRG BIOGEL 8

## (undated) DEVICE — PAD GROUNDING DUAL ADULT

## (undated) DEVICE — STRAP LITHOTOMY CANDY CANE

## (undated) DEVICE — Device

## (undated) DEVICE — 3M™ STERI-DRAPE™ U-DRAPE 1015: Brand: STERI-DRAPE™

## (undated) DEVICE — MEDI-VAC YANK SUCT HNDL W/TPRD BULBOUS TIP: Brand: CARDINAL HEALTH

## (undated) DEVICE — CATH GUIDING FIXED SHAPE 43CM

## (undated) DEVICE — DRESSING XEROFORM 5 X 9

## (undated) DEVICE — PADDING CAST 6IN COTTON STRL

## (undated) DEVICE — INTRO SHEATH PEEL AWAY 7FR

## (undated) DEVICE — CARDIOVASCULAR SPLIT DRAPE: Brand: CONVERTORS

## (undated) DEVICE — 3M™ STERI-STRIP™ REINFORCED ADHESIVE SKIN CLOSURES, R1547, 1/2 IN X 4 IN (12 MM X 100 MM), 6 STRIPS/ENVELOPE: Brand: 3M™ STERI-STRIP™

## (undated) DEVICE — READY WET SKIN SCRUB TRAY-LF: Brand: MEDLINE INDUSTRIES, INC.

## (undated) DEVICE — PINNACLE INTRODUCER SHEATH: Brand: PINNACLE

## (undated) DEVICE — DRAPE,U/ SHT,SPLIT,PLAS,STERIL: Brand: MEDLINE

## (undated) DEVICE — 3000CC GUARDIAN II: Brand: GUARDIAN

## (undated) DEVICE — TOWEL SURG XR DETECT GREEN STRL RFD

## (undated) DEVICE — SUT STRATAFIX SPIRAL 3-0 PGA/PCL 30 X 30 CM SXMD2B408

## (undated) DEVICE — HEMOVAC TUBING 1/4 IN

## (undated) DEVICE — CUFF TOURNIQUET 34 X 4 IN QUICK CONNECT DISP 1BLA

## (undated) DEVICE — SAW BLADE RECIPROCATING 179

## (undated) DEVICE — CEMENT MIXING CARTRIDGE PRISM II

## (undated) DEVICE — DGW .035 FC J3MM 150CM TEF: Brand: EMERALD

## (undated) DEVICE — RADIFOCUS GLIDEWIRE: Brand: GLIDEWIRE

## (undated) DEVICE — BLADE REAMER PATELLA 46MM

## (undated) DEVICE — SUT ETHIBOND 5 V-37 30 IN MB66G

## (undated) DEVICE — 3M™ MICROFOAM™ TAPE 1528-4: Brand: 3M™ MICROFOAM™

## (undated) DEVICE — SKIN MARKER DUAL TIP WITH RULER CAP, FLEXIBLE RULER AND LABELS: Brand: DEVON

## (undated) DEVICE — DRAPE SHEET THREE QUARTER

## (undated) DEVICE — BLADE INTREX LRG BONE OSCILLATING

## (undated) DEVICE — ANTIBACTERIAL UNDYED BRAIDED (POLYGLACTIN 910), SYNTHETIC ABSORBABLE SUTURE: Brand: COATED VICRYL

## (undated) DEVICE — FRAZIER SUCTION INSTRUMENT 18 FR W/OBTURATOR, NO CONTROL VENT: Brand: FRAZIER

## (undated) DEVICE — 3M™ IOBAN™ 2 ANTIMICROBIAL INCISE DRAPE 6650EZ: Brand: IOBAN™ 2

## (undated) DEVICE — TRAY FOLEY 16FR SURESTEP TEMP SENS URIMETER STAT LOK

## (undated) DEVICE — GLOVE INDICATOR PI UNDERGLOVE SZ 7.5 BLUE

## (undated) DEVICE — ABDOMINAL PAD: Brand: DERMACEA

## (undated) DEVICE — GLIDESHEATH BASIC HYDROPHILIC COATED INTRODUCER SHEATH: Brand: GLIDESHEATH

## (undated) DEVICE — 3M™ TEGADERM™ TRANSPARENT FILM DRESSING FRAME STYLE, 1624W, 2-3/8 IN X 2-3/4 IN (6 CM X 7 CM), 100/CT 4CT/CASE: Brand: 3M™ TEGADERM™

## (undated) DEVICE — BLADE OSCILLATOR 86.0 X 19.5MM

## (undated) DEVICE — RADIFOCUS OPTITORQUE ANGIOGRAPHIC CATHETER: Brand: OPTITORQUE

## (undated) DEVICE — BIPOLAR SEALER 23-301-1 AQM MBS: Brand: AQUAMANTYS™

## (undated) DEVICE — GUIDEWIRE WHOLEY HI TORQUE INTERM MOD J .035 145CM

## (undated) DEVICE — IMMOBILIZER KNEE UNIVERSAL 22 IN

## (undated) DEVICE — HOOD WITH PEEL AWAY FACE SHIELD: Brand: T7PLUS

## (undated) DEVICE — THERMOFLECT BLANKET, L, 25EA                               TS THERMOFLECT BLANKET, 48" X 84", SILVER, 5/BG, 5 BG/CS NW: Brand: THERMOFLECT

## (undated) DEVICE — SYRINGE 20ML LL

## (undated) DEVICE — SUT VICRYL 1 CP 27 IN J196H

## (undated) DEVICE — BETHLEHEM UNIV TOTAL KNEE, KIT: Brand: CARDINAL HEALTH

## (undated) DEVICE — 3M™ DURAPORE™ SURGICAL TAPE 1538-3, 3 INCH X 10 YARD (7,5CM X 9,1M), 4 ROLLS/BOX: Brand: 3M™ DURAPORE™

## (undated) DEVICE — CEMENT BOWL VACUUM MIXING

## (undated) DEVICE — SWAN-GANZ BIPOLAR PACING CATHETER: Brand: SWAN-GANZ

## (undated) DEVICE — NEEDLE 22 G X 1 1/2 SAFETY

## (undated) DEVICE — OCCLUSIVE GAUZE STRIP,3% BISMUTH TRIBROMOPHENATE IN PETROLATUM BLEND: Brand: XEROFORM

## (undated) DEVICE — 4-PORT MANIFOLD: Brand: NEPTUNE 2

## (undated) DEVICE — SPONGE LAP 18 X 18 IN STRL RFD

## (undated) DEVICE — ACE WRAP 6 IN XL STERILE

## (undated) DEVICE — BLOOD CONSERVATION SYSTEM WITH QUICK DISCONNECT AND PVC DRAIN: Brand: CBCII CONSTAVAC

## (undated) DEVICE — HEAVY DUTY TABLE COVER: Brand: CONVERTORS

## (undated) DEVICE — CAPIT KNEE GSF FLX CEM FEM/CEM TIB/FLX SURF/STD PAT - ZIMMER

## (undated) DEVICE — POV-IOD SOLUTION 4OZ BT

## (undated) DEVICE — CATH F6INF TL JR 4 100CM: Brand: INFINITI

## (undated) DEVICE — AVITENE NON-WOVEN WEB, 35 MM X 35 MM X 1 MM: Brand: AVITENE

## (undated) DEVICE — PREP SURGICAL PURPREP 26ML

## (undated) DEVICE — GLOVE PI ULTRA TOUCH SZ.7.0

## (undated) DEVICE — SUT VICRYL 2-0 CP-1 27 IN J266H

## (undated) DEVICE — GLOVE SRG BIOGEL 7.5

## (undated) DEVICE — CUFF TOURNIQUET 30 X 4 IN QUICK CONNECT DISP 1BLA

## (undated) DEVICE — SLITTER ADJUSTABLE

## (undated) DEVICE — HOOD: Brand: FLYTE, SURGICOOL

## (undated) DEVICE — SUT VICRYL 0 CP-1 27 IN J267H

## (undated) DEVICE — ASTOUND FABRIC REINFORCED SURGICAL GOWN: Brand: CONVERTORS

## (undated) DEVICE — STAPLER SKIN 35 WIDE ULC APPOSE

## (undated) DEVICE — GLOVE SRG BIOGEL ECLIPSE 8

## (undated) DEVICE — IMPERVIOUS STOCKINETTE: Brand: DEROYAL

## (undated) DEVICE — AMPLATZ EXTRA STIFF WIRE GUIDE: Brand: AMPLATZ

## (undated) DEVICE — GUIDEWIRE LUNDERQUIST TSCMG-35-300-LESDC

## (undated) DEVICE — GLOVE INDICATOR PI UNDERGLOVE SZ 7 BLUE

## (undated) DEVICE — DGW .035 FC J3MM 260CM TEF: Brand: EMERALD

## (undated) DEVICE — SPONGE 4 X 4 XRAY 16 PLY STRL LF RFD

## (undated) DEVICE — NO-SCRATCH ™ SMALL WHITNEY CURETTE ™ IS A SINGLE-USE, PLASTIC CURETTE FOR QUICKLY APPLYING, MANIPULATING AND REMOVING BONE CEMENT DURING HIP AND KNEE REPLACEMENT SURGERY. THE PLASTIC IS SOFTER THAN STEEL INSTRUMENTS, REDUCING THE RISK OF DAMAGING THE PROSTHESIS WITH METAL INSTRUMENTS.  THE CURETTE’S 6MM TIP REMOVES EXCESS CEMENT FROM REPLACEMENT HIPS AND KNEES. EASY-TO-MANEUVER, THE SMALL BLUE CURETTE LETS YOU REMOVE CEMENT FROM ALL EDGES OF THE PROSTHESIS.NO-SCRATCH WHITNEY SMALL CURETTE FEATURES:SAFER THAN STEEL- MADE OF PLASTIC - STURDY YET SOFTER THAN SURGICAL STEEL.HANDIER- EACH TOOL HAS A MOLDED-IN THUMB INDENTATION INSTANTLY ORIENTING THE TOOL.- EASIER TO MANEUVER IN HARD TO SEE PLACES.- COLOR-CODED FOR EASY IDENTIFICATION.FASTER- COMES INDIVIDUALLY PACKAGED IN STERILE, PEEL OPEN POUCH, READY TO GO.- APPLIES, MANIPULATES, OR REMOVES CEMENT WITH FINGERTIP PRECISION.ECONOMICAL- THE COST OF A SINGLE REVISION DWARFS THE COST OF A SINGLE-USE CURETTE. - DISPOSABLE – THERE’S NO NEED TO WASTE TIME REMOVING HARDENED CEMENT OR RE-STERILIZING TOOLS.- LESS EXPENSIVE TO BUY AND INVENTORY - ORDER ONLY THE TOOL YOU USE.- PACKAGED 25 INDIVIDUALLY WRAPPED TOOLS TO A CARTON FOR CONVENIENT SHELF STORAGE.: Brand: WHITNEY NO-SCRATCH CURETTE (SMALL)

## (undated) DEVICE — TR BAND RADIAL ARTERY COMPRESSION DEVICE: Brand: TR BAND

## (undated) DEVICE — ADHESIVE SKIN HIGH VISCOSITY EXOFIN 1ML

## (undated) DEVICE — BETHLEHEM MAJOR GENERAL PACK: Brand: CARDINAL HEALTH

## (undated) DEVICE — SUT SILK 0 CT-1 30 IN 424H

## (undated) DEVICE — COBAN 6 IN STERILE

## (undated) DEVICE — DGW .035 FC STR 260CM TEF: Brand: EMERALD

## (undated) DEVICE — TIBURON SPLIT SHEET: Brand: CONVERTORS

## (undated) DEVICE — EXOFIN PRECISION PEN HIGH VISCOSITY TOPICAL SKIN ADHESIVE: Brand: EXOFIN PRECISION PEN, 1G

## (undated) DEVICE — CAPIT KNEE ATTUNE FB CEMENT - DEPUY

## (undated) DEVICE — GAUZE SPONGES,16 PLY: Brand: CURITY

## (undated) DEVICE — TUBING SUCTION 5MM X 12 FT

## (undated) DEVICE — PACK CUSTOM PERFUSION PLEG PK

## (undated) DEVICE — FAN SPRAY KIT: Brand: PULSAVAC®

## (undated) DEVICE — CATH DIAG 6FR IMPULSE 110CM PIG

## (undated) DEVICE — 3M™ IOBAN™ 2 ANTIMICROBIAL INCISE DRAPE 6651EZ: Brand: IOBAN™ 2

## (undated) DEVICE — GLOVE PI ULTRA TOUCH SZ.7.5

## (undated) DEVICE — INTENDED FOR TISSUE SEPARATION, AND OTHER PROCEDURES THAT REQUIRE A SHARP SURGICAL BLADE TO PUNCTURE OR CUT.: Brand: BARD-PARKER SAFETY BLADES SIZE 10, STERILE

## (undated) DEVICE — PACK CUSTOM TAVR

## (undated) DEVICE — THE SIMPULSE SOLO SYSTEM WITH ULTREX RETRACTABLE SPLASH SHIELD TIP: Brand: SIMPULSE SOLO

## (undated) DEVICE — HOOD: Brand: T7PLUS

## (undated) DEVICE — PACK CUSTOM PERFUSION ANH

## (undated) DEVICE — STOCKINETTE,IMPERVIOUS,12X48,STERILE: Brand: MEDLINE

## (undated) DEVICE — SURGICAL GOWN, XL SMARTSLEEVE: Brand: CONVERTORS

## (undated) DEVICE — SUT VICRYL 0 CT-1 27 IN J260H

## (undated) DEVICE — BANDAGE, ESMARK LF STR 6"X9' (20/CS): Brand: CYPRESS

## (undated) DEVICE — GLOVE INDICATOR PI UNDERGLOVE SZ 8 BLUE

## (undated) DEVICE — ACE WRAP 4 IN STERILE